# Patient Record
Sex: FEMALE | Race: WHITE | Employment: OTHER | URBAN - METROPOLITAN AREA
[De-identification: names, ages, dates, MRNs, and addresses within clinical notes are randomized per-mention and may not be internally consistent; named-entity substitution may affect disease eponyms.]

---

## 2017-08-14 ENCOUNTER — SURG/PROC ORDERS (OUTPATIENT)
Dept: CARDIOLOGY | Age: 62
End: 2017-08-14

## 2017-08-14 PROBLEM — I21.4 NSTEMI (NON-ST ELEVATED MYOCARDIAL INFARCTION) (HCC): Status: ACTIVE | Noted: 2017-08-14

## 2017-08-14 RX ORDER — DIAZEPAM 5 MG/1
5 TABLET ORAL
Status: CANCELLED | OUTPATIENT
Start: 2017-08-14 | End: 2017-08-14

## 2017-08-14 RX ORDER — SODIUM CHLORIDE 9 MG/ML
INJECTION, SOLUTION INTRAVENOUS CONTINUOUS
Status: CANCELLED | OUTPATIENT
Start: 2017-08-14

## 2017-08-14 RX ORDER — SODIUM CHLORIDE 0.9 % (FLUSH) 0.9 %
10 SYRINGE (ML) INJECTION PRN
Status: CANCELLED | OUTPATIENT
Start: 2017-08-14

## 2017-08-14 RX ORDER — DIPHENHYDRAMINE HCL 25 MG
25 TABLET ORAL
Status: CANCELLED | OUTPATIENT
Start: 2017-08-14 | End: 2017-08-14

## 2017-08-14 RX ORDER — SODIUM CHLORIDE 0.9 % (FLUSH) 0.9 %
10 SYRINGE (ML) INJECTION EVERY 12 HOURS SCHEDULED
Status: CANCELLED | OUTPATIENT
Start: 2017-08-14

## 2017-08-15 PROBLEM — R94.39 ABNORMAL NUCLEAR STRESS TEST: Status: ACTIVE | Noted: 2017-08-15

## 2017-08-29 ENCOUNTER — OFFICE VISIT (OUTPATIENT)
Dept: CARDIOLOGY CLINIC | Age: 62
End: 2017-08-29

## 2017-08-29 VITALS
BODY MASS INDEX: 32.2 KG/M2 | HEART RATE: 72 BPM | SYSTOLIC BLOOD PRESSURE: 120 MMHG | DIASTOLIC BLOOD PRESSURE: 80 MMHG | WEIGHT: 175 LBS | HEIGHT: 62 IN

## 2017-08-29 DIAGNOSIS — R07.2 PRECORDIAL PAIN: Primary | ICD-10-CM

## 2017-08-29 PROCEDURE — G8427 DOCREV CUR MEDS BY ELIG CLIN: HCPCS | Performed by: INTERNAL MEDICINE

## 2017-08-29 PROCEDURE — 3017F COLORECTAL CA SCREEN DOC REV: CPT | Performed by: INTERNAL MEDICINE

## 2017-08-29 PROCEDURE — 99214 OFFICE O/P EST MOD 30 MIN: CPT | Performed by: INTERNAL MEDICINE

## 2017-08-29 PROCEDURE — 3014F SCREEN MAMMO DOC REV: CPT | Performed by: INTERNAL MEDICINE

## 2017-08-29 PROCEDURE — G8419 CALC BMI OUT NRM PARAM NOF/U: HCPCS | Performed by: INTERNAL MEDICINE

## 2017-08-29 PROCEDURE — 1111F DSCHRG MED/CURRENT MED MERGE: CPT | Performed by: INTERNAL MEDICINE

## 2017-08-29 PROCEDURE — 1036F TOBACCO NON-USER: CPT | Performed by: INTERNAL MEDICINE

## 2017-09-12 ENCOUNTER — TELEPHONE (OUTPATIENT)
Dept: CARDIOLOGY CLINIC | Age: 62
End: 2017-09-12

## 2017-10-27 ENCOUNTER — HOSPITAL ENCOUNTER (OUTPATIENT)
Dept: SLEEP CENTER | Age: 62
Discharge: HOME | End: 2017-10-27
Attending: NURSE PRACTITIONER | Admitting: NURSE PRACTITIONER

## 2017-10-27 VITALS
HEART RATE: 80 BPM | WEIGHT: 177 LBS | HEIGHT: 62 IN | SYSTOLIC BLOOD PRESSURE: 143 MMHG | DIASTOLIC BLOOD PRESSURE: 83 MMHG | BODY MASS INDEX: 32.57 KG/M2

## 2017-10-27 DIAGNOSIS — R06.83 SNORING: ICD-10-CM

## 2017-10-27 DIAGNOSIS — G47.10 HYPERSOMNOLENCE: ICD-10-CM

## 2017-10-27 DIAGNOSIS — G47.33 OSA (OBSTRUCTIVE SLEEP APNEA): Primary | ICD-10-CM

## 2017-10-27 ASSESSMENT — SLEEP AND FATIGUE QUESTIONNAIRES
HOW LIKELY ARE YOU TO NOD OFF OR FALL ASLEEP WHILE WATCHING TV: 2
HOW LIKELY ARE YOU TO NOD OFF OR FALL ASLEEP WHILE SITTING INACTIVE IN A PUBLIC PLACE: 0
NECK CIRCUMFERENCE (INCHES): 15.75
HOW LIKELY ARE YOU TO NOD OFF OR FALL ASLEEP WHILE SITTING QUIETLY AFTER LUNCH WITHOUT ALCOHOL: 1
HOW LIKELY ARE YOU TO NOD OFF OR FALL ASLEEP WHILE SITTING AND READING: 0
HOW LIKELY ARE YOU TO NOD OFF OR FALL ASLEEP WHEN YOU ARE A PASSENGER IN A CAR FOR AN HOUR WITHOUT A BREAK: 1
HOW LIKELY ARE YOU TO NOD OFF OR FALL ASLEEP WHILE LYING DOWN TO REST IN THE AFTERNOON WHEN CIRCUMSTANCES PERMIT: 1
ESS TOTAL SCORE: 5
HOW LIKELY ARE YOU TO NOD OFF OR FALL ASLEEP IN A CAR, WHILE STOPPED FOR A FEW MINUTES IN TRAFFIC: 0
HOW LIKELY ARE YOU TO NOD OFF OR FALL ASLEEP WHILE SITTING AND TALKING TO SOMEONE: 0

## 2017-10-27 NOTE — PROGRESS NOTES
REASON FOR CONSULTATION/CC: ILSA EDS snoring      CONSULTING PHYSICIAN: Ottoniel Phillip CNP  PCP: Tiffanie Ba NP    HISTORY OF PRESENT ILLNESS: Manolo Saleh is a 63y.o. year old female with a history of Br asthma ILSA who presents with c/o EDS snoring. Hasnt used Cpap for 4 yrs. Old studies not available. C/O sleep fragmentation. C/O SOB on exertion. PAST MEDICAL HISTORY:  Past Medical History:   Diagnosis Date    Abnormal EKG 4/22/2014    Acid reflux     Anemia     Anesthesia     Nausea/Vomiting Post Op In Past    Anginal pain (HCC)     Denies Chest Pain At This Time    Anxiety     Arthritis     \"All Over\"    Asthma     CAD (coronary artery disease)     per last cardiac cath.  CHF (congestive heart failure) (HCC)     Chronic back pain     Chronic kidney disease     DDD (degenerative disc disease), cervical     12- Patient reports she was dx with DDD of Cerival spine C6,C7    Depression     Diabetes mellitus ([address] Dx 1990's    Diabetic neuropathy ([address]     \"In My Legs And Feet\"    Dizziness     \"Sometimes\"    Dry skin     Enlarged ureter     Right Side    Fatty liver     Fibrocystic breast     Gout     Pt states she was diagnosed with gout in the past few months.  H/O cardiac catheterization     Showed mild disease per last cath.  H/O cardiovascular stress test 03/15/2010    EF 69%, normal perfusion study except for diaphragmatic artifact, uniform wall motion.  H/O cardiovascular stress test 10/09/2008    EF 60%, no anginia, normal study.  H/O cardiovascular stress test 05/06/2014    EF 66%, no ischemia, normal LV systolic funciton, normal perfusion pattern.  H/O Doppler ultrasound 02/28/2011    CAROTID DOPPLER-normal study.  H/O echocardiogram 5/6/2014    Ef >55%. Impaired LV relaxation.  H/O echocardiogram 10/14/15    EF 60% Normal LV and systolic function. No significant valvulopathy seen.      History of Holter monitoring 3/24/15    24 hour - predominant rhythm sinus    [place] (hard of hearing)     Bilateral Ears    Hx of cardiovascular stress test 10/19/2015    lexiscan-normal,EF63%    Hx of motion sickness     HX OTHER MEDICAL     Primary Care Physician Is Dr. Héctor Reed [place], [place]    Hyperlipidemia     Hypertension     IBS (irritable bowel syndrome)     Incisional hernia 4/2014    Kidney stones Last Episode In 2012 Or 2013    Passed Kidney Stones Numberous Times    Migraines     Nausea & vomiting     Nausea/Vomiting Post Op In Past    Other specified disorder of skin     12- Patient states she has a condition of her vaginal area (skin) which starts with the letters Rosalene Seton. She is currently being treated with multiple creams and weekly Diflucan. 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  Staph infection Dx 1980's    Toes On Left Foot    Thyroid disease     hypothroidism    Tremor     \"Tremors All Over\"    Urinary incontinence     UTI (urinary tract infection) In Past    No Current Symptoms    UTI (urinary tract infection)     Vertigo     \"Sometimes\"    Wears glasses        PAST SURGICAL HISTORY:  Past Surgical History:   Procedure Laterality Date    APPENDECTOMY  1970's    Done With Cholecystectomy    BLADDER SURGERY  1970's Or 1980's    \"Stretched The Opening To The Bladder\", \"Total Of Four Bladder Surgeries\"    BREAST BIOPSY Right 1980's    Twice, Benign    BREAST SURGERY Left 1990's    Five, Benign    CARDIAC CATHETERIZATION  10-18-06    normal coronary angiogram with a normal left ventricular systolic function, patient can be treated medically.     CARDIAC CATHETERIZATION      \"Total 7 Cardiac Catheterizations\"    CARPAL TUNNEL RELEASE Right 1999    CHOLECYSTECTOMY  1970's    Appendectomy Also Done   Jerry Horner flagyl [metronidazole]; naproxen; other; prozac [fluoxetine hcl]; robaxin [methocarbamol]; ultram [tramadol]; and zoloft. REVIEW OF SYSTEMS:  Constitutional: Negative for fever  HENT: Negative for sore throat  Eyes: Negative for redness   Respiratory: Negative for dyspnea, cough  Cardiovascular: Negative for chest pain  Gastrointestinal: Negative for vomiting, diarrhea    Musculoskeletal: Negative for arthralgias   Skin: Negative for rash  Neurological: Negative for syncope        Objective:   PHYSICAL EXAM:  Blood pressure (!) 143/83, pulse 80, height 5' 2\" (1.575 m), weight 177 lb (80.3 kg). '    [place]: Total score: 5    Neck Circumference:  Neck circumference: 15.75  Inches    BMI:  Body mass index is 32.37 kg/m². Gen: No distress. Eyes: PERRL. No sclera icterus. No conjunctival injection. ENT: No discharge. Pharynx clear. External appearance of ears and nose normal.Mallampati score 3. Neck: Trachea midline. No obvious mass. Resp: No accessory muscle use. No crackles. No wheezes. No rhonchi. No dullness on percussion. CV: Regular rate. Regular rhythm. No murmur or rub. No edema. GI: Non-tender. Non-distended. No hernia. Skin: Warm, dry, normal texture and turgor. No nodule on exposed extremities. Lymph: No cervical LAD. No supraclavicular LAD. M/S: No cyanosis. No clubbing. No joint deformity. Neuro: Moves all four extremities. CN 2-12 tested, no defect noted. Psych: Oriented x 3. No anxiety. Awake. Alert. Intact judgement and insight.     Current Outpatient Prescriptions on File Prior to Encounter   Medication Sig Dispense Refill    celecoxib (CELEBREX) 100 MG capsule Take 100 mg by mouth 2 times daily      allopurinol (ZYLOPRIM) 300 MG tablet Take 300 mg by mouth daily      amitriptyline (ELAVIL) 25 MG tablet Take 25 mg by mouth nightly      metaxalone (SKELAXIN) 800 MG tablet Take 800 mg by mouth 2 times daily      hydrochlorothiazide (HYDRODIURIL) 25 MG tablet Take 25 mg by mouth daily      Insulin Degludec (TRESIBA FLEXTOUCH) 100 UNIT/ML SOPN Inject 10 Units into the skin every morning (before breakfast)      insulin aspart (NOVOLOG) 100 UNIT/ML injection vial Inject into the skin 3 times daily (before meals) Indications: per sliding scale      levETIRAcetam (KEPPRA) 250 MG tablet Take 500 mg by mouth nightly       metoclopramide (REGLAN) 5 MG tablet Take 1 tablet by mouth 3 times daily as needed (for nausea with meals) 15 tablet 0    diphenhydrAMINE (BENADRYL) 25 MG capsule Take 1 capsule by mouth 3 times daily as needed (to take with reglan for nausea or headache) 15 capsule 0    losartan-hydrochlorothiazide (HYZAAR) 50-12.5 MG per tablet Take 1 tablet by mouth daily      albuterol sulfate  (90 BASE) MCG/ACT inhaler Inhale 2 puffs into the lungs every 6 hours as needed for Wheezing 1 Inhaler 3    magnesium oxide (MAG-OX) 400 (240 MG) MG tablet Take 400 mg by mouth daily      polyethylene glycol (GLYCOLAX) powder 1 g daily      metoprolol (TOPROL-XL) 50 MG XL tablet Take 1 tablet by mouth daily (Patient taking differently: Take 25 mg by mouth daily ) 90 tablet 3    aspirin (ASPIRIN LOW DOSE) 81 MG EC tablet Take 1 tablet by mouth every morning Last Dose Taken  6-3-14 90 tablet 3    Multiple Vitamins-Iron (MULTI-VITAMIN/IRON PO) Take  by mouth.  nystatin (MYCOSTATIN) 623391 UNIT/GM ointment Apply  topically 2 times daily. Apply topically 2 times daily.  levothyroxine (SYNTHROID) 25 MCG tablet Take 25 mcg by mouth every morning.  montelukast (SINGULAIR) 10 MG tablet Take 10 mg by mouth nightly.  simvastatin (ZOCOR) 20 MG tablet Take 20 mg by mouth nightly.  pantoprazole (PROTONIX) 40 MG tablet Take 40 mg by mouth daily       gabapentin (NEURONTIN) 600 MG tablet Take 1,200 mg by mouth 3 times daily       Respiratory Therapy Supplies (NEBULIZER COMPRESSOR) KIT 1 kit by Does not apply route once for 1 dose.  1 kit 0    albuterol (PROVENTIL) (2.5 MG/3ML) 0.083% nebulizer solution Take 3 mLs by nebulization every 6 hours as needed for Wheezing. 120 each 3    nitroGLYCERIN (NITROSTAT) 0.4 MG SL tablet Place 1 tablet under the tongue every 5 minutes as needed. 25 tablet 6     No current facility-administered medications on file prior to encounter. Data Reviewed:       Assessment:     1. OSAS, snoring EDS. Hasnt used Cpap for 4 yrs. Plan:      1. Split night study. 2. Sleep hygiene. 3. RTO 6 wks.         c

## 2017-11-30 ENCOUNTER — HOSPITAL ENCOUNTER (OUTPATIENT)
Dept: NUCLEAR MEDICINE | Age: 62
Discharge: HOME | End: 2017-12-29
Attending: INTERNAL MEDICINE | Admitting: INTERNAL MEDICINE

## 2017-11-30 DIAGNOSIS — R13.14 DYSPHAGIA, PHARYNGOESOPHAGEAL PHASE: ICD-10-CM

## 2017-12-03 ENCOUNTER — HOSPITAL ENCOUNTER (OUTPATIENT)
Dept: SLEEP CENTER | Age: 62
Discharge: HOME | End: 2017-12-03
Attending: INTERNAL MEDICINE | Admitting: INTERNAL MEDICINE

## 2017-12-04 NOTE — PROGRESS NOTES
12/4/2017  sleep study  for Danielle Ramirez  1955 is complete. Results are pending physician review.     Electronically signed by Rebecca Kemp on 12/4/2017 at 4:13 AM

## 2017-12-07 ENCOUNTER — HOSPITAL ENCOUNTER (OUTPATIENT)
Dept: OTHER | Age: 62
Discharge: HOME | End: 2017-12-31
Attending: NURSE PRACTITIONER | Admitting: NURSE PRACTITIONER

## 2017-12-07 VITALS — BODY MASS INDEX: 32.39 KG/M2 | WEIGHT: 176 LBS | HEIGHT: 62 IN

## 2017-12-07 NOTE — PROGRESS NOTES
importance of testing; testing supplies; HgbA1C target levels; Factors affecting blood glucose; Importance of logging blood glucose levels for pattern recognition; ketone testing; safe lancet disposal.     NC in this session  Met with RN educator on this date also   Prevention, detection & treatment of acute complications:  Identify symptoms of hyper & hypoglycemia, and prevention & treatment strategies. Describe sick day guidelines & indications for ketone testing & physician notification. Identify short term consequences of poor control. NC in this session  Met with RN educator on this date also   Prevention, detection & treatment of chronic complications:  Define the natural course of diabetes & describe the relationship of blood glucose levels to long term complications of diabetes. Identify preventative measures & standards of care. NC in this session  Met with RN educator on this date also   Developing strategies to address psychosocial issues:  Describe feelings about living with diabetes; Describe how stress, depression & anxiety affect blood glucose; Identify coping strategies; Identify support needed & support network available. Hands Depression Tool Score=not completed  []Physician notified of suicidal ideations   Developing strategies to promote health/change behavior: Identify 7 self-care behaviors; Personal health risk factors; Benefits, challenges & strategies for behavioral change; Individualized goal selection.      Goal: take DM meds on time, try not to skip meals, eat at consistent times     Identified Barriers to learning/adherence to self management plan:     []None  []Physical  []Visual  []Hearing  []Speech  [x]Emotional  [x]Cognitive    []Reading  []Language  []Accessibility  []Financial    Instruction Method: [x]Lecture/Discussion  []PowerPoint Presentation  [x]Handouts   []Return Demonstration  Education Materials/Equipment Provided:  []Self-Management Manual  []Meal Plan

## 2017-12-07 NOTE — PROGRESS NOTES
Diabetes Self-Management Education Record    Participant Name: Barbi Leigh  Referring Provider: Griselda Carlson NP  Assessment/Evaluation Ratings:  1=Needs Instruction   4=Demonstrates Understanding/Competency  2=Needs Review   NC=Not Covered    3=Comprehends Key Points  N/A=Not Applicable  Topics/Learning Objectives Pre-session Assess Date:   Instr. Date Reinforce Date Post- session Eval Comments   Diabetes disease process & Treatment process: Define diabetes & pre-diabetes; Identify own type of diabetes; role of the pancreas; signs/symptoms; diagnostic criteria; prevention & treatment options; contributing factors. 2 12-  3 Attends with SO. Reports that she was diagnosed about 20 years ago. Reports that she has attended class \"long time ago\"  Reports that her diabetes is effecting her eye, feet and stomach and \"I want to get better control\"  Reports problems with memory. Incorporating nutritional management into lifestyle: Describe effect of type, amount & timing of food on blood glucose; Describe basic meal planning techniques & current nutrition guidelines;Correctly read food labels & demonstrate CHO counting & portion control with personalized meal plan. Identify dining out strategies, & dietary sick day guidelines. NC 12-  NC To meet with Dietitian after this session   Incorporating physical activity into lifestyle:   Verbalize effect of exercise on blood glucose levels; benefits of regular exercise; safety considerations; contraindications; maintenance of activity. 2 12-  3 Realizes that she would benefit from exercise but has a hard time motivating herself to stick with exercise program. States problems with pain in neck and legs decrease her ability to walk. Discussed exercises that would not aggravate pain. Discussed working with health care team to develop exercise plan that would work for er.   SO states that she has done this but \"does not do what they suggest\"  Discussed plan:     []None  []Physical  []Visual  []Hearing  []Speech  [x]Emotional  [x]Cognitive    []Reading  []Language  []Accessibility  []Financial    Instruction Method: [x]Lecture/Discussion  []PowerPoint Presentation  [x]Handouts   []Return Demonstration  Education Materials/Equipment Provided:  [x]Self-Management Manual  []Meal Plan []Glucose Meter  []Insulin Kit  []Nutritional Packet  []Survival Packet   []Other      Encounter Type Date Start Time End Time Comments No Show Dates   Assessment and RN session 12- [id number] 1500   []In Person  []Telephone    Insulin Instrx Review     []Pen  []Vial & Syringe      Additional Comments:Recetive to information    DSMS Support Plan:  Support Group  Follow-up plan/Date: At 4-6 month  Contact Post Class Regarding:   Fasting Blood Sugar   HgbA1C   Weight              Follow-up with Physician   Self-Foot Exam Frequency   Monitoring Frequency   Goal Attainment  Post Education Referrals:       []WIC   []PAP   []Wound Care   []Social Service   []Home Health  [x]Support Group   []Physician Specialist   []Rehabilitation   []Other  Ac [place].  Nakul Youssef RN, BSN, CDE

## 2017-12-08 NOTE — PROGRESS NOTES
12/03/2017 sleep study results for Catrachita Serve  1955 are finalized and available. Please see media tab.     Electronically signed by [employer] on 12/8/2017 at 5:16 PM

## 2017-12-15 ENCOUNTER — HOSPITAL ENCOUNTER (OUTPATIENT)
Dept: SLEEP CENTER | Age: 62
Discharge: HOME | End: 2017-12-15
Attending: INTERNAL MEDICINE | Admitting: INTERNAL MEDICINE

## 2017-12-15 DIAGNOSIS — J45.20 MILD INTERMITTENT ASTHMA WITHOUT COMPLICATION: ICD-10-CM

## 2017-12-15 NOTE — PROGRESS NOTES
abdominal (1987); Tubal ligation (1978); Esophagus dilation (1980's And 1990's); Knee arthroscopy (Right, 1999); joint replacement (2008); other surgical history (06 13 2014); fracture surgery (1974 Or 1975); Cardiac catheterization (10-18-06); and Cardiac catheterization. SOCIAL HISTORY:   reports that she has never smoked. She has never used smokeless tobacco. She reports that she does not drink alcohol or use drugs. Family history:  family history includes Arthritis in her father, mother, and sister; Cancer in her brother; Depression in her mother; Diabetes in her mother; Early Death in her brother; Early Death (age of onset: 37) in her brother; Early Death (age of onset: 61) in her mother; Early Death (age of onset: 61) in her brother; Hearing Loss in her sister; Heart Disease in her brother, brother, brother, father, mother, and sister; High Blood Pressure in her brother, father, mother, and sister; High Cholesterol in her brother, father, and mother; Mental Illness in her daughter; [place] / [place] in her mother; Other in her daughter, mother, and son; Stroke in her mother. Objective:   PHYSICAL EXAM:        VITALS:  There were no vitals taken for this visit. 24HR INTAKE/OUTPUT:  No intake or output data in the 24 hours ending 12/15/17 1507    CONSTITUTIONAL:  awake, alert, cooperative, no apparent distress, and appears stated age  LUNGS:  decreased breath sounds  CARDIOVASCULAR:  normal S1 and S2 and negative JVD  ABD:Abdomen soft, non-tender. BS normal. No masses,  No organomegaly  NEURO:Alert and oriented x3. Gait normal. Reflexes and motor strength normal and symmetric. Cranial nerves 2-12 and sensation grossly intact. DATA:    CBC:  No results for input(s): WBC, RBC, HGB, HCT, PLT, MCV, MCH, MCHC, RDW, NRBC, SEGSPCT, BANDSPCT in the last 72 hours. BMP:  No results for input(s): NA, K, CL, CO2, BUN, CREATININE, CALCIUM, GLUCOSE in the last 72 hours.    ABG:  No results for input(s): PH, PO2ART, AVP4NJX, HCO3, BEART, O2SAT in the last 72 hours. Lab Results   Component Value Date    PROBNP 44.00 03/09/2016    PROBNP 21.86 12/14/2015    PROBNP 55.40 02/11/2015     Radiology Review:  Pertinent images / reports were reviewed as a part of this visit. Assessment:     Patient Active Problem List   Diagnosis    Chest pain    H/O Doppler ultrasound    H/O cardiovascular stress test    H/O cardiovascular stress test    Preop cardiovascular exam    H/O cardiovascular stress test    Incarcerated umbilical hernia    Essential hypertension    Type 2 diabetes mellitus with diabetic polyneuropathy (HCC)    Tachycardia    Dyslipidemia    Type 2 diabetes mellitus without complication, with long-term current use of insulin ([facility]    Family history of early CAD    NSTEMI (non-ST elevated myocardial infarction) ([facility]    Abnormal nuclear stress test    ILSA (obstructive sleep apnea)    Snoring    Hypersomnolence       Plan:   1. Overall the patient has improved  2. Sleep hygiene. 3. RTO 1 mth.     Enrique Rubio MD  12/15/2017  3:07 PM

## 2018-01-01 ENCOUNTER — HOSPITAL ENCOUNTER (OUTPATIENT)
Dept: OTHER | Age: 63
Discharge: HOME | End: 2018-01-31
Attending: NURSE PRACTITIONER | Admitting: NURSE PRACTITIONER

## 2018-02-27 ENCOUNTER — OFFICE VISIT (OUTPATIENT)
Dept: CARDIOLOGY CLINIC | Age: 63
End: 2018-02-27

## 2018-02-27 VITALS
WEIGHT: 175.8 LBS | HEART RATE: 58 BPM | SYSTOLIC BLOOD PRESSURE: 134 MMHG | HEIGHT: 62 IN | DIASTOLIC BLOOD PRESSURE: 82 MMHG | BODY MASS INDEX: 32.35 KG/M2

## 2018-02-27 DIAGNOSIS — R07.2 PRECORDIAL PAIN: Primary | ICD-10-CM

## 2018-02-27 PROCEDURE — G8484 FLU IMMUNIZE NO ADMIN: HCPCS | Performed by: INTERNAL MEDICINE

## 2018-02-27 PROCEDURE — G8598 ASA/ANTIPLAT THER USED: HCPCS | Performed by: INTERNAL MEDICINE

## 2018-02-27 PROCEDURE — 3017F COLORECTAL CA SCREEN DOC REV: CPT | Performed by: INTERNAL MEDICINE

## 2018-02-27 PROCEDURE — 1036F TOBACCO NON-USER: CPT | Performed by: INTERNAL MEDICINE

## 2018-02-27 PROCEDURE — G8417 CALC BMI ABV UP PARAM F/U: HCPCS | Performed by: INTERNAL MEDICINE

## 2018-02-27 PROCEDURE — 99213 OFFICE O/P EST LOW 20 MIN: CPT | Performed by: INTERNAL MEDICINE

## 2018-02-27 PROCEDURE — G8427 DOCREV CUR MEDS BY ELIG CLIN: HCPCS | Performed by: INTERNAL MEDICINE

## 2018-02-27 PROCEDURE — 3014F SCREEN MAMMO DOC REV: CPT | Performed by: INTERNAL MEDICINE

## 2018-02-27 RX ORDER — TIZANIDINE 2 MG/1
2 TABLET ORAL EVERY 6 HOURS PRN
COMMUNITY
End: 2018-06-14 | Stop reason: DRUGHIGH
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
## 2018-02-28 ENCOUNTER — TELEPHONE (OUTPATIENT)
Dept: CARDIOLOGY CLINIC | Age: 63
End: 2018-02-28

## 2018-07-30 ENCOUNTER — HOSPITAL ENCOUNTER (OUTPATIENT)
Dept: DIABETES SERVICES | Age: 63
Discharge: HOME | End: 2018-07-31
Attending: NURSE PRACTITIONER | Admitting: NURSE PRACTITIONER

## 2018-07-30 NOTE — LETTER
[facility] - Diabetes Education    2018       Re:     Zach Hanson         :  1955  Dear Roselle Galeazzi, CNP:                    Thank you for referring your patient, Zach Hanson , for diabetes education. Your patient attended a follow up class on 2018, that addressed the following topics:    Nursing/Medical [x]     Nutrition [x]  · Describing the diabetes disease process/ treatment options  · Incorporating physical activity into lifestyle  · Using medication safely & for maximum therapeutic effectiveness  · Monitoring blood glucose & other parameters:  Interpreting and using results for self-management & decision making  · Preventing, detecting, & treating acute complications  · Preventing, detecting & treating chronic complications  · Developing personal strategies to address psychosocial issues and concerns  · Developing personal strategies to promote health & behavior change (risk reduction) · Incorporating nutrition management into lifestyle    · Consistent meal pattern/timing  · Relationship among nutrition, exercise, medication & blood glucose levels  · Food Groups/carbohydrate & non- carbohydrate foods  · Eating strategies with gastroparesis  · Fluid needs   · Label reading  · Carbohydrate consistent meal planning/ techniques  · Weighing/measuring portions  · Free foods/nutritive and non-nutritive sweeteners     []  Did well with return demonstration on        glucose meter. Blood glucose was        . []  Did well with return demonstration of insulin technique with        .     Upon completion of these sessions the diabetes team made the following evaluation of your patients progress:  [x] Attended class alone  [] Attended classes accompanied by                family/friend/significant other  [x] Very attentive to teaching  [x] Actively participated in class discussions   [x] Answered questions appropriately when       asked [x] Seems able to apply class concepts to          daily lifestyle  [] Had difficulty relating class information          to daily lifestyle  [x]  Seems motivated to do well [] Participated in [place] nutrition                 session  [x]  Able to identify proper food choices/diet         changes  [] Unable to identify proper food choices  [x] Verbalizes an understanding of meal plan  [x]  Expresses an intent to comply with             meal plan  []  Refused to participate in  [place]             nutrition session  [x]  Worked out meal timing adjustment             according to work/schedule/lifestyle        COMMENTS: Interested in information. Has a variety of health issues that may interefer/complicate diabetes management. Reports plan in place for swallow evaluation and colonoscopy to address GI issues. PATIENT SELECTED GOAL: Test glucose as instructed and record results in logbook, eat meals at consistent times     DIABETES SELF-MANAGEMENT SUPPORT PLAN/REFERRALS (patient identified):  [] [address] [place] [address]  [] Prescription Assistance  [x] Diabetes Support Group  [] [facility]  [] Financial Counselor  []   [] Diabetes [place] Subscription  [] [facility]  [] Exercise Program/Plan:  [] Other: [x] Dentist  [] Ophthalmologist  [] Podiatrist  [] Podorothist  [] Smoking Cessation Classes  [] Follow up with physician regarding                Depression screening  [] Yearly Diabetes Update  [] Websites: www. [facility]. org                        www.diabetes org                         www.eatright. org                        www.dlife. com   There will be a follow-up in 4 months to evaluate A1C, carbohydrate recall, attainment of their chosen goal, and self identified support plan. Thank you for referring this patient to our program.  Please do not hesitate to call if you have any questions at [phone].         Sincerely, Electronically signed by Ally Kimball RD, LD on 7/30/2018 at 12:19 PM      Diabetes Nurse Educators:   Registered Licensed Dietitians:  Melissa Knapp RN, BSN, CDE              Tra Barrera RD, LD, CDE  Brittney Petersen, RN, BSN, [facility]

## 2018-07-30 NOTE — PROGRESS NOTES
sites; proper storage; supplies needed; proper technique; safe needle disposal guidelines. 3 07-  3 Reports better compliance with taking insulin since telephone FU. Monitoring blood glucose, interpreting and using results:  Identify recommended & personal blood glucose targets; importance of testing; testing supplies; HgbA1C target levels; Factors affecting blood glucose; Importance of logging blood glucose levels for pattern recognition; ketone testing; safe lancet disposal. 2 07-  3 States that she is monitoring BG \"not like I should\"  Discussed importance of monitoring BG, recording in log book and taking to provider office. States BG running in 180-200's. Reviewed BG and A1C targets and discussed to improve health. Prevention, detection & treatment of acute complications:  Identify symptoms of hyper & hypoglycemia, and prevention & treatment strategies. Describe sick day guidelines & indications for ketone testing & physician notification. Identify short term consequences of poor control. 2 07-  3 Denies low BG episodes. Prevention, detection & treatment of chronic complications:  Define the natural course of diabetes & describe the relationship of blood glucose levels to long term complications of diabetes. Identify preventative measures & standards of care. 3 07-  3    Developing strategies to address psychosocial issues:  Describe feelings about living with diabetes; Describe how stress, depression & anxiety affect blood glucose; Identify coping strategies; Identify support needed & support network available. 3 07-  3    Developing strategies to promote health/change behavior: Identify 7 self-care behaviors; Personal health risk factors; Benefits, challenges & strategies for behavioral change; Individualized goal selection.  3 07-  3 See goals     Identified Barriers to learning/adherence to self management plan:     []None  [x]Physical  []Visual  []Hearing

## 2018-07-30 NOTE — PROGRESS NOTES
regarding timing of medications. Monitoring blood glucose, interpreting and using results:  Identify recommended & personal blood glucose targets; importance of testing; testing supplies; HgbA1C target levels; Factors affecting blood glucose; Importance of logging blood glucose levels for pattern recognition; ketone testing; safe lancet disposal.     Does not record glucose results, does not test consistently. Prevention, detection & treatment of acute complications:  Identify symptoms of hyper & hypoglycemia, and prevention & treatment strategies. Describe sick day guidelines & indications for ketone testing & physician notification. Identify short term consequences of poor control. NC in this session    Prevention, detection & treatment of chronic complications:  Define the natural course of diabetes & describe the relationship of blood glucose levels to long term complications of diabetes. Identify preventative measures & standards of care. Brief discussion regarding progression of kidney disease with unmanaged DM per patient questions/concerns. Developing strategies to address psychosocial issues:  Describe feelings about living with diabetes; Describe how stress, depression & anxiety affect blood glucose; Identify coping strategies; Identify support needed & support network available. Hands Depression Tool Score=not completed  []Physician notified of suicidal ideations   Developing strategies to promote health/change behavior: Identify 7 self-care behaviors; Personal health risk factors; Benefits, challenges & strategies for behavioral change; Individualized goal selection.      Goal: test glucose as instructed and record results in logbook, eat meals at consistent times     Identified Barriers to learning/adherence to self management plan:     [x]None  []Physical  []Visual  []Hearing  []Speech  []Emotional  []Cognitive    []Reading  []Language  []Accessibility  []Financial    Instruction Method: [x]Lecture/Discussion  []PowerPoint Presentation  [x]Handouts   []Return Demonstration  Education Materials/Equipment Provided:  []Self-Management Manual  []Meal Plan []Glucose Meter  []Insulin Kit  [x]Nutritional Packet-carb counting handouts from nutrition care manual  []Survival Packet   []Other  [] Gestational Pathway Initiated & Signed    Encounter Type Date Start Time End Time Comments No Show Dates   Assessment      []In Person  []Telephone    Session 1         Session 2        Session 3         Individual MNT         Gestational MNT         Shared Med Appt         Yearly Follow-up 07/30/2018 10:20 11:35     Meter Instrx        Insulin Instrx      []Pen  []Vial & Syringe      Additional Comments: Attended session alone. Motivated to make some changes to meal pattern to assist with better food tolerance glycemic control. Plan in place for swallow eval and colonoscopy to address GI issues.      DSMS Support Plan:  Follow-up plan/Date: within 4-12 months  Contact Post Class Regarding:   Fasting Blood Sugar   HgbA1C   Weight   Hypertension/Follow-up with Physician   Self-Foot Exam Frequency   Monitoring Frequency   Exercise Routine   Goal Attainment  Post Education Referrals:       []WIC   []PAP   []Wound Care   []Social Service   []Home Health  []Support Group   []Physician Specialist   []Rehabilitation   []Other

## 2018-08-01 ENCOUNTER — HOSPITAL ENCOUNTER (OUTPATIENT)
Dept: OTHER | Age: 63
Discharge: HOME | End: 2018-08-31
Attending: NURSE PRACTITIONER | Admitting: NURSE PRACTITIONER

## 2018-09-21 ENCOUNTER — HOSPITAL ENCOUNTER (EMERGENCY)
Dept: EMERGENCY DEPT | Age: 63
Discharge: HOME | End: 2018-09-22
Admitting: EMERGENCY MEDICINE
Payer: MEDICARE

## 2018-09-21 DIAGNOSIS — K76.0 HEPATIC STEATOSIS: ICD-10-CM

## 2018-09-21 DIAGNOSIS — R10.11 RIGHT UPPER QUADRANT ABDOMINAL PAIN: Primary | ICD-10-CM

## 2018-09-21 DIAGNOSIS — R74.01 TRANSAMINITIS: ICD-10-CM

## 2018-09-21 LAB
ALBUMIN SERPL-MCNC: 4.3 GM/DL (ref 3.4–5)
ALP BLD-CCNC: 76 IU/L (ref 40–128)
ALT SERPL-CCNC: 42 U/L (ref 10–40)
AMYLASE: 37 U/L (ref 25–115)
ANION GAP SERPL CALCULATED.3IONS-SCNC: 14 MMOL/L (ref 4–16)
AST SERPL-CCNC: 63 IU/L (ref 15–37)
BACTERIA: NEGATIVE /HPF
BASOPHILS ABSOLUTE: 0.1 K/CU MM
BASOPHILS RELATIVE PERCENT: 0.9 % (ref 0–1)
BILIRUB SERPL-MCNC: 0.5 MG/DL (ref 0–1)
BILIRUBIN URINE: NEGATIVE MG/DL
BLOOD, URINE: NEGATIVE
BUN BLDV-MCNC: 13 MG/DL (ref 6–23)
CALCIUM OXALATE CRYSTALS: ABNORMAL /HPF
CALCIUM SERPL-MCNC: 9.9 MG/DL (ref 8.3–10.6)
CHLORIDE BLD-SCNC: 96 MMOL/L (ref 99–110)
CLARITY: CLEAR
CO2: 24 MMOL/L (ref 21–32)
COLOR: YELLOW
CREAT SERPL-MCNC: 0.7 MG/DL (ref 0.6–1.1)
DIFFERENTIAL TYPE: ABNORMAL
EOSINOPHILS ABSOLUTE: 0.2 K/CU MM
EOSINOPHILS RELATIVE PERCENT: 1.8 % (ref 0–3)
GFR AFRICAN AMERICAN: >60 ML/MIN/1.73M2
GFR NON-AFRICAN AMERICAN: >60 ML/MIN/1.73M2
GLUCOSE BLD-MCNC: 302 MG/DL (ref 70–99)
GLUCOSE, URINE: >500 MG/DL
HCT VFR BLD CALC: 41.6 % (ref 37–47)
HEMOGLOBIN: 14 GM/DL (ref 12.5–16)
HYALINE CASTS: 0 /LPF
IMMATURE NEUTROPHIL %: 0.4 % (ref 0–0.43)
KETONES, URINE: NEGATIVE MG/DL
LEUKOCYTE ESTERASE, URINE: ABNORMAL
LIPASE: 25 IU/L (ref 13–60)
LYMPHOCYTES ABSOLUTE: 2.7 K/CU MM
LYMPHOCYTES RELATIVE PERCENT: 33 % (ref 24–44)
MCH RBC QN AUTO: 30.3 PG (ref 27–31)
MCHC RBC AUTO-ENTMCNC: 33.7 % (ref 32–36)
MCV RBC AUTO: 90 FL (ref 78–100)
MONOCYTES ABSOLUTE: 0.8 K/CU MM
MONOCYTES RELATIVE PERCENT: 10.3 % (ref 0–4)
MUCUS: ABNORMAL HPF
NITRITE URINE, QUANTITATIVE: NEGATIVE
NUCLEATED RBC %: 0 %
PDW BLD-RTO: 12.3 % (ref 11.7–14.9)
PH, URINE: 5 (ref 5–8)
PLATELET # BLD: 304 K/CU MM (ref 140–440)
PMV BLD AUTO: 9.7 FL (ref 7.5–11.1)
POTASSIUM SERPL-SCNC: 4.5 MMOL/L (ref 3.5–5.1)
PROTEIN UA: 30 MG/DL
RBC # BLD: 4.62 M/CU MM (ref 4.2–5.4)
RBC URINE: ABNORMAL /HPF (ref 0–6)
SEGMENTED NEUTROPHILS ABSOLUTE COUNT: 4.4 K/CU MM
SEGMENTED NEUTROPHILS RELATIVE PERCENT: 53.6 % (ref 36–66)
SODIUM BLD-SCNC: 134 MMOL/L (ref 135–145)
SPECIFIC GRAVITY UA: 1.02 (ref 1–1.03)
SQUAMOUS EPITHELIAL: 1 /HPF
TOTAL IMMATURE NEUTOROPHIL: 0.03 K/CU MM
TOTAL NUCLEATED RBC: 0 K/CU MM
TOTAL PROTEIN: 7.7 GM/DL (ref 6.4–8.2)
TRICHOMONAS: ABNORMAL /HPF
URIC ACID CRYSTALS: ABNORMAL /HPF
UROBILINOGEN, URINE: NORMAL MG/DL (ref 0.2–1)
WBC # BLD: 8.1 K/CU MM (ref 4–10.5)
WBC UA: 8 /HPF (ref 0–5)

## 2018-09-21 PROCEDURE — 36415 COLL VENOUS BLD VENIPUNCTURE: CPT

## 2018-09-21 PROCEDURE — 6360000004 HC RX CONTRAST MEDICATION: Performed by: PHYSICIAN ASSISTANT

## 2018-09-21 PROCEDURE — 82150 ASSAY OF AMYLASE: CPT

## 2018-09-21 PROCEDURE — 9990 CHARGE CONVERSION

## 2018-09-21 PROCEDURE — 96376 TX/PRO/DX INJ SAME DRUG ADON: CPT

## 2018-09-21 PROCEDURE — 99285 EMERGENCY DEPT VISIT HI MDM: CPT

## 2018-09-21 PROCEDURE — 81001 URINALYSIS AUTO W/SCOPE: CPT

## 2018-09-21 PROCEDURE — 96374 THER/PROPH/DIAG INJ IV PUSH: CPT

## 2018-09-21 PROCEDURE — 83690 ASSAY OF LIPASE: CPT

## 2018-09-21 PROCEDURE — 80053 COMPREHEN METABOLIC PANEL: CPT

## 2018-09-21 PROCEDURE — 74177 CT ABD & PELVIS W/CONTRAST: CPT

## 2018-09-21 PROCEDURE — 85025 COMPLETE CBC W/AUTO DIFF WBC: CPT

## 2018-09-21 PROCEDURE — 96361 HYDRATE IV INFUSION ADD-ON: CPT

## 2018-09-21 PROCEDURE — 96375 TX/PRO/DX INJ NEW DRUG ADDON: CPT

## 2018-09-21 RX ORDER — MORPHINE SULFATE 4 MG/ML
4 INJECTION, SOLUTION INTRAMUSCULAR; INTRAVENOUS EVERY 30 MIN PRN
Status: DISCONTINUED | OUTPATIENT
Start: 2018-09-21 | End: 2018-09-22 | Stop reason: HOSPADM

## 2018-09-21 RX ORDER — ONDANSETRON 2 MG/ML
4 INJECTION INTRAMUSCULAR; INTRAVENOUS EVERY 30 MIN PRN
Status: DISCONTINUED | OUTPATIENT
Start: 2018-09-21 | End: 2018-09-22 | Stop reason: HOSPADM

## 2018-09-21 RX ORDER — 0.9 % SODIUM CHLORIDE 0.9 %
1000 INTRAVENOUS SOLUTION INTRAVENOUS ONCE
Status: COMPLETED | OUTPATIENT
Start: 2018-09-21 | End: 2018-09-22

## 2018-09-21 RX ORDER — DIPHENHYDRAMINE HYDROCHLORIDE 50 MG/ML
25 INJECTION INTRAMUSCULAR; INTRAVENOUS ONCE
Status: COMPLETED | OUTPATIENT
Start: 2018-09-21 | End: 2018-09-21

## 2018-09-21 RX ORDER — 0.9 % SODIUM CHLORIDE 0.9 %
1000 INTRAVENOUS SOLUTION INTRAVENOUS ONCE
Status: COMPLETED | OUTPATIENT
Start: 2018-09-21 | End: 2018-09-21

## 2018-09-21 RX ADMIN — Medication 1000 ML: at 20:49

## 2018-09-21 RX ADMIN — DIPHENHYDRAMINE HYDROCHLORIDE 25 MG: 50 INJECTION INTRAMUSCULAR; INTRAVENOUS at 20:48

## 2018-09-21 RX ADMIN — ONDANSETRON 4 MG: 2 INJECTION INTRAMUSCULAR; INTRAVENOUS at 20:49

## 2018-09-21 RX ADMIN — MORPHINE SULFATE 4 MG: 4 INJECTION, SOLUTION INTRAMUSCULAR; INTRAVENOUS at 20:48

## 2018-09-21 RX ADMIN — MORPHINE SULFATE 4 MG: 4 INJECTION, SOLUTION INTRAMUSCULAR; INTRAVENOUS at 22:24

## 2018-09-21 RX ADMIN — Medication 1000 ML: at 20:48

## 2018-09-21 RX ADMIN — MORPHINE SULFATE 4 MG: 4 INJECTION, SOLUTION INTRAMUSCULAR; INTRAVENOUS at 21:36

## 2018-09-21 RX ADMIN — IOPAMIDOL 85 ML: 755 INJECTION, SOLUTION INTRAVENOUS at 23:30

## 2018-09-21 ASSESSMENT — PAIN SCALES - GENERAL
PAINLEVEL_OUTOF10: 10
PAINLEVEL_OUTOF10: 7
PAINLEVEL_OUTOF10: 7
PAINLEVEL_OUTOF10: 8

## 2018-09-21 ASSESSMENT — PAIN DESCRIPTION - ORIENTATION: ORIENTATION: MID;RIGHT;UPPER

## 2018-09-21 ASSESSMENT — PAIN DESCRIPTION - DESCRIPTORS: DESCRIPTORS: ACHING;DISCOMFORT

## 2018-09-21 ASSESSMENT — PAIN DESCRIPTION - LOCATION: LOCATION: ABDOMEN

## 2018-09-21 ASSESSMENT — PAIN DESCRIPTION - PAIN TYPE: TYPE: ACUTE PAIN

## 2018-09-22 VITALS
HEART RATE: 75 BPM | WEIGHT: 172 LBS | BODY MASS INDEX: 31.65 KG/M2 | DIASTOLIC BLOOD PRESSURE: 78 MMHG | SYSTOLIC BLOOD PRESSURE: 138 MMHG | OXYGEN SATURATION: 98 % | HEIGHT: 62 IN | RESPIRATION RATE: 16 BRPM | TEMPERATURE: 97.7 F

## 2018-09-22 PROCEDURE — 6360000004 HC RX CONTRAST MEDICATION: Performed by: PHYSICIAN ASSISTANT

## 2018-09-22 ASSESSMENT — PAIN SCALES - GENERAL: PAINLEVEL_OUTOF10: 8

## 2018-09-22 NOTE — ED PROVIDER NOTES
Pereyra Darvocet [Propoxyphene N-Acetaminophen] Palpitations     \"Severe High Blood Pressure\"    Darvon [Propoxyphene Hcl] Palpitations     \"Severe High Blood Pressure\"    Decadron [Dexamethasone]      Seizures    Ditropan [Oxybutynin Chloride] Palpitations     \"Severe High Blood Pressure\"    Fioricet [Butalbital-Apap-Caffeine] Palpitations     \"Severe High Blood Pressure\"    Fiorinal-Codeine #3 [Butalbital-Asa-Caff-Codeine]      \"Severe Stomach Cramps\"    Flagyl [Metronidazole] Diarrhea     \"Severe Diarrhea And Cramping\"    Naproxen Palpitations     \"Severe High Blood Pressure\"    Other      \"Allergic To Spider Bites Causing Blackness Of Skin, Severe Itching And Pain\"                                                  \"Allergic To Powder In Gloves Causing Severe Redness And Itching\"    Prozac [Fluoxetine Hcl]      \"Hallucinations\"    Robaxin [Methocarbamol] Palpitations     \"Severe High Blood Pressure\"    Ultram [Tramadol]      \"Severe Stomach Pain\"    Zoloft Palpitations     \"Sever High Blood Pressure\"    Tape [Adhesive Tape]      Patient states it tears her skin, including band aids       Nursing Notes Reviewed    Physical Exam:  ED Triage Vitals [09/21/18 1615]   Enc Vitals Group      BP (!) 170/96      Pulse 84      Resp 20      Temp 97.7 °F (36.5 °C)      Temp Source Oral      SpO2 96 %      Weight 172 lb (78 kg)      Height 5' 2\" (1.575 m)      Head Circumference       Peak Flow       Pain Score       Pain Loc       Pain Edu? Excl. in [address]? General :Patient is awake alert oriented person place and time no acute distress nontoxic appearing  HEENT: Pupils are equally round and reactive to light extraocular motors are intact conjunctivae clear sclerae white there is no injection no icterus. Nose without any rhinorrhea or epistaxis. Oral mucosa is moist no exudate buccal mucosa shows no ulcerations.  Uvula is midline    Neck: Neck is supple full range of motion trachea midline thyroid nonpalpable  Cardiac: Heart regular rate rhythm no murmurs rubs clicks or gallops  Lungs: Lungs are clear to auscultation there is no wheezing rhonchi or rales. There is no use of accessory muscles no nasal flaring identified. Chest wall: There is no tenderness to palpation over the chest wall or over ribs  Abdomen: Abdomen is soft nontender nondistended. There is no firm or pulsatile masses no rebound rigidity or guarding negative Gonzales's negative McBurney, no peritoneal signs  Suprapubic:  there is no tenderness to palpation over the external bladder   Musculoskeletal: 5 out of 5 strength in all 4 extremities full flexion extension abduction and adduction supination pronation of all extremities and all digits. No obvious muscle atrophy is noted. No focal muscle deficits are appreciated  Dermatology: Skin is warm and dry there is no obvious abscesses lacerations or lesions noted  Psych: Mentation is grossly normal cognition is grossly normal. Affect is appropriate  Neuro: Motor intact sensory intact cranial nerves II through XII are intact level of consciousness is normal cerebellar function is normal reflexes are grossly normal. No evidence of incontinence or loss of bowel or bladder no saddle anesthesia noted Lymphatic: There is no submandibular or cervical adenopathy appreciated.         I have reviewed and interpreted all of the currently available lab results from this visit (if applicable):  Results for orders placed or performed during the hospital encounter of 09/21/18   CBC auto diff   Result Value Ref Range    WBC 8.1 4.0 - 10.5 K/CU MM    RBC 4.62 4.2 - 5.4 M/CU MM    Hemoglobin 14.0 12.5 - 16.0 GM/DL    Hematocrit 41.6 37 - 47 %    MCV 90.0 78 - 100 FL    MCH 30.3 27 - 31 PG    MCHC 33.7 32.0 - 36.0 %    RDW 12.3 11.7 - 14.9 %    Platelets 414 849 - 915 K/CU MM    MPV 9.7 7.5 - 11.1 FL    Differential Type AUTOMATED DIFFERENTIAL     Segs Relative 53.6 36 - 66 %    Lymphocytes % 33.0 24 - 44 % Monocytes % 10.3 (H) 0 - 4 %    Eosinophils % 1.8 0 - 3 %    Basophils % 0.9 0 - 1 %    Segs Absolute 4.4 K/CU MM    Lymphocytes # 2.7 K/CU MM    Monocytes # 0.8 K/CU MM    Eosinophils # 0.2 K/CU MM    Basophils # 0.1 K/CU MM    Nucleated RBC % 0.0 %    Total Nucleated RBC 0.0 K/CU MM    Total Immature Neutrophil 0.03 K/CU MM    Immature Neutrophil % 0.4 0 - 0.43 %   CMP   Result Value Ref Range    Sodium 134 (L) 135 - 145 MMOL/L    Potassium 4.5 3.5 - 5.1 MMOL/L    Chloride 96 (L) 99 - 110 mMol/L    CO2 24 21 - 32 MMOL/L    BUN 13 6 - 23 MG/DL    CREATININE 0.7 0.6 - 1.1 MG/DL    Glucose 302 (H) 70 - 99 MG/DL    Calcium 9.9 8.3 - 10.6 MG/DL    Alb 4.3 3.4 - 5.0 GM/DL    Total Protein 7.7 6.4 - 8.2 GM/DL    Total Bilirubin 0.5 0.0 - 1.0 MG/DL    ALT 42 (H) 10 - 40 U/L    AST 63 (H) 15 - 37 IU/L    Alkaline Phosphatase 76 40 - 128 IU/L    GFR Non-African American >60 >60 mL/min/1.73m2    GFR African American >60 >60 mL/min/1.73m2    Anion Gap 14 4 - 16   Lipase   Result Value Ref Range    Lipase 25 13 - 60 IU/L   Amylase   Result Value Ref Range    Amylase 37 25 - 115 U/L   Urinalysis   Result Value Ref Range    Color, UA YELLOW UYELL    Clarity, UA CLEAR CLEAR    Glucose, Urine >500 (A) NEG MG/DL    Bilirubin Urine NEGATIVE NEG MG/DL    Ketones, Urine NEGATIVE NEG MG/DL    Specific Gravity, UA 1.024 1.001 - 1.035    Blood, Urine NEGATIVE NEG    pH, Urine 5.0 5.0 - 8.0    Protein, UA 30 (A) NEG MG/DL    Urobilinogen, Urine NORMAL 0.2 - 1.0 MG/DL    Nitrite Urine, Quantitative NEGATIVE NEG    Leukocyte Esterase, Urine TRACE (A) NEG    RBC, UA NONE SEEN 0 - 6 /HPF    WBC, UA 8 (H) 0 - 5 /HPF    Bacteria, UA NEGATIVE NEG /HPF    Squam Epithel, UA 1 /HPF    Mucus, UA RARE (A) NEG HPF    Trichomonas, UA NONE SEEN NOSEE /HPF    Hyaline Casts, UA 0 /LPF    Ca Oxalate Estefania, UA MANY /HPF    Uric Acid Estefania, UA RARE /HPF      Radiographs (if obtained):  [] The following radiograph was interpreted by myself in the absence of a

## 2018-09-22 NOTE — ED NOTES
Pt c/o pain around where her liver is, states she was also feeling constipated on her arrival to ED but has since had a BM. Pt reports feeling a knot around her liver. Pt also c/o nausea, breaking out in a hot sweat \"because of my blood sugar being high\".      Anil Rhodes RN  09/21/18 2011

## 2018-10-26 ENCOUNTER — HOSPITAL ENCOUNTER (EMERGENCY)
Age: 63
Discharge: HOME | End: 2018-10-26
Payer: MEDICARE

## 2018-10-26 VITALS
TEMPERATURE: 98 F | SYSTOLIC BLOOD PRESSURE: 164 MMHG | HEART RATE: 92 BPM | WEIGHT: 173 LBS | RESPIRATION RATE: 15 BRPM | HEIGHT: 62 IN | DIASTOLIC BLOOD PRESSURE: 80 MMHG | BODY MASS INDEX: 31.83 KG/M2 | OXYGEN SATURATION: 97 %

## 2018-10-26 DIAGNOSIS — J01.90 ACUTE NON-RECURRENT SINUSITIS, UNSPECIFIED LOCATION: Primary | ICD-10-CM

## 2018-10-26 PROCEDURE — 99283 EMERGENCY DEPT VISIT LOW MDM: CPT

## 2018-10-26 RX ORDER — DOXYCYCLINE HYCLATE 100 MG
100 TABLET ORAL 2 TIMES DAILY
Qty: 20 TABLET | Refills: 0 | Status: SHIPPED | OUTPATIENT
Start: 2018-10-26 | End: 2018-11-05

## 2018-10-26 ASSESSMENT — PAIN DESCRIPTION - PAIN TYPE: TYPE: ACUTE PAIN

## 2018-10-26 ASSESSMENT — PAIN DESCRIPTION - LOCATION: LOCATION: HEAD

## 2018-10-26 ASSESSMENT — PAIN SCALES - GENERAL: PAINLEVEL_OUTOF10: 3

## 2018-10-26 ASSESSMENT — PAIN DESCRIPTION - DESCRIPTORS: DESCRIPTORS: PRESSURE

## 2018-10-26 NOTE — ED NOTES
Discharge instructions and prescriptions discussed with patient. Patient verbalized understanding at this time. Patient ambulated self to exit with steady gait.       Catrachita Rawls RN  10/26/18 1009

## 2018-10-26 NOTE — ED TRIAGE NOTES
Pt states she has been experiencing nasal congestion for 2 weeks now. Pt was seen at her PCP recently for medicine which has provided no relief.  Pt describes drainage as purulent and blood tinged

## 2018-10-26 NOTE — ED PROVIDER NOTES
eMERGENCY dEPARTMENT eNCOUnter      PCP: WENDIE Franco - CNP    CHIEF COMPLAINT    Chief Complaint   Patient presents with    Nasal Congestion     x 2 weeks       HPI    Sonny Mcdermott is a 66 y.o. female who presents with Nasal congestion and green drainage ×2 weeks. Patient reports she was seen approximately 10 days ago by her primary care provider who reported it was viral at that time. Patient reports since then she's continued to have purulent green drainage coming from her nose. She also had some mild bleeding when she blows her nose. She reports sometimes the drainage is more of a Ace green color. Patient reports she feels that there is a sore in the side of her nostril. Patient denies any fevers. Patient denies any cough, shortness of breath. REVIEW OF SYSTEMS    Constitutional:  Denies fever, chills  HEENT:  See above  Cardiovascular:  Denies chest pain, palpitations. Respiratory:  Denies wheezes or shortness of breath   GI:  Denies abdominal pain, vomiting, or diarrhea   Neurologic:  Denies confusion, light headedness, dizziness, or syncope   Skin:  No rash    All other review of systems are negative  See HPI and nursing notes for additional information       PAST MEDICAL AND SURGICAL HISTORY    Past Medical History:   Diagnosis Date    Abnormal EKG 4/22/2014    Acid reflux     Anemia     Anesthesia     Nausea/Vomiting Post Op In Past    Anginal pain (HCC)     Denies Chest Pain At This Time    Anxiety     Arthritis     \"All Over\"    Asthma     CAD (coronary artery disease)     per last cardiac cath.     CHF (congestive heart failure) (HCC)     Chronic back pain     Chronic kidney disease     DDD (degenerative disc disease), cervical     12- Patient reports she was dx with DDD of Cerival spine C6,C7    Depression     Diabetes mellitus ([address] Dx 1990's    Diabetic neuropathy ([address]     \"In My Legs And Feet\"    Dizziness     \"Sometimes\"    Dry skin     Enlarged ureter     Right Side    Fatty liver     Fibrocystic breast     Gout     Pt states she was diagnosed with gout in the past few months.  H/O cardiac catheterization     Showed mild disease per last cath.  H/O cardiovascular stress test 03/15/2010    EF 69%, normal perfusion study except for diaphragmatic artifact, uniform wall motion.  H/O cardiovascular stress test 10/09/2008    EF 60%, no anginia, normal study.  H/O cardiovascular stress test 05/06/2014    EF 66%, no ischemia, normal LV systolic funciton, normal perfusion pattern.  H/O Doppler ultrasound 02/28/2011    CAROTID DOPPLER-normal study.  H/O echocardiogram 5/6/2014    Ef >55%. Impaired LV relaxation.  H/O echocardiogram 10/14/15    EF 60% Normal LV and systolic function. No significant valvulopathy seen.  History of Holter monitoring 3/24/15    24 hour - predominant rhythm sinus    [place] (hard of hearing)     Bilateral Ears    Hx of cardiovascular stress test 10/19/2015    lexiscan-normal,EF63%    Hx of motion sickness     HX OTHER MEDICAL     Primary Care Physician Is Dr. June Ellington In [facility]    Hyperlipidemia     Hypertension     IBS (irritable bowel syndrome)     Incisional hernia 4/2014    Kidney stones Last Episode In 2012 Or 2013    Passed Kidney Stones Numberous Times    Migraines     Nausea & vomiting     Nausea/Vomiting Post Op In Past    Other specified disorder of skin     12- Patient states she has a condition of her vaginal area (skin) which starts with the letters Patricia Olsen. She is currently being treated with multiple creams and weekly Diflucan. 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     Staph infection Dx 1980's    Toes On Left Foot    Thyroid disease

## 2018-11-09 ENCOUNTER — HOSPITAL ENCOUNTER (EMERGENCY)
Age: 63
Discharge: HOME | End: 2018-11-09
Payer: MEDICARE

## 2018-11-09 VITALS
WEIGHT: 173 LBS | TEMPERATURE: 98.4 F | HEART RATE: 85 BPM | DIASTOLIC BLOOD PRESSURE: 71 MMHG | OXYGEN SATURATION: 95 % | BODY MASS INDEX: 31.83 KG/M2 | SYSTOLIC BLOOD PRESSURE: 136 MMHG | HEIGHT: 62 IN

## 2018-11-09 DIAGNOSIS — R20.2 RIGHT HAND PARESTHESIA: ICD-10-CM

## 2018-11-09 DIAGNOSIS — M25.531 RIGHT WRIST PAIN: Primary | ICD-10-CM

## 2018-11-09 PROCEDURE — 99283 EMERGENCY DEPT VISIT LOW MDM: CPT

## 2018-11-09 RX ORDER — ACETAMINOPHEN 500 MG
500 TABLET ORAL EVERY 6 HOURS PRN
Qty: 30 TABLET | Refills: 0 | Status: SHIPPED | OUTPATIENT
Start: 2018-11-09 | End: 2018-11-17

## 2018-11-09 ASSESSMENT — PAIN DESCRIPTION - ORIENTATION: ORIENTATION: RIGHT

## 2018-11-09 ASSESSMENT — PAIN DESCRIPTION - PAIN TYPE: TYPE: ACUTE PAIN

## 2018-11-09 ASSESSMENT — PAIN SCALES - GENERAL: PAINLEVEL_OUTOF10: 10

## 2018-11-09 ASSESSMENT — PAIN DESCRIPTION - LOCATION: LOCATION: ARM

## 2018-11-09 NOTE — ED NOTES
Patient presents complaining of right arm numbness and pain. Patient states that she first noticed these symptoms 4 days ago. Patient states that she has a history of right bicep tear and right carpal tunnel.       Jerry Rothman RN  11/09/18 [id number]

## 2018-11-09 NOTE — ED PROVIDER NOTES
children: 3    Years of education: 6     Social History Main Topics    Smoking status: Never Smoker    Smokeless tobacco: Never Used    Alcohol use No    Drug use: No    Sexual activity: Not Currently     Other Topics Concern    None     Social History Narrative    None     Family History   Problem Relation Age of Onset    Stroke Mother     Other Mother         Seizures    Diabetes Mother         Borderline Diabetes    High Blood Pressure Mother     Arthritis Mother     Early Death Mother 61        Stroke    Depression Mother     Heart Disease Mother     High Cholesterol Mother     Miscarriages / Stillbirths Mother     Heart Disease Father         Massive Heart Attack    High Blood Pressure Father     Arthritis Father     High Cholesterol Father     Cancer Brother         Liver And Colon Cancer    Early Death Brother 37        Liver And Colon Cancer    Heart Disease Brother         Heart Stents    High Blood Pressure Brother     High Cholesterol Brother     Early Death Brother         65 Years Old,Hit [employer]    Hearing Loss Sister     High Blood Pressure Sister     Arthritis Sister     Heart Disease Brother     Heart Disease Sister     Early Death Brother 61        Heart Attack    Heart Disease Brother         Heart Attack    Mental Illness Daughter         Bipolar    Other Son         Seizures    Other Daughter         Stomach And Bowel Problems         PHYSICAL EXAM    VITAL SIGNS: /71   Pulse 85   Temp 98.4 °F (36.9 °C) (Oral)   Ht 5' 2\" (1.575 m)   Wt 173 lb (78.5 kg)   SpO2 95%   BMI 31.64 kg/m²    Constitutional:  Well developed, Well nourished  HENT:  Normocephalic, Atraumatic  Neck/Lymphatics: supple, no JVD, no swollen nodes  Cardiovascular:  Normal heart rate, Normal rhythm  Respiratory:  Nonlabored breathing.   Normal breath sounds, No wheezing  Abdomen:  No tenderness  Musculoskeletal:   Right upper extremity with mild swelling in the distal anterior

## 2018-11-17 ENCOUNTER — HOSPITAL ENCOUNTER (EMERGENCY)
Age: 63
Discharge: HOME | End: 2018-11-17
Payer: MEDICARE

## 2018-11-17 VITALS
WEIGHT: 173 LBS | HEIGHT: 62 IN | HEART RATE: 77 BPM | DIASTOLIC BLOOD PRESSURE: 93 MMHG | BODY MASS INDEX: 31.83 KG/M2 | RESPIRATION RATE: 18 BRPM | OXYGEN SATURATION: 96 % | SYSTOLIC BLOOD PRESSURE: 165 MMHG | TEMPERATURE: 97.5 F

## 2018-11-17 DIAGNOSIS — S39.012A STRAIN OF LUMBAR REGION, INITIAL ENCOUNTER: Primary | ICD-10-CM

## 2018-11-17 DIAGNOSIS — R03.0 ELEVATED BLOOD PRESSURE READING: ICD-10-CM

## 2018-11-17 DIAGNOSIS — G89.29 ACUTE EXACERBATION OF CHRONIC LOW BACK PAIN: ICD-10-CM

## 2018-11-17 DIAGNOSIS — M54.50 ACUTE EXACERBATION OF CHRONIC LOW BACK PAIN: ICD-10-CM

## 2018-11-17 LAB
BACTERIA: ABNORMAL /HPF
BILIRUBIN URINE: NEGATIVE MG/DL
BLOOD, URINE: NEGATIVE
CLARITY: CLEAR
COLOR: YELLOW
GLUCOSE, URINE: NEGATIVE MG/DL
KETONES, URINE: NEGATIVE MG/DL
LEUKOCYTE ESTERASE, URINE: ABNORMAL
NITRITE URINE, QUANTITATIVE: NEGATIVE
PH, URINE: 5 (ref 5–8)
PROTEIN UA: NEGATIVE MG/DL
RBC URINE: 4 /HPF (ref 0–6)
SPECIFIC GRAVITY UA: 1.01 (ref 1–1.03)
SQUAMOUS EPITHELIAL: 2 /HPF
TRICHOMONAS: ABNORMAL /HPF
UROBILINOGEN, URINE: NORMAL MG/DL (ref 0.2–1)
WBC UA: 4 /HPF (ref 0–5)

## 2018-11-17 PROCEDURE — 6370000000 HC RX 637 (ALT 250 FOR IP): Performed by: PHYSICIAN ASSISTANT

## 2018-11-17 PROCEDURE — 96372 THER/PROPH/DIAG INJ SC/IM: CPT

## 2018-11-17 PROCEDURE — 81001 URINALYSIS AUTO W/SCOPE: CPT

## 2018-11-17 PROCEDURE — 87086 URINE CULTURE/COLONY COUNT: CPT

## 2018-11-17 PROCEDURE — 6360000002 HC RX W HCPCS: Performed by: PHYSICIAN ASSISTANT

## 2018-11-17 PROCEDURE — 99283 EMERGENCY DEPT VISIT LOW MDM: CPT

## 2018-11-17 RX ORDER — TIZANIDINE 4 MG/1
4 TABLET ORAL 4 TIMES DAILY PRN
Qty: 12 TABLET | Refills: 0 | Status: SHIPPED | OUTPATIENT
Start: 2018-11-17 | End: 2019-04-09

## 2018-11-17 RX ORDER — DIPHENHYDRAMINE HCL 25 MG
25 TABLET ORAL ONCE
Status: COMPLETED | OUTPATIENT
Start: 2018-11-17 | End: 2018-11-17

## 2018-11-17 RX ORDER — ACETAMINOPHEN 500 MG
500 TABLET ORAL EVERY 6 HOURS PRN
Qty: 30 TABLET | Refills: 0 | Status: SHIPPED | OUTPATIENT
Start: 2018-11-17 | End: 2018-12-03

## 2018-11-17 RX ORDER — TIZANIDINE 4 MG/1
4 TABLET ORAL ONCE
Status: COMPLETED | OUTPATIENT
Start: 2018-11-17 | End: 2018-11-17

## 2018-11-17 RX ORDER — LIDOCAINE 50 MG/G
1 PATCH TOPICAL DAILY
Qty: 30 PATCH | Refills: 0 | Status: ON HOLD | OUTPATIENT
Start: 2018-11-17 | End: 2019-01-01

## 2018-11-17 RX ORDER — MORPHINE SULFATE 2 MG/ML
4 INJECTION, SOLUTION INTRAMUSCULAR; INTRAVENOUS ONCE
Status: COMPLETED | OUTPATIENT
Start: 2018-11-17 | End: 2018-11-17

## 2018-11-17 RX ADMIN — TIZANIDINE 4 MG: 4 TABLET ORAL at 13:59

## 2018-11-17 RX ADMIN — MORPHINE SULFATE 4 MG: 2 INJECTION, SOLUTION INTRAMUSCULAR; INTRAVENOUS at 13:59

## 2018-11-17 RX ADMIN — DIPHENHYDRAMINE HCL 25 MG: 25 TABLET ORAL at 13:34

## 2018-11-17 ASSESSMENT — PAIN DESCRIPTION - LOCATION: LOCATION: BACK

## 2018-11-17 ASSESSMENT — PAIN DESCRIPTION - PAIN TYPE: TYPE: ACUTE PAIN

## 2018-11-17 ASSESSMENT — PAIN SCALES - GENERAL
PAINLEVEL_OUTOF10: 10
PAINLEVEL_OUTOF10: 10

## 2018-11-17 NOTE — ED PROVIDER NOTES
HISTORY  06 [id number]    umbilical hernia with mesh    TUBAL LIGATION  1978       CURRENT MEDICATIONS    Current Outpatient Rx   Medication Sig Dispense Refill    tiZANidine (ZANAFLEX) 4 MG tablet Take 1 tablet by mouth 4 times daily as needed (muscle spasm) 12 tablet 0    lidocaine (LIDODERM) 5 % Place 1 patch onto the skin daily 12 hours on, 12 hours off. 30 patch 0    acetaminophen (APAP EXTRA STRENGTH) 500 MG tablet Take 1 tablet by mouth every 6 hours as needed for Pain 30 tablet 0    aluminum & magnesium hydroxide-simethicone (MAALOX MAX) 400-400-40 MG/5ML SUSP Take 15 mLs by mouth every 6 hours as needed (heart burn) 120 mL 0    ipratropium (ATROVENT) 0.02 % nebulizer solution Take 2.5 mLs by nebulization every 6 hours as needed for Wheezing Please dispense one box.  2.5 mL 0    pantoprazole (PROTONIX) 40 MG tablet Take 1 tablet by mouth daily 30 tablet 0    citalopram (CELEXA) 40 MG tablet Take 40 mg by mouth daily      TRESIBA FLEXTOUCH 200 UNIT/ML SOPN Inject 16 Units into the skin every morning      levETIRAcetam (KEPPRA) 750 MG tablet Take 750 mg by mouth nightly      carbidopa-levodopa (SINEMET)  MG per tablet Take 1 tablet by mouth nightly      losartan-hydrochlorothiazide (HYZAAR) 100-12.5 MG per tablet Take 1 tablet by mouth daily      metoprolol tartrate (LOPRESSOR) 25 MG tablet Take 25 mg by mouth daily      Dextromethorphan-Guaifenesin (MUCINEX DM)  MG TB12 Take 1 tablet by mouth 2 times daily 28 tablet 0    albuterol (PROVENTIL) (2.5 MG/3ML) 0.083% nebulizer solution Take 3 mLs by nebulization every 6 hours as needed for Wheezing 120 each 3    levothyroxine (SYNTHROID) 25 MCG tablet Take 1 tablet by mouth every morning 30 tablet 0    amitriptyline (ELAVIL) 25 MG tablet Take 1 tablet by mouth nightly 30 tablet 0    polyethylene glycol (GLYCOLAX) packet Take 17 g by mouth daily as needed for Constipation      aspirin 81 MG tablet Take 81 mg by mouth daily      celecoxib

## 2018-11-18 LAB
CULTURE: NORMAL
Lab: NORMAL
REPORT STATUS: NORMAL
SPECIMEN: NORMAL

## 2018-12-03 ENCOUNTER — HOSPITAL ENCOUNTER (EMERGENCY)
Age: 63
Discharge: HOME | End: 2018-12-03
Payer: MEDICARE

## 2018-12-03 VITALS
HEART RATE: 76 BPM | DIASTOLIC BLOOD PRESSURE: 77 MMHG | HEIGHT: 62 IN | SYSTOLIC BLOOD PRESSURE: 147 MMHG | TEMPERATURE: 98 F | RESPIRATION RATE: 15 BRPM | WEIGHT: 173 LBS | OXYGEN SATURATION: 97 % | BODY MASS INDEX: 31.83 KG/M2

## 2018-12-03 DIAGNOSIS — M25.561 ACUTE PAIN OF RIGHT KNEE: Primary | ICD-10-CM

## 2018-12-03 DIAGNOSIS — J06.9 ACUTE UPPER RESPIRATORY INFECTION: ICD-10-CM

## 2018-12-03 DIAGNOSIS — R05.9 COUGH: ICD-10-CM

## 2018-12-03 PROCEDURE — 99282 EMERGENCY DEPT VISIT SF MDM: CPT

## 2018-12-03 RX ORDER — DOXYCYCLINE HYCLATE 100 MG
100 TABLET ORAL 2 TIMES DAILY
Qty: 14 TABLET | Refills: 0 | Status: SHIPPED | OUTPATIENT
Start: 2018-12-03 | End: 2018-12-10

## 2018-12-03 RX ORDER — ACETAMINOPHEN 500 MG
500 TABLET ORAL EVERY 6 HOURS PRN
Qty: 30 TABLET | Refills: 0 | Status: SHIPPED | OUTPATIENT
Start: 2018-12-03 | End: 2020-03-13

## 2018-12-03 ASSESSMENT — PAIN DESCRIPTION - LOCATION: LOCATION: KNEE

## 2018-12-03 ASSESSMENT — PAIN DESCRIPTION - ORIENTATION: ORIENTATION: RIGHT

## 2018-12-03 ASSESSMENT — PAIN SCALES - GENERAL: PAINLEVEL_OUTOF10: 10

## 2018-12-03 NOTE — LETTER
[facility] Emergency Department  [facility] [address] [address]  Phone: [phone]  Fax: [phone]               December 3, 2018    Patient: Silviano Wolf   YOB: 1955   Date of Visit: 12/3/2018       To Whom It May Concern:    Michael Smallwood was seen and treated in our emergency department on 12/3/2018. I recommend patient receive help with activities of daily living from family member and/or fiancé - patient stay with fiancé for the next 1-2 weeks.       Sincerely,       HAYDEN Orourke         Signature:__________________________________

## 2018-12-03 NOTE — ED NOTES
Pt able to stand and pivot to wheelchair unassisted.      [facility]Well[facility]  12/03/18 2337

## 2018-12-03 NOTE — ED PROVIDER NOTES
eMERGENCY dEPARTMENT eNCOUnter      PCP: Agustina Daley, APRN - CNP    CHIEF COMPLAINT    Chief Complaint   Patient presents with    Knee Pain       HPI    Anila Weeks is a 66 y.o. female who presents  With Right knee pain. Onset several days. Context is patient notes episodic right knee pain for the past several years without history of recent trauma. Pain is generalized, does not radiate. Pain is described as achy. No known aggravating or alleviating factors. Patient states she recently shopped requiring prolonged ambulation and feels she \"overdid it\". No fall or blunt trauma. No twisting mechanism. Patient had right total knee arthroplasty done 10 years ago. Patient also complains of sinus pressure, nasal congestion, nonproductive cough. Onset 5-7 days. Context is patient states she gets sinusitis every year in the winter and feels she may have sinusitis again. Cough is nonproductive. No wheezes or shortness of breath. No chest pain or palpitations. REVIEW OF SYSTEMS    Constitutional:  Denies fever, chills, weight loss or weakness   HENT:  Denies sore throat or ear pain   Cardiovascular:  Denies chest pain, palpitations   Respiratory:  See HPI. No shortness of breath. GI:  Denies abdominal pain, nausea, vomiting, or diarrhea  :  Denies any urinary symptoms or vaginal symptoms. Musculoskeletal:  See HPI.   Skin:  Denies rash  Neurologic:  Denies headache, focal weakness or sensory changes   Endocrine:  Denies polyuria or polydypsia   Lymphatic:  Denies swollen glands     All other review of systems are negative  See HPI and nursing notes for additional information     PAST MEDICAL AND SURGICAL HISTORY    Past Medical History:   Diagnosis Date    Abnormal EKG 4/22/2014    Acid reflux     Anemia     Anesthesia     Nausea/Vomiting Post Op In Past    Anginal pain (HCC)     Denies Chest Pain At This Time    Anxiety     Arthritis     \"All Over\"    Asthma     CAD (coronary artery disease)     per last cardiac cath.  CHF (congestive heart failure) (HCC)     Chronic back pain     Chronic kidney disease     DDD (degenerative disc disease), cervical     12- Patient reports she was dx with DDD of Cerival spine C6,C7    Depression     Diabetes mellitus ([address] Dx 1990's    Diabetic neuropathy [address] [address] [facility]     \"In My Legs And Feet\"    Dizziness     \"Sometimes\"    Dry skin     Enlarged ureter     Right Side    Fatty liver     Fibrocystic breast     Gout     Pt states she was diagnosed with gout in the past few months.  H/O cardiac catheterization     Showed mild disease per last cath.  H/O cardiovascular stress test 03/15/2010    EF 69%, normal perfusion study except for diaphragmatic artifact, uniform wall motion.  H/O cardiovascular stress test 10/09/2008    EF 60%, no anginia, normal study.  H/O cardiovascular stress test 05/06/2014    EF 66%, no ischemia, normal LV systolic funciton, normal perfusion pattern.  H/O Doppler ultrasound 02/28/2011    CAROTID DOPPLER-normal study.  H/O echocardiogram 5/6/2014    Ef >55%. Impaired LV relaxation.  H/O echocardiogram 10/14/15    EF 60% Normal LV and systolic function. No significant valvulopathy seen.  History of Holter monitoring 3/24/15    24 hour - predominant rhythm sinus    [place] (hard of hearing)     Bilateral Ears    Hx of cardiovascular stress test 10/19/2015    lexiscan-normal,EF63%    Hx of motion sickness     HX OTHER MEDICAL     Primary Care Physician Is Dr. Huber Golden In [address] Tamiko Gomez     Hypertension     IBS (irritable bowel syndrome)     Incisional hernia 4/2014    Kidney stones Last Episode In 2012 Or 2013    Passed Kidney Stones Numberous Times    Migraines     Nausea & vomiting     Nausea/Vomiting Post Op In Past    Other specified disorder of skin     12- Patient states she has a condition of her vaginal area (skin) which starts with the letters L.  S.

## 2018-12-16 ENCOUNTER — HOSPITAL ENCOUNTER (EMERGENCY)
Age: 63
Discharge: HOME | End: 2018-12-16
Payer: MEDICARE

## 2018-12-16 ENCOUNTER — APPOINTMENT (OUTPATIENT)
Dept: CT IMAGING | Age: 63
End: 2018-12-16
Payer: MEDICARE

## 2018-12-16 VITALS
BODY MASS INDEX: 31.28 KG/M2 | DIASTOLIC BLOOD PRESSURE: 87 MMHG | WEIGHT: 170 LBS | HEART RATE: 66 BPM | OXYGEN SATURATION: 95 % | TEMPERATURE: 98 F | SYSTOLIC BLOOD PRESSURE: 156 MMHG | HEIGHT: 62 IN | RESPIRATION RATE: 17 BRPM

## 2018-12-16 DIAGNOSIS — R51.9 NONINTRACTABLE HEADACHE, UNSPECIFIED CHRONICITY PATTERN, UNSPECIFIED HEADACHE TYPE: Primary | ICD-10-CM

## 2018-12-16 DIAGNOSIS — R10.84 GENERALIZED ABDOMINAL PAIN: ICD-10-CM

## 2018-12-16 DIAGNOSIS — E11.65 TYPE 2 DIABETES MELLITUS WITH HYPERGLYCEMIA, UNSPECIFIED WHETHER LONG TERM INSULIN USE (HCC): ICD-10-CM

## 2018-12-16 DIAGNOSIS — K59.00 CONSTIPATION, UNSPECIFIED CONSTIPATION TYPE: ICD-10-CM

## 2018-12-16 LAB
ALBUMIN SERPL-MCNC: 3.9 GM/DL (ref 3.4–5)
ALP BLD-CCNC: 74 IU/L (ref 40–128)
ALT SERPL-CCNC: 25 U/L (ref 10–40)
ANION GAP SERPL CALCULATED.3IONS-SCNC: 11 MMOL/L (ref 4–16)
AST SERPL-CCNC: 33 IU/L (ref 15–37)
BACTERIA: NEGATIVE /HPF
BASE EXCESS MIXED: 3.4 (ref 0–2.3)
BASOPHILS ABSOLUTE: 0.1 K/CU MM
BASOPHILS RELATIVE PERCENT: 0.5 % (ref 0–1)
BETA-HYDROXYBUTYRATE: 2.1 MG/DL (ref 0–3)
BILIRUB SERPL-MCNC: 0.5 MG/DL (ref 0–1)
BILIRUBIN URINE: NEGATIVE MG/DL
BLOOD, URINE: NEGATIVE
BUN BLDV-MCNC: 14 MG/DL (ref 6–23)
CALCIUM SERPL-MCNC: 9.3 MG/DL (ref 8.3–10.6)
CHLORIDE BLD-SCNC: 95 MMOL/L (ref 99–110)
CLARITY: CLEAR
CO2: 26 MMOL/L (ref 21–32)
COLOR: YELLOW
CREAT SERPL-MCNC: 0.6 MG/DL (ref 0.6–1.1)
DIFFERENTIAL TYPE: ABNORMAL
EOSINOPHILS ABSOLUTE: 0.4 K/CU MM
EOSINOPHILS RELATIVE PERCENT: 3.2 % (ref 0–3)
GFR AFRICAN AMERICAN: >60 ML/MIN/1.73M2
GFR NON-AFRICAN AMERICAN: >60 ML/MIN/1.73M2
GLUCOSE BLD-MCNC: 285 MG/DL (ref 70–99)
GLUCOSE, URINE: NEGATIVE MG/DL
HCO3 VENOUS: 28.5 MMOL/L (ref 19–25)
HCT VFR BLD CALC: 41.5 % (ref 37–47)
HEMOGLOBIN: 13.1 GM/DL (ref 12.5–16)
IMMATURE NEUTROPHIL %: 0.3 % (ref 0–0.43)
KETONES, URINE: NEGATIVE MG/DL
LEUKOCYTE ESTERASE, URINE: NEGATIVE
LIPASE: 22 IU/L (ref 13–60)
LYMPHOCYTES ABSOLUTE: 3.4 K/CU MM
LYMPHOCYTES RELATIVE PERCENT: 30.5 % (ref 24–44)
MCH RBC QN AUTO: 29 PG (ref 27–31)
MCHC RBC AUTO-ENTMCNC: 31.6 % (ref 32–36)
MCV RBC AUTO: 91.8 FL (ref 78–100)
MONOCYTES ABSOLUTE: 0.9 K/CU MM
MONOCYTES RELATIVE PERCENT: 8.2 % (ref 0–4)
MUCUS: ABNORMAL HPF
NITRITE URINE, QUANTITATIVE: NEGATIVE
NUCLEATED RBC %: 0 %
O2 SAT, VEN: 70.7 % (ref 50–70)
PCO2, VEN: 44 MMHG (ref 38–52)
PDW BLD-RTO: 12.3 % (ref 11.7–14.9)
PH VENOUS: 7.42 (ref 7.32–7.42)
PH, URINE: 5 (ref 5–8)
PLATELET # BLD: 261 K/CU MM (ref 140–440)
PMV BLD AUTO: 10.4 FL (ref 7.5–11.1)
PO2, VEN: 34 MMHG (ref 28–48)
POTASSIUM SERPL-SCNC: 4.3 MMOL/L (ref 3.5–5.1)
PROTEIN UA: NEGATIVE MG/DL
RBC # BLD: 4.52 M/CU MM (ref 4.2–5.4)
RBC URINE: 1 /HPF (ref 0–6)
SEGMENTED NEUTROPHILS ABSOLUTE COUNT: 6.4 K/CU MM
SEGMENTED NEUTROPHILS RELATIVE PERCENT: 57.3 % (ref 36–66)
SODIUM BLD-SCNC: 132 MMOL/L (ref 135–145)
SPECIFIC GRAVITY UA: 1.05 (ref 1–1.03)
SQUAMOUS EPITHELIAL: 1 /HPF
TOTAL IMMATURE NEUTOROPHIL: 0.03 K/CU MM
TOTAL NUCLEATED RBC: 0 K/CU MM
TOTAL PROTEIN: 7.4 GM/DL (ref 6.4–8.2)
TRICHOMONAS: ABNORMAL /HPF
UROBILINOGEN, URINE: NORMAL MG/DL (ref 0.2–1)
WBC # BLD: 11.2 K/CU MM (ref 4–10.5)
WBC UA: <1 /HPF (ref 0–5)

## 2018-12-16 PROCEDURE — 6360000004 HC RX CONTRAST MEDICATION: Performed by: PHYSICIAN ASSISTANT

## 2018-12-16 PROCEDURE — 85025 COMPLETE CBC W/AUTO DIFF WBC: CPT

## 2018-12-16 PROCEDURE — 6360000002 HC RX W HCPCS: Performed by: PHYSICIAN ASSISTANT

## 2018-12-16 PROCEDURE — 81001 URINALYSIS AUTO W/SCOPE: CPT

## 2018-12-16 PROCEDURE — 82805 BLOOD GASES W/O2 SATURATION: CPT

## 2018-12-16 PROCEDURE — 80053 COMPREHEN METABOLIC PANEL: CPT

## 2018-12-16 PROCEDURE — 2580000003 HC RX 258: Performed by: PHYSICIAN ASSISTANT

## 2018-12-16 PROCEDURE — 82010 KETONE BODYS QUAN: CPT

## 2018-12-16 PROCEDURE — 96374 THER/PROPH/DIAG INJ IV PUSH: CPT

## 2018-12-16 PROCEDURE — 83690 ASSAY OF LIPASE: CPT

## 2018-12-16 PROCEDURE — 96375 TX/PRO/DX INJ NEW DRUG ADDON: CPT

## 2018-12-16 PROCEDURE — 74177 CT ABD & PELVIS W/CONTRAST: CPT

## 2018-12-16 PROCEDURE — 36415 COLL VENOUS BLD VENIPUNCTURE: CPT

## 2018-12-16 PROCEDURE — 6370000000 HC RX 637 (ALT 250 FOR IP): Performed by: PHYSICIAN ASSISTANT

## 2018-12-16 PROCEDURE — 99285 EMERGENCY DEPT VISIT HI MDM: CPT

## 2018-12-16 RX ORDER — 0.9 % SODIUM CHLORIDE 0.9 %
1000 INTRAVENOUS SOLUTION INTRAVENOUS ONCE
Status: COMPLETED | OUTPATIENT
Start: 2018-12-16 | End: 2018-12-16

## 2018-12-16 RX ORDER — AMOXICILLIN 250 MG
1 CAPSULE ORAL 2 TIMES DAILY
Qty: 30 TABLET | Refills: 0 | Status: ON HOLD | OUTPATIENT
Start: 2018-12-16 | End: 2019-01-01 | Stop reason: ALTCHOICE

## 2018-12-16 RX ORDER — 0.9 % SODIUM CHLORIDE 0.9 %
10 VIAL (ML) INJECTION
Status: COMPLETED | OUTPATIENT
Start: 2018-12-16 | End: 2018-12-16

## 2018-12-16 RX ORDER — DIPHENHYDRAMINE HYDROCHLORIDE 50 MG/ML
50 INJECTION INTRAMUSCULAR; INTRAVENOUS ONCE
Status: COMPLETED | OUTPATIENT
Start: 2018-12-16 | End: 2018-12-16

## 2018-12-16 RX ORDER — MAGNESIUM HYDROXIDE/ALUMINUM HYDROXICE/SIMETHICONE 120; 1200; 1200 MG/30ML; MG/30ML; MG/30ML
30 SUSPENSION ORAL ONCE
Status: COMPLETED | OUTPATIENT
Start: 2018-12-16 | End: 2018-12-16

## 2018-12-16 RX ORDER — METOCLOPRAMIDE HYDROCHLORIDE 5 MG/ML
10 INJECTION INTRAMUSCULAR; INTRAVENOUS ONCE
Status: COMPLETED | OUTPATIENT
Start: 2018-12-16 | End: 2018-12-16

## 2018-12-16 RX ADMIN — ALUMINUM HYDROXIDE, MAGNESIUM HYDROXIDE, AND SIMETHICONE 30 ML: 200; 200; 20 SUSPENSION ORAL at 19:43

## 2018-12-16 RX ADMIN — SODIUM CHLORIDE, PRESERVATIVE FREE 10 ML: 5 INJECTION INTRAVENOUS at 19:01

## 2018-12-16 RX ADMIN — SODIUM CHLORIDE 1000 ML: 9 INJECTION, SOLUTION INTRAVENOUS at 17:19

## 2018-12-16 RX ADMIN — METOCLOPRAMIDE 10 MG: 5 INJECTION, SOLUTION INTRAMUSCULAR; INTRAVENOUS at 17:25

## 2018-12-16 RX ADMIN — IOPAMIDOL 75 ML: 755 INJECTION, SOLUTION INTRAVENOUS at 19:01

## 2018-12-16 RX ADMIN — DIPHENHYDRAMINE HYDROCHLORIDE 50 MG: 50 INJECTION, SOLUTION INTRAMUSCULAR; INTRAVENOUS at 17:25

## 2018-12-16 RX ADMIN — LIDOCAINE HYDROCHLORIDE 15 ML: 20 SOLUTION ORAL; TOPICAL at 19:43

## 2018-12-16 ASSESSMENT — PAIN DESCRIPTION - PAIN TYPE: TYPE: CHRONIC PAIN

## 2018-12-16 ASSESSMENT — PAIN SCALES - GENERAL: PAINLEVEL_OUTOF10: 10

## 2018-12-16 ASSESSMENT — PAIN DESCRIPTION - LOCATION: LOCATION: HEAD

## 2018-12-16 NOTE — ED PROVIDER NOTES
eMERGENCY dEPARTMENT eNCOUnter         [facility] EMERGENCY DEPARTMENT    PCP: Rony Evangelista, APRN - CNP    CHIEF COMPLAINT    Headache, hyperglycemia    HPI    Kiah Dave is a 66 y.o. female who presents with significant other with multiple complaints from home via EMS. Context is patient states that she has had gradual onset of a persistent migraine headache for the last 5 days. She has a history of chronic migraines similar to this was associated nausea but no vomiting or diarrhea. No neck pain or stiffness, fever or chills, vision changes or vision loss. She states that she takes Benadryl/Reglan orally at home for headaches however \"it doesn't seem to work as well as the IV medications. \"  She states the headache started 5 days ago without preceding trauma or injury. 10/10 generalized throbbing aching pain to the top of the head. She has been seen by neurology clonus in the past for Botox injections for migraine relief. This is not the worst headache of the patient's life, she denies thunderclap onset. No new symptoms different from her previous headaches. No history of brain aneurysms. Patient is also concerned for elevated glucose levels up to 400s. Reports that she has been compliant with her oral anti-glycemics as well as her insulin. Has also had one week of constipation and has not had a bowel movement. States \"when I strain, I feel like I'm stuck. \"  Denying any diarrhea or bright red blood per rectum however she has had on toilet paper with wiping in the past when she has been constipated. Has history of gastroparesis secondary to her diabetes history. She is reporting 10/10 generalized cramping abdominal pain, no nausea. She is passing gas. No fever or chills, dysuria hematuria.    Has had previous appendectomy and cholecystectomy    REVIEW OF SYSTEMS    Constitutional:  Denies fever, chills, weight loss or weakness   Neurologic:   Denies confusion or memory loss. Denies light-headedness, dizziness, or LOC. Denies stiff neck. Denies weakness or sensory changes   Eyes:   Denies discharge . Has photophobia,  Denies dipplopia, blurred vision, or loss visual field  HENT:  Denies sore throat or ear pain   Cardiovascular:  Denies chest pain, palpitations. Respiratory:  Denies cough, shortness of breath, respiratory discomfort   GI/:  See HPI. Denies Dysuria or Hematuria. Musculoskeletal:  Denies back pain   Skin:  Denies rash   Endocrine:  Denies polyuria or polydypsia   Lymphatic:  Denies swollen glands   Psychiatric:  Denies depression, suicidal ideation or homicidal ideation     All other review of systems are negative  See HPI and nursing notes for additional information       PAST MEDICAL & SURGICAL HISTORY    Past Medical History:   Diagnosis Date    Abnormal EKG 4/22/2014    Acid reflux     Anemia     Anesthesia     Nausea/Vomiting Post Op In Past    Anginal pain (HCC)     Denies Chest Pain At This Time    Anxiety     Arthritis     \"All Over\"    Asthma     CAD (coronary artery disease)     per last cardiac cath.  CHF (congestive heart failure) (HCC)     Chronic back pain     Chronic kidney disease     DDD (degenerative disc disease), cervical     12- Patient reports she was dx with DDD of Cerival spine C6,C7    Depression     Diabetes mellitus ([address] Dx 1990's    Diabetic neuropathy ([address]     \"In My Legs And Feet\"    Dizziness     \"Sometimes\"    Dry skin     Enlarged ureter     Right Side    Fatty liver     Fibrocystic breast     Gout     Pt states she was diagnosed with gout in the past few months.  H/O cardiac catheterization     Showed mild disease per last cath.  H/O cardiovascular stress test 03/15/2010    EF 69%, normal perfusion study except for diaphragmatic artifact, uniform wall motion.  H/O cardiovascular stress test 10/09/2008    EF 60%, no anginia, normal study.     H/O cardiovascular stress test 05/06/2014    EF 66%, no ischemia, normal LV systolic funciton, normal perfusion pattern.  H/O Doppler ultrasound 02/28/2011    CAROTID DOPPLER-normal study.  H/O echocardiogram 5/6/2014    Ef >55%. Impaired LV relaxation.  H/O echocardiogram 10/14/15    EF 60% Normal LV and systolic function. No significant valvulopathy seen.  History of Holter monitoring 3/24/15    24 hour - predominant rhythm sinus    [place] (hard of hearing)     Bilateral Ears    Hx of cardiovascular stress test 10/19/2015    lexiscan-normal,EF63%    Hx of motion sickness     HX OTHER MEDICAL     Primary Care Physician Is [facility] K[facility] Abdullahi [facility]    Hyperlipidemia     Hypertension     IBS (irritable bowel syndrome)     Incisional hernia 4/2014    Kidney stones Last Episode In 2012 Or 2013    Passed Kidney Stones Numberous Times    Migraines     Nausea & vomiting     Nausea/Vomiting Post Op In Past    Other specified disorder of skin     12- Patient states she has a condition of her vaginal area (skin) which starts with the letters Jenae Garcia. She is currently being treated with multiple creams and weekly Diflucan. 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     Staph infection Dx 1980's    Toes On Left Foot    Thyroid disease     hypothroidism    Tremor     \"Tremors All Over\"    Urinary incontinence     UTI (urinary tract infection) In Past    No Current Symptoms    UTI (urinary tract infection)     Vertigo     \"Sometimes\"    Wears glasses      Past Surgical History:   Procedure Laterality Date    APPENDECTOMY  1970's    Done With Cholecystectomy    BLADDER SURGERY  1970's Or 1980's    \"Stretched The Opening To The Bladder\", \"Total Of Four Bladder Surgeries\"    BREAST BIOPSY needed for Wheezing Please dispense one box. 2.5 mL 0    pantoprazole (PROTONIX) 40 MG tablet Take 1 tablet by mouth daily 30 tablet 0    citalopram (CELEXA) 40 MG tablet Take 40 mg by mouth daily      TRESIBA FLEXTOUCH 200 UNIT/ML SOPN Inject 16 Units into the skin every morning      levETIRAcetam (KEPPRA) 750 MG tablet Take 750 mg by mouth nightly      carbidopa-levodopa (SINEMET)  MG per tablet Take 1 tablet by mouth nightly      losartan-hydrochlorothiazide (HYZAAR) 100-12.5 MG per tablet Take 1 tablet by mouth daily      metoprolol tartrate (LOPRESSOR) 25 MG tablet Take 25 mg by mouth daily      Dextromethorphan-Guaifenesin (MUCINEX DM)  MG TB12 Take 1 tablet by mouth 2 times daily 28 tablet 0    albuterol (PROVENTIL) (2.5 MG/3ML) 0.083% nebulizer solution Take 3 mLs by nebulization every 6 hours as needed for Wheezing 120 each 3    levothyroxine (SYNTHROID) 25 MCG tablet Take 1 tablet by mouth every morning 30 tablet 0    amitriptyline (ELAVIL) 25 MG tablet Take 1 tablet by mouth nightly 30 tablet 0    polyethylene glycol (GLYCOLAX) packet Take 17 g by mouth daily as needed for Constipation      aspirin 81 MG tablet Take 81 mg by mouth daily      celecoxib (CELEBREX) 100 MG capsule Take 100 mg by mouth 2 times daily      allopurinol (ZYLOPRIM) 300 MG tablet Take 300 mg by mouth daily      insulin aspart (NOVOLOG) 100 UNIT/ML injection vial Inject 12 Units into the skin 3 times daily (before meals)       magnesium oxide (MAG-OX) 400 (240 MG) MG tablet Take 400 mg by mouth daily      Respiratory Therapy Supplies (NEBULIZER COMPRESSOR) KIT 1 kit by Does not apply route once for 1 dose. 1 kit 0    Multiple Vitamins-Iron (MULTI-VITAMIN/IRON PO) Take  by mouth.  nystatin (MYCOSTATIN) 577049 UNIT/GM ointment Apply  topically 2 times daily. Apply topically 2 times daily.  montelukast (SINGULAIR) 10 MG tablet Take 10 mg by mouth nightly.       simvastatin (ZOCOR) 20 MG tablet

## 2018-12-27 ENCOUNTER — HOSPITAL ENCOUNTER (EMERGENCY)
Age: 63
Discharge: HOME | End: 2018-12-28
Attending: EMERGENCY MEDICINE
Payer: MEDICARE

## 2018-12-27 ENCOUNTER — APPOINTMENT (OUTPATIENT)
Dept: GENERAL RADIOLOGY | Age: 63
End: 2018-12-27
Payer: MEDICARE

## 2018-12-27 DIAGNOSIS — R73.9 HYPERGLYCEMIA: Primary | ICD-10-CM

## 2018-12-27 DIAGNOSIS — R06.02 SHORTNESS OF BREATH: ICD-10-CM

## 2018-12-27 LAB
BASOPHILS ABSOLUTE: 0.1 K/CU MM
BASOPHILS RELATIVE PERCENT: 0.8 % (ref 0–1)
CHP ED QC CHECK: YES
DIFFERENTIAL TYPE: ABNORMAL
EOSINOPHILS ABSOLUTE: 0.4 K/CU MM
EOSINOPHILS RELATIVE PERCENT: 4.3 % (ref 0–3)
GLUCOSE BLD-MCNC: 365 MG/DL
GLUCOSE BLD-MCNC: 365 MG/DL (ref 70–99)
HCT VFR BLD CALC: 38.1 % (ref 37–47)
HEMOGLOBIN: 12.6 GM/DL (ref 12.5–16)
IMMATURE NEUTROPHIL %: 0.2 % (ref 0–0.43)
LYMPHOCYTES ABSOLUTE: 3.1 K/CU MM
LYMPHOCYTES RELATIVE PERCENT: 33.6 % (ref 24–44)
MCH RBC QN AUTO: 29.4 PG (ref 27–31)
MCHC RBC AUTO-ENTMCNC: 33.1 % (ref 32–36)
MCV RBC AUTO: 89 FL (ref 78–100)
MONOCYTES ABSOLUTE: 0.9 K/CU MM
MONOCYTES RELATIVE PERCENT: 10.3 % (ref 0–4)
NUCLEATED RBC %: 0 %
PDW BLD-RTO: 12.3 % (ref 11.7–14.9)
PLATELET # BLD: 269 K/CU MM (ref 140–440)
PMV BLD AUTO: 10.1 FL (ref 7.5–11.1)
RBC # BLD: 4.28 M/CU MM (ref 4.2–5.4)
REASON FOR REJECTION: NORMAL
REJECTED TEST: NORMAL
SEGMENTED NEUTROPHILS ABSOLUTE COUNT: 4.6 K/CU MM
SEGMENTED NEUTROPHILS RELATIVE PERCENT: 50.8 % (ref 36–66)
SOURCE: NORMAL
TOTAL IMMATURE NEUTOROPHIL: 0.02 K/CU MM
TOTAL NUCLEATED RBC: 0 K/CU MM
WBC # BLD: 9.1 K/CU MM (ref 4–10.5)

## 2018-12-27 PROCEDURE — 71045 X-RAY EXAM CHEST 1 VIEW: CPT

## 2018-12-27 PROCEDURE — 82962 GLUCOSE BLOOD TEST: CPT

## 2018-12-27 PROCEDURE — 99285 EMERGENCY DEPT VISIT HI MDM: CPT

## 2018-12-27 PROCEDURE — 85025 COMPLETE CBC W/AUTO DIFF WBC: CPT

## 2018-12-27 PROCEDURE — 93005 ELECTROCARDIOGRAM TRACING: CPT | Performed by: EMERGENCY MEDICINE

## 2018-12-27 ASSESSMENT — ENCOUNTER SYMPTOMS
RHINORRHEA: 1
NAUSEA: 0
WHEEZING: 0
EYE REDNESS: 0
ABDOMINAL PAIN: 0
COUGH: 1
VOMITING: 0
CHEST TIGHTNESS: 1
BACK PAIN: 0
SHORTNESS OF BREATH: 1
SORE THROAT: 0

## 2018-12-28 VITALS
RESPIRATION RATE: 16 BRPM | WEIGHT: 170 LBS | OXYGEN SATURATION: 99 % | SYSTOLIC BLOOD PRESSURE: 128 MMHG | DIASTOLIC BLOOD PRESSURE: 86 MMHG | HEART RATE: 74 BPM | BODY MASS INDEX: 31.28 KG/M2 | HEIGHT: 62 IN | TEMPERATURE: 98 F

## 2018-12-28 LAB
ALBUMIN SERPL-MCNC: 4 GM/DL (ref 3.4–5)
ALP BLD-CCNC: 108 IU/L (ref 40–128)
ALT SERPL-CCNC: 34 U/L (ref 10–40)
ANION GAP SERPL CALCULATED.3IONS-SCNC: 12 MMOL/L (ref 4–16)
AST SERPL-CCNC: 30 IU/L (ref 15–37)
BILIRUB SERPL-MCNC: 0.3 MG/DL (ref 0–1)
BUN BLDV-MCNC: 9 MG/DL (ref 6–23)
CALCIUM SERPL-MCNC: 9.3 MG/DL (ref 8.3–10.6)
CHLORIDE BLD-SCNC: 96 MMOL/L (ref 99–110)
CO2: 27 MMOL/L (ref 21–32)
CREAT SERPL-MCNC: 0.5 MG/DL (ref 0.6–1.1)
GFR AFRICAN AMERICAN: >60 ML/MIN/1.73M2
GFR NON-AFRICAN AMERICAN: >60 ML/MIN/1.73M2
GLUCOSE BLD-MCNC: 222 MG/DL (ref 70–99)
POTASSIUM SERPL-SCNC: 3.8 MMOL/L (ref 3.5–5.1)
PRO-BNP: 55.04 PG/ML
SODIUM BLD-SCNC: 135 MMOL/L (ref 135–145)
TOTAL PROTEIN: 7.1 GM/DL (ref 6.4–8.2)
TROPONIN T: <0.01 NG/ML

## 2018-12-28 PROCEDURE — 83880 ASSAY OF NATRIURETIC PEPTIDE: CPT

## 2018-12-28 PROCEDURE — 80053 COMPREHEN METABOLIC PANEL: CPT

## 2018-12-28 PROCEDURE — 36415 COLL VENOUS BLD VENIPUNCTURE: CPT

## 2018-12-28 PROCEDURE — 93010 ELECTROCARDIOGRAM REPORT: CPT | Performed by: INTERNAL MEDICINE

## 2018-12-28 PROCEDURE — 84484 ASSAY OF TROPONIN QUANT: CPT

## 2018-12-31 LAB
EKG ATRIAL RATE: 84 BPM
EKG DIAGNOSIS: NORMAL
EKG P AXIS: 14 DEGREES
EKG P-R INTERVAL: 164 MS
EKG Q-T INTERVAL: 412 MS
EKG QRS DURATION: 102 MS
EKG QTC CALCULATION (BAZETT): 486 MS
EKG R AXIS: -39 DEGREES
EKG T AXIS: 31 DEGREES
EKG VENTRICULAR RATE: 84 BPM

## 2019-01-01 ENCOUNTER — APPOINTMENT (OUTPATIENT)
Dept: GENERAL RADIOLOGY | Age: 64
End: 2019-01-01
Payer: MEDICARE

## 2019-01-01 ENCOUNTER — HOSPITAL ENCOUNTER (OUTPATIENT)
Age: 64
Setting detail: OBSERVATION
Discharge: HOME | End: 2019-01-03
Attending: EMERGENCY MEDICINE | Admitting: INTERNAL MEDICINE
Payer: MEDICARE

## 2019-01-01 DIAGNOSIS — J45.901 EXACERBATION OF ASTHMA, UNSPECIFIED ASTHMA SEVERITY, UNSPECIFIED WHETHER PERSISTENT: Primary | ICD-10-CM

## 2019-01-01 LAB
ALBUMIN SERPL-MCNC: 4.1 GM/DL (ref 3.4–5)
ALP BLD-CCNC: 77 IU/L (ref 40–129)
ALT SERPL-CCNC: 31 U/L (ref 10–40)
ANION GAP SERPL CALCULATED.3IONS-SCNC: 14 MMOL/L (ref 4–16)
AST SERPL-CCNC: 37 IU/L (ref 15–37)
BASOPHILS ABSOLUTE: 0.1 K/CU MM
BASOPHILS RELATIVE PERCENT: 0.8 % (ref 0–1)
BILIRUB SERPL-MCNC: 0.5 MG/DL (ref 0–1)
BUN BLDV-MCNC: 10 MG/DL (ref 6–23)
CALCIUM SERPL-MCNC: 9.4 MG/DL (ref 8.3–10.6)
CHLORIDE BLD-SCNC: 94 MMOL/L (ref 99–110)
CO2: 25 MMOL/L (ref 21–32)
CREAT SERPL-MCNC: 0.6 MG/DL (ref 0.6–1.1)
DIFFERENTIAL TYPE: ABNORMAL
EOSINOPHILS ABSOLUTE: 0.3 K/CU MM
EOSINOPHILS RELATIVE PERCENT: 2.7 % (ref 0–3)
GFR AFRICAN AMERICAN: >60 ML/MIN/1.73M2
GFR NON-AFRICAN AMERICAN: >60 ML/MIN/1.73M2
GLUCOSE BLD-MCNC: 161 MG/DL (ref 70–99)
GLUCOSE BLD-MCNC: 190 MG/DL (ref 70–99)
GLUCOSE BLD-MCNC: 239 MG/DL (ref 70–99)
GLUCOSE BLD-MCNC: 265 MG/DL (ref 70–99)
HCT VFR BLD CALC: 37.6 % (ref 37–47)
HEMOGLOBIN: 12.4 GM/DL (ref 12.5–16)
IMMATURE NEUTROPHIL %: 0.4 % (ref 0–0.43)
LYMPHOCYTES ABSOLUTE: 2.9 K/CU MM
LYMPHOCYTES RELATIVE PERCENT: 31.7 % (ref 24–44)
MCH RBC QN AUTO: 29.6 PG (ref 27–31)
MCHC RBC AUTO-ENTMCNC: 33 % (ref 32–36)
MCV RBC AUTO: 89.7 FL (ref 78–100)
MONOCYTES ABSOLUTE: 0.8 K/CU MM
MONOCYTES RELATIVE PERCENT: 8.5 % (ref 0–4)
NUCLEATED RBC %: 0 %
PDW BLD-RTO: 12.4 % (ref 11.7–14.9)
PLATELET # BLD: 299 K/CU MM (ref 140–440)
PMV BLD AUTO: 9.8 FL (ref 7.5–11.1)
POTASSIUM SERPL-SCNC: 3.9 MMOL/L (ref 3.5–5.1)
RAPID INFLUENZA  B AGN: NEGATIVE
RAPID INFLUENZA A AGN: NEGATIVE
RBC # BLD: 4.19 M/CU MM (ref 4.2–5.4)
SEGMENTED NEUTROPHILS ABSOLUTE COUNT: 5.2 K/CU MM
SEGMENTED NEUTROPHILS RELATIVE PERCENT: 55.9 % (ref 36–66)
SODIUM BLD-SCNC: 133 MMOL/L (ref 135–145)
TOTAL IMMATURE NEUTOROPHIL: 0.04 K/CU MM
TOTAL NUCLEATED RBC: 0 K/CU MM
TOTAL PROTEIN: 7.4 GM/DL (ref 6.4–8.2)
TROPONIN T: <0.01 NG/ML
WBC # BLD: 9.3 K/CU MM (ref 4–10.5)

## 2019-01-01 PROCEDURE — 84484 ASSAY OF TROPONIN QUANT: CPT

## 2019-01-01 PROCEDURE — 6360000002 HC RX W HCPCS: Performed by: EMERGENCY MEDICINE

## 2019-01-01 PROCEDURE — 82962 GLUCOSE BLOOD TEST: CPT

## 2019-01-01 PROCEDURE — 96374 THER/PROPH/DIAG INJ IV PUSH: CPT

## 2019-01-01 PROCEDURE — 93005 ELECTROCARDIOGRAM TRACING: CPT | Performed by: PHYSICIAN ASSISTANT

## 2019-01-01 PROCEDURE — 80053 COMPREHEN METABOLIC PANEL: CPT

## 2019-01-01 PROCEDURE — 6370000000 HC RX 637 (ALT 250 FOR IP): Performed by: PHYSICIAN ASSISTANT

## 2019-01-01 PROCEDURE — 85025 COMPLETE CBC W/AUTO DIFF WBC: CPT

## 2019-01-01 PROCEDURE — G0378 HOSPITAL OBSERVATION PER HR: HCPCS

## 2019-01-01 PROCEDURE — 94640 AIRWAY INHALATION TREATMENT: CPT

## 2019-01-01 PROCEDURE — 83036 HEMOGLOBIN GLYCOSYLATED A1C: CPT

## 2019-01-01 PROCEDURE — 96375 TX/PRO/DX INJ NEW DRUG ADDON: CPT

## 2019-01-01 PROCEDURE — 87804 INFLUENZA ASSAY W/OPTIC: CPT

## 2019-01-01 PROCEDURE — 99285 EMERGENCY DEPT VISIT HI MDM: CPT

## 2019-01-01 PROCEDURE — 94761 N-INVAS EAR/PLS OXIMETRY MLT: CPT

## 2019-01-01 PROCEDURE — 71046 X-RAY EXAM CHEST 2 VIEWS: CPT

## 2019-01-01 RX ORDER — TIZANIDINE 4 MG/1
4 TABLET ORAL 4 TIMES DAILY PRN
Status: DISCONTINUED | OUTPATIENT
Start: 2019-01-01 | End: 2019-01-03 | Stop reason: HOSPADM

## 2019-01-01 RX ORDER — DIPHENHYDRAMINE HYDROCHLORIDE 50 MG/ML
25 INJECTION INTRAMUSCULAR; INTRAVENOUS ONCE
Status: COMPLETED | OUTPATIENT
Start: 2019-01-01 | End: 2019-01-01

## 2019-01-01 RX ORDER — POLYETHYLENE GLYCOL 3350 17 G/17G
17 POWDER, FOR SOLUTION ORAL DAILY PRN
Status: DISCONTINUED | OUTPATIENT
Start: 2019-01-01 | End: 2019-01-03 | Stop reason: HOSPADM

## 2019-01-01 RX ORDER — KETOROLAC TROMETHAMINE 30 MG/ML
15 INJECTION, SOLUTION INTRAMUSCULAR; INTRAVENOUS EVERY 6 HOURS PRN
Status: DISCONTINUED | OUTPATIENT
Start: 2019-01-01 | End: 2019-01-03 | Stop reason: HOSPADM

## 2019-01-01 RX ORDER — IPRATROPIUM BROMIDE AND ALBUTEROL SULFATE 2.5; .5 MG/3ML; MG/3ML
SOLUTION RESPIRATORY (INHALATION)
Status: DISPENSED
Start: 2019-01-01 | End: 2019-01-02

## 2019-01-01 RX ORDER — ONDANSETRON 2 MG/ML
4 INJECTION INTRAMUSCULAR; INTRAVENOUS EVERY 6 HOURS PRN
Status: DISCONTINUED | OUTPATIENT
Start: 2019-01-01 | End: 2019-01-03 | Stop reason: HOSPADM

## 2019-01-01 RX ORDER — AMITRIPTYLINE HYDROCHLORIDE 25 MG/1
25 TABLET, FILM COATED ORAL NIGHTLY
Status: DISCONTINUED | OUTPATIENT
Start: 2019-01-01 | End: 2019-01-03 | Stop reason: HOSPADM

## 2019-01-01 RX ORDER — SIMVASTATIN 20 MG
20 TABLET ORAL NIGHTLY
Status: DISCONTINUED | OUTPATIENT
Start: 2019-01-01 | End: 2019-01-03 | Stop reason: HOSPADM

## 2019-01-01 RX ORDER — ALLOPURINOL 300 MG/1
300 TABLET ORAL DAILY
Status: DISCONTINUED | OUTPATIENT
Start: 2019-01-02 | End: 2019-01-03 | Stop reason: HOSPADM

## 2019-01-01 RX ORDER — MORPHINE SULFATE 4 MG/ML
4 INJECTION, SOLUTION INTRAMUSCULAR; INTRAVENOUS ONCE
Status: COMPLETED | OUTPATIENT
Start: 2019-01-01 | End: 2019-01-01

## 2019-01-01 RX ORDER — NICOTINE POLACRILEX 4 MG
15 LOZENGE BUCCAL PRN
Status: DISCONTINUED | OUTPATIENT
Start: 2019-01-01 | End: 2019-01-03 | Stop reason: HOSPADM

## 2019-01-01 RX ORDER — HYDROCODONE BITARTRATE AND ACETAMINOPHEN 5; 325 MG/1; MG/1
1 TABLET ORAL ONCE
Status: COMPLETED | OUTPATIENT
Start: 2019-01-01 | End: 2019-01-01

## 2019-01-01 RX ORDER — LOSARTAN POTASSIUM AND HYDROCHLOROTHIAZIDE 12.5; 1 MG/1; MG/1
1 TABLET ORAL DAILY
Status: DISCONTINUED | OUTPATIENT
Start: 2019-01-02 | End: 2019-01-02 | Stop reason: CLARIF

## 2019-01-01 RX ORDER — CELECOXIB 100 MG/1
100 CAPSULE ORAL 2 TIMES DAILY
Status: DISCONTINUED | OUTPATIENT
Start: 2019-01-01 | End: 2019-01-03 | Stop reason: HOSPADM

## 2019-01-01 RX ORDER — CITALOPRAM 40 MG/1
40 TABLET ORAL DAILY
Status: DISCONTINUED | OUTPATIENT
Start: 2019-01-02 | End: 2019-01-03 | Stop reason: HOSPADM

## 2019-01-01 RX ORDER — SODIUM CHLORIDE 0.9 % (FLUSH) 0.9 %
10 SYRINGE (ML) INJECTION EVERY 12 HOURS SCHEDULED
Status: DISCONTINUED | OUTPATIENT
Start: 2019-01-01 | End: 2019-01-03 | Stop reason: HOSPADM

## 2019-01-01 RX ORDER — IPRATROPIUM BROMIDE AND ALBUTEROL SULFATE 2.5; .5 MG/3ML; MG/3ML
3 SOLUTION RESPIRATORY (INHALATION)
Status: DISCONTINUED | OUTPATIENT
Start: 2019-01-01 | End: 2019-01-01

## 2019-01-01 RX ORDER — DEXTROSE MONOHYDRATE 25 G/50ML
12.5 INJECTION, SOLUTION INTRAVENOUS PRN
Status: DISCONTINUED | OUTPATIENT
Start: 2019-01-01 | End: 2019-01-03 | Stop reason: HOSPADM

## 2019-01-01 RX ORDER — LEVETIRACETAM 250 MG/1
750 TABLET ORAL NIGHTLY
Status: DISCONTINUED | OUTPATIENT
Start: 2019-01-01 | End: 2019-01-03 | Stop reason: HOSPADM

## 2019-01-01 RX ORDER — LEVOTHYROXINE SODIUM 0.03 MG/1
25 TABLET ORAL EVERY MORNING
Status: DISCONTINUED | OUTPATIENT
Start: 2019-01-02 | End: 2019-01-03 | Stop reason: HOSPADM

## 2019-01-01 RX ORDER — SODIUM CHLORIDE 0.9 % (FLUSH) 0.9 %
10 SYRINGE (ML) INJECTION PRN
Status: DISCONTINUED | OUTPATIENT
Start: 2019-01-01 | End: 2019-01-03 | Stop reason: HOSPADM

## 2019-01-01 RX ORDER — MAGNESIUM HYDROXIDE/ALUMINUM HYDROXICE/SIMETHICONE 120; 1200; 1200 MG/30ML; MG/30ML; MG/30ML
30 SUSPENSION ORAL EVERY 6 HOURS PRN
Status: DISCONTINUED | OUTPATIENT
Start: 2019-01-01 | End: 2019-01-03 | Stop reason: HOSPADM

## 2019-01-01 RX ORDER — GUAIFENESIN/DEXTROMETHORPHAN 100-10MG/5
5 SYRUP ORAL ONCE
Status: COMPLETED | OUTPATIENT
Start: 2019-01-01 | End: 2019-01-01

## 2019-01-01 RX ORDER — MONTELUKAST SODIUM 10 MG/1
10 TABLET ORAL NIGHTLY
Status: DISCONTINUED | OUTPATIENT
Start: 2019-01-01 | End: 2019-01-03 | Stop reason: HOSPADM

## 2019-01-01 RX ORDER — ALBUTEROL SULFATE 2.5 MG/3ML
2.5 SOLUTION RESPIRATORY (INHALATION) EVERY 6 HOURS PRN
Status: DISCONTINUED | OUTPATIENT
Start: 2019-01-01 | End: 2019-01-03 | Stop reason: HOSPADM

## 2019-01-01 RX ORDER — DEXTROSE MONOHYDRATE 50 MG/ML
100 INJECTION, SOLUTION INTRAVENOUS PRN
Status: DISCONTINUED | OUTPATIENT
Start: 2019-01-01 | End: 2019-01-03 | Stop reason: HOSPADM

## 2019-01-01 RX ORDER — IPRATROPIUM BROMIDE AND ALBUTEROL SULFATE 2.5; .5 MG/3ML; MG/3ML
3 SOLUTION RESPIRATORY (INHALATION) ONCE
Status: COMPLETED | OUTPATIENT
Start: 2019-01-01 | End: 2019-01-01

## 2019-01-01 RX ORDER — INSULIN GLARGINE 100 [IU]/ML
30 INJECTION, SOLUTION SUBCUTANEOUS NIGHTLY
Status: DISCONTINUED | OUTPATIENT
Start: 2019-01-01 | End: 2019-01-02

## 2019-01-01 RX ORDER — IPRATROPIUM BROMIDE AND ALBUTEROL SULFATE 2.5; .5 MG/3ML; MG/3ML
1 SOLUTION RESPIRATORY (INHALATION)
Status: DISCONTINUED | OUTPATIENT
Start: 2019-01-02 | End: 2019-01-03 | Stop reason: HOSPADM

## 2019-01-01 RX ORDER — PANTOPRAZOLE SODIUM 40 MG/1
40 TABLET, DELAYED RELEASE ORAL DAILY
Status: DISCONTINUED | OUTPATIENT
Start: 2019-01-02 | End: 2019-01-03 | Stop reason: HOSPADM

## 2019-01-01 RX ORDER — ASPIRIN 81 MG/1
81 TABLET, CHEWABLE ORAL DAILY
Status: DISCONTINUED | OUTPATIENT
Start: 2019-01-02 | End: 2019-01-03 | Stop reason: HOSPADM

## 2019-01-01 RX ORDER — ONDANSETRON 2 MG/ML
4 INJECTION INTRAMUSCULAR; INTRAVENOUS ONCE
Status: COMPLETED | OUTPATIENT
Start: 2019-01-01 | End: 2019-01-01

## 2019-01-01 RX ORDER — FLUTICASONE PROPIONATE 110 UG/1
1 AEROSOL, METERED RESPIRATORY (INHALATION) 2 TIMES DAILY
COMMUNITY
End: 2020-03-13

## 2019-01-01 RX ADMIN — ONDANSETRON 4 MG: 2 INJECTION INTRAMUSCULAR; INTRAVENOUS at 22:28

## 2019-01-01 RX ADMIN — DIPHENHYDRAMINE HYDROCHLORIDE 25 MG: 50 INJECTION, SOLUTION INTRAMUSCULAR; INTRAVENOUS at 22:28

## 2019-01-01 RX ADMIN — IPRATROPIUM BROMIDE AND ALBUTEROL SULFATE 3 AMPULE: .5; 3 SOLUTION RESPIRATORY (INHALATION) at 19:33

## 2019-01-01 RX ADMIN — GUAIFENESIN AND DEXTROMETHORPHAN 5 ML: 100; 10 SYRUP ORAL at 20:38

## 2019-01-01 RX ADMIN — MORPHINE SULFATE 4 MG: 4 INJECTION INTRAVENOUS at 22:28

## 2019-01-01 RX ADMIN — HYDROCODONE BITARTRATE AND ACETAMINOPHEN 1 TABLET: 5; 325 TABLET ORAL at 21:21

## 2019-01-01 ASSESSMENT — PAIN SCALES - GENERAL
PAINLEVEL_OUTOF10: 10
PAINLEVEL_OUTOF10: 0

## 2019-01-01 ASSESSMENT — PAIN DESCRIPTION - PAIN TYPE: TYPE: ACUTE PAIN

## 2019-01-01 ASSESSMENT — PAIN DESCRIPTION - ORIENTATION: ORIENTATION: RIGHT;LEFT

## 2019-01-01 ASSESSMENT — PAIN DESCRIPTION - LOCATION: LOCATION: CHEST

## 2019-01-02 LAB
ANION GAP SERPL CALCULATED.3IONS-SCNC: 13 MMOL/L (ref 4–16)
BUN BLDV-MCNC: 10 MG/DL (ref 6–23)
CALCIUM SERPL-MCNC: 9.3 MG/DL (ref 8.3–10.6)
CHLORIDE BLD-SCNC: 98 MMOL/L (ref 99–110)
CO2: 26 MMOL/L (ref 21–32)
CREAT SERPL-MCNC: 0.6 MG/DL (ref 0.6–1.1)
ESTIMATED AVERAGE GLUCOSE: 214 MG/DL
GFR AFRICAN AMERICAN: >60 ML/MIN/1.73M2
GFR NON-AFRICAN AMERICAN: >60 ML/MIN/1.73M2
GLUCOSE BLD-MCNC: 151 MG/DL (ref 70–99)
GLUCOSE BLD-MCNC: 161 MG/DL (ref 70–99)
GLUCOSE BLD-MCNC: 242 MG/DL (ref 70–99)
GLUCOSE BLD-MCNC: 379 MG/DL (ref 70–99)
GLUCOSE BLD-MCNC: 494 MG/DL (ref 70–99)
HBA1C MFR BLD: 9.1 % (ref 4.2–6.3)
HCT VFR BLD CALC: 35 % (ref 37–47)
HEMOGLOBIN: 11.5 GM/DL (ref 12.5–16)
MCH RBC QN AUTO: 29.6 PG (ref 27–31)
MCHC RBC AUTO-ENTMCNC: 32.9 % (ref 32–36)
MCV RBC AUTO: 90.2 FL (ref 78–100)
PDW BLD-RTO: 12.5 % (ref 11.7–14.9)
PLATELET # BLD: 247 K/CU MM (ref 140–440)
PMV BLD AUTO: 9.3 FL (ref 7.5–11.1)
POTASSIUM SERPL-SCNC: 4.7 MMOL/L (ref 3.5–5.1)
RBC # BLD: 3.88 M/CU MM (ref 4.2–5.4)
SODIUM BLD-SCNC: 137 MMOL/L (ref 135–145)
WBC # BLD: 10 K/CU MM (ref 4–10.5)

## 2019-01-02 PROCEDURE — 80048 BASIC METABOLIC PNL TOTAL CA: CPT

## 2019-01-02 PROCEDURE — 6360000002 HC RX W HCPCS: Performed by: INTERNAL MEDICINE

## 2019-01-02 PROCEDURE — 2580000003 HC RX 258: Performed by: INTERNAL MEDICINE

## 2019-01-02 PROCEDURE — 87205 SMEAR GRAM STAIN: CPT

## 2019-01-02 PROCEDURE — 6370000000 HC RX 637 (ALT 250 FOR IP): Performed by: INTERNAL MEDICINE

## 2019-01-02 PROCEDURE — 96376 TX/PRO/DX INJ SAME DRUG ADON: CPT

## 2019-01-02 PROCEDURE — 87070 CULTURE OTHR SPECIMN AEROBIC: CPT

## 2019-01-02 PROCEDURE — G0378 HOSPITAL OBSERVATION PER HR: HCPCS

## 2019-01-02 PROCEDURE — 96375 TX/PRO/DX INJ NEW DRUG ADDON: CPT

## 2019-01-02 PROCEDURE — 96372 THER/PROPH/DIAG INJ SC/IM: CPT

## 2019-01-02 PROCEDURE — 6370000000 HC RX 637 (ALT 250 FOR IP): Performed by: HOSPITALIST

## 2019-01-02 PROCEDURE — 96365 THER/PROPH/DIAG IV INF INIT: CPT

## 2019-01-02 PROCEDURE — 85027 COMPLETE CBC AUTOMATED: CPT

## 2019-01-02 PROCEDURE — 94640 AIRWAY INHALATION TREATMENT: CPT

## 2019-01-02 PROCEDURE — 36415 COLL VENOUS BLD VENIPUNCTURE: CPT

## 2019-01-02 PROCEDURE — 94680 O2 UPTK RST&XERS DIR SIMPLE: CPT

## 2019-01-02 PROCEDURE — 82962 GLUCOSE BLOOD TEST: CPT

## 2019-01-02 PROCEDURE — 93010 ELECTROCARDIOGRAM REPORT: CPT | Performed by: INTERNAL MEDICINE

## 2019-01-02 RX ORDER — DIPHENHYDRAMINE HCL 25 MG
50 TABLET ORAL ONCE
Status: COMPLETED | OUTPATIENT
Start: 2019-01-02 | End: 2019-01-02

## 2019-01-02 RX ORDER — INSULIN GLARGINE 100 [IU]/ML
30 INJECTION, SOLUTION SUBCUTANEOUS NIGHTLY
Status: DISCONTINUED | OUTPATIENT
Start: 2019-01-02 | End: 2019-01-03 | Stop reason: HOSPADM

## 2019-01-02 RX ORDER — HYDROXYZINE HYDROCHLORIDE 10 MG/1
10 TABLET, FILM COATED ORAL 3 TIMES DAILY PRN
Status: DISCONTINUED | OUTPATIENT
Start: 2019-01-02 | End: 2019-01-03 | Stop reason: HOSPADM

## 2019-01-02 RX ORDER — METOCLOPRAMIDE 10 MG/1
10 TABLET ORAL ONCE
Status: COMPLETED | OUTPATIENT
Start: 2019-01-02 | End: 2019-01-02

## 2019-01-02 RX ORDER — HYDROCHLOROTHIAZIDE 12.5 MG/1
12.5 TABLET ORAL DAILY
Status: DISCONTINUED | OUTPATIENT
Start: 2019-01-02 | End: 2019-01-03 | Stop reason: HOSPADM

## 2019-01-02 RX ORDER — BENZONATATE 100 MG/1
100 CAPSULE ORAL 3 TIMES DAILY PRN
Status: DISCONTINUED | OUTPATIENT
Start: 2019-01-02 | End: 2019-01-03 | Stop reason: HOSPADM

## 2019-01-02 RX ORDER — METHYLPREDNISOLONE SODIUM SUCCINATE 40 MG/ML
40 INJECTION, POWDER, LYOPHILIZED, FOR SOLUTION INTRAMUSCULAR; INTRAVENOUS EVERY 8 HOURS
Status: DISCONTINUED | OUTPATIENT
Start: 2019-01-02 | End: 2019-01-03 | Stop reason: HOSPADM

## 2019-01-02 RX ORDER — LOSARTAN POTASSIUM 100 MG/1
100 TABLET ORAL DAILY
Status: DISCONTINUED | OUTPATIENT
Start: 2019-01-02 | End: 2019-01-03 | Stop reason: HOSPADM

## 2019-01-02 RX ADMIN — HYDROXYZINE HYDROCHLORIDE 10 MG: 10 TABLET, FILM COATED ORAL at 04:44

## 2019-01-02 RX ADMIN — LEVOTHYROXINE SODIUM 25 MCG: 25 TABLET ORAL at 06:44

## 2019-01-02 RX ADMIN — ALLOPURINOL 300 MG: 300 TABLET ORAL at 08:20

## 2019-01-02 RX ADMIN — CELECOXIB 100 MG: 100 CAPSULE ORAL at 20:47

## 2019-01-02 RX ADMIN — LEVETIRACETAM 750 MG: 250 TABLET, FILM COATED ORAL at 20:47

## 2019-01-02 RX ADMIN — IPRATROPIUM BROMIDE AND ALBUTEROL SULFATE 1 AMPULE: .5; 3 SOLUTION RESPIRATORY (INHALATION) at 08:18

## 2019-01-02 RX ADMIN — KETOROLAC TROMETHAMINE 15 MG: 30 INJECTION, SOLUTION INTRAMUSCULAR at 00:14

## 2019-01-02 RX ADMIN — AMITRIPTYLINE HYDROCHLORIDE 25 MG: 25 TABLET, FILM COATED ORAL at 20:47

## 2019-01-02 RX ADMIN — METHYLPREDNISOLONE SODIUM SUCCINATE 40 MG: 40 INJECTION, POWDER, FOR SOLUTION INTRAMUSCULAR; INTRAVENOUS at 20:48

## 2019-01-02 RX ADMIN — MONTELUKAST SODIUM 10 MG: 10 TABLET, FILM COATED ORAL at 00:14

## 2019-01-02 RX ADMIN — SODIUM CHLORIDE, PRESERVATIVE FREE 10 ML: 5 INJECTION INTRAVENOUS at 08:21

## 2019-01-02 RX ADMIN — SIMVASTATIN 20 MG: 20 TABLET, FILM COATED ORAL at 00:14

## 2019-01-02 RX ADMIN — PANTOPRAZOLE SODIUM 40 MG: 40 TABLET, DELAYED RELEASE ORAL at 06:44

## 2019-01-02 RX ADMIN — LOSARTAN POTASSIUM 100 MG: 100 TABLET, FILM COATED ORAL at 08:20

## 2019-01-02 RX ADMIN — CARBIDOPA AND LEVODOPA 1 TABLET: 10; 100 TABLET ORAL at 20:47

## 2019-01-02 RX ADMIN — AMITRIPTYLINE HYDROCHLORIDE 25 MG: 25 TABLET, FILM COATED ORAL at 00:14

## 2019-01-02 RX ADMIN — CITALOPRAM HYDROBROMIDE 40 MG: 40 TABLET ORAL at 08:20

## 2019-01-02 RX ADMIN — INSULIN LISPRO 6 UNITS: 100 INJECTION, SOLUTION INTRAVENOUS; SUBCUTANEOUS at 20:47

## 2019-01-02 RX ADMIN — KETOROLAC TROMETHAMINE 15 MG: 30 INJECTION, SOLUTION INTRAMUSCULAR at 16:42

## 2019-01-02 RX ADMIN — ENOXAPARIN SODIUM 40 MG: 40 INJECTION SUBCUTANEOUS at 08:20

## 2019-01-02 RX ADMIN — METOPROLOL TARTRATE 25 MG: 25 TABLET ORAL at 08:20

## 2019-01-02 RX ADMIN — SIMVASTATIN 20 MG: 20 TABLET, FILM COATED ORAL at 20:47

## 2019-01-02 RX ADMIN — PREDNISONE 30 MG: 20 TABLET ORAL at 10:03

## 2019-01-02 RX ADMIN — CELECOXIB 100 MG: 100 CAPSULE ORAL at 00:14

## 2019-01-02 RX ADMIN — TIZANIDINE 4 MG: 4 TABLET ORAL at 16:47

## 2019-01-02 RX ADMIN — ASPIRIN 81 MG 81 MG: 81 TABLET ORAL at 08:20

## 2019-01-02 RX ADMIN — ALUMINUM HYDROXIDE, MAGNESIUM HYDROXIDE, AND SIMETHICONE 30 ML: 200; 200; 20 SUSPENSION ORAL at 14:12

## 2019-01-02 RX ADMIN — INSULIN GLARGINE 30 UNITS: 100 INJECTION, SOLUTION SUBCUTANEOUS at 20:48

## 2019-01-02 RX ADMIN — SODIUM CHLORIDE, PRESERVATIVE FREE 10 ML: 5 INJECTION INTRAVENOUS at 20:54

## 2019-01-02 RX ADMIN — SODIUM CHLORIDE, PRESERVATIVE FREE 10 ML: 5 INJECTION INTRAVENOUS at 00:15

## 2019-01-02 RX ADMIN — INSULIN LISPRO 10 UNITS: 100 INJECTION, SOLUTION INTRAVENOUS; SUBCUTANEOUS at 17:26

## 2019-01-02 RX ADMIN — METHYLPREDNISOLONE SODIUM SUCCINATE 40 MG: 40 INJECTION, POWDER, FOR SOLUTION INTRAMUSCULAR; INTRAVENOUS at 12:26

## 2019-01-02 RX ADMIN — HYDROCHLOROTHIAZIDE 12.5 MG: 12.5 TABLET ORAL at 08:20

## 2019-01-02 RX ADMIN — IPRATROPIUM BROMIDE AND ALBUTEROL SULFATE 1 AMPULE: .5; 3 SOLUTION RESPIRATORY (INHALATION) at 16:23

## 2019-01-02 RX ADMIN — GUAIFENESIN AND DEXTROMETHORPHAN HYDROBROMIDE 1 TABLET: 600; 30 TABLET, EXTENDED RELEASE ORAL at 00:14

## 2019-01-02 RX ADMIN — MONTELUKAST SODIUM 10 MG: 10 TABLET, FILM COATED ORAL at 20:47

## 2019-01-02 RX ADMIN — LEVETIRACETAM 750 MG: 250 TABLET, FILM COATED ORAL at 00:24

## 2019-01-02 RX ADMIN — INSULIN LISPRO 4 UNITS: 100 INJECTION, SOLUTION INTRAVENOUS; SUBCUTANEOUS at 12:26

## 2019-01-02 RX ADMIN — AZITHROMYCIN MONOHYDRATE 500 MG: 500 INJECTION, POWDER, LYOPHILIZED, FOR SOLUTION INTRAVENOUS at 13:12

## 2019-01-02 RX ADMIN — GUAIFENESIN AND DEXTROMETHORPHAN HYDROBROMIDE 1 TABLET: 600; 30 TABLET, EXTENDED RELEASE ORAL at 08:20

## 2019-01-02 RX ADMIN — DIPHENHYDRAMINE HCL 50 MG: 25 TABLET ORAL at 10:03

## 2019-01-02 RX ADMIN — INSULIN GLARGINE 30 UNITS: 100 INJECTION, SOLUTION SUBCUTANEOUS at 00:24

## 2019-01-02 RX ADMIN — IPRATROPIUM BROMIDE AND ALBUTEROL SULFATE 1 AMPULE: .5; 3 SOLUTION RESPIRATORY (INHALATION) at 20:20

## 2019-01-02 RX ADMIN — Medication 400 MG: at 08:20

## 2019-01-02 RX ADMIN — METOCLOPRAMIDE 10 MG: 10 TABLET ORAL at 10:03

## 2019-01-02 RX ADMIN — GUAIFENESIN AND DEXTROMETHORPHAN HYDROBROMIDE 1 TABLET: 600; 30 TABLET, EXTENDED RELEASE ORAL at 20:47

## 2019-01-02 RX ADMIN — CARBIDOPA AND LEVODOPA 1 TABLET: 10; 100 TABLET ORAL at 00:14

## 2019-01-02 RX ADMIN — CELECOXIB 100 MG: 100 CAPSULE ORAL at 08:20

## 2019-01-02 ASSESSMENT — PAIN DESCRIPTION - PAIN TYPE
TYPE: ACUTE PAIN
TYPE: ACUTE PAIN

## 2019-01-02 ASSESSMENT — PAIN DESCRIPTION - DESCRIPTORS
DESCRIPTORS: ACHING
DESCRIPTORS: CRAMPING;DISCOMFORT

## 2019-01-02 ASSESSMENT — PAIN DESCRIPTION - PROGRESSION
CLINICAL_PROGRESSION: NOT CHANGED
CLINICAL_PROGRESSION: RAPIDLY WORSENING

## 2019-01-02 ASSESSMENT — PAIN SCALES - GENERAL
PAINLEVEL_OUTOF10: 0
PAINLEVEL_OUTOF10: 0
PAINLEVEL_OUTOF10: 10
PAINLEVEL_OUTOF10: 8

## 2019-01-02 ASSESSMENT — PAIN DESCRIPTION - ONSET
ONSET: ON-GOING
ONSET: SUDDEN

## 2019-01-02 ASSESSMENT — PAIN DESCRIPTION - FREQUENCY
FREQUENCY: CONTINUOUS
FREQUENCY: INTERMITTENT

## 2019-01-02 ASSESSMENT — PAIN DESCRIPTION - ORIENTATION: ORIENTATION: UPPER

## 2019-01-02 ASSESSMENT — PAIN DESCRIPTION - LOCATION
LOCATION: ABDOMEN
LOCATION: GENERALIZED

## 2019-01-03 VITALS
OXYGEN SATURATION: 94 % | WEIGHT: 181.7 LBS | SYSTOLIC BLOOD PRESSURE: 116 MMHG | DIASTOLIC BLOOD PRESSURE: 55 MMHG | BODY MASS INDEX: 33.44 KG/M2 | TEMPERATURE: 98.2 F | HEART RATE: 66 BPM | HEIGHT: 62 IN | RESPIRATION RATE: 17 BRPM

## 2019-01-03 LAB
ALBUMIN SERPL-MCNC: 4.3 GM/DL (ref 3.4–5)
ALP BLD-CCNC: 62 IU/L (ref 40–128)
ALT SERPL-CCNC: 23 U/L (ref 10–40)
ANION GAP SERPL CALCULATED.3IONS-SCNC: 13 MMOL/L (ref 4–16)
AST SERPL-CCNC: 36 IU/L (ref 15–37)
BASOPHILS ABSOLUTE: 0 K/CU MM
BASOPHILS RELATIVE PERCENT: 0.1 % (ref 0–1)
BILIRUB SERPL-MCNC: 0.4 MG/DL (ref 0–1)
BUN BLDV-MCNC: 16 MG/DL (ref 6–23)
CALCIUM SERPL-MCNC: 9.5 MG/DL (ref 8.3–10.6)
CHLORIDE BLD-SCNC: 96 MMOL/L (ref 99–110)
CO2: 25 MMOL/L (ref 21–32)
CREAT SERPL-MCNC: 0.5 MG/DL (ref 0.6–1.1)
CULTURE: NORMAL
DIFFERENTIAL TYPE: ABNORMAL
EOSINOPHILS ABSOLUTE: 0 K/CU MM
EOSINOPHILS RELATIVE PERCENT: 0 % (ref 0–3)
GFR AFRICAN AMERICAN: >60 ML/MIN/1.73M2
GFR NON-AFRICAN AMERICAN: >60 ML/MIN/1.73M2
GLUCOSE BLD-MCNC: 260 MG/DL (ref 70–99)
GLUCOSE BLD-MCNC: 268 MG/DL (ref 70–99)
GLUCOSE BLD-MCNC: 303 MG/DL (ref 70–99)
GLUCOSE BLD-MCNC: 461 MG/DL (ref 70–99)
GRAM SMEAR: NORMAL
HCT VFR BLD CALC: 36.9 % (ref 37–47)
HEMOGLOBIN: 12 GM/DL (ref 12.5–16)
IMMATURE NEUTROPHIL %: 0.9 % (ref 0–0.43)
LYMPHOCYTES ABSOLUTE: 1.6 K/CU MM
LYMPHOCYTES RELATIVE PERCENT: 13 % (ref 24–44)
Lab: NORMAL
MAGNESIUM: 2.1 MG/DL (ref 1.8–2.4)
MCH RBC QN AUTO: 29.4 PG (ref 27–31)
MCHC RBC AUTO-ENTMCNC: 32.5 % (ref 32–36)
MCV RBC AUTO: 90.4 FL (ref 78–100)
MONOCYTES ABSOLUTE: 0.4 K/CU MM
MONOCYTES RELATIVE PERCENT: 3.2 % (ref 0–4)
NUCLEATED RBC %: 0 %
PDW BLD-RTO: 12.1 % (ref 11.7–14.9)
PHOSPHORUS: 2.6 MG/DL (ref 2.5–4.9)
PLATELET # BLD: 269 K/CU MM (ref 140–440)
PMV BLD AUTO: 9.8 FL (ref 7.5–11.1)
POTASSIUM SERPL-SCNC: 4.3 MMOL/L (ref 3.5–5.1)
PROCALCITONIN: 0.05
RBC # BLD: 4.08 M/CU MM (ref 4.2–5.4)
REASON FOR REJECTION: NORMAL
REJECTED TEST: NORMAL
REPORT STATUS: NORMAL
SEGMENTED NEUTROPHILS ABSOLUTE COUNT: 10 K/CU MM
SEGMENTED NEUTROPHILS RELATIVE PERCENT: 82.8 % (ref 36–66)
SODIUM BLD-SCNC: 134 MMOL/L (ref 135–145)
SOURCE: NORMAL
SPECIMEN: NORMAL
T4 FREE: 1.15 NG/DL (ref 0.9–1.8)
TOTAL IMMATURE NEUTOROPHIL: 0.11 K/CU MM
TOTAL NUCLEATED RBC: 0 K/CU MM
TOTAL PROTEIN: 7 GM/DL (ref 6.4–8.2)
TSH HIGH SENSITIVITY: 0.29 UIU/ML (ref 0.27–4.2)
WBC # BLD: 12 K/CU MM (ref 4–10.5)

## 2019-01-03 PROCEDURE — 6360000002 HC RX W HCPCS: Performed by: INTERNAL MEDICINE

## 2019-01-03 PROCEDURE — 6370000000 HC RX 637 (ALT 250 FOR IP): Performed by: INTERNAL MEDICINE

## 2019-01-03 PROCEDURE — 82962 GLUCOSE BLOOD TEST: CPT

## 2019-01-03 PROCEDURE — G0378 HOSPITAL OBSERVATION PER HR: HCPCS

## 2019-01-03 PROCEDURE — 96372 THER/PROPH/DIAG INJ SC/IM: CPT

## 2019-01-03 PROCEDURE — 80053 COMPREHEN METABOLIC PANEL: CPT

## 2019-01-03 PROCEDURE — 84443 ASSAY THYROID STIM HORMONE: CPT

## 2019-01-03 PROCEDURE — 85025 COMPLETE CBC W/AUTO DIFF WBC: CPT

## 2019-01-03 PROCEDURE — 94761 N-INVAS EAR/PLS OXIMETRY MLT: CPT

## 2019-01-03 PROCEDURE — 83735 ASSAY OF MAGNESIUM: CPT

## 2019-01-03 PROCEDURE — 6370000000 HC RX 637 (ALT 250 FOR IP): Performed by: HOSPITALIST

## 2019-01-03 PROCEDURE — 36415 COLL VENOUS BLD VENIPUNCTURE: CPT

## 2019-01-03 PROCEDURE — 84145 PROCALCITONIN (PCT): CPT

## 2019-01-03 PROCEDURE — 94640 AIRWAY INHALATION TREATMENT: CPT

## 2019-01-03 PROCEDURE — 84100 ASSAY OF PHOSPHORUS: CPT

## 2019-01-03 PROCEDURE — 96376 TX/PRO/DX INJ SAME DRUG ADON: CPT

## 2019-01-03 PROCEDURE — 2700000000 HC OXYGEN THERAPY PER DAY

## 2019-01-03 PROCEDURE — 84439 ASSAY OF FREE THYROXINE: CPT

## 2019-01-03 RX ORDER — IPRATROPIUM BROMIDE AND ALBUTEROL SULFATE 2.5; .5 MG/3ML; MG/3ML
3 SOLUTION RESPIRATORY (INHALATION)
Qty: 360 ML | Refills: 2 | Status: ON HOLD | OUTPATIENT
Start: 2019-01-03 | End: 2020-09-04 | Stop reason: HOSPADM

## 2019-01-03 RX ORDER — ALBUTEROL SULFATE 2.5 MG/3ML
2.5 SOLUTION RESPIRATORY (INHALATION) 4 TIMES DAILY
Qty: 120 EACH | Refills: 3 | Status: SHIPPED | OUTPATIENT
Start: 2019-01-03 | End: 2019-03-18

## 2019-01-03 RX ORDER — AZITHROMYCIN 500 MG/1
500 TABLET, FILM COATED ORAL DAILY
Qty: 2 TABLET | Refills: 0 | Status: SHIPPED | OUTPATIENT
Start: 2019-01-03 | End: 2019-01-05

## 2019-01-03 RX ORDER — DIPHENHYDRAMINE HCL 25 MG
50 TABLET ORAL ONCE
Status: COMPLETED | OUTPATIENT
Start: 2019-01-03 | End: 2019-01-03

## 2019-01-03 RX ORDER — INSULIN DEGLUDEC 200 U/ML
30 INJECTION, SOLUTION SUBCUTANEOUS EVERY MORNING
Qty: 1 PEN | Refills: 2 | Status: ON HOLD | OUTPATIENT
Start: 2019-01-03 | End: 2020-09-04 | Stop reason: SDUPTHER

## 2019-01-03 RX ORDER — BENZONATATE 100 MG/1
100 CAPSULE ORAL 3 TIMES DAILY PRN
Qty: 30 CAPSULE | Refills: 1 | Status: SHIPPED | OUTPATIENT
Start: 2019-01-03 | End: 2019-01-10

## 2019-01-03 RX ORDER — PREDNISONE 10 MG/1
TABLET ORAL
Qty: 18 TABLET | Refills: 0 | Status: SHIPPED | OUTPATIENT
Start: 2019-01-03 | End: 2019-02-26 | Stop reason: ALTCHOICE

## 2019-01-03 RX ORDER — LEVETIRACETAM 750 MG/1
750 TABLET ORAL NIGHTLY
Qty: 60 TABLET | Refills: 3 | Status: ON HOLD | OUTPATIENT
Start: 2019-01-03 | End: 2020-09-04 | Stop reason: HOSPADM

## 2019-01-03 RX ORDER — KETOCONAZOLE 20 MG/G
CREAM TOPICAL 2 TIMES DAILY
Status: DISCONTINUED | OUTPATIENT
Start: 2019-01-03 | End: 2019-01-03 | Stop reason: HOSPADM

## 2019-01-03 RX ORDER — METOCLOPRAMIDE 10 MG/1
10 TABLET ORAL ONCE
Status: COMPLETED | OUTPATIENT
Start: 2019-01-03 | End: 2019-01-03

## 2019-01-03 RX ADMIN — CITALOPRAM HYDROBROMIDE 40 MG: 40 TABLET ORAL at 08:09

## 2019-01-03 RX ADMIN — METOCLOPRAMIDE 10 MG: 10 TABLET ORAL at 08:19

## 2019-01-03 RX ADMIN — TIZANIDINE 4 MG: 4 TABLET ORAL at 13:13

## 2019-01-03 RX ADMIN — METOPROLOL TARTRATE 25 MG: 25 TABLET ORAL at 08:09

## 2019-01-03 RX ADMIN — ENOXAPARIN SODIUM 40 MG: 40 INJECTION SUBCUTANEOUS at 08:09

## 2019-01-03 RX ADMIN — KETOCONAZOLE: 20 CREAM TOPICAL at 11:47

## 2019-01-03 RX ADMIN — INSULIN LISPRO 12 UNITS: 100 INJECTION, SOLUTION INTRAVENOUS; SUBCUTANEOUS at 13:14

## 2019-01-03 RX ADMIN — ALLOPURINOL 300 MG: 300 TABLET ORAL at 08:08

## 2019-01-03 RX ADMIN — KETOROLAC TROMETHAMINE 15 MG: 30 INJECTION, SOLUTION INTRAMUSCULAR at 00:28

## 2019-01-03 RX ADMIN — INSULIN LISPRO 8 UNITS: 100 INJECTION, SOLUTION INTRAVENOUS; SUBCUTANEOUS at 03:32

## 2019-01-03 RX ADMIN — IPRATROPIUM BROMIDE AND ALBUTEROL SULFATE 1 AMPULE: .5; 3 SOLUTION RESPIRATORY (INHALATION) at 08:01

## 2019-01-03 RX ADMIN — TIZANIDINE 4 MG: 4 TABLET ORAL at 08:19

## 2019-01-03 RX ADMIN — LEVOTHYROXINE SODIUM 25 MCG: 25 TABLET ORAL at 05:58

## 2019-01-03 RX ADMIN — LOSARTAN POTASSIUM 100 MG: 100 TABLET, FILM COATED ORAL at 08:09

## 2019-01-03 RX ADMIN — Medication 400 MG: at 08:09

## 2019-01-03 RX ADMIN — METHYLPREDNISOLONE SODIUM SUCCINATE 40 MG: 40 INJECTION, POWDER, FOR SOLUTION INTRAMUSCULAR; INTRAVENOUS at 03:34

## 2019-01-03 RX ADMIN — DIPHENHYDRAMINE HCL 50 MG: 25 TABLET ORAL at 08:19

## 2019-01-03 RX ADMIN — HYDROXYZINE HYDROCHLORIDE 10 MG: 10 TABLET, FILM COATED ORAL at 00:28

## 2019-01-03 RX ADMIN — CELECOXIB 100 MG: 100 CAPSULE ORAL at 08:09

## 2019-01-03 RX ADMIN — INSULIN LISPRO 15 UNITS: 100 INJECTION, SOLUTION INTRAVENOUS; SUBCUTANEOUS at 13:16

## 2019-01-03 RX ADMIN — ASPIRIN 81 MG 81 MG: 81 TABLET ORAL at 08:08

## 2019-01-03 RX ADMIN — HYDROCHLOROTHIAZIDE 12.5 MG: 12.5 TABLET ORAL at 08:09

## 2019-01-03 RX ADMIN — GUAIFENESIN AND DEXTROMETHORPHAN HYDROBROMIDE 1 TABLET: 600; 30 TABLET, EXTENDED RELEASE ORAL at 08:09

## 2019-01-03 RX ADMIN — KETOROLAC TROMETHAMINE 15 MG: 30 INJECTION, SOLUTION INTRAMUSCULAR at 08:19

## 2019-01-03 RX ADMIN — INSULIN LISPRO 4 UNITS: 100 INJECTION, SOLUTION INTRAVENOUS; SUBCUTANEOUS at 09:41

## 2019-01-03 RX ADMIN — PANTOPRAZOLE SODIUM 40 MG: 40 TABLET, DELAYED RELEASE ORAL at 05:58

## 2019-01-03 ASSESSMENT — PAIN DESCRIPTION - LOCATION: LOCATION: ABDOMEN

## 2019-01-03 ASSESSMENT — PAIN DESCRIPTION - PAIN TYPE: TYPE: ACUTE PAIN

## 2019-01-03 ASSESSMENT — PAIN SCALES - GENERAL
PAINLEVEL_OUTOF10: 8
PAINLEVEL_OUTOF10: 8
PAINLEVEL_OUTOF10: 4

## 2019-01-04 LAB
EKG ATRIAL RATE: 83 BPM
EKG DIAGNOSIS: NORMAL
EKG P AXIS: 19 DEGREES
EKG P-R INTERVAL: 164 MS
EKG Q-T INTERVAL: 408 MS
EKG QRS DURATION: 104 MS
EKG QTC CALCULATION (BAZETT): 479 MS
EKG R AXIS: -38 DEGREES
EKG T AXIS: 15 DEGREES
EKG VENTRICULAR RATE: 83 BPM

## 2019-01-23 ENCOUNTER — HOSPITAL ENCOUNTER (EMERGENCY)
Age: 64
Discharge: HOME | End: 2019-01-23
Attending: EMERGENCY MEDICINE
Payer: MEDICARE

## 2019-01-23 VITALS
TEMPERATURE: 97.8 F | WEIGHT: 176 LBS | SYSTOLIC BLOOD PRESSURE: 163 MMHG | OXYGEN SATURATION: 97 % | RESPIRATION RATE: 18 BRPM | BODY MASS INDEX: 32.39 KG/M2 | HEART RATE: 86 BPM | DIASTOLIC BLOOD PRESSURE: 80 MMHG | HEIGHT: 62 IN

## 2019-01-23 DIAGNOSIS — G89.29 CHRONIC ABDOMINAL PAIN: ICD-10-CM

## 2019-01-23 DIAGNOSIS — N39.0 ACUTE UTI: Primary | ICD-10-CM

## 2019-01-23 DIAGNOSIS — R10.9 CHRONIC ABDOMINAL PAIN: ICD-10-CM

## 2019-01-23 LAB
ALBUMIN SERPL-MCNC: 4 GM/DL (ref 3.4–5)
ALP BLD-CCNC: 76 IU/L (ref 40–129)
ALT SERPL-CCNC: 39 U/L (ref 10–40)
ANION GAP SERPL CALCULATED.3IONS-SCNC: 12 MMOL/L (ref 4–16)
AST SERPL-CCNC: 29 IU/L (ref 15–37)
BACTERIA: NEGATIVE /HPF
BASOPHILS ABSOLUTE: 0.1 K/CU MM
BASOPHILS RELATIVE PERCENT: 0.6 % (ref 0–1)
BILIRUB SERPL-MCNC: 0.5 MG/DL (ref 0–1)
BILIRUBIN URINE: NEGATIVE MG/DL
BLOOD, URINE: NEGATIVE
BUN BLDV-MCNC: 13 MG/DL (ref 6–23)
CALCIUM SERPL-MCNC: 9.7 MG/DL (ref 8.3–10.6)
CHLORIDE BLD-SCNC: 99 MMOL/L (ref 99–110)
CLARITY: CLEAR
CO2: 29 MMOL/L (ref 21–32)
COLOR: ABNORMAL
CREAT SERPL-MCNC: 0.6 MG/DL (ref 0.6–1.1)
DIFFERENTIAL TYPE: ABNORMAL
EOSINOPHILS ABSOLUTE: 0.3 K/CU MM
EOSINOPHILS RELATIVE PERCENT: 3.7 % (ref 0–3)
GFR AFRICAN AMERICAN: >60 ML/MIN/1.73M2
GFR NON-AFRICAN AMERICAN: >60 ML/MIN/1.73M2
GLUCOSE BLD-MCNC: 140 MG/DL (ref 70–99)
GLUCOSE, URINE: NEGATIVE MG/DL
HCT VFR BLD CALC: 40.7 % (ref 37–47)
HEMOGLOBIN: 13.6 GM/DL (ref 12.5–16)
IMMATURE NEUTROPHIL %: 0.4 % (ref 0–0.43)
KETONES, URINE: NEGATIVE MG/DL
LEUKOCYTE ESTERASE, URINE: ABNORMAL
LIPASE: 20 IU/L (ref 13–60)
LYMPHOCYTES ABSOLUTE: 3 K/CU MM
LYMPHOCYTES RELATIVE PERCENT: 34.5 % (ref 24–44)
MCH RBC QN AUTO: 29.4 PG (ref 27–31)
MCHC RBC AUTO-ENTMCNC: 33.4 % (ref 32–36)
MCV RBC AUTO: 88.1 FL (ref 78–100)
MONOCYTES ABSOLUTE: 0.8 K/CU MM
MONOCYTES RELATIVE PERCENT: 9 % (ref 0–4)
NITRITE URINE, QUANTITATIVE: NEGATIVE
NUCLEATED RBC %: 0 %
PDW BLD-RTO: 11.9 % (ref 11.7–14.9)
PH, URINE: 8 (ref 5–8)
PLATELET # BLD: 249 K/CU MM (ref 140–440)
PMV BLD AUTO: 10.3 FL (ref 7.5–11.1)
POTASSIUM SERPL-SCNC: 3.9 MMOL/L (ref 3.5–5.1)
PROTEIN UA: NEGATIVE MG/DL
RBC # BLD: 4.62 M/CU MM (ref 4.2–5.4)
RBC URINE: ABNORMAL /HPF (ref 0–6)
SEGMENTED NEUTROPHILS ABSOLUTE COUNT: 4.4 K/CU MM
SEGMENTED NEUTROPHILS RELATIVE PERCENT: 51.8 % (ref 36–66)
SODIUM BLD-SCNC: 140 MMOL/L (ref 135–145)
SPECIFIC GRAVITY UA: 1 (ref 1–1.03)
SQUAMOUS EPITHELIAL: 2 /HPF
TOTAL IMMATURE NEUTOROPHIL: 0.03 K/CU MM
TOTAL NUCLEATED RBC: 0 K/CU MM
TOTAL PROTEIN: 7.4 GM/DL (ref 6.4–8.2)
TRICHOMONAS: ABNORMAL /HPF
UROBILINOGEN, URINE: NORMAL MG/DL (ref 0.2–1)
WBC # BLD: 8.5 K/CU MM (ref 4–10.5)
WBC UA: 3 /HPF (ref 0–5)

## 2019-01-23 PROCEDURE — 83690 ASSAY OF LIPASE: CPT

## 2019-01-23 PROCEDURE — 85025 COMPLETE CBC W/AUTO DIFF WBC: CPT

## 2019-01-23 PROCEDURE — 96372 THER/PROPH/DIAG INJ SC/IM: CPT

## 2019-01-23 PROCEDURE — 6360000002 HC RX W HCPCS: Performed by: EMERGENCY MEDICINE

## 2019-01-23 PROCEDURE — 36415 COLL VENOUS BLD VENIPUNCTURE: CPT

## 2019-01-23 PROCEDURE — 81001 URINALYSIS AUTO W/SCOPE: CPT

## 2019-01-23 PROCEDURE — 6370000000 HC RX 637 (ALT 250 FOR IP): Performed by: EMERGENCY MEDICINE

## 2019-01-23 PROCEDURE — 80053 COMPREHEN METABOLIC PANEL: CPT

## 2019-01-23 PROCEDURE — 99284 EMERGENCY DEPT VISIT MOD MDM: CPT

## 2019-01-23 PROCEDURE — 87086 URINE CULTURE/COLONY COUNT: CPT

## 2019-01-23 RX ORDER — PROMETHAZINE HYDROCHLORIDE 25 MG/ML
25 INJECTION, SOLUTION INTRAMUSCULAR; INTRAVENOUS ONCE
Status: COMPLETED | OUTPATIENT
Start: 2019-01-23 | End: 2019-01-23

## 2019-01-23 RX ORDER — CEPHALEXIN 500 MG/1
500 CAPSULE ORAL 3 TIMES DAILY
Qty: 21 CAPSULE | Refills: 0 | Status: SHIPPED | OUTPATIENT
Start: 2019-01-23 | End: 2019-01-30

## 2019-01-23 RX ORDER — CEPHALEXIN 250 MG/1
500 CAPSULE ORAL ONCE
Status: COMPLETED | OUTPATIENT
Start: 2019-01-23 | End: 2019-01-23

## 2019-01-23 RX ORDER — KETOROLAC TROMETHAMINE 30 MG/ML
30 INJECTION, SOLUTION INTRAMUSCULAR; INTRAVENOUS ONCE
Status: COMPLETED | OUTPATIENT
Start: 2019-01-23 | End: 2019-01-23

## 2019-01-23 RX ADMIN — KETOROLAC TROMETHAMINE 30 MG: 30 INJECTION, SOLUTION INTRAMUSCULAR; INTRAVENOUS at 19:10

## 2019-01-23 RX ADMIN — PROMETHAZINE HYDROCHLORIDE 25 MG: 25 INJECTION INTRAMUSCULAR; INTRAVENOUS at 18:18

## 2019-01-23 RX ADMIN — CEPHALEXIN 500 MG: 250 CAPSULE ORAL at 19:08

## 2019-01-23 ASSESSMENT — PAIN SCALES - GENERAL: PAINLEVEL_OUTOF10: 10

## 2019-01-23 ASSESSMENT — PAIN DESCRIPTION - LOCATION: LOCATION: ABDOMEN

## 2019-01-23 ASSESSMENT — PAIN DESCRIPTION - ORIENTATION: ORIENTATION: RIGHT;MID;UPPER

## 2019-01-23 ASSESSMENT — PAIN DESCRIPTION - FREQUENCY: FREQUENCY: CONTINUOUS

## 2019-01-23 ASSESSMENT — PAIN DESCRIPTION - DESCRIPTORS: DESCRIPTORS: SPASM;SHARP

## 2019-01-23 ASSESSMENT — PAIN DESCRIPTION - PAIN TYPE: TYPE: ACUTE PAIN;CHRONIC PAIN

## 2019-01-25 LAB
CULTURE: NORMAL
Lab: NORMAL
REPORT STATUS: NORMAL
SPECIMEN: NORMAL

## 2019-01-31 ENCOUNTER — APPOINTMENT (OUTPATIENT)
Dept: CT IMAGING | Age: 64
End: 2019-01-31
Payer: MEDICARE

## 2019-01-31 ENCOUNTER — HOSPITAL ENCOUNTER (EMERGENCY)
Age: 64
Discharge: HOME | End: 2019-01-31
Attending: EMERGENCY MEDICINE
Payer: MEDICARE

## 2019-01-31 VITALS
TEMPERATURE: 98.1 F | SYSTOLIC BLOOD PRESSURE: 151 MMHG | OXYGEN SATURATION: 98 % | HEIGHT: 62 IN | WEIGHT: 176 LBS | DIASTOLIC BLOOD PRESSURE: 85 MMHG | BODY MASS INDEX: 32.39 KG/M2 | RESPIRATION RATE: 20 BRPM | HEART RATE: 91 BPM

## 2019-01-31 DIAGNOSIS — F41.0 PANIC ATTACK: Primary | ICD-10-CM

## 2019-01-31 LAB
ALBUMIN SERPL-MCNC: 3.8 GM/DL (ref 3.4–5)
ALP BLD-CCNC: 85 IU/L (ref 40–129)
ALT SERPL-CCNC: 41 U/L (ref 10–40)
ANION GAP SERPL CALCULATED.3IONS-SCNC: 14 MMOL/L (ref 4–16)
AST SERPL-CCNC: 44 IU/L (ref 15–37)
BACTERIA: NEGATIVE /HPF
BASOPHILS ABSOLUTE: 0.1 K/CU MM
BASOPHILS RELATIVE PERCENT: 0.7 % (ref 0–1)
BILIRUB SERPL-MCNC: 0.3 MG/DL (ref 0–1)
BILIRUBIN URINE: NEGATIVE MG/DL
BLOOD, URINE: NEGATIVE
BUN BLDV-MCNC: 9 MG/DL (ref 6–23)
CALCIUM SERPL-MCNC: 9.2 MG/DL (ref 8.3–10.6)
CHLORIDE BLD-SCNC: 97 MMOL/L (ref 99–110)
CLARITY: ABNORMAL
CO2: 26 MMOL/L (ref 21–32)
COLOR: YELLOW
CREAT SERPL-MCNC: 0.5 MG/DL (ref 0.6–1.1)
DIFFERENTIAL TYPE: ABNORMAL
EOSINOPHILS ABSOLUTE: 0.4 K/CU MM
EOSINOPHILS RELATIVE PERCENT: 3.6 % (ref 0–3)
GFR AFRICAN AMERICAN: >60 ML/MIN/1.73M2
GFR NON-AFRICAN AMERICAN: >60 ML/MIN/1.73M2
GLUCOSE BLD-MCNC: 269 MG/DL (ref 70–99)
GLUCOSE, URINE: >500 MG/DL
HCT VFR BLD CALC: 39 % (ref 37–47)
HEMOGLOBIN: 13.1 GM/DL (ref 12.5–16)
IMMATURE NEUTROPHIL %: 0.4 % (ref 0–0.43)
KETONES, URINE: ABNORMAL MG/DL
LEUKOCYTE ESTERASE, URINE: ABNORMAL
LIPASE: 18 IU/L (ref 13–60)
LYMPHOCYTES ABSOLUTE: 3.3 K/CU MM
LYMPHOCYTES RELATIVE PERCENT: 32.4 % (ref 24–44)
MCH RBC QN AUTO: 29.6 PG (ref 27–31)
MCHC RBC AUTO-ENTMCNC: 33.6 % (ref 32–36)
MCV RBC AUTO: 88 FL (ref 78–100)
MONOCYTES ABSOLUTE: 0.9 K/CU MM
MONOCYTES RELATIVE PERCENT: 8.9 % (ref 0–4)
MUCUS: ABNORMAL HPF
NITRITE URINE, QUANTITATIVE: NEGATIVE
NUCLEATED RBC %: 0 %
PDW BLD-RTO: 12.2 % (ref 11.7–14.9)
PH, URINE: 5 (ref 5–8)
PLATELET # BLD: 349 K/CU MM (ref 140–440)
PMV BLD AUTO: 10 FL (ref 7.5–11.1)
POTASSIUM SERPL-SCNC: 3.8 MMOL/L (ref 3.5–5.1)
PROTEIN UA: NEGATIVE MG/DL
RBC # BLD: 4.43 M/CU MM (ref 4.2–5.4)
RBC URINE: ABNORMAL /HPF (ref 0–6)
REASON FOR REJECTION: NORMAL
REJECTED TEST: NORMAL
SEGMENTED NEUTROPHILS ABSOLUTE COUNT: 5.4 K/CU MM
SEGMENTED NEUTROPHILS RELATIVE PERCENT: 54 % (ref 36–66)
SODIUM BLD-SCNC: 137 MMOL/L (ref 135–145)
SOURCE: NORMAL
SPECIFIC GRAVITY UA: 1.03 (ref 1–1.03)
SQUAMOUS EPITHELIAL: 5 /HPF
TOTAL IMMATURE NEUTOROPHIL: 0.04 K/CU MM
TOTAL NUCLEATED RBC: 0 K/CU MM
TOTAL PROTEIN: 7 GM/DL (ref 6.4–8.2)
TRICHOMONAS: ABNORMAL /HPF
UROBILINOGEN, URINE: NORMAL MG/DL (ref 0.2–1)
WBC # BLD: 10.1 K/CU MM (ref 4–10.5)
WBC UA: 6 /HPF (ref 0–5)

## 2019-01-31 PROCEDURE — 93005 ELECTROCARDIOGRAM TRACING: CPT | Performed by: EMERGENCY MEDICINE

## 2019-01-31 PROCEDURE — 70450 CT HEAD/BRAIN W/O DYE: CPT

## 2019-01-31 PROCEDURE — 85025 COMPLETE CBC W/AUTO DIFF WBC: CPT

## 2019-01-31 PROCEDURE — 6360000002 HC RX W HCPCS: Performed by: EMERGENCY MEDICINE

## 2019-01-31 PROCEDURE — 81001 URINALYSIS AUTO W/SCOPE: CPT

## 2019-01-31 PROCEDURE — 99284 EMERGENCY DEPT VISIT MOD MDM: CPT

## 2019-01-31 PROCEDURE — 83690 ASSAY OF LIPASE: CPT

## 2019-01-31 PROCEDURE — 36415 COLL VENOUS BLD VENIPUNCTURE: CPT

## 2019-01-31 PROCEDURE — 96375 TX/PRO/DX INJ NEW DRUG ADDON: CPT

## 2019-01-31 PROCEDURE — 80053 COMPREHEN METABOLIC PANEL: CPT

## 2019-01-31 PROCEDURE — 96374 THER/PROPH/DIAG INJ IV PUSH: CPT

## 2019-01-31 RX ORDER — LORAZEPAM 0.5 MG/1
0.5 TABLET ORAL EVERY 8 HOURS PRN
Qty: 15 TABLET | Refills: 0 | Status: SHIPPED | OUTPATIENT
Start: 2019-01-31 | End: 2019-02-05

## 2019-01-31 RX ORDER — DIPHENHYDRAMINE HYDROCHLORIDE 50 MG/ML
50 INJECTION INTRAMUSCULAR; INTRAVENOUS ONCE
Status: COMPLETED | OUTPATIENT
Start: 2019-01-31 | End: 2019-01-31

## 2019-01-31 RX ORDER — METOCLOPRAMIDE HYDROCHLORIDE 5 MG/ML
10 INJECTION INTRAMUSCULAR; INTRAVENOUS ONCE
Status: COMPLETED | OUTPATIENT
Start: 2019-01-31 | End: 2019-01-31

## 2019-01-31 RX ADMIN — METOCLOPRAMIDE 10 MG: 5 INJECTION, SOLUTION INTRAMUSCULAR; INTRAVENOUS at 22:58

## 2019-01-31 RX ADMIN — DIPHENHYDRAMINE HYDROCHLORIDE 50 MG: 50 INJECTION INTRAMUSCULAR; INTRAVENOUS at 22:58

## 2019-01-31 ASSESSMENT — PAIN SCALES - GENERAL: PAINLEVEL_OUTOF10: 10

## 2019-01-31 ASSESSMENT — PAIN DESCRIPTION - LOCATION: LOCATION: ABDOMEN

## 2019-02-01 PROCEDURE — 93010 ELECTROCARDIOGRAM REPORT: CPT | Performed by: INTERNAL MEDICINE

## 2019-02-06 LAB
EKG ATRIAL RATE: 79 BPM
EKG DIAGNOSIS: NORMAL
EKG P AXIS: 25 DEGREES
EKG P-R INTERVAL: 154 MS
EKG Q-T INTERVAL: 408 MS
EKG QRS DURATION: 108 MS
EKG QTC CALCULATION (BAZETT): 467 MS
EKG R AXIS: -41 DEGREES
EKG T AXIS: 14 DEGREES
EKG VENTRICULAR RATE: 79 BPM

## 2019-02-16 ENCOUNTER — APPOINTMENT (OUTPATIENT)
Dept: CT IMAGING | Age: 64
End: 2019-02-16
Payer: MEDICARE

## 2019-02-16 ENCOUNTER — HOSPITAL ENCOUNTER (EMERGENCY)
Age: 64
Discharge: HOME | End: 2019-02-16
Attending: EMERGENCY MEDICINE
Payer: MEDICARE

## 2019-02-16 VITALS
RESPIRATION RATE: 16 BRPM | WEIGHT: 175 LBS | OXYGEN SATURATION: 97 % | BODY MASS INDEX: 32.2 KG/M2 | TEMPERATURE: 97.8 F | DIASTOLIC BLOOD PRESSURE: 70 MMHG | HEART RATE: 57 BPM | SYSTOLIC BLOOD PRESSURE: 134 MMHG | HEIGHT: 62 IN

## 2019-02-16 DIAGNOSIS — R10.9 RIGHT FLANK PAIN: Primary | ICD-10-CM

## 2019-02-16 LAB
ALBUMIN SERPL-MCNC: 3.7 GM/DL (ref 3.4–5)
ALP BLD-CCNC: 72 IU/L (ref 40–129)
ALT SERPL-CCNC: 43 U/L (ref 10–40)
ANION GAP SERPL CALCULATED.3IONS-SCNC: 11 MMOL/L (ref 4–16)
AST SERPL-CCNC: 54 IU/L (ref 15–37)
BACTERIA: NEGATIVE /HPF
BASOPHILS ABSOLUTE: 0 K/CU MM
BASOPHILS RELATIVE PERCENT: 0.4 % (ref 0–1)
BILIRUB SERPL-MCNC: 0.5 MG/DL (ref 0–1)
BILIRUBIN URINE: NEGATIVE MG/DL
BLOOD, URINE: NEGATIVE
BUN BLDV-MCNC: 12 MG/DL (ref 6–23)
CALCIUM SERPL-MCNC: 8.9 MG/DL (ref 8.3–10.6)
CHLORIDE BLD-SCNC: 95 MMOL/L (ref 99–110)
CLARITY: CLEAR
CO2: 24 MMOL/L (ref 21–32)
COLOR: YELLOW
CREAT SERPL-MCNC: 0.6 MG/DL (ref 0.6–1.1)
DIFFERENTIAL TYPE: ABNORMAL
EOSINOPHILS ABSOLUTE: 0.3 K/CU MM
EOSINOPHILS RELATIVE PERCENT: 2.6 % (ref 0–3)
GFR AFRICAN AMERICAN: >60 ML/MIN/1.73M2
GFR NON-AFRICAN AMERICAN: >60 ML/MIN/1.73M2
GLUCOSE BLD-MCNC: 377 MG/DL (ref 70–99)
GLUCOSE, URINE: >500 MG/DL
HCT VFR BLD CALC: 36.4 % (ref 37–47)
HEMOGLOBIN: 12 GM/DL (ref 12.5–16)
IMMATURE NEUTROPHIL %: 0.2 % (ref 0–0.43)
KETONES, URINE: NEGATIVE MG/DL
LEUKOCYTE ESTERASE, URINE: NEGATIVE
LYMPHOCYTES ABSOLUTE: 2.8 K/CU MM
LYMPHOCYTES RELATIVE PERCENT: 25.3 % (ref 24–44)
MCH RBC QN AUTO: 29.3 PG (ref 27–31)
MCHC RBC AUTO-ENTMCNC: 33 % (ref 32–36)
MCV RBC AUTO: 89 FL (ref 78–100)
MONOCYTES ABSOLUTE: 1.1 K/CU MM
MONOCYTES RELATIVE PERCENT: 9.7 % (ref 0–4)
MUCUS: ABNORMAL HPF
NITRITE URINE, QUANTITATIVE: NEGATIVE
NUCLEATED RBC %: 0 %
PDW BLD-RTO: 12.1 % (ref 11.7–14.9)
PH, URINE: 5 (ref 5–8)
PLATELET # BLD: 232 K/CU MM (ref 140–440)
PMV BLD AUTO: 10.4 FL (ref 7.5–11.1)
POTASSIUM SERPL-SCNC: 4.4 MMOL/L (ref 3.5–5.1)
PROTEIN UA: NEGATIVE MG/DL
RBC # BLD: 4.09 M/CU MM (ref 4.2–5.4)
RBC URINE: 1 /HPF (ref 0–6)
SEGMENTED NEUTROPHILS ABSOLUTE COUNT: 6.8 K/CU MM
SEGMENTED NEUTROPHILS RELATIVE PERCENT: 61.8 % (ref 36–66)
SODIUM BLD-SCNC: 130 MMOL/L (ref 135–145)
SPECIFIC GRAVITY UA: 1.02 (ref 1–1.03)
SQUAMOUS EPITHELIAL: 3 /HPF
TOTAL IMMATURE NEUTOROPHIL: 0.02 K/CU MM
TOTAL NUCLEATED RBC: 0 K/CU MM
TOTAL PROTEIN: 6.7 GM/DL (ref 6.4–8.2)
TRICHOMONAS: ABNORMAL /HPF
UROBILINOGEN, URINE: NORMAL MG/DL (ref 0.2–1)
WBC # BLD: 11 K/CU MM (ref 4–10.5)
WBC UA: 3 /HPF (ref 0–5)

## 2019-02-16 PROCEDURE — 6370000000 HC RX 637 (ALT 250 FOR IP): Performed by: EMERGENCY MEDICINE

## 2019-02-16 PROCEDURE — 81001 URINALYSIS AUTO W/SCOPE: CPT

## 2019-02-16 PROCEDURE — 99284 EMERGENCY DEPT VISIT MOD MDM: CPT

## 2019-02-16 PROCEDURE — 87086 URINE CULTURE/COLONY COUNT: CPT

## 2019-02-16 PROCEDURE — 85025 COMPLETE CBC W/AUTO DIFF WBC: CPT

## 2019-02-16 PROCEDURE — 74176 CT ABD & PELVIS W/O CONTRAST: CPT

## 2019-02-16 PROCEDURE — 80053 COMPREHEN METABOLIC PANEL: CPT

## 2019-02-16 RX ORDER — PHENAZOPYRIDINE HYDROCHLORIDE 100 MG/1
200 TABLET, FILM COATED ORAL ONCE
Status: COMPLETED | OUTPATIENT
Start: 2019-02-16 | End: 2019-02-16

## 2019-02-16 RX ORDER — HYDROCODONE BITARTRATE AND ACETAMINOPHEN 5; 325 MG/1; MG/1
1 TABLET ORAL ONCE
Status: COMPLETED | OUTPATIENT
Start: 2019-02-16 | End: 2019-02-16

## 2019-02-16 RX ORDER — OXYBUTYNIN CHLORIDE 5 MG/1
5 TABLET ORAL 3 TIMES DAILY
Qty: 10 TABLET | Refills: 0 | Status: SHIPPED | OUTPATIENT
Start: 2019-02-16 | End: 2020-03-13

## 2019-02-16 RX ORDER — IBUPROFEN 600 MG/1
600 TABLET ORAL ONCE
Status: DISCONTINUED | OUTPATIENT
Start: 2019-02-16 | End: 2019-02-16 | Stop reason: HOSPADM

## 2019-02-16 RX ADMIN — PHENAZOPYRIDINE HYDROCHLORIDE 200 MG: 100 TABLET ORAL at 14:32

## 2019-02-16 RX ADMIN — HYDROCODONE BITARTRATE AND ACETAMINOPHEN 1 TABLET: 5; 325 TABLET ORAL at 15:51

## 2019-02-16 ASSESSMENT — PAIN DESCRIPTION - DESCRIPTORS: DESCRIPTORS: ACHING

## 2019-02-16 ASSESSMENT — PAIN DESCRIPTION - FREQUENCY: FREQUENCY: CONTINUOUS

## 2019-02-16 ASSESSMENT — PAIN SCALES - GENERAL
PAINLEVEL_OUTOF10: 6
PAINLEVEL_OUTOF10: 8
PAINLEVEL_OUTOF10: 10

## 2019-02-16 ASSESSMENT — PAIN DESCRIPTION - PAIN TYPE: TYPE: ACUTE PAIN

## 2019-02-16 ASSESSMENT — PAIN DESCRIPTION - ORIENTATION: ORIENTATION: RIGHT

## 2019-02-16 ASSESSMENT — PAIN DESCRIPTION - LOCATION: LOCATION: FLANK

## 2019-02-17 LAB
CULTURE: NORMAL
Lab: NORMAL
REPORT STATUS: NORMAL
SPECIMEN: NORMAL

## 2019-02-23 ENCOUNTER — HOSPITAL ENCOUNTER (EMERGENCY)
Age: 64
Discharge: HOME | End: 2019-02-23
Attending: EMERGENCY MEDICINE
Payer: MEDICARE

## 2019-02-23 VITALS
OXYGEN SATURATION: 97 % | HEIGHT: 62 IN | HEART RATE: 68 BPM | DIASTOLIC BLOOD PRESSURE: 80 MMHG | WEIGHT: 175 LBS | TEMPERATURE: 98.2 F | BODY MASS INDEX: 32.2 KG/M2 | RESPIRATION RATE: 18 BRPM | SYSTOLIC BLOOD PRESSURE: 135 MMHG

## 2019-02-23 DIAGNOSIS — F32.A DEPRESSION, UNSPECIFIED DEPRESSION TYPE: ICD-10-CM

## 2019-02-23 DIAGNOSIS — R45.89 THOUGHTS OF HARMING OTHERS: Primary | ICD-10-CM

## 2019-02-23 LAB
ACETAMINOPHEN LEVEL: <5 UG/ML (ref 15–30)
ALBUMIN SERPL-MCNC: 3.8 GM/DL (ref 3.4–5)
ALCOHOL SCREEN SERUM: <0.01 %WT/VOL
ALP BLD-CCNC: 66 IU/L (ref 40–129)
ALT SERPL-CCNC: 37 U/L (ref 10–40)
AMPHETAMINES: NEGATIVE
ANION GAP SERPL CALCULATED.3IONS-SCNC: 11 MMOL/L (ref 4–16)
AST SERPL-CCNC: 49 IU/L (ref 15–37)
BACTERIA: ABNORMAL /HPF
BARBITURATE SCREEN URINE: NEGATIVE
BASOPHILS ABSOLUTE: 0.1 K/CU MM
BASOPHILS RELATIVE PERCENT: 0.5 % (ref 0–1)
BENZODIAZEPINE SCREEN, URINE: NEGATIVE
BILIRUB SERPL-MCNC: 0.4 MG/DL (ref 0–1)
BILIRUBIN URINE: NEGATIVE MG/DL
BLOOD, URINE: NEGATIVE
BUN BLDV-MCNC: 11 MG/DL (ref 6–23)
CALCIUM SERPL-MCNC: 9 MG/DL (ref 8.3–10.6)
CANNABINOID SCREEN URINE: NEGATIVE
CHLORIDE BLD-SCNC: 98 MMOL/L (ref 99–110)
CHP ED QC CHECK: NORMAL
CLARITY: CLEAR
CO2: 26 MMOL/L (ref 21–32)
COCAINE METABOLITE: NEGATIVE
COLOR: YELLOW
CREAT SERPL-MCNC: 0.6 MG/DL (ref 0.6–1.1)
DIFFERENTIAL TYPE: ABNORMAL
EOSINOPHILS ABSOLUTE: 0.4 K/CU MM
EOSINOPHILS RELATIVE PERCENT: 3.8 % (ref 0–3)
GFR AFRICAN AMERICAN: >60 ML/MIN/1.73M2
GFR NON-AFRICAN AMERICAN: >60 ML/MIN/1.73M2
GLUCOSE BLD-MCNC: 235 MG/DL
GLUCOSE BLD-MCNC: 235 MG/DL (ref 70–99)
GLUCOSE, URINE: >500 MG/DL
HCT VFR BLD CALC: 37.6 % (ref 37–47)
HEMOGLOBIN: 12.5 GM/DL (ref 12.5–16)
IMMATURE NEUTROPHIL %: 0.8 % (ref 0–0.43)
KETONES, URINE: NEGATIVE MG/DL
LEUKOCYTE ESTERASE, URINE: NEGATIVE
LYMPHOCYTES ABSOLUTE: 2.4 K/CU MM
LYMPHOCYTES RELATIVE PERCENT: 26 % (ref 24–44)
MCH RBC QN AUTO: 29.5 PG (ref 27–31)
MCHC RBC AUTO-ENTMCNC: 33.2 % (ref 32–36)
MCV RBC AUTO: 88.7 FL (ref 78–100)
MONOCYTES ABSOLUTE: 0.9 K/CU MM
MONOCYTES RELATIVE PERCENT: 9.5 % (ref 0–4)
MUCUS: ABNORMAL HPF
NITRITE URINE, QUANTITATIVE: NEGATIVE
NUCLEATED RBC %: 0 %
OPIATES, URINE: NEGATIVE
OXYCODONE: NEGATIVE
PDW BLD-RTO: 12.1 % (ref 11.7–14.9)
PH, URINE: 5 (ref 5–8)
PHENCYCLIDINE, URINE: NEGATIVE
PLATELET # BLD: 258 K/CU MM (ref 140–440)
PMV BLD AUTO: 10 FL (ref 7.5–11.1)
POTASSIUM SERPL-SCNC: 4 MMOL/L (ref 3.5–5.1)
PROTEIN UA: NEGATIVE MG/DL
RBC # BLD: 4.24 M/CU MM (ref 4.2–5.4)
RBC URINE: 1 /HPF (ref 0–6)
SALICYLATE LEVEL: <0.3 MG/DL (ref 15–30)
SEGMENTED NEUTROPHILS ABSOLUTE COUNT: 5.5 K/CU MM
SEGMENTED NEUTROPHILS RELATIVE PERCENT: 59.4 % (ref 36–66)
SODIUM BLD-SCNC: 135 MMOL/L (ref 135–145)
SPECIFIC GRAVITY UA: 1.01 (ref 1–1.03)
SQUAMOUS EPITHELIAL: 4 /HPF
T4 FREE: 1.14 NG/DL (ref 0.9–1.8)
TOTAL IMMATURE NEUTOROPHIL: 0.07 K/CU MM
TOTAL NUCLEATED RBC: 0 K/CU MM
TOTAL PROTEIN: 7.2 GM/DL (ref 6.4–8.2)
TRICHOMONAS: ABNORMAL /HPF
TSH HIGH SENSITIVITY: 1.84 UIU/ML (ref 0.27–4.2)
UROBILINOGEN, URINE: NORMAL MG/DL (ref 0.2–1)
WBC # BLD: 9.2 K/CU MM (ref 4–10.5)
WBC UA: 1 /HPF (ref 0–5)

## 2019-02-23 PROCEDURE — 80053 COMPREHEN METABOLIC PANEL: CPT

## 2019-02-23 PROCEDURE — 80307 DRUG TEST PRSMV CHEM ANLYZR: CPT

## 2019-02-23 PROCEDURE — 36415 COLL VENOUS BLD VENIPUNCTURE: CPT

## 2019-02-23 PROCEDURE — G0480 DRUG TEST DEF 1-7 CLASSES: HCPCS

## 2019-02-23 PROCEDURE — 6370000000 HC RX 637 (ALT 250 FOR IP): Performed by: PHYSICIAN ASSISTANT

## 2019-02-23 PROCEDURE — 81001 URINALYSIS AUTO W/SCOPE: CPT

## 2019-02-23 PROCEDURE — 85025 COMPLETE CBC W/AUTO DIFF WBC: CPT

## 2019-02-23 PROCEDURE — 99283 EMERGENCY DEPT VISIT LOW MDM: CPT

## 2019-02-23 PROCEDURE — 84443 ASSAY THYROID STIM HORMONE: CPT

## 2019-02-23 PROCEDURE — 84439 ASSAY OF FREE THYROXINE: CPT

## 2019-02-23 RX ORDER — HYDROXYZINE PAMOATE 25 MG/1
25 CAPSULE ORAL 3 TIMES DAILY PRN
Qty: 15 CAPSULE | Refills: 0 | Status: SHIPPED | OUTPATIENT
Start: 2019-02-23 | End: 2019-03-09

## 2019-02-23 RX ORDER — HYDROXYZINE PAMOATE 25 MG/1
25 CAPSULE ORAL ONCE
Status: COMPLETED | OUTPATIENT
Start: 2019-02-23 | End: 2019-02-23

## 2019-02-23 RX ADMIN — HYDROXYZINE PAMOATE 25 MG: 25 CAPSULE ORAL at 14:24

## 2019-02-23 ASSESSMENT — PAIN SCALES - GENERAL: PAINLEVEL_OUTOF10: 8

## 2019-02-23 ASSESSMENT — PAIN DESCRIPTION - LOCATION: LOCATION: BACK;NECK

## 2019-02-23 ASSESSMENT — PAIN DESCRIPTION - PAIN TYPE: TYPE: CHRONIC PAIN

## 2019-02-26 ENCOUNTER — OFFICE VISIT (OUTPATIENT)
Dept: CARDIOLOGY CLINIC | Age: 64
End: 2019-02-26
Payer: MEDICARE

## 2019-02-26 VITALS
WEIGHT: 176.8 LBS | HEIGHT: 62 IN | SYSTOLIC BLOOD PRESSURE: 132 MMHG | HEART RATE: 72 BPM | BODY MASS INDEX: 32.54 KG/M2 | DIASTOLIC BLOOD PRESSURE: 84 MMHG

## 2019-02-26 DIAGNOSIS — I10 ESSENTIAL HYPERTENSION: Primary | ICD-10-CM

## 2019-02-26 PROCEDURE — G8484 FLU IMMUNIZE NO ADMIN: HCPCS | Performed by: INTERNAL MEDICINE

## 2019-02-26 PROCEDURE — G8598 ASA/ANTIPLAT THER USED: HCPCS | Performed by: INTERNAL MEDICINE

## 2019-02-26 PROCEDURE — 3017F COLORECTAL CA SCREEN DOC REV: CPT | Performed by: INTERNAL MEDICINE

## 2019-02-26 PROCEDURE — 1036F TOBACCO NON-USER: CPT | Performed by: INTERNAL MEDICINE

## 2019-02-26 PROCEDURE — 99213 OFFICE O/P EST LOW 20 MIN: CPT | Performed by: INTERNAL MEDICINE

## 2019-02-26 PROCEDURE — G8417 CALC BMI ABV UP PARAM F/U: HCPCS | Performed by: INTERNAL MEDICINE

## 2019-02-26 PROCEDURE — G8427 DOCREV CUR MEDS BY ELIG CLIN: HCPCS | Performed by: INTERNAL MEDICINE

## 2019-02-26 RX ORDER — AMITRIPTYLINE HYDROCHLORIDE 25 MG/1
1 TABLET, FILM COATED ORAL EVERY EVENING
COMMUNITY
End: 2020-03-13

## 2019-02-26 RX ORDER — QUETIAPINE FUMARATE 25 MG/1
25 TABLET, FILM COATED ORAL EVERY EVENING
Status: ON HOLD | COMMUNITY
End: 2020-09-10 | Stop reason: HOSPADM

## 2019-03-18 ENCOUNTER — APPOINTMENT (OUTPATIENT)
Dept: GENERAL RADIOLOGY | Age: 64
End: 2019-03-18
Payer: MEDICARE

## 2019-03-18 ENCOUNTER — HOSPITAL ENCOUNTER (EMERGENCY)
Age: 64
Discharge: HOME | End: 2019-03-18
Payer: MEDICARE

## 2019-03-18 VITALS
WEIGHT: 176 LBS | SYSTOLIC BLOOD PRESSURE: 127 MMHG | HEIGHT: 62 IN | RESPIRATION RATE: 15 BRPM | DIASTOLIC BLOOD PRESSURE: 68 MMHG | BODY MASS INDEX: 32.39 KG/M2 | TEMPERATURE: 98.2 F | HEART RATE: 68 BPM | OXYGEN SATURATION: 97 %

## 2019-03-18 DIAGNOSIS — R05.9 COUGH: Primary | ICD-10-CM

## 2019-03-18 DIAGNOSIS — R07.9 CHEST PAIN, UNSPECIFIED TYPE: ICD-10-CM

## 2019-03-18 DIAGNOSIS — R06.00 DYSPNEA, UNSPECIFIED TYPE: ICD-10-CM

## 2019-03-18 LAB
ALBUMIN SERPL-MCNC: 3.9 GM/DL (ref 3.4–5)
ALP BLD-CCNC: 65 IU/L (ref 40–129)
ALT SERPL-CCNC: 30 U/L (ref 10–40)
ANION GAP SERPL CALCULATED.3IONS-SCNC: 12 MMOL/L (ref 4–16)
AST SERPL-CCNC: 36 IU/L (ref 15–37)
BASOPHILS ABSOLUTE: 0.1 K/CU MM
BASOPHILS RELATIVE PERCENT: 0.8 % (ref 0–1)
BILIRUB SERPL-MCNC: 0.4 MG/DL (ref 0–1)
BUN BLDV-MCNC: 14 MG/DL (ref 6–23)
CALCIUM SERPL-MCNC: 8.7 MG/DL (ref 8.3–10.6)
CHLORIDE BLD-SCNC: 99 MMOL/L (ref 99–110)
CO2: 24 MMOL/L (ref 21–32)
CREAT SERPL-MCNC: 0.6 MG/DL (ref 0.6–1.1)
DIFFERENTIAL TYPE: ABNORMAL
EOSINOPHILS ABSOLUTE: 0.3 K/CU MM
EOSINOPHILS RELATIVE PERCENT: 3.1 % (ref 0–3)
GFR AFRICAN AMERICAN: >60 ML/MIN/1.73M2
GFR NON-AFRICAN AMERICAN: >60 ML/MIN/1.73M2
GLUCOSE BLD-MCNC: 231 MG/DL (ref 70–99)
HCT VFR BLD CALC: 39.5 % (ref 37–47)
HEMOGLOBIN: 12.8 GM/DL (ref 12.5–16)
IMMATURE NEUTROPHIL %: 0.3 % (ref 0–0.43)
LYMPHOCYTES ABSOLUTE: 2.5 K/CU MM
LYMPHOCYTES RELATIVE PERCENT: 28.1 % (ref 24–44)
MCH RBC QN AUTO: 29.3 PG (ref 27–31)
MCHC RBC AUTO-ENTMCNC: 32.4 % (ref 32–36)
MCV RBC AUTO: 90.4 FL (ref 78–100)
MONOCYTES ABSOLUTE: 0.9 K/CU MM
MONOCYTES RELATIVE PERCENT: 9.8 % (ref 0–4)
NUCLEATED RBC %: 0 %
PDW BLD-RTO: 12.1 % (ref 11.7–14.9)
PLATELET # BLD: 266 K/CU MM (ref 140–440)
PMV BLD AUTO: 9.7 FL (ref 7.5–11.1)
POTASSIUM SERPL-SCNC: 3.8 MMOL/L (ref 3.5–5.1)
PRO-BNP: 83.48 PG/ML
RBC # BLD: 4.37 M/CU MM (ref 4.2–5.4)
SEGMENTED NEUTROPHILS ABSOLUTE COUNT: 5.2 K/CU MM
SEGMENTED NEUTROPHILS RELATIVE PERCENT: 57.9 % (ref 36–66)
SODIUM BLD-SCNC: 135 MMOL/L (ref 135–145)
TOTAL IMMATURE NEUTOROPHIL: 0.03 K/CU MM
TOTAL NUCLEATED RBC: 0 K/CU MM
TOTAL PROTEIN: 7.1 GM/DL (ref 6.4–8.2)
TROPONIN T: <0.01 NG/ML
WBC # BLD: 9 K/CU MM (ref 4–10.5)

## 2019-03-18 PROCEDURE — 6360000002 HC RX W HCPCS: Performed by: PHYSICIAN ASSISTANT

## 2019-03-18 PROCEDURE — 6370000000 HC RX 637 (ALT 250 FOR IP): Performed by: PHYSICIAN ASSISTANT

## 2019-03-18 PROCEDURE — 83880 ASSAY OF NATRIURETIC PEPTIDE: CPT

## 2019-03-18 PROCEDURE — 94761 N-INVAS EAR/PLS OXIMETRY MLT: CPT

## 2019-03-18 PROCEDURE — 85025 COMPLETE CBC W/AUTO DIFF WBC: CPT

## 2019-03-18 PROCEDURE — 80053 COMPREHEN METABOLIC PANEL: CPT

## 2019-03-18 PROCEDURE — 84484 ASSAY OF TROPONIN QUANT: CPT

## 2019-03-18 PROCEDURE — 71045 X-RAY EXAM CHEST 1 VIEW: CPT

## 2019-03-18 PROCEDURE — 36415 COLL VENOUS BLD VENIPUNCTURE: CPT

## 2019-03-18 PROCEDURE — 93010 ELECTROCARDIOGRAM REPORT: CPT | Performed by: INTERNAL MEDICINE

## 2019-03-18 PROCEDURE — 93005 ELECTROCARDIOGRAM TRACING: CPT | Performed by: EMERGENCY MEDICINE

## 2019-03-18 PROCEDURE — 99285 EMERGENCY DEPT VISIT HI MDM: CPT

## 2019-03-18 PROCEDURE — 96372 THER/PROPH/DIAG INJ SC/IM: CPT

## 2019-03-18 PROCEDURE — 94640 AIRWAY INHALATION TREATMENT: CPT

## 2019-03-18 RX ORDER — CYCLOBENZAPRINE HCL 5 MG
5 TABLET ORAL 2 TIMES DAILY PRN
Qty: 20 TABLET | Refills: 0 | Status: SHIPPED | OUTPATIENT
Start: 2019-03-18 | End: 2019-03-28

## 2019-03-18 RX ORDER — METHYLPREDNISOLONE SODIUM SUCCINATE 125 MG/2ML
125 INJECTION, POWDER, LYOPHILIZED, FOR SOLUTION INTRAMUSCULAR; INTRAVENOUS ONCE
Status: COMPLETED | OUTPATIENT
Start: 2019-03-18 | End: 2019-03-18

## 2019-03-18 RX ORDER — BENZONATATE 100 MG/1
100 CAPSULE ORAL 3 TIMES DAILY PRN
Qty: 20 CAPSULE | Refills: 0 | Status: SHIPPED | OUTPATIENT
Start: 2019-03-18 | End: 2019-06-11

## 2019-03-18 RX ORDER — AZITHROMYCIN 250 MG/1
500 TABLET, FILM COATED ORAL ONCE
Status: COMPLETED | OUTPATIENT
Start: 2019-03-18 | End: 2019-03-18

## 2019-03-18 RX ORDER — IPRATROPIUM BROMIDE AND ALBUTEROL SULFATE 2.5; .5 MG/3ML; MG/3ML
1 SOLUTION RESPIRATORY (INHALATION) ONCE
Status: COMPLETED | OUTPATIENT
Start: 2019-03-18 | End: 2019-03-18

## 2019-03-18 RX ORDER — ALBUTEROL SULFATE 90 UG/1
2 AEROSOL, METERED RESPIRATORY (INHALATION) EVERY 6 HOURS PRN
Qty: 1 INHALER | Refills: 0 | Status: SHIPPED | OUTPATIENT
Start: 2019-03-18 | End: 2019-06-11

## 2019-03-18 RX ORDER — AZITHROMYCIN 250 MG/1
TABLET, FILM COATED ORAL
Qty: 1 PACKET | Refills: 0 | Status: SHIPPED | OUTPATIENT
Start: 2019-03-18 | End: 2019-04-18

## 2019-03-18 RX ORDER — BENZONATATE 100 MG/1
100 CAPSULE ORAL ONCE
Status: COMPLETED | OUTPATIENT
Start: 2019-03-18 | End: 2019-03-18

## 2019-03-18 RX ADMIN — AZITHROMYCIN 500 MG: 250 TABLET, FILM COATED ORAL at 17:33

## 2019-03-18 RX ADMIN — METHYLPREDNISOLONE SODIUM SUCCINATE 125 MG: 125 INJECTION, POWDER, FOR SOLUTION INTRAMUSCULAR; INTRAVENOUS at 17:34

## 2019-03-18 RX ADMIN — IPRATROPIUM BROMIDE AND ALBUTEROL SULFATE 1 AMPULE: .5; 3 SOLUTION RESPIRATORY (INHALATION) at 17:43

## 2019-03-18 RX ADMIN — BENZONATATE 100 MG: 100 CAPSULE ORAL at 17:33

## 2019-03-18 ASSESSMENT — PAIN DESCRIPTION - LOCATION: LOCATION: CHEST

## 2019-03-18 ASSESSMENT — PAIN DESCRIPTION - DESCRIPTORS: DESCRIPTORS: TIGHTNESS

## 2019-03-18 ASSESSMENT — PAIN DESCRIPTION - ORIENTATION: ORIENTATION: MID

## 2019-03-18 ASSESSMENT — PAIN SCALES - GENERAL: PAINLEVEL_OUTOF10: 8

## 2019-03-18 ASSESSMENT — PAIN DESCRIPTION - PAIN TYPE: TYPE: ACUTE PAIN

## 2019-03-22 LAB
EKG ATRIAL RATE: 64 BPM
EKG DIAGNOSIS: NORMAL
EKG P AXIS: 21 DEGREES
EKG P-R INTERVAL: 168 MS
EKG Q-T INTERVAL: 444 MS
EKG QRS DURATION: 108 MS
EKG QTC CALCULATION (BAZETT): 458 MS
EKG R AXIS: -39 DEGREES
EKG T AXIS: 23 DEGREES
EKG VENTRICULAR RATE: 64 BPM

## 2019-04-09 ENCOUNTER — HOSPITAL ENCOUNTER (EMERGENCY)
Age: 64
Discharge: HOME | End: 2019-04-09
Payer: MEDICARE

## 2019-04-09 VITALS
OXYGEN SATURATION: 99 % | RESPIRATION RATE: 18 BRPM | HEIGHT: 62 IN | WEIGHT: 177 LBS | DIASTOLIC BLOOD PRESSURE: 86 MMHG | HEART RATE: 73 BPM | BODY MASS INDEX: 32.57 KG/M2 | TEMPERATURE: 98.3 F | SYSTOLIC BLOOD PRESSURE: 140 MMHG

## 2019-04-09 DIAGNOSIS — M54.50 ACUTE EXACERBATION OF CHRONIC LOW BACK PAIN: ICD-10-CM

## 2019-04-09 DIAGNOSIS — M54.2 NECK PAIN: Primary | ICD-10-CM

## 2019-04-09 DIAGNOSIS — G89.29 ACUTE EXACERBATION OF CHRONIC LOW BACK PAIN: ICD-10-CM

## 2019-04-09 PROCEDURE — 99283 EMERGENCY DEPT VISIT LOW MDM: CPT

## 2019-04-09 PROCEDURE — 6360000002 HC RX W HCPCS: Performed by: NURSE PRACTITIONER

## 2019-04-09 PROCEDURE — 96372 THER/PROPH/DIAG INJ SC/IM: CPT

## 2019-04-09 RX ORDER — ORPHENADRINE CITRATE 30 MG/ML
60 INJECTION INTRAMUSCULAR; INTRAVENOUS ONCE
Status: COMPLETED | OUTPATIENT
Start: 2019-04-09 | End: 2019-04-09

## 2019-04-09 RX ORDER — TIZANIDINE 2 MG/1
2 TABLET ORAL 3 TIMES DAILY PRN
Qty: 30 TABLET | Refills: 0 | Status: SHIPPED | OUTPATIENT
Start: 2019-04-09 | End: 2019-04-18

## 2019-04-09 RX ADMIN — ORPHENADRINE CITRATE 60 MG: 60 INJECTION INTRAMUSCULAR; INTRAVENOUS at 15:09

## 2019-04-09 ASSESSMENT — PAIN DESCRIPTION - LOCATION: LOCATION: BACK;NECK

## 2019-04-09 ASSESSMENT — PAIN SCALES - GENERAL: PAINLEVEL_OUTOF10: 10
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
## 2019-04-18 ENCOUNTER — HOSPITAL ENCOUNTER (EMERGENCY)
Age: 64
Discharge: HOME | End: 2019-04-18
Payer: MEDICARE

## 2019-04-18 VITALS
SYSTOLIC BLOOD PRESSURE: 166 MMHG | HEART RATE: 71 BPM | BODY MASS INDEX: 32.57 KG/M2 | HEIGHT: 62 IN | OXYGEN SATURATION: 95 % | TEMPERATURE: 98.2 F | WEIGHT: 177 LBS | DIASTOLIC BLOOD PRESSURE: 80 MMHG | RESPIRATION RATE: 17 BRPM

## 2019-04-18 DIAGNOSIS — M54.31 SCIATICA OF RIGHT SIDE: ICD-10-CM

## 2019-04-18 DIAGNOSIS — G89.29 ACUTE EXACERBATION OF CHRONIC LOW BACK PAIN: Primary | ICD-10-CM

## 2019-04-18 DIAGNOSIS — M54.50 ACUTE EXACERBATION OF CHRONIC LOW BACK PAIN: Primary | ICD-10-CM

## 2019-04-18 PROCEDURE — 96372 THER/PROPH/DIAG INJ SC/IM: CPT

## 2019-04-18 PROCEDURE — 6360000002 HC RX W HCPCS: Performed by: PHYSICIAN ASSISTANT

## 2019-04-18 PROCEDURE — 99283 EMERGENCY DEPT VISIT LOW MDM: CPT

## 2019-04-18 RX ORDER — CYCLOBENZAPRINE HCL 10 MG
10 TABLET ORAL 3 TIMES DAILY PRN
Qty: 12 TABLET | Refills: 0 | Status: SHIPPED | OUTPATIENT
Start: 2019-04-18 | End: 2019-06-11

## 2019-04-18 RX ORDER — KETOROLAC TROMETHAMINE 30 MG/ML
30 INJECTION, SOLUTION INTRAMUSCULAR; INTRAVENOUS ONCE
Status: COMPLETED | OUTPATIENT
Start: 2019-04-18 | End: 2019-04-18

## 2019-04-18 RX ADMIN — KETOROLAC TROMETHAMINE 30 MG: 30 INJECTION, SOLUTION INTRAMUSCULAR at 15:01

## 2019-04-18 ASSESSMENT — PAIN DESCRIPTION - LOCATION: LOCATION: BACK;LEG

## 2019-04-18 ASSESSMENT — PAIN SCALES - GENERAL
PAINLEVEL_OUTOF10: 10
PAINLEVEL_OUTOF10: 10

## 2019-04-18 ASSESSMENT — PAIN DESCRIPTION - ORIENTATION: ORIENTATION: RIGHT

## 2019-04-18 ASSESSMENT — PAIN DESCRIPTION - PAIN TYPE: TYPE: CHRONIC PAIN

## 2019-04-18 NOTE — ED NOTES
Bed: ED-02  Expected date:   Expected time:   Means of arrival:   Comments:  Medic chronic knee pain     Jimi Montgomery RN  04/18/19 0081

## 2019-04-18 NOTE — ED NOTES
Discharge instructions given to pt, pt verbalized understanding. All questions answered. Follow-up instructions given.      Sergei Lowry, RN  04/18/19 [id number]

## 2019-04-18 NOTE — ED PROVIDER NOTES
eMERGENCY dEPARTMENT eNCOUnter      PCP: No primary care provider on file. CHIEF COMPLAINT    Chief Complaint   Patient presents with    Back Pain     lower back pain    Leg Injury     right       HPI    Yen Bowman is a 66 y.o. female who presents with back pain, located in the low back region. The onset was approximately 7-10 days. Context is patient has a history of chronic episodic low back pain with sciatica. She was seen in the emergency Department 9 days ago for the same. Recent fall or blunt trauma. Patient does admit to lifting heavy furniture within the past 2 weeks. Pain is generalized the low back, worse in the right lower back and occasionally radiates down right lower extremity. Patient reports no focal knee pain or lower leg pain or lower leg swelling or redness. No known alleviating factors. REVIEW OF SYSTEMS    General:   Denies fever, weight loss or weakness. Denies recent/current systemic infection, recent spinal fracture or procedure, or immunosuppression. Denies history of IVDA. ENT:  No upper respiratory symptoms  Neck:  See HPI  Cardiovascular:  Denies chest pain, palpitations or swelling  Respiratory:  Denies cough or shortness of breath    GI:  Denies abdominal pain, nausea, vomiting, or diarrhea  :  Denies any urinary symptoms (including incontinence or retention) or  vaginal symptoms. Denies pregnancy. No recent or current indwelling urinary catheter  Musculoskeletal:  No upper or lower extremity pain or functional deficits. No numbness or tingling. Skin:  Denies rash  Neurologic:   No bowel incontinence or bladder retention, No saddle anesthesia. + radicular symptoms. No lower extremity weakness or altered sensation.   Endocrine:  Denies polyuria or polydypsia   Lymphatic:  Denies swollen glands     All other review of systems are negative  See HPI and nursing notes for additional information       PAST MEDICAL & SURGICAL HISTORY    Past Medical History: Diagnosis Date    Abnormal EKG 4/22/2014    Acid reflux     Anemia     Anesthesia     Nausea/Vomiting Post Op In Past    Anginal pain (HCC)     Denies Chest Pain At This Time    Anxiety     Arthritis     \"All Over\"    Asthma     CAD (coronary artery disease)     per last cardiac cath.  CH[facility]congestive heart failure) [facility]     Chronic back pain     Chronic kidney disease     DDD (degenerative disc disease), cervical     12- Patient reports she was dx with DDD of Cerival spine C6,C7    Depression     Diabetes mellitus ([address] Dx 1990's    Diabetic neuropathy ([address]     \"In My Legs And Feet\"    Dizziness     \"Sometimes\"    Dry skin     Enlarged ureter     Right Side    Fatty liver     Fibrocystic breast     Gout     Pt states she was diagnosed with gout in the past few months.  H/O cardiac catheterization     Showed mild disease per last cath.  H/O cardiovascular stress test 03/15/2010    EF 69%, normal perfusion study except for diaphragmatic artifact, uniform wall motion.  H/O cardiovascular stress test 10/09/2008    EF 60%, no anginia, normal study.  H/O cardiovascular stress test 05/06/2014    EF 66%, no ischemia, normal LV systolic funciton, normal perfusion pattern.  H/O Doppler ultrasound 02/28/2011    CAROTID DOPPLER-normal study.  H/O echocardiogram 5/6/2014    Ef >55%. Impaired LV relaxation.  H/O echocardiogram 10/14/15    EF 60% Normal LV and systolic function. No significant valvulopathy seen.      History of Holter monitoring 3/24/15    24 hour - predominant rhythm sinus    Resighini (hard of hearing)     Bilateral Ears    Hx of cardiovascular stress test 10/19/2015    lexiscan-normal,EF63%    Hx of motion sickness     HX OTHER MEDICAL     Primary Care Physician Is Dr. Ne Moses [facility] [place]    Hyperlipidemia     Hypertension     IBS (irritable bowel syndrome)     Incisional hernia 4/2014    Kidney stones Last Episode In 2012 Or 2013    Passed Kidney Stones Numberous Times    Migraines     Nausea & vomiting     Nausea/Vomiting Post Op In Past    Other specified disorder of skin     12- Patient states she has a condition of her vaginal area (skin) which starts with the letters Paolowilma Serratoa. She is currently being treated with multiple creams and weekly Diflucan. 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  Staph infection Dx 1980's    Toes On Left Foot    Thyroid disease     hypothroidism    Tremor     \"Tremors All Over\"    Urinary incontinence     UTI (urinary tract infection) In Past    No Current Symptoms    UTI (urinary tract infection)     Vertigo     \"Sometimes\"    Wears glasses      Past Surgical History:   Procedure Laterality Date    APPENDECTOMY  1970's    Done With Cholecystectomy    BLADDER SURGERY  1970's Or 1980's    \"Stretched The Opening To The Bladder\", \"Total Of Four Bladder Surgeries\"    BREAST BIOPSY Right 1980's    Twice, Benign    BREAST SURGERY Left 1990's    Five, Benign    CARDIAC CATHETERIZATION  10-18-06    normal coronary angiogram with a normal left ventricular systolic function, patient can be treated medically.     CARDIAC CATHETERIZATION      \"Total 7 Cardiac Catheterizations\"    CARPAL TUNNEL RELEASE Right 1999    CHOLECYSTECTOMY  1970's    Appendectomy Also Done    COLONOSCOPY  Last Done 6-13    One Polyp Removed In Past    DENTAL SURGERY      All Teeth Extracted In Past    DIAGNOSTIC CARDIAC CATH LAB PROCEDURE  01/11/2010    no significant disease, continue medical therapy    ENDOSCOPY, COLON, DIAGNOSTIC  Several     ESOPHAGEAL DILATATION  1980's And 1990's    X 3    FRACTURE SURGERY  1974 Or 1975    Broken Bones Left [place] Due To [address] [id number] Incisional Abdominal Hernia Repair  With Mesh    HERNIA REPAIR  1970's    Abdominal Hernia Repair    HYSTERECTOMY, TOTAL ABDOMINAL  1987    JOINT REPLACEMENT  2008    Total Right Knee    KNEE ARTHROSCOPY Right 1999    LITHOTRIPSY  2011    For Kidney Stones    OTHER SURGICAL HISTORY  [id number]    umbilical hernia with mesh    TUBAL LIGATION  1978       CURRENT MEDICATIONS    Current Outpatient Rx   Medication Sig Dispense Refill    tiZANidine (ZANAFLEX) 2 MG tablet Take 1 tablet by mouth 3 times daily as needed (muscle spasm.) 30 tablet 0    azithromycin (ZITHROMAX) 250 MG tablet Z-Elias. Use as directed. 1 packet 0    albuterol sulfate  (90 Base) MCG/ACT inhaler Inhale 2 puffs into the lungs every 6 hours as needed for Wheezing or Shortness of Breath (or cough) Please include spacer with instructions for use.  1 Inhaler 0    benzonatate (TESSALON PERLES) 100 MG capsule Take 1 capsule by mouth 3 times daily as needed for Cough 20 capsule 0    amitriptyline (ELAVIL) 25 MG tablet 1 tablet every evening      QUEtiapine (SEROQUEL) 25 MG tablet Take 25 mg by mouth every evening      oxybutynin (DITROPAN) 5 MG tablet Take 1 tablet by mouth 3 times daily 10 tablet 0    TRESIBA FLEXTOUCH 200 UNIT/ML SOPN Inject 30 Units into the skin every morning 1 pen 2    Respiratory Therapy Supplies (NEBULIZER COMPRESSOR) KIT 1 kit by Does not apply route once for 1 dose 1 kit 0    ipratropium-albuterol (DUONEB) 0.5-2.5 (3) MG/3ML SOLN nebulizer solution Inhale 3 mLs into the lungs every 4 hours (while awake) 360 mL 2    levETIRAcetam (KEPPRA) 750 MG tablet Take 1 tablet by mouth nightly 60 tablet 3    fluticasone (FLOVENT HFA) 110 MCG/ACT inhaler Inhale 1 puff into the lungs 2 times daily      acetaminophen (APAP EXTRA STRENGTH) 500 MG tablet Take 1 tablet by mouth every 6 hours as needed for Pain 30 tablet 0    aluminum & magnesium hydroxide-simethicone (MAALOX MAX) 400-400-40 MG/5ML SUSP Take 15 mLs by mouth every 6 hours as needed (heart burn) 120 mL 0    pantoprazole (PROTONIX) 40 MG tablet Take 1 tablet by mouth daily 30 tablet 0    citalopram (CELEXA) 40 MG tablet Take 40 mg by mouth daily      carbidopa-levodopa (SINEMET)  MG per tablet Take 1 tablet by mouth nightly      losartan-hydrochlorothiazide (HYZAAR) 100-12.5 MG per tablet Take 1 tablet by mouth daily      metoprolol tartrate (LOPRESSOR) 25 MG tablet Take 25 mg by mouth daily      Dextromethorphan-Guaifenesin (MUCINEX DM)  MG TB12 Take 1 tablet by mouth 2 times daily 28 tablet 0    levothyroxine (SYNTHROID) 25 MCG tablet Take 1 tablet by mouth every morning 30 tablet 0    amitriptyline (ELAVIL) 25 MG tablet Take 1 tablet by mouth nightly 30 tablet 0    polyethylene glycol (GLYCOLAX) packet Take 17 g by mouth daily as needed for Constipation      aspirin 81 MG tablet Take 81 mg by mouth daily      celecoxib (CELEBREX) 100 MG capsule Take 100 mg by mouth 2 times daily      allopurinol (ZYLOPRIM) 300 MG tablet Take 300 mg by mouth daily      insulin aspart (NOVOLOG) 100 UNIT/ML injection vial Inject 12 Units into the skin 3 times daily (before meals)       magnesium oxide (MAG-OX) 400 (240 MG) MG tablet Take 400 mg by mouth daily      Multiple Vitamins-Iron (MULTI-VITAMIN/IRON PO) Take  by mouth.  nystatin (MYCOSTATIN) 451028 UNIT/GM ointment Apply  topically 2 times daily. Apply topically 2 times daily.  montelukast (SINGULAIR) 10 MG tablet Take 10 mg by mouth nightly.  simvastatin (ZOCOR) 20 MG tablet Take 20 mg by mouth nightly.          ALLERGIES    Allergies   Allergen Reactions    Abilify [Aripiprazole]      \"Severe Shaking And Restlessness\"    Advil [Ibuprofen Micronized] Palpitations     \"Severe High Blood Pressure\"    Augmentin [Amoxicillin-Pot Clavulanate] Itching and Rash    Bee Venom Swelling     Redness    Ciprofloxacin Itching and Rash    Codeine      \"Severe Abdominal Cramping\"    Darvocet [Propoxyphene N-Acetaminophen] Palpitations     \"Severe High Blood Pressure\"    Darvon [Propoxyphene Hcl] Palpitations     \"Severe High Blood Pressure\"    Decadron [Dexamethasone] Other (See Comments)     seizure  Seizures    Ditropan [Oxybutynin Chloride] Palpitations     \"Severe High Blood Pressure\"    Fioricet [Butalbital-Apap-Caffeine] Palpitations     \"Severe High Blood Pressure\"    Fiorinal-Codeine #3 [Butalbital-Asa-Caff-Codeine]      \"Severe Stomach Cramps\"    Flagyl [Metronidazole] Diarrhea     \"Severe Diarrhea And Cramping\"    Naproxen Palpitations     \"Severe High Blood Pressure\"    Other      \"Allergic To Spider Bites Causing Blackness Of Skin, Severe Itching And Pain\"                                                  \"Allergic To Powder In Gloves Causing Severe Redness And Itching\"    Prozac [Fluoxetine Hcl]      \"Hallucinations\"    Robaxin [Methocarbamol] Palpitations     \"Severe High Blood Pressure\"    Ultram [Tramadol]      \"Severe Stomach Pain\"    Zoloft Palpitations     \"Sever High Blood Pressure\"    Butalbital-Aspirin-Caffeine Other (See Comments)     \"severe stomach pain\"    Fluoxetine Other (See Comments)     hallucinations    Oxybutynin Chloride Other (See Comments)     Raises bp    Sertraline Other (See Comments)     hallucinations    Tape [Adhesive Tape]      Patient states it tears her skin, including band aids    Hydrocodone-Acetaminophen Palpitations     Blood pressure spikes, severe heart palpitations       SOCIAL HISTORY    Social History     Socioeconomic History    Marital status:       Spouse name: None    Number of children: 3    Years of education: 6    Highest education level: None   Occupational History    None   Social Needs    Financial resource strain: None    Food insecurity:     Worry: None     Inability: None    Transportation needs:     Medical: None     Non-medical: None   Tobacco Use    Smoking status: Never Smoker    Smokeless tobacco: Never Used   Substance and Sexual Activity    Alcohol use: No    Drug use: No    Sexual activity: Not Currently   Lifestyle    Physical activity:     Days per week: None     Minutes per session: None    Stress: None   Relationships    Social connections:     Talks on phone: None     Gets together: None     Attends Orthodox service: None     Active member of club or organization: None     Attends meetings of clubs or organizations: None     Relationship status: None    Intimate partner violence:     Fear of current or ex partner: None     Emotionally abused: None     Physically abused: None     Forced sexual activity: None   Other Topics Concern    None   Social History Narrative    None       PHYSICAL EXAM    VITAL SIGNS: BP (!) 166/80   Pulse 71   Temp 98.2 °F (36.8 °C) (Oral)   Resp 17   Ht 5' 2\" (1.575 m)   Wt 177 lb (80.3 kg)   SpO2 95%   BMI 32.37 kg/m²    Patient was noted to be afebrile    Constitutional:  Well developed, well nourished. HENT:  Atraumatic,  Moist mucus membranes. Eyes:  No orbital trauma. No scleral icterus. Neck: No JVD, supple. No enlarged lymph nodes. Cardiovascular:  Normal rate, regular rhythm, no murmurs/rubs/gallops  Respiratory:  No respiratory distress, normal breath sounds. Abdomen: Bowel sounds normal, Soft, No tenderness, no masses. Musculoskeletal:     No lower extremity swelling, discoloration, focal tenderness, palpable cord. Veronica's sign negative  Integument:  Well hydrated, no rash, no pallor. Back:   - No gross deformity, swelling, or discoloration.    -  + generalized lumbar and Paralumbar tenderness without focus. No masses, fluctuance, warmth, or skin changes.  - No localized midline bony tenderness.   - No change in pain with forward flexion  - SLR test negative     No CVA tenderness to percussion      Neurologic:    No obvious neurological deficits. Patient moves without obvious difficulty or weakness.      HIGH sensitivity Neuro

## 2019-04-26 ENCOUNTER — APPOINTMENT (OUTPATIENT)
Dept: GENERAL RADIOLOGY | Age: 64
End: 2019-04-26
Payer: MEDICARE

## 2019-04-26 ENCOUNTER — HOSPITAL ENCOUNTER (EMERGENCY)
Age: 64
Discharge: HOME | End: 2019-04-26
Payer: MEDICARE

## 2019-04-26 VITALS
WEIGHT: 178 LBS | OXYGEN SATURATION: 97 % | HEART RATE: 80 BPM | TEMPERATURE: 98.5 F | SYSTOLIC BLOOD PRESSURE: 155 MMHG | DIASTOLIC BLOOD PRESSURE: 89 MMHG | HEIGHT: 62 IN | RESPIRATION RATE: 14 BRPM | BODY MASS INDEX: 32.76 KG/M2

## 2019-04-26 DIAGNOSIS — M54.6 ACUTE BILATERAL THORACIC BACK PAIN: ICD-10-CM
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
## 2019-04-26 PROCEDURE — 99284 EMERGENCY DEPT VISIT MOD MDM: CPT

## 2019-04-26 PROCEDURE — 72072 X-RAY EXAM THORAC SPINE 3VWS: CPT

## 2019-04-26 PROCEDURE — 96372 THER/PROPH/DIAG INJ SC/IM: CPT

## 2019-04-26 PROCEDURE — 6360000002 HC RX W HCPCS: Performed by: PHYSICIAN ASSISTANT

## 2019-04-26 PROCEDURE — 6370000000 HC RX 637 (ALT 250 FOR IP): Performed by: PHYSICIAN ASSISTANT

## 2019-04-26 RX ORDER — LIDOCAINE 50 MG/G
1 PATCH TOPICAL DAILY
Qty: 10 PATCH | Refills: 0 | Status: SHIPPED | OUTPATIENT
Start: 2019-04-26 | End: 2019-07-18

## 2019-04-26 RX ORDER — ORPHENADRINE CITRATE 30 MG/ML
60 INJECTION INTRAMUSCULAR; INTRAVENOUS ONCE
Status: COMPLETED | OUTPATIENT
Start: 2019-04-26 | End: 2019-04-26

## 2019-04-26 RX ORDER — LIDOCAINE 4 G/G
1 PATCH TOPICAL DAILY
Status: DISCONTINUED | OUTPATIENT
Start: 2019-04-26 | End: 2019-04-26 | Stop reason: HOSPADM

## 2019-04-26 RX ORDER — ACETAMINOPHEN 500 MG
1000 TABLET ORAL ONCE
Status: COMPLETED | OUTPATIENT
Start: 2019-04-26 | End: 2019-04-26

## 2019-04-26 RX ADMIN — ORPHENADRINE CITRATE 60 MG: 60 INJECTION INTRAMUSCULAR; INTRAVENOUS at 14:49

## 2019-04-26 RX ADMIN — ACETAMINOPHEN 1000 MG: 500 TABLET ORAL at 14:49

## 2019-04-26 ASSESSMENT — PAIN DESCRIPTION - PAIN TYPE: TYPE: ACUTE PAIN

## 2019-04-26 ASSESSMENT — PAIN SCALES - GENERAL
PAINLEVEL_OUTOF10: 8
PAINLEVEL_OUTOF10: 8

## 2019-04-26 ASSESSMENT — PAIN DESCRIPTION - LOCATION: LOCATION: NECK;BACK

## 2019-04-26 ASSESSMENT — PAIN DESCRIPTION - ORIENTATION: ORIENTATION: MID;UPPER
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
## 2019-04-26 NOTE — ED NOTES
Discharge instructions reviewed with patient. PT verbalizes understanding. All questions answered. Follow up instructions given. PT denies any further needs at this time.       Julian Crowleyut  57/72/30 0620
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
## 2019-06-11 ENCOUNTER — HOSPITAL ENCOUNTER (EMERGENCY)
Age: 64
Discharge: HOME | End: 2019-06-11
Payer: MEDICARE

## 2019-06-11 VITALS
HEART RATE: 77 BPM | TEMPERATURE: 98.3 F | BODY MASS INDEX: 32.76 KG/M2 | OXYGEN SATURATION: 97 % | RESPIRATION RATE: 18 BRPM | HEIGHT: 62 IN | SYSTOLIC BLOOD PRESSURE: 151 MMHG | WEIGHT: 178 LBS | DIASTOLIC BLOOD PRESSURE: 89 MMHG

## 2019-06-11 DIAGNOSIS — F41.9 ANXIETY: Primary | ICD-10-CM

## 2019-06-11 DIAGNOSIS — Z87.09 HISTORY OF ASTHMA: ICD-10-CM

## 2019-06-11 DIAGNOSIS — R06.2 WHEEZES: ICD-10-CM

## 2019-06-11 LAB
BACTERIA: NEGATIVE /HPF
BILIRUBIN URINE: NEGATIVE MG/DL
BLOOD, URINE: NEGATIVE
CLARITY: CLEAR
COLOR: ABNORMAL
GLUCOSE, URINE: >500 MG/DL
KETONES, URINE: NEGATIVE MG/DL
LEUKOCYTE ESTERASE, URINE: NEGATIVE
MUCUS: ABNORMAL HPF
NITRITE URINE, QUANTITATIVE: NEGATIVE
PH, URINE: 5 (ref 5–8)
PROTEIN UA: NEGATIVE MG/DL
RBC URINE: ABNORMAL /HPF (ref 0–6)
SPECIFIC GRAVITY UA: 1.01 (ref 1–1.03)
SQUAMOUS EPITHELIAL: <1 /HPF
TRICHOMONAS: ABNORMAL /HPF
UROBILINOGEN, URINE: NORMAL MG/DL (ref 0.2–1)
WBC UA: <1 /HPF (ref 0–5)

## 2019-06-11 PROCEDURE — 93005 ELECTROCARDIOGRAM TRACING: CPT | Performed by: EMERGENCY MEDICINE

## 2019-06-11 PROCEDURE — 6370000000 HC RX 637 (ALT 250 FOR IP): Performed by: PHYSICIAN ASSISTANT

## 2019-06-11 PROCEDURE — 94761 N-INVAS EAR/PLS OXIMETRY MLT: CPT

## 2019-06-11 PROCEDURE — 99284 EMERGENCY DEPT VISIT MOD MDM: CPT

## 2019-06-11 PROCEDURE — 94664 DEMO&/EVAL PT USE INHALER: CPT

## 2019-06-11 PROCEDURE — 2700000000 HC OXYGEN THERAPY PER DAY

## 2019-06-11 PROCEDURE — 81001 URINALYSIS AUTO W/SCOPE: CPT

## 2019-06-11 PROCEDURE — 93010 ELECTROCARDIOGRAM REPORT: CPT | Performed by: INTERNAL MEDICINE

## 2019-06-11 PROCEDURE — 94640 AIRWAY INHALATION TREATMENT: CPT

## 2019-06-11 RX ORDER — PREDNISONE 20 MG/1
20 TABLET ORAL DAILY
Qty: 4 TABLET | Refills: 0 | Status: SHIPPED | OUTPATIENT
Start: 2019-06-11 | End: 2019-06-15

## 2019-06-11 RX ORDER — IPRATROPIUM BROMIDE AND ALBUTEROL SULFATE 2.5; .5 MG/3ML; MG/3ML
1 SOLUTION RESPIRATORY (INHALATION) ONCE
Status: COMPLETED | OUTPATIENT
Start: 2019-06-11 | End: 2019-06-11

## 2019-06-11 RX ORDER — ALBUTEROL SULFATE 90 UG/1
2 AEROSOL, METERED RESPIRATORY (INHALATION) EVERY 6 HOURS PRN
Qty: 1 INHALER | Refills: 0 | Status: ON HOLD | OUTPATIENT
Start: 2019-06-11 | End: 2022-01-11 | Stop reason: HOSPADM

## 2019-06-11 RX ORDER — HYDROXYZINE PAMOATE 25 MG/1
25 CAPSULE ORAL ONCE
Status: COMPLETED | OUTPATIENT
Start: 2019-06-11 | End: 2019-06-11

## 2019-06-11 RX ORDER — PREDNISONE 20 MG/1
20 TABLET ORAL ONCE
Status: COMPLETED | OUTPATIENT
Start: 2019-06-11 | End: 2019-06-11

## 2019-06-11 RX ADMIN — IPRATROPIUM BROMIDE AND ALBUTEROL SULFATE 1 AMPULE: .5; 3 SOLUTION RESPIRATORY (INHALATION) at 04:27

## 2019-06-11 RX ADMIN — IPRATROPIUM BROMIDE AND ALBUTEROL SULFATE 1 AMPULE: .5; 3 SOLUTION RESPIRATORY (INHALATION) at 13:09

## 2019-06-11 RX ADMIN — PREDNISONE 20 MG: 20 TABLET ORAL at 13:00

## 2019-06-11 RX ADMIN — HYDROXYZINE PAMOATE 25 MG: 25 CAPSULE ORAL at 04:03

## 2019-06-11 ASSESSMENT — PAIN SCALES - GENERAL: PAINLEVEL_OUTOF10: 6

## 2019-06-11 ASSESSMENT — PAIN DESCRIPTION - PAIN TYPE: TYPE: ACUTE PAIN
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
## 2019-06-11 NOTE — ED NOTES
This nurse spoke with [employer]. An advocate has been referred and will be back in contact with the patient.       Sunni Mccartney RN  06/11/19 [id number]

## 2019-06-11 NOTE — ED NOTES
The patient has finished her breakfast. She is in low fowlers position and is alert. Denies need for a blanket, but has put herself back on 02, stating she feels like she needs it.      Alley Valdez RN  06/11/19 1037
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
## 2019-06-11 NOTE — ED NOTES
Report received on this patient from [facility], [address] [place] [address]  06/11/19 9739

## 2019-06-11 NOTE — ED NOTES
Bed: ED-27  Expected date:   Expected time:   Means of arrival:   Comments:  [facility] [address] [facility]  06/11/19 [id number]

## 2019-06-11 NOTE — ED NOTES
The patient complains of chest pressure. Placed on the cardiac monitor. Showing NSR with RBBB. EKG done per EDT.      Ryland Lutz RN  06/11/19 [id number]

## 2019-06-21 LAB
EKG ATRIAL RATE: 78 BPM
EKG DIAGNOSIS: NORMAL
EKG P AXIS: 20 DEGREES
EKG P-R INTERVAL: 166 MS
EKG Q-T INTERVAL: 412 MS
EKG QRS DURATION: 100 MS
EKG QTC CALCULATION (BAZETT): 469 MS
EKG R AXIS: -39 DEGREES
EKG T AXIS: 9 DEGREES
EKG VENTRICULAR RATE: 78 BPM

## 2019-07-18 ENCOUNTER — HOSPITAL ENCOUNTER (EMERGENCY)
Age: 64
Discharge: HOME | End: 2019-07-18
Payer: MEDICARE

## 2019-07-18 VITALS
SYSTOLIC BLOOD PRESSURE: 133 MMHG | DIASTOLIC BLOOD PRESSURE: 72 MMHG | WEIGHT: 178 LBS | TEMPERATURE: 98.2 F | RESPIRATION RATE: 17 BRPM | HEIGHT: 62 IN | HEART RATE: 61 BPM | BODY MASS INDEX: 32.76 KG/M2 | OXYGEN SATURATION: 100 %

## 2019-07-18 DIAGNOSIS — M54.50 ACUTE EXACERBATION OF CHRONIC LOW BACK PAIN: Primary | ICD-10-CM

## 2019-07-18 DIAGNOSIS — M54.2 NECK PAIN: ICD-10-CM

## 2019-07-18 DIAGNOSIS — G89.29 ACUTE EXACERBATION OF CHRONIC LOW BACK PAIN: Primary | ICD-10-CM

## 2019-07-18 LAB
BACTERIA: ABNORMAL /HPF
BILIRUBIN URINE: NEGATIVE MG/DL
BLOOD, URINE: NEGATIVE
CLARITY: CLEAR
COLOR: YELLOW
GLUCOSE, URINE: 50 MG/DL
KETONES, URINE: NEGATIVE MG/DL
LEUKOCYTE ESTERASE, URINE: NEGATIVE
MUCUS: ABNORMAL HPF
NITRITE URINE, QUANTITATIVE: NEGATIVE
PH, URINE: 5 (ref 5–8)
PROTEIN UA: NEGATIVE MG/DL
RBC URINE: ABNORMAL /HPF (ref 0–6)
SPECIFIC GRAVITY UA: 1.01 (ref 1–1.03)
SQUAMOUS EPITHELIAL: 3 /HPF
TRICHOMONAS: ABNORMAL /HPF
UROBILINOGEN, URINE: NORMAL MG/DL (ref 0.2–1)
WBC UA: 2 /HPF (ref 0–5)

## 2019-07-18 PROCEDURE — 6360000002 HC RX W HCPCS: Performed by: PHYSICIAN ASSISTANT

## 2019-07-18 PROCEDURE — 81001 URINALYSIS AUTO W/SCOPE: CPT

## 2019-07-18 PROCEDURE — 96372 THER/PROPH/DIAG INJ SC/IM: CPT

## 2019-07-18 PROCEDURE — 6370000000 HC RX 637 (ALT 250 FOR IP): Performed by: PHYSICIAN ASSISTANT

## 2019-07-18 PROCEDURE — 99283 EMERGENCY DEPT VISIT LOW MDM: CPT

## 2019-07-18 RX ORDER — LIDOCAINE 4 G/G
1 PATCH TOPICAL ONCE
Status: DISCONTINUED | OUTPATIENT
Start: 2019-07-18 | End: 2019-07-18 | Stop reason: HOSPADM

## 2019-07-18 RX ORDER — LIDOCAINE 4 G/G
1 PATCH TOPICAL DAILY
Qty: 30 PATCH | Refills: 0 | Status: SHIPPED | OUTPATIENT
Start: 2019-07-18 | End: 2019-08-17

## 2019-07-18 RX ORDER — KETOROLAC TROMETHAMINE 30 MG/ML
30 INJECTION, SOLUTION INTRAMUSCULAR; INTRAVENOUS ONCE
Status: COMPLETED | OUTPATIENT
Start: 2019-07-18 | End: 2019-07-18

## 2019-07-18 RX ADMIN — KETOROLAC TROMETHAMINE 30 MG: 30 INJECTION, SOLUTION INTRAMUSCULAR; INTRAVENOUS at 20:32

## 2019-07-18 ASSESSMENT — PAIN DESCRIPTION - LOCATION: LOCATION: BACK;NECK

## 2019-07-18 ASSESSMENT — PAIN SCALES - GENERAL
PAINLEVEL_OUTOF10: 9
PAINLEVEL_OUTOF10: 9

## 2019-07-18 ASSESSMENT — PAIN DESCRIPTION - PAIN TYPE: TYPE: ACUTE PAIN;CHRONIC PAIN

## 2019-07-18 ASSESSMENT — PAIN DESCRIPTION - DESCRIPTORS: DESCRIPTORS: ACHING;SHARP;RADIATING
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
## 2019-12-11 ENCOUNTER — HOSPITAL ENCOUNTER (EMERGENCY)
Age: 64
Discharge: HOME | End: 2019-12-11
Payer: MEDICARE

## 2019-12-11 VITALS
SYSTOLIC BLOOD PRESSURE: 194 MMHG | TEMPERATURE: 98.1 F | HEIGHT: 62 IN | OXYGEN SATURATION: 97 % | HEART RATE: 62 BPM | DIASTOLIC BLOOD PRESSURE: 84 MMHG | RESPIRATION RATE: 20 BRPM | WEIGHT: 170 LBS | BODY MASS INDEX: 31.28 KG/M2

## 2019-12-11 DIAGNOSIS — K21.00 GASTROESOPHAGEAL REFLUX DISEASE WITH ESOPHAGITIS: Primary | ICD-10-CM

## 2019-12-11 LAB
ALBUMIN SERPL-MCNC: 4.5 GM/DL (ref 3.4–5)
ALP BLD-CCNC: 75 IU/L (ref 40–128)
ALT SERPL-CCNC: 25 U/L (ref 10–40)
ANION GAP SERPL CALCULATED.3IONS-SCNC: 12 MMOL/L (ref 4–16)
AST SERPL-CCNC: 29 IU/L (ref 15–37)
BACTERIA: NEGATIVE /HPF
BASOPHILS ABSOLUTE: 0.1 K/CU MM
BASOPHILS RELATIVE PERCENT: 0.6 % (ref 0–1)
BILIRUB SERPL-MCNC: 0.6 MG/DL (ref 0–1)
BILIRUBIN URINE: NEGATIVE MG/DL
BLOOD, URINE: NEGATIVE
BUN BLDV-MCNC: 9 MG/DL (ref 6–23)
CALCIUM SERPL-MCNC: 9.9 MG/DL (ref 8.3–10.6)
CHLORIDE BLD-SCNC: 97 MMOL/L (ref 99–110)
CLARITY: CLEAR
CO2: 27 MMOL/L (ref 21–32)
COLOR: ABNORMAL
CREAT SERPL-MCNC: 0.6 MG/DL (ref 0.6–1.1)
DIFFERENTIAL TYPE: ABNORMAL
EOSINOPHILS ABSOLUTE: 0.2 K/CU MM
EOSINOPHILS RELATIVE PERCENT: 2 % (ref 0–3)
GFR AFRICAN AMERICAN: >60 ML/MIN/1.73M2
GFR NON-AFRICAN AMERICAN: >60 ML/MIN/1.73M2
GLUCOSE BLD-MCNC: 135 MG/DL (ref 70–99)
GLUCOSE, URINE: NEGATIVE MG/DL
HCT VFR BLD CALC: 41.2 % (ref 37–47)
HEMOGLOBIN: 13.7 GM/DL (ref 12.5–16)
IMMATURE NEUTROPHIL %: 0.3 % (ref 0–0.43)
KETONES, URINE: NEGATIVE MG/DL
LEUKOCYTE ESTERASE, URINE: ABNORMAL
LIPASE: 16 IU/L (ref 13–60)
LYMPHOCYTES ABSOLUTE: 3 K/CU MM
LYMPHOCYTES RELATIVE PERCENT: 26.2 % (ref 24–44)
MCH RBC QN AUTO: 28.7 PG (ref 27–31)
MCHC RBC AUTO-ENTMCNC: 33.3 % (ref 32–36)
MCV RBC AUTO: 86.2 FL (ref 78–100)
MONOCYTES ABSOLUTE: 0.9 K/CU MM
MONOCYTES RELATIVE PERCENT: 8 % (ref 0–4)
MUCUS: ABNORMAL HPF
NITRITE URINE, QUANTITATIVE: NEGATIVE
NUCLEATED RBC %: 0 %
PDW BLD-RTO: 12.3 % (ref 11.7–14.9)
PH, URINE: 9 (ref 5–8)
PLATELET # BLD: 322 K/CU MM (ref 140–440)
PMV BLD AUTO: 10.2 FL (ref 7.5–11.1)
POTASSIUM SERPL-SCNC: 4.1 MMOL/L (ref 3.5–5.1)
PROTEIN UA: NEGATIVE MG/DL
RBC # BLD: 4.78 M/CU MM (ref 4.2–5.4)
RBC URINE: <1 /HPF (ref 0–6)
SEGMENTED NEUTROPHILS ABSOLUTE COUNT: 7.3 K/CU MM
SEGMENTED NEUTROPHILS RELATIVE PERCENT: 62.9 % (ref 36–66)
SODIUM BLD-SCNC: 136 MMOL/L (ref 135–145)
SPECIFIC GRAVITY UA: 1.01 (ref 1–1.03)
SQUAMOUS EPITHELIAL: 1 /HPF
TOTAL IMMATURE NEUTOROPHIL: 0.04 K/CU MM
TOTAL NUCLEATED RBC: 0 K/CU MM
TOTAL PROTEIN: 7.6 GM/DL (ref 6.4–8.2)
TRICHOMONAS: ABNORMAL /HPF
UROBILINOGEN, URINE: NORMAL MG/DL (ref 0.2–1)
WBC # BLD: 11.6 K/CU MM (ref 4–10.5)
WBC UA: 2 /HPF (ref 0–5)

## 2019-12-11 PROCEDURE — 80053 COMPREHEN METABOLIC PANEL: CPT

## 2019-12-11 PROCEDURE — 6370000000 HC RX 637 (ALT 250 FOR IP): Performed by: PHYSICIAN ASSISTANT

## 2019-12-11 PROCEDURE — 83690 ASSAY OF LIPASE: CPT

## 2019-12-11 PROCEDURE — 36415 COLL VENOUS BLD VENIPUNCTURE: CPT

## 2019-12-11 PROCEDURE — 93005 ELECTROCARDIOGRAM TRACING: CPT | Performed by: PHYSICIAN ASSISTANT

## 2019-12-11 PROCEDURE — 99284 EMERGENCY DEPT VISIT MOD MDM: CPT

## 2019-12-11 PROCEDURE — 81001 URINALYSIS AUTO W/SCOPE: CPT

## 2019-12-11 PROCEDURE — 85025 COMPLETE CBC W/AUTO DIFF WBC: CPT

## 2019-12-11 RX ORDER — LIDOCAINE HYDROCHLORIDE 20 MG/ML
15 SOLUTION OROPHARYNGEAL PRN
Qty: 100 ML | Refills: 0 | Status: SHIPPED | OUTPATIENT
Start: 2019-12-11 | End: 2020-07-12

## 2019-12-11 RX ORDER — METOCLOPRAMIDE 10 MG/1
10 TABLET ORAL ONCE
Status: DISCONTINUED | OUTPATIENT
Start: 2019-12-11 | End: 2019-12-12 | Stop reason: HOSPADM

## 2019-12-11 RX ORDER — DICYCLOMINE HCL 20 MG
20 TABLET ORAL ONCE
Status: DISCONTINUED | OUTPATIENT
Start: 2019-12-11 | End: 2019-12-12 | Stop reason: HOSPADM

## 2019-12-11 RX ORDER — LIDOCAINE HYDROCHLORIDE 20 MG/ML
15 SOLUTION OROPHARYNGEAL ONCE
Status: COMPLETED | OUTPATIENT
Start: 2019-12-11 | End: 2019-12-11

## 2019-12-11 RX ORDER — PROMETHAZINE HYDROCHLORIDE 25 MG/1
25 TABLET ORAL EVERY 6 HOURS PRN
Qty: 10 TABLET | Refills: 0 | Status: SHIPPED | OUTPATIENT
Start: 2019-12-11 | End: 2019-12-18

## 2019-12-11 RX ORDER — MAGNESIUM HYDROXIDE/ALUMINUM HYDROXICE/SIMETHICONE 120; 1200; 1200 MG/30ML; MG/30ML; MG/30ML
30 SUSPENSION ORAL ONCE
Status: COMPLETED | OUTPATIENT
Start: 2019-12-11 | End: 2019-12-11

## 2019-12-11 RX ADMIN — LIDOCAINE HYDROCHLORIDE 15 ML: 20 SOLUTION ORAL; TOPICAL at 20:05

## 2019-12-11 RX ADMIN — ALUMINUM HYDROXIDE, MAGNESIUM HYDROXIDE, AND SIMETHICONE 30 ML: 200; 200; 20 SUSPENSION ORAL at 20:05

## 2019-12-11 ASSESSMENT — PAIN SCALES - GENERAL: PAINLEVEL_OUTOF10: 10

## 2019-12-12 PROCEDURE — 93010 ELECTROCARDIOGRAM REPORT: CPT | Performed by: INTERNAL MEDICINE

## 2019-12-18 LAB
EKG ATRIAL RATE: 65 BPM
EKG DIAGNOSIS: NORMAL
EKG P AXIS: 18 DEGREES
EKG P-R INTERVAL: 170 MS
EKG Q-T INTERVAL: 432 MS
EKG QRS DURATION: 106 MS
EKG QTC CALCULATION (BAZETT): 449 MS
EKG R AXIS: -41 DEGREES
EKG T AXIS: 11 DEGREES
EKG VENTRICULAR RATE: 65 BPM

## 2020-01-09 ENCOUNTER — APPOINTMENT (OUTPATIENT)
Dept: GENERAL RADIOLOGY | Age: 65
End: 2020-01-09
Payer: MEDICARE

## 2020-01-09 ENCOUNTER — APPOINTMENT (OUTPATIENT)
Dept: CT IMAGING | Age: 65
End: 2020-01-09
Payer: MEDICARE

## 2020-01-09 ENCOUNTER — HOSPITAL ENCOUNTER (EMERGENCY)
Age: 65
Discharge: HOME | End: 2020-01-09
Attending: EMERGENCY MEDICINE
Payer: MEDICARE

## 2020-01-09 VITALS
DIASTOLIC BLOOD PRESSURE: 91 MMHG | HEART RATE: 76 BPM | RESPIRATION RATE: 17 BRPM | OXYGEN SATURATION: 100 % | BODY MASS INDEX: 31.01 KG/M2 | SYSTOLIC BLOOD PRESSURE: 182 MMHG | HEIGHT: 63 IN | WEIGHT: 175 LBS | TEMPERATURE: 98.8 F

## 2020-01-09 LAB
ALBUMIN SERPL-MCNC: 3.8 GM/DL (ref 3.4–5)
ALP BLD-CCNC: 75 IU/L (ref 40–129)
ALT SERPL-CCNC: 16 U/L (ref 10–40)
ANION GAP SERPL CALCULATED.3IONS-SCNC: 13 MMOL/L (ref 4–16)
AST SERPL-CCNC: 20 IU/L (ref 15–37)
BACTERIA: NEGATIVE /HPF
BASOPHILS ABSOLUTE: 0.1 K/CU MM
BASOPHILS RELATIVE PERCENT: 0.6 % (ref 0–1)
BILIRUB SERPL-MCNC: 0.3 MG/DL (ref 0–1)
BILIRUBIN URINE: NEGATIVE MG/DL
BLOOD, URINE: NEGATIVE
BUN BLDV-MCNC: 12 MG/DL (ref 6–23)
CALCIUM SERPL-MCNC: 9.4 MG/DL (ref 8.3–10.6)
CHLORIDE BLD-SCNC: 98 MMOL/L (ref 99–110)
CLARITY: CLEAR
CO2: 24 MMOL/L (ref 21–32)
COLOR: YELLOW
CREAT SERPL-MCNC: 0.6 MG/DL (ref 0.6–1.1)
DIFFERENTIAL TYPE: ABNORMAL
EOSINOPHILS ABSOLUTE: 0.4 K/CU MM
EOSINOPHILS RELATIVE PERCENT: 4.4 % (ref 0–3)
GFR AFRICAN AMERICAN: >60 ML/MIN/1.73M2
GFR NON-AFRICAN AMERICAN: >60 ML/MIN/1.73M2
GLUCOSE BLD-MCNC: 157 MG/DL (ref 70–99)
GLUCOSE, URINE: NEGATIVE MG/DL
HCT VFR BLD CALC: 37.7 % (ref 37–47)
HEMOGLOBIN: 12.5 GM/DL (ref 12.5–16)
IMMATURE NEUTROPHIL %: 0.2 % (ref 0–0.43)
KETONES, URINE: NEGATIVE MG/DL
LACTATE: ABNORMAL MMOL/L (ref 0.4–2)
LEUKOCYTE ESTERASE, URINE: NEGATIVE
LYMPHOCYTES ABSOLUTE: 3.4 K/CU MM
LYMPHOCYTES RELATIVE PERCENT: 36.7 % (ref 24–44)
MCH RBC QN AUTO: 28.8 PG (ref 27–31)
MCHC RBC AUTO-ENTMCNC: 33.2 % (ref 32–36)
MCV RBC AUTO: 86.9 FL (ref 78–100)
MONOCYTES ABSOLUTE: 0.9 K/CU MM
MONOCYTES RELATIVE PERCENT: 9.1 % (ref 0–4)
MUCUS: ABNORMAL HPF
NITRITE URINE, QUANTITATIVE: NEGATIVE
NUCLEATED RBC %: 0 %
PDW BLD-RTO: 12.2 % (ref 11.7–14.9)
PH, URINE: 5 (ref 5–8)
PLATELET # BLD: 261 K/CU MM (ref 140–440)
PMV BLD AUTO: 9.9 FL (ref 7.5–11.1)
POTASSIUM SERPL-SCNC: 4 MMOL/L (ref 3.5–5.1)
PROTEIN UA: NEGATIVE MG/DL
RBC # BLD: 4.34 M/CU MM (ref 4.2–5.4)
RBC URINE: <1 /HPF (ref 0–6)
SEGMENTED NEUTROPHILS ABSOLUTE COUNT: 4.6 K/CU MM
SEGMENTED NEUTROPHILS RELATIVE PERCENT: 49 % (ref 36–66)
SODIUM BLD-SCNC: 135 MMOL/L (ref 135–145)
SPECIFIC GRAVITY UA: 1.01 (ref 1–1.03)
SQUAMOUS EPITHELIAL: 2 /HPF
TOTAL IMMATURE NEUTOROPHIL: 0.02 K/CU MM
TOTAL NUCLEATED RBC: 0 K/CU MM
TOTAL PROTEIN: 7.1 GM/DL (ref 6.4–8.2)
TRICHOMONAS: ABNORMAL /HPF
UROBILINOGEN, URINE: NORMAL MG/DL (ref 0.2–1)
WBC # BLD: 9.3 K/CU MM (ref 4–10.5)
WBC UA: 2 /HPF (ref 0–5)

## 2020-01-09 PROCEDURE — 74019 RADEX ABDOMEN 2 VIEWS: CPT

## 2020-01-09 PROCEDURE — 80053 COMPREHEN METABOLIC PANEL: CPT

## 2020-01-09 PROCEDURE — 74177 CT ABD & PELVIS W/CONTRAST: CPT

## 2020-01-09 PROCEDURE — 81001 URINALYSIS AUTO W/SCOPE: CPT

## 2020-01-09 PROCEDURE — 83605 ASSAY OF LACTIC ACID: CPT

## 2020-01-09 PROCEDURE — 85025 COMPLETE CBC W/AUTO DIFF WBC: CPT

## 2020-01-09 PROCEDURE — 96372 THER/PROPH/DIAG INJ SC/IM: CPT

## 2020-01-09 PROCEDURE — 6360000004 HC RX CONTRAST MEDICATION: Performed by: PHYSICIAN ASSISTANT

## 2020-01-09 PROCEDURE — 99284 EMERGENCY DEPT VISIT MOD MDM: CPT

## 2020-01-09 PROCEDURE — 36415 COLL VENOUS BLD VENIPUNCTURE: CPT

## 2020-01-09 PROCEDURE — 6370000000 HC RX 637 (ALT 250 FOR IP): Performed by: PHYSICIAN ASSISTANT

## 2020-01-09 PROCEDURE — 2580000003 HC RX 258: Performed by: PHYSICIAN ASSISTANT

## 2020-01-09 PROCEDURE — 6360000002 HC RX W HCPCS: Performed by: PHYSICIAN ASSISTANT

## 2020-01-09 RX ORDER — ACETAMINOPHEN 500 MG
500 TABLET ORAL ONCE
Status: DISCONTINUED | OUTPATIENT
Start: 2020-01-09 | End: 2020-01-09 | Stop reason: HOSPADM

## 2020-01-09 RX ORDER — 0.9 % SODIUM CHLORIDE 0.9 %
500 INTRAVENOUS SOLUTION INTRAVENOUS ONCE
Status: COMPLETED | OUTPATIENT
Start: 2020-01-09 | End: 2020-01-09

## 2020-01-09 RX ORDER — DOCUSATE SODIUM 100 MG/1
100 CAPSULE, LIQUID FILLED ORAL 2 TIMES DAILY
Qty: 30 CAPSULE | Refills: 0 | Status: SHIPPED | OUTPATIENT
Start: 2020-01-09

## 2020-01-09 RX ORDER — PROMETHAZINE HYDROCHLORIDE 25 MG/ML
12.5 INJECTION, SOLUTION INTRAMUSCULAR; INTRAVENOUS ONCE
Status: COMPLETED | OUTPATIENT
Start: 2020-01-09 | End: 2020-01-09

## 2020-01-09 RX ADMIN — PROMETHAZINE HYDROCHLORIDE 12.5 MG: 25 INJECTION INTRAMUSCULAR; INTRAVENOUS at 12:00

## 2020-01-09 RX ADMIN — MAGNESIUM CITRATE 296 ML: 1.75 LIQUID ORAL at 14:18

## 2020-01-09 RX ADMIN — IOPAMIDOL 80 ML: 755 INJECTION, SOLUTION INTRAVENOUS at 13:46

## 2020-01-09 RX ADMIN — SODIUM CHLORIDE 500 ML: 9 INJECTION, SOLUTION INTRAVENOUS at 13:14

## 2020-01-09 ASSESSMENT — PAIN DESCRIPTION - LOCATION: LOCATION: ABDOMEN

## 2020-01-09 ASSESSMENT — PAIN DESCRIPTION - PAIN TYPE: TYPE: CHRONIC PAIN

## 2020-01-09 ASSESSMENT — PAIN DESCRIPTION - FREQUENCY: FREQUENCY: CONTINUOUS

## 2020-01-09 ASSESSMENT — PAIN DESCRIPTION - PROGRESSION: CLINICAL_PROGRESSION: GRADUALLY WORSENING

## 2020-01-09 ASSESSMENT — PAIN SCALES - GENERAL
PAINLEVEL_OUTOF10: 10
PAINLEVEL_OUTOF10: 7

## 2020-01-09 ASSESSMENT — PAIN DESCRIPTION - ONSET: ONSET: AWAKENED FROM SLEEP

## 2020-01-09 ASSESSMENT — PAIN SCALES - WONG BAKER: WONGBAKER_NUMERICALRESPONSE: 10

## 2020-01-09 ASSESSMENT — PAIN DESCRIPTION - ORIENTATION: ORIENTATION: RIGHT
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
## 2020-01-09 NOTE — ED PROVIDER NOTES
I independently examined and evaluated Jo Ortiz. In brief their history revealed patient who is had chronic abdominal pain for approximately a year and a half but states he got worse over the last few days. Does admit she is having some decreased bowel movements. No nausea or vomiting. No urinary symptoms. No fevers or chills. . Their exam revealed female resting comfortably. Vital signs stable. No guarding or rebound in abdomen. Soft abdomen. . All diagnostic, treatment, and disposition decisions were made by myself in conjunction with the mid-level provider. Before disposition, questions were sought and answered with the patient. They have voiced understanding and agree with the plan: Patient hypertensive at 181/91. Heart rate in the 80s. Sats 90%. CBC does not show any elevation in white count. No left shift. Normal hemoglobin. Urinalysis is clear. .  Electrolytes are normal.  Tray was ordered by GISELL that stooled stool throughout the colon. Patient is chronically on narcotics at home. Just filled 90 tablets on the 19th of last month. CT scan then ordered which showed no acute findings. Patient's vitals are stable. Labs within normal limits. Patient does have chronic pain medication has medication at home. Do feel she should follow-up with her primary care doctor. For all further details of the patient's emergency department visit, please see the mid-level practitioner's documentation.         Mike Vann MD  01/09/20 [id number]

## 2020-01-09 NOTE — ED NOTES
Bed: ED-29  Expected date:   Expected time:   Means of arrival:   Comments:  Ems  [facility] yof with abd pain x3 days  With h/o same     Florence Ortiz RN  01/09/20 2802

## 2020-02-05 ENCOUNTER — APPOINTMENT (OUTPATIENT)
Dept: ULTRASOUND IMAGING | Age: 65
End: 2020-02-05
Payer: MEDICARE

## 2020-02-05 ENCOUNTER — HOSPITAL ENCOUNTER (EMERGENCY)
Age: 65
Discharge: HOME | End: 2020-02-05
Payer: MEDICARE

## 2020-02-05 ENCOUNTER — APPOINTMENT (OUTPATIENT)
Dept: GENERAL RADIOLOGY | Age: 65
End: 2020-02-05
Payer: MEDICARE

## 2020-02-05 VITALS
BODY MASS INDEX: 31.83 KG/M2 | DIASTOLIC BLOOD PRESSURE: 70 MMHG | TEMPERATURE: 98 F | HEART RATE: 80 BPM | WEIGHT: 173 LBS | RESPIRATION RATE: 18 BRPM | SYSTOLIC BLOOD PRESSURE: 132 MMHG | OXYGEN SATURATION: 100 % | HEIGHT: 62 IN

## 2020-02-05 PROCEDURE — 73600 X-RAY EXAM OF ANKLE: CPT

## 2020-02-05 PROCEDURE — 73630 X-RAY EXAM OF FOOT: CPT

## 2020-02-05 PROCEDURE — 99284 EMERGENCY DEPT VISIT MOD MDM: CPT

## 2020-02-05 PROCEDURE — 73610 X-RAY EXAM OF ANKLE: CPT

## 2020-02-05 PROCEDURE — 96372 THER/PROPH/DIAG INJ SC/IM: CPT

## 2020-02-05 PROCEDURE — 6360000002 HC RX W HCPCS: Performed by: PHYSICIAN ASSISTANT

## 2020-02-05 PROCEDURE — 93971 EXTREMITY STUDY: CPT

## 2020-02-05 RX ORDER — ORPHENADRINE CITRATE 30 MG/ML
60 INJECTION INTRAMUSCULAR; INTRAVENOUS ONCE
Status: COMPLETED | OUTPATIENT
Start: 2020-02-05 | End: 2020-02-05

## 2020-02-05 RX ORDER — COVID-19 ANTIGEN TEST
1 KIT MISCELLANEOUS 3 TIMES DAILY
Qty: 30 CAPSULE | Refills: 0 | Status: ON HOLD | OUTPATIENT
Start: 2020-02-05 | End: 2020-06-30 | Stop reason: HOSPADM

## 2020-02-05 RX ORDER — PREDNISONE 20 MG/1
20 TABLET ORAL DAILY
Qty: 7 TABLET | Refills: 0 | Status: SHIPPED | OUTPATIENT
Start: 2020-02-05 | End: 2020-02-12

## 2020-02-05 RX ORDER — KETOROLAC TROMETHAMINE 30 MG/ML
15 INJECTION, SOLUTION INTRAMUSCULAR; INTRAVENOUS ONCE
Status: COMPLETED | OUTPATIENT
Start: 2020-02-05 | End: 2020-02-05

## 2020-02-05 RX ADMIN — ORPHENADRINE CITRATE 60 MG: 30 INJECTION INTRAMUSCULAR; INTRAVENOUS at 16:23

## 2020-02-05 RX ADMIN — KETOROLAC TROMETHAMINE 15 MG: 30 INJECTION, SOLUTION INTRAMUSCULAR at 15:14

## 2020-02-05 ASSESSMENT — PAIN SCALES - GENERAL
PAINLEVEL_OUTOF10: 8
PAINLEVEL_OUTOF10: 8
PAINLEVEL_OUTOF10: 0

## 2020-02-05 ASSESSMENT — PAIN DESCRIPTION - PAIN TYPE: TYPE: ACUTE PAIN

## 2020-02-05 ASSESSMENT — PAIN DESCRIPTION - ORIENTATION: ORIENTATION: LEFT

## 2020-02-05 ASSESSMENT — PAIN DESCRIPTION - LOCATION: LOCATION: ANKLE
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
## 2020-02-20 ENCOUNTER — HOSPITAL ENCOUNTER (EMERGENCY)
Age: 65
Discharge: HOME | End: 2020-02-20
Attending: EMERGENCY MEDICINE
Payer: MEDICARE

## 2020-02-20 VITALS
TEMPERATURE: 98.2 F | OXYGEN SATURATION: 97 % | HEIGHT: 62 IN | SYSTOLIC BLOOD PRESSURE: 168 MMHG | DIASTOLIC BLOOD PRESSURE: 119 MMHG | RESPIRATION RATE: 18 BRPM | WEIGHT: 173 LBS | BODY MASS INDEX: 31.83 KG/M2 | HEART RATE: 68 BPM

## 2020-02-20 PROCEDURE — 6370000000 HC RX 637 (ALT 250 FOR IP): Performed by: EMERGENCY MEDICINE

## 2020-02-20 PROCEDURE — 96372 THER/PROPH/DIAG INJ SC/IM: CPT

## 2020-02-20 PROCEDURE — 99284 EMERGENCY DEPT VISIT MOD MDM: CPT

## 2020-02-20 PROCEDURE — 6360000002 HC RX W HCPCS: Performed by: EMERGENCY MEDICINE

## 2020-02-20 RX ORDER — DIPHENHYDRAMINE HCL 25 MG
25 TABLET ORAL ONCE
Status: COMPLETED | OUTPATIENT
Start: 2020-02-20 | End: 2020-02-20

## 2020-02-20 RX ORDER — HYDROMORPHONE HCL 110MG/55ML
1 PATIENT CONTROLLED ANALGESIA SYRINGE INTRAVENOUS ONCE
Status: COMPLETED | OUTPATIENT
Start: 2020-02-20 | End: 2020-02-20

## 2020-02-20 RX ADMIN — HYDROMORPHONE HYDROCHLORIDE 1 MG: 2 INJECTION, SOLUTION INTRAMUSCULAR; INTRAVENOUS; SUBCUTANEOUS at 16:23

## 2020-02-20 RX ADMIN — DIPHENHYDRAMINE HYDROCHLORIDE 25 MG: 25 TABLET ORAL at 16:24

## 2020-02-20 ASSESSMENT — PAIN SCALES - GENERAL
PAINLEVEL_OUTOF10: 10
PAINLEVEL_OUTOF10: 10

## 2020-02-20 ASSESSMENT — PAIN DESCRIPTION - ORIENTATION: ORIENTATION: LEFT

## 2020-02-20 ASSESSMENT — PAIN DESCRIPTION - LOCATION: LOCATION: ARM

## 2020-02-20 ASSESSMENT — PAIN DESCRIPTION - PAIN TYPE: TYPE: ACUTE PAIN

## 2020-02-25 ENCOUNTER — OFFICE VISIT (OUTPATIENT)
Dept: PHYSICAL MEDICINE AND REHAB | Age: 65
End: 2020-02-25
Payer: MEDICARE

## 2020-02-25 PROCEDURE — 95913 NRV CNDJ TEST 13/> STUDIES: CPT | Performed by: PHYSICAL MEDICINE & REHABILITATION

## 2020-02-25 PROCEDURE — 95886 MUSC TEST DONE W/N TEST COMP: CPT | Performed by: PHYSICAL MEDICINE & REHABILITATION

## 2020-02-25 NOTE — LETTER
February 25, 2020        Kyle Moore MD  [address] [place] [address], [facility],Third Floor        Patient Name: Fiona Lopez   MRN Number: [id number]   YOB: 1955   Date Of Visit: 02/25/2020          Dear Kyle Moore MD,      Thank you for referring Fiona Lopez to me for electrodiagnostic testing. Attached are the findings of the EMG and my assessment. If you have any further questions, please do not hesitate to call me. Thank you for this interesting referral.      Sincerely,          BRANDYN Chapa MD.

## 2020-02-25 NOTE — PROGRESS NOTES
EMG REPORT     CHIEF COMPLAINT: Multiple spinal, paraspinal and limb pains including the left neck and left lateral lower leg. HISTORY OF PRESENT ILLNESS: 60 y.o. right hand dominant female with a long history of struggles with spinal and limb arthritis manifesting as joint pain, activity intolerance and frequent stumbling. She reports having diabetes for more than 3 decades with numbness and tingling of the fingers and toes. She has had prior right carpal tunnel surgical release procedures done. She has a history of both diabetes and a thyroid disorder. Now she reports significant neck stiffness, particularly when turning her head to the left. She also has throbbing about the left lower leg distal to the knee. She occasionally falls and is not sleeping well. She denied the presence of any rashes or limb discoloration. She rated the pain severity as 10/10 frequently. PHYSICAL EXAMINATION: Only about 20 degrees of active cervical rotation bilaterally. Spurling's maneuver increases neck pain but does not re-create limb symptoms. Upper limb reflexes are absent and Tinel sign was negative. She had give way with manual muscle testing across the wrist and digits. Perception of touch in the fingertips is blunted. She had multiple soft tissue tender points throughout the shoulder girdle muscles and in the proximal upper limbs. Her lumbar lordosis was slightly exaggerated. Paraspinal tenderness was present to palpation. Hip and knee passive movements were free and full. Straight leg raising was negative. Lower limb deep tendon reflexes were absent. She had give way with manual muscle testing across the left ankle. There was blunted perception of touch about both feet. NERVE CONDUCTION STUDIES:     MOTOR         LATENCY NORMAL AMPLITUDE DISTANCE COND. JERROD.    RIGHT  MEDIAN 4.3 < 4.2 msec 5.5 8 cm >50   LEFT  MEDIAN 5.0 < 4.2 msec 5.4 8 cm 53   RIGHT  ULNAR 3.5 < 4.2 msec 4.7 8 cm >50   LEFT
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
## 2020-03-05 ENCOUNTER — TELEPHONE (OUTPATIENT)
Dept: CARDIOLOGY CLINIC | Age: 65
End: 2020-03-05

## 2020-03-05 NOTE — LETTER
[facility]  [address]  [facility], [address]  Phone: [phone]    Fax [phone]                  Mckinley Wells MD, Graciela Whitfield MD, [address] [facility], MD, MD Christopher Khan MD Angelita Alto, MD Emile Rochester, APRN-CNP   Thao Hankins, APRN-CNP  Nayely Sanchez, APRN-CNP    Magdalena Allen, APRN-CNP    Cardiac Risk Assessment   3/13/2020        Surgery Date: Pending  Surgery: Colonoscopy  Anesthesia Type: Propofol  Fax Number: [phone]    To: Dr. Liu Palma  From : Mckinley Wells MD, [facility]    Patient Name: Zach Forrest     YOB: 1955  MRN: [id number]    Zach Forrest was evaluated from a cardiac standpoint for her surgery or procedure and based on her history, diagnosis, recent cardiac testing, she is considered a low risk candidate for any carlene-operative cardiac complications. Antiplatelet/anticoagulant therapy can be held prior to the surgery or procedure at the discretion of the surgeon to be resumed as soon as possible if held. Please call with any further questions.     Respectfully,     Mckinley Wells MD, [facility]  FA/cjs

## 2020-03-13 ENCOUNTER — HOSPITAL ENCOUNTER (EMERGENCY)
Age: 65
Discharge: HOME | End: 2020-03-13
Payer: MEDICARE

## 2020-03-13 ENCOUNTER — OFFICE VISIT (OUTPATIENT)
Dept: CARDIOLOGY CLINIC | Age: 65
End: 2020-03-13
Payer: MEDICARE

## 2020-03-13 VITALS
WEIGHT: 172 LBS | DIASTOLIC BLOOD PRESSURE: 74 MMHG | HEIGHT: 62 IN | HEART RATE: 60 BPM | SYSTOLIC BLOOD PRESSURE: 112 MMHG | BODY MASS INDEX: 31.65 KG/M2

## 2020-03-13 VITALS
WEIGHT: 172 LBS | HEIGHT: 62 IN | HEART RATE: 88 BPM | OXYGEN SATURATION: 95 % | BODY MASS INDEX: 31.65 KG/M2 | DIASTOLIC BLOOD PRESSURE: 83 MMHG | RESPIRATION RATE: 18 BRPM | SYSTOLIC BLOOD PRESSURE: 181 MMHG | TEMPERATURE: 97.8 F

## 2020-03-13 PROCEDURE — G8427 DOCREV CUR MEDS BY ELIG CLIN: HCPCS | Performed by: INTERNAL MEDICINE

## 2020-03-13 PROCEDURE — 1036F TOBACCO NON-USER: CPT | Performed by: INTERNAL MEDICINE

## 2020-03-13 PROCEDURE — G8417 CALC BMI ABV UP PARAM F/U: HCPCS | Performed by: INTERNAL MEDICINE

## 2020-03-13 PROCEDURE — 99214 OFFICE O/P EST MOD 30 MIN: CPT | Performed by: INTERNAL MEDICINE

## 2020-03-13 PROCEDURE — 99283 EMERGENCY DEPT VISIT LOW MDM: CPT

## 2020-03-13 PROCEDURE — 3017F COLORECTAL CA SCREEN DOC REV: CPT | Performed by: INTERNAL MEDICINE

## 2020-03-13 PROCEDURE — 93000 ELECTROCARDIOGRAM COMPLETE: CPT | Performed by: INTERNAL MEDICINE

## 2020-03-13 PROCEDURE — G8484 FLU IMMUNIZE NO ADMIN: HCPCS | Performed by: INTERNAL MEDICINE

## 2020-03-13 RX ORDER — HYDROXYZINE PAMOATE 25 MG/1
1 CAPSULE ORAL PRN
COMMUNITY
Start: 2020-03-09 | End: 2020-07-12

## 2020-03-13 RX ORDER — DIPHENHYDRAMINE HCL 25 MG
25 TABLET ORAL EVERY 6 HOURS PRN
Status: ON HOLD | COMMUNITY
End: 2020-09-04 | Stop reason: HOSPADM

## 2020-03-13 RX ORDER — TIZANIDINE 4 MG/1
TABLET ORAL PRN
Status: ON HOLD | COMMUNITY
Start: 2020-03-04 | End: 2020-09-04 | Stop reason: HOSPADM

## 2020-03-13 RX ORDER — PROMETHAZINE HYDROCHLORIDE 25 MG/1
TABLET ORAL
COMMUNITY
Start: 2020-01-03 | End: 2020-06-24 | Stop reason: ALTCHOICE

## 2020-03-13 RX ORDER — HYDROCODONE BITARTRATE AND ACETAMINOPHEN 5; 325 MG/1; MG/1
1-2 TABLET ORAL EVERY 4 HOURS PRN
Status: ON HOLD | COMMUNITY
End: 2020-09-10 | Stop reason: HOSPADM

## 2020-03-13 ASSESSMENT — PAIN SCALES - GENERAL: PAINLEVEL_OUTOF10: 10

## 2020-03-13 NOTE — ED PROVIDER NOTES
[facility] [address] ENCOUNTER      PCP: Dayanna Elise MD    [address] [facility]    Chief Complaint   Patient presents with    Hand Pain     Left hand         This patient was not evaluated by the attending physician. I have independently evaluated this patient . HPI    Nj Bartholomew is a 60 y.o. female who presents with left hand pain. Onset 3 days. Context is 3 days ago patient had left carpal tunnel surgery and notes generalized pain since. No redness or warmth. No cold or pale hand or fingers. No numbness or tingling. Able to move all fingers. No other pain. REVIEW OF SYSTEMS    General: Denies constitutional symptoms. Cardiac: Denies chest wall injury or chest pain  Pulmonary: Denies chest wall injury or shortness of breath  GI: Denies abdomen injury or abdominal pain  Musculoskeletal:  Denies any other musculoskeletal pain or injuries, including chest wall and back. Skin: See HPI. Lymphatics:  No nodules or streaks. See HPI and nursing notes for additional information     PAST MEDICAL & SURGICAL HISTORY    Past Medical History:   Diagnosis Date    Abnormal EKG 4/22/2014    Acid reflux     Anemia     Anesthesia     Nausea/Vomiting Post Op In Past    Anginal pain (HCC)     Denies Chest Pain At This Time    Anxiety     Arthritis     \"All Over\"    Asthma     CAD (coronary artery disease)     per last cardiac cath.  CHF (congestive heart failure) (HCC)     Chronic back pain     Chronic kidney disease     DDD (degenerative disc disease), cervical     12- Patient reports she was dx with DDD of Cerival spine C6,C7    Depression     Diabetes mellitus ([address] Dx 1990's    Diabetic neuropathy ([address]     \"In My Legs And Feet\"    Dizziness     \"Sometimes\"    Dry skin     Enlarged ureter     Right Side    Fatty liver     Fibrocystic breast     Gout     Pt states she was diagnosed with gout in the past few months.     H/O cardiac catheterization     Showed mild disease per last cath.  H/O cardiovascular stress test 03/15/2010    EF 69%, normal perfusion study except for diaphragmatic artifact, uniform wall motion.  H/O cardiovascular stress test 10/09/2008    EF 60%, no anginia, normal study.  H/O cardiovascular stress test 05/06/2014    EF 66%, no ischemia, normal LV systolic funciton, normal perfusion pattern.  H/O Doppler ultrasound 02/28/2011    CAROTID DOPPLER-normal study.  H/O echocardiogram 5/6/2014    Ef >55%. Impaired LV relaxation.  H/O echocardiogram 10/14/15    EF 60% Normal LV and systolic function. No significant valvulopathy seen.  History of Holter monitoring 3/24/15    24 hour - predominant rhythm sinus    [place] (hard of hearing)     Bilateral Ears    Hx of cardiovascular stress test 10/19/2015    lexiscan-normal,EF63%    Hx of motion sickness     HX OTHER MEDICAL     Primary Care Physician Is Dr. Tracy Cisneros In [facility]    Hyperlipidemia     Hypertension     IBS (irritable bowel syndrome)     Incisional hernia 4/2014    Kidney stones Last Episode In 2012 Or 2013    Passed Kidney Stones Numberous Times    Migraines     Nausea & vomiting     Nausea/Vomiting Post Op In Past    Other specified disorder of skin     12- Patient states she has a condition of her vaginal area (skin) which starts with the letters [place]. She is currently being treated with multiple creams and weekly Diflucan. 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     Staph infection Dx 1980's    Toes On Left Foot    Thyroid disease     hypothroidism    Tremor     \"Tremors All Over\"    Urinary incontinence     UTI (urinary tract infection) In Past    No Current Symptoms    UTI (urinary tract infection)     Vertigo Diabetes Mother         Borderline Diabetes    High Blood Pressure Mother     Arthritis Mother     Early Death Mother 61        Stroke    Depression Mother     Heart Disease Mother     High Cholesterol Mother     Miscarriages / Stillbirths Mother     Heart Disease Father         Massive Heart Attack    High Blood Pressure Father     Arthritis Father     High Cholesterol Father     Cancer Brother         Liver And Colon Cancer    Early Death Brother 37        Liver And Colon Cancer    Heart Disease Brother         Heart Stents    High Blood Pressure Brother     High Cholesterol Brother     Early Death Brother         65 Years Old,Hit By A Car    Colon Cancer Brother     Hearing Loss Sister     Heart Failure Sister     High Blood Pressure Sister     Arthritis Sister     Heart Disease Brother     Heart Disease Sister     Cancer Sister     Early Death Brother 61        Heart Attack    Heart Disease Brother         Heart Attack    Mental Illness Daughter         Bipolar    Other Son         Seizures    Other Daughter         Stomach And Bowel Problems           PHYSICAL EXAM    VITAL SIGNS: BP (!) 181/83   Pulse 88   Temp 97.8 °F (36.6 °C) (Oral)   Resp 18   Ht 5' 2\" (1.575 m)   Wt 172 lb (78 kg)   SpO2 95%   BMI 31.46 kg/m²   Constitutional:  Well developed, well nourished, no acute distress, non-toxic appearance   HENT:  Atraumatic    Musculoskeletal:   Patient's left wrist reveals surgical incision consistent with recent carpal tunnel surgery. No redness or warmth. Wound edges well approximated. No wound dehiscence. No palpable induration, fluctuance or mass. No streaks. Patient able to move all fingers at all joints and distal sensation and capillary refill intact. Remaining left upper extremity exam without abnormality. Examination of remaining extremities reveals active ROM of  joints without ligamentous laxity.  Theres no obvious joint or bony

## 2020-03-13 NOTE — ED TRIAGE NOTES
Pt presents to ED c/o left hand swelling and pain following carpal tunnel surgery on wed. States that her fingers are discolored. Rates pain 10/10. Pt is alert and oriented.

## 2020-03-13 NOTE — PROGRESS NOTES
CARDIOLOGY NOTE      3/13/2020    RE: Gilda Pham  ([id number])                               TO:  Dr. Jay Prakash MD            Celestino Thompson is a 60 y.o. female who was seen today for management of  htn                                    HPI:   Patient is here for    - Hypertension,is  well controlled, pt is  compliant with medicines  - Hyperlipidimea, lipids are in acceptable range. Pt  is  compliant with medicines  - Diabetes mellitus, blood glucose level is  well controlled.  Pt is compliant with meds and diet                The patient does not have cardiac complaints    Gilda Pham has the following history recorded in care path:  Patient Active Problem List    Diagnosis Date Noted    Abnormal nuclear stress test 08/15/2017     Priority: High    Dyslipidemia      Priority: High    Type 2 diabetes mellitus without complication, with long-term current use of insulin (HCC)      Priority: High    Family history of early CAD      Priority: High    Asthma exacerbation attacks 01/01/2019    Mild intermittent asthma without complication 72/15/6367    ILSA (obstructive sleep apnea) 10/27/2017    Snoring 10/27/2017    Hypersomnolence 10/27/2017    NSTEMI (non-ST elevated myocardial infarction) ([facility] [address] 08/14/2017    Tachycardia 03/30/2016    Essential hypertension     Type 2 diabetes mellitus with diabetic polyneuropathy (HCC)     Incarcerated umbilical hernia 38/16/0521    H/O cardiovascular stress test 05/06/2014    H/O Doppler ultrasound     H/O cardiovascular stress test     H/O cardiovascular stress test     Chest pain 09/16/2013     Current Outpatient Medications   Medication Sig Dispense Refill    Naproxen Sodium 220 MG CAPS Take 1 tablet by mouth 3 times daily 30 capsule 0    docusate sodium (COLACE) 100 MG capsule Take 1 capsule by mouth 2 times daily 30 capsule 0    lidocaine viscous hcl (XYLOCAINE) 2 % SOLN solution Take 15 mLs by mouth as needed for (MYCOSTATIN) 756682 UNIT/GM ointment Apply  topically 2 times daily. Apply topically 2 times daily.  montelukast (SINGULAIR) 10 MG tablet Take 10 mg by mouth nightly.  simvastatin (ZOCOR) 20 MG tablet Take 20 mg by mouth nightly.  diphenhydrAMINE (BENADRYL) 25 MG tablet Take 25 mg by mouth every 6 hours as needed      MUCINEX 600 MG extended release tablet 2 times daily      HYDROcodone-acetaminophen (NORCO) 5-325 MG per tablet Take 1-2 tablets by mouth every 4 hours as needed.  promethazine (PHENERGAN) 25 MG tablet       hydrOXYzine (VISTARIL) 25 MG capsule 1 capsule as needed      tiZANidine (ZANAFLEX) 4 MG tablet as needed      Respiratory Therapy Supplies (NEBULIZER COMPRESSOR) KIT 1 kit by Does not apply route once for 1 dose 1 kit 0     No current facility-administered medications for this visit. Allergies: Abilify [aripiprazole]; Advil [ibuprofen micronized]; Augmentin [amoxicillin-pot clavulanate]; Bee venom; Ciprofloxacin; Codeine; Darvocet [propoxyphene n-acetaminophen]; Darvon [propoxyphene hcl]; Decadron [dexamethasone]; Ditropan [oxybutynin chloride]; Fioricet [butalbital-apap-caffeine]; Fiorinal-codeine #3 [butalbital-asa-caff-codeine]; Flagyl [metronidazole]; Naproxen; Other; Prozac [fluoxetine hcl]; Robaxin [methocarbamol]; Ultram [tramadol]; Zoloft; Butalbital-aspirin-caffeine; Fluoxetine; Oxybutynin chloride; Sertraline; and Tape [adhesive tape]  Past Medical History:   Diagnosis Date    Abnormal EKG 4/22/2014    Acid reflux     Anemia     Anesthesia     Nausea/Vomiting Post Op In Past    Anginal pain ([facility])     Denies Chest Pain At This Time    Anxiety     Arthritis     \"All Over\"    Asthma     CAD (coronary artery disease)     per last cardiac cath.     CHF (congestive heart failure) ([facility])     Chronic back pain     Chronic kidney disease     DDD (degenerative disc disease), cervical     12- Patient reports she was dx with DDD of Cerival spine C6,C7 Diabetes    High Blood Pressure Mother     Arthritis Mother     Early Death Mother 61        Stroke    Depression Mother     Heart Disease Mother     High Cholesterol Mother     Miscarriages / Stillbirths Mother     Heart Disease Father         Massive Heart Attack    High Blood Pressure Father     Arthritis Father     High Cholesterol Father     Cancer Brother         Liver And Colon Cancer    Early Death Brother 37        Liver And Colon Cancer    Heart Disease Brother         Heart Stents    High Blood Pressure Brother     High Cholesterol Brother     Early Death Brother         65 Years Old,Hit By [employer]    Hearing Loss Sister     Heart Failure Sister     High Blood Pressure Sister     Arthritis Sister     Heart Disease Brother     Heart Disease Sister     Early Death Brother 61        Heart Attack    Heart Disease Brother         Heart Attack    Mental Illness Daughter         Bipolar    Other Son         Seizures    Other Daughter         Stomach And Bowel Problems     Social History     Tobacco Use    Smoking status: Never Smoker    Smokeless tobacco: Never Used   Substance Use Topics    Alcohol use: No      Review of Systems:    Constitutional: Negative for diaphoresis and fatigue  Psychological:Negative for anxiety or depression  HENT: Negative for headaches, nasal congestion, sinus pain or vertigo  Eyes: Negative for visual disturbance.    Endocrine: Negative for polydipsia/polyuria  Respiratory: Negative for shortness of breath  Cardiovascular: Negative for chest pain, dyspnea on exertion, claudication, edema, irregular heartbeat, murmur, palpitations or shortness of breath  Gastrointestinal: Negative for abdominal pain or heartburn  Genito-Urinary: Negative for urinary frequency/urgency  Musculoskeletal: Negative for muscle pain, muscular weakness, negative for pain in arm and leg or swelling in foot and leg  Neurological: Negative for dizziness, headaches, memory loss, numbness/tingling, visual changes, syncope  Dermatological: Negative for rash    Objective: There were no vitals taken for this visit. Wt Readings from Last 3 Encounters:   02/20/20 173 lb (78.5 kg)   02/05/20 173 lb (78.5 kg)   01/09/20 175 lb (79.4 kg)     There is no height or weight on file to calculate BMI. GENERAL - Alert, oriented, pleasant, in no apparent distress. EYES: No jaundice, no conjunctival pallor. SKIN: It is warm & dry. No rashes. No Echhymosis    HEENT - No clinically significant abnormalities seen. Neck - Supple. No jugular venous distention noted. No carotid bruits. Cardiovascular - Normal S1 and S2 without obvious murmur or gallop. Extremities - No cyanosis, clubbing, or significant edema. Pulmonary - No respiratory distress. No wheezes or rales. Abdomen - No masses, tenderness, or organomegaly. Musculoskeletal - No significant edema. No joint deformities. No muscle wasting. Neurologic - Cranial nerves II through XII are grossly intact. There were no gross focal neurologic abnormalities.     Lab Review   Lab Results   Component Value Date    CKTOTAL 46 12/14/2015    TROPONINT <0.010 03/18/2019     BNP:    Lab Results   Component Value Date    BNP 51.0 02/15/2014     PT/INR:    Lab Results   Component Value Date    INR 0.97 02/18/2016     Lab Results   Component Value Date    LABA1C 9.1 (H) 01/01/2019    LABA1C 7.6 (H) 08/13/2017     Lab Results   Component Value Date    WBC 9.3 01/09/2020    HCT 37.7 01/09/2020    MCV 86.9 01/09/2020     01/09/2020     Lab Results   Component Value Date    CHOL 162 07/15/2016    TRIG 159 (H) 07/15/2016    HDL 67 07/15/2016    LDLCALC 63 07/15/2016    LDLDIRECT 42 09/20/2014     Lab Results   Component Value Date    ALT 16 01/09/2020    AST 20 01/09/2020     BMP:    Lab Results   Component Value Date     01/09/2020    K 4.0 01/09/2020    K 4.2 03/15/2018    CL 98 01/09/2020    CO2 24 01/09/2020    BUN 12 01/09/2020 CREATININE 0.6 01/09/2020     CMP:   Lab Results   Component Value Date     01/09/2020    K 4.0 01/09/2020    K 4.2 03/15/2018    CL 98 01/09/2020    CO2 24 01/09/2020    BUN 12 01/09/2020    PROT 7.1 01/09/2020    PROT 6.6 03/12/2011     TSH:    Lab Results   Component Value Date    TSHHS 1.840 02/23/2019           Impression:    No diagnosis found. Patient Active Problem List   Diagnosis Code    Chest pain R07.9    H/O Doppler ultrasound Z92.89    H/O cardiovascular stress test Z92.89    H/O cardiovascular stress test Z92.89    H/O cardiovascular stress test Z92.89    Incarcerated umbilical hernia B91.0    Essential hypertension I10    Type 2 diabetes mellitus with diabetic polyneuropathy (HCC) E11.42    Tachycardia R00.0    Dyslipidemia E78.5    Type 2 diabetes mellitus without complication, with long-term current use of insulin (HCC) E11.9, Z79.4    Family history of early CAD Z80.55    NSTEMI (non-ST elevated myocardial infarction) ([facility] [address] I21.4    Abnormal nuclear stress test R94.39    ILSA (obstructive sleep apnea) G47.33    Snoring R06.83    Hypersomnolence G47.10    Mild intermittent asthma without complication E80.74    Asthma exacerbation attacks J45.901       Assessment & Plan:    - Low cardiac risk    -  Hypertension: Patients blood pressure is normal. Patient is advised about low sodium diet. Present medical regimen will not be changed. -  LIPID MANAGEMENT:  Available lipid  lab data reviewed  and patient was given dietary advice. NCEP- ATP III guidelines reviewed with patient. -   Changes  in medicines made: No           -   DIABETES MELLITUS: Available pertinent lab data reviewed   and  patient was given dietary advice . Advised to check blood glucose level on a regular basis. -   Changes  in medicines made:  No                                                               Noah Ross MA  [facility] [place]

## 2020-05-09 ENCOUNTER — HOSPITAL ENCOUNTER (OUTPATIENT)
Age: 65
Setting detail: SPECIMEN
Discharge: HOME | End: 2020-05-09
Payer: MEDICARE

## 2020-05-09 PROCEDURE — U0002 COVID-19 LAB TEST NON-CDC: HCPCS

## 2020-05-11 LAB
SARS-COV-2: NOT DETECTED
SOURCE: NORMAL

## 2020-05-26 ENCOUNTER — HOSPITAL ENCOUNTER (OUTPATIENT)
Age: 65
Discharge: HOME | End: 2020-05-26
Payer: MEDICARE

## 2020-05-26 LAB
ALBUMIN SERPL-MCNC: 4.3 GM/DL (ref 3.4–5)
ALP BLD-CCNC: 64 IU/L (ref 40–128)
ALT SERPL-CCNC: 25 U/L (ref 10–40)
ANION GAP SERPL CALCULATED.3IONS-SCNC: 14 MMOL/L (ref 4–16)
AST SERPL-CCNC: 37 IU/L (ref 15–37)
BILIRUB SERPL-MCNC: 0.7 MG/DL (ref 0–1)
BUN BLDV-MCNC: 14 MG/DL (ref 6–23)
CALCIUM SERPL-MCNC: 9.6 MG/DL (ref 8.3–10.6)
CHLORIDE BLD-SCNC: 98 MMOL/L (ref 99–110)
CHOLESTEROL: 107 MG/DL
CO2: 25 MMOL/L (ref 21–32)
CREAT SERPL-MCNC: 0.7 MG/DL (ref 0.6–1.1)
ESTIMATED AVERAGE GLUCOSE: 186 MG/DL
GFR AFRICAN AMERICAN: >60 ML/MIN/1.73M2
GFR NON-AFRICAN AMERICAN: >60 ML/MIN/1.73M2
GLUCOSE BLD-MCNC: 154 MG/DL (ref 70–99)
HBA1C MFR BLD: 8.1 % (ref 4.2–6.3)
HDLC SERPL-MCNC: 43 MG/DL
LDL CHOLESTEROL DIRECT: 51 MG/DL
POTASSIUM SERPL-SCNC: 4.3 MMOL/L (ref 3.5–5.1)
SODIUM BLD-SCNC: 137 MMOL/L (ref 135–145)
TOTAL PROTEIN: 6.8 GM/DL (ref 6.4–8.2)
TRIGL SERPL-MCNC: 142 MG/DL

## 2020-05-26 PROCEDURE — 80061 LIPID PANEL: CPT

## 2020-05-26 PROCEDURE — 83721 ASSAY OF BLOOD LIPOPROTEIN: CPT

## 2020-05-26 PROCEDURE — 36415 COLL VENOUS BLD VENIPUNCTURE: CPT

## 2020-05-26 PROCEDURE — 80053 COMPREHEN METABOLIC PANEL: CPT

## 2020-05-26 PROCEDURE — 83036 HEMOGLOBIN GLYCOSYLATED A1C: CPT

## 2020-05-30 ENCOUNTER — HOSPITAL ENCOUNTER (EMERGENCY)
Age: 65
Discharge: HOME | End: 2020-05-30
Attending: EMERGENCY MEDICINE
Payer: MEDICARE

## 2020-05-30 VITALS
BODY MASS INDEX: 31.65 KG/M2 | TEMPERATURE: 97.5 F | HEIGHT: 62 IN | DIASTOLIC BLOOD PRESSURE: 103 MMHG | OXYGEN SATURATION: 97 % | RESPIRATION RATE: 16 BRPM | WEIGHT: 172 LBS | HEART RATE: 103 BPM | SYSTOLIC BLOOD PRESSURE: 207 MMHG

## 2020-05-30 LAB
ALBUMIN SERPL-MCNC: 4.6 GM/DL (ref 3.4–5)
ALP BLD-CCNC: 70 IU/L (ref 40–128)
ALT SERPL-CCNC: 21 U/L (ref 10–40)
ANION GAP SERPL CALCULATED.3IONS-SCNC: 15 MMOL/L (ref 4–16)
AST SERPL-CCNC: 25 IU/L (ref 15–37)
BACTERIA: ABNORMAL /HPF
BASOPHILS ABSOLUTE: 0.1 K/CU MM
BASOPHILS RELATIVE PERCENT: 0.5 % (ref 0–1)
BILIRUB SERPL-MCNC: 0.4 MG/DL (ref 0–1)
BILIRUBIN URINE: NEGATIVE MG/DL
BLOOD, URINE: NEGATIVE
BUN BLDV-MCNC: 8 MG/DL (ref 6–23)
CALCIUM SERPL-MCNC: 10 MG/DL (ref 8.3–10.6)
CHLORIDE BLD-SCNC: 101 MMOL/L (ref 99–110)
CLARITY: CLEAR
CO2: 25 MMOL/L (ref 21–32)
COLOR: YELLOW
CREAT SERPL-MCNC: 0.7 MG/DL (ref 0.6–1.1)
DIFFERENTIAL TYPE: ABNORMAL
EOSINOPHILS ABSOLUTE: 0.1 K/CU MM
EOSINOPHILS RELATIVE PERCENT: 1.1 % (ref 0–3)
GFR AFRICAN AMERICAN: >60 ML/MIN/1.73M2
GFR NON-AFRICAN AMERICAN: >60 ML/MIN/1.73M2
GLUCOSE BLD-MCNC: 241 MG/DL (ref 70–99)
GLUCOSE, URINE: >500 MG/DL
GONADOTROPIN, CHORIONIC (HCG) QUANT: 3.7 UIU/ML
HCT VFR BLD CALC: 37.5 % (ref 37–47)
HEMOGLOBIN: 12.5 GM/DL (ref 12.5–16)
IMMATURE NEUTROPHIL %: 0.4 % (ref 0–0.43)
KETONES, URINE: ABNORMAL MG/DL
LEUKOCYTE ESTERASE, URINE: ABNORMAL
LIPASE: 19 IU/L (ref 13–60)
LYMPHOCYTES ABSOLUTE: 2.5 K/CU MM
LYMPHOCYTES RELATIVE PERCENT: 19 % (ref 24–44)
MCH RBC QN AUTO: 28.1 PG (ref 27–31)
MCHC RBC AUTO-ENTMCNC: 33.3 % (ref 32–36)
MCV RBC AUTO: 84.3 FL (ref 78–100)
MONOCYTES ABSOLUTE: 1 K/CU MM
MONOCYTES RELATIVE PERCENT: 7.8 % (ref 0–4)
NITRITE URINE, QUANTITATIVE: NEGATIVE
NUCLEATED RBC %: 0 %
PDW BLD-RTO: 13.1 % (ref 11.7–14.9)
PH, URINE: 5 (ref 5–8)
PLATELET # BLD: 290 K/CU MM (ref 140–440)
PMV BLD AUTO: 9.9 FL (ref 7.5–11.1)
POTASSIUM SERPL-SCNC: 3.9 MMOL/L (ref 3.5–5.1)
PROTEIN UA: NEGATIVE MG/DL
RBC # BLD: 4.45 M/CU MM (ref 4.2–5.4)
RBC URINE: ABNORMAL /HPF (ref 0–6)
SEGMENTED NEUTROPHILS ABSOLUTE COUNT: 9.2 K/CU MM
SEGMENTED NEUTROPHILS RELATIVE PERCENT: 71.2 % (ref 36–66)
SODIUM BLD-SCNC: 141 MMOL/L (ref 135–145)
SPECIFIC GRAVITY UA: 1.02 (ref 1–1.03)
SQUAMOUS EPITHELIAL: 1 /HPF
TOTAL IMMATURE NEUTOROPHIL: 0.05 K/CU MM
TOTAL NUCLEATED RBC: 0 K/CU MM
TOTAL PROTEIN: 7.5 GM/DL (ref 6.4–8.2)
TRICHOMONAS: ABNORMAL /HPF
UROBILINOGEN, URINE: NORMAL MG/DL (ref 0.2–1)
WBC # BLD: 12.9 K/CU MM (ref 4–10.5)
WBC UA: 8 /HPF (ref 0–5)

## 2020-05-30 PROCEDURE — 83690 ASSAY OF LIPASE: CPT

## 2020-05-30 PROCEDURE — 6360000002 HC RX W HCPCS

## 2020-05-30 PROCEDURE — 6370000000 HC RX 637 (ALT 250 FOR IP): Performed by: PHYSICIAN ASSISTANT

## 2020-05-30 PROCEDURE — 80053 COMPREHEN METABOLIC PANEL: CPT

## 2020-05-30 PROCEDURE — 96374 THER/PROPH/DIAG INJ IV PUSH: CPT

## 2020-05-30 PROCEDURE — 84702 CHORIONIC GONADOTROPIN TEST: CPT

## 2020-05-30 PROCEDURE — 2580000003 HC RX 258: Performed by: PHYSICIAN ASSISTANT

## 2020-05-30 PROCEDURE — 85025 COMPLETE CBC W/AUTO DIFF WBC: CPT

## 2020-05-30 PROCEDURE — 99284 EMERGENCY DEPT VISIT MOD MDM: CPT

## 2020-05-30 PROCEDURE — 81001 URINALYSIS AUTO W/SCOPE: CPT

## 2020-05-30 RX ORDER — ONDANSETRON 2 MG/ML
INJECTION INTRAMUSCULAR; INTRAVENOUS
Status: COMPLETED
Start: 2020-05-30 | End: 2020-05-30

## 2020-05-30 RX ORDER — ONDANSETRON 2 MG/ML
4 INJECTION INTRAMUSCULAR; INTRAVENOUS EVERY 6 HOURS PRN
Status: DISCONTINUED | OUTPATIENT
Start: 2020-05-30 | End: 2020-05-30 | Stop reason: HOSPADM

## 2020-05-30 RX ORDER — LOPERAMIDE HYDROCHLORIDE 2 MG/1
2 CAPSULE ORAL ONCE
Status: COMPLETED | OUTPATIENT
Start: 2020-05-30 | End: 2020-05-30

## 2020-05-30 RX ORDER — 0.9 % SODIUM CHLORIDE 0.9 %
1000 INTRAVENOUS SOLUTION INTRAVENOUS ONCE
Status: COMPLETED | OUTPATIENT
Start: 2020-05-30 | End: 2020-05-30

## 2020-05-30 RX ORDER — PROMETHAZINE HYDROCHLORIDE 25 MG/ML
25 INJECTION, SOLUTION INTRAMUSCULAR; INTRAVENOUS ONCE
Status: DISCONTINUED | OUTPATIENT
Start: 2020-05-30 | End: 2020-05-30 | Stop reason: HOSPADM

## 2020-05-30 RX ADMIN — ONDANSETRON 4 MG: 2 INJECTION INTRAMUSCULAR; INTRAVENOUS at 04:22

## 2020-05-30 RX ADMIN — SODIUM CHLORIDE 1000 ML: 9 INJECTION, SOLUTION INTRAVENOUS at 04:23

## 2020-05-30 RX ADMIN — LOPERAMIDE HYDROCHLORIDE 2 MG: 2 CAPSULE ORAL at 04:22

## 2020-05-30 ASSESSMENT — PAIN SCALES - GENERAL: PAINLEVEL_OUTOF10: 10

## 2020-05-30 NOTE — ED NOTES
Bed: ED-19  Expected date:   Expected time:   Means of arrival:   Comments:  EMS     Dhruv Bautista RN  05/30/20 2002
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
## 2020-06-01 ENCOUNTER — CARE COORDINATION (OUTPATIENT)
Dept: CARE COORDINATION | Age: 65
End: 2020-06-01

## 2020-06-09 ENCOUNTER — CARE COORDINATION (OUTPATIENT)
Dept: CARE COORDINATION | Age: 65
End: 2020-06-09

## 2020-06-09 NOTE — CARE COORDINATION
You Patient resolved from the Care Transitions episode on 6. 9.20  Discussed COVID-19 related testing which was not done at this time. Test results were not done. Patient informed of results, if available? n/a    Patient/family has been provided the following resources and education related to COVID-19:                         Signs, symptoms and red flags related to COVID-19            CDC exposure and quarantine guidelines            [employer] exposure contact - [phone]            Contact for their local Department of Health                 Patient currently reports that the following symptoms have improved:  Pt went to ED with ABD pain  and no new/worsening symptoms. No further outreach scheduled with this CTN/ACM. Episode of Care resolved. Patient has this CTN/ACM contact information if future needs arise. Pt reports she has f/u with her PCP and her GI, she is feeling a little better, and she does not have any s/s of respiratory illness at this time.

## 2020-06-19 ENCOUNTER — NURSE TRIAGE (OUTPATIENT)
Dept: OTHER | Facility: CLINIC | Age: 65
End: 2020-06-19

## 2020-06-21 ENCOUNTER — APPOINTMENT (OUTPATIENT)
Dept: GENERAL RADIOLOGY | Age: 65
End: 2020-06-21
Payer: MEDICARE

## 2020-06-21 ENCOUNTER — HOSPITAL ENCOUNTER (EMERGENCY)
Age: 65
Discharge: HOME | End: 2020-06-21
Payer: MEDICARE

## 2020-06-21 VITALS
OXYGEN SATURATION: 97 % | SYSTOLIC BLOOD PRESSURE: 125 MMHG | HEIGHT: 62 IN | WEIGHT: 172 LBS | TEMPERATURE: 98.6 F | DIASTOLIC BLOOD PRESSURE: 67 MMHG | HEART RATE: 63 BPM | RESPIRATION RATE: 18 BRPM | BODY MASS INDEX: 31.65 KG/M2

## 2020-06-21 PROCEDURE — 99283 EMERGENCY DEPT VISIT LOW MDM: CPT

## 2020-06-21 PROCEDURE — 6370000000 HC RX 637 (ALT 250 FOR IP): Performed by: PHYSICIAN ASSISTANT

## 2020-06-21 PROCEDURE — 73130 X-RAY EXAM OF HAND: CPT

## 2020-06-21 RX ORDER — PREDNISONE 20 MG/1
TABLET ORAL
Qty: 11 TABLET | Refills: 0 | Status: SHIPPED | OUTPATIENT
Start: 2020-06-21 | End: 2020-06-30

## 2020-06-21 RX ORDER — HYDROCODONE BITARTRATE AND ACETAMINOPHEN 5; 325 MG/1; MG/1
1 TABLET ORAL ONCE
Status: COMPLETED | OUTPATIENT
Start: 2020-06-21 | End: 2020-06-21

## 2020-06-21 RX ADMIN — HYDROCODONE BITARTRATE AND ACETAMINOPHEN 1 TABLET: 5; 325 TABLET ORAL at 14:32

## 2020-06-21 ASSESSMENT — PAIN DESCRIPTION - LOCATION: LOCATION: HAND

## 2020-06-21 ASSESSMENT — PAIN DESCRIPTION - ORIENTATION: ORIENTATION: LEFT

## 2020-06-21 ASSESSMENT — PAIN SCALES - GENERAL
PAINLEVEL_OUTOF10: 10
PAINLEVEL_OUTOF10: 10

## 2020-06-21 ASSESSMENT — PAIN DESCRIPTION - PAIN TYPE: TYPE: ACUTE PAIN

## 2020-06-21 ASSESSMENT — PAIN DESCRIPTION - FREQUENCY: FREQUENCY: CONTINUOUS

## 2020-06-21 NOTE — ED NOTES
Pt requesting medication for hand. HAYDEN Marie notified. Pt took her own norco just PTA.      Damián Fonseca RN  06/21/20 [id number]
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
## 2020-06-23 ENCOUNTER — NURSE TRIAGE (OUTPATIENT)
Dept: OTHER | Facility: CLINIC | Age: 65
End: 2020-06-23

## 2020-06-24 ENCOUNTER — HOSPITAL ENCOUNTER (OUTPATIENT)
Dept: SPEECH THERAPY | Age: 65
Setting detail: THERAPIES SERIES
Discharge: HOME | End: 2020-06-24
Payer: MEDICARE

## 2020-06-24 ENCOUNTER — APPOINTMENT (OUTPATIENT)
Dept: CT IMAGING | Age: 65
End: 2020-06-24
Payer: MEDICARE

## 2020-06-24 ENCOUNTER — APPOINTMENT (OUTPATIENT)
Dept: GENERAL RADIOLOGY | Age: 65
End: 2020-06-24
Payer: MEDICARE

## 2020-06-24 ENCOUNTER — HOSPITAL ENCOUNTER (OUTPATIENT)
Dept: GENERAL RADIOLOGY | Age: 65
Discharge: HOME | End: 2020-06-24
Payer: MEDICARE

## 2020-06-24 ENCOUNTER — HOSPITAL ENCOUNTER (EMERGENCY)
Age: 65
Discharge: HOME | End: 2020-06-24
Attending: EMERGENCY MEDICINE
Payer: MEDICARE

## 2020-06-24 VITALS
RESPIRATION RATE: 18 BRPM | BODY MASS INDEX: 32.02 KG/M2 | HEIGHT: 62 IN | OXYGEN SATURATION: 96 % | WEIGHT: 174 LBS | HEART RATE: 88 BPM | DIASTOLIC BLOOD PRESSURE: 78 MMHG | SYSTOLIC BLOOD PRESSURE: 146 MMHG | TEMPERATURE: 98.4 F

## 2020-06-24 LAB
ALBUMIN SERPL-MCNC: 4.2 GM/DL (ref 3.4–5)
ALP BLD-CCNC: 62 IU/L (ref 40–129)
ALT SERPL-CCNC: 23 U/L (ref 10–40)
ANION GAP SERPL CALCULATED.3IONS-SCNC: 10 MMOL/L (ref 4–16)
AST SERPL-CCNC: 40 IU/L (ref 15–37)
BASOPHILS ABSOLUTE: 0.1 K/CU MM
BASOPHILS RELATIVE PERCENT: 0.7 % (ref 0–1)
BILIRUB SERPL-MCNC: 0.5 MG/DL (ref 0–1)
BUN BLDV-MCNC: 12 MG/DL (ref 6–23)
CALCIUM SERPL-MCNC: 9.7 MG/DL (ref 8.3–10.6)
CHLORIDE BLD-SCNC: 97 MMOL/L (ref 99–110)
CHP ED QC CHECK: YES
CO2: 28 MMOL/L (ref 21–32)
CREAT SERPL-MCNC: 0.7 MG/DL (ref 0.6–1.1)
DIFFERENTIAL TYPE: ABNORMAL
EOSINOPHILS ABSOLUTE: 0.3 K/CU MM
EOSINOPHILS RELATIVE PERCENT: 2.2 % (ref 0–3)
GFR AFRICAN AMERICAN: >60 ML/MIN/1.73M2
GFR NON-AFRICAN AMERICAN: >60 ML/MIN/1.73M2
GLUCOSE BLD-MCNC: 114 MG/DL (ref 70–99)
GLUCOSE BLD-MCNC: 118 MG/DL
GLUCOSE BLD-MCNC: 123 MG/DL (ref 70–99)
HCT VFR BLD CALC: 39.7 % (ref 37–47)
HEMOGLOBIN: 13.2 GM/DL (ref 12.5–16)
IMMATURE NEUTROPHIL %: 0.2 % (ref 0–0.43)
INR BLD: 0.95 INDEX
LYMPHOCYTES ABSOLUTE: 4.2 K/CU MM
LYMPHOCYTES RELATIVE PERCENT: 34.1 % (ref 24–44)
MCH RBC QN AUTO: 28.4 PG (ref 27–31)
MCHC RBC AUTO-ENTMCNC: 33.2 % (ref 32–36)
MCV RBC AUTO: 85.6 FL (ref 78–100)
MONOCYTES ABSOLUTE: 1 K/CU MM
MONOCYTES RELATIVE PERCENT: 8 % (ref 0–4)
NUCLEATED RBC %: 0 %
PDW BLD-RTO: 13.6 % (ref 11.7–14.9)
PLATELET # BLD: 296 K/CU MM (ref 140–440)
PMV BLD AUTO: 9.8 FL (ref 7.5–11.1)
POTASSIUM SERPL-SCNC: 4.2 MMOL/L (ref 3.5–5.1)
PROTHROMBIN TIME: 11.5 SECONDS (ref 11.7–14.5)
RBC # BLD: 4.64 M/CU MM (ref 4.2–5.4)
SEGMENTED NEUTROPHILS ABSOLUTE COUNT: 6.7 K/CU MM
SEGMENTED NEUTROPHILS RELATIVE PERCENT: 54.8 % (ref 36–66)
SODIUM BLD-SCNC: 135 MMOL/L (ref 135–145)
TOTAL IMMATURE NEUTOROPHIL: 0.03 K/CU MM
TOTAL NUCLEATED RBC: 0 K/CU MM
TOTAL PROTEIN: 7.5 GM/DL (ref 6.4–8.2)
TROPONIN T: <0.01 NG/ML
WBC # BLD: 12.2 K/CU MM (ref 4–10.5)

## 2020-06-24 PROCEDURE — 82962 GLUCOSE BLOOD TEST: CPT

## 2020-06-24 PROCEDURE — 71045 X-RAY EXAM CHEST 1 VIEW: CPT

## 2020-06-24 PROCEDURE — 96375 TX/PRO/DX INJ NEW DRUG ADDON: CPT

## 2020-06-24 PROCEDURE — 96374 THER/PROPH/DIAG INJ IV PUSH: CPT

## 2020-06-24 PROCEDURE — 84484 ASSAY OF TROPONIN QUANT: CPT

## 2020-06-24 PROCEDURE — 6360000004 HC RX CONTRAST MEDICATION: Performed by: PHYSICIAN ASSISTANT

## 2020-06-24 PROCEDURE — 99284 EMERGENCY DEPT VISIT MOD MDM: CPT

## 2020-06-24 PROCEDURE — 74230 X-RAY XM SWLNG FUNCJ C+: CPT

## 2020-06-24 PROCEDURE — 93005 ELECTROCARDIOGRAM TRACING: CPT | Performed by: PHYSICIAN ASSISTANT

## 2020-06-24 PROCEDURE — 92611 MOTION FLUOROSCOPY/SWALLOW: CPT | Performed by: SPEECH-LANGUAGE PATHOLOGIST

## 2020-06-24 PROCEDURE — 70498 CT ANGIOGRAPHY NECK: CPT

## 2020-06-24 PROCEDURE — 6360000002 HC RX W HCPCS

## 2020-06-24 PROCEDURE — 6370000000 HC RX 637 (ALT 250 FOR IP): Performed by: PHYSICIAN ASSISTANT

## 2020-06-24 PROCEDURE — 6360000002 HC RX W HCPCS: Performed by: PHYSICIAN ASSISTANT

## 2020-06-24 PROCEDURE — 70496 CT ANGIOGRAPHY HEAD: CPT

## 2020-06-24 PROCEDURE — 80053 COMPREHEN METABOLIC PANEL: CPT

## 2020-06-24 PROCEDURE — 36415 COLL VENOUS BLD VENIPUNCTURE: CPT

## 2020-06-24 PROCEDURE — 85610 PROTHROMBIN TIME: CPT

## 2020-06-24 PROCEDURE — 85025 COMPLETE CBC W/AUTO DIFF WBC: CPT

## 2020-06-24 PROCEDURE — 70450 CT HEAD/BRAIN W/O DYE: CPT

## 2020-06-24 PROCEDURE — 93010 ELECTROCARDIOGRAM REPORT: CPT | Performed by: INTERNAL MEDICINE

## 2020-06-24 RX ORDER — LEVOTHYROXINE SODIUM 0.05 MG/1
50 TABLET ORAL ONCE
Status: DISCONTINUED | OUTPATIENT
Start: 2020-06-24 | End: 2020-06-24 | Stop reason: HOSPADM

## 2020-06-24 RX ORDER — ONDANSETRON 2 MG/ML
INJECTION INTRAMUSCULAR; INTRAVENOUS
Status: COMPLETED
Start: 2020-06-24 | End: 2020-06-24

## 2020-06-24 RX ORDER — ONDANSETRON 2 MG/ML
4 INJECTION INTRAMUSCULAR; INTRAVENOUS ONCE
Status: COMPLETED | OUTPATIENT
Start: 2020-06-24 | End: 2020-06-24

## 2020-06-24 RX ORDER — LORAZEPAM 2 MG/ML
1 INJECTION INTRAMUSCULAR ONCE
Status: COMPLETED | OUTPATIENT
Start: 2020-06-24 | End: 2020-06-24

## 2020-06-24 RX ORDER — DIPHENHYDRAMINE HYDROCHLORIDE 50 MG/ML
25 INJECTION INTRAMUSCULAR; INTRAVENOUS ONCE
Status: COMPLETED | OUTPATIENT
Start: 2020-06-24 | End: 2020-06-24

## 2020-06-24 RX ORDER — METOPROLOL TARTRATE 50 MG/1
50 TABLET, FILM COATED ORAL ONCE
Status: COMPLETED | OUTPATIENT
Start: 2020-06-24 | End: 2020-06-24

## 2020-06-24 RX ORDER — HYDRALAZINE HYDROCHLORIDE 20 MG/ML
20 INJECTION INTRAMUSCULAR; INTRAVENOUS ONCE
Status: COMPLETED | OUTPATIENT
Start: 2020-06-24 | End: 2020-06-24

## 2020-06-24 RX ORDER — LOSARTAN POTASSIUM AND HYDROCHLOROTHIAZIDE 12.5; 5 MG/1; MG/1
1 TABLET ORAL DAILY
Status: DISCONTINUED | OUTPATIENT
Start: 2020-06-24 | End: 2020-06-24 | Stop reason: HOSPADM

## 2020-06-24 RX ADMIN — ONDANSETRON 4 MG: 2 INJECTION INTRAMUSCULAR; INTRAVENOUS at 11:19

## 2020-06-24 RX ADMIN — IOPAMIDOL 80 ML: 755 INJECTION, SOLUTION INTRAVENOUS at 11:11

## 2020-06-24 RX ADMIN — DIPHENHYDRAMINE HYDROCHLORIDE 25 MG: 50 INJECTION INTRAMUSCULAR; INTRAVENOUS at 11:54

## 2020-06-24 RX ADMIN — HYDRALAZINE HYDROCHLORIDE 20 MG: 20 INJECTION INTRAMUSCULAR; INTRAVENOUS at 11:19

## 2020-06-24 RX ADMIN — METOPROLOL TARTRATE 50 MG: 50 TABLET, FILM COATED ORAL at 11:55

## 2020-06-24 RX ADMIN — LORAZEPAM 1 MG: 2 INJECTION INTRAMUSCULAR; INTRAVENOUS at 11:54

## 2020-06-24 ASSESSMENT — PAIN DESCRIPTION - PAIN TYPE: TYPE: CHRONIC PAIN

## 2020-06-24 ASSESSMENT — PAIN DESCRIPTION - ORIENTATION: ORIENTATION: RIGHT

## 2020-06-24 ASSESSMENT — PAIN DESCRIPTION - LOCATION: LOCATION: ARM

## 2020-06-24 ASSESSMENT — PAIN SCALES - GENERAL: PAINLEVEL_OUTOF10: 10

## 2020-06-24 NOTE — PROCEDURES
INSTRUMENTAL SWALLOW REPORT  MODIFIED BARIUM SWALLOW      Thank you for referring [facility]  1955 for a modified barium swallow study. Please see attached results and contact me at your convenience if you have any questions or concerns. Reynaldo Chicas MA, [address] [place] [address] of [address] [place] [address] Pathology  [phone]  2020  1:00 PM      NAME: [facility]   : 1955  MRN: [id number]       Date of Eval: 2020     Ordering Physician: Rosie Orellana  Radiologist: available upon consult  ENT: N/a  Referring Diagnosis(es): Referring Diagnosis: pharyngeal esophageal dysphagia    Past Medical History:  has a past medical history of Abnormal EKG, Acid reflux, Anemia, Anesthesia, Anginal pain ([address] Anxiety, Arthritis, Asthma, CAD (coronary artery disease), Cerebral artery occlusion with cerebral infarction ([address] CHF (congestive heart failure) ([address] Chronic back pain, Chronic kidney disease, DDD (degenerative disc disease), cervical, Depression, Diabetes mellitus ([address] Diabetic neuropathy ([address] Dizziness, Dry skin, Enlarged ureter, Fatty liver, Fibrocystic breast, Gout, H/O cardiac catheterization, H/O cardiovascular stress test, H/O cardiovascular stress test, H/O cardiovascular stress test, H/O Doppler ultrasound, H/O echocardiogram, H/O echocardiogram, History of Holter monitoring, [place] (hard of hearing), Hx of cardiovascular stress test, Hx of motion sickness, HX OTHER MEDICAL, Hyperlipidemia, Hypertension, IBS (irritable bowel syndrome), Incisional hernia, Kidney stones, Migraines, Nausea & vomiting, Other specified disorder of skin, Panic attacks, Pneumonia, Pseudoseizures, Restless leg, Shortness of breath, Sleep apnea, Staph infection, Thyroid disease, Tremor, Urinary incontinence, UTI (urinary tract infection), UTI (urinary tract infection), Vertigo, and Wears glasses.   Past Surgical History:

## 2020-06-24 NOTE — ED PROVIDER NOTES
I independently examined and evaluated Dominic Mayo. In brief, 53-year-old female presents with facial numbness. She was being seen in the hospital today for a swallow study and began describing some facial numbness. She brought to the emergency department for evaluation. Stroke alert was initially called given that the onset of her symptoms seem to be within an appropriate window. However upon further interview, patient does note that she is had facial numbness for some time. Denies any other significant neurological deficits. No changes in speech, hearing or vision. No trouble with gait. No focal weakness. She does have history of high blood pressure. Her blood pressures were elevated upon presentation. Symptoms did improve while she was in the emergency department. .    Focused exam revealed speech is clear and fluent, face is symmetric, equal strength and sensation in bilateral upper and lower extremities, does complain of diffuse facial numbness with no focality, moving extremity spontaneously, cerebellar testing within normal limits, gait unremarkable. Patient is unlabored. Abdomen is soft, nontender non-peritoneal    ED course: Stroke alert was initially called on patient however upon further interview it was deemed that her symptoms have had a longer duration that would make her a TPA candidate. Labs were obtained and overall at baseline nonacute. Did obtain imaging including head CT scan and CT head neck. Imaging was overall nonacute. We did discuss disposition option with patient. She was offered inpatient mission for further work-up. This time she does decline. The risk of symptom progression and failure to complete medical evaluation were discussed. She demonstrates capacity and understanding sufficient to decline further work-up. Given that she has been having symptoms for some time is overall unremarkable work-up, discharge home is reasonable. She will follow-up outpatient. Return precautions for worsening or concerning symptoms are discussed. She is discharged home in stable condition. All diagnostic, treatment, and disposition decisions were made by myself in conjunction with the advanced practice provider. For all further details of the patient's emergency department visit, please see the advanced practice provider's documentation. Comment: Please note this report has been produced using speech recognition software and may contain errors related to that system including errors in grammar, punctuation, and spelling, as well as words and phrases that may be inappropriate. If there are any questions or concerns please feel free to contact the dictating provider for clarification.       Aviva Hutton MD  06/24/20 [id number]

## 2020-06-24 NOTE — ED PROVIDER NOTES
Triage Chief Complaint:   Numbness (facial; started 20 minutes ago; was being seen by speech therapy when symptoms started. )        Of note, this patient was also evaluated by the attending physician, Dr. Gallito Carpenter. HUNTER:  Godfrey Garcia is a 60 y.o. female that presents acute onset of numbness to face. Onset approximately 20 minutes prior to arrival to ED. Context is patient was at speech therapy for \"possible TIAs\" when she developed acute onset of symptoms. She notes numbness to her face consistent with previous anxiety episodes in which she develops facial numbness. She states this occurs at least 3 times weekly for several months. No new nature severity today. No slurred speech, difficulty speaking. No confusion, visual deficits. No new extremity numbness, tingling, weakness (patient has baseline right upper extremity weakness secondary to \"spine problems\"). REVIEW OF SYSTEMS    General:  No fevers  Eyes:  No recent vison changes  ENT:  No hearing changes  Cardiovascular:  No chest pain, no palpitations  Respiratory:  No shortness of breath, no cough, no wheezing  Gastrointestinal:  No pain, no nausea, no vomiting  Musculoskeletal:  No muscle pain, no joint pain  Skin:  No rash  Neurologic:  See HPI  Genitourinary:  No dysuria, no hematuria  Endocrine:  No unexpected weight gain, no unexpected weight loss  Extremities:  no edema, no pain    All other review of systems are negative  See HPI and nursing notes for additional information      Physical Exam:  ED Triage Vitals [06/24/20 1021]   Enc Vitals Group      BP (!) 230/98      Pulse 78      Resp 18      Temp 98.4 °F (36.9 °C)      Temp Source Oral      SpO2 96 %      Weight 174 lb (78.9 kg)      Height 5' 2\" (1.575 m)      Head Circumference       Peak Flow       Pain Score       Pain Loc       Pain Edu? Excl. in [address]? GENERAL APPEARANCE: Awake and alert. Cooperative. HEAD: Normocephalic. Atraumatic.   EYES:  EOM's grossly intact. Sclera anicteric. ENT: Mucous membranes are moist. Tolerates saliva. No trismus. NECK: Supple. No meningismus. Trachea midline. HEART: RRR. Radial pulses 2+. LUNGS: Respirations unlabored. CTAB  ABDOMEN: Soft. Non-tender. No guarding or rebound. EXTREMITIES: No acute deformities. SKIN: Warm and dry. Neurologic:    - Alert & oriented person, place, time, and situation, no speech difficulties or slurring.  - No obvious gross motor deficits  - Cranial nerves 2-12 grossly intact  - Negative meningeal signs.  - Sensation intact to light touch  - Strength 5/5 in upper and lower extremities bilaterally  - Normal finger to nose test bilaterally  - Rapid alternating movements intact  - Normal heel-shin bilaterally  - No pronator drift. - Light touch sensation intact throughout. - Upper and lower extremity DTRs 2+ bilaterally.   - Gait steady and without difficulty  -No truncal ataxia  -Negative skew test bilateral eyes      ----------------------------------------------------------------------------------------------------------------------    NIH Stroke Scale  (Total score at bottom)      (1A) LOC  (Alert?):  0 - alert; keenly responsive    (1B) LOC Questions (Month / Age):  0 - answers both questions correctly     (1C) LOC Command  (Close eyes, squeeze my hands):  0 - performs both tasks correctly    (2) Best Gaze  (Eyes open-patient follows finger or face):  0 - normal    (3) Visual  (Introduce visual stimulus to patient's visual field quadrants):  0 - no visual loss    (4)  Facial palsy  (Show teeth, raise eyebrows and squeeze eyes shut):  0 - normal symmetric movement    (5) Motor arms  (Elevate extremity to 90° in sitting or 45° if supine -  score drift/movement)       (5A)  motor arm LEFT :  0 - no drift, limb holds 90 (or 45) degrees for full 10 seconds       (5B) motor arm RIGHT:   0 - no drift, limb holds 90 (or 45) degrees for full 10 seconds    Of note, patient does have baseline deficit right upper extremity-unable to assess  strength, motor of fingers, wrist.  (Patient states no new deficits regarding this). (6) Motor legs  (Elevate extremity to 30° while supine and score drift/movement)       (6A)  motor leg LEFT:   0 - no drift; leg holds 30 degree position for full 5 seconds       (6B) motor leg RIGHT:  0 - no drift; leg holds 30 degree position for full 5 seconds       (7)  Limb Ataxia  (Finger-nose, heel-shin):    Unable to fully assess this given patient's baseline right upper extremity deficit. No obvious left upper extremity deficit. No deficits seen on lower extremity exam, heel-to-shin.     (8) Sensory  (face, arms trunk, and legs-compare side to side):  1 - mild to moderate sensory loss; patient feels pinprick is less sharp or is dull on the affected side; there is a loss of superficial pain with pinprick but patient is aware of being touched     (9)  Best language  (Name items, describes a picture, read sentence-see attached testing cards):  0 - no aphasia, normal    (10)  Dysarthria  (Evaluate speech clarity by patient repeating listed words):  0 - normal    (11)  Extinction and inattention  (can feel \"left, right, or both sides\" of face, arms, legs w/ closed eyes) (can see moving index finger in \"left, right, or both\":  0 - no abnormality        Simona Anna's NIH Stroke Scale at 2:08 PM is:  1    ----------------------------------------------------------------------------------------------------------------------        LABS:  Results for orders placed or performed during the hospital encounter of 06/24/20   CBC Auto Differential   Result Value Ref Range    WBC 12.2 (H) 4.0 - 10.5 K/CU MM    RBC 4.64 4.2 - 5.4 M/CU MM    Hemoglobin 13.2 12.5 - 16.0 GM/DL    Hematocrit 39.7 37 - 47 %    MCV 85.6 78 - 100 FL    MCH 28.4 27 - 31 PG    MCHC 33.2 32.0 - 36.0 %    RDW 13.6 11.7 - 14.9 %    Platelets 809 140 - 924 K/CU MM    MPV 9.8 7.5 - 11.1 FL    Differential Type AUTOMATED DIFFERENTIAL     Segs Relative 54.8 36 - 66 %    Lymphocytes % 34.1 24 - 44 %    Monocytes % 8.0 (H) 0 - 4 %    Eosinophils % 2.2 0 - 3 %    Basophils % 0.7 0 - 1 %    Segs Absolute 6.7 K/CU MM    Lymphocytes Absolute 4.2 K/CU MM    Monocytes Absolute 1.0 K/CU MM    Eosinophils Absolute 0.3 K/CU MM    Basophils Absolute 0.1 K/CU MM    Nucleated RBC % 0.0 %    Total Nucleated RBC 0.0 K/CU MM    Total Immature Neutrophil 0.03 K/CU MM    Immature Neutrophil % 0.2 0 - 0.43 %   Comprehensive Metabolic Panel w/ Reflex to MG   Result Value Ref Range    Sodium 135 135 - 145 MMOL/L    Potassium 4.2 3.5 - 5.1 MMOL/L    Chloride 97 (L) 99 - 110 mMol/L    CO2 28 21 - 32 MMOL/L    BUN 12 6 - 23 MG/DL    CREATININE 0.7 0.6 - 1.1 MG/DL    Glucose 114 (H) 70 - 99 MG/DL    Calcium 9.7 8.3 - 10.6 MG/DL    Alb 4.2 3.4 - 5.0 GM/DL    Total Protein 7.5 6.4 - 8.2 GM/DL    Total Bilirubin 0.5 0.0 - 1.0 MG/DL    ALT 23 10 - 40 U/L    AST 40 (H) 15 - 37 IU/L    Alkaline Phosphatase 62 40 - 129 IU/L    GFR Non-African American >60 >60 mL/min/1.73m2    GFR African American >60 >60 mL/min/1.73m2    Anion Gap 10 4 - 16   Troponin   Result Value Ref Range    Troponin T <0.010 <0.01 NG/ML   Protime-INR   Result Value Ref Range    Protime 11.5 (L) 11.7 - 14.5 SECONDS    INR 0.95 INDEX   POCT Glucose   Result Value Ref Range    Glucose 118 mg/dL    QC OK? yes    EKG 12 Lead   Result Value Ref Range    Ventricular Rate 119 BPM    Atrial Rate 119 BPM    P-R Interval 136 ms    QRS Duration 104 ms    Q-T Interval 360 ms    QTc Calculation (Bazett) 506 ms    P Axis 34 degrees    R Axis -51 degrees    T Axis 75 degrees    Diagnosis       Sinus tachycardia  Possible Left atrial enlargement  Left anterior fascicular block  Left ventricular hypertrophy with repolarization abnormality  Abnormal ECG  When compared with ECG of 11-DEC-2019 21:00,  Vent.  rate has increased BY  54 BPM  ST now depressed in Lateral leads  T wave inversion no longer evident in Inferior leads  T wave amplitude has increased in Anterolateral leads           Radiographs:  [] The following radiograph was interpreted by myself in the absence of a radiologist:  [x] Radiologist's Report Reviewed:        EKG Interpretation  Please see ED physician's note for EKG interpretation        MDM:      -----------------------------------------------------------      CRITICAL CARE NOTE:  There was a high probability of clinically significant life-threatening deterioration of the patient's condition requiring my urgent intervention due to concern for CVA. .    Immediate implementation of stroke alert protocol was performed to address this. Assement of the patient, including exam, was done simultaneously during critical care actions. Total critical care time is at least  25 minutes. This includes vital sign monitoring, pulse oximetry monitoring, telemetry monitoring, clinical response to the IV medications, reviewing the nursing notes, consultation time, dictation/documentation time, and interpretation of the lab work. This time excludes family discussion time and time spent performing separately billable procedure(s) (see below for more details)    If applicable, the below separately billable procedure that was performed during this critical care intervention are documented separately - please see the appropriate note for details:        -ECG interpretation      ----------------------------------------------------------                   Stroke Alert:    1. GISELL evaluation performed at: Immediately upon patient presentation to ED  2. Stroke alert called at:  1038  3. I immediately made ED supervising physician aware of patient scenario [id number]  3. CT results obtained at:  1038 via verbal report from Dr. Cherelle Mora, radiologist  4.   Decision was made that TPA is not indicated as patient rapidly improved throughout ED course and states face numbness has resolved on last serial recheck. Given unclear etiology of patient's symptoms, I recommend admission for further evaluation. Especially in the setting of extremely elevated blood pressure which was present on initial evaluation. Blood pressure was addressed and observed to trend downward to final reading of 146/78. Patient admits she did not take her blood pressure medication prior to emergency department visit today. Patient asymptomatic on last several serial rechecks and states would like to go home. She states that she Amanda Georges gets this way with anxiety\" and notes no new nature severity of symptoms. She understands that I cannot effectively rule out stroke without completion of evaluation, including MRI, admission, neuro consult. Prior to discharge, bedside conversation with patient done by supervising physician as well. Patient continues to adamantly want to go home. She understands she may return anytime should she change her mind. Of note, EMR records one low BP measurement. I investigated this and patient's BP cuff was lying on the floor. I immediately rechecked blood pressure and heart rate which were recorded at 146/78 and heart rate of 88. Recommend patient to follow with PCP within the next day or so. I recommend patient immediately return the emergency department if symptoms recur or any new symptoms occur. Clinical Impression:  1. Numbness of face    2. Anxiety    3.  Hypertension, unspecified type            (Please note that portions of this note may have been completed with a voice recognition program. Efforts were made to edit the dictations but occasionally words are mis-transcribed.)    Ailyn Gusman [address] [place] [address]  06/24/20 4106

## 2020-06-25 ENCOUNTER — CARE COORDINATION (OUTPATIENT)
Dept: CARE COORDINATION | Age: 65
End: 2020-06-25

## 2020-06-25 ENCOUNTER — HOSPITAL ENCOUNTER (EMERGENCY)
Age: 65
Discharge: HOME | End: 2020-06-25
Payer: MEDICARE

## 2020-06-25 VITALS
RESPIRATION RATE: 18 BRPM | OXYGEN SATURATION: 96 % | HEART RATE: 102 BPM | SYSTOLIC BLOOD PRESSURE: 155 MMHG | TEMPERATURE: 99.5 F | DIASTOLIC BLOOD PRESSURE: 85 MMHG

## 2020-06-25 PROCEDURE — 6370000000 HC RX 637 (ALT 250 FOR IP): Performed by: PHYSICIAN ASSISTANT

## 2020-06-25 PROCEDURE — 99283 EMERGENCY DEPT VISIT LOW MDM: CPT

## 2020-06-25 RX ORDER — OXYCODONE HYDROCHLORIDE AND ACETAMINOPHEN 5; 325 MG/1; MG/1
1 TABLET ORAL ONCE
Status: COMPLETED | OUTPATIENT
Start: 2020-06-25 | End: 2020-06-25

## 2020-06-25 RX ADMIN — OXYCODONE HYDROCHLORIDE AND ACETAMINOPHEN 1 TABLET: 5; 325 TABLET ORAL at 12:12

## 2020-06-25 ASSESSMENT — PAIN SCALES - GENERAL: PAINLEVEL_OUTOF10: 10

## 2020-06-25 NOTE — CARE COORDINATION
Patient contacted regarding recent discharge and COVID-19 risk. Discussed COVID-19 related testing which was not done at this time. Test results were not done. Patient informed of results, if available? No     Care Transition Nurse/ Ambulatory Care Manager contacted the patient by telephone to perform post discharge assessment. Verified name and  with patient as identifiers. Patient has following risk factors of: diabetes. CTN/ACM reviewed discharge instructions, medical action plan and red flags related to discharge diagnosis. Reviewed and educated them on any new and changed medications related to discharge diagnosis. Advised obtaining a 90-day supply of all daily and as-needed medications. Education provided regarding infection prevention, and signs and symptoms of COVID-19 and when to seek medical attention with patient who verbalized understanding. Discussed exposure protocols and quarantine from [address] you at higher risk for severe illness  and given an opportunity for questions and concerns. The patient agrees to contact the COVID-19 hotline [phone] or PCP office for questions related to their healthcare. CTN/ACM provided contact information for future reference. From CDC: Are you at higher risk for severe illness?  Wash your hands often.  Avoid close contact (6 feet, which is about two arm lengths) with people who are sick.  Put distance between yourself and other people if COVID-19 is spreading in your community.  Clean and disinfect frequently touched surfaces.  Avoid all cruise travel and non-essential air travel.  Call your healthcare professional if you have concerns about COVID-19 and your underlying condition or if you are sick. For more information on steps you can take to protect yourself, see CDC's How to [address] [place] [address] for follow-up call in 7-14 days based on severity of symptoms and risk factors.   Pt Reports Dr Sanjeev Gabriel (pain

## 2020-06-25 NOTE — ED NOTES
Patient was here on Sunday (06/21/20) and yesterday (06/24/20) for right arm pain. Patient states that she called her pain specialist office and he was not in today. The patient states that they told her if it gets too bad to come back to the ER. The patient states that she has \"complex regional pain syndrome in the right upper limb\". Patient states that the pain is 10 out of 10. No deformities noted. No redness noted. Patient states she is unable to move it.       Yelena Bermeo RN  06/25/20 4995

## 2020-06-25 NOTE — ED PROVIDER NOTES
\"Severe Shaking And Restlessness\"    Advil [Ibuprofen Micronized] Palpitations     \"Severe High Blood Pressure\"    Augmentin [Amoxicillin-Pot Clavulanate] Itching and Rash    Bee Venom Swelling     Redness    Ciprofloxacin Itching and Rash    Codeine      \"Severe Abdominal Cramping\"    Darvocet [Propoxyphene N-Acetaminophen] Palpitations     \"Severe High Blood Pressure\"    Darvon [Propoxyphene Hcl] Palpitations     \"Severe High Blood Pressure\"    Decadron [Dexamethasone] Other (See Comments)     seizure  Seizures    Ditropan [Oxybutynin Chloride] Palpitations     \"Severe High Blood Pressure\"    Fioricet [Butalbital-Apap-Caffeine] Palpitations     \"Severe High Blood Pressure\"    Fiorinal-Codeine #3 [Butalbital-Asa-Caff-Codeine]      \"Severe Stomach Cramps\"    Flagyl [Metronidazole] Diarrhea     \"Severe Diarrhea And Cramping\"    Naproxen Palpitations     \"Severe High Blood Pressure\"    Other      \"Allergic To Spider Bites Causing Blackness Of Skin, Severe Itching And Pain\"                                                  \"Allergic To Powder In Gloves Causing Severe Redness And Itching\"    Prozac [Fluoxetine Hcl]      \"Hallucinations\"    Robaxin [Methocarbamol] Palpitations     \"Severe High Blood Pressure\"    Ultram [Tramadol]      \"Severe Stomach Pain\"    Zoloft Palpitations     \"Sever High Blood Pressure\"    Butalbital-Aspirin-Caffeine Other (See Comments)     \"severe stomach pain\"    Fluoxetine Other (See Comments)     hallucinations    Oxybutynin Chloride Other (See Comments)     Raises bp    Sertraline Other (See Comments)     hallucinations    Tape [Adhesive Tape]      Patient states it tears her skin, including band aids       Past medical history:  has a past medical history of Abnormal EKG (4/22/2014), Acid reflux, Anemia, Anesthesia, Anginal pain ([address] Anxiety, Arthritis, Asthma, CAD (coronary artery disease), Cerebral artery occlusion with cerebral infarction [facility]), CHF (congestive heart failure) ([facility] Chronic back pain, Chronic kidney disease, DDD (degenerative disc disease), cervical, Depression, Diabetes mellitus ([address] (Dx 1990's), Diabetic neuropathy ([facility] Dizziness, Dry skin, Enlarged ureter, Fatty liver, Fibrocystic breast, Gout, H/O cardiac catheterization, H/O cardiovascular stress test (03/15/2010), H/O cardiovascular stress test (10/09/2008), H/O cardiovascular stress test (05/06/2014), H/O Doppler ultrasound (02/28/2011), H/O echocardiogram (5/6/2014), H/O echocardiogram (10/14/15), History of Holter monitoring (3/24/15), [place] (hard of hearing), cardiovascular stress test (10/19/2015), motion sickness, OTHER MEDICAL, Hyperlipidemia, Hypertension, IBS (irritable bowel syndrome), Incisional hernia (4/2014), Kidney stones (Last Episode In 2012 Or 2013), Migraines, Nausea & vomiting, Other specified disorder of skin, Panic attacks, Pneumonia (Last Episode In 1980's), Pseudoseizures (Last One In 1990's), Restless leg, Shortness of breath, Sleep apnea, Staph infection (Dx 1980's), Thyroid disease, Tremor, Urinary incontinence, UTI (urinary tract infection) (In Past), UTI (urinary tract infection), Vertigo, and Wears glasses. Past surgical history:  has a past surgical history that includes Carpal tunnel release (Right, 1999); Diagnostic Cardiac Cath Lab Procedure (01/11/2010); Dental surgery; Colonoscopy (Last Done 6-13); Endoscopy, colon, diagnostic (Several ); Bladder surgery (1970's Or 1980's); Lithotripsy (2011); Breast biopsy (Right, 1980's); Breast surgery (Left, 1990's); hernia repair (2744'[id number]); hernia repair (1970's); Cholecystectomy (1970's); Appendectomy (1970's); Hysterectomy, total abdominal (1987); Tubal ligation (1978); Esophagus dilation (1980's And 1990's); Knee arthroscopy (Right, 1999); joint replacement (2008); other surgical history (06 13 2014); fracture surgery (1974 Or 1975); Cardiac catheterization (10-18-06); and Cardiac catheterization.     Home medications:

## 2020-06-27 ENCOUNTER — APPOINTMENT (OUTPATIENT)
Dept: GENERAL RADIOLOGY | Age: 65
DRG: 305 | End: 2020-06-27
Payer: MEDICARE

## 2020-06-27 ENCOUNTER — APPOINTMENT (OUTPATIENT)
Dept: CT IMAGING | Age: 65
DRG: 305 | End: 2020-06-27
Payer: MEDICARE

## 2020-06-27 ENCOUNTER — HOSPITAL ENCOUNTER (INPATIENT)
Age: 65
LOS: 1 days | Discharge: HOME | DRG: 305 | End: 2020-06-30
Attending: EMERGENCY MEDICINE | Admitting: INTERNAL MEDICINE
Payer: MEDICARE

## 2020-06-27 LAB
ALBUMIN SERPL-MCNC: 4.5 GM/DL (ref 3.4–5)
ALP BLD-CCNC: 59 IU/L (ref 40–129)
ALT SERPL-CCNC: 25 U/L (ref 10–40)
ANION GAP SERPL CALCULATED.3IONS-SCNC: 13 MMOL/L (ref 4–16)
AST SERPL-CCNC: 37 IU/L (ref 15–37)
BACTERIA: ABNORMAL /HPF
BANDED NEUTROPHILS ABSOLUTE COUNT: 0.34 K/CU MM
BANDED NEUTROPHILS RELATIVE PERCENT: 2 % (ref 5–11)
BILIRUB SERPL-MCNC: 0.5 MG/DL (ref 0–1)
BILIRUBIN URINE: NEGATIVE MG/DL
BLOOD, URINE: NEGATIVE
BUN BLDV-MCNC: 16 MG/DL (ref 6–23)
CALCIUM SERPL-MCNC: 9.8 MG/DL (ref 8.3–10.6)
CHLORIDE BLD-SCNC: 98 MMOL/L (ref 99–110)
CLARITY: CLEAR
CO2: 25 MMOL/L (ref 21–32)
COLOR: COLORLESS
CREAT SERPL-MCNC: 0.7 MG/DL (ref 0.6–1.1)
DIFFERENTIAL TYPE: ABNORMAL
ESTIMATED AVERAGE GLUCOSE: 177 MG/DL
GFR AFRICAN AMERICAN: >60 ML/MIN/1.73M2
GFR NON-AFRICAN AMERICAN: >60 ML/MIN/1.73M2
GLUCOSE BLD-MCNC: 107 MG/DL (ref 70–99)
GLUCOSE BLD-MCNC: 142 MG/DL (ref 70–99)
GLUCOSE, URINE: NEGATIVE MG/DL
HBA1C MFR BLD: 7.8 % (ref 4.2–6.3)
HCT VFR BLD CALC: 41.2 % (ref 37–47)
HEMOGLOBIN: 13.6 GM/DL (ref 12.5–16)
KETONES, URINE: NEGATIVE MG/DL
LEUKOCYTE ESTERASE, URINE: ABNORMAL
LYMPHOCYTES ABSOLUTE: 6.9 K/CU MM
LYMPHOCYTES RELATIVE PERCENT: 41 % (ref 24–44)
MAGNESIUM: 1.9 MG/DL (ref 1.8–2.4)
MCH RBC QN AUTO: 28.2 PG (ref 27–31)
MCHC RBC AUTO-ENTMCNC: 33 % (ref 32–36)
MCV RBC AUTO: 85.5 FL (ref 78–100)
METAMYELOCYTES ABSOLUTE COUNT: 0.17 K/CU MM
METAMYELOCYTES PERCENT: 1 %
MONOCYTES ABSOLUTE: 0.7 K/CU MM
MONOCYTES RELATIVE PERCENT: 4 % (ref 0–4)
NITRITE URINE, QUANTITATIVE: NEGATIVE
PDW BLD-RTO: 13.5 % (ref 11.7–14.9)
PH, URINE: 7 (ref 5–8)
PLATELET # BLD: 239 K/CU MM (ref 140–440)
PLT MORPHOLOGY: ABNORMAL
PMV BLD AUTO: 10.3 FL (ref 7.5–11.1)
POLYCHROMASIA: ABNORMAL
POTASSIUM SERPL-SCNC: 4.3 MMOL/L (ref 3.5–5.1)
PRO-BNP: 533 PG/ML
PROTEIN UA: 30 MG/DL
RBC # BLD: 4.82 M/CU MM (ref 4.2–5.4)
RBC URINE: <1 /HPF (ref 0–6)
SEGMENTED NEUTROPHILS ABSOLUTE COUNT: 8.7 K/CU MM
SEGMENTED NEUTROPHILS RELATIVE PERCENT: 52 % (ref 36–66)
SODIUM BLD-SCNC: 136 MMOL/L (ref 135–145)
SPECIFIC GRAVITY UA: 1 (ref 1–1.03)
SQUAMOUS EPITHELIAL: <1 /HPF
TOTAL CK: 33 IU/L (ref 26–140)
TOTAL PROTEIN: 7.5 GM/DL (ref 6.4–8.2)
TRICHOMONAS: ABNORMAL /HPF
TROPONIN T: 0.03 NG/ML
TROPONIN T: <0.01 NG/ML
TSH HIGH SENSITIVITY: 4 UIU/ML (ref 0.27–4.2)
UROBILINOGEN, URINE: NORMAL MG/DL (ref 0.2–1)
WBC # BLD: 16.8 K/CU MM (ref 4–10.5)
WBC UA: 3 /HPF (ref 0–5)

## 2020-06-27 PROCEDURE — 94761 N-INVAS EAR/PLS OXIMETRY MLT: CPT

## 2020-06-27 PROCEDURE — 84484 ASSAY OF TROPONIN QUANT: CPT

## 2020-06-27 PROCEDURE — 96365 THER/PROPH/DIAG IV INF INIT: CPT

## 2020-06-27 PROCEDURE — 83735 ASSAY OF MAGNESIUM: CPT

## 2020-06-27 PROCEDURE — 71275 CT ANGIOGRAPHY CHEST: CPT

## 2020-06-27 PROCEDURE — 82962 GLUCOSE BLOOD TEST: CPT

## 2020-06-27 PROCEDURE — 82550 ASSAY OF CK (CPK): CPT

## 2020-06-27 PROCEDURE — 6360000002 HC RX W HCPCS: Performed by: NURSE PRACTITIONER

## 2020-06-27 PROCEDURE — 99285 EMERGENCY DEPT VISIT HI MDM: CPT

## 2020-06-27 PROCEDURE — 36415 COLL VENOUS BLD VENIPUNCTURE: CPT

## 2020-06-27 PROCEDURE — 81001 URINALYSIS AUTO W/SCOPE: CPT

## 2020-06-27 PROCEDURE — 96375 TX/PRO/DX INJ NEW DRUG ADDON: CPT

## 2020-06-27 PROCEDURE — 85027 COMPLETE CBC AUTOMATED: CPT

## 2020-06-27 PROCEDURE — G0378 HOSPITAL OBSERVATION PER HR: HCPCS

## 2020-06-27 PROCEDURE — 96372 THER/PROPH/DIAG INJ SC/IM: CPT

## 2020-06-27 PROCEDURE — 93005 ELECTROCARDIOGRAM TRACING: CPT | Performed by: PHYSICIAN ASSISTANT

## 2020-06-27 PROCEDURE — 85007 BL SMEAR W/DIFF WBC COUNT: CPT

## 2020-06-27 PROCEDURE — 6360000004 HC RX CONTRAST MEDICATION: Performed by: EMERGENCY MEDICINE

## 2020-06-27 PROCEDURE — 6370000000 HC RX 637 (ALT 250 FOR IP): Performed by: PHYSICIAN ASSISTANT

## 2020-06-27 PROCEDURE — 83036 HEMOGLOBIN GLYCOSYLATED A1C: CPT

## 2020-06-27 PROCEDURE — 80053 COMPREHEN METABOLIC PANEL: CPT

## 2020-06-27 PROCEDURE — 2580000003 HC RX 258: Performed by: NURSE PRACTITIONER

## 2020-06-27 PROCEDURE — 6370000000 HC RX 637 (ALT 250 FOR IP): Performed by: NURSE PRACTITIONER

## 2020-06-27 PROCEDURE — 83880 ASSAY OF NATRIURETIC PEPTIDE: CPT

## 2020-06-27 PROCEDURE — 71045 X-RAY EXAM CHEST 1 VIEW: CPT

## 2020-06-27 PROCEDURE — 84443 ASSAY THYROID STIM HORMONE: CPT

## 2020-06-27 RX ORDER — LORAZEPAM 2 MG/ML
1 INJECTION INTRAMUSCULAR EVERY 8 HOURS PRN
Status: DISCONTINUED | OUTPATIENT
Start: 2020-06-27 | End: 2020-06-29

## 2020-06-27 RX ORDER — DEXTROSE MONOHYDRATE 50 MG/ML
100 INJECTION, SOLUTION INTRAVENOUS PRN
Status: DISCONTINUED | OUTPATIENT
Start: 2020-06-27 | End: 2020-06-30 | Stop reason: HOSPADM

## 2020-06-27 RX ORDER — ATORVASTATIN CALCIUM 10 MG/1
10 TABLET, FILM COATED ORAL DAILY
Status: DISCONTINUED | OUTPATIENT
Start: 2020-06-27 | End: 2020-06-30 | Stop reason: HOSPADM

## 2020-06-27 RX ORDER — MAGNESIUM HYDROXIDE/ALUMINUM HYDROXICE/SIMETHICONE 120; 1200; 1200 MG/30ML; MG/30ML; MG/30ML
15 SUSPENSION ORAL EVERY 6 HOURS PRN
Status: DISCONTINUED | OUTPATIENT
Start: 2020-06-27 | End: 2020-06-30 | Stop reason: HOSPADM

## 2020-06-27 RX ORDER — DIPHENHYDRAMINE HYDROCHLORIDE 50 MG/ML
25 INJECTION INTRAMUSCULAR; INTRAVENOUS EVERY 6 HOURS PRN
Status: DISCONTINUED | OUTPATIENT
Start: 2020-06-27 | End: 2020-06-30 | Stop reason: HOSPADM

## 2020-06-27 RX ORDER — NICOTINE POLACRILEX 4 MG
15 LOZENGE BUCCAL PRN
Status: DISCONTINUED | OUTPATIENT
Start: 2020-06-27 | End: 2020-06-30 | Stop reason: HOSPADM

## 2020-06-27 RX ORDER — PANTOPRAZOLE SODIUM 40 MG/1
40 TABLET, DELAYED RELEASE ORAL DAILY
Status: DISCONTINUED | OUTPATIENT
Start: 2020-06-27 | End: 2020-06-30 | Stop reason: HOSPADM

## 2020-06-27 RX ORDER — DEXTROSE MONOHYDRATE 25 G/50ML
12.5 INJECTION, SOLUTION INTRAVENOUS PRN
Status: DISCONTINUED | OUTPATIENT
Start: 2020-06-27 | End: 2020-06-30 | Stop reason: HOSPADM

## 2020-06-27 RX ORDER — ONDANSETRON 2 MG/ML
4 INJECTION INTRAMUSCULAR; INTRAVENOUS EVERY 6 HOURS PRN
Status: DISCONTINUED | OUTPATIENT
Start: 2020-06-27 | End: 2020-06-30 | Stop reason: HOSPADM

## 2020-06-27 RX ORDER — HYDROCHLOROTHIAZIDE 12.5 MG/1
12.5 TABLET ORAL DAILY
Status: DISCONTINUED | OUTPATIENT
Start: 2020-06-27 | End: 2020-06-30 | Stop reason: HOSPADM

## 2020-06-27 RX ORDER — QUETIAPINE FUMARATE 25 MG/1
25 TABLET, FILM COATED ORAL EVERY EVENING
Status: DISCONTINUED | OUTPATIENT
Start: 2020-06-27 | End: 2020-06-30 | Stop reason: HOSPADM

## 2020-06-27 RX ORDER — HYDROCODONE BITARTRATE AND ACETAMINOPHEN 7.5; 325 MG/1; MG/1
1 TABLET ORAL EVERY 6 HOURS PRN
Status: DISCONTINUED | OUTPATIENT
Start: 2020-06-27 | End: 2020-06-30 | Stop reason: HOSPADM

## 2020-06-27 RX ORDER — IPRATROPIUM BROMIDE AND ALBUTEROL SULFATE 2.5; .5 MG/3ML; MG/3ML
3 SOLUTION RESPIRATORY (INHALATION)
Status: DISCONTINUED | OUTPATIENT
Start: 2020-06-27 | End: 2020-06-29

## 2020-06-27 RX ORDER — NITROGLYCERIN 0.4 MG/1
0.4 TABLET SUBLINGUAL EVERY 5 MIN PRN
Status: DISCONTINUED | OUTPATIENT
Start: 2020-06-27 | End: 2020-06-27

## 2020-06-27 RX ORDER — NITROGLYCERIN 0.4 MG/1
0.4 TABLET SUBLINGUAL EVERY 5 MIN PRN
Status: DISCONTINUED | OUTPATIENT
Start: 2020-06-27 | End: 2020-06-30 | Stop reason: HOSPADM

## 2020-06-27 RX ORDER — METOPROLOL TARTRATE 50 MG/1
100 TABLET, FILM COATED ORAL DAILY
Status: DISCONTINUED | OUTPATIENT
Start: 2020-06-27 | End: 2020-06-28

## 2020-06-27 RX ORDER — SODIUM CHLORIDE 0.9 % (FLUSH) 0.9 %
10 SYRINGE (ML) INJECTION PRN
Status: DISCONTINUED | OUTPATIENT
Start: 2020-06-27 | End: 2020-06-30 | Stop reason: HOSPADM

## 2020-06-27 RX ORDER — INSULIN GLARGINE 100 [IU]/ML
30 INJECTION, SOLUTION SUBCUTANEOUS DAILY
Status: DISCONTINUED | OUTPATIENT
Start: 2020-06-28 | End: 2020-06-28

## 2020-06-27 RX ORDER — SODIUM CHLORIDE 9 MG/ML
INJECTION, SOLUTION INTRAVENOUS CONTINUOUS
Status: DISCONTINUED | OUTPATIENT
Start: 2020-06-27 | End: 2020-06-28

## 2020-06-27 RX ORDER — METOPROLOL TARTRATE 50 MG/1
100 TABLET, FILM COATED ORAL ONCE
Status: COMPLETED | OUTPATIENT
Start: 2020-06-27 | End: 2020-06-27

## 2020-06-27 RX ORDER — LOSARTAN POTASSIUM AND HYDROCHLOROTHIAZIDE 12.5; 5 MG/1; MG/1
1 TABLET ORAL ONCE
Status: COMPLETED | OUTPATIENT
Start: 2020-06-27 | End: 2020-06-27

## 2020-06-27 RX ORDER — PROMETHAZINE HYDROCHLORIDE 25 MG/1
12.5 TABLET ORAL EVERY 6 HOURS PRN
Status: DISCONTINUED | OUTPATIENT
Start: 2020-06-27 | End: 2020-06-30 | Stop reason: HOSPADM

## 2020-06-27 RX ORDER — AMITRIPTYLINE HYDROCHLORIDE 25 MG/1
25 TABLET, FILM COATED ORAL NIGHTLY
Status: DISCONTINUED | OUTPATIENT
Start: 2020-06-27 | End: 2020-06-30 | Stop reason: HOSPADM

## 2020-06-27 RX ORDER — GUAIFENESIN 600 MG/1
600 TABLET, EXTENDED RELEASE ORAL 2 TIMES DAILY
Status: DISCONTINUED | OUTPATIENT
Start: 2020-06-27 | End: 2020-06-30 | Stop reason: HOSPADM

## 2020-06-27 RX ORDER — LANOLIN ALCOHOL/MO/W.PET/CERES
400 CREAM (GRAM) TOPICAL DAILY
Status: DISCONTINUED | OUTPATIENT
Start: 2020-06-27 | End: 2020-06-30 | Stop reason: HOSPADM

## 2020-06-27 RX ORDER — LEVOTHYROXINE SODIUM 0.07 MG/1
75 TABLET ORAL EVERY MORNING
Status: DISCONTINUED | OUTPATIENT
Start: 2020-06-28 | End: 2020-06-30 | Stop reason: HOSPADM

## 2020-06-27 RX ORDER — ONDANSETRON 4 MG/1
4 TABLET, ORALLY DISINTEGRATING ORAL ONCE
Status: COMPLETED | OUTPATIENT
Start: 2020-06-27 | End: 2020-06-27

## 2020-06-27 RX ORDER — METOCLOPRAMIDE HYDROCHLORIDE 5 MG/ML
10 INJECTION INTRAMUSCULAR; INTRAVENOUS EVERY 6 HOURS
Status: DISCONTINUED | OUTPATIENT
Start: 2020-06-27 | End: 2020-06-30 | Stop reason: HOSPADM

## 2020-06-27 RX ORDER — MONTELUKAST SODIUM 10 MG/1
10 TABLET ORAL NIGHTLY
Status: DISCONTINUED | OUTPATIENT
Start: 2020-06-27 | End: 2020-06-30 | Stop reason: HOSPADM

## 2020-06-27 RX ORDER — LOSARTAN POTASSIUM 100 MG/1
100 TABLET ORAL DAILY
Status: DISCONTINUED | OUTPATIENT
Start: 2020-06-27 | End: 2020-06-30 | Stop reason: HOSPADM

## 2020-06-27 RX ORDER — SODIUM CHLORIDE 0.9 % (FLUSH) 0.9 %
10 SYRINGE (ML) INJECTION EVERY 12 HOURS SCHEDULED
Status: DISCONTINUED | OUTPATIENT
Start: 2020-06-27 | End: 2020-06-30 | Stop reason: HOSPADM

## 2020-06-27 RX ORDER — POLYETHYLENE GLYCOL 3350 17 G/17G
17 POWDER, FOR SOLUTION ORAL DAILY PRN
Status: DISCONTINUED | OUTPATIENT
Start: 2020-06-27 | End: 2020-06-30 | Stop reason: HOSPADM

## 2020-06-27 RX ORDER — LOSARTAN POTASSIUM AND HYDROCHLOROTHIAZIDE 12.5; 1 MG/1; MG/1
1 TABLET ORAL DAILY
Status: DISCONTINUED | OUTPATIENT
Start: 2020-06-27 | End: 2020-06-27 | Stop reason: CLARIF

## 2020-06-27 RX ADMIN — SODIUM CHLORIDE: 9 INJECTION, SOLUTION INTRAVENOUS at 18:02

## 2020-06-27 RX ADMIN — HYDRALAZINE HYDROCHLORIDE 10 MG: 20 INJECTION INTRAMUSCULAR; INTRAVENOUS at 17:23

## 2020-06-27 RX ADMIN — LORAZEPAM 1 MG: 2 INJECTION INTRAMUSCULAR; INTRAVENOUS at 18:45

## 2020-06-27 RX ADMIN — LOSARTAN POTASSIUM AND HYDROCHLOROTHIAZIDE 1 TABLET: 12.5; 5 TABLET ORAL at 09:53

## 2020-06-27 RX ADMIN — ONDANSETRON 4 MG: 4 TABLET, ORALLY DISINTEGRATING ORAL at 09:25

## 2020-06-27 RX ADMIN — MONTELUKAST 10 MG: 10 TABLET, FILM COATED ORAL at 21:52

## 2020-06-27 RX ADMIN — IOPAMIDOL 80 ML: 755 INJECTION, SOLUTION INTRAVENOUS at 11:49

## 2020-06-27 RX ADMIN — ATORVASTATIN CALCIUM 10 MG: 10 TABLET, FILM COATED ORAL at 21:52

## 2020-06-27 RX ADMIN — POLYETHYLENE GLYCOL (3350) 17 G: 17 POWDER, FOR SOLUTION ORAL at 18:02

## 2020-06-27 RX ADMIN — SODIUM CHLORIDE, PRESERVATIVE FREE 10 ML: 5 INJECTION INTRAVENOUS at 18:01

## 2020-06-27 RX ADMIN — LEVETIRACETAM 750 MG: 500 TABLET ORAL at 21:52

## 2020-06-27 RX ADMIN — DIPHENHYDRAMINE HYDROCHLORIDE 25 MG: 50 INJECTION, SOLUTION INTRAMUSCULAR; INTRAVENOUS at 18:03

## 2020-06-27 RX ADMIN — METOPROLOL TARTRATE 100 MG: 50 TABLET, FILM COATED ORAL at 09:25

## 2020-06-27 RX ADMIN — QUETIAPINE FUMARATE 25 MG: 25 TABLET ORAL at 21:52

## 2020-06-27 RX ADMIN — ENOXAPARIN SODIUM 40 MG: 40 INJECTION SUBCUTANEOUS at 21:52

## 2020-06-27 RX ADMIN — AMITRIPTYLINE HYDROCHLORIDE 25 MG: 25 TABLET, FILM COATED ORAL at 21:52

## 2020-06-27 RX ADMIN — CARBIDOPA AND LEVODOPA 1 TABLET: 10; 100 TABLET ORAL at 21:52

## 2020-06-27 RX ADMIN — PANTOPRAZOLE SODIUM 40 MG: 40 TABLET, DELAYED RELEASE ORAL at 18:09

## 2020-06-27 RX ADMIN — ONDANSETRON 4 MG: 2 INJECTION INTRAMUSCULAR; INTRAVENOUS at 18:03

## 2020-06-27 RX ADMIN — Medication 400 MG: at 18:09

## 2020-06-27 RX ADMIN — HYDROCODONE BITARTRATE AND ACETAMINOPHEN 1 TABLET: 7.5; 325 TABLET ORAL at 18:10

## 2020-06-27 RX ADMIN — METOCLOPRAMIDE 10 MG: 5 INJECTION, SOLUTION INTRAMUSCULAR; INTRAVENOUS at 18:03

## 2020-06-27 ASSESSMENT — PAIN DESCRIPTION - PAIN TYPE
TYPE: ACUTE PAIN
TYPE: NEUROPATHIC PAIN
TYPE: ACUTE PAIN

## 2020-06-27 ASSESSMENT — PAIN SCALES - GENERAL
PAINLEVEL_OUTOF10: 10
PAINLEVEL_OUTOF10: 6

## 2020-06-27 ASSESSMENT — PAIN DESCRIPTION - LOCATION
LOCATION: LEG
LOCATION: ELBOW
LOCATION: ELBOW

## 2020-06-27 ASSESSMENT — PAIN DESCRIPTION - ORIENTATION
ORIENTATION: RIGHT
ORIENTATION: RIGHT;LEFT

## 2020-06-27 ASSESSMENT — PAIN DESCRIPTION - FREQUENCY
FREQUENCY: CONTINUOUS
FREQUENCY: CONTINUOUS

## 2020-06-27 ASSESSMENT — PAIN DESCRIPTION - DESCRIPTORS: DESCRIPTORS: ACHING

## 2020-06-27 NOTE — H&P
History and Physical      Name:  Radha Justin /Age/Sex: 1955  (60 y.o. female)   MRN & CSN:  [id number] & [id number] Admission Date/Time: 2020  8:55 AM   Location:  [facility] PCP: Arianna Moe MD       Hospital Day: 1        Admitting Physician: Dr. Nithin Cuenca      Assessment and Plan:   Radha Justin is a 60 y.o. female who presents with Hypertension and Arm Pain (Right elbow and arm )     Chest pain r/o ACS. Concern for anginal source given risk factors. EKG shows no acute changes. initial troponin neg   Admit to Med/Surg under OBSERVATION status. Telemetry monitoring    Serial troponin level and repeat EKG in AM   Cardiology consult. Follows with Dr. Jenny Caba   Pending labs for further risk stratification    Antiplatelet/BB/Statin/sl Nitro on medication regimen.  Uncontrolled hypertension- SL nitro given in ED   Continue home antihypertensive regime   Add PRN hydralazine      DMII with chronic complications and with long term use of insulin. Last A1C 8.1 (2020)   Continue Tresiba Monitor FSBS and cover with medium dose SSI   PRN symptoms control for gastroparesis      Leukocytosis- (16.8) unknown clinical significance or source at present. Monitor trends and hold abx for now        Patient case discussed with ED provider    Diet Carb control    DVT Prophylaxis [x] Lovenox, []  Heparin, [] SCDs, [] Ambulation  [] Long term AC   GI Prophylaxis [x] PPI,  [] H2 Blocker,  [] Carafate,  [] Diet/Tube Feeds   Code Status Full     Disposition Admit to inpatient. Patient plans to return home upon discharge   MDM [] Low, [x] Moderate,[]  High     -Patient assessment and plan discussed and reviewed with admitting physician: Paul Gregg MD.       History of Present Illness:     Chief Complaint: Hypertension and Arm Pain (Right elbow and arm )    Radha Justin is a 60 y.o. female who presents with elevated blood pressures and right arm pain.  Reports chest pain and palpitations. States home blood pressure readings were elevated with systolic >763M. States epigastric pain with sharp intermittent episodes she is attributing to gastroparesis. Attributing right arm pain to \"neck shot\" is wearing off. Ten point ROS: reviewed negative, unless as noted in above HPI. Objective:   No intake or output data in the 24 hours ending 06/27/20 1454     Vitals:   Vitals:    06/27/20 1402 06/27/20 1442 06/27/20 1501 06/27/20 1508   BP: (!) 194/88 (!) 202/93 (!) 190/103    Pulse: 68 74 78 71   Resp: 18 16 16 18   Temp:       TempSrc:       SpO2: 98% 97% 99% 98%   Weight:       Height:           Physical Exam: 06/27/20     GEN -Awake distressed appearing female, sitting upright in bed , NAD. obese body habitus. Appears given age. EYES -PERRLA. No scleral erythema, discharge, or conjunctivitis. HENT -MM are moist. Oral pharynx without exudates, no evidence of thrush. NECK -Supple, no apparent thyromegaly or masses. RESP -CTA, no wheezes, rales or rhonchi. Symmetric chest movement while on RA.   C/V -S1/S2 auscultated. RRR without appreciable M/R/G. No JVD or carotid bruits. Peripheral pulses equal bilaterally and palpable. Cap refill <3 sec. No peripheral edema. GI -Abdomen is soft non distended and without significant TTP. + BS. No masses or guarding. Rectal exam deferred. No HSM   -No CVA/ flank tenderness. Mercer catheter is not present. LYMPH-No palpable cervical lymphadenopathy and no hepatosplenomegaly. No petechiae or ecchymoses. MS -No gross joint deformities. SKIN -Normal coloration, warm, dry. NEURO-Cranial nerves appear grossly intact, normal speech, no lateralizing weakness. PSYC-Awake, alert, oriented x 4- person, place, time, situation,  Appropriate affect.     Past Medical History:      Past Medical History:   Diagnosis Date    Abnormal EKG 4/22/2014    Acid reflux     Anemia     Anesthesia     Nausea/Vomiting Post Op In Past    Anginal pain ([address] Denies Chest Pain At This Time    Anxiety     Arthritis     \"All Over\"    Asthma     CAD (coronary artery disease)     per last cardiac cath.  Cerebral artery occlusion with cerebral infarction ([address]     CHF (congestive heart failure) (HCC)     Chronic back pain     Chronic kidney disease     DDD (degenerative disc disease), cervical     12- Patient reports she was dx with DDD of Cerival spine C6,C7    Depression     Diabetes mellitus ([address] Dx 1990's    Diabetic neuropathy ([address]     \"In My Legs And Feet\"    Dizziness     \"Sometimes\"    Dry skin     Enlarged ureter     Right Side    Fatty liver     Fibrocystic breast     Gout     Pt states she was diagnosed with gout in the past few months.  H/O cardiac catheterization     Showed mild disease per last cath.  H/O cardiovascular stress test 03/15/2010    EF 69%, normal perfusion study except for diaphragmatic artifact, uniform wall motion.  H/O cardiovascular stress test 10/09/2008    EF 60%, no anginia, normal study.  H/O cardiovascular stress test 05/06/2014    EF 66%, no ischemia, normal LV systolic funciton, normal perfusion pattern.  H/O Doppler ultrasound 02/28/2011    CAROTID DOPPLER-normal study.  H/O echocardiogram 5/6/2014    Ef >55%. Impaired LV relaxation.  H/O echocardiogram 10/14/15    EF 60% Normal LV and systolic function. No significant valvulopathy seen.      History of Holter monitoring 3/24/15    24 hour - predominant rhythm sinus    [place] (hard of hearing)     Bilateral Ears    Hx of cardiovascular stress test 10/19/2015    lexiscan-normal,EF63%    Hx of motion sickness     HX OTHER MEDICAL     Primary Care Physician Is Dr[facility]pping In [facility]    Hyperlipidemia     Hypertension     IBS (irritable bowel syndrome)     Incisional hernia 4/2014    Kidney stones Last Episode In 2012 Or 2013    Passed Kidney Stones Numberous Times    Migraines     Nausea & vomiting     Nausea/Vomiting Hearing Loss in her sister; Heart Disease in her brother, brother, brother, father, mother, and sister; Heart Failure in her sister; High Blood Pressure in her brother, father, mother, and sister; High Cholesterol in her brother, father, and mother; Mental Illness in her daughter; [employer] [place] / [place] in her mother; Other in her daughter, mother, and son; Stroke in her mother. Soc HX:   Social History     Socioeconomic History    Marital status:      Spouse name: None    Number of children: 3    Years of education: 6    Highest education level: None   Occupational History    None   Social Needs    Financial resource strain: None    Food insecurity     Worry: None     Inability: None    Transportation needs     Medical: None     Non-medical: None   Tobacco Use    Smoking status: Never Smoker    Smokeless tobacco: Never Used   Substance and Sexual Activity    Alcohol use: No    Drug use: No    Sexual activity: Not Currently   Lifestyle    Physical activity     Days per week: None     Minutes per session: None    Stress: None   Relationships    Social connections     Talks on phone: None     Gets together: None     Attends Temple service: None     Active member of club or organization: None     Attends meetings of clubs or organizations: None     Relationship status: None    Intimate partner violence     Fear of current or ex partner: None     Emotionally abused: None     Physically abused: None     Forced sexual activity: None   Other Topics Concern    None   Social History Narrative    None     TOBACCO:   reports that she has never smoked. She has never used smokeless tobacco.  ETOH:   reports no history of alcohol use. Drugs:  reports no history of drug use. Allergies:    Allergies   Allergen Reactions    Abilify [Aripiprazole]      \"Severe Shaking And Restlessness\"    Advil [Ibuprofen Micronized] Palpitations     \"Severe High Blood Pressure\"    Augmentin [Amoxicillin-Pot Clavulanate] Itching and Rash    Bee Venom Swelling     Redness    Ciprofloxacin Itching and Rash    Codeine      \"Severe Abdominal Cramping\"    Darvocet [Propoxyphene N-Acetaminophen] Palpitations     \"Severe High Blood Pressure\"    Darvon [Propoxyphene Hcl] Palpitations     \"Severe High Blood Pressure\"    Decadron [Dexamethasone] Other (See Comments)     seizure  Seizures    Ditropan [Oxybutynin Chloride] Palpitations     \"Severe High Blood Pressure\"    Fioricet [Butalbital-Apap-Caffeine] Palpitations     \"Severe High Blood Pressure\"    Fiorinal-Codeine #3 [Butalbital-Asa-Caff-Codeine]      \"Severe Stomach Cramps\"    Flagyl [Metronidazole] Diarrhea     \"Severe Diarrhea And Cramping\"    Naproxen Palpitations     \"Severe High Blood Pressure\"    Other      \"Allergic To Spider Bites Causing Blackness Of Skin, Severe Itching And Pain\"                                                  \"Allergic To Powder In Gloves Causing Severe Redness And Itching\"    Prozac [Fluoxetine Hcl]      \"Hallucinations\"    Robaxin [Methocarbamol] Palpitations     \"Severe High Blood Pressure\"    Ultram [Tramadol]      \"Severe Stomach Pain\"    Zoloft Palpitations     \"Sever High Blood Pressure\"    Butalbital-Aspirin-Caffeine Other (See Comments)     \"severe stomach pain\"    Fluoxetine Other (See Comments)     hallucinations    Oxybutynin Chloride Other (See Comments)     Raises bp    Sertraline Other (See Comments)     hallucinations    Tape [Adhesive Tape]      Patient states it tears her skin, including band aids       Home Medications:     Prior to Admission medications    Medication Sig Start Date End Date Taking? Authorizing Provider   predniSONE (DELTASONE) 20 MG tablet Take 2 tablets by mouth daily for 3 days, THEN 1 tablet daily for 3 days, THEN 0.5 tablets daily for 3 days.  6/21/20 6/30/20  Purnima Moraes PA-C   diphenhydrAMINE (BENADRYL) 25 MG tablet Take 25 mg by mouth every 6 hours as needed    Historical Provider, MD   MUCINEX 600 MG extended release tablet 2 times daily 3/4/20   Historical Provider, MD   HYDROcodone-acetaminophen (NORCO) 5-325 MG per tablet Take 1-2 tablets by mouth every 4 hours as needed.     Historical Provider, MD   hydrOXYzine (VISTARIL) 25 MG capsule 1 capsule as needed 3/9/20   Historical Provider, MD   tiZANidine (ZANAFLEX) 4 MG tablet as needed 3/4/20   Historical Provider, MD   DICLOFENAC SODIUM EX Apply topically    Historical Provider, MD   tiotropium (SPIRIVA RESPIMAT) 1.25 MCG/ACT AERS inhaler Inhale 2 puffs into the lungs daily    Historical Provider, MD   metFORMIN (GLUCOPHAGE) 500 MG tablet Take 500 mg by mouth 3 times daily    Historical Provider, MD   Naproxen Sodium 220 MG CAPS Take 1 tablet by mouth 3 times daily 2/5/20   HAYDEN Luong   docusate sodium (COLACE) 100 MG capsule Take 1 capsule by mouth 2 times daily 1/9/20   HAYDEN Avalos   lidocaine viscous hcl (XYLOCAINE) 2 % SOLN solution Take 15 mLs by mouth as needed for Irritation 12/11/19   Jeri Oleary [place]   albuterol sulfate  (90 Base) MCG/ACT inhaler Inhale 2 puffs into the lungs every 6 hours as needed for Wheezing or Shortness of Breath (or cough) Please include spacer with instructions for use. 6/11/19   HAYDEN Avalos   QUEtiapine (SEROQUEL) 25 MG tablet Take 25 mg by mouth every evening    Historical Provider, MD   TREKIKI FLEXTOUCH 200 UNIT/ML SOPN Inject 30 Units into the skin every morning 1/3/19   Deloris Sandoval MD   Respiratory Therapy Supplies (NEBULIZER COMPRESSOR) KIT 1 kit by Does not apply route once for 1 dose 1/3/19 1/3/19  Deloris Sandoval MD   ipratropium-albuterol (DUONEB) 0.5-2.5 (3) MG/3ML SOLN nebulizer solution Inhale 3 mLs into the lungs every 4 hours (while awake) 1/3/19   Deloris Sandoval MD   levETIRAcetam (KEPPRA) 750 MG tablet Take 1 tablet by mouth nightly 1/3/19   Deloris Sandoval MD   aluminum & magnesium hydroxide-simethicone (MAALOX MAX) 023-606-17 MG/5ML SUSP Take 15 mLs by mouth every 6 hours as needed (heart burn) 9/12/18   WENDIE Buck - CNP   pantoprazole (PROTONIX) 40 MG tablet Take 1 tablet by mouth daily 7/20/18   HAYDEN Gusman   carbidopa-levodopa (SINEMET)  MG per tablet Take 1 tablet by mouth nightly 5/14/18   Historical Provider, MD   losartan-hydrochlorothiazide (HYZAAR) 100-12.5 MG per tablet Take 1 tablet by mouth daily 5/14/18   Historical Provider, MD   metoprolol tartrate (LOPRESSOR) 25 MG tablet Take 100 mg by mouth daily  5/14/18   Historical Provider, MD   levothyroxine (SYNTHROID) 25 MCG tablet Take 1 tablet by mouth every morning  Patient taking differently: Take 75 mcg by mouth every morning  1/11/18   Kamala Vences MD   amitriptyline (ELAVIL) 25 MG tablet Take 1 tablet by mouth nightly 1/11/18   Kamala Vences MD   polyethylene glycol (GLYCOLAX) packet Take 17 g by mouth daily as needed for Constipation    Historical Provider, MD   aspirin 81 MG tablet Take 81 mg by mouth daily    Historical Provider, MD   celecoxib (CELEBREX) 100 MG capsule Take 100 mg by mouth 2 times daily    Historical Provider, MD   allopurinol (ZYLOPRIM) 300 MG tablet Take 300 mg by mouth daily    Historical Provider, MD   insulin aspart (NOVOLOG) 100 UNIT/ML injection vial Inject 12 Units into the skin 3 times daily (before meals)     Historical Provider, MD   magnesium oxide (MAG-OX) 400 (240 MG) MG tablet Take 400 mg by mouth daily    Historical Provider, MD   Multiple Vitamins-Iron (MULTI-VITAMIN/IRON PO) Take  by mouth. Historical Provider, MD   nystatin (MYCOSTATIN) 778209 UNIT/GM ointment Apply  topically 2 times daily. Apply topically 2 times daily. Historical Provider, MD   montelukast (SINGULAIR) 10 MG tablet Take 10 mg by mouth nightly. Historical Provider, MD   simvastatin (ZOCOR) 20 MG tablet Take 20 mg by mouth nightly.     Historical Provider, MD         Medications:   Medications:    Infusions:   PRN TECHNOLOGIST PROVIDED HISTORY: Has a \"code stroke\" or \"stroke alert\" been called? ->Yes Reason for exam:->Numbness to face Reason for Exam: Stroke Acuity: Acute Type of Exam: Initial FINDINGS: BRAIN/VENTRICLES: There is no acute intracranial hemorrhage, mass effect or midline shift. No abnormal extra-axial fluid collection. The gray-white differentiation is maintained without evidence of an acute infarct. There is no evidence of hydrocephalus. Patchy and confluent areas of low-attenuation in the periventricular and subcortical white matter, likely related to chronic small vessel ischemic changes and not significantly changed since 01/31/2019. ORBITS: The visualized portion of the orbits demonstrate no acute abnormality. SINUSES: The visualized paranasal sinuses and mastoid air cells demonstrate no acute abnormality. SOFT TISSUES/SKULL:  No acute abnormality of the visualized skull or soft tissues. Stable exam.  No acute intracranial abnormality. Critical results were called by Dr. Tash Edwards. Curtis Olson MD to [facility] on 6/24/2020 at 10:59. Xr Chest Portable    Result Date: 6/27/2020  EXAMINATION: ONE XRAY VIEW OF THE CHEST 6/27/2020 9:06 am COMPARISON: 06/24/2020 HISTORY: ORDERING SYSTEM PROVIDED HISTORY: chest pain TECHNOLOGIST PROVIDED HISTORY: Reason for exam:->chest pain Reason for Exam: chest pain Acuity: Acute Type of Exam: Initial FINDINGS: The heart and mediastinal structures are stable. The pulmonary vasculature is normal.  Lungs are clear. No acute cardiopulmonary disease. Xr Chest Portable    Result Date: 6/24/2020  EXAMINATION: ONE XRAY VIEW OF THE CHEST 6/24/2020 11:04 am COMPARISON: 03/18/2019 HISTORY: ORDERING SYSTEM PROVIDED HISTORY: numbness to face TECHNOLOGIST PROVIDED HISTORY: Reason for exam:->numbness to face Reason for Exam: numbness to face Acuity: Acute Type of Exam: Initial Additional signs and symptoms:  female that presents acute onset of numbness to face.   Onset approximately 20 minutes prior to arrival to ED. Context is patient was at speech therapy for \"possible TIAs\" when she developed acute onset of symptoms. She notes numbness is worse over the left face FINDINGS: No focal consolidation, pleural effusion or pneumothorax. The cardiomediastinal silhouette is stable. No overt pulmonary edema. The osseous structures are stable. Multiple radiopaque densities are noted projecting over left chest and are likely external to the patient. No acute cardiopulmonary findings. Cta Chest W Contrast    Result Date: 6/27/2020  EXAMINATION: CTA OF THE CHEST 6/27/2020 11:46 am TECHNIQUE: CTA of the chest was performed after the administration of intravenous contrast.  Multiplanar reformatted images are provided for review. MIP images are provided for review. Dose modulation, iterative reconstruction, and/or weight based adjustment of the mA/kV was utilized to reduce the radiation dose to as low as reasonably achievable. COMPARISON: Chest radiograph 06/27/2020, chest CTA 11/15/2013 HISTORY: ORDERING SYSTEM PROVIDED HISTORY: chest pain/htn TECHNOLOGIST PROVIDED HISTORY: Reason for exam:->chest pain/htn Reason for Exam: chest pain/htn Acuity: Acute Type of Exam: Initial Additional signs and symptoms: recently seen here for high blood pressure Relevant Medical/Surgical History: hx of known high blood pressure FINDINGS: PULMONARY ARTERIES:  Normal caliber. Adequately opacified for evaluation. No filling defects consistent with emboli. MEDIASTINUM:  Mild cardiomegaly with left atrial and left ventricular dilation. No flattening of the interventricular septum. No pericardial effusion. Reflux of the contrast bolus into the azygos vein. Normal variant direct origin of the left vertebral artery from the aortic arch. No mediastinal nor hilar lymphadenopathy. Distention of the proximal to mid thoracic esophagus. LUNGS/PLEURA:  Patent central airways.   Minimal to mild bronchial images are provided for review. MIP images are provided for review. Dose modulation, iterative reconstruction, and/or weight based adjustment of the mA/kV was utilized to reduce the radiation dose to as low as reasonably achievable.; CTA of the neck was performed with the administration of intravenous contrast. Multiplanar reformatted images are provided for review. MIP images are provided for review. Stenosis of the internal carotid arteries measured using NASCET criteria. Dose modulation, iterative reconstruction, and/or weight based adjustment of the mA/kV was utilized to reduce the radiation dose to as low as reasonably achievable. COMPARISON: None. HISTORY: ORDERING SYSTEM PROVIDED HISTORY: numbness to face TECHNOLOGIST PROVIDED HISTORY: Has a \"code stroke\" or \"stroke alert\" been called? ->Yes Reason for exam:->numbness to face Reason for Exam: STROKE WEAKNESS; ORDERING SYSTEM PROVIDED HISTORY: numbness to face TECHNOLOGIST PROVIDED HISTORY: Has a \"code stroke\" or \"stroke alert\" been called? ->Yes Reason for exam:->numbness to face Reason for Exam: STROKE WEAKNESS Acuity: Acute Type of Exam: Initial FINDINGS: CTA NECK: AORTIC ARCH/ARCH VESSELS: There is 4 vessel aortic arch with direct origin of the left vertebral artery from the thoracic aorta. No dissection or arterial injury. No significant stenosis of the brachiocephalic or subclavian arteries. CAROTID ARTERIES: No dissection, arterial injury, or hemodynamically significant stenosis by NASCET criteria. VERTEBRAL ARTERIES: No dissection, arterial injury, or significant stenosis. SOFT TISSUES: The lung apices are clear. No cervical or superior mediastinal lymphadenopathy. The larynx and pharynx are unremarkable. No acute abnormality of the salivary and thyroid glands. BONES: No acute osseous abnormality. CTA HEAD: ANTERIOR CIRCULATION: No significant stenosis of the intracranial internal carotid, anterior cerebral, or middle cerebral arteries.  No aneurysm. Anterior communicating artery is present. POSTERIOR CIRCULATION: No significant stenosis of the vertebral, basilar, or posterior cerebral arteries. No aneurysm. Left posterior communicating artery is present. OTHER: No dural venous sinus thrombosis on this non-dedicated study. Unremarkable CTA of the head and neck. Fl Modified Barium Swallow W Video    Result Date: 6/24/2020  EXAMINATION: MODIFIED BARIUM SWALLOW WAS PERFORMED IN CONJUNCTION WITH SPEECH PATHOLOGY SERVICES TECHNIQUE: Fluoroscopic evaluation of the swallowing mechanism was performed with multiple consistency of barium product. FLUOROSCOPY DOSE AND TYPE OR TIME AND EXPOSURES: 6 images 1.2 minutes Dose area product 173.48 µGy*m² Entrance dose 5.3 mGy COMPARISON: None HISTORY: ORDERING SYSTEM PROVIDED HISTORY: Dysphagia, pharyngoesophageal TECHNOLOGIST PROVIDED HISTORY: Reason for Exam: Dysphagia FINDINGS: Thin barium, nectar consistency, puree, and solid were trialed. Oral phase of swallowing demonstrates lingual pumping. Pharyngeal phase of swallowing demonstrates residue at the upper esophageal sphincter. No laryngeal penetration or aspiration. Esophageal screen demonstrates tertiary contractions and motility deficit. Swallowing mechanism grossly within normal limits without aspiration. Please see separate speech pathology report for full discussion of findings and recommendations.          EKG this visit:  Reviewed             Electronically signed by WENDIE Albright CNP on 6/27/2020 at 2:54 PM

## 2020-06-27 NOTE — ED NOTES
Bed: ED-28  Expected date: 6/27/20  Expected time: 8:50 AM  Means of arrival:   Comments:  ems     Ofelia Alvarez RN  06/27/20 [id number]

## 2020-06-27 NOTE — ED NOTES
[address] paged Dr Merline Burton covering Dr Neris Mason  06/27/20 [phone] Dr Merline Burton returned call      Yumiko Lopez  06/27/20 [id number]

## 2020-06-27 NOTE — ED NOTES
Report called to Mary Orlando on 3E. All questions answered.      Akanksha Alvarado RN  06/27/20 0198
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
## 2020-06-27 NOTE — ED NOTES
[id number] paged hospitalist     Ata Vazquez  06/27/20 301 [facility] with [facility] returned call      Ata Vazquez  06/27/20 [id number]

## 2020-06-27 NOTE — ED PROVIDER NOTES
I independently examined and evaluated Zoey Thao. In brief their history revealed  a 60 y.o. female who presents with elevated blood pressure. Context is patient took random blood pressure reading this morning after changing batteries in her BP cuff. She noticed that blood pressure was greater than 625 systolic and called her PCP who instructed her to come to the ED. She states she feels like her Ernst Dress is beating out of her chest\" which is similar to previous feeling when her blood pressure is elevated. Otherwise denies  chest pain, difficulty breathing.     As a side note, patient has ongoing right upper extremity pain related to complex regional pain syndrome for which she follows with orthopedist.  She notes no new nature severity today. Their focused exam revealed alert and oriented female resting in bed no distress normocephalic atraumatic sclera clear airway normal lungs clear heart regular rate and rhythm 2+ pulses throughout abdomen soft obese mildly tender diffusely chronic pain she states no rebound guarding rigidity bowel sounds present repair 5-5 strength throughout skin has no rashes chronic swelling to both lower extremities compartments are soft. Cranial nerves intact does have chronic right arm pain compartments are soft no signs of trauma or infection no rash no weakness    ED course: Seen with PA please see his note. Patient here with high blood pressure, chest pain, right arm pain.   She has chronic pain syndrome regional pain syndrome of right arm this is not new she had some chest pressure she did not take her blood pressure medicine this morning due to thyroid problems we talked to her endocrinologist okay to give blood pressure medicine now we did order nitro if needed for chest pain shortness of breath hypertension work-up performed white counts elevated BNP elevated slightly otherwise she does have some possible heart failure seen on CT of her chest no dissection no PE will admit to hospital for hypertension chest pain shortness of breath hypertensive urgency otherwise stable. EKG is negative admitted. Did talk to hospital medicine. All diagnostic, treatment, and disposition decisions were made by myself in conjunction with the Advanced Practice Provider. For all further details of the patient's emergency department visit, please see the Advanced Practice Provider's documentation. 12 lead EKG per my interpretation:  Normal Sinus Rhythm 78 PVC's  Axis is   Left  QTc is  444  There is no specific T wave changes appreciated. There is no specific ST wave changes appreciated.     Prior EKG to compare with was not available        Grovo, DO  06/27/20 1724 Wilfred Mcdermott, DO  06/27/20 0532

## 2020-06-28 LAB
ALBUMIN SERPL-MCNC: 4.5 GM/DL (ref 3.4–5)
ALP BLD-CCNC: 62 IU/L (ref 40–128)
ALT SERPL-CCNC: 8 U/L (ref 10–40)
ANION GAP SERPL CALCULATED.3IONS-SCNC: 13 MMOL/L (ref 4–16)
AST SERPL-CCNC: 45 IU/L (ref 15–37)
BILIRUB SERPL-MCNC: 0.6 MG/DL (ref 0–1)
BUN BLDV-MCNC: 11 MG/DL (ref 6–23)
CALCIUM SERPL-MCNC: 9.5 MG/DL (ref 8.3–10.6)
CHLORIDE BLD-SCNC: 100 MMOL/L (ref 99–110)
CHOLESTEROL: 135 MG/DL
CO2: 26 MMOL/L (ref 21–32)
CREAT SERPL-MCNC: 0.6 MG/DL (ref 0.6–1.1)
GFR AFRICAN AMERICAN: >60 ML/MIN/1.73M2
GFR NON-AFRICAN AMERICAN: >60 ML/MIN/1.73M2
GLUCOSE BLD-MCNC: 106 MG/DL (ref 70–99)
GLUCOSE BLD-MCNC: 147 MG/DL (ref 70–99)
GLUCOSE BLD-MCNC: 163 MG/DL (ref 70–99)
GLUCOSE BLD-MCNC: 169 MG/DL (ref 70–99)
GLUCOSE BLD-MCNC: 231 MG/DL (ref 70–99)
GLUCOSE BLD-MCNC: 237 MG/DL (ref 70–99)
HCT VFR BLD CALC: 41.2 % (ref 37–47)
HDLC SERPL-MCNC: 58 MG/DL
HEMOGLOBIN: 13.7 GM/DL (ref 12.5–16)
LDL CHOLESTEROL DIRECT: 56 MG/DL
MAGNESIUM: 1.8 MG/DL (ref 1.8–2.4)
MCH RBC QN AUTO: 28.3 PG (ref 27–31)
MCHC RBC AUTO-ENTMCNC: 33.3 % (ref 32–36)
MCV RBC AUTO: 85.1 FL (ref 78–100)
PDW BLD-RTO: 13.8 % (ref 11.7–14.9)
PLATELET # BLD: 247 K/CU MM (ref 140–440)
PMV BLD AUTO: 9.7 FL (ref 7.5–11.1)
POTASSIUM SERPL-SCNC: 3.5 MMOL/L (ref 3.5–5.1)
RBC # BLD: 4.84 M/CU MM (ref 4.2–5.4)
SODIUM BLD-SCNC: 139 MMOL/L (ref 135–145)
TOTAL PROTEIN: 7.2 GM/DL (ref 6.4–8.2)
TRIGL SERPL-MCNC: 263 MG/DL
TROPONIN T: <0.01 NG/ML
WBC # BLD: 14.7 K/CU MM (ref 4–10.5)

## 2020-06-28 PROCEDURE — 85027 COMPLETE CBC AUTOMATED: CPT

## 2020-06-28 PROCEDURE — 80053 COMPREHEN METABOLIC PANEL: CPT

## 2020-06-28 PROCEDURE — 93005 ELECTROCARDIOGRAM TRACING: CPT | Performed by: NURSE PRACTITIONER

## 2020-06-28 PROCEDURE — 80061 LIPID PANEL: CPT

## 2020-06-28 PROCEDURE — 6370000000 HC RX 637 (ALT 250 FOR IP): Performed by: NURSE PRACTITIONER

## 2020-06-28 PROCEDURE — 6360000002 HC RX W HCPCS: Performed by: NURSE PRACTITIONER

## 2020-06-28 PROCEDURE — 83735 ASSAY OF MAGNESIUM: CPT

## 2020-06-28 PROCEDURE — 99222 1ST HOSP IP/OBS MODERATE 55: CPT | Performed by: INTERNAL MEDICINE

## 2020-06-28 PROCEDURE — G0378 HOSPITAL OBSERVATION PER HR: HCPCS

## 2020-06-28 PROCEDURE — 36415 COLL VENOUS BLD VENIPUNCTURE: CPT

## 2020-06-28 PROCEDURE — 2580000003 HC RX 258: Performed by: NURSE PRACTITIONER

## 2020-06-28 PROCEDURE — APPNB45 APP NON BILLABLE 31-45 MINUTES: Performed by: NURSE PRACTITIONER

## 2020-06-28 PROCEDURE — 6370000000 HC RX 637 (ALT 250 FOR IP): Performed by: INTERNAL MEDICINE

## 2020-06-28 PROCEDURE — 82962 GLUCOSE BLOOD TEST: CPT

## 2020-06-28 PROCEDURE — 96372 THER/PROPH/DIAG INJ SC/IM: CPT

## 2020-06-28 PROCEDURE — 83721 ASSAY OF BLOOD LIPOPROTEIN: CPT

## 2020-06-28 PROCEDURE — 94761 N-INVAS EAR/PLS OXIMETRY MLT: CPT

## 2020-06-28 PROCEDURE — 84484 ASSAY OF TROPONIN QUANT: CPT

## 2020-06-28 PROCEDURE — 96376 TX/PRO/DX INJ SAME DRUG ADON: CPT

## 2020-06-28 PROCEDURE — 6370000000 HC RX 637 (ALT 250 FOR IP): Performed by: HOSPITALIST

## 2020-06-28 RX ORDER — CARVEDILOL 6.25 MG/1
6.25 TABLET ORAL 2 TIMES DAILY WITH MEALS
Status: DISCONTINUED | OUTPATIENT
Start: 2020-06-28 | End: 2020-06-30 | Stop reason: HOSPADM

## 2020-06-28 RX ORDER — INSULIN GLARGINE 100 [IU]/ML
20 INJECTION, SOLUTION SUBCUTANEOUS NIGHTLY
Status: DISCONTINUED | OUTPATIENT
Start: 2020-06-28 | End: 2020-06-30 | Stop reason: HOSPADM

## 2020-06-28 RX ORDER — AMLODIPINE BESYLATE 5 MG/1
5 TABLET ORAL DAILY
Status: DISCONTINUED | OUTPATIENT
Start: 2020-06-28 | End: 2020-06-29

## 2020-06-28 RX ADMIN — Medication 400 MG: at 11:04

## 2020-06-28 RX ADMIN — HYDROCHLOROTHIAZIDE 12.5 MG: 12.5 TABLET ORAL at 11:04

## 2020-06-28 RX ADMIN — INSULIN LISPRO 4 UNITS: 100 INJECTION, SOLUTION INTRAVENOUS; SUBCUTANEOUS at 13:47

## 2020-06-28 RX ADMIN — LORAZEPAM 1 MG: 2 INJECTION INTRAMUSCULAR; INTRAVENOUS at 02:46

## 2020-06-28 RX ADMIN — MONTELUKAST 10 MG: 10 TABLET, FILM COATED ORAL at 22:36

## 2020-06-28 RX ADMIN — ATORVASTATIN CALCIUM 10 MG: 10 TABLET, FILM COATED ORAL at 11:05

## 2020-06-28 RX ADMIN — DIPHENHYDRAMINE HYDROCHLORIDE 25 MG: 50 INJECTION, SOLUTION INTRAMUSCULAR; INTRAVENOUS at 11:20

## 2020-06-28 RX ADMIN — DIPHENHYDRAMINE HYDROCHLORIDE 25 MG: 50 INJECTION, SOLUTION INTRAMUSCULAR; INTRAVENOUS at 17:14

## 2020-06-28 RX ADMIN — ALUMINUM HYDROXIDE, MAGNESIUM HYDROXIDE, AND SIMETHICONE 15 ML: 200; 200; 20 SUSPENSION ORAL at 11:20

## 2020-06-28 RX ADMIN — LEVETIRACETAM 750 MG: 500 TABLET ORAL at 22:36

## 2020-06-28 RX ADMIN — METOPROLOL TARTRATE 100 MG: 50 TABLET, FILM COATED ORAL at 11:04

## 2020-06-28 RX ADMIN — INSULIN GLARGINE 30 UNITS: 100 INJECTION, SOLUTION SUBCUTANEOUS at 11:07

## 2020-06-28 RX ADMIN — METOCLOPRAMIDE 10 MG: 5 INJECTION, SOLUTION INTRAMUSCULAR; INTRAVENOUS at 17:14

## 2020-06-28 RX ADMIN — METOCLOPRAMIDE 10 MG: 5 INJECTION, SOLUTION INTRAMUSCULAR; INTRAVENOUS at 11:05

## 2020-06-28 RX ADMIN — DIPHENHYDRAMINE HYDROCHLORIDE 25 MG: 50 INJECTION, SOLUTION INTRAMUSCULAR; INTRAVENOUS at 00:32

## 2020-06-28 RX ADMIN — METOCLOPRAMIDE 10 MG: 5 INJECTION, SOLUTION INTRAMUSCULAR; INTRAVENOUS at 00:32

## 2020-06-28 RX ADMIN — HYDROCODONE BITARTRATE AND ACETAMINOPHEN 1 TABLET: 7.5; 325 TABLET ORAL at 11:20

## 2020-06-28 RX ADMIN — METOCLOPRAMIDE 10 MG: 5 INJECTION, SOLUTION INTRAMUSCULAR; INTRAVENOUS at 22:36

## 2020-06-28 RX ADMIN — HYDROCODONE BITARTRATE AND ACETAMINOPHEN 1 TABLET: 7.5; 325 TABLET ORAL at 00:32

## 2020-06-28 RX ADMIN — SODIUM CHLORIDE: 9 INJECTION, SOLUTION INTRAVENOUS at 05:59

## 2020-06-28 RX ADMIN — AMITRIPTYLINE HYDROCHLORIDE 25 MG: 25 TABLET, FILM COATED ORAL at 22:36

## 2020-06-28 RX ADMIN — INSULIN LISPRO 2 UNITS: 100 INJECTION, SOLUTION INTRAVENOUS; SUBCUTANEOUS at 17:36

## 2020-06-28 RX ADMIN — ENOXAPARIN SODIUM 40 MG: 40 INJECTION SUBCUTANEOUS at 11:06

## 2020-06-28 RX ADMIN — QUETIAPINE FUMARATE 25 MG: 25 TABLET ORAL at 22:36

## 2020-06-28 RX ADMIN — LOSARTAN POTASSIUM 100 MG: 100 TABLET, FILM COATED ORAL at 11:05

## 2020-06-28 RX ADMIN — AMLODIPINE BESYLATE 5 MG: 5 TABLET ORAL at 17:31

## 2020-06-28 RX ADMIN — PANTOPRAZOLE SODIUM 40 MG: 40 TABLET, DELAYED RELEASE ORAL at 11:05

## 2020-06-28 RX ADMIN — CARVEDILOL 6.25 MG: 6.25 TABLET, FILM COATED ORAL at 22:36

## 2020-06-28 RX ADMIN — LEVOTHYROXINE SODIUM 75 MCG: 75 TABLET ORAL at 11:04

## 2020-06-28 RX ADMIN — LORAZEPAM 1 MG: 2 INJECTION INTRAMUSCULAR; INTRAVENOUS at 17:14

## 2020-06-28 RX ADMIN — CARBIDOPA AND LEVODOPA 1 TABLET: 10; 100 TABLET ORAL at 22:36

## 2020-06-28 RX ADMIN — SODIUM CHLORIDE, PRESERVATIVE FREE 10 ML: 5 INJECTION INTRAVENOUS at 22:45

## 2020-06-28 ASSESSMENT — PAIN SCALES - GENERAL
PAINLEVEL_OUTOF10: 10
PAINLEVEL_OUTOF10: 8
PAINLEVEL_OUTOF10: 6
PAINLEVEL_OUTOF10: 0

## 2020-06-28 ASSESSMENT — PAIN DESCRIPTION - LOCATION: LOCATION: LEG

## 2020-06-28 ASSESSMENT — PAIN DESCRIPTION - PAIN TYPE: TYPE: NEUROPATHIC PAIN

## 2020-06-28 ASSESSMENT — PAIN DESCRIPTION - ORIENTATION: ORIENTATION: RIGHT;LEFT

## 2020-06-28 NOTE — CONSULTS
CARDIOLOGY CONSULT NOTE    Reason for consultation: Chest pain     Referring physician: Pearson Cockayne, MD      Primary care physician: Dionte Johnson MD        Dear Pearson Cockayne, MD   Thanks for the consult.     History of present illness:Simona is a 60 y. o.year old who  presents with  chest pain for last few days, happening daily, intermittent for 15 to 20 mins and aggravated with activity substernal also,reproducible with palpation, radiated to shoulder, 6/10, tender to touch,associated with shortness of breath, + sweating, nausea, did not get NTG in ED. Her blood pressure is uncontrolled, she thinks its anxiety which is causing her symptoms, her 2nd trop was minially elevated and then normalized  No fever, no chills, no nausea no vomiting.  Blood pressure, cholesterol, blood glucose and weight are well controlled.     Chief Complaint   Patient presents with    Hypertension    Arm Pain     Right elbow and arm        Past medical history:    has a past medical history of Abnormal EKG, Acid reflux, Anemia, Anesthesia, Anginal pain ([address] Anxiety, Arthritis, Asthma, CAD (coronary artery disease), Cerebral artery occlusion with cerebral infarction ([address] CHF (congestive heart failure) ([address] Chronic back pain, Chronic kidney disease, DDD (degenerative disc disease), cervical, Depression, Diabetes mellitus ([address] Diabetic neuropathy ([address] Dizziness, Dry skin, Enlarged ureter, Fatty liver, Fibrocystic breast, Gout, H/O cardiac catheterization, H/O cardiovascular stress test, H/O cardiovascular stress test, H/O cardiovascular stress test, H/O Doppler ultrasound, H/O echocardiogram, H/O echocardiogram, History of Holter monitoring, [place] (hard of hearing), Hx of cardiovascular stress test, Hx of motion sickness, HX OTHER MEDICAL, Hyperlipidemia, Hypertension, IBS (irritable bowel syndrome), Incisional hernia, Kidney stones, Migraines, Nausea & vomiting, Other specified disorder of skin, Panic attacks, Pneumonia, Pseudoseizures, Restless leg, Shortness of breath, Sleep apnea, Staph infection, Thyroid disease, Tremor, Urinary incontinence, UTI (urinary tract infection), UTI (urinary tract infection), Vertigo, and Wears glasses. Past surgical history:   has a past surgical history that includes Carpal tunnel release (Right, 1999); Diagnostic Cardiac Cath Lab Procedure (01/11/2010); Dental surgery; Colonoscopy (Last Done 6-13); Endoscopy, colon, diagnostic (Several ); Bladder surgery (1970's Or 1980's); Lithotripsy (2011); Breast biopsy (Right, 1980's); Breast surgery (Left, 1990's); hernia repair (2331'D); hernia repair (1970's); Cholecystectomy (1970's); Appendectomy (1970's); Hysterectomy, total abdominal (1987); Tubal ligation (1978); Esophagus dilation (1980's And 1990's); Knee arthroscopy (Right, 1999); joint replacement (2008); other surgical history (06 13 2014); fracture surgery (1974 Or 1975); Cardiac catheterization (10-18-06); and Cardiac catheterization. Social History:   reports that she has never smoked. She has never used smokeless tobacco. She reports that she does not drink alcohol or use drugs.   Family history:   no family history of CAD, STROKE of DM    insulin glargine (LANTUS) injection vial 20 Units, Nightly  insulin lispro (HUMALOG) injection vial 0-6 Units, 2 times per day  amLODIPine (NORVASC) tablet 5 mg, Daily  nitroGLYCERIN (NITROSTAT) SL tablet 0.4 mg, Q5 Min PRN  carbidopa-levodopa (SINEMET)  MG per tablet 1 tablet, Nightly  levETIRAcetam (KEPPRA) tablet 750 mg, Nightly  levothyroxine (SYNTHROID) tablet 75 mcg, QAM  metoprolol tartrate (LOPRESSOR) tablet 100 mg, Daily  montelukast (SINGULAIR) tablet 10 mg, Nightly  QUEtiapine (SEROQUEL) tablet 25 mg, QPM  atorvastatin (LIPITOR) tablet 10 mg, Daily  pantoprazole (PROTONIX) tablet 40 mg, Daily  guaiFENesin (MUCINEX) extended release tablet 600 mg, BID  magnesium oxide (MAG-OX) tablet 400 mg, Daily  ipratropium-albuterol (DUONEB) nebulizer solution 3 mL, Q4H WA  insulin lispro (HUMALOG) injection vial 0-12 Units, TID WC  glucose (GLUTOSE) 40 % oral gel 15 g, PRN  dextrose 50 % IV solution, PRN  glucagon (rDNA) injection 1 mg, PRN  dextrose 5 % solution, PRN  sodium chloride flush 0.9 % injection 10 mL, 2 times per day  sodium chloride flush 0.9 % injection 10 mL, PRN  polyethylene glycol (GLYCOLAX) packet 17 g, Daily PRN  promethazine (PHENERGAN) tablet 12.5 mg, Q6H PRN    Or  ondansetron (ZOFRAN) injection 4 mg, Q6H PRN  enoxaparin (LOVENOX) injection 40 mg, Daily  nitroGLYCERIN (NITROSTAT) SL tablet 0.4 mg, Q5 Min PRN  aluminum & magnesium hydroxide-simethicone (MAALOX) 200-200-20 MG/5ML suspension 15 mL, Q6H PRN  amitriptyline (ELAVIL) tablet 25 mg, Nightly  diphenhydrAMINE (BENADRYL) injection 25 mg, Q6H PRN  metoclopramide (REGLAN) injection 10 mg, Q6H  hydrALAZINE (APRESOLINE) 10 mg in sodium chloride 0.9 % 50 mL ivpb, Q6H PRN  losartan (COZAAR) tablet 100 mg, Daily    And  hydrochlorothiazide (HYDRODIURIL) tablet 12.5 mg, Daily  HYDROcodone-acetaminophen (NORCO) 7.5-325 MG per tablet 1 tablet, Q6H PRN  LORazepam (ATIVAN) injection 1 mg, Q8H PRN      Current Facility-Administered Medications   Medication Dose Route Frequency Provider Last Rate Last Dose    insulin glargine (LANTUS) injection vial 20 Units  20 Units Subcutaneous Nightly M Martín Dubon MD        insulin lispro (HUMALOG) injection vial 0-6 Units  0-6 Units Subcutaneous 2 times per day KISHAN Dubon MD        amLODIPine (NORVASC) tablet 5 mg  5 mg Oral Daily Matthias Moreno MD   5 mg at 06/28/20 2622    nitroGLYCERIN (NITROSTAT) SL tablet 0.4 mg  0.4 mg Sublingual Q5 Min PRN Charlotte MirDyllan acosta        carbidopa-levodopa (SINEMET)  MG per tablet 1 tablet  1 tablet Oral Nightly Kevin Ket, APRN - CNP   1 tablet at 06/27/20 2152    levETIRAcetam (KEPPRA) tablet 750 mg  750 mg Oral Nightly WENDIE Pham CNP   750 mg at 06/27/20 2152    levothyroxine (SYNTHROID) tablet 75 mcg  75 mcg Oral QAM Coreyute CoastWENDIE castellon - CNP   75 mcg at 06/28/20 1104    metoprolol tartrate (LOPRESSOR) tablet 100 mg  100 mg Oral Daily [facility] W[facility] - C[facility]   100 mg at 06/28/20 1104    montelukast (SINGULAIR) tablet 10 mg  10 mg Oral Nightly Coreyute CoastWENDIE castellon - CNP   10 mg at 06/27/20 2152    QUEtiapine (SEROQUEL) tablet 25 mg  25 mg Oral QPM Knute CoastJEEVAN castellonN - CNP   25 mg at 06/27/20 2152    atorvastatin (LIPITOR) tablet 10 mg  10 mg Oral Daily \[facility] [place] [facility]\""ravinder, WENDIE - CNP   10 mg at 06/28/20 1105    pantoprazole (PROTONIX) tablet 40 mg  40 mg Oral Daily [facility]   40 mg at 06/28/20 1105    guaiFENesin (MUCINEX) extended release tablet 600 mg  600 mg Oral BID [facility] WENDIE Bardales CNP        magnesium oxide (MAG-OX) tablet 400 mg  400 mg Oral Daily [facility] - CNP   400 mg at 06/28/20 1104    ipratropium-albuterol (DUONEB) nebulizer solution 3 mL  3 mL Inhalation Q4H WA WENDIE Grover CNP        insulin lispro (HUMALOG) injection vial 0-12 Units  0-12 Units Subcutaneous TID  WENDIE Grover CNP   2 Units at 06/28/20 1736    glucose (GLUTOSE) 40 % oral gel 15 g  15 g Oral PRN WENDIE Grover CNP        dextrose 50 % IV solution  12.5 g Intravenous PRN WENDIE Grover CNP        glucagon (rDNA) injection 1 mg  1 mg Intramuscular PRN WENDIE Grover CNP        dextrose 5 % solution  100 mL/hr Intravenous PRN WENDIE Grover CNP        sodium chloride flush 0.9 % injection 10 mL  10 mL Intravenous 2 times per day WENDIE Grover CNP        sodium chloride flush 0.9 % injection 10 mL  10 mL Intravenous PRN WENDIE Grover - CNP   10 mL at 06/27/20 1801    polyethylene glycol (GLYCOLAX) packet 17 g  17 g Oral Daily PRN WENDIE Grover CNP   17 g at 06/27/20 1802    promethazine (PHENERGAN) tablet 12.5 mg  12.5 mg Oral Q6H PRN WENDIE Grover CNP        Or    ondansetron [facility]) injection 4 mg  4 mg Intravenous Q6H PRN Shanti Ramos, APRN - CNP   4 mg at 06/27/20 1803    enoxaparin (LOVENOX) injection 40 mg  40 mg Subcutaneous Daily Shanti Ramos, APRN - CNP   40 mg at 06/28/20 1106    nitroGLYCERIN (NITROSTAT) SL tablet 0.4 mg  0.4 mg Sublingual Q5 Min PRN Shanti Ramos, APRN - CNP        aluminum & magnesium hydroxide-simethicone (MAALOX) 200-200-20 MG/5ML suspension 15 mL  15 mL Oral Q6H PRN Shanti Ramos, APRN - CNP   15 mL at 06/28/20 1120    amitriptyline (ELAVIL) tablet 25 mg  25 mg Oral Nightly Shanti Ramos, APRN - CNP   25 mg at 06/27/20 2152    diphenhydrAMINE (BENADRYL) injection 25 mg  25 mg Intravenous Q6H PRN Shanti Ramos, APRN - CNP   25 mg at 06/28/20 1714    metoclopramide (REGLAN) injection 10 mg  10 mg Intravenous Q6H Shanti Ramos, APRN - CNP   10 mg at 06/28/20 1714    hydrALAZINE (APRESOLINE) 10 mg in sodium chloride 0.9 % 50 mL ivpb  10 mg Intravenous Q6H PRN Shanti Ramos, APRN - CNP   Stopped at 06/27/20 1755    losartan (COZAAR) tablet 100 mg  100 mg Oral Daily Shanti Ramos, APRN - CNP   100 mg at 06/28/20 1105    And    hydrochlorothiazide (HYDRODIURIL) tablet 12.5 mg  12.5 mg Oral Daily Shanti Ramos, APRN - CNP   12.5 mg at 06/28/20 1104    HYDROcodone-acetaminophen (NORCO) 7.5-325 MG per tablet 1 tablet  1 tablet Oral Q6H PRN Shanti Ramos, APRN - CNP   1 tablet at 06/28/20 1120    LORazepam (ATIVAN) injection 1 mg  1 mg Intravenous Q8H PRN Shanti Ramos, APRN - CNP   1 mg at 06/28/20 1714          Review of Systems:    · Constitutional: No Fever or Weight Loss    · Eyes: No Decreased Vision  · ENT: No Headaches, Hearing Loss or Vertigo  · Cardiovascular: + chest pain, dyspnea on exertion, palpitations or loss of consciousness  · Respiratory: No cough or wheezing    · Gastrointestinal: No abdominal pain, appetite loss, blood in stools, constipation, diarrhea or heartburn  · Genitourinary: No dysuria, trouble voiding, or hematuria  · Musculoskeletal: No gait disturbance, weakness or joint complaints  · Integumentary: No rash or pruritis  · Neurological: No TIA or stroke symptoms  · Psychiatric: No anxiety or depression  · Endocrine: No malaise, fatigue or temperature intolerance  · Hematologic/Lymphatic: No bleeding problems, blood clots or swollen lymph nodes  · Allergic/Immunologic: No nasal congestion or hives  All systems negative except as marked.      ·    ·    ·      Physical Examination:    Vitals:    06/28/20 1510   BP: 172/89   Pulse: 62   Resp: 25   Temp:    SpO2:       Wt Readings from Last 3 Encounters:   06/28/20 176 lb 14.4 oz (80.2 kg)   06/24/20 174 lb (78.9 kg)   06/21/20 172 lb (78 kg)     Body mass index is 32.36 kg/m².        General Appearance: No distress, conversant     Constitutional: Well developed, Well nourished, No acute distress, Non-toxic appearance.    HENT:  Normocephalic, Atraumatic, Bilateral external ears normal, Oropharynx moist, No oral exudates, Nose normal. Neck- Normal range of motion, No tenderness, Supple, No stridor,no apical-carotid delay, no carotid bruit  Eyes: PERRL, EOMI, Conjunctiva normal, No discharge.    Respiratory:  Normal breath sounds, No respiratory distress, No wheezing, No chest tenderness. ,no use of accessory muscles, diaphragm movement is normal  Cardiovascular: (PMI) apex non displaced,no lifts no thrills, no s3,no s4, Normal heart rate, Normal rhythm, No murmurs, No rubs, No gallops. Carotid arteries pulse and amplitude are normal no bruit, no abdominal bruit noted ( normal abdominal aorta ausculation), femoral arteries pulse and amplitude are normal no bruit, pedal pulses are normal.  GI: Bowel sounds normal, Soft, No tenderness, No masses, No pulsatile masses, no hepatosplenomegally, no bruits  : External genitalia appear normal, No masses or lesions. No discharge. No CVA tenderness.    Musculoskeletal: Intact distal pulses, No edema, No tenderness, No cyanosis, No clubbing. Good range of motion in all major joints.  No tenderness to palpation or major deformities noted. Back- No tenderness. Integument: Warm, Dry, No erythema, No rash. Skin: no rash, no ulcers  Lymphatic: No lymphadenopathy noted.    Neurologic: Alert & oriented x 3, Normal motor function, Normal sensory function, No focal deficits noted.    Psychiatric: Affect normal, Judgment normal, Mood normal.   Lab Review        Recent Labs       09/28/16 0010    WBC  12.3*    HGB  14.4    HCT  43.8    PLT  316            Recent Labs       09/28/16 0010    NA  136    K  3.9    CL  95*    CO2  23    BUN  10    CREATININE  0.8           Recent Labs       09/28/16 0010    AST  12*    ALT  10    BILITOT  0.4    ALKPHOS  124       No results for input(s): TROPONINI in the last 72 hours. No results found for: BNP  No results found for: INR, PROTIME        EKG:nsr, ILLB     Chest Xray:nad     ECHO:pending  Labs, echo, meds reviewed  Assessment:      Recommendations:     1. Chest pain: ATYPICAL, her [facility] was normal in 2017, will recommend echo as out patient  2. CT chest suggested bronchtiis, recmmend to treat with antibotics  3. uncontrolled htn: add coreg 6.25mg bid, dc lopressor and titrate up, contineu hctz,and losartan norvasc  4.  Health maintenance: exerise and diet  All labs, medications and tests reviewed, continue all other medications of all above medical condition listed as is.          Andrew Palacio MD,   Andrew Palacio MD, 6/28/2020 6:23 PM

## 2020-06-28 NOTE — PROGRESS NOTES
Cardiology consulted for chest pain    Patient reports that she has been having problems controlling her blood pressure  She reports this is been going on since March  She usually follows with Dr. Michaelle Evans  She states that yesterday she began to have a high blood pressure systolic pressures in the 200s  She had left-sided chest pain that did not radiate  She does not have chest pain now  She had a heart cath in 2017 that revealed normal coronary arteries  Has not had an echo since 2015  Recommend to control blood pressure  Uptitrate medications as blood pressure tolerates  troponins negative  EKG reviewed

## 2020-06-28 NOTE — PROGRESS NOTES
Hospitalist Progress Note      Name:  Laly Archer /Age/Sex: 1955  (60 y.o. female)   MRN & CSN:  [id number] & [id number] Admission Date/Time: 2020  8:55 AM   [facility] PCP: Nam Wilder MD         Hospital Day: 2    Assessment and Plan:   Laly Arcehr is a 60 y.o.  female  who presents with <principal problem not specified>    > Uncontrolled hypertension  - SL nitro given in ED  - losartan, HCTZ, lopressor,  PRN hydralazine   - add amlodipine    >  Chest pain  - EKG shows no acute changes. troponin 0.025 repeat neg  - Cardiology consulted  - Antiplatelet/BB/Statin/sl Nitro on medication regimen.   - LHC  normal, most likely sec to uncontrolled HTN, improved pressure control recommended      > DMII with chronic complications and with long term use of insulin. Last A1C 8.1 (2020)  - Continue Tresiba Monitor FSBS and cover with medium dose SSI  - PRN symptoms control for gastroparesis      > Leukocytosis  - (16.8) unknown clinical significance or source at present. - Monitor trends and hold abx for now     Diet Diet NPO, After Midnight   DVT Prophylaxis ? Lovenox   Code Status Full Code   Disposition  pending BP control, and cardiac clearance      History of Present Illness:     Pt S&E. No chest pain, no dyspnea, no abd pain, no N/V, no F/C. Admit poor BP control at home,     10-14 point ROS reviewed negative, unless as noted above    Objective: Intake/Output Summary (Last 24 hours) at 2020 0926  Last data filed at 2020 0916  Gross per 24 hour   Intake 0 ml   Output 0 ml   Net 0 ml      Vitals:   Vitals:    20 0202   BP: (!) 143/85   Pulse: 73   Resp: 20   Temp: 98.4 °F (36.9 °C)   SpO2: 98%     Physical Exam:    GEN Awake female, cooperative, no apparent distress. RESP Clear to auscultation, no wheezes, rales or rhonchi. Symmetric chest movement . CARDIO/VASC S1/S2 auscultated. Regular rate.    GI Abdomen is soft without significant
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
## 2020-06-29 ENCOUNTER — APPOINTMENT (OUTPATIENT)
Dept: GENERAL RADIOLOGY | Age: 65
DRG: 305 | End: 2020-06-29
Payer: MEDICARE

## 2020-06-29 PROBLEM — I16.0 HYPERTENSIVE URGENCY: Status: ACTIVE | Noted: 2020-06-29

## 2020-06-29 LAB
EKG ATRIAL RATE: 75 BPM
EKG ATRIAL RATE: 78 BPM
EKG DIAGNOSIS: NORMAL
EKG DIAGNOSIS: NORMAL
EKG P AXIS: -22 DEGREES
EKG P AXIS: 26 DEGREES
EKG P-R INTERVAL: 126 MS
EKG P-R INTERVAL: 158 MS
EKG Q-T INTERVAL: 390 MS
EKG Q-T INTERVAL: 438 MS
EKG QRS DURATION: 104 MS
EKG QRS DURATION: 114 MS
EKG QTC CALCULATION (BAZETT): 444 MS
EKG QTC CALCULATION (BAZETT): 489 MS
EKG R AXIS: -39 DEGREES
EKG R AXIS: -45 DEGREES
EKG T AXIS: -12 DEGREES
EKG T AXIS: 80 DEGREES
EKG VENTRICULAR RATE: 75 BPM
EKG VENTRICULAR RATE: 78 BPM
GLUCOSE BLD-MCNC: 144 MG/DL (ref 70–99)
GLUCOSE BLD-MCNC: 149 MG/DL (ref 70–99)
GLUCOSE BLD-MCNC: 149 MG/DL (ref 70–99)
GLUCOSE BLD-MCNC: 172 MG/DL (ref 70–99)
GLUCOSE BLD-MCNC: 188 MG/DL (ref 70–99)
LV EF: 58 %
LVEF MODALITY: NORMAL

## 2020-06-29 PROCEDURE — 94761 N-INVAS EAR/PLS OXIMETRY MLT: CPT

## 2020-06-29 PROCEDURE — 99232 SBSQ HOSP IP/OBS MODERATE 35: CPT | Performed by: INTERNAL MEDICINE

## 2020-06-29 PROCEDURE — 93010 ELECTROCARDIOGRAM REPORT: CPT | Performed by: INTERNAL MEDICINE

## 2020-06-29 PROCEDURE — 6370000000 HC RX 637 (ALT 250 FOR IP): Performed by: INTERNAL MEDICINE

## 2020-06-29 PROCEDURE — 6370000000 HC RX 637 (ALT 250 FOR IP): Performed by: NURSE PRACTITIONER

## 2020-06-29 PROCEDURE — 82962 GLUCOSE BLOOD TEST: CPT

## 2020-06-29 PROCEDURE — 74018 RADEX ABDOMEN 1 VIEW: CPT

## 2020-06-29 PROCEDURE — APPSS15 APP SPLIT SHARED TIME 0-15 MINUTES: Performed by: NURSE PRACTITIONER

## 2020-06-29 PROCEDURE — 1200000000 HC SEMI PRIVATE

## 2020-06-29 PROCEDURE — 2580000003 HC RX 258: Performed by: NURSE PRACTITIONER

## 2020-06-29 PROCEDURE — 6370000000 HC RX 637 (ALT 250 FOR IP): Performed by: HOSPITALIST

## 2020-06-29 PROCEDURE — 93306 TTE W/DOPPLER COMPLETE: CPT

## 2020-06-29 PROCEDURE — 6360000002 HC RX W HCPCS: Performed by: NURSE PRACTITIONER

## 2020-06-29 PROCEDURE — 6360000002 HC RX W HCPCS: Performed by: HOSPITALIST

## 2020-06-29 PROCEDURE — 96372 THER/PROPH/DIAG INJ SC/IM: CPT

## 2020-06-29 PROCEDURE — 94150 VITAL CAPACITY TEST: CPT

## 2020-06-29 RX ORDER — AMLODIPINE BESYLATE 10 MG/1
10 TABLET ORAL DAILY
Status: DISCONTINUED | OUTPATIENT
Start: 2020-06-30 | End: 2020-06-30 | Stop reason: HOSPADM

## 2020-06-29 RX ORDER — AMLODIPINE BESYLATE 5 MG/1
5 TABLET ORAL ONCE
Status: COMPLETED | OUTPATIENT
Start: 2020-06-29 | End: 2020-06-29

## 2020-06-29 RX ORDER — BUSPIRONE HYDROCHLORIDE 15 MG/1
15 TABLET ORAL 3 TIMES DAILY
Status: DISCONTINUED | OUTPATIENT
Start: 2020-06-29 | End: 2020-06-30 | Stop reason: HOSPADM

## 2020-06-29 RX ORDER — LORAZEPAM 2 MG/ML
0.5 INJECTION INTRAMUSCULAR EVERY 8 HOURS PRN
Status: DISCONTINUED | OUTPATIENT
Start: 2020-06-29 | End: 2020-06-30 | Stop reason: HOSPADM

## 2020-06-29 RX ORDER — IPRATROPIUM BROMIDE AND ALBUTEROL SULFATE 2.5; .5 MG/3ML; MG/3ML
3 SOLUTION RESPIRATORY (INHALATION) EVERY 4 HOURS PRN
Status: DISCONTINUED | OUTPATIENT
Start: 2020-06-29 | End: 2020-06-30 | Stop reason: HOSPADM

## 2020-06-29 RX ORDER — SENNA AND DOCUSATE SODIUM 50; 8.6 MG/1; MG/1
1 TABLET, FILM COATED ORAL NIGHTLY
Status: DISCONTINUED | OUTPATIENT
Start: 2020-06-29 | End: 2020-06-30 | Stop reason: HOSPADM

## 2020-06-29 RX ADMIN — DIPHENHYDRAMINE HYDROCHLORIDE 25 MG: 50 INJECTION, SOLUTION INTRAMUSCULAR; INTRAVENOUS at 22:20

## 2020-06-29 RX ADMIN — HYDROCODONE BITARTRATE AND ACETAMINOPHEN 1 TABLET: 7.5; 325 TABLET ORAL at 09:18

## 2020-06-29 RX ADMIN — LEVETIRACETAM 750 MG: 500 TABLET ORAL at 22:10

## 2020-06-29 RX ADMIN — METOCLOPRAMIDE 10 MG: 5 INJECTION, SOLUTION INTRAMUSCULAR; INTRAVENOUS at 04:33

## 2020-06-29 RX ADMIN — CARVEDILOL 6.25 MG: 6.25 TABLET, FILM COATED ORAL at 16:51

## 2020-06-29 RX ADMIN — BUSPIRONE HYDROCHLORIDE 15 MG: 15 TABLET ORAL at 16:51

## 2020-06-29 RX ADMIN — LOSARTAN POTASSIUM 100 MG: 100 TABLET, FILM COATED ORAL at 08:56

## 2020-06-29 RX ADMIN — CARVEDILOL 6.25 MG: 6.25 TABLET, FILM COATED ORAL at 08:56

## 2020-06-29 RX ADMIN — CARBIDOPA AND LEVODOPA 1 TABLET: 10; 100 TABLET ORAL at 22:10

## 2020-06-29 RX ADMIN — BUSPIRONE HYDROCHLORIDE 15 MG: 15 TABLET ORAL at 22:11

## 2020-06-29 RX ADMIN — HYDROCODONE BITARTRATE AND ACETAMINOPHEN 1 TABLET: 7.5; 325 TABLET ORAL at 23:18

## 2020-06-29 RX ADMIN — QUETIAPINE FUMARATE 25 MG: 25 TABLET ORAL at 22:10

## 2020-06-29 RX ADMIN — AMLODIPINE BESYLATE 5 MG: 5 TABLET ORAL at 08:57

## 2020-06-29 RX ADMIN — ENOXAPARIN SODIUM 40 MG: 40 INJECTION SUBCUTANEOUS at 08:57

## 2020-06-29 RX ADMIN — PANTOPRAZOLE SODIUM 40 MG: 40 TABLET, DELAYED RELEASE ORAL at 08:56

## 2020-06-29 RX ADMIN — INSULIN LISPRO 2 UNITS: 100 INJECTION, SOLUTION INTRAVENOUS; SUBCUTANEOUS at 07:56

## 2020-06-29 RX ADMIN — INSULIN LISPRO 2 UNITS: 100 INJECTION, SOLUTION INTRAVENOUS; SUBCUTANEOUS at 16:51

## 2020-06-29 RX ADMIN — ATORVASTATIN CALCIUM 10 MG: 10 TABLET, FILM COATED ORAL at 08:57

## 2020-06-29 RX ADMIN — BUSPIRONE HYDROCHLORIDE 15 MG: 15 TABLET ORAL at 11:09

## 2020-06-29 RX ADMIN — SODIUM CHLORIDE, PRESERVATIVE FREE 10 ML: 5 INJECTION INTRAVENOUS at 22:26

## 2020-06-29 RX ADMIN — MONTELUKAST 10 MG: 10 TABLET, FILM COATED ORAL at 22:16

## 2020-06-29 RX ADMIN — SODIUM CHLORIDE, PRESERVATIVE FREE 10 ML: 5 INJECTION INTRAVENOUS at 08:57

## 2020-06-29 RX ADMIN — HYDROCODONE BITARTRATE AND ACETAMINOPHEN 1 TABLET: 7.5; 325 TABLET ORAL at 16:51

## 2020-06-29 RX ADMIN — INSULIN GLARGINE 15 UNITS: 100 INJECTION, SOLUTION SUBCUTANEOUS at 22:16

## 2020-06-29 RX ADMIN — Medication 400 MG: at 08:56

## 2020-06-29 RX ADMIN — AMITRIPTYLINE HYDROCHLORIDE 25 MG: 25 TABLET, FILM COATED ORAL at 22:09

## 2020-06-29 RX ADMIN — SENNOSIDES AND DOCUSATE SODIUM 1 TABLET: 8.6; 5 TABLET ORAL at 22:10

## 2020-06-29 RX ADMIN — HYDROCHLOROTHIAZIDE 12.5 MG: 12.5 TABLET ORAL at 08:56

## 2020-06-29 RX ADMIN — LEVOTHYROXINE SODIUM 75 MCG: 75 TABLET ORAL at 08:56

## 2020-06-29 RX ADMIN — INSULIN LISPRO 2 UNITS: 100 INJECTION, SOLUTION INTRAVENOUS; SUBCUTANEOUS at 11:31

## 2020-06-29 RX ADMIN — LORAZEPAM 0.5 MG: 2 INJECTION INTRAMUSCULAR; INTRAVENOUS at 11:40

## 2020-06-29 RX ADMIN — DIPHENHYDRAMINE HYDROCHLORIDE 25 MG: 50 INJECTION, SOLUTION INTRAMUSCULAR; INTRAVENOUS at 11:39

## 2020-06-29 RX ADMIN — AMLODIPINE BESYLATE 5 MG: 5 TABLET ORAL at 18:34

## 2020-06-29 ASSESSMENT — PAIN SCALES - GENERAL
PAINLEVEL_OUTOF10: 8
PAINLEVEL_OUTOF10: 6
PAINLEVEL_OUTOF10: 10
PAINLEVEL_OUTOF10: 8
PAINLEVEL_OUTOF10: 8
PAINLEVEL_OUTOF10: 0

## 2020-06-29 ASSESSMENT — PAIN DESCRIPTION - DESCRIPTORS: DESCRIPTORS: ACHING;THROBBING

## 2020-06-29 ASSESSMENT — PAIN DESCRIPTION - PAIN TYPE: TYPE: CHRONIC PAIN

## 2020-06-29 ASSESSMENT — PAIN DESCRIPTION - LOCATION: LOCATION: ABDOMEN;HAND

## 2020-06-29 ASSESSMENT — PAIN DESCRIPTION - FREQUENCY: FREQUENCY: CONTINUOUS

## 2020-06-29 ASSESSMENT — PAIN DESCRIPTION - ORIENTATION: ORIENTATION: RIGHT;MID

## 2020-06-29 NOTE — PROGRESS NOTES
Cardiology Note    Admit Date:  6/27/2020     Today's Plan: uptitrate BP medications    Admission diagnosis / Complaint: chest pain      Subjective:  Ms. Ramón Day sitting up in bed. Denies cardiac complaints. History of present illness: Charli Driscoll is a 60 y. o.year old who  presents with  chest pain for last few days, happening daily, intermittent for 15 to 20 mins and aggravated with activity substernal also,reproducible with palpation, radiated to shoulder, 6/10, tender to touch,associated with shortness of breath, + sweating, nausea, did not get NTG in ED. Her blood pressure is uncontrolled, she thinks its anxiety which is causing her symptoms, her 2nd trop was minially elevated and then normalized      Assessment and Plan:    1. Chest Pain: atypical. LHC was normal in 2017. Echo done, its normal  2. CT chest suggestive of bronchitis: recommend to treat with antibiotics  3. Uncontrolled HTN: BP is still elevated today. continue coreg 6.25 mg BID uptitrate as BP and HR tolerate. continue HCTZ , losartan and norvasc.           Objective:   BP (!) 171/107   Pulse 86   Temp 98.2 °F (36.8 °C) (Oral)   Resp 18   Ht 5' 2\" (1.575 m)   Wt 176 lb 12.8 oz (80.2 kg)   SpO2 98%   BMI 32.34 kg/m²       Intake/Output Summary (Last 24 hours) at 6/29/2020 1358  Last data filed at 6/28/2020 1824  Gross per 24 hour   Intake 460 ml   Output --   Net 460 ml       TELEMETRY: Sinus    has a past medical history of Abnormal EKG, Acid reflux, Anemia, Anesthesia, Anginal pain (HCC), Anxiety, Arthritis, Asthma, CAD (coronary artery disease), Cerebral artery occlusion with cerebral infarction ([address] CHF (congestive heart failure) (HCC), Chronic back pain, Chronic kidney disease, DDD (degenerative disc disease), cervical, Depression, Diabetes mellitus ([address] Diabetic neuropathy ([address] Dizziness, Dry skin, Enlarged ureter, Fatty liver, Fibrocystic breast, Gout, H/O cardiac catheterization, H/O cardiovascular stress test, H/O cardiovascular stress test, H/O cardiovascular stress test, H/O Doppler ultrasound, H/O echocardiogram, H/O echocardiogram, History of Holter monitoring, Ramona (hard of hearing), Hx of cardiovascular stress test, Hx of motion sickness, HX OTHER MEDICAL, Hyperlipidemia, Hypertension, IBS (irritable bowel syndrome), Incisional hernia, Kidney stones, Migraines, Nausea & vomiting, Other specified disorder of skin, Panic attacks, Pneumonia, Pseudoseizures, Restless leg, Shortness of breath, Sleep apnea, Staph infection, Thyroid disease, Tremor, Urinary incontinence, UTI (urinary tract infection), UTI (urinary tract infection), Vertigo, and Wears glasses. has a past surgical history that includes Carpal tunnel release (Right, 1999); Diagnostic Cardiac Cath Lab Procedure (01/11/2010); Dental surgery; Colonoscopy (Last Done 6-13); Endoscopy, colon, diagnostic (Several ); Bladder surgery (1970's Or 1980's); Lithotripsy (2011); Breast biopsy (Right, 1980's); Breast surgery (Left, 1990's); hernia repair (5791'U); hernia repair (1970's); Cholecystectomy (1970's); Appendectomy (1970's); Hysterectomy, total abdominal (1987); Tubal ligation (1978); Esophagus dilation (1980's And 1990's); Knee arthroscopy (Right, 1999); joint replacement (2008); other surgical history (06 13 2014); fracture surgery (1974 Or 1975); Cardiac catheterization (10-18-06); and Cardiac catheterization.        Physical Exam:  General:  Awake, alert, NAD  Skin:  Warm and dry  Neck:  JVD not present  Chest:  Clear to auscultation, respiration easy  Cardiovascular:  RRR S1S2  Abdomen:  Soft nontender  Extremities:  Trace lower leg edema    Medications:    busPIRone  15 mg Oral TID    insulin glargine  20 Units Subcutaneous Nightly    insulin lispro  0-6 Units Subcutaneous 2 times per day    amLODIPine  5 mg Oral Daily    carvedilol  6.25 mg Oral BID WC    carbidopa-levodopa  1 tablet Oral Nightly    levETIRAcetam  750 mg Oral Nightly    levothyroxine 75 mcg Oral QAM    montelukast  10 mg Oral Nightly    QUEtiapine  25 mg Oral QPM    atorvastatin  10 mg Oral Daily    pantoprazole  40 mg Oral Daily    guaiFENesin  600 mg Oral BID    magnesium oxide  400 mg Oral Daily    insulin lispro  0-12 Units Subcutaneous TID WC    sodium chloride flush  10 mL Intravenous 2 times per day    enoxaparin  40 mg Subcutaneous Daily    amitriptyline  25 mg Oral Nightly    metoclopramide  10 mg Intravenous Q6H    losartan  100 mg Oral Daily    And    hydroCHLOROthiazide  12.5 mg Oral Daily      dextrose       LORazepam, ipratropium-albuterol, nitroGLYCERIN, glucose, dextrose, glucagon (rDNA), dextrose, sodium chloride flush, polyethylene glycol, promethazine **OR** ondansetron, nitroGLYCERIN, aluminum & magnesium hydroxide-simethicone, diphenhydrAMINE, hydrALAZINE (APRESOLINE) ivpb, HYDROcodone-acetaminophen    Lab Data:  CBC:   Recent Labs     06/27/20  0910 06/28/20  0101   WBC 16.8* 14.7*   HGB 13.6 13.7   HCT 41.2 41.2   MCV 85.5 85.1    247     BMP:   Recent Labs     06/27/20  0910 06/28/20  0101    139   K 4.3 3.5   CL 98* 100   CO2 25 26   BUN 16 11   CREATININE 0.7 0.6     LIVER PROFILE:   Recent Labs     06/27/20  0910 06/28/20  0101   AST 37 45*   ALT 25 8*   BILITOT 0.5 0.6   ALKPHOS 59 58               Anabella Shane, APRN-CNP 6/29/2020 1:58 PM     I have seen ,spoken to  and examined this patient personally, independently of the nurse practitioner. I have reviewed the hospital care given to date and reviewed all pertinent labs and imaging. The plan was developed mutually at the time of the visit with the patient,  NP  and myself. I have spoken with patient, nursing staff and provided written and verbal instructions . The above note has been reviewed and I agree with the assessment, diagnosis, and treatment plan with changes made by me as follows     CARDIOLOGY ATTENDING ADDENDUM    HPI:  I have reviewed the above HPI  And agree with above Milton Ridley is a 60 y. o.year old who and presents with had concerns including Hypertension and Arm Pain (Right elbow and arm ).   Chief Complaint   Patient presents with    Hypertension    Arm Pain     Right elbow and arm      Interval history:  Blood pressure remain uncontrolled, echo is normal    Physical Exam:  General:  Awake, alert, NAD  Head:normal  Eye:normal  Neck:  No JVD   Chest:  Clear to auscultation, respiration easy  Cardiovascular:  RRR S1S2  Abdomen:   nontender  Extremities:  no edema  Pulses; palpable  Neuro: grossly normal      MEDICAL DECISION MAKING;    I agree with the above plan, which was planned by myself and discussed with NP.

## 2020-06-29 NOTE — PROGRESS NOTES
Hospitalist Progress Note      Name:  David Ferraro /Age/Sex: 1955  (60 y.o. female)   MRN & CSN:  [id number] & [id number] Admission Date/Time: 2020  8:55 AM   [facility]- PCP: Jose Granado MD         Hospital Day: 3    Assessment and Plan:   David Ferraro is a 60 y.o.  female  who presents with <principal problem not specified>    > Uncontrolled hypertension  - SL nitro given in ED  - losartan, HCTZ, lopressor,  PRN hydralazine   - added amlodipine  -  BP still significantly elevated with diastolic above 005, will increased norvasc.     >  Chest pain  - EKG shows no acute changes. troponin 0.025 repeat neg  - Cardiology consulted  - Antiplatelet/BB/Statin/sl Nitro on medication regimen.   - LHC  normal, most likely sec to uncontrolled HTN, improving pressure control recommended      > DMII with chronic complications and with long term use of insulin. Last A1C 8.1 (2020)  - Continue Tresiba Monitor FSBS and cover with medium dose SSI  - PRN symptoms control for gastroparesis      > Leukocytosis  - (16.8) unknown clinical significance or source at present. - Monitor trends and hold abx for now   - trending down    > Abd pain  -  Abd xray with constipation, will start laxative. Diet DIET CARB CONTROL; Carb Control: 4 carb choices (60 gms)/meal; No Caffeine   DVT Prophylaxis ? Lovenox   Code Status Full Code   Disposition  Home with , pending BP control, and cardiac clearance      History of Present Illness:     Pt S&E. No cough, reports abd pain ( cramping recurrent chronic ) also chronic nausea, no chest pain, no F/C.    10-14 point ROS reviewed negative, unless as noted above    Objective:        Intake/Output Summary (Last 24 hours) at 2020 0846  Last data filed at 2020 1824  Gross per 24 hour   Intake 700 ml   Output 0 ml   Net 700 ml      Vitals:   Vitals:    20 0830   BP: (!) 171/107   Pulse: 101   Resp: 18   Temp: 98.2 °F (36.8 °C) SpO2:      Physical Exam:    GEN Awake female, cooperative, no apparent distress. RESP Clear to auscultation, no wheezes, rales or rhonchi. Symmetric chest movement . CARDIO/VASC S1/S2 auscultated. Regular rate. GI Abdomen is soft without significant tenderness, Bowel sounds are normoactive. MSK No gross joint deformities. Spontaneous movement of all extremities  SKIN Normal coloration, warm, dry. NEURO normal speech, no lateralizing weakness. PSYCH Awake, alert, oriented x 4. Affect appropriate.     Medications:   Medications:    busPIRone  15 mg Oral TID    insulin glargine  20 Units Subcutaneous Nightly    insulin lispro  0-6 Units Subcutaneous 2 times per day    amLODIPine  5 mg Oral Daily    carvedilol  6.25 mg Oral BID WC    carbidopa-levodopa  1 tablet Oral Nightly    levETIRAcetam  750 mg Oral Nightly    levothyroxine  75 mcg Oral QAM    montelukast  10 mg Oral Nightly    QUEtiapine  25 mg Oral QPM    atorvastatin  10 mg Oral Daily    pantoprazole  40 mg Oral Daily    guaiFENesin  600 mg Oral BID    magnesium oxide  400 mg Oral Daily    ipratropium-albuterol  3 mL Inhalation Q4H WA    insulin lispro  0-12 Units Subcutaneous TID WC    sodium chloride flush  10 mL Intravenous 2 times per day    enoxaparin  40 mg Subcutaneous Daily    amitriptyline  25 mg Oral Nightly    metoclopramide  10 mg Intravenous Q6H    losartan  100 mg Oral Daily    And    hydroCHLOROthiazide  12.5 mg Oral Daily      Infusions:    dextrose       PRN Meds: LORazepam, 0.5 mg, Q8H PRN  nitroGLYCERIN, 0.4 mg, Q5 Min PRN  glucose, 15 g, PRN  dextrose, 12.5 g, PRN  glucagon (rDNA), 1 mg, PRN  dextrose, 100 mL/hr, PRN  sodium chloride flush, 10 mL, PRN  polyethylene glycol, 17 g, Daily PRN  promethazine, 12.5 mg, Q6H PRN    Or  ondansetron, 4 mg, Q6H PRN  nitroGLYCERIN, 0.4 mg, Q5 Min PRN  aluminum & magnesium hydroxide-simethicone, 15 mL, Q6H PRN  diphenhydrAMINE, 25 mg, Q6H PRN  hydrALAZINE (APRESOLINE) ivpb, 10 mg, Q6H PRN  HYDROcodone-acetaminophen, 1 tablet, Q6H PRN          Electronically signed by Joaquin Cain MD on 6/29/2020 at 8:46 AM

## 2020-06-29 NOTE — CARE COORDINATION
CM reviewed patients chart CM into see patient to initiate a discharge plan. CM introduced self and explained role of CM. Pt. Is kind, alert and oriented. Patient currently lives by self in [facility]. Patient reported that her boyfriend also lives in the same apartment building should patient need any assistance. Patient does live on a one floor apartment on the 5th story, but utilizes an elevator. Patient reports that she does not have any difficulty walking but does have a walker at home when needed. Patient reported that since her \"blood pressure has been out of whack. . she is dizzy and has recently requested her family physician for prescriptions for a new walker and a shower chair. \"   Patient does have a Primary Care Physician. Patient can afford her medication once patient returns home as patient has Medicare and [employer]; however patient did report that she was having difficulty purchasing \"over the counter things at the drug store such as magnesium. \" Patient does have transportation or transportation assistance via utilization of the bus or [employer]. Patient reported that she is currently involved with [employer] [facility] @ [phone]. Patient reported that [employer] assists patient with anything that she needs help with at home. Patient requested that CM call [employer] and notify them that patient has been admitted to hospital as aid \"Shy\" was due to be at residence today at 1 p.m. Patient also reported that she is signed up for Meals on Wheels  Patient does have support upon discharge via her boyfriend who lives in the same building. Patient reported that she has 2 daughters that are busy, but she does get to see them some. CM provided name and encouraged to call for any needs or concerns. CM is available if any needs arise. CM called Brandan with Service Coordination from [employer] and shared information that patient is inpatient at hospital at this time.

## 2020-06-29 NOTE — PROGRESS NOTES
Pt has remained considerably drowsy and has slept for the entirety of the night but woke up to take meds and was able to hold conversation when awoken. Pt has not received any norco, ativan, or benadryl from this RN yet remains significantly drowsier than the previous night where she was highly anxious and awake and readily waiting for ativan to calm her nerves when she neared being due for her next dose. Pt glucose has remained stable as well as vitals.

## 2020-06-30 VITALS
OXYGEN SATURATION: 95 % | DIASTOLIC BLOOD PRESSURE: 89 MMHG | SYSTOLIC BLOOD PRESSURE: 144 MMHG | HEART RATE: 99 BPM | BODY MASS INDEX: 32.92 KG/M2 | HEIGHT: 62 IN | WEIGHT: 178.9 LBS | RESPIRATION RATE: 19 BRPM | TEMPERATURE: 98.6 F

## 2020-06-30 PROBLEM — I16.0 HYPERTENSIVE URGENCY: Status: RESOLVED | Noted: 2020-06-29 | Resolved: 2020-06-30

## 2020-06-30 LAB
BASOPHILS ABSOLUTE: 0.1 K/CU MM
BASOPHILS RELATIVE PERCENT: 0.7 % (ref 0–1)
DIFFERENTIAL TYPE: ABNORMAL
EKG ATRIAL RATE: 119 BPM
EKG DIAGNOSIS: NORMAL
EKG P AXIS: 34 DEGREES
EKG P-R INTERVAL: 136 MS
EKG Q-T INTERVAL: 360 MS
EKG QRS DURATION: 104 MS
EKG QTC CALCULATION (BAZETT): 506 MS
EKG R AXIS: -51 DEGREES
EKG T AXIS: 75 DEGREES
EKG VENTRICULAR RATE: 119 BPM
EOSINOPHILS ABSOLUTE: 0.4 K/CU MM
EOSINOPHILS RELATIVE PERCENT: 2.7 % (ref 0–3)
GLUCOSE BLD-MCNC: 172 MG/DL (ref 70–99)
GLUCOSE BLD-MCNC: 175 MG/DL (ref 70–99)
HCT VFR BLD CALC: 41.7 % (ref 37–47)
HEMOGLOBIN: 13.8 GM/DL (ref 12.5–16)
IMMATURE NEUTROPHIL %: 0.6 % (ref 0–0.43)
LYMPHOCYTES ABSOLUTE: 5.5 K/CU MM
LYMPHOCYTES RELATIVE PERCENT: 38.3 % (ref 24–44)
MCH RBC QN AUTO: 28.2 PG (ref 27–31)
MCHC RBC AUTO-ENTMCNC: 33.1 % (ref 32–36)
MCV RBC AUTO: 85.3 FL (ref 78–100)
MONOCYTES ABSOLUTE: 1.2 K/CU MM
MONOCYTES RELATIVE PERCENT: 8.4 % (ref 0–4)
NUCLEATED RBC %: 0 %
PDW BLD-RTO: 13.6 % (ref 11.7–14.9)
PLATELET # BLD: 293 K/CU MM (ref 140–440)
PMV BLD AUTO: 10.1 FL (ref 7.5–11.1)
RBC # BLD: 4.89 M/CU MM (ref 4.2–5.4)
SEGMENTED NEUTROPHILS ABSOLUTE COUNT: 7 K/CU MM
SEGMENTED NEUTROPHILS RELATIVE PERCENT: 49.3 % (ref 36–66)
TOTAL IMMATURE NEUTOROPHIL: 0.09 K/CU MM
TOTAL NUCLEATED RBC: 0 K/CU MM
WBC # BLD: 14.3 K/CU MM (ref 4–10.5)

## 2020-06-30 PROCEDURE — 6360000002 HC RX W HCPCS: Performed by: NURSE PRACTITIONER

## 2020-06-30 PROCEDURE — 85025 COMPLETE CBC W/AUTO DIFF WBC: CPT

## 2020-06-30 PROCEDURE — 6370000000 HC RX 637 (ALT 250 FOR IP): Performed by: INTERNAL MEDICINE

## 2020-06-30 PROCEDURE — 6370000000 HC RX 637 (ALT 250 FOR IP): Performed by: HOSPITALIST

## 2020-06-30 PROCEDURE — 2580000003 HC RX 258: Performed by: NURSE PRACTITIONER

## 2020-06-30 PROCEDURE — 6370000000 HC RX 637 (ALT 250 FOR IP): Performed by: NURSE PRACTITIONER

## 2020-06-30 PROCEDURE — 82962 GLUCOSE BLOOD TEST: CPT

## 2020-06-30 RX ORDER — CARVEDILOL 6.25 MG/1
6.25 TABLET ORAL 2 TIMES DAILY WITH MEALS
Qty: 60 TABLET | Refills: 3 | Status: ON HOLD | OUTPATIENT
Start: 2020-06-30 | End: 2020-07-15 | Stop reason: HOSPADM

## 2020-06-30 RX ORDER — AMLODIPINE BESYLATE 10 MG/1
10 TABLET ORAL DAILY
Qty: 30 TABLET | Refills: 3 | Status: SHIPPED | OUTPATIENT
Start: 2020-07-01 | End: 2020-07-01 | Stop reason: SDUPTHER

## 2020-06-30 RX ORDER — LEVOTHYROXINE SODIUM 0.07 MG/1
75 TABLET ORAL EVERY MORNING
Qty: 30 TABLET | Refills: 3 | Status: SHIPPED | OUTPATIENT
Start: 2020-07-01 | End: 2020-06-30

## 2020-06-30 RX ORDER — LEVOTHYROXINE SODIUM 0.03 MG/1
25 TABLET ORAL EVERY MORNING
Qty: 30 TABLET | Refills: 3 | Status: ON HOLD
Start: 2020-07-01 | End: 2020-09-04 | Stop reason: HOSPADM

## 2020-06-30 RX ADMIN — BUSPIRONE HYDROCHLORIDE 15 MG: 15 TABLET ORAL at 08:51

## 2020-06-30 RX ADMIN — GUAIFENESIN 600 MG: 600 TABLET, EXTENDED RELEASE ORAL at 08:51

## 2020-06-30 RX ADMIN — SODIUM CHLORIDE, PRESERVATIVE FREE 10 ML: 5 INJECTION INTRAVENOUS at 08:53

## 2020-06-30 RX ADMIN — LOSARTAN POTASSIUM 100 MG: 100 TABLET, FILM COATED ORAL at 08:51

## 2020-06-30 RX ADMIN — ENOXAPARIN SODIUM 40 MG: 40 INJECTION SUBCUTANEOUS at 08:51

## 2020-06-30 RX ADMIN — PANTOPRAZOLE SODIUM 40 MG: 40 TABLET, DELAYED RELEASE ORAL at 08:51

## 2020-06-30 RX ADMIN — LEVOTHYROXINE SODIUM 75 MCG: 75 TABLET ORAL at 08:51

## 2020-06-30 RX ADMIN — CARVEDILOL 6.25 MG: 6.25 TABLET, FILM COATED ORAL at 08:51

## 2020-06-30 RX ADMIN — INSULIN LISPRO 2 UNITS: 100 INJECTION, SOLUTION INTRAVENOUS; SUBCUTANEOUS at 08:52

## 2020-06-30 RX ADMIN — HYDROCHLOROTHIAZIDE 12.5 MG: 12.5 TABLET ORAL at 08:51

## 2020-06-30 RX ADMIN — ATORVASTATIN CALCIUM 10 MG: 10 TABLET, FILM COATED ORAL at 08:51

## 2020-06-30 RX ADMIN — Medication 400 MG: at 08:50

## 2020-06-30 RX ADMIN — AMLODIPINE BESYLATE 10 MG: 10 TABLET ORAL at 08:57

## 2020-06-30 ASSESSMENT — PAIN DESCRIPTION - LOCATION
LOCATION: ARM
LOCATION: ARM

## 2020-06-30 ASSESSMENT — PAIN DESCRIPTION - ORIENTATION
ORIENTATION: RIGHT
ORIENTATION: RIGHT

## 2020-06-30 ASSESSMENT — PAIN SCALES - GENERAL
PAINLEVEL_OUTOF10: 5
PAINLEVEL_OUTOF10: 5

## 2020-06-30 ASSESSMENT — PAIN DESCRIPTION - PAIN TYPE
TYPE: CHRONIC PAIN
TYPE: CHRONIC PAIN

## 2020-06-30 NOTE — PROGRESS NOTES
CLINICAL PHARMACY NOTE: MEDS TO [address] Select Patient?: No  Total # of Prescriptions Filled: 3   The following medications were delivered to the patient:  · Carvedilol 6.25mg  · Levothyroxine 75mcg  · Amlodipine 10mg  Total # of Interventions Completed: 1  Time Spent (min): 30    Additional Documentation:  Verified with her nurse dose of levothyroxine is now 75mcg

## 2020-06-30 NOTE — DISCHARGE SUMMARY
Discharge Summary    Name:  Landon Balderas /Age/Sex: 1955  (60 y.o. female)   MRN & CSN:  [id number] & [id number] Admission Date/Time: 2020  8:55 AM   Attending:  Johnna Sanchez MD Discharging Physician: Johnna Sanchez MD     Hospital Course:   Landon Balderas is a 60 y.o.  female  who presents with uncontrolled hypertension     > Hypertensive urgency - p/w BP of 214/119 mmhg.  - SL nitro given in ED  BP control achieved with losartan, hydrochlorothiazide, carvedilol, amlodipine. She will continue these medications on discharge. Cardiology was on board. >  Chest pain -resolved. Considered to be due to uncontrolled hypertension.  - EKG shows no acute changes. Initial troponin 0.025, repeat neg  - Cardiology was on board. - Antiplatelet/BB/Statin/sl Nitro on medication regimen.   - LHC 2017 normal.       > DMII with chronic complications and with long term use of insulin. Last A1C 8.1 (2020)  - Continue Cleavon Linear Monitor FSBS and cover with medium dose SSI  - PRN symptoms control for gastroparesis      > Leukocytosis -unclear significance. Infection not suspected. The patient expressed appropriate understanding of and agreement with the discharge recommendations, medications, and plan.      Consults this admission:  IP CONSULT TO PRIMARY CARE PROVIDER  IP CONSULT TO CARDIOLOGY  IP CONSULT TO HOSPITALIST    Discharge Instruction:   Follow up appointments:   Primary care physician:  within 2 weeks  Cardiology    Diet:  cardiac diet   Activity: activity as tolerated  Disposition: Discharged to:   [x]Home, [][facility], []SNF, []Acute Rehab, []Hospice  Condition on discharge: Stable    Discharge Medications:      Maliha Rdz   Home Medication Instructions GALILEO:[id number]    Printed on:20 0936   Medication Information                      albuterol sulfate  (90 Base) MCG/ACT inhaler  Inhale 2 puffs into the lungs every 6 hours as needed for Wheezing or Shortness of Breath (or cough) Please include spacer with instructions for use. allopurinol (ZYLOPRIM) 300 MG tablet  Take 300 mg by mouth daily             aluminum & magnesium hydroxide-simethicone (MAALOX MAX) 400-400-40 MG/5ML SUSP  Take 15 mLs by mouth every 6 hours as needed (heart burn)             amitriptyline (ELAVIL) 25 MG tablet  Take 1 tablet by mouth nightly             amLODIPine (NORVASC) 10 MG tablet  Take 1 tablet by mouth daily             aspirin 81 MG tablet  Take 81 mg by mouth daily             carbidopa-levodopa (SINEMET)  MG per tablet  Take 1 tablet by mouth nightly             carvedilol (COREG) 6.25 MG tablet  Take 1 tablet by mouth 2 times daily (with meals)             celecoxib (CELEBREX) 100 MG capsule  Take 100 mg by mouth 2 times daily             DICLOFENAC SODIUM EX  Apply topically             diphenhydrAMINE (BENADRYL) 25 MG tablet  Take 25 mg by mouth every 6 hours as needed             docusate sodium (COLACE) 100 MG capsule  Take 1 capsule by mouth 2 times daily             HYDROcodone-acetaminophen (NORCO) 5-325 MG per tablet  Take 1-2 tablets by mouth every 4 hours as needed.              hydrOXYzine (VISTARIL) 25 MG capsule  1 capsule as needed             insulin aspart (NOVOLOG) 100 UNIT/ML injection vial  Inject 12 Units into the skin 3 times daily (before meals)              ipratropium-albuterol (DUONEB) 0.5-2.5 (3) MG/3ML SOLN nebulizer solution  Inhale 3 mLs into the lungs every 4 hours (while awake)             levETIRAcetam (KEPPRA) 750 MG tablet  Take 1 tablet by mouth nightly             levothyroxine (SYNTHROID) 25 MCG tablet  Take 1 tablet by mouth every morning             lidocaine viscous hcl (XYLOCAINE) 2 % SOLN solution  Take 15 mLs by mouth as needed for Irritation             losartan-hydrochlorothiazide (HYZAAR) 100-12.5 MG per tablet  Take 1 tablet by mouth daily             magnesium oxide (MAG-OX) 400 (240 MG) MG tablet  Take 400 mg by mouth daily metFORMIN (GLUCOPHAGE) 500 MG tablet  Take 500 mg by mouth 3 times daily             montelukast (SINGULAIR) 10 MG tablet  Take 10 mg by mouth nightly. MUCINEX 600 MG extended release tablet  2 times daily             Multiple Vitamins-Iron (MULTI-VITAMIN/IRON PO)  Take  by mouth.             pantoprazole (PROTONIX) 40 MG tablet  Take 1 tablet by mouth daily             polyethylene glycol (GLYCOLAX) packet  Take 17 g by mouth daily as needed for Constipation             predniSONE (DELTASONE) 20 MG tablet  Take 2 tablets by mouth daily for 3 days, THEN 1 tablet daily for 3 days, THEN 0.5 tablets daily for 3 days. QUEtiapine (SEROQUEL) 25 MG tablet  Take 25 mg by mouth every evening             Respiratory Therapy Supplies (NEBULIZER COMPRESSOR) KIT  1 kit by Does not apply route once for 1 dose             simvastatin (ZOCOR) 20 MG tablet  Take 20 mg by mouth nightly. tiotropium (SPIRIVA RESPIMAT) 1.25 MCG/ACT AERS inhaler  Inhale 2 puffs into the lungs daily             tiZANidine (ZANAFLEX) 4 MG tablet  as needed             TRESIBA FLEXTOUCH 200 UNIT/ML SOPN  Inject 30 Units into the skin every morning                 Objective Findings at Discharge:   BP (!) 144/89   Pulse 99   Temp 98.6 °F (37 °C) (Oral)   Resp 19   Ht 5' 2\" (1.575 m)   Wt 178 lb 14.4 oz (81.1 kg)   SpO2 95%   BMI 32.72 kg/m²            PHYSICAL EXAM   GEN Awake female, sitting upright in bed in no apparent distress. RESP Clear to auscultation, no wheezes, rales or rhonchi. Symmetric chest movement while on room air. CARDIO/VASC S1/S2 auscultated. Regular rate without appreciable murmurs, rubs, or gallops. No JVD or carotid bruits. Peripheral pulses equal bilaterally and palpable. No peripheral edema. GI Abdomen is soft without significant tenderness, masses, or guarding. Bowel sounds are normoactive. MSK No gross joint deformities. SKIN Normal coloration, warm, dry.   NEURO normal

## 2020-06-30 NOTE — PROGRESS NOTES
Progress Note( Dr. Mckeon Door)  6/30/2020  Subjective:   Admit Date: 6/27/2020  PCP: Farhad Duong MD    Admitted For :    Consulted For:     Interval History:    Denies any chest pains,   Denies SOB . Denies nausea or vomiting. No new bowel or bladder symptoms. No intake or output data in the 24 hours ending 06/30/20 0701    DATA    CBC:   Recent Labs     06/27/20  0910 06/28/20  0101 06/30/20  0310   WBC 16.8* 14.7* 14.3*   HGB 13.6 13.7 13.8    247 293    CMP:  Recent Labs     06/27/20  0910 06/28/20  0101    139   K 4.3 3.5   CL 98* 100   CO2 25 26   BUN 16 11   CREATININE 0.7 0.6   CALCIUM 9.8 9.5   PROT 7.5 7.2   LABALBU 4.5 4.5   BILITOT 0.5 0.6   ALKPHOS 59 62   AST 37 45*   ALT 25 8*     Lipids:   Lab Results   Component Value Date    CHOL 135 06/28/2020    HDL 58 06/28/2020    TRIG 263 06/28/2020     Glucose:  Recent Labs     06/29/20  1637 06/29/20  2200 06/30/20  0250   POCGLU 188* 149* 172*     [id number]:  Lab Results   Component Value Date    LABA1C 7.8 06/27/2020     High Sensitivity TSH:   Lab Results   Component Value Date    TSHHS 4.000 06/27/2020     Free T3:   Lab Results   Component Value Date    FT3 2.2 07/15/2016     Free T4:  Lab Results   Component Value Date    T4FREE 1.14 02/23/2019       Xr Abdomen (kub) (single Ap View)    Result Date: 6/29/2020  EXAMINATION: ONE SUPINE XRAY VIEW(S) OF THE ABDOMEN 6/29/2020 10:22 am COMPARISON: 01/09/2020 HISTORY: ORDERING SYSTEM PROVIDED HISTORY: abd pain, cramping, nausea, r/o ileus/SBO TECHNOLOGIST PROVIDED HISTORY: Reason for exam:->abd pain, cramping, nausea, r/o ileus/SBO Reason for Exam: abd pain, cramping, nausea, r/o ileus/SBO FINDINGS: Prominent loops of large bowel with stool are noted no direct signs for pneumoperitoneum or obstruction. 5 lumbar type vertebral bodies are noted. Left greater than right femoroacetabular arthropathy. Scattered calcifications may represent fecaliths. No evidence for obstruction. Probable fecal impaction or constipation. Ct Head Wo Contrast    Result Date: 6/27/2020  EXAMINATION: CT OF THE HEAD WITHOUT CONTRAST,  6/24/2020 10:50 am TECHNIQUE: CT of the head was performed without the administration of intravenous contrast. Dose modulation, iterative reconstruction, and/or weight based adjustment of the mA/kV was utilized to reduce the radiation dose to as low as reasonably achievable. COMPARISON: 01/31/2019 HISTORY: ORDERING SYSTEM PROVIDED HISTORY: Numbness to face TECHNOLOGIST PROVIDED HISTORY: Has a \"code stroke\" or \"stroke alert\" been called? ->Yes Reason for exam:->Numbness to face Reason for Exam: Stroke Acuity: Acute Type of Exam: Initial FINDINGS: BRAIN/VENTRICLES: There is no acute intracranial hemorrhage, mass effect or midline shift. No abnormal extra-axial fluid collection. The gray-white differentiation is maintained without evidence of an acute infarct. There is no evidence of hydrocephalus. Patchy and confluent areas of low-attenuation in the periventricular and subcortical white matter, likely related to chronic small vessel ischemic changes and not significantly changed since 01/31/2019. ORBITS: The visualized portion of the orbits demonstrate no acute abnormality. SINUSES: The visualized paranasal sinuses and mastoid air cells demonstrate no acute abnormality. SOFT TISSUES/SKULL:  No acute abnormality of the visualized skull or soft tissues. Stable exam.  No acute intracranial abnormality. Critical results were called by Dr. Yakov Capone. Chuy Dillon MD to [facility] on 6/24/2020 at 10:59. Xr Chest Portable    Result Date: 6/27/2020  EXAMINATION: ONE XRAY VIEW OF THE CHEST 6/27/2020 9:06 am COMPARISON: 06/24/2020 HISTORY: ORDERING SYSTEM PROVIDED HISTORY: chest pain TECHNOLOGIST PROVIDED HISTORY: Reason for exam:->chest pain Reason for Exam: chest pain Acuity: Acute Type of Exam: Initial FINDINGS: The heart and mediastinal structures are stable.   The pulmonary vasculature is normal.  Lungs are clear. No acute cardiopulmonary disease. Xr Chest Portable    Result Date: 6/24/2020  EXAMINATION: ONE XRAY VIEW OF THE CHEST 6/24/2020 11:04 am COMPARISON: 03/18/2019 HISTORY: ORDERING SYSTEM PROVIDED HISTORY: numbness to face TECHNOLOGIST PROVIDED HISTORY: Reason for exam:->numbness to face Reason for Exam: numbness to face Acuity: Acute Type of Exam: Initial Additional signs and symptoms:  female that presents acute onset of numbness to face. Onset approximately 20 minutes prior to arrival to ED. Context is patient was at speech therapy for \"possible TIAs\" when she developed acute onset of symptoms. She notes numbness is worse over the left face FINDINGS: No focal consolidation, pleural effusion or pneumothorax. The cardiomediastinal silhouette is stable. No overt pulmonary edema. The osseous structures are stable. Multiple radiopaque densities are noted projecting over left chest and are likely external to the patient. No acute cardiopulmonary findings. Cta Chest W Contrast    Result Date: 6/27/2020  EXAMINATION: CTA OF THE CHEST 6/27/2020 11:46 am TECHNIQUE: CTA of the chest was performed after the administration of intravenous contrast.  Multiplanar reformatted images are provided for review. MIP images are provided for review. Dose modulation, iterative reconstruction, and/or weight based adjustment of the mA/kV was utilized to reduce the radiation dose to as low as reasonably achievable. COMPARISON: Chest radiograph 06/27/2020, chest CTA 11/15/2013 HISTORY: ORDERING SYSTEM PROVIDED HISTORY: chest pain/htn TECHNOLOGIST PROVIDED HISTORY: Reason for exam:->chest pain/htn Reason for Exam: chest pain/htn Acuity: Acute Type of Exam: Initial Additional signs and symptoms: recently seen here for high blood pressure Relevant Medical/Surgical History: hx of known high blood pressure FINDINGS: PULMONARY ARTERIES:  Normal caliber.   Adequately opacified for evaluation. No filling defects consistent with emboli. MEDIASTINUM:  Mild cardiomegaly with left atrial and left ventricular dilation. No flattening of the interventricular septum. No pericardial effusion. Reflux of the contrast bolus into the azygos vein. Normal variant direct origin of the left vertebral artery from the aortic arch. No mediastinal nor hilar lymphadenopathy. Distention of the proximal to mid thoracic esophagus. LUNGS/PLEURA:  Patent central airways. Minimal to mild bronchial wall thickening most prominent centrally. Patchy mosaic attenuation with no interlobular septal thickening, likely due to small airways disease or small vessels disease. Calcified granuloma in the right middle lobe. Minimal gravity dependent atelectasis bilaterally. No pleural effusions nor pneumothoraces. UPPER ABDOMEN:  Suggestion of hepatic steatosis. Moderate pancreatic atrophy. Splenic granulomatous calcifications. Reflux of the contrast bolus into the inferior vena cava and hepatic veins. SOFT TISSUES/BONES:   Reflux of the contrast bolus into venous collaterals about the right shoulder. No supraclavicular nor axillary lymphadenopathy. Diffuse osseous demineralization. No acute fractures nor suspicious osseous lesions. 1. No findings of pulmonary embolism. 2. Reflux of the contrast bolus suggestive of right heart dysfunction. Mild cardiomegaly with left atrial and left ventricular dilation may be related. 3. Minimal to mild bronchial wall thickening potentially due to pulmonary vascular congestion, reactive airways disease, or bronchitis. Cta Neck W Contrast    Result Date: 6/24/2020  EXAMINATION: CTA OF THE HEAD WITH CONTRAST; CTA OF THE NECK 6/24/2020 11:11 am; 6/24/2020 11:13 am: TECHNIQUE: CTA of the head/brain was performed with the administration of intravenous contrast. Multiplanar reformatted images are provided for review. MIP images are provided for review.  Dose modulation, iterative reconstruction, and/or weight based adjustment of the mA/kV was utilized to reduce the radiation dose to as low as reasonably achievable.; CTA of the neck was performed with the administration of intravenous contrast. Multiplanar reformatted images are provided for review. MIP images are provided for review. Stenosis of the internal carotid arteries measured using NASCET criteria. Dose modulation, iterative reconstruction, and/or weight based adjustment of the mA/kV was utilized to reduce the radiation dose to as low as reasonably achievable. COMPARISON: None. HISTORY: ORDERING SYSTEM PROVIDED HISTORY: numbness to face TECHNOLOGIST PROVIDED HISTORY: Has a \"code stroke\" or \"stroke alert\" been called? ->Yes Reason for exam:->numbness to face Reason for Exam: STROKE WEAKNESS; ORDERING SYSTEM PROVIDED HISTORY: numbness to face TECHNOLOGIST PROVIDED HISTORY: Has a \"code stroke\" or \"stroke alert\" been called? ->Yes Reason for exam:->numbness to face Reason for Exam: STROKE WEAKNESS Acuity: Acute Type of Exam: Initial FINDINGS: CTA NECK: AORTIC ARCH/ARCH VESSELS: There is 4 vessel aortic arch with direct origin of the left vertebral artery from the thoracic aorta. No dissection or arterial injury. No significant stenosis of the brachiocephalic or subclavian arteries. CAROTID ARTERIES: No dissection, arterial injury, or hemodynamically significant stenosis by NASCET criteria. VERTEBRAL ARTERIES: No dissection, arterial injury, or significant stenosis. SOFT TISSUES: The lung apices are clear. No cervical or superior mediastinal lymphadenopathy. The larynx and pharynx are unremarkable. No acute abnormality of the salivary and thyroid glands. BONES: No acute osseous abnormality. CTA HEAD: ANTERIOR CIRCULATION: No significant stenosis of the intracranial internal carotid, anterior cerebral, or middle cerebral arteries. No aneurysm. Anterior communicating artery is present.  POSTERIOR CIRCULATION: No significant stenosis of the vertebral, basilar, or posterior cerebral arteries. No aneurysm. Left posterior communicating artery is present. OTHER: No dural venous sinus thrombosis on this non-dedicated study. Unremarkable CTA of the head and neck. Cta Head W Contrast    Result Date: 6/24/2020  EXAMINATION: CTA OF THE HEAD WITH CONTRAST; CTA OF THE NECK 6/24/2020 11:11 am; 6/24/2020 11:13 am: TECHNIQUE: CTA of the head/brain was performed with the administration of intravenous contrast. Multiplanar reformatted images are provided for review. MIP images are provided for review. Dose modulation, iterative reconstruction, and/or weight based adjustment of the mA/kV was utilized to reduce the radiation dose to as low as reasonably achievable.; CTA of the neck was performed with the administration of intravenous contrast. Multiplanar reformatted images are provided for review. MIP images are provided for review. Stenosis of the internal carotid arteries measured using NASCET criteria. Dose modulation, iterative reconstruction, and/or weight based adjustment of the mA/kV was utilized to reduce the radiation dose to as low as reasonably achievable. COMPARISON: None. HISTORY: ORDERING SYSTEM PROVIDED HISTORY: numbness to face TECHNOLOGIST PROVIDED HISTORY: Has a \"code stroke\" or \"stroke alert\" been called? ->Yes Reason for exam:->numbness to face Reason for Exam: STROKE WEAKNESS; ORDERING SYSTEM PROVIDED HISTORY: numbness to face TECHNOLOGIST PROVIDED HISTORY: Has a \"code stroke\" or \"stroke alert\" been called? ->Yes Reason for exam:->numbness to face Reason for Exam: STROKE WEAKNESS Acuity: Acute Type of Exam: Initial FINDINGS: CTA NECK: AORTIC ARCH/ARCH VESSELS: There is 4 vessel aortic arch with direct origin of the left vertebral artery from the thoracic aorta. No dissection or arterial injury. No significant stenosis of the brachiocephalic or subclavian arteries.  CAROTID ARTERIES: No dissection, arterial injury, or hemodynamically Oral TID    amLODIPine  10 mg Oral Daily    sennosides-docusate sodium  1 tablet Oral Nightly    insulin glargine  20 Units Subcutaneous Nightly    insulin lispro  0-6 Units Subcutaneous 2 times per day    carvedilol  6.25 mg Oral BID WC    carbidopa-levodopa  1 tablet Oral Nightly    levETIRAcetam  750 mg Oral Nightly    levothyroxine  75 mcg Oral QAM    montelukast  10 mg Oral Nightly    QUEtiapine  25 mg Oral QPM    atorvastatin  10 mg Oral Daily    pantoprazole  40 mg Oral Daily    guaiFENesin  600 mg Oral BID    magnesium oxide  400 mg Oral Daily    insulin lispro  0-12 Units Subcutaneous TID WC    sodium chloride flush  10 mL Intravenous 2 times per day    enoxaparin  40 mg Subcutaneous Daily    amitriptyline  25 mg Oral Nightly    metoclopramide  10 mg Intravenous Q6H    losartan  100 mg Oral Daily    And    hydroCHLOROthiazide  12.5 mg Oral Daily      Infusions:    dextrose           Objective:   Vitals: /74   Pulse 84   Temp 98.8 °F (37.1 °C) (Tympanic)   Resp 21   Ht 5' 2\" (1.575 m)   Wt 178 lb 14.4 oz (81.1 kg)   SpO2 98%   BMI 32.72 kg/m²   General appearance: alert and cooperative with exam  Neck: no JVD or bruit  Thyroid : Normal lobes   Lungs: Has Vesicular Breath sounds   Heart:  regular rate and rhythm  Abdomen: soft, non-tender; bowel sounds normal; no masses,  no organomegaly  Musculoskeletal: Normal  Extremities: extremities normal, , no edema  Neurologic:  Awake, alert, oriented to name, place and time. Cranial nerves II-XII are grossly intact. Motor is  intact. Sensory is intact. ,  and gait is normal.    Assessment:     Patient Active Problem List:     Chest pain     H/O Doppler ultrasound     H/O cardiovascular stress test     H/O cardiovascular stress test     H/O cardiovascular stress test     Incarcerated umbilical hernia     Essential hypertension     Type 2 diabetes mellitus with diabetic polyneuropathy (HCC)     Tachycardia     Dyslipidemia     Type 2 diabetes mellitus without complication, with long-term current use of insulin (HCC)     Family history of early CAD     NSTEMI (non-ST elevated myocardial infarction) ([facility] [address]     Abnormal nuclear stress test     ILSA (obstructive sleep apnea)     Snoring     Hypersomnolence     Mild intermittent asthma without complication     Asthma exacerbation attacks     Hypertensive urgency      Plan:     1. Reviewed POC blood glucose . Labs and X ray results   2. Reviewed Current Medicines   3. On meal/ Correction bolus Humalog/ Basal Lantus Insulin regime / and Oral Hypoglycemic drugs   4. Monitor Blood glucose frequently   5. Modified  the dose of Insulin/ other medicines as needed   6. Will follow     .      Tita Rosa MD

## 2020-07-01 RX ORDER — AMLODIPINE BESYLATE 5 MG/1
5 TABLET ORAL DAILY
Qty: 30 TABLET | Refills: 5 | Status: SHIPPED | OUTPATIENT
Start: 2020-07-01 | End: 2020-07-12

## 2020-07-01 NOTE — TELEPHONE ENCOUNTER
I called the patient and the patient was stating she was in the hospital -. She said they put her on 2 new meds Coreg 6.25 and Amlodipoine 10mg. She been on it since . Last night she took her coreg and felt light head and BP was 84/74 75. Today her vitals are: (she took )   Layin/82 P: 74  Sitting 147/90 P: 72  Standin/84 P:85  She did not take her medications yet today. She feels light headed, dizzy, very unbalance and weak. Will talk to Nieves Mcdermott as soon as he comes in.

## 2020-07-01 NOTE — TELEPHONE ENCOUNTER
Patient called stated last night she was light headed   She took her blood pressure and it was 84/74  Today 124/78

## 2020-07-01 NOTE — TELEPHONE ENCOUNTER
I talked to Dr. Jose De Jesus Wu and he told me to lower the amlodipoine to 5mg. I talk to the patient and she verbally understood.

## 2020-07-02 ENCOUNTER — TELEPHONE (OUTPATIENT)
Dept: CARDIOLOGY CLINIC | Age: 65
End: 2020-07-02

## 2020-07-02 NOTE — TELEPHONE ENCOUNTER
I called the patient back and clarify the medications changes with and told her to track her BP as well.    Patient understood verbally

## 2020-07-02 NOTE — TELEPHONE ENCOUNTER
Patient calling to inform Dr. Marshall Bears that her BP was slowly decreasing states it is 126/76 and her pulse was at 75 earlier and at 1120am it was 59. She would like a nurse to call her.

## 2020-07-06 ENCOUNTER — TELEPHONE (OUTPATIENT)
Dept: CARDIOLOGY CLINIC | Age: 65
End: 2020-07-06

## 2020-07-06 NOTE — TELEPHONE ENCOUNTER
Called the patient back and she stating she stop her Amlodipine and Coreg. I tried to talk to her that Ambrosio Logan our NP said on the 2nd and what Dr. Art Beard wanted on the 1st. She said she is too scared that her blood pressure is going to bottom out and she will have to go to the er. I told her okay. And she is going to see Raine next week.

## 2020-07-06 NOTE — TELEPHONE ENCOUNTER
Patient called stated that she is scared to take   Her Coreg , She is worried that her blood pressure   Will bottom out. she reported that 7/2 it was as low as  83/58.  She has not taken her coreg for 2 days

## 2020-07-10 ENCOUNTER — CARE COORDINATION (OUTPATIENT)
Dept: CARE COORDINATION | Age: 65
End: 2020-07-10

## 2020-07-10 NOTE — CARE COORDINATION
You Patient resolved from the Care Transitions episode on 6/25/20. Discussed COVID-19 related testing which was not done at this time. Test results were not done. Patient informed of results, if available? n/a    Patient/family has been provided the following resources and education related to COVID-19:                         Signs, symptoms and red flags related to COVID-19            [facility] exposure and quarantine guidelines            [employer] exposure contact - [phone]            Contact for their local Department of Health             some sob, not new or worse, slight headache. Bp was 179/106 ths morning she reports and she then took her bp medication. Has not rechecked bp. Advised to call cardiology with any concerns/questions. Voices understanding and that she has number. Denies any other symptoms. Patient currently reports that the following symptoms have improved:  no new/worsening symptoms     No further outreach scheduled with this CTN/ACM. Episode of Care resolved. Patient has this CTN/ACM contact information if future needs arise.

## 2020-07-12 ENCOUNTER — APPOINTMENT (OUTPATIENT)
Dept: GENERAL RADIOLOGY | Age: 65
DRG: 305 | End: 2020-07-12
Payer: MEDICARE

## 2020-07-12 ENCOUNTER — HOSPITAL ENCOUNTER (INPATIENT)
Age: 65
LOS: 1 days | Discharge: HOME | DRG: 305 | End: 2020-07-15
Attending: EMERGENCY MEDICINE | Admitting: INTERNAL MEDICINE
Payer: MEDICARE

## 2020-07-12 LAB
BASOPHILS ABSOLUTE: 0.1 K/CU MM
BASOPHILS RELATIVE PERCENT: 0.7 % (ref 0–1)
DIFFERENTIAL TYPE: ABNORMAL
EOSINOPHILS ABSOLUTE: 0.4 K/CU MM
EOSINOPHILS RELATIVE PERCENT: 4 % (ref 0–3)
HCT VFR BLD CALC: 37.2 % (ref 37–47)
HEMOGLOBIN: 12.7 GM/DL (ref 12.5–16)
IMMATURE NEUTROPHIL %: 0.2 % (ref 0–0.43)
LYMPHOCYTES ABSOLUTE: 3.1 K/CU MM
LYMPHOCYTES RELATIVE PERCENT: 29.1 % (ref 24–44)
MCH RBC QN AUTO: 29.1 PG (ref 27–31)
MCHC RBC AUTO-ENTMCNC: 34.1 % (ref 32–36)
MCV RBC AUTO: 85.3 FL (ref 78–100)
MONOCYTES ABSOLUTE: 1.1 K/CU MM
MONOCYTES RELATIVE PERCENT: 10.1 % (ref 0–4)
NUCLEATED RBC %: 0 %
PDW BLD-RTO: 13.3 % (ref 11.7–14.9)
PLATELET # BLD: 327 K/CU MM (ref 140–440)
PMV BLD AUTO: 10.2 FL (ref 7.5–11.1)
RBC # BLD: 4.36 M/CU MM (ref 4.2–5.4)
SEGMENTED NEUTROPHILS ABSOLUTE COUNT: 6 K/CU MM
SEGMENTED NEUTROPHILS RELATIVE PERCENT: 55.9 % (ref 36–66)
TOTAL IMMATURE NEUTOROPHIL: 0.02 K/CU MM
TOTAL NUCLEATED RBC: 0 K/CU MM
WBC # BLD: 10.7 K/CU MM (ref 4–10.5)

## 2020-07-12 PROCEDURE — 93005 ELECTROCARDIOGRAM TRACING: CPT | Performed by: EMERGENCY MEDICINE

## 2020-07-12 PROCEDURE — 99285 EMERGENCY DEPT VISIT HI MDM: CPT

## 2020-07-12 PROCEDURE — 84484 ASSAY OF TROPONIN QUANT: CPT

## 2020-07-12 PROCEDURE — 83880 ASSAY OF NATRIURETIC PEPTIDE: CPT

## 2020-07-12 PROCEDURE — 80053 COMPREHEN METABOLIC PANEL: CPT

## 2020-07-12 PROCEDURE — 85025 COMPLETE CBC W/AUTO DIFF WBC: CPT

## 2020-07-12 ASSESSMENT — PAIN DESCRIPTION - LOCATION: LOCATION: ARM;NECK

## 2020-07-12 ASSESSMENT — PAIN DESCRIPTION - PAIN TYPE: TYPE: CHRONIC PAIN

## 2020-07-12 ASSESSMENT — PAIN SCALES - GENERAL: PAINLEVEL_OUTOF10: 9

## 2020-07-12 ASSESSMENT — PAIN DESCRIPTION - ORIENTATION: ORIENTATION: RIGHT;LEFT

## 2020-07-13 ENCOUNTER — APPOINTMENT (OUTPATIENT)
Dept: GENERAL RADIOLOGY | Age: 65
DRG: 305 | End: 2020-07-13
Payer: MEDICARE

## 2020-07-13 LAB
ALBUMIN SERPL-MCNC: 4.1 GM/DL (ref 3.4–5)
ALP BLD-CCNC: 61 IU/L (ref 40–129)
ALT SERPL-CCNC: 23 U/L (ref 10–40)
ANION GAP SERPL CALCULATED.3IONS-SCNC: 14 MMOL/L (ref 4–16)
AST SERPL-CCNC: 36 IU/L (ref 15–37)
BILIRUB SERPL-MCNC: 0.4 MG/DL (ref 0–1)
BUN BLDV-MCNC: 13 MG/DL (ref 6–23)
CALCIUM SERPL-MCNC: 9.8 MG/DL (ref 8.3–10.6)
CHLORIDE BLD-SCNC: 98 MMOL/L (ref 99–110)
CO2: 25 MMOL/L (ref 21–32)
CREAT SERPL-MCNC: 0.7 MG/DL (ref 0.6–1.1)
EKG ATRIAL RATE: 70 BPM
EKG DIAGNOSIS: NORMAL
EKG P AXIS: 21 DEGREES
EKG P-R INTERVAL: 170 MS
EKG Q-T INTERVAL: 412 MS
EKG QRS DURATION: 116 MS
EKG QTC CALCULATION (BAZETT): 444 MS
EKG R AXIS: -39 DEGREES
EKG T AXIS: 5 DEGREES
EKG VENTRICULAR RATE: 70 BPM
GFR AFRICAN AMERICAN: >60 ML/MIN/1.73M2
GFR NON-AFRICAN AMERICAN: >60 ML/MIN/1.73M2
GLUCOSE BLD-MCNC: 114 MG/DL (ref 70–99)
GLUCOSE BLD-MCNC: 140 MG/DL (ref 70–99)
GLUCOSE BLD-MCNC: 168 MG/DL (ref 70–99)
GLUCOSE BLD-MCNC: 176 MG/DL (ref 70–99)
GLUCOSE BLD-MCNC: 192 MG/DL (ref 70–99)
GLUCOSE BLD-MCNC: 210 MG/DL (ref 70–99)
POTASSIUM SERPL-SCNC: 4.2 MMOL/L (ref 3.5–5.1)
PRO-BNP: 213.4 PG/ML
SODIUM BLD-SCNC: 137 MMOL/L (ref 135–145)
TOTAL PROTEIN: 7.3 GM/DL (ref 6.4–8.2)
TROPONIN T: <0.01 NG/ML

## 2020-07-13 PROCEDURE — 2580000003 HC RX 258: Performed by: EMERGENCY MEDICINE

## 2020-07-13 PROCEDURE — 6360000002 HC RX W HCPCS: Performed by: INTERNAL MEDICINE

## 2020-07-13 PROCEDURE — 84484 ASSAY OF TROPONIN QUANT: CPT

## 2020-07-13 PROCEDURE — 6370000000 HC RX 637 (ALT 250 FOR IP): Performed by: INTERNAL MEDICINE

## 2020-07-13 PROCEDURE — G0378 HOSPITAL OBSERVATION PER HR: HCPCS

## 2020-07-13 PROCEDURE — 99222 1ST HOSP IP/OBS MODERATE 55: CPT | Performed by: INTERNAL MEDICINE

## 2020-07-13 PROCEDURE — 96366 THER/PROPH/DIAG IV INF ADDON: CPT

## 2020-07-13 PROCEDURE — 82962 GLUCOSE BLOOD TEST: CPT

## 2020-07-13 PROCEDURE — 96365 THER/PROPH/DIAG IV INF INIT: CPT

## 2020-07-13 PROCEDURE — 96375 TX/PRO/DX INJ NEW DRUG ADDON: CPT

## 2020-07-13 PROCEDURE — 71045 X-RAY EXAM CHEST 1 VIEW: CPT

## 2020-07-13 PROCEDURE — 93010 ELECTROCARDIOGRAM REPORT: CPT | Performed by: INTERNAL MEDICINE

## 2020-07-13 PROCEDURE — 36415 COLL VENOUS BLD VENIPUNCTURE: CPT

## 2020-07-13 PROCEDURE — 2500000003 HC RX 250 WO HCPCS: Performed by: EMERGENCY MEDICINE

## 2020-07-13 PROCEDURE — 2580000003 HC RX 258: Performed by: INTERNAL MEDICINE

## 2020-07-13 PROCEDURE — 94761 N-INVAS EAR/PLS OXIMETRY MLT: CPT

## 2020-07-13 PROCEDURE — 96376 TX/PRO/DX INJ SAME DRUG ADON: CPT

## 2020-07-13 PROCEDURE — 96372 THER/PROPH/DIAG INJ SC/IM: CPT

## 2020-07-13 RX ORDER — DOCUSATE SODIUM 100 MG/1
100 CAPSULE, LIQUID FILLED ORAL 2 TIMES DAILY
Status: DISCONTINUED | OUTPATIENT
Start: 2020-07-13 | End: 2020-07-15 | Stop reason: HOSPADM

## 2020-07-13 RX ORDER — MONTELUKAST SODIUM 10 MG/1
10 TABLET ORAL NIGHTLY
Status: DISCONTINUED | OUTPATIENT
Start: 2020-07-13 | End: 2020-07-15 | Stop reason: HOSPADM

## 2020-07-13 RX ORDER — SIMVASTATIN 20 MG
20 TABLET ORAL NIGHTLY
Status: DISCONTINUED | OUTPATIENT
Start: 2020-07-13 | End: 2020-07-15 | Stop reason: HOSPADM

## 2020-07-13 RX ORDER — MAGNESIUM HYDROXIDE/ALUMINUM HYDROXICE/SIMETHICONE 120; 1200; 1200 MG/30ML; MG/30ML; MG/30ML
30 SUSPENSION ORAL EVERY 6 HOURS PRN
Status: DISCONTINUED | OUTPATIENT
Start: 2020-07-13 | End: 2020-07-15 | Stop reason: HOSPADM

## 2020-07-13 RX ORDER — M-VIT,TX,IRON,MINS/CALC/FOLIC 27MG-0.4MG
1 TABLET ORAL DAILY
Status: DISCONTINUED | OUTPATIENT
Start: 2020-07-13 | End: 2020-07-13

## 2020-07-13 RX ORDER — QUETIAPINE FUMARATE 25 MG/1
25 TABLET, FILM COATED ORAL EVERY EVENING
Status: DISCONTINUED | OUTPATIENT
Start: 2020-07-13 | End: 2020-07-15 | Stop reason: HOSPADM

## 2020-07-13 RX ORDER — ONDANSETRON 2 MG/ML
4 INJECTION INTRAMUSCULAR; INTRAVENOUS EVERY 6 HOURS PRN
Status: DISCONTINUED | OUTPATIENT
Start: 2020-07-13 | End: 2020-07-15 | Stop reason: HOSPADM

## 2020-07-13 RX ORDER — ALLOPURINOL 300 MG/1
300 TABLET ORAL DAILY
Status: DISCONTINUED | OUTPATIENT
Start: 2020-07-13 | End: 2020-07-15 | Stop reason: HOSPADM

## 2020-07-13 RX ORDER — PROMETHAZINE HYDROCHLORIDE 25 MG/1
12.5 TABLET ORAL EVERY 6 HOURS PRN
Status: DISCONTINUED | OUTPATIENT
Start: 2020-07-13 | End: 2020-07-13

## 2020-07-13 RX ORDER — DEXTROSE MONOHYDRATE 25 G/50ML
12.5 INJECTION, SOLUTION INTRAVENOUS PRN
Status: DISCONTINUED | OUTPATIENT
Start: 2020-07-13 | End: 2020-07-15 | Stop reason: HOSPADM

## 2020-07-13 RX ORDER — LEVOTHYROXINE SODIUM 0.03 MG/1
25 TABLET ORAL EVERY MORNING
Status: DISCONTINUED | OUTPATIENT
Start: 2020-07-13 | End: 2020-07-15 | Stop reason: HOSPADM

## 2020-07-13 RX ORDER — PANTOPRAZOLE SODIUM 40 MG/1
40 TABLET, DELAYED RELEASE ORAL DAILY
Status: DISCONTINUED | OUTPATIENT
Start: 2020-07-13 | End: 2020-07-15 | Stop reason: HOSPADM

## 2020-07-13 RX ORDER — BUSPIRONE HYDROCHLORIDE 15 MG/1
15 TABLET ORAL 3 TIMES DAILY
Status: DISCONTINUED | OUTPATIENT
Start: 2020-07-13 | End: 2020-07-15 | Stop reason: HOSPADM

## 2020-07-13 RX ORDER — DIPHENHYDRAMINE HYDROCHLORIDE 50 MG/ML
12.5 INJECTION INTRAMUSCULAR; INTRAVENOUS ONCE
Status: DISCONTINUED | OUTPATIENT
Start: 2020-07-13 | End: 2020-07-13

## 2020-07-13 RX ORDER — IPRATROPIUM BROMIDE AND ALBUTEROL SULFATE 2.5; .5 MG/3ML; MG/3ML
3 SOLUTION RESPIRATORY (INHALATION) EVERY 4 HOURS PRN
Status: DISCONTINUED | OUTPATIENT
Start: 2020-07-13 | End: 2020-07-15 | Stop reason: HOSPADM

## 2020-07-13 RX ORDER — HYDROCODONE BITARTRATE AND ACETAMINOPHEN 7.5; 325 MG/1; MG/1
1 TABLET ORAL EVERY 8 HOURS SCHEDULED
Status: DISCONTINUED | OUTPATIENT
Start: 2020-07-13 | End: 2020-07-15 | Stop reason: HOSPADM

## 2020-07-13 RX ORDER — ASPIRIN 81 MG/1
81 TABLET, CHEWABLE ORAL DAILY
Status: DISCONTINUED | OUTPATIENT
Start: 2020-07-13 | End: 2020-07-15 | Stop reason: HOSPADM

## 2020-07-13 RX ORDER — CARVEDILOL 12.5 MG/1
12.5 TABLET ORAL 2 TIMES DAILY WITH MEALS
Status: DISCONTINUED | OUTPATIENT
Start: 2020-07-13 | End: 2020-07-13

## 2020-07-13 RX ORDER — M-VIT,TX,IRON,MINS/CALC/FOLIC 27MG-0.4MG
1 TABLET ORAL DAILY
Status: DISCONTINUED | OUTPATIENT
Start: 2020-07-13 | End: 2020-07-15 | Stop reason: HOSPADM

## 2020-07-13 RX ORDER — DIPHENHYDRAMINE HYDROCHLORIDE 50 MG/ML
25 INJECTION INTRAMUSCULAR; INTRAVENOUS EVERY 8 HOURS PRN
Status: DISCONTINUED | OUTPATIENT
Start: 2020-07-13 | End: 2020-07-13

## 2020-07-13 RX ORDER — AMITRIPTYLINE HYDROCHLORIDE 25 MG/1
25 TABLET, FILM COATED ORAL NIGHTLY
Status: DISCONTINUED | OUTPATIENT
Start: 2020-07-13 | End: 2020-07-15 | Stop reason: HOSPADM

## 2020-07-13 RX ORDER — HYDROCHLOROTHIAZIDE 12.5 MG/1
12.5 TABLET ORAL DAILY
Status: DISCONTINUED | OUTPATIENT
Start: 2020-07-13 | End: 2020-07-15 | Stop reason: HOSPADM

## 2020-07-13 RX ORDER — INSULIN GLARGINE 100 [IU]/ML
50 INJECTION, SOLUTION SUBCUTANEOUS NIGHTLY
Status: DISCONTINUED | OUTPATIENT
Start: 2020-07-13 | End: 2020-07-13

## 2020-07-13 RX ORDER — INSULIN GLARGINE 100 [IU]/ML
50 INJECTION, SOLUTION SUBCUTANEOUS NIGHTLY
Status: DISCONTINUED | OUTPATIENT
Start: 2020-07-13 | End: 2020-07-14

## 2020-07-13 RX ORDER — LANOLIN ALCOHOL/MO/W.PET/CERES
400 CREAM (GRAM) TOPICAL DAILY
Status: DISCONTINUED | OUTPATIENT
Start: 2020-07-13 | End: 2020-07-15 | Stop reason: HOSPADM

## 2020-07-13 RX ORDER — TIZANIDINE 2 MG/1
2 TABLET ORAL NIGHTLY PRN
Status: DISCONTINUED | OUTPATIENT
Start: 2020-07-13 | End: 2020-07-15 | Stop reason: HOSPADM

## 2020-07-13 RX ORDER — CARVEDILOL 12.5 MG/1
12.5 TABLET ORAL 2 TIMES DAILY WITH MEALS
Status: DISCONTINUED | OUTPATIENT
Start: 2020-07-13 | End: 2020-07-14

## 2020-07-13 RX ORDER — DEXTROSE MONOHYDRATE 50 MG/ML
100 INJECTION, SOLUTION INTRAVENOUS PRN
Status: DISCONTINUED | OUTPATIENT
Start: 2020-07-13 | End: 2020-07-15 | Stop reason: HOSPADM

## 2020-07-13 RX ORDER — HYDROCODONE BITARTRATE AND ACETAMINOPHEN 5; 325 MG/1; MG/1
1 TABLET ORAL EVERY 6 HOURS PRN
Status: DISCONTINUED | OUTPATIENT
Start: 2020-07-13 | End: 2020-07-13

## 2020-07-13 RX ORDER — LOSARTAN POTASSIUM AND HYDROCHLOROTHIAZIDE 12.5; 1 MG/1; MG/1
1 TABLET ORAL DAILY
Status: DISCONTINUED | OUTPATIENT
Start: 2020-07-13 | End: 2020-07-13 | Stop reason: CLARIF

## 2020-07-13 RX ORDER — LOSARTAN POTASSIUM 100 MG/1
100 TABLET ORAL DAILY
Status: DISCONTINUED | OUTPATIENT
Start: 2020-07-13 | End: 2020-07-15 | Stop reason: HOSPADM

## 2020-07-13 RX ORDER — MORPHINE SULFATE 2 MG/ML
2 INJECTION, SOLUTION INTRAMUSCULAR; INTRAVENOUS ONCE
Status: DISCONTINUED | OUTPATIENT
Start: 2020-07-13 | End: 2020-07-15 | Stop reason: HOSPADM

## 2020-07-13 RX ORDER — CELECOXIB 100 MG/1
100 CAPSULE ORAL 2 TIMES DAILY
Status: DISCONTINUED | OUTPATIENT
Start: 2020-07-13 | End: 2020-07-13

## 2020-07-13 RX ORDER — SODIUM CHLORIDE 0.9 % (FLUSH) 0.9 %
10 SYRINGE (ML) INJECTION EVERY 12 HOURS SCHEDULED
Status: DISCONTINUED | OUTPATIENT
Start: 2020-07-13 | End: 2020-07-15 | Stop reason: HOSPADM

## 2020-07-13 RX ORDER — LORAZEPAM 2 MG/ML
0.5 INJECTION INTRAMUSCULAR EVERY 4 HOURS PRN
Status: DISCONTINUED | OUTPATIENT
Start: 2020-07-13 | End: 2020-07-15 | Stop reason: HOSPADM

## 2020-07-13 RX ORDER — NICOTINE POLACRILEX 4 MG
15 LOZENGE BUCCAL PRN
Status: DISCONTINUED | OUTPATIENT
Start: 2020-07-13 | End: 2020-07-15 | Stop reason: HOSPADM

## 2020-07-13 RX ORDER — ALUMINA, MAGNESIA, AND SIMETHICONE 2400; 2400; 240 MG/30ML; MG/30ML; MG/30ML
15 SUSPENSION ORAL EVERY 6 HOURS PRN
Status: DISCONTINUED | OUTPATIENT
Start: 2020-07-13 | End: 2020-07-13 | Stop reason: CLARIF

## 2020-07-13 RX ORDER — ALBUTEROL SULFATE 90 UG/1
2 AEROSOL, METERED RESPIRATORY (INHALATION) EVERY 6 HOURS PRN
Status: DISCONTINUED | OUTPATIENT
Start: 2020-07-13 | End: 2020-07-15 | Stop reason: HOSPADM

## 2020-07-13 RX ORDER — SODIUM CHLORIDE 0.9 % (FLUSH) 0.9 %
10 SYRINGE (ML) INJECTION PRN
Status: DISCONTINUED | OUTPATIENT
Start: 2020-07-13 | End: 2020-07-15 | Stop reason: HOSPADM

## 2020-07-13 RX ORDER — INSULIN GLARGINE 100 [IU]/ML
60 INJECTION, SOLUTION SUBCUTANEOUS NIGHTLY
Status: DISCONTINUED | OUTPATIENT
Start: 2020-07-13 | End: 2020-07-13

## 2020-07-13 RX ORDER — POLYETHYLENE GLYCOL 3350 17 G/17G
17 POWDER, FOR SOLUTION ORAL DAILY PRN
Status: DISCONTINUED | OUTPATIENT
Start: 2020-07-13 | End: 2020-07-15 | Stop reason: HOSPADM

## 2020-07-13 RX ORDER — HYDRALAZINE HYDROCHLORIDE 20 MG/ML
10 INJECTION INTRAMUSCULAR; INTRAVENOUS EVERY 4 HOURS PRN
Status: DISCONTINUED | OUTPATIENT
Start: 2020-07-13 | End: 2020-07-13 | Stop reason: CLARIF

## 2020-07-13 RX ORDER — METOCLOPRAMIDE 10 MG/1
10 TABLET ORAL EVERY 6 HOURS PRN
Status: DISCONTINUED | OUTPATIENT
Start: 2020-07-13 | End: 2020-07-15 | Stop reason: HOSPADM

## 2020-07-13 RX ORDER — CARVEDILOL 6.25 MG/1
6.25 TABLET ORAL 2 TIMES DAILY WITH MEALS
Status: DISCONTINUED | OUTPATIENT
Start: 2020-07-13 | End: 2020-07-13

## 2020-07-13 RX ORDER — GUAIFENESIN 600 MG/1
600 TABLET, EXTENDED RELEASE ORAL 2 TIMES DAILY
Status: DISCONTINUED | OUTPATIENT
Start: 2020-07-13 | End: 2020-07-15 | Stop reason: HOSPADM

## 2020-07-13 RX ORDER — DIPHENHYDRAMINE HCL 25 MG
25 TABLET ORAL EVERY 8 HOURS PRN
Status: DISCONTINUED | OUTPATIENT
Start: 2020-07-13 | End: 2020-07-15 | Stop reason: HOSPADM

## 2020-07-13 RX ADMIN — HYDROCODONE BITARTRATE AND ACETAMINOPHEN 1 TABLET: 7.5; 325 TABLET ORAL at 14:59

## 2020-07-13 RX ADMIN — LORAZEPAM 0.5 MG: 2 INJECTION INTRAMUSCULAR; INTRAVENOUS at 18:07

## 2020-07-13 RX ADMIN — ONDANSETRON 4 MG: 2 INJECTION INTRAMUSCULAR; INTRAVENOUS at 05:21

## 2020-07-13 RX ADMIN — CARBIDOPA AND LEVODOPA 1 TABLET: 10; 100 TABLET ORAL at 21:19

## 2020-07-13 RX ADMIN — MULTIPLE VITAMINS W/ MINERALS TAB 1 TABLET: TAB at 08:23

## 2020-07-13 RX ADMIN — LORAZEPAM 0.5 MG: 2 INJECTION INTRAMUSCULAR; INTRAVENOUS at 11:27

## 2020-07-13 RX ADMIN — LEVETIRACETAM 750 MG: 500 TABLET, FILM COATED ORAL at 22:24

## 2020-07-13 RX ADMIN — QUETIAPINE FUMARATE 25 MG: 25 TABLET ORAL at 17:27

## 2020-07-13 RX ADMIN — HYDROCHLOROTHIAZIDE 12.5 MG: 12.5 TABLET ORAL at 08:23

## 2020-07-13 RX ADMIN — INSULIN LISPRO 2 UNITS: 100 INJECTION, SOLUTION INTRAVENOUS; SUBCUTANEOUS at 21:19

## 2020-07-13 RX ADMIN — HYDROCODONE BITARTRATE AND ACETAMINOPHEN 1 TABLET: 7.5; 325 TABLET ORAL at 09:16

## 2020-07-13 RX ADMIN — BUSPIRONE HYDROCHLORIDE 15 MG: 15 TABLET ORAL at 09:16

## 2020-07-13 RX ADMIN — AMITRIPTYLINE HYDROCHLORIDE 25 MG: 25 TABLET, FILM COATED ORAL at 21:20

## 2020-07-13 RX ADMIN — HYDROCODONE BITARTRATE AND ACETAMINOPHEN 1 TABLET: 7.5; 325 TABLET ORAL at 21:20

## 2020-07-13 RX ADMIN — CARVEDILOL 12.5 MG: 12.5 TABLET, FILM COATED ORAL at 10:25

## 2020-07-13 RX ADMIN — BUSPIRONE HYDROCHLORIDE 15 MG: 15 TABLET ORAL at 14:59

## 2020-07-13 RX ADMIN — SODIUM CHLORIDE, PRESERVATIVE FREE 10 ML: 5 INJECTION INTRAVENOUS at 08:24

## 2020-07-13 RX ADMIN — ENOXAPARIN SODIUM 40 MG: 40 INJECTION SUBCUTANEOUS at 08:23

## 2020-07-13 RX ADMIN — SODIUM CHLORIDE, PRESERVATIVE FREE 10 ML: 5 INJECTION INTRAVENOUS at 21:26

## 2020-07-13 RX ADMIN — METOCLOPRAMIDE 10 MG: 10 TABLET ORAL at 09:16

## 2020-07-13 RX ADMIN — INSULIN GLARGINE 50 UNITS: 100 INJECTION, SOLUTION SUBCUTANEOUS at 21:20

## 2020-07-13 RX ADMIN — ALLOPURINOL 300 MG: 300 TABLET ORAL at 08:23

## 2020-07-13 RX ADMIN — MONTELUKAST 10 MG: 10 TABLET, FILM COATED ORAL at 21:21

## 2020-07-13 RX ADMIN — BUSPIRONE HYDROCHLORIDE 15 MG: 15 TABLET ORAL at 21:20

## 2020-07-13 RX ADMIN — SIMVASTATIN 20 MG: 20 TABLET, FILM COATED ORAL at 21:19

## 2020-07-13 RX ADMIN — DOCUSATE SODIUM 100 MG: 100 CAPSULE, LIQUID FILLED ORAL at 21:20

## 2020-07-13 RX ADMIN — ONDANSETRON 4 MG: 2 INJECTION INTRAMUSCULAR; INTRAVENOUS at 18:08

## 2020-07-13 RX ADMIN — GUAIFENESIN 600 MG: 600 TABLET, EXTENDED RELEASE ORAL at 21:20

## 2020-07-13 RX ADMIN — ASPIRIN 81 MG CHEWABLE TABLET 81 MG: 81 TABLET CHEWABLE at 08:23

## 2020-07-13 RX ADMIN — DEXTROSE MONOHYDRATE 2.5 MG/HR: 50 INJECTION, SOLUTION INTRAVENOUS at 00:07

## 2020-07-13 RX ADMIN — DOCUSATE SODIUM 100 MG: 100 CAPSULE, LIQUID FILLED ORAL at 08:23

## 2020-07-13 RX ADMIN — Medication 400 MG: at 08:23

## 2020-07-13 RX ADMIN — DIPHENHYDRAMINE HYDROCHLORIDE 25 MG: 50 INJECTION, SOLUTION INTRAMUSCULAR; INTRAVENOUS at 09:16

## 2020-07-13 RX ADMIN — CARVEDILOL 12.5 MG: 12.5 TABLET, FILM COATED ORAL at 17:27

## 2020-07-13 RX ADMIN — PANTOPRAZOLE SODIUM 40 MG: 40 TABLET, DELAYED RELEASE ORAL at 08:23

## 2020-07-13 RX ADMIN — LOSARTAN POTASSIUM 100 MG: 100 TABLET, FILM COATED ORAL at 08:23

## 2020-07-13 RX ADMIN — HYDRALAZINE HYDROCHLORIDE 10 MG: 20 INJECTION INTRAMUSCULAR; INTRAVENOUS at 18:08

## 2020-07-13 ASSESSMENT — ENCOUNTER SYMPTOMS
VOMITING: 0
NAUSEA: 1
PHOTOPHOBIA: 0
EYE PAIN: 0
CONSTIPATION: 0
BACK PAIN: 1
DIARRHEA: 0
COLOR CHANGE: 0
WHEEZING: 0
COUGH: 0
ABDOMINAL PAIN: 1
SHORTNESS OF BREATH: 0
CHEST TIGHTNESS: 0
BLOOD IN STOOL: 0

## 2020-07-13 ASSESSMENT — PAIN DESCRIPTION - LOCATION
LOCATION: GENERALIZED
LOCATION: ARM;NECK
LOCATION: GENERALIZED

## 2020-07-13 ASSESSMENT — PAIN DESCRIPTION - PROGRESSION
CLINICAL_PROGRESSION: NOT CHANGED
CLINICAL_PROGRESSION: NOT CHANGED
CLINICAL_PROGRESSION: GRADUALLY WORSENING
CLINICAL_PROGRESSION: NOT CHANGED

## 2020-07-13 ASSESSMENT — PAIN DESCRIPTION - PAIN TYPE
TYPE: CHRONIC PAIN

## 2020-07-13 ASSESSMENT — PAIN DESCRIPTION - FREQUENCY
FREQUENCY: CONTINUOUS

## 2020-07-13 ASSESSMENT — PAIN SCALES - GENERAL
PAINLEVEL_OUTOF10: 8
PAINLEVEL_OUTOF10: 10
PAINLEVEL_OUTOF10: 5
PAINLEVEL_OUTOF10: 5
PAINLEVEL_OUTOF10: 8

## 2020-07-13 ASSESSMENT — PAIN DESCRIPTION - ONSET
ONSET: ON-GOING

## 2020-07-13 ASSESSMENT — PAIN DESCRIPTION - DESCRIPTORS
DESCRIPTORS: ACHING;BURNING;CONSTANT;DISCOMFORT
DESCRIPTORS: ACHING;THROBBING

## 2020-07-13 ASSESSMENT — PAIN DESCRIPTION - ORIENTATION
ORIENTATION: RIGHT;LEFT
ORIENTATION: RIGHT;LEFT

## 2020-07-13 ASSESSMENT — PAIN - FUNCTIONAL ASSESSMENT
PAIN_FUNCTIONAL_ASSESSMENT: ACTIVITIES ARE NOT PREVENTED
PAIN_FUNCTIONAL_ASSESSMENT: PREVENTS OR INTERFERES SOME ACTIVE ACTIVITIES AND ADLS
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
## 2020-07-13 NOTE — PROGRESS NOTES
Pt lying in bed shaking all over and crying. States she is having a full blown panic attack. Orders for ativan prn obtained.
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
## 2020-07-13 NOTE — PROGRESS NOTES
Hospitalist Progress Note      Name:  Tanner Otoole /Age/Sex: 1955  (60 y.o. female)   MRN & CSN:  [id number] & [id number] Admission Date/Time: 2020 10:43 PM   Location:  [address] [place], [place] [address]-A PCP: Stephen Guerra MD         Hospital Day: 2    ASSESSMENT & PLAN:   Tanner Otoole is a 60 y.o.  female  who presented with a complaint of chest pain and elevated blood pressures. Disposition: Home with home health care  Remain in the hospital due to elevated BP    Hypertensive emergency---- /99 on arrival to the ED. With associated chest pain/chest discomfort. Initial troponins and EKG normal.  Creatinine normal.  Recently discharged from the hospital after getting admitted for elevated blood pressures. Had been taking metoprolol and Coreg along with Norvasc and losartan. Stated blood pressures got lower and Norvasc subsequently discontinued. She has been initially treated with nicardipine drip but has been seen and started off.  -Resume home antihypertensives  -Increase Coreg to 12.5 twice daily, losartan 100 mg daily, hydrochlorothiazide 12.5 daily  -Hydralazine PRN    Chest pain---likely secondary to uncontrolled blood pressure. Initial troponins and EKG negative. Cardiology consulted by admitting team.  -Await cardiology recommendation    DM2--- on Tresiba 60 units at bedtime, NovoLog 20 units with lunch and breakfast and 25 units with dinner along with sliding scale insulin. A1c 7.8 on 20.   Blood glucose so far between 100-170.  -Lantus 50 units at bedtime  -Sliding scale insulin  -Hypoglycemic protocol    Peripheral diabetic neuropathy--- on Elavil    Depression/anxiety--- on Seroquel and BuSpar    Epilepsy---on Keppra    Restless leg syndrome/Parkinson disease---on Sinemet    Hypothyroidism---on levothyroxine    Obesity---weight 174 pounds, BMI 31.83    IBS      MEDICAL DECISION MAKING:  -Labs reviewed  -Imaging reviewed  -Level of risk moderate  Diet DIET CARB CONTROL;   DVT Subcutaneous TID WC    insulin lispro  0-3 Units Subcutaneous Nightly    sodium chloride flush  10 mL Intravenous 2 times per day    enoxaparin  40 mg Subcutaneous Daily    losartan  100 mg Oral Daily    And    hydrochlorothiazide  12.5 mg Oral Daily    insulin glargine  60 Units Subcutaneous Nightly    morphine  2 mg Intravenous Once    diphenhydrAMINE  12.5 mg Intravenous Once    therapeutic multivitamin-minerals  1 tablet Oral Daily    carvedilol  12.5 mg Oral BID WC    HYDROcodone-acetaminophen  1 tablet Oral 3 times per day    busPIRone  15 mg Oral TID      Infusions:    dextrose       PRN Meds: polyethylene glycol, 17 g, Daily PRN  ipratropium-albuterol, 3 mL, Q4H PRN  albuterol sulfate HFA, 2 puff, Q6H PRN  tiZANidine, 2 mg, Nightly PRN  glucose, 15 g, PRN  dextrose, 12.5 g, PRN  glucagon (rDNA), 1 mg, PRN  dextrose, 100 mL/hr, PRN  sodium chloride flush, 10 mL, PRN  promethazine, 12.5 mg, Q6H PRN    Or  ondansetron, 4 mg, Q6H PRN  aluminum & magnesium hydroxide-simethicone, 30 mL, Q6H PRN  hydrALAZINE, 10 mg, Q4H PRN          Electronically signed by Luisito Boyer MD on 7/13/2020 at 8:44 AM

## 2020-07-13 NOTE — PROGRESS NOTES
Patient admitted with   spiriva respimat 1.25mcg/actuation  Albuterol sulfate inhalation aerosol 90mcg per  actuation   from home. Patient also had small pocket knife in her purse. Knife confiscated by RN locked in med room. Patient left in safe and stable condition no signs of distress noted upon departure from patient room.

## 2020-07-13 NOTE — ED TRIAGE NOTES
Patient comes in with c/o hypertension at home, patient recently was placed on coreg. Patient also states that she is going through some depression and anxiety, no thoughts of SI or HI. Dr Fernie Schulte saw patient.  Patient in the hallway at this time

## 2020-07-13 NOTE — H&P
History and Physical      Name:  Godfrey Garcia /Age/Sex: 1955  (60 y.o. female)   MRN & CSN:  [id number] & [id number] Admission Date/Time: 2020 10:43 PM   Location:  [address]- PCP: Brady Will MD       Hospital Day: 2    History of Present Illness:     Chief Complaint: Chest pain   Godfrey Garcia is a 60 y.o.  female with history of hypertension, type 2 diabetes, diabetic neuropathy who presents with complaints of anxiety, chest discomfort. She has been having constant chest pain for the past 2 days which radiates to left side of the neck that goes down to her left arm. It also associated with anxiety. She was recently admitted to the hospital 2 weeks back, evaluated by cardiology at the time also obtain echocardiogram, sent home after adjusting her blood pressure medications. She says whenever her blood pressure goes out of control she gets anxiety and chest pain. She is currently taking metoprolol, was instructed to take carvedilol during discharge from hospital last time. On arrival to the ER her blood pressure was in 563V systolic, she was started on nicardipine drip, her blood pressure dropped soon and her chest pain resolved hence nicardipine drip was discontinued.     Ten point ROS reviewed negative, unless as noted above  Objective:   No intake or output data in the 24 hours ending 20   Vitals:   Vitals:    20 0131   BP: 131/76   Pulse: 63   Resp: 11   Temp:    SpO2: 97%     Physical Exam:   General Appearance: alert and oriented to person, place and time, in no acute distress  Cardiovascular: normal rate, regular rhythm, normal S1 and S2  Pulmonary/Chest: clear to auscultation bilaterally- no wheezes, rales or rhonchi, normal air movement, no respiratory distress  Abdomen: soft, non-tender, non-distended, normal bowel sounds, no masses   Extremities: no cyanosis, clubbing or edema, pulse   Skin: warm and dry, no rash or erythema  Head: normocephalic predominant rhythm sinus    [place] (hard of hearing)     Bilateral Ears    Hx of cardiovascular stress test 10/19/2015    lexiscan-normal,EF63%    Hx of motion sickness     HX OTHER MEDICAL     Primary Care Physician Is Dr. Hermelinda Asencio [place], [place]    Hyperlipidemia     Hypertension     IBS (irritable bowel syndrome)     Incisional hernia 4/2014    Kidney stones Last Episode In 2012 Or 2013    Passed Kidney Stones Numberous Times    Migraines     Nausea & vomiting     Nausea/Vomiting Post Op In Past    Other specified disorder of skin     12- Patient states she has a condition of her vaginal area (skin) which starts with the letters Starla Mackey. She is currently being treated with multiple creams and weekly Diflucan. 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  Staph infection Dx 1980's    Toes On Left Foot    Thyroid disease     hypothroidism    Tremor     \"Tremors All Over\"    Urinary incontinence     UTI (urinary tract infection) In Past    No Current Symptoms    UTI (urinary tract infection)     Vertigo     \"Sometimes\"    Wears glasses      PSHX:  has a past surgical history that includes Carpal tunnel release (Right, 1999); Diagnostic Cardiac Cath Lab Procedure (01/11/2010); Dental surgery; Colonoscopy (Last Done 6-13); Endoscopy, colon, diagnostic (Several ); Bladder surgery (1970's Or 1980's); Lithotripsy (2011); Breast biopsy (Right, 1980's); Breast surgery (Left, 1990's); hernia repair (1316'D); hernia repair (1970's); Cholecystectomy (1970's); Appendectomy (1970's); Hysterectomy, total abdominal (1987); Tubal ligation (1978); Esophagus dilation (1980's And 1990's);  Knee arthroscopy (Right, 1999); joint replacement (2008); other surgical history (06 13 2014); fracture with it it's fine\"       FAM HX: family history includes Arthritis in her father, mother, and sister; Cancer in her brother and sister; Tia Gosling in her brother; Depression in her mother; Diabetes in her mother; Early Death in her brother; Early Death (age of onset: 37) in her brother; Early Death (age of onset: 61) in her mother; Early Death (age of onset: 61) in her brother; Hearing Loss in her sister; Heart Disease in her brother, brother, brother, father, mother, and sister; Heart Failure in her sister; High Blood Pressure in her brother, father, mother, and sister; High Cholesterol in her brother, father, and mother; Mental Illness in her daughter; [place]  / [place] in her mother; Other in her daughter, mother, and son; Stroke in her mother. Soc HX:   Social History     Socioeconomic History    Marital status:       Spouse name: None    Number of children: 3    Years of education: 6    Highest education level: None   Occupational History    None   Social Needs    Financial resource strain: None    Food insecurity     Worry: None     Inability: None    Transportation needs     Medical: None     Non-medical: None   Tobacco Use    Smoking status: Never Smoker    Smokeless tobacco: Never Used   Substance and Sexual Activity    Alcohol use: No    Drug use: No    Sexual activity: Not Currently   Lifestyle    Physical activity     Days per week: None     Minutes per session: None    Stress: None   Relationships    Social connections     Talks on phone: None     Gets together: None     Attends Quaker service: None     Active member of club or organization: None     Attends meetings of clubs or organizations: None     Relationship status: None    Intimate partner violence     Fear of current or ex partner: None     Emotionally abused: None     Physically abused: None     Forced sexual activity: None   Other Topics Concern    None   Social History Narrative    None Medications:   Medications:    Infusions:    niCARdipine 2.5 mg/hr (07/13/20 0007)     PRN Meds:      Labs:     CBC:   Lab Results   Component Value Date    WBC 10.7 07/12/2020    RBC 4.36 07/12/2020    HGB 12.7 07/12/2020    HCT 37.2 07/12/2020     07/12/2020    MCV 85.3 07/12/2020     BMP:    Lab Results   Component Value Date     07/12/2020    K 4.2 07/12/2020    K 4.2 03/15/2018    CL 98 07/12/2020    CO2 25 07/12/2020    BUN 13 07/12/2020    CREATININE 0.7 07/12/2020    GLUCOSE 114 07/12/2020    CALCIUM 9.8 07/12/2020     Hepatic Function Panel:    Lab Results   Component Value Date    ALKPHOS 61 07/12/2020    AST 36 07/12/2020    ALT 23 07/12/2020    PROT 7.3 07/12/2020    PROT 6.6 03/12/2011    LABALBU 4.1 07/12/2020    BILITOT 0.4 07/12/2020     Magnesium:    Lab Results   Component Value Date    MG 1.8 06/28/2020     Cardiac Enzymes:   Lab Results   Component Value Date    CKTOTAL 33 06/27/2020    CKTOTAL 46 12/14/2015    CKTOTAL 25 (L) 02/07/2011    TROPONINI <0.006 03/28/2014    TROPONINI <0.006 02/15/2014    TROPONINI <0.006 02/09/2014     LDH:  No results found for: LDH  PT/INR:    Lab Results   Component Value Date    PROTIME 11.5 06/24/2020    PROTIME 11.2 02/07/2011    INR 0.95 06/24/2020     U/A:   Lab Results   Component Value Date    NITRITE Negative 02/28/2014    LEUKOCYTESUR TRACE 06/27/2020    WBCUA 3 06/27/2020    RBCUA <1 06/27/2020    BACTERIA RARE 06/27/2020    SPECGRAV 1.003 06/27/2020    BLOODU NEGATIVE 06/27/2020    GLUCOSEU Normal 11/05/2014     ABG:  No results found for: PHART, MZO3BQU, PO2ART, J3PXXFUT, YAD8JGR, BEART  TSH:  No results found for: TSH  Cardiac Enzymes:   Lab Results   Component Value Date    CKTOTAL 33 06/27/2020    CKTOTAL 46 12/14/2015    CKTOTAL 25 (L) 02/07/2011    TROPONINI <0.006 03/28/2014    TROPONINI <0.006 02/15/2014    TROPONINI <0.006 02/09/2014       Assessment and Plan:   Brenda Cabral is a 60 y.o.  female  who presents with anxiety, chest pain and uncontrolled hypertension    Chest pain likely secondary to uncontrolled hypertension  Initial Troponin negative  EKG with no ST/T changes  Trend troponin  Telemetry monitoring  Admit for observation   N.p.o. for now  Cardiology consult     Hypertension-uncontrolled  Restart home medications  Monitor blood pressure    Type 2 diabetes  Continue home degludec  Sliding scale insulin  Hypoglycemia precautions    Diabetic neuropathy  Continue pain medications     Restless leg syndrome/Parkinson  Continue Sinemet     Asthma  Albuterol as needed   Continue Singulair    History of seizures  Continue Keppra    Hypothyroidism  Continue Synthroid    Diet No diet orders on file   DVT Prophylaxis [x] Lovenox, []  Heparin, [] SCDs, [] Ambulation   GI Prophylaxis [x] PPI,  [] H2 Blocker,  [] Carafate,  [] Diet/Tube Feeds   Code Status Full code   Disposition  likely to be discharged today if okay with cardiology   MDM [] Low, [x] Moderate,[]  High       Electronically signed by Malik Maciel MD on 7/13/2020 at 2:16 AM

## 2020-07-13 NOTE — CARE COORDINATION
Destiney Duran was evaluated today and a DME order was entered for a wheeled walker with seat because she requires this to successfully complete daily living tasks of eating, bathing, toileting, personal cares, ambulating, grooming, hygiene, dressing upper body, dressing lower body, meal preparation and taking own medications. A wheeled walker with seat is necessary due to the patient's unsteady gait, upper body weakness, inability to  and ambulation device, ambulating only short distances by pushing a walker, and the need to sit for a short time before resuming ambulation. These tasks cannot be completed with a lesser ambulation device such as a cane, crutch, or standard walker. The need for this equipment was discussed with the patient and she understands and is in agreement.

## 2020-07-13 NOTE — CARE COORDINATION
Chart reviewed, in to see pt for dc planning. Introduced self and board updated. Pt was admitted for CP. States she lives in [facility] alone and uses her walker with a seat though it is broken and she has had it for nearly 10 years. States she has [employer] Touch C and intends to return home at discharge. Explained she follows with PCP, has insurance with affordable RX co-pays and reliable transportation. Spoke with Dr. Lonne Councilman who provided permission for walker orders. CM remains available if needs arise.

## 2020-07-13 NOTE — PROGRESS NOTES
Patient's daughter called to speak to charge nurse. Stated the patient called her upset stating we having been giving her anything to eat and she is a diabetic and we are not giving her medications to her besides a vitamin. Explained that patient had just arrived to floor within the hour. Patient was also NPO for cardiac consult in the event the patient would need a cardiac intervention. Daughter also asked about blood glucose being checked. Daughter informed that we can treat glucose with IV medications if needed. Patient also triggered BPA for NPO status based on patient stating she was having difficulty swallowing, dtr stated this is an ongoing problem. Also informed daughter that the Patient's primary nurse would be in to speak to patient as well as checking blood glucose. Patient's glucose 168 and is now requesting to speak with this nurse.

## 2020-07-14 LAB
GLUCOSE BLD-MCNC: 138 MG/DL (ref 70–99)
GLUCOSE BLD-MCNC: 153 MG/DL (ref 70–99)
GLUCOSE BLD-MCNC: 158 MG/DL (ref 70–99)
GLUCOSE BLD-MCNC: 168 MG/DL (ref 70–99)
GLUCOSE BLD-MCNC: 246 MG/DL (ref 70–99)

## 2020-07-14 PROCEDURE — 2700000000 HC OXYGEN THERAPY PER DAY

## 2020-07-14 PROCEDURE — 96376 TX/PRO/DX INJ SAME DRUG ADON: CPT

## 2020-07-14 PROCEDURE — 96367 TX/PROPH/DG ADDL SEQ IV INF: CPT

## 2020-07-14 PROCEDURE — 99232 SBSQ HOSP IP/OBS MODERATE 35: CPT | Performed by: NURSE PRACTITIONER

## 2020-07-14 PROCEDURE — 6370000000 HC RX 637 (ALT 250 FOR IP): Performed by: INTERNAL MEDICINE

## 2020-07-14 PROCEDURE — 94761 N-INVAS EAR/PLS OXIMETRY MLT: CPT

## 2020-07-14 PROCEDURE — 6360000002 HC RX W HCPCS: Performed by: INTERNAL MEDICINE

## 2020-07-14 PROCEDURE — 96372 THER/PROPH/DIAG INJ SC/IM: CPT

## 2020-07-14 PROCEDURE — 82962 GLUCOSE BLOOD TEST: CPT

## 2020-07-14 PROCEDURE — G0378 HOSPITAL OBSERVATION PER HR: HCPCS

## 2020-07-14 PROCEDURE — 2580000003 HC RX 258: Performed by: INTERNAL MEDICINE

## 2020-07-14 RX ORDER — CARVEDILOL 6.25 MG/1
12.5 TABLET ORAL 2 TIMES DAILY WITH MEALS
Qty: 60 TABLET | Refills: 2 | Status: CANCELLED | OUTPATIENT
Start: 2020-07-14

## 2020-07-14 RX ORDER — INSULIN GLARGINE 100 [IU]/ML
60 INJECTION, SOLUTION SUBCUTANEOUS NIGHTLY
Status: DISCONTINUED | OUTPATIENT
Start: 2020-07-14 | End: 2020-07-15 | Stop reason: HOSPADM

## 2020-07-14 RX ORDER — NIFEDIPINE 30 MG/1
30 TABLET, EXTENDED RELEASE ORAL DAILY
Status: DISCONTINUED | OUTPATIENT
Start: 2020-07-14 | End: 2020-07-15

## 2020-07-14 RX ORDER — LOSARTAN POTASSIUM 100 MG/1
100 TABLET ORAL DAILY
Qty: 30 TABLET | Refills: 3 | Status: CANCELLED | OUTPATIENT
Start: 2020-07-14

## 2020-07-14 RX ORDER — CARVEDILOL 25 MG/1
25 TABLET ORAL 2 TIMES DAILY WITH MEALS
Status: DISCONTINUED | OUTPATIENT
Start: 2020-07-14 | End: 2020-07-14

## 2020-07-14 RX ORDER — CARVEDILOL 12.5 MG/1
12.5 TABLET ORAL 2 TIMES DAILY WITH MEALS
Status: DISCONTINUED | OUTPATIENT
Start: 2020-07-14 | End: 2020-07-15 | Stop reason: HOSPADM

## 2020-07-14 RX ADMIN — BUSPIRONE HYDROCHLORIDE 15 MG: 15 TABLET ORAL at 20:52

## 2020-07-14 RX ADMIN — CARBIDOPA AND LEVODOPA 1 TABLET: 10; 100 TABLET ORAL at 20:51

## 2020-07-14 RX ADMIN — ALLOPURINOL 300 MG: 300 TABLET ORAL at 09:18

## 2020-07-14 RX ADMIN — INSULIN LISPRO 2 UNITS: 100 INJECTION, SOLUTION INTRAVENOUS; SUBCUTANEOUS at 02:30

## 2020-07-14 RX ADMIN — LEVETIRACETAM 750 MG: 500 TABLET, FILM COATED ORAL at 20:51

## 2020-07-14 RX ADMIN — HYDRALAZINE HYDROCHLORIDE 10 MG: 20 INJECTION INTRAMUSCULAR; INTRAVENOUS at 18:30

## 2020-07-14 RX ADMIN — LORAZEPAM 0.5 MG: 2 INJECTION INTRAMUSCULAR; INTRAVENOUS at 19:01

## 2020-07-14 RX ADMIN — SODIUM CHLORIDE, PRESERVATIVE FREE 10 ML: 5 INJECTION INTRAVENOUS at 09:18

## 2020-07-14 RX ADMIN — ASPIRIN 81 MG CHEWABLE TABLET 81 MG: 81 TABLET CHEWABLE at 09:18

## 2020-07-14 RX ADMIN — ENOXAPARIN SODIUM 40 MG: 40 INJECTION SUBCUTANEOUS at 09:18

## 2020-07-14 RX ADMIN — MONTELUKAST 10 MG: 10 TABLET, FILM COATED ORAL at 20:52

## 2020-07-14 RX ADMIN — MULTIPLE VITAMINS W/ MINERALS TAB 1 TABLET: TAB at 09:18

## 2020-07-14 RX ADMIN — DOCUSATE SODIUM 100 MG: 100 CAPSULE, LIQUID FILLED ORAL at 09:17

## 2020-07-14 RX ADMIN — AMITRIPTYLINE HYDROCHLORIDE 25 MG: 25 TABLET, FILM COATED ORAL at 20:52

## 2020-07-14 RX ADMIN — LEVOTHYROXINE SODIUM 25 MCG: 25 TABLET ORAL at 06:10

## 2020-07-14 RX ADMIN — HYDROCODONE BITARTRATE AND ACETAMINOPHEN 1 TABLET: 7.5; 325 TABLET ORAL at 14:30

## 2020-07-14 RX ADMIN — CARVEDILOL 12.5 MG: 12.5 TABLET, FILM COATED ORAL at 17:31

## 2020-07-14 RX ADMIN — DIPHENHYDRAMINE HYDROCHLORIDE 25 MG: 25 TABLET ORAL at 22:45

## 2020-07-14 RX ADMIN — ONDANSETRON 4 MG: 2 INJECTION INTRAMUSCULAR; INTRAVENOUS at 18:36

## 2020-07-14 RX ADMIN — DOCUSATE SODIUM 100 MG: 100 CAPSULE, LIQUID FILLED ORAL at 20:52

## 2020-07-14 RX ADMIN — PANTOPRAZOLE SODIUM 40 MG: 40 TABLET, DELAYED RELEASE ORAL at 06:10

## 2020-07-14 RX ADMIN — Medication 400 MG: at 09:18

## 2020-07-14 RX ADMIN — HYDROCHLOROTHIAZIDE 12.5 MG: 12.5 TABLET ORAL at 09:17

## 2020-07-14 RX ADMIN — BUSPIRONE HYDROCHLORIDE 15 MG: 15 TABLET ORAL at 14:30

## 2020-07-14 RX ADMIN — DIPHENHYDRAMINE HYDROCHLORIDE 25 MG: 25 TABLET ORAL at 06:09

## 2020-07-14 RX ADMIN — HYDROCODONE BITARTRATE AND ACETAMINOPHEN 1 TABLET: 7.5; 325 TABLET ORAL at 06:09

## 2020-07-14 RX ADMIN — INSULIN LISPRO 1 UNITS: 100 INJECTION, SOLUTION INTRAVENOUS; SUBCUTANEOUS at 01:00

## 2020-07-14 RX ADMIN — DIPHENHYDRAMINE HYDROCHLORIDE 25 MG: 25 TABLET ORAL at 14:30

## 2020-07-14 RX ADMIN — SIMVASTATIN 20 MG: 20 TABLET, FILM COATED ORAL at 20:52

## 2020-07-14 RX ADMIN — SODIUM CHLORIDE, PRESERVATIVE FREE 10 ML: 5 INJECTION INTRAVENOUS at 20:53

## 2020-07-14 RX ADMIN — CARVEDILOL 12.5 MG: 12.5 TABLET, FILM COATED ORAL at 09:17

## 2020-07-14 RX ADMIN — HYDROCODONE BITARTRATE AND ACETAMINOPHEN 1 TABLET: 7.5; 325 TABLET ORAL at 20:52

## 2020-07-14 RX ADMIN — INSULIN GLARGINE 60 UNITS: 100 INJECTION, SOLUTION SUBCUTANEOUS at 20:51

## 2020-07-14 RX ADMIN — LOSARTAN POTASSIUM 100 MG: 100 TABLET, FILM COATED ORAL at 09:18

## 2020-07-14 RX ADMIN — NIFEDIPINE 30 MG: 30 TABLET, FILM COATED, EXTENDED RELEASE ORAL at 13:38

## 2020-07-14 RX ADMIN — LORAZEPAM 0.5 MG: 2 INJECTION INTRAMUSCULAR; INTRAVENOUS at 22:52

## 2020-07-14 RX ADMIN — QUETIAPINE FUMARATE 25 MG: 25 TABLET ORAL at 17:31

## 2020-07-14 RX ADMIN — BUSPIRONE HYDROCHLORIDE 15 MG: 15 TABLET ORAL at 09:18

## 2020-07-14 ASSESSMENT — PAIN SCALES - GENERAL
PAINLEVEL_OUTOF10: 8
PAINLEVEL_OUTOF10: 0
PAINLEVEL_OUTOF10: 3
PAINLEVEL_OUTOF10: 7
PAINLEVEL_OUTOF10: 5
PAINLEVEL_OUTOF10: 5

## 2020-07-14 ASSESSMENT — PAIN DESCRIPTION - ONSET
ONSET: ON-GOING
ONSET: ON-GOING

## 2020-07-14 ASSESSMENT — PAIN DESCRIPTION - DESCRIPTORS
DESCRIPTORS: ACHING;BURNING
DESCRIPTORS: BURNING

## 2020-07-14 ASSESSMENT — PAIN DESCRIPTION - LOCATION
LOCATION: GENERALIZED
LOCATION: FOOT

## 2020-07-14 ASSESSMENT — PAIN - FUNCTIONAL ASSESSMENT: PAIN_FUNCTIONAL_ASSESSMENT: ACTIVITIES ARE NOT PREVENTED

## 2020-07-14 ASSESSMENT — PAIN DESCRIPTION - PROGRESSION: CLINICAL_PROGRESSION: NOT CHANGED

## 2020-07-14 ASSESSMENT — PAIN DESCRIPTION - FREQUENCY
FREQUENCY: INTERMITTENT
FREQUENCY: CONTINUOUS

## 2020-07-14 ASSESSMENT — PAIN DESCRIPTION - PAIN TYPE
TYPE: CHRONIC PAIN
TYPE: CHRONIC PAIN

## 2020-07-14 ASSESSMENT — PAIN DESCRIPTION - ORIENTATION
ORIENTATION: RIGHT;LEFT
ORIENTATION: RIGHT;LEFT
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
## 2020-07-14 NOTE — PROGRESS NOTES
Admit Date:  7/12/2020    Admission diagnosis / Complaint : Chest pain      Subjective:  Ms. Santiago Joyce states that she is feeling much better today. Chest pain has resolved. Objective:   BP (!) 166/99   Pulse 75   Temp 98.2 °F (36.8 °C) (Oral)   Resp 21   Ht 5' 2\" (1.575 m)   Wt 166 lb 4.8 oz (75.4 kg)   SpO2 97%   BMI 30.42 kg/m²       Intake/Output Summary (Last 24 hours) at 7/14/2020 1445  Last data filed at 7/13/2020 2126  Gross per 24 hour   Intake 370 ml   Output --   Net 370 ml       TELEMETRY: Sinus    has a past medical history of Abnormal EKG, Acid reflux, Anemia, Anesthesia, Anginal pain (HCC), Anxiety, Arthritis, Asthma, CAD (coronary artery disease), Cerebral artery occlusion with cerebral infarction ([address] CHF (congestive heart failure) ([address] Chronic back pain, Chronic kidney disease, DDD (degenerative disc disease), cervical, Depression, Diabetes mellitus ([address] Diabetic neuropathy ([address] Dizziness, Dry skin, Enlarged ureter, Fatty liver, Fibrocystic breast, Gout, H/O cardiac catheterization, H/O cardiovascular stress test, H/O cardiovascular stress test, H/O cardiovascular stress test, H/O Doppler ultrasound, H/O echocardiogram, H/O echocardiogram, History of Holter monitoring, [place] (hard of hearing), Hx of cardiovascular stress test, Hx of motion sickness, HX OTHER MEDICAL, Hyperlipidemia, Hypertension, IBS (irritable bowel syndrome), Incisional hernia, Kidney stones, Migraines, Nausea & vomiting, Other specified disorder of skin, Panic attacks, Pneumonia, Pseudoseizures, Restless leg, Shortness of breath, Sleep apnea, Staph infection, Thyroid disease, Tremor, Urinary incontinence, UTI (urinary tract infection), UTI (urinary tract infection), Vertigo, and Wears glasses. has a past surgical history that includes Carpal tunnel release (Right, 1999); Diagnostic Cardiac Cath Lab Procedure (01/11/2010); Dental surgery; Colonoscopy (Last Done 6-13);  Endoscopy, colon, diagnostic (Several ); glycol, ipratropium-albuterol, albuterol sulfate HFA, tiZANidine, glucose, dextrose, glucagon (rDNA), dextrose, sodium chloride flush, [DISCONTINUED] promethazine **OR** ondansetron, aluminum & magnesium hydroxide-simethicone, metoclopramide, LORazepam, hydrALAZINE (APRESOLINE) ivpb, diphenhydrAMINE    Lab Data:  CBC:   Recent Labs     07/12/20  2320   WBC 10.7*   HGB 12.7   HCT 37.2   MCV 85.3        BMP:   Recent Labs     07/12/20  2320      K 4.2   CL 98*   CO2 25   BUN 13   CREATININE 0.7     LIVER PROFILE:   Recent Labs     07/12/20  2320   AST 36   ALT 23   BILITOT 0.4   ALKPHOS 61     PT/INR: No results for input(s): PROTIME, INR in the last 72 hours. APTT: No results for input(s): APTT in the last 72 hours. BNP:  No results for input(s): BNP in the last 72 hours. TROPONIN: @TROPONINI:3@      Assessment:  Chest pain  Hypertensive urgency  Anxiety  Diabetes mellitus type 2      Denies any further chest pain-states she is feeling much better today  EKG did not show any acute ischemic changes  Troponins negative  Stress test 2017 reviewed showing abnormal at that point point she had a heart catheterization normal coronaries-   She has LVH findings per EKG and echocardiogram-we will hold on stress test at this time as most likely will have a false positive stress test    Chest pain probably from hypertensive urgency  Recommend good blood pressure control tolerating increased dose of carvedilol  Will continue with losartan and hydrochlorothiazide    We will sign off at this time and follow-up as an outpatient      WENDIE Arita - CNP, 7/14/2020 2:45 PM     Please note this report has been partially produced using speech recognition software and may contain errors related to that system including errors in grammar, punctuation, and spelling, as well as words and phrases that may be inappropriate.  If there are any questions or concerns please feel free to contact the dictating provider for clarification.

## 2020-07-14 NOTE — PROGRESS NOTES
Progress Note( Dr. Rosie Lucero)  7/14/2020  Subjective:   Admit Date: 7/12/2020  PCP: Irlanda Matson MD    Admitted For : Chest pain and severe hypertension    Consulted For: Better control of blood glucose      Chest pain d resolved with control of blood pressure  Denies SOB . Denies nausea or vomiting. No new bowel or bladder symptoms. Intake/Output Summary (Last 24 hours) at 7/14/2020 0651  Last data filed at 7/13/2020 2126  Gross per 24 hour   Intake 730 ml   Output --   Net 730 ml       DATA    CBC:   Recent Labs     07/12/20  2320   WBC 10.7*   HGB 12.7       CMP:  Recent Labs     07/12/20  2320      K 4.2   CL 98*   CO2 25   BUN 13   CREATININE 0.7   CALCIUM 9.8   PROT 7.3   LABALBU 4.1   BILITOT 0.4   ALKPHOS 61   AST 36   ALT 23     Lipids:   Lab Results   Component Value Date    CHOL 135 06/28/2020    HDL 58 06/28/2020    TRIG 263 06/28/2020     Glucose:  Recent Labs     07/13/20  1640 07/13/20  2057 07/14/20  0226   POCGLU 176* 210* 246*     YilfvctusfW7C:  Lab Results   Component Value Date    LABA1C 7.8 06/27/2020     High Sensitivity TSH:   Lab Results   Component Value Date    TSHHS 4.000 06/27/2020     Free T3:   Lab Results   Component Value Date    FT3 2.2 07/15/2016     Free T4:  Lab Results   Component Value Date    T4FREE 1.14 02/23/2019       Xr Chest Portable    Result Date: 7/13/2020  EXAMINATION: ONE XRAY VIEW OF THE CHEST 7/13/2020 12:02 am COMPARISON: 06/27/2020 HISTORY: ORDERING SYSTEM PROVIDED HISTORY: chest pain TECHNOLOGIST PROVIDED HISTORY: Reason for exam:->chest pain Reason for Exam: chest pain Acuity: Unknown Type of Exam: Initial FINDINGS: Cardiac and mediastinal silhouettes appear within normal limits for size. Pulmonary vascularity is normal.  No parenchymal consolidation or pleural effusion is identified. No acute cardiopulmonary process. No acute osseous abnormality is identified. Mild degenerative change in the shoulders and spine.   Minimal left basilar atelectasis. Minimal left basilar atelectasis, otherwise stable chest.       Scheduled Medicines   Medications:    insulin glargine  60 Units Subcutaneous Nightly    insulin lispro  15 Units Subcutaneous TID WC    simvastatin  20 mg Oral Nightly    montelukast  10 mg Oral Nightly    magnesium oxide  400 mg Oral Daily    allopurinol  300 mg Oral Daily    amitriptyline  25 mg Oral Nightly    aspirin  81 mg Oral Daily    carbidopa-levodopa  1 tablet Oral Nightly    pantoprazole  40 mg Oral Daily    levETIRAcetam  750 mg Oral Nightly    QUEtiapine  25 mg Oral QPM    docusate sodium  100 mg Oral BID    guaiFENesin  600 mg Oral BID    tiotropium  1 puff Inhalation Daily    levothyroxine  25 mcg Oral QAM    sodium chloride flush  10 mL Intravenous 2 times per day    enoxaparin  40 mg Subcutaneous Daily    losartan  100 mg Oral Daily    And    hydrochlorothiazide  12.5 mg Oral Daily    morphine  2 mg Intravenous Once    therapeutic multivitamin-minerals  1 tablet Oral Daily    HYDROcodone-acetaminophen  1 tablet Oral 3 times per day    busPIRone  15 mg Oral TID    insulin lispro  0-12 Units Subcutaneous TID WC    carvedilol  12.5 mg Oral BID WC    insulin lispro  0-6 Units Subcutaneous 2 times per day      Infusions:    dextrose           Objective:   Vitals: BP (!) 128/56   Pulse 71   Temp 98.3 °F (36.8 °C) (Oral)   Resp 16   Ht 5' 2\" (1.575 m)   Wt 166 lb 4.8 oz (75.4 kg)   SpO2 97%   BMI 30.42 kg/m²   General appearance: alert and cooperative with exam  Neck: no JVD or bruit  Thyroid : Normal lobes   Lungs: Has Vesicular Breath sounds   Heart:  regular rate and rhythm  Abdomen: soft, non-tender; bowel sounds normal; no masses,  no organomegaly  Musculoskeletal: Normal  Extremities: extremities normal, , no edema  Neurologic:  Awake, alert, oriented to name, place and time. Cranial nerves II-XII are grossly intact. Motor is  intact. Sensory is intact. ,  and gait is normal.    Assessment:     Patient Active Problem List:     Chest pain     H/O Doppler ultrasound     H/O cardiovascular stress test     H/O cardiovascular stress test     H/O cardiovascular stress test     Incarcerated umbilical hernia     Essential hypertension     Type 2 diabetes mellitus with diabetic polyneuropathy ([facility])     Tachycardia     Dyslipidemia     Type 2 diabetes mellitus without complication, with long-term current use of insulin ([facility])     Family history of early CAD     NSTEMI (non-ST elevated myocardial infarction) ([facility])     Abnormal nuclear stress test     ILSA (obstructive sleep apnea)     Snoring     Hypersomnolence     Mild intermittent asthma without complication     Asthma exacerbation attacks      Plan:     1. Reviewed POC blood glucose . Labs and X ray results   2. Reviewed Current Medicines   3. On meal/ Correction bolus Humalog/ Basal Lantus Insulin regime / and Oral Hypoglycemic drugs   4. Monitor Blood glucose frequently   5. Modified  the dose of Insulin/ other medicines as needed   6. Will follow     .      Jose Granado MD

## 2020-07-14 NOTE — PROGRESS NOTES
Hospitalist Progress Note      Name:  Raz Quezada /Age/Sex: 1955  (60 y.o. female)   MRN & CSN:  [id number] & [id number] Admission Date/Time: 2020 10:43 PM   Location:  [address] [place] [address]-A PCP: Neo Hernandez MD         Hospital Day: 3    ASSESSMENT & PLAN:   Raz Quezada is a 60 y.o.  female  who presented with a complaint of chest pain and elevated blood pressures. Disposition: Home with home health care  BP fluctuating. Patient to remain in the hospital overnight to see if current regimen controls BP steadily    Hypertensive emergency---- /99 on arrival to the ED. With associated chest pain/chest discomfort. Initial troponins and EKG normal.  Creatinine normal.  Recently discharged from the hospital after getting admitted for elevated blood pressures. Had been taking metoprolol and Coreg along with Norvasc and losartan. Stated blood pressures got lower and Norvasc subsequently discontinued. She has been initially treated with nicardipine drip but has been seen and started off.  -Nicardipine 30 mg as of   -I continue Coreg to 12.5 twice daily, losartan 100 mg daily, hydrochlorothiazide 12.5 daily  -Hydralazine PRN    Chest pain---likely secondary to uncontrolled blood pressure. Initial troponins and EKG negative. No further work-up. DM2--- on Tresiba 60 units at bedtime, NovoLog 20 units with lunch and breakfast and 25 units with dinner along with sliding scale insulin. A1c 7.8 on 20.   Blood glucose so far between 100-170.  -Lantus 60 units at bedtime  -Sliding scale insulin  -Hypoglycemic protocol    Peripheral diabetic neuropathy--- on Elavil    Depression/anxiety--- on Seroquel and BuSpar    Epilepsy---on Keppra    Restless leg syndrome/Parkinson disease---on Sinemet    Hypothyroidism---on levothyroxine    Obesity---weight 174 pounds, BMI 31.83    MEDICAL DECISION MAKING:  -Labs reviewed  -Imaging reviewed  -Level of risk moderate  Diet DIET CARB CONTROL;   DVT Prophylaxis [x] Lovenox, []  Heparin, [] SCDs, [] Ambulation   GI Prophylaxis [] PPI,  [] H2 Blocker,  [] Carafate,  [] Diet/Tube Feeds   Code Status Full Code   Disposition  Home   MDM [] Low, [x] Moderate,[]  High     Chief complaint/Interval History/ROS     Chief Complaint: Chest pain, elevated blood pressure      INTERVAL HISTORY: Nicardipine started today. Blood pressure fluctuating. Patient remained hospital overnight. ROS:  Chest pain. No abdominal pain. No nausea. No vomiting. No fevers or chills. Objective: Intake/Output Summary (Last 24 hours) at 7/14/2020 1201  Last data filed at 7/13/2020 2126  Gross per 24 hour   Intake 370 ml   Output --   Net 370 ml      Vitals:   Vitals:    07/14/20 0900   BP: (!) 166/93   Pulse: 89   Resp: 17   Temp: 98.1 °F (36.7 °C)   SpO2: 97%     Physical Exam:   GEN: Awake female, sitting upright at bedside. Eating breakfast.  Nontoxic-appearing. EYES: No eye discharge. HENT: Mucous membranes are moist.  Edentulous. NECK: Supple,  RESP: No wheezing. No crackles. Symmetric breath sounds. CV: RRR. No pitting lower extremity edema. GI: Abdomen soft. Nondistended. :  Mercer catheter is not present. MSK: No bony fractures. No gross deformities. SKIN: warm, dry, no rashes,   NEURO: Cranial nerves appear grossly intact, normal speech, no lateralizing weakness.   PSYCH: Awake, alert, oriented     Medications:   Medications:    insulin glargine  60 Units Subcutaneous Nightly    insulin lispro  15 Units Subcutaneous TID     NIFEdipine  30 mg Oral Daily    carvedilol  12.5 mg Oral BID WC    simvastatin  20 mg Oral Nightly    montelukast  10 mg Oral Nightly    magnesium oxide  400 mg Oral Daily    allopurinol  300 mg Oral Daily    amitriptyline  25 mg Oral Nightly    aspirin  81 mg Oral Daily    carbidopa-levodopa  1 tablet Oral Nightly    pantoprazole  40 mg Oral Daily    levETIRAcetam  750 mg Oral Nightly    QUEtiapine  25 mg Oral QPM    docusate sodium  100 mg Oral BID    guaiFENesin  600 mg Oral BID    tiotropium  1 puff Inhalation Daily    levothyroxine  25 mcg Oral QAM    sodium chloride flush  10 mL Intravenous 2 times per day    enoxaparin  40 mg Subcutaneous Daily    losartan  100 mg Oral Daily    And    hydrochlorothiazide  12.5 mg Oral Daily    morphine  2 mg Intravenous Once    therapeutic multivitamin-minerals  1 tablet Oral Daily    HYDROcodone-acetaminophen  1 tablet Oral 3 times per day    busPIRone  15 mg Oral TID    insulin lispro  0-12 Units Subcutaneous TID WC    insulin lispro  0-6 Units Subcutaneous 2 times per day      Infusions:    dextrose       PRN Meds: polyethylene glycol, 17 g, Daily PRN  ipratropium-albuterol, 3 mL, Q4H PRN  albuterol sulfate HFA, 2 puff, Q6H PRN  tiZANidine, 2 mg, Nightly PRN  glucose, 15 g, PRN  dextrose, 12.5 g, PRN  glucagon (rDNA), 1 mg, PRN  dextrose, 100 mL/hr, PRN  sodium chloride flush, 10 mL, PRN  ondansetron, 4 mg, Q6H PRN  aluminum & magnesium hydroxide-simethicone, 30 mL, Q6H PRN  metoclopramide, 10 mg, Q6H PRN  LORazepam, 0.5 mg, Q4H PRN  hydrALAZINE (APRESOLINE) ivpb, 10 mg, Q4H PRN  diphenhydrAMINE, 25 mg, Q8H PRN          Electronically signed by Jesus Manuel Flores MD on 7/14/2020 at 12:01 PM

## 2020-07-14 NOTE — PLAN OF CARE
Problem: Falls - Risk of:  Goal: Will remain free from falls  Description: Will remain free from falls  7/14/2020 1547 by Mckinley Vazquez RN  Outcome: Ongoing  7/14/2020 0333 by Harman Coburn RN  Outcome: Ongoing  Goal: Absence of physical injury  Description: Absence of physical injury  7/14/2020 1547 by Mckinley Vazquez RN  Outcome: Ongoing  7/14/2020 0333 by Harman Coburn RN  Outcome: Ongoing     Problem: Pain:  Goal: Pain level will decrease  Description: Pain level will decrease  7/14/2020 1547 by Mckinley Vazquez RN  Outcome: Ongoing  7/14/2020 0333 by Harman Coburn RN  Outcome: Ongoing  Goal: Control of acute pain  Description: Control of acute pain  7/14/2020 1547 by Mckinley Vazquez RN  Outcome: Ongoing  7/14/2020 0333 by Harman Coburn RN  Outcome: Ongoing  Goal: Control of chronic pain  Description: Control of chronic pain  7/14/2020 1547 by Mckinley Vazquez RN  Outcome: Ongoing  7/14/2020 0333 by Harman Coburn RN  Outcome: Ongoing     Problem: Cardiac:  Goal: Ability to maintain vital signs within normal range will improve  Description: Ability to maintain vital signs within normal range will improve  7/14/2020 1547 by Mckinley Vazquez RN  Outcome: Ongoing  7/14/2020 0333 by Harman Coburn RN  Outcome: Ongoing  Goal: Cardiovascular alteration will improve  Description: Cardiovascular alteration will improve  7/14/2020 1547 by Mckinley Vazquez RN  Outcome: Ongoing  7/14/2020 0333 by Harman Coburn RN  Outcome: Ongoing     Problem: Health Behavior:  Goal: Will modify at least one risk factor affecting health status  Description: Will modify at least one risk factor affecting health status  7/14/2020 1547 by Mckinley Vazquez RN  Outcome: Ongoing  7/14/2020 0333 by Harman Coburn RN  Outcome: Ongoing     Problem: Physical Regulation:  Goal: Complications related to the disease process, condition or treatment will be avoided or minimized  Description: Complications related to the disease process, condition or treatment will be avoided or minimized  7/14/2020 1547 by Silvino Castellanos RN  Outcome: Ongoing  7/14/2020 0333 by Jos Whitehead RN  Outcome: Ongoing     Problem: KNOWLEDGE DEFICIT  Goal: Patient/S.O. demonstrates understanding of disease process, treatment plan, medications, and discharge instructions.   7/14/2020 1547 by Silvino Castellanos RN  Outcome: Ongoing  7/14/2020 0333 by Jos Whitehead RN  Outcome: Ongoing     Problem: DISCHARGE BARRIERS  Goal: Patient's continuum of care needs are met  7/14/2020 1547 by Silvino Castellanos RN  Outcome: Ongoing  7/14/2020 0333 by Jos Whitehead RN  Outcome: Ongoing     Problem: Anxiety:  Goal: Level of anxiety will decrease  Description: Level of anxiety will decrease  7/14/2020 1547 by Silvino Castellanos RN  Outcome: Ongoing  7/14/2020 0333 by Jos Whitehead RN  Outcome: Ongoing     Problem: Skin Integrity:  Goal: Will show no infection signs and symptoms  Description: Will show no infection signs and symptoms  7/14/2020 1547 by Silvino Castellanos RN  Outcome: Ongoing  7/14/2020 0333 by Jos Whitehead RN  Outcome: Ongoing  Goal: Absence of new skin breakdown  Description: Absence of new skin breakdown  7/14/2020 1547 by Silvino Castellanos RN  Outcome: Ongoing  7/14/2020 0333 by Jos Whitehead RN  Outcome: Ongoing

## 2020-07-14 NOTE — PLAN OF CARE
Problem: Falls - Risk of:  Goal: Will remain free from falls  Description: Will remain free from falls  7/14/2020 0333 by Mike Sanchez RN  Outcome: Ongoing  7/13/2020 2023 by [facility]. Vanessa Harris RN  Outcome: Ongoing  Goal: Absence of physical injury  Description: Absence of physical injury  7/14/2020 0333 by Mike Sanchez RN  Outcome: Ongoing  7/13/2020 2023 by [facility]. Vanessa Harris RN  Outcome: Ongoing     Problem: Pain:  Description: Pain management should include both nonpharmacologic and pharmacologic interventions. Goal: Pain level will decrease  Description: Pain level will decrease  7/14/2020 0333 by Mike Sanchez RN  Outcome: Ongoing  7/13/2020 2023 by [facility]. Vanessa Harris RN  Outcome: Ongoing  Goal: Control of acute pain  Description: Control of acute pain  7/14/2020 0333 by Mike Sanchez RN  Outcome: Ongoing  7/13/2020 2023 by [facility]. Vanessa Harris RN  Outcome: Ongoing  Goal: Control of chronic pain  Description: Control of chronic pain  7/14/2020 0333 by Mike Sanchez RN  Outcome: Ongoing  7/13/2020 2023 by [facility]. Vanessa Harris RN  Outcome: Ongoing     Problem: Cardiac:  Goal: Ability to maintain vital signs within normal range will improve  Description: Ability to maintain vital signs within normal range will improve  7/14/2020 0333 by Mike Sanchez RN  Outcome: Ongoing  7/13/2020 2023 by [facility]. Vanessa Harris RN  Outcome: Ongoing  Goal: Cardiovascular alteration will improve  Description: Cardiovascular alteration will improve  7/14/2020 0333 by Mike Sanchez RN  Outcome: Ongoing  7/13/2020 2023 by [facility]. Vanessa Harris RN  Outcome: Ongoing     Problem: Health Behavior:  Goal: Will modify at least one risk factor affecting health status  Description: Will modify at least one risk factor affecting health status  7/14/2020 0333 by Mike Sanchez RN  Outcome: Ongoing  7/13/2020 2023 by [facility].  Vanessa Harris RN  Outcome: Ongoing     Problem: Physical Regulation:  Goal: Complications related to the disease process, condition or treatment will be avoided or minimized  Description: Complications related to the disease process, condition or treatment will be avoided or minimized  7/14/2020 0333 by Deepali Jo RN  Outcome: Ongoing  7/13/2020 2023 by Donice Heath. Orlin Goldmann, RN  Outcome: Ongoing     Problem: KNOWLEDGE DEFICIT  Goal: Patient/S.O. demonstrates understanding of disease process, treatment plan, medications, and discharge instructions. 7/14/2020 0333 by Deepali Jo RN  Outcome: Ongoing  7/13/2020 2023 by Donice Heath. Orlin Goldmann, RN  Outcome: Ongoing     Problem: DISCHARGE BARRIERS  Goal: Patient's continuum of care needs are met  7/14/2020 0333 by Deepali Jo RN  Outcome: Ongoing  7/13/2020 2023 by Donice Heath. Orlin Goldmann, RN  Outcome: Ongoing     Problem: Anxiety:  Goal: Level of anxiety will decrease  Description: Level of anxiety will decrease  7/14/2020 0333 by Deepali Jo RN  Outcome: Ongoing  7/13/2020 2023 by Donice Heath. Orlin Goldmann, RN  Outcome: Ongoing     Problem: Skin Integrity:  Goal: Will show no infection signs and symptoms  Description: Will show no infection signs and symptoms  Outcome: Ongoing  Goal: Absence of new skin breakdown  Description: Absence of new skin breakdown  Outcome: Ongoing

## 2020-07-15 VITALS
OXYGEN SATURATION: 95 % | HEART RATE: 82 BPM | SYSTOLIC BLOOD PRESSURE: 152 MMHG | WEIGHT: 166.3 LBS | BODY MASS INDEX: 30.6 KG/M2 | RESPIRATION RATE: 17 BRPM | TEMPERATURE: 98.3 F | DIASTOLIC BLOOD PRESSURE: 84 MMHG | HEIGHT: 62 IN

## 2020-07-15 PROBLEM — I16.1 HYPERTENSIVE EMERGENCY: Status: ACTIVE | Noted: 2020-07-15

## 2020-07-15 LAB
GLUCOSE BLD-MCNC: 113 MG/DL (ref 70–99)
GLUCOSE BLD-MCNC: 164 MG/DL (ref 70–99)
GLUCOSE BLD-MCNC: 173 MG/DL (ref 70–99)

## 2020-07-15 PROCEDURE — 6370000000 HC RX 637 (ALT 250 FOR IP): Performed by: INTERNAL MEDICINE

## 2020-07-15 PROCEDURE — G0378 HOSPITAL OBSERVATION PER HR: HCPCS

## 2020-07-15 PROCEDURE — 82962 GLUCOSE BLOOD TEST: CPT

## 2020-07-15 PROCEDURE — 6360000002 HC RX W HCPCS: Performed by: INTERNAL MEDICINE

## 2020-07-15 PROCEDURE — 2580000003 HC RX 258: Performed by: INTERNAL MEDICINE

## 2020-07-15 PROCEDURE — 94761 N-INVAS EAR/PLS OXIMETRY MLT: CPT

## 2020-07-15 PROCEDURE — 2700000000 HC OXYGEN THERAPY PER DAY

## 2020-07-15 PROCEDURE — 1200000000 HC SEMI PRIVATE

## 2020-07-15 RX ORDER — LOSARTAN POTASSIUM AND HYDROCHLOROTHIAZIDE 25; 100 MG/1; MG/1
1 TABLET ORAL DAILY
Qty: 30 TABLET | Refills: 0 | Status: SHIPPED | OUTPATIENT
Start: 2020-07-15 | End: 2020-08-11

## 2020-07-15 RX ORDER — NIFEDIPINE 60 MG/1
60 TABLET, FILM COATED, EXTENDED RELEASE ORAL DAILY
Qty: 30 TABLET | Refills: 2 | Status: SHIPPED | OUTPATIENT
Start: 2020-07-15 | End: 2020-08-11 | Stop reason: SDUPTHER

## 2020-07-15 RX ORDER — LOSARTAN POTASSIUM AND HYDROCHLOROTHIAZIDE 25; 100 MG/1; MG/1
1 TABLET ORAL DAILY
Qty: 30 TABLET | Refills: 2 | Status: ON HOLD
Start: 2020-07-15 | End: 2021-07-15 | Stop reason: HOSPADM

## 2020-07-15 RX ORDER — NIFEDIPINE 30 MG/1
60 TABLET, EXTENDED RELEASE ORAL DAILY
Status: DISCONTINUED | OUTPATIENT
Start: 2020-07-15 | End: 2020-07-15

## 2020-07-15 RX ORDER — NIFEDIPINE 30 MG/1
30 TABLET, EXTENDED RELEASE ORAL DAILY
Status: DISCONTINUED | OUTPATIENT
Start: 2020-07-15 | End: 2020-07-15 | Stop reason: HOSPADM

## 2020-07-15 RX ORDER — NIFEDIPINE 60 MG/1
60 TABLET, FILM COATED, EXTENDED RELEASE ORAL DAILY
Qty: 30 TABLET | Refills: 0 | Status: SHIPPED | OUTPATIENT
Start: 2020-07-16 | End: 2020-08-11

## 2020-07-15 RX ADMIN — PANTOPRAZOLE SODIUM 40 MG: 40 TABLET, DELAYED RELEASE ORAL at 06:39

## 2020-07-15 RX ADMIN — DOCUSATE SODIUM 100 MG: 100 CAPSULE, LIQUID FILLED ORAL at 09:58

## 2020-07-15 RX ADMIN — ALLOPURINOL 300 MG: 300 TABLET ORAL at 09:57

## 2020-07-15 RX ADMIN — GUAIFENESIN 600 MG: 600 TABLET, EXTENDED RELEASE ORAL at 09:57

## 2020-07-15 RX ADMIN — HYDROCODONE BITARTRATE AND ACETAMINOPHEN 1 TABLET: 7.5; 325 TABLET ORAL at 06:39

## 2020-07-15 RX ADMIN — Medication 400 MG: at 09:57

## 2020-07-15 RX ADMIN — LEVOTHYROXINE SODIUM 25 MCG: 25 TABLET ORAL at 06:39

## 2020-07-15 RX ADMIN — LOSARTAN POTASSIUM 100 MG: 100 TABLET, FILM COATED ORAL at 09:58

## 2020-07-15 RX ADMIN — ENOXAPARIN SODIUM 40 MG: 40 INJECTION SUBCUTANEOUS at 09:58

## 2020-07-15 RX ADMIN — HYDROCHLOROTHIAZIDE 12.5 MG: 12.5 TABLET ORAL at 09:58

## 2020-07-15 RX ADMIN — ASPIRIN 81 MG CHEWABLE TABLET 81 MG: 81 TABLET CHEWABLE at 09:57

## 2020-07-15 RX ADMIN — SODIUM CHLORIDE, PRESERVATIVE FREE 10 ML: 5 INJECTION INTRAVENOUS at 09:59

## 2020-07-15 RX ADMIN — NIFEDIPINE 30 MG: 30 TABLET, FILM COATED, EXTENDED RELEASE ORAL at 09:57

## 2020-07-15 RX ADMIN — BUSPIRONE HYDROCHLORIDE 15 MG: 15 TABLET ORAL at 09:58

## 2020-07-15 RX ADMIN — MULTIPLE VITAMINS W/ MINERALS TAB 1 TABLET: TAB at 09:57

## 2020-07-15 RX ADMIN — DIPHENHYDRAMINE HYDROCHLORIDE 25 MG: 25 TABLET ORAL at 06:39

## 2020-07-15 ASSESSMENT — PAIN SCALES - GENERAL
PAINLEVEL_OUTOF10: 7
PAINLEVEL_OUTOF10: 0

## 2020-07-15 NOTE — PROGRESS NOTES
CLINICAL PHARMACY NOTE: MEDS TO [address] Select Patient?: No  Total # of Prescriptions Filled: 2   The following medications were delivered to the patient:  · Nifedipine Er 60mg  · Losartan/hctz 100/25mg  Total # of Interventions Completed: 0  Time Spent (min): 15    Additional Documentation:

## 2020-07-15 NOTE — DISCHARGE SUMMARY
Discharge Summary    Name:  Lamberto Madison /Age/Sex: 1955  (63 y.o. female)   MRN & CSN:  [id number] & [id number] Admission Date/Time: 2020 10:43 PM   Attending:  Alea Cason MD Discharging Physician: Alea Cason MD     Hospital Course:   Lamberto Madison is a 60 y.o.  female  who presented with a complaint of chest pain and elevated blood pressures. HOSPITAL COURSE:    Hypertensive emergency/accelerated hypertension---- /99 on arrival to the ED. With associated chest pain/chest discomfort. Initial troponins and EKG normal.  Creatinine normal.  Recently discharged from the hospital after getting admitted for elevated blood pressures. Had been taking metoprolol and Coreg along with Norvasc and losartan at home. She Stated blood pressures got lower with Norvasc and Norvasc subsequently discontinued. She has been initially treated with nicardipine drip but has been seen subsequently turned off. She was treated with Coreg, HCTZ, losartan and nifedipine. Blood pressure improved with SBP from 140 to 160 and fluctuating. However, the patient stated that she will no longer take Coreg because it causes her headache the morning of discharge. As a result, Coreg was discontinued. She was discharged home with nifedipine 60 mg daily, losartan 100 mg and hydrochlorothiazide 25 mg daily. She is discharged in stable condition. Patient instructed to follow-up with her primary care doctor.    Tejal Gonzalez pain---likely secondary to uncontrolled blood pressure. Initial troponins and EKG negative. Seen by cardiology. No further work-up.     DM2--- on Tresiba 60 units at bedtime, NovoLog 20 units with lunch and breakfast and 25 units with dinner along with sliding scale insulin. A1c 7.8 on 20.    She follows up with endocrinology on outpatient basis.     Peripheral diabetic neuropathy--- on Elavil     Depression/anxiety--- on Seroquel and BuSpar     Epilepsy---on Keppra     Restless leg syndrome/Parkinson disease---on Sinemet     Hypothyroidism---on levothyroxine     Obesity---weight 174 pounds, BMI 31.83    The patient expressed appropriate understanding of and agreement with the discharge recommendations, medications, and plan. Consults this admission:  IP CONSULT TO HOSPITALIST  IP CONSULT TO CARDIOLOGY    Discharge Instruction:   Follow-up with PCP and cardiology on outpatient basis    Diet:  diabetic diet   Activity: activity as tolerated  Disposition: Discharged to:   [x]Home, []C, []SNF, []Acute Rehab, []Hospice   Condition on discharge: Stable    Discharge Medications:      Mikaylajose maria Mack   Home Medication Instructions [id number]    Printed on:07/15/20 8437   Medication Information                      albuterol sulfate  (90 Base) MCG/ACT inhaler  Inhale 2 puffs into the lungs every 6 hours as needed for Wheezing or Shortness of Breath (or cough) Please include spacer with instructions for use. allopurinol (ZYLOPRIM) 300 MG tablet  Take 300 mg by mouth daily             aluminum & magnesium hydroxide-simethicone (MAALOX MAX) 400-400-40 MG/5ML SUSP  Take 15 mLs by mouth every 6 hours as needed (heart burn)             amitriptyline (ELAVIL) 25 MG tablet  Take 1 tablet by mouth nightly             aspirin 81 MG tablet  Take 81 mg by mouth daily             carbidopa-levodopa (SINEMET)  MG per tablet  Take 1 tablet by mouth nightly             DICLOFENAC SODIUM EX  Apply topically             diphenhydrAMINE (BENADRYL) 25 MG tablet  Take 25 mg by mouth every 6 hours as needed             docusate sodium (COLACE) 100 MG capsule  Take 1 capsule by mouth 2 times daily             HYDROcodone-acetaminophen (NORCO) 5-325 MG per tablet  Take 1-2 tablets by mouth every 4 hours as needed.              insulin aspart (NOVOLOG) 100 UNIT/ML injection vial  Inject 20 Units into the skin 3 times daily (before meals) Indications: per sliding scale Plus a sliding BMI 30.42 kg/m²            PHYSICAL EXAM   GEN Awake female, sitting upright in bed eating breakfast.  No acute distress. EYES  No scleral erythema, discharge, or conjunctivitis. HENT Mucous membranes are moist.  No nasal discharge. NECK Supple,   RESP Clear to auscultation, no wheezes, rales or rhonchi. Symmetric chest movement while on room air. CARDIO/VASC S1/S2 auscultated. Regular rate without appreciable murmurs,  No peripheral edema. GI Abdomen is soft without significant tenderness,  Rectal exam deferred.      Mercer catheter is not present. MSK No gross joint deformities. SKIN Normal coloration, warm, dry. NEURO Cranial nerves appear grossly intact, normal speech, no lateralizing weakness.   PSYCH Awake, alert, oriented     BMP/CBC  Recent Labs     07/12/20  2320      K 4.2   CL 98*   CO2 25   BUN 13   CREATININE 0.7   WBC 10.7*   HCT 37.2          IMAGING:  All imaging reviewed before discharge    Discharge Time of 34 minutes    Electronically signed by Edwar Jimenez MD on 7/15/2020 at 11:45 AM

## 2020-07-16 ENCOUNTER — VIRTUAL VISIT (OUTPATIENT)
Dept: CARDIOLOGY CLINIC | Age: 65
End: 2020-07-16
Payer: MEDICARE

## 2020-07-16 PROCEDURE — G2012 BRIEF CHECK IN BY MD/QHP: HCPCS | Performed by: INTERNAL MEDICINE

## 2020-07-16 RX ORDER — BUSPIRONE HYDROCHLORIDE 15 MG/1
15 TABLET ORAL 3 TIMES DAILY
Status: ON HOLD | COMMUNITY
End: 2021-04-28 | Stop reason: HOSPADM

## 2020-07-16 NOTE — LETTER
Claudine Bailey Breath     Daja Amie  1955  [id number]    Have you had any Chest Pain - No      Have you had any Shortness of Breath - No    Have you had any dizziness - No      Have you had any palpitations - No      Do you have any edema - swelling in legs    If Yes - CHECK TO SEE IF THE EDEMA IS PITTING  How long have they been having edema - Years  If Yes - Have they worn compression stockings No        Do you have a surgery or procedure scheduled in the near future - No

## 2020-07-25 ENCOUNTER — HOSPITAL ENCOUNTER (EMERGENCY)
Age: 65
Discharge: PSYCHIATRIC OR SUBSTANCE USE FACILITY | End: 2020-07-26
Attending: EMERGENCY MEDICINE
Payer: MEDICARE

## 2020-07-25 LAB
ACETAMINOPHEN LEVEL: <5 UG/ML (ref 15–30)
ALCOHOL SCREEN SERUM: <0.01 %WT/VOL
ANION GAP SERPL CALCULATED.3IONS-SCNC: 13 MMOL/L (ref 4–16)
BACTERIA: NEGATIVE /HPF
BASOPHILS ABSOLUTE: 0.1 K/CU MM
BASOPHILS RELATIVE PERCENT: 0.5 % (ref 0–1)
BILIRUBIN URINE: NEGATIVE MG/DL
BLOOD, URINE: NEGATIVE
BUN BLDV-MCNC: 14 MG/DL (ref 6–23)
CALCIUM SERPL-MCNC: 10.2 MG/DL (ref 8.3–10.6)
CHLORIDE BLD-SCNC: 95 MMOL/L (ref 99–110)
CLARITY: ABNORMAL
CO2: 28 MMOL/L (ref 21–32)
COLOR: YELLOW
CREAT SERPL-MCNC: 0.8 MG/DL (ref 0.6–1.1)
DIFFERENTIAL TYPE: ABNORMAL
DOSE AMOUNT: ABNORMAL
DOSE AMOUNT: ABNORMAL
DOSE TIME: ABNORMAL
DOSE TIME: ABNORMAL
EOSINOPHILS ABSOLUTE: 0.2 K/CU MM
EOSINOPHILS RELATIVE PERCENT: 1.9 % (ref 0–3)
GFR AFRICAN AMERICAN: >60 ML/MIN/1.73M2
GFR NON-AFRICAN AMERICAN: >60 ML/MIN/1.73M2
GLUCOSE BLD-MCNC: 127 MG/DL (ref 70–99)
GLUCOSE BLD-MCNC: 188 MG/DL (ref 70–99)
GLUCOSE, URINE: NEGATIVE MG/DL
HCT VFR BLD CALC: 37.4 % (ref 37–47)
HEMOGLOBIN: 12.5 GM/DL (ref 12.5–16)
HYALINE CASTS: 0 /LPF
IMMATURE NEUTROPHIL %: 0.4 % (ref 0–0.43)
KETONES, URINE: NEGATIVE MG/DL
LEUKOCYTE ESTERASE, URINE: ABNORMAL
LYMPHOCYTES ABSOLUTE: 2.8 K/CU MM
LYMPHOCYTES RELATIVE PERCENT: 25.3 % (ref 24–44)
MCH RBC QN AUTO: 29.2 PG (ref 27–31)
MCHC RBC AUTO-ENTMCNC: 33.4 % (ref 32–36)
MCV RBC AUTO: 87.4 FL (ref 78–100)
MONOCYTES ABSOLUTE: 1.4 K/CU MM
MONOCYTES RELATIVE PERCENT: 12.8 % (ref 0–4)
NITRITE URINE, QUANTITATIVE: NEGATIVE
NUCLEATED RBC %: 0 %
PDW BLD-RTO: 13.4 % (ref 11.7–14.9)
PH, URINE: 7 (ref 5–8)
PLATELET # BLD: 324 K/CU MM (ref 140–440)
PMV BLD AUTO: 9.8 FL (ref 7.5–11.1)
POTASSIUM SERPL-SCNC: 4 MMOL/L (ref 3.5–5.1)
PROTEIN UA: NEGATIVE MG/DL
RBC # BLD: 4.28 M/CU MM (ref 4.2–5.4)
RBC URINE: 1 /HPF (ref 0–6)
SALICYLATE LEVEL: <0.3 MG/DL (ref 15–30)
SEGMENTED NEUTROPHILS ABSOLUTE COUNT: 6.4 K/CU MM
SEGMENTED NEUTROPHILS RELATIVE PERCENT: 59.1 % (ref 36–66)
SODIUM BLD-SCNC: 136 MMOL/L (ref 135–145)
SPECIFIC GRAVITY UA: 1.01 (ref 1–1.03)
SQUAMOUS EPITHELIAL: 2 /HPF
TOTAL IMMATURE NEUTOROPHIL: 0.04 K/CU MM
TOTAL NUCLEATED RBC: 0 K/CU MM
TRICHOMONAS: ABNORMAL /HPF
UROBILINOGEN, URINE: NORMAL MG/DL (ref 0.2–1)
WBC # BLD: 10.9 K/CU MM (ref 4–10.5)
WBC UA: 25 /HPF (ref 0–5)

## 2020-07-25 PROCEDURE — 81001 URINALYSIS AUTO W/SCOPE: CPT

## 2020-07-25 PROCEDURE — 80048 BASIC METABOLIC PNL TOTAL CA: CPT

## 2020-07-25 PROCEDURE — G0480 DRUG TEST DEF 1-7 CLASSES: HCPCS

## 2020-07-25 PROCEDURE — 85025 COMPLETE CBC W/AUTO DIFF WBC: CPT

## 2020-07-25 PROCEDURE — 99284 EMERGENCY DEPT VISIT MOD MDM: CPT

## 2020-07-25 PROCEDURE — 82962 GLUCOSE BLOOD TEST: CPT

## 2020-07-25 ASSESSMENT — PAIN DESCRIPTION - LOCATION: LOCATION: ARM

## 2020-07-25 ASSESSMENT — PAIN DESCRIPTION - PAIN TYPE: TYPE: ACUTE PAIN

## 2020-07-25 ASSESSMENT — PAIN SCALES - GENERAL: PAINLEVEL_OUTOF10: 10

## 2020-07-25 ASSESSMENT — PAIN DESCRIPTION - ORIENTATION: ORIENTATION: RIGHT

## 2020-07-25 ASSESSMENT — PAIN DESCRIPTION - DESCRIPTORS: DESCRIPTORS: CONSTANT

## 2020-07-25 NOTE — ED NOTES
Pt told CM that she was afraid to go home because would OD. Dr. Courtney Hodgkins notified. Pt changed into green gown at this time. Sitter 1:1 at bedside.       Flor Finley RN  07/25/20 6500

## 2020-07-26 VITALS
WEIGHT: 166 LBS | RESPIRATION RATE: 14 BRPM | DIASTOLIC BLOOD PRESSURE: 69 MMHG | SYSTOLIC BLOOD PRESSURE: 119 MMHG | BODY MASS INDEX: 30.36 KG/M2 | TEMPERATURE: 97.9 F | HEART RATE: 74 BPM | OXYGEN SATURATION: 99 %

## 2020-07-26 LAB
AMPHETAMINES: NEGATIVE
BARBITURATE SCREEN URINE: NEGATIVE
BENZODIAZEPINE SCREEN, URINE: NEGATIVE
CANNABINOID SCREEN URINE: NEGATIVE
COCAINE METABOLITE: NEGATIVE
OPIATES, URINE: ABNORMAL
OXYCODONE: NEGATIVE
PHENCYCLIDINE, URINE: NEGATIVE

## 2020-07-26 PROCEDURE — 87086 URINE CULTURE/COLONY COUNT: CPT

## 2020-07-26 PROCEDURE — 6370000000 HC RX 637 (ALT 250 FOR IP): Performed by: EMERGENCY MEDICINE

## 2020-07-26 PROCEDURE — 80307 DRUG TEST PRSMV CHEM ANLYZR: CPT

## 2020-07-26 RX ORDER — LORAZEPAM 1 MG/1
1 TABLET ORAL ONCE
Status: COMPLETED | OUTPATIENT
Start: 2020-07-26 | End: 2020-07-26

## 2020-07-26 RX ADMIN — LORAZEPAM 1 MG: 1 TABLET ORAL at 00:32

## 2020-07-26 RX ADMIN — LORAZEPAM 1 MG: 1 TABLET ORAL at 04:26

## 2020-07-26 NOTE — ED NOTES
Patient sitting up in bed. Calm and cooperative. Sitter at bedside.      Oli Bajwa RN  07/26/20 0002

## 2020-07-26 NOTE — ED NOTES
Spoke with Beata Russell from the [employer], pt is accepted at [facility] per Dr. Dipak Ceballos.      Hortencia hSay RN  07/26/20 3465

## 2020-07-26 NOTE — ED NOTES
Pt in bed with no signs of distress. Sitter 1:1.       Edwin Gerardo, [place]  07/25/20 8851

## 2020-07-26 NOTE — ED NOTES
Pt in bed with no signs of distress. Sitter 1:1.         [facility]  07/25/20 2009

## 2020-07-26 NOTE — ED NOTES
Provisional Diagnosis:  Suicide Plan  Paranoia  History of Depression  History of Anxiety  Shares Diagnosis of PTSD  Questionable Compliance with Rx Meds    Psychosocial and Contextual Factors:  Per Dr. Eran Church, ED Physician:  \"presents to the emergency department for evaluation. Patient notes that there are multiple symptoms that are bothering her, however, her depression is the reason that she is here. Patient notes that she has a history of right arm pain for the past 2-1/2 months. Symptoms have been persistent. She is following up with the pain physician and a spinal surgeon for intervention. This intervention is scheduled in the near future. Patient notes that she has been dealing with generalized anxiety, major depressive disorder, and posttraumatic stress disorder for quite a few years. Patient believes that her symptoms are becoming worse. She has been taking her medications as prescribed, however, she does not feel that these are providing relief. Patient notes that she has many stressors that are causing her depression to worsen. Patient notes that she is in bad health overall which is making her more depressed. She does admit to a previous psychiatric admission at [place].  She feels that she may require psychiatric evaluation again. Initially, patient denies suicidal ideation. When case management discussed with the patient that there may be very little that we are able to do, patient states that she is feeling suicidal.  She is afraid that she will go home and overdose on medications. She denies any homicidal ideation, plan, intent. She denies auditory, visual, tactile hallucinations\". Per Case Management Consult - Maeve RN:  \"Pt went on to say that she is depressed and needs to be admitted to [facility] to the psych unit for treatment.  Pt states she is on Buspar, by PCP Dr Murtaza Peralta, it wasn't helping so the does was increased but pt states it still is not helping her with her Factors:    None are apparent at this time. Risk Factors:    Suicide Plan  History of Depression  History of Anxiety  Questionable Compliance with Rx Meds      Clinical Summary:    As above. Will seek placement.     Electronically signed by Mark Ramirez RN on 6/71/9807 at 1:47 AM     Mark Ramirez RN  91/13/69 Oscar Cosby RN  85/30/48 Oscar Cosby, CHAVO  88/51/34 5830

## 2020-07-26 NOTE — ED NOTES
Per pt request, daughter Main Méndez [phone] called to update on transfer to [facility].        Kenneth Medico, RN  07/26/20 2215

## 2020-07-26 NOTE — ED PROVIDER NOTES
CARE RECEIVED FROM: Dr. Michoacano Mart  I reviewed the [place] elements of the history, physical exam and initial treatment plan at the bedside. ANCILLARY DATA:  I reviewed the images. Radiologist interpretation:   No orders to display     Labs Reviewed   ACETAMINOPHEN LEVEL - Abnormal; Notable for the following components:       Result Value    Acetaminophen Level <5.0 (*)     All other components within normal limits   SALICYLATE LEVEL - Abnormal; Notable for the following components:    Salicylate Lvl <6.0 (*)     All other components within normal limits   URINALYSIS WITH MICROSCOPIC - Abnormal; Notable for the following components:    Clarity, UA SLIGHTLY CLOUDY (*)     Leukocyte Esterase, Urine MODERATE (*)     WBC, UA 25 (*)     All other components within normal limits   BASIC METABOLIC PANEL - Abnormal; Notable for the following components:    Chloride 95 (*)     Glucose 127 (*)     All other components within normal limits   CBC WITH AUTO DIFFERENTIAL - Abnormal; Notable for the following components:    WBC 10.9 (*)     Monocytes % 12.8 (*)     All other components within normal limits   POCT GLUCOSE - Abnormal; Notable for the following components:    POC Glucose 188 (*)     All other components within normal limits   ETHANOL     MEDICAL DECISION MAKING / PLAN:  This is a 21-year-old female with history of anxiety and depression who presented to the emergency department initially with multiple complaints including chronic arm pain for which she is supposed to see pain management. It seemed that her true reason for being here was secondary to worsening depression. She was evaluated by case management with discussion of outpatient therapy but patient did mention that she would go home and overdose on medications if she were to be discharged. Therefore pink slip was completed. At time of signout her medical screening exam is reassuring with urine drug screen pending.   She was noticed to have some bacteriuria but has no symptoms with this. Therefore, urine was sent for culture and antibiotic therapy was deferred. Urine drug screen was sent and positive for opioids. Evaluated by mental health team and is awaiting placement. Signed out to daytime physician. FINAL IMPRESSION:  1. Recurrent major depressive disorder, in partial remission ([facility] [place] [address]    2. Suicidal ideation      ? Electronically signed by:  Jayme Mckeon, 7/26/2020        Ruthy Seals DO  07/26/20 0509

## 2020-07-26 NOTE — ED NOTES
Called Provider Line @ [facility], I understand from [place], [address] @ [facility] that Dr. Tyson Cotto is on call now. Left information as required on Provider Line and expecting a return call regarding potential admission.      Stephanie Garcia RN  92/14/08 0959

## 2020-07-26 NOTE — ED NOTES
Spring Leiva in the Lab,they have urine from Urinalysis previously completed, requested UDS also be completed as per order.      Maryanne Linda RN  04/32/45 9532

## 2020-07-26 NOTE — ED NOTES
Report called to [id number] [facility] at [facility]. Advised that [facility] is on the way to  patient now. Voiced understanding. [place] slipped faxed to [facility].      Vishal Sanchez RN  07/26/20 3096

## 2020-07-26 NOTE — ED NOTES
Little Coffman, Charge RN @ [facility], requested she also text Dr. Yaa Goss to call me in the ED about patient.      Melvin Vidal, CHAVO  47/66/14 7840

## 2020-07-26 NOTE — ED NOTES
Report given to [facility]. Care transferred at this time.       Lalo Quinones, RN  07/25/20 5600
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
## 2020-07-26 NOTE — ED NOTES
Jony Rodriguez RN @ [facility], requested admission be sought @ [facility], explained that after more than one attempt, we are unable to reach on call provider @ [facility] with staff there not feeling iit appropriate to call provider's home or text them again for another hour. Libby Tan RN  02/07/16 [id number]

## 2020-07-26 NOTE — ED NOTES
Bed: ED-24  Expected date:   Expected time:   Means of arrival:   Comments:  Moving room 35 to this bed     Heather B. Colletta Grice, RN  07/26/20 3738

## 2020-07-26 NOTE — ED NOTES
Report from [facility]. Pt resting quietly in bed with eyes closed, resps easy and even. Suicide precautions in place, sitter 1:1 at bedside.      Angelique Ramirez RN  07/26/20 1147

## 2020-07-27 LAB
CULTURE: NORMAL
Lab: NORMAL
SPECIMEN: NORMAL

## 2020-08-11 ENCOUNTER — OFFICE VISIT (OUTPATIENT)
Dept: CARDIOLOGY CLINIC | Age: 65
End: 2020-08-11
Payer: MEDICARE

## 2020-08-11 ENCOUNTER — TELEPHONE (OUTPATIENT)
Dept: CARDIOLOGY CLINIC | Age: 65
End: 2020-08-11

## 2020-08-11 VITALS
WEIGHT: 166 LBS | SYSTOLIC BLOOD PRESSURE: 116 MMHG | RESPIRATION RATE: 16 BRPM | BODY MASS INDEX: 30.55 KG/M2 | DIASTOLIC BLOOD PRESSURE: 64 MMHG | HEART RATE: 64 BPM | HEIGHT: 62 IN

## 2020-08-11 PROCEDURE — 1036F TOBACCO NON-USER: CPT | Performed by: NURSE PRACTITIONER

## 2020-08-11 PROCEDURE — G8417 CALC BMI ABV UP PARAM F/U: HCPCS | Performed by: NURSE PRACTITIONER

## 2020-08-11 PROCEDURE — G8427 DOCREV CUR MEDS BY ELIG CLIN: HCPCS | Performed by: NURSE PRACTITIONER

## 2020-08-11 PROCEDURE — 1111F DSCHRG MED/CURRENT MED MERGE: CPT | Performed by: NURSE PRACTITIONER

## 2020-08-11 PROCEDURE — 99214 OFFICE O/P EST MOD 30 MIN: CPT | Performed by: NURSE PRACTITIONER

## 2020-08-11 PROCEDURE — 2022F DILAT RTA XM EVC RTNOPTHY: CPT | Performed by: NURSE PRACTITIONER

## 2020-08-11 PROCEDURE — 3017F COLORECTAL CA SCREEN DOC REV: CPT | Performed by: NURSE PRACTITIONER

## 2020-08-11 PROCEDURE — 93000 ELECTROCARDIOGRAM COMPLETE: CPT | Performed by: NURSE PRACTITIONER

## 2020-08-11 PROCEDURE — 3051F HG A1C>EQUAL 7.0%<8.0%: CPT | Performed by: NURSE PRACTITIONER

## 2020-08-11 RX ORDER — NIFEDIPINE 60 MG/1
60 TABLET, FILM COATED, EXTENDED RELEASE ORAL DAILY
Qty: 30 TABLET | Refills: 3 | Status: SHIPPED | OUTPATIENT
Start: 2020-08-11 | End: 2021-03-05
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
## 2020-08-11 NOTE — ASSESSMENT & PLAN NOTE
-EKG obtained and reviewed no acute abnormalities noted when comparing to previous EKG. Patient's heart rate is mildly elevated at 94 but during vitals pulse reading 64 bpm.  Patient reports that she is anxious and was recently started on more antipsychotic medication. Tachycardia secondary to anxiety and recent medication adjustment of psychiatric meds. We will have the patient follow-up in 3 months to evaluate if tachycardia has subsided.   Blood pressure is controlled

## 2020-08-11 NOTE — ASSESSMENT & PLAN NOTE
Controlled  She is to continue current medications   advised low salt diet   Testing ordered:  no   Last testing: Echo 2020

## 2020-08-11 NOTE — TELEPHONE ENCOUNTER
Patient called stated that since Sunday  Her blood pressure has been running   High today 151/93 hr 103 she stated that she   Feels very shake

## 2020-08-15 ENCOUNTER — HOSPITAL ENCOUNTER (EMERGENCY)
Age: 65
Discharge: HOME | End: 2020-08-15
Attending: EMERGENCY MEDICINE
Payer: MEDICARE

## 2020-08-15 VITALS
TEMPERATURE: 98.3 F | DIASTOLIC BLOOD PRESSURE: 80 MMHG | WEIGHT: 166 LBS | HEART RATE: 92 BPM | RESPIRATION RATE: 16 BRPM | BODY MASS INDEX: 30.55 KG/M2 | HEIGHT: 62 IN | SYSTOLIC BLOOD PRESSURE: 144 MMHG | OXYGEN SATURATION: 99 %

## 2020-08-15 LAB
ALBUMIN SERPL-MCNC: 4.3 GM/DL (ref 3.4–5)
ALP BLD-CCNC: 64 IU/L (ref 40–129)
ALT SERPL-CCNC: 20 U/L (ref 10–40)
ANION GAP SERPL CALCULATED.3IONS-SCNC: 13 MMOL/L (ref 4–16)
AST SERPL-CCNC: 22 IU/L (ref 15–37)
BASOPHILS ABSOLUTE: 0.1 K/CU MM
BASOPHILS RELATIVE PERCENT: 0.7 % (ref 0–1)
BILIRUB SERPL-MCNC: 0.5 MG/DL (ref 0–1)
BUN BLDV-MCNC: 9 MG/DL (ref 6–23)
CALCIUM SERPL-MCNC: 10.2 MG/DL (ref 8.3–10.6)
CHLORIDE BLD-SCNC: 97 MMOL/L (ref 99–110)
CO2: 27 MMOL/L (ref 21–32)
CREAT SERPL-MCNC: 0.7 MG/DL (ref 0.6–1.1)
DIFFERENTIAL TYPE: ABNORMAL
EOSINOPHILS ABSOLUTE: 0.2 K/CU MM
EOSINOPHILS RELATIVE PERCENT: 2 % (ref 0–3)
GFR AFRICAN AMERICAN: >60 ML/MIN/1.73M2
GFR NON-AFRICAN AMERICAN: >60 ML/MIN/1.73M2
GLUCOSE BLD-MCNC: 91 MG/DL (ref 70–99)
HCT VFR BLD CALC: 39.6 % (ref 37–47)
HEMOGLOBIN: 13 GM/DL (ref 12.5–16)
IMMATURE NEUTROPHIL %: 0.4 % (ref 0–0.43)
LYMPHOCYTES ABSOLUTE: 4.4 K/CU MM
LYMPHOCYTES RELATIVE PERCENT: 39.1 % (ref 24–44)
MAGNESIUM: 1.9 MG/DL (ref 1.8–2.4)
MCH RBC QN AUTO: 28.8 PG (ref 27–31)
MCHC RBC AUTO-ENTMCNC: 32.8 % (ref 32–36)
MCV RBC AUTO: 87.8 FL (ref 78–100)
MONOCYTES ABSOLUTE: 1.4 K/CU MM
MONOCYTES RELATIVE PERCENT: 12.3 % (ref 0–4)
NUCLEATED RBC %: 0 %
PDW BLD-RTO: 12.8 % (ref 11.7–14.9)
PLATELET # BLD: 403 K/CU MM (ref 140–440)
PMV BLD AUTO: 9.4 FL (ref 7.5–11.1)
POTASSIUM SERPL-SCNC: 3.2 MMOL/L (ref 3.5–5.1)
RBC # BLD: 4.51 M/CU MM (ref 4.2–5.4)
SEGMENTED NEUTROPHILS ABSOLUTE COUNT: 5.1 K/CU MM
SEGMENTED NEUTROPHILS RELATIVE PERCENT: 45.5 % (ref 36–66)
SODIUM BLD-SCNC: 137 MMOL/L (ref 135–145)
TOTAL IMMATURE NEUTOROPHIL: 0.04 K/CU MM
TOTAL NUCLEATED RBC: 0 K/CU MM
TOTAL PROTEIN: 7.3 GM/DL (ref 6.4–8.2)
TROPONIN T: <0.01 NG/ML
WBC # BLD: 11.2 K/CU MM (ref 4–10.5)

## 2020-08-15 PROCEDURE — 83735 ASSAY OF MAGNESIUM: CPT

## 2020-08-15 PROCEDURE — 99283 EMERGENCY DEPT VISIT LOW MDM: CPT

## 2020-08-15 PROCEDURE — 80053 COMPREHEN METABOLIC PANEL: CPT

## 2020-08-15 PROCEDURE — 85025 COMPLETE CBC W/AUTO DIFF WBC: CPT

## 2020-08-15 PROCEDURE — 94761 N-INVAS EAR/PLS OXIMETRY MLT: CPT

## 2020-08-15 PROCEDURE — 6370000000 HC RX 637 (ALT 250 FOR IP): Performed by: EMERGENCY MEDICINE

## 2020-08-15 PROCEDURE — 84484 ASSAY OF TROPONIN QUANT: CPT

## 2020-08-15 PROCEDURE — 36415 COLL VENOUS BLD VENIPUNCTURE: CPT

## 2020-08-15 PROCEDURE — 93005 ELECTROCARDIOGRAM TRACING: CPT | Performed by: EMERGENCY MEDICINE

## 2020-08-15 RX ORDER — LORAZEPAM 1 MG/1
1 TABLET ORAL ONCE
Status: COMPLETED | OUTPATIENT
Start: 2020-08-15 | End: 2020-08-15

## 2020-08-15 RX ORDER — POTASSIUM CHLORIDE 20 MEQ/1
40 TABLET, EXTENDED RELEASE ORAL ONCE
Status: COMPLETED | OUTPATIENT
Start: 2020-08-15 | End: 2020-08-15

## 2020-08-15 RX ORDER — METOPROLOL SUCCINATE 25 MG/1
25 TABLET, EXTENDED RELEASE ORAL DAILY
Qty: 30 TABLET | Refills: 0 | Status: SHIPPED | OUTPATIENT
Start: 2020-08-15 | End: 2021-02-16 | Stop reason: SDUPTHER

## 2020-08-15 RX ADMIN — POTASSIUM CHLORIDE 40 MEQ: 1500 TABLET, EXTENDED RELEASE ORAL at 18:44

## 2020-08-15 RX ADMIN — LORAZEPAM 1 MG: 1 TABLET ORAL at 17:53

## 2020-08-15 NOTE — ED PROVIDER NOTES
EMERGENCY DEPARTMENT ENCOUNTER      CHIEF COMPLAINT:   Anxiety  Tachycardia    HPI: Rashawn Ledezma is a 63 y.o. female who presents to the emergency department for evaluation of anxiety and episodes of tachycardia. The patient states that she was recently admitted to an inpatient psychiatric facility. She states that her medications were adjusted. She has had increased anxiety since being home. She is taking BuSpar without relief. She states that she has had intermittent episodes where her heart races and feels like it's beating fast. She started to have an episode today and felt lightheaded at that time. Symptoms have since improved. She denies any associated chest pain or shortness of breath. The patient states that she saw her cardiologist recently who told her that if she continued to have episodes that they may start her on a medication to help control her heart rate. She denies fevers, chills, chest pain, shortness of breath, abdominal pain or nausea, vomiting, suicidal ideation, homicidal ideation, hallucinations or any other complaints. REVIEW OF SYSTEMS:   Constitutional:  Denies fever or chills, complains of anxiety  Eyes:  Denies change in visual acuity  HENT:  Denies nasal congestion or sore throat  Respiratory:  Denies cough or shortness of breath  Cardiovascular:  Denies chest pain or edema, complains of tachycardia and feeling her heart racing   GI:  Denies abdominal pain, nausea, vomiting, bloody stools or diarrhea  :  Denies dysuria  Musculoskeletal:  Denies back pain or joint pain  Integument:  Denies rash  Neurologic:  Denies headache, focal weakness or sensory changes  \"Remaining review of systems reviewed and negative. I have reviewed the nursing triage documentation and agree unless otherwise noted below. \"      PAST MEDICAL HISTORY:   Past Medical History:   Diagnosis Date    Abnormal EKG 4/22/2014    Acid reflux     Anemia     Anesthesia     Nausea/Vomiting Post Op In Past    vomiting     Nausea/Vomiting Post Op In Past    Other specified disorder of skin     12- Patient states she has a condition of her vaginal area (skin) which starts with the letters Librado Carty. She is currently being treated with multiple creams and weekly Diflucan. 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  Staph infection Dx 1980's    Toes On Left Foot    Thyroid disease     hypothroidism    Tremor     \"Tremors All Over\"    Urinary incontinence     UTI (urinary tract infection) In Past    No Current Symptoms    UTI (urinary tract infection)     Vertigo     \"Sometimes\"    Wears glasses        CURRENT MEDICATIONS:   Home medications reviewed. SURGICAL HISTORY:   Past Surgical History:   Procedure Laterality Date    APPENDECTOMY  1970's    Done With Cholecystectomy    BLADDER SURGERY  1970's Or 1980's    \"Stretched The Opening To The Bladder\", \"Total Of Four Bladder Surgeries\"    BREAST BIOPSY Right 1980's    Twice, Benign    BREAST SURGERY Left 1990's    Five, Benign    CARDIAC CATHETERIZATION  10-18-06    normal coronary angiogram with a normal left ventricular systolic function, patient can be treated medically.     CARDIAC CATHETERIZATION      \"Total 7 Cardiac Catheterizations\"    CARPAL TUNNEL RELEASE Right 1999    CHOLECYSTECTOMY  1970's    Appendectomy Also Done    COLONOSCOPY  Last Done 6-13    One Polyp Removed In Past    DENTAL SURGERY      All Teeth Extracted In Past    DIAGNOSTIC CARDIAC CATH LAB PROCEDURE  01/11/2010    no significant disease, continue medical therapy    ENDOSCOPY, COLON, DIAGNOSTIC  Several     ESOPHAGEAL DILATATION  1980's And 1990's    X 3    FRACTURE SURGERY  1974 Or 1975    Broken Bones Left Religious Due To [facility] 1990's    Incisional Abdominal Hernia Repair  With Mesh    HERNIA REPAIR  1970's    Abdominal Hernia Repair    HYSTERECTOMY, TOTAL ABDOMINAL  1987    JOINT REPLACEMENT  2008    Total Right Knee    KNEE ARTHROSCOPY Right 1999    LITHOTRIPSY  2011    For Kidney Stones    OTHER SURGICAL HISTORY  06 13 0937    umbilical hernia with mesh    TUBAL LIGATION  1978       FAMILY HISTORY:   Family History   Problem Relation Age of Onset    Stroke Mother     Other Mother         Seizures    Diabetes Mother         Borderline Diabetes    High Blood Pressure Mother     Arthritis Mother     Early Death Mother 61        Stroke    Depression Mother     Heart Disease Mother     High Cholesterol Mother     Miscarriages / Stillbirths Mother     Heart Disease Father         Massive Heart Attack    High Blood Pressure Father     Arthritis Father     High Cholesterol Father     Cancer Brother         Liver And Colon Cancer    Early Death Brother 37        Liver And Colon Cancer    Heart Disease Brother         Heart Stents    High Blood Pressure Brother     High Cholesterol Brother     Early Death Brother         65 Years Old,Hit By [email or web address] Colon Cancer Brother     Hearing Loss Sister     Heart Failure Sister     High Blood Pressure Sister     Arthritis Sister     Heart Disease Brother     Heart Disease Sister     Cancer Sister     Early Death Brother 61        Heart Attack    Heart Disease Brother         Heart Attack    Mental Illness Daughter         Bipolar    Other Son         Seizures    Other Daughter         Stomach And Bowel Problems       SOCIAL HISTORY:   Social History     Socioeconomic History    Marital status:       Spouse name: Not on file    Number of children: 3    Years of education: 6    Highest education level: Not on file   Occupational History    Not on file   Social Needs    Financial resource strain: Not on file    Food insecurity     Worry: Not on file Inability: Not on file    Transportation needs     Medical: Not on file     Non-medical: Not on file   Tobacco Use    Smoking status: Never Smoker    Smokeless tobacco: Never Used   Substance and Sexual Activity    Alcohol use: No    Drug use: No    Sexual activity: Not Currently   Lifestyle    Physical activity     Days per week: Not on file     Minutes per session: Not on file    Stress: Not on file   Relationships    Social connections     Talks on phone: Not on file     Gets together: Not on file     Attends Synagogue service: Not on file     Active member of club or organization: Not on file     Attends meetings of clubs or organizations: Not on file     Relationship status: Not on file    Intimate partner violence     Fear of current or ex partner: Not on file     Emotionally abused: Not on file     Physically abused: Not on file     Forced sexual activity: Not on file   Other Topics Concern    Not on file   Social History Narrative    Not on file       ALLERGIES: Abilify [aripiprazole]; Advil [ibuprofen micronized]; Augmentin [amoxicillin-pot clavulanate]; Bee venom; Ciprofloxacin; Codeine; Darvocet [propoxyphene n-acetaminophen]; Darvon [propoxyphene hcl]; Decadron [dexamethasone]; Ditropan [oxybutynin chloride]; Fioricet [butalbital-apap-caffeine]; Fiorinal-codeine #3 [butalbital-asa-caff-codeine]; Flagyl [metronidazole]; Naproxen; Other; Prozac [fluoxetine hcl]; Robaxin [methocarbamol]; Ultram [tramadol]; Zoloft; Butalbital-aspirin-caffeine; Coreg [carvedilol]; Fluoxetine;  Oxybutynin chloride; Sertraline; Tape [adhesive tape]; and Reglan [metoclopramide]    PHYSICAL EXAM:  VITAL SIGNS:   ED Triage Vitals [08/15/20 1516]   Enc Vitals Group      /68      Pulse 114      Resp 18      Temp 98.3 °F (36.8 °C)      Temp Source Oral      SpO2 95 %      Weight 166 lb (75.3 kg)      Height 5' 2\" (1.575 m)      Head Circumference       Peak Flow       Pain Score       Pain Loc       Pain Edu? Excl. in [address]? Constitutional:  Non-toxic appearance  HENT: Normocephalic, Atraumatic, Bilateral external ears normal, Oropharynx moist, No oral exudates, Nose normal.  Eyes:  PERRL, EOMI, Conjunctiva normal, No discharge. Neck: Normal range of motion, No tenderness, Supple, No stridor, No lymphadenopathy. Cardiovascular:  Normal heart rate, Normal rhythm  Pulmonary/Chest:  Normal breath sounds, No respiratory distress, No wheezing  Abdomen: Bowel sounds normal, Soft, No tenderness, No masses, No pulsatile masses  Back:  No tenderness, No CVA tenderness  Extremities:  Normal range of motion, Intact distal pulses, No edema, No tenderness  Neurologic:  Alert & oriented x 3, Normal motor function, Sensation intact to light touch throughout, No focal deficits  Skin:  Warm, Dry, No erythema, No rash      EKG Interpretation  Interpreted by me  Compared to 8/11/20  Rhythm: sinus tachycardia  Rate: [id number]  Axis: left  Ectopy: none  Conduction: imcomplete RBBB (chronic)  ST Segments: no acute change  T Waves: new t-wave inversions in leads 1 and aVL  Q Waves: none  Clinical Impression: sinus tachycardia, LAD, chronic incomplete RBBB, new t-wave inversions in leads 1 and aVL    Cardiac Monitor Strip Interpretation  Interpreted by me  Monitor strip interpreted for greater than 10 seconds  Rhythm: sinus tachycardia  Rate: tachycardic  Ectopy: none  ST Segments: normal      Radiology / Procedures:  Labs Reviewed   CBC WITH AUTO DIFFERENTIAL - Abnormal; Notable for the following components:       Result Value    WBC 11.2 (*)     Monocytes % 12.3 (*)     All other components within normal limits   COMPREHENSIVE METABOLIC PANEL W/ REFLEX TO MG FOR LOW K - Abnormal; Notable for the following components:    Potassium 3.2 (*)     Chloride 97 (*)     All other components within normal limits   TROPONIN   MAGNESIUM       ED COURSE & MEDICAL DECISION MAKING:  Pertinent Labs & Imaging studies reviewed.  (See chart for details)  On exam, the patient is afebrile and nontoxic appearing. She is mildly hypertensive with a known history of hypertension and is asymptomatic. She is initially tachycardic but this resolves. She is otherwise hemodynamically stable and neurologically intact. EKG shows sinus tachycardia with no ST elevation or depression. Labs are obtained and are significant for mild leukocytosis and mild hypokalemia with no other clinically significant lab abnormalities. The patient was treated with 40 mEq of oral potassium chloride. I spoke with her cardiologist (Dr. Kranthi Pepper) who recommends that she be started on 25 mg of metoprolol daily. I suspect that the patient has episodes of anxiety as well as shows a sinus tachycardia. I have a low suspicion for arrhythmia, electrolyte derangement, STEMI, PE, pneumothorax or sepsis. I feel that the patient is stable for outpatient management follow up in 2-3 days. The patient is given return precautions. The patient verbalized understanding, was agreeable with plan, and the patient was discharged home in stable condition. Clinical Impression:  1. Anxiety    2. Tachycardia        Disposition referral (if applicable):  Ruben Carrasco MD  [address] [id number] [phone]    Schedule an appointment as soon as possible for a visit in 2 days      [facility] Emergency Department  [facility] [phone]  Go to   If symptoms worsen      Disposition medications (if applicable):  Discharge Medication List as of 8/15/2020  9:05 PM      START taking these medications    Details   metoprolol succinate (TOPROL XL) 25 MG extended release tablet Take 1 tablet by mouth daily, Disp-30 tablet,R-0Print               Comment: Please note this report has been produced using speech recognition software and may contain errors related to that system including errors in grammar, punctuation, and spelling, as well as words and phrases that may be inappropriate. If there are any questions or concerns please feel free to contact the dictating provider for clarification.         Dominic Luong MD  08/16/20 1014

## 2020-08-15 NOTE — ED NOTES
Bed: ED-32  Expected date:   Expected time:   Means of arrival:   Comments:  SOLEDAD Ryan RN  08/15/20 2932

## 2020-08-15 NOTE — ED PROVIDER NOTES
TeleHealth Pit Note    I evaluated this patient via telehealth platform as a physician in triage. I performed a medical screening evaluation on the patient remotely via the [address] History of Present Illness  Patient presents today with tachycardia and feelings of anxiety and feeling as if she was going to pass out. She has had the symptoms in the past.  Today she began feeling her heart racing. She was having no other symptoms at that time. It continued to increase and she felt that she was going to have a syncopal episode. She was on her feet when she noticed this. She denies any chest pain or shortness of breath. Patient does have history of anxiety and after she began feeling the symptoms she began feeling anxious. She has no other complaints at this time. Physical Exam  Vital signs reviewed  Well-appearing. She does appear anxious at this time.     (If required part of the physical exam was done by the nurse on my behalf while I was observing.)     John Yusuf, DO  08/15/20 8565

## 2020-08-16 PROCEDURE — 93010 ELECTROCARDIOGRAM REPORT: CPT | Performed by: INTERNAL MEDICINE

## 2020-08-16 NOTE — ED NOTES
Patient discharged to home at this time. Discharge instructions and follow up care discussed, patient voices understanding.       Julaine Dakin, RN  08/15/20 1289

## 2020-08-18 LAB
EKG ATRIAL RATE: 113 BPM
EKG DIAGNOSIS: NORMAL
EKG P AXIS: 17 DEGREES
EKG P-R INTERVAL: 134 MS
EKG Q-T INTERVAL: 368 MS
EKG QRS DURATION: 106 MS
EKG QTC CALCULATION (BAZETT): 504 MS
EKG R AXIS: -40 DEGREES
EKG T AXIS: 94 DEGREES
EKG VENTRICULAR RATE: 113 BPM

## 2020-08-19 ENCOUNTER — TELEPHONE (OUTPATIENT)
Dept: CARDIOLOGY CLINIC | Age: 65
End: 2020-08-19

## 2020-08-19 NOTE — TELEPHONE ENCOUNTER
Patient called she has a question about her Metoprolol  That was changed  When she was seen in th Ed  Her blood pressure 137/76  Pulse 97 , she asking if she should continue to   Take Metoprolol

## 2020-08-19 NOTE — TELEPHONE ENCOUNTER
Patient had not started Metoprolol, afraid it would lower B/P and HR too much. Assured her that she was safe.  Scheduled OV for 8/21 with Dr Brennon Renee

## 2020-09-02 ENCOUNTER — APPOINTMENT (OUTPATIENT)
Dept: CT IMAGING | Age: 65
DRG: 092 | End: 2020-09-02
Payer: MEDICARE

## 2020-09-02 ENCOUNTER — HOSPITAL ENCOUNTER (INPATIENT)
Age: 65
LOS: 2 days | Discharge: PSYCHIATRIC OR SUBSTANCE USE FACILITY | DRG: 092 | End: 2020-09-04
Attending: EMERGENCY MEDICINE | Admitting: INTERNAL MEDICINE
Payer: MEDICARE

## 2020-09-02 PROBLEM — R44.3 HALLUCINATIONS: Status: ACTIVE | Noted: 2020-09-02

## 2020-09-02 LAB
ALBUMIN SERPL-MCNC: 3.9 GM/DL (ref 3.4–5)
ALCOHOL SCREEN SERUM: <0.01 %WT/VOL
ALP BLD-CCNC: 66 IU/L (ref 40–129)
ALT SERPL-CCNC: 14 U/L (ref 10–40)
AMPHETAMINES: NEGATIVE
ANION GAP SERPL CALCULATED.3IONS-SCNC: 13 MMOL/L (ref 4–16)
AST SERPL-CCNC: 19 IU/L (ref 15–37)
BACTERIA: NEGATIVE /HPF
BARBITURATE SCREEN URINE: NEGATIVE
BASOPHILS ABSOLUTE: 0.1 K/CU MM
BASOPHILS RELATIVE PERCENT: 1 % (ref 0–1)
BENZODIAZEPINE SCREEN, URINE: NEGATIVE
BILIRUB SERPL-MCNC: 0.6 MG/DL (ref 0–1)
BILIRUBIN URINE: NEGATIVE MG/DL
BLOOD, URINE: NEGATIVE
BUN BLDV-MCNC: 10 MG/DL (ref 6–23)
CALCIUM SERPL-MCNC: 9.4 MG/DL (ref 8.3–10.6)
CANNABINOID SCREEN URINE: NEGATIVE
CHLORIDE BLD-SCNC: 96 MMOL/L (ref 99–110)
CLARITY: CLEAR
CO2: 23 MMOL/L (ref 21–32)
COCAINE METABOLITE: NEGATIVE
COLOR: YELLOW
CREAT SERPL-MCNC: 0.6 MG/DL (ref 0.6–1.1)
DIFFERENTIAL TYPE: ABNORMAL
EOSINOPHILS ABSOLUTE: 0.2 K/CU MM
EOSINOPHILS RELATIVE PERCENT: 2.1 % (ref 0–3)
GFR AFRICAN AMERICAN: >60 ML/MIN/1.73M2
GFR NON-AFRICAN AMERICAN: >60 ML/MIN/1.73M2
GLUCOSE BLD-MCNC: 130 MG/DL (ref 70–99)
GLUCOSE BLD-MCNC: 142 MG/DL (ref 70–99)
GLUCOSE, URINE: NEGATIVE MG/DL
HCT VFR BLD CALC: 39 % (ref 37–47)
HEMOGLOBIN: 13.4 GM/DL (ref 12.5–16)
IMMATURE NEUTROPHIL %: 0.2 % (ref 0–0.43)
KETONES, URINE: NEGATIVE MG/DL
LEUKOCYTE ESTERASE, URINE: ABNORMAL
LYMPHOCYTES ABSOLUTE: 3.6 K/CU MM
LYMPHOCYTES RELATIVE PERCENT: 39.1 % (ref 24–44)
MCH RBC QN AUTO: 29.4 PG (ref 27–31)
MCHC RBC AUTO-ENTMCNC: 34.4 % (ref 32–36)
MCV RBC AUTO: 85.5 FL (ref 78–100)
MONOCYTES ABSOLUTE: 0.9 K/CU MM
MONOCYTES RELATIVE PERCENT: 9.2 % (ref 0–4)
NITRITE URINE, QUANTITATIVE: NEGATIVE
NUCLEATED RBC %: 0 %
OPIATES, URINE: ABNORMAL
OXYCODONE: NEGATIVE
PDW BLD-RTO: 12.4 % (ref 11.7–14.9)
PH, URINE: 7 (ref 5–8)
PHENCYCLIDINE, URINE: NEGATIVE
PLATELET # BLD: 317 K/CU MM (ref 140–440)
PMV BLD AUTO: 9.6 FL (ref 7.5–11.1)
POTASSIUM SERPL-SCNC: 3.7 MMOL/L (ref 3.5–5.1)
PROTEIN UA: NEGATIVE MG/DL
RBC # BLD: 4.56 M/CU MM (ref 4.2–5.4)
RBC URINE: 2 /HPF (ref 0–6)
SEGMENTED NEUTROPHILS ABSOLUTE COUNT: 4.5 K/CU MM
SEGMENTED NEUTROPHILS RELATIVE PERCENT: 48.4 % (ref 36–66)
SODIUM BLD-SCNC: 132 MMOL/L (ref 135–145)
SPECIFIC GRAVITY UA: 1.01 (ref 1–1.03)
SQUAMOUS EPITHELIAL: 4 /HPF
T4 FREE: 1.47 NG/DL (ref 0.9–1.8)
TOTAL IMMATURE NEUTOROPHIL: 0.02 K/CU MM
TOTAL NUCLEATED RBC: 0 K/CU MM
TOTAL PROTEIN: 6.8 GM/DL (ref 6.4–8.2)
TRICHOMONAS: ABNORMAL /HPF
TSH HIGH SENSITIVITY: 0.5 UIU/ML (ref 0.27–4.2)
UROBILINOGEN, URINE: NORMAL MG/DL (ref 0.2–1)
WBC # BLD: 9.2 K/CU MM (ref 4–10.5)
WBC UA: 21 /HPF (ref 0–5)

## 2020-09-02 PROCEDURE — 99285 EMERGENCY DEPT VISIT HI MDM: CPT

## 2020-09-02 PROCEDURE — 84439 ASSAY OF FREE THYROXINE: CPT

## 2020-09-02 PROCEDURE — 6360000002 HC RX W HCPCS: Performed by: NURSE PRACTITIONER

## 2020-09-02 PROCEDURE — 2580000003 HC RX 258: Performed by: PHYSICIAN ASSISTANT

## 2020-09-02 PROCEDURE — 80307 DRUG TEST PRSMV CHEM ANLYZR: CPT

## 2020-09-02 PROCEDURE — 1200000000 HC SEMI PRIVATE

## 2020-09-02 PROCEDURE — 82962 GLUCOSE BLOOD TEST: CPT

## 2020-09-02 PROCEDURE — 70450 CT HEAD/BRAIN W/O DYE: CPT

## 2020-09-02 PROCEDURE — 2580000003 HC RX 258: Performed by: NURSE PRACTITIONER

## 2020-09-02 PROCEDURE — G0480 DRUG TEST DEF 1-7 CLASSES: HCPCS

## 2020-09-02 PROCEDURE — 81001 URINALYSIS AUTO W/SCOPE: CPT

## 2020-09-02 PROCEDURE — 6370000000 HC RX 637 (ALT 250 FOR IP): Performed by: NURSE PRACTITIONER

## 2020-09-02 PROCEDURE — 84443 ASSAY THYROID STIM HORMONE: CPT

## 2020-09-02 PROCEDURE — 96365 THER/PROPH/DIAG IV INF INIT: CPT

## 2020-09-02 PROCEDURE — 80177 DRUG SCRN QUAN LEVETIRACETAM: CPT

## 2020-09-02 PROCEDURE — 6360000002 HC RX W HCPCS: Performed by: PHYSICIAN ASSISTANT

## 2020-09-02 PROCEDURE — 80053 COMPREHEN METABOLIC PANEL: CPT

## 2020-09-02 PROCEDURE — 6370000000 HC RX 637 (ALT 250 FOR IP): Performed by: PHYSICIAN ASSISTANT

## 2020-09-02 PROCEDURE — 85025 COMPLETE CBC W/AUTO DIFF WBC: CPT

## 2020-09-02 RX ORDER — LOSARTAN POTASSIUM AND HYDROCHLOROTHIAZIDE 12.5; 5 MG/1; MG/1
2 TABLET ORAL DAILY
Status: DISCONTINUED | OUTPATIENT
Start: 2020-09-02 | End: 2020-09-04 | Stop reason: HOSPADM

## 2020-09-02 RX ORDER — POLYETHYLENE GLYCOL 3350 17 G/17G
17 POWDER, FOR SOLUTION ORAL DAILY PRN
Status: CANCELLED | OUTPATIENT
Start: 2020-09-02

## 2020-09-02 RX ORDER — DULOXETIN HYDROCHLORIDE 30 MG/1
30 CAPSULE, DELAYED RELEASE ORAL DAILY
Status: DISCONTINUED | OUTPATIENT
Start: 2020-09-02 | End: 2020-09-03

## 2020-09-02 RX ORDER — QUETIAPINE FUMARATE 25 MG/1
25 TABLET, FILM COATED ORAL EVERY EVENING
Status: DISCONTINUED | OUTPATIENT
Start: 2020-09-02 | End: 2020-09-04

## 2020-09-02 RX ORDER — SODIUM CHLORIDE 0.9 % (FLUSH) 0.9 %
10 SYRINGE (ML) INJECTION EVERY 12 HOURS SCHEDULED
Status: DISCONTINUED | OUTPATIENT
Start: 2020-09-02 | End: 2020-09-04 | Stop reason: HOSPADM

## 2020-09-02 RX ORDER — PROMETHAZINE HYDROCHLORIDE 25 MG/1
12.5 TABLET ORAL EVERY 6 HOURS PRN
Status: DISCONTINUED | OUTPATIENT
Start: 2020-09-02 | End: 2020-09-04 | Stop reason: HOSPADM

## 2020-09-02 RX ORDER — SODIUM CHLORIDE 0.9 % (FLUSH) 0.9 %
10 SYRINGE (ML) INJECTION PRN
Status: DISCONTINUED | OUTPATIENT
Start: 2020-09-02 | End: 2020-09-04 | Stop reason: HOSPADM

## 2020-09-02 RX ORDER — MAGNESIUM OXIDE 400 MG/1
400 TABLET ORAL DAILY
Status: DISCONTINUED | OUTPATIENT
Start: 2020-09-02 | End: 2020-09-04 | Stop reason: HOSPADM

## 2020-09-02 RX ORDER — DEXTROSE MONOHYDRATE 25 G/50ML
12.5 INJECTION, SOLUTION INTRAVENOUS PRN
Status: DISCONTINUED | OUTPATIENT
Start: 2020-09-02 | End: 2020-09-04 | Stop reason: HOSPADM

## 2020-09-02 RX ORDER — ALLOPURINOL 100 MG/1
300 TABLET ORAL DAILY
Status: DISCONTINUED | OUTPATIENT
Start: 2020-09-02 | End: 2020-09-04 | Stop reason: HOSPADM

## 2020-09-02 RX ORDER — ONDANSETRON 2 MG/ML
4 INJECTION INTRAMUSCULAR; INTRAVENOUS EVERY 6 HOURS PRN
Status: DISCONTINUED | OUTPATIENT
Start: 2020-09-02 | End: 2020-09-04 | Stop reason: HOSPADM

## 2020-09-02 RX ORDER — DEXTROSE MONOHYDRATE 50 MG/ML
100 INJECTION, SOLUTION INTRAVENOUS PRN
Status: DISCONTINUED | OUTPATIENT
Start: 2020-09-02 | End: 2020-09-04 | Stop reason: HOSPADM

## 2020-09-02 RX ORDER — MONTELUKAST SODIUM 10 MG/1
10 TABLET ORAL NIGHTLY
Status: DISCONTINUED | OUTPATIENT
Start: 2020-09-02 | End: 2020-09-04 | Stop reason: HOSPADM

## 2020-09-02 RX ORDER — HYDROCODONE BITARTRATE AND ACETAMINOPHEN 5; 325 MG/1; MG/1
1.5 TABLET ORAL ONCE
Status: COMPLETED | OUTPATIENT
Start: 2020-09-02 | End: 2020-09-02

## 2020-09-02 RX ORDER — HYDROCODONE BITARTRATE AND ACETAMINOPHEN 5; 325 MG/1; MG/1
1 TABLET ORAL EVERY 6 HOURS PRN
Status: DISCONTINUED | OUTPATIENT
Start: 2020-09-02 | End: 2020-09-04 | Stop reason: HOSPADM

## 2020-09-02 RX ORDER — ATORVASTATIN CALCIUM 20 MG/1
20 TABLET, FILM COATED ORAL DAILY
Status: DISCONTINUED | OUTPATIENT
Start: 2020-09-02 | End: 2020-09-04 | Stop reason: HOSPADM

## 2020-09-02 RX ORDER — LEVOTHYROXINE SODIUM 0.07 MG/1
75 TABLET ORAL EVERY MORNING
Status: DISCONTINUED | OUTPATIENT
Start: 2020-09-03 | End: 2020-09-04 | Stop reason: HOSPADM

## 2020-09-02 RX ORDER — MAGNESIUM HYDROXIDE/ALUMINUM HYDROXICE/SIMETHICONE 120; 1200; 1200 MG/30ML; MG/30ML; MG/30ML
15 SUSPENSION ORAL EVERY 6 HOURS PRN
Status: DISCONTINUED | OUTPATIENT
Start: 2020-09-02 | End: 2020-09-04 | Stop reason: HOSPADM

## 2020-09-02 RX ORDER — METOPROLOL SUCCINATE 25 MG/1
25 TABLET, EXTENDED RELEASE ORAL DAILY
Status: DISCONTINUED | OUTPATIENT
Start: 2020-09-02 | End: 2020-09-04 | Stop reason: HOSPADM

## 2020-09-02 RX ORDER — NIFEDIPINE 30 MG/1
60 TABLET, EXTENDED RELEASE ORAL DAILY
Status: DISCONTINUED | OUTPATIENT
Start: 2020-09-02 | End: 2020-09-04 | Stop reason: HOSPADM

## 2020-09-02 RX ORDER — GUAIFENESIN 600 MG/1
600 TABLET, EXTENDED RELEASE ORAL 2 TIMES DAILY PRN
Status: DISCONTINUED | OUTPATIENT
Start: 2020-09-02 | End: 2020-09-04 | Stop reason: HOSPADM

## 2020-09-02 RX ORDER — PANTOPRAZOLE SODIUM 40 MG/1
40 TABLET, DELAYED RELEASE ORAL DAILY
Status: DISCONTINUED | OUTPATIENT
Start: 2020-09-02 | End: 2020-09-04 | Stop reason: HOSPADM

## 2020-09-02 RX ORDER — POLYETHYLENE GLYCOL 3350 17 G/17G
17 POWDER, FOR SOLUTION ORAL DAILY PRN
Status: DISCONTINUED | OUTPATIENT
Start: 2020-09-02 | End: 2020-09-04 | Stop reason: HOSPADM

## 2020-09-02 RX ORDER — BUSPIRONE HYDROCHLORIDE 15 MG/1
15 TABLET ORAL 3 TIMES DAILY
Status: DISCONTINUED | OUTPATIENT
Start: 2020-09-02 | End: 2020-09-04

## 2020-09-02 RX ORDER — NICOTINE POLACRILEX 4 MG
15 LOZENGE BUCCAL PRN
Status: DISCONTINUED | OUTPATIENT
Start: 2020-09-02 | End: 2020-09-04 | Stop reason: HOSPADM

## 2020-09-02 RX ORDER — SODIUM CHLORIDE 9 MG/ML
INJECTION, SOLUTION INTRAVENOUS CONTINUOUS
Status: DISCONTINUED | OUTPATIENT
Start: 2020-09-02 | End: 2020-09-04

## 2020-09-02 RX ADMIN — CARBIDOPA AND LEVODOPA 1 TABLET: 10; 100 TABLET ORAL at 22:11

## 2020-09-02 RX ADMIN — QUETIAPINE FUMARATE 25 MG: 25 TABLET ORAL at 22:09

## 2020-09-02 RX ADMIN — HYDROCODONE BITARTRATE AND ACETAMINOPHEN 1 TABLET: 5; 325 TABLET ORAL at 22:53

## 2020-09-02 RX ADMIN — CEFTRIAXONE SODIUM 1 G: 1 INJECTION, POWDER, FOR SOLUTION INTRAMUSCULAR; INTRAVENOUS at 19:17

## 2020-09-02 RX ADMIN — MONTELUKAST 10 MG: 10 TABLET, FILM COATED ORAL at 22:11

## 2020-09-02 RX ADMIN — SODIUM CHLORIDE: 9 INJECTION, SOLUTION INTRAVENOUS at 22:22

## 2020-09-02 RX ADMIN — HYDROCODONE BITARTRATE AND ACETAMINOPHEN 1.5 TABLET: 5; 325 TABLET ORAL at 18:14

## 2020-09-02 RX ADMIN — ONDANSETRON 4 MG: 2 INJECTION INTRAMUSCULAR; INTRAVENOUS at 20:51

## 2020-09-02 RX ADMIN — BUSPIRONE HYDROCHLORIDE 15 MG: 15 TABLET ORAL at 22:10

## 2020-09-02 RX ADMIN — SODIUM CHLORIDE, PRESERVATIVE FREE 10 ML: 5 INJECTION INTRAVENOUS at 22:23

## 2020-09-02 RX ADMIN — LEVETIRACETAM 750 MG: 500 TABLET ORAL at 22:10

## 2020-09-02 RX ADMIN — ENOXAPARIN SODIUM 40 MG: 40 INJECTION SUBCUTANEOUS at 22:09

## 2020-09-02 RX ADMIN — ATORVASTATIN CALCIUM 20 MG: 20 TABLET, FILM COATED ORAL at 22:09

## 2020-09-02 ASSESSMENT — PAIN SCALES - GENERAL
PAINLEVEL_OUTOF10: 10
PAINLEVEL_OUTOF10: 8
PAINLEVEL_OUTOF10: 9
PAINLEVEL_OUTOF10: 10

## 2020-09-02 NOTE — ED PROVIDER NOTES
I independently examined and evaluated Tonya Martini. HPI:  In brief, patient is a 68-year-old female who presents to the emergency department with concern that she may have be having an adverse medication reaction. Patient notes that she has been taking Cymbalta for quite some time. On August 27, her dose of Cymbalta was increased to 60 mg twice daily. Since the increase in dose, she has been having auditory and visual hallucinations. She has been seeing spiders and people in her house were not there. She states that she sees cobwebs hanging from the ceiling. She hears indistinct voices. She has had similar symptoms with Prozac and Zoloft. Denies previous history of hallucinations. Denies suicidal homicidal ideation, plan, intent. Denies chest pain, shortness of breath, pleuritic pain. Denies abdominal pain, nausea, vomiting, diarrhea, constipation, hematochezia, melena, dysuria, hematuria. Denies additional precipitating, modifying, limiting factors      Physical Exam:  Triage VS:    ED Triage Vitals   Enc Vitals Group      BP 09/02/20 1357 (!) 154/85      Pulse 09/02/20 1357 67      Resp 09/02/20 1357 16      Temp 09/02/20 1357 98.3 °F (36.8 °C)      Temp Source 09/02/20 1357 Oral      SpO2 09/02/20 1357 99 %      Weight 09/02/20 1615 166 lb (75.3 kg)      Height 09/02/20 1615 5' 2\" (1.575 m)     My pulse ox interpretation is - normal    General appearance:  Patient is awake, alert, oriented. Following commands and answering questions. GCS is 15. Non-toxic in appearance. Skin:  Warm. Dry. Intact. No rashes, petechiae, purpura. Eye:  Pupils are equal, round, reactive. Extraocular movements intact. No nystagmus. No gaze deviation. Head, ears, nose, mouth and throat:  Head is normocephalic, atraumatic. No external masses or lesions. No nasal drainage. Pharynx is clear, non-erythematous. Airway is patent. Mucous membranes are moist.  Neck:  Supple. No nuchal rigidity. Trachea midline.  No masses, thyromegaly or lymphadenopathy. No JVD. No carotid thrills or bruits. Extremity:  No clubbing, cyanosis, or edema. No joint swelling. Normal muscle tone. Full range of motion in extremities. Heart:  Regular rate and regular rhythm. Audible S1 & S2. No audible murmurs, rubs, or gallops. Perfusion:  Symmetric peripheral pulses. Brisk capillary refill. Respiratory:  Lungs clear to auscultation bilaterally. No rales, rhonchi or wheezes. Respirations nonlabored. Abdominal:  Soft. Nontender. Non distended. Normal bowel sounds. No midline pulsatile abdominal masses, thrills or bruits. Back:  No CVA tenderness to palpation. No midline tenderness to palpation. Neurological:  Alert and oriented times 3. No focal or lateralizing neurological deficits. Psychiatric: Alert and lucid. Denies any suicidal or homicidal ideation, plan, intent. No signs of acute psychosis, edna, delirium. MDM:  Patient was seen and evaluated in the emergency department by myself and midlevel provider [facility]. A thorough history and physical exam were performed and prior medical records were reviewed. Upon arrival, patient's vital signs were noted. And no tachycardia, tachypnea, hypoxia per blood pressure elevated at 154/85. Patient is afebrile. Patient was connected to continuous cardiopulmonary monitoring. Rhythm strip interpreted by myself demonstrating sinus rhythm. Differential diagnoses and treatment plan were discussed. Pertinent labs were drawn and radiographic studies were performed. Once results were available, they were reviewed by myself. Labs demonstrate no leukocytosis, anemia, thrombocytopenia. Electrolytes demonstrate hyponatremia with a sodium 132. Chloride is hypochloremic at 96. Glucose is 142. TSH is 0.501. Free T4 is 1.47. Urine analysis is concerning for urinary tract infection with white blood cells and positive leukocyte esterase.  CT brain radiology report read stable exam without acute intracranial abnormality. On repeat evaluations, patient remains hemodynamically stable. Results of laboratory and radiographic data along with differential diagnoses and treatment plan were discussed with the patient. Patient presents with auditory/visual hallucinations after increasing dose of Cymbalta. She does have a urinary tract infection which may be leading to her encephalopathy as well. Given her symptoms, recommend admission to the hospital for further monitoring and treatment as necessary. Patient is agreeable. Rocephin is initiated for urinary tract infection. Case is discussed with admitting hospitalist who is agreeable with treatment plan excepts admission. Patient is updated bedside. All questions were answered to satisfaction. Patient is currently in the emergency department, and he was okay stable condition, awaiting admission. Clinical Impressions:  1. Acute encephalopathy    2. Urinary tract infection in female    3. Hallucinations    4. Adverse effect of drug, initial encounter        All diagnostic, treatment, and disposition decisions were made by myself in conjunction with the advanced practice provider. For all further details of the patient's emergency department visit, please see the advanced practice provider's documentation. Comment: Please note this report has been produced using speech recognition software and may contain errors related to that system including errors in grammar, punctuation, and spelling, as well as words and phrases that may be inappropriate. If there are any questions or concerns please feel free to contact the dictating provider for clarification.        [address] [facility],   09/02/20 8408

## 2020-09-02 NOTE — H&P
History and Physical      Name:  Vincent Levine /Age/Sex: 1955  (63 y.o. female)   MRN & CSN:  [id number] & [id number] Admission Date/Time: 2020  1:55 PM   Location:  [facility] PCP: Leandro Yee MD       Hospital Day: 1        Admitting Physician: Dr. Deb Bunch and Plan:   Vincent Levine is a 63 y.o. female who presents with Hallucinations and Medication Reaction     Hallucinations-auditory and visual.   Admit to [facility]   Neurochecks   Reduce Cymbalta back to 30 mg   Consult psych      UTI-urinalysis questionable but reports flank pain/dysuria   pending culture   IV Rocephin for empiric coverage      MDD/Anxiety -continue BuSpar/ seroquel     Essential hypertension- continue home antihypertensive regimen- Monitor BP trends.  DMII with chronic complications and with long term use of insulin. Last A1C 7.8 (2020). Nonadherent insulin regimen              Monitor FSBS and cover with medium dose SSI      RLS-sinemet      Chronic pain syndrome- continue norco     Patient case discussed with ED provider    Diet  carb control   DVT Prophylaxis [x] Lovenox, []  Heparin, [] SCDs, [] Ambulation  [] Long term AC   GI Prophylaxis [] PPI,  [] H2 Blocker,  [] Carafate,  [] Diet/Tube Feeds   Code Status Full     Disposition Admit to inpatient. Patient plans to return home upon discharge   MDM [] Low, [] Moderate,[x]  High     -Patient assessment and plan discussed and reviewed with admitting physician: Any Kumar MD.       History of Present Illness:     Chief Complaint: Hallucinations and Medication Reaction    Vincent Levine is a 63 y.o. female who presents with concerns of auditory visual hallucinations. Reports onset of past 3 days. Reports hearing/ seeing people picking up things in her apartment as well as seeing spiders. Also reports hearing incomprehensible voices.  She is attributing her symptoms to her newly increased Cymbalta dose (last taken this AM). It was increased from 30 mg DR to 60 mg ER on 8/3/2020. She does report discharge from inpatient psych unit at [address] [place] [address] on that day also. She was admitted for uncontrolled anxiety/suicidal ideations/ MDD. Currently denies any suicidal ideation/homicidal ideation but states anxiety continues to be uncontrolled generalized stressors. She was seen at this facility 8/15/2020 related to anxiety/tachycardia. She was started on metoprolol reports better control of her blood pressure/heart rate    Also states increased lower back pain that she is attributed to her kidneys with associated dysuria       Ten point ROS: reviewed negative, unless as noted in above HPI. Objective:   No intake or output data in the 24 hours ending 09/02/20 1822     Vitals:   Vitals:    09/02/20 1357 09/02/20 1615 09/02/20 1816   BP: (!) 154/85  (!) 162/79   Pulse: 67  80   Resp: 16  16   Temp: 98.3 °F (36.8 °C)  97.8 °F (36.6 °C)   TempSrc: Oral  Oral   SpO2: 99%  98%   Weight:  166 lb (75.3 kg)    Height:  5' 2\" (1.575 m)        Physical Exam: 09/02/20     GEN -Awake nontoxic appearing female, sitting upright in bed , NAD. Normal body habitus. Appears given age. EYES -PERRLA. No scleral erythema, discharge, or conjunctivitis. HENT -MM are moist. Oral pharynx without exudates, no evidence of thrush. NECK -Supple, no apparent thyromegaly or masses. RESP -CTA, no wheezes, rales or rhonchi. Symmetric chest movement while on RA.   C/V -S1/S2 auscultated. RRR without appreciable M/R/G. No JVD or carotid bruits. Peripheral pulses equal bilaterally and palpable. Cap refill <3 sec. no peripheral edema. GI -Abdomen is soft non distended and without significant TTP. + BS. No masses or guarding. Rectal exam deferred. No HSM   -No CVA/ flank tenderness. Mercer catheter is not present. LYMPH-No palpable cervical lymphadenopathy and no hepatosplenomegaly. No petechiae or ecchymoses. MS -No gross joint deformities.   SKIN -Normal coloration, warm, dry. NEURO-Cranial nerves appear grossly intact, normal speech, no lateralizing weakness. PSYC-Awake, alert, oriented x 4- person, place, time, situation, anxious affect. Flight of ideas/tangential communication    Past Medical History:      Past Medical History:   Diagnosis Date    Abnormal EKG 4/22/2014    Acid reflux     Anemia     Anesthesia     Nausea/Vomiting Post Op In Past    Anginal pain (HCC)     Denies Chest Pain At This Time    Anxiety     Arthritis     \"All Over\"    Asthma     CAD (coronary artery disease)     per last cardiac cath.  Cerebral artery occlusion with cerebral infarction ([address]     CHF (congestive heart failure) (HCC)     Chronic back pain     Chronic kidney disease     DDD (degenerative disc disease), cervical     12- Patient reports she was dx with DDD of Cerival spine C6,C7    Depression     Diabetes mellitus ([address] Dx 1990's    Diabetic neuropathy ([address]     \"In My Legs And Feet\"    Dizziness     \"Sometimes\"    Dry skin     Enlarged ureter     Right Side    Fatty liver     Fibrocystic breast     Gout     Pt states she was diagnosed with gout in the past few months.  H/O cardiac catheterization     Showed mild disease per last cath.  H/O cardiovascular stress test 03/15/2010    EF 69%, normal perfusion study except for diaphragmatic artifact, uniform wall motion.  H/O cardiovascular stress test 10/09/2008    EF 60%, no anginia, normal study.  H/O cardiovascular stress test 05/06/2014    EF 66%, no ischemia, normal LV systolic funciton, normal perfusion pattern.  H/O Doppler ultrasound 02/28/2011    CAROTID DOPPLER-normal study.  H/O echocardiogram 5/6/2014    Ef >55%. Impaired LV relaxation.  H/O echocardiogram 10/14/15    EF 60% Normal LV and systolic function. No significant valvulopathy seen.      History of Holter monitoring 3/24/15    24 hour - predominant rhythm sinus    [place] (hard of hearing)     Bilateral Ears    Hx of cardiovascular stress test 10/19/2015    lexiscan-normal,EF63%    Hx of motion sickness     HX OTHER MEDICAL     Primary Care Physician Is Dr. Heron Jiménez In [facility] in [place]    Hyperlipidemia     Hypertension     IBS (irritable bowel syndrome)     Incisional hernia 4/2014    Kidney stones Last Episode In 2012 Or 2013    Passed Kidney Stones Numberous Times    Migraines     Nausea & vomiting     Nausea/Vomiting Post Op In Past    Other specified disorder of skin     12- Patient states she has a condition of her vaginal area (skin) which starts with the letters Hector Bras. She is currently being treated with multiple creams and weekly Diflucan. 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  Staph infection Dx 1980's    Toes On Left Foot    Thyroid disease     hypothroidism    Tremor     \"Tremors All Over\"    Urinary incontinence     UTI (urinary tract infection) In Past    No Current Symptoms    UTI (urinary tract infection)     Vertigo     \"Sometimes\"    Wears glasses        Past Surgical  History:    has a past surgical history that includes Carpal tunnel release (Right, 1999); Diagnostic Cardiac Cath Lab Procedure (01/11/2010); Dental surgery; Colonoscopy (Last Done 6-13); Endoscopy, colon, diagnostic (Several ); Bladder surgery (1970's Or 1980's); Lithotripsy (2011); Breast biopsy (Right, 1980's); Breast surgery (Left, 1990's); hernia repair (7746'X); hernia repair (1970's); Cholecystectomy (1970's); Appendectomy (1970's); Hysterectomy, total abdominal (1987); Tubal ligation (1978); Esophagus dilation (1980's And 1990's); Knee arthroscopy (Right, 1999); joint replacement (2008); other surgical history (06 13 2014); fracture surgery (1974 Or 1975);  Cardiac catheterization (10-18-06); and Cardiac catheterization. Social History:    FAM HX: Reviewed  family history includes Arthritis in her father, mother, and sister; Cancer in her brother and sister; Colon Cancer in her brother; Depression in her mother; Diabetes in her mother; Early Death in her brother; Early Death (age of onset: 37) in her brother; Early Death (age of onset: 61) in her mother; Early Death (age of onset: 61) in her brother; Hearing Loss in her sister; Heart Disease in her brother, brother, brother, father, mother, and sister; Heart Failure in her sister; High Blood Pressure in her brother, father, mother, and sister; High Cholesterol in her brother, father, and mother; Mental Illness in her daughter; Arnetha Veronica / [place] in her mother; Other in her daughter, mother, and son; Stroke in her mother. Soc HX:   Social History     Socioeconomic History    Marital status:       Spouse name: None    Number of children: 3    Years of education: 6    Highest education level: None   Occupational History    None   Social Needs    Financial resource strain: None    Food insecurity     Worry: None     Inability: None    Transportation needs     Medical: None     Non-medical: None   Tobacco Use    Smoking status: Never Smoker    Smokeless tobacco: Never Used   Substance and Sexual Activity    Alcohol use: No    Drug use: No    Sexual activity: Not Currently   Lifestyle    Physical activity     Days per week: None     Minutes per session: None    Stress: None   Relationships    Social connections     Talks on phone: None     Gets together: None     Attends Yarsani service: None     Active member of club or organization: None     Attends meetings of clubs or organizations: None     Relationship status: None    Intimate partner violence     Fear of current or ex partner: None     Emotionally abused: None     Physically abused: None     Forced sexual activity: None   Other Topics Concern    None   Social History Narrative    None     TOBACCO:   reports that she has never smoked. She has never used smokeless tobacco.  ETOH:   reports no history of alcohol use. Drugs:  reports no history of drug use. Allergies:    Allergies   Allergen Reactions    Abilify [Aripiprazole]      \"Severe Shaking And Restlessness\"    Advil [Ibuprofen Micronized] Palpitations     \"Severe High Blood Pressure\"    Augmentin [Amoxicillin-Pot Clavulanate] Itching and Rash    Bee Venom Swelling     Redness    Ciprofloxacin Itching and Rash    Codeine      \"Severe Abdominal Cramping\"    Darvocet [Propoxyphene N-Acetaminophen] Palpitations     \"Severe High Blood Pressure\"    Darvon [Propoxyphene Hcl] Palpitations     \"Severe High Blood Pressure\"    Decadron [Dexamethasone] Other (See Comments)     seizure  Seizures    Ditropan [Oxybutynin Chloride] Palpitations     \"Severe High Blood Pressure\"    Fioricet [Butalbital-Apap-Caffeine] Palpitations     \"Severe High Blood Pressure\"    Fiorinal-Codeine #3 [Butalbital-Asa-Caff-Codeine]      \"Severe Stomach Cramps\"    Flagyl [Metronidazole] Diarrhea     \"Severe Diarrhea And Cramping\"    Naproxen Palpitations     \"Severe High Blood Pressure\"    Other      \"Allergic To Spider Bites Causing Blackness Of Skin, Severe Itching And Pain\"                                                  \"Allergic To Powder In Gloves Causing Severe Redness And Itching\"    Prozac [Fluoxetine Hcl]      \"Hallucinations\"    Robaxin [Methocarbamol] Palpitations     \"Severe High Blood Pressure\"    Ultram [Tramadol]      \"Severe Stomach Pain\"    Zoloft Palpitations     \"Sever High Blood Pressure\"    Butalbital-Aspirin-Caffeine Other (See Comments)     \"severe stomach pain\"    Coreg [Carvedilol] Other (See Comments)     \"spikes my BP severely and send me into a severe anxiety attack\"    Fluoxetine Other (See Comments)     hallucinations    Oxybutynin Chloride Other (See Comments) Raises bp    Sertraline Other (See Comments)     hallucinations    Tape Victor M Pares Tape]      Patient states it tears her skin, including band aids    Reglan [Metoclopramide] Other (See Comments)     \"makes me talk like a baby, but if I take benadryl with it it's fine\"       Home Medications:     Prior to Admission medications    Medication Sig Start Date End Date Taking? Authorizing Provider   metoprolol succinate (TOPROL XL) 25 MG extended release tablet Take 1 tablet by mouth daily 8/15/20   Kevin Hall MD   NIFEdipine (ADALAT CC) 60 MG extended release tablet Take 1 tablet by mouth daily 8/11/20   Jordon Samaniego, APRN - CNP   busPIRone (BUSPAR) 15 MG tablet Take 15 mg by mouth 3 times daily    Historical Provider, MD   losartan-hydrochlorothiazide (HYZAAR) 100-25 MG per tablet Take 1 tablet by mouth daily 7/15/20   Rolando Benitez MD   levothyroxine (SYNTHROID) 25 MCG tablet Take 1 tablet by mouth every morning  Patient taking differently: Take 75 mcg by mouth every morning  7/1/20   Radha Razo MD   diphenhydrAMINE (BENADRYL) 25 MG tablet Take 25 mg by mouth every 6 hours as needed    Historical Provider, MD   MUCINEX 600 MG extended release tablet 2 times daily 3/4/20   Historical Provider, MD   HYDROcodone-acetaminophen (NORCO) 5-325 MG per tablet Take 1-2 tablets by mouth every 4 hours as needed.     Historical Provider, MD   tiZANidine (ZANAFLEX) 4 MG tablet as needed 3/4/20   Historical Provider, MD   DICLOFENAC SODIUM EX Apply topically    Historical Provider, MD   docusate sodium (COLACE) 100 MG capsule Take 1 capsule by mouth 2 times daily 1/9/20   HAYDEN Bowman   albuterol sulfate  (90 Base) MCG/ACT inhaler Inhale 2 puffs into the lungs every 6 hours as needed for Wheezing or Shortness of Breath (or cough) Please include spacer with instructions for use. 6/11/19   HAYDEN Bowman   QUEtiapine (SEROQUEL) 25 MG tablet Take 25 mg by mouth every evening    Historical Provider, MD PEACOCK FLEXTOUCH 200 UNIT/ML SOPN Inject 30 Units into the skin every morning  Patient taking differently: Inject 60 Units into the skin nightly  1/3/19   Toshia Harding MD   Respiratory Therapy Supplies (NEBULIZER COMPRESSOR) KIT 1 kit by Does not apply route once for 1 dose 1/3/19 7/16/20  Toshia Harding MD   ipratropium-albuterol (DUONEB) 0.5-2.5 (3) MG/3ML SOLN nebulizer solution Inhale 3 mLs into the lungs every 4 hours (while awake) 1/3/19   Toshia Harding, MD   levETIRAcetam (KEPPRA) 750 MG tablet Take 1 tablet by mouth nightly 1/3/19   Toshia Harding, MD   aluminum & magnesium hydroxide-simethicone (MAALOX MAX) 400-400-40 MG/5ML SUSP Take 15 mLs by mouth every 6 hours as needed (heart burn) 9/12/18   WENDIE Rosario - CNP   pantoprazole (PROTONIX) 40 MG tablet Take 1 tablet by mouth daily 7/20/18   HAYDEN Suarez   carbidopa-levodopa (SINEMET)  MG per tablet Take 1 tablet by mouth nightly 5/14/18   Historical Provider, MD   amitriptyline (ELAVIL) 25 MG tablet Take 1 tablet by mouth nightly 1/11/18   Corazon Martines MD   polyethylene glycol (GLYCOLAX) packet Take 17 g by mouth daily as needed for Constipation    Historical Provider, MD   aspirin 81 MG tablet Take 81 mg by mouth daily    Historical Provider, MD   allopurinol (ZYLOPRIM) 300 MG tablet Take 300 mg by mouth daily    Historical Provider, MD   insulin aspart (NOVOLOG) 100 UNIT/ML injection vial Inject 20 Units into the skin 3 times daily (before meals) Indications: per sliding scale Plus a sliding scale, supper 25 units plus sliding scale    Historical Provider, MD   magnesium oxide (MAG-OX) 400 (240 MG) MG tablet Take 400 mg by mouth daily    Historical Provider, MD   Multiple Vitamins-Iron (MULTI-VITAMIN/IRON PO) Take  by mouth. Historical Provider, MD   montelukast (SINGULAIR) 10 MG tablet Take 10 mg by mouth nightly.     Historical Provider, MD   simvastatin (ZOCOR) 20 MG tablet Take 20 mg by mouth nightly. Historical Provider, MD         Medications:   Medications:    cefTRIAXone (ROCEPHIN) IV  1 g Intravenous Once      Infusions:   PRN Meds:      Data:     Laboratory this visit:  Reviewed  Recent Labs     09/02/20  1517   WBC 9.2   HGB 13.4   HCT 39.0         Recent Labs     09/02/20  1517   *   K 3.7   CL 96*   CO2 23   BUN 10   CREATININE 0.6     Recent Labs     09/02/20  1517   AST 19   ALT 14   BILITOT 0.6   ALKPHOS 66     No results for input(s): INR in the last 72 hours. Radiology this visit:  Reviewed. Ct Head Wo Contrast    Result Date: 9/2/2020  EXAMINATION: CT OF THE HEAD WITHOUT CONTRAST  9/2/2020 2:36 pm TECHNIQUE: CT of the head was performed without the administration of intravenous contrast. Dose modulation, iterative reconstruction, and/or weight based adjustment of the mA/kV was utilized to reduce the radiation dose to as low as reasonably achievable. COMPARISON: 06/24/2020. HISTORY: ORDERING SYSTEM PROVIDED HISTORY: hallucinations TECHNOLOGIST PROVIDED HISTORY: Reason for exam:->hallucinations Has a \"code stroke\" or \"stroke alert\" been called? ->No Reason for Exam: hallucinations Acuity: Acute Type of Exam: Initial FINDINGS: BRAIN/VENTRICLES: There is no acute intracranial hemorrhage, mass effect or midline shift. No abnormal extra-axial fluid collection. The gray-white differentiation is maintained without evidence of an acute infarct. There is prominence of the ventricles and sulci due to global parenchymal volume loss. There are nonspecific areas of hypoattenuation within the periventricular and subcortical white matter, which likely represent chronic microvascular ischemic change. ORBITS: The visualized portion of the orbits demonstrate no acute abnormality. SINUSES: The visualized paranasal sinuses and mastoid air cells demonstrate no acute abnormality. SOFT TISSUES/SKULL: No acute abnormality of the visualized skull or soft tissues.      Stable exam without acute intracranial abnormality.          EKG this visit:  Reviewed         Electronically signed by Margarita Holstein, APRN - CNP on 9/2/2020 at 6:22 PM

## 2020-09-02 NOTE — ED TRIAGE NOTES
Pt to ED with c/o medication reaction r/t Cymbalta, pt states she is having hallucinations while in her apartment with \"people walking in my apartment and stack boxes where they shouldn't be\". Pt with many listed drug allergies.
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
## 2020-09-03 ENCOUNTER — APPOINTMENT (OUTPATIENT)
Dept: CT IMAGING | Age: 65
DRG: 092 | End: 2020-09-03
Payer: MEDICARE

## 2020-09-03 LAB
ALBUMIN SERPL-MCNC: 4.3 GM/DL (ref 3.4–5)
ALP BLD-CCNC: 65 IU/L (ref 40–128)
ALT SERPL-CCNC: <5 U/L (ref 10–40)
ANION GAP SERPL CALCULATED.3IONS-SCNC: 9 MMOL/L (ref 4–16)
AST SERPL-CCNC: 16 IU/L (ref 15–37)
BASOPHILS ABSOLUTE: 0.1 K/CU MM
BASOPHILS RELATIVE PERCENT: 0.7 % (ref 0–1)
BILIRUB SERPL-MCNC: 0.5 MG/DL (ref 0–1)
BUN BLDV-MCNC: 10 MG/DL (ref 6–23)
CALCIUM SERPL-MCNC: 9.4 MG/DL (ref 8.3–10.6)
CHLORIDE BLD-SCNC: 99 MMOL/L (ref 99–110)
CO2: 29 MMOL/L (ref 21–32)
CREAT SERPL-MCNC: 0.7 MG/DL (ref 0.6–1.1)
DIFFERENTIAL TYPE: ABNORMAL
EOSINOPHILS ABSOLUTE: 0.1 K/CU MM
EOSINOPHILS RELATIVE PERCENT: 1.1 % (ref 0–3)
GFR AFRICAN AMERICAN: >60 ML/MIN/1.73M2
GFR NON-AFRICAN AMERICAN: >60 ML/MIN/1.73M2
GLUCOSE BLD-MCNC: 153 MG/DL (ref 70–99)
GLUCOSE BLD-MCNC: 156 MG/DL (ref 70–99)
GLUCOSE BLD-MCNC: 202 MG/DL (ref 70–99)
GLUCOSE BLD-MCNC: 216 MG/DL (ref 70–99)
GLUCOSE BLD-MCNC: 270 MG/DL (ref 70–99)
HCT VFR BLD CALC: 37.1 % (ref 37–47)
HEMOGLOBIN: 12.7 GM/DL (ref 12.5–16)
IMMATURE NEUTROPHIL %: 0.3 % (ref 0–0.43)
LYMPHOCYTES ABSOLUTE: 3.4 K/CU MM
LYMPHOCYTES RELATIVE PERCENT: 32.5 % (ref 24–44)
MCH RBC QN AUTO: 29.3 PG (ref 27–31)
MCHC RBC AUTO-ENTMCNC: 34.2 % (ref 32–36)
MCV RBC AUTO: 85.5 FL (ref 78–100)
MONOCYTES ABSOLUTE: 0.8 K/CU MM
MONOCYTES RELATIVE PERCENT: 7.6 % (ref 0–4)
NUCLEATED RBC %: 0 %
PDW BLD-RTO: 12.5 % (ref 11.7–14.9)
PLATELET # BLD: 317 K/CU MM (ref 140–440)
PMV BLD AUTO: 9.5 FL (ref 7.5–11.1)
POTASSIUM SERPL-SCNC: 4 MMOL/L (ref 3.5–5.1)
RBC # BLD: 4.34 M/CU MM (ref 4.2–5.4)
SEGMENTED NEUTROPHILS ABSOLUTE COUNT: 6.1 K/CU MM
SEGMENTED NEUTROPHILS RELATIVE PERCENT: 57.8 % (ref 36–66)
SODIUM BLD-SCNC: 137 MMOL/L (ref 135–145)
TOTAL IMMATURE NEUTOROPHIL: 0.03 K/CU MM
TOTAL NUCLEATED RBC: 0 K/CU MM
TOTAL PROTEIN: 6.5 GM/DL (ref 6.4–8.2)
WBC # BLD: 10.5 K/CU MM (ref 4–10.5)

## 2020-09-03 PROCEDURE — 87086 URINE CULTURE/COLONY COUNT: CPT

## 2020-09-03 PROCEDURE — 80053 COMPREHEN METABOLIC PANEL: CPT

## 2020-09-03 PROCEDURE — 94150 VITAL CAPACITY TEST: CPT

## 2020-09-03 PROCEDURE — 6360000002 HC RX W HCPCS: Performed by: NURSE PRACTITIONER

## 2020-09-03 PROCEDURE — 2580000003 HC RX 258: Performed by: NURSE PRACTITIONER

## 2020-09-03 PROCEDURE — 74176 CT ABD & PELVIS W/O CONTRAST: CPT

## 2020-09-03 PROCEDURE — 6370000000 HC RX 637 (ALT 250 FOR IP): Performed by: NURSE PRACTITIONER

## 2020-09-03 PROCEDURE — 6370000000 HC RX 637 (ALT 250 FOR IP): Performed by: PHYSICIAN ASSISTANT

## 2020-09-03 PROCEDURE — 2580000003 HC RX 258: Performed by: HOSPITALIST

## 2020-09-03 PROCEDURE — 6370000000 HC RX 637 (ALT 250 FOR IP): Performed by: HOSPITALIST

## 2020-09-03 PROCEDURE — 99221 1ST HOSP IP/OBS SF/LOW 40: CPT | Performed by: NURSE PRACTITIONER

## 2020-09-03 PROCEDURE — 82962 GLUCOSE BLOOD TEST: CPT

## 2020-09-03 PROCEDURE — 85025 COMPLETE CBC W/AUTO DIFF WBC: CPT

## 2020-09-03 PROCEDURE — 36415 COLL VENOUS BLD VENIPUNCTURE: CPT

## 2020-09-03 PROCEDURE — 1200000000 HC SEMI PRIVATE

## 2020-09-03 PROCEDURE — 6360000002 HC RX W HCPCS: Performed by: HOSPITALIST

## 2020-09-03 RX ORDER — HYDROCODONE BITARTRATE AND ACETAMINOPHEN 5; 325 MG/1; MG/1
1.5 TABLET ORAL ONCE
Status: COMPLETED | OUTPATIENT
Start: 2020-09-03 | End: 2020-09-03

## 2020-09-03 RX ORDER — DULOXETIN HYDROCHLORIDE 30 MG/1
30 CAPSULE, DELAYED RELEASE ORAL 2 TIMES DAILY
Status: DISCONTINUED | OUTPATIENT
Start: 2020-09-03 | End: 2020-09-04

## 2020-09-03 RX ADMIN — INSULIN LISPRO 2 UNITS: 100 INJECTION, SOLUTION INTRAVENOUS; SUBCUTANEOUS at 09:37

## 2020-09-03 RX ADMIN — HYDROCODONE BITARTRATE AND ACETAMINOPHEN 1 TABLET: 5; 325 TABLET ORAL at 20:12

## 2020-09-03 RX ADMIN — BUSPIRONE HYDROCHLORIDE 15 MG: 15 TABLET ORAL at 14:13

## 2020-09-03 RX ADMIN — SODIUM CHLORIDE: 9 INJECTION, SOLUTION INTRAVENOUS at 12:44

## 2020-09-03 RX ADMIN — DULOXETINE HYDROCHLORIDE 30 MG: 30 CAPSULE, DELAYED RELEASE ORAL at 08:02

## 2020-09-03 RX ADMIN — CEFTRIAXONE SODIUM 1 G: 1 INJECTION, POWDER, FOR SOLUTION INTRAMUSCULAR; INTRAVENOUS at 16:43

## 2020-09-03 RX ADMIN — MAGNESIUM OXIDE 400 MG (241.3 MG MAGNESIUM) TABLET 400 MG: TABLET at 08:02

## 2020-09-03 RX ADMIN — INSULIN LISPRO 4 UNITS: 100 INJECTION, SOLUTION INTRAVENOUS; SUBCUTANEOUS at 14:08

## 2020-09-03 RX ADMIN — PANTOPRAZOLE SODIUM 40 MG: 40 TABLET, DELAYED RELEASE ORAL at 08:02

## 2020-09-03 RX ADMIN — BUSPIRONE HYDROCHLORIDE 15 MG: 15 TABLET ORAL at 20:09

## 2020-09-03 RX ADMIN — ATORVASTATIN CALCIUM 20 MG: 20 TABLET, FILM COATED ORAL at 08:02

## 2020-09-03 RX ADMIN — CARBIDOPA AND LEVODOPA 1 TABLET: 10; 100 TABLET ORAL at 20:11

## 2020-09-03 RX ADMIN — ENOXAPARIN SODIUM 40 MG: 40 INJECTION SUBCUTANEOUS at 08:02

## 2020-09-03 RX ADMIN — NIFEDIPINE 60 MG: 30 TABLET, FILM COATED, EXTENDED RELEASE ORAL at 08:02

## 2020-09-03 RX ADMIN — DULOXETINE HYDROCHLORIDE 30 MG: 30 CAPSULE, DELAYED RELEASE ORAL at 20:09

## 2020-09-03 RX ADMIN — HYDROCODONE BITARTRATE AND ACETAMINOPHEN 1 TABLET: 5; 325 TABLET ORAL at 08:02

## 2020-09-03 RX ADMIN — MONTELUKAST 10 MG: 10 TABLET, FILM COATED ORAL at 20:09

## 2020-09-03 RX ADMIN — QUETIAPINE FUMARATE 25 MG: 25 TABLET ORAL at 16:43

## 2020-09-03 RX ADMIN — HYDROCODONE BITARTRATE AND ACETAMINOPHEN 1 TABLET: 5; 325 TABLET ORAL at 14:13

## 2020-09-03 RX ADMIN — LEVETIRACETAM 750 MG: 500 TABLET ORAL at 20:10

## 2020-09-03 RX ADMIN — METOPROLOL SUCCINATE 25 MG: 25 TABLET, EXTENDED RELEASE ORAL at 08:02

## 2020-09-03 RX ADMIN — ALLOPURINOL 300 MG: 100 TABLET ORAL at 08:02

## 2020-09-03 RX ADMIN — BUSPIRONE HYDROCHLORIDE 15 MG: 15 TABLET ORAL at 08:02

## 2020-09-03 RX ADMIN — ONDANSETRON 4 MG: 2 INJECTION INTRAMUSCULAR; INTRAVENOUS at 15:55

## 2020-09-03 RX ADMIN — LOSARTAN POTASSIUM AND HYDROCHLOROTHIAZIDE 2 TABLET: 12.5; 5 TABLET ORAL at 10:18

## 2020-09-03 RX ADMIN — HYDROCODONE BITARTRATE AND ACETAMINOPHEN 1.5 TABLET: 5; 325 TABLET ORAL at 01:32

## 2020-09-03 RX ADMIN — INSULIN LISPRO 2 UNITS: 100 INJECTION, SOLUTION INTRAVENOUS; SUBCUTANEOUS at 16:47

## 2020-09-03 RX ADMIN — SODIUM CHLORIDE, PRESERVATIVE FREE 10 ML: 5 INJECTION INTRAVENOUS at 10:18

## 2020-09-03 RX ADMIN — LEVOTHYROXINE SODIUM 75 MCG: 75 TABLET ORAL at 08:02

## 2020-09-03 ASSESSMENT — PAIN DESCRIPTION - FREQUENCY
FREQUENCY: CONTINUOUS
FREQUENCY: CONTINUOUS

## 2020-09-03 ASSESSMENT — PAIN SCALES - GENERAL
PAINLEVEL_OUTOF10: 6
PAINLEVEL_OUTOF10: 9
PAINLEVEL_OUTOF10: 10
PAINLEVEL_OUTOF10: 9
PAINLEVEL_OUTOF10: 10
PAINLEVEL_OUTOF10: 9
PAINLEVEL_OUTOF10: 10
PAINLEVEL_OUTOF10: 6

## 2020-09-03 ASSESSMENT — PAIN DESCRIPTION - LOCATION
LOCATION: ARM;BACK;NECK
LOCATION: GENERALIZED

## 2020-09-03 ASSESSMENT — PAIN DESCRIPTION - ONSET: ONSET: ON-GOING

## 2020-09-03 ASSESSMENT — PAIN DESCRIPTION - DESCRIPTORS
DESCRIPTORS: ACHING;CONSTANT
DESCRIPTORS: ACHING;DISCOMFORT

## 2020-09-03 ASSESSMENT — PAIN DESCRIPTION - ORIENTATION: ORIENTATION: RIGHT

## 2020-09-03 ASSESSMENT — PAIN DESCRIPTION - PAIN TYPE
TYPE: CHRONIC PAIN
TYPE: CHRONIC PAIN

## 2020-09-03 NOTE — CARE COORDINATION
Pt needs to be seen by psych per consult. CM to follow, will see her once psych has made recommendations.

## 2020-09-03 NOTE — PROGRESS NOTES
HOME MEDICATIONS BEING STORED IN THE INPATIENT PHARMACY     CONTENTS:  HYDROCODONE-APAP 7.5-325mg (seventy-one tablets (71))    Security Bag #: [id number]       Bag Receipt sent to the floor to be placed in the patient's chart. Please have RN or patient pick-up home medications in the in-patient pharmacy prior to being discharged. Thank you.     Genoveva Diehl RPh, PharmD, BCPS  9/3/2020   7:48 PM

## 2020-09-03 NOTE — PROGRESS NOTES
 Robaxin [Methocarbamol] Palpitations     \"Severe High Blood Pressure\"    Ultram [Tramadol]      \"Severe Stomach Pain\"    Zoloft Palpitations     \"Sever High Blood Pressure\"    Butalbital-Aspirin-Caffeine Other (See Comments)     \"severe stomach pain\"    Coreg [Carvedilol] Other (See Comments)     \"spikes my BP severely and send me into a severe anxiety attack\"    Fluoxetine Other (See Comments)     hallucinations    Oxybutynin Chloride Other (See Comments)     Raises bp    Sertraline Other (See Comments)     hallucinations    Tape [Adhesive Tape]      Patient states it tears her skin, including band aids    Reglan [Metoclopramide] Other (See Comments)     \"makes me talk like a baby, but if I take benadryl with it it's fine\"       PCP: Dilcia Andersen MD    PAST MEDICAL HISTORY, SURGICAL HISTORY, SOCIAL HISTORY and  HOME MEDICATIONS all reviewed. OBJECTIVE  Vitals:    09/02/20 1816 09/02/20 2040 09/03/20 0412 09/03/20 0800   BP: (!) 162/79 (!) 161/83 (!) 158/86 (!) 174/84   Pulse: 80 79 78 71   Resp: 16 16 16 18   Temp: 97.8 °F (36.6 °C) 98.4 °F (36.9 °C) 98 °F (36.7 °C) 98.2 °F (36.8 °C)   TempSrc: Oral Oral Oral Oral   SpO2: 98% 98%     Weight:       Height:           PHYSICAL EXAM   GEN Awake female, sitting upright in bed in no apparent distress. Appears given age. EYES Pupils are equally round. No scleral erythema, discharge, or conjunctivitis. HENT Mucous membranes are moist. Oral pharynx without exudates, no evidence of thrush. NECK Supple, no apparent thyromegaly or masses. RESP Clear to auscultation, no wheezes, rales or rhonchi. Symmetric chest movement while on room air. CARDIO/VASC S1/S2 auscultated. Regular rate without appreciable murmurs, rubs, or gallops. No JVD or carotid bruits. Peripheral pulses equal bilaterally and palpable. No peripheral edema. GI Abdomen is soft without significant tenderness, masses, or guarding. Bowel sounds are normoactive. Rectal exam deferred.  No costovertebral angle tenderness. Normal appearing external genitalia. Mercer catheter is not present. HEME/LYMPH No palpable cervical lymphadenopathy and no hepatosplenomegaly. No petechiae or ecchymoses. MSK Spontaneous movement of all extremities. No gross joint deformities. SKIN Normal coloration, warm, dry. NEURO Cranial nerves appear grossly intact, normal speech, no lateralizing weakness. PSYCH Awake, alert, oriented x 4. Affect appropriate. INTAKE: In: 10 [I.V.:10]  Out: -   OUTPUT: In: 10   Out: -     LABS  Recent Labs     09/02/20  1517 09/03/20  0155   WBC 9.2 10.5   HGB 13.4 12.7   HCT 39.0 37.1    317      Recent Labs     09/02/20  1517 09/03/20  0155   * 137   K 3.7 4.0   CL 96* 99   CO2 23 29   BUN 10 10   CREATININE 0.6 0.7     Recent Labs     09/02/20  1517 09/03/20  0155   AST 19 16   ALT 14 <5*   BILITOT 0.6 0.5   ALKPHOS 66 65     No results for input(s): INR in the last 72 hours. No results for input(s): CKTOTAL, CKMB, CKMBINDEX, TROPONINT in the last 72 hours.        Abnormal labs for today noted      Imaging:     ECHO:    Microbiology:  Blood culture:    Urine culture:    Sputum culture:    Procedures done this admission:    MEDS  Scheduled Meds:   sodium chloride flush  10 mL Intravenous 2 times per day    enoxaparin  40 mg Subcutaneous Daily    allopurinol  300 mg Oral Daily    busPIRone  15 mg Oral TID    metoprolol succinate  25 mg Oral Daily    carbidopa-levodopa  1 tablet Oral Nightly    levETIRAcetam  750 mg Oral Nightly    levothyroxine  75 mcg Oral QAM    magnesium oxide  400 mg Oral Daily    losartan-hydroCHLOROthiazide  2 tablet Oral Daily    montelukast  10 mg Oral Nightly    NIFEdipine  60 mg Oral Daily    pantoprazole  40 mg Oral Daily    QUEtiapine  25 mg Oral QPM    atorvastatin  20 mg Oral Daily    insulin lispro  0-12 Units Subcutaneous TID     insulin lispro  0-6 Units Subcutaneous Nightly    DULoxetine  30 mg Oral Daily Continuous Infusions:   sodium chloride 75 mL/hr at 09/02/20 2222    dextrose       PRN Meds:sodium chloride flush, promethazine **OR** ondansetron, aluminum & magnesium hydroxide-simethicone, guaiFENesin, polyethylene glycol, glucose, dextrose, glucagon (rDNA), dextrose, HYDROcodone-acetaminophen        ASSESSMENT and PLAN  Hospital Day: 2    1-Probable symptomatic UTI on presentation- with lower abdominal and right sided lower back pain- dysuric- UA mildly positive, urine culture ordered- continue rocephin for now  2-Hallucinations- visual/auditory- likely metabolic encephalopathy from UTI vs toxic from medications , denies having this while being treated at inpatient psych- CT head negative, noted cymbalta dose, recommended to resume original cymbalta dose of 60mg while treating UTI- declines and wants to keep it at 30mg- she thinks its contributing to this ongoing hallucinations. 3-Major depression/anxiety- recently requiring inpatient psych- reports continued improvement since discharge from inpatient, even though with intermittent uncontrolled anxiety at times.  Psych consult pending    Other issues  -HTN  -DN  -chronic pain syndrome  -RLS    Expect to discharge once her UTI symptoms and hallucinations improves          Disp:     Diet DIET CARB CONTROL;   DVT Prophylaxis [] Lovenox, []  Heparin, [] SCDs, [] Ambulation   GI Prophylaxis [] PPI,  [] H2 Blocker,  [] Carafate,  [] Diet/Tube Feeds   Code Status Full Code   Disposition Patient requires continued admission due to symptomatic UTI   CMS Level of Risk [] Low, [x] Moderate,[]  High  Patient's risk as above due to symptomatic UTI     ANDREE NEVAREZ MD 9/3/2020 8:24 AM

## 2020-09-04 ENCOUNTER — APPOINTMENT (OUTPATIENT)
Dept: MRI IMAGING | Age: 65
DRG: 092 | End: 2020-09-04
Payer: MEDICARE

## 2020-09-04 ENCOUNTER — APPOINTMENT (OUTPATIENT)
Dept: CT IMAGING | Age: 65
DRG: 092 | End: 2020-09-04
Payer: MEDICARE

## 2020-09-04 ENCOUNTER — HOSPITAL ENCOUNTER (INPATIENT)
Age: 65
LOS: 6 days | Discharge: HOME HEALTH | DRG: 885 | End: 2020-09-10
Attending: PSYCHIATRY & NEUROLOGY | Admitting: PSYCHIATRY & NEUROLOGY
Payer: MEDICARE

## 2020-09-04 VITALS
WEIGHT: 161 LBS | RESPIRATION RATE: 16 BRPM | BODY MASS INDEX: 29.63 KG/M2 | OXYGEN SATURATION: 93 % | DIASTOLIC BLOOD PRESSURE: 63 MMHG | TEMPERATURE: 98.6 F | HEIGHT: 62 IN | HEART RATE: 77 BPM | SYSTOLIC BLOOD PRESSURE: 132 MMHG

## 2020-09-04 PROBLEM — F41.1 GENERALIZED ANXIETY DISORDER: Status: ACTIVE | Noted: 2020-09-04

## 2020-09-04 PROBLEM — R44.0 AUDITORY HALLUCINATIONS: Status: ACTIVE | Noted: 2020-09-04

## 2020-09-04 PROBLEM — F33.3 MDD (MAJOR DEPRESSIVE DISORDER), RECURRENT, SEVERE, WITH PSYCHOSIS (HCC): Status: ACTIVE | Noted: 2020-09-04

## 2020-09-04 PROBLEM — R44.1 HALLUCINATION, VISUAL: Status: ACTIVE | Noted: 2020-09-02

## 2020-09-04 PROBLEM — F33.3 SEVERE EPISODE OF RECURRENT MAJOR DEPRESSIVE DISORDER, WITH PSYCHOTIC FEATURES (HCC): Status: ACTIVE | Noted: 2020-09-04

## 2020-09-04 LAB
AMITRIPTYLINE + NORTRIPTYLINE: ABNORMAL NG/ML (ref 95–250)
AMITRIPTYLINE: <10 NG/ML
ANION GAP SERPL CALCULATED.3IONS-SCNC: 11 MMOL/L (ref 4–16)
BASOPHILS ABSOLUTE: 0.1 K/CU MM
BASOPHILS RELATIVE PERCENT: 0.7 % (ref 0–1)
BUN BLDV-MCNC: 6 MG/DL (ref 6–23)
CALCIUM SERPL-MCNC: 8.9 MG/DL (ref 8.3–10.6)
CHLORIDE BLD-SCNC: 100 MMOL/L (ref 99–110)
CO2: 25 MMOL/L (ref 21–32)
CREAT SERPL-MCNC: 0.7 MG/DL (ref 0.6–1.1)
CULTURE: NORMAL
DIFFERENTIAL TYPE: ABNORMAL
EOSINOPHILS ABSOLUTE: 0.2 K/CU MM
EOSINOPHILS RELATIVE PERCENT: 1.9 % (ref 0–3)
GFR AFRICAN AMERICAN: >60 ML/MIN/1.73M2
GFR NON-AFRICAN AMERICAN: >60 ML/MIN/1.73M2
GLUCOSE BLD-MCNC: 165 MG/DL (ref 70–99)
GLUCOSE BLD-MCNC: 168 MG/DL (ref 70–99)
GLUCOSE BLD-MCNC: 170 MG/DL (ref 70–99)
GLUCOSE BLD-MCNC: 180 MG/DL (ref 70–99)
GLUCOSE BLD-MCNC: 206 MG/DL (ref 70–99)
GLUCOSE BLD-MCNC: 216 MG/DL (ref 70–99)
GLUCOSE BLD-MCNC: 255 MG/DL (ref 70–99)
HCT VFR BLD CALC: 34.5 % (ref 37–47)
HEMOGLOBIN: 11.9 GM/DL (ref 12.5–16)
IMMATURE NEUTROPHIL %: 0.3 % (ref 0–0.43)
KEPPRA: 9 UG/ML (ref 12–46)
LYMPHOCYTES ABSOLUTE: 1.8 K/CU MM
LYMPHOCYTES RELATIVE PERCENT: 19.9 % (ref 24–44)
Lab: NORMAL
MAGNESIUM: 1.5 MG/DL (ref 1.8–2.4)
MCH RBC QN AUTO: 30 PG (ref 27–31)
MCHC RBC AUTO-ENTMCNC: 34.5 % (ref 32–36)
MCV RBC AUTO: 86.9 FL (ref 78–100)
MONOCYTES ABSOLUTE: 0.8 K/CU MM
MONOCYTES RELATIVE PERCENT: 8.9 % (ref 0–4)
NORTRIPTYLINE: <10 NG/ML (ref 50–150)
NUCLEATED RBC %: 0 %
PDW BLD-RTO: 12.6 % (ref 11.7–14.9)
PLATELET # BLD: 272 K/CU MM (ref 140–440)
PMV BLD AUTO: 9.6 FL (ref 7.5–11.1)
POTASSIUM SERPL-SCNC: 3.5 MMOL/L (ref 3.5–5.1)
RBC # BLD: 3.97 M/CU MM (ref 4.2–5.4)
SARS-COV-2, NAAT: NOT DETECTED
SEGMENTED NEUTROPHILS ABSOLUTE COUNT: 6.2 K/CU MM
SEGMENTED NEUTROPHILS RELATIVE PERCENT: 68.3 % (ref 36–66)
SODIUM BLD-SCNC: 136 MMOL/L (ref 135–145)
SOURCE: NORMAL
SPECIMEN: NORMAL
TOTAL IMMATURE NEUTOROPHIL: 0.03 K/CU MM
TOTAL NUCLEATED RBC: 0 K/CU MM
WBC # BLD: 9 K/CU MM (ref 4–10.5)

## 2020-09-04 PROCEDURE — 36415 COLL VENOUS BLD VENIPUNCTURE: CPT

## 2020-09-04 PROCEDURE — 85027 COMPLETE CBC AUTOMATED: CPT

## 2020-09-04 PROCEDURE — 80048 BASIC METABOLIC PNL TOTAL CA: CPT

## 2020-09-04 PROCEDURE — 6370000000 HC RX 637 (ALT 250 FOR IP): Performed by: PSYCHIATRY & NEUROLOGY

## 2020-09-04 PROCEDURE — 93010 ELECTROCARDIOGRAM REPORT: CPT | Performed by: INTERNAL MEDICINE

## 2020-09-04 PROCEDURE — 70498 CT ANGIOGRAPHY NECK: CPT

## 2020-09-04 PROCEDURE — 6370000000 HC RX 637 (ALT 250 FOR IP): Performed by: HOSPITALIST

## 2020-09-04 PROCEDURE — 70450 CT HEAD/BRAIN W/O DYE: CPT

## 2020-09-04 PROCEDURE — 2580000003 HC RX 258: Performed by: NURSE PRACTITIONER

## 2020-09-04 PROCEDURE — U0002 COVID-19 LAB TEST NON-CDC: HCPCS

## 2020-09-04 PROCEDURE — 6370000000 HC RX 637 (ALT 250 FOR IP): Performed by: INTERNAL MEDICINE

## 2020-09-04 PROCEDURE — 2580000003 HC RX 258: Performed by: HOSPITALIST

## 2020-09-04 PROCEDURE — 6370000000 HC RX 637 (ALT 250 FOR IP): Performed by: NURSE PRACTITIONER

## 2020-09-04 PROCEDURE — 6360000002 HC RX W HCPCS

## 2020-09-04 PROCEDURE — 82962 GLUCOSE BLOOD TEST: CPT

## 2020-09-04 PROCEDURE — 94150 VITAL CAPACITY TEST: CPT

## 2020-09-04 PROCEDURE — 1240000000 HC EMOTIONAL WELLNESS R&B

## 2020-09-04 PROCEDURE — 6360000004 HC RX CONTRAST MEDICATION: Performed by: HOSPITALIST

## 2020-09-04 PROCEDURE — 83735 ASSAY OF MAGNESIUM: CPT

## 2020-09-04 PROCEDURE — 85025 COMPLETE CBC W/AUTO DIFF WBC: CPT

## 2020-09-04 PROCEDURE — 2700000000 HC OXYGEN THERAPY PER DAY

## 2020-09-04 PROCEDURE — 6360000002 HC RX W HCPCS: Performed by: HOSPITALIST

## 2020-09-04 PROCEDURE — 93005 ELECTROCARDIOGRAM TRACING: CPT | Performed by: HOSPITALIST

## 2020-09-04 PROCEDURE — 6360000002 HC RX W HCPCS: Performed by: NURSE PRACTITIONER

## 2020-09-04 PROCEDURE — 70551 MRI BRAIN STEM W/O DYE: CPT

## 2020-09-04 PROCEDURE — 94761 N-INVAS EAR/PLS OXIMETRY MLT: CPT

## 2020-09-04 RX ORDER — LEVETIRACETAM 500 MG/1
500 TABLET ORAL 2 TIMES DAILY
Qty: 60 TABLET | Refills: 3 | Status: ON HOLD | OUTPATIENT
Start: 2020-09-04 | End: 2020-09-10 | Stop reason: HOSPADM

## 2020-09-04 RX ORDER — LORAZEPAM 0.5 MG/1
0.5 TABLET ORAL EVERY 4 HOURS PRN
Status: DISCONTINUED | OUTPATIENT
Start: 2020-09-04 | End: 2020-09-04 | Stop reason: HOSPADM

## 2020-09-04 RX ORDER — MAGNESIUM HYDROXIDE/ALUMINUM HYDROXICE/SIMETHICONE 120; 1200; 1200 MG/30ML; MG/30ML; MG/30ML
15 SUSPENSION ORAL EVERY 6 HOURS PRN
Status: CANCELLED | OUTPATIENT
Start: 2020-09-04

## 2020-09-04 RX ORDER — NIFEDIPINE 30 MG/1
60 TABLET, EXTENDED RELEASE ORAL DAILY
Status: CANCELLED | OUTPATIENT
Start: 2020-09-05

## 2020-09-04 RX ORDER — HYDROCODONE BITARTRATE AND ACETAMINOPHEN 5; 325 MG/1; MG/1
1 TABLET ORAL EVERY 6 HOURS PRN
Status: CANCELLED | OUTPATIENT
Start: 2020-09-04

## 2020-09-04 RX ORDER — HALOPERIDOL 5 MG
5 TABLET ORAL EVERY 4 HOURS PRN
Status: DISCONTINUED | OUTPATIENT
Start: 2020-09-04 | End: 2020-09-10 | Stop reason: HOSPADM

## 2020-09-04 RX ORDER — MAGNESIUM OXIDE 400 MG/1
400 TABLET ORAL DAILY
Status: CANCELLED | OUTPATIENT
Start: 2020-09-05

## 2020-09-04 RX ORDER — DULOXETIN HYDROCHLORIDE 30 MG/1
30 CAPSULE, DELAYED RELEASE ORAL DAILY
Qty: 30 CAPSULE | Refills: 3 | Status: SHIPPED | OUTPATIENT
Start: 2020-09-05 | End: 2020-12-01

## 2020-09-04 RX ORDER — TRAZODONE HYDROCHLORIDE 50 MG/1
50 TABLET ORAL NIGHTLY PRN
Status: DISCONTINUED | OUTPATIENT
Start: 2020-09-04 | End: 2020-09-09

## 2020-09-04 RX ORDER — INSULIN GLARGINE 100 [IU]/ML
20 INJECTION, SOLUTION SUBCUTANEOUS NIGHTLY
Status: CANCELLED | OUTPATIENT
Start: 2020-09-04

## 2020-09-04 RX ORDER — LORAZEPAM 2 MG/ML
INJECTION INTRAMUSCULAR
Status: COMPLETED
Start: 2020-09-04 | End: 2020-09-04

## 2020-09-04 RX ORDER — LORAZEPAM 1 MG/1
1 TABLET ORAL EVERY 4 HOURS PRN
Status: DISCONTINUED | OUTPATIENT
Start: 2020-09-04 | End: 2020-09-10 | Stop reason: HOSPADM

## 2020-09-04 RX ORDER — DULOXETIN HYDROCHLORIDE 30 MG/1
30 CAPSULE, DELAYED RELEASE ORAL DAILY
Status: CANCELLED | OUTPATIENT
Start: 2020-09-05

## 2020-09-04 RX ORDER — LEVOTHYROXINE SODIUM 0.07 MG/1
75 TABLET ORAL EVERY MORNING
Status: CANCELLED | OUTPATIENT
Start: 2020-09-05

## 2020-09-04 RX ORDER — ALLOPURINOL 100 MG/1
300 TABLET ORAL DAILY
Status: CANCELLED | OUTPATIENT
Start: 2020-09-05

## 2020-09-04 RX ORDER — BUSPIRONE HYDROCHLORIDE 15 MG/1
30 TABLET ORAL 2 TIMES DAILY
Status: DISCONTINUED | OUTPATIENT
Start: 2020-09-04 | End: 2020-09-04 | Stop reason: HOSPADM

## 2020-09-04 RX ORDER — POLYETHYLENE GLYCOL 3350 17 G/17G
17 POWDER, FOR SOLUTION ORAL DAILY PRN
Status: DISCONTINUED | OUTPATIENT
Start: 2020-09-04 | End: 2020-09-05 | Stop reason: SDUPTHER

## 2020-09-04 RX ORDER — INSULIN DEGLUDEC 200 U/ML
20 INJECTION, SOLUTION SUBCUTANEOUS EVERY MORNING
Qty: 1 PEN | Refills: 2 | Status: SHIPPED | OUTPATIENT
Start: 2020-09-04 | End: 2022-07-06 | Stop reason: SDUPTHER

## 2020-09-04 RX ORDER — POLYETHYLENE GLYCOL 3350 17 G/17G
17 POWDER, FOR SOLUTION ORAL DAILY PRN
Status: CANCELLED | OUTPATIENT
Start: 2020-09-04

## 2020-09-04 RX ORDER — LEVOTHYROXINE SODIUM 0.07 MG/1
75 TABLET ORAL EVERY MORNING
Qty: 30 TABLET | Refills: 3 | Status: ON HOLD | OUTPATIENT
Start: 2020-09-05 | End: 2020-09-10 | Stop reason: HOSPADM

## 2020-09-04 RX ORDER — ASPIRIN 81 MG/1
81 TABLET, CHEWABLE ORAL DAILY
Status: CANCELLED | OUTPATIENT
Start: 2020-09-05

## 2020-09-04 RX ORDER — BUSPIRONE HYDROCHLORIDE 15 MG/1
30 TABLET ORAL 2 TIMES DAILY
Status: CANCELLED | OUTPATIENT
Start: 2020-09-04

## 2020-09-04 RX ORDER — DULOXETIN HYDROCHLORIDE 30 MG/1
30 CAPSULE, DELAYED RELEASE ORAL DAILY
Status: DISCONTINUED | OUTPATIENT
Start: 2020-09-05 | End: 2020-09-04 | Stop reason: HOSPADM

## 2020-09-04 RX ORDER — SODIUM CHLORIDE 0.9 % (FLUSH) 0.9 %
10 SYRINGE (ML) INJECTION 2 TIMES DAILY
Status: DISCONTINUED | OUTPATIENT
Start: 2020-09-04 | End: 2020-09-04 | Stop reason: HOSPADM

## 2020-09-04 RX ORDER — METOPROLOL SUCCINATE 25 MG/1
25 TABLET, EXTENDED RELEASE ORAL DAILY
Status: CANCELLED | OUTPATIENT
Start: 2020-09-05

## 2020-09-04 RX ORDER — LEVETIRACETAM 500 MG/1
500 TABLET ORAL 2 TIMES DAILY
Status: DISCONTINUED | OUTPATIENT
Start: 2020-09-04 | End: 2020-09-04 | Stop reason: HOSPADM

## 2020-09-04 RX ORDER — MONTELUKAST SODIUM 10 MG/1
10 TABLET ORAL NIGHTLY
Status: CANCELLED | OUTPATIENT
Start: 2020-09-04

## 2020-09-04 RX ORDER — HALOPERIDOL 5 MG/ML
5 INJECTION INTRAMUSCULAR EVERY 4 HOURS PRN
Status: DISCONTINUED | OUTPATIENT
Start: 2020-09-04 | End: 2020-09-10 | Stop reason: HOSPADM

## 2020-09-04 RX ORDER — OXCARBAZEPINE 150 MG/1
150 TABLET, FILM COATED ORAL 2 TIMES DAILY
Qty: 60 TABLET | Refills: 3 | Status: ON HOLD
Start: 2020-09-04 | End: 2021-07-15 | Stop reason: HOSPADM

## 2020-09-04 RX ORDER — LEVETIRACETAM 500 MG/1
500 TABLET ORAL 2 TIMES DAILY
Status: CANCELLED | OUTPATIENT
Start: 2020-09-04

## 2020-09-04 RX ORDER — LORAZEPAM 2 MG/ML
1 INJECTION INTRAMUSCULAR EVERY 4 HOURS PRN
Status: DISCONTINUED | OUTPATIENT
Start: 2020-09-04 | End: 2020-09-10 | Stop reason: HOSPADM

## 2020-09-04 RX ORDER — OXCARBAZEPINE 150 MG/1
150 TABLET, FILM COATED ORAL 2 TIMES DAILY
Status: DISCONTINUED | OUTPATIENT
Start: 2020-09-04 | End: 2020-09-04 | Stop reason: HOSPADM

## 2020-09-04 RX ORDER — QUETIAPINE FUMARATE 25 MG/1
50 TABLET, FILM COATED ORAL EVERY EVENING
Status: DISCONTINUED | OUTPATIENT
Start: 2020-09-04 | End: 2020-09-04 | Stop reason: HOSPADM

## 2020-09-04 RX ORDER — PANTOPRAZOLE SODIUM 40 MG/1
40 TABLET, DELAYED RELEASE ORAL DAILY
Status: CANCELLED | OUTPATIENT
Start: 2020-09-05

## 2020-09-04 RX ORDER — ACETAMINOPHEN 325 MG/1
650 TABLET ORAL EVERY 4 HOURS PRN
Status: DISCONTINUED | OUTPATIENT
Start: 2020-09-04 | End: 2020-09-10 | Stop reason: HOSPADM

## 2020-09-04 RX ORDER — ATORVASTATIN CALCIUM 20 MG/1
20 TABLET, FILM COATED ORAL DAILY
Status: CANCELLED | OUTPATIENT
Start: 2020-09-05

## 2020-09-04 RX ORDER — OXCARBAZEPINE 150 MG/1
150 TABLET, FILM COATED ORAL 2 TIMES DAILY
Status: CANCELLED | OUTPATIENT
Start: 2020-09-04

## 2020-09-04 RX ORDER — ASPIRIN 81 MG/1
81 TABLET, CHEWABLE ORAL DAILY
Status: DISCONTINUED | OUTPATIENT
Start: 2020-09-04 | End: 2020-09-04 | Stop reason: HOSPADM

## 2020-09-04 RX ORDER — INSULIN GLARGINE 100 [IU]/ML
20 INJECTION, SOLUTION SUBCUTANEOUS NIGHTLY
Status: DISCONTINUED | OUTPATIENT
Start: 2020-09-04 | End: 2020-09-04 | Stop reason: HOSPADM

## 2020-09-04 RX ORDER — MAGNESIUM HYDROXIDE/ALUMINUM HYDROXICE/SIMETHICONE 120; 1200; 1200 MG/30ML; MG/30ML; MG/30ML
30 SUSPENSION ORAL EVERY 6 HOURS PRN
Status: DISCONTINUED | OUTPATIENT
Start: 2020-09-04 | End: 2020-09-05 | Stop reason: SDUPTHER

## 2020-09-04 RX ORDER — ACETAMINOPHEN 500 MG
500 TABLET ORAL EVERY 4 HOURS PRN
Status: DISCONTINUED | OUTPATIENT
Start: 2020-09-04 | End: 2020-09-10 | Stop reason: HOSPADM

## 2020-09-04 RX ORDER — LOSARTAN POTASSIUM AND HYDROCHLOROTHIAZIDE 12.5; 5 MG/1; MG/1
2 TABLET ORAL DAILY
Status: CANCELLED | OUTPATIENT
Start: 2020-09-05

## 2020-09-04 RX ORDER — CEFUROXIME AXETIL 250 MG/1
250 TABLET ORAL 2 TIMES DAILY
Qty: 8 TABLET | Refills: 0 | Status: ON HOLD | OUTPATIENT
Start: 2020-09-04 | End: 2020-09-10 | Stop reason: HOSPADM

## 2020-09-04 RX ORDER — ACETAMINOPHEN 325 MG/1
325 TABLET ORAL EVERY 4 HOURS PRN
Status: DISCONTINUED | OUTPATIENT
Start: 2020-09-04 | End: 2020-09-10 | Stop reason: HOSPADM

## 2020-09-04 RX ADMIN — CEFTRIAXONE SODIUM 1 G: 1 INJECTION, POWDER, FOR SOLUTION INTRAMUSCULAR; INTRAVENOUS at 09:40

## 2020-09-04 RX ADMIN — LOSARTAN POTASSIUM AND HYDROCHLOROTHIAZIDE 2 TABLET: 12.5; 5 TABLET ORAL at 09:31

## 2020-09-04 RX ADMIN — LEVOTHYROXINE SODIUM 75 MCG: 75 TABLET ORAL at 09:30

## 2020-09-04 RX ADMIN — DULOXETINE HYDROCHLORIDE 30 MG: 30 CAPSULE, DELAYED RELEASE ORAL at 09:30

## 2020-09-04 RX ADMIN — PANTOPRAZOLE SODIUM 40 MG: 40 TABLET, DELAYED RELEASE ORAL at 09:30

## 2020-09-04 RX ADMIN — INSULIN LISPRO 10 UNITS: 100 INJECTION, SOLUTION INTRAVENOUS; SUBCUTANEOUS at 13:04

## 2020-09-04 RX ADMIN — ASPIRIN 81 MG CHEWABLE TABLET 81 MG: 81 TABLET CHEWABLE at 18:08

## 2020-09-04 RX ADMIN — BUSPIRONE HYDROCHLORIDE 15 MG: 15 TABLET ORAL at 14:57

## 2020-09-04 RX ADMIN — ENOXAPARIN SODIUM 40 MG: 40 INJECTION SUBCUTANEOUS at 09:36

## 2020-09-04 RX ADMIN — SODIUM CHLORIDE, PRESERVATIVE FREE 10 ML: 5 INJECTION INTRAVENOUS at 09:37

## 2020-09-04 RX ADMIN — MAGNESIUM OXIDE 400 MG (241.3 MG MAGNESIUM) TABLET 400 MG: TABLET at 09:31

## 2020-09-04 RX ADMIN — BUSPIRONE HYDROCHLORIDE 30 MG: 15 TABLET ORAL at 20:40

## 2020-09-04 RX ADMIN — BUSPIRONE HYDROCHLORIDE 15 MG: 15 TABLET ORAL at 09:31

## 2020-09-04 RX ADMIN — HYDROCODONE BITARTRATE AND ACETAMINOPHEN 1 TABLET: 5; 325 TABLET ORAL at 09:31

## 2020-09-04 RX ADMIN — SODIUM CHLORIDE: 9 INJECTION, SOLUTION INTRAVENOUS at 02:45

## 2020-09-04 RX ADMIN — ATORVASTATIN CALCIUM 20 MG: 20 TABLET, FILM COATED ORAL at 09:31

## 2020-09-04 RX ADMIN — LORAZEPAM 2 MG: 2 INJECTION, SOLUTION INTRAMUSCULAR; INTRAVENOUS at 04:19

## 2020-09-04 RX ADMIN — MONTELUKAST 10 MG: 10 TABLET, FILM COATED ORAL at 20:40

## 2020-09-04 RX ADMIN — QUETIAPINE FUMARATE 50 MG: 25 TABLET ORAL at 20:40

## 2020-09-04 RX ADMIN — INSULIN LISPRO 4 UNITS: 100 INJECTION, SOLUTION INTRAVENOUS; SUBCUTANEOUS at 09:12

## 2020-09-04 RX ADMIN — NIFEDIPINE 60 MG: 30 TABLET, FILM COATED, EXTENDED RELEASE ORAL at 09:34

## 2020-09-04 RX ADMIN — INSULIN LISPRO 6 UNITS: 100 INJECTION, SOLUTION INTRAVENOUS; SUBCUTANEOUS at 13:03

## 2020-09-04 RX ADMIN — ALLOPURINOL 300 MG: 100 TABLET ORAL at 09:30

## 2020-09-04 RX ADMIN — METOPROLOL SUCCINATE 25 MG: 25 TABLET, EXTENDED RELEASE ORAL at 09:31

## 2020-09-04 RX ADMIN — LEVETIRACETAM 500 MG: 500 TABLET ORAL at 20:40

## 2020-09-04 RX ADMIN — SODIUM CHLORIDE, PRESERVATIVE FREE 10 ML: 5 INJECTION INTRAVENOUS at 09:38

## 2020-09-04 RX ADMIN — INSULIN LISPRO 2 UNITS: 100 INJECTION, SOLUTION INTRAVENOUS; SUBCUTANEOUS at 17:17

## 2020-09-04 RX ADMIN — IOPAMIDOL 75 ML: 755 INJECTION, SOLUTION INTRAVENOUS at 04:40

## 2020-09-04 RX ADMIN — HYDROCODONE BITARTRATE AND ACETAMINOPHEN 1 TABLET: 5; 325 TABLET ORAL at 02:49

## 2020-09-04 ASSESSMENT — PAIN SCALES - GENERAL
PAINLEVEL_OUTOF10: 6
PAINLEVEL_OUTOF10: 7
PAINLEVEL_OUTOF10: 8
PAINLEVEL_OUTOF10: 8

## 2020-09-04 ASSESSMENT — PAIN DESCRIPTION - LOCATION
LOCATION: GENERALIZED

## 2020-09-04 ASSESSMENT — PAIN DESCRIPTION - PAIN TYPE
TYPE: CHRONIC PAIN
TYPE: ACUTE PAIN

## 2020-09-04 ASSESSMENT — PAIN DESCRIPTION - DESCRIPTORS: DESCRIPTORS: ACHING

## 2020-09-04 NOTE — PLAN OF CARE
Problem: Pain:  Goal: Pain level will decrease  Description: Pain level will decrease  9/3/2020 2345 by Farshad Reece RN  Outcome: Ongoing  9/3/2020 1657 by Kaylynn Newton RN  Outcome: Ongoing  Goal: Control of acute pain  Description: Control of acute pain  9/3/2020 2345 by Farshad Reece RN  Outcome: Ongoing  9/3/2020 1657 by Kaylynn Newton RN  Outcome: Ongoing  Goal: Control of chronic pain  Description: Control of chronic pain  9/3/2020 2345 by Farshad Reece RN  Outcome: Ongoing  9/3/2020 1657 by Kaylynn Newton RN  Outcome: Ongoing     Problem: Falls - Risk of:  Goal: Will remain free from falls  Description: Will remain free from falls  9/3/2020 2345 by Farshad Reece RN  Outcome: Ongoing  9/3/2020 1657 by Kaylynn Newton RN  Outcome: Ongoing  Goal: Absence of physical injury  Description: Absence of physical injury  9/3/2020 2345 by Farshad Reece RN  Outcome: Ongoing  9/3/2020 1657 by Kaylynn Newton RN  Outcome: Ongoing

## 2020-09-04 NOTE — CARE COORDINATION
Chart reviewed. Pt is from home and has a support system of her children. Pt was recently discharged from [address] [place] [address] psych (8/3/2020). Pt is independent in the room. Waiting on recommendations from psych to help with discharge planning.
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
## 2020-09-04 NOTE — PROGRESS NOTES
Pain\"    Zoloft Palpitations     \"Sever High Blood Pressure\"    Butalbital-Aspirin-Caffeine Other (See Comments)     \"severe stomach pain\"    Coreg [Carvedilol] Other (See Comments)     \"spikes my BP severely and send me into a severe anxiety attack\"    Fluoxetine Other (See Comments)     hallucinations    Oxybutynin Chloride Other (See Comments)     Raises bp    Sertraline Other (See Comments)     hallucinations    Tape [Adhesive Tape]      Patient states it tears her skin, including band aids    Reglan [Metoclopramide] Other (See Comments)     \"makes me talk like a baby, but if I take benadryl with it it's fine\"       PCP: Chase Pardo MD    PAST MEDICAL HISTORY, SURGICAL HISTORY, SOCIAL HISTORY and  HOME MEDICATIONS all reviewed. OBJECTIVE  Vitals:    09/04/20 0400 09/04/20 0445 09/04/20 0654 09/04/20 0930   BP: (!) 173/92 126/70  (!) 140/86   Pulse: 113 105  107   Resp: 18 18     Temp: 98 °F (36.7 °C) 98.3 °F (36.8 °C)     TempSrc: Skin Axillary     SpO2: 98% 95%     Weight:   161 lb (73 kg)    Height:           PHYSICAL EXAM   GEN Awake female, sitting upright in bed in no apparent distress. Appears given age. EYES Pupils are equally round. No scleral erythema, discharge, or conjunctivitis. HENT Mucous membranes are moist. Oral pharynx without exudates, no evidence of thrush. NECK Supple, no apparent thyromegaly or masses. RESP Clear to auscultation, no wheezes, rales or rhonchi. Symmetric chest movement while on room air. CARDIO/VASC S1/S2 auscultated. Regular rate without appreciable murmurs, rubs, or gallops. No JVD or carotid bruits. Peripheral pulses equal bilaterally and palpable. No peripheral edema. GI Abdomen is soft without significant tenderness, masses, or guarding. Bowel sounds are normoactive. Rectal exam deferred.  No costovertebral angle tenderness. Normal appearing external genitalia. Mercer catheter is not present.   HEME/LYMPH No palpable cervical lymphadenopathy and no hepatosplenomegaly. No petechiae or ecchymoses. MSK Spontaneous movement of all extremities. No gross joint deformities. SKIN Normal coloration, warm, dry. NEURO Cranial nerves appear grossly intact, normal speech, no lateralizing weakness. PSYCH Awake, alert, oriented x 4. Affect appropriate. INTAKE: In: -   Out: 1   OUTPUT: In: -   Out: 1 [Urine:1]    LABS  Recent Labs     09/02/20  1517 09/03/20  0155 09/04/20  0439   WBC 9.2 10.5 9.0   HGB 13.4 12.7 11.9*   HCT 39.0 37.1 34.5*    317 272      Recent Labs     09/02/20  1517 09/03/20  0155 09/04/20  0439   * 137 136   K 3.7 4.0 3.5   CL 96* 99 100   CO2 23 29 25   BUN 10 10 6   CREATININE 0.6 0.7 0.7     Recent Labs     09/02/20  1517 09/03/20  0155   AST 19 16   ALT 14 <5*   BILITOT 0.6 0.5   ALKPHOS 66 65     No results for input(s): INR in the last 72 hours. No results for input(s): CKTOTAL, CKMB, CKMBINDEX, TROPONINT in the last 72 hours.        Abnormal labs for today noted      Imaging:     ECHO:    Microbiology:  Blood culture:    Urine culture:    Sputum culture:    Procedures done this admission:    MEDS  Scheduled Meds:   sodium chloride flush  10 mL Intravenous BID    insulin lispro  10 Units Subcutaneous TID     insulin lispro  0-6 Units Subcutaneous 2 times per day    insulin glargine  20 Units Subcutaneous Nightly    DULoxetine  30 mg Oral BID    cefTRIAXone (ROCEPHIN) IV  1 g Intravenous Daily    sodium chloride flush  10 mL Intravenous 2 times per day    enoxaparin  40 mg Subcutaneous Daily    allopurinol  300 mg Oral Daily    busPIRone  15 mg Oral TID    metoprolol succinate  25 mg Oral Daily    carbidopa-levodopa  1 tablet Oral Nightly    levETIRAcetam  750 mg Oral Nightly    levothyroxine  75 mcg Oral QAM    magnesium oxide  400 mg Oral Daily    losartan-hydroCHLOROthiazide  2 tablet Oral Daily    montelukast  10 mg Oral Nightly    NIFEdipine  60 mg Oral Daily    pantoprazole  40 mg Oral Daily

## 2020-09-04 NOTE — PROGRESS NOTES
Pt was noted to be lethargic and confused. Pt kept asking where she is and stating she is lost.  Pt had called her daughter and was confused when speaking with her. Pt unable to answer questions appropriately. Blood sugar 180 and BP elevated. Rapid response called about 0400. EKG done and stroke alert was called at 0410. During assessment, pt had a seizure and Lorazepam 2 mg given IV. Pt taken for CT and CTA, labs drawn. Pt did come back to baseline and was more alert and oriented. Pt returned to room at 0525.

## 2020-09-04 NOTE — CONSULTS
Cerebral artery occlusion with cerebral infarction ([address]     CHF (congestive heart failure) (HCC)     Chronic back pain     Chronic kidney disease     DDD (degenerative disc disease), cervical     12- Patient reports she was dx with DDD of Cerival spine C6,C7    Depression     Diabetes mellitus ([address] Dx 1990's    Diabetic neuropathy ([address]     \"In My Legs And Feet\"    Dizziness     \"Sometimes\"    Dry skin     Enlarged ureter     Right Side    Fatty liver     Fibrocystic breast     Gout     Pt states she was diagnosed with gout in the past few months.  H/O cardiac catheterization     Showed mild disease per last cath.  H/O cardiovascular stress test 03/15/2010    EF 69%, normal perfusion study except for diaphragmatic artifact, uniform wall motion.  H/O cardiovascular stress test 10/09/2008    EF 60%, no anginia, normal study.  H/O cardiovascular stress test 05/06/2014    EF 66%, no ischemia, normal LV systolic funciton, normal perfusion pattern.  H/O Doppler ultrasound 02/28/2011    CAROTID DOPPLER-normal study.  H/O echocardiogram 5/6/2014    Ef >55%. Impaired LV relaxation.  H/O echocardiogram 10/14/15    EF 60% Normal LV and systolic function. No significant valvulopathy seen.      History of Holter monitoring 3/24/15    24 hour - predominant rhythm sinus    [place] (hard of hearing)     Bilateral Ears    Hx of cardiovascular stress test 10/19/2015    lexiscan-normal,EF63%    Hx of motion sickness     HX OTHER MEDICAL     Primary Care Physician Is Dr. Heron Jiménez In [place], [place]    Hyperlipidemia     Hypertension     IBS (irritable bowel syndrome)     Incisional hernia 4/2014    Kidney stones Last Episode In 2012 Or 2013    Passed Kidney Stones Numberous Times    Migraines     Nausea & vomiting     Nausea/Vomiting Post Op In Past    Other specified disorder of skin     12- Patient states she has a condition of her vaginal area (skin) which starts with the For Kidney Stones    OTHER SURGICAL HISTORY  06 13 5165    umbilical hernia with mesh    TUBAL LIGATION  1978     Current Medications:   Current Facility-Administered Medications: sodium chloride flush 0.9 % injection 10 mL, 10 mL, Intravenous, BID  LORazepam (ATIVAN) tablet 0.5 mg, 0.5 mg, Oral, Q4H PRN  insulin lispro (HUMALOG) injection vial 10 Units, 10 Units, Subcutaneous, TID WC  insulin lispro (HUMALOG) injection vial 0-6 Units, 0-6 Units, Subcutaneous, 2 times per day  insulin glargine (LANTUS) injection vial 20 Units, 20 Units, Subcutaneous, Nightly  QUEtiapine (SEROQUEL) tablet 50 mg, 50 mg, Oral, QPM  busPIRone (BUSPAR) tablet 30 mg, 30 mg, Oral, BID  [START ON 9/5/2020] DULoxetine (CYMBALTA) extended release capsule 30 mg, 30 mg, Oral, Daily  levETIRAcetam (KEPPRA) tablet 500 mg, 500 mg, Oral, BID  OXcarbazepine (TRILEPTAL) tablet 150 mg, 150 mg, Oral, BID  cefTRIAXone (ROCEPHIN) 1 g IVPB in 50 mL D5W minibag, 1 g, Intravenous, Daily  sodium chloride flush 0.9 % injection 10 mL, 10 mL, Intravenous, 2 times per day  sodium chloride flush 0.9 % injection 10 mL, 10 mL, Intravenous, PRN  promethazine (PHENERGAN) tablet 12.5 mg, 12.5 mg, Oral, Q6H PRN **OR** ondansetron (ZOFRAN) injection 4 mg, 4 mg, Intravenous, Q6H PRN  enoxaparin (LOVENOX) injection 40 mg, 40 mg, Subcutaneous, Daily  allopurinol (ZYLOPRIM) tablet 300 mg, 300 mg, Oral, Daily  aluminum & magnesium hydroxide-simethicone (MAALOX) 200-200-20 MG/5ML suspension 15 mL, 15 mL, Oral, Q6H PRN  metoprolol succinate (TOPROL XL) extended release tablet 25 mg, 25 mg, Oral, Daily  carbidopa-levodopa (SINEMET)  MG per tablet 1 tablet, 1 tablet, Oral, Nightly  levothyroxine (SYNTHROID) tablet 75 mcg, 75 mcg, Oral, QAM  magnesium oxide (MAG-OX) tablet 400 mg, 400 mg, Oral, Daily  losartan-hydroCHLOROthiazide (HYZAAR) 50-12.5 MG per tablet 2 tablet, 2 tablet, Oral, Daily  montelukast (SINGULAIR) tablet 10 mg, 10 mg, Oral, Nightly  guaiFENesin [facility]) extended release tablet 600 mg, 600 mg, Oral, BID PRN  NIFEdipine (PROCARDIA XL) extended release tablet 60 mg, 60 mg, Oral, Daily  pantoprazole (PROTONIX) tablet 40 mg, 40 mg, Oral, Daily  polyethylene glycol (GLYCOLAX) packet 17 g, 17 g, Oral, Daily PRN  atorvastatin (LIPITOR) tablet 20 mg, 20 mg, Oral, Daily  insulin lispro (HUMALOG) injection vial 0-12 Units, 0-12 Units, Subcutaneous, TID WC  glucose (GLUTOSE) 40 % oral gel 15 g, 15 g, Oral, PRN  dextrose 50 % IV solution, 12.5 g, Intravenous, PRN  glucagon (rDNA) injection 1 mg, 1 mg, Intramuscular, PRN  dextrose 5 % solution, 100 mL/hr, Intravenous, PRN  HYDROcodone-acetaminophen (NORCO) 5-325 MG per tablet 1 tablet, 1 tablet, Oral, Q6H PRN  Allergies:  Abilify [aripiprazole]; Advil [ibuprofen micronized]; Augmentin [amoxicillin-pot clavulanate]; Bee venom; Ciprofloxacin; Codeine; Darvocet [propoxyphene n-acetaminophen]; Darvon [propoxyphene hcl]; Decadron [dexamethasone]; Ditropan [oxybutynin chloride]; Fioricet [butalbital-apap-caffeine]; Fiorinal-codeine #3 [butalbital-asa-caff-codeine]; Flagyl [metronidazole]; Naproxen; Other; Prozac [fluoxetine hcl]; Robaxin [methocarbamol]; Ultram [tramadol]; Zoloft; Butalbital-aspirin-caffeine; Coreg [carvedilol]; Fluoxetine; Oxybutynin chloride; Sertraline; Tape [adhesive tape]; and Reglan [metoclopramide]    Social History:  TOBACCO:   reports that she has never smoked. She has never used smokeless tobacco.  ETOH:   reports no history of alcohol use. DRUGS:   reports no history of drug use.   Family History:       Problem Relation Age of Onset    Stroke Mother     Other Mother         Seizures    Diabetes Mother         Borderline Diabetes    High Blood Pressure Mother     Arthritis Mother     Early Death Mother 61        Stroke    Depression Mother     Heart Disease Mother     High Cholesterol Mother    [employer] / Stillbirths Mother     Heart Disease Father         Massive Heart Attack    High Blood Pressure Father     Arthritis Father     High Cholesterol Father     Cancer Brother         Liver And Colon Cancer    Early Death Brother 37        Liver And Colon Cancer    Heart Disease Brother         Heart Stents    High Blood Pressure Brother     High Cholesterol Brother     Early Death Brother         65 Years Old,Hit By [employer]    Colon Cancer Brother     Hearing Loss Sister     Heart Failure Sister     High Blood Pressure Sister     Arthritis Sister     Heart Disease Brother     Heart Disease Sister     Cancer Sister     Early Death Brother 61        Heart Attack    Heart Disease Brother         Heart Attack    Mental Illness Daughter         Bipolar    Depression Daughter     Anxiety Disorder Daughter     Other Son         Seizures    Other Daughter         Stomach And Bowel Problems       REVIEW OF SYSTEMS:  CONSTITUTIONAL:  negative  HEENT:  negative  RESPIRATORY:  negative  CARDIOVASCULAR:  negative  GASTROINTESTINAL:  negative  GENITOURINARY:  negative  MUSCULOSKELETAL:  negative  BEHAVIOR/PSYCH:  Negative    ROS neg    Family hx neg    PHYSICAL EXAM  ------------------------  Vitals:  /63   Pulse 77   Temp 98.6 °F (37 °C) (Oral)   Resp 16   Ht 5' 2\" (1.575 m)   Wt 161 lb (73 kg)   SpO2 93%   BMI 29.45 kg/m²      General:  Awake, alert, oriented X 3. Well developed, well nourished, well groomed. No apparent distress. HEENT:  Normocephalic, atraumatic. Pupils equal, round, reactive to light. No scleral icterus. No conjunctival injection. Normal lips, teeth, and gums. No nasal discharge. Neck:  Supple  Heart:  RRR, no murmurs, gallops, rubs  Lungs:  CTA bilaterally, bilat symmetrical expansion, no wheeze, rales, or rhonchi  Abdomen:   Bowel sounds present, soft, nontender, no masses, no organomegaly, no peritoneal signs  Extremities:  No clubbing, cyanosis, or edema  Skin:  Warm and dry, no open lesions or rash  Breast: deferred  Rectal: deferred  Genitalia:  deferred    NEUROLOGICAL EXAM  ---------------------------------    Mental Status Exam:             Alert and oriented times three,follows commands,speech and language intact    Cranial Dcarbg-FX-PUH Intact.         Cranial nerve II           Visual acuity:  normal                 Cranial nerve III           Pupils:  equal, round, reactive to light      Cranial nerves III, IV, VI           Extraocular Movements: intact      Cranial nerve V           Facial sensation:  intact      Cranial nerve VII           Facial strength: intact      Cranial nerve VIII           Hearing:  intact      Cranial nerve IX           Palate:  intact      Cranial nerve XI         Shoulder shrug:  intact      Cranial nerve XII          Tongue movement:  normal    Motor:    Drift:  absent  Motor exam is symmetrical 5 out of 5 all extremities bilaterally  Tone:  normal  Abnormal Movements:  Absent    DTRs-2+ biceps,triceps,brachioradialis,knee jerks and ankle jerks bilaterally symmetrical.  Toes-downgoing bilaterally            Sensory:normal sensation              CBC with Differential:    Lab Results   Component Value Date    WBC 9.0 09/04/2020    RBC 3.97 09/04/2020    HGB 11.9 09/04/2020    HCT 34.5 09/04/2020     09/04/2020    MCV 86.9 09/04/2020    MCH 30.0 09/04/2020    MCHC 34.5 09/04/2020    RDW 12.6 09/04/2020    SEGSPCT 68.3 09/04/2020    BANDSPCT 2 06/27/2020    LYMPHOPCT 19.9 09/04/2020    MONOPCT 8.9 09/04/2020    EOSPCT 3.5 03/12/2011    BASOPCT 0.7 09/04/2020    MONOSABS 0.8 09/04/2020    LYMPHSABS 1.8 09/04/2020    EOSABS 0.2 09/04/2020    BASOSABS 0.1 09/04/2020    DIFFTYPE AUTOMATED DIFFERENTIAL 09/04/2020     CMP:    Lab Results   Component Value Date     09/04/2020    K 3.5 09/04/2020    K 4.2 03/15/2018     09/04/2020    CO2 25 09/04/2020    BUN 6 09/04/2020    CREATININE 0.7 09/04/2020    GFRAA >60 09/04/2020    LABGLOM >60 09/04/2020    GLUCOSE 206 09/04/2020    PROT 6.5 09/03/2020    PROT 6.6 03/12/2011    LABALBU 4.3 09/03/2020    CALCIUM 8.9 09/04/2020    BILITOT 0.5 09/03/2020    ALKPHOS 65 09/03/2020    AST 16 09/03/2020    ALT <5 09/03/2020     BMP:    Lab Results   Component Value Date     09/04/2020    K 3.5 09/04/2020    K 4.2 03/15/2018     09/04/2020    CO2 25 09/04/2020    BUN 6 09/04/2020    LABALBU 4.3 09/03/2020    CREATININE 0.7 09/04/2020    CALCIUM 8.9 09/04/2020    GFRAA >60 09/04/2020    LABGLOM >60 09/04/2020    GLUCOSE 206 09/04/2020     PT/INR:    Lab Results   Component Value Date    PROTIME 11.5 06/24/2020    PROTIME 11.2 02/07/2011    INR 0.95 06/24/2020     PTT:    Lab Results   Component Value Date    APTT 20.0 02/18/2016   [APTT  U/A:    Lab Results   Component Value Date    NITRITE Negative 02/28/2014    COLORU YELLOW 09/02/2020    WBCUA 21 09/02/2020    RBCUA 2 09/02/2020    MUCUS RARE 01/09/2020    TRICHOMONAS NONE SEEN 09/02/2020    YEAST RARE 09/12/2018    BACTERIA NEGATIVE 09/02/2020    CLARITYU CLEAR 09/02/2020    SPECGRAV 1.010 09/02/2020    LEUKOCYTESUR LARGE 09/02/2020    UROBILINOGEN NORMAL 09/02/2020    BILIRUBINUR NEGATIVE 09/02/2020    BLOODU NEGATIVE 09/02/2020    GLUCOSEU Normal 11/05/2014     TSH:  No results found for: TSH  VITAMIN B12: No components found for: B12  FOLATE:    Lab Results   Component Value Date    FOLATE >24.0 01/27/2011     RPR:  No results found for: RPR  AYLA:  No results found for: ANATITER, AYLA  Urine Toxicology:  No components found for: IAMMENTA, IBARBIT, IBENZO, ICOCAINE, IMARTHC, IOPIATES, IPHENCYC     IMPRESSION:    Hallucinations possibly from cymbalta VS MDD-pt transferred to [facility]    Seizures/Pseudo-seizures continue keppra    Refractory tension Migraine headaches    Occpital neuralgia    MDD    Anxiety panic attacks    Small vessel disease    PLAN:    Mri brain neg    B 12 folate TSH    keppra 500 mg bid    Start trileptal    Decrease cymbalta    Rashaad Faustin    Psychiatry consult
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
## 2020-09-04 NOTE — CONSULTS
Initial Psychiatric History and Physical    Ling Kramer  [id number]  9/2/2020 09/04/20    ID: Patient is a 63 yrs y.o. female    CC: \"I've been having hallucinations for the past 3 nights. \"    HPI: Ling Kramer is a 63 y.o. female who presents with concerns of auditory and visual hallucinations. Reports onset of past 3 days. Reports hearing/ seeing people picking up things in her apartment as well as seeing spiders. Also reports hearing incomprehensible voices. She is attributing her symptoms to her newly increased Cymbalta dose (last taken this AM). It was increased from 30 mg DR to 60 mg ER on 8/3/2020. She does report discharge from inpatient psych unit at [facility] on that day also. She was admitted for uncontrolled anxiety/suicidal ideations/MDD. Currently denies any suicidal ideation/homicidal ideation but states anxiety continues to be uncontrolled; generalized stressors. She was seen at this facility 8/15/2020 related to anxiety/tachycardia. She was started on metoprolol reports better control of her blood pressure/heart rate. The interview was conducted via In Touch audio visual machine with the interviewer located at [facility] and the interviewee located at [facility]. Interview conducted on 9/3/2020 at 1320 but could not be entered into the record until today due to a system-wide computer downtime. Patient was alert and oriented x 3. She denies SI/HI. She endorses auditory and visual hallucinations of \"snakes, spiders, deer and voices. \" Denies any verbal command hallucinations. She endorses depression which she rates as \"7-8\" and anxiety as \"10\" on a scale of 0 to 10 with 0 being none and 10 being horrible. Notes that her sleep has been fitful over the past 4 nights. She finds the hallucinations distressing and states that it makes it difficult to function. States that her appetite has improved recently, but had been reduced.  Pt was polite and cordial during the interview process. Past Psychiatric History: suicide attempt several years ago, multiple inpatient psychiatric hospitalizations    Family Psychiatric History:   Family History   Problem Relation Age of Onset    Stroke Mother     Other Mother         Seizures    Diabetes Mother         Borderline Diabetes    High Blood Pressure Mother     Arthritis Mother     Early Death Mother 61        Stroke    Depression Mother     Heart Disease Mother     High Cholesterol Mother     Miscarriages / Theola Curling Mother     Heart Disease Father         Massive Heart Attack    High Blood Pressure Father     Arthritis Father     High Cholesterol Father     Cancer Brother         Liver And Colon Cancer    Early Death Brother 37        Liver And Colon Cancer    Heart Disease Brother         Heart Stents    High Blood Pressure Brother     High Cholesterol Brother     Early Death Brother         65 Years Old,Hit By Twenga Colon Cancer Brother     Hearing Loss Sister     Heart Failure Sister     High Blood Pressure Sister     Arthritis Sister     Heart Disease Brother     Heart Disease Sister     Cancer Sister     Early Death Brother 61        Heart Attack    Heart Disease Brother         Heart Attack    Mental Illness Daughter         Bipolar    Depression Daughter     Anxiety Disorder Daughter     Other Son         Seizures    Other Daughter         Stomach And Bowel Problems        Allergies:   Allergies   Allergen Reactions    Abilify [Aripiprazole]      \"Severe Shaking And Restlessness\"    Advil [Ibuprofen Micronized] Palpitations     \"Severe High Blood Pressure\"    Augmentin [Amoxicillin-Pot Clavulanate] Itching and Rash    Bee Venom Swelling     Redness    Ciprofloxacin Itching and Rash    Codeine      \"Severe Abdominal Cramping\"    Darvocet [Propoxyphene N-Acetaminophen] Palpitations     \"Severe High Blood Pressure\"    Darvon [Propoxyphene Hcl] Palpitations     \"Severe High Blood Pressure\"    Decadron [Dexamethasone] Other (See Comments)     seizure  Seizures    Ditropan [Oxybutynin Chloride] Palpitations     \"Severe High Blood Pressure\"    Fioricet [Butalbital-Apap-Caffeine] Palpitations     \"Severe High Blood Pressure\"    Fiorinal-Codeine #3 [Butalbital-Asa-Caff-Codeine]      \"Severe Stomach Cramps\"    Flagyl [Metronidazole] Diarrhea     \"Severe Diarrhea And Cramping\"    Naproxen Palpitations     \"Severe High Blood Pressure\"    Other      \"Allergic To Spider Bites Causing Blackness Of Skin, Severe Itching And Pain\"                                                  \"Allergic To Powder In Gloves Causing Severe Redness And Itching\"    Prozac [Fluoxetine Hcl]      \"Hallucinations\"    Robaxin [Methocarbamol] Palpitations     \"Severe High Blood Pressure\"    Ultram [Tramadol]      \"Severe Stomach Pain\"    Zoloft Palpitations     \"Sever High Blood Pressure\"    Butalbital-Aspirin-Caffeine Other (See Comments)     \"severe stomach pain\"    Coreg [Carvedilol] Other (See Comments)     \"spikes my BP severely and send me into a severe anxiety attack\"    Fluoxetine Other (See Comments)     hallucinations    Oxybutynin Chloride Other (See Comments)     Raises bp    Sertraline Other (See Comments)     hallucinations    Tape [Adhesive Tape]      Patient states it tears her skin, including band aids    Reglan [Metoclopramide] Other (See Comments)     \"makes me talk like a baby, but if I take benadryl with it it's fine\"        OBJECTIVE  Vital Signs:  Vitals:    09/04/20 1430   BP: 132/63   Pulse: 77   Resp: 16   Temp: 98.6 °F (37 °C)   SpO2: 93%       Labs:  Recent Results (from the past 48 hour(s))   Urinalysis    Collection Time: 09/02/20  4:34 PM   Result Value Ref Range    Color, UA YELLOW YELLOW    Clarity, UA CLEAR CLEAR    Glucose, Urine NEGATIVE NEGATIVE MG/DL    Bilirubin Urine NEGATIVE NEGATIVE MG/DL    Ketones, Urine NEGATIVE NEGATIVE MG/DL    Specific Gravity, UA 1.010 1.001 - 1.035    Blood, Urine NEGATIVE NEGATIVE    pH, Urine 7.0 5.0 - 8.0    Protein, UA NEGATIVE NEGATIVE MG/DL    Urobilinogen, Urine NORMAL 0.2 - 1.0 MG/DL    Nitrite Urine, Quantitative NEGATIVE NEGATIVE    Leukocyte Esterase, Urine LARGE (A) NEGATIVE    RBC, UA 2 0 - 6 /HPF    WBC, UA 21 (H) 0 - 5 /HPF    Bacteria, UA NEGATIVE NEGATIVE /HPF    Squam Epithel, UA 4 /HPF    Trichomonas, UA NONE SEEN NONE SEEN /HPF   Drug screen multi urine    Collection Time: 09/02/20  4:34 PM   Result Value Ref Range    Cannabinoid Scrn, Ur NEGATIVE NEGATIVE    Amphetamines NEGATIVE NEGATIVE    Cocaine Metabolite NEGATIVE NEGATIVE    Benzodiazepine Screen, Urine NEGATIVE NEGATIVE    Barbiturate Screen, Ur NEGATIVE NEGATIVE    Opiates, Urine UNCONFIRMED POSITIVE (A) NEGATIVE    Phencyclidine, Urine NEGATIVE NEGATIVE    Oxycodone NEGATIVE NEGATIVE   ETOH Blood    Collection Time: 09/02/20  6:56 PM   Result Value Ref Range    Alcohol Scrn <0.01 <0.01 %WT/VOL   POCT Glucose    Collection Time: 09/02/20 10:24 PM   Result Value Ref Range    POC Glucose 130 (H) 70 - 99 MG/DL   Comprehensive Metabolic Panel w/ Reflex to MG    Collection Time: 09/03/20  1:55 AM   Result Value Ref Range    Sodium 137 135 - 145 MMOL/L    Potassium 4.0 3.5 - 5.1 MMOL/L    Chloride 99 99 - 110 mMol/L    CO2 29 21 - 32 MMOL/L    BUN 10 6 - 23 MG/DL    CREATININE 0.7 0.6 - 1.1 MG/DL    Glucose 270 (H) 70 - 99 MG/DL    Calcium 9.4 8.3 - 10.6 MG/DL    Alb 4.3 3.4 - 5.0 GM/DL    Total Protein 6.5 6.4 - 8.2 GM/DL    Total Bilirubin 0.5 0.0 - 1.0 MG/DL    ALT <5 (L) 10 - 40 U/L    AST 16 15 - 37 IU/L    Alkaline Phosphatase 65 40 - 128 IU/L    GFR Non-African American >60 >60 mL/min/1.73m2    GFR African American >60 >60 mL/min/1.73m2    Anion Gap 9 4 - 16   CBC auto differential    Collection Time: 09/03/20  1:55 AM   Result Value Ref Range    WBC 10.5 4.0 - 10.5 K/CU MM    RBC 4.34 4.2 - 5.4 M/CU MM    Hemoglobin 12.7 12.5 - 16.0 GM/DL    Hematocrit 37.1 37 - 47 %    MCV 85.5 78 - 100 FL    MCH 29.3 27 - 31 PG    MCHC 34.2 32.0 - 36.0 %    RDW 12.5 11.7 - 14.9 %    Platelets 981 168 - 802 K/CU MM    MPV 9.5 7.5 - 11.1 FL    Differential Type AUTOMATED DIFFERENTIAL     Segs Relative 57.8 36 - 66 %    Lymphocytes % 32.5 24 - 44 %    Monocytes % 7.6 (H) 0 - 4 %    Eosinophils % 1.1 0 - 3 %    Basophils % 0.7 0 - 1 %    Segs Absolute 6.1 K/CU MM    Lymphocytes Absolute 3.4 K/CU MM    Monocytes Absolute 0.8 K/CU MM    Eosinophils Absolute 0.1 K/CU MM    Basophils Absolute 0.1 K/CU MM    Nucleated RBC % 0.0 %    Total Nucleated RBC 0.0 K/CU MM    Total Immature Neutrophil 0.03 K/CU MM    Immature Neutrophil % 0.3 0 - 0.43 %   POCT Glucose    Collection Time: 09/03/20  7:57 AM   Result Value Ref Range    POC Glucose 153 (H) 70 - 99 MG/DL   POCT Glucose    Collection Time: 09/03/20 11:55 AM   Result Value Ref Range    POC Glucose 202 (H) 70 - 99 MG/DL   Culture, Urine    Collection Time: 09/03/20  2:14 PM    Specimen: Urine, clean catch   Result Value Ref Range    Specimen URINE CLEAN CATCH     Special Requests NONE     Culture Final Report  No growth at 18 to 36 hours      POCT Glucose    Collection Time: 09/03/20  4:41 PM   Result Value Ref Range    POC Glucose 156 (H) 70 - 99 MG/DL   POCT Glucose    Collection Time: 09/03/20  8:19 PM   Result Value Ref Range    POC Glucose 216 (H) 70 - 99 MG/DL   POCT Glucose    Collection Time: 09/04/20  2:41 AM   Result Value Ref Range    POC Glucose 165 (H) 70 - 99 MG/DL   POCT Glucose    Collection Time: 09/04/20  4:06 AM   Result Value Ref Range    POC Glucose 180 (H) 70 - 99 MG/DL   EKG 12 Lead    Collection Time: 09/04/20  4:15 AM   Result Value Ref Range    Ventricular Rate 112 BPM    Atrial Rate 112 BPM    P-R Interval 166 ms    QRS Duration 102 ms    Q-T Interval 334 ms    QTc Calculation (Bazett) 455 ms    P Axis 36 degrees    R Axis -46 degrees    T Axis 116 degrees    Diagnosis       Sinus tachycardia  Left anterior fascicular cardiovascular, respiratory, gastrointestinal, genitourinary, integumentary, neurological, musculoskeletal, or immunological symptoms today. PSYCHIATRIC: See HPI above. PSYCHIATRIC EXAMINATION / MENTAL STATUS EXAM    CONSTITUTIONAL:    Vitals:   Vitals:    09/04/20 1430   BP: 132/63   Pulse: 77   Resp: 16   Temp: 98.6 °F (37 °C)   SpO2: 93%      General appearance: [x] appears age, []  appears older than stated age,               [x]  adequately dressed and groomed, [] disheveled,               [x]  in no acute distress, [] appears mildly distressed, [] other           MUSCULOSKELETAL:   Gait:   [] normal, [] antalgic, [] unsteady, [x] gait not evaluated   Station:             [] erect, [x] sitting, [] recumbent, [] other        Strength/tone:  [x] muscle strength and tone appear consistent with age and condition     [] atrophy      [] abnormal movements  PSYCHIATRIC:    Appearance: Appears stated age. Alert and oriented to person, place, time & situation. No acute distress. Adequate grooming and hygiene. Good eye contact. No prominent physical abnormalities. Attitude: Manner is cooperative and pleasant  Motor: No psychomotor agitation, retardation or abnormal movements noted  Speech: Clearly articulated; normal rate, volume, tone & amount. Language: intact understanding and production  Mood: anxious  Affect: anxious, full range, non-labile, congruent with mood and content of speech  Thought Production: Spontaneous. Thought Form: Coherent, linear, logical & goal-directed. No tangentiality or circumstantiality. No flight of ideas or loosening of associations. Thought Content/Perceptions: No REYNALDO, no AVH, no delusion  Insight: adequate  Judgment: adequate  Memory: Immediate, recent, and remote appear intact, though not formally tested.   Attention: maintained throughout interview  Fund of knowledge: Average  Gait/Balance: not assessed    Impression:   Severe episode of recurrent major depressive disorder with psychotic features  Visual hallucinations  Auditory hallucinations  Generalized anxiety disorder    Problem List:   Severe episode of recurrent major depressive disorder, with psychotic features ([facility] [address]    Plan:  1. Increase quetiapine to 50 mg nightly  2. Increase buspirone to 30 mg BID  3. Patient is a good candidate for the [facility] at [facility]  4. Case discussed with Dr. Shellie Harper who accepts admission of the patient for acute psychosis  5. Patient is willing to come on a voluntary basis  6.  Notified  Megan Sinha    Electronically signed by WENDIE Echavarria CNP on 9/4/2020 at 3:37 PM

## 2020-09-04 NOTE — CARE COORDINATION
Call from [facility]. Pt has david accepted to the [facility] if she will come voluntarily . Elizabeth Lutz wanted LSW to go and talk to the Pt to make sure that she wanted to go. LSW went into the Pt room. Pt is willing to go. LSW sent message to the hospitlist.     If Pt discharged after the LSW has left. Please arrange transportation with either [facility] [phone] or [phone] Pt choice. Contact Pt family. Complete RN part of the [address]. Complete packet by printing AVS with ANDREINA, H&P, and facesheet. Call [phone] to give report. Thanks.  Jae Harris

## 2020-09-04 NOTE — DISCHARGE SUMMARY
Discharge Summary    Name:  Edu Garcia /Age/Sex: 1955  (63 y.o. female)   MRN & CSN:  [id number] & [id number] Admission Date/Time: 2020  1:55 PM   Attending:  Mart Lauren MD Discharging Physician: Amanda Crowell MD     HPI and Hospital Course:   Edu Garcia is a 63 y.o.  female  who presents with Hallucinations and Medication Reaction    HPI- as per H and [address]     1-Probable symptomatic UTI on presentation- with lower abdominal and right sided lower back pain- dysuric- CT non-acute, was on rocephin- discharged on ceftin for 4 more days  2-Hallucinations- visual/auditory- likely metabolic encephalopathy from UTI vs toxic from medications , denies having this while being treated at inpatient psych- CT head negative  -seizure episode with probable post-ictal confusion- MRI non-acute. -seen by neuro- keppra dose reduced, cymbalta dose reduced, started on trilepta. 3-Major depression/anxiety- recently requiring inpatient psych- reports continued improvement since discharge from inpatient, even though with intermittent uncontrolled anxiety at times. Psych consulted- going to inpatient psych    Other issues  -HTN  -DN  -chronic pain syndrome  -RLS    The patient expressed appropriate understanding of and agreement with the discharge recommendations, medications, and plan.      Consults this admission:  IP CONSULT TO HOSPITALIST  IP CONSULT TO PSYCHIATRY  IP CONSULT TO NEUROLOGY  IP CONSULT TO ENDOCRINOLOGY    Discharge Instruction:   Follow up appointments:   Primary care physician:  within 2 weeks    Diet:  General/cardiac/ADA/as tolerated  Activity: {discharge activity: as tolerated  Disposition: Discharged to:   [x]Home, []C, []SNF, []Acute Rehab, []Hospice   Condition on discharge: Stable    Discharge Medications:      Gil Fails   Home Medication Instructions [id number]    Printed on:20 7249   Medication Information                      albuterol sulfate  (90 Base) MCG/ACT inhaler  Inhale 2 puffs into the lungs every 6 hours as needed for Wheezing or Shortness of Breath (or cough) Please include spacer with instructions for use. allopurinol (ZYLOPRIM) 300 MG tablet  Take 300 mg by mouth daily             aluminum & magnesium hydroxide-simethicone (MAALOX MAX) 400-400-40 MG/5ML SUSP  Take 15 mLs by mouth every 6 hours as needed (heart burn)             amitriptyline (ELAVIL) 25 MG tablet  Take 1 tablet by mouth nightly             aspirin 81 MG tablet  Take 81 mg by mouth daily             busPIRone (BUSPAR) 15 MG tablet  Take 15 mg by mouth 3 times daily             carbidopa-levodopa (SINEMET)  MG per tablet  Take 1 tablet by mouth nightly             cefUROXime (CEFTIN) 250 MG tablet  Take 1 tablet by mouth 2 times daily for 4 days             docusate sodium (COLACE) 100 MG capsule  Take 1 capsule by mouth 2 times daily             DULoxetine (CYMBALTA) 30 MG extended release capsule  Take 1 capsule by mouth daily             HYDROcodone-acetaminophen (NORCO) 5-325 MG per tablet  Take 1-2 tablets by mouth every 4 hours as needed. insulin lispro (HUMALOG) 100 UNIT/ML injection vial  Check blood glucose three times daily before meals and at bedtime. For blood glucose less than 150- no insulin, 151-200=2, 201-250=4, 251-300=6, 301-350=8, 351-400=10, >400=12 units and call MD             levETIRAcetam (KEPPRA) 500 MG tablet  Take 1 tablet by mouth 2 times daily             levothyroxine (SYNTHROID) 75 MCG tablet  Take 1 tablet by mouth every morning             losartan-hydrochlorothiazide (HYZAAR) 100-25 MG per tablet  Take 1 tablet by mouth daily             magnesium oxide (MAG-OX) 400 (240 MG) MG tablet  Take 400 mg by mouth daily             metoprolol succinate (TOPROL XL) 25 MG extended release tablet  Take 1 tablet by mouth daily             montelukast (SINGULAIR) 10 MG tablet  Take 10 mg by mouth nightly. MUCINEX 600 MG extended release tablet  2 times daily             Multiple Vitamins-Iron (MULTI-VITAMIN/IRON PO)  Take  by mouth. NIFEdipine (ADALAT CC) 60 MG extended release tablet  Take 1 tablet by mouth daily             OXcarbazepine (TRILEPTAL) 150 MG tablet  Take 1 tablet by mouth 2 times daily             pantoprazole (PROTONIX) 40 MG tablet  Take 1 tablet by mouth daily             polyethylene glycol (GLYCOLAX) packet  Take 17 g by mouth daily as needed for Constipation             QUEtiapine (SEROQUEL) 25 MG tablet  Take 25 mg by mouth every evening             Respiratory Therapy Supplies (NEBULIZER COMPRESSOR) KIT  1 kit by Does not apply route once for 1 dose             simvastatin (ZOCOR) 20 MG tablet  Take 20 mg by mouth nightly. TRESIBA FLEXTOUCH 200 UNIT/ML SOPN  Inject 20 Units into the skin every morning                 Objective Findings at Discharge:   /63   Pulse 77   Temp 98.6 °F (37 °C) (Oral)   Resp 16   Ht 5' 2\" (1.575 m)   Wt 161 lb (73 kg)   SpO2 93%   BMI 29.45 kg/m²            PHYSICAL EXAM   GEN Awake female, laying in bed in no apparent distress. EYES Pupils are equally round. No scleral discharge  HENT Atraumatic and symmetric head  NECK No apparent thyromegaly  RESP Symmetric chest movement while on room air. CARDIO/VASC Peripheral pulses equal bilaterally and palpable. No peripheral edema. GI Abdomen is not distended. Rectal exam deferred.  Mercer catheter is not present. HEME/LYMPH No petechiae or ecchymoses. MSK Spontaneous movement of BL upper extremities  SKIN Normal coloration, warm, dry. NEURO Cranial nerves appear grossly intact  PSYCH Awake, alert.     BMP/CBC  Recent Labs     09/02/20  1517 09/03/20  0155 09/04/20  0439   * 137 136   K 3.7 4.0 3.5   CL 96* 99 100   CO2 23 29 25   BUN 10 10 6   CREATININE 0.6 0.7 0.7   WBC 9.2 10.5 9.0   HCT 39.0 37.1 34.5*    317 272     SIGNIFICANT IMAGING AND LABS:      Discharge Time of 31 minutes    Electronically signed by Efe Gonsalez MD on 9/4/2020 at 5:54 PM

## 2020-09-04 NOTE — PROGRESS NOTES
Patient tearful and anxious. Pt reports pain \"all over\" and shaky, sitting up in her bed. This nurse talked with patient and gave Sandra Soda for her pain. Warm blanket provided.

## 2020-09-04 NOTE — PROGRESS NOTES
Pt feeling nauseous and anxious at this time. Reassurance given and HOB elevated. Daughter at bedside. Pt alert and oriented. She stated that she has had these \"spells\" before, when she gets anxious she goes unresponsive.

## 2020-09-05 PROBLEM — F31.4 BIPOLAR I DISORDER WITH DEPRESSION, SEVERE (HCC): Status: ACTIVE | Noted: 2020-09-04

## 2020-09-05 LAB
GLUCOSE BLD-MCNC: 130 MG/DL (ref 70–99)
GLUCOSE BLD-MCNC: 201 MG/DL (ref 70–99)
GLUCOSE BLD-MCNC: 213 MG/DL (ref 70–99)
GLUCOSE BLD-MCNC: 224 MG/DL (ref 70–99)

## 2020-09-05 PROCEDURE — 1240000000 HC EMOTIONAL WELLNESS R&B

## 2020-09-05 PROCEDURE — 82962 GLUCOSE BLOOD TEST: CPT

## 2020-09-05 PROCEDURE — 6370000000 HC RX 637 (ALT 250 FOR IP): Performed by: PSYCHIATRY & NEUROLOGY

## 2020-09-05 PROCEDURE — 99232 SBSQ HOSP IP/OBS MODERATE 35: CPT | Performed by: PSYCHIATRY & NEUROLOGY

## 2020-09-05 PROCEDURE — 6370000000 HC RX 637 (ALT 250 FOR IP): Performed by: HOSPITALIST

## 2020-09-05 RX ORDER — MAGNESIUM HYDROXIDE/ALUMINUM HYDROXICE/SIMETHICONE 120; 1200; 1200 MG/30ML; MG/30ML; MG/30ML
15 SUSPENSION ORAL EVERY 6 HOURS PRN
Status: DISCONTINUED | OUTPATIENT
Start: 2020-09-05 | End: 2020-09-10 | Stop reason: HOSPADM

## 2020-09-05 RX ORDER — NIFEDIPINE 30 MG/1
60 TABLET, EXTENDED RELEASE ORAL DAILY
Status: DISCONTINUED | OUTPATIENT
Start: 2020-09-05 | End: 2020-09-10 | Stop reason: HOSPADM

## 2020-09-05 RX ORDER — DULOXETIN HYDROCHLORIDE 30 MG/1
30 CAPSULE, DELAYED RELEASE ORAL DAILY
Status: DISCONTINUED | OUTPATIENT
Start: 2020-09-05 | End: 2020-09-10 | Stop reason: HOSPADM

## 2020-09-05 RX ORDER — ASPIRIN 81 MG/1
81 TABLET, CHEWABLE ORAL DAILY
Status: DISCONTINUED | OUTPATIENT
Start: 2020-09-05 | End: 2020-09-10 | Stop reason: HOSPADM

## 2020-09-05 RX ORDER — HYDROCODONE BITARTRATE AND ACETAMINOPHEN 5; 325 MG/1; MG/1
1 TABLET ORAL EVERY 6 HOURS PRN
Status: DISCONTINUED | OUTPATIENT
Start: 2020-09-05 | End: 2020-09-10 | Stop reason: HOSPADM

## 2020-09-05 RX ORDER — ALLOPURINOL 300 MG/1
300 TABLET ORAL DAILY
Status: DISCONTINUED | OUTPATIENT
Start: 2020-09-05 | End: 2020-09-10 | Stop reason: HOSPADM

## 2020-09-05 RX ORDER — LEVETIRACETAM 500 MG/1
500 TABLET ORAL 2 TIMES DAILY
Status: DISCONTINUED | OUTPATIENT
Start: 2020-09-05 | End: 2020-09-10 | Stop reason: HOSPADM

## 2020-09-05 RX ORDER — OXCARBAZEPINE 150 MG/1
150 TABLET, FILM COATED ORAL 2 TIMES DAILY
Status: DISCONTINUED | OUTPATIENT
Start: 2020-09-05 | End: 2020-09-05 | Stop reason: SDUPTHER

## 2020-09-05 RX ORDER — OXCARBAZEPINE 150 MG/1
150 TABLET, FILM COATED ORAL 2 TIMES DAILY
Status: DISCONTINUED | OUTPATIENT
Start: 2020-09-05 | End: 2020-09-10 | Stop reason: HOSPADM

## 2020-09-05 RX ORDER — BUSPIRONE HYDROCHLORIDE 15 MG/1
30 TABLET ORAL 2 TIMES DAILY
Status: DISCONTINUED | OUTPATIENT
Start: 2020-09-05 | End: 2020-09-05 | Stop reason: SDUPTHER

## 2020-09-05 RX ORDER — HYDROCHLOROTHIAZIDE 25 MG/1
25 TABLET ORAL DAILY
Status: DISCONTINUED | OUTPATIENT
Start: 2020-09-05 | End: 2020-09-10 | Stop reason: HOSPADM

## 2020-09-05 RX ORDER — LOSARTAN POTASSIUM 100 MG/1
100 TABLET ORAL DAILY
Status: DISCONTINUED | OUTPATIENT
Start: 2020-09-05 | End: 2020-09-10 | Stop reason: HOSPADM

## 2020-09-05 RX ORDER — BUSPIRONE HYDROCHLORIDE 15 MG/1
15 TABLET ORAL 3 TIMES DAILY
Status: DISCONTINUED | OUTPATIENT
Start: 2020-09-05 | End: 2020-09-10 | Stop reason: HOSPADM

## 2020-09-05 RX ORDER — MONTELUKAST SODIUM 10 MG/1
10 TABLET ORAL NIGHTLY
Status: DISCONTINUED | OUTPATIENT
Start: 2020-09-05 | End: 2020-09-10 | Stop reason: HOSPADM

## 2020-09-05 RX ORDER — ARIPIPRAZOLE 5 MG/1
5 TABLET ORAL DAILY
Status: DISCONTINUED | OUTPATIENT
Start: 2020-09-05 | End: 2020-09-07

## 2020-09-05 RX ORDER — PANTOPRAZOLE SODIUM 40 MG/1
40 TABLET, DELAYED RELEASE ORAL DAILY
Status: DISCONTINUED | OUTPATIENT
Start: 2020-09-05 | End: 2020-09-10 | Stop reason: HOSPADM

## 2020-09-05 RX ORDER — METOPROLOL SUCCINATE 25 MG/1
25 TABLET, EXTENDED RELEASE ORAL DAILY
Status: DISCONTINUED | OUTPATIENT
Start: 2020-09-05 | End: 2020-09-10 | Stop reason: HOSPADM

## 2020-09-05 RX ORDER — POLYETHYLENE GLYCOL 3350 17 G/17G
17 POWDER, FOR SOLUTION ORAL DAILY PRN
Status: DISCONTINUED | OUTPATIENT
Start: 2020-09-05 | End: 2020-09-10 | Stop reason: HOSPADM

## 2020-09-05 RX ORDER — ATORVASTATIN CALCIUM 20 MG/1
20 TABLET, FILM COATED ORAL NIGHTLY
Status: DISCONTINUED | OUTPATIENT
Start: 2020-09-05 | End: 2020-09-08 | Stop reason: CLARIF

## 2020-09-05 RX ORDER — LEVOTHYROXINE SODIUM 0.07 MG/1
75 TABLET ORAL EVERY MORNING
Status: DISCONTINUED | OUTPATIENT
Start: 2020-09-05 | End: 2020-09-08

## 2020-09-05 RX ORDER — LOSARTAN POTASSIUM AND HYDROCHLOROTHIAZIDE 12.5; 5 MG/1; MG/1
2 TABLET ORAL DAILY
Status: DISCONTINUED | OUTPATIENT
Start: 2020-09-05 | End: 2020-09-05 | Stop reason: CLARIF

## 2020-09-05 RX ADMIN — MAGNESIUM OXIDE TAB 400 MG (241.3 MG ELEMENTAL MG) 400 MG: 400 (241.3 MG) TAB at 14:55

## 2020-09-05 RX ADMIN — TRAZODONE HYDROCHLORIDE 50 MG: 50 TABLET ORAL at 20:56

## 2020-09-05 RX ADMIN — ARIPIPRAZOLE 5 MG: 5 TABLET ORAL at 12:08

## 2020-09-05 RX ADMIN — METOPROLOL SUCCINATE 25 MG: 25 TABLET, EXTENDED RELEASE ORAL at 14:56

## 2020-09-05 RX ADMIN — MONTELUKAST 10 MG: 10 TABLET, FILM COATED ORAL at 20:49

## 2020-09-05 RX ADMIN — ALLOPURINOL 300 MG: 300 TABLET ORAL at 14:55

## 2020-09-05 RX ADMIN — INSULIN LISPRO 4 UNITS: 100 INJECTION, SOLUTION INTRAVENOUS; SUBCUTANEOUS at 17:02

## 2020-09-05 RX ADMIN — ASPIRIN 81 MG 81 MG: 81 TABLET ORAL at 12:09

## 2020-09-05 RX ADMIN — CARBIDOPA AND LEVODOPA 1 TABLET: 10; 100 TABLET ORAL at 20:59

## 2020-09-05 RX ADMIN — INSULIN LISPRO 4 UNITS: 100 INJECTION, SOLUTION INTRAVENOUS; SUBCUTANEOUS at 12:30

## 2020-09-05 RX ADMIN — PANTOPRAZOLE SODIUM 40 MG: 40 TABLET, DELAYED RELEASE ORAL at 12:09

## 2020-09-05 RX ADMIN — ACETAMINOPHEN 650 MG: 325 TABLET ORAL at 12:09

## 2020-09-05 RX ADMIN — LOSARTAN POTASSIUM 100 MG: 100 TABLET, FILM COATED ORAL at 14:56

## 2020-09-05 RX ADMIN — DULOXETINE HYDROCHLORIDE 30 MG: 30 CAPSULE, DELAYED RELEASE ORAL at 12:09

## 2020-09-05 RX ADMIN — HYDROCODONE BITARTRATE AND ACETAMINOPHEN 1 TABLET: 5; 325 TABLET ORAL at 15:04

## 2020-09-05 RX ADMIN — NIFEDIPINE 60 MG: 30 TABLET, FILM COATED, EXTENDED RELEASE ORAL at 12:08

## 2020-09-05 RX ADMIN — OXCARBAZEPINE 150 MG: 150 TABLET, FILM COATED ORAL at 14:55

## 2020-09-05 RX ADMIN — HYDROCODONE BITARTRATE AND ACETAMINOPHEN 1 TABLET: 5; 325 TABLET ORAL at 21:00

## 2020-09-05 RX ADMIN — INSULIN GLARGINE 20 UNITS: 100 INJECTION, SOLUTION SUBCUTANEOUS at 21:03

## 2020-09-05 RX ADMIN — LEVETIRACETAM 500 MG: 500 TABLET ORAL at 20:49

## 2020-09-05 RX ADMIN — OXCARBAZEPINE 150 MG: 150 TABLET, FILM COATED ORAL at 20:54

## 2020-09-05 RX ADMIN — ATORVASTATIN CALCIUM 20 MG: 20 TABLET, FILM COATED ORAL at 20:49

## 2020-09-05 RX ADMIN — BUSPIRONE HYDROCHLORIDE 15 MG: 15 TABLET ORAL at 14:56

## 2020-09-05 RX ADMIN — HYDROCHLOROTHIAZIDE 25 MG: 25 TABLET ORAL at 14:56

## 2020-09-05 RX ADMIN — BUSPIRONE HYDROCHLORIDE 15 MG: 15 TABLET ORAL at 20:49

## 2020-09-05 RX ADMIN — LEVOTHYROXINE SODIUM 75 MCG: 0.07 TABLET ORAL at 12:09

## 2020-09-05 ASSESSMENT — PAIN SCALES - GENERAL
PAINLEVEL_OUTOF10: 8
PAINLEVEL_OUTOF10: 8
PAINLEVEL_OUTOF10: 4
PAINLEVEL_OUTOF10: 0
PAINLEVEL_OUTOF10: 5

## 2020-09-05 ASSESSMENT — SLEEP AND FATIGUE QUESTIONNAIRES
RESTFUL SLEEP: NO
DIFFICULTY ARISING: NO
DIFFICULTY STAYING ASLEEP: YES
DO YOU HAVE DIFFICULTY SLEEPING: YES
SLEEP PATTERN: DISTURBED/INTERRUPTED SLEEP
AVERAGE NUMBER OF SLEEP HOURS: 6
DIFFICULTY FALLING ASLEEP: YES
DO YOU USE A SLEEP AID: YES

## 2020-09-05 ASSESSMENT — PATIENT HEALTH QUESTIONNAIRE - PHQ9: SUM OF ALL RESPONSES TO PHQ QUESTIONS 1-9: 11

## 2020-09-05 NOTE — PROGRESS NOTES
Pt isolated to room for most of shift. Came out for lunch and dinner. Med compliant and given Norco per prn order at 2pm for c/o neck pain. Pleasant and cooperative all shift. Alert and oriented x4.

## 2020-09-05 NOTE — BH NOTE
Pt belongings include:  Pink t shirt  Grey sweats  White socks  Shoes  Black sports bra  Purse with cell phone, wallet,   50.00 cash  Make up case with tic tacs and notes in it  Glasses

## 2020-09-05 NOTE — BH NOTE
[facility]  Admission Note     Admission Type:   Admission Type: Voluntary    Reason for admission:  Reason for Admission: MDD severe with psychotic features    PATIENT STRENGTHS:  Strengths: Communication, Medication Compliance, Social Skills    Patient Strengths and Limitations:  Limitations: Tendency to isolate self    Addictive Behavior:        Medical Problems:   Past Medical History:   Diagnosis Date    Abnormal EKG 4/22/2014    Acid reflux     Anemia     Anesthesia     Nausea/Vomiting Post Op In Past    Anginal pain (HCC)     Denies Chest Pain At This Time    Anxiety     Arthritis     \"All Over\"    Asthma     CAD (coronary artery disease)     per last cardiac cath.  Cerebral artery occlusion with cerebral infarction ([address]     [facility]est[facility] failure[facility]     Chronic back pain     Chronic kidney disease     DDD (degenerative disc disease), cervical     12- Patient reports she was dx with DDD of Cerival spine C6,C7    Depression     Diabetes mellitus ([address] Dx 1990's    Diabetic neuropathy ([address]     \"In My Legs And Feet\"    Dizziness     \"Sometimes\"    Dry skin     Enlarged ureter     Right Side    Fatty liver     Fibrocystic breast     Gout     Pt states she was diagnosed with gout in the past few months.  H/O cardiac catheterization     Showed mild disease per last cath.  H/O cardiovascular stress test 03/15/2010    EF 69%, normal perfusion study except for diaphragmatic artifact, uniform wall motion.  H/O cardiovascular stress test 10/09/2008    EF 60%, no anginia, normal study.  H/O cardiovascular stress test 05/06/2014    EF 66%, no ischemia, normal LV systolic funciton, normal perfusion pattern.  H/O Doppler ultrasound 02/28/2011    CAROTID DOPPLER-normal study.  H/O echocardiogram 5/6/2014    Ef >55%. Impaired LV relaxation.  H/O echocardiogram 10/14/15    EF 60% Normal LV and systolic function.  No significant valvulopathy seen.     History of Holter monitoring 3/24/15    24 hour - predominant rhythm sinus    [place] (hard of hearing)     Bilateral Ears    Hx of cardiovascular stress test 10/19/2015    lexiscan-normal,EF63%    Hx of motion sickness     HX OTHER MEDICAL     Primary Care Physician Is [facility] In [facility]    Hyperlipidemia     Hypertension     IBS (irritable bowel syndrome)     Incisional hernia 4/2014    Kidney stones Last Episode In 2012 Or 2013    Passed Kidney Stones Numberous Times    Migraines     Nausea & vomiting     Nausea/Vomiting Post Op In Past    Other specified disorder of skin     12- Patient states she has a condition of her vaginal area (skin) which starts with the letters Floreen Pipe. She is currently being treated with multiple creams and weekly Diflucan. 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     Staph infection Dx 1980's    Toes On Left Foot    Thyroid disease     hypothroidism    Tremor     \"Tremors All Over\"    Urinary incontinence     UTI (urinary tract infection) In Past    No Current Symptoms    UTI (urinary tract infection)     Vertigo     \"Sometimes\"    Wears glasses        Status EXAM:  Status and Exam  Normal: No  Facial Expression: Flat  Affect: Appropriate  Level of Consciousness: Alert  Mood:Normal: No  Mood: Depressed  Motor Activity:Normal: No  Motor Activity: Decreased  Interview Behavior: Cooperative  Preception: Sawyer to Person, Martin Baize to Time, Sawyer to Place, Sawyer to Situation  Attention:Normal: Yes  Thought Content:Normal: Yes  Hallucinations: None  Delusions: No  Memory:Normal: Yes  Insight and Judgment: No  Insight and Judgment: Poor Insight  Present Suicidal Ideation: No  Present Homicidal Ideation: No    Tobacco Screening:  Practical Counseling, on admission, dread X, if applicable and completed (first 3 are required if patient doesn't refuse):            ( )  Recognizing danger situations (included triggers and roadblocks)                    ( )  Coping skills (new ways to manage stress, exercise, relaxation techniques, changing routine, distraction)                                                           ( )  Basic information about quitting (benefits of quitting, techniques in how to quit, available resources  ( ) [facility]                                           ( ) Patient refused counseling  (x ) Patient has not smoked in the last 30 days      Pt admitted with followings belongings:  Dentures: None  Vision - Corrective Lenses: Glasses  Hearing Aid: None  Body Piercings Removed: N/A  Clothing: Footwear, Pants, Shirt, Socks, Undergarments (Comment)  Were All Patient Medications Collected?: Yes  Other Valuables: Cell phone, Money (Comment), Ashley Ree, Keys(50 cash)     Patient arrives on unit per cot via squad. Alert and oriented x4. Ambulated to bed independently. Admission assessment complete, denies SI/HI, AVH. Valuables placed in locker number [id number]. Patient's home medications were placed in med room. Patient oriented to surroundings and program expectations and copy of patient rights given. Patient offered Psychiatric Advanced Directive:refused. Consents not signed as patient wants to sleep.                                          Lauren Munoz, RN
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
## 2020-09-05 NOTE — PLAN OF CARE
Problem: Altered Mood, Depressive Behavior:  Goal: Able to verbalize acceptance of life and situations over which he or she has no control  Description: Able to verbalize acceptance of life and situations over which he or she has no control  Outcome: Ongoing  Goal: Able to verbalize and/or display a decrease in depressive symptoms  Description: Able to verbalize and/or display a decrease in depressive symptoms  Outcome: Ongoing  Goal: Ability to disclose and discuss suicidal ideas will improve  Description: Ability to disclose and discuss suicidal ideas will improve  Outcome: Ongoing  Goal: Able to verbalize support systems  Description: Able to verbalize support systems  Outcome: Ongoing  Goal: Absence of self-harm  Description: Absence of self-harm  Outcome: Ongoing  Goal: Patient specific goal  Description: Patient specific goal  Outcome: Ongoing  Goal: Participates in care planning  Description: Participates in care planning  Outcome: Ongoing

## 2020-09-06 LAB
GLUCOSE BLD-MCNC: 157 MG/DL (ref 70–99)
GLUCOSE BLD-MCNC: 193 MG/DL (ref 70–99)
GLUCOSE BLD-MCNC: 226 MG/DL (ref 70–99)
GLUCOSE BLD-MCNC: 239 MG/DL (ref 70–99)

## 2020-09-06 PROCEDURE — 6370000000 HC RX 637 (ALT 250 FOR IP): Performed by: PSYCHIATRY & NEUROLOGY

## 2020-09-06 PROCEDURE — 6370000000 HC RX 637 (ALT 250 FOR IP): Performed by: HOSPITALIST

## 2020-09-06 PROCEDURE — 99232 SBSQ HOSP IP/OBS MODERATE 35: CPT | Performed by: PSYCHIATRY & NEUROLOGY

## 2020-09-06 PROCEDURE — 82962 GLUCOSE BLOOD TEST: CPT

## 2020-09-06 PROCEDURE — 1240000000 HC EMOTIONAL WELLNESS R&B

## 2020-09-06 RX ORDER — LORAZEPAM 1 MG/1
2 TABLET ORAL ONCE
Status: COMPLETED | OUTPATIENT
Start: 2020-09-07 | End: 2020-09-06

## 2020-09-06 RX ADMIN — INSULIN LISPRO 2 UNITS: 100 INJECTION, SOLUTION INTRAVENOUS; SUBCUTANEOUS at 08:12

## 2020-09-06 RX ADMIN — INSULIN LISPRO 4 UNITS: 100 INJECTION, SOLUTION INTRAVENOUS; SUBCUTANEOUS at 16:30

## 2020-09-06 RX ADMIN — LEVETIRACETAM 500 MG: 500 TABLET ORAL at 08:10

## 2020-09-06 RX ADMIN — OXCARBAZEPINE 150 MG: 150 TABLET, FILM COATED ORAL at 08:11

## 2020-09-06 RX ADMIN — LEVOTHYROXINE SODIUM 75 MCG: 0.07 TABLET ORAL at 08:10

## 2020-09-06 RX ADMIN — LORAZEPAM 1 MG: 1 TABLET ORAL at 22:14

## 2020-09-06 RX ADMIN — BUSPIRONE HYDROCHLORIDE 15 MG: 15 TABLET ORAL at 14:00

## 2020-09-06 RX ADMIN — BUSPIRONE HYDROCHLORIDE 15 MG: 15 TABLET ORAL at 08:10

## 2020-09-06 RX ADMIN — ATORVASTATIN CALCIUM 20 MG: 20 TABLET, FILM COATED ORAL at 20:34

## 2020-09-06 RX ADMIN — MAGNESIUM OXIDE TAB 400 MG (241.3 MG ELEMENTAL MG) 400 MG: 400 (241.3 MG) TAB at 08:10

## 2020-09-06 RX ADMIN — HALOPERIDOL 2.5 MG: 2 TABLET ORAL at 23:57

## 2020-09-06 RX ADMIN — HYDROCHLOROTHIAZIDE 25 MG: 25 TABLET ORAL at 08:10

## 2020-09-06 RX ADMIN — CARBIDOPA AND LEVODOPA 1 TABLET: 10; 100 TABLET ORAL at 20:34

## 2020-09-06 RX ADMIN — HYDROCODONE BITARTRATE AND ACETAMINOPHEN 1 TABLET: 5; 325 TABLET ORAL at 08:16

## 2020-09-06 RX ADMIN — ALLOPURINOL 300 MG: 300 TABLET ORAL at 08:11

## 2020-09-06 RX ADMIN — OXCARBAZEPINE 150 MG: 150 TABLET, FILM COATED ORAL at 20:35

## 2020-09-06 RX ADMIN — DULOXETINE HYDROCHLORIDE 30 MG: 30 CAPSULE, DELAYED RELEASE ORAL at 08:10

## 2020-09-06 RX ADMIN — ACETAMINOPHEN 650 MG: 325 TABLET ORAL at 21:52

## 2020-09-06 RX ADMIN — ARIPIPRAZOLE 5 MG: 5 TABLET ORAL at 08:10

## 2020-09-06 RX ADMIN — ASPIRIN 81 MG 81 MG: 81 TABLET ORAL at 08:10

## 2020-09-06 RX ADMIN — TRAZODONE HYDROCHLORIDE 50 MG: 50 TABLET ORAL at 20:34

## 2020-09-06 RX ADMIN — INSULIN LISPRO 2 UNITS: 100 INJECTION, SOLUTION INTRAVENOUS; SUBCUTANEOUS at 11:56

## 2020-09-06 RX ADMIN — NIFEDIPINE 60 MG: 30 TABLET, FILM COATED, EXTENDED RELEASE ORAL at 08:10

## 2020-09-06 RX ADMIN — PANTOPRAZOLE SODIUM 40 MG: 40 TABLET, DELAYED RELEASE ORAL at 08:10

## 2020-09-06 RX ADMIN — LORAZEPAM 2 MG: 1 TABLET ORAL at 23:57

## 2020-09-06 RX ADMIN — MONTELUKAST 10 MG: 10 TABLET, FILM COATED ORAL at 20:34

## 2020-09-06 RX ADMIN — LOSARTAN POTASSIUM 100 MG: 100 TABLET, FILM COATED ORAL at 08:10

## 2020-09-06 RX ADMIN — INSULIN GLARGINE 20 UNITS: 100 INJECTION, SOLUTION SUBCUTANEOUS at 20:35

## 2020-09-06 RX ADMIN — BUSPIRONE HYDROCHLORIDE 15 MG: 15 TABLET ORAL at 20:34

## 2020-09-06 RX ADMIN — METOPROLOL SUCCINATE 25 MG: 25 TABLET, EXTENDED RELEASE ORAL at 08:10

## 2020-09-06 RX ADMIN — LEVETIRACETAM 500 MG: 500 TABLET ORAL at 20:34

## 2020-09-06 ASSESSMENT — PAIN DESCRIPTION - ORIENTATION
ORIENTATION: LEFT
ORIENTATION: RIGHT;LEFT

## 2020-09-06 ASSESSMENT — PAIN DESCRIPTION - FREQUENCY
FREQUENCY: CONTINUOUS
FREQUENCY: CONTINUOUS

## 2020-09-06 ASSESSMENT — PAIN SCALES - GENERAL
PAINLEVEL_OUTOF10: 10
PAINLEVEL_OUTOF10: 8
PAINLEVEL_OUTOF10: 3
PAINLEVEL_OUTOF10: 5
PAINLEVEL_OUTOF10: 5
PAINLEVEL_OUTOF10: 0

## 2020-09-06 ASSESSMENT — PAIN DESCRIPTION - LOCATION
LOCATION: RIB CAGE
LOCATION: ABDOMEN
LOCATION: NECK

## 2020-09-06 ASSESSMENT — PAIN - FUNCTIONAL ASSESSMENT
PAIN_FUNCTIONAL_ASSESSMENT: PREVENTS OR INTERFERES SOME ACTIVE ACTIVITIES AND ADLS
PAIN_FUNCTIONAL_ASSESSMENT: ACTIVITIES ARE NOT PREVENTED

## 2020-09-06 ASSESSMENT — PAIN DESCRIPTION - PAIN TYPE
TYPE: CHRONIC PAIN
TYPE: OTHER (COMMENT)
TYPE: CHRONIC PAIN

## 2020-09-06 ASSESSMENT — PAIN DESCRIPTION - PROGRESSION
CLINICAL_PROGRESSION: NOT CHANGED
CLINICAL_PROGRESSION: RAPIDLY WORSENING

## 2020-09-06 ASSESSMENT — PAIN DESCRIPTION - DESCRIPTORS
DESCRIPTORS: ACHING;DISCOMFORT
DESCRIPTORS: DISCOMFORT

## 2020-09-06 ASSESSMENT — PAIN DESCRIPTION - ONSET
ONSET: ON-GOING
ONSET: ON-GOING

## 2020-09-06 NOTE — PROGRESS NOTES
Pt med compliant this shift, out for all meals, then isolated to room for the remainder of time. Asked twice if she wanted a shower and both times said yes, but then would go back to bed.

## 2020-09-06 NOTE — PLAN OF CARE
Problem: Altered Mood, Depressive Behavior:  Goal: Able to verbalize acceptance of life and situations over which he or she has no control  Description: Able to verbalize acceptance of life and situations over which he or she has no control  9/5/2020 2301 by Jazmyne Arias RN  Outcome: Ongoing  9/5/2020 1114 by Kalli Fofana RN  Outcome: Ongoing  Goal: Able to verbalize and/or display a decrease in depressive symptoms  Description: Able to verbalize and/or display a decrease in depressive symptoms  9/5/2020 2301 by Jazmyne Arias RN  Outcome: Ongoing  9/5/2020 1114 by Kalli Fofana RN  Outcome: Ongoing  Goal: Ability to disclose and discuss suicidal ideas will improve  Description: Ability to disclose and discuss suicidal ideas will improve  9/5/2020 2301 by Jazmyne Arias RN  Outcome: Ongoing  9/5/2020 1114 by Kalli Fofana RN  Outcome: Ongoing  Goal: Able to verbalize support systems  Description: Able to verbalize support systems  9/5/2020 2301 by Jazmyne Arias RN  Outcome: Ongoing  9/5/2020 1114 by Kalli Fofana RN  Outcome: Ongoing  Goal: Absence of self-harm  Description: Absence of self-harm  9/5/2020 2301 by Jazmyne Arias RN  Outcome: Ongoing  9/5/2020 1114 by Kalli Fofana RN  Outcome: Ongoing  Goal: Patient specific goal  Description: Patient specific goal  9/5/2020 2301 by Jazmyne Arias RN  Outcome: Ongoing  9/5/2020 1114 by Kalli Fofana RN  Outcome: Ongoing  Goal: Participates in care planning  Description: Participates in care planning  9/5/2020 2301 by Jazmyne Arias RN  Outcome: Ongoing  9/5/2020 1114 by Kalli Fofana RN  Outcome: Ongoing     Problem: Falls - Risk of:  Goal: Will remain free from falls  Description: Will remain free from falls  9/5/2020 2301 by Jazmyne Arias RN  Outcome: Ongoing  9/5/2020 1114 by Kalli Fofana RN  Outcome: Ongoing  Goal: Absence of physical injury  Description: Absence of physical injury  9/5/2020 2301 by Jazmyne Arias RN  Outcome: Ongoing  9/5/2020 1114 by Robin Galindo RN  Outcome: Ongoing

## 2020-09-06 NOTE — PLAN OF CARE
echocardiogram 5/6/2014    Ef >55%. Impaired LV relaxation.  H/O echocardiogram 10/14/15    EF 60% Normal LV and systolic function. No significant valvulopathy seen.  History of Holter monitoring 3/24/15    24 hour - predominant rhythm sinus    [place] (hard of hearing)     Bilateral Ears    Hx of cardiovascular stress test 10/19/2015    lexiscan-normal,EF63%    Hx of motion sickness     HX OTHER MEDICAL     Primary Care Physician [place]. [place]    Hyperlipidemia     Hypertension     IBS (irritable bowel syndrome)     Incisional hernia 4/2014    Kidney stones Last Episode In 2012 Or 2013    Passed Kidney Stones Numberous Times    Migraines     Nausea & vomiting     Nausea/Vomiting Post Op In Past    Other specified disorder of skin     12- Patient states she has a condition of her vaginal area (skin) which starts with the letters [place]. She is currently being treated with multiple creams and weekly Diflucan. 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     Staph infection Dx 1980's    Toes On Left Foot    Thyroid disease     hypothroidism    Tremor     \"Tremors All Over\"    Urinary incontinence     UTI (urinary tract infection) In Past    No Current Symptoms    UTI (urinary tract infection)     Vertigo     \"Sometimes\"    Wears glasses        EDUCATION:   Learner Progress Toward Treatment Goals: Reviewed goals and plan of care    Method: Group    Outcome: Needs reinforcement    PATIENT GOALS: Med titration, compliance with unit programming    PLAN/TREATMENT RECOMMENDATIONS UPDATE: Med titration    Estimated Length of Stay Update:  6 days  Estimated Discharge Date Update: 09/11/20  Discharge Criteria: Med titration    SHORT-TERM GOALS UPDATE:   Time frame for Short-Term Goals: 6 days    LONG-TERM GOALS UPDATE:   Time frame for Long-Term Goals: 6 days  Members Present in Team Meeting: See [address] MSW, LSW

## 2020-09-06 NOTE — PROGRESS NOTES
Psychiatric Progress Note    Chau Kuhn  [id number]  9/4/2020 09/06/20    ID: Patient is a 63 yrs y.o. female    CC: I am depressed     HPI: Pt is a 64 yo  female who presents for exacerbation of bipolar depression and anxiety. Pt noted recent exacarbation of mood with thoughts to harm himself. Pt noted she currently feels safe and comfortable on the unit. Pt was in agreement with treatment team.  Pt was polite and cordial during the interview process. Pt noted she is doing [place] today. \"  Pt noted she is sleeping \"good. ..about 6 hours last night. \"  Pt noted her apptetite is getting better. Pt rated her depresssion a \"6, on a scale of zero to ten with ten being the worst and zero being none. Pt rated her anxiety a \"6,\" on the same scale. Pt denied any thoughts to harm herself or anyone else. Pt denied any auditory or visiual hallucintations.       Met with pt and treatment team  No TD noted, AIMS=0        Family Psychiatric History:   Family History   Problem Relation Age of Onset    Stroke Mother     Other Mother         Seizures    Diabetes Mother         Borderline Diabetes    High Blood Pressure Mother     Arthritis Mother     Early Death Mother 61        Stroke    Depression Mother     Heart Disease Mother     High Cholesterol Mother     Miscarriages / Dagmar See Mother     Heart Disease Father         Massive Heart Attack    High Blood Pressure Father     Arthritis Father     High Cholesterol Father     Cancer Brother         Liver And Colon Cancer    Early Death Brother 37        Liver And Colon Cancer    Heart Disease Brother         Heart Stents    High Blood Pressure Brother     High Cholesterol Brother     Early Death Brother         65 Years Old,Hit By Arledia Colon Cancer Brother     Hearing Loss Sister     Heart Failure Sister     High Blood Pressure Sister     Arthritis Sister     Heart Disease Brother     Heart Disease Sister     Cancer Sister  Early Death Brother 61        Heart Attack    Heart Disease Brother         Heart Attack    Mental Illness Daughter         Bipolar    Depression Daughter     Anxiety Disorder Daughter     Other Son         Seizures    Other Daughter         Stomach And Bowel Problems        Allergies:   Allergies   Allergen Reactions    Abilify [Aripiprazole]      \"Severe Shaking And Restlessness\"    Advil [Ibuprofen Micronized] Palpitations     \"Severe High Blood Pressure\"    Augmentin [Amoxicillin-Pot Clavulanate] Itching and Rash    Bee Venom Swelling     Redness    Ciprofloxacin Itching and Rash    Codeine      \"Severe Abdominal Cramping\"    Darvocet [Propoxyphene N-Acetaminophen] Palpitations     \"Severe High Blood Pressure\"    Darvon [Propoxyphene Hcl] Palpitations     \"Severe High Blood Pressure\"    Decadron [Dexamethasone] Other (See Comments)     seizure  Seizures    Ditropan [Oxybutynin Chloride] Palpitations     \"Severe High Blood Pressure\"    Fioricet [Butalbital-Apap-Caffeine] Palpitations     \"Severe High Blood Pressure\"    Fiorinal-Codeine #3 [Butalbital-Asa-Caff-Codeine]      \"Severe Stomach Cramps\"    Flagyl [Metronidazole] Diarrhea     \"Severe Diarrhea And Cramping\"    Naproxen Palpitations     \"Severe High Blood Pressure\"    Other      \"Allergic To Spider Bites Causing Blackness Of Skin, Severe Itching And Pain\"                                                  \"Allergic To Powder In Gloves Causing Severe Redness And Itching\"    Prozac [Fluoxetine Hcl]      \"Hallucinations\"    Robaxin [Methocarbamol] Palpitations     \"Severe High Blood Pressure\"    Ultram [Tramadol]      \"Severe Stomach Pain\"    Zoloft Palpitations     \"Sever High Blood Pressure\"    Butalbital-Aspirin-Caffeine Other (See Comments)     \"severe stomach pain\"    Coreg [Carvedilol] Other (See Comments)     \"spikes my BP severely and send me into a severe anxiety attack\"    Fluoxetine Other (See Comments) and oriented to person, place, time & situation. no acute distress. Adequate grooming and hygeine. Good eye contact. No prominent physical abnormalities. Attitude: Manner is cooperative and pleasant  Motor: No psychomotor agitation, retardation or abnormal movements noted  Speech: Clearly articulated; normal rate, volume, tone & amount. Language: intact understanding and production  Mood: depressed  Affect: flat  Thought Production: Spontaneous. Thought Form: Coherent, linear, logical & goal-directed. No tangentiality or circumstantiality. No flight of ideas or loosening of associations. Thought Content/Perceptions: No REYNALDO, noted hx of AVH, no delusion  Insight:questionable  Judgment: questionable  Memory: Immediate, recent, and remote appear intact, though not formally tested. Attention: maintained throughout interview  Fund of knowledge: Average  Gait/Balance: WNL/WNL           Impression:   Bipolar Depression severe with psychosis    Problem List:   <principal problem not specified>    Plan:  1. Admit to [facility] Unit  2. Consult hospitalist to evaluate and treat medical conditions  3. Adjust psychotropic medications to target symptoms  4. Occupational Therapy, Physical Therapy, Group Psychotherapy as tolerated  5. Reviewed treatment plan with patient including medication risks, benefits, side effects. Obtained informed consent for treatment. 6. Anticipated length of stay 10-12 days  7. I certify that inpatient psychiatric hospital admission is medically necessary for treatment, which can be expected to improve the patient's condition, and/or for diagnostic study. 8. Psychiatric management:medication initiation and titration, group and individual therapy, safe and theraputic environment. 9. Status of problem/condition: ?Improving  10. Medical co-morbidities: Management per [facility] group, appreciate assistance  11. Legal Status:Pending  12.  The treatment team reviewed with the patient the diagnosis and treatment recommendations to include the risks, benefits, and side effects of chosen medications. 13. The patient verbalized understanding and agreed with the treatment regimen as outlined above. 14. The patient was encouraged to participate in groups. 15. Medical records, Labs, Diagnotic tests reviewed  16. q15 min safety checks for safety  17. Interval History. 18. Review current labs  21. Continue current medications  22. Supportive Therapy Provided  23. Pt had an opportunity to ask questions and address concerns  24. Pt encouraged to continue therapy group or individual.  25. Pt was in agreement with treatment plan. 26. The risks benefits and side effects of medications were discussed with the patient, including alternatives and no treatment.           Electronically signed by Piyush Neal DO on 9/6/2020 at 1:58 PM

## 2020-09-06 NOTE — BH NOTE
Assessment complete, patient states anxiety 4, depression 4. States she likes the unit and enjoys being here. Denies SI/HI/AVH. Came out to dayroom for snack. Medications discussed, Norco given for complaints of pain rated 8 in neck and arm. Ambulates without difficulty.

## 2020-09-06 NOTE — PLAN OF CARE
Problem: Altered Mood, Depressive Behavior:  Goal: Able to verbalize acceptance of life and situations over which he or she has no control  Description: Able to verbalize acceptance of life and situations over which he or she has no control  Outcome: Ongoing  Goal: Able to verbalize and/or display a decrease in depressive symptoms  Description: Able to verbalize and/or display a decrease in depressive symptoms  Outcome: Ongoing  Goal: Ability to disclose and discuss suicidal ideas will improve  Description: Ability to disclose and discuss suicidal ideas will improve  Outcome: Ongoing  Goal: Able to verbalize support systems  Description: Able to verbalize support systems  Outcome: Ongoing  Goal: Absence of self-harm  Description: Absence of self-harm  Outcome: Ongoing  Goal: Patient specific goal  Description: Patient specific goal  Outcome: Ongoing  Goal: Participates in care planning  Description: Participates in care planning  Outcome: Ongoing     Problem: Falls - Risk of:  Goal: Will remain free from falls  Description: Will remain free from falls  Outcome: Ongoing  Goal: Absence of physical injury  Description: Absence of physical injury  Outcome: Ongoing     Problem: Pain:  Goal: Pain level will decrease  Description: Pain level will decrease  Outcome: Ongoing  Goal: Control of acute pain  Description: Control of acute pain  Outcome: Ongoing  Goal: Control of chronic pain  Description: Control of chronic pain  Outcome: Ongoing

## 2020-09-07 LAB
BACTERIA: NEGATIVE /HPF
BILIRUBIN URINE: NEGATIVE MG/DL
BLOOD, URINE: NEGATIVE
CAST TYPE: ABNORMAL /HPF
CLARITY: CLEAR
COLOR: YELLOW
CRYSTAL TYPE: NEGATIVE /HPF
EPITHELIAL CELLS, UA: 2 /HPF
GLUCOSE BLD-MCNC: 166 MG/DL (ref 70–99)
GLUCOSE BLD-MCNC: 200 MG/DL (ref 70–99)
GLUCOSE BLD-MCNC: 206 MG/DL (ref 70–99)
GLUCOSE BLD-MCNC: 232 MG/DL (ref 70–99)
GLUCOSE BLD-MCNC: 265 MG/DL (ref 70–99)
GLUCOSE, URINE: NEGATIVE MG/DL
KETONES, URINE: NEGATIVE MG/DL
LEUKOCYTE ESTERASE, URINE: ABNORMAL
NITRITE URINE, QUANTITATIVE: NEGATIVE
PH, URINE: 6 (ref 5–8)
PROTEIN UA: NEGATIVE MG/DL
RBC URINE: 1 /HPF (ref 0–6)
SPECIFIC GRAVITY UA: 1.02 (ref 1–1.03)
UROBILINOGEN, URINE: NORMAL MG/DL (ref 0.2–1)
WBC UA: 8 /HPF (ref 0–5)

## 2020-09-07 PROCEDURE — 81001 URINALYSIS AUTO W/SCOPE: CPT

## 2020-09-07 PROCEDURE — 6370000000 HC RX 637 (ALT 250 FOR IP): Performed by: PSYCHIATRY & NEUROLOGY

## 2020-09-07 PROCEDURE — 85025 COMPLETE CBC W/AUTO DIFF WBC: CPT

## 2020-09-07 PROCEDURE — 99232 SBSQ HOSP IP/OBS MODERATE 35: CPT | Performed by: PSYCHIATRY & NEUROLOGY

## 2020-09-07 PROCEDURE — 1240000000 HC EMOTIONAL WELLNESS R&B

## 2020-09-07 PROCEDURE — 6370000000 HC RX 637 (ALT 250 FOR IP): Performed by: HOSPITALIST

## 2020-09-07 PROCEDURE — 82962 GLUCOSE BLOOD TEST: CPT

## 2020-09-07 RX ORDER — OLANZAPINE 5 MG/1
5 TABLET ORAL NIGHTLY
Status: DISCONTINUED | OUTPATIENT
Start: 2020-09-07 | End: 2020-09-10 | Stop reason: HOSPADM

## 2020-09-07 RX ADMIN — LEVOTHYROXINE SODIUM 75 MCG: 0.07 TABLET ORAL at 09:50

## 2020-09-07 RX ADMIN — MONTELUKAST 10 MG: 10 TABLET, FILM COATED ORAL at 20:38

## 2020-09-07 RX ADMIN — BUSPIRONE HYDROCHLORIDE 15 MG: 15 TABLET ORAL at 17:31

## 2020-09-07 RX ADMIN — MAGNESIUM OXIDE TAB 400 MG (241.3 MG ELEMENTAL MG) 400 MG: 400 (241.3 MG) TAB at 09:50

## 2020-09-07 RX ADMIN — BUSPIRONE HYDROCHLORIDE 15 MG: 15 TABLET ORAL at 09:50

## 2020-09-07 RX ADMIN — INSULIN LISPRO 4 UNITS: 100 INJECTION, SOLUTION INTRAVENOUS; SUBCUTANEOUS at 09:59

## 2020-09-07 RX ADMIN — LOSARTAN POTASSIUM 100 MG: 100 TABLET, FILM COATED ORAL at 09:50

## 2020-09-07 RX ADMIN — OLANZAPINE 5 MG: 5 TABLET, FILM COATED ORAL at 20:38

## 2020-09-07 RX ADMIN — ATORVASTATIN CALCIUM 20 MG: 20 TABLET, FILM COATED ORAL at 20:38

## 2020-09-07 RX ADMIN — METOPROLOL SUCCINATE 25 MG: 25 TABLET, EXTENDED RELEASE ORAL at 09:50

## 2020-09-07 RX ADMIN — LEVETIRACETAM 500 MG: 500 TABLET ORAL at 20:38

## 2020-09-07 RX ADMIN — LEVETIRACETAM 500 MG: 500 TABLET ORAL at 09:50

## 2020-09-07 RX ADMIN — OXCARBAZEPINE 150 MG: 150 TABLET, FILM COATED ORAL at 21:00

## 2020-09-07 RX ADMIN — ARIPIPRAZOLE 5 MG: 5 TABLET ORAL at 09:50

## 2020-09-07 RX ADMIN — ALUMINUM HYDROXIDE, MAGNESIUM HYDROXIDE, AND SIMETHICONE 15 ML: 200; 200; 20 SUSPENSION ORAL at 18:17

## 2020-09-07 RX ADMIN — DULOXETINE HYDROCHLORIDE 30 MG: 30 CAPSULE, DELAYED RELEASE ORAL at 09:50

## 2020-09-07 RX ADMIN — ALLOPURINOL 300 MG: 300 TABLET ORAL at 09:58

## 2020-09-07 RX ADMIN — PANTOPRAZOLE SODIUM 40 MG: 40 TABLET, DELAYED RELEASE ORAL at 09:58

## 2020-09-07 RX ADMIN — INSULIN GLARGINE 20 UNITS: 100 INJECTION, SOLUTION SUBCUTANEOUS at 21:18

## 2020-09-07 RX ADMIN — BUSPIRONE HYDROCHLORIDE 15 MG: 15 TABLET ORAL at 20:38

## 2020-09-07 RX ADMIN — INSULIN LISPRO 2 UNITS: 100 INJECTION, SOLUTION INTRAVENOUS; SUBCUTANEOUS at 13:48

## 2020-09-07 RX ADMIN — OXCARBAZEPINE 150 MG: 150 TABLET, FILM COATED ORAL at 09:58

## 2020-09-07 RX ADMIN — HYDROCODONE BITARTRATE AND ACETAMINOPHEN 1 TABLET: 5; 325 TABLET ORAL at 20:52

## 2020-09-07 RX ADMIN — HYDROCHLOROTHIAZIDE 25 MG: 25 TABLET ORAL at 09:50

## 2020-09-07 RX ADMIN — ASPIRIN 81 MG 81 MG: 81 TABLET ORAL at 09:50

## 2020-09-07 RX ADMIN — TRAZODONE HYDROCHLORIDE 50 MG: 50 TABLET ORAL at 20:38

## 2020-09-07 RX ADMIN — NIFEDIPINE 60 MG: 30 TABLET, FILM COATED, EXTENDED RELEASE ORAL at 09:50

## 2020-09-07 RX ADMIN — CARBIDOPA AND LEVODOPA 1 TABLET: 10; 100 TABLET ORAL at 20:38

## 2020-09-07 RX ADMIN — INSULIN LISPRO 2 UNITS: 100 INJECTION, SOLUTION INTRAVENOUS; SUBCUTANEOUS at 17:31

## 2020-09-07 ASSESSMENT — PAIN SCALES - GENERAL
PAINLEVEL_OUTOF10: 10
PAINLEVEL_OUTOF10: 3

## 2020-09-07 ASSESSMENT — PAIN DESCRIPTION - PAIN TYPE: TYPE: CHRONIC PAIN

## 2020-09-07 ASSESSMENT — PAIN DESCRIPTION - LOCATION: LOCATION: NECK

## 2020-09-07 NOTE — CONSULTS
Hospitalist Consult Note      Name:  Dominic Mayo /Age/Sex: 1955  (63 y.o. female)   MRN & CSN:  [id number] & [id number] Admission Date/Time: 2020  9:19 PM   Location:  1046/1046-01 PCP: Luis Antonio Quach MD       Hospital Day: 4    Assessment and Plan:   Dominic Mayo is a 63 y.o.  female  who presents with psychiatric disturbances  Hospitalist Team consulted for: Medical Management patient with a past medical history of diabetes mellitus type 2, hyperlipidemia, hypothyroidism, hypertension gout and GERD. Also medical review of EKGs and cardiology history with 2020 echo EF of 55 to 60% moderate LVH. EKG from 2020 demonstrated sinus tach with left anterior fascicular block with LVH no acute ST-T wave abnormalities. 1.  Hypertension: Mildly elevated but recorded prior to medications. We will continue to monitor but continue on losartan/hydrochlorothiazide 100-20 daily, Toprol-XL 25 mg daily, nifedipine ER 60 mg daily. Continue to monitor will adjust accordingly if needed. 2.  Insulin-dependent diabetes: POC glucose 239 yesterday last reading. Will order POC glucose with meals and at bedtime. With adjustments moderate dose sliding scale. Hemoglobin A1c 7.8 as of 2020. Poorly controlled. Continue to monitor    3. History of hypothyroid: TSH in  therapeutic continue levothyroxine 75 mcg daily in the a.m. no overt signs continue to monitor. 4.  Hyperlipidemia: Controlled. Record review  demonstrates total cholesterol 107, HDL 43, LDL 51 with triglycerides 142. We will continue simvastatin 20 mg daily    5. History of gout: Currently on allopurinol 300 mg daily continue same. No symptoms or signs at this time.     6.  GERD: Continue pantoprazole 40 mg daily        Diet DIET CARB CONTROL;   DVT Prophylaxis [] Lovenox, []  Heparin, [] SCDs, [] No VTE prophylaxis, patient ambulating   GI Prophylaxis [] PPI, [] H2 Blocker, [] No GI prophylaxis, patient is receiving diet/Tube Feeds   Code Status Full Code   Disposition Patient requires continued admission due to an SBU   MDM [] Low, [x] Moderate,[]  High  Patient's risk as above due to chronic conditions as above as well as psychiatric behavior issues difficulty gathering history     History of Present Illness:     Pt S&E. Resting comfortably in bed versus recently received Ativan. Not participating in history and physical.  However, medical records reviewed as above with a past medical history significant for insulin-dependent diabetes, hyperlipidemia controlled, hypothyroidism, hypertension, gout and GERD. EKG with a history of left anterior fascicular block and LVH. Preserved EF noted at 55 to 60%. No acute events overnight. No fever chills or nausea vomiting. Was some concern for a UTI. However, only shows small leukoesterase without nitrites some bacteria with no symptoms. 10-14 point ROS unable to obtain due to recent Ativan administration    Objective:   No intake or output data in the 24 hours ending 09/07/20 1454   Vitals:   Vitals:    09/07/20 0930   BP: (!) 148/83   Pulse: 103   Resp: 14   Temp: 99.4 °F (37.4 °C)   SpO2: 98%     Physical Exam:    GEN sleeping female, lying upright in bed in no apparent distress. Appears given age. HENT Mucous membranes are moist.   NECK No apparent thyromegaly or masses. RESP Clear to auscultation, no wheezes, rales or rhonchi. Symmetric chest movement while on room air. CARDIO/VASC S1/S2 auscultated. Regular rate without appreciable murmurs, rubs, or gallops. No JVD or carotid bruits. Peripheral pulses equal bilaterally and palpable. No peripheral edema. GI Abdomen is soft without significant tenderness, masses, or guarding. Bowel sounds are normoactive. Rectal exam deferred.  Mercer catheter is not present. HEME/LYMPH No palpable cervical lymphadenopathy and no hepatosplenomegaly. No petechiae or ecchymoses.   MSK No gross joint deformities. SKIN Normal coloration, warm, dry.       Medications:   Medications:    OLANZapine  5 mg Oral Nightly    OXcarbazepine  150 mg Oral BID    busPIRone  15 mg Oral TID    allopurinol  300 mg Oral Daily    aspirin  81 mg Oral Daily    atorvastatin  20 mg Oral Nightly    carbidopa-levodopa  1 tablet Oral Nightly    DULoxetine  30 mg Oral Daily    insulin glargine  20 Units Subcutaneous Nightly    insulin lispro  0-12 Units Subcutaneous TID WC    insulin lispro  10 Units Subcutaneous TID WC    insulin lispro  0-6 Units Subcutaneous 2 times per day    levETIRAcetam  500 mg Oral BID    levothyroxine  75 mcg Oral QAM    magnesium oxide  400 mg Oral Daily    metoprolol succinate  25 mg Oral Daily    montelukast  10 mg Oral Nightly    NIFEdipine  60 mg Oral Daily    pantoprazole  40 mg Oral Daily    losartan  100 mg Oral Daily    And    hydroCHLOROthiazide  25 mg Oral Daily      Infusions:   PRN Meds: aluminum & magnesium hydroxide-simethicone, 15 mL, Q6H PRN  HYDROcodone-acetaminophen, 1 tablet, Q6H PRN  polyethylene glycol, 17 g, Daily PRN  acetaminophen, 650 mg, Q4H PRN  traZODone, 50 mg, Nightly PRN  LORazepam, 1 mg, Q4H PRN    Or  LORazepam, 1 mg, Q4H PRN  haloperidol lactate, 5 mg, Q4H PRN    Or  haloperidol, 5 mg, Q4H PRN  acetaminophen, 325 mg, Q4H PRN  acetaminophen, 500 mg, Q4H PRN          Electronically signed by Bobetta Seip, MD on 9/7/2020 at 2:54 PM

## 2020-09-07 NOTE — GROUP NOTE
Group Therapy Note    Date: 9/7/2020    Group Start Time: 0820  Group End Time: 0840    Number of Participants: 3/5    Type: Morning Goals Group/ Community Meeting    Group Topic/Objective: Set Goal For The Day and to review Unit Rules and Regulations. Patient's Goal:  Pt states \"Try to feel better. \"    Notes:  Pt presented as lethargic during group. Pt states she is feeling \"tired\" and grateful \"I'm awake. \"  Pt reports restless sleep of approximately 3-4 hours. Depression (0-10): 5    Anxiety (0-10): 0    Irritability/Aggitation (0-10): 5    Status After Intervention:  Improved    Participation Level:  Active Listener and Interactive    Participation Quality: Appropriate and Lethargic    Speech:  normal    Thought Process/Content: Logical    Affective Functioning: Blunted    Mood: Blunted    Level of consciousness:  Attentive and Drowsy    Response to Learning: Able to verbalize current knowledge/experience    Endings: None Reported    Modes of Intervention: Education, Support and Socialization    Discipline Responsible: Certified Therapeutic Recreation Specialist     Electronically signed by Bety Dalton MA on 9/7/2020 at 9:19 AM

## 2020-09-07 NOTE — PROGRESS NOTES
Attempted to complete pt's assessment at ~0909. Pt sleeping and did not respond to attempts to wake her up.      Electronically signed by DANIELLE Krishnan MA on 9/7/2020 at 9:10 AM

## 2020-09-07 NOTE — PROGRESS NOTES
Pt pressed call light and staff responded. Pt found thrashing arms and legs in bed, looking up breathing loudly through nose. Pt HOB sat up and staff encouraged Pt to breathe normally. Pt stopped thrashing in bed after 3 minutes. Pt has a hx of pseudoseizures. VS obtained and notified psychiatrist by phone. New orders received for Ativan 2mg and haldol 2.5mg X1 now. Pt is now alert and oriented, able to follow direction. Pt states \"I felt funny and I hit the light. \" After medication was administered Pt stated \"I had a spell like that last week at the other hospital and it was worse than that one, when I woke up I couldn't remember my daughter. \" Emotional support and positive encouragement offered.

## 2020-09-07 NOTE — PROGRESS NOTES
Psychiatric Progress Note    Laly Archer  [id number]  9/4/2020 09/07/20    ID: Patient is a 63 yrs y.o. female    CC: I am depressed     HPI: Pt is a 62 yo  female who presents for exacerbation of bipolar depression and anxiety. Pt noted recent exacarbation of mood with thoughts to harm himself. Pt noted she currently feels safe and comfortable on the unit. Pt was in agreement with treatment team.  Pt was polite and cordial during the interview process. Pt noted she is doing [place] today. \"  Pt noted she is sleeping \"good. Fabián Brinks Fabián Brinks \"about 8 hours last night. Pt noted her apptetite is getting better. Pt rated her depresssion a \"5, on a scale of zero to ten with ten being the worst and zero being none. Pt rated her anxiety a \"5,\" on the same scale. Pt denied any thoughts to harm herself or anyone else. Pt denied any auditory or visiual hallucintations.       Met with pt and treatment team  No TD noted, AIMS=0        Family Psychiatric History:   Family History   Problem Relation Age of Onset    Stroke Mother     Other Mother         Seizures    Diabetes Mother         Borderline Diabetes    High Blood Pressure Mother     Arthritis Mother     Early Death Mother 61        Stroke    Depression Mother     Heart Disease Mother     High Cholesterol Mother     Miscarriages / Merilynn Gu Mother     Heart Disease Father         Massive Heart Attack    High Blood Pressure Father     Arthritis Father     High Cholesterol Father     Cancer Brother         Liver And Colon Cancer    Early Death Brother 37        Liver And Colon Cancer    Heart Disease Brother         Heart Stents    High Blood Pressure Brother     High Cholesterol Brother     Early Death Brother         65 Years Old,Hit By Influx Colon Cancer Brother     Hearing Loss Sister     Heart Failure Sister     High Blood Pressure Sister     Arthritis Sister     Heart Disease Brother     Heart Disease Sister     Cancer Sister  Early Death Brother 61        Heart Attack    Heart Disease Brother         Heart Attack    Mental Illness Daughter         Bipolar    Depression Daughter     Anxiety Disorder Daughter     Other Son         Seizures    Other Daughter         Stomach And Bowel Problems        Allergies:   Allergies   Allergen Reactions    Abilify [Aripiprazole]      \"Severe Shaking And Restlessness\"    Advil [Ibuprofen Micronized] Palpitations     \"Severe High Blood Pressure\"    Augmentin [Amoxicillin-Pot Clavulanate] Itching and Rash    Bee Venom Swelling     Redness    Ciprofloxacin Itching and Rash    Codeine      \"Severe Abdominal Cramping\"    Darvocet [Propoxyphene N-Acetaminophen] Palpitations     \"Severe High Blood Pressure\"    Darvon [Propoxyphene Hcl] Palpitations     \"Severe High Blood Pressure\"    Decadron [Dexamethasone] Other (See Comments)     seizure  Seizures    Ditropan [Oxybutynin Chloride] Palpitations     \"Severe High Blood Pressure\"    Fioricet [Butalbital-Apap-Caffeine] Palpitations     \"Severe High Blood Pressure\"    Fiorinal-Codeine #3 [Butalbital-Asa-Caff-Codeine]      \"Severe Stomach Cramps\"    Flagyl [Metronidazole] Diarrhea     \"Severe Diarrhea And Cramping\"    Naproxen Palpitations     \"Severe High Blood Pressure\"    Other      \"Allergic To Spider Bites Causing Blackness Of Skin, Severe Itching And Pain\"                                                  \"Allergic To Powder In Gloves Causing Severe Redness And Itching\"    Prozac [Fluoxetine Hcl]      \"Hallucinations\"    Robaxin [Methocarbamol] Palpitations     \"Severe High Blood Pressure\"    Ultram [Tramadol]      \"Severe Stomach Pain\"    Zoloft Palpitations     \"Sever High Blood Pressure\"    Butalbital-Aspirin-Caffeine Other (See Comments)     \"severe stomach pain\"    Coreg [Carvedilol] Other (See Comments)     \"spikes my BP severely and send me into a severe anxiety attack\"    Fluoxetine Other (See Comments) hallucinations    Oxybutynin Chloride Other (See Comments)     Raises bp    Sertraline Other (See Comments)     hallucinations    Tape Omid Dye Tape]      Patient states it tears her skin, including band aids    Reglan [Metoclopramide] Other (See Comments)     \"makes me talk like a baby, but if I take benadryl with it it's fine\"        OBJECTIVE  Vital Signs:  Vitals:    09/07/20 0930   BP: (!) 148/83   Pulse: 103   Resp: 14   Temp: 99.4 °F (37.4 °C)   SpO2: 98%       Labs:  Recent Results (from the past 48 hour(s))   POCT Glucose    Collection Time: 09/05/20  8:07 PM   Result Value Ref Range    POC Glucose 130 (H) 70 - 99 MG/DL   POCT Glucose    Collection Time: 09/05/20  8:12 PM   Result Value Ref Range    POC Glucose 213 (H) 70 - 99 MG/DL   POCT Glucose    Collection Time: 09/06/20  4:52 PM   Result Value Ref Range    POC Glucose 239 (H) 70 - 99 MG/DL   Urinalysis    Collection Time: 09/07/20 12:20 AM   Result Value Ref Range    Color, UA YELLOW YELLOW    Clarity, UA CLEAR CLEAR    Glucose, Urine NEGATIVE NEGATIVE MG/DL    Bilirubin Urine NEGATIVE NEGATIVE MG/DL    Ketones, Urine NEGATIVE NEGATIVE MG/DL    Specific Gravity, UA 1.020 1.001 - 1.035    Blood, Urine NEGATIVE NEGATIVE    pH, Urine 6.0 5.0 - 8.0    Protein, UA NEGATIVE NEGATIVE MG/DL    Urobilinogen, Urine NORMAL 0.2 - 1.0 MG/DL    Nitrite Urine, Quantitative NEGATIVE NEGATIVE    Leukocyte Esterase, Urine SMALL NUMBER OR AMOUNT OBSERVED (A) NEGATIVE    RBC, UA 1 0 - 6 /HPF    WBC, UA 8 (H) 0 - 5 /HPF    Epithelial Cells, UA 2 /HPF    Cast Type NO CAST FORMS SEEN NO CAST FORMS SEEN /HPF    Bacteria, UA NEGATIVE NEGATIVE /HPF    Crystal Type NEGATIVE NEGATIVE /HPF      .          PSYCHIATRIC EXAMINATION / MENTAL STATUS EXAM    CONSTITUTIONAL:    Vitals:   Vitals:    09/07/20 0930   BP: (!) 148/83   Pulse: 103   Resp: 14   Temp: 99.4 °F (37.4 °C)   SpO2: 98%      General appearance: [x] appears age, []  appears older than stated age,               [x] adequately dressed and groomed, [] disheveled,               [x]  in no acute distress, [] appears mildly distressed, [] other           MUSCULOSKELETAL:   Gait:   [] normal, [] antalgic, [] unsteady, [] gait not evaluated   Station:             [] erect, [] sitting, [] recumbent, [] other        Strength/tone:  [x] muscle strength and tone appear consistent with age and                                        condition     [] atrophy      [] abnormal movements  PSYCHIATRIC:    Appearance: appears stated age. alert and oriented to person, place, time & situation. no acute distress. Adequate grooming and hygeine. Good eye contact. No prominent physical abnormalities. Attitude: Manner is cooperative and pleasant  Motor: No psychomotor agitation, retardation or abnormal movements noted  Speech: Clearly articulated; normal rate, volume, tone & amount. Language: intact understanding and production  Mood: depressed  Affect: flat  Thought Production: Spontaneous. Thought Form: Coherent, linear, logical & goal-directed. No tangentiality or circumstantiality. No flight of ideas or loosening of associations. Thought Content/Perceptions: No REYNALDO, noted hx of AVH, no delusion  Insight:questionable  Judgment: questionable  Memory: Immediate, recent, and remote appear intact, though not formally tested. Attention: maintained throughout interview  Fund of knowledge: Average  Gait/Balance: WNL/WNL           Impression:   Bipolar Depression severe with psychosis    Problem List:   <principal problem not specified>    Plan:  1. Admit to [facility] Unit  2. Consult hospitalist to evaluate and treat medical conditions  3. Adjust psychotropic medications to target symptoms  4. Occupational Therapy, Physical Therapy, Group Psychotherapy as tolerated  5. Reviewed treatment plan with patient including medication risks, benefits, side effects. Obtained informed consent for treatment.   6. Anticipated length of stay 10-12 days  7. I certify that inpatient psychiatric hospital admission is medically necessary for treatment, which can be expected to improve the patient's condition, and/or for diagnostic study. 8. Psychiatric management:medication initiation and titration, group and individual therapy, safe and theraputic environment. 9. Status of problem/condition: ?Improving  10. Medical co-morbidities: Management per [facility] group, appreciate assistance  11. Legal Status:Pending  12. The treatment team reviewed with the patient the diagnosis and treatment recommendations to include the risks, benefits, and side effects of chosen medications. 13. The patient verbalized understanding and agreed with the treatment regimen as outlined above. 14. The patient was encouraged to participate in groups. 15. Medical records, Labs, Diagnotic tests reviewed  16. q15 min safety checks for safety  17. Interval History. 18. Review current labs  21. Continue current medications  22. Supportive Therapy Provided  23. Pt had an opportunity to ask questions and address concerns  24. Pt encouraged to continue therapy group or individual.  25. Pt was in agreement with treatment plan. 26. The risks benefits and side effects of medications were discussed with the patient, including alternatives and no treatment.           Electronically signed by Albert Pimentel DO on 9/7/2020 at 10:52 AM

## 2020-09-07 NOTE — PLAN OF CARE
[facility]  Day 3 Interdisciplinary Treatment Plan NOTE    Review Date & Time: 09/07/20  0830    Admission Type:   Admission Type: Voluntary    Reason for admission:  Reason for Admission: MDD severe with psychotic features    Patient Diagnosis: Bipolar I disorder with depression, severe ([address]     PATIENT STRENGTHS:  Patient Strengths Strengths: Communication, Medication Compliance, Social Skills  Patient Strengths and Limitations:Limitations: Tendency to isolate self  Addictive Behavior:   Medical Problems:  Past Medical History:   Diagnosis Date    Abnormal EKG 4/22/2014    Acid reflux     Anemia     Anesthesia     Nausea/Vomiting Post Op In Past    Anginal pain (HCC)     Denies Chest Pain At This Time    Anxiety     Arthritis     \"All Over\"    Asthma     CAD (coronary artery disease)     per last cardiac cath.  Cerebral artery occlusion with cerebral infarction ([address]     CHF (congestive heart failure) ([facility]     Chronic back pain     Chronic kidney disease     DDD (degenerative disc disease), cervical     12- Patient reports she was dx with DDD of Cerival spine C6,C7    Depression     Diabetes mellitus ([address] Dx 1990's    Diabetic neuropathy ([address]     \"In My Legs And Feet\"    Dizziness     \"Sometimes\"    Dry skin     Enlarged ureter     Right Side    Fatty liver     Fibrocystic breast     Gout     Pt states she was diagnosed with gout in the past few months.  H/O cardiac catheterization     Showed mild disease per last cath.  H/O cardiovascular stress test 03/15/2010    EF 69%, normal perfusion study except for diaphragmatic artifact, uniform wall motion.  H/O cardiovascular stress test 10/09/2008    EF 60%, no anginia, normal study.  H/O cardiovascular stress test 05/06/2014    EF 66%, no ischemia, normal LV systolic funciton, normal perfusion pattern.  H/O Doppler ultrasound 02/28/2011    CAROTID DOPPLER-normal study.     H/O echocardiogram 5/6/2014    Ef >55%. Impaired LV relaxation.  H/O echocardiogram 10/14/15    EF 60% Normal LV and systolic function. No significant valvulopathy seen.  History of Holter monitoring 3/24/15    24 hour - predominant rhythm sinus    [place] (hard of hearing)     Bilateral Ears    Hx of cardiovascular stress test 10/19/2015    lexiscan-normal,EF63%    Hx of motion sickness     HX OTHER MEDICAL     Primary Care Physician Is Dr. Racheal Florez In [address] Tamiko Gomez     Hypertension     IBS (irritable bowel syndrome)     Incisional hernia 4/2014    Kidney stones Last Episode In 2012 Or 2013    Passed Kidney Stones Numberous Times    Migraines     Nausea & vomiting     Nausea/Vomiting Post Op In Past    Other specified disorder of skin     12- Patient states she has a condition of her vaginal area (skin) which starts with the letters Alphonso Goode. She is currently being treated with multiple creams and weekly Diflucan. 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     Staph infection Dx 1980's    Toes On Left Foot    Thyroid disease     hypothroidism    Tremor     \"Tremors All Over\"    Urinary incontinence     UTI (urinary tract infection) In Past    No Current Symptoms    UTI (urinary tract infection)     Vertigo     \"Sometimes\"    Wears glasses        Risk:  Fall Risk Total: 75  Bethel Scale Bethel Scale Score: 21  BVC Total: 0    Status EXAM:   Status and Exam  Normal: No  Facial Expression: Sad  Affect: Appropriate  Level of Consciousness: Alert  Mood:Normal: No  Mood: Depressed  Motor Activity:Normal: Yes  Motor Activity: Decreased  Interview Behavior: Cooperative  Preception: [place] to Person, [place] to Time, [place] to Situation, [place] to Place  Attention:Normal: Yes  Thought Content:Normal: Yes  Hallucinations: None  Delusions: No  Memory:Normal: Yes  Insight and Judgment: Yes  Insight and Judgment: Unmotivated  Present Suicidal Ideation: No  Present Homicidal Ideation: No    Daily Assessment Last Entry:   Daily Sleep (WDL): Within Defined Limits  Patient Currently in Pain: Yes  Daily Nutrition (WDL): Within Defined Limits    Patient Monitoring:  Frequency of Checks: 4 times per hour, close    Psychiatric Symptoms:   Depression Symptoms  Depression Symptoms: Change in energy level(pt coming out of room sitting with other pts)  Anxiety Symptoms  Anxiety Symptoms: Chest pain, Shortness of breath  Yesenia Symptoms  Yesenia Symptoms: No problems reported or observed. Psychosis Symptoms  Delusion Type: No problems reported or observed.     Suicide Risk CSSR-S:  1) Within the past month, have you wished you were dead or wished you could go to sleep and not wake up? : No  2) Have you actually had any thoughts of killing yourself? : No  6) Have you ever done anything, started to do anything, or prepared to do anything to end your life?: No    EDUCATION:   Learner Progress Toward Treatment Goals: Reviewed goals and plan of care    Method: Group    Outcome: Needs reinforcement    PATIENT GOALS: Med titration, compliance with unit programming    PLAN/TREATMENT RECOMMENDATIONS UPDATE: Med titration    Estimated Length of Stay Update:  4 days  Estimated Discharge Date Update: 09/11/20  Discharge criteria: Med titration      SHORT-TERM GOALS UPDATE:   Time frame for Short-Term Goals: 4 days    LONG-TERM GOALS UPDATE:   Time frame for Long-Term Goals: 4 days  Members Present in Team Meeting: See Signature Sheet    Kemal Kim MSW, LSW

## 2020-09-08 LAB
ALBUMIN SERPL-MCNC: 4 GM/DL (ref 3.4–5)
ALP BLD-CCNC: 61 IU/L (ref 40–129)
ALT SERPL-CCNC: 14 U/L (ref 10–40)
ANION GAP SERPL CALCULATED.3IONS-SCNC: 11 MMOL/L (ref 4–16)
AST SERPL-CCNC: 14 IU/L (ref 15–37)
BASOPHILS ABSOLUTE: 0.1 K/CU MM
BASOPHILS RELATIVE PERCENT: 0.8 % (ref 0–1)
BILIRUB SERPL-MCNC: 0.5 MG/DL (ref 0–1)
BUN BLDV-MCNC: 10 MG/DL (ref 6–23)
CALCIUM SERPL-MCNC: 9.8 MG/DL (ref 8.3–10.6)
CHLORIDE BLD-SCNC: 97 MMOL/L (ref 99–110)
CO2: 31 MMOL/L (ref 21–32)
CREAT SERPL-MCNC: 0.5 MG/DL (ref 0.6–1.1)
DIFFERENTIAL TYPE: ABNORMAL
EKG ATRIAL RATE: 112 BPM
EKG DIAGNOSIS: NORMAL
EKG P AXIS: 36 DEGREES
EKG P-R INTERVAL: 166 MS
EKG Q-T INTERVAL: 334 MS
EKG QRS DURATION: 102 MS
EKG QTC CALCULATION (BAZETT): 455 MS
EKG R AXIS: -46 DEGREES
EKG T AXIS: 116 DEGREES
EKG VENTRICULAR RATE: 112 BPM
EOSINOPHILS ABSOLUTE: 0.2 K/CU MM
EOSINOPHILS RELATIVE PERCENT: 1.7 % (ref 0–3)
GFR AFRICAN AMERICAN: >60 ML/MIN/1.73M2
GFR NON-AFRICAN AMERICAN: >60 ML/MIN/1.73M2
GLUCOSE BLD-MCNC: 157 MG/DL (ref 70–99)
GLUCOSE BLD-MCNC: 163 MG/DL (ref 70–99)
GLUCOSE BLD-MCNC: 175 MG/DL (ref 70–99)
GLUCOSE BLD-MCNC: 214 MG/DL (ref 70–99)
GLUCOSE BLD-MCNC: 215 MG/DL (ref 70–99)
HCT VFR BLD CALC: 37.1 % (ref 37–47)
HEMOGLOBIN: 12.9 GM/DL (ref 12.5–16)
IMMATURE NEUTROPHIL %: 0.2 % (ref 0–0.43)
LYMPHOCYTES ABSOLUTE: 4.6 K/CU MM
LYMPHOCYTES RELATIVE PERCENT: 44.6 % (ref 24–44)
MAGNESIUM: 1.7 MG/DL (ref 1.8–2.4)
MCH RBC QN AUTO: 29.3 PG (ref 27–31)
MCHC RBC AUTO-ENTMCNC: 34.8 % (ref 32–36)
MCV RBC AUTO: 84.3 FL (ref 78–100)
MONOCYTES ABSOLUTE: 1.1 K/CU MM
MONOCYTES RELATIVE PERCENT: 11 % (ref 0–4)
PDW BLD-RTO: 12.5 % (ref 11.7–14.9)
PLATELET # BLD: 302 K/CU MM (ref 140–440)
PMV BLD AUTO: 9.6 FL (ref 7.5–11.1)
POTASSIUM SERPL-SCNC: 3 MMOL/L (ref 3.5–5.1)
RBC # BLD: 4.4 M/CU MM (ref 4.2–5.4)
SEGMENTED NEUTROPHILS ABSOLUTE COUNT: 4.3 K/CU MM
SEGMENTED NEUTROPHILS RELATIVE PERCENT: 41.7 % (ref 36–66)
SODIUM BLD-SCNC: 139 MMOL/L (ref 135–145)
TOTAL IMMATURE NEUTOROPHIL: 0.02 K/CU MM
TOTAL PROTEIN: 6.9 GM/DL (ref 6.4–8.2)
WBC # BLD: 10.3 K/CU MM (ref 4–10.5)

## 2020-09-08 PROCEDURE — 83735 ASSAY OF MAGNESIUM: CPT

## 2020-09-08 PROCEDURE — 6370000000 HC RX 637 (ALT 250 FOR IP): Performed by: HOSPITALIST

## 2020-09-08 PROCEDURE — 6370000000 HC RX 637 (ALT 250 FOR IP): Performed by: PSYCHIATRY & NEUROLOGY

## 2020-09-08 PROCEDURE — 80053 COMPREHEN METABOLIC PANEL: CPT

## 2020-09-08 PROCEDURE — 85025 COMPLETE CBC W/AUTO DIFF WBC: CPT

## 2020-09-08 PROCEDURE — 1240000000 HC EMOTIONAL WELLNESS R&B

## 2020-09-08 PROCEDURE — 36415 COLL VENOUS BLD VENIPUNCTURE: CPT

## 2020-09-08 PROCEDURE — 6370000000 HC RX 637 (ALT 250 FOR IP): Performed by: NURSE PRACTITIONER

## 2020-09-08 PROCEDURE — 99233 SBSQ HOSP IP/OBS HIGH 50: CPT | Performed by: NURSE PRACTITIONER

## 2020-09-08 RX ORDER — SIMVASTATIN 20 MG
20 TABLET ORAL NIGHTLY
Status: DISCONTINUED | OUTPATIENT
Start: 2020-09-08 | End: 2020-09-10 | Stop reason: HOSPADM

## 2020-09-08 RX ORDER — LEVOTHYROXINE SODIUM 0.07 MG/1
75 TABLET ORAL
Status: DISCONTINUED | OUTPATIENT
Start: 2020-09-09 | End: 2020-09-10 | Stop reason: HOSPADM

## 2020-09-08 RX ORDER — POTASSIUM CHLORIDE 20 MEQ/1
20 TABLET, EXTENDED RELEASE ORAL ONCE
Status: COMPLETED | OUTPATIENT
Start: 2020-09-08 | End: 2020-09-08

## 2020-09-08 RX ADMIN — LEVOTHYROXINE SODIUM 75 MCG: 0.07 TABLET ORAL at 08:05

## 2020-09-08 RX ADMIN — PANTOPRAZOLE SODIUM 40 MG: 40 TABLET, DELAYED RELEASE ORAL at 08:05

## 2020-09-08 RX ADMIN — METOPROLOL SUCCINATE 25 MG: 25 TABLET, EXTENDED RELEASE ORAL at 08:03

## 2020-09-08 RX ADMIN — ACETAMINOPHEN 650 MG: 325 TABLET ORAL at 08:17

## 2020-09-08 RX ADMIN — INSULIN GLARGINE 20 UNITS: 100 INJECTION, SOLUTION SUBCUTANEOUS at 20:44

## 2020-09-08 RX ADMIN — HYDROCODONE BITARTRATE AND ACETAMINOPHEN 1 TABLET: 5; 325 TABLET ORAL at 17:20

## 2020-09-08 RX ADMIN — DULOXETINE HYDROCHLORIDE 30 MG: 30 CAPSULE, DELAYED RELEASE ORAL at 08:05

## 2020-09-08 RX ADMIN — SIMVASTATIN 20 MG: 20 TABLET, FILM COATED ORAL at 20:43

## 2020-09-08 RX ADMIN — TRAZODONE HYDROCHLORIDE 50 MG: 50 TABLET ORAL at 20:43

## 2020-09-08 RX ADMIN — HYDROCODONE BITARTRATE AND ACETAMINOPHEN 1 TABLET: 5; 325 TABLET ORAL at 23:24

## 2020-09-08 RX ADMIN — POTASSIUM CHLORIDE 20 MEQ: 20 TABLET, EXTENDED RELEASE ORAL at 08:05

## 2020-09-08 RX ADMIN — OXCARBAZEPINE 150 MG: 150 TABLET, FILM COATED ORAL at 20:43

## 2020-09-08 RX ADMIN — LEVETIRACETAM 500 MG: 500 TABLET ORAL at 20:43

## 2020-09-08 RX ADMIN — HYDROCODONE BITARTRATE AND ACETAMINOPHEN 1 TABLET: 5; 325 TABLET ORAL at 09:55

## 2020-09-08 RX ADMIN — ALLOPURINOL 300 MG: 300 TABLET ORAL at 08:05

## 2020-09-08 RX ADMIN — NIFEDIPINE 60 MG: 30 TABLET, FILM COATED, EXTENDED RELEASE ORAL at 08:04

## 2020-09-08 RX ADMIN — INSULIN LISPRO 4 UNITS: 100 INJECTION, SOLUTION INTRAVENOUS; SUBCUTANEOUS at 16:56

## 2020-09-08 RX ADMIN — BUSPIRONE HYDROCHLORIDE 15 MG: 15 TABLET ORAL at 20:43

## 2020-09-08 RX ADMIN — BUSPIRONE HYDROCHLORIDE 15 MG: 15 TABLET ORAL at 13:45

## 2020-09-08 RX ADMIN — LEVETIRACETAM 500 MG: 500 TABLET ORAL at 08:05

## 2020-09-08 RX ADMIN — LOSARTAN POTASSIUM 100 MG: 100 TABLET, FILM COATED ORAL at 08:05

## 2020-09-08 RX ADMIN — BUSPIRONE HYDROCHLORIDE 15 MG: 15 TABLET ORAL at 08:05

## 2020-09-08 RX ADMIN — OLANZAPINE 5 MG: 5 TABLET, FILM COATED ORAL at 20:43

## 2020-09-08 RX ADMIN — MAGNESIUM OXIDE TAB 400 MG (241.3 MG ELEMENTAL MG) 400 MG: 400 (241.3 MG) TAB at 08:05

## 2020-09-08 RX ADMIN — INSULIN LISPRO 2 UNITS: 100 INJECTION, SOLUTION INTRAVENOUS; SUBCUTANEOUS at 08:17

## 2020-09-08 RX ADMIN — ASPIRIN 81 MG 81 MG: 81 TABLET ORAL at 08:05

## 2020-09-08 RX ADMIN — MONTELUKAST 10 MG: 10 TABLET, FILM COATED ORAL at 20:43

## 2020-09-08 RX ADMIN — INSULIN LISPRO 4 UNITS: 100 INJECTION, SOLUTION INTRAVENOUS; SUBCUTANEOUS at 12:14

## 2020-09-08 RX ADMIN — HYDROCHLOROTHIAZIDE 25 MG: 25 TABLET ORAL at 08:05

## 2020-09-08 RX ADMIN — OXCARBAZEPINE 150 MG: 150 TABLET, FILM COATED ORAL at 08:05

## 2020-09-08 RX ADMIN — CARBIDOPA AND LEVODOPA 1 TABLET: 10; 100 TABLET ORAL at 20:43

## 2020-09-08 ASSESSMENT — PAIN SCALES - GENERAL
PAINLEVEL_OUTOF10: 10
PAINLEVEL_OUTOF10: 7
PAINLEVEL_OUTOF10: 0
PAINLEVEL_OUTOF10: 0
PAINLEVEL_OUTOF10: 3
PAINLEVEL_OUTOF10: 10
PAINLEVEL_OUTOF10: 0
PAINLEVEL_OUTOF10: 2
PAINLEVEL_OUTOF10: 2
PAINLEVEL_OUTOF10: 8

## 2020-09-08 NOTE — GROUP NOTE
Group Therapy Note    Date: 9/8/2020    Group Start Time: 0830  Group End Time: 0900    Number of Participants: 2/5    Type: Morning Goals Group/ Community Meeting    Group Topic/Objective: Set Goal For The Day and to review Unit Rules and Regulations. Patient's Goal:  Pt states her goal is \"Going home and trying to straighten up my apartment. \"    Notes:  Pt presented as pleasant and cooperative during group. Pt states she is feeling \"sore and in pain\" and is grateful \"getting out of here. \"  Pt reports restful sleep of ~7.5 hours. Depression (0-10): 5    Anxiety (0-10): 5    Irritability/Aggitation (0-10): 5    Status After Intervention:  Improved    Participation Level:  Active Listener and Interactive    Participation Quality: Appropriate, Attentive and Sharing    Speech:  normal    Thought Process/Content: Logical    Affective Functioning: Congruent    Mood: Bright    Level of consciousness:  Alert and Attentive    Response to Learning: Able to verbalize current knowledge/experience    Endings: None Reported    Modes of Intervention: Education, Support and Socialization    Discipline Responsible: Certified Therapeutic Recreation Specialist     Electronically signed by Teresa Herron MA on 9/8/2020 at 10:09 AM

## 2020-09-08 NOTE — PLAN OF CARE
9/5/20, 11:00 AM EDT    DISCHARGE BARRIERS    Reason for Referral: [facility] initial assessment  Mental Status: alert and oriented  Decision Making Ability: Yes  Family/Social/Home Environment: spoke with patient who states the patient currently resides with Alone  in an apartment  and their support system includes Children, Friends/Neighbors  Current Services/ Equipment: ,  None  Patient Income source:Unemployed, and Income meets expenses. Concerns or Barriers to Discharge: no  Patient and/or family verbalized understanding of the plan of care and contributed to goal setting. Social/ Functional History    Lives With:  Alone  Type of Home: Apartment  Home Layout:    Home Access:    Bathroom Shower/ Tub:    Bathroom Toilet: Standard  Other Bathroom Assistive Devices:    Home Equipment[de-identified]    ADL Assistance: Independent  Homemaking Assistance: Independent  Ambulation Assistance: Independent  Transfer Assistance: Independent  Additional Comments:      Anticipated Needs per Patient:     Potential Assistance Needed: N/A   Type of Home Care Services: None  Patient expects to be discharged to: Home        Discharge Plan:  D/C home, no needs. F/U with [facility], transport needed?     Vidya Tracy, MSW, LSW

## 2020-09-08 NOTE — CARE COORDINATION
LSW attempted to contact patient's daughter Katerin Marquez ([phone]) concerning discharge planning. Katerin Marquez did not answer and her voicemail is full, so unable to leave message. LSW will reattempt later in the day. 11:00 AM  LSW received notification from [facility] that they are patient's secondary insurance. Traditional Medicare is primary. Per Medicare guidelines, no pre-auth needed. Auth/cert screen updated to reflect.

## 2020-09-08 NOTE — PROGRESS NOTES
Psychiatric Progress Note    Nicanor Kowalski  [id number]  09/08/20    CHIEF COMPLAINT: I am depressed    HPI: Nicanor Kowalski is a 63 y. o. female who presented to [facility] on 9/2/2020 with concerns of auditory and visual hallucinations. Reported onset of past 3 days.  Reported hearing/ seeing people picking up things in her apartment as well as seeing spiders. Also reported hearing incomprehensible voices. She is attributing her symptoms to her newly increased Cymbalta dose.  It was increased from 30 mg DR to 60 mg ER on 8/3/2020.  She does report discharge from inpatient psych unit at [facility] on that day also. She was admitted for uncontrolled anxiety/suicidal ideations/MDD. Denied any suicidal ideation/homicidal ideation but stated anxiety continues to be uncontrolled; generalized stressors. Ryan Pagan was seen at this facility 8/15/2020 related to anxiety/tachycardia. Ryan Pagan was started on metoprolol reports better control of her blood pressure/heart rate. She was seen in consultation on 9/3/2020 and decision was made to admit her to the [facility] in [facility] once medically stable for acute psychiatric stabilization. She presented to the [facility] on 9/5/2020 for exacerbation of bipolar depression and anxiety. Pt noted recent exacerbation of mood with thoughts to harm herself. Pt noted she currently feels safe and comfortable on the unit. Pt was in agreement with treatment team.  Pt was polite and cordial during the interview process.     Pt noted she is doing [place] today. \"  Pt noted she is sleeping \"OK. \"  Pt noted her appetite is \"so-so. \"  Pt rated her depression a \"5\" on a scale of zero to ten with ten being the worst and zero being none. Pt rated her anxiety a \"5\" on the same scale. Pt denied any thoughts to harm herself or anyone else.  Pt denied any auditory or visual hallucinations.     Met with pt and treatment team  No TD noted, AIMS=0    Allergies   Allergen Reactions    Abilify [Aripiprazole]      \"Severe Shaking And Restlessness\"    Advil [Ibuprofen Micronized] Palpitations     \"Severe High Blood Pressure\"    Augmentin [Amoxicillin-Pot Clavulanate] Itching and Rash    Bee Venom Swelling     Redness    Ciprofloxacin Itching and Rash    Codeine      \"Severe Abdominal Cramping\"    Darvocet [Propoxyphene N-Acetaminophen] Palpitations     \"Severe High Blood Pressure\"    Darvon [Propoxyphene Hcl] Palpitations     \"Severe High Blood Pressure\"    Decadron [Dexamethasone] Other (See Comments)     seizure  Seizures    Ditropan [Oxybutynin Chloride] Palpitations     \"Severe High Blood Pressure\"    Fioricet [Butalbital-Apap-Caffeine] Palpitations     \"Severe High Blood Pressure\"    Fiorinal-Codeine #3 [Butalbital-Asa-Caff-Codeine]      \"Severe Stomach Cramps\"    Flagyl [Metronidazole] Diarrhea     \"Severe Diarrhea And Cramping\"    Naproxen Palpitations     \"Severe High Blood Pressure\"    Other      \"Allergic To Spider Bites Causing Blackness Of Skin, Severe Itching And Pain\"                                                  \"Allergic To Powder In Gloves Causing Severe Redness And Itching\"    Prozac [Fluoxetine Hcl]      \"Hallucinations\"    Robaxin [Methocarbamol] Palpitations     \"Severe High Blood Pressure\"    Ultram [Tramadol]      \"Severe Stomach Pain\"    Zoloft Palpitations     \"Sever High Blood Pressure\"    Butalbital-Aspirin-Caffeine Other (See Comments)     \"severe stomach pain\"    Coreg [Carvedilol] Other (See Comments)     \"spikes my BP severely and send me into a severe anxiety attack\"    Fluoxetine Other (See Comments)     hallucinations    Oxybutynin Chloride Other (See Comments)     Raises bp    Sertraline Other (See Comments)     hallucinations    Tape [Adhesive Tape]      Patient states it tears her skin, including band aids    Reglan [Metoclopramide] Other (See Comments)     \"makes me talk like a baby, but if I take benadryl with it it's fine\" tablet, Take 1 tablet by mouth daily  carbidopa-levodopa (SINEMET)  MG per tablet, Take 1 tablet by mouth nightly  amitriptyline (ELAVIL) 25 MG tablet, Take 1 tablet by mouth nightly  polyethylene glycol (GLYCOLAX) packet, Take 17 g by mouth daily as needed for Constipation  allopurinol (ZYLOPRIM) 300 MG tablet, Take 300 mg by mouth daily  magnesium oxide (MAG-OX) 400 (240 MG) MG tablet, Take 400 mg by mouth daily  Multiple Vitamins-Iron (MULTI-VITAMIN/IRON PO), Take  by mouth.  montelukast (SINGULAIR) 10 MG tablet, Take 10 mg by mouth nightly. simvastatin (ZOCOR) 20 MG tablet, Take 20 mg by mouth nightly. Past Medical History:   Diagnosis Date    Abnormal EKG 4/22/2014    Acid reflux     Anemia     Anesthesia     Nausea/Vomiting Post Op In Past    Anginal pain (HCC)     Denies Chest Pain At This Time    Anxiety     Arthritis     \"All Over\"    Asthma     CAD (coronary artery disease)     per last cardiac cath.  Cerebral artery occlusion with cerebral infarction ([address]     CHF (congestive heart failure) (HCC)     Chronic back pain     Chronic kidney disease     DDD (degenerative disc disease), cervical     12- Patient reports she was dx with DDD of Cerival spine C6,C7    Depression     Diabetes mellitus ([address] Dx 1990's    Diabetic neuropathy ([address]     \"In My Legs And Feet\"    Dizziness     \"Sometimes\"    Dry skin     Enlarged ureter     Right Side    Fatty liver     Fibrocystic breast     Gout     Pt states she was diagnosed with gout in the past few months.  H/O cardiac catheterization     Showed mild disease per last cath.  H/O cardiovascular stress test 03/15/2010    EF 69%, normal perfusion study except for diaphragmatic artifact, uniform wall motion.  H/O cardiovascular stress test 10/09/2008    EF 60%, no anginia, normal study.  H/O cardiovascular stress test 05/06/2014    EF 66%, no ischemia, normal LV systolic funciton, normal perfusion pattern.      H/O Doppler ultrasound 02/28/2011    CAROTID DOPPLER-normal study.  H/O echocardiogram 5/6/2014    Ef >55%. Impaired LV relaxation.  H/O echocardiogram 10/14/15    EF 60% Normal LV and systolic function. No significant valvulopathy seen.  History of Holter monitoring 3/24/15    24 hour - predominant rhythm sinus    [place] (hard of hearing)     Bilateral Ears    Hx of cardiovascular stress test 10/19/2015    lexiscan-normal,EF63%    Hx of motion sickness     HX OTHER MEDICAL     Primary Care Physician Is Dr[facility] Agustin [facility]    Hyperlipidemia     Hypertension     IBS (irritable bowel syndrome)     Incisional hernia 4/2014    Kidney stones Last Episode In 2012 Or 2013    Passed Kidney Stones Numberous Times    Migraines     Nausea & vomiting     Nausea/Vomiting Post Op In Past    Other specified disorder of skin     12- Patient states she has a condition of her vaginal area (skin) which starts with the letters Krystal Boyer. She is currently being treated with multiple creams and weekly Diflucan. 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     Staph infection Dx 1980's    Toes On Left Foot    Thyroid disease     hypothroidism    Tremor     \"Tremors All Over\"    Urinary incontinence     UTI (urinary tract infection) In Past    No Current Symptoms    UTI (urinary tract infection)     Vertigo     \"Sometimes\"    Wears glasses         Patient Active Problem List   Diagnosis    Chest pain    H/O Doppler ultrasound    H/O cardiovascular stress test    H/O cardiovascular stress test    H/O cardiovascular stress test    Incarcerated umbilical hernia    Essential hypertension    Type 2 diabetes mellitus with diabetic polyneuropathy, with long-term current use of insulin ([facility]    Tachycardia    Dyslipidemia    Family history of early CAD    NSTEMI (non-ST elevated myocardial infarction) ([facility]    Abnormal nuclear stress test    ILSA (obstructive sleep apnea)    Snoring    Hypersomnolence    Mild intermittent asthma without complication    Asthma exacerbation attacks    Hypertensive emergency    Hallucination, visual    Severe episode of recurrent major depressive disorder, with psychotic features ([facility]    Generalized anxiety disorder    Auditory hallucinations    Bipolar I disorder with depression, severe ([facility]       Review of Systems    OBJECTIVE  Vital Signs:  Vitals:    09/08/20 0745   BP: 130/71   Pulse: 96   Resp: 16   Temp: 97.6 °F (36.4 °C)   SpO2: 95%       Labs:  Recent Results (from the past 48 hour(s))   POCT Glucose    Collection Time: 09/06/20 11:52 AM   Result Value Ref Range    POC Glucose 157 (H) 70 - 99 MG/DL   POCT Glucose    Collection Time: 09/06/20  4:52 PM   Result Value Ref Range    POC Glucose 239 (H) 70 - 99 MG/DL   POCT Glucose    Collection Time: 09/06/20  7:56 PM   Result Value Ref Range    POC Glucose 226 (H) 70 - 99 MG/DL   Urinalysis    Collection Time: 09/07/20 12:20 AM   Result Value Ref Range    Color, UA YELLOW YELLOW    Clarity, UA CLEAR CLEAR    Glucose, Urine NEGATIVE NEGATIVE MG/DL    Bilirubin Urine NEGATIVE NEGATIVE MG/DL    Ketones, Urine NEGATIVE NEGATIVE MG/DL    Specific Gravity, UA 1.020 1.001 - 1.035    Blood, Urine NEGATIVE NEGATIVE    pH, Urine 6.0 5.0 - 8.0    Protein, UA NEGATIVE NEGATIVE MG/DL    Urobilinogen, Urine NORMAL 0.2 - 1.0 MG/DL    Nitrite Urine, Quantitative NEGATIVE NEGATIVE    Leukocyte Esterase, Urine SMALL NUMBER OR AMOUNT OBSERVED (A) NEGATIVE    RBC, UA 1 0 - 6 /HPF    WBC, UA 8 (H) 0 - 5 /HPF    Epithelial Cells, UA 2 /HPF    Cast Type NO CAST FORMS SEEN NO CAST FORMS SEEN /HPF    Bacteria, UA NEGATIVE NEGATIVE /HPF    Crystal Type NEGATIVE NEGATIVE /HPF   POCT Glucose    Collection Time: 09/07/20 1:51 AM   Result Value Ref Range    POC Glucose 265 (H) 70 - 99 MG/DL   POCT Glucose    Collection Time: 09/07/20  8:04 AM   Result Value Ref Range    POC Glucose 200 (H) 70 - 99 MG/DL   POCT Glucose    Collection Time: 09/07/20 12:02 PM   Result Value Ref Range    POC Glucose 206 (H) 70 - 99 MG/DL   POCT Glucose    Collection Time: 09/07/20  4:55 PM   Result Value Ref Range    POC Glucose 166 (H) 70 - 99 MG/DL   POCT Glucose    Collection Time: 09/07/20  7:38 PM   Result Value Ref Range    POC Glucose 232 (H) 70 - 99 MG/DL   CBC Auto Differential    Collection Time: 09/08/20  5:36 AM   Result Value Ref Range    WBC 10.3 4.0 - 10.5 K/CU MM    RBC 4.40 4.2 - 5.4 M/CU MM    Hemoglobin 12.9 12.5 - 16.0 GM/DL    Hematocrit 37.1 37 - 47 %    MCV 84.3 78 - 100 FL    MCH 29.3 27 - 31 PG    MCHC 34.8 32.0 - 36.0 %    RDW 12.5 11.7 - 14.9 %    Platelets 863 983 - 147 K/CU MM    MPV 9.6 7.5 - 11.1 FL    Differential Type AUTOMATED DIFFERENTIAL     Segs Relative 41.7 36 - 66 %    Lymphocytes % 44.6 (H) 24 - 44 %    Monocytes % 11.0 (H) 0 - 4 %    Eosinophils % 1.7 0 - 3 %    Basophils % 0.8 0 - 1 %    Segs Absolute 4.3 K/CU MM    Lymphocytes Absolute 4.6 K/CU MM    Monocytes Absolute 1.1 K/CU MM    Eosinophils Absolute 0.2 K/CU MM    Basophils Absolute 0.1 K/CU MM    Immature Neutrophil % 0.2 0 - 0.43 %    Total Immature Neutrophil 0.02 K/CU MM   Comprehensive Metabolic Panel w/ Reflex to MG    Collection Time: 09/08/20  5:36 AM   Result Value Ref Range    Sodium 139 135 - 145 MMOL/L    Potassium 3.0 (LL) 3.5 - 5.1 MMOL/L    Chloride 97 (L) 99 - 110 mMol/L    CO2 31 21 - 32 MMOL/L    BUN 10 6 - 23 MG/DL    CREATININE 0.5 (L) 0.6 - 1.1 MG/DL    Glucose 163 (H) 70 - 99 MG/DL    Calcium 9.8 8.3 - 10.6 MG/DL    Alb 4.0 3.4 - 5.0 GM/DL    Total Protein 6.9 6.4 - 8.2 GM/DL    Total Bilirubin 0.5 0.0 - 1.0 MG/DL    ALT 14 10 - 40 U/L    AST 14 (L) 15 - 37 IU/L    Alkaline Phosphatase 61 40 - 129 IU/L    GFR Non- >60 >60 mL/min/1.73m2    GFR African American >60 >60 mL/min/1.73m2    Anion Gap 11 4 - 16   Magnesium    Collection Time: 09/08/20  5:36 AM   Result Value Ref Range    Magnesium 1.7 (L) 1.8 - 2.4 mg/dl   POCT Glucose    Collection Time: 09/08/20  7:42 AM   Result Value Ref Range    POC Glucose 175 (H) 70 - 99 MG/DL       PSYCHIATRIC ASSESSMENT / MENTAL STATUS EXAM:   Vitals: Blood pressure 130/71, pulse 96, temperature 97.6 °F (36.4 °C), temperature source Temporal, resp. rate 16, weight 159 lb 4.8 oz (72.3 kg), SpO2 95 %. CONSTITUTIONAL:    Appearance: Appears stated age. Alert and oriented to person, place, time & situation. No acute distress. Adequate grooming and hygiene. Good eye contact. No prominent physical abnormalities. Attitude: Manner is cooperative and pleasant  Motor: No psychomotor agitation, retardation or abnormal movements noted  Speech: Clearly articulated; normal rate, volume, tone & amount. Language: intact understanding and production  Mood: anxious, depressed  Affect: anxious, full range, non-labile, congruent with mood and content of speech  Thought Production: Spontaneous  Thought Form: Coherent, linear, logical & goal-directed. No tangentiality or circumstantiality. No flight of ideas or loosening of associations. Thought Content/Perceptions: No REYNALDO, no AVH, no delusion  Insight: limited  Judgment: limited  Memory: Immediate, recent, and remote appear intact, though not formally tested. Attention: maintained throughout interview  Fund of knowledge: Average  Gait/Balance: not assessed    IMPRESSION:   Bipolar depression severe with psychosis    PLAN:  Psychiatric management:medication initiation and titration, group and individual therapy, safe and therapeutic environment. Status of problem/condition: Improving  Medical co-morbidities: Management per [facility] group, appreciate assistance  Legal Status: Pending  Disposition:estimated LOS: Pending.   The treatment team reviewed with the patient the diagnosis and treatment recommendations to include the risks, benefits, and side effects of chosen medications. The patient verbalized understanding and agreed with the treatment regimen as outlined above. The patient was encouraged to participate in groups. Medical records, Labs, Diagnotic tests reviewed  q15 min safety checks for safety  Interval History  Review current labs  Continue current medications  Supportive Therapy Provided  Pt had an opportunity to ask questions and address concerns  Pt encouraged to continue therapy group or individual.  Pt was in agreement with treatment plan. The risks benefits and side effects of medications were discussed with the patient, including alternatives and no treatment.     Electronically signed by WENDIE Sarah CNP on 9/8/2020 at 11:19 AM

## 2020-09-08 NOTE — PLAN OF CARE
Problem: Altered Mood, Depressive Behavior:  Goal: Able to verbalize acceptance of life and situations over which he or she has no control  Description: Able to verbalize acceptance of life and situations over which he or she has no control  9/7/2020 2200 by Alina Jimenez RN  Outcome: Ongoing  9/7/2020 1711 by Lashanda Zambrano RN  Outcome: Ongoing  Goal: Able to verbalize and/or display a decrease in depressive symptoms  Description: Able to verbalize and/or display a decrease in depressive symptoms  9/7/2020 2200 by Alina Jimenez RN  Outcome: Ongoing  9/7/2020 1711 by Lashanda Zambrano RN  Outcome: Ongoing  Goal: Ability to disclose and discuss suicidal ideas will improve  Description: Ability to disclose and discuss suicidal ideas will improve  9/7/2020 2200 by Alina Jimenez RN  Outcome: Ongoing  9/7/2020 1711 by Lashanda Zambrano RN  Outcome: Ongoing  Goal: Able to verbalize support systems  Description: Able to verbalize support systems  9/7/2020 2200 by Alina Jimenez RN  Outcome: Ongoing  9/7/2020 1711 by Lashanda Zambrano RN  Outcome: Ongoing  Goal: Absence of self-harm  Description: Absence of self-harm  9/7/2020 2200 by Alina Jimenez RN  Outcome: Ongoing  9/7/2020 1711 by Lashanda Zambrano RN  Outcome: Ongoing  Goal: Patient specific goal  Description: Patient specific goal  9/7/2020 2200 by Alina Jimenez RN  Outcome: Ongoing  9/7/2020 1711 by Lashanda Zambrano RN  Outcome: Ongoing  Goal: Participates in care planning  Description: Participates in care planning  9/7/2020 2200 by Alina Jimenez RN  Outcome: Ongoing  9/7/2020 1711 by Lashanda Zambrano RN  Outcome: Ongoing     Problem: Falls - Risk of:  Goal: Will remain free from falls  Description: Will remain free from falls  9/7/2020 2200 by Alina Jimenez RN  Outcome: Ongoing  9/7/2020 1711 by Lashanda Zambrano RN  Outcome: Ongoing  Goal: Absence of physical injury  Description: Absence of physical injury  9/7/2020 2200 by Kostas Candelario RN  Outcome: Ongoing  9/7/2020 1711 by Deng Newman RN  Outcome: Ongoing     Problem: Pain:  Goal: Pain level will decrease  Description: Pain level will decrease  9/7/2020 2200 by Kostas Candelario RN  Outcome: Ongoing  9/7/2020 1711 by Deng Newman RN  Outcome: Ongoing  Goal: Control of acute pain  Description: Control of acute pain  9/7/2020 2200 by Kostas Candelario RN  Outcome: Ongoing  9/7/2020 1711 by Deng Newman RN  Outcome: Ongoing  Goal: Control of chronic pain  Description: Control of chronic pain  9/7/2020 2200 by Kostas Candelario RN  Outcome: Ongoing  9/7/2020 1711 by Deng Newman RN  Outcome: Ongoing

## 2020-09-08 NOTE — PROGRESS NOTES
Pt seen by therapist for 1:1 session. Pt agreeable to completing assessment during 1:1 session. Pt states reason for admission was [place] doctor wanted me to come here because I had a couple of spells because of anxiety. \"  After prompting from therapist, pt admitted to having visual hallucinations. Pt noted to struggle to identify leisure interests and reports limited participating d/t pain and physical limitations. Therapist encouraged pt to attend groups.      Electronically signed by Richard Dominguez on 9/8/2020 at 11:48 AM

## 2020-09-08 NOTE — CARE COORDINATION
Patient pre-auth submitted to [facility] via fax to [phone]. LSW awaits determination.

## 2020-09-08 NOTE — GROUP NOTE
Group Therapy Note    Date: 9/8/2020    Group Start Time: 9054  Group End Time: 1600    Number of Participants: 2/5    Type: Exercise/Recreation Group    Group Topic/Objective: Chair Exercise    Notes:  Pt completed approximately half of the exercises. Pt reported finding simple movements painful. Pt and therapist discussed pain management techniques. Status After Intervention:  Improved    Participation Level:  Active Listener and Interactive    Participation Quality: Appropriate, Attentive and Sharing    Speech:  normal    Thought Process/Content: Logical    Affective Functioning: Congruent    Mood: Bright    Level of consciousness:  Alert and Attentive    Response to Learning: Able to verbalize current knowledge/experience and Able to verbalize/acknowledge new learning    Endings: None Reported    Modes of Intervention: Activity and Movement    Discipline Responsible: Certified Therapeutic Recreation Specialist     Electronically signed by DANIELLE Barcenas MA+ on 9/8/2020 at 4:21 PM

## 2020-09-09 LAB
GLUCOSE BLD-MCNC: 142 MG/DL (ref 70–99)
GLUCOSE BLD-MCNC: 160 MG/DL (ref 70–99)
GLUCOSE BLD-MCNC: 191 MG/DL (ref 70–99)
GLUCOSE BLD-MCNC: 217 MG/DL (ref 70–99)
GLUCOSE BLD-MCNC: 236 MG/DL (ref 70–99)
MAGNESIUM: 1.7 MG/DL (ref 1.7–2.4)
POTASSIUM SERPL-SCNC: 3.2 MMOL/L (ref 3.5–5.1)

## 2020-09-09 PROCEDURE — 83735 ASSAY OF MAGNESIUM: CPT

## 2020-09-09 PROCEDURE — 84132 ASSAY OF SERUM POTASSIUM: CPT

## 2020-09-09 PROCEDURE — 6370000000 HC RX 637 (ALT 250 FOR IP): Performed by: NURSE PRACTITIONER

## 2020-09-09 PROCEDURE — 82962 GLUCOSE BLOOD TEST: CPT

## 2020-09-09 PROCEDURE — 6370000000 HC RX 637 (ALT 250 FOR IP): Performed by: HOSPITALIST

## 2020-09-09 PROCEDURE — 1240000000 HC EMOTIONAL WELLNESS R&B

## 2020-09-09 PROCEDURE — 6370000000 HC RX 637 (ALT 250 FOR IP): Performed by: PSYCHIATRY & NEUROLOGY

## 2020-09-09 PROCEDURE — 36415 COLL VENOUS BLD VENIPUNCTURE: CPT

## 2020-09-09 PROCEDURE — 99233 SBSQ HOSP IP/OBS HIGH 50: CPT | Performed by: NURSE PRACTITIONER

## 2020-09-09 RX ORDER — TRAZODONE HYDROCHLORIDE 100 MG/1
100 TABLET ORAL NIGHTLY PRN
Status: DISCONTINUED | OUTPATIENT
Start: 2020-09-09 | End: 2020-09-10 | Stop reason: HOSPADM

## 2020-09-09 RX ADMIN — INSULIN LISPRO 4 UNITS: 100 INJECTION, SOLUTION INTRAVENOUS; SUBCUTANEOUS at 16:00

## 2020-09-09 RX ADMIN — MONTELUKAST 10 MG: 10 TABLET, FILM COATED ORAL at 20:36

## 2020-09-09 RX ADMIN — LEVETIRACETAM 500 MG: 500 TABLET ORAL at 20:37

## 2020-09-09 RX ADMIN — HYDROCODONE BITARTRATE AND ACETAMINOPHEN 1 TABLET: 5; 325 TABLET ORAL at 12:21

## 2020-09-09 RX ADMIN — OLANZAPINE 5 MG: 5 TABLET, FILM COATED ORAL at 20:36

## 2020-09-09 RX ADMIN — BUSPIRONE HYDROCHLORIDE 15 MG: 15 TABLET ORAL at 20:37

## 2020-09-09 RX ADMIN — INSULIN GLARGINE 20 UNITS: 100 INJECTION, SOLUTION SUBCUTANEOUS at 20:52

## 2020-09-09 RX ADMIN — METOPROLOL SUCCINATE 25 MG: 25 TABLET, EXTENDED RELEASE ORAL at 07:55

## 2020-09-09 RX ADMIN — LEVOTHYROXINE SODIUM 75 MCG: 0.07 TABLET ORAL at 07:55

## 2020-09-09 RX ADMIN — ASPIRIN 81 MG 81 MG: 81 TABLET ORAL at 07:55

## 2020-09-09 RX ADMIN — HYDROCHLOROTHIAZIDE 25 MG: 25 TABLET ORAL at 07:56

## 2020-09-09 RX ADMIN — DULOXETINE HYDROCHLORIDE 30 MG: 30 CAPSULE, DELAYED RELEASE ORAL at 07:55

## 2020-09-09 RX ADMIN — OXCARBAZEPINE 150 MG: 150 TABLET, FILM COATED ORAL at 07:55

## 2020-09-09 RX ADMIN — SIMVASTATIN 20 MG: 20 TABLET, FILM COATED ORAL at 20:37

## 2020-09-09 RX ADMIN — ALLOPURINOL 300 MG: 300 TABLET ORAL at 07:55

## 2020-09-09 RX ADMIN — BUSPIRONE HYDROCHLORIDE 15 MG: 15 TABLET ORAL at 14:00

## 2020-09-09 RX ADMIN — ALUMINUM HYDROXIDE, MAGNESIUM HYDROXIDE, AND SIMETHICONE 15 ML: 200; 200; 20 SUSPENSION ORAL at 22:07

## 2020-09-09 RX ADMIN — CARBIDOPA AND LEVODOPA 1 TABLET: 10; 100 TABLET ORAL at 20:36

## 2020-09-09 RX ADMIN — INSULIN LISPRO 2 UNITS: 100 INJECTION, SOLUTION INTRAVENOUS; SUBCUTANEOUS at 12:01

## 2020-09-09 RX ADMIN — LEVETIRACETAM 500 MG: 500 TABLET ORAL at 07:55

## 2020-09-09 RX ADMIN — HYDROCODONE BITARTRATE AND ACETAMINOPHEN 1 TABLET: 5; 325 TABLET ORAL at 20:37

## 2020-09-09 RX ADMIN — MAGNESIUM OXIDE TAB 400 MG (241.3 MG ELEMENTAL MG) 400 MG: 400 (241.3 MG) TAB at 07:55

## 2020-09-09 RX ADMIN — LOSARTAN POTASSIUM 100 MG: 100 TABLET, FILM COATED ORAL at 07:55

## 2020-09-09 RX ADMIN — BUSPIRONE HYDROCHLORIDE 15 MG: 15 TABLET ORAL at 07:55

## 2020-09-09 RX ADMIN — INSULIN LISPRO 1 UNITS: 100 INJECTION, SOLUTION INTRAVENOUS; SUBCUTANEOUS at 07:59

## 2020-09-09 RX ADMIN — PANTOPRAZOLE SODIUM 40 MG: 40 TABLET, DELAYED RELEASE ORAL at 07:55

## 2020-09-09 RX ADMIN — OXCARBAZEPINE 150 MG: 150 TABLET, FILM COATED ORAL at 20:37

## 2020-09-09 RX ADMIN — NIFEDIPINE 60 MG: 30 TABLET, FILM COATED, EXTENDED RELEASE ORAL at 07:55

## 2020-09-09 ASSESSMENT — PAIN SCALES - GENERAL
PAINLEVEL_OUTOF10: 2
PAINLEVEL_OUTOF10: 8
PAINLEVEL_OUTOF10: 9
PAINLEVEL_OUTOF10: 3

## 2020-09-09 NOTE — PROGRESS NOTES
Psychiatric Progress Note    Vincent Levine  [id number]  09/09/20    CHIEF COMPLAINT: I am depressed    HPI: Vincent Levine is a 63 y. o. female who presented to [facility] on 9/2/2020 with concerns of auditory and visual hallucinations. Reported onset of past 3 days.  Reported hearing/ seeing people picking up things in her apartment as well as seeing spiders. Also reported hearing incomprehensible voices. She is attributing her symptoms to her newly increased Cymbalta dose.  It was increased from 30 mg DR to 60 mg ER on 8/3/2020.  She does report discharge from inpatient psych unit at [facility] on that day also. She was admitted for uncontrolled anxiety/suicidal ideations/MDD. Denied any suicidal ideation/homicidal ideation but stated anxiety continues to be uncontrolled; generalized stressors. Rangel Redd was seen at this facility 8/15/2020 related to anxiety/tachycardia. Rangel Redd was started on metoprolol reports better control of her blood pressure/heart rate. She was seen in consultation on 9/3/2020 and decision was made to [facility] [place] once medically stable for acute psychiatric stabilization. [facility] for exacerbation of bipolar depression and anxiety. Pt noted recent exacerbation of mood with thoughts to harm herself. Pt noted she currently feels safe and comfortable on the unit. Pt was in agreement with treatment team.  Pt was polite and cordial during the interview process.     Pt noted she is doing [place] today. \"  Pt noted she is sleeping \"not so good. \"  Pt noted her appetite is \"so-so. \"  Pt rated her depression a \"0\" on a scale of zero to ten with ten being the worst and zero being none. Pt rated her anxiety a \"0\" on the same scale. Pt denied any thoughts to harm herself or anyone else. Pt denied any auditory or visual hallucinations. Taking medications as prescribed.  Tends to isolate in her room, but has attended some magnesium hydroxide-simethicone (MAALOX MAX) 400-400-40 MG/5ML SUSP, Take 15 mLs by mouth every 6 hours as needed (heart burn)  pantoprazole (PROTONIX) 40 MG tablet, Take 1 tablet by mouth daily  carbidopa-levodopa (SINEMET)  MG per tablet, Take 1 tablet by mouth nightly  amitriptyline (ELAVIL) 25 MG tablet, Take 1 tablet by mouth nightly  polyethylene glycol (GLYCOLAX) packet, Take 17 g by mouth daily as needed for Constipation  allopurinol (ZYLOPRIM) 300 MG tablet, Take 300 mg by mouth daily  magnesium oxide (MAG-OX) 400 (240 MG) MG tablet, Take 400 mg by mouth daily  Multiple Vitamins-Iron (MULTI-VITAMIN/IRON PO), Take  by mouth.  montelukast (SINGULAIR) 10 MG tablet, Take 10 mg by mouth nightly. simvastatin (ZOCOR) 20 MG tablet, Take 20 mg by mouth nightly. Past Medical History:   Diagnosis Date    Abnormal EKG 4/22/2014    Acid reflux     Anemia     Anesthesia     Nausea/Vomiting Post Op In Past    Anginal pain (HCC)     Denies Chest Pain At This Time    Anxiety     Arthritis     \"All Over\"    Asthma     CAD (coronary artery disease)     per last cardiac cath.  Cerebral artery occlusion with cerebral infarction ([address]     [facility]     Chronic back pain     Chronic kidney disease     DDD (degenerative disc disease), cervical     12- Patient reports she was dx with DDD of Cerival spine C6,C7    Depression     Diabetes mellitus ([address] Dx 1990's    Diabetic neuropathy ([address]     \"In My Legs And Feet\"    Dizziness     \"Sometimes\"    Dry skin     Enlarged ureter     Right Side    Fatty liver     Fibrocystic breast     Gout     Pt states she was diagnosed with gout in the past few months.  H/O cardiac catheterization     Showed mild disease per last cath.  H/O cardiovascular stress test 03/15/2010    EF 69%, normal perfusion study except for diaphragmatic artifact, uniform wall motion.     H/O cardiovascular stress test 10/09/2008    EF 60%, no anginia, normal study.  H/O cardiovascular stress test 05/06/2014    EF 66%, no ischemia, normal LV systolic funciton, normal perfusion pattern.  H/O Doppler ultrasound 02/28/2011    CAROTID DOPPLER-normal study.  H/O echocardiogram 5/6/2014    Ef >55%. Impaired LV relaxation.  H/O echocardiogram 10/14/15    EF 60% Normal LV and systolic function. No significant valvulopathy seen.  History of Holter monitoring 3/24/15    24 hour - predominant rhythm sinus    [place] (hard of hearing)     Bilateral Ears    Hx of cardiovascular stress test 10/19/2015    lexiscan-normal,EF63%    Hx of motion sickness     HX OTHER MEDICAL     Primary Care Physician Is Dr. Sanjeev Gabriel In [place], [place]    Hyperlipidemia     Hypertension     IBS (irritable bowel syndrome)     Incisional hernia 4/2014    Kidney stones Last Episode In 2012 Or 2013    Passed Kidney Stones Numberous Times    Migraines     Nausea & vomiting     Nausea/Vomiting Post Op In Past    Other specified disorder of skin     12- Patient states she has a condition of her vaginal area (skin) which starts with the letters Dolph Pata. She is currently being treated with multiple creams and weekly Diflucan. 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     Staph infection Dx 1980's    Toes On Left Foot    Thyroid disease     hypothroidism    Tremor     \"Tremors All Over\"    Urinary incontinence     UTI (urinary tract infection) In Past    No Current Symptoms    UTI (urinary tract infection)     Vertigo     \"Sometimes\"    Wears glasses         Patient Active Problem List   Diagnosis    Chest pain    H/O Doppler ultrasound    H/O cardiovascular stress test    H/O cardiovascular stress test    H/O cardiovascular stress test    Incarcerated umbilical hernia    Essential hypertension    Type 2 diabetes mellitus with diabetic polyneuropathy, with long-term current use of insulin (HCC)    Tachycardia    Dyslipidemia    Family history of early CAD    [facility]STEMI (non-ST elevated myocardial infarction) [facility]    Abnormal nuclear stress test    ILSA (obstructive sleep apnea)    Snoring    Hypersomnolence    Mild intermittent asthma without complication    Asthma exacerbation attacks    Hypertensive emergency    Hallucination, visual    Severe episode of recurrent major depressive disorder, with psychotic features ([facility]    Generalized anxiety disorder    Auditory hallucinations    Bipolar I disorder with depression, severe ([facility]       Review of Systems    OBJECTIVE  Vital Signs:  Vitals:    09/09/20 0740   BP: (!) 174/88   Pulse: 88   Resp: 16   Temp: 97.4 °F (36.3 °C)   SpO2: 95%       Labs:  Recent Results (from the past 48 hour(s))   POCT Glucose    Collection Time: 09/07/20 12:02 PM   Result Value Ref Range    POC Glucose 206 (H) 70 - 99 MG/DL   POCT Glucose    Collection Time: 09/07/20  4:55 PM   Result Value Ref Range    POC Glucose 166 (H) 70 - 99 MG/DL   POCT Glucose    Collection Time: 09/07/20  7:38 PM   Result Value Ref Range    POC Glucose 232 (H) 70 - 99 MG/DL   CBC Auto Differential    Collection Time: 09/08/20  5:36 AM   Result Value Ref Range    WBC 10.3 4.0 - 10.5 K/CU MM    RBC 4.40 4.2 - 5.4 M/CU MM    Hemoglobin 12.9 12.5 - 16.0 GM/DL    Hematocrit 37.1 37 - 47 %    MCV 84.3 78 - 100 FL    MCH 29.3 27 - 31 PG    MCHC 34.8 32.0 - 36.0 %    RDW 12.5 11.7 - 14.9 %    Platelets 211 146 - 962 K/CU MM    MPV 9.6 7.5 - 11.1 FL    Differential Type AUTOMATED DIFFERENTIAL     Segs Relative 41.7 36 - 66 %    Lymphocytes % 44.6 (H) 24 - 44 %    Monocytes % 11.0 (H) 0 - 4 %    Eosinophils % 1.7 0 - 3 %    Basophils % 0.8 0 - 1 %    Segs Absolute 4.3 K/CU MM    Lymphocytes Absolute 4.6 K/CU MM    Monocytes Absolute 1.1 K/CU MM Eosinophils Absolute 0.2 K/CU MM    Basophils Absolute 0.1 K/CU MM    Immature Neutrophil % 0.2 0 - 0.43 %    Total Immature Neutrophil 0.02 K/CU MM   Comprehensive Metabolic Panel w/ Reflex to MG    Collection Time: 09/08/20  5:36 AM   Result Value Ref Range    Sodium 139 135 - 145 MMOL/L    Potassium 3.0 (LL) 3.5 - 5.1 MMOL/L    Chloride 97 (L) 99 - 110 mMol/L    CO2 31 21 - 32 MMOL/L    BUN 10 6 - 23 MG/DL    CREATININE 0.5 (L) 0.6 - 1.1 MG/DL    Glucose 163 (H) 70 - 99 MG/DL    Calcium 9.8 8.3 - 10.6 MG/DL    Alb 4.0 3.4 - 5.0 GM/DL    Total Protein 6.9 6.4 - 8.2 GM/DL    Total Bilirubin 0.5 0.0 - 1.0 MG/DL    ALT 14 10 - 40 U/L    AST 14 (L) 15 - 37 IU/L    Alkaline Phosphatase 61 40 - 129 IU/L    GFR Non-African American >60 >60 mL/min/1.73m2    GFR African American >60 >60 mL/min/1.73m2    Anion Gap 11 4 - 16   Magnesium    Collection Time: 09/08/20  5:36 AM   Result Value Ref Range    Magnesium 1.7 (L) 1.8 - 2.4 mg/dl   POCT Glucose    Collection Time: 09/08/20  7:42 AM   Result Value Ref Range    POC Glucose 175 (H) 70 - 99 MG/DL   POCT Glucose    Collection Time: 09/08/20 12:10 PM   Result Value Ref Range    POC Glucose 214 (H) 70 - 99 MG/DL   POCT Glucose    Collection Time: 09/08/20  4:17 PM   Result Value Ref Range    POC Glucose 215 (H) 70 - 99 MG/DL   POCT Glucose    Collection Time: 09/08/20  7:31 PM   Result Value Ref Range    POC Glucose 157 (H) 70 - 99 MG/DL   POCT Glucose    Collection Time: 09/09/20  1:03 AM   Result Value Ref Range    POC Glucose 142 (H) 70 - 99 MG/DL   POCT Glucose    Collection Time: 09/09/20  7:47 AM   Result Value Ref Range    POC Glucose 160 (H) 70 - 99 MG/DL       PSYCHIATRIC ASSESSMENT / MENTAL STATUS EXAM:   Vitals: Blood pressure (!) 174/88, pulse 88, temperature 97.4 °F (36.3 °C), temperature source Temporal, resp. rate 16, weight 159 lb 4.8 oz (72.3 kg), SpO2 95 %. CONSTITUTIONAL:    Appearance: Appears stated age.  Alert and oriented to person, place, time & situation. No acute distress. Adequate grooming and hygiene. Good eye contact. No prominent physical abnormalities. Attitude: Manner is cooperative and pleasant  Motor: No psychomotor agitation, retardation or abnormal movements noted  Speech: Clearly articulated; normal rate, volume, tone & amount. Language: intact understanding and production  Mood: anxious, depressed  Affect: anxious, full range, non-labile, congruent with mood and content of speech  Thought Production: Spontaneous  Thought Form: Coherent, linear, logical & goal-directed. No tangentiality or circumstantiality. No flight of ideas or loosening of associations. Thought Content/Perceptions: No REYNALDO, no AVH, no delusion  Insight: limited  Judgment: limited  Memory: Immediate, recent, and remote appear intact, though not formally tested. Attention: maintained throughout interview  Fund of knowledge: Average  Gait/Balance: not assessed    IMPRESSION:   Bipolar depression severe with psychosis    PLAN:  Psychiatric management:medication initiation and titration, group and individual therapy, safe and therapeutic environment. Status of problem/condition: Improving  Medical co-morbidities: Management per [facility] group, appreciate assistance  Legal Status: Pending  Disposition:estimated LOS: Pending. The treatment team reviewed with the patient the diagnosis and treatment recommendations to include the risks, benefits, and side effects of chosen medications. The patient verbalized understanding and agreed with the treatment regimen as outlined above. The patient was encouraged to participate in groups.   Medical records, Labs, Diagnotic tests reviewed  q15 min safety checks for safety  Interval History  Review current labs - check potassium with reflex to magnesium  Continue current medications  Supportive Therapy Provided  Pt had an opportunity to ask questions and address concerns  Pt encouraged to continue therapy group or individual.  Pt was in agreement with treatment plan. The risks benefits and side effects of medications were discussed with the patient, including alternatives and no treatment.     Electronically signed by WENDIE Billy CNP on 9/9/2020 at 10:04 AM

## 2020-09-09 NOTE — GROUP NOTE
Group Therapy Note    Date: 9/9/2020    Group Start Time: 2616  Group End Time: 1600    Number of Participants: 1/4    Type: Exercise/Recreation Group    Group Topic/Objective: Mental Health Benefits of Exercise Discussion    Notes:  Pt participated actively in the group. Pt fixated on physical health and required constant redirection/prompting to focus on group. Pt presented as wanting therapist to tell her she is unable to physically do things for herself. Status After Intervention:  Improved    Participation Level:  Active Listener and Interactive    Participation Quality: Attentive and Sharing    Speech:  normal    Thought Process/Content: Logical    Affective Functioning: Blunted    Mood: Blunted    Level of consciousness:  Alert and Attentive    Response to Learning: Able to verbalize current knowledge/experience and Able to verbalize/acknowledge new learning    Endings: None Reported    Modes of Intervention: Education, Support and Socialization    Discipline Responsible: Certified Therapeutic Recreation Specialist     Electronically signed by Samantha Hodgkin, MA on 9/9/2020 at 4:15 PM

## 2020-09-09 NOTE — PLAN OF CARE
Problem: Altered Mood, Depressive Behavior:  Goal: Able to verbalize acceptance of life and situations over which he or she has no control  Description: Able to verbalize acceptance of life and situations over which he or she has no control  9/8/2020 2302 by Ivan Strickland RN  Outcome: Ongoing  9/8/2020 0952 by Santana Thorne RN  Outcome: Ongoing  Goal: Able to verbalize and/or display a decrease in depressive symptoms  Description: Able to verbalize and/or display a decrease in depressive symptoms  9/8/2020 2302 by Ivan Strickland RN  Outcome: Ongoing  9/8/2020 0952 by Santana Thorne RN  Outcome: Ongoing  Goal: Ability to disclose and discuss suicidal ideas will improve  Description: Ability to disclose and discuss suicidal ideas will improve  9/8/2020 2302 by Ivan Strickland RN  Outcome: Ongoing  9/8/2020 0952 by Santana Thorne RN  Outcome: Ongoing  Goal: Able to verbalize support systems  Description: Able to verbalize support systems  9/8/2020 2302 by Ivan Strickland RN  Outcome: Ongoing  9/8/2020 0952 by Santana Thorne RN  Outcome: Ongoing  Goal: Absence of self-harm  Description: Absence of self-harm  9/8/2020 2302 by Ivan Strickland RN  Outcome: Ongoing  9/8/2020 0952 by Santana Thorne RN  Outcome: Ongoing  Goal: Patient specific goal  Description: Patient specific goal  9/8/2020 2302 by Ivan Strickland RN  Outcome: Ongoing  9/8/2020 0952 by Santana Thorne RN  Outcome: Ongoing  Goal: Participates in care planning  Description: Participates in care planning  9/8/2020 2302 by Ivan Strickland RN  Outcome: Ongoing  9/8/2020 0952 by Santana Thorne RN  Outcome: Ongoing     Problem: Falls - Risk of:  Goal: Will remain free from falls  Description: Will remain free from falls  9/8/2020 2302 by Ivan Strickland RN  Outcome: Ongoing  9/8/2020 0952 by Santana Thorne RN  Outcome: Ongoing  Goal: Absence of physical injury  Description: Absence of physical injury  9/8/2020 2302 by Ysabel Larose [place] RN  Outcome: Ongoing  9/8/2020 9084 by Meeta Frausto RN  Outcome: Ongoing     Problem: Pain:  Description: Pain management should include both nonpharmacologic and pharmacologic interventions.   Goal: Pain level will decrease  Description: Pain level will decrease  9/8/2020 2302 by Reece Osler, RN  Outcome: Ongoing  9/8/2020 0952 by Meeta Frausto RN  Outcome: Ongoing  Goal: Control of acute pain  Description: Control of acute pain  9/8/2020 2302 by Reece Osler, RN  Outcome: Ongoing  9/8/2020 0952 by Meeta Frausto RN  Outcome: Ongoing  Goal: Control of chronic pain  Description: Control of chronic pain  9/8/2020 2302 by Reece Osler, RN  Outcome: Ongoing  9/8/2020 0952 by Meeta Frausto RN  Outcome: Ongoing

## 2020-09-09 NOTE — GROUP NOTE
Group Therapy Note    Date: 9/9/2020    Group Start Time: 1030  Group End Time: 3011  Group Topic: Psychoeducation    [address] [place], [place]        Group Therapy Note    Attendees: 4/6       Notes:  Pts participated in recreational therapy group; Relaxation Through Music. Pts were each allowed to pick a song they could listen to that will help them relax. Pts were encouraged to relax and socialize as the music was playing. Pt encouraged to attend, pt refused.        Discipline Responsible: Certified Therapeutic Recreation Specialist       Signature:  Ellie Costa [place], [place]

## 2020-09-10 VITALS
TEMPERATURE: 97.1 F | DIASTOLIC BLOOD PRESSURE: 83 MMHG | OXYGEN SATURATION: 99 % | BODY MASS INDEX: 29.43 KG/M2 | HEART RATE: 85 BPM | SYSTOLIC BLOOD PRESSURE: 139 MMHG | WEIGHT: 160.9 LBS | RESPIRATION RATE: 16 BRPM

## 2020-09-10 LAB
GLUCOSE BLD-MCNC: 198 MG/DL (ref 70–99)
GLUCOSE BLD-MCNC: 249 MG/DL (ref 70–99)

## 2020-09-10 PROCEDURE — 6370000000 HC RX 637 (ALT 250 FOR IP): Performed by: HOSPITALIST

## 2020-09-10 PROCEDURE — 82962 GLUCOSE BLOOD TEST: CPT

## 2020-09-10 PROCEDURE — 6370000000 HC RX 637 (ALT 250 FOR IP): Performed by: PSYCHIATRY & NEUROLOGY

## 2020-09-10 PROCEDURE — 99239 HOSP IP/OBS DSCHRG MGMT >30: CPT | Performed by: NURSE PRACTITIONER

## 2020-09-10 PROCEDURE — 6370000000 HC RX 637 (ALT 250 FOR IP): Performed by: NURSE PRACTITIONER

## 2020-09-10 RX ORDER — SIMVASTATIN 20 MG
20 TABLET ORAL NIGHTLY
Qty: 30 TABLET | Refills: 3 | Status: SHIPPED | OUTPATIENT
Start: 2020-09-10 | End: 2022-06-16 | Stop reason: SDUPTHER

## 2020-09-10 RX ORDER — LEVOTHYROXINE SODIUM 0.07 MG/1
75 TABLET ORAL
Qty: 30 TABLET | Refills: 3 | Status: ON HOLD | OUTPATIENT
Start: 2020-09-11 | End: 2021-04-28 | Stop reason: SDUPTHER

## 2020-09-10 RX ORDER — TRAZODONE HYDROCHLORIDE 100 MG/1
100 TABLET ORAL NIGHTLY PRN
Qty: 30 TABLET | Refills: 1 | Status: SHIPPED | OUTPATIENT
Start: 2020-09-10 | End: 2020-12-01

## 2020-09-10 RX ORDER — OLANZAPINE 5 MG/1
5 TABLET ORAL NIGHTLY
Qty: 30 TABLET | Refills: 1 | Status: SHIPPED | OUTPATIENT
Start: 2020-09-10 | End: 2020-12-01

## 2020-09-10 RX ORDER — HYDROCODONE BITARTRATE AND ACETAMINOPHEN 5; 325 MG/1; MG/1
1 TABLET ORAL EVERY 6 HOURS PRN
Qty: 30 TABLET | Refills: 0 | Status: SHIPPED | OUTPATIENT
Start: 2020-09-10 | End: 2020-09-13

## 2020-09-10 RX ORDER — LEVETIRACETAM 500 MG/1
500 TABLET ORAL 2 TIMES DAILY
Qty: 60 TABLET | Refills: 3 | Status: ON HOLD
Start: 2020-09-10 | End: 2020-12-04 | Stop reason: HOSPADM

## 2020-09-10 RX ADMIN — NIFEDIPINE 60 MG: 30 TABLET, FILM COATED, EXTENDED RELEASE ORAL at 09:35

## 2020-09-10 RX ADMIN — PANTOPRAZOLE SODIUM 40 MG: 40 TABLET, DELAYED RELEASE ORAL at 09:34

## 2020-09-10 RX ADMIN — ALLOPURINOL 300 MG: 300 TABLET ORAL at 09:36

## 2020-09-10 RX ADMIN — LOSARTAN POTASSIUM 100 MG: 100 TABLET, FILM COATED ORAL at 09:35

## 2020-09-10 RX ADMIN — HYDROCHLOROTHIAZIDE 25 MG: 25 TABLET ORAL at 09:35

## 2020-09-10 RX ADMIN — MAGNESIUM OXIDE TAB 400 MG (241.3 MG ELEMENTAL MG) 400 MG: 400 (241.3 MG) TAB at 09:36

## 2020-09-10 RX ADMIN — LEVETIRACETAM 500 MG: 500 TABLET ORAL at 09:35

## 2020-09-10 RX ADMIN — DULOXETINE HYDROCHLORIDE 30 MG: 30 CAPSULE, DELAYED RELEASE ORAL at 09:36

## 2020-09-10 RX ADMIN — LEVOTHYROXINE SODIUM 75 MCG: 0.07 TABLET ORAL at 06:32

## 2020-09-10 RX ADMIN — INSULIN LISPRO 4 UNITS: 100 INJECTION, SOLUTION INTRAVENOUS; SUBCUTANEOUS at 12:32

## 2020-09-10 RX ADMIN — METOPROLOL SUCCINATE 25 MG: 25 TABLET, EXTENDED RELEASE ORAL at 09:35

## 2020-09-10 RX ADMIN — ASPIRIN 81 MG 81 MG: 81 TABLET ORAL at 09:36

## 2020-09-10 RX ADMIN — LORAZEPAM 1 MG: 1 TABLET ORAL at 09:55

## 2020-09-10 RX ADMIN — INSULIN LISPRO 2 UNITS: 100 INJECTION, SOLUTION INTRAVENOUS; SUBCUTANEOUS at 07:39

## 2020-09-10 RX ADMIN — BUSPIRONE HYDROCHLORIDE 15 MG: 15 TABLET ORAL at 09:36

## 2020-09-10 RX ADMIN — HYDROCODONE BITARTRATE AND ACETAMINOPHEN 1 TABLET: 5; 325 TABLET ORAL at 03:48

## 2020-09-10 RX ADMIN — BUSPIRONE HYDROCHLORIDE 15 MG: 15 TABLET ORAL at 14:11

## 2020-09-10 RX ADMIN — OXCARBAZEPINE 150 MG: 150 TABLET, FILM COATED ORAL at 09:34

## 2020-09-10 ASSESSMENT — PAIN SCALES - GENERAL: PAINLEVEL_OUTOF10: 10

## 2020-09-10 NOTE — GROUP NOTE
Group Therapy Note    Date: 9/10/2020    Group Start Time: 0830  Group End Time: 0900    Number of Participants: 4/4    Type: Morning Goals Group/ Community Meeting    Group Topic/Objective: Set Goal For The Day and to review Unit Rules and Regulations. Patient's Goal:  Pt states her goal is \"Going home. \"    Notes:  Pt presented as pleasant and cooperative during group. Pt states she is feeling \"a little shaky\" d/t anxiety and is grateful for \"being here. \"  Pt reports restless sleep of approximately 6-7 hours. Depression (0-10): 0    Anxiety (0-10): 7    Irritability/Aggitation (0-10): 0    Status After Intervention:  Improved    Participation Level:  Active Listener and Interactive    Participation Quality: Appropriate, Attentive and Sharing    Speech:  normal    Thought Process/Content: Logical    Affective Functioning: Congruent    Mood: anxious    Level of consciousness:  Alert and Attentive    Response to Learning: Able to verbalize current knowledge/experience    Endings: None Reported    Modes of Intervention: Education, Support and Socialization    Discipline Responsible: Certified Therapeutic Recreation Specialist     Electronically signed by Richard Dominguez MA on 9/10/2020 at 10:21 AM

## 2020-09-10 NOTE — GROUP NOTE
Group Therapy Note    Date: 9/10/2020    Group Start Time: 1115  Group End Time: 8354  Group Topic: Psychoeducation    [address] [place], [place]        Group Therapy Note    Attendees: 4/4       Notes:  Pts participated in recreational therapy group; [employer] [facility]. Pts and therapist utilized various activities to reminisce about their past and share stories. Purpose was to encourage positive thinking and socialization. Pt participated actively in the group. Pt shared memories of going into haunted houses and hay rides with her family. Status After Intervention:  Improved    Participation Level:  Active Listener and Interactive    Participation Quality: Appropriate, Attentive and Sharing      Speech:  normal      Thought Process/Content: Logical      Affective Functioning: Congruent      Mood: Bright      Level of consciousness:  Alert and Attentive      Response to Learning: Able to verbalize current knowledge/experience and Able to verbalize/acknowledge new learning      Endings: None Reported    Modes of Intervention: Support, Socialization, Exploration and Activity      Discipline Responsible: Certified Therapeutic Recreation Specialist       Signature:  Naima Dial, [address], [address] [facility] [place]

## 2020-09-10 NOTE — PROGRESS NOTES
Physician Progress Note      PATIENT:               Keon Crenshaw  CSN #:                  [id number]  :                       1955  ADMIT DATE:       2020 1:55 PM  [address] DATE:        2020 9:03 PM  RESPONDING  PROVIDER #:        Shekhar Caldera MD          QUERY TEXT:    Dear Hospitalist    Patient admitted with UTI. Documentation reflects \"The urine culture yielded   no growth\". If possible, please document in the progress notes and discharge   summary if UTI was: The medical record reflects the following:  Risk Factors: UTI, Encephalopathy  Clinical Indicators:\" Probable symptomatic UTI on presentation- with lower   abdominal and right sided lower back pain- dysuric- CT non-acute,   UTI-urinalysis questionable but reports flank pain/dysuria\":\"The urine culture   yielded no growth  Treatment: on rocephin- discharged on ceftin for 4 more days , UA, UA cx  Options provided:  -- UTI ruled out after study  -- UTI treated and resolved  -- UTI confirmed after study  -- Other - I will add my own diagnosis  -- Disagree - Not applicable / Not valid  -- Disagree - Clinically unable to determine / Unknown  -- Refer to Clinical Documentation Reviewer    PROVIDER RESPONSE TEXT:    UTI ruled out after study.     Query created by: Jeffrey Song on 9/10/2020 12:42 PM      Electronically signed by:  Shekhar Caldera MD 9/10/2020 3:59 PM

## 2020-09-10 NOTE — DISCHARGE SUMMARY
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    Specific Gravity, UA 1.020 1.001 - 1.035    Blood, Urine NEGATIVE NEGATIVE    pH, Urine 6.0 5.0 - 8.0    Protein, UA NEGATIVE NEGATIVE MG/DL    Urobilinogen, Urine NORMAL 0.2 - 1.0 MG/DL    Nitrite Urine, Quantitative NEGATIVE NEGATIVE    Leukocyte Esterase, Urine SMALL NUMBER OR AMOUNT OBSERVED (A) NEGATIVE    RBC, UA 1 0 - 6 /HPF    WBC, UA 8 (H) 0 - 5 /HPF    Epithelial Cells, UA 2 /HPF    Cast Type NO CAST FORMS SEEN NO CAST FORMS SEEN /HPF    Bacteria, UA NEGATIVE NEGATIVE /HPF    Crystal Type NEGATIVE NEGATIVE /HPF   POCT Glucose    Collection Time: 09/07/20  1:51 AM   Result Value Ref Range    POC Glucose 265 (H) 70 - 99 MG/DL   POCT Glucose    Collection Time: 09/07/20  8:04 AM   Result Value Ref Range    POC Glucose 200 (H) 70 - 99 MG/DL   POCT Glucose    Collection Time: 09/07/20 12:02 PM   Result Value Ref Range    POC Glucose 206 (H) 70 - 99 MG/DL   POCT Glucose    Collection Time: 09/07/20  4:55 PM   Result Value Ref Range    POC Glucose 166 (H) 70 - 99 MG/DL   POCT Glucose    Collection Time: 09/07/20  7:38 PM   Result Value Ref Range    POC Glucose 232 (H) 70 - 99 MG/DL   CBC Auto Differential    Collection Time: 09/08/20  5:36 AM   Result Value Ref Range    WBC 10.3 4.0 - 10.5 K/CU MM    RBC 4.40 4.2 - 5.4 M/CU MM    Hemoglobin 12.9 12.5 - 16.0 GM/DL    Hematocrit 37.1 37 - 47 %    MCV 84.3 78 - 100 FL    MCH 29.3 27 - 31 PG    MCHC 34.8 32.0 - 36.0 %    RDW 12.5 11.7 - 14.9 %    Platelets 926 176 - 153 K/CU MM    MPV 9.6 7.5 - 11.1 FL    Differential Type AUTOMATED DIFFERENTIAL     Segs Relative 41.7 36 - 66 %    Lymphocytes % 44.6 (H) 24 - 44 %    Monocytes % 11.0 (H) 0 - 4 %    Eosinophils % 1.7 0 - 3 %    Basophils % 0.8 0 - 1 %    Segs Absolute 4.3 K/CU MM    Lymphocytes Absolute 4.6 K/CU MM    Monocytes Absolute 1.1 K/CU MM    Eosinophils Absolute 0.2 K/CU MM    Basophils Absolute 0.1 K/CU MM    Immature Neutrophil % 0.2 0 - 0.43 %    Total Immature Neutrophil 0.02 K/CU MM   Comprehensive Metabolic Panel w/ Reflex to MG    Collection Time: 09/08/20  5:36 AM   Result Value Ref Range    Sodium 139 135 - 145 MMOL/L    Potassium 3.0 (LL) 3.5 - 5.1 MMOL/L    Chloride 97 (L) 99 - 110 mMol/L    CO2 31 21 - 32 MMOL/L    BUN 10 6 - 23 MG/DL    CREATININE 0.5 (L) 0.6 - 1.1 MG/DL    Glucose 163 (H) 70 - 99 MG/DL    Calcium 9.8 8.3 - 10.6 MG/DL    Alb 4.0 3.4 - 5.0 GM/DL    Total Protein 6.9 6.4 - 8.2 GM/DL    Total Bilirubin 0.5 0.0 - 1.0 MG/DL    ALT 14 10 - 40 U/L    AST 14 (L) 15 - 37 IU/L    Alkaline Phosphatase 61 40 - 129 IU/L    GFR Non-African American >60 >60 mL/min/1.73m2    GFR African American >60 >60 mL/min/1.73m2    Anion Gap 11 4 - 16   Magnesium    Collection Time: 09/08/20  5:36 AM   Result Value Ref Range    Magnesium 1.7 (L) 1.8 - 2.4 mg/dl   POCT Glucose    Collection Time: 09/08/20  7:42 AM   Result Value Ref Range    POC Glucose 175 (H) 70 - 99 MG/DL   POCT Glucose    Collection Time: 09/08/20 12:10 PM   Result Value Ref Range    POC Glucose 214 (H) 70 - 99 MG/DL   POCT Glucose    Collection Time: 09/08/20  4:17 PM   Result Value Ref Range    POC Glucose 215 (H) 70 - 99 MG/DL   POCT Glucose    Collection Time: 09/08/20  7:31 PM   Result Value Ref Range    POC Glucose 157 (H) 70 - 99 MG/DL   POCT Glucose    Collection Time: 09/09/20  1:03 AM   Result Value Ref Range    POC Glucose 142 (H) 70 - 99 MG/DL   POCT Glucose    Collection Time: 09/09/20  7:47 AM   Result Value Ref Range    POC Glucose 160 (H) 70 - 99 MG/DL   Potassium w/ Reflex to Magnesium    Collection Time: 09/09/20 11:00 AM   Result Value Ref Range    Potassium 3.2 (L) 3.5 - 5.1 MMOL/L   Magnesium    Collection Time: 09/09/20 11:00 AM   Result Value Ref Range    Magnesium 1.7 1.7 - 2.4 mg/dl   POCT Glucose    Collection Time: 09/09/20 11:50 AM   Result Value Ref Range    POC Glucose 191 (H) 70 - 99 MG/DL   POCT Glucose    Collection Time: 09/09/20  4:49 PM   Result Value Ref Range    POC Glucose 217 (H) 70 - 99 MG/DL   POCT Glucose    Collection Time: 09/09/20  7:23 PM   Result Value Ref Range    POC Glucose 236 (H) 70 - 99 MG/DL   POCT Glucose    Collection Time: 09/10/20  7:25 AM   Result Value Ref Range    POC Glucose 198 (H) 70 - 99 MG/DL       40 Minutes    Electronically signed by WENDIE Garcia CNP on 9/10/2020 at 11:52 AM

## 2020-09-10 NOTE — PLAN OF CARE
Problem: Altered Mood, Depressive Behavior:  Goal: Able to verbalize acceptance of life and situations over which he or she has no control  Description: Able to verbalize acceptance of life and situations over which he or she has no control  9/10/2020 1101 by Jerod Scott RN  Outcome: Ongoing  9/9/2020 2329 by Frida Green RN  Outcome: Ongoing  Goal: Able to verbalize and/or display a decrease in depressive symptoms  Description: Able to verbalize and/or display a decrease in depressive symptoms  9/10/2020 1101 by Jerod Scott RN  Outcome: Ongoing  9/9/2020 2329 by Frida Green RN  Outcome: Ongoing  Goal: Ability to disclose and discuss suicidal ideas will improve  Description: Ability to disclose and discuss suicidal ideas will improve  9/10/2020 1101 by Jerod Scott RN  Outcome: Ongoing  9/9/2020 2329 by Frida Green RN  Outcome: Ongoing  Goal: Able to verbalize support systems  Description: Able to verbalize support systems  9/10/2020 1101 by Jerod Scott RN  Outcome: Ongoing  9/9/2020 2329 by Frida Green RN  Outcome: Ongoing  Goal: Absence of self-harm  Description: Absence of self-harm  9/10/2020 1101 by Jerod Scott RN  Outcome: Ongoing  9/9/2020 2329 by Frida Green RN  Outcome: Ongoing  Goal: Patient specific goal  Description: Patient specific goal  9/10/2020 1101 by Jerod Scott RN  Outcome: Ongoing  9/9/2020 2329 by Frida Green RN  Outcome: Ongoing  Goal: Participates in care planning  Description: Participates in care planning  9/10/2020 1101 by Jerod Scott RN  Outcome: Ongoing  9/9/2020 2329 by Frida Green RN  Outcome: Ongoing     Problem: Falls - Risk of:  Goal: Will remain free from falls  Description: Will remain free from falls  9/10/2020 1101 by Jerod Scott RN  Outcome: Ongoing  9/9/2020 2329 by Frida Green RN  Outcome: Ongoing  Goal: Absence of physical injury  Description: Absence of physical injury  9/10/2020 1101 by Anuja Eckert Jaiden Nieto RN  Outcome: Ongoing  9/9/2020 2329 by Leandro Pimentel RN  Outcome: Ongoing     Problem: Pain:  Goal: Pain level will decrease  Description: Pain level will decrease  9/10/2020 1101 by Ivan Romero RN  Outcome: Ongoing  9/9/2020 2329 by Leandro Pimentel RN  Outcome: Ongoing  Goal: Control of acute pain  Description: Control of acute pain  9/10/2020 1101 by Ivan Romero RN  Outcome: Ongoing  9/9/2020 2329 by Leandro Pimentel RN  Outcome: Ongoing  Goal: Control of chronic pain  Description: Control of chronic pain  9/10/2020 1101 by Ivan Romero RN  Outcome: Ongoing  9/9/2020 2329 by Leandro Pimentel RN  Outcome: Ongoing
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
## 2020-09-10 NOTE — PLAN OF CARE
Problem: Altered Mood, Depressive Behavior:  Goal: Able to verbalize acceptance of life and situations over which he or she has no control  Description: Able to verbalize acceptance of life and situations over which he or she has no control  9/9/2020 2329 by Zhen Soni RN  Outcome: Ongoing  9/9/2020 1458 by Dejan Love RN  Outcome: Ongoing  Goal: Able to verbalize and/or display a decrease in depressive symptoms  Description: Able to verbalize and/or display a decrease in depressive symptoms  9/9/2020 2329 by Zhen Soni RN  Outcome: Ongoing  9/9/2020 1458 by Dejan Love RN  Outcome: Ongoing  Goal: Ability to disclose and discuss suicidal ideas will improve  Description: Ability to disclose and discuss suicidal ideas will improve  9/9/2020 2329 by Zhen Soni RN  Outcome: Ongoing  9/9/2020 1458 by Dejan Love RN  Outcome: Ongoing  Goal: Able to verbalize support systems  Description: Able to verbalize support systems  9/9/2020 2329 by Zhen Soni RN  Outcome: Ongoing  9/9/2020 1458 by Dejan Lvoe RN  Outcome: Ongoing  Goal: Absence of self-harm  Description: Absence of self-harm  9/9/2020 2329 by Zhen Soni RN  Outcome: Ongoing  9/9/2020 1458 by Dejan Love RN  Outcome: Ongoing  Goal: Patient specific goal  Description: Patient specific goal  9/9/2020 2329 by Zhen Soni RN  Outcome: Ongoing  9/9/2020 1458 by Dejan Love RN  Outcome: Ongoing  Goal: Participates in care planning  Description: Participates in care planning  9/9/2020 2329 by Zhen Soni RN  Outcome: Ongoing  9/9/2020 1458 by Dejan Love RN  Outcome: Ongoing     Problem: Falls - Risk of:  Goal: Will remain free from falls  Description: Will remain free from falls  9/9/2020 2329 by Zhen Soni RN  Outcome: Ongoing  9/9/2020 1458 by Dejan Love RN  Outcome: Ongoing  Goal: Absence of physical injury  Description: Absence of physical injury  9/9/2020 2329 by Danis Ibarra Angeli Andino RN  Outcome: Ongoing  9/9/2020 1458 by Noemi Monte RN  Outcome: Ongoing     Problem: Pain:  Goal: Pain level will decrease  Description: Pain level will decrease  9/9/2020 2329 by Leandro Pimentel RN  Outcome: Ongoing  9/9/2020 1458 by Noemi Monte RN  Outcome: Ongoing  Goal: Control of acute pain  Description: Control of acute pain  9/9/2020 2329 by Leandro Pimentel RN  Outcome: Ongoing  9/9/2020 1458 by Noemi Monte RN  Outcome: Ongoing  Goal: Control of chronic pain  Description: Control of chronic pain  9/9/2020 2329 by Leandro Pimentel RN  Outcome: Ongoing  9/9/2020 1458 by Noemi Monte RN  Outcome: Ongoing

## 2020-09-10 NOTE — PROGRESS NOTES
Discharge orders reviewed with patient. Picked up in private car by daughter with belongings and home medications.

## 2020-09-15 ENCOUNTER — HOSPITAL ENCOUNTER (EMERGENCY)
Age: 65
Discharge: HOME | End: 2020-09-15
Payer: MEDICARE

## 2020-09-15 ENCOUNTER — APPOINTMENT (OUTPATIENT)
Dept: CT IMAGING | Age: 65
End: 2020-09-15
Payer: MEDICARE

## 2020-09-15 ENCOUNTER — APPOINTMENT (OUTPATIENT)
Dept: ULTRASOUND IMAGING | Age: 65
End: 2020-09-15
Payer: MEDICARE

## 2020-09-15 VITALS
BODY MASS INDEX: 29.44 KG/M2 | WEIGHT: 160 LBS | HEART RATE: 97 BPM | DIASTOLIC BLOOD PRESSURE: 73 MMHG | OXYGEN SATURATION: 98 % | SYSTOLIC BLOOD PRESSURE: 130 MMHG | HEIGHT: 62 IN | RESPIRATION RATE: 18 BRPM | TEMPERATURE: 98.2 F

## 2020-09-15 LAB
ANION GAP SERPL CALCULATED.3IONS-SCNC: 9 MMOL/L (ref 4–16)
BUN BLDV-MCNC: 10 MG/DL (ref 6–23)
CALCIUM SERPL-MCNC: 9.7 MG/DL (ref 8.3–10.6)
CHLORIDE BLD-SCNC: 97 MMOL/L (ref 99–110)
CO2: 29 MMOL/L (ref 21–32)
CREAT SERPL-MCNC: 0.6 MG/DL (ref 0.6–1.1)
GFR AFRICAN AMERICAN: >60 ML/MIN/1.73M2
GFR NON-AFRICAN AMERICAN: >60 ML/MIN/1.73M2
GLUCOSE BLD-MCNC: 160 MG/DL (ref 70–99)
POTASSIUM SERPL-SCNC: 3.3 MMOL/L (ref 3.5–5.1)
SODIUM BLD-SCNC: 135 MMOL/L (ref 135–145)
TROPONIN T: <0.01 NG/ML

## 2020-09-15 PROCEDURE — 96374 THER/PROPH/DIAG INJ IV PUSH: CPT

## 2020-09-15 PROCEDURE — 96361 HYDRATE IV INFUSION ADD-ON: CPT

## 2020-09-15 PROCEDURE — 93970 EXTREMITY STUDY: CPT

## 2020-09-15 PROCEDURE — 6370000000 HC RX 637 (ALT 250 FOR IP): Performed by: PHYSICIAN ASSISTANT

## 2020-09-15 PROCEDURE — 6360000002 HC RX W HCPCS: Performed by: PHYSICIAN ASSISTANT

## 2020-09-15 PROCEDURE — 93010 ELECTROCARDIOGRAM REPORT: CPT | Performed by: INTERNAL MEDICINE

## 2020-09-15 PROCEDURE — 96375 TX/PRO/DX INJ NEW DRUG ADDON: CPT

## 2020-09-15 PROCEDURE — 84484 ASSAY OF TROPONIN QUANT: CPT

## 2020-09-15 PROCEDURE — 80048 BASIC METABOLIC PNL TOTAL CA: CPT

## 2020-09-15 PROCEDURE — 6360000004 HC RX CONTRAST MEDICATION: Performed by: PHYSICIAN ASSISTANT

## 2020-09-15 PROCEDURE — 71275 CT ANGIOGRAPHY CHEST: CPT

## 2020-09-15 PROCEDURE — 2580000003 HC RX 258: Performed by: PHYSICIAN ASSISTANT

## 2020-09-15 PROCEDURE — 99284 EMERGENCY DEPT VISIT MOD MDM: CPT

## 2020-09-15 PROCEDURE — 93005 ELECTROCARDIOGRAM TRACING: CPT | Performed by: PHYSICIAN ASSISTANT

## 2020-09-15 RX ORDER — LORAZEPAM 2 MG/ML
1 INJECTION INTRAMUSCULAR ONCE
Status: COMPLETED | OUTPATIENT
Start: 2020-09-15 | End: 2020-09-15

## 2020-09-15 RX ORDER — POTASSIUM CHLORIDE 20 MEQ/1
40 TABLET, EXTENDED RELEASE ORAL ONCE
Status: COMPLETED | OUTPATIENT
Start: 2020-09-15 | End: 2020-09-15

## 2020-09-15 RX ORDER — ONDANSETRON 2 MG/ML
4 INJECTION INTRAMUSCULAR; INTRAVENOUS EVERY 30 MIN PRN
Status: DISCONTINUED | OUTPATIENT
Start: 2020-09-15 | End: 2020-09-15 | Stop reason: HOSPADM

## 2020-09-15 RX ORDER — 0.9 % SODIUM CHLORIDE 0.9 %
500 INTRAVENOUS SOLUTION INTRAVENOUS ONCE
Status: COMPLETED | OUTPATIENT
Start: 2020-09-15 | End: 2020-09-15

## 2020-09-15 RX ORDER — HYDROCODONE BITARTRATE AND ACETAMINOPHEN 5; 325 MG/1; MG/1
1 TABLET ORAL ONCE
Status: COMPLETED | OUTPATIENT
Start: 2020-09-15 | End: 2020-09-15

## 2020-09-15 RX ORDER — SODIUM CHLORIDE 0.9 % (FLUSH) 0.9 %
10 SYRINGE (ML) INJECTION 2 TIMES DAILY
Status: DISCONTINUED | OUTPATIENT
Start: 2020-09-15 | End: 2020-09-15 | Stop reason: HOSPADM

## 2020-09-15 RX ADMIN — ONDANSETRON 4 MG: 2 INJECTION INTRAMUSCULAR; INTRAVENOUS at 04:32

## 2020-09-15 RX ADMIN — POTASSIUM CHLORIDE 40 MEQ: 1500 TABLET, EXTENDED RELEASE ORAL at 03:30

## 2020-09-15 RX ADMIN — LORAZEPAM 1 MG: 2 INJECTION INTRAMUSCULAR; INTRAVENOUS at 04:30

## 2020-09-15 RX ADMIN — IOPAMIDOL 100 ML: 755 INJECTION, SOLUTION INTRAVENOUS at 03:13

## 2020-09-15 RX ADMIN — SODIUM CHLORIDE 500 ML: 9 INJECTION, SOLUTION INTRAVENOUS at 02:26

## 2020-09-15 RX ADMIN — HYDROCODONE BITARTRATE AND ACETAMINOPHEN 1 TABLET: 5; 325 TABLET ORAL at 02:26

## 2020-09-15 ASSESSMENT — PAIN SCALES - GENERAL
PAINLEVEL_OUTOF10: 10
PAINLEVEL_OUTOF10: 7

## 2020-09-15 NOTE — ED PROVIDER NOTES
EKG: Normal sinus rhythm with left axis deviation. No specific ST or T wave changes. Incomplete right bundle branch block. LVH. QTc is normal.  Age-indeterminate septal infarct.       Aleja Amin MD  09/15/20 3157

## 2020-09-15 NOTE — ED PROVIDER NOTES
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 or midline shift. 1. No flow limiting stenosis of the cervical carotid/vertebral arteries. 2. No focal stenosis of the [id number]. Mri Brain Wo Contrast    Result Date: 9/8/2020  EXAMINATION: MRI OF THE BRAIN WITHOUT CONTRAST  9/4/2020 8:21 am TECHNIQUE: Multiplanar multisequence MRI of the brain was performed without the administration of intravenous contrast. COMPARISON: 6/25/2010 HISTORY: ORDERING SYSTEM PROVIDED HISTORY: r/o stroke TECHNOLOGIST PROVIDED HISTORY: Reason for exam:->r/o stroke Reason for Exam: hallucinations Initial evaluation FINDINGS: INTRACRANIAL STRUCTURES/VENTRICLES: There is no acute infarct. The ventricles and cisternal spaces are normal in size, shape, and configuration for the age of the patient. There are numerous punctate and confluent areas of high signal in the periventricular white matter and centrum semiovale that are likely related to chronic small vessel ischemic disease. There is no midline shift or mass effect. There are no areas of restricted diffusion. ORBITS: The visualized portion of the orbits demonstrate no acute abnormality. SINUSES: There is minimal mucoperiosteal thickening of the ethmoid air cells. The remainder of the sinuses are clear.  BONES/SOFT TISSUES: The bone marrow signal intensity appears normal. The soft tissues demonstrate no acute abnormality. Moderate chronic small vessel ischemic changes. There are no areas of restricted diffusion to suggest an acute ischemic event. No acute brain parenchymal abnormality. MDM:   Hemodynamically stable neurologically neurovascularly intact  Pt has multiple complaints. She does have a panic attack while here. Requiring Ativan. Her work-up is essentially negative including CT scan, blood work. She is noted to be mildly hypokalemic. Incidental finding on CT scan was noted. Patient is made aware of these findings. Also put in her discharge information. No evidence of kidney stones. Doubt acute intra-abdominal inflammatory versus infectious versus obstructive process  I estimate there is LOW risk for (including but not limited to) ACUTE CORONARY SYNDROME, PULMONARY EMBOLISM, PNEUMOTHORAX, RUPTURED ESOPHAGUS OR THORACIC AORTIC DISSECTION, thus I consider the discharge disposition reasonable. ve addressed their questions and concerns. Important warning signs as well as new or worsening symptoms which would necessitate immediate return to the ED were discussed. The plan is to discharge from the ED at this time, and the patient is in stable condition. The patient acknowledged understanding is agreeable with this plan. The patient and/or family and I have discussed the diagnosis and risks, and we agree with discharging home to follow-up with their primary care, specialist or referral doctor. Questions addressed. Disposition and follow-up agreed upon. Specific discharge instructions explained. We have discussed the symptoms which are most concerning that necessitate immediate return. We also discussed returning to the Emergency Department immediately if new or worsening symptoms occur.         I independently managed patient today in the ED        BP (!) 116/91   Pulse 105   Temp 98.2 °F (36.8 °C)   Resp 28   Ht 5' 2\" (1.575 m)   Wt 160 lb (72.6 kg)   SpO2 98% BMI 29.26 kg/m²       Clinical Impression:  1. Abnormal CT of the chest    2. Leg swelling    3. Flank pain    4. Anxiety attack        Disposition referral (if applicable): Stephen Guerra MD  [facility] ([place], [address]  [phone]    In 2 days      Disposition medications (if applicable):  New Prescriptions    No medications on file         Comment: Please note this report has been produced using speech recognition software and may contain errors related to that system including errors in grammar, punctuation, and spelling, as well as words and phrases that may be inappropriate. If there are any questions or concerns please feel free to contact the dictating provider for clarification.       SHIVA Ruiz PA-C  09/15/20 [address], PA-C  09/15/20 [id number]

## 2020-09-15 NOTE — ED NOTES
Bed: 01TR-01  Expected date:   Expected time:   Means of arrival:   Comments:  EMS - pedal edema; [id number] [facility], RN  09/15/20 0133

## 2020-09-15 NOTE — ED NOTES
Nausea better , pt states feels better , given gingerale at this time to sip on , skin w/d pink a.o/ox4     Courtney Mello RN  09/15/20 8837

## 2020-09-15 NOTE — ED NOTES
Pt found jerking all over lasted about 45 sec , pt dropped arm to her side , closes eyes tight when tries to open , and responds to painful stimuli by pulling her finger away when pinched , stopped jerking and started having  Conversation with me , PA notified , meds ordered, pt also c/o nausea ,      Lianne Mulligan RN  09/15/20 2069

## 2020-09-23 LAB
EKG ATRIAL RATE: 71 BPM
EKG DIAGNOSIS: NORMAL
EKG P AXIS: 16 DEGREES
EKG P-R INTERVAL: 174 MS
EKG Q-T INTERVAL: 428 MS
EKG QRS DURATION: 112 MS
EKG QTC CALCULATION (BAZETT): 465 MS
EKG R AXIS: -39 DEGREES
EKG T AXIS: 25 DEGREES
EKG VENTRICULAR RATE: 71 BPM

## 2020-11-02 ENCOUNTER — HOSPITAL ENCOUNTER (EMERGENCY)
Age: 65
Discharge: HOME | End: 2020-11-03
Attending: EMERGENCY MEDICINE
Payer: MEDICARE

## 2020-11-02 ENCOUNTER — APPOINTMENT (OUTPATIENT)
Dept: GENERAL RADIOLOGY | Age: 65
End: 2020-11-02
Payer: MEDICARE

## 2020-11-02 LAB
ALBUMIN SERPL-MCNC: 4.6 GM/DL (ref 3.4–5)
ALP BLD-CCNC: 91 IU/L (ref 40–129)
ALT SERPL-CCNC: 22 U/L (ref 10–40)
ANION GAP SERPL CALCULATED.3IONS-SCNC: 18 MMOL/L (ref 4–16)
AST SERPL-CCNC: 28 IU/L (ref 15–37)
BACTERIA: NEGATIVE /HPF
BASOPHILS ABSOLUTE: 0.1 K/CU MM
BASOPHILS RELATIVE PERCENT: 0.5 % (ref 0–1)
BILIRUB SERPL-MCNC: 0.3 MG/DL (ref 0–1)
BILIRUBIN URINE: NEGATIVE MG/DL
BLOOD, URINE: NEGATIVE
BUN BLDV-MCNC: 8 MG/DL (ref 6–23)
CALCIUM SERPL-MCNC: 10.2 MG/DL (ref 8.3–10.6)
CHLORIDE BLD-SCNC: 93 MMOL/L (ref 99–110)
CLARITY: CLEAR
CO2: 25 MMOL/L (ref 21–32)
COLOR: COLORLESS
CREAT SERPL-MCNC: 0.7 MG/DL (ref 0.6–1.1)
DIFFERENTIAL TYPE: ABNORMAL
EOSINOPHILS ABSOLUTE: 0.2 K/CU MM
EOSINOPHILS RELATIVE PERCENT: 1.1 % (ref 0–3)
GFR AFRICAN AMERICAN: >60 ML/MIN/1.73M2
GFR NON-AFRICAN AMERICAN: >60 ML/MIN/1.73M2
GLUCOSE BLD-MCNC: 231 MG/DL (ref 70–99)
GLUCOSE, URINE: 150 MG/DL
HCT VFR BLD CALC: 40.4 % (ref 37–47)
HEMOGLOBIN: 14.5 GM/DL (ref 12.5–16)
IMMATURE NEUTROPHIL %: 0.5 % (ref 0–0.43)
KETONES, URINE: NEGATIVE MG/DL
LACTATE DEHYDROGENASE: 241 IU/L (ref 120–246)
LEUKOCYTE ESTERASE, URINE: ABNORMAL
LIPASE: 16 IU/L (ref 13–60)
LYMPHOCYTES ABSOLUTE: 3.4 K/CU MM
LYMPHOCYTES RELATIVE PERCENT: 23 % (ref 24–44)
MAGNESIUM: 1.7 MG/DL (ref 1.8–2.4)
MCH RBC QN AUTO: 30 PG (ref 27–31)
MCHC RBC AUTO-ENTMCNC: 35.9 % (ref 32–36)
MCV RBC AUTO: 83.5 FL (ref 78–100)
MONOCYTES ABSOLUTE: 1.1 K/CU MM
MONOCYTES RELATIVE PERCENT: 7.1 % (ref 0–4)
NITRITE URINE, QUANTITATIVE: NEGATIVE
NUCLEATED RBC %: 0 %
PDW BLD-RTO: 12.5 % (ref 11.7–14.9)
PH, URINE: 6 (ref 5–8)
PLATELET # BLD: 410 K/CU MM (ref 140–440)
PMV BLD AUTO: 10.1 FL (ref 7.5–11.1)
POTASSIUM SERPL-SCNC: 3.4 MMOL/L (ref 3.5–5.1)
PRO-BNP: 120.5 PG/ML
PROTEIN UA: NEGATIVE MG/DL
RBC # BLD: 4.84 M/CU MM (ref 4.2–5.4)
RBC URINE: ABNORMAL /HPF (ref 0–6)
SEGMENTED NEUTROPHILS ABSOLUTE COUNT: 10.1 K/CU MM
SEGMENTED NEUTROPHILS RELATIVE PERCENT: 67.8 % (ref 36–66)
SODIUM BLD-SCNC: 136 MMOL/L (ref 135–145)
SPECIFIC GRAVITY UA: 1 (ref 1–1.03)
SQUAMOUS EPITHELIAL: 3 /HPF
TOTAL IMMATURE NEUTOROPHIL: 0.08 K/CU MM
TOTAL NUCLEATED RBC: 0 K/CU MM
TOTAL PROTEIN: 8.1 GM/DL (ref 6.4–8.2)
TRICHOMONAS: ABNORMAL /HPF
TROPONIN T: <0.01 NG/ML
UROBILINOGEN, URINE: NORMAL MG/DL (ref 0.2–1)
WBC # BLD: 14.9 K/CU MM (ref 4–10.5)
WBC UA: 4 /HPF (ref 0–5)

## 2020-11-02 PROCEDURE — 83615 LACTATE (LD) (LDH) ENZYME: CPT

## 2020-11-02 PROCEDURE — 81001 URINALYSIS AUTO W/SCOPE: CPT

## 2020-11-02 PROCEDURE — 71045 X-RAY EXAM CHEST 1 VIEW: CPT

## 2020-11-02 PROCEDURE — 6370000000 HC RX 637 (ALT 250 FOR IP): Performed by: EMERGENCY MEDICINE

## 2020-11-02 PROCEDURE — 2580000003 HC RX 258: Performed by: EMERGENCY MEDICINE

## 2020-11-02 PROCEDURE — 96360 HYDRATION IV INFUSION INIT: CPT

## 2020-11-02 PROCEDURE — 87086 URINE CULTURE/COLONY COUNT: CPT

## 2020-11-02 PROCEDURE — 85025 COMPLETE CBC W/AUTO DIFF WBC: CPT

## 2020-11-02 PROCEDURE — 94640 AIRWAY INHALATION TREATMENT: CPT

## 2020-11-02 PROCEDURE — 83690 ASSAY OF LIPASE: CPT

## 2020-11-02 PROCEDURE — 83880 ASSAY OF NATRIURETIC PEPTIDE: CPT

## 2020-11-02 PROCEDURE — 80053 COMPREHEN METABOLIC PANEL: CPT

## 2020-11-02 PROCEDURE — 84484 ASSAY OF TROPONIN QUANT: CPT

## 2020-11-02 PROCEDURE — 83735 ASSAY OF MAGNESIUM: CPT

## 2020-11-02 PROCEDURE — 93005 ELECTROCARDIOGRAM TRACING: CPT | Performed by: EMERGENCY MEDICINE

## 2020-11-02 PROCEDURE — 99284 EMERGENCY DEPT VISIT MOD MDM: CPT

## 2020-11-02 RX ORDER — 0.9 % SODIUM CHLORIDE 0.9 %
1000 INTRAVENOUS SOLUTION INTRAVENOUS ONCE
Status: COMPLETED | OUTPATIENT
Start: 2020-11-02 | End: 2020-11-03

## 2020-11-02 RX ORDER — ALBUTEROL SULFATE 90 UG/1
2 AEROSOL, METERED RESPIRATORY (INHALATION) ONCE
Status: COMPLETED | OUTPATIENT
Start: 2020-11-02 | End: 2020-11-02

## 2020-11-02 RX ADMIN — ALBUTEROL SULFATE 2 PUFF: 90 AEROSOL, METERED RESPIRATORY (INHALATION) at 22:15

## 2020-11-02 RX ADMIN — SODIUM CHLORIDE 1000 ML: 9 INJECTION, SOLUTION INTRAVENOUS at 23:33

## 2020-11-02 ASSESSMENT — PAIN SCALES - GENERAL: PAINLEVEL_OUTOF10: 8

## 2020-11-02 ASSESSMENT — PAIN DESCRIPTION - LOCATION: LOCATION: GENERALIZED

## 2020-11-02 ASSESSMENT — PAIN DESCRIPTION - PAIN TYPE: TYPE: ACUTE PAIN

## 2020-11-03 VITALS
DIASTOLIC BLOOD PRESSURE: 82 MMHG | HEART RATE: 90 BPM | BODY MASS INDEX: 31.28 KG/M2 | RESPIRATION RATE: 20 BRPM | TEMPERATURE: 98.1 F | HEIGHT: 62 IN | OXYGEN SATURATION: 100 % | WEIGHT: 170 LBS | SYSTOLIC BLOOD PRESSURE: 162 MMHG

## 2020-11-03 PROCEDURE — 6370000000 HC RX 637 (ALT 250 FOR IP): Performed by: EMERGENCY MEDICINE

## 2020-11-03 PROCEDURE — 96361 HYDRATE IV INFUSION ADD-ON: CPT

## 2020-11-03 PROCEDURE — 93010 ELECTROCARDIOGRAM REPORT: CPT | Performed by: INTERNAL MEDICINE

## 2020-11-03 RX ORDER — MAGNESIUM OXIDE 400 MG/1
400 TABLET ORAL ONCE
Status: COMPLETED | OUTPATIENT
Start: 2020-11-03 | End: 2020-11-03

## 2020-11-03 RX ORDER — POTASSIUM CHLORIDE 20 MEQ/1
40 TABLET, EXTENDED RELEASE ORAL PRN
Status: DISCONTINUED | OUTPATIENT
Start: 2020-11-03 | End: 2020-11-03 | Stop reason: HOSPADM

## 2020-11-03 RX ORDER — ONDANSETRON 4 MG/1
4 TABLET, ORALLY DISINTEGRATING ORAL ONCE
Status: COMPLETED | OUTPATIENT
Start: 2020-11-03 | End: 2020-11-03

## 2020-11-03 RX ADMIN — ONDANSETRON 4 MG: 4 TABLET, ORALLY DISINTEGRATING ORAL at 00:53

## 2020-11-03 RX ADMIN — POTASSIUM BICARBONATE 40 MEQ: 782 TABLET, EFFERVESCENT ORAL at 00:53

## 2020-11-03 RX ADMIN — MAGNESIUM OXIDE 400 MG (241.3 MG MAGNESIUM) TABLET 400 MG: TABLET at 00:57

## 2020-11-03 ASSESSMENT — PAIN SCALES - GENERAL: PAINLEVEL_OUTOF10: 5

## 2020-11-03 ASSESSMENT — PAIN DESCRIPTION - PAIN TYPE: TYPE: ACUTE PAIN

## 2020-11-03 ASSESSMENT — PAIN - FUNCTIONAL ASSESSMENT: PAIN_FUNCTIONAL_ASSESSMENT: 0-10

## 2020-11-03 NOTE — ED PROVIDER NOTES
As physician-in-triage, I performed a medical screening history and physical exam on this patient. HISTORY OF PRESENT ILLNESS  Branden Bryson is a 63 y.o. female presents emergency department with chief complaint of palpitations, generalized muscle aches. Patient has a history of similar symptoms. Patient has a very long list of medications that cause palpitations and blood pressure issues. Lab work, EKG and chest x-ray were started. Patient will be evaluated by physicians at [facility]. PHYSICAL EXAM  BP (!) 168/81   Pulse 93   Temp 98.1 °F (36.7 °C)   Resp 19   Ht 5' 2\" (1.575 m)   Wt 170 lb (77.1 kg)   BMI 31.09 kg/m²     On exam, the patient appears in no acute distress. Speech is clear. Breathing is unlabored. Moves all extremities    Comment: Please note this report has been produced using speech recognition software and may contain errors related to that system including errors in grammar, punctuation, and spelling, as well as words and phrases that may be inappropriate. If there are any questions or concerns please feel free to contact the dictating provider for clarification.        Kristine Benites,   11/02/20 1939

## 2020-11-03 NOTE — ED NOTES
Pt up walked to restroom and back to  without any problems.  Skin w/d pink a/ox4     Adrianna Brown RN  11/02/20 7715
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
## 2020-11-03 NOTE — ED NOTES
Anesthesia PreOp Note    HPI:     Sushila Cruz is a 78year old male who presents for preoperative consultation requested by: Trung Chun MD    Date of Surgery: 6/28/2017    Procedure(s):  NASAL SEPTOPLASTY BILAT SINUS ENDOSCOPY ETHM MAX  Indicat Called to pt rm by pt .  Pt c/o nausea at this time, given emisis bag , skin w/d pink a/ox4 , pt breathing in air through her mouth and then belching , talked with pt about stoppig that it will help her nausea, pt states she can not help it      Silvia Prasad RN  11/03/20 3596 History:  2-17-17: ADJ TISS Bridgett Null <10SQCM Right      Comment: Exc lesion of nose, V_Y flap  No date: COLONOSCOPY  2-17-17: EXCIS TENDON SHEATH TJ MADDEN/EFRAIN Right      Comment: Exc ganglion R thumb  2006: FOOT SURGERY Left      Comment: Nerve manager       Social History Main Topics   Smoking status: Current Every Day Smoker  1.00 Packs/day  For 42.00 Years     Types: Cigarettes    Smokeless tobacco: Not on file    Alcohol use Yes  0.0 oz/week     Comment: rarely    Drug use: No    Sexual activ planned.   Jake GLASS  6/28/2017 12:50 PM

## 2020-11-03 NOTE — ED NOTES
Pateint states she did not take her medications today.  Poor historian     Arty Salines, RN  11/02/20 0627

## 2020-11-03 NOTE — ED NOTES
Report from delmar neri , care taken over at this time, in to get list of pt meds from , pt , pt states she took her home meds today , er dr notified     Mariella Everett RN  11/02/20 9887

## 2020-11-05 LAB
CULTURE: NORMAL
Lab: NORMAL
SPECIMEN: NORMAL

## 2020-11-10 LAB
EKG ATRIAL RATE: 108 BPM
EKG DIAGNOSIS: NORMAL
EKG P AXIS: 43 DEGREES
EKG P-R INTERVAL: 172 MS
EKG Q-T INTERVAL: 370 MS
EKG QRS DURATION: 114 MS
EKG QTC CALCULATION (BAZETT): 495 MS
EKG R AXIS: -44 DEGREES
EKG T AXIS: 87 DEGREES
EKG VENTRICULAR RATE: 108 BPM

## 2020-12-01 ENCOUNTER — APPOINTMENT (OUTPATIENT)
Dept: CT IMAGING | Age: 65
DRG: 204 | End: 2020-12-01
Payer: MEDICARE

## 2020-12-01 ENCOUNTER — APPOINTMENT (OUTPATIENT)
Dept: GENERAL RADIOLOGY | Age: 65
DRG: 204 | End: 2020-12-01
Payer: MEDICARE

## 2020-12-01 ENCOUNTER — HOSPITAL ENCOUNTER (INPATIENT)
Age: 65
LOS: 3 days | Discharge: HOME HEALTH | DRG: 204 | End: 2020-12-04
Attending: HOSPITALIST | Admitting: HOSPITALIST
Payer: MEDICARE

## 2020-12-01 PROBLEM — R06.02 SHORTNESS OF BREATH: Status: ACTIVE | Noted: 2020-12-01

## 2020-12-01 LAB
ALBUMIN SERPL-MCNC: 4.1 GM/DL (ref 3.4–5)
ALP BLD-CCNC: 81 IU/L (ref 40–129)
ALT SERPL-CCNC: 24 U/L (ref 10–40)
ANION GAP SERPL CALCULATED.3IONS-SCNC: 17 MMOL/L (ref 4–16)
AST SERPL-CCNC: 27 IU/L (ref 15–37)
BACTERIA: NEGATIVE /HPF
BASOPHILS ABSOLUTE: 0.1 K/CU MM
BASOPHILS RELATIVE PERCENT: 0.7 % (ref 0–1)
BILIRUB SERPL-MCNC: 0.5 MG/DL (ref 0–1)
BILIRUBIN URINE: NEGATIVE MG/DL
BLOOD, URINE: NEGATIVE
BUN BLDV-MCNC: 8 MG/DL (ref 6–23)
CALCIUM SERPL-MCNC: 9.7 MG/DL (ref 8.3–10.6)
CHLORIDE BLD-SCNC: 96 MMOL/L (ref 99–110)
CLARITY: CLEAR
CO2: 22 MMOL/L (ref 21–32)
COLOR: YELLOW
CREAT SERPL-MCNC: 0.5 MG/DL (ref 0.6–1.1)
D DIMER: 4726 NG/ML(DDU)
DIFFERENTIAL TYPE: ABNORMAL
EOSINOPHILS ABSOLUTE: 0.3 K/CU MM
EOSINOPHILS RELATIVE PERCENT: 2 % (ref 0–3)
GFR AFRICAN AMERICAN: >60 ML/MIN/1.73M2
GFR NON-AFRICAN AMERICAN: >60 ML/MIN/1.73M2
GLUCOSE BLD-MCNC: 221 MG/DL (ref 70–99)
GLUCOSE BLD-MCNC: 234 MG/DL (ref 70–99)
GLUCOSE, URINE: NEGATIVE MG/DL
HCT VFR BLD CALC: 41.7 % (ref 37–47)
HEMOGLOBIN: 14.2 GM/DL (ref 12.5–16)
IMMATURE NEUTROPHIL %: 0.5 % (ref 0–0.43)
KETONES, URINE: ABNORMAL MG/DL
LEUKOCYTE ESTERASE, URINE: ABNORMAL
LYMPHOCYTES ABSOLUTE: 3.1 K/CU MM
LYMPHOCYTES RELATIVE PERCENT: 24.5 % (ref 24–44)
MAGNESIUM: 1.6 MG/DL (ref 1.8–2.4)
MCH RBC QN AUTO: 29.2 PG (ref 27–31)
MCHC RBC AUTO-ENTMCNC: 34.1 % (ref 32–36)
MCV RBC AUTO: 85.8 FL (ref 78–100)
MONOCYTES ABSOLUTE: 1.1 K/CU MM
MONOCYTES RELATIVE PERCENT: 8.8 % (ref 0–4)
MUCUS: ABNORMAL HPF
NITRITE URINE, QUANTITATIVE: NEGATIVE
NUCLEATED RBC %: 0 %
PDW BLD-RTO: 12.3 % (ref 11.7–14.9)
PH, URINE: 5 (ref 5–8)
PLATELET # BLD: 342 K/CU MM (ref 140–440)
PMV BLD AUTO: 9.4 FL (ref 7.5–11.1)
POTASSIUM SERPL-SCNC: 3.4 MMOL/L (ref 3.5–5.1)
PRO-BNP: 47.12 PG/ML
PROTEIN UA: NEGATIVE MG/DL
RBC # BLD: 4.86 M/CU MM (ref 4.2–5.4)
RBC URINE: 3 /HPF (ref 0–6)
SARS-COV-2, NAAT: NOT DETECTED
SEGMENTED NEUTROPHILS ABSOLUTE COUNT: 8.1 K/CU MM
SEGMENTED NEUTROPHILS RELATIVE PERCENT: 63.5 % (ref 36–66)
SODIUM BLD-SCNC: 135 MMOL/L (ref 135–145)
SPECIFIC GRAVITY UA: >1.06 (ref 1–1.03)
SQUAMOUS EPITHELIAL: 3 /HPF
TOTAL IMMATURE NEUTOROPHIL: 0.06 K/CU MM
TOTAL NUCLEATED RBC: 0 K/CU MM
TOTAL PROTEIN: 7.6 GM/DL (ref 6.4–8.2)
TRICHOMONAS: ABNORMAL /HPF
TROPONIN T: <0.01 NG/ML
UROBILINOGEN, URINE: NORMAL MG/DL (ref 0.2–1)
WBC # BLD: 12.7 K/CU MM (ref 4–10.5)
WBC UA: 3 /HPF (ref 0–5)

## 2020-12-01 PROCEDURE — 84484 ASSAY OF TROPONIN QUANT: CPT

## 2020-12-01 PROCEDURE — 71275 CT ANGIOGRAPHY CHEST: CPT

## 2020-12-01 PROCEDURE — 6360000002 HC RX W HCPCS: Performed by: PHYSICIAN ASSISTANT

## 2020-12-01 PROCEDURE — 82962 GLUCOSE BLOOD TEST: CPT

## 2020-12-01 PROCEDURE — 1200000000 HC SEMI PRIVATE

## 2020-12-01 PROCEDURE — 94761 N-INVAS EAR/PLS OXIMETRY MLT: CPT

## 2020-12-01 PROCEDURE — 93005 ELECTROCARDIOGRAM TRACING: CPT | Performed by: PHYSICIAN ASSISTANT

## 2020-12-01 PROCEDURE — 96372 THER/PROPH/DIAG INJ SC/IM: CPT

## 2020-12-01 PROCEDURE — 83880 ASSAY OF NATRIURETIC PEPTIDE: CPT

## 2020-12-01 PROCEDURE — U0002 COVID-19 LAB TEST NON-CDC: HCPCS

## 2020-12-01 PROCEDURE — 6360000004 HC RX CONTRAST MEDICATION: Performed by: PHYSICIAN ASSISTANT

## 2020-12-01 PROCEDURE — 96365 THER/PROPH/DIAG IV INF INIT: CPT

## 2020-12-01 PROCEDURE — 81001 URINALYSIS AUTO W/SCOPE: CPT

## 2020-12-01 PROCEDURE — 2580000003 HC RX 258: Performed by: PHYSICIAN ASSISTANT

## 2020-12-01 PROCEDURE — 85379 FIBRIN DEGRADATION QUANT: CPT

## 2020-12-01 PROCEDURE — 6370000000 HC RX 637 (ALT 250 FOR IP): Performed by: PHYSICIAN ASSISTANT

## 2020-12-01 PROCEDURE — 96366 THER/PROPH/DIAG IV INF ADDON: CPT

## 2020-12-01 PROCEDURE — 80053 COMPREHEN METABOLIC PANEL: CPT

## 2020-12-01 PROCEDURE — 85025 COMPLETE CBC W/AUTO DIFF WBC: CPT

## 2020-12-01 PROCEDURE — 36415 COLL VENOUS BLD VENIPUNCTURE: CPT

## 2020-12-01 PROCEDURE — 71045 X-RAY EXAM CHEST 1 VIEW: CPT

## 2020-12-01 PROCEDURE — 83735 ASSAY OF MAGNESIUM: CPT

## 2020-12-01 PROCEDURE — 99285 EMERGENCY DEPT VISIT HI MDM: CPT

## 2020-12-01 PROCEDURE — 96361 HYDRATE IV INFUSION ADD-ON: CPT

## 2020-12-01 PROCEDURE — 94640 AIRWAY INHALATION TREATMENT: CPT

## 2020-12-01 RX ORDER — HYDROCODONE BITARTRATE AND ACETAMINOPHEN 7.5; 325 MG/1; MG/1
1 TABLET ORAL 3 TIMES DAILY
COMMUNITY
Start: 2020-11-25 | End: 2021-02-02

## 2020-12-01 RX ORDER — TIZANIDINE 4 MG/1
4 TABLET ORAL 3 TIMES DAILY
Status: ON HOLD | COMMUNITY
Start: 2020-10-12 | End: 2021-04-28 | Stop reason: HOSPADM

## 2020-12-01 RX ORDER — METOPROLOL SUCCINATE 25 MG/1
25 TABLET, EXTENDED RELEASE ORAL ONCE
Status: COMPLETED | OUTPATIENT
Start: 2020-12-01 | End: 2020-12-01

## 2020-12-01 RX ORDER — LEVOTHYROXINE SODIUM 0.07 MG/1
75 TABLET ORAL
Status: DISCONTINUED | OUTPATIENT
Start: 2020-12-02 | End: 2020-12-04 | Stop reason: HOSPADM

## 2020-12-01 RX ORDER — LANOLIN ALCOHOL/MO/W.PET/CERES
400 CREAM (GRAM) TOPICAL 2 TIMES DAILY
Status: DISCONTINUED | OUTPATIENT
Start: 2020-12-01 | End: 2020-12-01 | Stop reason: CLARIF

## 2020-12-01 RX ORDER — 0.9 % SODIUM CHLORIDE 0.9 %
1000 INTRAVENOUS SOLUTION INTRAVENOUS ONCE
Status: COMPLETED | OUTPATIENT
Start: 2020-12-01 | End: 2020-12-01

## 2020-12-01 RX ORDER — INSULIN ASPART 100 [IU]/ML
INJECTION, SOLUTION INTRAVENOUS; SUBCUTANEOUS SEE ADMIN INSTRUCTIONS
COMMUNITY
Start: 2020-10-24 | End: 2022-07-06 | Stop reason: SDUPTHER

## 2020-12-01 RX ORDER — METOPROLOL SUCCINATE 25 MG/1
25 TABLET, EXTENDED RELEASE ORAL DAILY
Status: DISCONTINUED | OUTPATIENT
Start: 2020-12-02 | End: 2020-12-04 | Stop reason: HOSPADM

## 2020-12-01 RX ORDER — AMITRIPTYLINE HYDROCHLORIDE 25 MG/1
25 TABLET, FILM COATED ORAL NIGHTLY
Status: DISCONTINUED | OUTPATIENT
Start: 2020-12-01 | End: 2020-12-04 | Stop reason: HOSPADM

## 2020-12-01 RX ORDER — MONTELUKAST SODIUM 10 MG/1
10 TABLET ORAL NIGHTLY
Status: DISCONTINUED | OUTPATIENT
Start: 2020-12-01 | End: 2020-12-04 | Stop reason: HOSPADM

## 2020-12-01 RX ORDER — MAGNESIUM OXIDE 400 MG/1
400 TABLET ORAL 2 TIMES DAILY
Status: DISCONTINUED | OUTPATIENT
Start: 2020-12-01 | End: 2020-12-04 | Stop reason: HOSPADM

## 2020-12-01 RX ORDER — QUETIAPINE FUMARATE 25 MG/1
25 TABLET, FILM COATED ORAL NIGHTLY
Status: DISCONTINUED | OUTPATIENT
Start: 2020-12-01 | End: 2020-12-04 | Stop reason: HOSPADM

## 2020-12-01 RX ORDER — NIFEDIPINE 30 MG/1
60 TABLET, EXTENDED RELEASE ORAL ONCE
Status: COMPLETED | OUTPATIENT
Start: 2020-12-01 | End: 2020-12-01

## 2020-12-01 RX ORDER — ASPIRIN 81 MG/1
81 TABLET, CHEWABLE ORAL DAILY
Status: DISCONTINUED | OUTPATIENT
Start: 2020-12-01 | End: 2020-12-04 | Stop reason: HOSPADM

## 2020-12-01 RX ORDER — BUSPIRONE HYDROCHLORIDE 15 MG/1
15 TABLET ORAL 3 TIMES DAILY
Status: DISCONTINUED | OUTPATIENT
Start: 2020-12-01 | End: 2020-12-03

## 2020-12-01 RX ORDER — METOPROLOL SUCCINATE 25 MG/1
25 TABLET, EXTENDED RELEASE ORAL DAILY
Status: DISCONTINUED | OUTPATIENT
Start: 2020-12-01 | End: 2020-12-01 | Stop reason: CLARIF

## 2020-12-01 RX ORDER — LOSARTAN POTASSIUM AND HYDROCHLOROTHIAZIDE 25; 100 MG/1; MG/1
1 TABLET ORAL DAILY
Status: DISCONTINUED | OUTPATIENT
Start: 2020-12-01 | End: 2020-12-01 | Stop reason: CLARIF

## 2020-12-01 RX ORDER — LOSARTAN POTASSIUM 50 MG/1
100 TABLET ORAL DAILY
Status: DISCONTINUED | OUTPATIENT
Start: 2020-12-01 | End: 2020-12-04 | Stop reason: HOSPADM

## 2020-12-01 RX ORDER — QUETIAPINE FUMARATE 25 MG/1
25 TABLET, FILM COATED ORAL NIGHTLY
Status: ON HOLD | COMMUNITY
Start: 2020-11-12 | End: 2021-07-15 | Stop reason: HOSPADM

## 2020-12-01 RX ORDER — LORAZEPAM 1 MG/1
1 TABLET ORAL ONCE
Status: COMPLETED | OUTPATIENT
Start: 2020-12-01 | End: 2020-12-01

## 2020-12-01 RX ORDER — ATORVASTATIN CALCIUM 20 MG/1
20 TABLET, FILM COATED ORAL DAILY
Status: DISCONTINUED | OUTPATIENT
Start: 2020-12-01 | End: 2020-12-04 | Stop reason: HOSPADM

## 2020-12-01 RX ORDER — AMITRIPTYLINE HYDROCHLORIDE 25 MG/1
25 TABLET, FILM COATED ORAL NIGHTLY
Status: ON HOLD | COMMUNITY
Start: 2020-11-20 | End: 2021-04-28 | Stop reason: SDUPTHER

## 2020-12-01 RX ORDER — NIFEDIPINE 30 MG/1
60 TABLET, EXTENDED RELEASE ORAL DAILY
Status: DISCONTINUED | OUTPATIENT
Start: 2020-12-01 | End: 2020-12-01 | Stop reason: CLARIF

## 2020-12-01 RX ORDER — POTASSIUM CHLORIDE 750 MG/1
10 TABLET, FILM COATED, EXTENDED RELEASE ORAL 2 TIMES DAILY
Status: DISCONTINUED | OUTPATIENT
Start: 2020-12-01 | End: 2020-12-04 | Stop reason: HOSPADM

## 2020-12-01 RX ORDER — NIFEDIPINE 30 MG/1
60 TABLET, EXTENDED RELEASE ORAL DAILY
Status: DISCONTINUED | OUTPATIENT
Start: 2020-12-02 | End: 2020-12-04 | Stop reason: HOSPADM

## 2020-12-01 RX ORDER — HYDROXYZINE PAMOATE 25 MG/1
50 CAPSULE ORAL 3 TIMES DAILY PRN
COMMUNITY
Start: 2020-11-12 | End: 2022-05-30

## 2020-12-01 RX ORDER — POTASSIUM CHLORIDE 750 MG/1
10 TABLET, FILM COATED, EXTENDED RELEASE ORAL 2 TIMES DAILY
Status: ON HOLD | COMMUNITY
Start: 2020-11-05 | End: 2021-07-15 | Stop reason: SDUPTHER

## 2020-12-01 RX ORDER — OXCARBAZEPINE 150 MG/1
150 TABLET, FILM COATED ORAL 2 TIMES DAILY
Status: DISCONTINUED | OUTPATIENT
Start: 2020-12-01 | End: 2020-12-03

## 2020-12-01 RX ORDER — ALBUTEROL SULFATE 90 UG/1
2 AEROSOL, METERED RESPIRATORY (INHALATION) ONCE
Status: COMPLETED | OUTPATIENT
Start: 2020-12-01 | End: 2020-12-01

## 2020-12-01 RX ORDER — LORAZEPAM 2 MG/ML
1 INJECTION INTRAMUSCULAR ONCE
Status: COMPLETED | OUTPATIENT
Start: 2020-12-01 | End: 2020-12-01

## 2020-12-01 RX ORDER — LEVETIRACETAM 500 MG/1
500 TABLET ORAL NIGHTLY
Status: DISCONTINUED | OUTPATIENT
Start: 2020-12-01 | End: 2020-12-01

## 2020-12-01 RX ORDER — HYDROCHLOROTHIAZIDE 25 MG/1
25 TABLET ORAL DAILY
Status: DISCONTINUED | OUTPATIENT
Start: 2020-12-01 | End: 2020-12-04 | Stop reason: HOSPADM

## 2020-12-01 RX ADMIN — METOPROLOL SUCCINATE 25 MG: 25 TABLET, EXTENDED RELEASE ORAL at 15:30

## 2020-12-01 RX ADMIN — LEVETIRACETAM 500 MG: 100 INJECTION, SOLUTION INTRAVENOUS at 13:09

## 2020-12-01 RX ADMIN — LORAZEPAM 1 MG: 2 INJECTION INTRAMUSCULAR; INTRAVENOUS at 13:06

## 2020-12-01 RX ADMIN — ALBUTEROL SULFATE 2 PUFF: 90 AEROSOL, METERED RESPIRATORY (INHALATION) at 18:00

## 2020-12-01 RX ADMIN — SODIUM CHLORIDE 1000 ML: 9 INJECTION, SOLUTION INTRAVENOUS at 17:32

## 2020-12-01 RX ADMIN — Medication 2 PUFF: at 18:01

## 2020-12-01 RX ADMIN — NIFEDIPINE 60 MG: 30 TABLET, FILM COATED, EXTENDED RELEASE ORAL at 15:30

## 2020-12-01 RX ADMIN — ENOXAPARIN SODIUM 80 MG: 80 INJECTION SUBCUTANEOUS at 18:50

## 2020-12-01 RX ADMIN — IOPAMIDOL 100 ML: 755 INJECTION, SOLUTION INTRAVENOUS at 14:28

## 2020-12-01 RX ADMIN — LORAZEPAM 1 MG: 1 TABLET ORAL at 12:06

## 2020-12-01 ASSESSMENT — ENCOUNTER SYMPTOMS
EYE ITCHING: 1
DIARRHEA: 0
BACK PAIN: 0
VOMITING: 0
ABDOMINAL PAIN: 0
TROUBLE SWALLOWING: 1
SORE THROAT: 0
COUGH: 1
CHEST TIGHTNESS: 1
WHEEZING: 1
RHINORRHEA: 0
SHORTNESS OF BREATH: 1

## 2020-12-01 ASSESSMENT — PAIN DESCRIPTION - ORIENTATION: ORIENTATION: MID

## 2020-12-01 ASSESSMENT — PAIN DESCRIPTION - PAIN TYPE: TYPE: ACUTE PAIN

## 2020-12-01 ASSESSMENT — PAIN DESCRIPTION - LOCATION: LOCATION: CHEST

## 2020-12-01 NOTE — ED NOTES
Contacted [employer] regarding collection of COVID. They advise to wait 15 minutes before collecting specimen.        Irma Choi RN  12/01/20 0054

## 2020-12-01 NOTE — ED PROVIDER NOTES
12 lead EKG per my interpretation:  Sinus Tachycardia 111  Axis is   Left axis deviation  QTc is  462  There is no specific T wave changes appreciated. There is no specific ST wave changes appreciated.     Prior EKG to compare with was not available        Tracey Mae DO  12/01/20 1175

## 2020-12-01 NOTE — ED NOTES
The patient is alert and tearful and states having chest pain when asked again about her pain, she states that she has pain everyday and that the pain is \"slight\".      Astrid Stevens RN  12/01/20 2046

## 2020-12-01 NOTE — ED NOTES
The patient was not responding and would move her head around. The PA Raisa Allen was notified and examined the patient and the patient would initially not respond and as soon as he left she opened her eyes and stated \" I can't move \". The PA was notified.      Evon Keen RN  12/01/20 1048

## 2020-12-01 NOTE — ED NOTES
Pt to and from restroom, pt was very weak, required a wheel chair and her heart rate was 135 upon return to room. Pt back on tele. Call light within reach. HAYDEN Beasley notified.       Gregg Byrnes RN  12/01/20 [id number]

## 2020-12-01 NOTE — ED TRIAGE NOTES
The patient presents to the emergency department alert and oriented with a complaint of shortness of breath since this morning. The patient believes asthma related and that her inhaler did not help. The patient does have substernal chest pain but states \" I think its from the asthma\". The patient also states severe anxiety. The patient denies a fever. The patient placed on the monitor with vital signs taken. Assessment as follows; Skin is pink, warm and dry. Lung sounds are clear without wheezing but demenished.

## 2020-12-01 NOTE — ED NOTES
CT called and stated that the IV infiltrated with contrast. The CT tech stated that most of the contrast made it through the IV and that he would send it to the radiologist. The IV Keppra had infused complete just prior to CT scan. The patients right arm is swollen and a heated blanket applied.  was notified.      Verl Schaumann, RN  12/01/20 4830

## 2020-12-01 NOTE — ED NOTES
Bed: ED-25  Expected date: 12/1/20  Expected time: 8:34 AM  Means of arrival:   Comments:  ems     Carmen Myers RN  12/01/20 [id number]
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
## 2020-12-01 NOTE — ED NOTES
The patient states a history of anxiety and when she has an attack that she is unresponsive. The patient states that she \"wanted to let me know in case it happens\".      Augusto Contreras RN  12/01/20 1034

## 2020-12-01 NOTE — PROGRESS NOTES
Medication History  [facility]    Patient Name: Shahzad Shane 1955     Medication history has been completed by: Kelly Gaytan CPhT    Source(s) of information: patient, insurance claims and retail pharmacy     Primary Care Physician: Yohana Felipe MD     Pharmacy: [address] as of 12/01/2020 - Review Complete 12/01/2020   Allergen Reaction Noted    Abilify [aripiprazole]  06/10/2014    Advil [ibuprofen micronized] Palpitations     Augmentin [amoxicillin-pot clavulanate] Itching and Rash     Bee venom Swelling 06/10/2014    Ciprofloxacin Itching and Rash     Codeine      Darvocet [propoxyphene n-acetaminophen] Palpitations     Darvon [propoxyphene hcl] Palpitations 06/10/2014    Decadron [dexamethasone] Other (See Comments) 05/01/2015    Ditropan [oxybutynin chloride] Palpitations     Fioricet [butalbital-apap-caffeine] Palpitations     Fiorinal-codeine #3 [butalbital-asa-caff-codeine]  06/10/2014    Flagyl [metronidazole] Diarrhea     Naproxen Palpitations     Other  06/10/2014    Prozac [fluoxetine hcl]      Robaxin [methocarbamol] Palpitations     Ultram [tramadol]      Zoloft Palpitations     Butalbital-aspirin-caffeine Other (See Comments) 05/01/2015    Coreg [carvedilol] Other (See Comments) 07/25/2020    Fluoxetine Other (See Comments) 05/01/2015    Oxybutynin chloride Other (See Comments) 05/01/2015    Sertraline Other (See Comments) 05/01/2015    Tape [adhesive tape]  06/14/2018    Reglan [metoclopramide] Other (See Comments) 12/03/2014        Prior to Admission medications    Medication Sig Start Date End Date Taking?  Authorizing Provider   amitriptyline (ELAVIL) 25 MG tablet Take 25 mg by mouth nightly 11/20/20  Yes Historical Provider, MD   hydrOXYzine (VISTARIL) 25 MG capsule Take 25 mg by mouth 2 times daily as needed 11/12/20  Yes Historical Provider, MD   NOVOLOG FLEXPEN 100 UNIT/ML injection pen Inject into the skin See Admin Instructions 12/01/2020 patient states she follows sliding scale regimen BS > 150 10/24/20  Yes Historical Provider, MD   potassium chloride (KLOR-CON) 10 MEQ extended release tablet Take 10 mEq by mouth 2 times daily 11/5/20  Yes Historical Provider, MD   QUEtiapine (SEROQUEL) 25 MG tablet Take 25 mg by mouth nightly 11/12/20  Yes Historical Provider, MD   tiZANidine (ZANAFLEX) 4 MG tablet Take 4 mg by mouth 3 times daily 10/12/20  Yes Historical Provider, MD   HYDROcodone-acetaminophen (NORCO) 7.5-325 MG per tablet Take 1 tablet by mouth 3 times daily. 11/25/20  Yes Historical Provider, MD   levETIRAcetam (KEPPRA) 500 MG tablet Take 1 tablet by mouth 2 times daily 9/10/20  Yes WENDIE Ponce CNP   simvastatin (ZOCOR) 20 MG tablet Take 1 tablet by mouth nightly 9/10/20  Yes WENDIE Ponce CNP   levothyroxine (SYNTHROID) 75 MCG tablet Take 1 tablet by mouth every morning (before breakfast) 9/11/20  Yes WENDIE Ponce CNP   TRESIBA FLEXTOUCH 200 UNIT/ML SOPN Inject into the skin nightly 12/01/2020 Patient states she follows sliding scale 9/4/20  Yes Chintan Bustos MD   OXcarbazepine (TRILEPTAL) 150 MG tablet Take 1 tablet by mouth 2 times daily 9/4/20  Yes Chintan Bustos MD   metoprolol succinate (TOPROL XL) 25 MG extended release tablet Take 1 tablet by mouth daily 8/15/20  Yes Mak Cornell MD   NIFEdipine (ADALAT CC) 60 MG extended release tablet Take 1 tablet by mouth daily 8/11/20  Yes WENDIE Lyles CNP   busPIRone (BUSPAR) 15 MG tablet Take 15 mg by mouth 3 times daily   Yes Historical Provider, MD   losartan-hydrochlorothiazide (HYZAAR) 100-25 MG per tablet Take 1 tablet by mouth daily 7/15/20  Yes Wilbur Sadler MD   docusate sodium (COLACE) 100 MG capsule Take 1 capsule by mouth 2 times daily 1/9/20  Yes HAYDEN Grimm   albuterol sulfate  (90 Base) MCG/ACT inhaler Inhale 2 puffs into the lungs every 6 hours as needed for Wheezing or Shortness of Breath (or cough) Please

## 2020-12-01 NOTE — ED NOTES
During rounding check, the patient is shaking and states \" It's happening again\". The patient is easily redirected and when she talks the shaking stops.       Sanjeev Jacinto RN  12/01/20 [id number]
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
## 2020-12-01 NOTE — ED NOTES
[id number] paged hospitalist     Eloise Lemus  12/01/20 [phone] hospitalist returned call      Eloise Lemus  12/01/20 [id number]

## 2020-12-01 NOTE — ED NOTES
Patient's IV infiltrated during CTA chest w/contrast exam. IV removed and arm wrapped in a warm compress. RN Agueda Vargas notified and patient taken back to ED room.

## 2020-12-01 NOTE — ED NOTES
IV was started and CT was notified after several attempts were made at starting the IV.      Pamella Limon RN  12/01/20 1300

## 2020-12-02 ENCOUNTER — APPOINTMENT (OUTPATIENT)
Dept: ULTRASOUND IMAGING | Age: 65
DRG: 204 | End: 2020-12-02
Payer: MEDICARE

## 2020-12-02 ENCOUNTER — APPOINTMENT (OUTPATIENT)
Dept: CT IMAGING | Age: 65
DRG: 204 | End: 2020-12-02
Payer: MEDICARE

## 2020-12-02 PROBLEM — R06.89 DYSPNEA AND RESPIRATORY ABNORMALITIES: Status: ACTIVE | Noted: 2020-12-01

## 2020-12-02 PROBLEM — R06.00 DYSPNEA AND RESPIRATORY ABNORMALITIES: Status: ACTIVE | Noted: 2020-12-01

## 2020-12-02 LAB
ESTIMATED AVERAGE GLUCOSE: 180 MG/DL
GLUCOSE BLD-MCNC: 116 MG/DL (ref 70–99)
GLUCOSE BLD-MCNC: 131 MG/DL (ref 70–99)
GLUCOSE BLD-MCNC: 181 MG/DL (ref 70–99)
GLUCOSE BLD-MCNC: 206 MG/DL (ref 70–99)
GLUCOSE BLD-MCNC: 274 MG/DL (ref 70–99)
HBA1C MFR BLD: 7.9 % (ref 4.2–6.3)
LV EF: 53 %
LVEF MODALITY: NORMAL
MAGNESIUM: 1.8 MG/DL (ref 1.8–2.4)
TROPONIN T: <0.01 NG/ML
TROPONIN T: <0.01 NG/ML
TSH HIGH SENSITIVITY: 0.85 UIU/ML (ref 0.27–4.2)

## 2020-12-02 PROCEDURE — 6360000002 HC RX W HCPCS: Performed by: INTERNAL MEDICINE

## 2020-12-02 PROCEDURE — 1200000000 HC SEMI PRIVATE

## 2020-12-02 PROCEDURE — 93306 TTE W/DOPPLER COMPLETE: CPT

## 2020-12-02 PROCEDURE — 94761 N-INVAS EAR/PLS OXIMETRY MLT: CPT

## 2020-12-02 PROCEDURE — 99222 1ST HOSP IP/OBS MODERATE 55: CPT | Performed by: INTERNAL MEDICINE

## 2020-12-02 PROCEDURE — 93970 EXTREMITY STUDY: CPT

## 2020-12-02 PROCEDURE — 6370000000 HC RX 637 (ALT 250 FOR IP): Performed by: NURSE PRACTITIONER

## 2020-12-02 PROCEDURE — 6370000000 HC RX 637 (ALT 250 FOR IP): Performed by: INTERNAL MEDICINE

## 2020-12-02 PROCEDURE — 2580000003 HC RX 258: Performed by: INTERNAL MEDICINE

## 2020-12-02 PROCEDURE — 83036 HEMOGLOBIN GLYCOSYLATED A1C: CPT

## 2020-12-02 PROCEDURE — 6360000002 HC RX W HCPCS: Performed by: HOSPITALIST

## 2020-12-02 PROCEDURE — 6370000000 HC RX 637 (ALT 250 FOR IP): Performed by: HOSPITALIST

## 2020-12-02 PROCEDURE — 84484 ASSAY OF TROPONIN QUANT: CPT

## 2020-12-02 PROCEDURE — 82962 GLUCOSE BLOOD TEST: CPT

## 2020-12-02 PROCEDURE — 36415 COLL VENOUS BLD VENIPUNCTURE: CPT

## 2020-12-02 PROCEDURE — 84443 ASSAY THYROID STIM HORMONE: CPT

## 2020-12-02 PROCEDURE — 71275 CT ANGIOGRAPHY CHEST: CPT

## 2020-12-02 PROCEDURE — 6360000004 HC RX CONTRAST MEDICATION: Performed by: INTERNAL MEDICINE

## 2020-12-02 PROCEDURE — 2580000003 HC RX 258: Performed by: HOSPITALIST

## 2020-12-02 PROCEDURE — 93010 ELECTROCARDIOGRAM REPORT: CPT | Performed by: INTERNAL MEDICINE

## 2020-12-02 PROCEDURE — 83735 ASSAY OF MAGNESIUM: CPT

## 2020-12-02 RX ORDER — INSULIN GLARGINE 100 [IU]/ML
30 INJECTION, SOLUTION SUBCUTANEOUS NIGHTLY
Status: DISCONTINUED | OUTPATIENT
Start: 2020-12-02 | End: 2020-12-04 | Stop reason: HOSPADM

## 2020-12-02 RX ORDER — SODIUM CHLORIDE 9 MG/ML
INJECTION, SOLUTION INTRAVENOUS CONTINUOUS
Status: ACTIVE | OUTPATIENT
Start: 2020-12-02 | End: 2020-12-02

## 2020-12-02 RX ORDER — HYDROCODONE BITARTRATE AND ACETAMINOPHEN 7.5; 325 MG/1; MG/1
1 TABLET ORAL 3 TIMES DAILY
Status: DISCONTINUED | OUTPATIENT
Start: 2020-12-02 | End: 2020-12-04 | Stop reason: HOSPADM

## 2020-12-02 RX ORDER — OXYCODONE HYDROCHLORIDE AND ACETAMINOPHEN 5; 325 MG/1; MG/1
1 TABLET ORAL ONCE
Status: COMPLETED | OUTPATIENT
Start: 2020-12-02 | End: 2020-12-02

## 2020-12-02 RX ORDER — MAGNESIUM SULFATE 1 G/100ML
1 INJECTION INTRAVENOUS PRN
Status: DISCONTINUED | OUTPATIENT
Start: 2020-12-02 | End: 2020-12-04 | Stop reason: HOSPADM

## 2020-12-02 RX ORDER — ALBUTEROL SULFATE 90 UG/1
2 AEROSOL, METERED RESPIRATORY (INHALATION) 4 TIMES DAILY
Status: DISCONTINUED | OUTPATIENT
Start: 2020-12-02 | End: 2020-12-04 | Stop reason: HOSPADM

## 2020-12-02 RX ORDER — TIZANIDINE 4 MG/1
4 TABLET ORAL 3 TIMES DAILY
Status: DISCONTINUED | OUTPATIENT
Start: 2020-12-02 | End: 2020-12-04 | Stop reason: HOSPADM

## 2020-12-02 RX ORDER — DEXTROSE MONOHYDRATE 25 G/50ML
12.5 INJECTION, SOLUTION INTRAVENOUS PRN
Status: DISCONTINUED | OUTPATIENT
Start: 2020-12-02 | End: 2020-12-04 | Stop reason: HOSPADM

## 2020-12-02 RX ORDER — ALBUTEROL SULFATE 90 UG/1
2 AEROSOL, METERED RESPIRATORY (INHALATION) EVERY 6 HOURS PRN
Status: DISCONTINUED | OUTPATIENT
Start: 2020-12-02 | End: 2020-12-04 | Stop reason: HOSPADM

## 2020-12-02 RX ORDER — DEXTROSE MONOHYDRATE 50 MG/ML
100 INJECTION, SOLUTION INTRAVENOUS PRN
Status: DISCONTINUED | OUTPATIENT
Start: 2020-12-02 | End: 2020-12-04 | Stop reason: HOSPADM

## 2020-12-02 RX ORDER — DIPHENHYDRAMINE HYDROCHLORIDE 50 MG/ML
25 INJECTION INTRAMUSCULAR; INTRAVENOUS ONCE
Status: COMPLETED | OUTPATIENT
Start: 2020-12-02 | End: 2020-12-02

## 2020-12-02 RX ORDER — DIPHENHYDRAMINE HCL 25 MG
25 TABLET ORAL ONCE
Status: COMPLETED | OUTPATIENT
Start: 2020-12-02 | End: 2020-12-02

## 2020-12-02 RX ORDER — NICOTINE POLACRILEX 4 MG
15 LOZENGE BUCCAL PRN
Status: DISCONTINUED | OUTPATIENT
Start: 2020-12-02 | End: 2020-12-04 | Stop reason: HOSPADM

## 2020-12-02 RX ADMIN — OXCARBAZEPINE 150 MG: 150 TABLET, FILM COATED ORAL at 21:54

## 2020-12-02 RX ADMIN — TIZANIDINE 4 MG: 4 TABLET ORAL at 10:20

## 2020-12-02 RX ADMIN — NIFEDIPINE 60 MG: 30 TABLET, FILM COATED, EXTENDED RELEASE ORAL at 10:20

## 2020-12-02 RX ADMIN — INSULIN LISPRO 4 UNITS: 100 INJECTION, SOLUTION INTRAVENOUS; SUBCUTANEOUS at 10:05

## 2020-12-02 RX ADMIN — ENOXAPARIN SODIUM 80 MG: 80 INJECTION SUBCUTANEOUS at 10:20

## 2020-12-02 RX ADMIN — OXCARBAZEPINE 150 MG: 150 TABLET, FILM COATED ORAL at 00:22

## 2020-12-02 RX ADMIN — CARBIDOPA AND LEVODOPA 1 TABLET: 10; 100 TABLET ORAL at 00:14

## 2020-12-02 RX ADMIN — DIPHENHYDRAMINE HYDROCHLORIDE 25 MG: 25 TABLET ORAL at 23:33

## 2020-12-02 RX ADMIN — ATORVASTATIN CALCIUM 20 MG: 20 TABLET, FILM COATED ORAL at 00:14

## 2020-12-02 RX ADMIN — BUSPIRONE HYDROCHLORIDE 15 MG: 15 TABLET ORAL at 06:27

## 2020-12-02 RX ADMIN — LOSARTAN POTASSIUM 100 MG: 50 TABLET, FILM COATED ORAL at 10:18

## 2020-12-02 RX ADMIN — BUSPIRONE HYDROCHLORIDE 15 MG: 15 TABLET ORAL at 21:53

## 2020-12-02 RX ADMIN — LEVETIRACETAM 750 MG: 500 TABLET, FILM COATED ORAL at 21:53

## 2020-12-02 RX ADMIN — MAGNESIUM OXIDE 400 MG: 400 TABLET ORAL at 10:17

## 2020-12-02 RX ADMIN — HYDROCHLOROTHIAZIDE 25 MG: 25 TABLET ORAL at 00:14

## 2020-12-02 RX ADMIN — INSULIN LISPRO 2 UNITS: 100 INJECTION, SOLUTION INTRAVENOUS; SUBCUTANEOUS at 18:35

## 2020-12-02 RX ADMIN — BUSPIRONE HYDROCHLORIDE 15 MG: 15 TABLET ORAL at 00:14

## 2020-12-02 RX ADMIN — MONTELUKAST 10 MG: 10 TABLET, FILM COATED ORAL at 00:22

## 2020-12-02 RX ADMIN — ENOXAPARIN SODIUM 80 MG: 80 INJECTION SUBCUTANEOUS at 21:52

## 2020-12-02 RX ADMIN — QUETIAPINE FUMARATE 25 MG: 25 TABLET ORAL at 21:53

## 2020-12-02 RX ADMIN — AMITRIPTYLINE HYDROCHLORIDE 25 MG: 25 TABLET, FILM COATED ORAL at 21:53

## 2020-12-02 RX ADMIN — ATORVASTATIN CALCIUM 20 MG: 20 TABLET, FILM COATED ORAL at 21:53

## 2020-12-02 RX ADMIN — SODIUM CHLORIDE: 9 INJECTION, SOLUTION INTRAVENOUS at 01:31

## 2020-12-02 RX ADMIN — POTASSIUM CHLORIDE 10 MEQ: 750 TABLET, FILM COATED, EXTENDED RELEASE ORAL at 21:53

## 2020-12-02 RX ADMIN — BUSPIRONE HYDROCHLORIDE 15 MG: 15 TABLET ORAL at 15:26

## 2020-12-02 RX ADMIN — TIZANIDINE 4 MG: 4 TABLET ORAL at 13:36

## 2020-12-02 RX ADMIN — CARBIDOPA AND LEVODOPA 1 TABLET: 10; 100 TABLET ORAL at 21:53

## 2020-12-02 RX ADMIN — ASPIRIN 81 MG CHEWABLE TABLET 81 MG: 81 TABLET CHEWABLE at 10:17

## 2020-12-02 RX ADMIN — AMITRIPTYLINE HYDROCHLORIDE 25 MG: 25 TABLET, FILM COATED ORAL at 00:15

## 2020-12-02 RX ADMIN — OXYCODONE HYDROCHLORIDE AND ACETAMINOPHEN 1 TABLET: 5; 325 TABLET ORAL at 07:22

## 2020-12-02 RX ADMIN — QUETIAPINE FUMARATE 25 MG: 25 TABLET ORAL at 00:22

## 2020-12-02 RX ADMIN — POTASSIUM CHLORIDE 10 MEQ: 750 TABLET, FILM COATED, EXTENDED RELEASE ORAL at 00:21

## 2020-12-02 RX ADMIN — TIZANIDINE 4 MG: 4 TABLET ORAL at 21:53

## 2020-12-02 RX ADMIN — LEVOTHYROXINE SODIUM 75 MCG: 75 TABLET ORAL at 06:24

## 2020-12-02 RX ADMIN — AZITHROMYCIN MONOHYDRATE 500 MG: 500 INJECTION, POWDER, LYOPHILIZED, FOR SOLUTION INTRAVENOUS at 13:36

## 2020-12-02 RX ADMIN — DIPHENHYDRAMINE HYDROCHLORIDE 25 MG: 50 INJECTION INTRAMUSCULAR; INTRAVENOUS at 07:22

## 2020-12-02 RX ADMIN — ASPIRIN 81 MG CHEWABLE TABLET 81 MG: 81 TABLET CHEWABLE at 00:20

## 2020-12-02 RX ADMIN — LEVETIRACETAM 500 MG: 500 TABLET, FILM COATED ORAL at 00:23

## 2020-12-02 RX ADMIN — POTASSIUM CHLORIDE 10 MEQ: 750 TABLET, FILM COATED, EXTENDED RELEASE ORAL at 10:17

## 2020-12-02 RX ADMIN — HYDROCODONE BITARTRATE AND ACETAMINOPHEN 1 TABLET: 7.5; 325 TABLET ORAL at 22:09

## 2020-12-02 RX ADMIN — OXCARBAZEPINE 150 MG: 150 TABLET, FILM COATED ORAL at 11:18

## 2020-12-02 RX ADMIN — METOPROLOL SUCCINATE 25 MG: 25 TABLET, EXTENDED RELEASE ORAL at 10:18

## 2020-12-02 RX ADMIN — MAGNESIUM OXIDE 400 MG: 400 TABLET ORAL at 00:22

## 2020-12-02 RX ADMIN — MAGNESIUM OXIDE 400 MG: 400 TABLET ORAL at 21:53

## 2020-12-02 RX ADMIN — MONTELUKAST 10 MG: 10 TABLET, FILM COATED ORAL at 21:53

## 2020-12-02 RX ADMIN — LOSARTAN POTASSIUM 100 MG: 50 TABLET, FILM COATED ORAL at 00:20

## 2020-12-02 RX ADMIN — SODIUM CHLORIDE: 9 INJECTION, SOLUTION INTRAVENOUS at 10:28

## 2020-12-02 RX ADMIN — IOPAMIDOL 85 ML: 755 INJECTION, SOLUTION INTRAVENOUS at 09:41

## 2020-12-02 RX ADMIN — HYDROCHLOROTHIAZIDE 25 MG: 25 TABLET ORAL at 10:18

## 2020-12-02 RX ADMIN — ENOXAPARIN SODIUM 80 MG: 80 INJECTION SUBCUTANEOUS at 00:19

## 2020-12-02 ASSESSMENT — PAIN DESCRIPTION - DESCRIPTORS
DESCRIPTORS: ACHING;CONSTANT;DISCOMFORT
DESCRIPTORS: ACHING;CONSTANT;DISCOMFORT

## 2020-12-02 ASSESSMENT — PAIN DESCRIPTION - PAIN TYPE
TYPE: CHRONIC PAIN
TYPE: ACUTE PAIN
TYPE: ACUTE PAIN

## 2020-12-02 ASSESSMENT — PAIN SCALES - GENERAL
PAINLEVEL_OUTOF10: 3
PAINLEVEL_OUTOF10: 10
PAINLEVEL_OUTOF10: 10
PAINLEVEL_OUTOF10: 7
PAINLEVEL_OUTOF10: 10
PAINLEVEL_OUTOF10: 10

## 2020-12-02 ASSESSMENT — PAIN DESCRIPTION - FREQUENCY
FREQUENCY: CONTINUOUS
FREQUENCY: CONTINUOUS

## 2020-12-02 ASSESSMENT — PAIN DESCRIPTION - LOCATION
LOCATION: GENERALIZED

## 2020-12-02 NOTE — CONSULTS
[address] [place] [address], [address] [place] [address]                                  CONSULTATION    PATIENT NAME: Frankie Salazar                   :        1955  MED REC NO:   [id number]                          ROOM:       [id number]  ACCOUNT NO:   [de-identified]                           ADMIT DATE: 2020  PROVIDER:     Arnaud Oleary MD    CONSULT DATE:  2020    HISTORY OF PRESENT ILLNESS:  The patient is a 53-year-old lady with  multiple medical problems including bronchial asthma, hypertension,  hyperlipidemia, obstructive sleep apnea, noncompliant with CPAP,  gastroesophageal reflux disease, congestive heart failure, coronary  artery disease, who was admitted through the emergency room with  complaints of shortness of breath and cough. She denied any hemoptysis. She denied any fever or chills. She denied any nausea or vomiting. She  was complaining of some chest tightness. According to the history, she  had a temperature of 104, but in the ER, the temperature was normal and  she has been afebrile while in the hospital.  She had a CAT scan of the  chest, which showed no evidence of PE. She was given bronchodilator  therapy and was subsequently admitted to the hospital.  She had COVID-19  test, which was negative. PAST MEDICAL HISTORY:  Significant for bronchial asthma, obstructive  sleep apnea, noncompliant with CPAP, arthritis, anxiety disorder,  gastroesophageal reflux disease, hypertension, hyperlipidemia, and  migraine headaches. PAST SURGICAL HISTORY:  Remarkable for tubal ligation, knee arthroscopy,  hysterectomy, hernia repair, esophageal dilatation, colonoscopy, carpal  tunnel release surgery, cardiac catheterization, bladder surgery, and  appendectomy.     FAMILY HISTORY:  Reveals that her mother had diabetes, hypertension,  arthritis, depression, coronary artery disease, and hypercholesterolemia. Her father had heart disease, hypertension, and  hypercholesterolemia. SOCIAL HISTORY:  Reveals that she is a nonsmoker. No history of alcohol  or drug abuse. MEDICATIONS:  Reviewed. ALLERGIES:  She has multiple allergies, 26 of them, as listed in the  Epic. REVIEW OF SYSTEMS:  A 10- to 14-point review of systems was reviewed and  is negative except for what is mentioned in the history of present  illness. PHYSICAL EXAMINATION:  GENERAL:  The patient is alert and oriented x3, in no acute respiratory  distress. VITAL SIGNS:  Her blood pressure is 163/79 mmHg, pulse of 82 per minute,  and respiratory rate of 20 per minute. She is afebrile. Her saturation  is 97% on room air. HEENT:  Essentially unremarkable. NECK:  There is no JVD. No lymphadenopathy. The neck is supple. LUNG:  Revealed diminished breath sounds. HEART:  Showed normal S1 and S2. There was no S3 or S4 noted. ABDOMEN:  Benign. There is no evidence of any organomegaly. The bowel  sounds are present. NEUROLOGIC:  Reveals that she is awake and responsive, answering  questions appropriately, and moving her extremities. IMAGING:  Her CAT scan of the chest showed no evidence of PE and no  acute infiltrate. Her duplex leg scan was negative. LABORATORY DATA:  Her COVID-19 test was negative. Her electrolytes  showed a sodium of 135, potassium 3.4, chloride 96, carbon dioxide 22,  BUN 8, creatinine 0.5. Her proBNP was 47.12. Her CBC showed a white  count of 14.9, hemoglobin 14.4, and hematocrit of 40.4. IMPRESSION:  1. Acute asthmatic bronchitis. 2.  Obstructive sleep apnea, noncompliant with CPAP. 3.  Anxiety disorder. PLAN:  1. We will start the patient on Zithromax. 2.  Combivent two puffs four times a day. 3.  Singulair 10 mg at bedtime. 4.  The patient has had reaction with Decadron and steroids. We will  try to avoid steroids. 5.  Sputum for culture and sensitivity. 6. Check magnesium and phosphorus. 7.  Check procalcitonin level. 8.  As per orders.         Kenney Field MD    D: 12/02/2020 11:32:13       T: 12/02/2020 14:48:39     [email or web address]  Job#: [id number]     Doc#: [id number]    CC:

## 2020-12-02 NOTE — CONSULTS
Endocrinology   Consult Note      Dear Doctor Rojean Felty     Thank You for the Consult     Pt. Was Admitted for : Severe cough and shortness of breath    Reason for Consult: Better control of blood glucose      History Obtained From:  Patient/ EMR       HISTORY OF PRESENT ILLNESS:                The patient is a 63 y.o. female with significant past medical history of CAD GERD, anxiety, nausea vomiting, asthma, CVA, CHF, diabetes mellitus with diabetic neuropathy, hypertension, hyperlipidemia comes in complaining of severe shortness of breath with wheezing and chest discomfort. I was  consulted for better control of blood glucose. ROS:   Pt's ROS done in detail. Abnormal ROS are noted in Medical and Surgical History Section below: Other Medical History:        Diagnosis Date    Abnormal EKG 4/22/2014    Acid reflux     Anemia     Anesthesia     Nausea/Vomiting Post Op In Past    Anginal pain (HCC)     Denies Chest Pain At This Time    Anxiety     Arthritis     \"All Over\"    Asthma     CAD (coronary artery disease)     per last cardiac cath.  Cerebral artery occlusion with cerebral infarction ([address]     CHF (congestive heart failure) (HCC)     Chronic back pain     Chronic kidney disease     DDD (degenerative disc disease), cervical     12- Patient reports she was dx with DDD of Cerival spine C6,C7    Depression     Diabetes mellitus ([address] Dx 1990's    Diabetic neuropathy ([address]     \"In My Legs And Feet\"    Dizziness     \"Sometimes\"    Dry skin     Enlarged ureter     Right Side    Fatty liver     Fibrocystic breast     Gout     Pt states she was diagnosed with gout in the past few months.  H/O cardiac catheterization     Showed mild disease per last cath.  H/O cardiovascular stress test 03/15/2010    EF 69%, normal perfusion study except for diaphragmatic artifact, uniform wall motion.  H/O cardiovascular stress test 10/09/2008    EF 60%, no anginia, normal study.     H/O cardiovascular stress test 05/06/2014    EF 66%, no ischemia, normal LV systolic funciton, normal perfusion pattern.  H/O Doppler ultrasound 02/28/2011    CAROTID DOPPLER-normal study.  H/O echocardiogram 5/6/2014    Ef >55%. Impaired LV relaxation.  H/O echocardiogram 10/14/15    EF 60% Normal LV and systolic function. No significant valvulopathy seen.  History of Holter monitoring 3/24/15    24 hour - predominant rhythm sinus    [place] (hard of hearing)     Bilateral Ears    Hx of cardiovascular stress test 10/19/2015    lexiscan-normal,EF63%    Hx of motion sickness     HX OTHER MEDICAL     Primary Care Physician Is Dr[facility] Yasmeen Leach In [facility]    Hyperlipidemia     Hypertension     IBS (irritable bowel syndrome)     Incisional hernia 4/2014    Kidney stones Last Episode In 2012 Or 2013    Passed Kidney Stones Numberous Times    Migraines     Nausea & vomiting     Nausea/Vomiting Post Op In Past    Other specified disorder of skin     12- Patient states she has a condition of her vaginal area (skin) which starts with the letters Nirav Pavon. She is currently being treated with multiple creams and weekly Diflucan. 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     Staph infection Dx 1980's    Toes On Left Foot    Thyroid disease     hypothroidism    Tremor     \"Tremors All Over\"    Urinary incontinence     UTI (urinary tract infection) In Past    No Current Symptoms    UTI (urinary tract infection)     Vertigo     \"Sometimes\"    Wears glasses      Surgical History:        Procedure Laterality Date    APPENDECTOMY  1970's    Done With Cholecystectomy    BLADDER SURGERY  1970's Or 1980's    \"Stretched The Opening To The Bladder\", \"Total Of Four Bladder Surgeries\"    Borderline Diabetes    High Blood Pressure Mother     Arthritis Mother     Early Death Mother 61        Stroke    Depression Mother     Heart Disease Mother     High Cholesterol Mother    Christina Prema / Stillbirths Mother     Heart Disease Father         Massive Heart Attack    High Blood Pressure Father     Arthritis Father     High Cholesterol Father     Cancer Brother         Liver And Colon Cancer    Early Death Brother 37        Liver And Colon Cancer    Heart Disease Brother         Heart Stents    High Blood Pressure Brother     High Cholesterol Brother     Early Death Brother         65 Years Old,Hit By A Car    Colon Cancer Brother     Hearing Loss Sister     Heart Failure Sister     High Blood Pressure Sister     Arthritis Sister     Heart Disease Brother     Heart Disease Sister     Cancer Sister     Early Death Brother 61        Heart Attack    Heart Disease Brother         Heart Attack    Mental Illness Daughter         Bipolar    Depression Daughter     Anxiety Disorder Daughter     Other Son         Seizures    Other Daughter         Stomach And Bowel Problems     REVIEW OF SYSTEMS:  Review of System Done as noted above     PHYSICAL EXAM:      Vitals:    BP (!) 163/79   Pulse 82   Temp 98.1 °F (36.7 °C) (Oral)   Resp 20   Ht 5' 2\" (1.575 m)   Wt 172 lb 1 oz (78 kg)   SpO2 97%   BMI 31.47 kg/m²     CONSTITUTIONAL:  awake, alert, cooperative, appears stated age   EYES:  vision intact Fundoscopic Exam not performed   ENT:Normal  NECK:  Supple, No JVD.    Thyroid Exam:Normal   LUNGS:  Has Vesicular Breath Sounds, has occasional wheezing  CARDIOVASCULAR:  Normal apical impulse, regular rate and rhythm, normal S1 and S2, no S3 or S4, and has no  murmur   ABDOMEN:  No scars, normal bowel sounds, soft, non-distended, non-tender, no masses palpated, no hepatolienomegaly  Musculoskeletal: Normal  Extremities: Normal, peripheral pulses normal, , has no edema   NEUROLOGIC: Awake, alert, oriented to name, place and time. Cranial nerves II-XII are grossly intact. Motor is  intact. Sensory neuropathy t. ,  and gait is abnormal.    DATA:    CBC:   Recent Labs     12/01/20 0917   WBC 12.7*   HGB 14.2       CMP:  Recent Labs     12/01/20 0917      K 3.4*   CL 96*   CO2 22   BUN 8   CREATININE 0.5*   CALCIUM 9.7   PROT 7.6   LABALBU 4.1   BILITOT 0.5   ALKPHOS 81   AST 27   ALT 24     Lipids:   Lab Results   Component Value Date    CHOL 135 06/28/2020    HDL 58 06/28/2020    TRIG 263 06/28/2020     Glucose:   Recent Labs     12/01/20 2031 12/02/20  0013   POCGLU 221* 274*     Hemoglobin A1C:   Lab Results   Component Value Date    LABA1C 7.8 06/27/2020     Free T4:   Lab Results   Component Value Date    T4FREE 1.47 09/02/2020     Free T3:   Lab Results   Component Value Date    FT3 2.2 07/15/2016     TSH High Sensitivity:   Lab Results   Component Value Date    TSHHS 0.501 09/02/2020       Xr Chest Portable    Result Date: 12/1/2020  EXAMINATION: ONE XRAY VIEW OF THE CHEST 12/1/2020 9:13 am COMPARISON: 11/02/2020 HISTORY: ORDERING SYSTEM PROVIDED HISTORY: chest pain TECHNOLOGIST PROVIDED HISTORY: Reason for exam:->chest pain Reason for Exam: chest pain FINDINGS: Normal heart size and pulmonary vasculature. No focal consolidations, pleural effusions, or pneumothorax. No evidence of acute process.      Vl Dup Lower Extremity Venous Bilateral    Result Date: 12/2/2020  EXAMINATION: DUPLEX VENOUS ULTRASOUND OF THE BILATERAL LOWER EXTREMITIES, 12/2/2020 5:29 am TECHNIQUE: Duplex ultrasound and Doppler images were obtained of the bilateral lower extremities COMPARISON: 09/15/2020 HISTORY: ORDERING SYSTEM PROVIDED HISTORY: concern for PE, r/o DVT TECHNOLOGIST PROVIDED HISTORY: Reason for exam:->concern for PE, r/o DVT Reason for Exam: Leg pain Acuity: Chronic Type of Exam: Ongoing FINDINGS: The visualized veins of the bilateral lower extremities are patent and free of echogenic thrombus. The veins are compressible and demonstrate gross flow. No evidence of DVT in either lower extremity. Cta Pulmonary W Contrast    Result Date: 12/1/2020  EXAMINATION: CTA OF THE CHEST 12/1/2020 2:26 pm       1. Moderately to severely suboptimal examination related to poor timing of the contrast bolus. No central pulmonary embolism. No evidence of right heart strain. Exam could be repeated if clinically indicated. 2. Fatty liver.        Scheduled Medicines   Medications:    tiZANidine  4 mg Oral TID    insulin lispro  10 Units Subcutaneous TID WC    insulin glargine  30 Units Subcutaneous Nightly    insulin lispro  0-6 Units Subcutaneous 2 times per day    amitriptyline  25 mg Oral Nightly    aspirin  81 mg Oral Daily    busPIRone  15 mg Oral TID    carbidopa-levodopa  1 tablet Oral Nightly    levothyroxine  75 mcg Oral QAM AC    montelukast  10 mg Oral Nightly    OXcarbazepine  150 mg Oral BID    potassium chloride  10 mEq Oral BID    QUEtiapine  25 mg Oral Nightly    atorvastatin  20 mg Oral Daily    insulin lispro  0-12 Units Subcutaneous TID WC    enoxaparin  1 mg/kg Subcutaneous BID    losartan  100 mg Oral Daily    And    hydroCHLOROthiazide  25 mg Oral Daily    magnesium oxide  400 mg Oral BID    metoprolol succinate  25 mg Oral Daily    NIFEdipine  60 mg Oral Daily    levETIRAcetam  750 mg Oral Nightly      Infusions:    sodium chloride 100 mL/hr at 12/02/20 0131         IMPRESSION    Patient Active Problem List   Diagnosis    Chest pain    H/O Doppler ultrasound    H/O cardiovascular stress test    H/O cardiovascular stress test    H/O cardiovascular stress test    Incarcerated umbilical hernia    Essential hypertension    Type 2 diabetes mellitus with diabetic polyneuropathy, with long-term current use of insulin ([facility])    Tachycardia    Dyslipidemia    Family history of early CAD    NSTEMI (non-ST elevated myocardial infarction) ([address]    Abnormal nuclear stress test    ILSA (obstructive sleep apnea)    Snoring    Hypersomnolence    Mild intermittent asthma without complication    Asthma exacerbation attacks    Hypertensive emergency    Hallucination, visual    Severe episode of recurrent major depressive disorder, with psychotic features [facility]    Generalized anxiety disorder    Auditory hallucinations    Bipolar I disorder with depression, severe (HCC)    Shortness of breath         RECOMMENDATIONS:      1. Reviewed POC blood glucose . Labs and X ray results   2. Reviewed Home and Current Medicines   3. Will Start On meal/ Correction bolus Humalog/ Lantus Insulin regime   4. Monitor Blood glucose frequently   5. Modify  the dose of Insulin/as needed        Will follow with you  Again thank you for sharing pt's care with me.      Truly yours,       Kami Harding MD

## 2020-12-02 NOTE — CARE COORDINATION
Met c pt to initiate discharge planning. Pt states she lives at home, she does not drive, dghtrs provide transport. Pt has pcp/ active insurance and can afford meds. Typically pt is able to ambulate without AD however prior to admit needed to start using her RW. Pt states she is active with [facility] for aide services for 2hrs each M/W/F. Pt was agreeable to [facility] follow up, no pref for provider if [facility] unable to provide skilled care. I called [facility] [phone] ([phone] fax) and spoke with Luisana Ornelas. Confirmed pt is active and they only provide aides, no skilled services. I therefore sent referral to [facility] with [facility] via [facility] to follow as well. Upon discharge please notify  [facility] upon discharge, [phone], they will provide fax number to send [facility] order & [address] [phone] of pt's discharge.

## 2020-12-02 NOTE — PROGRESS NOTES
[address]    Hospitalist Progress Note      Name:  Randi Traylor /Age/Sex: 1955  (63 y.o. female)   MRN & CSN:  [id number] & [id number] Admission Date/Time: 2020  8:55 AM   Location:  [address] [place] [address] PCP: Lindy Pena MD         Hospital Day: 2    Assessment and Plan:   Randi Traylor is a 63 y.o.  female  who presents with dyspnea    Dyspnea -unclear etiology  Associated chest tightness    Mostly exertional, not requiring any oxygen supplementation  CT chest no PE or any infectious process-done twice  Troponin negative, EKG unremarkable  ECHO with LVEF 50 to 55%  Cardiology planning stress test in a.m.    Mild intermittent asthma with bronchitis    Started on albuterol/Atrovent and Zithromax per pulmonary  Continue with Singulair    DM type II- on insulin sliding scale and Lantus    Hypertension -on metoprolol, Procardia, losartan, hydrochlorothiazide    Chronic conditions: Resume home medications unless contraindicated    Parkinson's disease  Depression/anxiety disorder  Hyperlipidemia  Seizure disorder  Hypothyroidism  ILSA    Diet No diet orders on file   DVT Prophylaxis [] Lovenox, []  Heparin, [] SCDs, []No VTE prophylaxis, patient ambulating   GI Prophylaxis [] PPI, [] H2 Blocker, [] No GI prophylaxis, patient is receiving diet/Tube Feeds   Code Status Prior   Disposition Patient requires continued admission due to dyspnea   MDM [] Low, [] Moderate,[x]  High     History of Present Illness: Subjective     Patient Seen & Examined at the bedside      Patient is resting in bed with no distress while on room air  Denies any chest pain or palpitation    Ten point ROS reviewed negative, unless as noted above    Objective:   No intake or output data in the 24 hours ending 20 1556   Vitals:   Vitals:    20 0806   BP: (!) 163/79   Pulse: 82   Resp: 20   Temp: 98.1 °F (36.7 °C)   SpO2: 97%     Physical Exam:    GEN Awake female, resting in bed in no apparent distress.  Appears given age.  HENT Mucous membranes are moist.   RESP Clear to auscultation, no wheezes, rales or rhonchi. CARDIO/VASC -S1/S2 auscultated. Regular rate without appreciable murmurs, rubs, or gallops. Peripheral pulses equal bilaterally and palpable. No peripheral edema. GI Abdomen is soft without significant tenderness, masses, or guarding. Bowel sounds are normoactive. Rectal exam deferred.  Mercer catheter is not present.     Medications:   Medications:    tiZANidine  4 mg Oral TID    insulin lispro  10 Units Subcutaneous TID WC    insulin glargine  30 Units Subcutaneous Nightly    insulin lispro  0-6 Units Subcutaneous 2 times per day    albuterol sulfate HFA  2 puff Inhalation 4x daily    ipratropium  2 puff Inhalation 4x daily    azithromycin  500 mg Intravenous Q24H    amitriptyline  25 mg Oral Nightly    aspirin  81 mg Oral Daily    busPIRone  15 mg Oral TID    carbidopa-levodopa  1 tablet Oral Nightly    levothyroxine  75 mcg Oral QAM AC    montelukast  10 mg Oral Nightly    OXcarbazepine  150 mg Oral BID    potassium chloride  10 mEq Oral BID    QUEtiapine  25 mg Oral Nightly    atorvastatin  20 mg Oral Daily    insulin lispro  0-12 Units Subcutaneous TID WC    enoxaparin  1 mg/kg Subcutaneous BID    losartan  100 mg Oral Daily    And    hydroCHLOROthiazide  25 mg Oral Daily    magnesium oxide  400 mg Oral BID    metoprolol succinate  25 mg Oral Daily    NIFEdipine  60 mg Oral Daily    levETIRAcetam  750 mg Oral Nightly      Infusions:   PRN Meds: albuterol sulfate HFA, 2 puff, Q6H PRN  magnesium sulfate, 1 g, PRN          Electronically signed by Deja Donato MD on 12/2/2020 at 3:56 PM

## 2020-12-02 NOTE — PLAN OF CARE
Problem: Falls - Risk of:  Goal: Will remain free from falls  Description: Will remain free from falls  Outcome: Ongoing  Goal: Absence of physical injury  Description: Absence of physical injury  Outcome: Ongoing     Problem: Pain:  Goal: Pain level will decrease  Description: Pain level will decrease  Outcome: Ongoing  Goal: Control of acute pain  Description: Control of acute pain  Outcome: Ongoing  Goal: Control of chronic pain  Description: Control of chronic pain  Outcome: Ongoing     Problem: OXYGENATION/RESPIRATORY FUNCTION  Goal: Patient will maintain patent airway  Outcome: Ongoing  Goal: Patient will achieve/maintain normal respiratory rate/effort  Description: Respiratory rate and effort will be within normal limits for the patient  Outcome: Ongoing

## 2020-12-02 NOTE — CONSULTS
INPATIENT CARDIOLOGY CONSULT NOTE       Reason for consultation: Shortness of breath    Referring physician:  Ginger Brown MD     Primary care physician: Zhen Weaver MD      Dear Ginger Brown MD Thank you for the consult    Chief Complaint   Patient presents with    Shortness of Breath     Started this morning    Anxiety       History of present illness:Simona is a 63 y. o.year old who  presents with  Chief Complaint   Patient presents with    Shortness of Breath     Started this morning    Anxiety     Patient is a 59-year-old female with prior medical history significant for hyperlipidemia, hypertension, sleep apnea presents to the hospital with chief complaint of shortness of breath. Patient's mentions that she started feeling short of breath and develop a cough 1 day ago. She has been using bronchodilators without much effect. She called the paramedics and was brought to the hospital.  Patient denies any chest pain today however she did feel chest discomfort yesterday with episode of shortness of breath. She has chronic headache dizziness and lightheadedness. In the ER CTA was performed which was suboptimal for PE  D-dimer is elevated.   EKG shows sinus tachycardia      Past medical history:    has a past medical history of Abnormal EKG, Acid reflux, Anemia, Anesthesia, Anginal pain ([address] Anxiety, Arthritis, Asthma, CAD (coronary artery disease), Cerebral artery occlusion with cerebral infarction ([address] CHF (congestive heart failure) ([address] Chronic back pain, Chronic kidney disease, DDD (degenerative disc disease), cervical, Depression, Diabetes mellitus ([address] Diabetic neuropathy ([address] Dizziness, Dry skin, Enlarged ureter, Fatty liver, Fibrocystic breast, Gout, H/O cardiac catheterization, H/O cardiovascular stress test, H/O cardiovascular stress test, H/O cardiovascular stress test, H/O Doppler ultrasound, H/O echocardiogram, H/O echocardiogram, History of Holter monitoring, [place] (hard of hearing), Hx of cardiovascular stress test, Hx of motion sickness, HX OTHER MEDICAL, Hyperlipidemia, Hypertension, IBS (irritable bowel syndrome), Incisional hernia, Kidney stones, Migraines, Nausea & vomiting, Other specified disorder of skin, Panic attacks, Pneumonia, Pseudoseizures, Restless leg, Shortness of breath, Sleep apnea, Staph infection, Thyroid disease, Tremor, Urinary incontinence, UTI (urinary tract infection), UTI (urinary tract infection), Vertigo, and Wears glasses. Past surgical history:   has a past surgical history that includes Carpal tunnel release (Right, 1999); Diagnostic Cardiac Cath Lab Procedure (01/11/2010); Dental surgery; Colonoscopy (Last Done 6-13); Endoscopy, colon, diagnostic (Several ); Bladder surgery (1970's Or 1980's); Lithotripsy (2011); Breast biopsy (Right, 1980's); Breast surgery (Left, 1990's); hernia repair (6972'K); hernia repair (1970's); Cholecystectomy (1970's); Appendectomy (1970's); Hysterectomy, total abdominal (1987); Tubal ligation (1978); Esophagus dilation (1980's And 1990's); Knee arthroscopy (Right, 1999); joint replacement (2008); other surgical history (06 13 2014); fracture surgery (1974 Or 1975); Cardiac catheterization (10-18-06); and Cardiac catheterization. Social History:   reports that she has never smoked. She has never used smokeless tobacco. She reports that she does not drink alcohol or use drugs.   Family history:   no family history of CAD, STROKE of DM    Allergies   Allergen Reactions    Abilify [Aripiprazole]      \"Severe Shaking And Restlessness\"    Advil [Ibuprofen Micronized] Palpitations     \"Severe High Blood Pressure\"    Augmentin [Amoxicillin-Pot Clavulanate] Itching and Rash    Bee Venom Swelling     Redness    Ciprofloxacin Itching and Rash    Codeine      \"Severe Abdominal Cramping\"    Darvocet [Propoxyphene N-Acetaminophen] Palpitations     \"Severe High Blood Pressure\"    Darvon [Propoxyphene Hcl] Palpitations     \"Severe 81 mg, Daily  busPIRone (BUSPAR) tablet 15 mg, TID  carbidopa-levodopa (SINEMET)  MG per tablet 1 tablet, Nightly  levothyroxine (SYNTHROID) tablet 75 mcg, QAM AC  montelukast (SINGULAIR) tablet 10 mg, Nightly  OXcarbazepine (TRILEPTAL) tablet 150 mg, BID  potassium chloride (KLOR-CON) extended release tablet 10 mEq, BID  QUEtiapine (SEROQUEL) tablet 25 mg, Nightly  atorvastatin (LIPITOR) tablet 20 mg, Daily  insulin lispro (HUMALOG) injection vial 0-12 Units, TID   enoxaparin (LOVENOX) injection 80 mg, BID  losartan (COZAAR) tablet 100 mg, Daily    And  hydroCHLOROthiazide (HYDRODIURIL) tablet 25 mg, Daily  magnesium oxide (MAG-OX) tablet 400 mg, BID  metoprolol succinate (TOPROL XL) extended release tablet 25 mg, Daily  NIFEdipine (PROCARDIA XL) extended release tablet 60 mg, Daily  levETIRAcetam (KEPPRA) tablet 750 mg, Nightly      Current Facility-Administered Medications   Medication Dose Route Frequency Provider Last Rate Last Dose    albuterol sulfate  (90 Base) MCG/ACT inhaler 2 puff  2 puff Inhalation Q6H PRN Jad Wu MD        tiZANidine (ZANAFLEX) tablet 4 mg  4 mg Oral TID Jad Wu MD        magnesium sulfate 1 g in dextrose 5% 100 mL IVPB  1 g Intravenous PRN Jad Wu MD        0.9 % sodium chloride infusion   Intravenous Continuous Jad Wu  mL/hr at 12/02/20 0131      insulin lispro (HUMALOG) injection vial 10 Units  10 Units Subcutaneous TID  Gilbert Allen MD        insulin glargine (LANTUS) injection vial 30 Units  30 Units Subcutaneous Nightly  Summer Schrader MD        insulin lispro (HUMALOG) injection vial 0-6 Units  0-6 Units Subcutaneous 2 times per day Gilbert Allen MD        amitriptyline (ELAVIL) tablet 25 mg  25 mg Oral Nightly Jad Wu MD   25 mg at 12/02/20 0015    aspirin chewable tablet 81 mg  81 mg Oral Daily Jad Wu MD   81 mg at 12/02/20 0020    busPIRone (BUSPAR) tablet 15 mg  15 mg Oral TID Jad Wu MD   15 mg at 12/02/20 9344    carbidopa-levodopa (SINEMET)  MG per tablet 1 tablet  1 tablet Oral Nightly Dayna Galvan MD   1 tablet at 12/02/20 0014    levothyroxine (SYNTHROID) tablet 75 mcg  75 mcg Oral QAM AC Dayna Galvan MD   75 mcg at 12/02/20 0624    montelukast (SINGULAIR) tablet 10 mg  10 mg Oral Nightly Dayna Galvan MD   10 mg at 12/02/20 0022    OXcarbazepine (TRILEPTAL) tablet 150 mg  150 mg Oral BID Dayna Galvan MD   150 mg at 12/02/20 0022    potassium chloride (KLOR-CON) extended release tablet 10 mEq  10 mEq Oral BID Dayna Galvan MD   10 mEq at 12/02/20 0021    QUEtiapine (SEROQUEL) tablet 25 mg  25 mg Oral Nightly Dayna Galvan MD   25 mg at 12/02/20 0022    atorvastatin (LIPITOR) tablet 20 mg  20 mg Oral Daily Dayna Galvan MD   20 mg at 12/02/20 0014    insulin lispro (HUMALOG) injection vial 0-12 Units  0-12 Units Subcutaneous TID WC Dayna Galvan MD        enoxaparin (LOVENOX) injection 80 mg  1 mg/kg Subcutaneous BID Dayna Galvan MD   80 mg at 12/02/20 0019    losartan (COZAAR) tablet 100 mg  100 mg Oral Daily Dayna Galvan MD   100 mg at 12/02/20 0020    And    hydroCHLOROthiazide (HYDRODIURIL) tablet 25 mg  25 mg Oral Daily Dayna Galvan MD   25 mg at 12/02/20 0014    magnesium oxide (MAG-OX) tablet 400 mg  400 mg Oral BID Dayna Galvan MD   400 mg at 12/02/20 0022    metoprolol succinate (TOPROL XL) extended release tablet 25 mg  25 mg Oral Daily Dayna Galvan MD        NIFEdipine (PROCARDIA XL) extended release tablet 60 mg  60 mg Oral Daily Dayna Galvan MD        levETIRAcetam (KEPPRA) tablet 750 mg  750 mg Oral Nightly Dayna Galvan MD             Review of Systems:     · Constitutional: No Fever or Weight Loss   · Eyes: No Decreased Vision  · ENT: No Headaches, Hearing Loss or Vertigo  · Cardiovascular: + chest pain, +++ dyspnea on exertion, no palpitations or loss of consciousness  · Respiratory: No cough or wheezing    · Gastrointestinal: No abdominal pain, appetite loss, blood in stools, constipation, diarrhea or heartburn  · Genitourinary: No dysuria, trouble voiding, or hematuria  · Musculoskeletal:  No gait disturbance, weakness or joint complaints  · Integumentary: No rash or pruritis  · Neurological: No TIA or stroke symptoms  · Psychiatric: No anxiety or depression  · Endocrine: No malaise, fatigue or temperature intolerance  · Hematologic/Lymphatic: No bleeding problems, blood clots or swollen lymph nodes  · Allergic/Immunologic: No nasal congestion or hives    All other systems were reviewed and were negative otherwise. Physical Examination:      Vitals:    12/02/20 0806   BP: (!) 163/79   Pulse: 82   Resp: 20   Temp: 98.1 °F (36.7 °C)   SpO2: 97%      Wt Readings from Last 3 Encounters:   12/02/20 172 lb 1 oz (78 kg)   11/02/20 170 lb (77.1 kg)   09/15/20 160 lb (72.6 kg)     Body mass index is 31.47 kg/m². General Appearance:  No distress, conversant  Constitutional:  Well developed, Well nourished  HEENT:  Normocephalic, Atraumatic, Oropharynx moist, No oral exudates,   Nose normal. Neck Supple Carotid: no carotid bruit  Eyes:  Conjunctiva normal, No discharge. Respiratory:    Normal breath sounds, Mild respiratory distress, No wheezing, no use of accessory muscles, diaphragm movement is normal  No chest Tenderness  Cardiovascular: S1-S2 No murmurs auscultated. No rubs, thrills or gallops. Normal  rhythm. Pedal pulses are normal. No pedal edema  GI:  Soft Non tender, non distended. :  No CVA tenderness. Musculoskeletal:   No tenderness, No cyanosis, No clubbing. Integument:  Warm, Dry, No erythema, No rash. Lymphatic:  No lymphadenopathy noted. Neurologic:  Alert & oriented x 3  No focal deficits noted.    Psychiatric:  Affect normal, Judgment normal, Mood normal.       Lab Review     Recent Labs     12/01/20  0917   WBC 12.7*   HGB 14.2   HCT 41.7         Recent Labs     12/01/20  0917      K 3.4*   CL 96*   CO2 22   BUN 8   CREATININE 0.5* Recent Labs     12/01/20  0917   AST 27   ALT 24   BILITOT 0.5   ALKPHOS 81     No results for input(s): TROPONINI in the last 72 hours. Lab Results   Component Value Date    BNP 51.0 02/15/2014    BNP 55 11/10/2013    BNP 21.6 11/08/2013     Lab Results   Component Value Date    INR 0.95 06/24/2020    PROTIME 11.5 (L) 06/24/2020         All labs, images, EKGs were personally reviewed      Assessment: 63 y. o.year old with PMH of  has a past medical history of Abnormal EKG, Acid reflux, Anemia, Anesthesia, Anginal pain ([address] Anxiety, Arthritis, Asthma, CAD (coronary artery disease), Cerebral artery occlusion with cerebral infarction ([address] CHF (congestive heart failure) ([address] Chronic back pain, Chronic kidney disease, DDD (degenerative disc disease), cervical, Depression, Diabetes mellitus ([address] Diabetic neuropathy ([address] Dizziness, Dry skin, Enlarged ureter, Fatty liver, Fibrocystic breast, Gout, H/O cardiac catheterization, H/O cardiovascular stress test, H/O cardiovascular stress test, H/O cardiovascular stress test, H/O Doppler ultrasound, H/O echocardiogram, H/O echocardiogram, History of Holter monitoring, [place] (hard of hearing), Hx of cardiovascular stress test, Hx of motion sickness, HX OTHER MEDICAL, Hyperlipidemia, Hypertension, IBS (irritable bowel syndrome), Incisional hernia, Kidney stones, Migraines, Nausea & vomiting, Other specified disorder of skin, Panic attacks, Pneumonia, Pseudoseizures, Restless leg, Shortness of breath, Sleep apnea, Staph infection, Thyroid disease, Tremor, Urinary incontinence, UTI (urinary tract infection), UTI (urinary tract infection), Vertigo, and Wears glasses. Recommendations:    1. Shortness of breath  2. Atypical chest pain  3. Elevated D-dimer  4. HTN -   5. HLD - cont statins   6. DM II       63 yr old f.  W/ shortness of breath + Retrosternal chest discomfort   EKG sinus tachycardia   DDIMER  Elevated   CTA suboptimal study     [facility] 2017 reviewed: Normal Coronaries  Echo today Prelim review: No RV strain, normal LV function.      Dyspnea + elevated DDimer + tachycardia, highly suspisous for PE   Will repeat CTA   If negative stress test in am   \    Thank you for the consult    Dr. Vicky Sands  12/2/2020 9:01 AM

## 2020-12-02 NOTE — ED NOTES
Called pharmacy about needed Humalog. Khang Perez states he will send it up.       Divina Bronson RN  12/01/20 6925

## 2020-12-03 ENCOUNTER — APPOINTMENT (OUTPATIENT)
Dept: CT IMAGING | Age: 65
DRG: 204 | End: 2020-12-03
Payer: MEDICARE

## 2020-12-03 LAB
ALBUMIN SERPL-MCNC: 3.9 GM/DL (ref 3.4–5)
ALP BLD-CCNC: 57 IU/L (ref 40–128)
ALT SERPL-CCNC: 19 U/L (ref 10–40)
ANION GAP SERPL CALCULATED.3IONS-SCNC: 11 MMOL/L (ref 4–16)
AST SERPL-CCNC: 31 IU/L (ref 15–37)
BASOPHILS ABSOLUTE: 0.1 K/CU MM
BASOPHILS RELATIVE PERCENT: 0.9 % (ref 0–1)
BILIRUB SERPL-MCNC: 0.4 MG/DL (ref 0–1)
BUN BLDV-MCNC: 7 MG/DL (ref 6–23)
CALCIUM SERPL-MCNC: 9.2 MG/DL (ref 8.3–10.6)
CHLORIDE BLD-SCNC: 102 MMOL/L (ref 99–110)
CO2: 25 MMOL/L (ref 21–32)
CREAT SERPL-MCNC: 0.6 MG/DL (ref 0.6–1.1)
DIFFERENTIAL TYPE: ABNORMAL
EOSINOPHILS ABSOLUTE: 0.4 K/CU MM
EOSINOPHILS RELATIVE PERCENT: 6.4 % (ref 0–3)
GFR AFRICAN AMERICAN: >60 ML/MIN/1.73M2
GFR NON-AFRICAN AMERICAN: >60 ML/MIN/1.73M2
GLUCOSE BLD-MCNC: 124 MG/DL (ref 70–99)
GLUCOSE BLD-MCNC: 130 MG/DL (ref 70–99)
GLUCOSE BLD-MCNC: 133 MG/DL (ref 70–99)
GLUCOSE BLD-MCNC: 143 MG/DL (ref 70–99)
GLUCOSE BLD-MCNC: 160 MG/DL (ref 70–99)
GLUCOSE BLD-MCNC: 164 MG/DL (ref 70–99)
HCT VFR BLD CALC: 35.6 % (ref 37–47)
HEMOGLOBIN: 11.9 GM/DL (ref 12.5–16)
IMMATURE NEUTROPHIL %: 0.3 % (ref 0–0.43)
LYMPHOCYTES ABSOLUTE: 2.9 K/CU MM
LYMPHOCYTES RELATIVE PERCENT: 42.2 % (ref 24–44)
MAGNESIUM: 1.8 MG/DL (ref 1.8–2.4)
MCH RBC QN AUTO: 28.6 PG (ref 27–31)
MCHC RBC AUTO-ENTMCNC: 33.4 % (ref 32–36)
MCV RBC AUTO: 85.6 FL (ref 78–100)
MONOCYTES ABSOLUTE: 0.8 K/CU MM
MONOCYTES RELATIVE PERCENT: 11.7 % (ref 0–4)
NUCLEATED RBC %: 0 %
PDW BLD-RTO: 12.4 % (ref 11.7–14.9)
PHOSPHORUS: 3.4 MG/DL (ref 2.5–4.9)
PLATELET # BLD: 213 K/CU MM (ref 140–440)
PMV BLD AUTO: 9.1 FL (ref 7.5–11.1)
POTASSIUM SERPL-SCNC: 3.8 MMOL/L (ref 3.5–5.1)
PRO-BNP: 116.8 PG/ML
PROCALCITONIN: 7.07
RBC # BLD: 4.16 M/CU MM (ref 4.2–5.4)
REASON FOR REJECTION: NORMAL
REJECTED TEST: NORMAL
SEGMENTED NEUTROPHILS ABSOLUTE COUNT: 2.6 K/CU MM
SEGMENTED NEUTROPHILS RELATIVE PERCENT: 38.5 % (ref 36–66)
SODIUM BLD-SCNC: 138 MMOL/L (ref 135–145)
TOTAL IMMATURE NEUTOROPHIL: 0.02 K/CU MM
TOTAL NUCLEATED RBC: 0 K/CU MM
TOTAL PROTEIN: 6.3 GM/DL (ref 6.4–8.2)
WBC # BLD: 6.8 K/CU MM (ref 4–10.5)

## 2020-12-03 PROCEDURE — 6370000000 HC RX 637 (ALT 250 FOR IP): Performed by: INTERNAL MEDICINE

## 2020-12-03 PROCEDURE — 6360000002 HC RX W HCPCS: Performed by: INTERNAL MEDICINE

## 2020-12-03 PROCEDURE — 83880 ASSAY OF NATRIURETIC PEPTIDE: CPT

## 2020-12-03 PROCEDURE — 94640 AIRWAY INHALATION TREATMENT: CPT

## 2020-12-03 PROCEDURE — 99232 SBSQ HOSP IP/OBS MODERATE 35: CPT | Performed by: INTERNAL MEDICINE

## 2020-12-03 PROCEDURE — 2580000003 HC RX 258: Performed by: INTERNAL MEDICINE

## 2020-12-03 PROCEDURE — 80053 COMPREHEN METABOLIC PANEL: CPT

## 2020-12-03 PROCEDURE — 6360000002 HC RX W HCPCS: Performed by: PSYCHIATRY & NEUROLOGY

## 2020-12-03 PROCEDURE — 2580000003 HC RX 258: Performed by: PSYCHIATRY & NEUROLOGY

## 2020-12-03 PROCEDURE — 2500000003 HC RX 250 WO HCPCS: Performed by: INTERNAL MEDICINE

## 2020-12-03 PROCEDURE — 83735 ASSAY OF MAGNESIUM: CPT

## 2020-12-03 PROCEDURE — 82550 ASSAY OF CK (CPK): CPT

## 2020-12-03 PROCEDURE — 70450 CT HEAD/BRAIN W/O DYE: CPT

## 2020-12-03 PROCEDURE — 6370000000 HC RX 637 (ALT 250 FOR IP): Performed by: PSYCHIATRY & NEUROLOGY

## 2020-12-03 PROCEDURE — 6360000002 HC RX W HCPCS

## 2020-12-03 PROCEDURE — 84146 ASSAY OF PROLACTIN: CPT

## 2020-12-03 PROCEDURE — 1200000000 HC SEMI PRIVATE

## 2020-12-03 PROCEDURE — 84145 PROCALCITONIN (PCT): CPT

## 2020-12-03 PROCEDURE — 84100 ASSAY OF PHOSPHORUS: CPT

## 2020-12-03 PROCEDURE — 82962 GLUCOSE BLOOD TEST: CPT

## 2020-12-03 PROCEDURE — 6370000000 HC RX 637 (ALT 250 FOR IP): Performed by: HOSPITALIST

## 2020-12-03 PROCEDURE — 36415 COLL VENOUS BLD VENIPUNCTURE: CPT

## 2020-12-03 PROCEDURE — 6370000000 HC RX 637 (ALT 250 FOR IP): Performed by: NURSE PRACTITIONER

## 2020-12-03 PROCEDURE — 85025 COMPLETE CBC W/AUTO DIFF WBC: CPT

## 2020-12-03 PROCEDURE — 6360000002 HC RX W HCPCS: Performed by: HOSPITALIST

## 2020-12-03 RX ORDER — LORAZEPAM 2 MG/ML
2 INJECTION INTRAMUSCULAR ONCE
Status: COMPLETED | OUTPATIENT
Start: 2020-12-03 | End: 2020-12-03

## 2020-12-03 RX ORDER — LORAZEPAM 2 MG/ML
INJECTION INTRAMUSCULAR
Status: COMPLETED
Start: 2020-12-03 | End: 2020-12-03

## 2020-12-03 RX ORDER — OXCARBAZEPINE 600 MG/1
300 TABLET, FILM COATED ORAL 2 TIMES DAILY
Status: DISCONTINUED | OUTPATIENT
Start: 2020-12-03 | End: 2020-12-04 | Stop reason: HOSPADM

## 2020-12-03 RX ORDER — LANOLIN ALCOHOL/MO/W.PET/CERES
3 CREAM (GRAM) TOPICAL NIGHTLY PRN
Status: DISCONTINUED | OUTPATIENT
Start: 2020-12-03 | End: 2020-12-04 | Stop reason: HOSPADM

## 2020-12-03 RX ORDER — METOPROLOL TARTRATE 5 MG/5ML
2.5 INJECTION INTRAVENOUS ONCE
Status: COMPLETED | OUTPATIENT
Start: 2020-12-03 | End: 2020-12-03

## 2020-12-03 RX ORDER — LORAZEPAM 2 MG/ML
INJECTION INTRAMUSCULAR
Status: DISCONTINUED
Start: 2020-12-03 | End: 2020-12-03 | Stop reason: WASHOUT

## 2020-12-03 RX ORDER — BUSPIRONE HYDROCHLORIDE 10 MG/1
20 TABLET ORAL 3 TIMES DAILY
Status: DISCONTINUED | OUTPATIENT
Start: 2020-12-03 | End: 2020-12-04 | Stop reason: HOSPADM

## 2020-12-03 RX ORDER — TRAZODONE HYDROCHLORIDE 50 MG/1
50 TABLET ORAL NIGHTLY
Status: DISCONTINUED | OUTPATIENT
Start: 2020-12-03 | End: 2020-12-04 | Stop reason: HOSPADM

## 2020-12-03 RX ADMIN — HYDRALAZINE HYDROCHLORIDE 20 MG: 20 INJECTION INTRAMUSCULAR; INTRAVENOUS at 12:29

## 2020-12-03 RX ADMIN — QUETIAPINE FUMARATE 25 MG: 25 TABLET ORAL at 21:00

## 2020-12-03 RX ADMIN — AZITHROMYCIN MONOHYDRATE 500 MG: 500 INJECTION, POWDER, LYOPHILIZED, FOR SOLUTION INTRAVENOUS at 13:04

## 2020-12-03 RX ADMIN — MONTELUKAST 10 MG: 10 TABLET, FILM COATED ORAL at 20:59

## 2020-12-03 RX ADMIN — TIZANIDINE 4 MG: 4 TABLET ORAL at 21:00

## 2020-12-03 RX ADMIN — TRAZODONE HYDROCHLORIDE 50 MG: 50 TABLET ORAL at 20:59

## 2020-12-03 RX ADMIN — Medication 2 PUFF: at 20:14

## 2020-12-03 RX ADMIN — SODIUM CHLORIDE 750 MG: 900 INJECTION, SOLUTION INTRAVENOUS at 15:11

## 2020-12-03 RX ADMIN — METOPROLOL TARTRATE 2.5 MG: 1 INJECTION, SOLUTION INTRAVENOUS at 09:59

## 2020-12-03 RX ADMIN — ENOXAPARIN SODIUM 80 MG: 80 INJECTION SUBCUTANEOUS at 21:01

## 2020-12-03 RX ADMIN — ALBUTEROL SULFATE 2 PUFF: 90 AEROSOL, METERED RESPIRATORY (INHALATION) at 20:14

## 2020-12-03 RX ADMIN — BUSPIRONE HYDROCHLORIDE 20 MG: 10 TABLET ORAL at 21:00

## 2020-12-03 RX ADMIN — AMITRIPTYLINE HYDROCHLORIDE 25 MG: 25 TABLET, FILM COATED ORAL at 21:00

## 2020-12-03 RX ADMIN — POTASSIUM CHLORIDE 10 MEQ: 750 TABLET, FILM COATED, EXTENDED RELEASE ORAL at 21:01

## 2020-12-03 RX ADMIN — HYDROCODONE BITARTRATE AND ACETAMINOPHEN 1 TABLET: 7.5; 325 TABLET ORAL at 21:00

## 2020-12-03 RX ADMIN — LORAZEPAM 2 MG: 2 INJECTION INTRAMUSCULAR at 09:20

## 2020-12-03 RX ADMIN — LORAZEPAM 2 MG: 2 INJECTION INTRAMUSCULAR; INTRAVENOUS at 09:35

## 2020-12-03 RX ADMIN — ATORVASTATIN CALCIUM 20 MG: 20 TABLET, FILM COATED ORAL at 21:00

## 2020-12-03 RX ADMIN — CARBIDOPA AND LEVODOPA 1 TABLET: 10; 100 TABLET ORAL at 21:08

## 2020-12-03 RX ADMIN — LORAZEPAM 2 MG: 2 INJECTION INTRAMUSCULAR at 09:35

## 2020-12-03 RX ADMIN — OXCARBAZEPINE 300 MG: 600 TABLET, FILM COATED ORAL at 21:01

## 2020-12-03 RX ADMIN — HYDROCODONE BITARTRATE AND ACETAMINOPHEN 1 TABLET: 7.5; 325 TABLET ORAL at 18:02

## 2020-12-03 RX ADMIN — ENOXAPARIN SODIUM 80 MG: 80 INJECTION SUBCUTANEOUS at 08:46

## 2020-12-03 RX ADMIN — MAGNESIUM OXIDE 400 MG: 400 TABLET ORAL at 21:00

## 2020-12-03 RX ADMIN — LORAZEPAM 2 MG: 2 INJECTION INTRAMUSCULAR; INTRAVENOUS at 09:20

## 2020-12-03 ASSESSMENT — PAIN SCALES - GENERAL
PAINLEVEL_OUTOF10: 5
PAINLEVEL_OUTOF10: 10
PAINLEVEL_OUTOF10: 9

## 2020-12-03 ASSESSMENT — PAIN DESCRIPTION - PAIN TYPE: TYPE: CHRONIC PAIN

## 2020-12-03 ASSESSMENT — PAIN DESCRIPTION - DESCRIPTORS: DESCRIPTORS: ACHING;CONSTANT

## 2020-12-03 ASSESSMENT — PAIN DESCRIPTION - LOCATION: LOCATION: GENERALIZED

## 2020-12-03 ASSESSMENT — PAIN DESCRIPTION - FREQUENCY: FREQUENCY: CONTINUOUS

## 2020-12-03 ASSESSMENT — PAIN DESCRIPTION - ORIENTATION: ORIENTATION: MID
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
## 2020-12-03 NOTE — PROGRESS NOTES
Pt states she is short of breath holding chest and holding her breath and then cough. Lungs CTA throughout.   Called Respiratory, Leonardo, to get breathing tx per pt request.

## 2020-12-03 NOTE — PROGRESS NOTES
CARDIOLOGY PROGRESS NOTE                                                  Name:  Araseli Sanchez /Age/Sex: 1955  (63 y.o. female)   MRN & CSN:  [id number] & [id number] Admission Date/Time: 2020  8:55 AM   Location:  [address] [place] [address] PCP: Audrey Camacho MD         Admit Date:  2020  Hospital Day: 3      SUBJECTIVE:   Seen patient as follow up as consultation for   Chest pain     No chest pain. Pt had a seizure this morning  No shortness of breath  No palpations    TELEMETRY: NSR          Assessment/Plan:        1. Shortness of breath  2. Atypical chest pain  3. Elevated D-dimer  4. HTN -   5. HLD - cont statins   6. DM II         63 yr old f. W/ shortness of breath + Retrosternal chest discomfort   EKG sinus tachycardia   DDIMER  Elevated   CTA suboptimal study      [facility]  reviewed: Normal Coronaries  Echo :  No RV strain, normal LV function.      Dyspnea + elevated DDimer + tachycardia, highly suspisous for PE   CTA is negative x 2   Pt had a seziure this morning, suggest neuro workup   Recommend  stress test as out patient - cont with ASA/Statins/Toprol XL.  Losartan/HCTZ       Outpt follow up  Cardiology will sign off        Past medical history:    has a past medical history of Abnormal EKG, Acid reflux, Anemia, Anesthesia, Anginal pain (HCC), Anxiety, Arthritis, Asthma, CAD (coronary artery disease), Cerebral artery occlusion with cerebral infarction ([address] CHF (congestive heart failure) ([address] Chronic back pain, Chronic kidney disease, DDD (degenerative disc disease), cervical, Depression, Diabetes mellitus ([address] Diabetic neuropathy ([address] Dizziness, Dry skin, Enlarged ureter, Fatty liver, Fibrocystic breast, Gout, H/O cardiac catheterization, H/O cardiovascular stress test, H/O cardiovascular stress test, H/O cardiovascular stress test, H/O Doppler ultrasound, H/O echocardiogram, H/O echocardiogram, History of Holter monitoring, [place] (hard of hearing), Hx of cardiovascular stress \"Allergic To Powder In Gloves Causing Severe Redness And Itching\"    Prozac [Fluoxetine Hcl]      \"Hallucinations\"    Robaxin [Methocarbamol] Palpitations     \"Severe High Blood Pressure\"    Ultram [Tramadol]      \"Severe Stomach Pain\"    Zoloft Palpitations     \"Sever High Blood Pressure\"    Butalbital-Aspirin-Caffeine Other (See Comments)     \"severe stomach pain\"    Coreg [Carvedilol] Other (See Comments)     \"spikes my BP severely and send me into a severe anxiety attack\"    Fluoxetine Other (See Comments)     hallucinations    Oxybutynin Chloride Other (See Comments)     Raises bp    Sertraline Other (See Comments)     hallucinations    Tape Marella [place] Tape]      Patient states it tears her skin, including band aids    Reglan [Metoclopramide] Other (See Comments)     \"makes me talk like a baby, but if I take benadryl with it it's fine\"       Lab Data:  CBC:   Recent Labs     12/01/20  0917 12/03/20  0730   WBC 12.7* 6.8   HGB 14.2 11.9*   HCT 41.7 35.6*   MCV 85.8 85.6    213     BMP:   Recent Labs     12/01/20  0917 12/03/20  0730    138   K 3.4* 3.8   CL 96* 102   CO2 22 25   PHOS  --  3.4   BUN 8 7   CREATININE 0.5* 0.6     LIVER PROFILE:   Recent Labs     12/01/20  0917 12/03/20  0730   AST 27 31   ALT 24 19   BILITOT 0.5 0.4   ALKPHOS 81 [phone] [facility], MD 12/3/2020 11:58 AM

## 2020-12-03 NOTE — PROGRESS NOTES
Pt states she does not want to see Dr. Rian Jean anymore she has a neurologist in [place], [place].

## 2020-12-03 NOTE — PROGRESS NOTES
Pt called daughter on phone (hospital employee) and pt was at bedside and called out \"she's not responding. \"  Upon entering pt room 4102, pt's eyes were watering and flickering. Flinched with sternal rub but kept blinking eyes then pt starting to move head and legs. Dr Alysa Alston and ICU nurse, Rosmery Actis to bedside then Dr. Nadine Leblanc relieved Dr. Stephon Sutton, charge nurse and Jewels Schneider RT at bedside. Pt placed on 3 L O2 NC. Ativan iv total of 4 mg admin. ICU nurse Alissa admin Metoprolol iv 2.5 mg. VS as follows:  0486  234/121 (167), 95% RA,   0922  241/114 (164), 96% 2L O2 NC,   0932 202/95 (136), 97% 3L O2  0938 185/93 (131), HR 98, 96% 3L O2  0958 196/91 (130), 85, 16, 95% 3L O2 NC    Awaiting Apresoline IV and Keppra Iv from pharmacy. Pads placed on side rails and call light in reach. Pt drowsy but alert. Side rails up x2.

## 2020-12-03 NOTE — PROGRESS NOTES
[address]    Hospitalist Progress Note      Name:  Bon Bar /Age/Sex: 1955  (63 y.o. female)   MRN & CSN:  [id number] & [id number] Admission Date/Time: 2020  8:55 AM   Location:  [address] [place] [address] PCP: Ashleigh Franco MD         Hospital Day: 3    Assessment and Plan:   oBn Bar is a 63 y.o.  female  who presents with dyspnea    Dyspnea -unclear etiology  Associated chest tightness    Mostly exertional, not requiring any oxygen supplementation  CT chest no PE or any infectious process-done twice  Troponin negative, EKG unremarkable  ECHO with LVEF 50 to 55%  Cardiology planning stress test in a.m. Breakthrough seizure? Possible pseudoseizure? Rapid response was called around 10 AM for seizure like activity  Responded to Ativan 2 mg repeated in 10 minutes  Increased Lamictal 150 to 3300 mg and continued Keppra to 750 IV twice daily per neurology recommendation  Neurology consulted  May need EEG, check MRI of the brain    Mild intermittent asthma with bronchitis    Started on albuterol/Atrovent and Zithromax per pulmonary  Continue with Singulair    DM type II- on insulin sliding scale and Lantus    Hypertension -on metoprolol, Procardia, losartan, hydrochlorothiazide    Chronic conditions: Resume home medications unless contraindicated    Parkinson's disease  Depression/anxiety disorder  Hyperlipidemia  Seizure disorder?   Hypothyroidism  ILSA    Diet Diet NPO, After Midnight   DVT Prophylaxis [] Lovenox, []  Heparin, [] SCDs, []No VTE prophylaxis, patient ambulating   GI Prophylaxis [] PPI, [] H2 Blocker, [] No GI prophylaxis, patient is receiving diet/Tube Feeds   Code Status Prior   Disposition Patient requires continued admission due to dyspnea   MDM [] Low, [] Moderate,[x]  High     History of Present Illness: Subjective     Patient Seen & Examined at the bedside      Responded to a rapid response around 10 AM for seizure-like activity-  Patient was showing some seizure-like activity which was on and off and responded to Ativan IV given twice in 10 minutes difference for a total of 4 mg    Ten point ROS reviewed negative, unless as noted above    Objective:   No intake or output data in the 24 hours ending 12/03/20 0946   Vitals:   Vitals:    12/03/20 0532   BP: (!) 156/80   Pulse: 67   Resp: 16   Temp: 97.9 °F (36.6 °C)   SpO2: 95%     Physical Exam:    GEN Was having seizure like activity ,   HENT Mucous membranes are moist.   RESP Clear to auscultation, no wheezes, rales or rhonchi. CARDIO/VASC -S1/S2 auscultated. Regular rate without appreciable murmurs, rubs, or gallops. Peripheral pulses equal bilaterally and palpable. No peripheral edema. GI Abdomen is soft without significant tenderness, masses, or guarding. Bowel sounds are normoactive. Rectal exam deferred.  Mercer catheter is not present.     Medications:   Medications:    LORazepam        LORazepam  2 mg Intravenous Once    levetiracetam  1,000 mg Intravenous Q12H    OXcarbazepine  300 mg Oral BID    tiZANidine  4 mg Oral TID    insulin lispro  10 Units Subcutaneous TID WC    insulin glargine  30 Units Subcutaneous Nightly    insulin lispro  0-6 Units Subcutaneous 2 times per day    albuterol sulfate HFA  2 puff Inhalation 4x daily    ipratropium  2 puff Inhalation 4x daily    azithromycin  500 mg Intravenous Q24H    HYDROcodone-acetaminophen  1 tablet Oral TID    amitriptyline  25 mg Oral Nightly    aspirin  81 mg Oral Daily    busPIRone  15 mg Oral TID    carbidopa-levodopa  1 tablet Oral Nightly    levothyroxine  75 mcg Oral QAM AC    montelukast  10 mg Oral Nightly    potassium chloride  10 mEq Oral BID    QUEtiapine  25 mg Oral Nightly    atorvastatin  20 mg Oral Daily    insulin lispro  0-12 Units Subcutaneous TID WC    enoxaparin  1 mg/kg Subcutaneous BID    losartan  100 mg Oral Daily    And    hydroCHLOROthiazide  25 mg Oral Daily    magnesium oxide  400 mg Oral BID    metoprolol succinate 25 mg Oral Daily    NIFEdipine  60 mg Oral Daily      Infusions:    dextrose       PRN Meds: albuterol sulfate HFA, 2 puff, Q6H PRN  magnesium sulfate, 1 g, PRN  glucose, 15 g, PRN  dextrose, 12.5 g, PRN  glucagon (rDNA), 1 mg, PRN  dextrose, 100 mL/hr, PRN          Electronically signed by Yolis Ann MD on 12/3/2020 at 9:46 AM

## 2020-12-04 VITALS
RESPIRATION RATE: 26 BRPM | HEIGHT: 62 IN | HEART RATE: 74 BPM | WEIGHT: 172.06 LBS | BODY MASS INDEX: 31.66 KG/M2 | OXYGEN SATURATION: 95 % | DIASTOLIC BLOOD PRESSURE: 77 MMHG | SYSTOLIC BLOOD PRESSURE: 168 MMHG | TEMPERATURE: 98.2 F

## 2020-12-04 LAB
GLUCOSE BLD-MCNC: 149 MG/DL (ref 70–99)
GLUCOSE BLD-MCNC: 159 MG/DL (ref 70–99)
GLUCOSE BLD-MCNC: 256 MG/DL (ref 70–99)
PROLACTIN: 27.8 NG/ML
TOTAL CK: 51 IU/L (ref 26–140)

## 2020-12-04 PROCEDURE — 6370000000 HC RX 637 (ALT 250 FOR IP): Performed by: PSYCHIATRY & NEUROLOGY

## 2020-12-04 PROCEDURE — 6370000000 HC RX 637 (ALT 250 FOR IP): Performed by: NURSE PRACTITIONER

## 2020-12-04 PROCEDURE — 6370000000 HC RX 637 (ALT 250 FOR IP): Performed by: INTERNAL MEDICINE

## 2020-12-04 PROCEDURE — 6360000002 HC RX W HCPCS: Performed by: HOSPITALIST

## 2020-12-04 PROCEDURE — 2580000003 HC RX 258: Performed by: INTERNAL MEDICINE

## 2020-12-04 PROCEDURE — 6370000000 HC RX 637 (ALT 250 FOR IP): Performed by: HOSPITALIST

## 2020-12-04 PROCEDURE — 94640 AIRWAY INHALATION TREATMENT: CPT

## 2020-12-04 PROCEDURE — 2580000003 HC RX 258: Performed by: PSYCHIATRY & NEUROLOGY

## 2020-12-04 PROCEDURE — 6360000002 HC RX W HCPCS: Performed by: INTERNAL MEDICINE

## 2020-12-04 PROCEDURE — 6360000002 HC RX W HCPCS: Performed by: PSYCHIATRY & NEUROLOGY

## 2020-12-04 PROCEDURE — 82962 GLUCOSE BLOOD TEST: CPT

## 2020-12-04 PROCEDURE — 94761 N-INVAS EAR/PLS OXIMETRY MLT: CPT

## 2020-12-04 RX ORDER — LEVETIRACETAM 750 MG/1
750 TABLET ORAL 2 TIMES DAILY
Qty: 60 TABLET | Refills: 3 | Status: ON HOLD
Start: 2020-12-04 | End: 2021-07-15 | Stop reason: HOSPADM

## 2020-12-04 RX ORDER — MAGNESIUM OXIDE 400 MG/1
400 TABLET ORAL 2 TIMES DAILY
Qty: 30 TABLET | Refills: 1 | Status: ON HOLD | OUTPATIENT
Start: 2020-12-04 | End: 2021-07-15 | Stop reason: SDUPTHER

## 2020-12-04 RX ORDER — OXCARBAZEPINE 300 MG/1
300 TABLET, FILM COATED ORAL 2 TIMES DAILY
Qty: 60 TABLET | Refills: 3 | Status: SHIPPED | OUTPATIENT
Start: 2020-12-04

## 2020-12-04 RX ADMIN — SODIUM CHLORIDE 750 MG: 900 INJECTION, SOLUTION INTRAVENOUS at 03:41

## 2020-12-04 RX ADMIN — INSULIN LISPRO 6 UNITS: 100 INJECTION, SOLUTION INTRAVENOUS; SUBCUTANEOUS at 14:38

## 2020-12-04 RX ADMIN — HYDROCHLOROTHIAZIDE 25 MG: 25 TABLET ORAL at 08:44

## 2020-12-04 RX ADMIN — LOSARTAN POTASSIUM 100 MG: 50 TABLET, FILM COATED ORAL at 08:43

## 2020-12-04 RX ADMIN — ALBUTEROL SULFATE 2 PUFF: 90 AEROSOL, METERED RESPIRATORY (INHALATION) at 07:16

## 2020-12-04 RX ADMIN — ASPIRIN 81 MG CHEWABLE TABLET 81 MG: 81 TABLET CHEWABLE at 08:43

## 2020-12-04 RX ADMIN — POTASSIUM CHLORIDE 10 MEQ: 750 TABLET, FILM COATED, EXTENDED RELEASE ORAL at 08:44

## 2020-12-04 RX ADMIN — AZITHROMYCIN MONOHYDRATE 500 MG: 500 INJECTION, POWDER, LYOPHILIZED, FOR SOLUTION INTRAVENOUS at 12:21

## 2020-12-04 RX ADMIN — ENOXAPARIN SODIUM 80 MG: 80 INJECTION SUBCUTANEOUS at 08:42

## 2020-12-04 RX ADMIN — INSULIN LISPRO 2 UNITS: 100 INJECTION, SOLUTION INTRAVENOUS; SUBCUTANEOUS at 08:45

## 2020-12-04 RX ADMIN — OXCARBAZEPINE 300 MG: 600 TABLET, FILM COATED ORAL at 08:43

## 2020-12-04 RX ADMIN — HYDROCODONE BITARTRATE AND ACETAMINOPHEN 1 TABLET: 7.5; 325 TABLET ORAL at 08:44

## 2020-12-04 RX ADMIN — Medication 2 PUFF: at 07:16

## 2020-12-04 RX ADMIN — METOPROLOL SUCCINATE 25 MG: 25 TABLET, EXTENDED RELEASE ORAL at 08:43

## 2020-12-04 RX ADMIN — LEVOTHYROXINE SODIUM 75 MCG: 75 TABLET ORAL at 06:29

## 2020-12-04 RX ADMIN — TIZANIDINE 4 MG: 4 TABLET ORAL at 08:44

## 2020-12-04 RX ADMIN — MAGNESIUM OXIDE 400 MG: 400 TABLET ORAL at 09:00

## 2020-12-04 RX ADMIN — NIFEDIPINE 60 MG: 30 TABLET, FILM COATED, EXTENDED RELEASE ORAL at 08:43

## 2020-12-04 RX ADMIN — BUSPIRONE HYDROCHLORIDE 20 MG: 10 TABLET ORAL at 06:29

## 2020-12-04 ASSESSMENT — PAIN SCALES - GENERAL
PAINLEVEL_OUTOF10: 7
PAINLEVEL_OUTOF10: 0
PAINLEVEL_OUTOF10: 0

## 2020-12-04 NOTE — PROGRESS NOTES
Pulmonary and Critical Care  Progress Note    Subjective: The patient had sx this am.  Shortness of breath none  Chest pain none  Addressing respiratory complaints Patient is negative for  hemoptysis and cyanosis  CONSTITUTIONAL:  negative for fevers and chills      Past Medical History:     has a past medical history of Abnormal EKG, Acid reflux, Anemia, Anesthesia, Anginal pain (HCC), Anxiety, Arthritis, Asthma, CAD (coronary artery disease), Cerebral artery occlusion with cerebral infarction ([address] CHF (congestive heart failure) ([address] Chronic back pain, Chronic kidney disease, DDD (degenerative disc disease), cervical, Depression, Diabetes mellitus ([address] Diabetic neuropathy ([address] Dizziness, Dry skin, Enlarged ureter, Fatty liver, Fibrocystic breast, Gout, H/O cardiac catheterization, H/O cardiovascular stress test, H/O cardiovascular stress test, H/O cardiovascular stress test, H/O Doppler ultrasound, H/O echocardiogram, H/O echocardiogram, History of Holter monitoring, [place] (hard of hearing), Hx of cardiovascular stress test, Hx of motion sickness, HX OTHER MEDICAL, Hyperlipidemia, Hypertension, IBS (irritable bowel syndrome), Incisional hernia, Kidney stones, Migraines, Nausea & vomiting, Other specified disorder of skin, Panic attacks, Pneumonia, Pseudoseizures, Restless leg, Shortness of breath, Sleep apnea, Staph infection, Thyroid disease, Tremor, Urinary incontinence, UTI (urinary tract infection), UTI (urinary tract infection), Vertigo, and Wears glasses. has a past surgical history that includes Carpal tunnel release (Right, 1999); Diagnostic Cardiac Cath Lab Procedure (01/11/2010); Dental surgery; Colonoscopy (Last Done 6-13); Endoscopy, colon, diagnostic (Several ); Bladder surgery (1970's Or 1980's); Lithotripsy (2011); Breast biopsy (Right, 1980's); Breast surgery (Left, 1990's); hernia repair (9716'F); hernia repair (1970's); Cholecystectomy (1970's); Appendectomy (1970's);  Hysterectomy, total abdominal (1987); Tubal ligation (1978); Esophagus dilation (1980's And 1990's); Knee arthroscopy (Right, 1999); joint replacement (2008); other surgical history (06 13 2014); fracture surgery (1974 Or 1975); Cardiac catheterization (10-18-06); and Cardiac catheterization. reports that she has never smoked. She has never used smokeless tobacco. She reports that she does not drink alcohol or use drugs. Family history:  family history includes Anxiety Disorder in her daughter; Arthritis in her father, mother, and sister; Cancer in her brother and sister; Colon Cancer in her brother; Depression in her daughter and mother; Diabetes in her mother; Early Death in her brother; Early Death (age of onset: 37) in her brother; Early Death (age of onset: 61) in her mother; Early Death (age of onset: 61) in her brother; Hearing Loss in her sister; Heart Disease in her brother, brother, brother, father, mother, and sister; Heart Failure in her sister; High Blood Pressure in her brother, father, mother, and sister; High Cholesterol in her brother, father, and mother; Mental Illness in her daughter; Timmothy Rowels / [place] in her mother; Other in her daughter, mother, and son; Stroke in her mother.     Allergies   Allergen Reactions    Abilify [Aripiprazole]      \"Severe Shaking And Restlessness\"    Advil [Ibuprofen Micronized] Palpitations     \"Severe High Blood Pressure\"    Augmentin [Amoxicillin-Pot Clavulanate] Itching and Rash    Bee Venom Swelling     Redness    Ciprofloxacin Itching and Rash    Codeine      \"Severe Abdominal Cramping\"    Darvocet [Propoxyphene N-Acetaminophen] Palpitations     \"Severe High Blood Pressure\"    Darvon [Propoxyphene Hcl] Palpitations     \"Severe High Blood Pressure\"    Decadron [Dexamethasone] Other (See Comments)     seizure  Seizures    Ditropan [Oxybutynin Chloride] Palpitations     \"Severe High Blood Pressure\"    Fioricet [Butalbital-Apap-Caffeine] Palpitations     \"Severe High Blood Pressure\"    Fiorinal-Codeine #3 [Butalbital-Asa-Caff-Codeine]      \"Severe Stomach Cramps\"    Flagyl [Metronidazole] Diarrhea     \"Severe Diarrhea And Cramping\"    Naproxen Palpitations     \"Severe High Blood Pressure\"    Other      \"Allergic To Spider Bites Causing Blackness Of Skin, Severe Itching And Pain\"                                                  \"Allergic To Powder In Gloves Causing Severe Redness And Itching\"    Prozac [Fluoxetine Hcl]      \"Hallucinations\"    Robaxin [Methocarbamol] Palpitations     \"Severe High Blood Pressure\"    Ultram [Tramadol]      \"Severe Stomach Pain\"    Zoloft Palpitations     \"Sever High Blood Pressure\"    Butalbital-Aspirin-Caffeine Other (See Comments)     \"severe stomach pain\"    Coreg [Carvedilol] Other (See Comments)     \"spikes my BP severely and send me into a severe anxiety attack\"    Fluoxetine Other (See Comments)     hallucinations    Oxybutynin Chloride Other (See Comments)     Raises bp    Sertraline Other (See Comments)     hallucinations    Tape [employer] Tape]      Patient states it tears her skin, including band aids    Reglan [Metoclopramide] Other (See Comments)     \"makes me talk like a baby, but if I take benadryl with it it's fine\"     Social History:    Reviewed; no changes    Objective:   PHYSICAL EXAM:        VITALS:  BP (!) 176/94   Pulse 110   Temp 98.2 °F (36.8 °C) (Axillary)   Resp 16   Ht 5' 2\" (1.575 m)   Wt 172 lb 1 oz (78 kg)   SpO2 95%   BMI 31.47 kg/m²     24HR INTAKE/OUTPUT:  No intake or output data in the 24 hours ending 12/03/20 1902    CONSTITUTIONAL:  Somnolent. LUNGS:  decreased breath sounds  CARDIOVASCULAR:  normal S1 and S2 and negative JVD  ABD:Abdomen soft, non-tender. BS normal. No masses,  No organomegaly  NEURO:Somnolent.   DATA:    CBC:  Recent Labs     12/01/20  0917 12/03/20  0730   WBC 12.7* 6.8   RBC 4.86 4.16*   HGB 14.2 11.9*   HCT 41.7 35.6*    213   MCV 85.8 85.6   MCH 29.2 28.6 MCHC 34.1 33.4   RDW 12.3 12.4   SEGSPCT 63.5 38.5      BMP:  Recent Labs     12/01/20  0917 12/03/20  0730    138   K 3.4* 3.8   CL 96* 102   CO2 22 25   BUN 8 7   CREATININE 0.5* 0.6   CALCIUM 9.7 9.2   GLUCOSE 234* 124*      ABG:  No results for input(s): PH, PO2ART, PLN1ZEW, HCO3, BEART, O2SAT in the last 72 hours. Lab Results   Component Value Date    PROBNP 116.8 12/03/2020    PROBNP 47.12 12/01/2020    PROBNP 120.5 11/02/2020     No results found for: [facility]    Radiology Review:  Pertinent images / reports were reviewed as a part of this visit. Assessment:     Patient Active Problem List   Diagnosis    Chest pain    H/O Doppler ultrasound    H/O cardiovascular stress test    H/O cardiovascular stress test    H/O cardiovascular stress test    Incarcerated umbilical hernia    Essential hypertension    Type 2 diabetes mellitus with diabetic polyneuropathy, with long-term current use of insulin (HCC)    Tachycardia    Dyslipidemia    Family history of early CAD    NSTEMI (non-ST elevated myocardial infarction) ([address]    Abnormal nuclear stress test    ILSA (obstructive sleep apnea)    Snoring    Hypersomnolence    Mild intermittent asthma without complication    Asthma exacerbation attacks    Hypertensive emergency    Hallucination, visual    Severe episode of recurrent major depressive disorder, with psychotic features ([address]    Generalized anxiety disorder    Auditory hallucinations    Bipolar I disorder with depression, severe (HCC)    Dyspnea and respiratory abnormalities       Plan:   1. Overall the patient has improved in some ways and worsened in others  2. As per neurology.       Jordana Samaniego MD  12/3/2020  7:02 PM

## 2020-12-04 NOTE — PROGRESS NOTES
[facility] Liaison spoke w/pt & is aware of discharge & will initiate [address]. Spoke with Dr Ashley Burroughs and he will follow for c.

## 2020-12-04 NOTE — PROGRESS NOTES
Pulmonary and Critical Care  Progress Note    Subjective: The patient is feeling better. No more sx today. Shortness of breath none  Chest pain none  Addressing respiratory complaints Patient is negative for  hemoptysis and cyanosis  CONSTITUTIONAL:  negative for fevers and chills      Past Medical History:     has a past medical history of Abnormal EKG, Acid reflux, Anemia, Anesthesia, Anginal pain (HCC), Anxiety, Arthritis, Asthma, CAD (coronary artery disease), Cerebral artery occlusion with cerebral infarction ([address] CHF (congestive heart failure) ([address] Chronic back pain, Chronic kidney disease, DDD (degenerative disc disease), cervical, Depression, Diabetes mellitus ([address] Diabetic neuropathy ([address] Dizziness, Dry skin, Enlarged ureter, Fatty liver, Fibrocystic breast, Gout, H/O cardiac catheterization, H/O cardiovascular stress test, H/O cardiovascular stress test, H/O cardiovascular stress test, H/O Doppler ultrasound, H/O echocardiogram, H/O echocardiogram, History of Holter monitoring, [place] (hard of hearing), Hx of cardiovascular stress test, Hx of motion sickness, HX OTHER MEDICAL, Hyperlipidemia, Hypertension, IBS (irritable bowel syndrome), Incisional hernia, Kidney stones, Migraines, Nausea & vomiting, Other specified disorder of skin, Panic attacks, Pneumonia, Pseudoseizures, Restless leg, Shortness of breath, Sleep apnea, Staph infection, Thyroid disease, Tremor, Urinary incontinence, UTI (urinary tract infection), UTI (urinary tract infection), Vertigo, and Wears glasses. has a past surgical history that includes Carpal tunnel release (Right, 1999); Diagnostic Cardiac Cath Lab Procedure (01/11/2010); Dental surgery; Colonoscopy (Last Done 6-13); Endoscopy, colon, diagnostic (Several ); Bladder surgery (1970's Or 1980's); Lithotripsy (2011); Breast biopsy (Right, 1980's); Breast surgery (Left, 1990's); hernia repair (6186'E); hernia repair (1970's); Cholecystectomy (1970's);  Appendectomy (1970's); Hysterectomy, total abdominal (1987); Tubal ligation (1978); Esophagus dilation (1980's And 1990's); Knee arthroscopy (Right, 1999); joint replacement (2008); other surgical history (06 13 2014); fracture surgery (1974 Or 1975); Cardiac catheterization (10-18-06); and Cardiac catheterization. reports that she has never smoked. She has never used smokeless tobacco. She reports that she does not drink alcohol or use drugs. Family history:  family history includes Anxiety Disorder in her daughter; Arthritis in her father, mother, and sister; Cancer in her brother and sister; Colon Cancer in her brother; Depression in her daughter and mother; Diabetes in her mother; Early Death in her brother; Early Death (age of onset: 37) in her brother; Early Death (age of onset: 61) in her mother; Early Death (age of onset: 61) in her brother; Hearing Loss in her sister; Heart Disease in her brother, brother, brother, father, mother, and sister; Heart Failure in her sister; High Blood Pressure in her brother, father, mother, and sister; High Cholesterol in her brother, father, and mother; Mental Illness in her daughter; [place] Ho[place] / [place] in her mother; Other in her daughter, mother, and son; Stroke in her mother.     Allergies   Allergen Reactions    Abilify [Aripiprazole]      \"Severe Shaking And Restlessness\"    Advil [Ibuprofen Micronized] Palpitations     \"Severe High Blood Pressure\"    Augmentin [Amoxicillin-Pot Clavulanate] Itching and Rash    Bee Venom Swelling     Redness    Ciprofloxacin Itching and Rash    Codeine      \"Severe Abdominal Cramping\"    Darvocet [Propoxyphene N-Acetaminophen] Palpitations     \"Severe High Blood Pressure\"    Darvon [Propoxyphene Hcl] Palpitations     \"Severe High Blood Pressure\"    Decadron [Dexamethasone] Other (See Comments)     seizure  Seizures    Ditropan [Oxybutynin Chloride] Palpitations     \"Severe High Blood Pressure\"    Fioricet [Butalbital-Apap-Caffeine] Palpitations     \"Severe High Blood Pressure\"    Fiorinal-Codeine #3 [Butalbital-Asa-Caff-Codeine]      \"Severe Stomach Cramps\"    Flagyl [Metronidazole] Diarrhea     \"Severe Diarrhea And Cramping\"    Naproxen Palpitations     \"Severe High Blood Pressure\"    Other      \"Allergic To Spider Bites Causing Blackness Of Skin, Severe Itching And Pain\"                                                  \"Allergic To Powder In Gloves Causing Severe Redness And Itching\"    Prozac [Fluoxetine Hcl]      \"Hallucinations\"    Robaxin [Methocarbamol] Palpitations     \"Severe High Blood Pressure\"    Ultram [Tramadol]      \"Severe Stomach Pain\"    Zoloft Palpitations     \"Sever High Blood Pressure\"    Butalbital-Aspirin-Caffeine Other (See Comments)     \"severe stomach pain\"    Coreg [Carvedilol] Other (See Comments)     \"spikes my BP severely and send me into a severe anxiety attack\"    Fluoxetine Other (See Comments)     hallucinations    Oxybutynin Chloride Other (See Comments)     Raises bp    Sertraline Other (See Comments)     hallucinations    Tape [employer] Tape]      Patient states it tears her skin, including band aids    Reglan [Metoclopramide] Other (See Comments)     \"makes me talk like a baby, but if I take benadryl with it it's fine\"     Social History:    Reviewed; no changes    Objective:   PHYSICAL EXAM:        VITALS:  BP (!) 168/77   Pulse 74   Temp 98.2 °F (36.8 °C) (Oral)   Resp 26   Ht 5' 2\" (1.575 m)   Wt 172 lb 1 oz (78 kg)   SpO2 95%   BMI 31.47 kg/m²     24HR INTAKE/OUTPUT:  No intake or output data in the 24 hours ending 12/04/20 1221    CONSTITUTIONAL: Awake. LUNGS:  decreased breath sounds. CARDIOVASCULAR:  normal S1 and S2 and negative JVD  ABD:Abdomen soft, non-tender. BS normal. No masses,  No organomegaly  NEURO:Somnolent.   DATA:    CBC:  Recent Labs     12/03/20  0730   WBC 6.8   RBC 4.16*   HGB 11.9*   HCT 35.6*      MCV 85.6   MCH 28.6   MCHC 33.4   RDW 12.4   SEGSPCT 38.5      BMP:  Recent Labs     12/03/20  0730      K 3.8      CO2 25   BUN 7   CREATININE 0.6   CALCIUM 9.2   GLUCOSE 124*      ABG:  No results for input(s): PH, PO2ART, [id number], HCO3, BEART, O2SAT in the last 72 hours. Lab Results   Component Value Date    PROBNP 116.8 12/03/2020    PROBNP 47.12 12/01/2020    PROBNP 120.5 11/02/2020     No results found for: [facility]    Radiology Review:  Pertinent images / reports were reviewed as a part of this visit. Assessment:     Patient Active Problem List   Diagnosis    Chest pain    H/O Doppler ultrasound    H/O cardiovascular stress test    H/O cardiovascular stress test    H/O cardiovascular stress test    Incarcerated umbilical hernia    Essential hypertension    Type 2 diabetes mellitus with diabetic polyneuropathy, with long-term current use of insulin (HCC)    Tachycardia    Dyslipidemia    Family history of early CAD    NSTEMI (non-ST elevated myocardial infarction) ([address]    Abnormal nuclear stress test    ILSA (obstructive sleep apnea)    Snoring    Hypersomnolence    Mild intermittent asthma without complication    Asthma exacerbation attacks    Hypertensive emergency    Hallucination, visual    Severe episode of recurrent major depressive disorder, with psychotic features ([address]    Generalized anxiety disorder    Auditory hallucinations    Bipolar I disorder with depression, severe (HCC)    Dyspnea and respiratory abnormalities       Plan:   1. Overall the patient has improved. 2. Inc. activity.       Anita Win MD  12/4/2020  12:21 PM

## 2020-12-04 NOTE — DISCHARGE SUMMARY
[address] Hospitalist     Discharge Summary    Name:  Bentley Garcia /Age/Sex: 1955  (63 y.o. female)   MRN & CSN:  [id number] & [id number] Admission Date/Time: 2020  8:55 AM   Attending:  Staci Lutz MD Discharging Physician: Staci Lutz MD     HPI:     Please, see admission HPI in [address] and patient's hospital course below    Hospital Course:   Bentley Garcia is a 63 y.o.  female  who presents with dyspnea nd the following assessments reflect patient's hospital course      Dyspnea -unclear etiology - resolved   Associated chest tightness - resolved      Mostly exertional, not requiring any oxygen supplementation  CT chest no PE or any infectious process-done twice  Troponin negative, EKG unremarkable  ECHO with LVEF 50 to 55%  Cardiology planning stress test as OP      Breakthrough seizure? Possible pseudoseizure?     Rapid response was called around 10 AM on  for seizure like activity  Responded to Ativan 2 mg repeated in 10 minutes  Increased Lamictal 150 to 3300 mg and Keppra to 750 twice daily per neurology recommendation  Neurology follow up as OP      Mild intermittent asthma with bronchitis     Started on albuterol/Atrovent and Zithromax per pulmonary  Continue with Singulair     DM type II-resume home medications on discharge     Hypertension -on metoprolol, Procardia, losartan, hydrochlorothiazide     Chronic conditions: Resume home medications unless contraindicated     Parkinson's disease  Depression/anxiety disorder  Hyperlipidemia  Seizure disorder? Hypothyroidism  ILSA    Patient is hemodynamically stable for DC to home with [facility]    The patient expressed appropriate understanding of and agreement with the discharge recommendations, medications, and plan.      Consults this admission:  IP CONSULT TO HOSPITALIST  IP CONSULT TO PULMONOLOGY  IP CONSULT TO CARDIOLOGY  IP CONSULT TO NEUROLOGY    Discharge Instruction:   Follow up appointments: Neurology, cardiology  Primary care physician:  within 1 to 2 weeks    Diet:  diabetic diet   Activity: activity as tolerated -fall and seizure precautions  Disposition: Discharged to:   []Home, []HHC, []SNF, []Acute Rehab, []Hospice   Condition on discharge: Stable    Discharge Medications:      Lonny Sports   Home Medication Instructions [id number]    Printed on:12/04/20 1200   Medication Information                      albuterol sulfate  (90 Base) MCG/ACT inhaler  Inhale 2 puffs into the lungs every 6 hours as needed for Wheezing or Shortness of Breath (or cough) Please include spacer with instructions for use. aluminum & magnesium hydroxide-simethicone (MAALOX MAX) 400-400-40 MG/5ML SUSP  Take 15 mLs by mouth every 6 hours as needed (heart burn)             amitriptyline (ELAVIL) 25 MG tablet  Take 25 mg by mouth nightly             aspirin 81 MG tablet  Take 81 mg by mouth daily             busPIRone (BUSPAR) 15 MG tablet  Take 15 mg by mouth 3 times daily             carbidopa-levodopa (SINEMET)  MG per tablet  Take 1 tablet by mouth nightly             docusate sodium (COLACE) 100 MG capsule  Take 1 capsule by mouth 2 times daily             HYDROcodone-acetaminophen (NORCO) 7.5-325 MG per tablet  Take 1 tablet by mouth 3 times daily.              hydrOXYzine (VISTARIL) 25 MG capsule  Take 25 mg by mouth 2 times daily as needed             levETIRAcetam (KEPPRA) 750 MG tablet  Take 1 tablet by mouth 2 times daily             levothyroxine (SYNTHROID) 75 MCG tablet  Take 1 tablet by mouth every morning (before breakfast)             losartan-hydrochlorothiazide (HYZAAR) 100-25 MG per tablet  Take 1 tablet by mouth daily             magnesium oxide (MAG-OX) 400 (240 MG) MG tablet  Take 400 mg by mouth 2 times daily              magnesium oxide (MAG-OX) 400 MG tablet  Take 1 tablet by mouth 2 times daily             metoprolol succinate (TOPROL XL) 25 MG extended release tablet  Take 1 tablet by mouth daily montelukast (SINGULAIR) 10 MG tablet  Take 10 mg by mouth nightly. Multiple Vitamins-Iron (MULTI-VITAMIN/IRON PO)  Take  by mouth. NIFEdipine (ADALAT CC) 60 MG extended release tablet  Take 1 tablet by mouth daily             NOVOLOG FLEXPEN 100 UNIT/ML injection pen  Inject into the skin See Admin Instructions 12/01/2020 patient states she follows sliding scale regimen BS > 150             OXcarbazepine (TRILEPTAL) 150 MG tablet  Take 1 tablet by mouth 2 times daily             OXcarbazepine (TRILEPTAL) 300 MG tablet  Take 1 tablet by mouth 2 times daily             pantoprazole (PROTONIX) 40 MG tablet  Take 1 tablet by mouth daily             polyethylene glycol (GLYCOLAX) packet  Take 17 g by mouth daily as needed for Constipation             potassium chloride (KLOR-CON) 10 MEQ extended release tablet  Take 10 mEq by mouth 2 times daily             QUEtiapine (SEROQUEL) 25 MG tablet  Take 25 mg by mouth nightly             simvastatin (ZOCOR) 20 MG tablet  Take 1 tablet by mouth nightly             tiZANidine (ZANAFLEX) 4 MG tablet  Take 4 mg by mouth 3 times daily             TRESIBA FLEXTOUCH 200 UNIT/ML SOPN  Inject 20 Units into the skin every morning                 Subjective _patient is resting in her bed at the age, eating her breakfast, no distress while on room air, no seizure activity since yesterday morning    Objective Findings at Discharge:   BP (!) 168/77   Pulse 74   Temp 98.2 °F (36.8 °C) (Oral)   Resp 26   Ht 5' 2\" (1.575 m)   Wt 172 lb 1 oz (78 kg)   SpO2 95%   BMI 31.47 kg/m²            PHYSICAL EXAM   GEN Awake female, resting in bed in no apparent distress. Appears given age. RESP Clear to auscultation, no wheezes, rales or rhonchi. CARDIO/VAS - S1/S2 auscultated. Regular rate without appreciable murmurs, rubs, or gallops. Peripheral pulses equal bilaterally and palpable. No peripheral edema.   GI Abdomen is soft without significant tenderness, masses, or guarding. Bowel sounds are normoactive  MSK No gross joint deformities. Spontaneous movement of all extremities  SKIN Normal coloration, warm, dry. NEURO Cranial nerves appear grossly intact, normal speech, no lateralizing weakness.     BMP/CBC  Recent Labs     12/03/20  0730      K 3.8      CO2 25   BUN 7   CREATININE 0.6   WBC 6.8   HCT 35.6*            Discharge Time of 35 minutes    Electronically signed by Janene Sr MD on 12/4/2020 at 12:00 PM

## 2020-12-09 LAB
EKG ATRIAL RATE: 111 BPM
EKG DIAGNOSIS: NORMAL
EKG P AXIS: 54 DEGREES
EKG P-R INTERVAL: 176 MS
EKG Q-T INTERVAL: 340 MS
EKG QRS DURATION: 98 MS
EKG QTC CALCULATION (BAZETT): 462 MS
EKG R AXIS: -36 DEGREES
EKG T AXIS: 78 DEGREES
EKG VENTRICULAR RATE: 111 BPM

## 2020-12-17 ENCOUNTER — HOSPITAL ENCOUNTER (EMERGENCY)
Age: 65
Discharge: HOME | End: 2020-12-17
Attending: EMERGENCY MEDICINE
Payer: MEDICARE

## 2020-12-17 ENCOUNTER — TELEPHONE (OUTPATIENT)
Dept: CARDIOLOGY CLINIC | Age: 65
End: 2020-12-17

## 2020-12-17 ENCOUNTER — APPOINTMENT (OUTPATIENT)
Dept: GENERAL RADIOLOGY | Age: 65
End: 2020-12-17
Payer: MEDICARE

## 2020-12-17 VITALS
SYSTOLIC BLOOD PRESSURE: 126 MMHG | DIASTOLIC BLOOD PRESSURE: 81 MMHG | RESPIRATION RATE: 18 BRPM | HEIGHT: 66 IN | HEART RATE: 89 BPM | BODY MASS INDEX: 26.52 KG/M2 | OXYGEN SATURATION: 99 % | WEIGHT: 165 LBS | TEMPERATURE: 99.4 F

## 2020-12-17 LAB
ALBUMIN SERPL-MCNC: 3.7 GM/DL (ref 3.4–5)
ALP BLD-CCNC: 68 IU/L (ref 40–129)
ALT SERPL-CCNC: 21 U/L (ref 10–40)
ANION GAP SERPL CALCULATED.3IONS-SCNC: 12 MMOL/L (ref 4–16)
AST SERPL-CCNC: 25 IU/L (ref 15–37)
BASE EXCESS MIXED: 2.5 (ref 0–2.3)
BASOPHILS ABSOLUTE: 0.1 K/CU MM
BASOPHILS RELATIVE PERCENT: 0.9 % (ref 0–1)
BILIRUB SERPL-MCNC: 0.4 MG/DL (ref 0–1)
BUN BLDV-MCNC: 10 MG/DL (ref 6–23)
CALCIUM SERPL-MCNC: 9.3 MG/DL (ref 8.3–10.6)
CHLORIDE BLD-SCNC: 96 MMOL/L (ref 99–110)
CO2: 23 MMOL/L (ref 21–32)
COMMENT: ABNORMAL
CREAT SERPL-MCNC: 0.6 MG/DL (ref 0.6–1.1)
D DIMER: <200 NG/ML(DDU)
DIFFERENTIAL TYPE: ABNORMAL
EOSINOPHILS ABSOLUTE: 0.2 K/CU MM
EOSINOPHILS RELATIVE PERCENT: 2.5 % (ref 0–3)
GFR AFRICAN AMERICAN: >60 ML/MIN/1.73M2
GFR NON-AFRICAN AMERICAN: >60 ML/MIN/1.73M2
GLUCOSE BLD-MCNC: 238 MG/DL (ref 70–99)
HCO3 VENOUS: 26.3 MMOL/L (ref 19–25)
HCT VFR BLD CALC: 34.7 % (ref 37–47)
HEMOGLOBIN: 12.3 GM/DL (ref 12.5–16)
IMMATURE NEUTROPHIL %: 0.3 % (ref 0–0.43)
LYMPHOCYTES ABSOLUTE: 2.1 K/CU MM
LYMPHOCYTES RELATIVE PERCENT: 21.9 % (ref 24–44)
MAGNESIUM: 1.8 MG/DL (ref 1.8–2.4)
MCH RBC QN AUTO: 29.7 PG (ref 27–31)
MCHC RBC AUTO-ENTMCNC: 35.4 % (ref 32–36)
MCV RBC AUTO: 83.8 FL (ref 78–100)
MONOCYTES ABSOLUTE: 0.9 K/CU MM
MONOCYTES RELATIVE PERCENT: 9.6 % (ref 0–4)
NUCLEATED RBC %: 0 %
O2 SAT, VEN: 84.5 % (ref 50–70)
PCO2, VEN: 37 MMHG (ref 38–52)
PDW BLD-RTO: 12.2 % (ref 11.7–14.9)
PH VENOUS: 7.46 (ref 7.32–7.42)
PLATELET # BLD: 314 K/CU MM (ref 140–440)
PMV BLD AUTO: 9.6 FL (ref 7.5–11.1)
PO2, VEN: 45 MMHG (ref 28–48)
POTASSIUM SERPL-SCNC: 3.8 MMOL/L (ref 3.5–5.1)
PRO-BNP: 94.75 PG/ML
RBC # BLD: 4.14 M/CU MM (ref 4.2–5.4)
SEGMENTED NEUTROPHILS ABSOLUTE COUNT: 6.1 K/CU MM
SEGMENTED NEUTROPHILS RELATIVE PERCENT: 64.8 % (ref 36–66)
SODIUM BLD-SCNC: 131 MMOL/L (ref 135–145)
TOTAL CK: 31 IU/L (ref 26–140)
TOTAL IMMATURE NEUTOROPHIL: 0.03 K/CU MM
TOTAL NUCLEATED RBC: 0 K/CU MM
TOTAL PROTEIN: 6.8 GM/DL (ref 6.4–8.2)
TROPONIN T: <0.01 NG/ML
TROPONIN T: <0.01 NG/ML
TSH HIGH SENSITIVITY: 1.34 UIU/ML (ref 0.27–4.2)
WBC # BLD: 9.4 K/CU MM (ref 4–10.5)

## 2020-12-17 PROCEDURE — 84484 ASSAY OF TROPONIN QUANT: CPT

## 2020-12-17 PROCEDURE — 83880 ASSAY OF NATRIURETIC PEPTIDE: CPT

## 2020-12-17 PROCEDURE — 84443 ASSAY THYROID STIM HORMONE: CPT

## 2020-12-17 PROCEDURE — 99285 EMERGENCY DEPT VISIT HI MDM: CPT

## 2020-12-17 PROCEDURE — 85025 COMPLETE CBC W/AUTO DIFF WBC: CPT

## 2020-12-17 PROCEDURE — 82805 BLOOD GASES W/O2 SATURATION: CPT

## 2020-12-17 PROCEDURE — 82550 ASSAY OF CK (CPK): CPT

## 2020-12-17 PROCEDURE — 80053 COMPREHEN METABOLIC PANEL: CPT

## 2020-12-17 PROCEDURE — 85379 FIBRIN DEGRADATION QUANT: CPT

## 2020-12-17 PROCEDURE — 93005 ELECTROCARDIOGRAM TRACING: CPT | Performed by: EMERGENCY MEDICINE

## 2020-12-17 PROCEDURE — 83735 ASSAY OF MAGNESIUM: CPT

## 2020-12-17 PROCEDURE — 36415 COLL VENOUS BLD VENIPUNCTURE: CPT

## 2020-12-17 PROCEDURE — 6370000000 HC RX 637 (ALT 250 FOR IP): Performed by: EMERGENCY MEDICINE

## 2020-12-17 PROCEDURE — 71045 X-RAY EXAM CHEST 1 VIEW: CPT

## 2020-12-17 RX ORDER — ALBUTEROL SULFATE 90 UG/1
2 AEROSOL, METERED RESPIRATORY (INHALATION) ONCE
Status: DISCONTINUED | OUTPATIENT
Start: 2020-12-17 | End: 2020-12-17

## 2020-12-17 ASSESSMENT — ENCOUNTER SYMPTOMS
EYES NEGATIVE: 1
SHORTNESS OF BREATH: 1
ALLERGIC/IMMUNOLOGIC NEGATIVE: 1
GASTROINTESTINAL NEGATIVE: 1

## 2020-12-17 ASSESSMENT — PAIN DESCRIPTION - LOCATION: LOCATION: CHEST

## 2020-12-17 ASSESSMENT — PAIN DESCRIPTION - ORIENTATION: ORIENTATION: LEFT

## 2020-12-17 ASSESSMENT — PAIN SCALES - GENERAL: PAINLEVEL_OUTOF10: 7

## 2020-12-17 ASSESSMENT — PAIN DESCRIPTION - DESCRIPTORS: DESCRIPTORS: ACHING;PRESSURE

## 2020-12-17 NOTE — TELEPHONE ENCOUNTER
Raine patient called she feels like her heart is   Racing and she is very short of breath started  yesterday

## 2020-12-17 NOTE — TELEPHONE ENCOUNTER
Patient called again to advise that she is calling the squad; her blood pressure is 170/91, pulse 103.

## 2020-12-17 NOTE — ED PROVIDER NOTES
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 paranasal sinuses and mastoid air cells demonstrate no acute abnormality. SOFT TISSUES/SKULL:  No acute abnormality of the visualized skull or soft tissues. No acute intracranial abnormality. Old hemisphere white matter changes. No change has occurred from 09/04/2020. Xr Chest Portable    Result Date: 12/1/2020  EXAMINATION: ONE XRAY VIEW OF THE CHEST 12/1/2020 9:13 am COMPARISON: 11/02/2020 HISTORY: ORDERING SYSTEM PROVIDED HISTORY: chest pain TECHNOLOGIST PROVIDED HISTORY: Reason for exam:->chest pain Reason for Exam: chest pain FINDINGS: Normal heart size and pulmonary vasculature. No focal consolidations, pleural effusions, or pneumothorax. No evidence of acute process.      Vl Dup Lower Extremity Venous Bilateral    Result Date: 12/2/2020  EXAMINATION: DUPLEX VENOUS ULTRASOUND OF THE BILATERAL LOWER EXTREMITIES, 12/2/2020 5:29 am TECHNIQUE: Duplex ultrasound and Doppler images were obtained of the bilateral lower extremities COMPARISON: 09/15/2020 HISTORY: ORDERING SYSTEM PROVIDED HISTORY: concern for PE, r/o DVT TECHNOLOGIST PROVIDED HISTORY: Reason for exam:->concern for PE, r/o DVT Reason for Exam: Leg pain Acuity: Chronic Type of Exam: Ongoing FINDINGS: The visualized veins of the bilateral lower extremities are patent and free of echogenic thrombus. The veins are compressible and demonstrate gross flow. No evidence of DVT in either lower extremity. Cta Pulmonary W Contrast    Result Date: 12/2/2020  EXAMINATION: CTA OF THE CHEST 12/2/2020 9:32 am TECHNIQUE: CTA of the chest was performed after the administration of intravenous contrast.  Multiplanar reformatted images are provided for review. MIP images are provided for review. Dose modulation, iterative reconstruction, and/or weight based adjustment of the mA/kV was utilized to reduce the radiation dose to as low as reasonably achievable. COMPARISON: CT pulmonary angiogram performed 12/01/2020. HISTORY: ORDERING SYSTEM PROVIDED HISTORY: Suboptimal study yesterday, suspious for PE TECHNOLOGIST PROVIDED HISTORY: Reason for exam:-> suboptimal study yesterday, suspious for PE Reason for Exam: SUBOPTIMAL STUDY YESTERDAY 12/1/20, SUSPIOUS FOR PE Acuity: Acute Type of Exam: Subsequent/Follow-up Additional signs and symptoms: SOB,ANXIETY FINDINGS: Pulmonary Arteries: Pulmonary arteries are adequately opacified for evaluation. No evidence of intraluminal filling defect to suggest pulmonary embolism. Main pulmonary artery is normal in caliber. Mediastinum: No significant mediastinal or hilar lymphadenopathy. The esophagus is unremarkable. The thoracic aorta is normal in course and caliber. 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 Lungs/pleura: No focal consolidation, pleural effusion, or pneumothorax. The central airways are grossly patent. Upper Abdomen: Hepatic steatosis. No acute abnormality of the visualized upper abdomen. Soft Tissues/Bones: No acute bone or soft tissue abnormality. No evidence of pulmonary embolism or acute pulmonary abnormality.      Cta Pulmonary W Contrast    Result Date: 12/1/2020  EXAMINATION: CTA OF THE CHEST 12/1/2020 2:26 pm TECHNIQUE: CTA of the chest was performed after the administration of intravenous contrast.  Multiplanar reformatted images are provided for review. MIP images are provided for review. Dose modulation, iterative reconstruction, and/or weight based adjustment of the mA/kV was utilized to reduce the radiation dose to as low as reasonably achievable. COMPARISON: 09/15/2020, 11/15/2013 HISTORY: ORDERING SYSTEM PROVIDED HISTORY: sob, tachycardia TECHNOLOGIST PROVIDED HISTORY: Reason for exam:->sob, tachycardia Reason for Exam: sob, tachycardia, r/o PE Acuity: Acute Type of Exam: Initial Relevant Medical/Surgical History: hx tachycardia, NSTEMI FINDINGS: Pulmonary Arteries: Moderately to severely suboptimal examination related to poor timing of the contrast bolus. No central pulmonary embolism. No evidence of right heart strain. Main pulmonary artery is normal in caliber. Mediastinum: No evidence of mediastinal lymphadenopathy. The heart and pericardium demonstrate no acute abnormality. There is no acute abnormality of the thoracic aorta. Duplicated SVC. Lungs/pleura: The lungs are without acute process. No focal consolidation or pulmonary edema. No evidence of pleural effusion or pneumothorax. Upper Abdomen: Fatty liver. Soft Tissues/Bones: No acute bone or soft tissue abnormality. 1. Moderately to severely suboptimal examination related to poor timing of the contrast bolus. No central pulmonary embolism. No evidence of right heart strain. Exam could be repeated if clinically indicated. 2. Fatty liver. EKG (if obtained): (All EKG's are interpreted by myself in the absence of a cardiologist)    12 lead EKG per my interpretation:  Normal Sinus Rhythm 66  Axis is   Left axis deviation  QTc is  454  There is no specific T wave changes appreciated. There is no specific ST wave changes appreciated. Prior EKG to compare with was not available       MDM:    Patient here with chest pain shortness of breath.   Again symptoms started today after she was walking back to her apartment upstairs from her judeienkeri's apartment. Patient was in the hospital recently for the same complaint had a negative work-up including echocardiogram and otherwise normal work-up. She had a heart catheter 2017 that was normal.  She is back again with the same complaint she appears well now she had palpitations upon sitting after she walked back to her apartment she states her blood pressure was high but this is expected given exertion. She feels better on arrival EKG is normal vital signs are stable work-up performed so far her cardiac work-up is negative including labs EKG chest x-ray troponin. I did talk to cardiology given second visit for the same complaint and heart cath last was in 2017. Cardiologist reviewed echocardiogram and catheterization okay with D-dimer and second troponin if negative can go home. Which I will do. Patient rechecked doing well again cardiology okay with patient going home with D-dimer was negative her second troponin I talked to the lab was also negative patient is resting bed comfortably in the room no distress she is observed here for several hours did well patient stable discharged home given return precautions follow-up information. She is aware she has sleep apnea which she could she does see cardiology outpatient and pulmonology. Patient stable discharged home.     CLINICAL IMPRESSION:  Final diagnoses:   Chest pain, unspecified type   Dyspnea and respiratory abnormalities   Fatigue, unspecified type       (Please note that portions of this note may have been completed with a voice recognition program. Efforts were made to edit the dictations but occasionally words aremis-transcribed.)    DISPOSITION REFERRAL (if applicable):  [facility] Emergency Department  [address] [phone]    If symptoms worsen    Donna Garcia MD  [facility] [address]  [phone]    In 1 day      Kinza Patterson MD  [address] [address]  [phone]    Schedule an appointment as soon as possible for a visit in 1 day        DISPOSITION MEDICATIONS (if applicable):  New Prescriptions    No medications on file          Varinder Whitfield, [address] [place] [address],   12/17/20 4236

## 2020-12-17 NOTE — ED NOTES
Laura 18 paged Dr Garcia Dates  12/17/20 1239  1307 repaged Dr Garcia Dates  12/17/20 1307  1308 Dr Guerrero Castellon returned call      [facility]  12/17/20 1301

## 2020-12-17 NOTE — CARE COORDINATION
Pt identified as potential readmission. Last admission 12/1 - 12/4 for dyspnea. Pt here today for Shortness of Breath. No acute findings. Readmission was avoided and pt was able to be d/c'd home.

## 2020-12-17 NOTE — ED NOTES
Bed: ED-30  Expected date:   Expected time:   Means of arrival:   Comments:  EMS - SOB     Dede Damon RN  12/17/20 2648

## 2020-12-18 PROCEDURE — 93010 ELECTROCARDIOGRAM REPORT: CPT | Performed by: INTERNAL MEDICINE

## 2020-12-22 LAB
EKG ATRIAL RATE: 66 BPM
EKG DIAGNOSIS: NORMAL
EKG P AXIS: 18 DEGREES
EKG P-R INTERVAL: 170 MS
EKG Q-T INTERVAL: 434 MS
EKG QRS DURATION: 114 MS
EKG QTC CALCULATION (BAZETT): 454 MS
EKG R AXIS: -42 DEGREES
EKG T AXIS: 23 DEGREES
EKG VENTRICULAR RATE: 66 BPM

## 2020-12-28 ENCOUNTER — VIRTUAL VISIT (OUTPATIENT)
Dept: CARDIOLOGY CLINIC | Age: 65
End: 2020-12-28
Payer: MEDICARE

## 2020-12-28 ENCOUNTER — TELEPHONE (OUTPATIENT)
Dept: CARDIOLOGY CLINIC | Age: 65
End: 2020-12-28

## 2020-12-28 PROCEDURE — 99442 PR PHYS/QHP TELEPHONE EVALUATION 11-20 MIN: CPT | Performed by: NURSE PRACTITIONER

## 2020-12-28 ASSESSMENT — ENCOUNTER SYMPTOMS
COUGH: 0
DIARRHEA: 1
EYE DISCHARGE: 0
TROUBLE SWALLOWING: 1
CONSTIPATION: 1
EYE PAIN: 0

## 2020-12-28 NOTE — PROGRESS NOTES
David Rod is a 63 y.o. female evaluated via telephone on 12/28/2020. Documentation:  I communicated with the patient and/or health care decision maker about   1. Essential hypertension      . Details of this discussion including any medical advice provided:   She states she is feeling fair- she was recently seen in [place] [address] with complaints of chest pain. Troponins negative x2 EKG nonacute. Echocardiogram was completed showing normal left ventricular systolic function. Ejection fraction estimated 50 to 85%. There is no significant valvular abnormalities. She does note left breast pain with palpation. She also notes some swelling to her neck. She is to follow-up with her endocrinologist Dr. Loreta Dominguez. She denies any shortness of breath at this time. She notes that she is tired. She also reports generalized weakness and anxiety. Vital Signs: (As obtained by patient/caregiver or practitioner observation)    Patient-Reported Vitals 12/28/2020   Patient-Reported Weight -   Patient-Reported Height 5'2\"       Review of Systems   Constitutional: Positive for fatigue. HENT: Positive for trouble swallowing (at times). Negative for congestion. Eyes: Negative for pain and discharge. Respiratory: Negative for cough. Cardiovascular: Negative for chest pain. Gastrointestinal: Positive for constipation and diarrhea. Musculoskeletal: Positive for arthralgias and neck pain. Myalgias: left side of neck swelling         Right knee pain   Neurological: Positive for weakness. Negative for syncope. Psychiatric/Behavioral: The patient is nervous/anxious. Prior to Visit Medications    Medication Sig Taking?  Authorizing Provider   fluticasone-umeclidin-vilant (TRELEGY ELLIPTA) 100-62.5-25 MCG/INH AEPB Inhale 1 puff into the lungs daily  Arthur Angeles MD   magnesium oxide (MAG-OX) 400 MG tablet Take 1 tablet by mouth 2 times daily  Noah Oro MD OXcarbazepine (TRILEPTAL) 300 MG tablet Take 1 tablet by mouth 2 times daily  Malu Hoffmann MD   levETIRAcetam (KEPPRA) 750 MG tablet Take 1 tablet by mouth 2 times daily  Malu Hoffmann MD   amitriptyline (ELAVIL) 25 MG tablet Take 25 mg by mouth nightly  Historical Provider, MD   hydrOXYzine (VISTARIL) 25 MG capsule Take 25 mg by mouth 2 times daily as needed  Historical Provider, MD   NOVOLOG FLEXPEN 100 UNIT/ML injection pen Inject into the skin See Admin Instructions 12/01/2020 patient states she follows sliding scale regimen BS > 150  Historical Provider, MD   potassium chloride (KLOR-CON) 10 MEQ extended release tablet Take 10 mEq by mouth 2 times daily  Historical Provider, MD   QUEtiapine (SEROQUEL) 25 MG tablet Take 25 mg by mouth nightly  Historical Provider, MD   tiZANidine (ZANAFLEX) 4 MG tablet Take 4 mg by mouth 3 times daily  Historical Provider, MD   HYDROcodone-acetaminophen (NORCO) 7.5-325 MG per tablet Take 1 tablet by mouth 3 times daily. Historical Provider, MD   simvastatin (ZOCOR) 20 MG tablet Take 1 tablet by mouth nightly  WENDIE Mclaughlin CNP   levothyroxine (SYNTHROID) 75 MCG tablet Take 1 tablet by mouth every morning (before breakfast)  WENDIE Mclaughlin CNP   TRESIBA FLEXTOUCH 200 UNIT/ML SOPN Inject 20 Units into the skin every morning  Patient taking differently: Inject into the skin nightly 12/01/2020 Patient states she follows sliding scale  Yecenia Reynolds MD   OXcarbazepine (TRILEPTAL) 150 MG tablet Take 1 tablet by mouth 2 times daily  Alphonso Celestin MD   metoprolol succinate (TOPROL XL) 25 MG extended release tablet Take 1 tablet by mouth daily  Augustus Meza MD   NIFEdipine (ADALAT CC) 60 MG extended release tablet Take 1 tablet by mouth daily  WENDIE Lyles CNP   busPIRone (BUSPAR) 15 MG tablet Take 15 mg by mouth 3 times daily  Historical Provider, MD losartan-hydrochlorothiazide (HYZAAR) 100-25 MG per tablet Take 1 tablet by mouth daily  Chanelle Heard MD   docusate sodium (COLACE) 100 MG capsule Take 1 capsule by mouth 2 times daily  HAYDEN Ríos   albuterol sulfate  (90 Base) MCG/ACT inhaler Inhale 2 puffs into the lungs every 6 hours as needed for Wheezing or Shortness of Breath (or cough) Please include spacer with instructions for use. HAYDEN Ríos   aluminum & magnesium hydroxide-simethicone (MAALOX MAX) 400-400-40 MG/5ML SUSP Take 15 mLs by mouth every 6 hours as needed (heart burn)  WENDIE Mosquera - CNP   pantoprazole (PROTONIX) 40 MG tablet Take 1 tablet by mouth daily  HAYDEN Ríos   carbidopa-levodopa (SINEMET)  MG per tablet Take 1 tablet by mouth nightly  Historical Provider, MD   polyethylene glycol (GLYCOLAX) packet Take 17 g by mouth daily as needed for Constipation  Historical Provider, MD   aspirin 81 MG tablet Take 81 mg by mouth daily  Historical Provider, MD   magnesium oxide (MAG-OX) 400 (240 MG) MG tablet Take 400 mg by mouth 2 times daily   Historical Provider, MD   Multiple Vitamins-Iron (MULTI-VITAMIN/IRON PO) Take  by mouth. Historical Provider, MD   montelukast (SINGULAIR) 10 MG tablet Take 10 mg by mouth nightly.   Historical Provider, MD       Social History     Tobacco Use    Smoking status: Never Smoker    Smokeless tobacco: Never Used   Substance Use Topics    Alcohol use: No    Drug use: No        Allergies   Allergen Reactions    Abilify [Aripiprazole]      \"Severe Shaking And Restlessness\"    Advil [Ibuprofen Micronized] Palpitations     \"Severe High Blood Pressure\"    Augmentin [Amoxicillin-Pot Clavulanate] Itching and Rash    Bee Venom Swelling     Redness    Ciprofloxacin Itching and Rash    Codeine      \"Severe Abdominal Cramping\"    Darvocet [Propoxyphene N-Acetaminophen] Palpitations     \"Severe High Blood Pressure\"  Darvon [Propoxyphene Hcl] Palpitations     \"Severe High Blood Pressure\"    Decadron [Dexamethasone] Other (See Comments)     seizure  Seizures    Ditropan [Oxybutynin Chloride] Palpitations     \"Severe High Blood Pressure\"    Fioricet [Butalbital-Apap-Caffeine] Palpitations     \"Severe High Blood Pressure\"    Fiorinal-Codeine #3 [Butalbital-Asa-Caff-Codeine]      \"Severe Stomach Cramps\"    Flagyl [Metronidazole] Diarrhea     \"Severe Diarrhea And Cramping\"    Naproxen Palpitations     \"Severe High Blood Pressure\"    Other      \"Allergic To Spider Bites Causing Blackness Of Skin, Severe Itching And Pain\"                                                  \"Allergic To Powder In Gloves Causing Severe Redness And Itching\"    Prozac [Fluoxetine Hcl]      \"Hallucinations\"    Robaxin [Methocarbamol] Palpitations     \"Severe High Blood Pressure\"    Ultram [Tramadol]      \"Severe Stomach Pain\"    Zoloft Palpitations     \"Sever High Blood Pressure\"    Butalbital-Aspirin-Caffeine Other (See Comments)     \"severe stomach pain\"    Coreg [Carvedilol] Other (See Comments)     \"spikes my BP severely and send me into a severe anxiety attack\"    Fluoxetine Other (See Comments)     hallucinations    Oxybutynin Chloride Other (See Comments)     Raises bp    Sertraline Other (See Comments)     hallucinations    Tape [Adhesive Tape]      Patient states it tears her skin, including band aids    Reglan [Metoclopramide] Other (See Comments)     \"makes me talk like a baby, but if I take benadryl with it it's fine\"         1. Essential hypertension  She was unable to take her blood pressure today. She is unsure of any of her medications that she has had across his body and did not take her medications with. Hospital notes, testing, labs are reviewed with patient. Chest pain  Appears to be atypical.  Patient having pain in left breast with palpation.  Negative troponins Patient is recommended to have stress test for further evaluation of chest pain. Patient is concerned about insurance and payment  Testing can be done at hospital if needed  Will arrange for magdy scan    Had elevated D dimer - CT negative for PE        Follow up in 3 months or sooner if needed   I Confirm this is a Patient Initiated Episode with an Established Patient who has not had a related appointment within my department in the past 7 days or scheduled within the next 24 hours. The call was not tied to face to face office visit or procedure that has occurred in in prior 7 days.   Based on our conversation /evaluation , a subsequent office visit for the patient's problem is not indicated for  24 hrs     Total Time: minutes: 11-20 minutes    Note: not billable if this call serves to triage the patient into an appointment for the relevant concern

## 2020-12-28 NOTE — PATIENT INSTRUCTIONS
Please be informed that if you contact our office outside of normal business hours the physician on call cannot help with any scheduling or rescheduling issues, procedure instruction questions or any type of medication issue. We advise you for any urgent/emergency that you go to the nearest emergency room!     PLEASE CALL OUR OFFICE DURING NORMAL BUSINESS HOURS    Monday - Friday   8 am to 5 pm    [place]: [phone]    [place]:  [phone]

## 2020-12-28 NOTE — TELEPHONE ENCOUNTER
Mil Mcadams is having some insurance issues and would like to give us a call back to schedule. Instructed her to call the office back as soon as she gets things in order.

## 2021-01-08 ENCOUNTER — HOSPITAL ENCOUNTER (OUTPATIENT)
Dept: NUCLEAR MEDICINE | Age: 66
Discharge: HOME | End: 2021-01-08
Payer: MEDICARE

## 2021-01-08 DIAGNOSIS — M22.2X1 PATELLOFEMORAL DISORDER OF RIGHT KNEE: ICD-10-CM

## 2021-01-08 DIAGNOSIS — M22.2X9 DISORDER OF PATELLOFEMORAL JOINT, UNSPECIFIED LATERALITY: ICD-10-CM

## 2021-01-08 PROCEDURE — 3430000000 HC RX DIAGNOSTIC RADIOPHARMACEUTICAL: Performed by: ORTHOPAEDIC SURGERY

## 2021-01-08 PROCEDURE — A9503 TC99M MEDRONATE: HCPCS | Performed by: ORTHOPAEDIC SURGERY

## 2021-01-08 PROCEDURE — 78315 BONE IMAGING 3 PHASE: CPT

## 2021-01-08 RX ORDER — TC 99M MEDRONATE 20 MG/10ML
25 INJECTION, POWDER, LYOPHILIZED, FOR SOLUTION INTRAVENOUS
Status: COMPLETED | OUTPATIENT
Start: 2021-01-08 | End: 2021-01-08

## 2021-01-08 RX ADMIN — TC 99M MEDRONATE 25 MILLICURIE: 20 INJECTION, POWDER, LYOPHILIZED, FOR SOLUTION INTRAVENOUS at 09:30

## 2021-01-22 ENCOUNTER — TELEPHONE (OUTPATIENT)
Dept: CARDIOLOGY CLINIC | Age: 66
End: 2021-01-22

## 2021-01-22 NOTE — TELEPHONE ENCOUNTER
Called patient to see if she is ready to schedule Lexiscan stress test that was ordered 12/28/20 by Jael Devi. Per telephone note in chart, patient was going to call to schedule. Left message for return call.

## 2021-01-22 NOTE — TELEPHONE ENCOUNTER
Pt called back and states she wants to keep ov on 2/1/21 with Dr José Muñiz, before she has Oscar Ryan done.

## 2021-02-02 ENCOUNTER — TELEMEDICINE (OUTPATIENT)
Dept: CARDIOLOGY CLINIC | Age: 66
End: 2021-02-02
Payer: MEDICARE

## 2021-02-02 DIAGNOSIS — E78.5 DYSLIPIDEMIA: ICD-10-CM

## 2021-02-02 PROCEDURE — 99442 PR PHYS/QHP TELEPHONE EVALUATION 11-20 MIN: CPT | Performed by: INTERNAL MEDICINE

## 2021-02-02 RX ORDER — HYDROCODONE BITARTRATE AND ACETAMINOPHEN 10; 325 MG/1; MG/1
1 TABLET ORAL EVERY 6 HOURS PRN
COMMUNITY
Start: 2021-01-22

## 2021-02-02 NOTE — PROGRESS NOTES
Zara Martinez  is a  Established patient  ,63 y.o.   female here for evaluation of the following chief complaint(s):    htn  On phone visit      SUBJECTIVE/OBJECTIVE:  HPI : h/o  Htn, hyperlipidimea, DM now on phone visit  Has no cardiac complains    Has pain in kness and back  Had 10 :[facility]  Review of Systems    Knee pain    There were no vitals filed for this visit. There were no vitals taken for this visit. Patient-Reported Vitals 12/28/2020   Patient-Reported Weight -   Patient-Reported Height 5'2\"     Wt Readings from Last 3 Encounters:   12/17/20 165 lb (74.8 kg)   12/08/20 167 lb (75.8 kg)   12/02/20 172 lb 1 oz (78 kg)     There is no height or weight on file to calculate BMI. Physical Exam       All pertinent data reviewed      Meds : reviewed         Tests ordered        ASSESSMENT/PLAN:    -  Hypertension: Patients blood pressure is normal. Patient is advised about low sodium diet. Present medical regimen will not be changed. -  LIPID MANAGEMENT:  Importance of lipid levels discussed with patient   and patient was given dietary advice. NCEP- ATP III guidelines reviewed with patient. -   Changes  in medicines made: No     -   DIABETES MELLITUS: Available pertinent lab data reviewed   and  patient was given dietary advice . Advised to check blood glucose level on a regular basis. -   Changes  in medicines made: No              '   I confirm that this visit was completed in a telehealth setting ,using synchronous audiotechnology for real time patient interaction . The patient identity with name and date of birth was confirmed . This evaluation of patient was done by telehealth in the setting of [address] emergency , which precluded assurance of safe in person visit at the time of service. The patient consented to and accepts potential risks associated with telemedical evaluation and care was taken to assess patti presence of any medical issues that would be more  appropriate for expedited

## 2021-02-02 NOTE — LETTER
Dr. Heber Song  1955  [id number]    Have you had any Chest Pain that is not new? - No        Have you had any Shortness of Breath - Yes  If Yes - When on exertion    Have you had any dizziness - yes been dealing with it for 4 to 5 months     ? How long does it last she deals with this everyday all day   Have you had any palpitations that are not new? - No    Do you have any edema - swelling in legs    If Yes - CHECK TO SEE IF THE EDEMA IS PITTING  How long have they been having edema - Years  If Yes - Have they worn compression stockings No    Vein \"LEG PROBLEM Questionnaire\"  1. Do you have prominent leg veins? No   2. Do you have any skin discoloration? No  3. Do you have any healed or active sores? No  4. Do you have swelling of the legs? No  5. Do you have a family history of varicose veins? No  6. Does your profession involve pro-longed        standing or heavy lifting? No  7. Have you been fighting overweight problems? No  8. Do you have restless legs? No  9. Do you have any night time cramps? No  10.  Do you have any of the following in your legs:        No  Do you have a surgery or procedure scheduled in the near future - No

## 2021-02-11 ENCOUNTER — TELEPHONE (OUTPATIENT)
Dept: CARDIOLOGY CLINIC | Age: 66
End: 2021-02-11

## 2021-02-11 RX ORDER — METOPROLOL SUCCINATE 50 MG/1
50 TABLET, EXTENDED RELEASE ORAL DAILY
Qty: 30 TABLET | Refills: 3 | OUTPATIENT
Start: 2021-02-11

## 2021-02-11 NOTE — TELEPHONE ENCOUNTER
Patient called stating that her blood pressure is staying high; this morning it is 160/107 and she has dizziness and is lightheaded all the time. Please call her back at ph# [phone].

## 2021-02-16 ENCOUNTER — TELEPHONE (OUTPATIENT)
Dept: CARDIOLOGY CLINIC | Age: 66
End: 2021-02-16

## 2021-02-16 RX ORDER — METOPROLOL SUCCINATE 25 MG/1
50 TABLET, EXTENDED RELEASE ORAL DAILY
Qty: 30 TABLET | Refills: 5 | Status: SHIPPED | OUTPATIENT
Start: 2021-02-16 | End: 2021-09-10 | Stop reason: SDUPTHER

## 2021-02-16 NOTE — TELEPHONE ENCOUNTER
Patient called stated that [facility] is refusing to   Fill her Metoprolol that was called in on 2/16

## 2021-02-26 DIAGNOSIS — E11.42 TYPE 2 DIABETES MELLITUS WITH DIABETIC POLYNEUROPATHY, UNSPECIFIED WHETHER LONG TERM INSULIN USE (HCC): ICD-10-CM

## 2021-02-26 DIAGNOSIS — I10 ESSENTIAL HYPERTENSION: ICD-10-CM

## 2021-02-26 DIAGNOSIS — E78.5 DYSLIPIDEMIA: ICD-10-CM

## 2021-02-26 DIAGNOSIS — R00.0 TACHYCARDIA: ICD-10-CM

## 2021-03-01 ENCOUNTER — HOSPITAL ENCOUNTER (EMERGENCY)
Age: 66
Discharge: HOME | End: 2021-03-01
Payer: MEDICARE

## 2021-03-01 VITALS
WEIGHT: 171 LBS | SYSTOLIC BLOOD PRESSURE: 208 MMHG | DIASTOLIC BLOOD PRESSURE: 101 MMHG | HEIGHT: 62 IN | RESPIRATION RATE: 14 BRPM | TEMPERATURE: 97.5 F | OXYGEN SATURATION: 97 % | BODY MASS INDEX: 31.47 KG/M2 | HEART RATE: 83 BPM

## 2021-03-01 DIAGNOSIS — G89.29 BILATERAL CHRONIC KNEE PAIN: ICD-10-CM

## 2021-03-01 DIAGNOSIS — M25.561 BILATERAL CHRONIC KNEE PAIN: ICD-10-CM

## 2021-03-01 DIAGNOSIS — G89.29 ACUTE EXACERBATION OF CHRONIC LOW BACK PAIN: Primary | ICD-10-CM

## 2021-03-01 DIAGNOSIS — M54.50 ACUTE EXACERBATION OF CHRONIC LOW BACK PAIN: Primary | ICD-10-CM

## 2021-03-01 DIAGNOSIS — G89.29 CHRONIC NECK PAIN: ICD-10-CM

## 2021-03-01 DIAGNOSIS — M25.562 BILATERAL CHRONIC KNEE PAIN: ICD-10-CM

## 2021-03-01 DIAGNOSIS — M54.2 CHRONIC NECK PAIN: ICD-10-CM

## 2021-03-01 PROCEDURE — 99283 EMERGENCY DEPT VISIT LOW MDM: CPT

## 2021-03-01 PROCEDURE — 6360000002 HC RX W HCPCS: Performed by: PHYSICIAN ASSISTANT

## 2021-03-01 PROCEDURE — 6370000000 HC RX 637 (ALT 250 FOR IP): Performed by: PHYSICIAN ASSISTANT

## 2021-03-01 PROCEDURE — 96372 THER/PROPH/DIAG INJ SC/IM: CPT

## 2021-03-01 PROCEDURE — 99284 EMERGENCY DEPT VISIT MOD MDM: CPT

## 2021-03-01 RX ORDER — NIFEDIPINE 60 MG/1
60 TABLET, FILM COATED, EXTENDED RELEASE ORAL DAILY
Qty: 90 TABLET | Refills: 3 | OUTPATIENT
Start: 2021-03-01

## 2021-03-01 RX ORDER — KETOROLAC TROMETHAMINE 30 MG/ML
15 INJECTION, SOLUTION INTRAMUSCULAR; INTRAVENOUS ONCE
Status: COMPLETED | OUTPATIENT
Start: 2021-03-01 | End: 2021-03-01

## 2021-03-01 RX ORDER — ORPHENADRINE CITRATE 30 MG/ML
60 INJECTION INTRAMUSCULAR; INTRAVENOUS ONCE
Status: DISCONTINUED | OUTPATIENT
Start: 2021-03-01 | End: 2021-03-01 | Stop reason: HOSPADM

## 2021-03-01 RX ORDER — HYDROCODONE BITARTRATE AND ACETAMINOPHEN 5; 325 MG/1; MG/1
1 TABLET ORAL ONCE
Status: COMPLETED | OUTPATIENT
Start: 2021-03-01 | End: 2021-03-01

## 2021-03-01 RX ADMIN — HYDROCODONE BITARTRATE AND ACETAMINOPHEN 1 TABLET: 5; 325 TABLET ORAL at 08:26

## 2021-03-01 RX ADMIN — KETOROLAC TROMETHAMINE 15 MG: 30 INJECTION, SOLUTION INTRAMUSCULAR; INTRAVENOUS at 08:26

## 2021-03-01 ASSESSMENT — PAIN SCALES - GENERAL: PAINLEVEL_OUTOF10: 10

## 2021-03-01 NOTE — ED NOTES
Feb 25 Dr Eduard Garner started cervical injections due to bulging disks.       [facility], [place]  03/01/21 9562

## 2021-03-01 NOTE — ED NOTES
Pt given written and verbal discharge instructions and states understanding. Offered hot coffee/tea but refused, given trupti crackers. Pt given blanket and assisted to main lobby/bus stop to wait for bus.        [address], RN  03/01/21 0607

## 2021-03-01 NOTE — ED TRIAGE NOTES
Pt to ED with complaints of chronic back and neck pain that has worsened since this morning. Pt states she did take her pain medication at 0630.
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
## 2021-03-04 DIAGNOSIS — E78.5 DYSLIPIDEMIA: ICD-10-CM

## 2021-03-04 DIAGNOSIS — E11.42 TYPE 2 DIABETES MELLITUS WITH DIABETIC POLYNEUROPATHY, UNSPECIFIED WHETHER LONG TERM INSULIN USE (HCC): ICD-10-CM

## 2021-03-04 DIAGNOSIS — R00.0 TACHYCARDIA: ICD-10-CM

## 2021-03-04 DIAGNOSIS — I10 ESSENTIAL HYPERTENSION: ICD-10-CM

## 2021-03-05 RX ORDER — NIFEDIPINE 60 MG/1
60 TABLET, EXTENDED RELEASE ORAL DAILY
Qty: 30 TABLET | Refills: 5 | Status: SHIPPED | OUTPATIENT
Start: 2021-03-05 | End: 2021-09-03 | Stop reason: SDUPTHER

## 2021-03-22 ENCOUNTER — HOSPITAL ENCOUNTER (EMERGENCY)
Age: 66
Discharge: HOME | End: 2021-03-22
Payer: MEDICARE

## 2021-03-22 ENCOUNTER — APPOINTMENT (OUTPATIENT)
Dept: CT IMAGING | Age: 66
End: 2021-03-22
Payer: MEDICARE

## 2021-03-22 VITALS
WEIGHT: 170 LBS | RESPIRATION RATE: 20 BRPM | TEMPERATURE: 97.9 F | BODY MASS INDEX: 31.28 KG/M2 | HEIGHT: 62 IN | SYSTOLIC BLOOD PRESSURE: 176 MMHG | DIASTOLIC BLOOD PRESSURE: 91 MMHG | OXYGEN SATURATION: 98 % | HEART RATE: 77 BPM

## 2021-03-22 DIAGNOSIS — N39.0 URINARY TRACT INFECTION WITHOUT HEMATURIA, SITE UNSPECIFIED: Primary | ICD-10-CM

## 2021-03-22 LAB
ALBUMIN SERPL-MCNC: 4.1 GM/DL (ref 3.4–5)
ALP BLD-CCNC: 85 IU/L (ref 40–129)
ALT SERPL-CCNC: 16 U/L (ref 10–40)
ANION GAP SERPL CALCULATED.3IONS-SCNC: 10 MMOL/L (ref 4–16)
AST SERPL-CCNC: 16 IU/L (ref 15–37)
BACTERIA: ABNORMAL /HPF
BASOPHILS ABSOLUTE: 0.1 K/CU MM
BASOPHILS RELATIVE PERCENT: 0.6 % (ref 0–1)
BILIRUB SERPL-MCNC: 0.3 MG/DL (ref 0–1)
BILIRUBIN URINE: NEGATIVE MG/DL
BLOOD, URINE: NEGATIVE
BUN BLDV-MCNC: 9 MG/DL (ref 6–23)
CALCIUM SERPL-MCNC: 9.6 MG/DL (ref 8.3–10.6)
CHLORIDE BLD-SCNC: 86 MMOL/L (ref 99–110)
CLARITY: CLEAR
CO2: 28 MMOL/L (ref 21–32)
COLOR: YELLOW
CREAT SERPL-MCNC: 0.6 MG/DL (ref 0.6–1.1)
DIFFERENTIAL TYPE: ABNORMAL
EOSINOPHILS ABSOLUTE: 0.2 K/CU MM
EOSINOPHILS RELATIVE PERCENT: 1.6 % (ref 0–3)
GFR AFRICAN AMERICAN: >60 ML/MIN/1.73M2
GFR NON-AFRICAN AMERICAN: >60 ML/MIN/1.73M2
GLUCOSE BLD-MCNC: 190 MG/DL (ref 70–99)
GLUCOSE, URINE: NEGATIVE MG/DL
HCT VFR BLD CALC: 38.1 % (ref 37–47)
HEMOGLOBIN: 13.7 GM/DL (ref 12.5–16)
IMMATURE NEUTROPHIL %: 0.5 % (ref 0–0.43)
KETONES, URINE: NEGATIVE MG/DL
LEUKOCYTE ESTERASE, URINE: ABNORMAL
LYMPHOCYTES ABSOLUTE: 1.8 K/CU MM
LYMPHOCYTES RELATIVE PERCENT: 16.6 % (ref 24–44)
MAGNESIUM: 1.9 MG/DL (ref 1.8–2.4)
MCH RBC QN AUTO: 29.3 PG (ref 27–31)
MCHC RBC AUTO-ENTMCNC: 36 % (ref 32–36)
MCV RBC AUTO: 81.6 FL (ref 78–100)
MONOCYTES ABSOLUTE: 1.1 K/CU MM
MONOCYTES RELATIVE PERCENT: 9.9 % (ref 0–4)
NITRITE URINE, QUANTITATIVE: NEGATIVE
NUCLEATED RBC %: 0 %
PDW BLD-RTO: 11.6 % (ref 11.7–14.9)
PH, URINE: 7 (ref 5–8)
PLATELET # BLD: 353 K/CU MM (ref 140–440)
PMV BLD AUTO: 8.4 FL (ref 7.5–11.1)
POTASSIUM SERPL-SCNC: 4.8 MMOL/L (ref 3.5–5.1)
PROTEIN UA: NEGATIVE MG/DL
RBC # BLD: 4.67 M/CU MM (ref 4.2–5.4)
RBC URINE: <1 /HPF (ref 0–6)
SEGMENTED NEUTROPHILS ABSOLUTE COUNT: 7.8 K/CU MM
SEGMENTED NEUTROPHILS RELATIVE PERCENT: 70.8 % (ref 36–66)
SODIUM BLD-SCNC: 124 MMOL/L (ref 135–145)
SPECIFIC GRAVITY UA: 1.01 (ref 1–1.03)
SQUAMOUS EPITHELIAL: <1 /HPF
TOTAL IMMATURE NEUTOROPHIL: 0.06 K/CU MM
TOTAL NUCLEATED RBC: 0 K/CU MM
TOTAL PROTEIN: 6.9 GM/DL (ref 6.4–8.2)
TRICHOMONAS: ABNORMAL /HPF
UROBILINOGEN, URINE: NEGATIVE MG/DL (ref 0.2–1)
WBC # BLD: 11.1 K/CU MM (ref 4–10.5)
WBC UA: 7 /HPF (ref 0–5)

## 2021-03-22 PROCEDURE — 80053 COMPREHEN METABOLIC PANEL: CPT

## 2021-03-22 PROCEDURE — 99285 EMERGENCY DEPT VISIT HI MDM: CPT

## 2021-03-22 PROCEDURE — 81001 URINALYSIS AUTO W/SCOPE: CPT

## 2021-03-22 PROCEDURE — 85025 COMPLETE CBC W/AUTO DIFF WBC: CPT

## 2021-03-22 PROCEDURE — 74176 CT ABD & PELVIS W/O CONTRAST: CPT

## 2021-03-22 PROCEDURE — 83735 ASSAY OF MAGNESIUM: CPT

## 2021-03-22 RX ORDER — PHENAZOPYRIDINE HYDROCHLORIDE 200 MG/1
200 TABLET, FILM COATED ORAL 3 TIMES DAILY PRN
Qty: 6 TABLET | Refills: 0 | Status: SHIPPED | OUTPATIENT
Start: 2021-03-22 | End: 2021-03-24

## 2021-03-22 RX ORDER — CEPHALEXIN 500 MG/1
500 CAPSULE ORAL 3 TIMES DAILY
Qty: 21 CAPSULE | Refills: 0 | Status: SHIPPED | OUTPATIENT
Start: 2021-03-22 | End: 2021-03-29

## 2021-03-22 NOTE — ED PROVIDER NOTES
As physician-in-triage, I performed a medical screening history and physical exam on this patient. HISTORY OF PRESENT ILLNESS  Leatha Condon is a 63 y.o. female with right flank pain and concern for UTI and kidney stone. Patient with a history of the same. Additionally, patient reports that she has generalized fatigue/weakness and thinks that her \"magnesium oxide levels might be low\". .      PHYSICAL EXAM  BP (!) 181/103   Pulse 72   Temp 97.9 °F (36.6 °C) (Oral)   Resp 20   Ht 5' 2\" (1.575 m)   Wt 170 lb (77.1 kg)   SpO2 99%   BMI 31.09 kg/m²     On exam, the patient appears in no acute distress. Speech is clear. Breathing is unlabored. Moves all extremities. Comment: Please note this report has been produced using speech recognition software and may contain errors related to that system including errors in grammar, punctuation, and spelling, as well as words and phrases that may be inappropriate. If there are any questions or concerns please feel free to contact the dictating provider for clarification.        Lorraine López MD  03/22/21 [id number]
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
## 2021-03-22 NOTE — ED NOTES
Discharge instructions reviewed with patient, verbalized understanding and agreeable to discharge.      Haroldo Diego RN  03/22/21 [id number]

## 2021-04-01 ENCOUNTER — OFFICE VISIT (OUTPATIENT)
Dept: PHYSICAL MEDICINE AND REHAB | Age: 66
End: 2021-04-01
Payer: MEDICARE

## 2021-04-01 VITALS — TEMPERATURE: 97 F

## 2021-04-01 DIAGNOSIS — M54.50 CHRONIC BILATERAL LOW BACK PAIN WITHOUT SCIATICA: ICD-10-CM

## 2021-04-01 DIAGNOSIS — E11.42 DIABETIC SENSORIMOTOR POLYNEUROPATHY (HCC): Primary | ICD-10-CM

## 2021-04-01 DIAGNOSIS — R20.2 PARESTHESIA OF BOTH FEET: ICD-10-CM

## 2021-04-01 DIAGNOSIS — G89.29 CHRONIC BILATERAL LOW BACK PAIN WITHOUT SCIATICA: ICD-10-CM

## 2021-04-01 PROCEDURE — 95886 MUSC TEST DONE W/N TEST COMP: CPT | Performed by: PHYSICAL MEDICINE & REHABILITATION

## 2021-04-01 PROCEDURE — 95911 NRV CNDJ TEST 9-10 STUDIES: CPT | Performed by: PHYSICAL MEDICINE & REHABILITATION

## 2021-04-01 NOTE — PROGRESS NOTES
EMG REPORT     CHIEF COMPLAINT: Various pains in her back and right lower leg. HISTORY OF PRESENT ILLNESS: 63 y.o. right hand dominant female with nearly a lifetime of various spinal and limb pain issues. Most recently she is having intermittent right knee pain that makes it difficult to walk. She also reported pain in her neck, lower back, each flank and both lower limbs, worse on the right. She denied cramping, rashes or limb discoloration. She gets some swelling in her feet each day. She did not report any recent falls. She rated her pain severity as 10/10. The pains seem to wake her from sleep. She stumbles some with walking but does not describe foot drop. She has a history of a right knee replacement 13 years ago and both cervical and lumbar spinal injections in the past.  She has diabetes. No thyroid disorder. PHYSICAL EXAMINATION: Alert. Normal lumbar lordosis. No paraspinal muscle tenderness to light palpation. Guarded hip and knee passive range of motion. Negative straight leg raising. 1+ and symmetric knee jerks. Trace and symmetric ankle jerks. Give way with MMT across the ankles and toes. Trace edema in both feet. Blunted perception of touch about the feet and ankles. No signs of DVT. NERVE CONDUCTION STUDIES:     MOTOR         LATENCY NORMAL AMPLITUDE DISTANCE COND. JERROD.    R  PERONEAL   3.9 < 6.2 msec 0.7/0.6 8 cm 42/63   L  PERONEAL  < 6.2 msec  8 cm    RIGHT  TIBIAL 3.8 < 6.2 msec 3.0 8 cm 41   LEFT TIBIAL  < 6.2 msec  8 cm    R TIB H REFLEX Absent < 50 msec      L TIB H REFLEX Absent < 50 msec         SENSORY  ANTIDROMIC        LATENCY NORMAL AMPLITUDE DISTANCE   R SUP PERONEAL Absent < 3.6 msec  10 cm   L SUP PERONEAL  < 3.6 msec  10 cm   RIGHT  SURAL 3.5 < 4.0 msec 5 14 cm   LEFT  SURAL 3.7 < 4.0 msec 3 14 cm         NEEDLE EMG:      RIGHT   LEFT     Insertional Activity Spontaneous  Activity Volutional  MUAP's Insertional Activity Spontaneous  Activity Volutional  MUAP's   Lumbar paraspinals Normal None Normal      Glut Med Normal None Normal      Rect fem Normal None Normal      Vast Med Normal None Normal      Ant Tibialis Normal None Normal      EHL Normal None Normal      FDL Normal None Normal      Gastroc Normal None Normal      EDB Normal None Dec #, Larger      1st dors int Normal None Dec #, Polys          FINDINGS:   EMG of the lumbar paraspinals and the right lower limb demonstrated no paraspinal muscle membrane irritability. No limb muscle membrane irritability was encountered, either. A diminished number of larger motor units were noted in the foot transit muscles. Motor latencies were normal but the right peroneal motor evoked amplitude was diminished with stimulation distally and proximally. The right peroneal sensory and both tibial H reflexes were absent. Sural sensory responses were present, with diminished SNAP amplitudes. IMPRESSION:      1. Mildly abnormal EMG. 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 Electrical abnormalities are rather stable compared to an EMG from February 2020.     2.  No evidence of a focal spinal nerve root injury (radiculopathy), plexopathy or primary muscle disease.             Thank you for this interesting referral.

## 2021-04-01 NOTE — LETTER
April 01, 2021        Celeste Torres MD  [address] [place] [address]         Patient Name: Felicitas Kirkland   MRN Number: [id number]   YOB: 1955   Date Of Visit: 04/01/2021          Dear Celeste Torres MD,      Thank you for referring Felicitas Kirkland to me for electrodiagnostic testing. Attached are the findings of the EMG and my assessment. If you have any further questions, please do not hesitate to call me. Thank you for this interesting referral.      Sincerely,          BRANDYN Rodriguez MD.

## 2021-04-26 ENCOUNTER — APPOINTMENT (OUTPATIENT)
Dept: GENERAL RADIOLOGY | Age: 66
DRG: 883 | End: 2021-04-26
Payer: MEDICARE

## 2021-04-26 ENCOUNTER — APPOINTMENT (OUTPATIENT)
Dept: CT IMAGING | Age: 66
DRG: 883 | End: 2021-04-26
Payer: MEDICARE

## 2021-04-26 ENCOUNTER — HOSPITAL ENCOUNTER (INPATIENT)
Age: 66
LOS: 1 days | Discharge: HOME | DRG: 883 | End: 2021-04-28
Attending: EMERGENCY MEDICINE | Admitting: FAMILY MEDICINE
Payer: MEDICARE

## 2021-04-26 DIAGNOSIS — R79.89 ELEVATED LACTIC ACID LEVEL: ICD-10-CM

## 2021-04-26 DIAGNOSIS — G93.40 ACUTE ENCEPHALOPATHY: Primary | ICD-10-CM

## 2021-04-26 DIAGNOSIS — R30.0 DYSURIA: ICD-10-CM

## 2021-04-26 PROBLEM — R55 SYNCOPE: Status: ACTIVE | Noted: 2021-04-26

## 2021-04-26 LAB
ALBUMIN SERPL-MCNC: 4.6 GM/DL (ref 3.4–5)
ALP BLD-CCNC: 91 IU/L (ref 40–129)
ALT SERPL-CCNC: 12 U/L (ref 10–40)
ANION GAP SERPL CALCULATED.3IONS-SCNC: 12 MMOL/L (ref 4–16)
AST SERPL-CCNC: 20 IU/L (ref 15–37)
BACTERIA: NEGATIVE /HPF
BASOPHILS ABSOLUTE: 0.1 K/CU MM
BASOPHILS RELATIVE PERCENT: 0.7 % (ref 0–1)
BILIRUB SERPL-MCNC: 0.4 MG/DL (ref 0–1)
BILIRUBIN URINE: NEGATIVE MG/DL
BLOOD, URINE: NEGATIVE
BUN BLDV-MCNC: 15 MG/DL (ref 6–23)
CALCIUM SERPL-MCNC: 9.6 MG/DL (ref 8.3–10.6)
CHLORIDE BLD-SCNC: 93 MMOL/L (ref 99–110)
CLARITY: CLEAR
CO2: 27 MMOL/L (ref 21–32)
COLOR: YELLOW
CREAT SERPL-MCNC: 0.6 MG/DL (ref 0.6–1.1)
DIFFERENTIAL TYPE: ABNORMAL
EKG ATRIAL RATE: 90 BPM
EKG DIAGNOSIS: NORMAL
EKG P AXIS: 32 DEGREES
EKG P-R INTERVAL: 230 MS
EKG Q-T INTERVAL: 400 MS
EKG QRS DURATION: 118 MS
EKG QTC CALCULATION (BAZETT): 489 MS
EKG R AXIS: -42 DEGREES
EKG T AXIS: 96 DEGREES
EKG VENTRICULAR RATE: 90 BPM
EOSINOPHILS ABSOLUTE: 0.4 K/CU MM
EOSINOPHILS RELATIVE PERCENT: 3.6 % (ref 0–3)
GFR AFRICAN AMERICAN: >60 ML/MIN/1.73M2
GFR NON-AFRICAN AMERICAN: >60 ML/MIN/1.73M2
GLUCOSE BLD-MCNC: 160 MG/DL (ref 70–99)
GLUCOSE BLD-MCNC: 212 MG/DL (ref 70–99)
GLUCOSE BLD-MCNC: 220 MG/DL (ref 70–99)
GLUCOSE BLD-MCNC: 222 MG/DL (ref 70–99)
GLUCOSE BLD-MCNC: 232 MG/DL
GLUCOSE BLD-MCNC: 232 MG/DL (ref 70–99)
GLUCOSE, URINE: 50 MG/DL
HCT VFR BLD CALC: 40.2 % (ref 37–47)
HEMOGLOBIN: 13.9 GM/DL (ref 12.5–16)
IMMATURE NEUTROPHIL %: 0.2 % (ref 0–0.43)
INR BLD: 0.98 INDEX
KETONES, URINE: NEGATIVE MG/DL
LACTATE: 2.9 MMOL/L (ref 0.4–2)
LACTATE: 2.9 MMOL/L (ref 0.4–2)
LEUKOCYTE ESTERASE, URINE: ABNORMAL
LYMPHOCYTES ABSOLUTE: 4.1 K/CU MM
LYMPHOCYTES RELATIVE PERCENT: 39.5 % (ref 24–44)
MAGNESIUM: 1.8 MG/DL (ref 1.8–2.4)
MCH RBC QN AUTO: 29 PG (ref 27–31)
MCHC RBC AUTO-ENTMCNC: 34.6 % (ref 32–36)
MCV RBC AUTO: 83.9 FL (ref 78–100)
MONOCYTES ABSOLUTE: 1 K/CU MM
MONOCYTES RELATIVE PERCENT: 9.9 % (ref 0–4)
MUCUS: ABNORMAL HPF
NITRITE URINE, QUANTITATIVE: NEGATIVE
NUCLEATED RBC %: 0 %
PDW BLD-RTO: 11.7 % (ref 11.7–14.9)
PH, URINE: 5 (ref 5–8)
PLATELET # BLD: 331 K/CU MM (ref 140–440)
PMV BLD AUTO: 8.9 FL (ref 7.5–11.1)
POTASSIUM SERPL-SCNC: 3.4 MMOL/L (ref 3.5–5.1)
PROTEIN UA: NEGATIVE MG/DL
PROTHROMBIN TIME: 11.8 SECONDS (ref 11.7–14.5)
RBC # BLD: 4.79 M/CU MM (ref 4.2–5.4)
RBC URINE: 1 /HPF (ref 0–6)
SEGMENTED NEUTROPHILS ABSOLUTE COUNT: 4.8 K/CU MM
SEGMENTED NEUTROPHILS RELATIVE PERCENT: 46.1 % (ref 36–66)
SODIUM BLD-SCNC: 132 MMOL/L (ref 135–145)
SPECIFIC GRAVITY UA: 1.05 (ref 1–1.03)
SQUAMOUS EPITHELIAL: <1 /HPF
TOTAL IMMATURE NEUTOROPHIL: 0.02 K/CU MM
TOTAL NUCLEATED RBC: 0 K/CU MM
TOTAL PROTEIN: 7.6 GM/DL (ref 6.4–8.2)
TRICHOMONAS: ABNORMAL /HPF
TROPONIN T: <0.01 NG/ML
TROPONIN T: <0.01 NG/ML
UROBILINOGEN, URINE: NEGATIVE MG/DL (ref 0.2–1)
WBC # BLD: 10.5 K/CU MM (ref 4–10.5)
WBC UA: 4 /HPF (ref 0–5)

## 2021-04-26 PROCEDURE — 6360000002 HC RX W HCPCS: Performed by: FAMILY MEDICINE

## 2021-04-26 PROCEDURE — 82962 GLUCOSE BLOOD TEST: CPT

## 2021-04-26 PROCEDURE — 2580000003 HC RX 258: Performed by: INTERNAL MEDICINE

## 2021-04-26 PROCEDURE — 96376 TX/PRO/DX INJ SAME DRUG ADON: CPT

## 2021-04-26 PROCEDURE — 36415 COLL VENOUS BLD VENIPUNCTURE: CPT

## 2021-04-26 PROCEDURE — 94761 N-INVAS EAR/PLS OXIMETRY MLT: CPT

## 2021-04-26 PROCEDURE — G0378 HOSPITAL OBSERVATION PER HR: HCPCS

## 2021-04-26 PROCEDURE — 6370000000 HC RX 637 (ALT 250 FOR IP): Performed by: PSYCHIATRY & NEUROLOGY

## 2021-04-26 PROCEDURE — 84484 ASSAY OF TROPONIN QUANT: CPT

## 2021-04-26 PROCEDURE — 6360000002 HC RX W HCPCS: Performed by: INTERNAL MEDICINE

## 2021-04-26 PROCEDURE — 81001 URINALYSIS AUTO W/SCOPE: CPT

## 2021-04-26 PROCEDURE — 6360000004 HC RX CONTRAST MEDICATION: Performed by: EMERGENCY MEDICINE

## 2021-04-26 PROCEDURE — 93005 ELECTROCARDIOGRAM TRACING: CPT | Performed by: EMERGENCY MEDICINE

## 2021-04-26 PROCEDURE — 97166 OT EVAL MOD COMPLEX 45 MIN: CPT

## 2021-04-26 PROCEDURE — 6370000000 HC RX 637 (ALT 250 FOR IP): Performed by: NURSE PRACTITIONER

## 2021-04-26 PROCEDURE — 70496 CT ANGIOGRAPHY HEAD: CPT

## 2021-04-26 PROCEDURE — 93010 ELECTROCARDIOGRAM REPORT: CPT | Performed by: INTERNAL MEDICINE

## 2021-04-26 PROCEDURE — 83605 ASSAY OF LACTIC ACID: CPT

## 2021-04-26 PROCEDURE — 6360000002 HC RX W HCPCS: Performed by: STUDENT IN AN ORGANIZED HEALTH CARE EDUCATION/TRAINING PROGRAM

## 2021-04-26 PROCEDURE — 80053 COMPREHEN METABOLIC PANEL: CPT

## 2021-04-26 PROCEDURE — 87040 BLOOD CULTURE FOR BACTERIA: CPT

## 2021-04-26 PROCEDURE — 99285 EMERGENCY DEPT VISIT HI MDM: CPT

## 2021-04-26 PROCEDURE — 6370000000 HC RX 637 (ALT 250 FOR IP): Performed by: FAMILY MEDICINE

## 2021-04-26 PROCEDURE — 85025 COMPLETE CBC W/AUTO DIFF WBC: CPT

## 2021-04-26 PROCEDURE — 93005 ELECTROCARDIOGRAM TRACING: CPT | Performed by: INTERNAL MEDICINE

## 2021-04-26 PROCEDURE — 97530 THERAPEUTIC ACTIVITIES: CPT

## 2021-04-26 PROCEDURE — 87086 URINE CULTURE/COLONY COUNT: CPT

## 2021-04-26 PROCEDURE — 96374 THER/PROPH/DIAG INJ IV PUSH: CPT

## 2021-04-26 PROCEDURE — 71045 X-RAY EXAM CHEST 1 VIEW: CPT

## 2021-04-26 PROCEDURE — 97535 SELF CARE MNGMENT TRAINING: CPT

## 2021-04-26 PROCEDURE — 2580000003 HC RX 258: Performed by: STUDENT IN AN ORGANIZED HEALTH CARE EDUCATION/TRAINING PROGRAM

## 2021-04-26 PROCEDURE — 96375 TX/PRO/DX INJ NEW DRUG ADDON: CPT

## 2021-04-26 PROCEDURE — 83735 ASSAY OF MAGNESIUM: CPT

## 2021-04-26 PROCEDURE — 96372 THER/PROPH/DIAG INJ SC/IM: CPT

## 2021-04-26 PROCEDURE — 6370000000 HC RX 637 (ALT 250 FOR IP): Performed by: STUDENT IN AN ORGANIZED HEALTH CARE EDUCATION/TRAINING PROGRAM

## 2021-04-26 PROCEDURE — 85610 PROTHROMBIN TIME: CPT

## 2021-04-26 PROCEDURE — 92610 EVALUATE SWALLOWING FUNCTION: CPT | Performed by: SPEECH-LANGUAGE PATHOLOGIST

## 2021-04-26 RX ORDER — BUSPIRONE HYDROCHLORIDE 10 MG/1
20 TABLET ORAL 3 TIMES DAILY
Status: DISCONTINUED | OUTPATIENT
Start: 2021-04-26 | End: 2021-04-28 | Stop reason: HOSPADM

## 2021-04-26 RX ORDER — NICOTINE POLACRILEX 4 MG
15 LOZENGE BUCCAL PRN
Status: DISCONTINUED | OUTPATIENT
Start: 2021-04-26 | End: 2021-04-28 | Stop reason: HOSPADM

## 2021-04-26 RX ORDER — ASPIRIN 81 MG/1
81 TABLET ORAL DAILY
Status: DISCONTINUED | OUTPATIENT
Start: 2021-04-26 | End: 2021-04-28 | Stop reason: HOSPADM

## 2021-04-26 RX ORDER — SODIUM CHLORIDE 0.9 % (FLUSH) 0.9 %
5-40 SYRINGE (ML) INJECTION EVERY 12 HOURS SCHEDULED
Status: DISCONTINUED | OUTPATIENT
Start: 2021-04-26 | End: 2021-04-28 | Stop reason: HOSPADM

## 2021-04-26 RX ORDER — ALBUTEROL SULFATE 2.5 MG/3ML
2.5 SOLUTION RESPIRATORY (INHALATION) EVERY 6 HOURS PRN
Status: DISCONTINUED | OUTPATIENT
Start: 2021-04-26 | End: 2021-04-28 | Stop reason: HOSPADM

## 2021-04-26 RX ORDER — INSULIN GLARGINE 100 [IU]/ML
20 INJECTION, SOLUTION SUBCUTANEOUS DAILY
Status: DISCONTINUED | OUTPATIENT
Start: 2021-04-26 | End: 2021-04-28

## 2021-04-26 RX ORDER — DEXTROSE MONOHYDRATE 50 MG/ML
100 INJECTION, SOLUTION INTRAVENOUS PRN
Status: DISCONTINUED | OUTPATIENT
Start: 2021-04-26 | End: 2021-04-28 | Stop reason: HOSPADM

## 2021-04-26 RX ORDER — ASPIRIN 300 MG/1
300 SUPPOSITORY RECTAL DAILY
Status: DISCONTINUED | OUTPATIENT
Start: 2021-04-26 | End: 2021-04-28 | Stop reason: HOSPADM

## 2021-04-26 RX ORDER — LORAZEPAM 2 MG/ML
2 INJECTION INTRAMUSCULAR EVERY 4 HOURS PRN
Status: DISCONTINUED | OUTPATIENT
Start: 2021-04-26 | End: 2021-04-28 | Stop reason: HOSPADM

## 2021-04-26 RX ORDER — BUDESONIDE AND FORMOTEROL FUMARATE DIHYDRATE 160; 4.5 UG/1; UG/1
2 AEROSOL RESPIRATORY (INHALATION) 2 TIMES DAILY
Status: DISCONTINUED | OUTPATIENT
Start: 2021-04-26 | End: 2021-04-28 | Stop reason: HOSPADM

## 2021-04-26 RX ORDER — SODIUM CHLORIDE 9 MG/ML
25 INJECTION, SOLUTION INTRAVENOUS PRN
Status: DISCONTINUED | OUTPATIENT
Start: 2021-04-26 | End: 2021-04-28 | Stop reason: HOSPADM

## 2021-04-26 RX ORDER — LABETALOL HYDROCHLORIDE 5 MG/ML
10 INJECTION, SOLUTION INTRAVENOUS EVERY 10 MIN PRN
Status: DISCONTINUED | OUTPATIENT
Start: 2021-04-26 | End: 2021-04-28 | Stop reason: HOSPADM

## 2021-04-26 RX ORDER — SODIUM CHLORIDE 0.9 % (FLUSH) 0.9 %
5-40 SYRINGE (ML) INJECTION 2 TIMES DAILY
Status: DISCONTINUED | OUTPATIENT
Start: 2021-04-26 | End: 2021-04-28 | Stop reason: HOSPADM

## 2021-04-26 RX ORDER — MORPHINE SULFATE 4 MG/ML
1 INJECTION, SOLUTION INTRAMUSCULAR; INTRAVENOUS EVERY 4 HOURS PRN
Status: DISCONTINUED | OUTPATIENT
Start: 2021-04-26 | End: 2021-04-27

## 2021-04-26 RX ORDER — DEXTROSE MONOHYDRATE 25 G/50ML
12.5 INJECTION, SOLUTION INTRAVENOUS PRN
Status: DISCONTINUED | OUTPATIENT
Start: 2021-04-26 | End: 2021-04-28 | Stop reason: HOSPADM

## 2021-04-26 RX ORDER — BUSPIRONE HYDROCHLORIDE 15 MG/1
15 TABLET ORAL 3 TIMES DAILY
Status: DISCONTINUED | OUTPATIENT
Start: 2021-04-26 | End: 2021-04-26

## 2021-04-26 RX ORDER — ONDANSETRON 2 MG/ML
4 INJECTION INTRAMUSCULAR; INTRAVENOUS EVERY 8 HOURS PRN
Status: DISCONTINUED | OUTPATIENT
Start: 2021-04-26 | End: 2021-04-28 | Stop reason: HOSPADM

## 2021-04-26 RX ORDER — PROMETHAZINE HYDROCHLORIDE 25 MG/1
12.5 TABLET ORAL EVERY 6 HOURS PRN
Status: DISCONTINUED | OUTPATIENT
Start: 2021-04-26 | End: 2021-04-28 | Stop reason: HOSPADM

## 2021-04-26 RX ORDER — DIPHENHYDRAMINE HCL 25 MG
25 TABLET ORAL EVERY 6 HOURS PRN
Status: DISCONTINUED | OUTPATIENT
Start: 2021-04-26 | End: 2021-04-28 | Stop reason: HOSPADM

## 2021-04-26 RX ORDER — HYDROCODONE BITARTRATE AND ACETAMINOPHEN 7.5; 325 MG/1; MG/1
1 TABLET ORAL EVERY 6 HOURS PRN
Status: DISCONTINUED | OUTPATIENT
Start: 2021-04-26 | End: 2021-04-28 | Stop reason: HOSPADM

## 2021-04-26 RX ORDER — POTASSIUM CHLORIDE 7.45 MG/ML
10 INJECTION INTRAVENOUS ONCE
Status: COMPLETED | OUTPATIENT
Start: 2021-04-26 | End: 2021-04-26

## 2021-04-26 RX ORDER — SODIUM CHLORIDE 0.9 % (FLUSH) 0.9 %
5-40 SYRINGE (ML) INJECTION PRN
Status: DISCONTINUED | OUTPATIENT
Start: 2021-04-26 | End: 2021-04-28 | Stop reason: HOSPADM

## 2021-04-26 RX ORDER — OXCARBAZEPINE 300 MG/1
450 TABLET, FILM COATED ORAL 2 TIMES DAILY
Status: DISCONTINUED | OUTPATIENT
Start: 2021-04-26 | End: 2021-04-27

## 2021-04-26 RX ORDER — POLYETHYLENE GLYCOL 3350 17 G/17G
17 POWDER, FOR SOLUTION ORAL DAILY PRN
Status: DISCONTINUED | OUTPATIENT
Start: 2021-04-26 | End: 2021-04-28 | Stop reason: HOSPADM

## 2021-04-26 RX ORDER — LORAZEPAM 2 MG/ML
0.5 INJECTION INTRAMUSCULAR EVERY 6 HOURS PRN
Status: DISCONTINUED | OUTPATIENT
Start: 2021-04-26 | End: 2021-04-27

## 2021-04-26 RX ADMIN — OXCARBAZEPINE 450 MG: 300 TABLET, FILM COATED ORAL at 20:58

## 2021-04-26 RX ADMIN — LORAZEPAM 1 MG: 2 INJECTION INTRAMUSCULAR; INTRAVENOUS at 18:12

## 2021-04-26 RX ADMIN — CARBIDOPA AND LEVODOPA 1 TABLET: 10; 100 TABLET ORAL at 23:39

## 2021-04-26 RX ADMIN — LEVETIRACETAM 750 MG: 100 INJECTION, SOLUTION INTRAVENOUS at 18:30

## 2021-04-26 RX ADMIN — CEFTRIAXONE 1000 MG: 1 INJECTION, POWDER, FOR SOLUTION INTRAMUSCULAR; INTRAVENOUS at 18:27

## 2021-04-26 RX ADMIN — ENOXAPARIN SODIUM 40 MG: 40 INJECTION SUBCUTANEOUS at 18:30

## 2021-04-26 RX ADMIN — IOPAMIDOL 75 ML: 755 INJECTION, SOLUTION INTRAVENOUS at 04:57

## 2021-04-26 RX ADMIN — POTASSIUM CHLORIDE 10 MEQ: 7.46 INJECTION, SOLUTION INTRAVENOUS at 06:44

## 2021-04-26 RX ADMIN — LORAZEPAM 0.5 MG: 2 INJECTION INTRAMUSCULAR; INTRAVENOUS at 18:09

## 2021-04-26 RX ADMIN — BUSPIRONE HYDROCHLORIDE 20 MG: 10 TABLET ORAL at 20:59

## 2021-04-26 RX ADMIN — SODIUM CHLORIDE, PRESERVATIVE FREE 10 ML: 5 INJECTION INTRAVENOUS at 20:59

## 2021-04-26 RX ADMIN — DIPHENHYDRAMINE HYDROCHLORIDE 25 MG: 25 TABLET ORAL at 23:39

## 2021-04-26 RX ADMIN — MORPHINE SULFATE 1 MG: 4 INJECTION, SOLUTION INTRAMUSCULAR; INTRAVENOUS at 20:58

## 2021-04-26 RX ADMIN — HYDROCODONE BITARTRATE AND ACETAMINOPHEN 1 TABLET: 7.5; 325 TABLET ORAL at 23:39

## 2021-04-26 RX ADMIN — LEVETIRACETAM 250 MG: 100 INJECTION, SOLUTION INTRAVENOUS at 21:02

## 2021-04-26 RX ADMIN — ASPIRIN 81 MG: 81 TABLET, COATED ORAL at 21:04

## 2021-04-26 ASSESSMENT — ENCOUNTER SYMPTOMS
DIARRHEA: 0
COUGH: 0
SHORTNESS OF BREATH: 1
NAUSEA: 0
SORE THROAT: 0
ABDOMINAL PAIN: 1
VOMITING: 0
WHEEZING: 0
RHINORRHEA: 0
CHEST TIGHTNESS: 0
COLOR CHANGE: 0
BACK PAIN: 1

## 2021-04-26 ASSESSMENT — PAIN SCALES - GENERAL: PAINLEVEL_OUTOF10: 10

## 2021-04-26 NOTE — H&P
History and Physical      Name:  Candace León /Age/Sex: 1955  (63 y.o. female)   MRN & CSN:  [id number] & [id number] Admission Date/Time: 2021  4:30 AM   Location:  [facility] PCP: Sai Jonas MD       Hospital Day: 1    Assessment and Plan:   Candace León is a 63 y.o.  female  who presents with Syncope    1. TIA versus syncope versus panic attack  2. History of seizures and pseudoseizures  3. History of CVA with right-sided residual deficit  -Admit to observation with telemetry and continuous pulse ox  -CT head without contrast and CTA head and neck with no large vessel occlusion, significant stenosis, or cerebral aneurysm and no significant arterial stenosis identified within the neck and CT head without contrast shows no acute intracranial abnormalities  -MRI brain ordered  -Started ASA and statin  -2D echo was completed in 2020  -Check lipid panel and hemoglobin A1c  -Neuro checks q4 hours, bedside swallow evaluation, SLP consult, PT/OT consult, seizure and fall precautions  -Permissive hypertension unless BP >220/120 or pt symptomatic  -Consulted neurology, appreciate recommendations  -Consulted psychiatry, appreciate recommendations  -Initial troponin <0.010, cycle troponin  -Check orthostatic blood pressure and pulse  -Adjust blood pressure medications  -Holding all home medications as patient is n.p.o. and converting patient's Keppra to IV  -IV Ativan as needed    4. UTI? 5. Elevated lactic acid  -UA and urine culture pending  -Empirically treat with IV ceftriaxone  -Can de-escalate based on results if needed  -Lactic acid likely related to above    6. Hypokalemia  -Potassium was 3.4 on admission  -10 mEq of IV potassium  -A. m. labs    Pharmacy consult for a.m. med rec    Other chronic medical conditions:   Holding all home meds except stated above or contraindicated.    IDDM 2: N.p.o., home Tresiba, low n.p.o. SSI + BG checks ACHS, hypoglycemic protocol, and states she does not have a history of seizures. Otherwise no further history was obtained. Please see ED providers HPI for further detail. Discussed case with ED provider. ROS:   Review of Systems   Constitutional: Negative for appetite change, chills, fatigue and fever. HENT: Negative for congestion, rhinorrhea and sore throat. Eyes: Negative for visual disturbance. Respiratory: Positive for shortness of breath. Negative for cough, chest tightness and wheezing. Cardiovascular: Positive for palpitations. Negative for chest pain. Gastrointestinal: Positive for abdominal pain. Negative for diarrhea, nausea and vomiting. Genitourinary: Positive for dysuria, frequency and urgency. Negative for hematuria. Musculoskeletal: Positive for back pain. Negative for arthralgias. Skin: Negative for color change, pallor and rash. Neurological: Positive for syncope, weakness and headaches. Negative for dizziness, seizures, facial asymmetry, speech difficulty, light-headedness and numbness. Psychiatric/Behavioral: Negative for confusion. Objective:   No intake or output data in the 24 hours ending 04/26/21 0633   Vitals:   Vitals:    04/26/21 0444   BP: (!) 178/87   Pulse: 89   Resp: 18   Temp: 98.9 °F (37.2 °C)   SpO2: (!) 20%     BP (!) 178/87   Pulse 89   Temp 98.9 °F (37.2 °C) (Axillary)   Resp 18   Ht 5' 2\" (1.575 m)   Wt 170 lb (77.1 kg)   SpO2 (!) 20%   BMI 31.09 kg/m²   Physical Exam:   Physical Exam  Vitals signs and nursing note reviewed. Constitutional:       General: She is awake. She is not in acute distress. Appearance: Normal appearance. She is obese. She is not ill-appearing, toxic-appearing or diaphoretic. Interventions: She is not intubated. HENT:      Head: Atraumatic. Right Ear: External ear normal.      Left Ear: External ear normal.      Nose: Nose normal. No rhinorrhea.       Mouth/Throat:      Mouth: Mucous membranes are moist.      Dentition: Abnormal dentition. Tongue: Tongue does not deviate from midline. Pharynx: Uvula midline. Eyes:      General: No scleral icterus. Extraocular Movements: Extraocular movements intact. Conjunctiva/sclera: Conjunctivae normal.      Pupils: Pupils are equal, round, and reactive to light. Neck:      Musculoskeletal: Neck supple. Cardiovascular:      Rate and Rhythm: Normal rate and regular rhythm. Pulses: Normal pulses. Heart sounds: Normal heart sounds. No murmur. No gallop. Pulmonary:      Effort: Pulmonary effort is normal. No tachypnea, prolonged expiration or respiratory distress. She is not intubated. Breath sounds: Decreased air movement present. No wheezing, rhonchi or rales. Comments: Poor inspiratory quality secondary to his steroids. Abdominal:      General: Abdomen is protuberant. Bowel sounds are normal. There is no distension. Palpations: Abdomen is soft. Tenderness: There is abdominal tenderness in the suprapubic area. There is no guarding or rebound. Negative signs include Gonzales's sign and Rovsing's sign. Musculoskeletal: Normal range of motion. Right lower leg: No edema. Left lower leg: No edema. Skin:     General: Skin is warm and dry. Capillary Refill: Capillary refill takes less than 2 seconds. Neurological:      General: No focal deficit present. Mental Status: She is alert and oriented to person, place, and time. Mental status is at baseline. Cranial Nerves: Cranial nerves are intact. No cranial nerve deficit, dysarthria or facial asymmetry. Sensory: Sensation is intact. No sensory deficit. Motor: Weakness (Right-sided weakness, at patient's baseline) present. No tremor, seizure activity or pronator drift. Coordination: Coordination is intact. Finger-Nose-Finger Test normal.   Psychiatric:         Attention and Perception: She is attentive. Mood and Affect: Mood is anxious.  Affect is of cardiovascular stress test 10/19/2015    lexiscan-normal,EF63%    Hx of motion sickness     HX OTHER MEDICAL     Primary Care Physician Is Dr. Gustavo Zuniga In hospitals    Hyperlipidemia     Hypertension     IBS (irritable bowel syndrome)     Incisional hernia 4/2014    Kidney stones Last Episode In 2012 Or 2013    Passed Kidney Stones Numberous Times    Migraines     Nausea & vomiting     Nausea/Vomiting Post Op In Past    Other specified disorder of skin     12- Patient states she has a condition of her vaginal area (skin) which starts with the letters Charlotte Needs. She is currently being treated with multiple creams and weekly Diflucan. 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  Staph infection Dx 1980's    Toes On Left Foot    Thyroid disease     hypothroidism    Tremor     \"Tremors All Over\"    Urinary incontinence     UTI (urinary tract infection) In Past    No Current Symptoms    UTI (urinary tract infection)     Vertigo     \"Sometimes\"    Wears glasses      PSHX:  has a past surgical history that includes Carpal tunnel release (Right, 1999); Diagnostic Cardiac Cath Lab Procedure (01/11/2010); Dental surgery; Colonoscopy (Last Done 6-13); Endoscopy, colon, diagnostic (Several ); Bladder surgery (1970's Or 1980's); Lithotripsy (2011); Breast biopsy (Right, 1980's); Breast surgery (Left, 1990's); hernia repair (5892'Z); hernia repair (1970's); Cholecystectomy (1970's); Appendectomy (1970's); Hysterectomy, total abdominal (1987); Tubal ligation (1978); Esophagus dilation (1980's And 1990's); Knee arthroscopy (Right, 1999); joint replacement (2008); other surgical history (06 13 2014); fracture surgery (1974 Or 1975);  Cardiac catheterization (10-18-06); and Cardiac catheterization. Allergies:    Allergies   Allergen Reactions    Abilify [Aripiprazole]      \"Severe Shaking And Restlessness\"    Advil [Ibuprofen Micronized] Palpitations     \"Severe High Blood Pressure\"    Augmentin [Amoxicillin-Pot Clavulanate] Itching and Rash    Bee Venom Swelling     Redness    Ciprofloxacin Itching and Rash    Codeine      \"Severe Abdominal Cramping\"    Darvocet [Propoxyphene N-Acetaminophen] Palpitations     \"Severe High Blood Pressure\"    Darvon [Propoxyphene Hcl] Palpitations     \"Severe High Blood Pressure\"    Decadron [Dexamethasone] Other (See Comments)     seizure  Seizures    Ditropan [Oxybutynin Chloride] Palpitations     \"Severe High Blood Pressure\"    Fioricet [Butalbital-Apap-Caffeine] Palpitations     \"Severe High Blood Pressure\"    Fiorinal-Codeine #3 [Butalbital-Asa-Caff-Codeine]      \"Severe Stomach Cramps\"    Flagyl [Metronidazole] Diarrhea     \"Severe Diarrhea And Cramping\"    Naproxen Palpitations     \"Severe High Blood Pressure\"    Other      \"Allergic To Spider Bites Causing Blackness Of Skin, Severe Itching And Pain\"                                                  \"Allergic To Powder In Gloves Causing Severe Redness And Itching\"    Prozac [Fluoxetine Hcl]      \"Hallucinations\"    Robaxin [Methocarbamol] Palpitations     \"Severe High Blood Pressure\"    Ultram [Tramadol]      \"Severe Stomach Pain\"    Zoloft Palpitations     \"Sever High Blood Pressure\"    Butalbital-Aspirin-Caffeine Other (See Comments)     \"severe stomach pain\"    Coreg [Carvedilol] Other (See Comments)     \"spikes my BP severely and send me into a severe anxiety attack\"    Fluoxetine Other (See Comments)     hallucinations    Oxybutynin Chloride Other (See Comments)     Raises bp    Percocet [Oxycodone-Acetaminophen]      \"knocked me out for 12 hrs\"    Sertraline Other (See Comments)     hallucinations    Tape [Adhesive Tape]      Patient states it tears her skin, including band aids    Reglan [Metoclopramide] Other (See Comments)     \"makes me talk like a baby, but if I take benadryl with it it's fine\"       FAM HX: family history includes Anxiety Disorder in her daughter; Arthritis in her father, mother, and sister; Cancer in her brother and sister; Colon Cancer in her brother; Depression in her daughter and mother; Diabetes in her mother; Early Death in her brother; Early Death (age of onset: 37) in her brother; Early Death (age of onset: 61) in her mother; Early Death (age of onset: 61) in her brother; Hearing Loss in her sister; Heart Disease in her brother, brother, brother, father, mother, and sister; Heart Failure in her sister; High Blood Pressure in her brother, father, mother, and sister; High Cholesterol in her brother, father, and mother; Mental Illness in her daughter; [place] / [place] in her mother; Other in her daughter, mother, and son; Stroke in her mother. Soc HX:   Social History     Socioeconomic History    Marital status:       Spouse name: None    Number of children: 3    Years of education: 6    Highest education level: None   Occupational History    None   Social Needs    Financial resource strain: None    Food insecurity     Worry: None     Inability: None    Transportation needs     Medical: None     Non-medical: None   Tobacco Use    Smoking status: Never Smoker    Smokeless tobacco: Never Used   Substance and Sexual Activity    Alcohol use: No    Drug use: No    Sexual activity: Not Currently   Lifestyle    Physical activity     Days per week: None     Minutes per session: None    Stress: None   Relationships    Social connections     Talks on phone: None     Gets together: None     Attends [employer] service: None     Active member of club or organization: None     Attends meetings of clubs or organizations: None     Relationship status: None    Intimate partner violence     Fear of current or ex partner: None Emotionally abused: None     Physically abused: None     Forced sexual activity: None   Other Topics Concern    None   Social History Narrative    None       Data:   CBC with Differential:    Lab Results   Component Value Date    WBC 10.5 04/26/2021    RBC 4.79 04/26/2021    HGB 13.9 04/26/2021    HCT 40.2 04/26/2021     04/26/2021    MCV 83.9 04/26/2021    MCH 29.0 04/26/2021    MCHC 34.6 04/26/2021    RDW 11.7 04/26/2021    SEGSPCT 46.1 04/26/2021    BANDSPCT 2 06/27/2020    LYMPHOPCT 39.5 04/26/2021    MONOPCT 9.9 04/26/2021    EOSPCT 3.5 03/12/2011    BASOPCT 0.7 04/26/2021    MONOSABS 1.0 04/26/2021    LYMPHSABS 4.1 04/26/2021    EOSABS 0.4 04/26/2021    BASOSABS 0.1 04/26/2021    DIFFTYPE AUTOMATED DIFFERENTIAL 04/26/2021       CMP:     Lab Results   Component Value Date     04/26/2021    K 3.4 04/26/2021    K 4.2 03/15/2018    CL 93 04/26/2021    CO2 27 04/26/2021    BUN 15 04/26/2021    CREATININE 0.6 04/26/2021    GFRAA >60 04/26/2021    LABGLOM >60 04/26/2021    GLUCOSE 232 04/26/2021    PROT 7.6 04/26/2021    PROT 6.6 03/12/2011    LABALBU 4.6 04/26/2021    CALCIUM 9.6 04/26/2021    BILITOT 0.4 04/26/2021    ALKPHOS 91 04/26/2021    AST 20 04/26/2021    ALT 12 04/26/2021       Troponin:  Lab Results   Component Value Date    TROPONINT <0.010 04/26/2021       Radiology Review:    CXR: No acute process    CTA head neck with contrast:      CT head without contrast:      Medications:   Home Medications:   Prior to Admission medications    Medication Sig Start Date End Date Taking? Authorizing Provider   NIFEdipine (PROCARDIA XL) 60 MG extended release tablet Take 1 tablet by mouth daily 3/5/21   Jordon Samaniego, APRN - CNP   metoprolol succinate (TOPROL XL) 25 MG extended release tablet Take 2 tablets by mouth daily 2/16/21   Nirmala Ruiz MD   HYDROcodone-acetaminophen [facility])  MG per tablet  1/22/21   Historical Provider, MD   fluticasone-umeclidin-vilant (Annia Plan) 238-76.8-55 MCG/INH AEPB Inhale 1 puff into the lungs daily  Patient taking differently: Inhale 1 puff into the lungs daily as needed (only takes prn daily due to yeast infections in mouth, is aware she is supposed to take daily)  12/8/20   Tatiana Garcia MD   magnesium oxide (MAG-OX) 400 MG tablet Take 1 tablet by mouth 2 times daily 12/4/20   Santos Torre MD   OXcarbazepine (TRILEPTAL) 300 MG tablet Take 1 tablet by mouth 2 times daily 12/4/20   Santos Torre MD   levETIRAcetam (KEPPRA) 750 MG tablet Take 1 tablet by mouth 2 times daily  Patient taking differently: Take 750 mg by mouth nightly  12/4/20   Santos Torre MD   amitriptyline (ELAVIL) 25 MG tablet Take 25 mg by mouth nightly 11/20/20   Historical Provider, MD   hydrOXYzine (VISTARIL) 25 MG capsule Take 25 mg by mouth 2 times daily as needed 11/12/20   Historical Provider, MD   NOVOLOG FLEXPEN 100 UNIT/ML injection pen Inject into the skin See Admin Instructions 12/01/2020 patient states she follows sliding scale regimen BS > 150 10/24/20   Historical Provider, MD   potassium chloride (KLOR-CON) 10 MEQ extended release tablet Take 10 mEq by mouth 2 times daily 11/5/20   Historical Provider, MD   QUEtiapine (SEROQUEL) 25 MG tablet Take 25 mg by mouth nightly 11/12/20   Historical Provider, MD   tiZANidine (ZANAFLEX) 4 MG tablet Take 4 mg by mouth 3 times daily 10/12/20   Historical Provider, MD   simvastatin (ZOCOR) 20 MG tablet Take 1 tablet by mouth nightly 9/10/20   WENDIE Yin CNP   levothyroxine (SYNTHROID) 75 MCG tablet Take 1 tablet by mouth every morning (before breakfast) 9/11/20   WENDIE Yin CNP   TRESIBA FLEXTOUCH 200 UNIT/ML SOPN Inject 20 Units into the skin every morning  Patient taking differently: Inject into the skin nightly 12/01/2020 Patient states she follows sliding scale 9/4/20   Michael Charles MD   OXcarbazepine (TRILEPTAL) 150 MG tablet Take 1 tablet by mouth 2 times daily 9/4/20   Michael Charles MD busPIRone (BUSPAR) 15 MG tablet Take 15 mg by mouth 3 times daily    Historical Provider, MD   losartan-hydrochlorothiazide (HYZAAR) 100-25 MG per tablet Take 1 tablet by mouth daily 7/15/20   Jose L Barron MD   docusate sodium (COLACE) 100 MG capsule Take 1 capsule by mouth 2 times daily 1/9/20   HAYDEN Gilman   albuterol sulfate  (90 Base) MCG/ACT inhaler Inhale 2 puffs into the lungs every 6 hours as needed for Wheezing or Shortness of Breath (or cough) Please include spacer with instructions for use. 6/11/19   HAYDEN Gilman   aluminum & magnesium hydroxide-simethicone (MAALOX MAX) 400-400-40 MG/5ML SUSP Take 15 mLs by mouth every 6 hours as needed (heart burn) 9/12/18   WENDIE Ibanez - CNP   pantoprazole (PROTONIX) 40 MG tablet Take 1 tablet by mouth daily 7/20/18   HAYDEN Gilman   carbidopa-levodopa (SINEMET)  MG per tablet Take 1 tablet by mouth nightly 5/14/18   Historical Provider, MD   polyethylene glycol (GLYCOLAX) packet Take 17 g by mouth daily as needed for Constipation    Historical Provider, MD   aspirin 81 MG tablet Take 81 mg by mouth daily    Historical Provider, MD   Multiple Vitamins-Iron (MULTI-VITAMIN/IRON PO) Take  by mouth. Historical Provider, MD   montelukast (SINGULAIR) 10 MG tablet Take 10 mg by mouth nightly. Historical Provider, MD     Medications:    sodium chloride flush  5-40 mL Intravenous BID    sodium chloride flush  5-40 mL Intravenous BID    potassium chloride  10 mEq Intravenous Once      Infusions:   PRN Meds: iopamidol, 75 mL, ONCE PRN  morphine, 1 mg, Q4H PRN        Electronically signed by Lidia Mcfadden DO on 4/26/2021 at 6:33 AM      This dictation was created with voice recognition software. While attempts have been made to review the dictation as it is transcribed, on occasion the spoken word can be misinterpreted by the technology leading to omissions or inappropriate words, phrases or sentences.

## 2021-04-26 NOTE — PROGRESS NOTES
Hospitalist Progress Note      Name:  Bean lAmonte /Age/Sex: 1955  (63 y.o. female)   MRN & CSN:  [id number] & [id number] Admission Date/Time: 2021  4:30 AM   Location:  [place] [address] PCP: Chino Cedillo MD       Bean Almonte is a 63 y.o.  female  who presents with Altered Mental Status (called dispatch for being weak, once medics got there she was unresponsive)      Assessment and Plan:   1. TIA versus syncope versus panic attack r/o CVA  - likely appears to be panic attack considering HTN, Tachy and her clinical exam at this time with no gross neuro deficits, daugher at bedside confirms she has panic attacks like this  - CTA head and neck non acute  - CT head neg  - MRI brain pending  - asa + statin  - neuro checks  - Neurology consulted  - psych consulted  - SLP eval  - pt/ot  - ativan prn for anxiety  - resume home buspar    hypoK  - replace                       Other chronic medical conditions:   Holding all home meds except stated above or contraindicated.   · IDDM 2  · History of CAD  · GERD  · Asthma  · CKD  · DDD  · CHF  · Depression  · Hypothyroidism  · Gout  · Fatty liver disease            Diet Diet NPO Effective Now   Code Status Prior     Medications:   Medications:    sodium chloride flush  5-40 mL Intravenous BID    sodium chloride flush  5-40 mL Intravenous BID      Infusions:   PRN Meds: iopamidol, 75 mL, ONCE PRN  morphine, 1 mg, Q4H PRN      Subjective:   Doing a but better still has some anxiety    Objective:   No intake or output data in the 24 hours ending 21 1304   Vitals:   Vitals:    21 1102   BP: (!) 165/90   Pulse: 102   Resp: 26   Temp:    SpO2: 97%     Physical Exam:   Gen:  awake, alert, no apparent distress  Head/Eyes:  Normocephalic atraumatic, EOMI   NECK:   symmetrical, trachea midline  LUNGS: Normal Effort   CARDIOVASCULAR:  Normal rate  ABDOMEN:  non distended  MUSCULOSKELETAL:  ROM WNL  NEUROLOGIC: Alert and Oriented,  Cranial nerves II-XII are grossly intact.    SKIN:  no bruising or bleeding, normal skin color,  no redness      Data:       CBC   Recent Labs     04/26/21  0438   WBC 10.5   HGB 13.9   HCT 40.2         BMP   Recent Labs     04/26/21  0438   *   K 3.4*   CL 93*   CO2 27   BUN 15   CREATININE 0.6         Electronically signed by Aiden Yost MD on 4/26/2021 at 1:04 PM
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
## 2021-04-26 NOTE — ED TRIAGE NOTES
Pt presents to the ED unresponsive. Pt called ems for feeling weak. Once medics got there she was 'unresponsive'. She has her eyes open, but is not responding to any questions. No reflexes. . Pt is alert, does not speak.
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
## 2021-04-26 NOTE — PROGRESS NOTES
Medication History  [facility]    Patient Name: Juan Pablo Adkins 1955     Medication history has been completed by: Ike Brownlee CPhT    Source(s) of information: patient and insurance claims     Primary Care Physician: Bg Vernon MD     Pharmacy: [phone]  as of 04/26/2021 - Review Complete 04/26/2021   Allergen Reaction Noted    Abilify [aripiprazole]  06/10/2014    Advil [ibuprofen micronized] Palpitations     Augmentin [amoxicillin-pot clavulanate] Itching and Rash     Bee venom Swelling 06/10/2014    Ciprofloxacin Itching and Rash     Codeine      Darvocet [propoxyphene n-acetaminophen] Palpitations     Darvon [propoxyphene hcl] Palpitations 06/10/2014    Decadron [dexamethasone] Other (See Comments) 05/01/2015    Ditropan [oxybutynin chloride] Palpitations     Fioricet [butalbital-apap-caffeine] Palpitations     Fiorinal-codeine #3 [butalbital-asa-caff-codeine]  06/10/2014    Flagyl [metronidazole] Diarrhea     Naproxen Palpitations     Other  06/10/2014    Prozac [fluoxetine hcl]      Robaxin [methocarbamol] Palpitations     Ultram [tramadol]      Zoloft Palpitations     Butalbital-aspirin-caffeine Other (See Comments) 05/01/2015    Coreg [carvedilol] Other (See Comments) 07/25/2020    Fluoxetine Other (See Comments) 05/01/2015    Oxybutynin chloride Other (See Comments) 05/01/2015    Percocet [oxycodone-acetaminophen]  03/01/2021    Sertraline Other (See Comments) 05/01/2015    Tape [adhesive tape]  06/14/2018    Reglan [metoclopramide] Other (See Comments) 12/03/2014        Prior to Admission medications    Medication Sig Start Date End Date Taking? Authorizing Provider   NIFEdipine (PROCARDIA XL) 60 MG extended release tablet Take 1 tablet by mouth daily 3/5/21  Yes Jordon Samaniego, APRN - CNP   metoprolol succinate (TOPROL XL) 25 MG extended release tablet Take 2 tablets by mouth daily 2/16/21  Yes Guerda Linares MD   HYDROcodone-acetaminophen [facility]  MG per tablet Take 1 tablet by mouth every 6 hours as needed.   1/22/21  Yes Historical Provider, MD   fluticasone-umeclidin-vilant (Louellen Diana) 100-62.5-25 MCG/INH AEPB Inhale 1 puff into the lungs daily  Patient taking differently: Inhale 1 puff into the lungs daily as needed (only takes prn daily due to yeast infections in mouth, is aware she is supposed to take daily)  12/8/20  Yes January Rivera MD   magnesium oxide (MAG-OX) 400 MG tablet Take 1 tablet by mouth 2 times daily 12/4/20  Yes Richy Alcantar MD   OXcarbazepine (TRILEPTAL) 300 MG tablet Take 1 tablet by mouth 2 times daily 12/4/20  Yes Richy Alcantar MD   levETIRAcetam (KEPPRA) 750 MG tablet Take 750 mg by mouth nightly  12/4/20  Yes Richy Alcantar MD   amitriptyline (ELAVIL) 25 MG tablet Take 25 mg by mouth nightly 11/20/20  Yes Historical Provider, MD   hydrOXYzine (VISTARIL) 25 MG capsule Take 25 mg by mouth 2 times daily as needed 11/12/20  Yes Historical Provider, MD   NOVOLOG FLEXPEN 100 UNIT/ML injection pen Inject into the skin See Admin Instructions 12/01/2020 patient states she follows sliding scale regimen BS > 160 10/24/20  Yes Historical Provider, MD   potassium chloride (KLOR-CON) 10 MEQ extended release tablet Take 10 mEq by mouth 2 times daily 11/5/20  Yes Historical Provider, MD   QUEtiapine (SEROQUEL) 25 MG tablet Take 25 mg by mouth nightly 11/12/20  Yes Historical Provider, MD   tiZANidine (ZANAFLEX) 4 MG tablet Take 4 mg by mouth 3 times daily 10/12/20  Yes Historical Provider, MD   simvastatin (ZOCOR) 20 MG tablet Take 1 tablet by mouth nightly 9/10/20  Yes WENDIE Bonilla CNP   levothyroxine (SYNTHROID) 75 MCG tablet Take 1 tablet by mouth every morning (before breakfast) 9/11/20  Yes WENDIE Bonilla CNP   TRESIBA FLEXTOUCH 200 UNIT/ML SOPN  Inject into the skin nightly 04/26/21 Patient states she follows sliding scale 9/4/20  Yes Estefani Barcenas MD   OXcarbazepine (TRILEPTAL) 150 MG tablet Take 1 tablet by mouth 2 times daily 9/4/20  Yes Asya Chappell MD   busPIRone (BUSPAR) 15 MG tablet Take 15 mg by mouth 3 times daily   Yes Historical Provider, MD   losartan-hydrochlorothiazide (HYZAAR) 100-25 MG per tablet Take 1 tablet by mouth daily 7/15/20  Yes Caprice Fernandes MD   docusate sodium (COLACE) 100 MG capsule Take 1 capsule by mouth 2 times daily 1/9/20  Yes HAYDEN Louise   albuterol sulfate  (90 Base) MCG/ACT inhaler Inhale 2 puffs into the lungs every 6 hours as needed for Wheezing or Shortness of Breath (or cough) Please include spacer with instructions for use. 6/11/19  Yes HAYDEN Louise   pantoprazole (PROTONIX) 40 MG tablet Take 1 tablet by mouth daily 7/20/18  Yes HAYDEN Louise   carbidopa-levodopa (SINEMET)  MG per tablet Take 1 tablet by mouth nightly 5/14/18  Yes Historical Provider, MD   aspirin 81 MG tablet Take 81 mg by mouth daily   Yes Historical Provider, MD   Multiple Vitamins-Iron (MULTI-VITAMIN/IRON PO) Take  by mouth. Yes Historical Provider, MD   montelukast (SINGULAIR) 10 MG tablet Take 10 mg by mouth nightly. Yes Historical Provider, MD   aluminum & magnesium hydroxide-simethicone (MAALOX MAX) 400-400-40 MG/5ML SUSP Take 15 mLs by mouth every 6 hours as needed (heart burn) 9/12/18   WENDIE Quintero - CNP   polyethylene glycol [facility]) packet Take 17 g by mouth daily as needed for Constipation    Historical Provider, MD     Comments:  Medication list reviewed with patient and insurance claims verified. Insulin dosage updated as patient reports she follows sliding scale for both Novolog and Xiomara Romp   Reports she has taken no meds piror to ER visit.     To my knowledge the above medication history is accurate as of 4/26/2021 7:56 AM.   Erwin Gore CPhT   4/26/2021 7:56 AM

## 2021-04-26 NOTE — PROGRESS NOTES
Rapid response Note    Patient reportedly was seizing and appeared to be in post ictal phase initially but started seizing again. Given some Ativan IV and seizure was controled.     Increased Keppra to 1000 IV BID  MRI brain is ordered - pending  Neurology has been consulted and notified of the event

## 2021-04-26 NOTE — CONSULTS
good ability to grasp RW and grab bar when performing transfers. · Sensation: Diabetic neuropathy   · Tone: Normal  · Coordination: WFL  · Perception: WNL    **Pt reports CRPS in R (10/10 pain) and L (8/10 pain) hand. Edema noted. Pain with extension of digits in R hand. Per chart review, pt has history of CVA with right-sided residual deficit. Unable to formally assess RUE d/t pain. **    Activities of Daily Living (ADLs):  · Feeding: Cuate  setup and increased time. · Grooming: SBA anticipate pt could complete in standing with setup, increased time, and VC. Intermittent rest breaks d/t   · UB bathing: SBA recommend pt complete in seated with setup, increased time, VC.   · LB bathing: SBA recommend pt complete with setup, increased time, and VC, AE for distal reaching as needed. · UB dressing: SBA recommend pt complete in seated with setup, increased time, VC.  · LB dressing: CGA pt doffed/donned depend in standing with CGA for safety, increased time, and VC. · Toileting: CGA pt performed toileting this date with CGA to doff depend in standing and CGA for toilet transfers. Pt was able to complete carlene care in seated with setup and increased time. **Difficult to assess true function versus behaviors d/t to inconsistencies throughout session. **     Cognitive and Psychosocial Functioning:  · Overall cognitive status: Oriented to date, person, place, city, and time. · Affect: Normal     Balance:   · Sitting: Independent (pt sat EOB demo good dynamic sitting balance). · Standing: CGA (pt stood with RW. Demo good dynamic standing balance with slightly forward flexed posture). Functional Mobility:   · Bed Mobility: NT this date. Pt received seated EOB upon arrival.   · Transfers:   · CGA (pt performed STS from EOB with CGA and use of RW. VC throughout for sequencing and increased time). · Ambulation: CGA (pt ambulated approx 20ft to bathroom for toileting with RW. Demo good pace throughout and 0 LOB). AM-PAC 6 click short form for inpatient daily activity:   How much help from another person does the patient currently need. .. Unable  Dep A Lot  Max A A Lot   Mod A A Little  Min A A Little   CGA  SBA None   Mod I  Indep  Sup   1. Putting on and taking off regular lower body clothing? [] 1    [] 2   [] 2   [] 3   [x] 3   [] 4      2. Bathing (including washing, rinsing, drying)? [] 1   [] 2   [] 2 [] 3 [x] 3 [] 4   3. Toileting, which includes using toilet, bedpan, or urinal? [] 1    [] 2   [] 2   [] 3   [x] 3   [] 4     4. Putting on and taking off regular upper body clothing? [] 1   [] 2   [] 2   [] 3   [x] 3    [] 4      5. Taking care of personal grooming such as brushing teeth? [] 1   [] 2    [] 2 [] 3    [x] 3   [] 4      6. Eating meals? [] 1   [] 2   [] 2   [] 3   [] 3   [x] 4      Raw Score:  19     [24=0% impaired(CH), 23=1-19%(CI), 20-22=20-39%(CJ), 15-19=40-59%(CK), 10-14=60-79%(CL), 7-9=80-99%(CM), 6=100%(CN)]    Treatment:  Self Care Training:   Cues were given for safety, sequence, UE/LE placement, visual cues, and balance. Activities performed today included dressing and toileting. Therapeutic Activity Training:   Therapeutic activity training was instructed today. Cues were given for safety, sequence, UE/LE placement, awareness, and balance. Activities performed today included bed mobility training, sup-sit, sit-stand, ambulation. Safety Measures: Gait belt used, Left in bed, Alarm in place    Assessment:  Assessment  Treatment Diagnosis: syncope  Prognosis: Good  Decision Making: Medium Complexity  REQUIRES OT FOLLOW UP: No  Discharge Recommendations: Home with assist PRN(Initial 24 hour SUP/assist.)    Pt is a 63year old F admitted for syncope. Pt reports that prior to admission she was IND in ADLs, most IADLs, and Cuate for functional mobility. Difficult to assess true function versus behaviors d/t to inconsistencies throughout session.  At this time, pt appears to be functioning at or near baseline and does not require skilled OT services. OT recommend use of TTB for showering to increase access into tub, ability to complete tub transfers, and safety. OT recommend pt utilize AD full time while standing/ambulating. D/c home with initial 24 hour SUP/assist, PRN assist. Re-order if symptoms change. Time:   Time in: 1536  Time out: 1630  Timed treatment minutes: 40  Total time: 54      Electronically signed by:       ANN Lang/L, [place], .[id number]

## 2021-04-26 NOTE — ED NOTES
Report received from Mini Bowden, [facility]. Care assumed at this time. Patient tearful in bed at this time. Reassurance given. Updated patients daughter on plan of care. Verbalizes understanding and denies any further needs questions, concerns or complaints. resps even and unlabored.       Juanita Villegas RN  04/26/21 4223

## 2021-04-26 NOTE — PROGRESS NOTES
Speech Language Pathology  Facility/Department: [address] [place] [address]   CLINICAL BEDSIDE SWALLOW EVALUATION    NAME: Bernice Wells  : 1955  MRN: [id number]    Impression  Dysphagia Diagnosis: Swallow function appears grossly intact  Dysphagia Outcome Severity Scale: Level 6: Within functional limits/Modified independence     Bernice Wells was seen for a clinical bedside swallow evaluation following admission for TIA vs syncope vs panic attack. Pt was tearful, appeared anxious, and subjectively c/o multiple symptoms including dysphagia to liquids and solids, a sensation that she is \"not swallowing completely\" on the left side of her throat. No h/o CVA could be located in the chart. MBSS completed 20 demonstrated normal oropharyngeal swallow and suspected esophageal dysmotility that resulted in a cough when bolus was sitting at the aortic arch. She reports she has had her esophagus dilated in the past.  Oral motor exam was deferred due to pt's high level of distractibility and tangential behavior. No focal weakness appreciated. Vocal quality was WNL and cough strength was [place]. PO trials of thin liquids by cup and straw, pureed and regular solids completed with normal oral phase and no convincing s/s aspiration. Pt exhibited an exaggerated effortful swallow and mild cough during trials for liquids, however, this was inconsistent and did not appear to be r/t aspiration or reduced clearance. Oropharyngeal swallow appears WFL. Pt may experience discomfort when eating solids due to her h/o suspected esophageal dysmotility and report of small sliding hiatal hernia. Recommend:  Regular/Thins. ST will monitor x 1. May consider OP referral to GI if appropriate.       ADMISSION DATE: 2021  ADMITTING DIAGNOSIS: has Chest pain; H/O Doppler ultrasound; H/O cardiovascular stress test; H/O cardiovascular stress test; H/O cardiovascular stress test; Incarcerated umbilical hernia; Essential hypertension; Type 2 diabetes mellitus with diabetic polyneuropathy, with long-term current use of insulin ([address] Tachycardia; Dyslipidemia; Family history of early CAD; NSTEMI (non-ST elevated myocardial infarction) ([address] Abnormal nuclear stress test; ILSA (obstructive sleep apnea); Snoring; Hypersomnolence; Mild intermittent asthma without complication; Asthma exacerbation attacks; Hypertensive emergency; Hallucination, visual; Severe episode of recurrent major depressive disorder, with psychotic features ([address] Generalized anxiety disorder; Auditory hallucinations; Bipolar I disorder with depression, severe ([address] Dyspnea and respiratory abnormalities; Encephalopathy; and Syncope on their problem list.  ONSET DATE: 4/26/2021     Recent Chest Xray/CT of Chest:  negative    Date of Eval: 4/26/2021  Evaluating Therapist: Tobin Jimenez    Current Diet level:  Current Diet : Regular  Current Liquid Diet : Thin    Primary Complaint  Patient Complaint: c/o asthma, anxiety, gastroparesis, difficulty swallowing, and hiatal hernia    Pain:  Denies pain     Reason for Referral  Edmundo Kawasaki was referred for a bedside swallow evaluation to assess the efficiency of her swallow function, identify signs and symptoms of aspiration and make recommendations regarding safe dietary consistencies, effective compensatory strategies, and safe eating environment. Treatment Plan  Requires SLP Intervention: Yes  Duration/Frequency of Treatment: monitor x 1  D/C Recommendations: To be determined     Recommended Diet and Intervention  Diet Solids Recommendation: Regular  Liquid Consistency Recommendation:  Thin  Recommended Form of Meds: PO  Recommendations: Dysphagia treatment(for education regarding MBSS results of last year)     Compensatory Swallowing Strategies:  n/a     Treatment/Goals  Short-term Goals  Timeframe for Short-term Goals: 1 week  Goal 1: Pt will demonstrate understanding of impressions and recommendations following clinical assessment of swallowing 100%  Long-term Goals  Timeframe for Long-term Goals: n/a    General  Chart Reviewed: Yes  Behavior/Cognition: Alert;Pleasant mood(tangential)  Respiratory Status: Room air  O2 Device: None (Room air)  Communication Observation: Functional  Follows Directions: Simple  Dentition: Adequate  Patient Positioning: Upright in bed  Baseline Vocal Quality: Normal  Volitional Cough: Strong  Prior Dysphagia History: pt reports h/o dysphagia, MBSS 6/2020 demonstrates no pharyngeal dysphagia and suspected esophageal dysphagia  Consistencies Administered: Reg solid; Thin - straw    Vision/Hearing  Vision  Vision: Within Functional Limits  Hearing  Hearing: Within functional limits    Oral Motor Deficits  Oral/Motor  Oral Motor: (no focal weakness appreciated)    Oral Phase Dysfunction  Oral Phase  Oral Phase: WNL     Indicators of Pharyngeal Phase Dysfunction   Pharyngeal Phase  Pharyngeal Phase: WFL    Prognosis  Prognosis  Prognosis for safe diet advancement: good  Barriers to reach goals: behavior  Individuals consulted  Consulted and agree with results and recommendations: RN    Education  Patient Education: results appear WNL  Patient Education Response: Refused teaching  Safety Devices in place: Yes  Type of devices: Left in bed       Therapy Time  SLP Individual Minutes  Time In: 5297  Time Out: 3495  Minutes: Jameel Dawn [place]  4/26/2021 4:58 PM

## 2021-04-27 ENCOUNTER — APPOINTMENT (OUTPATIENT)
Dept: MRI IMAGING | Age: 66
DRG: 883 | End: 2021-04-27
Payer: MEDICARE

## 2021-04-27 PROBLEM — F41.0 GENERALIZED ANXIETY DISORDER WITH PANIC ATTACKS: Status: ACTIVE | Noted: 2020-09-04

## 2021-04-27 PROBLEM — F31.9 BIPOLAR 1 DISORDER (HCC): Status: ACTIVE | Noted: 2021-04-27

## 2021-04-27 LAB
ALBUMIN SERPL-MCNC: 4.3 GM/DL (ref 3.4–5)
ALP BLD-CCNC: 67 IU/L (ref 40–128)
ALT SERPL-CCNC: 7 U/L (ref 10–40)
ANION GAP SERPL CALCULATED.3IONS-SCNC: 12 MMOL/L (ref 4–16)
AST SERPL-CCNC: 21 IU/L (ref 15–37)
BILIRUB SERPL-MCNC: 0.3 MG/DL (ref 0–1)
BUN BLDV-MCNC: 7 MG/DL (ref 6–23)
CALCIUM SERPL-MCNC: 9.1 MG/DL (ref 8.3–10.6)
CHLORIDE BLD-SCNC: 99 MMOL/L (ref 99–110)
CHOLESTEROL: 99 MG/DL
CO2: 26 MMOL/L (ref 21–32)
CREAT SERPL-MCNC: 0.5 MG/DL (ref 0.6–1.1)
CULTURE: NORMAL
ESTIMATED AVERAGE GLUCOSE: 171 MG/DL
GFR AFRICAN AMERICAN: >60 ML/MIN/1.73M2
GFR NON-AFRICAN AMERICAN: >60 ML/MIN/1.73M2
GLUCOSE BLD-MCNC: 157 MG/DL (ref 70–99)
GLUCOSE BLD-MCNC: 165 MG/DL (ref 70–99)
GLUCOSE BLD-MCNC: 167 MG/DL (ref 70–99)
GLUCOSE BLD-MCNC: 175 MG/DL (ref 70–99)
GLUCOSE BLD-MCNC: 183 MG/DL (ref 70–99)
GLUCOSE BLD-MCNC: 190 MG/DL (ref 70–99)
GLUCOSE BLD-MCNC: 242 MG/DL (ref 70–99)
GLUCOSE BLD-MCNC: 284 MG/DL (ref 70–99)
HBA1C MFR BLD: 7.6 % (ref 4.2–6.3)
HCT VFR BLD CALC: 40.1 % (ref 37–47)
HDLC SERPL-MCNC: 42 MG/DL
HEMOGLOBIN: 13.5 GM/DL (ref 12.5–16)
LDL CHOLESTEROL DIRECT: 45 MG/DL
Lab: NORMAL
MAGNESIUM: 1.8 MG/DL (ref 1.8–2.4)
MCH RBC QN AUTO: 28.1 PG (ref 27–31)
MCHC RBC AUTO-ENTMCNC: 33.7 % (ref 32–36)
MCV RBC AUTO: 83.4 FL (ref 78–100)
PDW BLD-RTO: 11.9 % (ref 11.7–14.9)
PLATELET # BLD: 297 K/CU MM (ref 140–440)
PMV BLD AUTO: 9 FL (ref 7.5–11.1)
POTASSIUM SERPL-SCNC: 3.5 MMOL/L (ref 3.5–5.1)
RBC # BLD: 4.81 M/CU MM (ref 4.2–5.4)
SODIUM BLD-SCNC: 137 MMOL/L (ref 135–145)
SPECIMEN: NORMAL
TOTAL PROTEIN: 6.9 GM/DL (ref 6.4–8.2)
TRIGL SERPL-MCNC: 116 MG/DL
WBC # BLD: 12.8 K/CU MM (ref 4–10.5)

## 2021-04-27 PROCEDURE — 6360000004 HC RX CONTRAST MEDICATION: Performed by: STUDENT IN AN ORGANIZED HEALTH CARE EDUCATION/TRAINING PROGRAM

## 2021-04-27 PROCEDURE — 6370000000 HC RX 637 (ALT 250 FOR IP): Performed by: NURSE PRACTITIONER

## 2021-04-27 PROCEDURE — 36415 COLL VENOUS BLD VENIPUNCTURE: CPT

## 2021-04-27 PROCEDURE — 83735 ASSAY OF MAGNESIUM: CPT

## 2021-04-27 PROCEDURE — G0378 HOSPITAL OBSERVATION PER HR: HCPCS

## 2021-04-27 PROCEDURE — 94761 N-INVAS EAR/PLS OXIMETRY MLT: CPT

## 2021-04-27 PROCEDURE — 99221 1ST HOSP IP/OBS SF/LOW 40: CPT | Performed by: NURSE PRACTITIONER

## 2021-04-27 PROCEDURE — 6370000000 HC RX 637 (ALT 250 FOR IP): Performed by: FAMILY MEDICINE

## 2021-04-27 PROCEDURE — 6370000000 HC RX 637 (ALT 250 FOR IP): Performed by: PSYCHIATRY & NEUROLOGY

## 2021-04-27 PROCEDURE — 6370000000 HC RX 637 (ALT 250 FOR IP): Performed by: STUDENT IN AN ORGANIZED HEALTH CARE EDUCATION/TRAINING PROGRAM

## 2021-04-27 PROCEDURE — 83036 HEMOGLOBIN GLYCOSYLATED A1C: CPT

## 2021-04-27 PROCEDURE — 96372 THER/PROPH/DIAG INJ SC/IM: CPT

## 2021-04-27 PROCEDURE — 97163 PT EVAL HIGH COMPLEX 45 MIN: CPT

## 2021-04-27 PROCEDURE — 97530 THERAPEUTIC ACTIVITIES: CPT

## 2021-04-27 PROCEDURE — 99221 1ST HOSP IP/OBS SF/LOW 40: CPT | Performed by: INTERNAL MEDICINE

## 2021-04-27 PROCEDURE — 2580000003 HC RX 258: Performed by: STUDENT IN AN ORGANIZED HEALTH CARE EDUCATION/TRAINING PROGRAM

## 2021-04-27 PROCEDURE — 70553 MRI BRAIN STEM W/O & W/DYE: CPT

## 2021-04-27 PROCEDURE — A9579 GAD-BASE MR CONTRAST NOS,1ML: HCPCS | Performed by: STUDENT IN AN ORGANIZED HEALTH CARE EDUCATION/TRAINING PROGRAM

## 2021-04-27 PROCEDURE — 1200000000 HC SEMI PRIVATE

## 2021-04-27 PROCEDURE — 80053 COMPREHEN METABOLIC PANEL: CPT

## 2021-04-27 PROCEDURE — 97116 GAIT TRAINING THERAPY: CPT

## 2021-04-27 PROCEDURE — 85027 COMPLETE CBC AUTOMATED: CPT

## 2021-04-27 PROCEDURE — 2580000003 HC RX 258: Performed by: INTERNAL MEDICINE

## 2021-04-27 PROCEDURE — 80061 LIPID PANEL: CPT

## 2021-04-27 PROCEDURE — 96376 TX/PRO/DX INJ SAME DRUG ADON: CPT

## 2021-04-27 PROCEDURE — 94640 AIRWAY INHALATION TREATMENT: CPT

## 2021-04-27 PROCEDURE — 82962 GLUCOSE BLOOD TEST: CPT

## 2021-04-27 PROCEDURE — 83721 ASSAY OF BLOOD LIPOPROTEIN: CPT

## 2021-04-27 PROCEDURE — 6360000002 HC RX W HCPCS: Performed by: STUDENT IN AN ORGANIZED HEALTH CARE EDUCATION/TRAINING PROGRAM

## 2021-04-27 PROCEDURE — 6360000002 HC RX W HCPCS: Performed by: INTERNAL MEDICINE

## 2021-04-27 RX ORDER — QUETIAPINE FUMARATE 25 MG/1
12.5 TABLET, FILM COATED ORAL DAILY
Status: DISCONTINUED | OUTPATIENT
Start: 2021-04-27 | End: 2021-04-28 | Stop reason: HOSPADM

## 2021-04-27 RX ORDER — METOPROLOL SUCCINATE 50 MG/1
50 TABLET, EXTENDED RELEASE ORAL DAILY
Status: DISCONTINUED | OUTPATIENT
Start: 2021-04-27 | End: 2021-04-28 | Stop reason: HOSPADM

## 2021-04-27 RX ORDER — LOSARTAN POTASSIUM AND HYDROCHLOROTHIAZIDE 12.5; 5 MG/1; MG/1
2 TABLET ORAL DAILY
Status: DISCONTINUED | OUTPATIENT
Start: 2021-04-27 | End: 2021-04-28 | Stop reason: HOSPADM

## 2021-04-27 RX ORDER — OXCARBAZEPINE 300 MG/1
600 TABLET, FILM COATED ORAL 2 TIMES DAILY
Status: DISCONTINUED | OUTPATIENT
Start: 2021-04-28 | End: 2021-04-28 | Stop reason: HOSPADM

## 2021-04-27 RX ORDER — NIFEDIPINE 30 MG/1
60 TABLET, EXTENDED RELEASE ORAL DAILY
Status: DISCONTINUED | OUTPATIENT
Start: 2021-04-27 | End: 2021-04-28 | Stop reason: HOSPADM

## 2021-04-27 RX ORDER — QUETIAPINE FUMARATE 25 MG/1
25 TABLET, FILM COATED ORAL NIGHTLY
Status: DISCONTINUED | OUTPATIENT
Start: 2021-04-27 | End: 2021-04-28 | Stop reason: HOSPADM

## 2021-04-27 RX ADMIN — SODIUM CHLORIDE, PRESERVATIVE FREE 10 ML: 5 INJECTION INTRAVENOUS at 09:04

## 2021-04-27 RX ADMIN — NIFEDIPINE 60 MG: 30 TABLET, FILM COATED, EXTENDED RELEASE ORAL at 16:08

## 2021-04-27 RX ADMIN — TIOTROPIUM BROMIDE INHALATION SPRAY 2 PUFF: 3.12 SPRAY, METERED RESPIRATORY (INHALATION) at 08:13

## 2021-04-27 RX ADMIN — LORAZEPAM 2 MG: 2 INJECTION INTRAMUSCULAR; INTRAVENOUS at 12:46

## 2021-04-27 RX ADMIN — OXCARBAZEPINE 450 MG: 300 TABLET, FILM COATED ORAL at 20:37

## 2021-04-27 RX ADMIN — LEVETIRACETAM 1000 MG: 100 INJECTION, SOLUTION INTRAVENOUS at 18:39

## 2021-04-27 RX ADMIN — BUDESONIDE AND FORMOTEROL FUMARATE DIHYDRATE 2 PUFF: 160; 4.5 AEROSOL RESPIRATORY (INHALATION) at 08:13

## 2021-04-27 RX ADMIN — BUSPIRONE HYDROCHLORIDE 20 MG: 10 TABLET ORAL at 09:03

## 2021-04-27 RX ADMIN — BUSPIRONE HYDROCHLORIDE 20 MG: 10 TABLET ORAL at 20:37

## 2021-04-27 RX ADMIN — QUETIAPINE FUMARATE 25 MG: 25 TABLET ORAL at 20:37

## 2021-04-27 RX ADMIN — GADOTERIDOL 15 ML: 279.3 INJECTION, SOLUTION INTRAVENOUS at 15:30

## 2021-04-27 RX ADMIN — DIPHENHYDRAMINE HYDROCHLORIDE 25 MG: 25 TABLET ORAL at 09:03

## 2021-04-27 RX ADMIN — ASPIRIN 81 MG: 81 TABLET, COATED ORAL at 09:03

## 2021-04-27 RX ADMIN — INSULIN LISPRO 3 UNITS: 100 INJECTION, SOLUTION INTRAVENOUS; SUBCUTANEOUS at 20:50

## 2021-04-27 RX ADMIN — LOSARTAN POTASSIUM AND HYDROCHLOROTHIAZIDE 2 TABLET: 12.5; 5 TABLET ORAL at 16:08

## 2021-04-27 RX ADMIN — DIPHENHYDRAMINE HYDROCHLORIDE 25 MG: 25 TABLET ORAL at 20:37

## 2021-04-27 RX ADMIN — CARBIDOPA AND LEVODOPA 1 TABLET: 10; 100 TABLET ORAL at 20:37

## 2021-04-27 RX ADMIN — INSULIN LISPRO 1 UNITS: 100 INJECTION, SOLUTION INTRAVENOUS; SUBCUTANEOUS at 17:57

## 2021-04-27 RX ADMIN — INSULIN LISPRO 1 UNITS: 100 INJECTION, SOLUTION INTRAVENOUS; SUBCUTANEOUS at 14:07

## 2021-04-27 RX ADMIN — SODIUM CHLORIDE, PRESERVATIVE FREE 10 ML: 5 INJECTION INTRAVENOUS at 20:38

## 2021-04-27 RX ADMIN — OXCARBAZEPINE 450 MG: 300 TABLET, FILM COATED ORAL at 09:03

## 2021-04-27 RX ADMIN — ENOXAPARIN SODIUM 40 MG: 40 INJECTION SUBCUTANEOUS at 09:05

## 2021-04-27 RX ADMIN — HYDROCODONE BITARTRATE AND ACETAMINOPHEN 1 TABLET: 7.5; 325 TABLET ORAL at 09:03

## 2021-04-27 RX ADMIN — HYDROCODONE BITARTRATE AND ACETAMINOPHEN 1 TABLET: 7.5; 325 TABLET ORAL at 20:37

## 2021-04-27 RX ADMIN — METOPROLOL SUCCINATE 50 MG: 50 TABLET, EXTENDED RELEASE ORAL at 16:08

## 2021-04-27 RX ADMIN — LEVETIRACETAM 1000 MG: 100 INJECTION, SOLUTION INTRAVENOUS at 06:03

## 2021-04-27 ASSESSMENT — PAIN SCALES - GENERAL: PAINLEVEL_OUTOF10: 6
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
## 2021-04-27 NOTE — CONSULTS
Name:  Messi Lopez /Age/Sex: 1955  (63 y.o. female)   MRN & CSN:  [id number] & [id number] Admission Date/Time: 2021  4:30 AM   Location:  3005/3005-A PCP: Catr[place]  Cora Harris Day: 2          Referring physician:  Maria R Adam DO         Reason for consultation:  syncope        Thanks for referral.    Information source: patient    CC;  syncope      HPI:   Thank you for involving me in taking  care of Messi Lopez who  is a 63 y. o.year  Old female  Presents with  H/o DM, normal LHC in past now admitted with  ? Syncope, says felt very anxious and passed out, has no CP, SOB, no vagal symptoms. Past medical history:    has a past medical history of Abnormal EKG, Acid reflux, Anemia, Anesthesia, Anginal pain ([address] Anxiety, Arthritis, Asthma, CAD (coronary artery disease), Cerebral artery occlusion with cerebral infarction ([address] CHF (congestive heart failure) ([address] Chronic back pain, Chronic kidney disease, DDD (degenerative disc disease), cervical, Depression, Diabetes mellitus ([address] Diabetic neuropathy ([address] Dizziness, Dry skin, Enlarged ureter, Fatty liver, Fibrocystic breast, Gout, H/O cardiac catheterization, H/O cardiovascular stress test, H/O cardiovascular stress test, H/O cardiovascular stress test, H/O Doppler ultrasound, H/O echocardiogram, H/O echocardiogram, History of Holter monitoring, [place] (hard of hearing), Hx of cardiovascular stress test, Hx of motion sickness, HX OTHER MEDICAL, Hyperlipidemia, Hypertension, IBS (irritable bowel syndrome), Incisional hernia, Kidney stones, Migraines, Nausea & vomiting, Other specified disorder of skin, Panic attacks, Pneumonia, Pseudoseizures, Restless leg, Shortness of breath, Sleep apnea, Staph infection, Thyroid disease, Tremor, Urinary incontinence, UTI (urinary tract infection), UTI (urinary tract infection), Vertigo, and Wears glasses.   Past surgical history:   has a past surgical history that includes Carpal tunnel release (Right, 1999); Diagnostic Cardiac Cath Lab Procedure (01/11/2010); Dental surgery; Colonoscopy (Last Done 6-13); Endoscopy, colon, diagnostic (Several ); Bladder surgery (1970's Or 1980's); Lithotripsy (2011); Breast biopsy (Right, 1980's); Breast surgery (Left, 1990's); hernia repair (9715'Y); hernia repair (1970's); Cholecystectomy (1970's); Appendectomy (1970's); Hysterectomy, total abdominal (1987); Tubal ligation (1978); Esophagus dilation (1980's And 1990's); Knee arthroscopy (Right, 1999); joint replacement (2008); other surgical history (06 13 2014); fracture surgery (1974 Or 1975); Cardiac catheterization (10-18-06); and Cardiac catheterization. Social History:   reports that she has never smoked. She has never used smokeless tobacco. She reports that she does not drink alcohol or use drugs. Family history:  family history includes Anxiety Disorder in her daughter; Arthritis in her father, mother, and sister; Cancer in her brother and sister; Colon Cancer in her brother; Depression in her daughter and mother; Diabetes in her mother; Early Death in her brother; Early Death (age of onset: 37) in her brother; Early Death (age of onset: 61) in her mother; Early Death (age of onset: 61) in her brother; Hearing Loss in her sister; Heart Disease in her brother, brother, brother, father, mother, and sister; Heart Failure in her sister; High Blood Pressure in her brother, father, mother, and sister; High Cholesterol in her brother, father, and mother; Mental Illness in her daughter; [place] in her mother; Other in her daughter, mother, and son; Stroke in her mother.     Allergies   Allergen Reactions    Abilify [Aripiprazole]      \"Severe Shaking And Restlessness\"    Advil [Ibuprofen Micronized] Palpitations     \"Severe High Blood Pressure\"    Augmentin [Amoxicillin-Pot Clavulanate] Itching and Rash    Bee Venom Swelling     Redness    Ciprofloxacin Itching and Rash    Codeine      \"Severe Abdominal Cramping\"   [place] Darvocet [Propoxyphene N-Acetaminophen] Palpitations     \"Severe High Blood Pressure\"    Darvon [Propoxyphene Hcl] Palpitations     \"Severe High Blood Pressure\"    Decadron [Dexamethasone] Other (See Comments)     seizure  Seizures    Ditropan [Oxybutynin Chloride] Palpitations     \"Severe High Blood Pressure\"    Fioricet [Butalbital-Apap-Caffeine] Palpitations     \"Severe High Blood Pressure\"    Fiorinal-Codeine #3 [Butalbital-Asa-Caff-Codeine]      \"Severe Stomach Cramps\"    Flagyl [Metronidazole] Diarrhea     \"Severe Diarrhea And Cramping\"    Naproxen Palpitations     \"Severe High Blood Pressure\"    Other      \"Allergic To Spider Bites Causing Blackness Of Skin, Severe Itching And Pain\"                                                  \"Allergic To Powder In Gloves Causing Severe Redness And Itching\"    Prozac [Fluoxetine Hcl]      \"Hallucinations\"    Robaxin [Methocarbamol] Palpitations     \"Severe High Blood Pressure\"    Ultram [Tramadol]      \"Severe Stomach Pain\"    Zoloft Palpitations     \"Sever High Blood Pressure\"    Butalbital-Aspirin-Caffeine Other (See Comments)     \"severe stomach pain\"    Coreg [Carvedilol] Other (See Comments)     \"spikes my BP severely and send me into a severe anxiety attack\"    Fluoxetine Other (See Comments)     hallucinations    Oxybutynin Chloride Other (See Comments)     Raises bp    Percocet [Oxycodone-Acetaminophen]      \"knocked me out for 12 hrs\"    Sertraline Other (See Comments)     hallucinations    Tape [Adhesive Tape]      Patient states it tears her skin, including band aids    Reglan [Metoclopramide] Other (See Comments)     \"makes me talk like a baby, but if I take benadryl with it it's fine\"       sodium chloride flush 0.9 % injection 5-40 mL, BID  sodium chloride flush 0.9 % injection 5-40 mL, BID  iopamidol (ISOVUE-370) 76 % injection 75 mL, ONCE PRN  albuterol (PROVENTIL) nebulizer solution 2.5 mg, Q6H PRN  sodium chloride flush 0.9 % injection 5-40 mL, 2 times per day  sodium chloride flush 0.9 % injection 5-40 mL, PRN  0.9 % sodium chloride infusion, PRN  polyethylene glycol (GLYCOLAX) packet 17 g, Daily PRN  aspirin EC tablet 81 mg, Daily    Or  aspirin suppository 300 mg, Daily  enoxaparin (LOVENOX) injection 40 mg, Daily  promethazine (PHENERGAN) tablet 12.5 mg, Q6H PRN  glucose (GLUTOSE) 40 % oral gel 15 g, PRN  dextrose 50 % IV solution, PRN  glucagon (rDNA) injection 1 mg, PRN  dextrose 5 % solution, PRN  LORazepam (ATIVAN) injection 2 mg, Q4H PRN  labetalol (NORMODYNE;TRANDATE) injection 10 mg, Q10 Min PRN  insulin lispro (HUMALOG) injection vial 0-6 Units, Q4H  insulin glargine (LANTUS) injection vial 20 Units, Daily  budesonide-formoterol (SYMBICORT) 160-4.5 MCG/ACT inhaler 2 puff, BID  tiotropium (SPIRIVA RESPIMAT) 2.5 MCG/ACT inhaler 2 puff, Daily  OXcarbazepine (TRILEPTAL) tablet 450 mg, BID  busPIRone (BUSPAR) tablet 20 mg, TID  ondansetron (ZOFRAN) injection 4 mg, Q8H PRN  diphenhydrAMINE (BENADRYL) tablet 25 mg, Q6H PRN  HYDROcodone-acetaminophen (NORCO) 7.5-325 MG per tablet 1 tablet, Q6H PRN  levETIRAcetam (KEPPRA) 1,000 mg in sodium chloride 0.9 % 100 mL IVPB, Q12H  carbidopa-levodopa (SINEMET)  MG per tablet 1 tablet, Nightly      Current Facility-Administered Medications   Medication Dose Route Frequency Provider Last Rate Last Admin    sodium chloride flush 0.9 % injection 5-40 mL  5-40 mL Intravenous BID [facility], DO        sodium chloride flush 0.9 % injection 5-40 mL  5-40 mL Intravenous BID Ramakrishna Angela, DO        iopamidol (ISOVUE-370) 76 % injection 75 mL  75 mL Intravenous ONCE PRN Ramakrishna Manuele, DO        albuterol (PROVENTIL) nebulizer solution 2.5 mg  2.5 mg Nebulization Q6H PRN [facility], DO        sodium chloride flush 0.9 % injection 5-40 mL  5-40 mL Intravenous 2 times per day Ramakrishna Angela DO   10 mL at Berry Edwards MD        diphenhydrAMINE (BENADRYL) tablet 25 mg  25 mg Oral Q6H PRN Shirlene Lawler MD   25 mg at 04/27/21 0903    HYDROcodone-acetaminophen (NORCO) 7.5-325 MG per tablet 1 tablet  1 tablet Oral Q6H PRN Shirlene Lawler MD   1 tablet at 04/27/21 0903    levETIRAcetam (KEPPRA) 1,000 mg in sodium chloride 0.9 % 100 mL IVPB  1,000 mg Intravenous Q12H Santos Torre MD   Stopped at 04/27/21 0648    carbidopa-levodopa (SINEMET)  MG per tablet 1 tablet  1 tablet Oral Nightly WENDIE Fitzpatrick - CNP   1 tablet at 04/26/21 2339     Review of Systems:  All 14 systems reviewed, all negative except for  syncope    Physical Examination:    BP (!) 148/86   Pulse 85   Temp 98.4 °F (36.9 °C) (Oral)   Resp 14   Ht 5' 2\" (1.575 m)   Wt 170 lb (77.1 kg)   SpO2 98%   BMI 31.09 kg/m²      Wt Readings from Last 3 Encounters:   04/26/21 170 lb (77.1 kg)   03/22/21 170 lb (77.1 kg)   03/01/21 171 lb (77.6 kg)     Body mass index is 31.09 kg/m². General Appearance:  fair  Head: normocephalic     Eyes: normal, noninjected conjunctiva    ENT: normal mucosa, noninjected throat, normal     NECK: No JVP  No thyromegaly        Cardiovascular: No thrills palpated   Auscultation: Normal S1 and S2,  no murmur   carotid bruit no   Abdominal Aorta no bruit    Respiratory:    Breath sounds Clear = 0    Extremities:  none Edema clubbing ,   no cyanosis    SKIN: Warm and well perfused, no pallor or cyanosis    Vascular exam:  Pedal Pulses: palp  bilaterally        Abdomen:  No masses or tenderness. No organomegaly noted. Neurological:  Oriented to time, place, and person   No focal neurological deficit noted.   Psychiatric:normal mood, no anxiety    Lab Review   Recent Labs     04/27/21  0321   WBC 12.8*   HGB 13.5   HCT 40.1         Recent Labs     04/27/21  0321      K 3.5   CL 99   CO2 26   BUN 7   CREATININE 0.5*     Recent Labs     04/27/21  0321   AST 21   ALT 7*   BILITOT 0.3   ALKPHOS 67     No results for input(s): TROPONINI in the last 72 hours.   Lab Results   Component Value Date    BNP 51.0 02/15/2014    BNP 55 11/10/2013    BNP 21.6 11/08/2013     Lab Results   Component Value Date    INR 0.98 04/26/2021    PROTIME 11.8 04/26/2021           Assessment/Recommendations:     - syncope:   Holter , orthos  - HTN  stable  - hyperlipidimea on statin  - ? neuropsych consult         Alfonzo Isbell MD, 4/27/2021 11:07 AM
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
## 2021-04-27 NOTE — CARE COORDINATION
Chart reviewed, in to see pt for dc planning. Introduced self and board updated. Pt was admitted for unresponsiveness. States she lives at [address] [place] [address] alone and intends to return home at discharge. Shared she has a [place] [address] aide with [facility] per [employer] who visits twice weekly for two hours each. Explained she follows with PCP, has insurance with affordable RX co-pays and reliable transportation per her dgts, [employer] or walks. Discussed HHC and pt declined skilled [place] [address] need nor any other discharge needs. CM remains available if needs arise.

## 2021-04-27 NOTE — PROGRESS NOTES
Hospitalist Progress Note      Name:  Rosmery Puentes /Age/Sex: 1955  (63 y.o. female)   MRN & CSN:  [id number] & [id number] Admission Date/Time: 2021  4:30 AM   [facility] PCP: Fermín Fisher MD       Rosmery Puentes is a 63 y.o.  female  who presents with Altered Mental Status (called dispatch for being weak, once medics got there she was unresponsive)      Assessment and Plan:   1. TIA versus syncope versus panic attack r/o CVA  - likely appears to be panic attack considering HTN, Tachy and her clinical exam at this time with no gross neuro deficits, daugher at bedside confirms she has panic attacks like this  - could also be pseudoseizures vs seizures?  - CTA head and neck non acute  - CT head neg  - MRI brain pending  - asa + statin  - neuro checks  - Neurology consulted  - psych consulted  - consult cardiology   - SLP eval  - pt/ot  - ativan prn for anxiety  - resume home buspar  - IV Keppra  - trileptal added     hypoK  - replaced                          Other chronic medical conditions:   Holding all home meds except stated above or contraindicated.   · IDDM 2  · History of CAD  · GERD  · Asthma  · CKD  · DDD  · CHF  · Depression  · Hypothyroidism  · Gout  · Fatty liver disease               Diet DIET CARB CONTROL;   Code Status Full Code     Medications:   Medications:    sodium chloride flush  5-40 mL Intravenous BID    sodium chloride flush  5-40 mL Intravenous BID    sodium chloride flush  5-40 mL Intravenous 2 times per day    aspirin  81 mg Oral Daily    Or    aspirin  300 mg Rectal Daily    enoxaparin  40 mg Subcutaneous Daily    insulin lispro  0-6 Units Subcutaneous Q4H    cefTRIAXone (ROCEPHIN) IV  1,000 mg Intravenous Q24H    insulin glargine  20 Units Subcutaneous Daily    budesonide-formoterol  2 puff Inhalation BID    tiotropium  2 puff Inhalation Daily    OXcarbazepine  450 mg Oral BID    busPIRone  20 mg Oral TID    levetiracetam  1,000 mg

## 2021-04-27 NOTE — PROGRESS NOTES
Physical Therapy    Facility/Department: [address] [place] [address] 3E  Initial Assessment    NAME: Juan Pablo Adkins  : 1955  MRN: [id number]    Date of Service: 2021    Discharge Recommendations:  IP Rehab        Assessment   Assessment: Pt is a 63 y.o. female with a medical history, surgical history, co-morbidities, and personal factors including Abnormal EKG, Acid reflux, Anemia, Anesthesia, Anginal pain ([address] Anxiety, Arthritis, Asthma, CAD (coronary artery disease), Cerebral artery occlusion with cerebral infarction ([address] CHF (congestive heart failure) ([address] Chronic back pain, Chronic kidney disease, DDD (degenerative disc disease), cervical, Depression, Diabetes mellitus ([address] Diabetic neuropathy ([address] Dizziness, Dry skin, Enlarged ureter, Fatty liver, Fibrocystic breast, Gout, H/O cardiac catheterization, H/O cardiovascular stress test, H/O cardiovascular stress test, H/O cardiovascular stress test, H/O Doppler ultrasound, H/O echocardiogram, H/O echocardiogram, History of Holter monitoring, [place] (hard of hearing), Hx of cardiovascular stress test, Hx of motion sickness, HX OTHER MEDICAL, Hyperlipidemia, Hypertension, IBS (irritable bowel syndrome), Incisional hernia, Kidney stones, Migraines, Nausea & vomiting, Other specified disorder of skin, Panic attacks, Pneumonia, Pseudoseizures, Restless leg, Shortness of breath, Sleep apnea, Staph infection, Thyroid disease, Tremor, Urinary incontinence, UTI (urinary tract infection), UTI (urinary tract infection), Vertigo, Carpal tunnel release (Right, ); Diagnostic Cardiac Cath Lab Procedure (2010); Dental surgery; Colonoscopy (Last Done ); Endoscopy, colon, diagnostic (Several ); Bladder surgery (s Or ); Lithotripsy (); Breast biopsy (Right, ); Breast surgery (Left, ); hernia repair (3'L); hernia repair (); Cholecystectomy (s); Appendectomy (s); Hysterectomy, total abdominal ();  Tubal ligation (); Esophagus dilation (1980's And 1990's); Knee arthroscopy (Right, 1999); joint replacement (2008); other surgical history (06 13 2014); fracture surgery (1974 Or 1975); Cardiac catheterization (10-18-06); and Cardiac catheterization with admission for Acute Encephalopathy and Syncope. Prior to admission, pt was modified independent with functional mobility and ADLs. Examination of body systems reveals decreased strength, decreased balance, decreased endurance, and decreased independence with functional mobility. Prognosis: Good  Decision Making: High Complexity  Clinical Presentation: unpredictable characteristics  PT Education: Goals; Home Exercise Program;PT Role;General Safety;Equipment; Adaptive Device Training;Functional Mobility Training;Plan of Care;Transfer Training;Gait Training  REQUIRES PT FOLLOW UP: Yes  Activity Tolerance  Activity Tolerance: Patient Tolerated treatment well;Patient limited by pain; Patient limited by endurance       Restrictions  Restrictions/Precautions  Restrictions/Precautions: Fall Risk, General Precautions  Vision/Hearing  Vision: Within Functional Limits  Hearing: Within functional limits     Subjective  General  Chart Reviewed: Yes  Patient assessed for rehabilitation services?: Yes  Family / Caregiver Present: No  Follows Commands: Within Functional Limits  Subjective  Subjective: \"I need help cutting my food\"  Pain Screening  Patient Currently in Pain: Yes(RUE, RLE, Low back; pt didn't rate pain)    Orientation  Orientation  Overall Orientation Status: Within Functional Limits  Social/Functional History  Social/Functional History  Lives With: Alone  Type of Home: Apartment(5th floor)  Home Layout: One level, Laundry in basement  Home Access: Elevator, Level entry(elevator access to apartment)  Bathroom Shower/Tub: Tub/Shower unit  Bathroom Toilet: Standard  Bathroom Equipment: Grab bars in shower  Home Equipment: 4 wheeled walker(Pt reports she does not always ambulate with AD. )  ADL Assistance: Independent(Pt reports difficulty with tub transfers, which pt reports results in decreased hygiene.  Pt reports when her knee is not in pain she is able to complete LB tasks without assist. IND toileting.)  Homemaking Assistance: Independent  Homemaking Responsibilities: Yes(Pt reports her children grocery shop and pt is able to cook simple meals.)  Ambulation Assistance: Independent  Transfer Assistance: Independent  Active : No  Cognition   Cognition  Overall Cognitive Status: WFL    Objective     Observation/Palpation  Posture: Fair(Forward head, rounded shoulders)       Strength RLE  Strength RLE: Exception  Comment: 3+/5 knee extension; 3/5 DF  Strength LLE  Strength LLE: Exception  Comment: 4-/5 knee extension 4+/5 DF     Sensation  Overall Sensation Status: Impaired  Light Touch: Partial deficits in the RLE     Transfers  Sit to Stand: Contact guard assistance(with verbal cues for BUE and BLE placement, walker placement, and sequencing)  Stand to sit: Contact guard assistance;Minimal Assistance(with verbal cues to feel chair at back of legs, reach back, and sit slowly; pt demonstrates decreased eccentric control)  Ambulation  Ambulation?: Yes  Ambulation 1  Surface: level tile  Device: Rolling Walker  Assistance: Contact guard assistance;Minimal assistance(intermittent assist for lateral weight shifts during ambulation)  Quality of Gait: decreased dat, decreased step length bilaterally, decreased step height RLE, decreased stance time RLE; pt ambulated with R elbow lean on walker and unable to  wlker handle with R hand; pt unable to clear RLE during ambulation  Distance: 10 feet  Comments: with verbal cues for sequencing throughout ambulation and tactile cues to lateral weight shifts to advance RLE     Balance  Posture: Fair  Sitting - Static: Good  Sitting - Dynamic: Fair;+  Standing - Static: Fair  Standing - Dynamic: Poor      Gait Training:  Cues were given for safety, sequence, device management, balance, posture, awareness, path. Plan   Plan  Times per week: 3+  Current Treatment Recommendations: Strengthening, Functional Mobility Training, IADL Training, Neuromuscular Re-education, Home Exercise Program, ROM, Transfer Training, Gait Training, Safety Education & Training, Balance Training, Endurance Training, ADL/Self-care Training, Pain Management, Patient/Caregiver Education & Training  Safety Devices  Type of devices: All fall risk precautions in place, Call light within reach, Gait belt, Chair alarm in place, Left in chair      AM-PAC Score  AM-PAC Inpatient Mobility Raw Score : 14 (04/27/21 1146)  AM-PAC Inpatient T-Scale Score : 38.1 (04/27/21 1146)  Mobility Inpatient CMS 0-100% Score: 61.29 (04/27/21 1146)  Mobility Inpatient CMS G-Code Modifier : CL (04/27/21 1146)          Goals  Long term goals  Time Frame for Long term goals :  In one week:  Long term goal 1: pt will complete all bed mobility with CGAx1  Long term goal 2: pt will complete sit <> stand transfer with SBAx1 and use of LRAD  Long term goal 3: pt will ambulate 35 feet with CGAx1 and use of LRAD  Long term goal 4: pt will independently complete 3 sets of 10 reps of BLE AROM exercises in appropriate and allowed ROM       Time In:  0955  Time Out: 1030  Total Treatment Time: 35  Timed Code Treatment Minutes: 25    Genet Javier, ANITHA

## 2021-04-28 VITALS
WEIGHT: 170 LBS | OXYGEN SATURATION: 94 % | HEART RATE: 76 BPM | SYSTOLIC BLOOD PRESSURE: 158 MMHG | HEIGHT: 62 IN | TEMPERATURE: 98.5 F | BODY MASS INDEX: 31.28 KG/M2 | RESPIRATION RATE: 20 BRPM | DIASTOLIC BLOOD PRESSURE: 87 MMHG

## 2021-04-28 LAB
EKG ATRIAL RATE: 102 BPM
EKG DIAGNOSIS: NORMAL
EKG P AXIS: 31 DEGREES
EKG P-R INTERVAL: 156 MS
EKG Q-T INTERVAL: 342 MS
EKG QRS DURATION: 106 MS
EKG QTC CALCULATION (BAZETT): 445 MS
EKG R AXIS: -43 DEGREES
EKG T AXIS: 68 DEGREES
EKG VENTRICULAR RATE: 102 BPM
GLUCOSE BLD-MCNC: 202 MG/DL (ref 70–99)
GLUCOSE BLD-MCNC: 232 MG/DL (ref 70–99)
TSH HIGH SENSITIVITY: 1.36 UIU/ML (ref 0.27–4.2)

## 2021-04-28 PROCEDURE — 93010 ELECTROCARDIOGRAM REPORT: CPT | Performed by: INTERNAL MEDICINE

## 2021-04-28 PROCEDURE — 82607 VITAMIN B-12: CPT

## 2021-04-28 PROCEDURE — 84443 ASSAY THYROID STIM HORMONE: CPT

## 2021-04-28 PROCEDURE — 6360000002 HC RX W HCPCS: Performed by: STUDENT IN AN ORGANIZED HEALTH CARE EDUCATION/TRAINING PROGRAM

## 2021-04-28 PROCEDURE — 82962 GLUCOSE BLOOD TEST: CPT

## 2021-04-28 PROCEDURE — 94761 N-INVAS EAR/PLS OXIMETRY MLT: CPT

## 2021-04-28 PROCEDURE — 94640 AIRWAY INHALATION TREATMENT: CPT

## 2021-04-28 PROCEDURE — 6370000000 HC RX 637 (ALT 250 FOR IP): Performed by: NURSE PRACTITIONER

## 2021-04-28 PROCEDURE — 2580000003 HC RX 258: Performed by: STUDENT IN AN ORGANIZED HEALTH CARE EDUCATION/TRAINING PROGRAM

## 2021-04-28 PROCEDURE — 82746 ASSAY OF FOLIC ACID SERUM: CPT

## 2021-04-28 PROCEDURE — 6370000000 HC RX 637 (ALT 250 FOR IP): Performed by: STUDENT IN AN ORGANIZED HEALTH CARE EDUCATION/TRAINING PROGRAM

## 2021-04-28 PROCEDURE — 6370000000 HC RX 637 (ALT 250 FOR IP): Performed by: PSYCHIATRY & NEUROLOGY

## 2021-04-28 PROCEDURE — 6370000000 HC RX 637 (ALT 250 FOR IP): Performed by: INTERNAL MEDICINE

## 2021-04-28 PROCEDURE — 93226 XTRNL ECG REC<48 HR SCAN A/R: CPT

## 2021-04-28 PROCEDURE — 96376 TX/PRO/DX INJ SAME DRUG ADON: CPT

## 2021-04-28 PROCEDURE — APPSS60 APP SPLIT SHARED TIME 46-60 MINUTES: Performed by: NURSE PRACTITIONER

## 2021-04-28 PROCEDURE — 99231 SBSQ HOSP IP/OBS SF/LOW 25: CPT | Performed by: INTERNAL MEDICINE

## 2021-04-28 PROCEDURE — 2580000003 HC RX 258: Performed by: INTERNAL MEDICINE

## 2021-04-28 PROCEDURE — 6360000002 HC RX W HCPCS: Performed by: INTERNAL MEDICINE

## 2021-04-28 PROCEDURE — 36415 COLL VENOUS BLD VENIPUNCTURE: CPT

## 2021-04-28 PROCEDURE — 6370000000 HC RX 637 (ALT 250 FOR IP): Performed by: FAMILY MEDICINE

## 2021-04-28 PROCEDURE — 93225 XTRNL ECG REC<48 HRS REC: CPT

## 2021-04-28 RX ORDER — AMITRIPTYLINE HYDROCHLORIDE 25 MG/1
25 TABLET, FILM COATED ORAL NIGHTLY
Qty: 30 TABLET | Refills: 3 | Status: SHIPPED | OUTPATIENT
Start: 2021-04-28 | End: 2022-06-16 | Stop reason: SDUPTHER

## 2021-04-28 RX ORDER — LEVOTHYROXINE SODIUM 0.07 MG/1
75 TABLET ORAL DAILY
Status: DISCONTINUED | OUTPATIENT
Start: 2021-04-28 | End: 2021-04-28 | Stop reason: HOSPADM

## 2021-04-28 RX ORDER — BUSPIRONE HYDROCHLORIDE 10 MG/1
20 TABLET ORAL 3 TIMES DAILY
Qty: 180 TABLET | Refills: 0 | Status: SHIPPED | OUTPATIENT
Start: 2021-04-28

## 2021-04-28 RX ORDER — INSULIN GLARGINE 100 [IU]/ML
30 INJECTION, SOLUTION SUBCUTANEOUS DAILY
Status: DISCONTINUED | OUTPATIENT
Start: 2021-04-28 | End: 2021-04-28 | Stop reason: HOSPADM

## 2021-04-28 RX ORDER — LEVOTHYROXINE SODIUM 0.07 MG/1
75 TABLET ORAL DAILY
Status: ON HOLD | COMMUNITY
End: 2021-04-28 | Stop reason: HOSPADM

## 2021-04-28 RX ORDER — LEVOTHYROXINE SODIUM 0.07 MG/1
75 TABLET ORAL
Qty: 30 TABLET | Refills: 3 | Status: SHIPPED | OUTPATIENT
Start: 2021-04-28 | End: 2022-06-16 | Stop reason: SDUPTHER

## 2021-04-28 RX ORDER — QUETIAPINE FUMARATE 25 MG/1
12.5 TABLET, FILM COATED ORAL DAILY
Qty: 60 TABLET | Refills: 3 | Status: ON HOLD
Start: 2021-04-29 | End: 2021-07-15 | Stop reason: HOSPADM

## 2021-04-28 RX ADMIN — HYDROCODONE BITARTRATE AND ACETAMINOPHEN 1 TABLET: 7.5; 325 TABLET ORAL at 06:39

## 2021-04-28 RX ADMIN — NIFEDIPINE 60 MG: 30 TABLET, FILM COATED, EXTENDED RELEASE ORAL at 08:19

## 2021-04-28 RX ADMIN — METOPROLOL SUCCINATE 50 MG: 50 TABLET, EXTENDED RELEASE ORAL at 08:20

## 2021-04-28 RX ADMIN — ASPIRIN 81 MG: 81 TABLET, COATED ORAL at 08:19

## 2021-04-28 RX ADMIN — LOSARTAN POTASSIUM AND HYDROCHLOROTHIAZIDE 2 TABLET: 12.5; 5 TABLET ORAL at 08:20

## 2021-04-28 RX ADMIN — LEVOTHYROXINE SODIUM 75 MCG: 0.07 TABLET ORAL at 11:36

## 2021-04-28 RX ADMIN — OXCARBAZEPINE 600 MG: 300 TABLET, FILM COATED ORAL at 08:21

## 2021-04-28 RX ADMIN — ENOXAPARIN SODIUM 40 MG: 40 INJECTION SUBCUTANEOUS at 08:21

## 2021-04-28 RX ADMIN — SODIUM CHLORIDE, PRESERVATIVE FREE 10 ML: 5 INJECTION INTRAVENOUS at 08:37

## 2021-04-28 RX ADMIN — BUDESONIDE AND FORMOTEROL FUMARATE DIHYDRATE 2 PUFF: 160; 4.5 AEROSOL RESPIRATORY (INHALATION) at 08:14

## 2021-04-28 RX ADMIN — QUETIAPINE FUMARATE 12.5 MG: 25 TABLET ORAL at 08:20

## 2021-04-28 RX ADMIN — INSULIN GLARGINE 30 UNITS: 100 INJECTION, SOLUTION SUBCUTANEOUS at 08:58

## 2021-04-28 RX ADMIN — LEVETIRACETAM 1000 MG: 100 INJECTION, SOLUTION INTRAVENOUS at 06:34

## 2021-04-28 RX ADMIN — BUSPIRONE HYDROCHLORIDE 20 MG: 10 TABLET ORAL at 08:19

## 2021-04-28 RX ADMIN — TIOTROPIUM BROMIDE INHALATION SPRAY 2 PUFF: 3.12 SPRAY, METERED RESPIRATORY (INHALATION) at 08:14

## 2021-04-28 ASSESSMENT — PAIN SCALES - GENERAL: PAINLEVEL_OUTOF10: 0

## 2021-04-28 NOTE — PROGRESS NOTES
[facility]  Liz Gonzalez  Phone: [phone]    Fax [phone]                  Elsi De La Torre MD, Phyllis Leavitt MD, Mike Whitley MD, MD Leroy Hurt MD Ginette Pitt, MD Janeice Endo, MD Du Roseline, APRN      Tommy Diaz, APRN  Pushpa Scriver, [address]    Cardiology Progress Note     Today's Plan: check orthostatic blood pressures    Admit Date:  4/26/2021    Consult reason/ Seen today for: syncope    Subjective and  Overnight Events:  Patient is frustrated that no one is able to tell her what is vi on. She states that she has been told she has pseudoseizures and that she has real seizures. She denies any issues with syncope today. Assessment / Plan / Recommendation:     1. Syncope: possible seizure. Psyc issue per psyc evaluation. Orthostatic blood pressures are negative. 2. HTN: elevated, improved from yesterday. Continue metoprolol, nefideipine, losartan hydrochlorothiazide. Patient just recently restarted medications we will see how she tolerates them prior to adjusting. Goal blood pressure for patient and less than 140/80. 3. Hyperlipidemia: 4/27/2021 HDL 42 LDL 45 triglycerides 116. At goal continue statin therapy  4. DVT prophylaxis if not contraindicated while in the hospital.   5. Will sign off. Please re consult if additional cardiology recommendations are needed. History of Presenting Illness:    Chief complain on admission : 63 y. o.year old who is admitted for  Chief Complaint   Patient presents with    Altered Mental Status     called dispatch for being weak, once medics got there she was unresponsive        Past medical history:    has a past medical history of Abnormal EKG, Acid reflux, Anemia, Anesthesia, Anginal pain ([facility] [address] Anxiety, Arthritis, Asthma, CAD (coronary artery disease), Cerebral artery occlusion with cerebral infarction [facility]), CHF (congestive heart failure) (HCC), Chronic back pain, Chronic kidney disease, DDD (degenerative disc disease), cervical, Depression, Diabetes mellitus ([address] Diabetic neuropathy ([address] Dizziness, Dry skin, Enlarged ureter, Fatty liver, Fibrocystic breast, Gout, H/O cardiac catheterization, H/O cardiovascular stress test, H/O cardiovascular stress test, H/O cardiovascular stress test, H/O Doppler ultrasound, H/O echocardiogram, H/O echocardiogram, History of Holter monitoring, [place] (hard of hearing), Hx of cardiovascular stress test, Hx of motion sickness, HX OTHER MEDICAL, Hyperlipidemia, Hypertension, IBS (irritable bowel syndrome), Incisional hernia, Kidney stones, Migraines, Nausea & vomiting, Other specified disorder of skin, Panic attacks, Pneumonia, Pseudoseizures, Restless leg, Shortness of breath, Sleep apnea, Staph infection, Thyroid disease, Tremor, Urinary incontinence, UTI (urinary tract infection), UTI (urinary tract infection), Vertigo, and Wears glasses. Past surgical history:   has a past surgical history that includes Carpal tunnel release (Right, 1999); Diagnostic Cardiac Cath Lab Procedure (01/11/2010); Dental surgery; Colonoscopy (Last Done 6-13); Endoscopy, colon, diagnostic (Several ); Bladder surgery (1970's Or 1980's); Lithotripsy (2011); Breast biopsy (Right, 1980's); Breast surgery (Left, 1990's); hernia repair (0204'G); hernia repair (1970's); Cholecystectomy (1970's); Appendectomy (1970's); Hysterectomy, total abdominal (1987); Tubal ligation (1978); Esophagus dilation (1980's And 1990's); Knee arthroscopy (Right, 1999); joint replacement (2008); other surgical history (06 13 2014); fracture surgery (1974 Or 1975); Cardiac catheterization (10-18-06); and Cardiac catheterization. Social History:   reports that she has never smoked. She has never used smokeless tobacco. She reports that she does not drink alcohol or use drugs.   Family history:  family history includes Anxiety Disorder in her daughter; Arthritis in her father, mother, and sister; Cancer in her brother and sister; Colon Cancer in her brother; Depression in her daughter and mother; Diabetes in her mother; Early Death in her brother; Early Death (age of onset: 37) in her brother; Early Death (age of onset: 61) in her mother; Early Death (age of onset: 61) in her brother; Hearing Loss in her sister; Heart Disease in her brother, brother, brother, father, mother, and sister; Heart Failure in her sister; High Blood Pressure in her brother, father, mother, and sister; High Cholesterol in her brother, father, and mother; Mental Illness in her daughter; [place]  / [place] in her mother; Other in her daughter, mother, and son; Stroke in her mother.     Allergies   Allergen Reactions    Abilify [Aripiprazole]      \"Severe Shaking And Restlessness\"    Advil [Ibuprofen Micronized] Palpitations     \"Severe High Blood Pressure\"    Augmentin [Amoxicillin-Pot Clavulanate] Itching and Rash    Bee Venom Swelling     Redness    Ciprofloxacin Itching and Rash    Codeine      \"Severe Abdominal Cramping\"    Darvocet [Propoxyphene N-Acetaminophen] Palpitations     \"Severe High Blood Pressure\"    Darvon [Propoxyphene Hcl] Palpitations     \"Severe High Blood Pressure\"    Decadron [Dexamethasone] Other (See Comments)     seizure  Seizures    Ditropan [Oxybutynin Chloride] Palpitations     \"Severe High Blood Pressure\"    Fioricet [Butalbital-Apap-Caffeine] Palpitations     \"Severe High Blood Pressure\"    Fiorinal-Codeine #3 [Butalbital-Asa-Caff-Codeine]      \"Severe Stomach Cramps\"    Flagyl [Metronidazole] Diarrhea     \"Severe Diarrhea And Cramping\"    Naproxen Palpitations     \"Severe High Blood Pressure\"    Other      \"Allergic To Spider Bites Causing Blackness Of Skin, Severe Itching And Pain\"                                                  \"Allergic To Powder In Gloves Causing Severe Redness And Itching\"    Prozac [Fluoxetine Hcl] \"Hallucinations\"    Robaxin [Methocarbamol] Palpitations     \"Severe High Blood Pressure\"    Ultram [Tramadol]      \"Severe Stomach Pain\"    Zoloft Palpitations     \"Sever High Blood Pressure\"    Butalbital-Aspirin-Caffeine Other (See Comments)     \"severe stomach pain\"    Coreg [Carvedilol] Other (See Comments)     \"spikes my BP severely and send me into a severe anxiety attack\"    Fluoxetine Other (See Comments)     hallucinations    Oxybutynin Chloride Other (See Comments)     Raises bp    Percocet [Oxycodone-Acetaminophen]      \"knocked me out for 12 hrs\"    Sertraline Other (See Comments)     hallucinations    Tape [Adhesive Tape]      Patient states it tears her skin, including band aids    Reglan [Metoclopramide] Other (See Comments)     \"makes me talk like a baby, but if I take benadryl with it it's fine\"       Review of Systems   All 14 systems were reviewed and are negative  Except for the positive findings  which as documented     BP (!) 130/57   Pulse 75   Temp 98.5 °F (36.9 °C) (Oral)   Resp 20   Ht 5' 2\" (1.575 m)   Wt 170 lb (77.1 kg)   SpO2 94%   BMI 31.09 kg/m²       Intake/Output Summary (Last 24 hours) at 4/28/2021 0734  Last data filed at 4/27/2021 2146  Gross per 24 hour   Intake --   Output 200 ml   Net -200 ml       Physical Exam  Vitals signs reviewed. Constitutional:       General: She is not in acute distress. Appearance: Normal appearance. She is obese. She is not ill-appearing. HENT:      Head: Atraumatic. Neck:      Musculoskeletal: Neck supple. No muscular tenderness. Vascular: No carotid bruit. Cardiovascular:      Rate and Rhythm: Normal rate and regular rhythm. Pulses: Normal pulses. Heart sounds: Normal heart sounds. No murmur. Pulmonary:      Effort: Pulmonary effort is normal. No respiratory distress. Breath sounds: Normal breath sounds. Musculoskeletal:         General: No swelling or deformity.    Neurological:      Mental reviewed by myself , continue all other medications of all above medical condition listed as is except for changes mentioned above. Thank you very much for consult , please call with questions. Electronically signed by WENDIE Zhao CNP on 4/28/2021 at 7:34 AM    I have seen ,spoken to  and examined this patient personally, independently of the nurse practitioner. I have reviewed the hospital care given to date and reviewed all pertinent labs and imaging. The plan was developed mutually at the time of the visit with the patient,  NP  and myself. I have spoken with patient, nursing staff and provided written and verbal instructions . The above note has been reviewed and I agree with the assessment, diagnosis, and treatment plan with changes made by me as follows     CARDIOLOGY ATTENDING ADDENDUM    HPI:  I have reviewed the above HPI  And agree with above   Meme Donis is a 63 y. o.year old who and presents with had concerns including Altered Mental Status (called dispatch for being weak, once medics got there she was unresponsive). Chief Complaint   Patient presents with    Altered Mental Status     called dispatch for being weak, once medics got there she was unresponsive     Interval history:  No syncope    Physical Exam:  General:  Awake, alert, NAD  Head:normal  Eye:normal  Neck:  No JVD   Chest:  Clear to auscultation, respiration easy  Cardiovascular:  RRR S1S2  Abdomen:   nontender  Extremities:  no edema  Pulses; palpable  Neuro: grossly normal      MEDICAL DECISION MAKING;    I agree with the above plan, which was planned by myself and discussed with NP.   Supportive care        Cindra Holstein MD [facility]

## 2021-04-28 NOTE — PROGRESS NOTES
Went over discharge instructions and medication list of what to  from Pharmacy and what to take tonight and what to stop taking. Went over the holter monitor with her and instructions on what to do at home and when to take it off.   Also, instructed her to put it in the envelope and have someone bring it back to hospital and give to information desk

## 2021-04-28 NOTE — PROGRESS NOTES
exam:->aphasia Decision Support Exception->Emergency Medical Condition (MA) FINDINGS: BRAIN/VENTRICLES: There is no acute intracranial hemorrhage, mass effect or midline shift. No abnormal extra-axial fluid collection. The gray-white differentiation is maintained without evidence of an acute infarct. There is no evidence of hydrocephalus. Mild generalized cerebral atrophy is noted with chronic microvascular ischemic changes. ORBITS: The visualized portion of the orbits demonstrate no acute abnormality. SINUSES: The visualized paranasal sinuses and mastoid air cells demonstrate no acute abnormality. SOFT TISSUES/SKULL:  No acute abnormality of the visualized skull or soft tissues. No acute intracranial abnormality. Generalized cerebral atrophy with chronic microvascular ischemic changes are noted. Results were called by Dr. Mireille Flannery MD to [facility] on 4/26/2021 at 05:02. Xr Chest Portable    Result Date: 4/26/2021  EXAMINATION: ONE XRAY VIEW OF THE CHEST 4/26/2021 5:21 am COMPARISON: Chest radiograph dated December 17, 2020 HISTORY: ORDERING SYSTEM PROVIDED HISTORY: ams TECHNOLOGIST PROVIDED HISTORY: Reason for exam:->ams Reason for Exam: stroke alert protocol Acuity: Acute Type of Exam: Initial FINDINGS: The lungs are without acute focal process. There is no effusion or pneumothorax. The cardiomediastinal silhouette is without acute process. The osseous structures are without acute process. No acute process. Cta Head Neck W Contrast    Result Date: 4/26/2021  EXAMINATION: CTA OF THE HEAD AND NECK WITH CONTRAST 4/26/2021 4:58 am: TECHNIQUE: CTA of the head and neck was performed with the administration of intravenous contrast. Multiplanar reformatted images are provided for review. MIP images are provided for review. Stenosis of the internal carotid arteries measured using NASCET criteria.  Dose modulation, iterative reconstruction, and/or weight based adjustment of the mA/kV was utilized to reduce the radiation dose to as low as reasonably achievable. COMPARISON: CT brain earlier same day, CT angiogram head and neck 09/04/2020 HISTORY: ORDERING SYSTEM PROVIDED HISTORY: aphasia TECHNOLOGIST PROVIDED HISTORY: Reason for exam:->aphasia Decision Support Exception->Emergency Medical Condition (MA) FINDINGS: CTA NECK: AORTIC ARCH/ARCH VESSELS: No dissection or arterial injury. No significant stenosis of the brachiocephalic or subclavian arteries. CAROTID ARTERIES: No dissection, arterial injury, or hemodynamically significant stenosis by NASCET criteria. Mild atherosclerotic calcifications are present at the origin of the left internal carotid artery. VERTEBRAL ARTERIES: No dissection, arterial injury, or significant stenosis. 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 The lung apices are clear. There is no pathologically enlarged lymphadenopathy. There is no soft tissue mass. The parotid glands and submandibular glands are unremarkable. BONES: No lytic or blastic osseous lesions are identified. There is moderate neural foraminal narrowing at C5-6 secondary to uncovertebral overgrowth. CTA HEAD: ANTERIOR CIRCULATION: The intracranial segments of the internal carotid arteries are normal.  There is no significant stenosis or aneurysm identified. The anterior and middle cerebral arteries are normal in appearance. No significant stenosis or aneurysm is identified. POSTERIOR CIRCULATION: The distal vertebral arteries are normal in appearance. The basilar artery is normal.  No significant stenosis or aneurysm is identified. The posterior cerebral arteries are normal in appearance. A fetal origin of the left posterior cerebral artery is noted, an anatomic variant. OTHER: No dural venous sinus thrombosis on this non-dedicated study. BRAIN: There is no mass effect or midline shift. There is no vascular malformation.      1. No large vessel occlusion, significant stenosis or cerebral aneurysm features ([facility] [address]     Generalized anxiety disorder with panic attacks     Auditory hallucinations     Bipolar I disorder with depression, severe (HCC)     Dyspnea and respiratory abnormalities     Encephalopathy     Syncope     Bipolar 1 disorder ([facility] [address]      Plan:     1. Reviewed POC blood glucose . Labs and X ray results   2. Reviewed Current Medicines   3. On meal/ Correction bolus Humalog/ Basal Lantus Insulin regime    4. Monitor Blood glucose frequently   5. Modified  the dose of Insulin/ other medicines as needed   6. Will follow     .      Sandhya Rowe MD

## 2021-04-28 NOTE — PROGRESS NOTES
significant stenosis. 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 The lung apices are clear. There is no pathologically enlarged lymphadenopathy. There is no soft tissue mass. The parotid glands and submandibular glands are unremarkable. BONES: No lytic or blastic osseous lesions are identified. There is moderate neural foraminal narrowing at C5-6 secondary to uncovertebral overgrowth. CTA HEAD: ANTERIOR CIRCULATION: The intracranial segments of the internal carotid arteries are normal.  There is no significant stenosis or aneurysm identified. The anterior and middle cerebral arteries are normal in appearance. No significant stenosis or aneurysm is identified. POSTERIOR CIRCULATION: The distal vertebral arteries are normal in appearance. The basilar artery is normal.  No significant stenosis or aneurysm is identified. The posterior cerebral arteries are normal in appearance. A fetal origin of the left posterior cerebral artery is noted, an anatomic variant. OTHER: No dural venous sinus thrombosis on this non-dedicated study. BRAIN: There is no mass effect or midline shift. There is no vascular malformation. 1. No large vessel occlusion, significant stenosis or cerebral aneurysm identified. 2. No significant arterial stenosis identified within the neck. Mri Brain With And Without Contrast    Result Date: 4/27/2021  EXAMINATION: MRI OF THE BRAIN WITHOUT AND WITH CONTRAST  4/27/2021 2:41 pm TECHNIQUE: Multiplanar multisequence MRI of the head/brain was performed without and with the administration of intravenous contrast. COMPARISON: Head CT 04/26/2021, MRI brain 09/04/2020 HISTORY: ORDERING SYSTEM PROVIDED HISTORY: tia TECHNOLOGIST PROVIDED HISTORY: Reason for exam:->tia Reason for Exam: ams , syncope  tia gfr>60 15 ml prohance FINDINGS: INTRACRANIAL STRUCTURES/VENTRICLES:  No acute infarction. No mass effect or midline shift. No acute intracranial hemorrhage.   Diffuse parenchymal volume loss with enlargement of the ventricles and cerebral sulci. Periventricular, subcortical, as well as pontine white matter T2/FLAIR hyperintense signal. The sellar/suprasellar regions appear unremarkable. The normal signal voids within the major intracranial vessels appear maintained. No abnormal focus of enhancement is seen within the brain. ORBITS: The visualized portion of the orbits demonstrate no acute abnormality. SINUSES: The visualized paranasal sinuses and mastoid air cells are well aerated. BONES/SOFT TISSUES: The bone marrow signal intensity appears normal. The soft tissues demonstrate no acute abnormality. 1. No acute intracranial abnormality. Specifically, no acute infarction. 2. Mild parenchymal volume loss. Sequela of moderate chronic microvascular ischemic changes.        LAB RESULTS  --------------------    Recent Results (from the past 24 hour(s))   POCT Glucose    Collection Time: 04/26/21  8:57 PM   Result Value Ref Range    POC Glucose 160 (H) 70 - 99 MG/DL   POCT Glucose    Collection Time: 04/27/21  2:27 AM   Result Value Ref Range    POC Glucose 167 (H) 70 - 99 MG/DL   CBC    Collection Time: 04/27/21  3:21 AM   Result Value Ref Range    WBC 12.8 (H) 4.0 - 10.5 K/CU MM    RBC 4.81 4.2 - 5.4 M/CU MM    Hemoglobin 13.5 12.5 - 16.0 GM/DL    Hematocrit 40.1 37 - 47 %    MCV 83.4 78 - 100 FL    MCH 28.1 27 - 31 PG    MCHC 33.7 32.0 - 36.0 %    RDW 11.9 11.7 - 14.9 %    Platelets 847 681 - 507 K/CU MM    MPV 9.0 7.5 - 11.1 FL   Hemoglobin A1c    Collection Time: 04/27/21  3:21 AM   Result Value Ref Range    Hemoglobin A1C 7.6 (H) 4.2 - 6.3 %    eAG 171 mg/dL   Lipid panel - fasting    Collection Time: 04/27/21  3:21 AM   Result Value Ref Range    Triglycerides 116 <150 MG/DL    Cholesterol 99 <200 MG/DL    HDL 42 >40 MG/DL    LDL Direct 45 <100 MG/DL   Comprehensive Metabolic Panel w/ Reflex to MG    Collection Time: 04/27/21  3:21 AM   Result Value Ref Range    Sodium 137 135 - 145 MMOL/L Potassium 3.5 3.5 - 5.1 MMOL/L    Chloride 99 99 - 110 mMol/L    CO2 26 21 - 32 MMOL/L    BUN 7 6 - 23 MG/DL    CREATININE 0.5 (L) 0.6 - 1.1 MG/DL    Glucose 165 (H) 70 - 99 MG/DL    Calcium 9.1 8.3 - 10.6 MG/DL    Albumin 4.3 3.4 - 5.0 GM/DL    Total Protein 6.9 6.4 - 8.2 GM/DL    Total Bilirubin 0.3 0.0 - 1.0 MG/DL    ALT 7 (L) 10 - 40 U/L    AST 21 15 - 37 IU/L    Alkaline Phosphatase 67 40 - 128 IU/L    GFR Non-African American >60 >60 mL/min/1.73m2    GFR African American >60 >60 mL/min/1.73m2    Anion Gap 12 4 - 16   Magnesium    Collection Time: 04/27/21  3:21 AM   Result Value Ref Range    Magnesium 1.8 1.8 - 2.4 mg/dl   POCT Glucose    Collection Time: 04/27/21  6:33 AM   Result Value Ref Range    POC Glucose 157 (H) 70 - 99 MG/DL   POCT Glucose    Collection Time: 04/27/21 11:14 AM   Result Value Ref Range    POC Glucose 284 (H) 70 - 99 MG/DL   POCT Glucose    Collection Time: 04/27/21  2:06 PM   Result Value Ref Range    POC Glucose 190 (H) 70 - 99 MG/DL   POCT Glucose    Collection Time: 04/27/21  4:15 PM   Result Value Ref Range    POC Glucose 175 (H) 70 - 99 MG/DL   POCT Glucose    Collection Time: 04/27/21  5:57 PM   Result Value Ref Range    POC Glucose 183 (H) 70 - 99 MG/DL         Medical problems    Patient Active Problem List:     Chest pain     H/O Doppler ultrasound     H/O cardiovascular stress test     H/O cardiovascular stress test     H/O cardiovascular stress test     Incarcerated umbilical hernia     Essential hypertension     Type 2 diabetes mellitus with diabetic polyneuropathy, with long-term current use of insulin ([facility])     Tachycardia     Dyslipidemia     Family history of early CAD     NSTEMI (non-ST elevated myocardial infarction) ([facility])     Abnormal nuclear stress test     ILSA (obstructive sleep apnea)     Snoring     Hypersomnolence     Mild intermittent asthma without complication     Asthma exacerbation attacks     Hypertensive emergency     Hallucination, visual     Severe episode of recurrent major depressive disorder, with psychotic features ([facility] [address]     Generalized anxiety disorder with panic attacks     Auditory hallucinations     Bipolar I disorder with depression, severe (HCC)     Dyspnea and respiratory abnormalities     Encephalopathy     Syncope     Bipolar 1 disorder (HCC)      ASSESSMENT:  ---------------------    Seizures     Pseudo-seizures     Syncopy r/o orthostatic hypotension/Hypertension-consult cardiology     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     PLAN:     CT brain neg     CTA head neck neg     Mri brain neg     B 12 folate TSH      Continue keppra Trileptal     Consult Psychiatry-cardiology     Pt had a spell yesterday and today which the nurse states were atypical with tremors for a few min without GTC activity and pt moving head from ammonia capsule and without any postictal state with pt following commands right during and after. Psychiatry noted one of the spells and felt are from Hysterical conversion reaction.  Psychiatry started pt on seroquel.   Discussed dx prognosis meds side effects and above with pt and answered all questions        Electronically signed by Chilango Meyers MD on 4/27/2021 at 8:34 PM

## 2021-04-28 NOTE — DISCHARGE SUMMARY
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 Evaluated pt and during episode which appears to be seizure like but with painful stimuli she snaps out of it immediately, this is more in line with a psych issue as noted above than actual seizures. - stable  - could also be pseudoseizures  - CTA head and neck non acute  - CT head neg  - MRI brain non acute  - asa + statin  - neuro checks  - Neurology consulted  - psych consulted, increased seroquel  - consult cardiology   - SLP eval  - pt/ot  - ativan prn for anxiety  - resume home buspar  - trileptal and home keppra     hypoK  - replaced    HTN Urgency  - resolved  - home meds resumed                          Other chronic medical conditions:   Holding all home meds except stated above or contraindicated. · IDDM 2  · History of CAD  · GERD  · Asthma  · CKD  · DDD  · CHF  · Depression  · Hypothyroidism  · Gout  · Fatty liver disease           Physical Exam on Discharge date: 04/28/21  Gen:  awake, alert, no apparent distress  Head/Eyes:  Normocephalic atraumatic, EOMI   NECK:   symmetrical, trachea midline  LUNGS: Normal Effort   CARDIOVASCULAR:  Normal rate  ABDOMEN:  non distended  MUSCULOSKELETAL:  ROM WNL  NEUROLOGIC: Alert and Oriented,  Cranial nerves II-XII are grossly intact.    SKIN:  no bruising or bleeding, normal skin color,  no redness    Procedures:  See above  Ct Head Wo Contrast    Result Date: 4/26/2021  EXAMINATION: CT OF THE HEAD WITHOUT CONTRAST  4/26/2021 4:56 am TECHNIQUE: CT of the head was performed without the administration of intravenous contrast. Dose modulation, iterative reconstruction, and/or weight based adjustment of the mA/kV was utilized to reduce the radiation dose to as low as reasonably achievable. COMPARISON: None. HISTORY: ORDERING SYSTEM PROVIDED HISTORY: aphasia TECHNOLOGIST PROVIDED HISTORY: Has a \"code stroke\" or \"stroke alert\" been called? ->Yes Reason for exam:->aphasia Decision Support Exception->Emergency Medical Condition (MA) FINDINGS: BRAIN/VENTRICLES: There is no acute intracranial hemorrhage, mass effect or midline shift. No abnormal extra-axial fluid collection. The gray-white differentiation is maintained without evidence of an acute infarct. There is no evidence of hydrocephalus. Mild generalized cerebral atrophy is noted with chronic microvascular ischemic changes. ORBITS: The visualized portion of the orbits demonstrate no acute abnormality. SINUSES: The visualized paranasal sinuses and mastoid air cells demonstrate no acute abnormality. SOFT TISSUES/SKULL:  No acute abnormality of the visualized skull or soft tissues. No acute intracranial abnormality. Generalized cerebral atrophy with chronic microvascular ischemic changes are noted. Results were called by Dr. Radha Baires MD to [facility] on 4/26/2021 at 05:02. Xr Chest Portable    Result Date: 4/26/2021  EXAMINATION: ONE XRAY VIEW OF THE CHEST 4/26/2021 5:21 am COMPARISON: Chest radiograph dated December 17, 2020 HISTORY: ORDERING SYSTEM PROVIDED HISTORY: ams TECHNOLOGIST PROVIDED HISTORY: Reason for exam:->ams Reason for Exam: stroke alert protocol Acuity: Acute Type of Exam: Initial FINDINGS: The lungs are without acute focal process. There is no effusion or pneumothorax. The cardiomediastinal silhouette is without acute process. The osseous structures are without acute process. No acute process.      Cta Head Neck W Contrast    Result Date: 4/26/2021  EXAMINATION: CTA OF THE HEAD AND NECK WITH CONTRAST 4/26/2021 4:58 am: TECHNIQUE: CTA of the head and neck was performed with the administration of intravenous contrast. Multiplanar reformatted images are provided venous sinus thrombosis on this non-dedicated study. BRAIN: There is no mass effect or midline shift. There is no vascular malformation. 1. No large vessel occlusion, significant stenosis or cerebral aneurysm identified. 2. No significant arterial stenosis identified within the neck. Mri Brain With And Without Contrast    Result Date: 4/27/2021  EXAMINATION: MRI OF THE BRAIN WITHOUT AND WITH CONTRAST  4/27/2021 2:41 pm TECHNIQUE: Multiplanar multisequence MRI of the head/brain was performed without and with the administration of intravenous contrast. COMPARISON: Head CT 04/26/2021, MRI brain 09/04/2020 HISTORY: ORDERING SYSTEM PROVIDED HISTORY: tia TECHNOLOGIST PROVIDED HISTORY: Reason for exam:->tia Reason for Exam: ams , syncope  tia gfr>60 15 ml prohance FINDINGS: INTRACRANIAL STRUCTURES/VENTRICLES:  No acute infarction. No mass effect or midline shift. No acute intracranial hemorrhage. Diffuse parenchymal volume loss with enlargement of the ventricles and cerebral sulci. Periventricular, subcortical, as well as pontine white matter T2/FLAIR hyperintense signal. The sellar/suprasellar regions appear unremarkable. The normal signal voids within the major intracranial vessels appear maintained. No abnormal focus of enhancement is seen within the brain. ORBITS: The visualized portion of the orbits demonstrate no acute abnormality. SINUSES: The visualized paranasal sinuses and mastoid air cells are well aerated. BONES/SOFT TISSUES: The bone marrow signal intensity appears normal. The soft tissues demonstrate no acute abnormality. 1. No acute intracranial abnormality. Specifically, no acute infarction. 2. Mild parenchymal volume loss. Sequela of moderate chronic microvascular ischemic changes.        Significant Diagnostic Studies at discharge:   CBC:   Lab Results   Component Value Date    WBC 12.8 04/27/2021    RBC 4.81 04/27/2021    HGB 13.5 04/27/2021    HCT 40.1 04/27/2021    MCV 83.4 04/27/2021    MCH 28.1 04/27/2021    MCHC 33.7 04/27/2021    RDW 11.9 04/27/2021     04/27/2021    MPV 9.0 04/27/2021       Patient Instructions:     Medication List      CHANGE how you take these medications    busPIRone 10 MG tablet  Commonly known as: BUSPAR  Take 2 tablets by mouth 3 times daily  What changed:   · medication strength  · how much to take     levETIRAcetam 750 MG tablet  Commonly known as: Keppra  Take 1 tablet by mouth 2 times daily  What changed:   · how much to take  · when to take this     * QUEtiapine 25 MG tablet  Commonly known as: SEROQUEL  What changed: Another medication with the same name was added. Make sure you understand how and when to take each. * QUEtiapine 25 MG tablet  Commonly known as: SEROQUEL  Take 0.5 tablets by mouth daily  Start taking on: April 29, 2021  What changed: You were already taking a medication with the same name, and this prescription was added. Make sure you understand how and when to take each. Trelegy Ellipta 100-62.5-25 MCG/INH Aepb  Generic drug: fluticasone-umeclidin-vilant  Inhale 1 puff into the lungs daily  What changed:   · when to take this  · reasons to take this     Genice Gram FlexTouch 200 UNIT/ML Sopn  Generic drug: Insulin Degludec  Inject 20 Units into the skin every morning  What changed:   · how much to take  · when to take this  · additional instructions         * This list has 2 medication(s) that are the same as other medications prescribed for you. Read the directions carefully, and ask your doctor or other care provider to review them with you. CONTINUE taking these medications    albuterol sulfate  (90 Base) MCG/ACT inhaler  Inhale 2 puffs into the lungs every 6 hours as needed for Wheezing or Shortness of Breath (or cough) Please include spacer with instructions for use. aluminum & magnesium hydroxide-simethicone 400-400-40 MG/5ML Susp  Commonly known as:  Maalox Max  Take 15 mLs by mouth every 6 hours
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
## 2021-04-29 LAB
FOLATE: >20 NG/ML (ref 3.1–17.5)
VITAMIN B-12: 560.3 PG/ML (ref 211–911)

## 2021-04-29 NOTE — PROGRESS NOTES
4/26/2021  EXAMINATION: ONE XRAY VIEW OF THE CHEST 4/26/2021 5:21 am COMPARISON: Chest radiograph dated December 17, 2020 HISTORY: ORDERING SYSTEM PROVIDED HISTORY: ams TECHNOLOGIST PROVIDED HISTORY: Reason for exam:->ams Reason for Exam: stroke alert protocol Acuity: Acute Type of Exam: Initial FINDINGS: The lungs are without acute focal process. There is no effusion or pneumothorax. The cardiomediastinal silhouette is without acute process. The osseous structures are without acute process. No acute process. Cta Head Neck W Contrast    Result Date: 4/26/2021  EXAMINATION: CTA OF THE HEAD AND NECK WITH CONTRAST 4/26/2021 4:58 am: TECHNIQUE: CTA of the head and neck was performed with the administration of intravenous contrast. Multiplanar reformatted images are provided for review. MIP images are provided for review. Stenosis of the internal carotid arteries measured using NASCET criteria. Dose modulation, iterative reconstruction, and/or weight based adjustment of the mA/kV was utilized to reduce the radiation dose to as low as reasonably achievable. COMPARISON: CT brain earlier same day, CT angiogram head and neck 09/04/2020 HISTORY: ORDERING SYSTEM PROVIDED HISTORY: aphasia TECHNOLOGIST PROVIDED HISTORY: Reason for exam:->aphasia Decision Support Exception->Emergency Medical Condition (MA) FINDINGS: CTA NECK: AORTIC ARCH/ARCH VESSELS: No dissection or arterial injury. No significant stenosis of the brachiocephalic or subclavian arteries. CAROTID ARTERIES: No dissection, arterial injury, or hemodynamically significant stenosis by NASCET criteria. Mild atherosclerotic calcifications are present at the origin of the left internal carotid artery. VERTEBRAL ARTERIES: No dissection, arterial injury, or significant stenosis. 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 The lung apices are clear. There is no pathologically enlarged lymphadenopathy. There is no soft tissue mass.  The parotid glands and submandibular glands are unremarkable. BONES: No lytic or blastic osseous lesions are identified. There is moderate neural foraminal narrowing at C5-6 secondary to uncovertebral overgrowth. CTA HEAD: ANTERIOR CIRCULATION: The intracranial segments of the internal carotid arteries are normal.  There is no significant stenosis or aneurysm identified. The anterior and middle cerebral arteries are normal in appearance. No significant stenosis or aneurysm is identified. POSTERIOR CIRCULATION: The distal vertebral arteries are normal in appearance. The basilar artery is normal.  No significant stenosis or aneurysm is identified. The posterior cerebral arteries are normal in appearance. A fetal origin of the left posterior cerebral artery is noted, an anatomic variant. OTHER: No dural venous sinus thrombosis on this non-dedicated study. BRAIN: There is no mass effect or midline shift. There is no vascular malformation. 1. No large vessel occlusion, significant stenosis or cerebral aneurysm identified. 2. No significant arterial stenosis identified within the neck. Mri Brain With And Without Contrast    Result Date: 4/27/2021  EXAMINATION: MRI OF THE BRAIN WITHOUT AND WITH CONTRAST  4/27/2021 2:41 pm TECHNIQUE: Multiplanar multisequence MRI of the head/brain was performed without and with the administration of intravenous contrast. COMPARISON: Head CT 04/26/2021, MRI brain 09/04/2020 HISTORY: ORDERING SYSTEM PROVIDED HISTORY: tia TECHNOLOGIST PROVIDED HISTORY: Reason for exam:->tia Reason for Exam: ams , syncope  tia gfr>60 15 ml prohance FINDINGS: INTRACRANIAL STRUCTURES/VENTRICLES:  No acute infarction. No mass effect or midline shift. No acute intracranial hemorrhage. Diffuse parenchymal volume loss with enlargement of the ventricles and cerebral sulci. Periventricular, subcortical, as well as pontine white matter T2/FLAIR hyperintense signal. The sellar/suprasellar regions appear unremarkable.   The normal signal voids within the major intracranial vessels appear maintained. No abnormal focus of enhancement is seen within the brain. ORBITS: The visualized portion of the orbits demonstrate no acute abnormality. SINUSES: The visualized paranasal sinuses and mastoid air cells are well aerated. BONES/SOFT TISSUES: The bone marrow signal intensity appears normal. The soft tissues demonstrate no acute abnormality. 1. No acute intracranial abnormality. Specifically, no acute infarction. 2. Mild parenchymal volume loss. Sequela of moderate chronic microvascular ischemic changes.        LAB RESULTS  --------------------    Recent Results (from the past 24 hour(s))   Vitamin B12 & Folate    Collection Time: 04/28/21  3:01 AM   Result Value Ref Range    Folate >20.0 (H) 3.1 - 17.5 NG/ML   TSH without Reflex    Collection Time: 04/28/21  3:01 AM   Result Value Ref Range    TSH, High Sensitivity 1.360 0.270 - 4.20 uIu/ml   POCT Glucose    Collection Time: 04/28/21  6:33 AM   Result Value Ref Range    POC Glucose 232 (H) 70 - 99 MG/DL   POCT Glucose    Collection Time: 04/28/21 11:31 AM   Result Value Ref Range    POC Glucose 202 (H) 70 - 99 MG/DL         Medical problems    Patient Active Problem List:     Chest pain     H/O Doppler ultrasound     H/O cardiovascular stress test     H/O cardiovascular stress test     H/O cardiovascular stress test     Incarcerated umbilical hernia     Essential hypertension     Type 2 diabetes mellitus with diabetic polyneuropathy, with long-term current use of insulin (HCC)     Tachycardia     Dyslipidemia     Family history of early CAD     NSTEMI (non-ST elevated myocardial infarction) ([facility] [address]     Abnormal nuclear stress test     ILSA (obstructive sleep apnea)     Snoring     Hypersomnolence     Mild intermittent asthma without complication     Asthma exacerbation attacks     Hypertensive emergency     Hallucination, visual     Severe episode of recurrent major depressive disorder, with psychotic features ([facility] [address]     Generalized anxiety disorder with panic attacks     Auditory hallucinations     Bipolar I disorder with depression, severe (HCC)     Dyspnea and respiratory abnormalities     Encephalopathy     Syncope     Bipolar 1 disorder (HCC)      ASSESSMENT:  ---------------------    Seizures     Pseudo-seizures     Syncopy r/o orthostatic hypotension/Hypertension-consult cardiology     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     PLAN:     CT brain neg     CTA head neck neg     Mri brain neg     nl folate TSH      Continue keppra Trileptal     Consult Psychiatry-cardiology     Pt has not had any more spells and doing better        Electronically signed by Altagracia Charles MD on 4/28/2021 at 9:12 PM

## 2021-05-01 LAB
CULTURE: NORMAL
CULTURE: NORMAL
Lab: NORMAL
Lab: NORMAL
SPECIMEN: NORMAL
SPECIMEN: NORMAL

## 2021-05-03 LAB
ACQUISITION DURATION: NORMAL S
AVERAGE HEART RATE: 88 BPM
EKG DIAGNOSIS: NORMAL
HOOKUP DATE: NORMAL
HOOKUP TIME: NORMAL
MAX HEART RATE TIME/DATE: NORMAL
MAX HEART RATE: 120 BPM
MIN HEART RATE TIME/DATE: NORMAL
MIN HEART RATE: 63 BPM
NUMBER OF QRS COMPLEXES: NORMAL
NUMBER OF SUPRAVENTRICULAR COUPLETS: 1
NUMBER OF SUPRAVENTRICULAR ECTOPICS: 10
NUMBER OF SUPRAVENTRICULAR ISOLATED BEATS: 8
NUMBER OF VENTRICULAR BIGEMINAL CYCLES: 0
NUMBER OF VENTRICULAR COUPLETS: 0
NUMBER OF VENTRICULAR ECTOPICS: 1

## 2021-05-09 NOTE — PROGRESS NOTES
Progress Note( Dr. Aleja Peralta)    Subjective:   Admit Date: 4/26/2021  PCP: Katelynn Christiansen MD    Admitted For :Anxiety and the panic attack versus TIA       Consulted For: Better control of blood glucose    Interval History: Feels somewhat better    Denies any chest pains,   Denies SOB . Denies nausea or vomiting. No new bowel or bladder symptoms. No intake or output data in the 24 hours ending 05/09/21 1816    DATA    CBC:   No results for input(s): WBC, HGB, PLT in the last 72 hours. CMP:  No results for input(s): NA, K, CL, CO2, BUN, CREATININE, CALCIUM, PROT, LABALBU, BILITOT, ALKPHOS, AST, ALT in the last 72 hours. Invalid input(s): GLU  Lipids:   Lab Results   Component Value Date    CHOL 99 04/27/2021    HDL 42 04/27/2021    TRIG 116 04/27/2021     Glucose:  No results for input(s): POCGLU in the last 72 hours. [id number]:  Lab Results   Component Value Date    LABA1C 7.6 04/27/2021     High Sensitivity TSH:   Lab Results   Component Value Date    TSHHS 1.360 04/28/2021     Free T3:   Lab Results   Component Value Date    FT3 2.2 07/15/2016     Free T4:  Lab Results   Component Value Date    T4FREE 1.47 09/02/2020       Ct Head Wo Contrast    Result Date: 4/26/2021  EXAMINATION: CT OF THE HEAD WITHOUT CONTRAST  4/26/2021 4:56 am TECHNIQUE: CT of the head was performed without the administration of intravenous contrast. Dose modulation, iterative reconstruction, and/or weight based adjustment of the mA/kV was utilized to reduce the radiation dose to as low as reasonably achievable. COMPARISON: None. HISTORY: ORDERING SYSTEM PROVIDED HISTORY: aphasia TECHNOLOGIST PROVIDED HISTORY: Has a \"code stroke\" or \"stroke alert\" been called? ->Yes Reason for exam:->aphasia Decision Support Exception->Emergency Medical Condition (MA) FINDINGS: BRAIN/VENTRICLES: There is no acute intracranial hemorrhage, mass effect or midline shift. No abnormal extra-axial fluid collection.   The gray-white differentiation is maintained without evidence of an acute infarct. There is no evidence of hydrocephalus. Mild generalized cerebral atrophy is noted with chronic microvascular ischemic changes. ORBITS: The visualized portion of the orbits demonstrate no acute abnormality. SINUSES: The visualized paranasal sinuses and mastoid air cells demonstrate no acute abnormality. SOFT TISSUES/SKULL:  No acute abnormality of the visualized skull or soft tissues. No acute intracranial abnormality. Generalized cerebral atrophy with chronic microvascular ischemic changes are noted. Results were called by Dr. Wing Roberts MD to [facility] on 4/26/2021 at 05:02. Xr Chest Portable    Result Date: 4/26/2021  EXAMINATION: ONE XRAY VIEW OF THE CHEST 4/26/2021 5:21 am COMPARISON: Chest radiograph dated December 17, 2020 HISTORY: ORDERING SYSTEM PROVIDED HISTORY: ams TECHNOLOGIST PROVIDED HISTORY: Reason for exam:->ams Reason for Exam: stroke alert protocol Acuity: Acute Type of Exam: Initial FINDINGS: The lungs are without acute focal process. There is no effusion or pneumothorax. The cardiomediastinal silhouette is without acute process. The osseous structures are without acute process. No acute process. Cta Head Neck W Contrast    Result Date: 4/26/2021  EXAMINATION: CTA OF THE HEAD AND NECK WITH CONTRAST 4/26/2021 4:58 am: TECHNIQUE: CTA of the head and neck was performed with the administration of intravenous contrast. Multiplanar reformatted images are provided for review. MIP images are provided for review. Stenosis of the internal carotid arteries measured using NASCET criteria. Dose modulation, iterative reconstruction, and/or weight based adjustment of the mA/kV was utilized to reduce the radiation dose to as low as reasonably achievable.  COMPARISON: CT brain earlier same day, CT angiogram head and neck 09/04/2020 HISTORY: ORDERING SYSTEM PROVIDED HISTORY: aphasia TECHNOLOGIST PROVIDED HISTORY: Reason for exam:->aphasia the head/brain was performed without and with the administration of intravenous contrast. COMPARISON: Head CT 04/26/2021, MRI brain 09/04/2020 HISTORY: ORDERING SYSTEM PROVIDED HISTORY: tia TECHNOLOGIST PROVIDED HISTORY: Reason for exam:->tia Reason for Exam: ams , syncope  tia gfr>60 15 ml prohance FINDINGS: INTRACRANIAL STRUCTURES/VENTRICLES:  No acute infarction. No mass effect or midline shift. No acute intracranial hemorrhage. Diffuse parenchymal volume loss with enlargement of the ventricles and cerebral sulci. Periventricular, subcortical, as well as pontine white matter T2/FLAIR hyperintense signal. The sellar/suprasellar regions appear unremarkable. The normal signal voids within the major intracranial vessels appear maintained. No abnormal focus of enhancement is seen within the brain. ORBITS: The visualized portion of the orbits demonstrate no acute abnormality. SINUSES: The visualized paranasal sinuses and mastoid air cells are well aerated. BONES/SOFT TISSUES: The bone marrow signal intensity appears normal. The soft tissues demonstrate no acute abnormality. 1. No acute intracranial abnormality. Specifically, no acute infarction. 2. Mild parenchymal volume loss. Sequela of moderate chronic microvascular ischemic changes. Scheduled Medicines   Medications:      Infusions:         Objective:   Vitals: BP (!) 158/87   Pulse 76   Temp 98.5 °F (36.9 °C) (Oral)   Resp 20   Ht 5' 2\" (1.575 m)   Wt 170 lb (77.1 kg)   SpO2 94%   BMI 31.09 kg/m²   General appearance: alert and cooperative with exam  Neck: no JVD or bruit  Thyroid : Normal lobes   Lungs: Has Vesicular Breath sounds   Heart:  regular rate and rhythm  Abdomen: soft, non-tender; bowel sounds normal; no masses,  no organomegaly  Musculoskeletal: Normal  Extremities: extremities normal, , no edema  Neurologic:  Awake, alert, oriented to name, place and time. Cranial nerves II-XII are grossly intact. Motor is  intact.   Sensory

## 2021-07-10 ENCOUNTER — APPOINTMENT (OUTPATIENT)
Dept: GENERAL RADIOLOGY | Age: 66
DRG: 305 | End: 2021-07-10
Payer: MEDICARE

## 2021-07-10 ENCOUNTER — HOSPITAL ENCOUNTER (INPATIENT)
Age: 66
LOS: 4 days | Discharge: HOME | DRG: 305 | End: 2021-07-15
Attending: INTERNAL MEDICINE | Admitting: INTERNAL MEDICINE
Payer: MEDICARE

## 2021-07-10 DIAGNOSIS — E87.1 HYPONATREMIA: ICD-10-CM

## 2021-07-10 DIAGNOSIS — R07.9 CHEST PAIN, UNSPECIFIED TYPE: Primary | ICD-10-CM

## 2021-07-10 LAB
ALBUMIN SERPL-MCNC: 4.7 GM/DL (ref 3.4–5)
ALP BLD-CCNC: 82 IU/L (ref 40–129)
ALT SERPL-CCNC: 25 U/L (ref 10–40)
ANION GAP SERPL CALCULATED.3IONS-SCNC: 17 MMOL/L (ref 4–16)
AST SERPL-CCNC: 32 IU/L (ref 15–37)
BASOPHILS ABSOLUTE: 0.1 K/CU MM
BASOPHILS RELATIVE PERCENT: 0.6 % (ref 0–1)
BILIRUB SERPL-MCNC: 0.3 MG/DL (ref 0–1)
BUN BLDV-MCNC: 9 MG/DL (ref 6–23)
CALCIUM SERPL-MCNC: 9.6 MG/DL (ref 8.3–10.6)
CHLORIDE BLD-SCNC: 85 MMOL/L (ref 99–110)
CO2: 22 MMOL/L (ref 21–32)
CREAT SERPL-MCNC: 0.4 MG/DL (ref 0.6–1.1)
DIFFERENTIAL TYPE: ABNORMAL
EOSINOPHILS ABSOLUTE: 0.1 K/CU MM
EOSINOPHILS RELATIVE PERCENT: 1.2 % (ref 0–3)
GFR AFRICAN AMERICAN: >60 ML/MIN/1.73M2
GFR NON-AFRICAN AMERICAN: >60 ML/MIN/1.73M2
GLUCOSE BLD-MCNC: 131 MG/DL (ref 70–99)
HCT VFR BLD CALC: 42.2 % (ref 37–47)
HEMOGLOBIN: 15.1 GM/DL (ref 12.5–16)
IMMATURE NEUTROPHIL %: 0.3 % (ref 0–0.43)
LIPASE: 18 IU/L (ref 13–60)
LYMPHOCYTES ABSOLUTE: 2.3 K/CU MM
LYMPHOCYTES RELATIVE PERCENT: 25.3 % (ref 24–44)
MCH RBC QN AUTO: 28.9 PG (ref 27–31)
MCHC RBC AUTO-ENTMCNC: 35.8 % (ref 32–36)
MCV RBC AUTO: 80.7 FL (ref 78–100)
MONOCYTES ABSOLUTE: 1.1 K/CU MM
MONOCYTES RELATIVE PERCENT: 12.2 % (ref 0–4)
NUCLEATED RBC %: 0 %
PDW BLD-RTO: 11.4 % (ref 11.7–14.9)
PLATELET # BLD: 346 K/CU MM (ref 140–440)
PMV BLD AUTO: 9.1 FL (ref 7.5–11.1)
POTASSIUM SERPL-SCNC: 4.5 MMOL/L (ref 3.5–5.1)
PRO-BNP: 35.76 PG/ML
RBC # BLD: 5.23 M/CU MM (ref 4.2–5.4)
REASON FOR REJECTION: NORMAL
REASON FOR REJECTION: NORMAL
REJECTED TEST: NORMAL
REJECTED TEST: NORMAL
SEGMENTED NEUTROPHILS ABSOLUTE COUNT: 5.4 K/CU MM
SEGMENTED NEUTROPHILS RELATIVE PERCENT: 60.4 % (ref 36–66)
SODIUM BLD-SCNC: 124 MMOL/L (ref 135–145)
TOTAL IMMATURE NEUTOROPHIL: 0.03 K/CU MM
TOTAL NUCLEATED RBC: 0 K/CU MM
TOTAL PROTEIN: 7.6 GM/DL (ref 6.4–8.2)
TROPONIN T: <0.01 NG/ML
WBC # BLD: 9 K/CU MM (ref 4–10.5)

## 2021-07-10 PROCEDURE — 99284 EMERGENCY DEPT VISIT MOD MDM: CPT

## 2021-07-10 PROCEDURE — 85025 COMPLETE CBC W/AUTO DIFF WBC: CPT

## 2021-07-10 PROCEDURE — 6370000000 HC RX 637 (ALT 250 FOR IP): Performed by: PHYSICIAN ASSISTANT

## 2021-07-10 PROCEDURE — 83690 ASSAY OF LIPASE: CPT

## 2021-07-10 PROCEDURE — 71045 X-RAY EXAM CHEST 1 VIEW: CPT

## 2021-07-10 PROCEDURE — 80053 COMPREHEN METABOLIC PANEL: CPT

## 2021-07-10 PROCEDURE — 83880 ASSAY OF NATRIURETIC PEPTIDE: CPT

## 2021-07-10 PROCEDURE — 96372 THER/PROPH/DIAG INJ SC/IM: CPT

## 2021-07-10 PROCEDURE — 84484 ASSAY OF TROPONIN QUANT: CPT

## 2021-07-10 PROCEDURE — 6360000002 HC RX W HCPCS: Performed by: PHYSICIAN ASSISTANT

## 2021-07-10 RX ORDER — NITROGLYCERIN 0.4 MG/1
0.4 TABLET SUBLINGUAL EVERY 5 MIN PRN
Status: COMPLETED | OUTPATIENT
Start: 2021-07-10 | End: 2021-07-14

## 2021-07-10 RX ORDER — MORPHINE SULFATE 4 MG/ML
4 INJECTION, SOLUTION INTRAMUSCULAR; INTRAVENOUS ONCE
Status: COMPLETED | OUTPATIENT
Start: 2021-07-10 | End: 2021-07-10

## 2021-07-10 RX ORDER — DIPHENHYDRAMINE HCL 25 MG
50 TABLET ORAL EVERY 6 HOURS PRN
Status: DISCONTINUED | OUTPATIENT
Start: 2021-07-10 | End: 2021-07-15 | Stop reason: HOSPADM

## 2021-07-10 RX ORDER — ASPIRIN 81 MG/1
162 TABLET, CHEWABLE ORAL ONCE
Status: COMPLETED | OUTPATIENT
Start: 2021-07-10 | End: 2021-07-10

## 2021-07-10 RX ADMIN — DIPHENHYDRAMINE HYDROCHLORIDE 50 MG: 25 TABLET ORAL at 22:59

## 2021-07-10 RX ADMIN — NITROGLYCERIN 0.4 MG: 0.4 TABLET, ORALLY DISINTEGRATING SUBLINGUAL at 22:11

## 2021-07-10 RX ADMIN — MORPHINE SULFATE 4 MG: 4 INJECTION, SOLUTION INTRAMUSCULAR; INTRAVENOUS at 22:59

## 2021-07-10 RX ADMIN — NITROGLYCERIN 0.4 MG: 0.4 TABLET, ORALLY DISINTEGRATING SUBLINGUAL at 22:02

## 2021-07-10 RX ADMIN — ASPIRIN 162 MG: 81 TABLET, CHEWABLE ORAL at 21:21

## 2021-07-10 ASSESSMENT — PAIN DESCRIPTION - LOCATION: LOCATION: CHEST

## 2021-07-10 ASSESSMENT — PAIN SCALES - GENERAL: PAINLEVEL_OUTOF10: 8

## 2021-07-10 ASSESSMENT — PAIN DESCRIPTION - DESCRIPTORS: DESCRIPTORS: PRESSURE;SHARP

## 2021-07-11 ENCOUNTER — APPOINTMENT (OUTPATIENT)
Dept: CT IMAGING | Age: 66
DRG: 305 | End: 2021-07-11
Payer: MEDICARE

## 2021-07-11 LAB
ANION GAP SERPL CALCULATED.3IONS-SCNC: 11 MMOL/L (ref 4–16)
ANION GAP SERPL CALCULATED.3IONS-SCNC: 13 MMOL/L (ref 4–16)
ANION GAP SERPL CALCULATED.3IONS-SCNC: 14 MMOL/L (ref 4–16)
BUN BLDV-MCNC: 10 MG/DL (ref 6–23)
BUN BLDV-MCNC: 8 MG/DL (ref 6–23)
BUN BLDV-MCNC: 9 MG/DL (ref 6–23)
CALCIUM SERPL-MCNC: 9.2 MG/DL (ref 8.3–10.6)
CALCIUM SERPL-MCNC: 9.3 MG/DL (ref 8.3–10.6)
CALCIUM SERPL-MCNC: 9.4 MG/DL (ref 8.3–10.6)
CHLORIDE BLD-SCNC: 86 MMOL/L (ref 99–110)
CHLORIDE BLD-SCNC: 86 MMOL/L (ref 99–110)
CHLORIDE BLD-SCNC: 89 MMOL/L (ref 99–110)
CO2: 23 MMOL/L (ref 21–32)
CO2: 25 MMOL/L (ref 21–32)
CO2: 26 MMOL/L (ref 21–32)
CREAT SERPL-MCNC: 0.4 MG/DL (ref 0.6–1.1)
CREAT SERPL-MCNC: 0.5 MG/DL (ref 0.6–1.1)
CREAT SERPL-MCNC: 0.5 MG/DL (ref 0.6–1.1)
ESTIMATED AVERAGE GLUCOSE: 186 MG/DL
GFR AFRICAN AMERICAN: >60 ML/MIN/1.73M2
GFR NON-AFRICAN AMERICAN: >60 ML/MIN/1.73M2
GLUCOSE BLD-MCNC: 134 MG/DL (ref 70–99)
GLUCOSE BLD-MCNC: 134 MG/DL (ref 70–99)
GLUCOSE BLD-MCNC: 137 MG/DL (ref 70–99)
GLUCOSE BLD-MCNC: 142 MG/DL (ref 70–99)
GLUCOSE BLD-MCNC: 151 MG/DL (ref 70–99)
GLUCOSE BLD-MCNC: 164 MG/DL (ref 70–99)
GLUCOSE BLD-MCNC: 165 MG/DL (ref 70–99)
GLUCOSE BLD-MCNC: 185 MG/DL (ref 70–99)
GLUCOSE BLD-MCNC: 207 MG/DL (ref 70–99)
HBA1C MFR BLD: 8.1 % (ref 4.2–6.3)
LACTIC ACID, SEPSIS: 2 MMOL/L (ref 0.5–1.9)
LACTIC ACID, SEPSIS: 2.7 MMOL/L (ref 0.5–1.9)
POTASSIUM SERPL-SCNC: 3.8 MMOL/L (ref 3.5–5.1)
POTASSIUM SERPL-SCNC: 3.8 MMOL/L (ref 3.5–5.1)
POTASSIUM SERPL-SCNC: 4.1 MMOL/L (ref 3.5–5.1)
SODIUM BLD-SCNC: 123 MMOL/L (ref 135–145)
SODIUM BLD-SCNC: 125 MMOL/L (ref 135–145)
SODIUM BLD-SCNC: 125 MMOL/L (ref 135–145)
TOTAL CK: 28 IU/L (ref 26–140)
TOTAL CK: 32 IU/L (ref 26–140)
TROPONIN T: <0.01 NG/ML

## 2021-07-11 PROCEDURE — 6360000002 HC RX W HCPCS

## 2021-07-11 PROCEDURE — 2580000003 HC RX 258: Performed by: INTERNAL MEDICINE

## 2021-07-11 PROCEDURE — 84300 ASSAY OF URINE SODIUM: CPT

## 2021-07-11 PROCEDURE — 76937 US GUIDE VASCULAR ACCESS: CPT

## 2021-07-11 PROCEDURE — 2000000000 HC ICU R&B

## 2021-07-11 PROCEDURE — 6370000000 HC RX 637 (ALT 250 FOR IP): Performed by: INTERNAL MEDICINE

## 2021-07-11 PROCEDURE — 94761 N-INVAS EAR/PLS OXIMETRY MLT: CPT

## 2021-07-11 PROCEDURE — C1751 CATH, INF, PER/CENT/MIDLINE: HCPCS

## 2021-07-11 PROCEDURE — 84484 ASSAY OF TROPONIN QUANT: CPT

## 2021-07-11 PROCEDURE — 36415 COLL VENOUS BLD VENIPUNCTURE: CPT

## 2021-07-11 PROCEDURE — 2500000003 HC RX 250 WO HCPCS: Performed by: PHYSICIAN ASSISTANT

## 2021-07-11 PROCEDURE — 36410 VNPNXR 3YR/> PHY/QHP DX/THER: CPT

## 2021-07-11 PROCEDURE — 83605 ASSAY OF LACTIC ACID: CPT

## 2021-07-11 PROCEDURE — 83930 ASSAY OF BLOOD OSMOLALITY: CPT

## 2021-07-11 PROCEDURE — 05HF33Z INSERTION OF INFUSION DEVICE INTO LEFT CEPHALIC VEIN, PERCUTANEOUS APPROACH: ICD-10-PCS | Performed by: INTERNAL MEDICINE

## 2021-07-11 PROCEDURE — 6360000002 HC RX W HCPCS: Performed by: INTERNAL MEDICINE

## 2021-07-11 PROCEDURE — 80048 BASIC METABOLIC PNL TOTAL CA: CPT

## 2021-07-11 PROCEDURE — 82436 ASSAY OF URINE CHLORIDE: CPT

## 2021-07-11 PROCEDURE — 84133 ASSAY OF URINE POTASSIUM: CPT

## 2021-07-11 PROCEDURE — 83036 HEMOGLOBIN GLYCOSYLATED A1C: CPT

## 2021-07-11 PROCEDURE — 83935 ASSAY OF URINE OSMOLALITY: CPT

## 2021-07-11 PROCEDURE — 70450 CT HEAD/BRAIN W/O DYE: CPT

## 2021-07-11 PROCEDURE — 82962 GLUCOSE BLOOD TEST: CPT

## 2021-07-11 PROCEDURE — 82550 ASSAY OF CK (CPK): CPT

## 2021-07-11 RX ORDER — MORPHINE SULFATE 4 MG/ML
4 INJECTION, SOLUTION INTRAMUSCULAR; INTRAVENOUS EVERY 4 HOURS PRN
Status: DISCONTINUED | OUTPATIENT
Start: 2021-07-11 | End: 2021-07-15 | Stop reason: HOSPADM

## 2021-07-11 RX ORDER — SODIUM CHLORIDE 0.9 % (FLUSH) 0.9 %
5-40 SYRINGE (ML) INJECTION PRN
Status: DISCONTINUED | OUTPATIENT
Start: 2021-07-11 | End: 2021-07-15 | Stop reason: HOSPADM

## 2021-07-11 RX ORDER — HYDROCODONE BITARTRATE AND ACETAMINOPHEN 10; 325 MG/1; MG/1
1 TABLET ORAL EVERY 6 HOURS PRN
Status: DISCONTINUED | OUTPATIENT
Start: 2021-07-11 | End: 2021-07-15 | Stop reason: HOSPADM

## 2021-07-11 RX ORDER — SODIUM CHLORIDE 0.9 % (FLUSH) 0.9 %
5-40 SYRINGE (ML) INJECTION EVERY 12 HOURS SCHEDULED
Status: DISCONTINUED | OUTPATIENT
Start: 2021-07-11 | End: 2021-07-15 | Stop reason: HOSPADM

## 2021-07-11 RX ORDER — LEVETIRACETAM 500 MG/1
750 TABLET ORAL NIGHTLY
Status: DISCONTINUED | OUTPATIENT
Start: 2021-07-11 | End: 2021-07-12

## 2021-07-11 RX ORDER — METOPROLOL SUCCINATE 25 MG/1
50 TABLET, EXTENDED RELEASE ORAL DAILY
Status: DISCONTINUED | OUTPATIENT
Start: 2021-07-11 | End: 2021-07-15 | Stop reason: HOSPADM

## 2021-07-11 RX ORDER — NITROGLYCERIN 20 MG/100ML
5-200 INJECTION INTRAVENOUS CONTINUOUS
Status: DISCONTINUED | OUTPATIENT
Start: 2021-07-11 | End: 2021-07-11

## 2021-07-11 RX ORDER — INSULIN GLARGINE 100 [IU]/ML
20 INJECTION, SOLUTION SUBCUTANEOUS NIGHTLY
Status: DISCONTINUED | OUTPATIENT
Start: 2021-07-11 | End: 2021-07-15 | Stop reason: HOSPADM

## 2021-07-11 RX ORDER — OXCARBAZEPINE 300 MG/1
300 TABLET, FILM COATED ORAL 2 TIMES DAILY
Status: DISCONTINUED | OUTPATIENT
Start: 2021-07-11 | End: 2021-07-15 | Stop reason: HOSPADM

## 2021-07-11 RX ORDER — SODIUM CHLORIDE 9 MG/ML
25 INJECTION, SOLUTION INTRAVENOUS PRN
Status: DISCONTINUED | OUTPATIENT
Start: 2021-07-11 | End: 2021-07-15 | Stop reason: HOSPADM

## 2021-07-11 RX ORDER — QUETIAPINE FUMARATE 25 MG/1
25 TABLET, FILM COATED ORAL NIGHTLY
Status: DISCONTINUED | OUTPATIENT
Start: 2021-07-11 | End: 2021-07-13

## 2021-07-11 RX ORDER — LORAZEPAM 2 MG/ML
INJECTION INTRAMUSCULAR
Status: COMPLETED
Start: 2021-07-11 | End: 2021-07-11

## 2021-07-11 RX ORDER — ONDANSETRON 2 MG/ML
4 INJECTION INTRAMUSCULAR; INTRAVENOUS EVERY 6 HOURS PRN
Status: DISCONTINUED | OUTPATIENT
Start: 2021-07-11 | End: 2021-07-15 | Stop reason: HOSPADM

## 2021-07-11 RX ORDER — ASPIRIN 81 MG/1
81 TABLET, CHEWABLE ORAL DAILY
Status: DISCONTINUED | OUTPATIENT
Start: 2021-07-12 | End: 2021-07-15 | Stop reason: HOSPADM

## 2021-07-11 RX ORDER — ATORVASTATIN CALCIUM 40 MG/1
40 TABLET, FILM COATED ORAL NIGHTLY
Status: DISCONTINUED | OUTPATIENT
Start: 2021-07-11 | End: 2021-07-15 | Stop reason: HOSPADM

## 2021-07-11 RX ORDER — LORAZEPAM 2 MG/ML
2 INJECTION INTRAMUSCULAR ONCE
Status: COMPLETED | OUTPATIENT
Start: 2021-07-11 | End: 2021-07-11

## 2021-07-11 RX ORDER — NITROGLYCERIN 20 MG/100ML
5-200 INJECTION INTRAVENOUS CONTINUOUS
Status: DISCONTINUED | OUTPATIENT
Start: 2021-07-11 | End: 2021-07-12

## 2021-07-11 RX ORDER — ACETAMINOPHEN 325 MG/1
650 TABLET ORAL EVERY 6 HOURS PRN
Status: DISCONTINUED | OUTPATIENT
Start: 2021-07-11 | End: 2021-07-15 | Stop reason: HOSPADM

## 2021-07-11 RX ORDER — LIDOCAINE HYDROCHLORIDE 10 MG/ML
5 INJECTION, SOLUTION EPIDURAL; INFILTRATION; INTRACAUDAL; PERINEURAL ONCE
Status: DISCONTINUED | OUTPATIENT
Start: 2021-07-11 | End: 2021-07-15 | Stop reason: HOSPADM

## 2021-07-11 RX ORDER — SODIUM CHLORIDE 9 MG/ML
INJECTION, SOLUTION INTRAVENOUS CONTINUOUS
Status: DISCONTINUED | OUTPATIENT
Start: 2021-07-11 | End: 2021-07-14

## 2021-07-11 RX ORDER — POLYETHYLENE GLYCOL 3350 17 G/17G
17 POWDER, FOR SOLUTION ORAL DAILY PRN
Status: DISCONTINUED | OUTPATIENT
Start: 2021-07-11 | End: 2021-07-15 | Stop reason: HOSPADM

## 2021-07-11 RX ORDER — PANTOPRAZOLE SODIUM 40 MG/1
40 TABLET, DELAYED RELEASE ORAL DAILY
Status: DISCONTINUED | OUTPATIENT
Start: 2021-07-11 | End: 2021-07-15 | Stop reason: HOSPADM

## 2021-07-11 RX ORDER — MONTELUKAST SODIUM 10 MG/1
10 TABLET ORAL NIGHTLY
Status: DISCONTINUED | OUTPATIENT
Start: 2021-07-11 | End: 2021-07-15 | Stop reason: HOSPADM

## 2021-07-11 RX ORDER — MORPHINE SULFATE 4 MG/ML
4 INJECTION, SOLUTION INTRAMUSCULAR; INTRAVENOUS EVERY 4 HOURS PRN
Status: DISCONTINUED | OUTPATIENT
Start: 2021-07-11 | End: 2021-07-11

## 2021-07-11 RX ORDER — ACETAMINOPHEN 650 MG/1
650 SUPPOSITORY RECTAL EVERY 6 HOURS PRN
Status: DISCONTINUED | OUTPATIENT
Start: 2021-07-11 | End: 2021-07-15 | Stop reason: HOSPADM

## 2021-07-11 RX ORDER — BUSPIRONE HYDROCHLORIDE 5 MG/1
20 TABLET ORAL 3 TIMES DAILY
Status: DISCONTINUED | OUTPATIENT
Start: 2021-07-11 | End: 2021-07-15 | Stop reason: HOSPADM

## 2021-07-11 RX ORDER — ONDANSETRON 4 MG/1
4 TABLET, ORALLY DISINTEGRATING ORAL EVERY 8 HOURS PRN
Status: DISCONTINUED | OUTPATIENT
Start: 2021-07-11 | End: 2021-07-15 | Stop reason: HOSPADM

## 2021-07-11 RX ORDER — NIFEDIPINE 30 MG/1
60 TABLET, EXTENDED RELEASE ORAL DAILY
Status: DISCONTINUED | OUTPATIENT
Start: 2021-07-11 | End: 2021-07-15 | Stop reason: HOSPADM

## 2021-07-11 RX ORDER — QUETIAPINE FUMARATE 25 MG/1
12.5 TABLET, FILM COATED ORAL DAILY
Status: DISCONTINUED | OUTPATIENT
Start: 2021-07-11 | End: 2021-07-13

## 2021-07-11 RX ORDER — LOSARTAN POTASSIUM AND HYDROCHLOROTHIAZIDE 12.5; 5 MG/1; MG/1
2 TABLET ORAL DAILY
Status: DISCONTINUED | OUTPATIENT
Start: 2021-07-11 | End: 2021-07-11

## 2021-07-11 RX ORDER — AMITRIPTYLINE HYDROCHLORIDE 25 MG/1
25 TABLET, FILM COATED ORAL NIGHTLY
Status: DISCONTINUED | OUTPATIENT
Start: 2021-07-11 | End: 2021-07-15 | Stop reason: HOSPADM

## 2021-07-11 RX ORDER — LEVOTHYROXINE SODIUM 0.03 MG/1
75 TABLET ORAL
Status: DISCONTINUED | OUTPATIENT
Start: 2021-07-11 | End: 2021-07-15 | Stop reason: HOSPADM

## 2021-07-11 RX ORDER — HYDROXYZINE PAMOATE 25 MG/1
25 CAPSULE ORAL EVERY 6 HOURS PRN
Status: DISCONTINUED | OUTPATIENT
Start: 2021-07-11 | End: 2021-07-15 | Stop reason: HOSPADM

## 2021-07-11 RX ADMIN — LORAZEPAM 2 MG: 2 INJECTION INTRAMUSCULAR; INTRAVENOUS at 11:16

## 2021-07-11 RX ADMIN — QUETIAPINE FUMARATE 25 MG: 25 TABLET ORAL at 01:15

## 2021-07-11 RX ADMIN — NIFEDIPINE 60 MG: 30 TABLET, FILM COATED, EXTENDED RELEASE ORAL at 10:05

## 2021-07-11 RX ADMIN — LORAZEPAM 2 MG: 2 INJECTION INTRAMUSCULAR; INTRAVENOUS at 11:02

## 2021-07-11 RX ADMIN — OXCARBAZEPINE 300 MG: 300 TABLET, FILM COATED ORAL at 10:04

## 2021-07-11 RX ADMIN — SODIUM CHLORIDE: 9 INJECTION, SOLUTION INTRAVENOUS at 16:57

## 2021-07-11 RX ADMIN — LEVETIRACETAM 1500 MG: 100 INJECTION, SOLUTION INTRAVENOUS at 11:29

## 2021-07-11 RX ADMIN — LEVETIRACETAM 1500 MG: 100 INJECTION, SOLUTION INTRAVENOUS at 21:15

## 2021-07-11 RX ADMIN — LOSARTAN POTASSIUM AND HYDROCHLOROTHIAZIDE 2 TABLET: 12.5; 5 TABLET ORAL at 10:03

## 2021-07-11 RX ADMIN — ATORVASTATIN CALCIUM 40 MG: 40 TABLET, FILM COATED ORAL at 21:15

## 2021-07-11 RX ADMIN — NITROGLYCERIN 5 MCG/MIN: 20 INJECTION INTRAVENOUS at 03:04

## 2021-07-11 RX ADMIN — LEVOTHYROXINE SODIUM 75 MCG: 25 TABLET ORAL at 10:03

## 2021-07-11 RX ADMIN — INSULIN GLARGINE 20 UNITS: 100 INJECTION, SOLUTION SUBCUTANEOUS at 02:05

## 2021-07-11 RX ADMIN — ENOXAPARIN SODIUM 40 MG: 40 INJECTION SUBCUTANEOUS at 10:06

## 2021-07-11 RX ADMIN — ONDANSETRON 4 MG: 2 INJECTION INTRAMUSCULAR; INTRAVENOUS at 03:25

## 2021-07-11 RX ADMIN — BUSPIRONE HYDROCHLORIDE 20 MG: 5 TABLET ORAL at 21:15

## 2021-07-11 RX ADMIN — MONTELUKAST 10 MG: 10 TABLET, FILM COATED ORAL at 01:52

## 2021-07-11 RX ADMIN — LEVETIRACETAM 750 MG: 500 TABLET, FILM COATED ORAL at 21:15

## 2021-07-11 RX ADMIN — HYDROCODONE BITARTRATE AND ACETAMINOPHEN 1 TABLET: 10; 325 TABLET ORAL at 10:04

## 2021-07-11 RX ADMIN — METOPROLOL SUCCINATE 50 MG: 25 TABLET, EXTENDED RELEASE ORAL at 10:04

## 2021-07-11 RX ADMIN — MONTELUKAST 10 MG: 10 TABLET, FILM COATED ORAL at 21:15

## 2021-07-11 RX ADMIN — QUETIAPINE FUMARATE 25 MG: 25 TABLET ORAL at 21:15

## 2021-07-11 RX ADMIN — ATORVASTATIN CALCIUM 40 MG: 40 TABLET, FILM COATED ORAL at 01:15

## 2021-07-11 RX ADMIN — INSULIN GLARGINE 10 UNITS: 100 INJECTION, SOLUTION SUBCUTANEOUS at 21:04

## 2021-07-11 RX ADMIN — QUETIAPINE FUMARATE 12.5 MG: 25 TABLET ORAL at 10:05

## 2021-07-11 RX ADMIN — BUSPIRONE HYDROCHLORIDE 20 MG: 5 TABLET ORAL at 10:03

## 2021-07-11 RX ADMIN — OXCARBAZEPINE 300 MG: 300 TABLET, FILM COATED ORAL at 01:52

## 2021-07-11 RX ADMIN — CARBIDOPA AND LEVODOPA 1 TABLET: 10; 100 TABLET ORAL at 22:15

## 2021-07-11 RX ADMIN — HYDROCODONE BITARTRATE AND ACETAMINOPHEN 1 TABLET: 10; 325 TABLET ORAL at 01:15

## 2021-07-11 RX ADMIN — CARBIDOPA AND LEVODOPA 1 TABLET: 10; 100 TABLET ORAL at 01:51

## 2021-07-11 RX ADMIN — LORAZEPAM 2 MG: 2 INJECTION INTRAMUSCULAR; INTRAVENOUS at 11:08

## 2021-07-11 RX ADMIN — OXCARBAZEPINE 300 MG: 300 TABLET, FILM COATED ORAL at 21:15

## 2021-07-11 RX ADMIN — LEVETIRACETAM 750 MG: 500 TABLET, FILM COATED ORAL at 01:15

## 2021-07-11 RX ADMIN — AMITRIPTYLINE HYDROCHLORIDE 25 MG: 25 TABLET, FILM COATED ORAL at 21:15

## 2021-07-11 RX ADMIN — AMITRIPTYLINE HYDROCHLORIDE 25 MG: 25 TABLET, FILM COATED ORAL at 01:52

## 2021-07-11 RX ADMIN — LORAZEPAM 2 MG: 2 INJECTION INTRAMUSCULAR at 11:02

## 2021-07-11 ASSESSMENT — PAIN DESCRIPTION - ORIENTATION: ORIENTATION: MID;LOWER

## 2021-07-11 ASSESSMENT — PAIN DESCRIPTION - FREQUENCY: FREQUENCY: INTERMITTENT

## 2021-07-11 ASSESSMENT — PAIN SCALES - GENERAL
PAINLEVEL_OUTOF10: 0
PAINLEVEL_OUTOF10: 10

## 2021-07-11 ASSESSMENT — PAIN DESCRIPTION - DESCRIPTORS: DESCRIPTORS: PRESSURE;OTHER (COMMENT)

## 2021-07-11 ASSESSMENT — PAIN DESCRIPTION - PROGRESSION: CLINICAL_PROGRESSION: NOT CHANGED

## 2021-07-11 ASSESSMENT — PAIN DESCRIPTION - PAIN TYPE: TYPE: ACUTE PAIN

## 2021-07-11 ASSESSMENT — PAIN DESCRIPTION - ONSET: ONSET: ON-GOING

## 2021-07-11 ASSESSMENT — PAIN DESCRIPTION - LOCATION: LOCATION: CHEST;ABDOMEN

## 2021-07-11 NOTE — ED PROVIDER NOTES
12 lead EKG per my interpretation:  Normal Sinus Rhythm at 77  [place] is   Left axis deviation  QTc is  within an acceptable range  There is no specific T wave changes appreciated. There is no specific ST wave changes appreciated. LVH  No STEMI    Prior EKG to compare with was available and no clinically significant change over morphology.     MD Lino Villar MD  07/10/21 [id number]
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
## 2021-07-11 NOTE — PROGRESS NOTES
Per patient she is having a anxiety attack and she is physically shaking, This nurse did give her all her morning medications about 45 minutes ago and norco for headache. Dr. Kristel Marrufo paged.

## 2021-07-11 NOTE — PROGRESS NOTES
Labs reviewed, lactic acidosis consistent with seizure induced hypoperfusion state. BMP shows worsening sodium levels  Medication reviewed, discontinue lisinopril/HCTZ  Gentle IV fluids at 100 cc/h and recheck sodium and CK levels at 9 PM  If sodium levels worsening, will consult nephrology in a.m.

## 2021-07-11 NOTE — PROGRESS NOTES
Patient arrived to unit from 2027 after rapid response, bedside report received from [facility] and handoff to [employer]. No active seizures at this time. Patient responds to voice and following commands. Patient is alert and oriented on cardiac monitor and RA. Nitroglycerine infusion continues    Skin assessment performed with Dawit diggs RN; no abnormalities noted. Patient unable to be oriented to room due to mental status (lethargic after ativan administration and seizure). Educated by this RN to not get up without assistance to prevent falls, and all needs assessed. Patient states understanding at this time.
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
## 2021-07-11 NOTE — PROGRESS NOTES
US guided peripheral IV insertion attempted x 2 with no success. Awaiting PICC team for midline insertion.
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
## 2021-07-11 NOTE — PROGRESS NOTES
Hospitalist Progress Note      Name:  Javier Rome /Age/Sex: 1955  (63 y.o. female)   MRN & CSN:  [id number] & [id number] Admission Date/Time: 7/10/2021  7:29 PM   Location:  -A PCP: Luz Frey MD         Hospital Day: 2    Assessment and Plan:   Javier Rome is a 63 y.o.  female with a past medical history of multiple past medical history including hypertension, GERD, dyslipidemia, type 2 diabetes, Parkinson's, SAH, seizures, hypothyroidism, anxiety with depression, who presented to the ED on 7/10/2021 on account of chest pain and shortness of breath. 1.  Chest pain: Appears typical.  Improved with sublingual nitro. Currently on nitroglycerin gtt. Continue for now  Chart review shows cardiac cath with clean coronaries 2017. May pursue stress test in a.m. Await cardiology evaluation. 2.  Hyponatremia: Presumed acute. Undetermined etiology but presumed SIADH versus drug-induced. Initiate fluid restriction  Urine electrolytes. Serum osmolality  Repeat BMP    3. Parkinson's: Continue Sinemet    4. Hyperglycemia due to type 2 diabetes: Continue basal bolus insulin  Lantus 20 units nightly, moderate dose insulin correctional scale    5. Seizures: Due to history of SAH. Per report, patient is compliant with Keppra 750 mg twice daily, Trileptal 300 mg twice daily. Continue. Addendum:  Rapid response called about 11 AM  Staff initially called out about 10:52 AM for extreme anxiety during the rapid response was called and on my arrival, patient was having generalized including convulsions. Patient seized for about 12 minutes needing 6 mg IV Ativan in total to abort seizures. Patient is now postictal  IV Keppra 1500 mg x 1. Placed on 15 L nonrebreather. Diagnosis: Status epilepticus  Transfer to ICU  Every hour neuro checks. Obtain stat labs-lactic acid, CK, BMP, urine test  Neuro consult  CT head stat   EEG.   Discussed with nursing staff, no capability for stat EEG.  Critical care time: 31minutes. 6.  Anxiety depression: Continue Seroquel twice daily, BuSpar 3 times daily    5. Dyslipidemia: Continue Lipitor 40 mg nightly    Greater than 50% of encounter time spent in counseling/coordination of care      Diet ADULT DIET; Regular; 3 carb choices (45 gm/meal)   DVT Prophylaxis [x] Lovenox, []  Heparin, [] SCDs, [] Ambulation   GI Prophylaxis [x] PPI,  [] H2 Blocker,  [] Carafate,  [] Diet/Tube Feeds   Code Status Full Code   Disposition Patient requires continued admission due to chest pain, seizures   MDM [] Low, [] Moderate,[x]  High  Patient's risk as above due to status     History of Present Illness:     Chief Complaint:     Araseli Sanchez is a 63 y.o.  female with a past medical history of the above who presents to the ED on account of chest pain. Patient is seen and examined several times today. At first when I saw this patient this morning, she reported that her chest pain is now negligible since she started a heparin gtt. She was walking back and forth in the room without reproducing her chest pain which brought her to the hospital initially. I discussed doing a stress test in the a.m. and she was agreeable     Patient seen again at around 11 AM due to rapid response. Patient was unresponsive to sternal rub and noxious stimuli and was having generalized tonic-clonic convulsions with opisthotonus. Ten point ROS reviewed negative, unless as noted above    Objective:   No intake or output data in the 24 hours ending 07/11/21 1121   Vitals:   Vitals:    07/11/21 1001   BP: (!) 181/96   Pulse: 85   Resp: 13   Temp: 98.1 °F (36.7 °C)   SpO2:      Physical Exam:   GEN: Awake female, alert and oriented x3 in no apparent distress. Appears given age. HEENT: Normal   NECK: Supple, no apparent thyromegaly or masses. No FAZAL  RESP: Clear lung fields bilaterally. Symmetric chest movement while on room air.   CVS: RRR, S1, S2  GI/: Abdomen is soft, nontender, no organomegaly. . Bowel sounds normal, rectal exam deferred. No CVA tenderness. MSK: No gross joint deformities. No tenderness  SKIN: Normal coloration, warm, dry. NEURO: Cranial nerves appear grossly intact, normal speech, no lateralizing weakness. PSYCH:  Affect appropriate.     Medications:   Medications:    amitriptyline  25 mg Oral Nightly    busPIRone  20 mg Oral TID    carbidopa-levodopa  1 tablet Oral Nightly    levETIRAcetam  750 mg Oral Nightly    levothyroxine  75 mcg Oral QAM AC    losartan-hydroCHLOROthiazide  2 tablet Oral Daily    metoprolol succinate  50 mg Oral Daily    montelukast  10 mg Oral Nightly    NIFEdipine  60 mg Oral Daily    OXcarbazepine  300 mg Oral BID    pantoprazole  40 mg Oral Daily    QUEtiapine  25 mg Oral Nightly    QUEtiapine  12.5 mg Oral Daily    sodium chloride flush  5-40 mL Intravenous 2 times per day    [START ON 7/12/2021] aspirin  81 mg Oral Daily    atorvastatin  40 mg Oral Nightly    enoxaparin  40 mg Subcutaneous Daily    insulin glargine  20 Units Subcutaneous Nightly    insulin lispro  0-6 Units Subcutaneous TID WC    insulin lispro  0-3 Units Subcutaneous 2 times per day    levetiracetam  1,500 mg Intravenous BID      Infusions:    sodium chloride      nitroGLYCERIN 5 mcg/min (07/11/21 0304)     PRN Meds: fluticasone-umeclidin-vilant, 1 puff, Daily PRN  HYDROcodone-acetaminophen, 1 tablet, Q6H PRN  hydrOXYzine, 25 mg, Q6H PRN  sodium chloride flush, 5-40 mL, PRN  sodium chloride, 25 mL, PRN  ondansetron, 4 mg, Q8H PRN   Or  ondansetron, 4 mg, Q6H PRN  acetaminophen, 650 mg, Q6H PRN   Or  acetaminophen, 650 mg, Q6H PRN  polyethylene glycol, 17 g, Daily PRN  morphine, 4 mg, Q4H PRN  nitroGLYCERIN, 0.4 mg, Q5 Min PRN  diphenhydrAMINE, 50 mg, Q6H PRN          Electronically signed by Odilia Wakefield MD on 7/11/2021 at 11:21 AM

## 2021-07-11 NOTE — PLAN OF CARE
Problem: Pain:  Description: Pain management should include both nonpharmacologic and pharmacologic interventions.   Goal: Pain level will decrease  Description: Pain level will decrease  7/11/2021 1706 by Maribel Mason RN  Outcome: Ongoing  7/11/2021 0616 by Tiana Ray RN  Outcome: Ongoing  Goal: Control of acute pain  Description: Control of acute pain  7/11/2021 1706 by Maribel Mason RN  Outcome: Ongoing  7/11/2021 0616 by Tiana Ray RN  Outcome: Ongoing  Goal: Control of chronic pain  Description: Control of chronic pain  7/11/2021 1706 by Maribel Mason RN  Outcome: Ongoing  7/11/2021 0616 by Tiana Ray RN  Outcome: Ongoing

## 2021-07-12 ENCOUNTER — APPOINTMENT (OUTPATIENT)
Dept: NUCLEAR MEDICINE | Age: 66
DRG: 305 | End: 2021-07-12
Payer: MEDICARE

## 2021-07-12 LAB
ANION GAP SERPL CALCULATED.3IONS-SCNC: 11 MMOL/L (ref 4–16)
BUN BLDV-MCNC: 8 MG/DL (ref 6–23)
CALCIUM SERPL-MCNC: 9.5 MG/DL (ref 8.3–10.6)
CHLORIDE BLD-SCNC: 91 MMOL/L (ref 99–110)
CHLORIDE URINE RANDOM: 90 MMOL/L (ref 43–210)
CO2: 26 MMOL/L (ref 21–32)
CREAT SERPL-MCNC: 0.5 MG/DL (ref 0.6–1.1)
GFR AFRICAN AMERICAN: >60 ML/MIN/1.73M2
GFR NON-AFRICAN AMERICAN: >60 ML/MIN/1.73M2
GLUCOSE BLD-MCNC: 146 MG/DL (ref 70–99)
GLUCOSE BLD-MCNC: 148 MG/DL (ref 70–99)
GLUCOSE BLD-MCNC: 154 MG/DL (ref 70–99)
GLUCOSE BLD-MCNC: 163 MG/DL (ref 70–99)
GLUCOSE BLD-MCNC: 214 MG/DL (ref 70–99)
HCT VFR BLD CALC: 35.8 % (ref 37–47)
HEMOGLOBIN: 12.3 GM/DL (ref 12.5–16)
LACTATE: 2 MMOL/L (ref 0.4–2)
LACTATE: 2.4 MMOL/L (ref 0.4–2)
MCH RBC QN AUTO: 28.2 PG (ref 27–31)
MCHC RBC AUTO-ENTMCNC: 34.4 % (ref 32–36)
MCV RBC AUTO: 82.1 FL (ref 78–100)
PDW BLD-RTO: 11.2 % (ref 11.7–14.9)
PLATELET # BLD: 249 K/CU MM (ref 140–440)
PMV BLD AUTO: 8.4 FL (ref 7.5–11.1)
POTASSIUM SERPL-SCNC: 3.7 MMOL/L (ref 3.5–5.1)
POTASSIUM, UR: 48 MMOL/L (ref 22–119)
RBC # BLD: 4.36 M/CU MM (ref 4.2–5.4)
SODIUM BLD-SCNC: 128 MMOL/L (ref 135–145)
SODIUM URINE: 98 MMOL/L (ref 35–167)
WBC # BLD: 9.8 K/CU MM (ref 4–10.5)

## 2021-07-12 PROCEDURE — 6370000000 HC RX 637 (ALT 250 FOR IP): Performed by: INTERNAL MEDICINE

## 2021-07-12 PROCEDURE — 83605 ASSAY OF LACTIC ACID: CPT

## 2021-07-12 PROCEDURE — 6360000002 HC RX W HCPCS: Performed by: INTERNAL MEDICINE

## 2021-07-12 PROCEDURE — 82962 GLUCOSE BLOOD TEST: CPT

## 2021-07-12 PROCEDURE — 2700000000 HC OXYGEN THERAPY PER DAY

## 2021-07-12 PROCEDURE — 94761 N-INVAS EAR/PLS OXIMETRY MLT: CPT

## 2021-07-12 PROCEDURE — 85027 COMPLETE CBC AUTOMATED: CPT

## 2021-07-12 PROCEDURE — 93005 ELECTROCARDIOGRAM TRACING: CPT | Performed by: INTERNAL MEDICINE

## 2021-07-12 PROCEDURE — 6360000002 HC RX W HCPCS

## 2021-07-12 PROCEDURE — 99222 1ST HOSP IP/OBS MODERATE 55: CPT | Performed by: INTERNAL MEDICINE

## 2021-07-12 PROCEDURE — 2580000003 HC RX 258: Performed by: INTERNAL MEDICINE

## 2021-07-12 PROCEDURE — 2000000000 HC ICU R&B

## 2021-07-12 PROCEDURE — 99223 1ST HOSP IP/OBS HIGH 75: CPT | Performed by: PSYCHIATRY & NEUROLOGY

## 2021-07-12 PROCEDURE — 6370000000 HC RX 637 (ALT 250 FOR IP): Performed by: NURSE PRACTITIONER

## 2021-07-12 PROCEDURE — 36415 COLL VENOUS BLD VENIPUNCTURE: CPT

## 2021-07-12 PROCEDURE — 80048 BASIC METABOLIC PNL TOTAL CA: CPT

## 2021-07-12 RX ORDER — LORAZEPAM 2 MG/ML
2 INJECTION INTRAMUSCULAR ONCE
Status: COMPLETED | OUTPATIENT
Start: 2021-07-12 | End: 2021-07-12

## 2021-07-12 RX ORDER — HYDRALAZINE HYDROCHLORIDE 20 MG/ML
20 INJECTION INTRAMUSCULAR; INTRAVENOUS EVERY 4 HOURS PRN
Status: DISCONTINUED | OUTPATIENT
Start: 2021-07-12 | End: 2021-07-15 | Stop reason: HOSPADM

## 2021-07-12 RX ORDER — HYDRALAZINE HYDROCHLORIDE 20 MG/ML
10 INJECTION INTRAMUSCULAR; INTRAVENOUS EVERY 4 HOURS PRN
Status: DISCONTINUED | OUTPATIENT
Start: 2021-07-12 | End: 2021-07-12

## 2021-07-12 RX ORDER — SODIUM CHLORIDE 1000 MG
1 TABLET, SOLUBLE MISCELLANEOUS
Status: DISPENSED | OUTPATIENT
Start: 2021-07-12 | End: 2021-07-13

## 2021-07-12 RX ORDER — LORAZEPAM 2 MG/ML
INJECTION INTRAMUSCULAR
Status: COMPLETED
Start: 2021-07-12 | End: 2021-07-12

## 2021-07-12 RX ORDER — LOSARTAN POTASSIUM 100 MG/1
100 TABLET ORAL DAILY
Status: DISCONTINUED | OUTPATIENT
Start: 2021-07-12 | End: 2021-07-15 | Stop reason: HOSPADM

## 2021-07-12 RX ADMIN — BUSPIRONE HYDROCHLORIDE 20 MG: 5 TABLET ORAL at 20:32

## 2021-07-12 RX ADMIN — Medication 1 G: at 13:07

## 2021-07-12 RX ADMIN — HYDROXYZINE PAMOATE 25 MG: 25 CAPSULE ORAL at 07:40

## 2021-07-12 RX ADMIN — CARBIDOPA AND LEVODOPA 1 TABLET: 10; 100 TABLET ORAL at 20:32

## 2021-07-12 RX ADMIN — OXCARBAZEPINE 300 MG: 300 TABLET, FILM COATED ORAL at 20:33

## 2021-07-12 RX ADMIN — Medication 1 G: at 17:29

## 2021-07-12 RX ADMIN — LEVETIRACETAM 750 MG: 500 TABLET, FILM COATED ORAL at 20:33

## 2021-07-12 RX ADMIN — LORAZEPAM 2 MG: 2 INJECTION INTRAMUSCULAR at 07:54

## 2021-07-12 RX ADMIN — HYDROCODONE BITARTRATE AND ACETAMINOPHEN 1 TABLET: 10; 325 TABLET ORAL at 22:39

## 2021-07-12 RX ADMIN — LOSARTAN POTASSIUM 100 MG: 100 TABLET, FILM COATED ORAL at 10:33

## 2021-07-12 RX ADMIN — METOPROLOL SUCCINATE 50 MG: 25 TABLET, EXTENDED RELEASE ORAL at 10:31

## 2021-07-12 RX ADMIN — MONTELUKAST 10 MG: 10 TABLET, FILM COATED ORAL at 20:33

## 2021-07-12 RX ADMIN — HYDROCODONE BITARTRATE AND ACETAMINOPHEN 1 TABLET: 10; 325 TABLET ORAL at 10:32

## 2021-07-12 RX ADMIN — INSULIN LISPRO 1 UNITS: 100 INJECTION, SOLUTION INTRAVENOUS; SUBCUTANEOUS at 16:54

## 2021-07-12 RX ADMIN — HYDRALAZINE HYDROCHLORIDE 10 MG: 20 INJECTION INTRAMUSCULAR; INTRAVENOUS at 08:41

## 2021-07-12 RX ADMIN — LEVETIRACETAM 1500 MG: 100 INJECTION, SOLUTION INTRAVENOUS at 09:45

## 2021-07-12 RX ADMIN — ENOXAPARIN SODIUM 40 MG: 40 INJECTION SUBCUTANEOUS at 10:33

## 2021-07-12 RX ADMIN — BUSPIRONE HYDROCHLORIDE 20 MG: 5 TABLET ORAL at 15:11

## 2021-07-12 RX ADMIN — SODIUM CHLORIDE, PRESERVATIVE FREE 10 ML: 5 INJECTION INTRAVENOUS at 08:42

## 2021-07-12 RX ADMIN — OXCARBAZEPINE 300 MG: 300 TABLET, FILM COATED ORAL at 10:31

## 2021-07-12 RX ADMIN — BUSPIRONE HYDROCHLORIDE 20 MG: 5 TABLET ORAL at 10:31

## 2021-07-12 RX ADMIN — QUETIAPINE FUMARATE 12.5 MG: 25 TABLET ORAL at 10:32

## 2021-07-12 RX ADMIN — QUETIAPINE FUMARATE 25 MG: 25 TABLET ORAL at 20:33

## 2021-07-12 RX ADMIN — LORAZEPAM 2 MG: 2 INJECTION INTRAMUSCULAR; INTRAVENOUS at 07:54

## 2021-07-12 RX ADMIN — NIFEDIPINE 60 MG: 30 TABLET, FILM COATED, EXTENDED RELEASE ORAL at 10:31

## 2021-07-12 RX ADMIN — ATORVASTATIN CALCIUM 40 MG: 40 TABLET, FILM COATED ORAL at 20:33

## 2021-07-12 RX ADMIN — LEVOTHYROXINE SODIUM 75 MCG: 25 TABLET ORAL at 10:32

## 2021-07-12 RX ADMIN — LORAZEPAM 2 MG: 2 INJECTION INTRAMUSCULAR at 07:58

## 2021-07-12 RX ADMIN — INSULIN GLARGINE 20 UNITS: 100 INJECTION, SOLUTION SUBCUTANEOUS at 20:32

## 2021-07-12 RX ADMIN — AMITRIPTYLINE HYDROCHLORIDE 25 MG: 25 TABLET, FILM COATED ORAL at 20:33

## 2021-07-12 RX ADMIN — ASPIRIN 81 MG: 81 TABLET, CHEWABLE ORAL at 10:33

## 2021-07-12 RX ADMIN — SODIUM CHLORIDE: 9 INJECTION, SOLUTION INTRAVENOUS at 17:29

## 2021-07-12 RX ADMIN — LORAZEPAM 2 MG: 2 INJECTION INTRAMUSCULAR; INTRAVENOUS at 07:58

## 2021-07-12 ASSESSMENT — PAIN DESCRIPTION - PAIN TYPE: TYPE: CHRONIC PAIN

## 2021-07-12 ASSESSMENT — PAIN SCALES - GENERAL
PAINLEVEL_OUTOF10: 10
PAINLEVEL_OUTOF10: 0
PAINLEVEL_OUTOF10: 8
PAINLEVEL_OUTOF10: 8

## 2021-07-12 ASSESSMENT — PAIN DESCRIPTION - LOCATION: LOCATION: GENERALIZED

## 2021-07-12 ASSESSMENT — PAIN DESCRIPTION - DESCRIPTORS: DESCRIPTORS: DISCOMFORT

## 2021-07-12 NOTE — PROGRESS NOTES
No acute intracranial abnormality. 2. Cerebral and cerebellar parenchymal volume loss with chronic microvascular white matter ischemic disease. XR CHEST PORTABLE    Result Date: 7/10/2021  EXAMINATION: ONE XRAY VIEW OF THE CHEST 7/10/2021 8:13 pm COMPARISON: Chest radiograph 04/26/2021. HISTORY: ORDERING SYSTEM PROVIDED HISTORY: chest pain TECHNOLOGIST PROVIDED HISTORY: Reason for exam:->chest pain Reason for Exam: chest pain Acuity: Acute Type of Exam: Initial FINDINGS: The lungs are without acute focal process. There is no effusion or pneumothorax. The cardiomediastinal silhouette is stable. The osseous structures are stable. No acute process.        Scheduled Medicines   Medications:    amitriptyline  25 mg Oral Nightly    busPIRone  20 mg Oral TID    carbidopa-levodopa  1 tablet Oral Nightly    levETIRAcetam  750 mg Oral Nightly    levothyroxine  75 mcg Oral QAM AC    metoprolol succinate  50 mg Oral Daily    montelukast  10 mg Oral Nightly    NIFEdipine  60 mg Oral Daily    OXcarbazepine  300 mg Oral BID    pantoprazole  40 mg Oral Daily    QUEtiapine  25 mg Oral Nightly    QUEtiapine  12.5 mg Oral Daily    sodium chloride flush  5-40 mL Intravenous 2 times per day    aspirin  81 mg Oral Daily    atorvastatin  40 mg Oral Nightly    enoxaparin  40 mg Subcutaneous Daily    insulin glargine  20 Units Subcutaneous Nightly    insulin lispro  0-6 Units Subcutaneous TID WC    insulin lispro  0-3 Units Subcutaneous 2 times per day    levetiracetam  1,500 mg Intravenous BID    lidocaine PF  5 mL Intradermal Once    sodium chloride flush  5-40 mL Intravenous 2 times per day      Infusions:    sodium chloride      nitroGLYCERIN Stopped (07/11/21 1125)    sodium chloride      sodium chloride 100 mL/hr at 07/11/21 1657         Objective:   Vitals: BP (!) 154/81   Pulse 89   Temp 98 °F (36.7 °C) (Oral)   Resp 27   Ht 5' 2\" (1.575 m)   Wt 170 lb 10.2 oz (77.4 kg)   SpO2 97%   BMI 31.21 kg/m²   General appearance: alert and cooperative with exam  Neck: no JVD or bruit  Thyroid : Normal lobes   Lungs: Has Vesicular Breath sounds   Heart:  regular rate and rhythm  Abdomen: soft, non-tender; bowel sounds normal; no masses,  no organomegaly  Musculoskeletal: Normal  Extremities: extremities normal, , no edema  Neurologic:  Awake, alert, oriented to name, place and time. Cranial nerves II-XII are grossly intact. Motor is  intact. Sensory is intact. ,  and gait is normal.    Assessment:     Patient Active Problem List:     Chest pain     H/O Doppler ultrasound     H/O cardiovascular stress test     H/O cardiovascular stress test     H/O cardiovascular stress test     Incarcerated umbilical hernia     Essential hypertension     Type 2 diabetes mellitus with diabetic polyneuropathy, with long-term current use of insulin (HCC)     Tachycardia     Dyslipidemia     Family history of early CAD     NSTEMI (non-ST elevated myocardial infarction) ([address]     Abnormal nuclear stress test     ILSA (obstructive sleep apnea)     Snoring     Hypersomnolence     Mild intermittent asthma without complication     Asthma exacerbation attacks     Hypertensive emergency     Hallucination, visual     Severe episode of recurrent major depressive disorder, with psychotic features ([address]     Generalized anxiety disorder with panic attacks     Auditory hallucinations     Bipolar I disorder with depression, severe (HCC)     Dyspnea and respiratory abnormalities     Encephalopathy     Syncope     Bipolar 1 disorder ([address]      Plan:     1. Reviewed POC blood glucose . Labs and X ray results   2. Reviewed Current Medicines   3. On Correction bolus Humalog/ Basal Lantus Insulin regime /  4. Monitor Blood glucose frequently   5. Modified  the dose of Insulin/ other medicines as needed  6. For Cardiac Stress test today    7. Will follow     .      Molina Barlow MD, MD

## 2021-07-12 NOTE — PROGRESS NOTES
Upon am assessment, pt stated she did not want to be treated by Dr. Valorie Mckeon and if she had to be sent to [place] she did not want to be treated by Dr. Dann Castillo, she states he is not with [facility] but another building near there.  -Hospitalist notified of pt's choice of 's Skye Officer NP notified of consult

## 2021-07-12 NOTE — PROGRESS NOTES
Patient moved to room 2121 without incident. Patient oriented to new room, placed on all monitors, and call light provided.

## 2021-07-12 NOTE — PROGRESS NOTES
Hospitalist Progress Note      Name:  Autumn Helms /Age/Sex: 1955  (63 y.o. female)   MRN & CSN:  [id number] & [id number] Admission Date/Time: 7/10/2021  7:29 PM   Location:  -A PCP: Juan Ruano MD         Hospital Day: 3    Assessment and Plan:   Autumn Helms is a 63 y.o.  female with a past medical history of multiple past medical history including hypertension, GERD, dyslipidemia, type 2 diabetes, Parkinson's, SAH, seizures, hypothyroidism, anxiety with depression, who presented to the ED on 7/10/2021 on account of chest pain and shortness of breath. 1.  Chest pain: Appears typical.  Improved with sublingual nitro. Off nitro gtt. Pt denies chest pain now. Chart review shows cardiac cath with clean coronaries . Await cardiology evaluation. Stress test d/c as cardio has not seen patient yet and due to seizure like activity and pt receiving IV benzos this morning, sleepy, echo  neg, EF 55%    2. Hyponatremia: improved from 123 to 128, possibly due to HCTZ and psych meds, neph consulted, on MIVF, improving slowly, urine lytes ordered    3. Parkinson's: Continue Sinemet    4. DM type 2--uncontrolled, a1c 8.1, endo consulted, lantus, SSI, ADA diet    1. 5.  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 Psychiatry is on the case we are appreciative of their recommendations. No further neurological recommendations at this time.     6. Mood disorder: continue home meds, await psych recs regarding medications given continued pseudoseizures due to anxiety and hyponatremia, psych meds possibly contributing to hyponatremia,     5. Dyslipidemia: Continue Lipitor 40 mg nightly    HTN--continue home meds    Elevated lactate--MIVF, repeat tomorrow, no evidence of infection clinically,       Await cardio consult and recs for chest pain that pt admitted with and psych consult for hyponatremia d/t meds and pseudoseizures/anxiety, med recs    PT/OT consulted for discharge recs       Diet ADULT DIET; Regular; 3 carb choices (45 gm/meal); 1800 ml   DVT Prophylaxis [x] Lovenox, []  Heparin, [] SCDs, [] Ambulation   GI Prophylaxis [x] PPI,  [] H2 Blocker,  [] Carafate,  [] Diet/Tube Feeds   Code Status Full Code   Disposition Patient requires continued admission due to chest pain, seizures   MDM [] Low, [] Moderate,[x]  High  Patient's risk as above due to status     History of Present Illness:     Chief Complaint:     Kirsten Cobos is a 63 y.o.  female with a past medical history of the above who presents to the ED on account of chest pain. Continued chest pain but improved, pt with seizure like activity this morning, received IV ativan x 2 doses, too sleepy for PT/OT eval and psych eval and stress test today    Ten point ROS reviewed negative, unless as noted above    Objective: Intake/Output Summary (Last 24 hours) at 7/12/2021 1609  Last data filed at 7/12/2021 1400  Gross per 24 hour   Intake 120 ml   Output --   Net 120 ml      Vitals:   Vitals:    07/12/21 1300   BP: (!) 146/79   Pulse: 84   Resp: 17   Temp: 96.9 °F (36.1 °C)   SpO2: 97%     Physical Exam:   GEN: Awake female, alert and oriented x3 in no apparent distress. Appears given age. HEENT: Normal   NECK: Supple, no apparent thyromegaly or masses. No FAZAL  RESP: Clear lung fields bilaterally. Symmetric chest movement while on room air. CVS: RRR, S1, S2  GI/: Abdomen is soft, nontender, no organomegaly. . Bowel sounds normal, rectal exam deferred. No CVA tenderness. MSK: No gross joint deformities. No tenderness  SKIN: Normal coloration, warm, dry.   NEURO: Cranial nerves appear grossly intact, normal speech, no lateralizing weakness. PSYCH:  Affect appropriate.     Medications:   Medications:    losartan  100 mg Oral Daily    sodium chloride  1 g Oral TID WC    levETIRAcetam  750 mg Oral BID    amitriptyline  25 mg Oral Nightly    busPIRone  20 mg Oral TID    carbidopa-levodopa  1 tablet Oral Nightly    levothyroxine  75 mcg Oral QAM AC    metoprolol succinate  50 mg Oral Daily    montelukast  10 mg Oral Nightly    NIFEdipine  60 mg Oral Daily    OXcarbazepine  300 mg Oral BID    pantoprazole  40 mg Oral Daily    QUEtiapine  25 mg Oral Nightly    QUEtiapine  12.5 mg Oral Daily    sodium chloride flush  5-40 mL Intravenous 2 times per day    aspirin  81 mg Oral Daily    atorvastatin  40 mg Oral Nightly    enoxaparin  40 mg Subcutaneous Daily    insulin glargine  20 Units Subcutaneous Nightly    insulin lispro  0-6 Units Subcutaneous TID WC    insulin lispro  0-3 Units Subcutaneous 2 times per day    lidocaine PF  5 mL Intradermal Once    sodium chloride flush  5-40 mL Intravenous 2 times per day      Infusions:    sodium chloride      sodium chloride      sodium chloride 50 mL/hr at 07/12/21 1040     PRN Meds: technetium sestamibi, 10 millicurie, ONCE PRN  technetium sestamibi, 30 millicurie, ONCE PRN  hydrALAZINE, 20 mg, Q4H PRN  fluticasone-umeclidin-vilant, 1 puff, Daily PRN  HYDROcodone-acetaminophen, 1 tablet, Q6H PRN  hydrOXYzine, 25 mg, Q6H PRN  sodium chloride flush, 5-40 mL, PRN  sodium chloride, 25 mL, PRN  ondansetron, 4 mg, Q8H PRN   Or  ondansetron, 4 mg, Q6H PRN  acetaminophen, 650 mg, Q6H PRN   Or  acetaminophen, 650 mg, Q6H PRN  polyethylene glycol, 17 g, Daily PRN  morphine, 4 mg, Q4H PRN  sodium chloride flush, 5-40 mL, PRN  sodium chloride, 25 mL, PRN  nitroGLYCERIN, 0.4 mg, Q5 Min PRN  diphenhydrAMINE, 50 mg, Q6H PRN          Electronically signed by Jameson Schwab, MD on 7/12/2021 at 4:09 PM

## 2021-07-12 NOTE — PROGRESS NOTES
Physician Progress Note      PATIENT:               Alannah Watson  CSN #:                  [id number]  :                       1955  ADMIT DATE:       7/10/2021 7:29 PM  [address] [place] DATE:  RESPONDING  PROVIDER #:        Jose Hanley MD        QUERY TEXT:    Stage of Chronic Kidney Disease: Please provide further specificity, if known. Clinical indicators include:  Options provided:  -- Chronic kidney disease stage 1  -- Chronic kidney disease stage 2  -- Chronic kidney disease stage 3  -- Chronic kidney disease stage 3a  -- Chronic kidney disease stage 3b  -- Chronic kidney disease stage 4  -- Chronic kidney disease stage 5  -- Chronic kidney disease stage 5, requiring dialysis  -- End stage renal disease  -- Other - I will add my own diagnosis  -- Disagree - Not applicable / Not valid  -- Disagree - Clinically Unable to determine / Unknown        PROVIDER RESPONSE TEXT:    Provider dismissed this query because it was not applicable to the patient or   not a valid query.   no CKD      Electronically signed by:  Jose Hanley MD 2021 7:02 PM

## 2021-07-12 NOTE — PROGRESS NOTES
Staff in from nuclear medicine to give med for stress test, medicated pt with vistaril for stated anxiety. Pt stated I hope someone will be able to watch me, once I get anxious I start shaking all over.  -At 0748 pt start to mildly shake and stated oh here it goes, at 0751 pt shaking head arms and legs and unresponsive, Ativan 2 mg given at 0753, and shaking continued, addt Ativan 2mg given at 0758. Head shaking and body movements ceased at 0803.    0835-No stress test at this time.  Per Dr. Cristian Carrasco

## 2021-07-12 NOTE — FLOWSHEET NOTE
At approx 0751 pt started seizing, Jaqui Marina called to bedside immediately, see orders. After two doses of ativan pt stopped seizure like activity stopped and responds to voice at this time. Cheri Hampton contacted by primary nurse.  Will monitor closely

## 2021-07-12 NOTE — CONSULTS
INPATIENT CARDIOLOGY CONSULT NOTE       Reason for consultation: Chest pain    Referring physician:  Ondina Haas MD     History of present illness:Simona is a 63 y. o.year old who  presents with chest pain. Patient presented to the hospital with chief complaint of chest pain. Chest pain was retrosternal radiating to the back rated as 6 out of 10 nonexertional primarily but exacerbated with exertion. Patient is also being evaluated by neurology for generalized tonic-clonic seizures versus pseudoseizures. Currently patient denies any chest pain. Left heart cardiac catheterization was performed in 2017, personally reviewed. Shows patent arteries no blockage.       EKG: NSR with no ST-T changes      Past medical history:    has a past medical history of Abnormal EKG, Acid reflux, Anemia, Anesthesia, Anginal pain (HCC), Anxiety, Arthritis, Asthma, CAD (coronary artery disease), Cerebral artery occlusion with cerebral infarction ([address] CHF (congestive heart failure) ([address] Chronic back pain, Chronic kidney disease, DDD (degenerative disc disease), cervical, Depression, Diabetes mellitus ([address] Diabetic neuropathy ([address] Dizziness, Dry skin, Enlarged ureter, Fatty liver, Fibrocystic breast, Gout, H/O cardiac catheterization, H/O cardiovascular stress test, H/O cardiovascular stress test, H/O cardiovascular stress test, H/O Doppler ultrasound, H/O echocardiogram, H/O echocardiogram, History of Holter monitoring, [place] (hard of hearing), Hx of cardiovascular stress test, Hx of motion sickness, HX OTHER MEDICAL, Hyperlipidemia, Hypertension, IBS (irritable bowel syndrome), Incisional hernia, Kidney stones, Migraines, Nausea & vomiting, Other specified disorder of skin, Panic attacks, Pneumonia, Pseudoseizures, Restless leg, Shortness of breath, Sleep apnea, Staph infection, Thyroid disease, Tremor, Urinary incontinence, UTI (urinary tract infection), UTI (urinary tract infection), Vertigo, and Wears glasses. Past surgical history:   has a past surgical history that includes Carpal tunnel release (Right, 1999); Diagnostic Cardiac Cath Lab Procedure (01/11/2010); Dental surgery; Colonoscopy (Last Done 6-13); Endoscopy, colon, diagnostic (Several ); Bladder surgery (1970's Or 1980's); Lithotripsy (2011); Breast biopsy (Right, 1980's); Breast surgery (Left, 1990's); hernia repair (4093'[id number]); hernia repair (1970's); Cholecystectomy (1970's); Appendectomy (1970's); Hysterectomy, total abdominal (1987); Tubal ligation (1978); Esophagus dilation (1980's And 1990's); Knee arthroscopy (Right, 1999); joint replacement (2008); other surgical history (06 13 2014); fracture surgery (1974 Or 1975); Cardiac catheterization (10-18-06); and Cardiac catheterization. Social History:   reports that she has never smoked. She has never used smokeless tobacco. She reports that she does not drink alcohol and does not use drugs.   Family history:   no family history of CAD, STROKE of DM    Allergies   Allergen Reactions    Abilify [Aripiprazole]      \"Severe Shaking And Restlessness\"    Advil [Ibuprofen Micronized] Palpitations     \"Severe High Blood Pressure\"    Augmentin [Amoxicillin-Pot Clavulanate] Itching and Rash    Bee Venom Swelling     Redness    Ciprofloxacin Itching and Rash    Codeine      \"Severe Abdominal Cramping\"    Darvocet [Propoxyphene N-Acetaminophen] Palpitations     \"Severe High Blood Pressure\"    Darvon [Propoxyphene Hcl] Palpitations     \"Severe High Blood Pressure\"    Decadron [Dexamethasone] Other (See Comments)     seizure  Seizures    Ditropan [Oxybutynin Chloride] Palpitations     \"Severe High Blood Pressure\"    Fioricet [Butalbital-Apap-Caffeine] Palpitations     \"Severe High Blood Pressure\"    Fiorinal-Codeine #3 [Butalbital-Asa-Caff-Codeine]      \"Severe Stomach Cramps\"    Flagyl [Metronidazole] Diarrhea     \"Severe Diarrhea And Cramping\"    Naproxen Palpitations     \"Severe High Blood (PROCARDIA XL) extended release tablet 60 mg, Daily  OXcarbazepine (TRILEPTAL) tablet 300 mg, BID  pantoprazole (PROTONIX) tablet 40 mg, Daily  QUEtiapine (SEROQUEL) tablet 25 mg, Nightly  QUEtiapine (SEROQUEL) tablet 12.5 mg, Daily  sodium chloride flush 0.9 % injection 5-40 mL, 2 times per day  sodium chloride flush 0.9 % injection 5-40 mL, PRN  0.9 % sodium chloride infusion, PRN  ondansetron (ZOFRAN-ODT) disintegrating tablet 4 mg, Q8H PRN   Or  ondansetron (ZOFRAN) injection 4 mg, Q6H PRN  acetaminophen (TYLENOL) tablet 650 mg, Q6H PRN   Or  acetaminophen (TYLENOL) suppository 650 mg, Q6H PRN  polyethylene glycol (GLYCOLAX) packet 17 g, Daily PRN  aspirin chewable tablet 81 mg, Daily  atorvastatin (LIPITOR) tablet 40 mg, Nightly  enoxaparin (LOVENOX) injection 40 mg, Daily  insulin glargine (LANTUS) injection vial 20 Units, Nightly  morphine sulfate (PF) injection 4 mg, Q4H PRN  insulin lispro (HUMALOG) injection vial 0-6 Units, TID WC  insulin lispro (HUMALOG) injection vial 0-3 Units, 2 times per day  lidocaine PF 1 % injection 5 mL, Once  sodium chloride flush 0.9 % injection 5-40 mL, 2 times per day  sodium chloride flush 0.9 % injection 5-40 mL, PRN  0.9 % sodium chloride infusion, PRN  0.9 % sodium chloride infusion, Continuous  nitroGLYCERIN (NITROSTAT) SL tablet 0.4 mg, Q5 Min PRN  diphenhydrAMINE (BENADRYL) tablet 50 mg, Q6H PRN      Current Facility-Administered Medications   Medication Dose Route Frequency Provider Last Rate Last Admin    technetium sestamibi (CARDIOLITE) injection 10 millicurie  10 millicurie Intravenous ONCE PRN Cassy Lao MD        technetium sestamibi (CARDIOLITE) injection 30 millicurie  30 millicurie Intravenous ONCE PRN Ritika Edmonds MD        losartan (COZAAR) tablet 100 mg  100 mg Oral Daily Eder Manriquez MD   100 mg at 07/12/21 1033    hydrALAZINE (APRESOLINE) injection 20 mg  20 mg Intravenous Q4H PRN Eder Manriquez MD        sodium chloride tablet 1 g  1 g Oral TID  Elvin Cosme DO   1 g at 07/12/21 1307    levETIRAcetam (KEPPRA) tablet 750 mg  750 mg Oral BID WENDIE Montenegro - CNP        amitriptyline (ELAVIL) tablet 25 mg  25 mg Oral Nightly Josh Abby Hay MD   25 mg at 07/11/21 2115    busPIRone (BUSPAR) tablet 20 mg  20 mg Oral TID Kevin Garcia MD   20 mg at 07/12/21 1511    carbidopa-levodopa (SINEMET)  MG per tablet 1 tablet  1 tablet Oral Nightly Josh Abby Hay MD   1 tablet at 07/11/21 2215    fluticasone-umeclidin-vilant (TRELEGY ELLIPTA) 100-62.5-25 MCG/INH inhaler 1 puff  1 puff Inhalation Daily PRN Josh Abby Hay MD        HYDROcodone-acetaminophen (NORCO)  MG per tablet 1 tablet  1 tablet Oral Q6H PRN Josh Abby Hay MD   1 tablet at 07/12/21 1032    hydrOXYzine (VISTARIL) capsule 25 mg  25 mg Oral Q6H PRN Josh Abby Hay MD   25 mg at 07/12/21 0740    levothyroxine (SYNTHROID) tablet 75 mcg  75 mcg Oral QAM AC Josh Abby Hay MD   75 mcg at 07/12/21 1032    metoprolol succinate (TOPROL XL) extended release tablet 50 mg  50 mg Oral Daily Pieter ARAUZ MD   50 mg at 07/12/21 1031    montelukast (SINGULAIR) tablet 10 mg  10 mg Oral Nightly Josh Abby Hay MD   10 mg at 07/11/21 2115    NIFEdipine (PROCARDIA XL) extended release tablet 60 mg  60 mg Oral Daily Josh Abby Hay MD   60 mg at 07/12/21 1031    OXcarbazepine (TRILEPTAL) tablet 300 mg  300 mg Oral BID Pieter ARAUZ MD   300 mg at 07/12/21 1031    pantoprazole (PROTONIX) tablet 40 mg  40 mg Oral Daily Josh Abby Hay MD        QUEtiapine (SEROQUEL) tablet 25 mg  25 mg Oral Nightly Josh Abby Hay MD   25 mg at 07/11/21 2115    QUEtiapine (SEROQUEL) tablet 12.5 mg  12.5 mg Oral Daily Pieter ARAUZ MD   12.5 mg at 07/12/21 1032    sodium chloride flush 0.9 % injection 5-40 mL  5-40 mL Intravenous 2 times per day Kevin Garcia MD        sodium chloride flush 0.9 % injection 5-40 mL  5-40 mL Intravenous PRN Kevin Garcia MD  0.9 % sodium chloride infusion  25 mL Intravenous PRN Josh Elsy Liang MD        ondansetron (ZOFRAN-ODT) disintegrating tablet 4 mg  4 mg Oral Q8H PRN Josh Elsy Liang MD        Or    ondansetron (ZOFRAN) injection 4 mg  4 mg Intravenous Q6H PRN Josh Elsy Liang MD   4 mg at 07/11/21 0325    acetaminophen (TYLENOL) tablet 650 mg  650 mg Oral Q6H PRN Josh Elsy Liang MD        Or    acetaminophen (TYLENOL) suppository 650 mg  650 mg Rectal Q6H PRN Josh Elsy Liang MD        polyethylene glycol (GLYCOLAX) packet 17 g  17 g Oral Daily PRN Josh Elsy Liang MD        aspirin chewable tablet 81 mg  81 mg Oral Daily Josh Elsy Liang MD   81 mg at 07/12/21 1033    atorvastatin (LIPITOR) tablet 40 mg  40 mg Oral Nightly Josh Elsy Liang MD   40 mg at 07/11/21 2115    enoxaparin (LOVENOX) injection 40 mg  40 mg Subcutaneous Daily Josh Elsy Liang MD   40 mg at 07/12/21 1033    insulin glargine (LANTUS) injection vial 20 Units  20 Units Subcutaneous Nightly Segun Fox MD   10 Units at 07/11/21 2104    morphine sulfate (PF) injection 4 mg  4 mg Intravenous Q4H PRN WENDIE Stephenson - NP        insulin lispro (HUMALOG) injection vial 0-6 Units  0-6 Units Subcutaneous TID  Segun Fox MD   1 Units at 07/12/21 1654    insulin lispro (HUMALOG) injection vial 0-3 Units  0-3 Units Subcutaneous 2 times per day Segun Fox MD   1 Units at 07/11/21 2114    lidocaine PF 1 % injection 5 mL  5 mL Intradermal Once Nirmal Fung MD        sodium chloride flush 0.9 % injection 5-40 mL  5-40 mL Intravenous 2 times per day Nirmal Fung MD   10 mL at 07/12/21 0842    sodium chloride flush 0.9 % injection 5-40 mL  5-40 mL Intravenous PRN Nirmal Fung MD        0.9 % sodium chloride infusion  25 mL Intravenous PRN Nirmal Fung MD        0.9 % sodium chloride infusion   Intravenous Continuous Riri Nina MD 50 mL/hr at 07/12/21 1040 Rate Change at 07/12/21 1040    nitroGLYCERIN (NITROSTAT) SL tablet 0.4 mg  0.4 mg Sublingual Q5 Min PRN Katherina Kocher, PA-C   0.4 mg at 07/10/21 2211    diphenhydrAMINE (BENADRYL) tablet 50 mg  50 mg Oral Q6H PRN Katherina Kocher, PA-C   50 mg at 07/10/21 8792         Review of Systems:     · Constitutional: No Fever or Weight Loss   · Eyes: No Decreased Vision  · ENT: No Headaches, Hearing Loss or Vertigo  · Cardiovascular: No chest pain, dyspnea on exertion, palpitations or loss of consciousness  · Respiratory: No cough or wheezing    · Gastrointestinal: No abdominal pain, appetite loss, blood in stools, constipation, diarrhea or heartburn  · Genitourinary: No dysuria, trouble voiding, or hematuria  · Musculoskeletal:  No gait disturbance, weakness or joint complaints  · Integumentary: No rash or pruritis  · Neurological: No TIA or stroke symptoms  · Psychiatric: No anxiety or depression  · Endocrine: No malaise, fatigue or temperature intolerance  · Hematologic/Lymphatic: No bleeding problems, blood clots or swollen lymph nodes  · Allergic/Immunologic: No nasal congestion or hives    All other systems were reviewed and were negative otherwise. Physical Examination:      Vitals:    07/12/21 1635   BP: (!) 168/90   Pulse: 81   Resp: 23   Temp: 98 °F (36.7 °C)   SpO2:       Wt Readings from Last 3 Encounters:   07/12/21 170 lb 10.2 oz (77.4 kg)   04/26/21 170 lb (77.1 kg)   03/22/21 170 lb (77.1 kg)     Body mass index is 31.21 kg/m². General Appearance:  No distress, conversant  Constitutional:  Well developed, Well nourished  HEENT:  Normocephalic, Atraumatic, Oropharynx moist, No oral exudates,   Nose normal. Neck Supple Carotid: no carotid bruit  Eyes:  Conjunctiva normal, No discharge. Respiratory:    Normal breath sounds, No respiratory distress, No wheezing, no use of accessory muscles, diaphragm movement is normal  No chest Tenderness  Cardiovascular: S1-S2 No murmurs auscultated. No rubs, thrills or gallops. Normal  rhythm. Pedal pulses are normal. Nopedal edema  GI:  Soft Non tender, non distended. :  No CVA tenderness. Musculoskeletal:   No tenderness, No cyanosis, No clubbing. Integument:  Warm, Dry, No erythema, No rash. Lymphatic:  No lymphadenopathy noted. Neurologic:  Alert & oriented x 3  No focal deficits noted. Psychiatric:  Affect normal, Judgment normal, Mood normal.       Lab Review     Recent Labs     07/12/21  0527   WBC 9.8   HGB 12.3*   HCT 35.8*         Recent Labs     07/12/21  0527   *   K 3.7   CL 91*   CO2 26   BUN 8   CREATININE 0.5*     Recent Labs     07/10/21  2200   AST 32   ALT 25   BILITOT 0.3   ALKPHOS 82     No results for input(s): TROPONINI in the last 72 hours. Lab Results   Component Value Date    BNP 51.0 02/15/2014    BNP 55 11/10/2013    BNP 21.6 11/08/2013     Lab Results   Component Value Date    INR 0.98 04/26/2021    PROTIME 11.8 04/26/2021         All labs, images, EKGs were personally reviewed      Assessment: 63 y. o.year old with PMH of  has a past medical history of Abnormal EKG, Acid reflux, Anemia, Anesthesia, Anginal pain ([address] Anxiety, Arthritis, Asthma, CAD (coronary artery disease), Cerebral artery occlusion with cerebral infarction ([address] CHF (congestive heart failure) ([address] Chronic back pain, Chronic kidney disease, DDD (degenerative disc disease), cervical, Depression, Diabetes mellitus ([address] Diabetic neuropathy ([address] Dizziness, Dry skin, Enlarged ureter, Fatty liver, Fibrocystic breast, Gout, H/O cardiac catheterization, H/O cardiovascular stress test, H/O cardiovascular stress test, H/O cardiovascular stress test, H/O Doppler ultrasound, H/O echocardiogram, H/O echocardiogram, History of Holter monitoring, [place] (hard of hearing), Hx of cardiovascular stress test, Hx of motion sickness, HX OTHER MEDICAL, Hyperlipidemia, Hypertension, IBS (irritable bowel syndrome), Incisional hernia, Kidney stones, Migraines, Nausea & vomiting, Other specified

## 2021-07-12 NOTE — CARE COORDINATION
Chart reviewed. CM attempted to meet with patient at bedside but she is unable to keep her eyes open. CM notes that [place] from [employer] was unable to speak with patient. Upon chart review, patient lives at [address] [place] [address] alone and will likely return. She has a [place] [id number] aide with [facility] per [employer] who visits twice weekly for two hours each. She follows with PCP, has insurance with affordable RX co-pays and reliable transportation per her dgts, [employer] or walks. CM will follow for any d/c needs.

## 2021-07-12 NOTE — BH NOTE
Psychiatry attempted to see patient but she is too somnolent for an assessment. Will attempt tomorrow.
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
## 2021-07-12 NOTE — PROGRESS NOTES
Sodium is better at 128  Continue NS  No more HCTZ on dc  She is on many antipsychotics, that cause low sodium  Full note to follow
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
## 2021-07-13 ENCOUNTER — APPOINTMENT (OUTPATIENT)
Dept: NUCLEAR MEDICINE | Age: 66
DRG: 305 | End: 2021-07-13
Payer: MEDICARE

## 2021-07-13 PROBLEM — F41.0 PANIC ATTACKS: Chronic | Status: ACTIVE | Noted: 2021-07-13

## 2021-07-13 PROBLEM — R56.9 PSEUDOSEIZURES (HCC): Chronic | Status: ACTIVE | Noted: 2021-07-13

## 2021-07-13 PROBLEM — F44.5 PSEUDOSEIZURES: Chronic | Status: ACTIVE | Noted: 2021-07-13

## 2021-07-13 LAB
ALBUMIN SERPL-MCNC: 4.4 GM/DL (ref 3.4–5)
ALP BLD-CCNC: 63 IU/L (ref 40–129)
ALT SERPL-CCNC: 15 U/L (ref 10–40)
ANION GAP SERPL CALCULATED.3IONS-SCNC: 14 MMOL/L (ref 4–16)
AST SERPL-CCNC: 23 IU/L (ref 15–37)
BASOPHILS ABSOLUTE: 0.1 K/CU MM
BASOPHILS RELATIVE PERCENT: 0.6 % (ref 0–1)
BILIRUB SERPL-MCNC: 0.4 MG/DL (ref 0–1)
BILIRUBIN DIRECT: 0.2 MG/DL (ref 0–0.3)
BILIRUBIN, INDIRECT: 0.2 MG/DL (ref 0–0.7)
BUN BLDV-MCNC: 7 MG/DL (ref 6–23)
CALCIUM SERPL-MCNC: 9.4 MG/DL (ref 8.3–10.6)
CHLORIDE BLD-SCNC: 96 MMOL/L (ref 99–110)
CO2: 23 MMOL/L (ref 21–32)
CREAT SERPL-MCNC: 0.5 MG/DL (ref 0.6–1.1)
DIFFERENTIAL TYPE: ABNORMAL
EOSINOPHILS ABSOLUTE: 0.3 K/CU MM
EOSINOPHILS RELATIVE PERCENT: 4 % (ref 0–3)
GFR AFRICAN AMERICAN: >60 ML/MIN/1.73M2
GFR NON-AFRICAN AMERICAN: >60 ML/MIN/1.73M2
GLUCOSE BLD-MCNC: 136 MG/DL (ref 70–99)
GLUCOSE BLD-MCNC: 141 MG/DL (ref 70–99)
GLUCOSE BLD-MCNC: 141 MG/DL (ref 70–99)
GLUCOSE BLD-MCNC: 156 MG/DL (ref 70–99)
HCT VFR BLD CALC: 34.3 % (ref 37–47)
HEMOGLOBIN: 11.9 GM/DL (ref 12.5–16)
IMMATURE NEUTROPHIL %: 0.2 % (ref 0–0.43)
LACTATE: 0.8 MMOL/L (ref 0.4–2)
LV EF: 58 %
LVEF MODALITY: NORMAL
LYMPHOCYTES ABSOLUTE: 2.6 K/CU MM
LYMPHOCYTES RELATIVE PERCENT: 31.7 % (ref 24–44)
MAGNESIUM: 1.4 MG/DL (ref 1.8–2.4)
MCH RBC QN AUTO: 28.4 PG (ref 27–31)
MCHC RBC AUTO-ENTMCNC: 34.7 % (ref 32–36)
MCV RBC AUTO: 81.9 FL (ref 78–100)
MONOCYTES ABSOLUTE: 1.2 K/CU MM
MONOCYTES RELATIVE PERCENT: 14.7 % (ref 0–4)
NUCLEATED RBC %: 0 %
OSMOLALITY URINE: 457 MOSM/KG (ref 50–800)
OSMOLALITY: 262 MOSM/KG (ref 280–303)
PDW BLD-RTO: 11.3 % (ref 11.7–14.9)
PLATELET # BLD: 262 K/CU MM (ref 140–440)
PMV BLD AUTO: 8.6 FL (ref 7.5–11.1)
POTASSIUM SERPL-SCNC: 3.5 MMOL/L (ref 3.5–5.1)
RBC # BLD: 4.19 M/CU MM (ref 4.2–5.4)
SEGMENTED NEUTROPHILS ABSOLUTE COUNT: 4 K/CU MM
SEGMENTED NEUTROPHILS RELATIVE PERCENT: 48.8 % (ref 36–66)
SODIUM BLD-SCNC: 133 MMOL/L (ref 135–145)
T4 FREE: 1.36 NG/DL (ref 0.9–1.8)
TOTAL IMMATURE NEUTOROPHIL: 0.02 K/CU MM
TOTAL NUCLEATED RBC: 0 K/CU MM
TOTAL PROTEIN: 6.1 GM/DL (ref 6.4–8.2)
WBC # BLD: 8.2 K/CU MM (ref 4–10.5)

## 2021-07-13 PROCEDURE — 82962 GLUCOSE BLOOD TEST: CPT

## 2021-07-13 PROCEDURE — A9500 TC99M SESTAMIBI: HCPCS | Performed by: INTERNAL MEDICINE

## 2021-07-13 PROCEDURE — 80053 COMPREHEN METABOLIC PANEL: CPT

## 2021-07-13 PROCEDURE — 2580000003 HC RX 258: Performed by: INTERNAL MEDICINE

## 2021-07-13 PROCEDURE — 83735 ASSAY OF MAGNESIUM: CPT

## 2021-07-13 PROCEDURE — 99233 SBSQ HOSP IP/OBS HIGH 50: CPT | Performed by: INTERNAL MEDICINE

## 2021-07-13 PROCEDURE — 6370000000 HC RX 637 (ALT 250 FOR IP): Performed by: INTERNAL MEDICINE

## 2021-07-13 PROCEDURE — 93306 TTE W/DOPPLER COMPLETE: CPT

## 2021-07-13 PROCEDURE — 85025 COMPLETE CBC W/AUTO DIFF WBC: CPT

## 2021-07-13 PROCEDURE — 6370000000 HC RX 637 (ALT 250 FOR IP): Performed by: PHYSICIAN ASSISTANT

## 2021-07-13 PROCEDURE — 6370000000 HC RX 637 (ALT 250 FOR IP): Performed by: NURSE PRACTITIONER

## 2021-07-13 PROCEDURE — 99221 1ST HOSP IP/OBS SF/LOW 40: CPT | Performed by: NURSE PRACTITIONER

## 2021-07-13 PROCEDURE — 2000000000 HC ICU R&B

## 2021-07-13 PROCEDURE — 99232 SBSQ HOSP IP/OBS MODERATE 35: CPT | Performed by: INTERNAL MEDICINE

## 2021-07-13 PROCEDURE — 6360000002 HC RX W HCPCS: Performed by: INTERNAL MEDICINE

## 2021-07-13 PROCEDURE — 3430000000 HC RX DIAGNOSTIC RADIOPHARMACEUTICAL: Performed by: INTERNAL MEDICINE

## 2021-07-13 PROCEDURE — 80048 BASIC METABOLIC PNL TOTAL CA: CPT

## 2021-07-13 PROCEDURE — 83605 ASSAY OF LACTIC ACID: CPT

## 2021-07-13 PROCEDURE — 92610 EVALUATE SWALLOWING FUNCTION: CPT

## 2021-07-13 PROCEDURE — 6360000002 HC RX W HCPCS: Performed by: HOSPITALIST

## 2021-07-13 PROCEDURE — 82248 BILIRUBIN DIRECT: CPT

## 2021-07-13 PROCEDURE — 84439 ASSAY OF FREE THYROXINE: CPT

## 2021-07-13 RX ORDER — QUETIAPINE FUMARATE 25 MG/1
25 TABLET, FILM COATED ORAL 2 TIMES DAILY
Status: DISCONTINUED | OUTPATIENT
Start: 2021-07-13 | End: 2021-07-15 | Stop reason: HOSPADM

## 2021-07-13 RX ORDER — DIAZEPAM 5 MG/ML
5 INJECTION, SOLUTION INTRAMUSCULAR; INTRAVENOUS
Status: ACTIVE | OUTPATIENT
Start: 2021-07-13 | End: 2021-07-13

## 2021-07-13 RX ORDER — DEXTROSE MONOHYDRATE 50 MG/ML
100 INJECTION, SOLUTION INTRAVENOUS PRN
Status: DISCONTINUED | OUTPATIENT
Start: 2021-07-13 | End: 2021-07-15 | Stop reason: HOSPADM

## 2021-07-13 RX ORDER — LORAZEPAM 2 MG/ML
0.5 INJECTION INTRAMUSCULAR
Status: COMPLETED | OUTPATIENT
Start: 2021-07-13 | End: 2021-07-13

## 2021-07-13 RX ORDER — TRAZODONE HYDROCHLORIDE 50 MG/1
50 TABLET ORAL NIGHTLY PRN
Status: DISCONTINUED | OUTPATIENT
Start: 2021-07-13 | End: 2021-07-15 | Stop reason: HOSPADM

## 2021-07-13 RX ORDER — DEXTROSE MONOHYDRATE 25 G/50ML
12.5 INJECTION, SOLUTION INTRAVENOUS PRN
Status: DISCONTINUED | OUTPATIENT
Start: 2021-07-13 | End: 2021-07-15 | Stop reason: HOSPADM

## 2021-07-13 RX ORDER — MAGNESIUM SULFATE 1 G/100ML
1000 INJECTION INTRAVENOUS PRN
Status: DISCONTINUED | OUTPATIENT
Start: 2021-07-13 | End: 2021-07-15 | Stop reason: HOSPADM

## 2021-07-13 RX ORDER — MAGNESIUM OXIDE 400 MG/1
400 TABLET ORAL 2 TIMES DAILY
Status: DISCONTINUED | OUTPATIENT
Start: 2021-07-13 | End: 2021-07-15 | Stop reason: HOSPADM

## 2021-07-13 RX ORDER — NICOTINE POLACRILEX 4 MG
15 LOZENGE BUCCAL PRN
Status: DISCONTINUED | OUTPATIENT
Start: 2021-07-13 | End: 2021-07-15 | Stop reason: HOSPADM

## 2021-07-13 RX ADMIN — HYDROCODONE BITARTRATE AND ACETAMINOPHEN 1 TABLET: 10; 325 TABLET ORAL at 15:53

## 2021-07-13 RX ADMIN — CARBIDOPA AND LEVODOPA 1 TABLET: 10; 100 TABLET ORAL at 19:58

## 2021-07-13 RX ADMIN — DIPHENHYDRAMINE HYDROCHLORIDE 50 MG: 25 TABLET ORAL at 07:28

## 2021-07-13 RX ADMIN — SODIUM CHLORIDE: 9 INJECTION, SOLUTION INTRAVENOUS at 19:58

## 2021-07-13 RX ADMIN — DIPHENHYDRAMINE HYDROCHLORIDE 50 MG: 25 TABLET ORAL at 15:53

## 2021-07-13 RX ADMIN — ATORVASTATIN CALCIUM 40 MG: 40 TABLET, FILM COATED ORAL at 19:59

## 2021-07-13 RX ADMIN — BUSPIRONE HYDROCHLORIDE 20 MG: 5 TABLET ORAL at 19:58

## 2021-07-13 RX ADMIN — KIT FOR THE PREPARATION OF TECHNETIUM TC99M SESTAMIBI 10 MILLICURIE: 1 INJECTION, POWDER, LYOPHILIZED, FOR SOLUTION PARENTERAL at 10:00

## 2021-07-13 RX ADMIN — QUETIAPINE FUMARATE 12.5 MG: 25 TABLET ORAL at 13:52

## 2021-07-13 RX ADMIN — HYDROXYZINE PAMOATE 25 MG: 25 CAPSULE ORAL at 22:24

## 2021-07-13 RX ADMIN — AMITRIPTYLINE HYDROCHLORIDE 25 MG: 25 TABLET, FILM COATED ORAL at 19:59

## 2021-07-13 RX ADMIN — LORAZEPAM 0.5 MG: 2 INJECTION INTRAMUSCULAR; INTRAVENOUS at 08:42

## 2021-07-13 RX ADMIN — MONTELUKAST 10 MG: 10 TABLET, FILM COATED ORAL at 19:59

## 2021-07-13 RX ADMIN — MAGNESIUM OXIDE 400 MG (241.3 MG MAGNESIUM) TABLET 400 MG: TABLET at 13:52

## 2021-07-13 RX ADMIN — LOSARTAN POTASSIUM 100 MG: 100 TABLET, FILM COATED ORAL at 13:51

## 2021-07-13 RX ADMIN — LEVETIRACETAM 750 MG: 500 TABLET, FILM COATED ORAL at 13:51

## 2021-07-13 RX ADMIN — BUSPIRONE HYDROCHLORIDE 20 MG: 5 TABLET ORAL at 13:53

## 2021-07-13 RX ADMIN — NIFEDIPINE 60 MG: 30 TABLET, FILM COATED, EXTENDED RELEASE ORAL at 13:52

## 2021-07-13 RX ADMIN — HYDROCODONE BITARTRATE AND ACETAMINOPHEN 1 TABLET: 10; 325 TABLET ORAL at 22:24

## 2021-07-13 RX ADMIN — PANTOPRAZOLE SODIUM 40 MG: 40 TABLET, DELAYED RELEASE ORAL at 13:52

## 2021-07-13 RX ADMIN — ASPIRIN 81 MG: 81 TABLET, CHEWABLE ORAL at 13:53

## 2021-07-13 RX ADMIN — TRAZODONE HYDROCHLORIDE 50 MG: 50 TABLET ORAL at 19:59

## 2021-07-13 RX ADMIN — HYDROCODONE BITARTRATE AND ACETAMINOPHEN 1 TABLET: 10; 325 TABLET ORAL at 07:28

## 2021-07-13 RX ADMIN — OXCARBAZEPINE 300 MG: 300 TABLET, FILM COATED ORAL at 13:51

## 2021-07-13 RX ADMIN — METOPROLOL SUCCINATE 50 MG: 25 TABLET, EXTENDED RELEASE ORAL at 13:51

## 2021-07-13 RX ADMIN — MAGNESIUM OXIDE 400 MG (241.3 MG MAGNESIUM) TABLET 400 MG: TABLET at 19:58

## 2021-07-13 RX ADMIN — SODIUM CHLORIDE, PRESERVATIVE FREE 10 ML: 5 INJECTION INTRAVENOUS at 19:59

## 2021-07-13 RX ADMIN — ENOXAPARIN SODIUM 40 MG: 40 INJECTION SUBCUTANEOUS at 14:00

## 2021-07-13 RX ADMIN — ONDANSETRON 4 MG: 2 INJECTION INTRAMUSCULAR; INTRAVENOUS at 13:48

## 2021-07-13 RX ADMIN — INSULIN GLARGINE 10 UNITS: 100 INJECTION, SOLUTION SUBCUTANEOUS at 20:08

## 2021-07-13 RX ADMIN — QUETIAPINE FUMARATE 25 MG: 25 TABLET ORAL at 19:58

## 2021-07-13 RX ADMIN — OXCARBAZEPINE 300 MG: 300 TABLET, FILM COATED ORAL at 19:59

## 2021-07-13 RX ADMIN — LEVETIRACETAM 750 MG: 500 TABLET, FILM COATED ORAL at 19:58

## 2021-07-13 ASSESSMENT — PAIN DESCRIPTION - PROGRESSION
CLINICAL_PROGRESSION: NOT CHANGED
CLINICAL_PROGRESSION: NOT CHANGED

## 2021-07-13 ASSESSMENT — PAIN SCALES - GENERAL
PAINLEVEL_OUTOF10: 10
PAINLEVEL_OUTOF10: 7
PAINLEVEL_OUTOF10: 0
PAINLEVEL_OUTOF10: 10
PAINLEVEL_OUTOF10: 8

## 2021-07-13 ASSESSMENT — PAIN DESCRIPTION - FREQUENCY
FREQUENCY: INTERMITTENT
FREQUENCY: INTERMITTENT

## 2021-07-13 ASSESSMENT — PAIN DESCRIPTION - DESCRIPTORS
DESCRIPTORS: DISCOMFORT
DESCRIPTORS: DISCOMFORT

## 2021-07-13 ASSESSMENT — PAIN DESCRIPTION - ONSET
ONSET: ON-GOING
ONSET: ON-GOING

## 2021-07-13 ASSESSMENT — PAIN DESCRIPTION - LOCATION
LOCATION: BACK
LOCATION: BACK;NECK

## 2021-07-13 ASSESSMENT — PAIN DESCRIPTION - PAIN TYPE
TYPE: CHRONIC PAIN
TYPE: CHRONIC PAIN

## 2021-07-13 NOTE — PROGRESS NOTES
Comprehensive Nutrition Assessment    Type and Reason for Visit:  Positive Nutrition Screen    Nutrition Recommendations/Plan:   Correct depleted magnesium levels  Recommend SLP eval if patient is having swallowing difficulty  Add fortified pudding BID to enhance po intake  Will closely monitor po intake, nutrition status, poc    Nutrition Assessment:  Pt admitted for hyponatremia, chest pain, PMH: HTN, IBS, CHF, CAD, DM, CVA, positive nutrition screen for decreased appetite and difficulty with swallowing food, pt currently on 3 carb choice diet/1800 ml fluid restriction, 1 meal of % documented in the past 72 hr, pt is at moderate nutrition risk    Malnutrition Assessment:  Malnutrition Status:  No malnutrition    Context:  Acute Illness     Estimated Daily Nutrient Needs:  Energy (kcal):  7074-4186 ([place] w/ stress factor 1.0-1.2); Weight Used for Energy Requirements:  Current     Protein (g):  50-60 (1.0-1.2 g/kg); Weight Used for Protein Requirements:  Ideal        Fluid (ml/day):  1500; Method Used for Fluid Requirements:  1 ml/kcal      Nutrition Related Findings:  Na 133, Mag 1.4, Glucose 136      Wounds:  None       Current Nutrition Therapies:    ADULT DIET; Regular; 3 carb choices (45 gm/meal); 1800 ml    Anthropometric Measures:  · Height: 5' 2.01\" (157.5 cm)  · Current Body Weight: 168 lb 14 oz (76.6 kg)   · Admission Body Weight: 172 lb 2.9 oz (78.1 kg) (first measured weight)    · Usual Body Weight: 171 lb 15.3 oz (78 kg) ((12/1/20) per chart review)     · Ideal Body Weight: 110 lbs; % Ideal Body Weight 153.5 %   · BMI: 30.9  · BMI Categories: Obese Class 1 (BMI 30.0-34. 9)       Nutrition Diagnosis:   · Inadequate oral intake related to decreased appetite as evidenced by poor intake prior to admission    Nutrition Interventions:   Food and/or Nutrient Delivery:  Continue Current Diet, Start Oral Nutrition Supplement  Nutrition Education/Counseling:  No recommendation at this time Coordination of Nutrition Care:  Continue to monitor while inpatient    Goals:  Pt will consume greater than 75% of meals and supplements       Nutrition Monitoring and Evaluation:   Behavioral-Environmental Outcomes:  None Identified   Food/Nutrient Intake Outcomes:  Supplement Intake, Food and Nutrient Intake  Physical Signs/Symptoms Outcomes:  Biochemical Data, Weight, GI Status, Hemodynamic Status     Discharge Planning:     Too soon to determine     Electronically signed by Ninoska Welch MS, RD, LD on 7/13/21 at 2:47 PM EDT    Contact: [phone]

## 2021-07-13 NOTE — PROGRESS NOTES
Speech Language Pathology  Facility/Department: [facility] ICU   CLINICAL BEDSIDE SWALLOW EVALUATION    NAME: Chiara Olsen  : 1955  MRN: [id number]    IMPRESSIONS: Chiara Olsen was referred for a bedside swallow evaluation following episode of seizure-like activity. She was admitted to [facility] with chest pressure and shortness of breath. Medical hx includes DM, CAD, HTN, CKD, CHF. She has been seen by SLP during previous admissions; last visit with SLP was MBS completed 2020, revealing normal oropharyngeal swallow/no laryngeal penetration or aspiration. Pt seen for evaluation seated upright in chair, alert, cooperative. Oral mechanism examination WFL/no asymmetry. Noted pt is edentulous. She was seen with her lunch tray of regular and soft solids and thin liquids via cup/straw. Oral stage with slow/adequate mastication, intact AP transit/clearance. Pharyngeal stage appears [place] with adequate swallow initiation/laryngeal elevation and no s/s aspiration. Pt did report sensation of dryness in her throat, ongoing for several weeks, as well as occasional globus sensation. She was encouraged to increase fluid intake and drink liquids with meals to facilitate pharyngeal clearance and comfort with PO intake. Recommend continued regular diet/thin liquids with general aspiration precautions. No further acute SLP needs identified. ADMISSION DATE: 7/10/2021  ADMITTING DIAGNOSIS: has Chest pain; H/O Doppler ultrasound; H/O cardiovascular stress test; H/O cardiovascular stress test; H/O cardiovascular stress test; Incarcerated umbilical hernia; Essential hypertension; Type 2 diabetes mellitus with diabetic polyneuropathy, with long-term current use of insulin ([facility] [address] Tachycardia; Dyslipidemia; Family history of early CAD; NSTEMI (non-ST elevated myocardial infarction) ([address] Abnormal nuclear stress test; ILSA (obstructive sleep apnea); Snoring;  Hypersomnolence; Mild intermittent asthma without complication; Asthma exacerbation attacks; Hypertensive emergency; Hallucination, visual; Severe episode of recurrent major depressive disorder, with psychotic features ([address] CHEKO (generalized anxiety disorder); Auditory hallucinations; Bipolar 1 disorder, depressed, severe ([address] Dyspnea and respiratory abnormalities; Encephalopathy; Syncope; Bipolar 1 disorder ([address] Hyponatremia; Pseudoseizures; and Panic attacks on their problem list.  ONSET DATE: this admission    Recent Chest Xray/CT of Chest: see chart    Date of Eval: 7/13/2021  Evaluating Therapist: ELY Guerrier    Current Diet level:  Current Diet : Regular  Current Liquid Diet : Thin      Primary Complaint  Patient Complaint: reports dry mouth/throat, globus sensation    Pain:  Pain Assessment  Pain Assessment: 0-10  Pain Level: 10  Patient's Stated Pain Goal: No pain  Pain Type: Chronic pain  Pain Location: Generalized  Pain Orientation: Mid, Lower  Pain Descriptors: Discomfort  Pain Frequency: Intermittent  Pain Onset: On-going  Clinical Progression: Not changed  Response to Pain Intervention: Patient Satisfied    Reason for Referral  Matt Oliver was referred for a bedside swallow evaluation to assess the efficiency of her swallow function, identify signs and symptoms of aspiration and make recommendations regarding safe dietary consistencies, effective compensatory strategies, and safe eating environment. Impression  Dysphagia Diagnosis: Swallow function appears grossly intact  Dysphagia Outcome Severity Scale: Level 6: Within functional limits/Modified independence     Treatment Plan  Requires SLP Intervention: No  Duration/Frequency of Treatment: N/A  D/C Recommendations: To be determined       Recommended Diet and Intervention  Diet Solids Recommendation: Regular  Liquid Consistency Recommendation:  Thin  Recommended Form of Meds: PO          Compensatory Swallowing Strategies  Compensatory Swallowing Strategies: Upright as possible for all oral intake;Eat/Feed slowly; Small bites/sips; Alternate solids and liquids; Remain upright for 30-45 minutes after meals    Treatment/Goals  Short-term Goals  Timeframe for Short-term Goals: N/A    General  Chart Reviewed: Yes  Behavior/Cognition: Alert; Cooperative  Respiratory Status: Room air  O2 Device: None (Room air)  Communication Observation: Functional  Follows Directions: Simple  Dentition: Edentulous  Patient Positioning: Upright in chair  Baseline Vocal Quality: Normal  Prior Dysphagia History: history of esophageal dysphagia, no oropharyngeal dysphagia  Consistencies Administered: Reg solid; Dysphagia Minced and Moist (Dysphagia II); Thin - cup; Thin - straw           Vision/Hearing  Hearing  Hearing: Within functional limits    Oral Motor Deficits  Oral/Motor  Oral Motor: Within functional limits    Oral Phase Dysfunction  Oral Phase  Oral Phase: WFL     Indicators of Pharyngeal Phase Dysfunction   Pharyngeal Phase  Pharyngeal Phase: WFL    Prognosis  Prognosis  Barriers/Prognosis Comment: N/A  Individuals consulted  Consulted and agree with results and recommendations: Patient;RN    Education  Patient Education: results, recommendations  Patient Education Response: Verbalizes understanding  Safety Devices in place: Yes  Type of devices:  All fall risk precautions in place       Therapy Time  SLP Individual Minutes  Time In: 1156  Time Out: [address]  Minutes: [address]  7/13/2021 3:42 PM

## 2021-07-13 NOTE — PROGRESS NOTES
Spoke @ length with pts daughters outside of room. They said that her mother is attention seeking & sleeps a lot during the day. The pt often c/o being drowsy d/t her medications to her family, but reported to the physicians today that she has difficulty sleeping. Both daughters stated that she did much better when she was seeing a counselor/psychiatrist regularly and would like to know what they could do to get that set up again. Also, they wanted to speak to the psychiatrist that evaluated her today. Unable to perfect serve DONNA Alexander--will message her in the morning.

## 2021-07-13 NOTE — PROGRESS NOTES
Hospitalist Progress Note      Name:  Javier Rome /Age/Sex: 1955  (63 y.o. female)   MRN & CSN:  [id number] & [id number] Admission Date/Time: 7/10/2021  7:29 PM   Location:  -A PCP: Luz Frey MD         Hospital Day: 4    Assessment and Plan:   Javier Rome is a 63 y.o.  female with a past medical history of multiple past medical history including hypertension, GERD, dyslipidemia, type 2 diabetes, Parkinson's, SAH, seizures, hypothyroidism, anxiety with depression, who presented to the ED on 7/10/2021 on account of chest pain and shortness of breath. 1.  Chest pain:   Improved with sublingual nitro. Off nitro gtt. Pt denies chest pain now. Chart review shows cardiac cath with clean coronaries 2017. Await cardiology evaluation. echo  neg, EF 55%  -unable to complete stress test due to another seizure-like episode. Tender with palpation, possible musculoskeletal.    2.  Hyponatremia:   possibly due to HCTZ and psych meds,  on MIVF, improving slowly, urine lytes ordered  -improved sodium 133. Nephrology recommends to stop HCTZ on discharge. Given salt tablets. Fluid restriction. 3.  Parkinson's:   Continue Sinemet    4. DM type 2  --uncontrolled, a1c 8.1, endo consulted, lantus, SSI, ADA diet    5. Seizure like activity  --pt seen by neuro, h/o pseudoseizures,  reported generalized tonic-clonic seizure x12 minutes based on description of events suspect pseudoseizure, no further work-up needed as she has a known history of pseudoseizure versus epileptic seizures, stay on home dose of Keppra (1500mg BID), CT head was nonacute, nonfocal neurological exam, do not need EEG at this time. Psychiatry is on the case we are appreciative of their recommendations. No further neurological recommendations at this time.  -Today seizure-like activity appeared to resolve with normal saline flush as per nurse.     6.   Bipolar 1 disorder, depressed, severe; general anxiety disorder with panic attacks  continue home meds, await psych recs regarding medications given continued pseudoseizures due to anxiety and hyponatremia, psych meds possibly contributing to hyponatremia,  -Psych increase Seroquel and added trazodone. Recommends outpatient follow-up with [facility] mental health services. 5.  Dyslipidemia:   Continue Lipitor 40 mg nightly    HTN  --continue home meds. On losartan and Toprol. Elevated lactate  --MIVF, no evidence of infection clinically,    -lactate improved to 0.8. Hypomagnesemia  -Magnesium 1.4. Start replacement protocol. Diet ADULT DIET; Regular; 3 carb choices (45 gm/meal); 1800 ml   DVT Prophylaxis [x] Lovenox, []  Heparin, [] SCDs, [] Ambulation   GI Prophylaxis [x] PPI,  [] H2 Blocker,  [] Carafate,  [] Diet/Tube Feeds   Code Status Full Code   Disposition Await cardiac work up   MDM [] Low, [] Moderate,[x]  High  Patient's risk as above due to status     History of Present Illness:     Chief Complaint:     Ariana Jorgensen is a 63 y.o.  female with a past medical history of the above who presents to the ED on account of chest pain. She states she has chest pain and shortness of breath, it is in the epigastric area. Feels like a dead weight. She went for stress test and had seizure like activity. As per nurse patient improved with saline flush. As per hospitalist physician at rapid response there was no post ictal phase for patient. Ten point ROS reviewed negative, unless as noted above    Objective: Intake/Output Summary (Last 24 hours) at 7/13/2021 1120  Last data filed at 7/12/2021 1635  Gross per 24 hour   Intake 2373.4 ml   Output --   Net 2373.4 ml      Vitals:   Vitals:    07/13/21 0730   BP: (!) 162/80   Pulse: 95   Resp: 19   Temp: 97.8 °F (36.6 °C)   SpO2:      Physical Exam:   GEN: Awake female, no acute distress, laying in bed  HEENT: NCAT  RESP: Clear lung fields bilaterally.  Symmetric chest movement while on room air. CVS: RRR, S1, S2  Chest: Has tenderness over sternum  GI/: Abdomen is soft, has epigastric tenderness to palpation  MSK: No gross joint deformities. No tenderness  SKIN: Normal coloration, warm, dry. NEURO: Cranial nerves appear grossly intact, normal speech, no lateralizing weakness. PSYCH:  Affect appropriate.     Medications:   Medications:    magnesium oxide  400 mg Oral BID    losartan  100 mg Oral Daily    sodium chloride  1 g Oral TID WC    levETIRAcetam  750 mg Oral BID    amitriptyline  25 mg Oral Nightly    busPIRone  20 mg Oral TID    carbidopa-levodopa  1 tablet Oral Nightly    levothyroxine  75 mcg Oral QAM AC    metoprolol succinate  50 mg Oral Daily    montelukast  10 mg Oral Nightly    NIFEdipine  60 mg Oral Daily    OXcarbazepine  300 mg Oral BID    pantoprazole  40 mg Oral Daily    QUEtiapine  25 mg Oral Nightly    QUEtiapine  12.5 mg Oral Daily    sodium chloride flush  5-40 mL Intravenous 2 times per day    aspirin  81 mg Oral Daily    atorvastatin  40 mg Oral Nightly    enoxaparin  40 mg Subcutaneous Daily    insulin glargine  20 Units Subcutaneous Nightly    insulin lispro  0-6 Units Subcutaneous TID WC    insulin lispro  0-3 Units Subcutaneous 2 times per day    lidocaine PF  5 mL Intradermal Once    sodium chloride flush  5-40 mL Intravenous 2 times per day      Infusions:    sodium chloride      sodium chloride      sodium chloride 50 mL/hr at 07/12/21 1729     PRN Meds: technetium sestamibi, 10 millicurie, ONCE PRN  technetium sestamibi, 30 millicurie, ONCE PRN  hydrALAZINE, 20 mg, Q4H PRN  fluticasone-umeclidin-vilant, 1 puff, Daily PRN  HYDROcodone-acetaminophen, 1 tablet, Q6H PRN  hydrOXYzine, 25 mg, Q6H PRN  sodium chloride flush, 5-40 mL, PRN  sodium chloride, 25 mL, PRN  ondansetron, 4 mg, Q8H PRN   Or  ondansetron, 4 mg, Q6H PRN  acetaminophen, 650 mg, Q6H PRN   Or  acetaminophen, 650 mg, Q6H PRN  polyethylene glycol, 17 g, Daily PRN  morphine, 4 mg, Q4H PRN  sodium chloride flush, 5-40 mL, PRN  sodium chloride, 25 mL, PRN  nitroGLYCERIN, 0.4 mg, Q5 Min PRN  diphenhydrAMINE, 50 mg, Q6H PRN      Recent Labs     07/10/21  2058 07/12/21  0527 07/13/21  0514   WBC 9.0 9.8 8.2   HGB 15.1 12.3* 11.9*   HCT 42.2 35.8* 34.3*    249 262      Recent Labs     07/11/21 2028 07/12/21  0527 07/13/21  0514   * 128* 133*   K 3.8 3.7 3.5   CL 89* 91* 96*   CO2 25 26 23   BUN 10 8 7   CREATININE 0.5* 0.5* 0.5*     Recent Labs     07/10/21  2200 07/13/21  0514   AST 32 23   ALT 25 15   BILIDIR  --  0.2   BILITOT 0.3 0.4   ALKPHOS 82 63     No results for input(s): INR in the last 72 hours.   Recent Labs     07/10/21  2200 07/11/21  0302 07/11/21  1314 07/11/21 2028   CKTOTAL  --   --  32 28   TROPONINT <0.010 <0.010  --   --          Electronically signed by Betzy Rolle MD on 7/13/2021 at 11:20 AM

## 2021-07-13 NOTE — PROGRESS NOTES
Patient continuing to get out of bed unassisted, removing blood pressure cuff and pulse ox. Instructed patient on use of call light and having nurse at bedside when walking around room, and importance of staying attached to all monitors, patient voices understanding, but continues to remove equipment and get up unassisted. Will continue to reapply as patient takes off. Patient returned back to bed, bed alarm active, call light within reach. Patient voices to needs at this time.

## 2021-07-13 NOTE — PLAN OF CARE
Nutrition Problem #1: Inadequate oral intake  Intervention: Food and/or Nutrient Delivery: Continue Current Diet, Start Oral Nutrition Supplement  Nutritional Goals: Pt will consume greater than 75% of meals and supplements

## 2021-07-13 NOTE — CONSULTS
happy with her current outpatient mental health provider. Pt was polite and cordial during the interview process.     Past Psychiatric History:   [facility] 9/10/2020 self harm thoughts  [facility] 7/2020 suicidal thoughts after her ex BF Starla Lutz began stalking her  [place] several years ago suicidal thoughts    Psychiatric Meds hx:   Abilify - severe shaking and restlessness,  Prozac - hallucinations  Zoloft - severe high blood pressure  Cymbalta - 30 mg and 60 mg stopped recently  Seroquel - started in 2020, continues  Trazodone 100 mg - stopped 2015, was effective though  Buspirone 20 mg tid, continues and is helpful  Ativan 1.0 mg bid stopped 7/30/2015  Hydroxyzine (atarax) 50 mg bid  Oxcarbazepine 450 mg bid - helpful as mood stabilizer    Family Psychiatric History:   Family History   Problem Relation Age of Onset    Stroke Mother     Other Mother         Seizures    Diabetes Mother         Borderline Diabetes    High Blood Pressure Mother     Arthritis Mother     Early Death Mother 61        Stroke    Depression Mother     Heart Disease Mother     High Cholesterol Mother     Miscarriages / Stillbirths Mother     Heart Disease Father         Massive Heart Attack    High Blood Pressure Father     Arthritis Father     High Cholesterol Father     Hearing Loss Sister     Heart Failure Sister     High Blood Pressure Sister     Arthritis Sister     Heart Disease Sister     Cancer Sister     Cancer Brother         Liver And Colon Cancer    Early Death Brother 37        Liver And Colon Cancer    Heart Disease Brother         Heart Stents    High Blood Pressure Brother     High Cholesterol Brother     Early Death Brother         65 Years Old,Hit [employer]    Colon Cancer Brother     Heart Disease Brother     Early Death Brother 61        Heart Attack    Heart Disease Brother         Heart Attack    Mental Illness Daughter         Bipolar    Depression Daughter     Anxiety Disorder Daughter     Bipolar Disorder Daughter     Other Daughter         Stomach And Bowel Problems    Other Son         Seizures        Allergies:   Allergies   Allergen Reactions    Abilify [Aripiprazole]      \"Severe Shaking And Restlessness\"    Advil [Ibuprofen Micronized] Palpitations     \"Severe High Blood Pressure\"    Augmentin [Amoxicillin-Pot Clavulanate] Itching and Rash    Bee Venom Swelling     Redness    Ciprofloxacin Itching and Rash    Codeine      \"Severe Abdominal Cramping\"    Darvocet [Propoxyphene N-Acetaminophen] Palpitations     \"Severe High Blood Pressure\"    Darvon [Propoxyphene Hcl] Palpitations     \"Severe High Blood Pressure\"    Decadron [Dexamethasone] Other (See Comments)     seizure  Seizures    Ditropan [Oxybutynin Chloride] Palpitations     \"Severe High Blood Pressure\"    Fioricet [Butalbital-Apap-Caffeine] Palpitations     \"Severe High Blood Pressure\"    Fiorinal-Codeine #3 [Butalbital-Asa-Caff-Codeine]      \"Severe Stomach Cramps\"    Flagyl [Metronidazole] Diarrhea     \"Severe Diarrhea And Cramping\"    Naproxen Palpitations     \"Severe High Blood Pressure\"    Other      \"Allergic To Spider Bites Causing Blackness Of Skin, Severe Itching And Pain\"                                                  \"Allergic To Powder In Gloves Causing Severe Redness And Itching\"    Prozac [Fluoxetine Hcl]      \"Hallucinations\"    Robaxin [Methocarbamol] Palpitations     \"Severe High Blood Pressure\"    Ultram [Tramadol]      \"Severe Stomach Pain\"    Zoloft Palpitations     \"Sever High Blood Pressure\"    Butalbital-Aspirin-Caffeine Other (See Comments)     \"severe stomach pain\"    Coreg [Carvedilol] Other (See Comments)     \"spikes my BP severely and send me into a severe anxiety attack\"    Fluoxetine Other (See Comments)     hallucinations    Oxybutynin Chloride Other (See Comments)     Raises bp    Percocet [Oxycodone-Acetaminophen]      \"knocked me out for 12 hrs\"    Sertraline Other (See Comments)     hallucinations    Tape [employer]]      Patient states it tears her skin, including band aids    Reglan [Metoclopramide] Other (See Comments)     \"makes me talk like a baby, but if I take benadryl with it it's fine\"        OBJECTIVE  Vital Signs:  Vitals:    07/13/21 1500   BP: (!) 140/83   Pulse: 79   Resp: 23   Temp:    SpO2:        Labs:  Recent Results (from the past 48 hour(s))   Lactate, Sepsis    Collection Time: 07/11/21  3:38 PM   Result Value Ref Range    Lactic Acid, Sepsis 2.0 (HH) 0.5 - 1.9 mMOL/L   POCT Glucose    Collection Time: 07/11/21  4:59 PM   Result Value Ref Range    POC Glucose 134 (H) 70 - 99 MG/DL   Basic metabolic panel    Collection Time: 07/11/21  8:28 PM   Result Value Ref Range    Sodium 125 (L) 135 - 145 MMOL/L    Potassium 3.8 3.5 - 5.1 MMOL/L    Chloride 89 (L) 99 - 110 mMol/L    CO2 25 21 - 32 MMOL/L    Anion Gap 11 4 - 16    BUN 10 6 - 23 MG/DL    CREATININE 0.5 (L) 0.6 - 1.1 MG/DL    Glucose 134 (H) 70 - 99 MG/DL    Calcium 9.4 8.3 - 10.6 MG/DL    GFR Non-African American >60 >60 mL/min/1.73m2    GFR African American >60 >60 mL/min/1.73m2   CK    Collection Time: 07/11/21  8:28 PM   Result Value Ref Range    Total CK 28 26 - 140 IU/L   POCT Glucose    Collection Time: 07/11/21  9:01 PM   Result Value Ref Range    POC Glucose 151 (H) 70 - 99 MG/DL   Electrolytes urine random    Collection Time: 07/11/21 11:49 PM   Result Value Ref Range    Sodium, Ur 98 35 - 167 MMOL/L    Potassium, Ur 48.0 22 - 119 MMOL/L    Chloride 90 43 - 210 MMOL/L   POCT Glucose    Collection Time: 07/12/21  2:09 AM   Result Value Ref Range    POC Glucose 148 (H) 70 - 99 MG/DL   CBC    Collection Time: 07/12/21  5:27 AM   Result Value Ref Range    WBC 9.8 4.0 - 10.5 K/CU MM    RBC 4.36 4.2 - 5.4 M/CU MM    Hemoglobin 12.3 (L) 12.5 - 16.0 GM/DL    Hematocrit 35.8 (L) 37 - 47 %    MCV 82.1 78 - 100 FL    MCH 28.2 27 - 31 PG    MCHC 34.4 32.0 - 36.0 %    RDW 11.2 (L) 11.7 - 14.9 %    Platelets 665 179 - 583 K/CU MM    MPV 8.4 7.5 - 11.1 FL   Basic metabolic panel    Collection Time: 07/12/21  5:27 AM   Result Value Ref Range    Sodium 128 (L) 135 - 145 MMOL/L    Potassium 3.7 3.5 - 5.1 MMOL/L    Chloride 91 (L) 99 - 110 mMol/L    CO2 26 21 - 32 MMOL/L    Anion Gap 11 4 - 16    BUN 8 6 - 23 MG/DL    CREATININE 0.5 (L) 0.6 - 1.1 MG/DL    Glucose 146 (H) 70 - 99 MG/DL    Calcium 9.5 8.3 - 10.6 MG/DL    GFR Non-African American >60 >60 mL/min/1.73m2    GFR African American >60 >60 mL/min/1.73m2   EKG 12 lead    Collection Time: 07/12/21  6:11 AM   Result Value Ref Range    Ventricular Rate 81 BPM    Atrial Rate 81 BPM    P-R Interval 178 ms    QRS Duration 116 ms    Q-T Interval 416 ms    QTc Calculation (Bazett) 483 ms    P Axis 25 degrees    R Axis -46 degrees    T Axis 28 degrees    Diagnosis       Normal sinus rhythm  Left anterior fascicular block  Left ventricular hypertrophy with QRS widening  Abnormal ECG  When compared with ECG of 10-JUL-2021 21:17,  No significant change was found     Lactic acid, plasma    Collection Time: 07/12/21 11:30 AM   Result Value Ref Range    Lactate 2.4 (HH) 0.4 - 2.0 mMOL/L   POCT Glucose    Collection Time: 07/12/21 12:57 PM   Result Value Ref Range    POC Glucose 163 (H) 70 - 99 MG/DL   Lactic Acid, Plasma    Collection Time: 07/12/21  4:08 PM   Result Value Ref Range    Lactate 2.0 0.4 - 2.0 mMOL/L   POCT Glucose    Collection Time: 07/12/21  4:53 PM   Result Value Ref Range    POC Glucose 154 (H) 70 - 99 MG/DL   POCT Glucose    Collection Time: 07/12/21  8:16 PM   Result Value Ref Range    POC Glucose 214 (H) 70 - 99 MG/DL   POCT Glucose    Collection Time: 07/13/21  2:05 AM   Result Value Ref Range    POC Glucose 141 (H) 70 - 99 MG/DL   Basic metabolic panel    Collection Time: 07/13/21  5:14 AM   Result Value Ref Range    Sodium 133 (L) 135 - 145 MMOL/L    Potassium 3.5 3.5 - 5.1 MMOL/L    Chloride 96 (L) 99 - 110 mMol/L    CO2 23 21 - 32 MMOL/L    Anion Gap 14 4 - 16    BUN 7 6 - 23 MG/DL    CREATININE 0.5 (L) 0.6 - 1.1 MG/DL    Glucose 136 (H) 70 - 99 MG/DL    Calcium 9.4 8.3 - 10.6 MG/DL    GFR Non-African American >60 >60 mL/min/1.73m2    GFR African American >60 >60 mL/min/1.73m2   T4, free    Collection Time: 07/13/21  5:14 AM   Result Value Ref Range    T4 Free 1.36 0.9 - 1.8 NG/DL   CBC Auto Differential    Collection Time: 07/13/21  5:14 AM   Result Value Ref Range    WBC 8.2 4.0 - 10.5 K/CU MM    RBC 4.19 (L) 4.2 - 5.4 M/CU MM    Hemoglobin 11.9 (L) 12.5 - 16.0 GM/DL    Hematocrit 34.3 (L) 37 - 47 %    MCV 81.9 78 - 100 FL    MCH 28.4 27 - 31 PG    MCHC 34.7 32.0 - 36.0 %    RDW 11.3 (L) 11.7 - 14.9 %    Platelets 220 909 - 511 K/CU MM    MPV 8.6 7.5 - 11.1 FL    Differential Type AUTOMATED DIFFERENTIAL     Segs Relative 48.8 36 - 66 %    Lymphocytes % 31.7 24 - 44 %    Monocytes % 14.7 (H) 0 - 4 %    Eosinophils % 4.0 (H) 0 - 3 %    Basophils % 0.6 0 - 1 %    Segs Absolute 4.0 K/CU MM    Lymphocytes Absolute 2.6 K/CU MM    Monocytes Absolute 1.2 K/CU MM    Eosinophils Absolute 0.3 K/CU MM    Basophils Absolute 0.1 K/CU MM    Nucleated RBC % 0.0 %    Total Nucleated RBC 0.0 K/CU MM    Total Immature Neutrophil 0.02 K/CU MM    Immature Neutrophil % 0.2 0 - 0.43 %   Magnesium    Collection Time: 07/13/21  5:14 AM   Result Value Ref Range    Magnesium 1.4 (L) 1.8 - 2.4 mg/dl   Lactic acid, plasma    Collection Time: 07/13/21  5:14 AM   Result Value Ref Range    Lactate 0.8 0.4 - 2.0 mMOL/L   Hepatic function panel    Collection Time: 07/13/21  5:14 AM   Result Value Ref Range    Albumin 4.4 3.4 - 5.0 GM/DL    Total Bilirubin 0.4 0.0 - 1.0 MG/DL    Bilirubin, Direct 0.2 0.0 - 0.3 MG/DL    Bilirubin, Indirect 0.2 0 - 0.7 MG/DL    Alkaline Phosphatase 63 40 - 129 IU/L    AST 23 15 - 37 IU/L    ALT 15 10 - 40 U/L    Total Protein 6.1 (L) 6.4 - 8.2 GM/DL       Review of Systems:  Reports of no current cardiovascular, respiratory, gastrointestinal, genitourinary, integumentary, neurological, muscuoskeletal, or immunological symptoms today. PSYCHIATRIC: See HPI above. PSYCHIATRIC EXAMINATION / MENTAL STATUS EXAM    CONSTITUTIONAL:    Vitals:   Vitals:    07/13/21 1500   BP: (!) 140/83   Pulse: 79   Resp: 23   Temp:    SpO2:       General appearance: [x] appears age, []  appears older than stated age,               [x]  adequately dressed and groomed, [] disheveled,               [x]  in no acute distress, [] appears mildly distressed, [] other           MUSCULOSKELETAL:   Gait:   [] normal, [] antalgic, [] unsteady, [x] gait not evaluated   Station:             [] erect, [] sitting, [x] recumbent, [] other        Strength/tone:  [x] muscle strength and tone appear consistent with age and condition     [] atrophy      [] abnormal movements  PSYCHIATRIC:    Appearance: Appears stated age. Alert and oriented to person, place, time & situation. No acute distress. Adequate grooming and hygiene. Good eye contact. No prominent physical abnormalities. Attitude: Manner is cooperative and pleasant  Motor: No psychomotor agitation, retardation or abnormal movements noted  Speech: Clearly articulated; normal rate, volume, tone & amount. Language: intact understanding and production  Mood:  Affect: euthymic, full range, non-labile, congruent with mood and content of speech  Thought Production: Spontaneous. Thought Form: Coherent, linear, logical & goal-directed. No tangentiality or circumstantiality. No flight of ideas or loosening of associations. Thought Content/Perceptions: No REYNALDO, no AVH, no delusion  Insight:  Judgment:  Memory: Immediate, recent, and remote appear intact, though not formally tested. Attention: maintained throughout interview  Fund of knowledge: Average  Gait/Balance: not assessed    Impression:   Pseudoseizures  Bipolar 1 disorder, depressed, severe  CHEKO with panic attacks    Problem List:   Chest pain    Plan:  1.  Increase Seroquel from 12.5 mg am and 25 mg pm to 25 mg BID  2. Add trazodone 50 mg HS PRN  3. Continue oxcarbazepine for mood stabilization and buspirone for anxiety  4. For procedures, can pre-medicate patient with 5-10 mg diazepam IM  5. Stable from a psychiatric standpoint for continued outpatient follow up with Mary Slater NP at [facility]. Please confirm that patient has an appointment after discharge. 6. Will follow loosely    Thank you for the interesting consult.     Electronically signed by WENDIE Carvalho CNP on 7/13/2021 at 3:31 PM

## 2021-07-13 NOTE — PROGRESS NOTES
recorded.     Intake/Output Summary (Last 24 hours) at 7/13/2021 0807  Last data filed at 7/12/2021 1635  Gross per 24 hour   Intake 2373.4 ml   Output --   Net 2373.4 ml       CBC:   Recent Labs     07/10/21  2058 07/12/21  0527 07/13/21  0514   WBC 9.0 9.8 8.2   HGB 15.1 12.3* 11.9*    249 262       BMP:    Recent Labs     07/11/21 2028 07/12/21 0527 07/13/21 0514   * 128* 133*   K 3.8 3.7 3.5   CL 89* 91* 96*   CO2 25 26 23   BUN 10 8 7   CREATININE 0.5* 0.5* 0.5*   GLUCOSE 134* 146* 136*     Hepatic:   Recent Labs     07/10/21  2200 07/13/21  0514   AST 32 23   ALT 25 15   BILITOT 0.3 0.4   ALKPHOS 82 63       Objective:   Vitals: BP (!) 162/80   Pulse 95   Temp 97.8 °F (36.6 °C) (Oral)   Resp 19   Ht 5' 2\" (1.575 m)   Wt 168 lb 12.8 oz (76.6 kg)   SpO2 94%   BMI 30.87 kg/m²   General appearance:  in no acute distress  HEENT: normocephalic, atraumatic,   Neck: supple, trachea midline  Lungs:  breathing comfortably   Heart[de-identified] regular rate and rhythm  Extremities: extremities atraumatic, no cyanosis or edema      Assessment and Plan:     Patient Active Problem List     Diagnosis Date Noted    Severe episode of recurrent major depressive disorder, with psychotic features ([address] 09/04/2020    Auditory hallucinations 09/04/2020    Abnormal nuclear stress test 08/15/2017    Dyslipidemia      Family history of early CAD      Generalized anxiety disorder with panic attacks 09/04/2020    Bipolar 1 disorder ([address] 04/27/2021    Encephalopathy 04/26/2021    Syncope 04/26/2021    Dyspnea and respiratory abnormalities 12/01/2020    Bipolar I disorder with depression, severe ([address] 09/04/2020    Hallucination, visual 09/02/2020    Hypertensive emergency 07/15/2020    Asthma exacerbation attacks 01/01/2019    Mild intermittent asthma without complication 64/01/0660    ILSA (obstructive sleep apnea) 10/27/2017    Snoring 10/27/2017    Hypersomnolence 10/27/2017    NSTEMI (non-ST elevated myocardial infarction) ([facility] 08/14/2017    Tachycardia 03/30/2016    Essential hypertension      Type 2 diabetes mellitus with diabetic polyneuropathy, with long-term current use of insulin (HCC)      Incarcerated umbilical hernia 92/84/9253    H/O cardiovascular stress test 05/06/2014    H/O Doppler ultrasound      H/O cardiovascular stress test      H/O cardiovascular stress test      Chest pain 09/16/2013      -Hyponatremia; sodium 122 on admission//ddx: volume depletion vs side effect of hctz and other antipsychotics  -Chest pain  -Seizure like activity  -HTN  -Mood disorder and on many antipsychotics     Plan:  Sodium better at 133, continue supportive mgmt  No more HCTZ on dc please  Limit oral fluid to 1800ml per day, continue good nutrtion                         Electronically signed by Lui Noble DO on 7/13/2021 at 8:07 MD Esteban Lopez DO [address],  [facility]  Formerly [facility], [place] [address]  PHONE: [phone]  FAX: [phone]

## 2021-07-13 NOTE — PROGRESS NOTES
CARDIOLOGY PROGRESS NOTE                                                  Name:  Bon Bar /Age/Sex: 1955  (63 y.o. female)   MRN & CSN:  [id number] & [id number] Admission Date/Time: 7/10/2021  7:29 PM   Location:  -A PCP: Ashleigh Franco MD         Admit Date:  7/10/2021  Hospital Day: 4      SUBJECTIVE:   Seen patient as follow up as consultation for chest pain    No chest pain. No shortness of breath  No palpations    TELEMETRY: Sinus       Intake/Output Summary (Last 24 hours) at 2021 1048  Last data filed at 2021 1635  Gross per 24 hour   Intake 2373.4 ml   Output --   Net 2373.4 ml       Assessment/Plan:         1. Atypical chest pain: [facility] Nonobstructive CAD in 2017. obtain stress MPI for his stratification. Echocardiogram to rule out structural heart disease. 2. Severe hyponatremia with sodium from 123-128. Possibly secondary to hydrochlorothiazide and psychiatric medications. Nephrology is following. On IV fluids. Off HCTZ   3. 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 4. Mood disorder: Patient is on psych medications. Mood is stable at this point. 5. hyperlipidemia: Continue with Lipitor 40 mg daily  6. Essential hypertension: Blood pressure elevated. Hold off on hydrochlorothiazide. Continue with losartan and Toprol-XL.          Past medical history:    has a past medical history of Abnormal EKG, Acid reflux, Anemia, Anesthesia, Anginal pain ([address] Anxiety, Arthritis, Asthma, CAD (coronary artery disease), Cerebral artery occlusion with cerebral infarction ([address] CHF (congestive heart failure) ([address] Chronic back pain, Chronic kidney disease, DDD (degenerative disc disease), cervical, Depression, Diabetes mellitus ([address] Diabetic neuropathy ([address] Dizziness, Dry skin, Enlarged ureter, Fatty liver, Fibrocystic breast, Gout, H/O cardiac catheterization, H/O cardiovascular stress test, H/O cardiovascular stress test, H/O cardiovascular stress test, H/O Doppler ultrasound, H/O echocardiogram, H/O echocardiogram, History of Holter monitoring, [place] (hard of hearing), Hx of cardiovascular stress test, Hx of motion sickness, HX OTHER MEDICAL, Hyperlipidemia, Hypertension, IBS (irritable bowel syndrome), Incisional hernia, Kidney stones, Migraines, Nausea & vomiting, Other specified disorder of skin, Panic attacks, Pneumonia, Pseudoseizures, Restless leg, Shortness of breath, Sleep apnea, Staph infection, Thyroid disease, Tremor, Urinary incontinence, UTI (urinary tract infection), UTI (urinary tract infection), Vertigo, and Wears glasses. Past surgical history:   has a past surgical history that includes Carpal tunnel release (Right, 1999); Diagnostic Cardiac Cath Lab Procedure (01/11/2010); Dental surgery; Colonoscopy (Last Done 6-13); Endoscopy, colon, diagnostic (Several ); Bladder surgery (1970's Or 1980's); Lithotripsy (2011); Breast biopsy (Right, 1980's); Breast surgery (Left, 1990's); hernia repair (0747'H); hernia repair (1970's); Cholecystectomy (1970's); Appendectomy (1970's); Hysterectomy, total abdominal (1987); Tubal ligation (1978); Esophagus dilation (1980's And 1990's); Knee arthroscopy (Right, 1999); joint replacement (2008); other surgical history (06 13 2014); fracture surgery (1974 Or 1975); Cardiac catheterization (10-18-06); and Cardiac catheterization. Social History:   reports that she has never smoked. She has never used smokeless tobacco. She reports that she does not drink alcohol and does not use drugs. Family history:  family history includes Anxiety Disorder in her daughter;  Arthritis in her father, mother, and sister; Cancer in her brother and sister; Colon Cancer in her brother; Depression in her daughter and mother; Diabetes in her mother; Early Death in her brother; Early Death (age of onset: 37) in her brother; Early Death (age of onset: 61) in her mother; Early Death (age of onset: 61) in her brother; Hearing Loss in her sister; Heart Disease in her brother, brother, brother, father, mother, and sister; Heart Failure in her sister; High Blood Pressure in her brother, father, mother, and sister; High Cholesterol in her brother, father, and mother; Mental Illness in her daughter; [place]elleen Job / [place] in her mother; Other in her daughter, mother, and son; Stroke in her mother. OBJECTIVE:     BP (!) 162/80   Pulse 95   Temp 97.8 °F (36.6 °C) (Oral)   Resp 19   Ht 5' 2\" (1.575 m)   Wt 168 lb 12.8 oz (76.6 kg)   SpO2 94%   BMI 30.87 kg/m²       Intake/Output Summary (Last 24 hours) at 7/13/2021 1048  Last data filed at 7/12/2021 1635  Gross per 24 hour   Intake 2373.4 ml   Output --   Net 2373.4 ml       Physical Exam:    Constitutional:  Well developed, Well nourished, No acute distress, Non-toxic appearance. HENT:  Normocephalic, Atraumatic, Bilateral external ears normal, Oropharynx moist, No oral exudates, Nose normal. Neck- Normal range of motion, No tenderness, Supple, No stridor. Eyes:  EOMI, Conjunctiva normal, No discharge. Respiratory:  Normal breath sounds, No respiratory distress, No wheezing, No chest tenderness. ,no use of accessory muscles, diaphragm movement is normal  Cardiovascular S1-S2 No murmurs, added sounds. Normal rate rhythm. No rubs gallops. Carotid pulses and amplitude are normal no bruit noted. Pedal pulses normal femoral pulses normal.  No pedal edema  GI:  Bowel sounds normal, Soft, No tenderness  : No CVA tenderness. Musculoskeletal: No edema, No tenderness, No cyanosis, No clubbing. Back- No tenderness. Integument:  Warm, Dry, No erythema, No rash. Lymphatic:  No lymphadenopathy noted. Neurologic:  Alert & oriented x 3, No focal deficits noted.    Psychiatric:  Affect normal, Judgment normal, Mood normal.           MEDICATIONS:     magnesium oxide  400 mg Oral BID    losartan  100 mg Oral Daily    sodium chloride  1 g Oral TID WC    levETIRAcetam  750 mg Oral BID  amitriptyline  25 mg Oral Nightly    busPIRone  20 mg Oral TID    carbidopa-levodopa  1 tablet Oral Nightly    levothyroxine  75 mcg Oral QAM AC    metoprolol succinate  50 mg Oral Daily    montelukast  10 mg Oral Nightly    NIFEdipine  60 mg Oral Daily    OXcarbazepine  300 mg Oral BID    pantoprazole  40 mg Oral Daily    QUEtiapine  25 mg Oral Nightly    QUEtiapine  12.5 mg Oral Daily    sodium chloride flush  5-40 mL Intravenous 2 times per day    aspirin  81 mg Oral Daily    atorvastatin  40 mg Oral Nightly    enoxaparin  40 mg Subcutaneous Daily    insulin glargine  20 Units Subcutaneous Nightly    insulin lispro  0-6 Units Subcutaneous TID WC    insulin lispro  0-3 Units Subcutaneous 2 times per day    lidocaine PF  5 mL Intradermal Once    sodium chloride flush  5-40 mL Intravenous 2 times per day      sodium chloride      sodium chloride      sodium chloride 50 mL/hr at 07/12/21 1729     technetium sestamibi, technetium sestamibi, hydrALAZINE, fluticasone-umeclidin-vilant, HYDROcodone-acetaminophen, hydrOXYzine, sodium chloride flush, sodium chloride, ondansetron **OR** ondansetron, acetaminophen **OR** acetaminophen, polyethylene glycol, morphine, sodium chloride flush, sodium chloride, nitroGLYCERIN, diphenhydrAMINE  Allergies   Allergen Reactions    Abilify [Aripiprazole]      \"Severe Shaking And Restlessness\"    Advil [Ibuprofen Micronized] Palpitations     \"Severe High Blood Pressure\"    Augmentin [Amoxicillin-Pot Clavulanate] Itching and Rash    Bee Venom Swelling     Redness    Ciprofloxacin Itching and Rash    Codeine      \"Severe Abdominal Cramping\"    Darvocet [Propoxyphene N-Acetaminophen] Palpitations     \"Severe High Blood Pressure\"    Darvon [Propoxyphene Hcl] Palpitations     \"Severe High Blood Pressure\"    Decadron [Dexamethasone] Other (See Comments)     seizure  Seizures    Ditropan [Oxybutynin Chloride] Palpitations     \"Severe High Blood Pressure\"    Fioricet [Butalbital-Apap-Caffeine] Palpitations     \"Severe High Blood Pressure\"    Fiorinal-Codeine #3 [Butalbital-Asa-Caff-Codeine]      \"Severe Stomach Cramps\"    Flagyl [Metronidazole] Diarrhea     \"Severe Diarrhea And Cramping\"    Naproxen Palpitations     \"Severe High Blood Pressure\"    Other      \"Allergic To Spider Bites Causing Blackness Of Skin, Severe Itching And Pain\"                                                  \"Allergic To Powder In Gloves Causing Severe Redness And Itching\"    Prozac [Fluoxetine Hcl]      \"Hallucinations\"    Robaxin [Methocarbamol] Palpitations     \"Severe High Blood Pressure\"    Ultram [Tramadol]      \"Severe Stomach Pain\"    Zoloft Palpitations     \"Sever High Blood Pressure\"    Butalbital-Aspirin-Caffeine Other (See Comments)     \"severe stomach pain\"    Coreg [Carvedilol] Other (See Comments)     \"spikes my BP severely and send me into a severe anxiety attack\"    Fluoxetine Other (See Comments)     hallucinations    Oxybutynin Chloride Other (See Comments)     Raises bp    Percocet [Oxycodone-Acetaminophen]      \"knocked me out for 12 hrs\"    Sertraline Other (See Comments)     hallucinations    Tape [Adhesive Tape]      Patient states it tears her skin, including band aids    Reglan [Metoclopramide] Other (See Comments)     \"makes me talk like a baby, but if I take benadryl with it it's fine\"       Lab Data:  CBC:   Recent Labs     07/10/21  2058 07/12/21  0527 07/13/21  0514   WBC 9.0 9.8 8.2   HGB 15.1 12.3* 11.9*   HCT 42.2 35.8* 34.3*   MCV 80.7 82.1 81.9    249 262     BMP:   Recent Labs     07/11/21 2028 07/12/21 0527 07/13/21  0514   * 128* 133*   K 3.8 3.7 3.5   CL 89* 91* 96*   CO2 25 26 23   BUN 10 8 7   CREATININE 0.5* 0.5* 0.5*     LIVER PROFILE:   Recent Labs     07/10/21  2200 07/13/21  0514   AST 32 23   ALT 25 15   LIPASE 18  --    BILIDIR  --  0.2   BILITOT 0.3 0.4   ALKPHOS [phone] Brady Fu MD, MD 7/13/2021 10:48 AM

## 2021-07-13 NOTE — PROGRESS NOTES
Pt in Nuclear Medicine for stress test. RR called do to pt having seizure-like activity. Arrived to procedure room to find pt having jerking movements, but talking. Dr. Soto Fruits Dr. Duncan Galeas present. Nurses at bedside stated that pt could feel it coming on & episode lasted for approx. 7 mins. Pt told this nurse that she started feeling panicked & knew it was about to happen. Pt transported back to room. Slightly hypertensive, but otherwise stable. Pt oriented x4.

## 2021-07-13 NOTE — CARE COORDINATION
I attempted to see patient, she is out of the room at this time. Will attempt again at a different time.

## 2021-07-14 ENCOUNTER — APPOINTMENT (OUTPATIENT)
Dept: NUCLEAR MEDICINE | Age: 66
DRG: 305 | End: 2021-07-14
Payer: MEDICARE

## 2021-07-14 LAB
ANION GAP SERPL CALCULATED.3IONS-SCNC: 10 MMOL/L (ref 4–16)
BASOPHILS ABSOLUTE: 0.1 K/CU MM
BASOPHILS RELATIVE PERCENT: 0.7 % (ref 0–1)
BUN BLDV-MCNC: 6 MG/DL (ref 6–23)
CALCIUM SERPL-MCNC: 9.3 MG/DL (ref 8.3–10.6)
CHLORIDE BLD-SCNC: 103 MMOL/L (ref 99–110)
CO2: 25 MMOL/L (ref 21–32)
CREAT SERPL-MCNC: 0.5 MG/DL (ref 0.6–1.1)
D DIMER: <200 NG/ML(DDU)
DIFFERENTIAL TYPE: ABNORMAL
EKG ATRIAL RATE: 77 BPM
EKG ATRIAL RATE: 81 BPM
EKG DIAGNOSIS: NORMAL
EKG DIAGNOSIS: NORMAL
EKG P AXIS: 20 DEGREES
EKG P AXIS: 25 DEGREES
EKG P-R INTERVAL: 170 MS
EKG P-R INTERVAL: 178 MS
EKG Q-T INTERVAL: 412 MS
EKG Q-T INTERVAL: 416 MS
EKG QRS DURATION: 110 MS
EKG QRS DURATION: 116 MS
EKG QTC CALCULATION (BAZETT): 466 MS
EKG QTC CALCULATION (BAZETT): 483 MS
EKG R AXIS: -41 DEGREES
EKG R AXIS: -46 DEGREES
EKG T AXIS: 28 DEGREES
EKG T AXIS: 32 DEGREES
EKG VENTRICULAR RATE: 77 BPM
EKG VENTRICULAR RATE: 81 BPM
EOSINOPHILS ABSOLUTE: 0.4 K/CU MM
EOSINOPHILS RELATIVE PERCENT: 4.8 % (ref 0–3)
GFR AFRICAN AMERICAN: >60 ML/MIN/1.73M2
GFR NON-AFRICAN AMERICAN: >60 ML/MIN/1.73M2
GLUCOSE BLD-MCNC: 139 MG/DL (ref 70–99)
GLUCOSE BLD-MCNC: 148 MG/DL (ref 70–99)
GLUCOSE BLD-MCNC: 149 MG/DL (ref 70–99)
GLUCOSE BLD-MCNC: 151 MG/DL (ref 70–99)
GLUCOSE BLD-MCNC: 156 MG/DL (ref 70–99)
GLUCOSE BLD-MCNC: 222 MG/DL (ref 70–99)
HCT VFR BLD CALC: 32.8 % (ref 37–47)
HEMOGLOBIN: 11.3 GM/DL (ref 12.5–16)
IMMATURE NEUTROPHIL %: 0.1 % (ref 0–0.43)
LACTATE: 1.1 MMOL/L (ref 0.4–2)
LYMPHOCYTES ABSOLUTE: 2.9 K/CU MM
LYMPHOCYTES RELATIVE PERCENT: 35 % (ref 24–44)
MAGNESIUM: 1.8 MG/DL (ref 1.8–2.4)
MCH RBC QN AUTO: 28.7 PG (ref 27–31)
MCHC RBC AUTO-ENTMCNC: 34.5 % (ref 32–36)
MCV RBC AUTO: 83.2 FL (ref 78–100)
MONOCYTES ABSOLUTE: 1.1 K/CU MM
MONOCYTES RELATIVE PERCENT: 13.5 % (ref 0–4)
NUCLEATED RBC %: 0 %
PDW BLD-RTO: 11.6 % (ref 11.7–14.9)
PLATELET # BLD: 274 K/CU MM (ref 140–440)
PMV BLD AUTO: 8.8 FL (ref 7.5–11.1)
POTASSIUM SERPL-SCNC: 3.8 MMOL/L (ref 3.5–5.1)
RBC # BLD: 3.94 M/CU MM (ref 4.2–5.4)
SEGMENTED NEUTROPHILS ABSOLUTE COUNT: 3.8 K/CU MM
SEGMENTED NEUTROPHILS RELATIVE PERCENT: 45.9 % (ref 36–66)
SODIUM BLD-SCNC: 138 MMOL/L (ref 135–145)
TOTAL IMMATURE NEUTOROPHIL: 0.01 K/CU MM
TOTAL NUCLEATED RBC: 0 K/CU MM
TROPONIN T: <0.01 NG/ML
WBC # BLD: 8.2 K/CU MM (ref 4–10.5)

## 2021-07-14 PROCEDURE — 6370000000 HC RX 637 (ALT 250 FOR IP): Performed by: PHYSICIAN ASSISTANT

## 2021-07-14 PROCEDURE — 97166 OT EVAL MOD COMPLEX 45 MIN: CPT

## 2021-07-14 PROCEDURE — 6370000000 HC RX 637 (ALT 250 FOR IP): Performed by: INTERNAL MEDICINE

## 2021-07-14 PROCEDURE — 2580000003 HC RX 258: Performed by: INTERNAL MEDICINE

## 2021-07-14 PROCEDURE — 99233 SBSQ HOSP IP/OBS HIGH 50: CPT | Performed by: NURSE PRACTITIONER

## 2021-07-14 PROCEDURE — 85025 COMPLETE CBC W/AUTO DIFF WBC: CPT

## 2021-07-14 PROCEDURE — 80048 BASIC METABOLIC PNL TOTAL CA: CPT

## 2021-07-14 PROCEDURE — 83735 ASSAY OF MAGNESIUM: CPT

## 2021-07-14 PROCEDURE — 93010 ELECTROCARDIOGRAM REPORT: CPT | Performed by: INTERNAL MEDICINE

## 2021-07-14 PROCEDURE — 94761 N-INVAS EAR/PLS OXIMETRY MLT: CPT

## 2021-07-14 PROCEDURE — 84484 ASSAY OF TROPONIN QUANT: CPT

## 2021-07-14 PROCEDURE — 6370000000 HC RX 637 (ALT 250 FOR IP): Performed by: NURSE PRACTITIONER

## 2021-07-14 PROCEDURE — 85379 FIBRIN DEGRADATION QUANT: CPT

## 2021-07-14 PROCEDURE — 82962 GLUCOSE BLOOD TEST: CPT

## 2021-07-14 PROCEDURE — 6360000002 HC RX W HCPCS: Performed by: INTERNAL MEDICINE

## 2021-07-14 PROCEDURE — 83605 ASSAY OF LACTIC ACID: CPT

## 2021-07-14 PROCEDURE — 93005 ELECTROCARDIOGRAM TRACING: CPT | Performed by: INTERNAL MEDICINE

## 2021-07-14 PROCEDURE — 97530 THERAPEUTIC ACTIVITIES: CPT

## 2021-07-14 PROCEDURE — 2000000000 HC ICU R&B

## 2021-07-14 PROCEDURE — 78451 HT MUSCLE IMAGE SPECT SING: CPT

## 2021-07-14 PROCEDURE — 99232 SBSQ HOSP IP/OBS MODERATE 35: CPT | Performed by: INTERNAL MEDICINE

## 2021-07-14 PROCEDURE — 1200000000 HC SEMI PRIVATE

## 2021-07-14 PROCEDURE — 99233 SBSQ HOSP IP/OBS HIGH 50: CPT | Performed by: INTERNAL MEDICINE

## 2021-07-14 RX ADMIN — LEVETIRACETAM 750 MG: 500 TABLET, FILM COATED ORAL at 20:26

## 2021-07-14 RX ADMIN — ENOXAPARIN SODIUM 40 MG: 40 INJECTION SUBCUTANEOUS at 08:28

## 2021-07-14 RX ADMIN — DIPHENHYDRAMINE HYDROCHLORIDE 50 MG: 25 TABLET ORAL at 13:19

## 2021-07-14 RX ADMIN — AMITRIPTYLINE HYDROCHLORIDE 25 MG: 25 TABLET, FILM COATED ORAL at 20:26

## 2021-07-14 RX ADMIN — LEVETIRACETAM 750 MG: 500 TABLET, FILM COATED ORAL at 08:26

## 2021-07-14 RX ADMIN — ASPIRIN 81 MG: 81 TABLET, CHEWABLE ORAL at 08:28

## 2021-07-14 RX ADMIN — QUETIAPINE FUMARATE 25 MG: 25 TABLET ORAL at 20:26

## 2021-07-14 RX ADMIN — MAGNESIUM OXIDE 400 MG (241.3 MG MAGNESIUM) TABLET 400 MG: TABLET at 08:28

## 2021-07-14 RX ADMIN — METOPROLOL SUCCINATE 50 MG: 25 TABLET, EXTENDED RELEASE ORAL at 08:27

## 2021-07-14 RX ADMIN — NITROGLYCERIN 0.4 MG: 0.4 TABLET, ORALLY DISINTEGRATING SUBLINGUAL at 17:01

## 2021-07-14 RX ADMIN — ATORVASTATIN CALCIUM 40 MG: 40 TABLET, FILM COATED ORAL at 20:26

## 2021-07-14 RX ADMIN — OXCARBAZEPINE 300 MG: 300 TABLET, FILM COATED ORAL at 20:26

## 2021-07-14 RX ADMIN — BUSPIRONE HYDROCHLORIDE 20 MG: 5 TABLET ORAL at 20:25

## 2021-07-14 RX ADMIN — Medication 10 ML: at 20:27

## 2021-07-14 RX ADMIN — SODIUM CHLORIDE, PRESERVATIVE FREE 10 ML: 5 INJECTION INTRAVENOUS at 20:27

## 2021-07-14 RX ADMIN — CARBIDOPA AND LEVODOPA 1 TABLET: 10; 100 TABLET ORAL at 20:38

## 2021-07-14 RX ADMIN — INSULIN LISPRO 1 UNITS: 100 INJECTION, SOLUTION INTRAVENOUS; SUBCUTANEOUS at 18:10

## 2021-07-14 RX ADMIN — MAGNESIUM OXIDE 400 MG (241.3 MG MAGNESIUM) TABLET 400 MG: TABLET at 20:26

## 2021-07-14 RX ADMIN — MONTELUKAST 10 MG: 10 TABLET, FILM COATED ORAL at 20:26

## 2021-07-14 RX ADMIN — SODIUM CHLORIDE, PRESERVATIVE FREE 10 ML: 5 INJECTION INTRAVENOUS at 08:29

## 2021-07-14 RX ADMIN — LOSARTAN POTASSIUM 100 MG: 100 TABLET, FILM COATED ORAL at 08:28

## 2021-07-14 RX ADMIN — INSULIN LISPRO 1 UNITS: 100 INJECTION, SOLUTION INTRAVENOUS; SUBCUTANEOUS at 09:35

## 2021-07-14 RX ADMIN — DIPHENHYDRAMINE HYDROCHLORIDE 50 MG: 25 TABLET ORAL at 21:56

## 2021-07-14 RX ADMIN — INSULIN LISPRO 1 UNITS: 100 INJECTION, SOLUTION INTRAVENOUS; SUBCUTANEOUS at 13:14

## 2021-07-14 RX ADMIN — BUSPIRONE HYDROCHLORIDE 20 MG: 5 TABLET ORAL at 09:35

## 2021-07-14 RX ADMIN — QUETIAPINE FUMARATE 25 MG: 25 TABLET ORAL at 08:27

## 2021-07-14 RX ADMIN — BUSPIRONE HYDROCHLORIDE 20 MG: 5 TABLET ORAL at 15:40

## 2021-07-14 RX ADMIN — PANTOPRAZOLE SODIUM 40 MG: 40 TABLET, DELAYED RELEASE ORAL at 05:51

## 2021-07-14 RX ADMIN — LEVOTHYROXINE SODIUM 75 MCG: 25 TABLET ORAL at 05:51

## 2021-07-14 RX ADMIN — INSULIN GLARGINE 20 UNITS: 100 INJECTION, SOLUTION SUBCUTANEOUS at 20:27

## 2021-07-14 RX ADMIN — HYDROCODONE BITARTRATE AND ACETAMINOPHEN 1 TABLET: 10; 325 TABLET ORAL at 21:56

## 2021-07-14 RX ADMIN — HYDROCODONE BITARTRATE AND ACETAMINOPHEN 1 TABLET: 10; 325 TABLET ORAL at 13:19

## 2021-07-14 RX ADMIN — NIFEDIPINE 60 MG: 30 TABLET, FILM COATED, EXTENDED RELEASE ORAL at 08:26

## 2021-07-14 RX ADMIN — OXCARBAZEPINE 300 MG: 300 TABLET, FILM COATED ORAL at 08:26

## 2021-07-14 RX ADMIN — ONDANSETRON 4 MG: 2 INJECTION INTRAMUSCULAR; INTRAVENOUS at 20:26

## 2021-07-14 ASSESSMENT — PAIN SCALES - GENERAL
PAINLEVEL_OUTOF10: 10
PAINLEVEL_OUTOF10: 8
PAINLEVEL_OUTOF10: 8
PAINLEVEL_OUTOF10: 5
PAINLEVEL_OUTOF10: 0

## 2021-07-14 ASSESSMENT — PAIN DESCRIPTION - DESCRIPTORS
DESCRIPTORS: ACHING

## 2021-07-14 ASSESSMENT — PAIN DESCRIPTION - FREQUENCY
FREQUENCY: CONTINUOUS
FREQUENCY: CONTINUOUS

## 2021-07-14 ASSESSMENT — PAIN - FUNCTIONAL ASSESSMENT
PAIN_FUNCTIONAL_ASSESSMENT: PREVENTS OR INTERFERES SOME ACTIVE ACTIVITIES AND ADLS
PAIN_FUNCTIONAL_ASSESSMENT: PREVENTS OR INTERFERES SOME ACTIVE ACTIVITIES AND ADLS

## 2021-07-14 ASSESSMENT — PAIN DESCRIPTION - LOCATION
LOCATION: HEAD;BACK;ABDOMEN
LOCATION: BACK;HEAD;ABDOMEN
LOCATION: ABDOMEN

## 2021-07-14 ASSESSMENT — PAIN DESCRIPTION - PAIN TYPE
TYPE: CHRONIC PAIN

## 2021-07-14 ASSESSMENT — PAIN DESCRIPTION - ONSET
ONSET: ON-GOING
ONSET: ON-GOING

## 2021-07-14 NOTE — PROGRESS NOTES
Occupational Therapy    [facility] ACUTE CARE OCCUPATIONAL THERAPY EVALUATION  Velia Muñoz, 1955, 2121/2121-A, 7/14/2021    History  [place]:  The primary encounter diagnosis was Chest pain, unspecified type. A diagnosis of Hyponatremia was also pertinent to this visit. Patient  has a past medical history of Abnormal EKG, Acid reflux, Anemia, Anesthesia, Anginal pain ([address] Anxiety, Arthritis, Asthma, Bipolar 1 disorder ([address] CAD (coronary artery disease), Cerebral artery occlusion with cerebral infarction ([address] CHF (congestive heart failure) ([address] Chronic back pain, Chronic kidney disease, DDD (degenerative disc disease), cervical, Depression, Diabetes mellitus ([address] Diabetic neuropathy ([address] Dizziness, Dry skin, Enlarged ureter, Fatty liver, Fibrocystic breast, Generalized anxiety disorder, Gout, H/O cardiac catheterization, H/O cardiovascular stress test, H/O cardiovascular stress test, H/O cardiovascular stress test, H/O Doppler ultrasound, H/O echocardiogram, H/O echocardiogram, History of Holter monitoring, [place] (hard of hearing), Hx of cardiovascular stress test, Hx of motion sickness, HX OTHER MEDICAL, Hyperlipidemia, Hypertension, IBS (irritable bowel syndrome), Incisional hernia, Kidney stones, Migraines, Nausea & vomiting, Other specified disorder of skin, Panic attacks, Panic attacks, Pneumonia, Pseudoseizures, Restless leg, Shortness of breath, Sleep apnea, Staph infection, Thyroid disease, Tremor, Urinary incontinence, UTI (urinary tract infection), UTI (urinary tract infection), Vertigo, and Wears glasses. Patient  has a past surgical history that includes Carpal tunnel release (Right, 1999); Diagnostic Cardiac Cath Lab Procedure (01/11/2010); Dental surgery; Colonoscopy (Last Done 6-13); Endoscopy, colon, diagnostic (Several ); Bladder surgery (1970's Or 1980's); Lithotripsy (2011); Breast biopsy (Right, 1980's);  Breast surgery (Left, 1990's); hernia repair (1990's); hernia functions:  · ROM R/L:  ~ 90 degrees of shoulder flexion, Pt wincing at 90 degrees, distal WFL w/ effort  · Strength R/L:  4-/5  · Sensation: Pt reported diabetic neuropathy in BLEs and numbness in BUEs. · Tone: Normal  · Coordination: decreased speed BUE  · Perception: WNL    Activities of Daily Living (ADLs):  · Feeding: Independent  · Grooming: SBA  · UB bathing: SBA  · LB bathing: Hayley  · UB dressing: SBA  · LB dressing: Hayley  · Toileting: Hayley    Cognitive and Psychosocial Functioning:  · Overall cognitive status: WFL  · Affect: Normal        Mobility:  · Supine to sit:  SBA  · Transfers: STS EOB<>RW [place], stand-sit RW<>chair CGA, sit-stand from chair [place], stand-sit to EOB CGA. · Sitting balance:  SBA  · Standing balance:  CGA  · Functional mobility: Pt completed functional mobility ~ 30ft in hallway w/ RW CGA. Pt became dizzy, returned to chair see Observation section for details                Treatment:  Therapeutic Activity Training:   Therapeutic activity training was instructed today. Cues were given for safety, sequence, UE/LE placement, awareness, and balance. Activities performed today included bed mobility training, sup-sit, sit-stand, stand-sit, sit-sup, functional mobility training. Safety: patient left in chair with chair alarm, call light within reach, RN notified, gait belt used. Assessment:  Pt is a 63 y.o. F admitted from home for chest pain. Pt at baseline is independent for ADLs poorly independent for high level IADLs and independent for functional transfers/mobility w/ 4VT. Pt currently presents w/ deficits in ADL and high level IADL independence, functional activity tolerance, UB strength, ROM, dynamic sitting and standing balance and tolerance and functional transfers. Pt would benefit from continued acute care OT services w/ discharge to SNF in order to return home to [facility]. Complexity: Moderate  Prognosis: Good, no significant barriers to participation at this time. Plan  Times per week: 3x+  Times per day: Daily  Current Treatment Recommendations: Strengthening, Patient/Caregiver Education & Training, Home Management Training, Equipment Evaluation, Education, & procurement, ROM, Balance Training, Functional Mobility Training, Positioning, Pain Management, Endurance Training, Safety Education & Training, Self-Care / ADL     Equipment: defer    Goals:  Pt goal: go home  Time Frame for STGs: discharge  Goal 1: Pt will perform UE ADLs supervision. Goal 2: Pt will perform LE ADLs supervision. Goal 3: Pt will perform toileting supervision. Goal 4: Pt will perform functional transfer w/ AD supervision. Goal 5: Pt will perform functional mobility w/ AD supervision. Goal 6: Pt will perform therex/theract in order to increase UB strength to 4+/5.     Treatment plan:  Pt will perform functional mobility inc distances in order to increase activity tolerance    Recommendations for NURSING activity: Up to chair for all 3 meals and up to [facility] for all toileting needs    Time:   Time in: 1629  Time out: 1651  Timed treatment minutes: 12  Total time: 22    Electronically signed by:    Mac Egan S/OT  7/14/2021, 4:57 PM
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
## 2021-07-14 NOTE — PROGRESS NOTES
CARDIOLOGY PROGRESS NOTE                                                  Name:  Bang Magallon /Age/Sex: 1955  (63 y.o. female)   MRN & CSN:  [id number] & [id number] Admission Date/Time: 7/10/2021  7:29 PM   Location:   PCP: Gilbert Allen MD         Admit Date:  7/10/2021  Hospital Day: 5      SUBJECTIVE:   Seen patient as follow up as consultation for chest pain    No chest pain today. No shortness of breath  No palpations    TELEMETRY: Sinus      Assessment/Plan:         1. Atypical chest pain: [facility] Nonobstructive CAD in 2017. Patient underwent resting MPI yesterday. She had an episode of ? Seizure (versus pseudoseizure ) in the noninvasive cardiac lab. Stress images were postponed. This morning patient refuses to proceed with further cardiac testing. She is chest pain-free. Recommend outpatient follow-up. Echocardiogram shows preserved LVEF. 2. Severe hyponatremia   Possibly secondary to hydrochlorothiazide and psychiatric medications. Nephrology is following. off HCTZ   3. 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 4. Mood disorder: Patient is on psych medications. Mood is stable at this point. 5. hyperlipidemia: Continue with Lipitor 40 mg daily  6. Essential hypertension: Blood pressure elevated. Hold off on hydrochlorothiazide. Continue with losartan and Toprol-XL.         Please call us with any further questions    Past medical history:    has a past medical history of Abnormal EKG, Acid reflux, Anemia, Anesthesia, Anginal pain ([address] Anxiety, Arthritis, Asthma, Bipolar 1 disorder ([address] CAD (coronary artery disease), Cerebral artery occlusion with cerebral infarction ([address] CHF (congestive heart failure) ([address] Chronic back pain, Chronic kidney disease, DDD (degenerative disc disease), cervical, Depression, Diabetes mellitus ([address] Diabetic neuropathy ([address] Dizziness, Dry skin, Enlarged ureter, Fatty liver, Fibrocystic breast, Generalized anxiety disorder, Gout, H/O cardiac catheterization, H/O cardiovascular stress test, H/O cardiovascular stress test, H/O cardiovascular stress test, H/O Doppler ultrasound, H/O echocardiogram, H/O echocardiogram, History of Holter monitoring, [place] (hard of hearing), Hx of cardiovascular stress test, Hx of motion sickness, HX OTHER MEDICAL, Hyperlipidemia, Hypertension, IBS (irritable bowel syndrome), Incisional hernia, Kidney stones, Migraines, Nausea & vomiting, Other specified disorder of skin, Panic attacks, Panic attacks, Pneumonia, Pseudoseizures, Restless leg, Shortness of breath, Sleep apnea, Staph infection, Thyroid disease, Tremor, Urinary incontinence, UTI (urinary tract infection), UTI (urinary tract infection), Vertigo, and Wears glasses. Past surgical history:   has a past surgical history that includes Carpal tunnel release (Right, 1999); Diagnostic Cardiac Cath Lab Procedure (01/11/2010); Dental surgery; Colonoscopy (Last Done 6-13); Endoscopy, colon, diagnostic (Several ); Bladder surgery (1970's Or 1980's); Lithotripsy (2011); Breast biopsy (Right, 1980's); Breast surgery (Left, 1990's); hernia repair (1960'E); hernia repair (1970's); Cholecystectomy (1970's); Appendectomy (1970's); Hysterectomy, total abdominal (1987); Tubal ligation (1978); Esophagus dilation (1980's And 1990's); Knee arthroscopy (Right, 1999); joint replacement (2008); other surgical history (06 13 2014); fracture surgery (1974 Or 1975); Cardiac catheterization (10-18-06); and Cardiac catheterization. Social History:   reports that she has never smoked. She has never used smokeless tobacco. She reports that she does not drink alcohol and does not use drugs. Family history:  family history includes Anxiety Disorder in her daughter;  Arthritis in her father, mother, and sister; Bipolar Disorder in her daughter; Cancer in her brother and sister; [place] in her brother; Depression in her daughter and mother; Diabetes in her magnesium oxide  400 mg Oral BID    QUEtiapine  25 mg Oral BID    losartan  100 mg Oral Daily    levETIRAcetam  750 mg Oral BID    amitriptyline  25 mg Oral Nightly    busPIRone  20 mg Oral TID    carbidopa-levodopa  1 tablet Oral Nightly    levothyroxine  75 mcg Oral QAM AC    metoprolol succinate  50 mg Oral Daily    montelukast  10 mg Oral Nightly    NIFEdipine  60 mg Oral Daily    OXcarbazepine  300 mg Oral BID    pantoprazole  40 mg Oral Daily    sodium chloride flush  5-40 mL Intravenous 2 times per day    aspirin  81 mg Oral Daily    atorvastatin  40 mg Oral Nightly    enoxaparin  40 mg Subcutaneous Daily    insulin glargine  20 Units Subcutaneous Nightly    insulin lispro  0-6 Units Subcutaneous TID WC    insulin lispro  0-3 Units Subcutaneous 2 times per day    lidocaine PF  5 mL Intradermal Once    sodium chloride flush  5-40 mL Intravenous 2 times per day      dextrose      sodium chloride      sodium chloride       glucose, dextrose, glucagon (rDNA), dextrose, traZODone, magnesium sulfate, technetium sestamibi, technetium sestamibi, hydrALAZINE, fluticasone-umeclidin-vilant, HYDROcodone-acetaminophen, hydrOXYzine, sodium chloride flush, sodium chloride, ondansetron **OR** ondansetron, acetaminophen **OR** acetaminophen, polyethylene glycol, morphine, sodium chloride flush, sodium chloride, nitroGLYCERIN, diphenhydrAMINE  Allergies   Allergen Reactions    Abilify [Aripiprazole]      \"Severe Shaking And Restlessness\"    Advil [Ibuprofen Micronized] Palpitations     \"Severe High Blood Pressure\"    Augmentin [Amoxicillin-Pot Clavulanate] Itching and Rash    Bee Venom Swelling     Redness    Ciprofloxacin Itching and Rash    Codeine      \"Severe Abdominal Cramping\"    Darvocet [Propoxyphene N-Acetaminophen] Palpitations     \"Severe High Blood Pressure\"    Darvon [Propoxyphene Hcl] Palpitations     \"Severe High Blood Pressure\"    Decadron [Dexamethasone] Other (See Comments) seizure  Seizures    Ditropan [Oxybutynin Chloride] Palpitations     \"Severe High Blood Pressure\"    Fioricet [Butalbital-Apap-Caffeine] Palpitations     \"Severe High Blood Pressure\"    Fiorinal-Codeine #3 [Butalbital-Asa-Caff-Codeine]      \"Severe Stomach Cramps\"    Flagyl [Metronidazole] Diarrhea     \"Severe Diarrhea And Cramping\"    Naproxen Palpitations     \"Severe High Blood Pressure\"    Other      \"Allergic To Spider Bites Causing Blackness Of Skin, Severe Itching And Pain\"                                                  \"Allergic To Powder In Gloves Causing Severe Redness And Itching\"    Prozac [Fluoxetine Hcl]      \"Hallucinations\"    Robaxin [Methocarbamol] Palpitations     \"Severe High Blood Pressure\"    Ultram [Tramadol]      \"Severe Stomach Pain\"    Zoloft Palpitations     \"Sever High Blood Pressure\"    Butalbital-Aspirin-Caffeine Other (See Comments)     \"severe stomach pain\"    Coreg [Carvedilol] Other (See Comments)     \"spikes my BP severely and send me into a severe anxiety attack\"    Fluoxetine Other (See Comments)     hallucinations    Oxybutynin Chloride Other (See Comments)     Raises bp    Percocet [Oxycodone-Acetaminophen]      \"knocked me out for 12 hrs\"    Sertraline Other (See Comments)     hallucinations    Tape [Adhesive Tape]      Patient states it tears her skin, including band aids    Reglan [Metoclopramide] Other (See Comments)     \"makes me talk like a baby, but if I take benadryl with it it's fine\"       Lab Data:  CBC:   Recent Labs     07/12/21 0527 07/13/21 0514 07/14/21 0452   WBC 9.8 8.2 8.2   HGB 12.3* 11.9* 11.3*   HCT 35.8* 34.3* 32.8*   MCV 82.1 81.9 83.2    262 274     BMP:   Recent Labs     07/12/21 0527 07/13/21 0514 07/14/21 0452   * 133* 138   K 3.7 3.5 3.8   CL 91* 96* 103   CO2 26 23 25   BUN 8 7 6   CREATININE 0.5* 0.5* 0.5*     LIVER PROFILE:   Recent Labs     07/13/21 0514   AST 23   ALT 15   BILIDIR 0.2   BILITOT 0.4 [facility] [address], MD, MD 7/14/2021 10:46 AM

## 2021-07-14 NOTE — PROGRESS NOTES
1715: Dr. Valerie Correa at bedside -  patient having seizure like activities, patient not verbally responsive at this time, small tremors noted, patient staring at the ceiling/closing eyes intermittently - received troponin order at this time    1716: Perfect serve sent to MD Mag Bryan there any other interventions you would like? NSR with left anterior fasicular block is what EKG shows. Hospitalist at bedside due to patient having seizure.  Hospitalist is okay transferring to stepdown if you had no other interventions\"     1723: Elle Gonzalez MD responds \"no interventions at this time\"

## 2021-07-14 NOTE — PROGRESS NOTES
Nephrology Progress Note        [address], [address] [address]  Phone: [phone]  Office Hours: 8:30AM - 4:30PM  Monday - Friday 7/14/2021 7:42 AM  Subjective:   Admit Date: 7/10/2021  PCP: Jinny Odell MD  Interval History: good bp  On nc    Diet: ADULT DIET; Regular; 3 carb choices (45 gm/meal); 1800 ml      Data:   Scheduled Meds:   magnesium oxide  400 mg Oral BID    QUEtiapine  25 mg Oral BID    losartan  100 mg Oral Daily    levETIRAcetam  750 mg Oral BID    amitriptyline  25 mg Oral Nightly    busPIRone  20 mg Oral TID    carbidopa-levodopa  1 tablet Oral Nightly    levothyroxine  75 mcg Oral QAM AC    metoprolol succinate  50 mg Oral Daily    montelukast  10 mg Oral Nightly    NIFEdipine  60 mg Oral Daily    OXcarbazepine  300 mg Oral BID    pantoprazole  40 mg Oral Daily    sodium chloride flush  5-40 mL Intravenous 2 times per day    aspirin  81 mg Oral Daily    atorvastatin  40 mg Oral Nightly    enoxaparin  40 mg Subcutaneous Daily    insulin glargine  20 Units Subcutaneous Nightly    insulin lispro  0-6 Units Subcutaneous TID WC    insulin lispro  0-3 Units Subcutaneous 2 times per day    lidocaine PF  5 mL Intradermal Once    sodium chloride flush  5-40 mL Intravenous 2 times per day     Continuous Infusions:   dextrose      sodium chloride      sodium chloride       PRN Meds:glucose, dextrose, glucagon (rDNA), dextrose, traZODone, magnesium sulfate, technetium sestamibi, technetium sestamibi, hydrALAZINE, fluticasone-umeclidin-vilant, HYDROcodone-acetaminophen, hydrOXYzine, sodium chloride flush, sodium chloride, ondansetron **OR** ondansetron, acetaminophen **OR** acetaminophen, polyethylene glycol, morphine, sodium chloride flush, sodium chloride, nitroGLYCERIN, diphenhydrAMINE  No intake/output data recorded. No intake/output data recorded.   No intake or output data in the 24 hours ending 07/14/21 0742    CBC:   Recent Labs 07/12/21 0527 07/13/21 0514 07/14/21 0452   WBC 9.8 8.2 8.2   HGB 12.3* 11.9* 11.3*    262 274       BMP:    Recent Labs     07/12/21 0527 07/13/21 0514 07/14/21 0452   * 133* 138   K 3.7 3.5 3.8   CL 91* 96* 103   CO2 26 23 25   BUN 8 7 6   CREATININE 0.5* 0.5* 0.5*   GLUCOSE 146* 136* 139*     Hepatic:   Recent Labs     07/13/21 0514   AST 23   ALT 15   BILITOT 0.4   ALKPHOS 63         Objective:   Vitals: /65   Pulse 74   Temp 98.7 °F (37.1 °C) (Oral)   Resp 22   Ht 5' 2.01\" (1.575 m)   Wt 179 lb 0.2 oz (81.2 kg)   SpO2 97%   BMI 32.73 kg/m²   General appearance:  in no acute distress  HEENT: normocephalic, atraumatic,   Neck: supple, trachea midline  Lungs:  breathing comfortably on nc  Heart[de-identified] regular rate and rhythm, S1, S2 normal,  Extremities: extremities atraumatic, no cyanosis or edema      Assessment and Plan:     Patient Active Problem List     Diagnosis Date Noted    Severe episode of recurrent major depressive disorder, with psychotic features ([address] 09/04/2020    Auditory hallucinations 09/04/2020    Abnormal nuclear stress test 08/15/2017    Dyslipidemia      Family history of early CAD      Generalized anxiety disorder with panic attacks 09/04/2020    Bipolar 1 disorder ([address] 04/27/2021    Encephalopathy 04/26/2021    Syncope 04/26/2021    Dyspnea and respiratory abnormalities 12/01/2020    Bipolar I disorder with depression, severe ([address] 09/04/2020    Hallucination, visual 09/02/2020    Hypertensive emergency 07/15/2020    Asthma exacerbation attacks 01/01/2019    Mild intermittent asthma without complication 04/07/0853    ILSA (obstructive sleep apnea) 10/27/2017    Snoring 10/27/2017    Hypersomnolence 10/27/2017    NSTEMI (non-ST elevated myocardial infarction) ([address] 08/14/2017    Tachycardia 03/30/2016    Essential hypertension      Type 2 diabetes mellitus with diabetic polyneuropathy, with long-term current use of insulin [facility]      Incarcerated umbilical hernia 74/09/3702    H/O cardiovascular stress test 05/06/2014    H/O Doppler ultrasound      H/O cardiovascular stress test      H/O cardiovascular stress test      Chest pain 09/16/2013      -Hyponatremia; sodium 122 on admission//ddx: volume depletion vs side effect of hctz and other antipsychotics  -Chest pain  -Seizure like activity  -HTN  -Mood disorder and on many antipsychotics     Plan:  Sodium better at 138, continue supportive mgmt  No more HCTZ on dc please  Stop NS today  Limit oral fluid to 1800ml per day, continue good nutrition, even on dc  Signing off today so call me back if needed    Thank you                                   Electronically signed by Bg Nicolas DO on 7/14/2021 at 7:42 AM    MD Leanne Burch DO [address]  [place], [place] [address]  PHONE: [phone]  FAX: [phone]

## 2021-07-14 NOTE — PROGRESS NOTES
volume loss with chronic microvascular white matter ischemic disease. XR CHEST PORTABLE    Result Date: 7/10/2021  EXAMINATION: ONE XRAY VIEW OF THE CHEST 7/10/2021 8:13 pm COMPARISON: Chest radiograph 04/26/2021. HISTORY: ORDERING SYSTEM PROVIDED HISTORY: chest pain TECHNOLOGIST PROVIDED HISTORY: Reason for exam:->chest pain Reason for Exam: chest pain Acuity: Acute Type of Exam: Initial FINDINGS: The lungs are without acute focal process. There is no effusion or pneumothorax. The cardiomediastinal silhouette is stable. The osseous structures are stable. No acute process.        Scheduled Medicines   Medications:    magnesium oxide  400 mg Oral BID    QUEtiapine  25 mg Oral BID    losartan  100 mg Oral Daily    levETIRAcetam  750 mg Oral BID    amitriptyline  25 mg Oral Nightly    busPIRone  20 mg Oral TID    carbidopa-levodopa  1 tablet Oral Nightly    levothyroxine  75 mcg Oral QAM AC    metoprolol succinate  50 mg Oral Daily    montelukast  10 mg Oral Nightly    NIFEdipine  60 mg Oral Daily    OXcarbazepine  300 mg Oral BID    pantoprazole  40 mg Oral Daily    sodium chloride flush  5-40 mL Intravenous 2 times per day    aspirin  81 mg Oral Daily    atorvastatin  40 mg Oral Nightly    enoxaparin  40 mg Subcutaneous Daily    insulin glargine  20 Units Subcutaneous Nightly    insulin lispro  0-6 Units Subcutaneous TID WC    insulin lispro  0-3 Units Subcutaneous 2 times per day    lidocaine PF  5 mL Intradermal Once    sodium chloride flush  5-40 mL Intravenous 2 times per day      Infusions:    dextrose      sodium chloride      sodium chloride      sodium chloride 50 mL/hr at 07/13/21 1958         Objective:   Vitals: /61   Pulse 67   Temp 98.6 °F (37 °C) (Oral)   Resp 19   Ht 5' 2.01\" (1.575 m)   Wt 168 lb 12.8 oz (76.6 kg)   SpO2 92%   BMI 30.87 kg/m²   General appearance: alert and cooperative with exam  Neck: no JVD or bruit  Thyroid : Normal lobes   Lungs: Has Vesicular Breath sounds   Heart:  regular rate and rhythm  Abdomen: soft, non-tender; bowel sounds normal; no masses,  no organomegaly  Musculoskeletal: Normal  Extremities: extremities normal, , no edema  Neurologic:  Awake, alert, oriented to name, place and time. Cranial nerves II-XII are grossly intact. Motor is  intact. Sensory is intact. ,  and gait is normal.    Assessment:     Patient Active Problem List:     Chest pain     H/O Doppler ultrasound     H/O cardiovascular stress test     H/O cardiovascular stress test     H/O cardiovascular stress test     Incarcerated umbilical hernia     Essential hypertension     Type 2 diabetes mellitus with diabetic polyneuropathy, with long-term current use of insulin (HCC)     Tachycardia     Dyslipidemia     Family history of early CAD     NSTEMI (non-ST elevated myocardial infarction) ([address]     Abnormal nuclear stress test     ILSA (obstructive sleep apnea)     Snoring     Hypersomnolence     Mild intermittent asthma without complication     Asthma exacerbation attacks     Hypertensive emergency     Hallucination, visual     Severe episode of recurrent major depressive disorder, with psychotic features ([address]     Generalized anxiety disorder with panic attacks     Auditory hallucinations     Bipolar I disorder with depression, severe (HCC)     Dyspnea and respiratory abnormalities     Encephalopathy     Syncope     Bipolar 1 disorder ([address]      Plan:     1. Reviewed POC blood glucose . Labs and X ray results   2. Reviewed Current Medicines   3. On Correction bolus Humalog/ Basal Lantus Insulin regime /  4. Monitor Blood glucose frequently   5. Modified  the dose of Insulin/ other medicines as needed  6. For Cardiac Stress test today    7. Reviewed notes from neurology suspecting is her seizure activity possibly pseudoseizures seizures does not need anymore further work-up  8. Will follow     .      Segun Fox MD, MD

## 2021-07-14 NOTE — PROGRESS NOTES
Hospitalist Progress Note       7/14/2021 5:21 PM  Admit Date: 7/10/2021    PCP: Jamilah Truner MD     Assessment and Plan:   1. Recurrent atypical chest pain: Troponin x2 - negative. Cardiology consulted. Echo-normal ejection fraction. She could not have stress test because patient had pseudoseizure attack just before the procedure. On metoprolol, aspirin and Lipitor. She had another chest pain this afternoon-EKG showed no acute ischemia. Will repeat troponin. 2.  Pseudoseizure versus seizures: Patient is having pseudoseizure attacks during the admission. EEG consulted. CT head-no acute findings. Continue on Keppra. No need for repeat EEG. Psychiatry consulted-diagnosis bipolar disorder and CHEKO with panic attack-continue psychiatric medications (Seroquel, trazodone and oxcarbazepine). Patient will follow up at [facility] mental health service. 3.  Hyponatremia: Likely due to hydrochlorothiazide. Nephrology consulted. Status post IV fluid. Hydrochlorothiazide discontinued. Resolved. 4.  Hypertension: Discontinued HCTZ. On metoprolol and Cozaar.     Chronic problems include hypothyroidism    Patient Active Problem List:     Chest pain     H/O Doppler ultrasound     H/O cardiovascular stress test     H/O cardiovascular stress test     H/O cardiovascular stress test     Incarcerated umbilical hernia     Essential hypertension     Type 2 diabetes mellitus with diabetic polyneuropathy, with long-term current use of insulin (HCC)     Tachycardia     Dyslipidemia     Family history of early CAD     NSTEMI (non-ST elevated [facility] infarction) [facility]     Abnormal nuclear stress test     ILSA (obstructive sleep apnea)     Snoring     Hypersomnolence     Mild intermittent asthma without complication     Asthma exacerbation attacks     Hypertensive emergency     Hallucination, visual     Severe episode of recurrent major depressive disorder, with psychotic features ([address]     CHEKO (generalized anxiety disorder)     Auditory hallucinations     Bipolar 1 disorder, depressed, severe (HCC)     Dyspnea and respiratory abnormalities     Encephalopathy     Syncope     Bipolar 1 disorder (HCC)     Hyponatremia     Pseudoseizures     Panic attacks      Subjective:     No chief complaint on file. F/U:  Interval History:   Nursing staff at bedside. Patient has just reported chest pain and now having pseudoseizure attacks-eyes open, no communicating and not conversant. Eyes blinking to corneal reflex. Objective:   No intake or output data in the 24 hours ending 07/14/21 1721   Vitals:   Vitals:    07/14/21 1600   BP: 136/81   Pulse: 76   Resp: 14   Temp:    SpO2: 96%     Physical Exam:  General: No apparent respiratory distress  Neurology: Patient is noncommunicating and both eyes open. Positive corneal reflex. Eyes: Anicteric. No pallor. Neck: No neck mass or thyromegaly  Cardiovascular: Regular. No murmur or S3  Respiratory: No wheezes or crepitation  Abdomen: Soft. No tenderness. No palpable mass. Extremities: No pedal edema.     Electronically signed by Liudmila Alicea MD on 7/14/2021 at 5:21 PM  [facility]

## 2021-07-15 VITALS
WEIGHT: 178.79 LBS | BODY MASS INDEX: 32.9 KG/M2 | DIASTOLIC BLOOD PRESSURE: 74 MMHG | OXYGEN SATURATION: 95 % | HEART RATE: 74 BPM | SYSTOLIC BLOOD PRESSURE: 141 MMHG | RESPIRATION RATE: 21 BRPM | HEIGHT: 62 IN | TEMPERATURE: 97.9 F

## 2021-07-15 LAB
ANION GAP SERPL CALCULATED.3IONS-SCNC: 9 MMOL/L (ref 4–16)
BASOPHILS ABSOLUTE: 0.1 K/CU MM
BASOPHILS RELATIVE PERCENT: 0.6 % (ref 0–1)
BUN BLDV-MCNC: 8 MG/DL (ref 6–23)
CALCIUM SERPL-MCNC: 8.9 MG/DL (ref 8.3–10.6)
CHLORIDE BLD-SCNC: 104 MMOL/L (ref 99–110)
CO2: 26 MMOL/L (ref 21–32)
CREAT SERPL-MCNC: 0.5 MG/DL (ref 0.6–1.1)
DIFFERENTIAL TYPE: ABNORMAL
EKG ATRIAL RATE: 78 BPM
EKG DIAGNOSIS: NORMAL
EKG P AXIS: 18 DEGREES
EKG P-R INTERVAL: 170 MS
EKG Q-T INTERVAL: 394 MS
EKG QRS DURATION: 116 MS
EKG QTC CALCULATION (BAZETT): 449 MS
EKG R AXIS: -47 DEGREES
EKG T AXIS: 41 DEGREES
EKG VENTRICULAR RATE: 78 BPM
EOSINOPHILS ABSOLUTE: 0.4 K/CU MM
EOSINOPHILS RELATIVE PERCENT: 4.8 % (ref 0–3)
GFR AFRICAN AMERICAN: >60 ML/MIN/1.73M2
GFR NON-AFRICAN AMERICAN: >60 ML/MIN/1.73M2
GLUCOSE BLD-MCNC: 128 MG/DL (ref 70–99)
GLUCOSE BLD-MCNC: 140 MG/DL (ref 70–99)
GLUCOSE BLD-MCNC: 147 MG/DL (ref 70–99)
GLUCOSE BLD-MCNC: 195 MG/DL (ref 70–99)
HCT VFR BLD CALC: 31.5 % (ref 37–47)
HEMOGLOBIN: 10.9 GM/DL (ref 12.5–16)
IMMATURE NEUTROPHIL %: 0.4 % (ref 0–0.43)
LYMPHOCYTES ABSOLUTE: 2.8 K/CU MM
LYMPHOCYTES RELATIVE PERCENT: 33.2 % (ref 24–44)
MAGNESIUM: 1.6 MG/DL (ref 1.8–2.4)
MCH RBC QN AUTO: 28.8 PG (ref 27–31)
MCHC RBC AUTO-ENTMCNC: 34.6 % (ref 32–36)
MCV RBC AUTO: 83.1 FL (ref 78–100)
MONOCYTES ABSOLUTE: 1 K/CU MM
MONOCYTES RELATIVE PERCENT: 11.5 % (ref 0–4)
NUCLEATED RBC %: 0 %
PDW BLD-RTO: 11.7 % (ref 11.7–14.9)
PLATELET # BLD: 254 K/CU MM (ref 140–440)
PMV BLD AUTO: 8.8 FL (ref 7.5–11.1)
POTASSIUM SERPL-SCNC: 3.4 MMOL/L (ref 3.5–5.1)
RBC # BLD: 3.79 M/CU MM (ref 4.2–5.4)
SEGMENTED NEUTROPHILS ABSOLUTE COUNT: 4.2 K/CU MM
SEGMENTED NEUTROPHILS RELATIVE PERCENT: 49.5 % (ref 36–66)
SODIUM BLD-SCNC: 139 MMOL/L (ref 135–145)
TOTAL IMMATURE NEUTOROPHIL: 0.03 K/CU MM
TOTAL NUCLEATED RBC: 0 K/CU MM
WBC # BLD: 8.5 K/CU MM (ref 4–10.5)

## 2021-07-15 PROCEDURE — 83735 ASSAY OF MAGNESIUM: CPT

## 2021-07-15 PROCEDURE — 2580000003 HC RX 258: Performed by: INTERNAL MEDICINE

## 2021-07-15 PROCEDURE — 80048 BASIC METABOLIC PNL TOTAL CA: CPT

## 2021-07-15 PROCEDURE — 97530 THERAPEUTIC ACTIVITIES: CPT

## 2021-07-15 PROCEDURE — 6370000000 HC RX 637 (ALT 250 FOR IP): Performed by: HOSPITALIST

## 2021-07-15 PROCEDURE — 6370000000 HC RX 637 (ALT 250 FOR IP): Performed by: INTERNAL MEDICINE

## 2021-07-15 PROCEDURE — 94761 N-INVAS EAR/PLS OXIMETRY MLT: CPT

## 2021-07-15 PROCEDURE — 6360000002 HC RX W HCPCS: Performed by: INTERNAL MEDICINE

## 2021-07-15 PROCEDURE — 99233 SBSQ HOSP IP/OBS HIGH 50: CPT | Performed by: INTERNAL MEDICINE

## 2021-07-15 PROCEDURE — 6370000000 HC RX 637 (ALT 250 FOR IP): Performed by: NURSE PRACTITIONER

## 2021-07-15 PROCEDURE — 6360000002 HC RX W HCPCS: Performed by: HOSPITALIST

## 2021-07-15 PROCEDURE — 85025 COMPLETE CBC W/AUTO DIFF WBC: CPT

## 2021-07-15 PROCEDURE — 82962 GLUCOSE BLOOD TEST: CPT

## 2021-07-15 PROCEDURE — 93010 ELECTROCARDIOGRAM REPORT: CPT | Performed by: INTERNAL MEDICINE

## 2021-07-15 PROCEDURE — 6370000000 HC RX 637 (ALT 250 FOR IP): Performed by: PHYSICIAN ASSISTANT

## 2021-07-15 RX ORDER — MAGNESIUM OXIDE 400 MG/1
400 TABLET ORAL 3 TIMES DAILY
Qty: 90 TABLET | Refills: 0 | Status: SHIPPED | OUTPATIENT
Start: 2021-07-15 | End: 2022-06-16 | Stop reason: SDUPTHER

## 2021-07-15 RX ORDER — LOSARTAN POTASSIUM 100 MG/1
100 TABLET ORAL DAILY
Qty: 30 TABLET | Refills: 0 | Status: SHIPPED | OUTPATIENT
Start: 2021-07-16 | End: 2022-06-16 | Stop reason: SDUPTHER

## 2021-07-15 RX ORDER — POTASSIUM CHLORIDE 750 MG/1
10 TABLET, FILM COATED, EXTENDED RELEASE ORAL DAILY
Qty: 14 TABLET | Refills: 0 | Status: SHIPPED | OUTPATIENT
Start: 2021-07-15 | End: 2021-09-14

## 2021-07-15 RX ORDER — POTASSIUM CHLORIDE 20 MEQ/1
20 TABLET, EXTENDED RELEASE ORAL ONCE
Status: COMPLETED | OUTPATIENT
Start: 2021-07-15 | End: 2021-07-15

## 2021-07-15 RX ORDER — TRAZODONE HYDROCHLORIDE 50 MG/1
50 TABLET ORAL NIGHTLY PRN
Qty: 30 TABLET | Refills: 0 | Status: SHIPPED | OUTPATIENT
Start: 2021-07-15 | End: 2021-09-14

## 2021-07-15 RX ORDER — QUETIAPINE FUMARATE 25 MG/1
25 TABLET, FILM COATED ORAL 2 TIMES DAILY
Qty: 60 TABLET | Refills: 0 | Status: SHIPPED | OUTPATIENT
Start: 2021-07-15

## 2021-07-15 RX ORDER — MAGNESIUM SULFATE IN WATER 40 MG/ML
2000 INJECTION, SOLUTION INTRAVENOUS ONCE
Status: COMPLETED | OUTPATIENT
Start: 2021-07-15 | End: 2021-07-15

## 2021-07-15 RX ORDER — LEVETIRACETAM 750 MG/1
1500 TABLET ORAL 2 TIMES DAILY
Qty: 120 TABLET | Refills: 0 | Status: ON HOLD | OUTPATIENT
Start: 2021-07-15 | End: 2021-10-01

## 2021-07-15 RX ADMIN — MAGNESIUM SULFATE HEPTAHYDRATE 2000 MG: 40 INJECTION, SOLUTION INTRAVENOUS at 10:12

## 2021-07-15 RX ADMIN — QUETIAPINE FUMARATE 25 MG: 25 TABLET ORAL at 08:29

## 2021-07-15 RX ADMIN — MAGNESIUM OXIDE 400 MG (241.3 MG MAGNESIUM) TABLET 400 MG: TABLET at 08:29

## 2021-07-15 RX ADMIN — METOPROLOL SUCCINATE 50 MG: 25 TABLET, EXTENDED RELEASE ORAL at 08:21

## 2021-07-15 RX ADMIN — ENOXAPARIN SODIUM 40 MG: 40 INJECTION SUBCUTANEOUS at 08:31

## 2021-07-15 RX ADMIN — HYDROCODONE BITARTRATE AND ACETAMINOPHEN 1 TABLET: 10; 325 TABLET ORAL at 08:29

## 2021-07-15 RX ADMIN — OXCARBAZEPINE 300 MG: 300 TABLET, FILM COATED ORAL at 08:30

## 2021-07-15 RX ADMIN — NIFEDIPINE 60 MG: 30 TABLET, FILM COATED, EXTENDED RELEASE ORAL at 08:21

## 2021-07-15 RX ADMIN — INSULIN LISPRO 1 UNITS: 100 INJECTION, SOLUTION INTRAVENOUS; SUBCUTANEOUS at 08:39

## 2021-07-15 RX ADMIN — LOSARTAN POTASSIUM 100 MG: 100 TABLET, FILM COATED ORAL at 08:28

## 2021-07-15 RX ADMIN — LEVETIRACETAM 750 MG: 500 TABLET, FILM COATED ORAL at 08:27

## 2021-07-15 RX ADMIN — BUSPIRONE HYDROCHLORIDE 20 MG: 5 TABLET ORAL at 08:26

## 2021-07-15 RX ADMIN — Medication 10 ML: at 08:34

## 2021-07-15 RX ADMIN — LEVOTHYROXINE SODIUM 75 MCG: 25 TABLET ORAL at 05:51

## 2021-07-15 RX ADMIN — DIPHENHYDRAMINE HYDROCHLORIDE 50 MG: 25 TABLET ORAL at 08:28

## 2021-07-15 RX ADMIN — SODIUM CHLORIDE, PRESERVATIVE FREE 10 ML: 5 INJECTION INTRAVENOUS at 08:26

## 2021-07-15 RX ADMIN — ASPIRIN 81 MG: 81 TABLET, CHEWABLE ORAL at 08:26

## 2021-07-15 RX ADMIN — PANTOPRAZOLE SODIUM 40 MG: 40 TABLET, DELAYED RELEASE ORAL at 05:51

## 2021-07-15 RX ADMIN — BUSPIRONE HYDROCHLORIDE 20 MG: 5 TABLET ORAL at 13:46

## 2021-07-15 RX ADMIN — ONDANSETRON 4 MG: 2 INJECTION INTRAMUSCULAR; INTRAVENOUS at 13:47

## 2021-07-15 RX ADMIN — INSULIN LISPRO 1 UNITS: 100 INJECTION, SOLUTION INTRAVENOUS; SUBCUTANEOUS at 13:23

## 2021-07-15 RX ADMIN — POTASSIUM CHLORIDE 20 MEQ: 1500 TABLET, EXTENDED RELEASE ORAL at 10:16

## 2021-07-15 RX ADMIN — MAGNESIUM SULFATE HEPTAHYDRATE 1000 MG: 1 INJECTION, SOLUTION INTRAVENOUS at 06:27

## 2021-07-15 ASSESSMENT — PAIN SCALES - GENERAL: PAINLEVEL_OUTOF10: 10

## 2021-07-15 NOTE — PROGRESS NOTES
Progress Note( Dr. Ashley Burroughs)  7/15/2021  Subjective:   Admit Date: 7/10/2021  PCP: Ramy De Guzman MD    Admitted For : Chest pain    Consulted For: Control of blood glucose    Interval History: Patient feels better. Her cardiac stress was postponed till later today  Reviewed notes from neurology they are suspecting that her seizures were possibly pseudoseizures does not need anymore further work-up    Patient was taken to heart station for stress test but could not complete as patient had: Code had seizure again so he was transferred back to ICU    Denies any chest pains,   Yes SOB . Denies nausea or vomiting. No new bowel or bladder symptoms. Intake/Output Summary (Last 24 hours) at 7/15/2021 0657  Last data filed at 7/15/2021 9004  Gross per 24 hour   Intake --   Output 500 ml   Net -500 ml       DATA    CBC:   Recent Labs     07/13/21  0514 07/14/21  0452 07/15/21  0355   WBC 8.2 8.2 8.5   HGB 11.9* 11.3* 10.9*    274 254    CMP:  Recent Labs     07/13/21  0514 07/14/21  0452 07/15/21  0355   * 138 139   K 3.5 3.8 3.4*   CL 96* 103 104   CO2 23 25 26   BUN 7 6 8   CREATININE 0.5* 0.5* 0.5*   CALCIUM 9.4 9.3 8.9   PROT 6.1*  --   --    LABALBU 4.4  --   --    BILITOT 0.4  --   --    ALKPHOS 63  --   --    AST 23  --   --    ALT 15  --   --      Lipids:   Lab Results   Component Value Date    CHOL 99 04/27/2021    HDL 42 04/27/2021    TRIG 116 04/27/2021     Glucose:  Recent Labs     07/14/21  1730 07/14/21  2025 07/15/21  0221   POCGLU 148* 222* 147*     [id number]:  Lab Results   Component Value Date    LABA1C 8.1 07/11/2021     High Sensitivity TSH:   Lab Results   Component Value Date    TSHHS 1.360 04/28/2021     Free T3:   Lab Results   Component Value Date    FT3 2.2 07/15/2016     Free T4:  Lab Results   Component Value Date    T4FREE 1.36 07/13/2021       CT HEAD WO CONTRAST    Result Date: 7/11/2021  EXAMINATION: CT OF THE HEAD WITHOUT CONTRAST  7/11/2021 12:02 pm     1.  No acute intracranial abnormality. 2. Cerebral and cerebellar parenchymal volume loss with chronic microvascular white matter ischemic disease. XR CHEST PORTABLE    Result Date: 7/10/2021  EXAMINATION: ONE XRAY VIEW OF THE CHEST 7/10/2021 8:13 pm COMPARISON: Chest radiograph 04/26/2021. HISTORY: ORDERING SYSTEM PROVIDED HISTORY: chest pain TECHNOLOGIST PROVIDED HISTORY: Reason for exam:->chest pain Reason for Exam: chest pain Acuity: Acute Type of Exam: Initial FINDINGS: The lungs are without acute focal process. There is no effusion or pneumothorax. The cardiomediastinal silhouette is stable. The osseous structures are stable. No acute process.        Scheduled Medicines   Medications:    magnesium oxide  400 mg Oral BID    QUEtiapine  25 mg Oral BID    losartan  100 mg Oral Daily    levETIRAcetam  750 mg Oral BID    amitriptyline  25 mg Oral Nightly    busPIRone  20 mg Oral TID    carbidopa-levodopa  1 tablet Oral Nightly    levothyroxine  75 mcg Oral QAM AC    metoprolol succinate  50 mg Oral Daily    montelukast  10 mg Oral Nightly    NIFEdipine  60 mg Oral Daily    OXcarbazepine  300 mg Oral BID    pantoprazole  40 mg Oral Daily    sodium chloride flush  5-40 mL Intravenous 2 times per day    aspirin  81 mg Oral Daily    atorvastatin  40 mg Oral Nightly    enoxaparin  40 mg Subcutaneous Daily    insulin glargine  20 Units Subcutaneous Nightly    insulin lispro  0-6 Units Subcutaneous TID WC    insulin lispro  0-3 Units Subcutaneous 2 times per day    lidocaine PF  5 mL Intradermal Once    sodium chloride flush  5-40 mL Intravenous 2 times per day      Infusions:    dextrose      sodium chloride      sodium chloride           Objective:   Vitals: /78   Pulse 66   Temp 98.3 °F (36.8 °C)   Resp 20   Ht 5' 2.01\" (1.575 m)   Wt 178 lb 12.7 oz (81.1 kg)   SpO2 92%   BMI 32.69 kg/m²   General appearance: alert and cooperative with exam  Neck: no JVD or bruit  Thyroid : Normal lobes   Lungs: Has Vesicular Breath sounds   Heart:  regular rate and rhythm  Abdomen: soft, non-tender; bowel sounds normal; no masses,  no organomegaly  Musculoskeletal: Normal  Extremities: extremities normal, , no edema  Neurologic:  Awake, alert, oriented to name, place and time. Cranial nerves II-XII are grossly intact. Motor is  intact. Sensory is intact. ,  and gait is normal.    Assessment:     Patient Active Problem List:     Chest pain     H/O Doppler ultrasound     H/O cardiovascular stress test     H/O cardiovascular stress test     H/O cardiovascular stress test     Incarcerated umbilical hernia     Essential hypertension     Type 2 diabetes mellitus with diabetic polyneuropathy, with long-term current use of insulin (HCC)     Tachycardia     Dyslipidemia     Family history of early CAD     NSTEMI (non-ST elevated myocardial infarction) ([address]     Abnormal nuclear stress test     ILSA (obstructive sleep apnea)     Snoring     Hypersomnolence     Mild intermittent asthma without complication     Asthma exacerbation attacks     Hypertensive emergency     Hallucination, visual     Severe episode of recurrent major depressive disorder, with psychotic features ([address]     Generalized anxiety disorder with panic attacks     Auditory hallucinations     Bipolar I disorder with depression, severe (HCC)     Dyspnea and respiratory abnormalities     Encephalopathy     Syncope     Bipolar 1 disorder ([address]      Plan:     1. Reviewed POC blood glucose . Labs and X ray results   2. Reviewed Current Medicines   3. On Correction bolus Humalog/ Basal Lantus Insulin regime /  4. Monitor Blood glucose frequently   5. Modified  the dose of Insulin/ other medicines as needed  6. For Cardiac Stress test today    7. Reviewed notes from neurology suspecting is her seizure activity possibly pseudoseizures seizures does not need anymore further work-up  8.  Second attempt for cardiac stress test that was incomplete and had to be aborted because of scoring \" \"seizures\"  9. Will follow     .      Segun Fox MD, MD

## 2021-07-15 NOTE — PROGRESS NOTES
Progress Note( Dr. Santo Kamara)  7/14/2021  Subjective:   Admit Date: 7/10/2021  PCP: Ailyn Seo MD    Admitted For : Chest pain    Consulted For: Control of blood glucose    Interval History: Patient feels better. Her cardiac stress was postponed till later today  Reviewed notes from neurology they are suspecting that her seizures were possibly pseudoseizures does not need anymore further work-up    Patient was taken to heart station for stress test but could not complete as patient had: Code had seizure again so he was transferred back to ICU    Denies any chest pains,   Yes SOB . Denies nausea or vomiting. No new bowel or bladder symptoms. Intake/Output Summary (Last 24 hours) at 7/14/2021 2342  Last data filed at 7/14/2021 2000  Gross per 24 hour   Intake --   Output 150 ml   Net -150 ml       DATA    CBC:   Recent Labs     07/12/21 0527 07/13/21 0514 07/14/21  0452   WBC 9.8 8.2 8.2   HGB 12.3* 11.9* 11.3*    262 274    CMP:  Recent Labs     07/12/21 0527 07/13/21  0514 07/14/21  0452   * 133* 138   K 3.7 3.5 3.8   CL 91* 96* 103   CO2 26 23 25   BUN 8 7 6   CREATININE 0.5* 0.5* 0.5*   CALCIUM 9.5 9.4 9.3   PROT  --  6.1*  --    LABALBU  --  4.4  --    BILITOT  --  0.4  --    ALKPHOS  --  63  --    AST  --  23  --    ALT  --  15  --      Lipids:   Lab Results   Component Value Date    CHOL 99 04/27/2021    HDL 42 04/27/2021    TRIG 116 04/27/2021     Glucose:  Recent Labs     07/14/21  1313 07/14/21  1730 07/14/21 2025   POCGLU 149* 148* 222*     [id number]:  Lab Results   Component Value Date    LABA1C 8.1 07/11/2021     High Sensitivity TSH:   Lab Results   Component Value Date    TSHHS 1.360 04/28/2021     Free T3:   Lab Results   Component Value Date    FT3 2.2 07/15/2016     Free T4:  Lab Results   Component Value Date    T4FREE 1.36 07/13/2021       CT HEAD WO CONTRAST    Result Date: 7/11/2021  EXAMINATION: CT OF THE HEAD WITHOUT CONTRAST  7/11/2021 12:02 pm     1.  No bruit  Thyroid : Normal lobes   Lungs: Has Vesicular Breath sounds   Heart:  regular rate and rhythm  Abdomen: soft, non-tender; bowel sounds normal; no masses,  no organomegaly  Musculoskeletal: Normal  Extremities: extremities normal, , no edema  Neurologic:  Awake, alert, oriented to name, place and time. Cranial nerves II-XII are grossly intact. Motor is  intact. Sensory is intact. ,  and gait is normal.    Assessment:     Patient Active Problem List:     Chest pain     H/O Doppler ultrasound     H/O cardiovascular stress test     H/O cardiovascular stress test     H/O cardiovascular stress test     Incarcerated umbilical hernia     Essential hypertension     Type 2 diabetes mellitus with diabetic polyneuropathy, with long-term current use of insulin (HCC)     Tachycardia     Dyslipidemia     Family history of early CAD     NSTEMI (non-ST elevated myocardial infarction) ([address]     Abnormal nuclear stress test     ILSA (obstructive sleep apnea)     Snoring     Hypersomnolence     Mild intermittent asthma without complication     Asthma exacerbation attacks     Hypertensive emergency     Hallucination, visual     Severe episode of recurrent major depressive disorder, with psychotic features ([address]     Generalized anxiety disorder with panic attacks     Auditory hallucinations     Bipolar I disorder with depression, severe (HCC)     Dyspnea and respiratory abnormalities     Encephalopathy     Syncope     Bipolar 1 disorder ([address]      Plan:     1. Reviewed POC blood glucose . Labs and X ray results   2. Reviewed Current Medicines   3. On Correction bolus Humalog/ Basal Lantus Insulin regime /  4. Monitor Blood glucose frequently   5. Modified  the dose of Insulin/ other medicines as needed  6. For Cardiac Stress test today    7. Reviewed notes from neurology suspecting is her seizure activity possibly pseudoseizures seizures does not need anymore further work-up  8.  Second attempt for cardiac stress test that was

## 2021-07-15 NOTE — PROGRESS NOTES
Occupational Therapy    Occupational Therapy Treatment Note      Name: Chiara Olsen MRN: [id number] :   1955   Date:  7/15/2021   Admission Date: 7/10/2021 Room:  -A     Primary Problem:  Chest pain    Restrictions/Precautions:    general precautions, fall risk    Communication with other providers:  RN notified Pt feeling nauseous and light-headed after functional mobility. Subjective:  Patient states: \"We can walk now. \"  Pain: none    Objective:    Observation:  Pt seated EOB eating lunch and agreeable to therapy. Objective Measures:  tele    Treatment, including education:  Pt performed sit-stand EOB<>RW CGA. Pt completed functional mobility in hallway ~ 12ft + 18ft + 20ft w/ 2 standing rest breaks. Pt performed stand-sit RW<>chair CGA. Pt returned to room in chair and Pt performed sit-stand chair<>RW CGA. Pt completed functional mobility ~ 4ft w/ RW CGA. Pt performed stand-sit RW<>EOB CGA. Therapeutic Activity Training:   Therapeutic activity training was instructed today. Cues were given for safety, sequence, UE/LE placement, awareness, and balance. Activities performed today included functional mobility training, stand-sit, sit-stand.         RN in room, RN approved Pt to remain seated EOB, call light in reach, bed alarm on    Assessment / Impression:    Patient's tolerance of treatment: well  Adverse Reaction: light-headedness and nausea after functional mobility  Significant change in status and impact:  none  Barriers to improvement: none      Plan for Next Session:    Continue w/ OT POC to inc functional activity tolerance    Time in:  1329  Time out:  1342  Timed treatment minutes:  13  Total treatment time:  13      Electronically signed by:    Damion Kaufman S/DESIREE,   7/15/2021, 1:46 PM

## 2021-07-15 NOTE — PROGRESS NOTES
Progress Note  Date:7/15/2021       ZNGK:[id number]  Patient Name:Simona Anna     Date of Birth:[birth year]     Age:66 y.o. Ready to go home  Subjective    Subjective:  Symptoms:  Stable. Diet:  Adequate intake. Pain:  She reports no pain. Review of Systems  Objective         Vitals Last 24 Hours:  TEMPERATURE:  Temp  Av.3 °F (36.8 °C)  Min: 98.3 °F (36.8 °C)  Max: 98.3 °F (36.8 °C)  RESPIRATIONS RANGE: Resp  Av.2  Min: 14  Max: 30  PULSE OXIMETRY RANGE: SpO2  Av.4 %  Min: 89 %  Max: 99 %  PULSE RANGE: Pulse  Av.6  Min: 63  Max: 92  BLOOD PRESSURE RANGE: Systolic (93FVM), RKX:398 , Min:120 , NBS:094   ; Diastolic (87EQV), XTN:24, Min:57, Max:108    I/O (24Hr): Intake/Output Summary (Last 24 hours) at 7/15/2021 0838  Last data filed at 7/15/2021 9096  Gross per 24 hour   Intake --   Output 500 ml   Net -500 ml     Objective:  General Appearance:  Comfortable. Vital signs: (most recent): Blood pressure (!) 141/74, pulse 73, temperature 97.9 °F (36.6 °C), temperature source Oral, resp. rate 20, height 5' 2.01\" (1.575 m), weight 178 lb 12.7 oz (81.1 kg), SpO2 95 %. Vital signs are normal.    HEENT: Normal HEENT exam.    Lungs:  Normal effort. Heart: Normal rate. Abdomen: Abdomen is soft. Bowel sounds are normal.     Extremities: Decreased range of motion. Neurological: Patient is alert. Pupils:  Pupils are equal, round, and reactive to light. Skin:  Warm.       Labs/Imaging/Diagnostics    Labs:  CBC:  Recent Labs     21  0514 21  0452 07/15/21  0355   WBC 8.2 8.2 8.5   RBC 4.19* 3.94* 3.79*   HGB 11.9* 11.3* 10.9*   HCT 34.3* 32.8* 31.5*   MCV 81.9 83.2 83.1   RDW 11.3* 11.6* 11.7    274 254     CHEMISTRIES:  Recent Labs     21  0514 21  0452 07/15/21  0355   * 138 139   K 3.5 3.8 3.4*   CL 96* 103 104   CO2 23 25 26   BUN 7 6 8   CREATININE 0.5* 0.5* 0.5*   GLUCOSE 136* 139* 128*   MG 1.4* 1.8 1.6*     PT/INR:No results function is normal.  Ejection fraction is visually estimated at 55-60%. Mild left ventricular hypertrophy. Grade I diastolic dysfunction. Left Atrium  Essentially normal left atrium. Right Atrium  Essentially normal right atrium. Right Ventricle  Essentially normal right ventricle. Aortic Valve  Structurally normal aortic valve. Mitral Valve  Mild mitral regurgitation. Tricuspid Valve  Structurally normal tricuspid valve. Pulmonic Valve  The pulmonic valve was not well visualized. Pericardial Effusion  No evidence of any pericardial effusion. Pleural Effusion  No evidence of pleural effusion.   M-Mode/2D Measurements & Calculations   LV Diastolic Dimension:  LV Systolic Dimension:  LA Dimension: 3.8 cmAO Root  4.89 cm                  3.55 cm                 Dimension: 3.1 cmLA Area:  LV FS:27.4 %             LV Volume Diastolic: 67 04.7 cm2  LV PW Diastolic: 8.90 cm ml  LV PW Systolic: 2.07 cm  LV Volume Systolic: 30  Septum Diastolic: 5.57   ml  cm                       LV EDV/LV EDV Index: 67  Septum Systolic: 7.12 cm EV/31 S4RD ESV/LV ESV   LA/Aorta: 1.23                           Index: 30 ml/17 m2                           EF Calculated (A4C):    LA volume/Index: 32 ml  LV Area Diastolic: 77.5  22.9 %                  /18m2  cm2                      EF Calculated (2D): 53  LV Area Systolic: 46.9   %  cm2                           LV Length: 8.02 cm                            LVOT: 2.1 cm  Doppler Measurements & Calculations   MV Peak E-Wave: 76.5 cm/s  MV Peak A-Wave: 111 cm/s  MV E/A Ratio: 0.69  MV Peak Gradient: 2.34 mmHg   MV P1/2t: 51 msec  MVA by PHT:4.31 cm2   MV E' Septal Velocity: 6.36 cm/s  MV E' Lateral Velocity: 6.03 cm/s  MV E/E' septal: 12.03  MV E/E' lateral: 12.69      NM MYOCARDIAL SPECT SINGLE    Result Date: 7/14/2021  Cardiac Perfusion Imaging   Demographics   Patient Name      89[facility]   Date of study        07/13/2021   Date of Birth     1955 Hospital Status: Inpatient.     Assessment//Plan           Hospital Problems         Last Modified POA    * (Principal) Chest pain 7/13/2021 Yes    CHEKO (generalized anxiety disorder) 7/13/2021 Yes    Pseudoseizures (Chronic) 7/13/2021 Yes    Panic attacks (Chronic) 7/13/2021 Yes    Bipolar 1 disorder, depressed, severe ([facility] [address] 7/13/2021 Yes    Hyponatremia 7/12/2021 Yes        Assessment & Plan  Chest pain  -ddimer low   -outpt f/U stress test  -Echo wnl  -Unable to stress test as had pseudoseizure/seizure  -BB, ASA, statin  Pseudosiezure, vs siezure  EEG not needed and  CT head neg  -keppra was increased  Bipolar  -psych recommend increase in seroquel  -trileptal  -on sinemet  Hyponatremia  -stopped thiazde as this was the cause  HTN  -BB, aRB, CCB  DM  -SSI  Hyothyroid  -synthroid  Hyokalemia and hypomag  -discharge on supplements      Discharge today    Electronically signed by Waldemar Sarmiento MD on 7/15/21 at 8:38 AM EDT

## 2021-07-15 NOTE — PROGRESS NOTES
CARDIOLOGY PROGRESS NOTE                                                  Name:  Bang Magallon /Age/Sex: 1955  (61 y.o. female)   MRN & CSN:  [id number] & [id number] Admission Date/Time: 7/10/2021  7:29 PM   Location:  - PCP: Gilbert Allen MD         Admit Date:  7/10/2021  Hospital Day: 6      SUBJECTIVE:   Seen patient as follow up as consultation for chest pain    + chest pain yesterday   No shortness of breath  No palpations    TELEMETRY: Sinus      Assessment/Plan:         1. Atypical chest pain: [facility] Nonobstructive CAD in 2017. Patient underwent resting MPI . She had an episode of ? Seizure (versus pseudoseizure ) in the noninvasive cardiac lab. Stress images were postponed. patient refuses to proceed with further cardiac testing. Patient had several episodes of chest pains. Unclear if the chest pains are pesudo chest pains (given psyh history) versus true angina. As mentioned prior angiogram in 2017 was unremarkable. Recommend outpatient follow-up. Echocardiogram shows preserved LVEF. No further work-up planned at this point  2. Severe hyponatremia   Possibly secondary to hydrochlorothiazide and psychiatric medications. Nephrology is following. off HCTZ   3. 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 4. Mood disorder: Patient is on psych medications. Mood is stable at this point. 5. hyperlipidemia: Continue with Lipitor 40 mg daily  6. Essential hypertension: Blood pressure elevated. Hold off on hydrochlorothiazide. Continue with losartan and Toprol-XL.         Please call us with any further questions    Past medical history:    has a past medical history of Abnormal EKG, Acid reflux, Anemia, Anesthesia, Anginal pain ([address] Anxiety, Arthritis, Asthma, Bipolar 1 disorder ([address] CAD (coronary artery disease), Cerebral artery occlusion with cerebral infarction [facility]), CHF (congestive heart failure) ([address] Chronic back pain, Chronic kidney disease, DDD (degenerative disc disease), cervical, Depression, Diabetes mellitus ([address] Diabetic neuropathy ([address] Dizziness, Dry skin, Enlarged ureter, Fatty liver, Fibrocystic breast, Generalized anxiety disorder, Gout, H/O cardiac catheterization, H/O cardiovascular stress test, H/O cardiovascular stress test, H/O cardiovascular stress test, H/O Doppler ultrasound, H/O echocardiogram, H/O echocardiogram, History of Holter monitoring, [place] (hard of hearing), Hx of cardiovascular stress test, Hx of motion sickness, HX OTHER MEDICAL, Hyperlipidemia, Hypertension, IBS (irritable bowel syndrome), Incisional hernia, Kidney stones, Migraines, Nausea & vomiting, Other specified disorder of skin, Panic attacks, Panic attacks, Pneumonia, Pseudoseizures, Restless leg, Shortness of breath, Sleep apnea, Staph infection, Thyroid disease, Tremor, Urinary incontinence, UTI (urinary tract infection), UTI (urinary tract infection), Vertigo, and Wears glasses. Past surgical history:   has a past surgical history that includes Carpal tunnel release (Right, 1999); Diagnostic Cardiac Cath Lab Procedure (01/11/2010); Dental surgery; Colonoscopy (Last Done 6-13); Endoscopy, colon, diagnostic (Several ); Bladder surgery (1970's Or 1980's); Lithotripsy (2011); Breast biopsy (Right, 1980's); Breast surgery (Left, 1990's); hernia repair ([address]); hernia repair (1970's); Cholecystectomy (1970's); Appendectomy (1970's); Hysterectomy, total abdominal (1987); Tubal ligation (1978); Esophagus dilation (1980's And 1990's); Knee arthroscopy (Right, 1999); joint replacement (2008); other surgical history (06 13 2014); fracture surgery (1974 Or 1975); Cardiac catheterization (10-18-06); and Cardiac catheterization. Social History:   reports that she has never smoked. She has never used smokeless tobacco. She reports that she does not drink alcohol and does not use drugs. Family history:  family history includes Anxiety Disorder in her daughter;  Arthritis in her father, mother, and sister; Bipolar Disorder in her daughter; Cancer in her brother and sister; Colon Cancer in her brother; Depression in her daughter and mother; Diabetes in her mother; Early Death in her brother; Early Death (age of onset: 37) in her brother; Early Death (age of onset: 61) in her mother; Early Death (age of onset: 61) in her brother; Hearing Loss in her sister; Heart Disease in her brother, brother, brother, father, mother, and sister; Heart Failure in her sister; High Blood Pressure in her brother, father, mother, and sister; High Cholesterol in her brother, father, and mother; Mental Illness in her brother, brother, daughter, father, mother, and sister; Patsy Dys / Padmini Budd in her mother; Other in her daughter, mother, and son; Stroke in her mother. OBJECTIVE:     BP (!) 141/74   Pulse 73   Temp 98.3 °F (36.8 °C)   Resp 19   Ht 5' 2.01\" (1.575 m)   Wt 178 lb 12.7 oz (81.1 kg)   SpO2 92%   BMI 32.69 kg/m²       Intake/Output Summary (Last 24 hours) at 7/15/2021 1003  Last data filed at 7/15/2021 2753  Gross per 24 hour   Intake --   Output 500 ml   Net -500 ml       Physical Exam:    Constitutional:  Well developed, Well nourished, No acute distress, Non-toxic appearance. HENT:  Normocephalic, Atraumatic, Bilateral external ears normal, Oropharynx moist, No oral exudates, Nose normal. Neck- Normal range of motion, No tenderness, Supple, No stridor. Eyes:  EOMI, Conjunctiva normal, No discharge. Respiratory:  Normal breath sounds, No respiratory distress, No wheezing, No chest tenderness. ,no use of accessory muscles, diaphragm movement is normal  Cardiovascular S1-S2 No murmurs, added sounds. Normal rate rhythm. No rubs gallops. Carotid pulses and amplitude are normal no bruit noted. Pedal pulses normal femoral pulses normal.  No pedal edema  GI:  Bowel sounds normal, Soft, No tenderness  : No CVA tenderness.    Musculoskeletal: No edema, No tenderness, No cyanosis, No clubbing. Back- No tenderness. Integument:  Warm, Dry, No erythema, No rash. Lymphatic:  No lymphadenopathy noted. Neurologic:  Alert & oriented x 3, No focal deficits noted.    Psychiatric:  Affect normal, Judgment normal, Mood normal.           MEDICATIONS:     magnesium sulfate  2,000 mg Intravenous Once    potassium chloride  20 mEq Oral Once    magnesium oxide  400 mg Oral BID    QUEtiapine  25 mg Oral BID    losartan  100 mg Oral Daily    levETIRAcetam  750 mg Oral BID    amitriptyline  25 mg Oral Nightly    busPIRone  20 mg Oral TID    carbidopa-levodopa  1 tablet Oral Nightly    levothyroxine  75 mcg Oral QAM AC    metoprolol succinate  50 mg Oral Daily    montelukast  10 mg Oral Nightly    NIFEdipine  60 mg Oral Daily    OXcarbazepine  300 mg Oral BID    pantoprazole  40 mg Oral Daily    sodium chloride flush  5-40 mL Intravenous 2 times per day    aspirin  81 mg Oral Daily    atorvastatin  40 mg Oral Nightly    enoxaparin  40 mg Subcutaneous Daily    insulin glargine  20 Units Subcutaneous Nightly    insulin lispro  0-6 Units Subcutaneous TID WC    insulin lispro  0-3 Units Subcutaneous 2 times per day    lidocaine PF  5 mL Intradermal Once    sodium chloride flush  5-40 mL Intravenous 2 times per day      dextrose      sodium chloride      sodium chloride       glucose, dextrose, glucagon (rDNA), dextrose, traZODone, magnesium sulfate, technetium sestamibi, hydrALAZINE, fluticasone-umeclidin-vilant, HYDROcodone-acetaminophen, hydrOXYzine, sodium chloride flush, sodium chloride, ondansetron **OR** ondansetron, acetaminophen **OR** acetaminophen, polyethylene glycol, morphine, sodium chloride flush, sodium chloride, diphenhydrAMINE  Allergies   Allergen Reactions    Abilify [Aripiprazole]      \"Severe Shaking And Restlessness\"    Advil [Ibuprofen Micronized] Palpitations     \"Severe High Blood Pressure\"    Augmentin [Amoxicillin-Pot Clavulanate] Itching and Rash    Bee Venom Swelling     Redness    Ciprofloxacin Itching and Rash    Codeine      \"Severe Abdominal Cramping\"    Darvocet [Propoxyphene N-Acetaminophen] Palpitations     \"Severe High Blood Pressure\"    Darvon [Propoxyphene Hcl] Palpitations     \"Severe High Blood Pressure\"    Decadron [Dexamethasone] Other (See Comments)     seizure  Seizures    Ditropan [Oxybutynin Chloride] Palpitations     \"Severe High Blood Pressure\"    Fioricet [Butalbital-Apap-Caffeine] Palpitations     \"Severe High Blood Pressure\"    Fiorinal-Codeine #3 [Butalbital-Asa-Caff-Codeine]      \"Severe Stomach Cramps\"    Flagyl [Metronidazole] Diarrhea     \"Severe Diarrhea And Cramping\"    Naproxen Palpitations     \"Severe High Blood Pressure\"    Other      \"Allergic To Spider Bites Causing Blackness Of Skin, Severe Itching And Pain\"                                                  \"Allergic To Powder In Gloves Causing Severe Redness And Itching\"    Prozac [Fluoxetine Hcl]      \"Hallucinations\"    Robaxin [Methocarbamol] Palpitations     \"Severe High Blood Pressure\"    Ultram [Tramadol]      \"Severe Stomach Pain\"    Zoloft Palpitations     \"Sever High Blood Pressure\"    Butalbital-Aspirin-Caffeine Other (See Comments)     \"severe stomach pain\"    Coreg [Carvedilol] Other (See Comments)     \"spikes my BP severely and send me into a severe anxiety attack\"    Fluoxetine Other (See Comments)     hallucinations    Oxybutynin Chloride Other (See Comments)     Raises bp    Percocet [Oxycodone-Acetaminophen]      \"knocked me out for 12 hrs\"    Sertraline Other (See Comments)     hallucinations    Tape [Adhesive Tape]      Patient states it tears her skin, including band aids    Reglan [Metoclopramide] Other (See Comments)     \"makes me talk like a baby, but if I take benadryl with it it's fine\"       Lab Data:  CBC:   Recent Labs     07/13/21  0514 07/14/21  0452 07/15/21  0355   WBC 8.2 8.2 8.5   HGB 11.9* 11.3* 10.9*   HCT 34.3* 32.8* 31.5*   MCV 81.9 83.2 83.1    274 254     BMP:   Recent Labs     07/13/21  0514 07/14/21  0452 07/15/21  0355   * 138 139   K 3.5 3.8 3.4*   CL 96* 103 104   CO2 23 25 26   BUN 7 6 8   CREATININE 0.5* 0.5* 0.5*     LIVER PROFILE:   Recent Labs     07/13/21  0514   AST 23   ALT 15   BILIDIR 0.2   BILITOT 0.4   ALKPHOS 2071 Brady Fu MD, MD 7/15/2021 10:03 AM

## 2021-07-15 NOTE — CARE COORDINATION
Pt on discharge to return to home, cleared by psych for outpt follow up. TC to [facility] and verified pt is no longer active with their services as pt was not happy with providers that were in her home. I called and spoke with pt's CM through [facility] [phone] and she states she is working on finding a new provider for aide services. Updated that pt is on discharge home today, Claudine Pérez will call pt and follow up directly later today.

## 2021-07-15 NOTE — DISCHARGE SUMMARY
Physician Discharge Summary     Patient ID:  Suzette Soto  [id number]  80 y.o.  1955    Admit date: 7/10/2021    Discharge date and time: No discharge date for patient encounter. Admitting Physician: Carloz Mejias MD     Discharge Physician:Jeff    Admission Diagnoses: Hyponatremia [E87.1]  Chest pain [R07.9]  Chest pain, unspecified type [R07.9]    Discharge Diagnoses: Chest pain                                           Pseudoseizure vs seizure                                          Bipolar                                           Hyponatremia                                           DM                                          Hypothyroid                                            Hypokalemia and hypmagnesia                                            HTN    Admission Condition: fair    Discharged Condition: good    Indication for Admission: chest pain    Hospital Course:    63 y.o. female admitted for  chest pressure that started around 6 PM yesterday. Patient states that the chest pressure is associated with shortness of breath. She states that the pressure still persistent, and that is associate with shortness of breath, however during our conversation patient is able to speak in full sentences did not appear to be in respiratory distress, and appeared to be comfortable. Patient  otherwise has no complaints of dizziness, N/V/C/D, abdominal pain, dysuria, joint pains, rash/boils, or fevers    She was admitted for chest pain and cardiology consulted. DDimer low and low pretest prob for VTE. Stress test was ordered but could not complete as she had seizure vs pseudoseizure. STress test was deferred for outpt. Echo with normal EF. Neurology consutled and CT head neg and her keppra was increased. Psych also consulted and increased her seroquel. She also had hyponatremia due to thiazide and this was stopped and f/u with nephrology outpt. She was also placed on mag and potassium supplements.  She was stable and discharged. Consults: cardiology, nephrology and neurology andpsych        Outstanding Order Results     No orders found from 6/11/2021 to 7/11/2021. Discharge Exam:  HEENT: Normal HEENT exam.    Lungs:  Normal effort. Heart: Normal rate. Abdomen: Abdomen is soft. Bowel sounds are normal.     Extremities: Decreased range of motion. Neurological: Patient is alert. Pupils:  Pupils are equal, round, and reactive to light. Skin:  Warm. Disposition: home    Patient Instructions:      Medication List      START taking these medications    losartan 100 MG tablet  Commonly known as: COZAAR  Take 1 tablet by mouth daily  Start taking on: July 16, 2021     traZODone 50 MG tablet  Commonly known as: DESYREL  Take 1 tablet by mouth nightly as needed for Sleep        CHANGE how you take these medications    levETIRAcetam 750 MG tablet  Commonly known as: KEPPRA  Take 2 tablets by mouth 2 times daily  What changed: how much to take     magnesium oxide 400 MG tablet  Commonly known as: MAG-OX  Take 1 tablet by mouth 3 times daily  What changed: when to take this     OXcarbazepine 300 MG tablet  Commonly known as: TRILEPTAL  Take 1 tablet by mouth 2 times daily  What changed: Another medication with the same name was removed. Continue taking this medication, and follow the directions you see here. potassium chloride 10 MEQ extended release tablet  Commonly known as: KLOR-CON  Take 1 tablet by mouth daily for 14 days  What changed: when to take this     QUEtiapine 25 MG tablet  Commonly known as: SEROQUEL  Take 1 tablet by mouth 2 times daily  What changed:   · when to take this  · Another medication with the same name was removed. Continue taking this medication, and follow the directions you see here.      Trelegy Ellipta 100-62.5-25 MCG/INH Aepb  Generic drug: fluticasone-umeclidin-vilant  Inhale 1 puff into the lungs daily  What changed:   · when to take this  · reasons to take this     [place] FlexTouch 200 UNIT/ML Sopn  Generic drug: Insulin Degludec  Inject 20 Units into the skin every morning  What changed:   · how much to take  · when to take this  · additional instructions        CONTINUE taking these medications    albuterol sulfate  (90 Base) MCG/ACT inhaler  Inhale 2 puffs into the lungs every 6 hours as needed for Wheezing or Shortness of Breath (or cough) Please include spacer with instructions for use. aluminum & magnesium hydroxide-simethicone 400-400-40 MG/5ML Susp  Commonly known as:  Maalox Max  Take 15 mLs by mouth every 6 hours as needed (heart burn)     amitriptyline 25 MG tablet  Commonly known as: ELAVIL  Take 1 tablet by mouth nightly     aspirin 81 MG tablet     busPIRone 10 MG tablet  Commonly known as: BUSPAR  Take 2 tablets by mouth 3 times daily     carbidopa-levodopa  MG per tablet  Commonly known as: SINEMET     docusate sodium 100 MG capsule  Commonly known as: Colace  Take 1 capsule by mouth 2 times daily     HYDROcodone-acetaminophen  MG per tablet  Commonly known as: NORCO     hydrOXYzine 25 MG capsule  Commonly known as: VISTARIL     levothyroxine 75 MCG tablet  Commonly known as: SYNTHROID  Take 1 tablet by mouth every morning (before breakfast)     metoprolol succinate 25 MG extended release tablet  Commonly known as: TOPROL XL  Take 2 tablets by mouth daily     montelukast 10 MG tablet  Commonly known as: SINGULAIR     MULTI-VITAMIN/IRON PO     NIFEdipine 60 MG extended release tablet  Commonly known as: PROCARDIA XL  Take 1 tablet by mouth daily     NovoLOG FlexPen 100 UNIT/ML injection pen  Generic drug: insulin aspart     pantoprazole 40 MG tablet  Commonly known as: Protonix  Take 1 tablet by mouth daily     polyethylene glycol 17 g packet  Commonly known as: GLYCOLAX     simvastatin 20 MG tablet  Commonly known as: ZOCOR  Take 1 tablet by mouth nightly        STOP taking these medications    losartan-hydroCHLOROthiazide 100-25 MG per tablet  Commonly known as: HYZAAR           Where to Get Your Medications      You can get these medications from any pharmacy    Bring a paper prescription for each of these medications  · levETIRAcetam 750 MG tablet  · losartan 100 MG tablet  · magnesium oxide 400 MG tablet  · potassium chloride 10 MEQ extended release tablet  · QUEtiapine 25 MG tablet  · traZODone 50 MG tablet         Activity: activity as tolerated  Diet: cardiac diet and diabetic  Wound Care: none needed    Follow-up with PCP.   Discharge time 30min  Signed:  Saul Carreon MD  7/15/2021  2:53 PM

## 2021-07-27 NOTE — PROGRESS NOTES
Physician Progress Note      PATIENT:               Ines Mccarthy  CSN #:                  [id number]  :                       1955  ADMIT DATE:       7/10/2021 7:29 PM  [address] DATE:        7/15/2021 4:00 PM  RESPONDING  PROVIDER #:        Yamil Jaimes MD          QUERY TEXT:    Pt admitted with chest pain. Pt noted to have CAD and elevated BP. If   possible, please document in progress notes and discharge summary if you are   evaluating and/or treating any of the following: The medical record reflects the following:  Risk Factors: HTN, Hx of CAD  Clinical Indicators: Presents to ED with chest pressure x 1 day with   associated SOB. Trop negative x 3. CXR, no acute disease. ECHO: Left   ventricular systolic function is normal. Ejection fraction is visually   estimated at 55-60%. Mild left ventricular hypertrophy. Grade I diastolic   dysfunction. No evidence of any pericardial effusion. Stress test:   Inconclusive stress test with resting images only due to seizure/pseudo   seizure prior to test. 7/10/21 BP in ED was 187/90. 7/10/21 Documentation in   the H&P notes, \"chest pain somewhat improved with sublingual nitro, started on   nit  Treatment: nitro gtt, ECHO, Card consult, Stress test unable to complete    Thank you,  Alcira Hernandez, RN  [phone]  Options provided:  -- Chest pain due to CAD with unstable angina  -- Chest pain due to pleurisy  -- Chest pain due to uncontrolled HTN  -- Other - I will add my own diagnosis  -- Disagree - Not applicable / Not valid  -- Disagree - Clinically unable to determine / Unknown  -- Refer to Clinical Documentation Reviewer    PROVIDER RESPONSE TEXT:    This patient has chest pain due to uncontrolled HTN.     Query created by: Haris Yeh on 2021 9:35 PM      Electronically signed by:  Yamil Jaimes MD 2021 1:22 PM

## 2021-07-28 ENCOUNTER — HOSPITAL ENCOUNTER (OUTPATIENT)
Age: 66
Discharge: HOME | End: 2021-07-28
Payer: MEDICARE

## 2021-07-28 ENCOUNTER — TELEPHONE (OUTPATIENT)
Dept: CARDIOLOGY CLINIC | Age: 66
End: 2021-07-28

## 2021-07-28 ENCOUNTER — OFFICE VISIT (OUTPATIENT)
Dept: CARDIOLOGY CLINIC | Age: 66
End: 2021-07-28
Payer: MEDICARE

## 2021-07-28 VITALS
HEART RATE: 84 BPM | HEIGHT: 62 IN | SYSTOLIC BLOOD PRESSURE: 136 MMHG | BODY MASS INDEX: 32.7 KG/M2 | DIASTOLIC BLOOD PRESSURE: 76 MMHG

## 2021-07-28 DIAGNOSIS — R07.9 CHEST PAIN, UNSPECIFIED TYPE: Primary | ICD-10-CM

## 2021-07-28 DIAGNOSIS — I10 ESSENTIAL HYPERTENSION: ICD-10-CM

## 2021-07-28 LAB — TROPONIN T: <0.01 NG/ML

## 2021-07-28 PROCEDURE — 1036F TOBACCO NON-USER: CPT | Performed by: NURSE PRACTITIONER

## 2021-07-28 PROCEDURE — G8427 DOCREV CUR MEDS BY ELIG CLIN: HCPCS | Performed by: NURSE PRACTITIONER

## 2021-07-28 PROCEDURE — 99214 OFFICE O/P EST MOD 30 MIN: CPT | Performed by: NURSE PRACTITIONER

## 2021-07-28 PROCEDURE — G8417 CALC BMI ABV UP PARAM F/U: HCPCS | Performed by: NURSE PRACTITIONER

## 2021-07-28 PROCEDURE — 36415 COLL VENOUS BLD VENIPUNCTURE: CPT

## 2021-07-28 PROCEDURE — 93000 ELECTROCARDIOGRAM COMPLETE: CPT | Performed by: NURSE PRACTITIONER

## 2021-07-28 PROCEDURE — 1111F DSCHRG MED/CURRENT MED MERGE: CPT | Performed by: NURSE PRACTITIONER

## 2021-07-28 PROCEDURE — 84484 ASSAY OF TROPONIN QUANT: CPT

## 2021-07-28 PROCEDURE — 3017F COLORECTAL CA SCREEN DOC REV: CPT | Performed by: NURSE PRACTITIONER

## 2021-07-28 ASSESSMENT — ENCOUNTER SYMPTOMS
SHORTNESS OF BREATH: 1
ORTHOPNEA: 0

## 2021-07-28 NOTE — TELEPHONE ENCOUNTER
Patient presents SOB, states chest pain 10/10 and B/P elevated. Recently in [facility] [address]. Discussed with Scar Menjivar NP, will room patient and evaluate.

## 2021-07-28 NOTE — PATIENT INSTRUCTIONS
Please hold on to these instructions the  will call you within 1-9 business days when we receive authorization from your insurance. Nuclear Stress Test    WHAT TO EXPECT:   ? You will need to confirm the test or it could be cancelled. ? This test will take approximately 2 hours: 1 hour in the AM &    1 hour in the PM. You will be given a time by the Technologist after the first part is completed to come back. ? You will be given a medication, through an IV in the hand, this will safely simulate exercise. This IV is also needed to inject the radioactive isotope unless you are able toe walk the treadmill. ? You will receive an injection in the AM & PM before the pictures. ? Using a special camera, you will have one set of pictures of your heart taken in the AM and a set of pictures in the PM.     PREPARATION FOR TEST:  ? Eat a light breakfast such as water or juice and toast.  ? If you are DIABETIC: Eat a normal breakfast with NO CAFFEINE and take your insulin as normal.   ? AVOID ALL FOODS & DRINKS containing CAFFEINE 12 HOURS PRIOR TO THE TEST: Including coffee, Tea, Erma and other soft drinks even those labeled  caffeine free or decaffeinated.  ? HOLD THESE MEDICATIONS Persantine & Theophylline (Theodur)  24 hours prior & bring your inhaler with you. The physician will specify if the following Beta blockers need to be held for 24 hours prior to test: Lopressor (metoprolol)        Please be informed that if you contact our office outside of normal business hours the physician on call cannot help with any scheduling or rescheduling issues, procedure instruction questions or any type of medication issue. We advise you for any urgent/emergency that you go to the nearest emergency room!     PLEASE CALL OUR OFFICE DURING NORMAL BUSINESS HOURS    Monday - Friday   8 am to 5 pm    [place]: [phone]    [place]: [phone]    [place]:  [phone]    **It is YOUR responsibilty to bring medication bottles and/or updated medication list to [facility].  This will allow us to better serve you and all your healthcare needs**

## 2021-07-28 NOTE — PROGRESS NOTES
7/28/2021  Primary cardiologist: Dr. Leim Kussmaul  is an established 61 y.o.  female here for follow-up on   1. Chest pain, unspecified type    2. Essential hypertension            SUBJECTIVE/OBJECTIVE:    HPI :   Key jones was recently in the hospital with chest pain. She was unable to complete stress test test secondary to seizure activity. Initial troponin in hospital was negative. Walked into the office complaining of severe chest pain 10 out of 10. Reports she walked 5 blocks to get to the office and had chest pain 10 out of 10 with walking. She describes the pain as a sharp pain to the left chest wall area. States it has been off and on since hospitalization. There are no associated symptoms and pain does not radiate. Pain goes away on its own. She also notes shortness of breath with exertion. Review of Systems   Constitutional: Negative for diaphoresis and malaise/fatigue. Cardiovascular: Positive for chest pain and dyspnea on exertion. Negative for claudication, irregular heartbeat, leg swelling, near-syncope, orthopnea, palpitations and paroxysmal nocturnal dyspnea. Respiratory: Positive for shortness of breath. Neurological: Positive for seizures. Negative for dizziness and light-headedness. Psychiatric/Behavioral: The patient is nervous/anxious. Vitals:    07/28/21 1032   BP: 136/76   Pulse: 84   Height: 5' 2\" (1.575 m)     Patient-Reported Vitals 12/28/2020   Patient-Reported Weight -   Patient-Reported Height 5'2\"     Wt Readings from Last 3 Encounters:   07/15/21 178 lb 12.7 oz (81.1 kg)   04/26/21 170 lb (77.1 kg)   03/22/21 170 lb (77.1 kg)     Body mass index is 32.7 kg/m². Physical Exam  Constitutional:       Appearance: Normal appearance. HENT:      Head: Normocephalic and atraumatic. Eyes:      Conjunctiva/sclera: Conjunctivae normal.      Pupils: Pupils are equal, round, and reactive to light.    Cardiovascular:      Rate and Rhythm: Normal rate and regular rhythm. Pulses: Normal pulses. Heart sounds: Normal heart sounds. Pulmonary:      Effort: Pulmonary effort is normal.      Breath sounds: Normal breath sounds. No rales. Chest:      Chest wall: No tenderness. Abdominal:      General: There is no distension. Palpations: Abdomen is soft. Tenderness: There is no abdominal tenderness. Musculoskeletal:         General: Tenderness present. Injury: to the left chest wall with touch. Cervical back: Normal range of motion and neck supple. Right lower leg: No edema. Left lower leg: No edema. Skin:     General: Skin is warm and dry. Capillary Refill: Capillary refill takes less than 2 seconds. Neurological:      General: No focal deficit present. Mental Status: She is alert and oriented to person, place, and time.    Psychiatric:         Mood and Affect: Mood normal.         Behavior: Behavior normal.                Current Outpatient Medications   Medication Sig Dispense Refill    magnesium oxide (MAG-OX) 400 MG tablet Take 1 tablet by mouth 3 times daily 90 tablet 0    traZODone (DESYREL) 50 MG tablet Take 1 tablet by mouth nightly as needed for Sleep 30 tablet 0    losartan (COZAAR) 100 MG tablet Take 1 tablet by mouth daily 30 tablet 0    QUEtiapine (SEROQUEL) 25 MG tablet Take 1 tablet by mouth 2 times daily 60 tablet 0    levETIRAcetam (KEPPRA) 750 MG tablet Take 2 tablets by mouth 2 times daily 120 tablet 0    potassium chloride (KLOR-CON) 10 MEQ extended release tablet Take 1 tablet by mouth daily for 14 days 14 tablet 0    busPIRone (BUSPAR) 10 MG tablet Take 2 tablets by mouth 3 times daily 180 tablet 0    levothyroxine (SYNTHROID) 75 MCG tablet Take 1 tablet by mouth every morning (before breakfast) 30 tablet 3    amitriptyline (ELAVIL) 25 MG tablet Take 1 tablet by mouth nightly 30 tablet 3    NIFEdipine (PROCARDIA XL) 60 MG extended release tablet Take 1 tablet by mouth daily 30 tablet 5    metoprolol succinate (TOPROL XL) 25 MG extended release tablet Take 2 tablets by mouth daily 30 tablet 5    HYDROcodone-acetaminophen (NORCO)  MG per tablet Take 1 tablet by mouth every 6 hours as needed.  fluticasone-umeclidin-vilant (TRELEGY ELLIPTA) 100-62.5-25 MCG/INH AEPB Inhale 1 puff into the lungs daily (Patient taking differently: Inhale 1 puff into the lungs daily as needed (only takes prn daily due to yeast infections in mouth, is aware she is supposed to take daily) ) 1 each 5    OXcarbazepine (TRILEPTAL) 300 MG tablet Take 1 tablet by mouth 2 times daily 60 tablet 3    hydrOXYzine (VISTARIL) 25 MG capsule Take 25 mg by mouth 2 times daily as needed      NOVOLOG FLEXPEN 100 UNIT/ML injection pen Inject into the skin See Admin Instructions 12/01/2020 patient states she follows sliding scale regimen BS > 150      simvastatin (ZOCOR) 20 MG tablet Take 1 tablet by mouth nightly 30 tablet 3    TRESIBA FLEXTOUCH 200 UNIT/ML SOPN Inject 20 Units into the skin every morning (Patient taking differently: Inject into the skin nightly 04/26/21 Patient states she follows sliding scale) 1 pen 2    docusate sodium (COLACE) 100 MG capsule Take 1 capsule by mouth 2 times daily 30 capsule 0    albuterol sulfate  (90 Base) MCG/ACT inhaler Inhale 2 puffs into the lungs every 6 hours as needed for Wheezing or Shortness of Breath (or cough) Please include spacer with instructions for use.  1 Inhaler 0    aluminum & magnesium hydroxide-simethicone (MAALOX MAX) 400-400-40 MG/5ML SUSP Take 15 mLs by mouth every 6 hours as needed (heart burn) 120 mL 0    pantoprazole (PROTONIX) 40 MG tablet Take 1 tablet by mouth daily 30 tablet 0    carbidopa-levodopa (SINEMET)  MG per tablet Take 1 tablet by mouth nightly      polyethylene glycol (GLYCOLAX) packet Take 17 g by mouth daily as needed for Constipation      aspirin 81 MG tablet Take 81 mg by mouth daily      Multiple Vitamins-Iron (MULTI-VITAMIN/IRON PO) Take  by mouth.  montelukast (SINGULAIR) 10 MG tablet Take 10 mg by mouth nightly. No current facility-administered medications for this visit. All pertinent data reviewed and discussed with patient    Transthoracic echocardiogram : 07/2021  Left ventricular systolic function is normal.   Ejection fraction is visually estimated at 55-60%. Mild left ventricular hypertrophy. Grade I diastolic dysfunction. No evidence of any pericardial effusion. Limited imaging provided; patient unable to tolerate complete exam.         [facility]: 08/2017   Angiograhically normal epicardial coronary arteries. Normal LV systolic function & wall motion. LVEF is 55 %. Patient tolerated the procedure well. No immediate complications     ASSESSMENT/PLAN:    Chest pain   Chest pain appears to be atypical.  There was chest pain reproducible to palpation to the left upper breast however patient states this is not the chest pain she has been having. We will send for stat troponin and arrange for stress Cardiolite. EKG today shows no acute ST or T wave abnormalities  Left heart cath reviewed from August 2017 showing angiography normal epicardial coronaries with normal EF. Echocardiogram reviewed from July 13, 2021 showing normal left ventricular systolic function mild LVH with grade 1 diastolic dysfunction.   Reports she has anxiety which may be driving chest discomfort  Continue with aspirin Toprol  If pain persist advised to go to ED  EKG sinus rhythm rate 84 left axis deviation no acute ST or T wave abnormalities appreciated    Hypertension   Blood pressure is Controlled  Encouraged a low sodium diet  Recommend to continue with losartan    Dyslipidemia   No recent lipids available   Goals for lipids reviewed  Continue with simvastatin      Medications reviewed and confirmed with patient     Tests ordered:  magdy scan       Follow-up after testing     Signed:  Graciela Smith, APRN - CNP, 7/28/2021, 11:38 AM    An electronic signature was used to authenticate this note.

## 2021-07-29 ENCOUNTER — TELEPHONE (OUTPATIENT)
Dept: CARDIOLOGY CLINIC | Age: 66
End: 2021-07-29

## 2021-07-29 NOTE — TELEPHONE ENCOUNTER
----- Message from WENDIE Beckham CNP sent at 7/28/2021  4:12 PM EDT -----  Please phone- labs is normal

## 2021-07-29 NOTE — TELEPHONE ENCOUNTER
Troponin  Order: [id number]  Status:  Final result   Visible to patient:  Yes (MyChart)   0 Result Notes   Ref Range & Units 1 d ago   (7/28/21) 2 wk ago   (7/14/21) 2 wk ago   (7/11/21)   Troponin T <0.01 NG/ML <0.010  <0.010 CM  <0.010 CM    Comment:          Patients with high levels of Biotin oral intake   (ie >5 mg/day) may have falsely decreased Troponin   levels. Samples collected within 8 hours of Biotin   intake may require addtional information for diagnosis. Resulting Agency  [address] [place] [address] [facility]  [facility]          Specimen Collected: 07/28/21 12:12 Last Resulted: 07/28/21 15:28               Advised pt of results and she verbalized understaning.

## 2021-08-23 ENCOUNTER — APPOINTMENT (OUTPATIENT)
Dept: CT IMAGING | Age: 66
End: 2021-08-23
Payer: MEDICARE

## 2021-08-23 ENCOUNTER — HOSPITAL ENCOUNTER (EMERGENCY)
Age: 66
Discharge: LEFT BEFORE DISCHARGE | End: 2021-08-23
Payer: MEDICARE

## 2021-08-23 VITALS
DIASTOLIC BLOOD PRESSURE: 89 MMHG | OXYGEN SATURATION: 98 % | TEMPERATURE: 98.4 F | RESPIRATION RATE: 18 BRPM | HEART RATE: 86 BPM | SYSTOLIC BLOOD PRESSURE: 184 MMHG

## 2021-08-23 LAB
ALBUMIN SERPL-MCNC: 4.4 GM/DL (ref 3.4–5)
ALCOHOL SCREEN SERUM: <0.01 %WT/VOL
ALP BLD-CCNC: 95 IU/L (ref 40–129)
ALT SERPL-CCNC: 22 U/L (ref 10–40)
ANION GAP SERPL CALCULATED.3IONS-SCNC: 13 MMOL/L (ref 4–16)
AST SERPL-CCNC: 25 IU/L (ref 15–37)
BASOPHILS ABSOLUTE: 0.1 K/CU MM
BASOPHILS RELATIVE PERCENT: 0.8 % (ref 0–1)
BILIRUB SERPL-MCNC: 0.4 MG/DL (ref 0–1)
BUN BLDV-MCNC: 6 MG/DL (ref 6–23)
CALCIUM SERPL-MCNC: 9.5 MG/DL (ref 8.3–10.6)
CHLORIDE BLD-SCNC: 85 MMOL/L (ref 99–110)
CO2: 23 MMOL/L (ref 21–32)
CREAT SERPL-MCNC: 0.5 MG/DL (ref 0.6–1.1)
DIFFERENTIAL TYPE: ABNORMAL
EOSINOPHILS ABSOLUTE: 0.5 K/CU MM
EOSINOPHILS RELATIVE PERCENT: 4 % (ref 0–3)
GFR AFRICAN AMERICAN: >60 ML/MIN/1.73M2
GFR NON-AFRICAN AMERICAN: >60 ML/MIN/1.73M2
GLUCOSE BLD-MCNC: 196 MG/DL (ref 70–99)
HCT VFR BLD CALC: 39.6 % (ref 37–47)
HEMOGLOBIN: 13.8 GM/DL (ref 12.5–16)
IMMATURE NEUTROPHIL %: 0.4 % (ref 0–0.43)
LYMPHOCYTES ABSOLUTE: 1.6 K/CU MM
LYMPHOCYTES RELATIVE PERCENT: 12.4 % (ref 24–44)
MCH RBC QN AUTO: 28.7 PG (ref 27–31)
MCHC RBC AUTO-ENTMCNC: 34.8 % (ref 32–36)
MCV RBC AUTO: 82.3 FL (ref 78–100)
MONOCYTES ABSOLUTE: 1 K/CU MM
MONOCYTES RELATIVE PERCENT: 7.8 % (ref 0–4)
NUCLEATED RBC %: 0 %
PDW BLD-RTO: 11.4 % (ref 11.7–14.9)
PLATELET # BLD: 328 K/CU MM (ref 140–440)
PMV BLD AUTO: 8.7 FL (ref 7.5–11.1)
POTASSIUM SERPL-SCNC: 4.7 MMOL/L (ref 3.5–5.1)
PROLACTIN: 14 NG/ML
RBC # BLD: 4.81 M/CU MM (ref 4.2–5.4)
SEGMENTED NEUTROPHILS ABSOLUTE COUNT: 9.8 K/CU MM
SEGMENTED NEUTROPHILS RELATIVE PERCENT: 74.6 % (ref 36–66)
SODIUM BLD-SCNC: 121 MMOL/L (ref 135–145)
TOTAL IMMATURE NEUTOROPHIL: 0.05 K/CU MM
TOTAL NUCLEATED RBC: 0 K/CU MM
TOTAL PROTEIN: 7.6 GM/DL (ref 6.4–8.2)
WBC # BLD: 13.1 K/CU MM (ref 4–10.5)

## 2021-08-23 PROCEDURE — G0480 DRUG TEST DEF 1-7 CLASSES: HCPCS

## 2021-08-23 PROCEDURE — 4500000002 HC ER NO CHARGE

## 2021-08-23 PROCEDURE — 84146 ASSAY OF PROLACTIN: CPT

## 2021-08-23 PROCEDURE — 99283 EMERGENCY DEPT VISIT LOW MDM: CPT

## 2021-08-23 PROCEDURE — 80053 COMPREHEN METABOLIC PANEL: CPT

## 2021-08-23 PROCEDURE — 70450 CT HEAD/BRAIN W/O DYE: CPT

## 2021-08-23 PROCEDURE — 93005 ELECTROCARDIOGRAM TRACING: CPT | Performed by: PHYSICIAN ASSISTANT

## 2021-08-23 PROCEDURE — 85025 COMPLETE CBC W/AUTO DIFF WBC: CPT

## 2021-08-23 ASSESSMENT — PAIN SCALES - GENERAL: PAINLEVEL_OUTOF10: 10

## 2021-08-23 ASSESSMENT — PAIN DESCRIPTION - DESCRIPTORS: DESCRIPTORS: CONSTANT

## 2021-08-23 ASSESSMENT — PAIN DESCRIPTION - PAIN TYPE: TYPE: ACUTE PAIN

## 2021-08-23 ASSESSMENT — PAIN DESCRIPTION - LOCATION: LOCATION: HEAD

## 2021-08-23 NOTE — ED PROVIDER NOTES
EKG  Normal sinus rhythm with rate of 86 bpm, normal intervals. No ST elevation. No previous to compare.      Ralph Tomlin MD  08/23/21 [id number]

## 2021-08-23 NOTE — Clinical Note
Patient has decided to leave without treatment. Attempts made to convince patient to stay and receive medical screening. Pt refuses. Patient advised of possible risks of leaving without medical screening. Patient voices understanding.     Patient taj d appropriate documentation in regards to leaving without medical screening

## 2021-08-29 LAB
EKG ATRIAL RATE: 86 BPM
EKG DIAGNOSIS: NORMAL
EKG P AXIS: 46 DEGREES
EKG P-R INTERVAL: 182 MS
EKG Q-T INTERVAL: 378 MS
EKG QRS DURATION: 108 MS
EKG QTC CALCULATION (BAZETT): 452 MS
EKG R AXIS: -37 DEGREES
EKG T AXIS: 21 DEGREES
EKG VENTRICULAR RATE: 86 BPM

## 2021-08-29 PROCEDURE — 93010 ELECTROCARDIOGRAM REPORT: CPT | Performed by: INTERNAL MEDICINE

## 2021-09-03 DIAGNOSIS — R00.0 TACHYCARDIA: ICD-10-CM

## 2021-09-03 DIAGNOSIS — E11.42 TYPE 2 DIABETES MELLITUS WITH DIABETIC POLYNEUROPATHY, UNSPECIFIED WHETHER LONG TERM INSULIN USE (HCC): ICD-10-CM

## 2021-09-03 DIAGNOSIS — E78.5 DYSLIPIDEMIA: ICD-10-CM

## 2021-09-03 DIAGNOSIS — I10 ESSENTIAL HYPERTENSION: ICD-10-CM

## 2021-09-03 RX ORDER — NIFEDIPINE 60 MG/1
60 TABLET, EXTENDED RELEASE ORAL DAILY
Qty: 30 TABLET | Refills: 5 | Status: ON HOLD
Start: 2021-09-03 | End: 2022-01-11 | Stop reason: HOSPADM

## 2021-09-10 ENCOUNTER — TELEPHONE (OUTPATIENT)
Dept: CARDIOLOGY CLINIC | Age: 66
End: 2021-09-10

## 2021-09-10 RX ORDER — METOPROLOL SUCCINATE 50 MG/1
50 TABLET, EXTENDED RELEASE ORAL DAILY
Qty: 30 TABLET | Refills: 5 | Status: SHIPPED | OUTPATIENT
Start: 2021-09-10 | End: 2022-02-28

## 2021-09-14 ENCOUNTER — OFFICE VISIT (OUTPATIENT)
Dept: CARDIOLOGY CLINIC | Age: 66
End: 2021-09-14
Payer: MEDICARE

## 2021-09-14 VITALS
HEIGHT: 62 IN | BODY MASS INDEX: 32.31 KG/M2 | SYSTOLIC BLOOD PRESSURE: 168 MMHG | WEIGHT: 175.6 LBS | DIASTOLIC BLOOD PRESSURE: 84 MMHG | HEART RATE: 80 BPM

## 2021-09-14 DIAGNOSIS — R07.2 PRECORDIAL CHEST PAIN: Primary | ICD-10-CM

## 2021-09-14 DIAGNOSIS — R07.9 CHEST PAIN, UNSPECIFIED TYPE: ICD-10-CM

## 2021-09-14 PROCEDURE — 1036F TOBACCO NON-USER: CPT | Performed by: INTERNAL MEDICINE

## 2021-09-14 PROCEDURE — G8427 DOCREV CUR MEDS BY ELIG CLIN: HCPCS | Performed by: INTERNAL MEDICINE

## 2021-09-14 PROCEDURE — 3017F COLORECTAL CA SCREEN DOC REV: CPT | Performed by: INTERNAL MEDICINE

## 2021-09-14 PROCEDURE — G8417 CALC BMI ABV UP PARAM F/U: HCPCS | Performed by: INTERNAL MEDICINE

## 2021-09-14 PROCEDURE — 99214 OFFICE O/P EST MOD 30 MIN: CPT | Performed by: INTERNAL MEDICINE

## 2021-09-14 RX ORDER — DIAZEPAM 5 MG/1
TABLET ORAL PRN
COMMUNITY
Start: 2021-09-03

## 2021-09-14 RX ORDER — BACLOFEN 10 MG/1
1 TABLET ORAL 2 TIMES DAILY
COMMUNITY
Start: 2021-09-03
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
## 2021-09-14 NOTE — LETTER
Esther Castorena [birth year]  1955  [id number]    Have you had any Chest Pain that is not new? - No        Have you had any Shortness of Breath - Yes  If Yes - When at rest and on exertion    Have you had any dizziness - Yes, ongoing, daily, unchanged  If Yes DO ORTHOSTATIC BP - when do you feel dizzy all  day, all the time   How long does it last .all  the time, all day  All Day       Have you had any palpitations that are not new? - Yes, ongoing, occasional, unchanged  If Yes DO EKG - Do you feel your heart varies to all  symptoms  How long does it last - .2  minutes       Do you have any edema - swelling in legs to feet    If Yes - CHECK TO SEE IF THE EDEMA IS PITTING  How long have they been having edema - Months  If Yes - Have they worn compression stockings No     Vein \"LEG PROBLEM Questionnaire\"  1. Do you have prominent leg veins? Yes   2. Do you have any skin discoloration? No  3. Do you have any healed or active sores? No  4. Do you have swelling of the legs? Yes  5. Do you have a family history of varicose veins? No  6. 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 Have you been fighting overweight problems? Yes  8. Do you have restless legs? Yes  9. Do you have any night time cramps? Yes  10. Do you have any of the following in your legs:        Aching, Itching and Tiredness and weakness     When did you have your last labs drawn All  [facility]    If we do not have these labs you are retrieve these labs for these providers!     Do you have a surgery or procedure scheduled in the near future - No  If Yes- DO EKG

## 2021-09-14 NOTE — PATIENT INSTRUCTIONS
**It is YOUR responsibilty to bring medication bottles and/or updated medication list to [address] [place] [address]. This will allow us to better serve you and all your healthcare needs**      Please be informed that if you contact our office outside of normal business hours the physician on call cannot help with any scheduling or rescheduling issues, procedure instruction questions or any type of medication issue. We advise you for any urgent/emergency that you go to the nearest emergency room!     PLEASE CALL OUR OFFICE DURING NORMAL BUSINESS HOURS    Monday - Friday   8 am to 5 pm    [place]: [address]: [phone]    [facility]:  [phone]

## 2021-09-27 ENCOUNTER — HOSPITAL ENCOUNTER (INPATIENT)
Age: 66
LOS: 1 days | Discharge: HOME | DRG: 101 | End: 2021-10-01
Attending: EMERGENCY MEDICINE | Admitting: STUDENT IN AN ORGANIZED HEALTH CARE EDUCATION/TRAINING PROGRAM
Payer: MEDICARE

## 2021-09-27 ENCOUNTER — APPOINTMENT (OUTPATIENT)
Dept: GENERAL RADIOLOGY | Age: 66
DRG: 101 | End: 2021-09-27
Payer: MEDICARE

## 2021-09-27 DIAGNOSIS — R07.89 OTHER CHEST PAIN: Primary | ICD-10-CM

## 2021-09-27 LAB
ALBUMIN SERPL-MCNC: 4.2 GM/DL (ref 3.4–5)
ALP BLD-CCNC: 90 IU/L (ref 40–129)
ALT SERPL-CCNC: 9 U/L (ref 10–40)
ANION GAP SERPL CALCULATED.3IONS-SCNC: 14 MMOL/L (ref 4–16)
AST SERPL-CCNC: 26 IU/L (ref 15–37)
BASOPHILS ABSOLUTE: 0.1 K/CU MM
BASOPHILS RELATIVE PERCENT: 0.7 % (ref 0–1)
BILIRUB SERPL-MCNC: 0.3 MG/DL (ref 0–1)
BUN BLDV-MCNC: 11 MG/DL (ref 6–23)
CALCIUM SERPL-MCNC: 9.1 MG/DL (ref 8.3–10.6)
CHLORIDE BLD-SCNC: 92 MMOL/L (ref 99–110)
CO2: 25 MMOL/L (ref 21–32)
CREAT SERPL-MCNC: 0.4 MG/DL (ref 0.6–1.1)
DIFFERENTIAL TYPE: ABNORMAL
EOSINOPHILS ABSOLUTE: 0.4 K/CU MM
EOSINOPHILS RELATIVE PERCENT: 4.4 % (ref 0–3)
GFR AFRICAN AMERICAN: >60 ML/MIN/1.73M2
GFR NON-AFRICAN AMERICAN: >60 ML/MIN/1.73M2
GLUCOSE BLD-MCNC: 153 MG/DL (ref 70–99)
HCT VFR BLD CALC: 35.6 % (ref 37–47)
HEMOGLOBIN: 12.2 GM/DL (ref 12.5–16)
IMMATURE NEUTROPHIL %: 0.2 % (ref 0–0.43)
LYMPHOCYTES ABSOLUTE: 3.2 K/CU MM
LYMPHOCYTES RELATIVE PERCENT: 39.2 % (ref 24–44)
MCH RBC QN AUTO: 28.6 PG (ref 27–31)
MCHC RBC AUTO-ENTMCNC: 34.3 % (ref 32–36)
MCV RBC AUTO: 83.4 FL (ref 78–100)
MONOCYTES ABSOLUTE: 1 K/CU MM
MONOCYTES RELATIVE PERCENT: 12 % (ref 0–4)
NUCLEATED RBC %: 0 %
PDW BLD-RTO: 11.8 % (ref 11.7–14.9)
PLATELET # BLD: 251 K/CU MM (ref 140–440)
PMV BLD AUTO: 9 FL (ref 7.5–11.1)
POTASSIUM SERPL-SCNC: 3.6 MMOL/L (ref 3.5–5.1)
PRO-BNP: 86 PG/ML
RBC # BLD: 4.27 M/CU MM (ref 4.2–5.4)
SEGMENTED NEUTROPHILS ABSOLUTE COUNT: 3.6 K/CU MM
SEGMENTED NEUTROPHILS RELATIVE PERCENT: 43.5 % (ref 36–66)
SODIUM BLD-SCNC: 131 MMOL/L (ref 135–145)
TOTAL IMMATURE NEUTOROPHIL: 0.02 K/CU MM
TOTAL NUCLEATED RBC: 0 K/CU MM
TOTAL PROTEIN: 6.8 GM/DL (ref 6.4–8.2)
TROPONIN T: <0.01 NG/ML
WBC # BLD: 8.3 K/CU MM (ref 4–10.5)

## 2021-09-27 PROCEDURE — 96375 TX/PRO/DX INJ NEW DRUG ADDON: CPT

## 2021-09-27 PROCEDURE — 85025 COMPLETE CBC W/AUTO DIFF WBC: CPT

## 2021-09-27 PROCEDURE — 93005 ELECTROCARDIOGRAM TRACING: CPT | Performed by: EMERGENCY MEDICINE

## 2021-09-27 PROCEDURE — 84484 ASSAY OF TROPONIN QUANT: CPT

## 2021-09-27 PROCEDURE — 71045 X-RAY EXAM CHEST 1 VIEW: CPT

## 2021-09-27 PROCEDURE — 99285 EMERGENCY DEPT VISIT HI MDM: CPT

## 2021-09-27 PROCEDURE — 83880 ASSAY OF NATRIURETIC PEPTIDE: CPT

## 2021-09-27 PROCEDURE — 80053 COMPREHEN METABOLIC PANEL: CPT

## 2021-09-27 RX ORDER — NITROGLYCERIN 0.4 MG/1
0.4 TABLET SUBLINGUAL ONCE
Status: DISCONTINUED | OUTPATIENT
Start: 2021-09-27 | End: 2021-10-01 | Stop reason: HOSPADM

## 2021-09-27 RX ORDER — DIPHENHYDRAMINE HCL 25 MG
25 TABLET ORAL ONCE
Status: COMPLETED | OUTPATIENT
Start: 2021-09-28 | End: 2021-09-28

## 2021-09-27 RX ORDER — MORPHINE SULFATE 2 MG/ML
5 INJECTION, SOLUTION INTRAMUSCULAR; INTRAVENOUS ONCE
Status: COMPLETED | OUTPATIENT
Start: 2021-09-28 | End: 2021-09-28

## 2021-09-27 ASSESSMENT — ENCOUNTER SYMPTOMS
RHINORRHEA: 0
SHORTNESS OF BREATH: 1
NAUSEA: 1
VOMITING: 0
WHEEZING: 0
SINUS PRESSURE: 0
DIARRHEA: 0
COUGH: 0
SORE THROAT: 0
CONSTIPATION: 0
ABDOMINAL PAIN: 0

## 2021-09-27 ASSESSMENT — PAIN DESCRIPTION - FREQUENCY: FREQUENCY: CONTINUOUS

## 2021-09-27 ASSESSMENT — PAIN DESCRIPTION - DIRECTION: RADIATING_TOWARDS: BACK

## 2021-09-27 ASSESSMENT — PAIN DESCRIPTION - ORIENTATION: ORIENTATION: LEFT

## 2021-09-27 ASSESSMENT — PAIN DESCRIPTION - LOCATION: LOCATION: CHEST

## 2021-09-27 ASSESSMENT — PAIN SCALES - GENERAL: PAINLEVEL_OUTOF10: 10

## 2021-09-28 ENCOUNTER — APPOINTMENT (OUTPATIENT)
Dept: CT IMAGING | Age: 66
DRG: 101 | End: 2021-09-28
Payer: MEDICARE

## 2021-09-28 ENCOUNTER — APPOINTMENT (OUTPATIENT)
Dept: NUCLEAR MEDICINE | Age: 66
DRG: 101 | End: 2021-09-28
Payer: MEDICARE

## 2021-09-28 LAB
ANION GAP SERPL CALCULATED.3IONS-SCNC: 14 MMOL/L (ref 4–16)
BUN BLDV-MCNC: 14 MG/DL (ref 6–23)
CALCIUM SERPL-MCNC: 9 MG/DL (ref 8.3–10.6)
CHLORIDE BLD-SCNC: 92 MMOL/L (ref 99–110)
CO2: 23 MMOL/L (ref 21–32)
CREAT SERPL-MCNC: 0.7 MG/DL (ref 0.6–1.1)
EKG ATRIAL RATE: 76 BPM
EKG ATRIAL RATE: 85 BPM
EKG DIAGNOSIS: NORMAL
EKG DIAGNOSIS: NORMAL
EKG P AXIS: 15 DEGREES
EKG P AXIS: 17 DEGREES
EKG P-R INTERVAL: 176 MS
EKG P-R INTERVAL: 178 MS
EKG Q-T INTERVAL: 416 MS
EKG Q-T INTERVAL: 422 MS
EKG QRS DURATION: 114 MS
EKG QRS DURATION: 114 MS
EKG QTC CALCULATION (BAZETT): 474 MS
EKG QTC CALCULATION (BAZETT): 495 MS
EKG R AXIS: -39 DEGREES
EKG R AXIS: -44 DEGREES
EKG T AXIS: 16 DEGREES
EKG T AXIS: 37 DEGREES
EKG VENTRICULAR RATE: 76 BPM
EKG VENTRICULAR RATE: 85 BPM
GFR AFRICAN AMERICAN: >60 ML/MIN/1.73M2
GFR NON-AFRICAN AMERICAN: >60 ML/MIN/1.73M2
GLUCOSE BLD-MCNC: 171 MG/DL (ref 70–99)
GLUCOSE BLD-MCNC: 221 MG/DL (ref 70–99)
HCT VFR BLD CALC: 39.2 % (ref 37–47)
HEMOGLOBIN: 13.3 GM/DL (ref 12.5–16)
LV EF: 60 %
LVEF MODALITY: NORMAL
MCH RBC QN AUTO: 28.4 PG (ref 27–31)
MCHC RBC AUTO-ENTMCNC: 33.9 % (ref 32–36)
MCV RBC AUTO: 83.6 FL (ref 78–100)
PDW BLD-RTO: 11.9 % (ref 11.7–14.9)
PLATELET # BLD: 252 K/CU MM (ref 140–440)
PMV BLD AUTO: 9.3 FL (ref 7.5–11.1)
POTASSIUM SERPL-SCNC: 4.4 MMOL/L (ref 3.5–5.1)
RBC # BLD: 4.69 M/CU MM (ref 4.2–5.4)
SODIUM BLD-SCNC: 129 MMOL/L (ref 135–145)
TROPONIN T: <0.01 NG/ML
TROPONIN T: <0.01 NG/ML
WBC # BLD: 15.9 K/CU MM (ref 4–10.5)

## 2021-09-28 PROCEDURE — 82962 GLUCOSE BLOOD TEST: CPT

## 2021-09-28 PROCEDURE — 6370000000 HC RX 637 (ALT 250 FOR IP): Performed by: FAMILY MEDICINE

## 2021-09-28 PROCEDURE — 93005 ELECTROCARDIOGRAM TRACING: CPT | Performed by: EMERGENCY MEDICINE

## 2021-09-28 PROCEDURE — 94640 AIRWAY INHALATION TREATMENT: CPT

## 2021-09-28 PROCEDURE — 85027 COMPLETE CBC AUTOMATED: CPT

## 2021-09-28 PROCEDURE — 96375 TX/PRO/DX INJ NEW DRUG ADDON: CPT

## 2021-09-28 PROCEDURE — 6370000000 HC RX 637 (ALT 250 FOR IP): Performed by: STUDENT IN AN ORGANIZED HEALTH CARE EDUCATION/TRAINING PROGRAM

## 2021-09-28 PROCEDURE — 78452 HT MUSCLE IMAGE SPECT MULT: CPT

## 2021-09-28 PROCEDURE — 6360000002 HC RX W HCPCS: Performed by: EMERGENCY MEDICINE

## 2021-09-28 PROCEDURE — 96366 THER/PROPH/DIAG IV INF ADDON: CPT

## 2021-09-28 PROCEDURE — 80048 BASIC METABOLIC PNL TOTAL CA: CPT

## 2021-09-28 PROCEDURE — G0378 HOSPITAL OBSERVATION PER HR: HCPCS

## 2021-09-28 PROCEDURE — 6360000002 HC RX W HCPCS: Performed by: INTERNAL MEDICINE

## 2021-09-28 PROCEDURE — 84484 ASSAY OF TROPONIN QUANT: CPT

## 2021-09-28 PROCEDURE — 71275 CT ANGIOGRAPHY CHEST: CPT

## 2021-09-28 PROCEDURE — 99222 1ST HOSP IP/OBS MODERATE 55: CPT | Performed by: INTERNAL MEDICINE

## 2021-09-28 PROCEDURE — 2580000003 HC RX 258: Performed by: STUDENT IN AN ORGANIZED HEALTH CARE EDUCATION/TRAINING PROGRAM

## 2021-09-28 PROCEDURE — 3430000000 HC RX DIAGNOSTIC RADIOPHARMACEUTICAL: Performed by: INTERNAL MEDICINE

## 2021-09-28 PROCEDURE — 6360000004 HC RX CONTRAST MEDICATION: Performed by: EMERGENCY MEDICINE

## 2021-09-28 PROCEDURE — 93010 ELECTROCARDIOGRAM REPORT: CPT | Performed by: INTERNAL MEDICINE

## 2021-09-28 PROCEDURE — 93017 CV STRESS TEST TRACING ONLY: CPT

## 2021-09-28 PROCEDURE — A9500 TC99M SESTAMIBI: HCPCS | Performed by: INTERNAL MEDICINE

## 2021-09-28 PROCEDURE — 96365 THER/PROPH/DIAG IV INF INIT: CPT

## 2021-09-28 PROCEDURE — 2580000003 HC RX 258: Performed by: FAMILY MEDICINE

## 2021-09-28 PROCEDURE — 6360000002 HC RX W HCPCS: Performed by: STUDENT IN AN ORGANIZED HEALTH CARE EDUCATION/TRAINING PROGRAM

## 2021-09-28 PROCEDURE — 6370000000 HC RX 637 (ALT 250 FOR IP): Performed by: EMERGENCY MEDICINE

## 2021-09-28 RX ORDER — NITROGLYCERIN 0.4 MG/1
0.4 TABLET SUBLINGUAL EVERY 5 MIN PRN
Status: DISCONTINUED | OUTPATIENT
Start: 2021-09-28 | End: 2021-10-01 | Stop reason: HOSPADM

## 2021-09-28 RX ORDER — PROMETHAZINE HYDROCHLORIDE 25 MG/1
12.5 TABLET ORAL EVERY 6 HOURS PRN
Status: DISCONTINUED | OUTPATIENT
Start: 2021-09-28 | End: 2021-10-01 | Stop reason: HOSPADM

## 2021-09-28 RX ORDER — POLYETHYLENE GLYCOL 3350 17 G/17G
17 POWDER, FOR SOLUTION ORAL DAILY PRN
Status: DISCONTINUED | OUTPATIENT
Start: 2021-09-28 | End: 2021-10-01 | Stop reason: HOSPADM

## 2021-09-28 RX ORDER — LOSARTAN POTASSIUM 100 MG/1
100 TABLET ORAL DAILY
Status: DISCONTINUED | OUTPATIENT
Start: 2021-09-28 | End: 2021-10-01 | Stop reason: HOSPADM

## 2021-09-28 RX ORDER — BACLOFEN 10 MG/1
10 TABLET ORAL 2 TIMES DAILY
Status: DISCONTINUED | OUTPATIENT
Start: 2021-09-28 | End: 2021-10-01 | Stop reason: HOSPADM

## 2021-09-28 RX ORDER — ALBUTEROL SULFATE 90 UG/1
2 AEROSOL, METERED RESPIRATORY (INHALATION) EVERY 6 HOURS PRN
Status: DISCONTINUED | OUTPATIENT
Start: 2021-09-28 | End: 2021-10-01 | Stop reason: HOSPADM

## 2021-09-28 RX ORDER — ATORVASTATIN CALCIUM 40 MG/1
40 TABLET, FILM COATED ORAL DAILY
Status: DISCONTINUED | OUTPATIENT
Start: 2021-09-28 | End: 2021-10-01 | Stop reason: HOSPADM

## 2021-09-28 RX ORDER — BUDESONIDE AND FORMOTEROL FUMARATE DIHYDRATE 160; 4.5 UG/1; UG/1
2 AEROSOL RESPIRATORY (INHALATION) 2 TIMES DAILY
Status: DISCONTINUED | OUTPATIENT
Start: 2021-09-28 | End: 2021-10-01 | Stop reason: HOSPADM

## 2021-09-28 RX ORDER — LEVOTHYROXINE SODIUM 0.07 MG/1
75 TABLET ORAL
Status: DISCONTINUED | OUTPATIENT
Start: 2021-09-28 | End: 2021-10-01 | Stop reason: HOSPADM

## 2021-09-28 RX ORDER — NICOTINE POLACRILEX 4 MG
15 LOZENGE BUCCAL PRN
Status: DISCONTINUED | OUTPATIENT
Start: 2021-09-28 | End: 2021-10-01 | Stop reason: HOSPADM

## 2021-09-28 RX ORDER — BUSPIRONE HYDROCHLORIDE 10 MG/1
20 TABLET ORAL 3 TIMES DAILY
Status: DISCONTINUED | OUTPATIENT
Start: 2021-09-28 | End: 2021-10-01 | Stop reason: HOSPADM

## 2021-09-28 RX ORDER — GUAIFENESIN 600 MG/1
600 TABLET, EXTENDED RELEASE ORAL 2 TIMES DAILY PRN
Status: DISCONTINUED | OUTPATIENT
Start: 2021-09-28 | End: 2021-10-01 | Stop reason: HOSPADM

## 2021-09-28 RX ORDER — DEXTROSE MONOHYDRATE 50 MG/ML
100 INJECTION, SOLUTION INTRAVENOUS PRN
Status: DISCONTINUED | OUTPATIENT
Start: 2021-09-28 | End: 2021-10-01 | Stop reason: HOSPADM

## 2021-09-28 RX ORDER — LORAZEPAM 2 MG/ML
2 INJECTION INTRAMUSCULAR ONCE
Status: COMPLETED | OUTPATIENT
Start: 2021-09-28 | End: 2021-09-28

## 2021-09-28 RX ORDER — QUETIAPINE FUMARATE 25 MG/1
25 TABLET, FILM COATED ORAL DAILY
Status: DISCONTINUED | OUTPATIENT
Start: 2021-09-28 | End: 2021-10-01 | Stop reason: HOSPADM

## 2021-09-28 RX ORDER — METOPROLOL SUCCINATE 50 MG/1
50 TABLET, EXTENDED RELEASE ORAL DAILY
Status: DISCONTINUED | OUTPATIENT
Start: 2021-09-28 | End: 2021-10-01 | Stop reason: HOSPADM

## 2021-09-28 RX ORDER — ACETAMINOPHEN 650 MG/1
650 SUPPOSITORY RECTAL EVERY 6 HOURS PRN
Status: DISCONTINUED | OUTPATIENT
Start: 2021-09-28 | End: 2021-10-01 | Stop reason: HOSPADM

## 2021-09-28 RX ORDER — ACETAMINOPHEN 325 MG/1
650 TABLET ORAL EVERY 6 HOURS PRN
Status: DISCONTINUED | OUTPATIENT
Start: 2021-09-28 | End: 2021-10-01 | Stop reason: HOSPADM

## 2021-09-28 RX ORDER — SODIUM CHLORIDE 9 MG/ML
25 INJECTION, SOLUTION INTRAVENOUS PRN
Status: DISCONTINUED | OUTPATIENT
Start: 2021-09-28 | End: 2021-09-28

## 2021-09-28 RX ORDER — PANTOPRAZOLE SODIUM 40 MG/1
40 TABLET, DELAYED RELEASE ORAL DAILY
Status: DISCONTINUED | OUTPATIENT
Start: 2021-09-28 | End: 2021-10-01 | Stop reason: HOSPADM

## 2021-09-28 RX ORDER — SODIUM CHLORIDE 9 MG/ML
INJECTION, SOLUTION INTRAVENOUS CONTINUOUS
Status: DISCONTINUED | OUTPATIENT
Start: 2021-09-28 | End: 2021-09-28

## 2021-09-28 RX ORDER — HYDROXYZINE PAMOATE 25 MG/1
25 CAPSULE ORAL 3 TIMES DAILY PRN
Status: DISCONTINUED | OUTPATIENT
Start: 2021-09-28 | End: 2021-10-01 | Stop reason: HOSPADM

## 2021-09-28 RX ORDER — SODIUM CHLORIDE 0.9 % (FLUSH) 0.9 %
5-40 SYRINGE (ML) INJECTION PRN
Status: DISCONTINUED | OUTPATIENT
Start: 2021-09-28 | End: 2021-10-01 | Stop reason: HOSPADM

## 2021-09-28 RX ORDER — ASPIRIN 81 MG/1
81 TABLET, CHEWABLE ORAL DAILY
Status: DISCONTINUED | OUTPATIENT
Start: 2021-09-28 | End: 2021-10-01 | Stop reason: HOSPADM

## 2021-09-28 RX ORDER — LEVALBUTEROL INHALATION SOLUTION 0.63 MG/3ML
0.63 SOLUTION RESPIRATORY (INHALATION) EVERY 4 HOURS PRN
Status: DISCONTINUED | OUTPATIENT
Start: 2021-09-28 | End: 2021-10-01 | Stop reason: HOSPADM

## 2021-09-28 RX ORDER — INSULIN GLARGINE 100 [IU]/ML
20 INJECTION, SOLUTION SUBCUTANEOUS NIGHTLY
Status: DISCONTINUED | OUTPATIENT
Start: 2021-09-28 | End: 2021-09-29

## 2021-09-28 RX ORDER — SODIUM CHLORIDE 0.9 % (FLUSH) 0.9 %
5-40 SYRINGE (ML) INJECTION EVERY 12 HOURS SCHEDULED
Status: DISCONTINUED | OUTPATIENT
Start: 2021-09-28 | End: 2021-10-01 | Stop reason: HOSPADM

## 2021-09-28 RX ORDER — SODIUM CHLORIDE 9 MG/ML
25 INJECTION, SOLUTION INTRAVENOUS CONTINUOUS
Status: DISCONTINUED | OUTPATIENT
Start: 2021-09-28 | End: 2021-09-29

## 2021-09-28 RX ORDER — MAGNESIUM HYDROXIDE/ALUMINUM HYDROXICE/SIMETHICONE 120; 1200; 1200 MG/30ML; MG/30ML; MG/30ML
15 SUSPENSION ORAL EVERY 6 HOURS PRN
Status: DISCONTINUED | OUTPATIENT
Start: 2021-09-28 | End: 2021-10-01 | Stop reason: HOSPADM

## 2021-09-28 RX ORDER — AMITRIPTYLINE HYDROCHLORIDE 25 MG/1
25 TABLET, FILM COATED ORAL NIGHTLY
Status: DISCONTINUED | OUTPATIENT
Start: 2021-09-28 | End: 2021-10-01 | Stop reason: HOSPADM

## 2021-09-28 RX ORDER — NIFEDIPINE 30 MG/1
60 TABLET, EXTENDED RELEASE ORAL DAILY
Status: DISCONTINUED | OUTPATIENT
Start: 2021-09-28 | End: 2021-10-01 | Stop reason: HOSPADM

## 2021-09-28 RX ORDER — MONTELUKAST SODIUM 10 MG/1
10 TABLET ORAL NIGHTLY
Status: DISCONTINUED | OUTPATIENT
Start: 2021-09-28 | End: 2021-10-01 | Stop reason: HOSPADM

## 2021-09-28 RX ORDER — DEXTROSE MONOHYDRATE 25 G/50ML
12.5 INJECTION, SOLUTION INTRAVENOUS PRN
Status: DISCONTINUED | OUTPATIENT
Start: 2021-09-28 | End: 2021-10-01 | Stop reason: HOSPADM

## 2021-09-28 RX ORDER — QUETIAPINE FUMARATE 25 MG/1
50 TABLET, FILM COATED ORAL NIGHTLY
Status: DISCONTINUED | OUTPATIENT
Start: 2021-09-28 | End: 2021-10-01 | Stop reason: HOSPADM

## 2021-09-28 RX ORDER — FENTANYL CITRATE 50 UG/ML
50 INJECTION, SOLUTION INTRAMUSCULAR; INTRAVENOUS ONCE
Status: COMPLETED | OUTPATIENT
Start: 2021-09-28 | End: 2021-09-28

## 2021-09-28 RX ORDER — MAGNESIUM OXIDE 400 MG/1
400 TABLET ORAL 3 TIMES DAILY
Status: DISCONTINUED | OUTPATIENT
Start: 2021-09-28 | End: 2021-10-01 | Stop reason: HOSPADM

## 2021-09-28 RX ORDER — HYDROCODONE BITARTRATE AND ACETAMINOPHEN 10; 325 MG/1; MG/1
1 TABLET ORAL EVERY 6 HOURS PRN
Status: DISCONTINUED | OUTPATIENT
Start: 2021-09-28 | End: 2021-10-01 | Stop reason: HOSPADM

## 2021-09-28 RX ORDER — DIAZEPAM 5 MG/1
5 TABLET ORAL EVERY 12 HOURS PRN
Status: DISCONTINUED | OUTPATIENT
Start: 2021-09-28 | End: 2021-10-01 | Stop reason: HOSPADM

## 2021-09-28 RX ADMIN — SODIUM CHLORIDE, PRESERVATIVE FREE 10 ML: 5 INJECTION INTRAVENOUS at 20:44

## 2021-09-28 RX ADMIN — INSULIN GLARGINE 20 UNITS: 100 INJECTION, SOLUTION SUBCUTANEOUS at 20:47

## 2021-09-28 RX ADMIN — BACLOFEN 10 MG: 10 TABLET ORAL at 20:34

## 2021-09-28 RX ADMIN — MORPHINE SULFATE 5 MG: 2 INJECTION, SOLUTION INTRAMUSCULAR; INTRAVENOUS at 00:09

## 2021-09-28 RX ADMIN — LORAZEPAM 2 MG: 2 INJECTION INTRAMUSCULAR; INTRAVENOUS at 04:51

## 2021-09-28 RX ADMIN — REGADENOSON 0.4 MG: 0.08 INJECTION, SOLUTION INTRAVENOUS at 10:28

## 2021-09-28 RX ADMIN — KIT FOR THE PREPARATION OF TECHNETIUM TC99M SESTAMIBI 30 MILLICURIE: 1 INJECTION, POWDER, LYOPHILIZED, FOR SOLUTION PARENTERAL at 10:30

## 2021-09-28 RX ADMIN — MAGNESIUM OXIDE 400 MG (241.3 MG MAGNESIUM) TABLET 400 MG: TABLET at 20:38

## 2021-09-28 RX ADMIN — HYDROXYZINE PAMOATE 25 MG: 25 CAPSULE ORAL at 20:38

## 2021-09-28 RX ADMIN — AZITHROMYCIN MONOHYDRATE 500 MG: 500 INJECTION, POWDER, LYOPHILIZED, FOR SOLUTION INTRAVENOUS at 06:15

## 2021-09-28 RX ADMIN — SODIUM CHLORIDE 25 ML: 9 INJECTION, SOLUTION INTRAVENOUS at 16:09

## 2021-09-28 RX ADMIN — IOPAMIDOL 75 ML: 755 INJECTION, SOLUTION INTRAVENOUS at 00:30

## 2021-09-28 RX ADMIN — OXCARBAZEPINE 450 MG: 300 TABLET, FILM COATED ORAL at 20:32

## 2021-09-28 RX ADMIN — DIPHENHYDRAMINE HYDROCHLORIDE 25 MG: 25 TABLET ORAL at 00:11

## 2021-09-28 RX ADMIN — KIT FOR THE PREPARATION OF TECHNETIUM TC99M SESTAMIBI 10 MILLICURIE: 1 INJECTION, POWDER, LYOPHILIZED, FOR SOLUTION PARENTERAL at 08:15

## 2021-09-28 RX ADMIN — BUSPIRONE HYDROCHLORIDE 20 MG: 10 TABLET ORAL at 20:28

## 2021-09-28 RX ADMIN — LEVOTHYROXINE SODIUM 75 MCG: 0.07 TABLET ORAL at 06:14

## 2021-09-28 RX ADMIN — AMITRIPTYLINE HYDROCHLORIDE 25 MG: 25 TABLET, FILM COATED ORAL at 20:29

## 2021-09-28 RX ADMIN — BUSPIRONE HYDROCHLORIDE 20 MG: 10 TABLET ORAL at 16:06

## 2021-09-28 RX ADMIN — FENTANYL CITRATE 50 MCG: 50 INJECTION INTRAMUSCULAR; INTRAVENOUS at 01:45

## 2021-09-28 RX ADMIN — MAGNESIUM OXIDE 400 MG (241.3 MG MAGNESIUM) TABLET 400 MG: TABLET at 16:10

## 2021-09-28 RX ADMIN — MONTELUKAST 10 MG: 10 TABLET, FILM COATED ORAL at 20:28

## 2021-09-28 RX ADMIN — HYDROCODONE BITARTRATE AND ACETAMINOPHEN 1 TABLET: 10; 325 TABLET ORAL at 22:08

## 2021-09-28 RX ADMIN — SODIUM CHLORIDE: 9 INJECTION, SOLUTION INTRAVENOUS at 05:00

## 2021-09-28 RX ADMIN — QUETIAPINE FUMARATE 50 MG: 25 TABLET ORAL at 20:33

## 2021-09-28 RX ADMIN — BUDESONIDE AND FORMOTEROL FUMARATE DIHYDRATE 2 PUFF: 160; 4.5 AEROSOL RESPIRATORY (INHALATION) at 18:51

## 2021-09-28 RX ADMIN — PANTOPRAZOLE SODIUM 40 MG: 40 TABLET, DELAYED RELEASE ORAL at 06:14

## 2021-09-28 RX ADMIN — GUAIFENESIN 600 MG: 600 TABLET, EXTENDED RELEASE ORAL at 20:34

## 2021-09-28 RX ADMIN — CARBIDOPA AND LEVODOPA 1 TABLET: 10; 100 TABLET ORAL at 20:30

## 2021-09-28 ASSESSMENT — ENCOUNTER SYMPTOMS
BLOOD IN STOOL: 0
WHEEZING: 0
VOMITING: 0
CHEST TIGHTNESS: 1
CONSTIPATION: 0
SHORTNESS OF BREATH: 1
DIARRHEA: 0
ABDOMINAL PAIN: 0
COLOR CHANGE: 0
ANAL BLEEDING: 0
COUGH: 1
NAUSEA: 0
RHINORRHEA: 0
SORE THROAT: 0
BACK PAIN: 0

## 2021-09-28 ASSESSMENT — PAIN SCALES - GENERAL
PAINLEVEL_OUTOF10: 10
PAINLEVEL_OUTOF10: 0
PAINLEVEL_OUTOF10: 10
PAINLEVEL_OUTOF10: 6

## 2021-09-28 NOTE — ED NOTES
Bed: ED-27  Expected date:   Expected time:   Means of arrival:   Comments:  Medic: VIVIAN Gomez RN  09/27/21 [id number]

## 2021-09-28 NOTE — CARE COORDINATION
Chart reviewed and attempted to meet w/ pt to initiate discharge planning, however, pt out of room for testing at this time.

## 2021-09-28 NOTE — CONSULTS
Chart reviewed  Full note to follow                      Name:  Bobbi Santos /Age/Sex: 1955  (61 y.o. female)   MRN & CSN:  [id number] & [id number] Admission Date/Time: 2021  9:56 PM   Location:  [facility] PCP: Shane Rodriguez, [address] Day: 2          Referring physician:  Darin Arnold DO         Reason for consultation:  cp        Thanks for referral.    Information source: patient    CC;  cp      HPI:   Thank you for involving me in taking  care of Bobbi Santos who  is a 61 y. o.year  Old female  Presents with  H/o HTN, DM, has recurrent mid sternal CP, has normal trops, EKGs, cath has been normal.             Past medical history:    has a past medical history of Abnormal EKG, Acid reflux, Anemia, Anesthesia, Anginal pain ([address] Anxiety, Arthritis, Asthma, Bipolar 1 disorder ([address] CAD (coronary artery disease), Cerebral artery occlusion with cerebral infarction ([address] CHF (congestive heart failure) ([address] Chronic back pain, Chronic kidney disease, DDD (degenerative disc disease), cervical, Depression, Diabetes mellitus ([address] Diabetic neuropathy ([address] Dizziness, Dry skin, Enlarged ureter, Fatty liver, Fibrocystic breast, Generalized anxiety disorder, Gout, H/O cardiac catheterization, H/O cardiovascular stress test, H/O cardiovascular stress test, H/O cardiovascular stress test, H/O Doppler ultrasound, H/O echocardiogram, H/O echocardiogram, History of Holter monitoring, [place] (hard of hearing), Hx of cardiovascular stress test, Hx of motion sickness, HX OTHER MEDICAL, Hyperlipidemia, Hypertension, IBS (irritable bowel syndrome), Incisional hernia, Kidney stones, Migraines, Nausea & vomiting, Other specified disorder of skin, Panic attacks, Panic attacks, Pneumonia, Pseudoseizures, Restless leg, Shortness of breath, Sleep apnea, Staph infection, Thyroid disease, Tremor, Urinary incontinence, UTI (urinary tract infection), UTI (urinary tract infection), Vertigo, and Wears \"Severe High Blood Pressure\"    Augmentin [Amoxicillin-Pot Clavulanate] Itching and Rash    Bee Venom Swelling     Redness    Ciprofloxacin Itching and Rash    Codeine      \"Severe Abdominal Cramping\"    Darvocet [Propoxyphene N-Acetaminophen] Palpitations     \"Severe High Blood Pressure\"    Darvon [Propoxyphene Hcl] Palpitations     \"Severe High Blood Pressure\"    Decadron [Dexamethasone] Other (See Comments)     seizure  Seizures    Ditropan [Oxybutynin Chloride] Palpitations     \"Severe High Blood Pressure\"    Fioricet [Butalbital-Apap-Caffeine] Palpitations     \"Severe High Blood Pressure\"    Fiorinal-Codeine #3 [Butalbital-Asa-Caff-Codeine]      \"Severe Stomach Cramps\"    Flagyl [Metronidazole] Diarrhea     \"Severe Diarrhea And Cramping\"    Naproxen Palpitations     \"Severe High Blood Pressure\"    Other      \"Allergic To Spider Bites Causing Blackness Of Skin, Severe Itching And Pain\"                                                  \"Allergic To Powder In Gloves Causing Severe Redness And Itching\"    Prozac [Fluoxetine Hcl]      \"Hallucinations\"    Robaxin [Methocarbamol] Palpitations     \"Severe High Blood Pressure\"    Ultram [Tramadol]      \"Severe Stomach Pain\"    Zoloft Palpitations     \"Sever High Blood Pressure\"    Butalbital-Aspirin-Caffeine Other (See Comments)     \"severe stomach pain\"    Coreg [Carvedilol] Other (See Comments)     \"spikes my BP severely and send me into a severe anxiety attack\"    Fluoxetine Other (See Comments)     hallucinations    Oxybutynin Chloride Other (See Comments)     Raises bp    Percocet [Oxycodone-Acetaminophen]      \"knocked me out for 12 hrs\"    Sertraline Other (See Comments)     hallucinations    Tape [Adhesive Tape]      Patient states it tears her skin, including band aids    Reglan [Metoclopramide] Other (See Comments)     \"makes me talk like a baby, but if I take benadryl with it it's fine\"       albuterol sulfate  (90 Base) MCG/ACT inhaler 2 puff, Q6H PRN  aluminum & magnesium hydroxide-simethicone (MAALOX) 200-200-20 MG/5ML suspension 15 mL, Q6H PRN  amitriptyline (ELAVIL) tablet 25 mg, Nightly  aspirin chewable tablet 81 mg, Daily  baclofen (LIORESAL) tablet 10 mg, BID  busPIRone (BUSPAR) tablet 20 mg, TID  carbidopa-levodopa (SINEMET)  MG per tablet 1 tablet, Nightly  diazePAM (VALIUM) tablet 5 mg, Q12H PRN  HYDROcodone-acetaminophen (NORCO)  MG per tablet 1 tablet, Q6H PRN  hydrOXYzine (VISTARIL) capsule 25 mg, TID PRN  levothyroxine (SYNTHROID) tablet 75 mcg, QAM AC  losartan (COZAAR) tablet 100 mg, Daily  magnesium oxide (MAG-OX) tablet 400 mg, TID  metoprolol succinate (TOPROL XL) extended release tablet 50 mg, Daily  montelukast (SINGULAIR) tablet 10 mg, Nightly  guaiFENesin (MUCINEX) extended release tablet 600 mg, BID PRN  NIFEdipine (PROCARDIA XL) extended release tablet 60 mg, Daily  OXcarbazepine (TRILEPTAL) tablet 450 mg, BID  pantoprazole (PROTONIX) tablet 40 mg, Daily  QUEtiapine (SEROQUEL) tablet 25 mg, Daily  QUEtiapine (SEROQUEL) tablet 50 mg, Nightly  atorvastatin (LIPITOR) tablet 40 mg, Daily  glucose (GLUTOSE) 40 % oral gel 15 g, PRN  dextrose 50 % IV solution, PRN  glucagon (rDNA) injection 1 mg, PRN  dextrose 5 % solution, PRN  sodium chloride flush 0.9 % injection 5-40 mL, 2 times per day  sodium chloride flush 0.9 % injection 5-40 mL, PRN  0.9 % sodium chloride infusion, PRN  acetaminophen (TYLENOL) tablet 650 mg, Q6H PRN   Or  acetaminophen (TYLENOL) suppository 650 mg, Q6H PRN  polyethylene glycol (GLYCOLAX) packet 17 g, Daily PRN  0.9 % sodium chloride infusion, Continuous  promethazine (PHENERGAN) tablet 12.5 mg, Q6H PRN  insulin lispro (HUMALOG) injection vial 0-6 Units, Q4H  enoxaparin (LOVENOX) injection 40 mg, Daily  nitroGLYCERIN (NITROSTAT) SL tablet 0.4 mg, Q5 Min PRN  levalbuterol (XOPENEX) nebulization 0.63 mg, Q4H PRN  azithromycin (ZITHROMAX) 500 mg in dextrose 5 % 250 mL IVPB, Q24H  budesonide-formoterol (SYMBICORT) 160-4.5 MCG/ACT inhaler 2 puff, BID  tiotropium (SPIRIVA RESPIMAT) 2.5 MCG/ACT inhaler 2 puff, Daily  insulin glargine (LANTUS) injection vial 20 Units, Nightly  nitroGLYCERIN (NITROSTAT) SL tablet 0.4 mg, Once      Current Facility-Administered Medications   Medication Dose Route Frequency Provider Last Rate Last Admin    albuterol sulfate  (90 Base) MCG/ACT inhaler 2 puff  2 puff Inhalation Q6H PRN Randol Nasuti, DO        aluminum & magnesium hydroxide-simethicone (MAALOX) 200-200-20 MG/5ML suspension 15 mL  15 mL Oral Q6H PRN Kamalaol Nasuti, DO        amitriptyline (ELAVIL) tablet 25 mg  25 mg Oral Nightly Kamalaol Nasuti, DO        aspirin chewable tablet 81 mg  81 mg Oral Daily Kamalaol Nasuti, DO        baclofen (LIORESAL) tablet 10 mg  10 mg Oral BID Randol Nasuti, DO        busPIRone (BUSPAR) tablet 20 mg  20 mg Oral TID Kamalaol Nasuti, DO        carbidopa-levodopa (SINEMET)  MG per tablet 1 tablet  1 tablet Oral Nightly Ramakrishna Angela,         diazePAM (VALIUM) tablet 5 mg  5 mg Oral Q12H PRN Randol Nasuti, DO        HYDROcodone-acetaminophen (NORCO)  MG per tablet 1 tablet  1 tablet Oral Q6H PRN Randol Nasuti, DO        hydrOXYzine (VISTARIL) capsule 25 mg  25 mg Oral TID PRN Kamalaol Nasuti, DO        levothyroxine (SYNTHROID) tablet 75 mcg  75 mcg Oral QAM AC Ramakrishna Angela DO        losartan (COZAAR) tablet 100 mg  100 mg Oral Daily Kamalaol Nasuti, DO        magnesium oxide (MAG-OX) tablet 400 mg  400 mg Oral TID Kamalaol Nasuti, DO        metoprolol succinate (TOPROL XL) extended release tablet 50 mg  50 mg Oral Daily Ramakrishna Angela DO        montelukast (SINGULAIR) tablet 10 mg  10 mg Oral Nightly Kamalaol Nasuti, DO        guaiFENesin [facility]) extended release tablet 600 mg  600 mg Oral BID PRN Randol Nasuti, DO        NIFEdipine (PROCARDIA XL) extended release tablet 60 mg  60 mg Oral Daily Maria De Jesus Avalos DO        OXcarbazepine (TRILEPTAL) tablet 450 mg  450 mg Oral BID Sahra De La Torreer, DO        pantoprazole (PROTONIX) tablet 40 mg  40 mg Oral Daily Sahra De La Torreer, DO        QUEtiapine (SEROQUEL) tablet 25 mg  25 mg Oral Daily Mariettada Colvin, DO        QUEtiapine (SEROQUEL) tablet 50 mg  50 mg Oral Nightly Ramakrishna Angela, DO        atorvastatin (LIPITOR) tablet 40 mg  40 mg Oral Daily Ramakrishna Angela, DO        glucose (GLUTOSE) 40 % oral gel 15 g  15 g Oral PRN Sahra De La Torreer, DO        dextrose 50 % IV solution  12.5 g IntraVENous PRN Sahra De La Torreer, DO        glucagon (rDNA) injection 1 mg  1 mg IntraMUSCular PRN Sahra De La Torreer, DO        dextrose 5 % solution  100 mL/hr IntraVENous PRN Sahra De La Torreer, DO        sodium chloride flush 0.9 % injection 5-40 mL  5-40 mL IntraVENous 2 times per day Sahra De La Torreer, DO        sodium chloride flush 0.9 % injection 5-40 mL  5-40 mL IntraVENous PRN Sahra De La Torreer, DO        0.9 % sodium chloride infusion  25 mL IntraVENous PRN Sahra De La Torreer, DO        acetaminophen (TYLENOL) tablet 650 mg  650 mg Oral Q6H PRN Sahra De La Torreer, DO        Or    acetaminophen (TYLENOL) suppository 650 mg  650 mg Rectal Q6H PRN Sahra De La Torreer, DO        polyethylene glycol (GLYCOLAX) packet 17 g  17 g Oral Daily PRN Sahra De La Torreer, DO        0.9 % sodium chloride infusion   IntraVENous Continuous Sahra Colvin, DO 75 mL/hr at 09/28/21 0500 New Bag at 09/28/21 0500    promethazine (PHENERGAN) tablet 12.5 mg  12.5 mg Oral Q6H PRN Sahra De La Torreer, DO        insulin lispro (HUMALOG) injection vial 0-6 Units  0-6 Units SubCUTAneous Q4H Ramakrishna Angela, DO        enoxaparin (LOVENOX) injection 40 mg  40 mg SubCUTAneous Daily Sahra De La Torreer, DO        nitroGLYCERIN (NITROSTAT) SL tablet 0.4 mg  0.4 mg SubLINGual Q5 Min PRN Sahra Colvin, DO        levalbuterol (XOPENEX) nebulization 0.63 mg  0.63 mg Nebulization Q4H PRN Sahra Colvin DO        azithromycin (ZITHROMAX) 500 mg in dextrose 5 % 250 mL IVPB  500 mg IntraVENous Q24H Devika Flowers,         budesonide-formoterol (SYMBICORT) 160-4.5 MCG/ACT inhaler 2 puff  2 puff Inhalation BID Ramakrishna Angela DO        tiotropium (SPIRIVA RESPIMAT) 2.5 MCG/ACT inhaler 2 puff  2 puff Inhalation Daily Ramakrishna Angela DO        insulin glargine (LANTUS) injection vial 20 Units  20 Units SubCUTAneous Nightly Ramakrishna Angela DO        nitroGLYCERIN (NITROSTAT) SL tablet 0.4 mg  0.4 mg SubLINGual Once Devika Flowers DO         Current Outpatient Medications   Medication Sig Dispense Refill    baclofen (LIORESAL) 10 MG tablet 1 tablet 2 times daily      diazePAM (VALIUM) 5 MG tablet as needed.  MUCINEX 600 MG extended release tablet 2 times daily as needed      metoprolol succinate (TOPROL XL) 50 MG extended release tablet Take 1 tablet by mouth daily 30 tablet 5    NIFEdipine (PROCARDIA XL) 60 MG extended release tablet Take 1 tablet by mouth daily 30 tablet 5    magnesium oxide (MAG-OX) 400 MG tablet Take 1 tablet by mouth 3 times daily 90 tablet 0    losartan (COZAAR) 100 MG tablet Take 1 tablet by mouth daily 30 tablet 0    QUEtiapine (SEROQUEL) 25 MG tablet Take 1 tablet by mouth 2 times daily (Patient taking differently: Take 25 mg by mouth 2 times daily Indications: 25 mg in am and 50 mg in pm ) 60 tablet 0    levETIRAcetam (KEPPRA) 750 MG tablet Take 2 tablets by mouth 2 times daily (Patient taking differently: Take 750 mg by mouth 2 times daily ) 120 tablet 0    busPIRone (BUSPAR) 10 MG tablet Take 2 tablets by mouth 3 times daily 180 tablet 0    levothyroxine (SYNTHROID) 75 MCG tablet Take 1 tablet by mouth every morning (before breakfast) 30 tablet 3    amitriptyline (ELAVIL) 25 MG tablet Take 1 tablet by mouth nightly 30 tablet 3    HYDROcodone-acetaminophen (NORCO)  MG per tablet Take 1 tablet by mouth every 6 hours as needed.        fluticasone-umeclidin-vilant (TRELEGY ELLIPTA) 100-62.5-25 MCG/INH AEPB Inhale 1 puff into the lungs daily (Patient taking differently: Inhale 1 puff into the lungs daily as needed (only takes prn daily due to yeast infections in mouth, is aware she is supposed to take daily) ) 1 each 5    OXcarbazepine (TRILEPTAL) 300 MG tablet Take 1 tablet by mouth 2 times daily (Patient taking differently: Take 450 mg by mouth 2 times daily ) 60 tablet 3    hydrOXYzine (VISTARIL) 25 MG capsule Take 25 mg by mouth 3 times daily as needed       NOVOLOG FLEXPEN 100 UNIT/ML injection pen Inject into the skin See Admin Instructions 12/01/2020 patient states she follows sliding scale regimen BS > 150      simvastatin (ZOCOR) 20 MG tablet Take 1 tablet by mouth nightly 30 tablet 3    TRESIBA FLEXTOUCH 200 UNIT/ML SOPN Inject 20 Units into the skin every morning (Patient taking differently: Inject into the skin nightly 04/26/21 Patient states she follows sliding scale) 1 pen 2    docusate sodium (COLACE) 100 MG capsule Take 1 capsule by mouth 2 times daily 30 capsule 0    albuterol sulfate  (90 Base) MCG/ACT inhaler Inhale 2 puffs into the lungs every 6 hours as needed for Wheezing or Shortness of Breath (or cough) Please include spacer with instructions for use. 1 Inhaler 0    aluminum & magnesium hydroxide-simethicone (MAALOX MAX) 400-400-40 MG/5ML SUSP Take 15 mLs by mouth every 6 hours as needed (heart burn) 120 mL 0    pantoprazole (PROTONIX) 40 MG tablet Take 1 tablet by mouth daily 30 tablet 0    carbidopa-levodopa (SINEMET)  MG per tablet Take 1 tablet by mouth nightly      polyethylene glycol (GLYCOLAX) packet Take 17 g by mouth daily as needed for Constipation      aspirin 81 MG tablet Take 81 mg by mouth daily      Multiple Vitamins-Iron (MULTI-VITAMIN/IRON PO) Take  by mouth.  montelukast (SINGULAIR) 10 MG tablet Take 10 mg by mouth nightly.        Review of Systems:  All 14 systems reviewed, all negative except for  cp    Physical Examination:    /85   Pulse 73   Temp 97.8 °F (36.6 °C)   Resp 23   Ht 5' 2\" (1.575 m)   Wt 176 lb (79.8 kg)   SpO2 94%   BMI 32.19 kg/m²      Wt Readings from Last 3 Encounters:   09/27/21 176 lb (79.8 kg)   09/14/21 175 lb 9.6 oz (79.7 kg)   07/15/21 178 lb 12.7 oz (81.1 kg)     Body mass index is 32.19 kg/m². General Appearance:  fair  Head: normocephalic     Eyes: normal, noninjected conjunctiva    ENT: normal mucosa, noninjected throat, normal     NECK: No JVP  No thyromegaly        Cardiovascular: No thrills palpated   Auscultation: Normal S1 and S2,  no murmur   carotid bruit no   Abdominal Aorta no bruit    Respiratory:    Breath sounds Diminshed bilaterally    Extremities:  Trace Edema clubbing ,   no cyanosis    SKIN: Warm and well perfused, no pallor or cyanosis    Vascular exam:  Pedal Pulses: palp  bilaterally        Abdomen:  No masses or tenderness. No organomegaly noted. Neurological:  Oriented to time, place, and person   No focal neurological deficit noted. Psychiatric:normal mood, no anxiety    Lab Review   Recent Labs     09/27/21  2223   WBC 8.3   HGB 12.2*   HCT 35.6*         Recent Labs     09/27/21  2223   *   K 3.6   CL 92*   CO2 25   BUN 11   CREATININE 0.4*     Recent Labs     09/27/21 2223   AST 26   ALT 9*   BILITOT 0.3   ALKPHOS 90     No results for input(s): TROPONINI in the last 72 hours.   Lab Results   Component Value Date    BNP 51.0 02/15/2014    BNP 55 11/10/2013    BNP 21.6 11/08/2013     Lab Results   Component Value Date    INR 0.98 04/26/2021    PROTIME 11.8 04/26/2021           Assessment/Recommendations:     - CP; serial trops, EKGs, stress cardiolite today  - Risk factor modification         Adrien Smith MD, 9/28/2021 5:26 AM

## 2021-09-28 NOTE — H&P
History and Physical      Name:  Kathy Mathews /Age/Sex: 1955  (61 y.o. female)   MRN & CSN:  [id number] & [id number] Admission Date/Time: 2021  9:56 PM   Location:  [facility] PCP: Ruma Santo MD       Hospital Day: 2    Assessment and Plan:   Kathy Mathews is a 61 y.o.  female  who presents with Chest pain    1. Chest pain, rule out acute coronary syndrome  -Admit to observation services with telemetry  -Status-post cardiac cath in 2017 with angiographically normal epicardial coronary arteries. Inconclusive myocardial stress test on 7/10/2021.  -Troponin <0.010 in ED, Cycle troponin x2 q6  -Continue home ASA, Beta blocker, ARB, calcium channel blocker, and statin  -Reviewed recent lipid panel and hemoglobin A1c  -CTA pulmonary with contrast shows no evidence of pulmonary list  -SL nitro prn  -Nasal cannula oxygen prn  -N.p.o. after midnight  -Reviewed 2D Echo from 7/10/2021 shows LVSF normal with EF 50 to 55% with mild LV hypertrophy and G1DD  -Reviewed stress test from 7/10/2021 that was inconclusive  -Outpatient follow-up with Dr. Reina Ash 2021: CTA coronary ordered  -Consult cardiology, appreciate recommendations    2. IDDM2  -Continue home long-acting medication, n.p.o. diet, low n.p.o. SSI + BG checks ACHS, hypoglycemic protocol, and target -180    3. Anxiety, severe  -Patient has severe anxiety to the point where she passes out.  -Continue home diazepam  -One-time Ativan dose now as patient is beginning to have a severe panic attack    4. Chronic hyponatremia, improved  -Sodium 131 on admission. Baseline sodium over the last 2 months ~ 127  -Question of medication related  -IVF at 75 mL an hour for 5 hours  -If sodium drops during this admission can consult Dr. Ginger Burgos, who patient follows with for her kidney care    5.  Acute asthma exacerbation, mild  -Patient with seizure activity with Decadron  -We will hold off on IV corticosteroids  -CTA pulmonary with contrast shows mosaic perfusion provider within the lung may be related to bronchiolitis or reactive airway disease with air trapping.  -We will add azithromycin IV for anti-inflammatory effects  -Xopenex for breathing treatments given significant anxiety as above    6. Abdominal hernias  -Patient endorses tenderness to palpation in the abdominal region. Patient states this is her baseline and has no abdominal hernias. Endorses no change in physical exam.    Other chronic medical conditions:   Continue all home meds except stated above or contraindicated.  Pseudoseizures   HLD   HTN   ILSA   Obesity   Bipolar 1 disorder   Insomnia    Diet No diet orders on file   DVT Prophylaxis [] Lovenox, []  Heparin, [] SCDs, [] Ambulation   GI Prophylaxis [] PPI,  [] H2 Blocker,  [] Carafate,  [] Diet/Tube Feeds   Code Status Prior   Disposition Patient requires continued admission due to Chest pain   MDM [] Low, [] Moderate,[]  High  Patient's risk as above due to acuity of condition with potential for decompensation. History of Present Illness:     Chief Complaint: Chest pain    Kimberly Vicente is a 61 y.o.  female with a reviewed and a contributory family history of early cardiac disease and a PMH as stated above, who presents with complaints of chest pain. Onset was 7 hours ago, with unchanged course since that time. The patient describes the pain as constant, pressure like in nature, radiates to the left neck/jaw. Patient rates pain as a 10/10 in intensity. Associated symptoms are dyspnea. Aggravating factors are emotional stress, exercise and laying flat. Alleviating factors are: nitroglycerin 2 tablets. Patient's cardiac risk factors are diabetes mellitus, dyslipidemia, family history of premature cardiovascular disease, hypertension, obesity (BMI >= 30 kg/m2) and sedentary lifestyle. Patient's risk factors for DVT/PE: none.  Previous cardiac testing: Stress test on 7/10/2021 that was inconclusive and 2D echocardiogram on 7/10/2021 with normal LVSF but grade 1 diastolic dysfunction. Patient does have an additional complaint of shortness of breath with mild cough. States she has a history of asthma and feels like she is going into an asthma exacerbation. Patient states she was afraid to take her home medication as this exacerbates her anxiety. She also endorses significant anxiety right now as she is concerned about her heart. Otherwise patient denies fever, chills, nausea, vomiting, diarrhea, dark tarry stools, blood per rectum, dysuria, frequency, or urgency. Discussed case with ED provider. ROS:   Review of Systems   Constitutional: Negative for appetite change, chills, diaphoresis, fatigue and fever. HENT: Negative for congestion, rhinorrhea and sore throat. Eyes: Negative for visual disturbance. Respiratory: Positive for cough, chest tightness and shortness of breath. Negative for wheezing. Cardiovascular: Positive for chest pain. Negative for palpitations and leg swelling. Gastrointestinal: Negative for abdominal pain, anal bleeding, blood in stool, constipation, diarrhea, nausea and vomiting. Genitourinary: Negative for dysuria, frequency, hematuria and urgency. Musculoskeletal: Negative for arthralgias and back pain. Skin: Negative for color change, pallor and rash. Neurological: Negative for dizziness, seizures, syncope, speech difficulty, weakness, numbness and headaches. Psychiatric/Behavioral: The patient is nervous/anxious. Objective:   No intake or output data in the 24 hours ending 09/28/21 0220   Vitals:   Vitals:    09/28/21 0117   BP: 135/85   Pulse: 73   Resp: 23   Temp:    SpO2: 94%     /85   Pulse 73   Temp 97.8 °F (36.6 °C)   Resp 23   Ht 5' 2\" (1.575 m)   Wt 176 lb (79.8 kg)   SpO2 94%   BMI 32.19 kg/m²   Physical Exam:   Physical Exam  Vitals and nursing note reviewed. Constitutional:       General: She is awake.  She is not in acute distress. Appearance: Normal appearance. She is ill-appearing. She is not toxic-appearing or diaphoretic. Interventions: She is not intubated. HENT:      Head: Atraumatic. Right Ear: External ear normal.      Left Ear: External ear normal.      Nose: Nose normal. No rhinorrhea. Mouth/Throat:      Mouth: Mucous membranes are moist.   Eyes:      General: No scleral icterus. Conjunctiva/sclera: Conjunctivae normal.      Pupils: Pupils are equal, round, and reactive to light. Cardiovascular:      Rate and Rhythm: Normal rate and regular rhythm. Pulses: Normal pulses. Heart sounds: No murmur heard. No gallop. Pulmonary:      Effort: Pulmonary effort is normal. Tachypnea present. No accessory muscle usage, prolonged expiration or respiratory distress. She is not intubated. Breath sounds: Wheezing present. No rhonchi or rales. Abdominal:      General: Bowel sounds are normal. There is no distension. Palpations: Abdomen is soft. Tenderness: There is generalized abdominal tenderness. There is no guarding or rebound. Negative signs include Gonzales's sign and Rovsing's sign. Musculoskeletal:         General: Normal range of motion. Cervical back: Neck supple. Right lower leg: No edema. Left lower leg: No edema. Skin:     General: Skin is warm and dry. Capillary Refill: Capillary refill takes less than 2 seconds. Neurological:      General: No focal deficit present. Mental Status: She is alert and oriented to person, place, and time. Mental status is at baseline. Cranial Nerves: No cranial nerve deficit, dysarthria or facial asymmetry. Motor: No tremor or seizure activity. Psychiatric:         Attention and Perception: She is attentive. Mood and Affect: Mood is anxious. Speech: She is communicative. Speech is not slurred. Behavior: Behavior is cooperative.          Past Medical History:      Past Medical History:   Diagnosis Date    Abnormal EKG 04/22/2014    Acid reflux     Anemia     Anesthesia     Nausea/Vomiting Post Op In Past    Anginal pain (HCC)     Denies Chest Pain At This Time    Anxiety     Arthritis     \"All Over\"    Asthma     Bipolar 1 disorder ([address]     CAD (coronary artery disease)     per last cardiac cath.  Cerebral artery occlusion with cerebral infarction ([address]     CHF (congestive heart failure) (HCC)     Chronic back pain     Chronic kidney disease     DDD (degenerative disc disease), cervical     12- Patient reports she was dx with DDD of Cerival spine C6,C7    Depression     Diabetes mellitus ([address] Dx 1990's    Diabetic neuropathy ([address]     \"In My Legs And Feet\"    Dizziness     \"Sometimes\"    Dry skin     Enlarged ureter     Right Side    Fatty liver     Fibrocystic breast     Generalized anxiety disorder     Gout     Pt states she was diagnosed with gout in the past few months.  H/O cardiac catheterization     Showed mild disease per last cath.  H/O cardiovascular stress test 03/15/2010    EF 69%, normal perfusion study except for diaphragmatic artifact, uniform wall motion.  H/O cardiovascular stress test 10/09/2008    EF 60%, no anginia, normal study.  H/O cardiovascular stress test 05/06/2014    EF 66%, no ischemia, normal LV systolic funciton, normal perfusion pattern.  H/O Doppler ultrasound 02/28/2011    CAROTID DOPPLER-normal study.  H/O echocardiogram 05/06/2014    Ef >55%. Impaired LV relaxation.  H/O echocardiogram 10/14/2015    EF 60% Normal LV and systolic function. No significant valvulopathy seen.      History of Holter monitoring 03/24/2015    24 hour - predominant rhythm sinus    [place] (hard of hearing)     Bilateral Ears    Hx of cardiovascular stress test 10/19/2015    lexiscan-normal,EF63%    Hx of motion sickness     HX OTHER MEDICAL     Primary Care Physician Is Dr. Harjit Peralta In [place] [id number] Hyperlipidemia     Hypertension     IBS (irritable bowel syndrome)     Incisional hernia 04/2014    Kidney stones Last Episode In 2012 Or 2013    Passed Kidney Stones Numberous Times    Migraines     Nausea & vomiting     Nausea/Vomiting Post Op In Past    Other specified disorder of skin     12- Patient states she has a condition of her vaginal area (skin) which starts with the letters Roland [place]. She is currently being treated with multiple creams and weekly Diflucan. 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  Staph infection Dx 1980's    Toes On Left Foot    Thyroid disease     hypothroidism    Tremor     \"Tremors All Over\"    Urinary incontinence     UTI (urinary tract infection) In Past    No Current Symptoms    UTI (urinary tract infection)     Vertigo     \"Sometimes\"    Wears glasses      PSHX:  has a past surgical history that includes Carpal tunnel release (Right, 1999); Diagnostic Cardiac Cath Lab Procedure (01/11/2010); Dental surgery; Colonoscopy (Last Done 6-13); Endoscopy, colon, diagnostic (Several ); Bladder surgery (1970's Or 1980's); Lithotripsy (2011); Breast biopsy (Right, 1980's); Breast surgery (Left, 1990's); hernia repair (1702'T); hernia repair (1970's); Cholecystectomy (1970's); Appendectomy (1970's); Hysterectomy, total abdominal (1987); Tubal ligation (1978); Esophagus dilation (1980's And 1990's); Knee arthroscopy (Right, 1999); joint replacement (2008); other surgical history (06 13 2014); fracture surgery (1974 Or 1975); Cardiac catheterization (10-18-06); and Cardiac catheterization. Allergies:    Allergies   Allergen Reactions    Abilify [Aripiprazole]      \"Severe Shaking And Restlessness\"    Advil [Ibuprofen Micronized] Palpitations     \"Severe High Blood Pressure\"    Augmentin [Amoxicillin-Pot Clavulanate] Itching and Rash    Bee Venom Swelling     Redness    Ciprofloxacin Itching and Rash    Codeine      \"Severe Abdominal Cramping\"    Darvocet [Propoxyphene N-Acetaminophen] Palpitations     \"Severe High Blood Pressure\"    Darvon [Propoxyphene Hcl] Palpitations     \"Severe High Blood Pressure\"    Decadron [Dexamethasone] Other (See Comments)     seizure  Seizures    Ditropan [Oxybutynin Chloride] Palpitations     \"Severe High Blood Pressure\"    Fioricet [Butalbital-Apap-Caffeine] Palpitations     \"Severe High Blood Pressure\"    Fiorinal-Codeine #3 [Butalbital-Asa-Caff-Codeine]      \"Severe Stomach Cramps\"    Flagyl [Metronidazole] Diarrhea     \"Severe Diarrhea And Cramping\"    Naproxen Palpitations     \"Severe High Blood Pressure\"    Other      \"Allergic To Spider Bites Causing Blackness Of Skin, Severe Itching And Pain\"                                                  \"Allergic To Powder In Gloves Causing Severe Redness And Itching\"    Prozac [Fluoxetine Hcl]      \"Hallucinations\"    Robaxin [Methocarbamol] Palpitations     \"Severe High Blood Pressure\"    Ultram [Tramadol]      \"Severe Stomach Pain\"    Zoloft Palpitations     \"Sever High Blood Pressure\"    Butalbital-Aspirin-Caffeine Other (See Comments)     \"severe stomach pain\"    Coreg [Carvedilol] Other (See Comments)     \"spikes my BP severely and send me into a severe anxiety attack\"    Fluoxetine Other (See Comments)     hallucinations    Oxybutynin Chloride Other (See Comments)     Raises bp    Percocet [Oxycodone-Acetaminophen]      \"knocked me out for 12 hrs\"    Sertraline Other (See Comments)     hallucinations    Tape [Adhesive Tape]      Patient states it tears her skin, including band aids    Reglan [Metoclopramide] Other (See Comments)     \"makes me talk like a baby, but if I take benadryl with it it's fine\"       FAM HX: family history and Family:     Attends Rastafarian Services:     Active Member of Clubs or Organizations:     Attends Club or Organization Meetings:     Marital Status:    Intimate Partner Violence:     Fear of Current or Ex-Partner:     Emotionally Abused:     Physically Abused:     Sexually Abused:        Data:   CBC with Differential:    Lab Results   Component Value Date    WBC 8.3 09/27/2021    RBC 4.27 09/27/2021    HGB 12.2 09/27/2021    HCT 35.6 09/27/2021     09/27/2021    MCV 83.4 09/27/2021    MCH 28.6 09/27/2021    MCHC 34.3 09/27/2021    RDW 11.8 09/27/2021    SEGSPCT 43.5 09/27/2021    BANDSPCT 2 06/27/2020    LYMPHOPCT 39.2 09/27/2021    MONOPCT 12.0 09/27/2021    EOSPCT 3.5 03/12/2011    BASOPCT 0.7 09/27/2021    MONOSABS 1.0 09/27/2021    LYMPHSABS 3.2 09/27/2021    EOSABS 0.4 09/27/2021    BASOSABS 0.1 09/27/2021    DIFFTYPE AUTOMATED DIFFERENTIAL 09/27/2021       CMP:     Lab Results   Component Value Date     09/27/2021    K 3.6 09/27/2021    K 4.2 03/15/2018    CL 92 09/27/2021    CO2 25 09/27/2021    BUN 11 09/27/2021    CREATININE 0.4 09/27/2021    GFRAA >60 09/27/2021    LABGLOM >60 09/27/2021    GLUCOSE 153 09/27/2021    PROT 6.8 09/27/2021    PROT 6.6 03/12/2011    LABALBU 4.2 09/27/2021    CALCIUM 9.1 09/27/2021    BILITOT 0.3 09/27/2021    ALKPHOS 90 09/27/2021    AST 26 09/27/2021    ALT 9 09/27/2021       Troponin:  Lab Results   Component Value Date    TROPONINT <0.010 09/27/2021     Radiology results:  CTA PULMONARY W CONTRAST   Final Result   No evidence of pulmonary embolism. There is a mosaic perfusion pattern within the lungs. The findings may be   related to bronchiolitis or reactive airway disease with air trapping among   other possibilities. XR CHEST PORTABLE   Final Result   No acute cardiopulmonary process identified. Medications:   Home Medications:   Prior to Admission medications    Medication Sig Start Date End Date Taking?  Authorizing Provider   baclofen (LIORESAL) 10 MG tablet 1 tablet 2 times daily 9/3/21   Historical Provider, MD   diazePAM (VALIUM) 5 MG tablet as needed. 9/3/21   Historical Provider, MD   MUCINEX 600 MG extended release tablet 2 times daily as needed 9/3/21   Historical Provider, MD   metoprolol succinate (TOPROL XL) 50 MG extended release tablet Take 1 tablet by mouth daily 9/10/21   Judy Mcfadden, APRN - CNP   NIFEdipine (PROCARDIA XL) 60 MG extended release tablet Take 1 tablet by mouth daily 9/3/21   Meron Pabon MD   magnesium oxide (MAG-OX) 400 MG tablet Take 1 tablet by mouth 3 times daily 7/15/21   Thomas Car MD   losartan (COZAAR) 100 MG tablet Take 1 tablet by mouth daily 7/16/21   Thomas Car MD   QUEtiapine (SEROQUEL) 25 MG tablet Take 1 tablet by mouth 2 times daily  Patient taking differently: Take 25 mg by mouth 2 times daily Indications: 25 mg in am and 50 mg in pm  7/15/21   Thomas Car MD   levETIRAcetam (KEPPRA) 750 MG tablet Take 2 tablets by mouth 2 times daily  Patient taking differently: Take 750 mg by mouth 2 times daily  7/15/21 9/14/21  Thomas Car MD   busPIRone (BUSPAR) 10 MG tablet Take 2 tablets by mouth 3 times daily 4/28/21   Marc Jeffrey MD   levothyroxine (SYNTHROID) 75 MCG tablet Take 1 tablet by mouth every morning (before breakfast) 4/28/21   Marc Jeffrey MD   amitriptyline (ELAVIL) 25 MG tablet Take 1 tablet by mouth nightly 4/28/21   Marc Jeffrey MD   HYDROcodone-acetaminophen (NORCO)  MG per tablet Take 1 tablet by mouth every 6 hours as needed.   1/22/21   Historical Provider, MD   fluticasone-umeclidin-vilant (TRELEGY ELLIPTA) 100-62.5-25 MCG/INH AEPB Inhale 1 puff into the lungs daily  Patient taking differently: Inhale 1 puff into the lungs daily as needed (only takes prn daily due to yeast infections in mouth, is aware she is supposed to take daily)  12/8/20   Ralph Dubose MD   OXcarbazepine (TRILEPTAL) 300 MG tablet Take 1 tablet by mouth 2 times daily  Patient taking differently: Take 450 mg by mouth 2 times daily  12/4/20   Jovon Aguilar MD   hydrOXYzine (VISTARIL) 25 MG capsule Take 25 mg by mouth 3 times daily as needed  11/12/20   Historical Provider, MD   NOVOLOG FLEXPEN 100 UNIT/ML injection pen Inject into the skin See Admin Instructions 12/01/2020 patient states she follows sliding scale regimen BS > 150 10/24/20   Historical Provider, MD   simvastatin (ZOCOR) 20 MG tablet Take 1 tablet by mouth nightly 9/10/20   WENDIE English - CNP   TRESIBA FLEXTOUCH 200 UNIT/ML SOPN Inject 20 Units into the skin every morning  Patient taking differently: Inject into the skin nightly 04/26/21 Patient states she follows sliding scale 9/4/20   Ramakrishna Asencio MD   docusate sodium (COLACE) 100 MG capsule Take 1 capsule by mouth 2 times daily 1/9/20   HAYDEN Coleman   albuterol sulfate  (90 Base) MCG/ACT inhaler Inhale 2 puffs into the lungs every 6 hours as needed for Wheezing or Shortness of Breath (or cough) Please include spacer with instructions for use. 6/11/19   HAYDEN Coleman   aluminum & magnesium hydroxide-simethicone (MAALOX MAX) 400-400-40 MG/5ML SUSP Take 15 mLs by mouth every 6 hours as needed (heart burn) 9/12/18   WENDIE Laguna - CNP   pantoprazole (PROTONIX) 40 MG tablet Take 1 tablet by mouth daily 7/20/18   HAYDEN Coleman   carbidopa-levodopa (SINEMET)  MG per tablet Take 1 tablet by mouth nightly 5/14/18   Historical Provider, MD   polyethylene glycol (GLYCOLAX) packet Take 17 g by mouth daily as needed for Constipation    Historical Provider, MD   aspirin 81 MG tablet Take 81 mg by mouth daily    Historical Provider, MD   Multiple Vitamins-Iron (MULTI-VITAMIN/IRON PO) Take  by mouth. Historical Provider, MD   montelukast (SINGULAIR) 10 MG tablet Take 10 mg by mouth nightly.     Historical Provider, MD     Medications:    LORazepam  2 mg IntraVENous Once    azithromycin  500 mg IntraVENous Q24H    nitroGLYCERIN  0.4 mg SubLINGual Once      Infusions:   PRN Meds: levalbuterol, 0.63 mg, Q4H PRN        Electronically signed by Ed Benjamin DO on 9/28/2021 at 2:20 AM      This dictation was created with voice recognition software. While attempts have been made to review the dictation as it is transcribed, on occasion the spoken word can be misinterpreted by the technology leading to omissions or inappropriate words, phrases or sentences.

## 2021-09-28 NOTE — PROGRESS NOTES
Hospitalist Progress Note      Name:  Marie Gusman /Age/Sex: 1955  (61 y.o. female)   MRN & CSN:  [id number] & [id number] Admission Date/Time: 2021  9:56 PM   Location:  OBS 10/CVF45-19 PCP: Harpal Ramirez MD         Hospital Day: 2    Assessment and Plan:   Marie Gusman is a 61 y.o.  female  who presents with Chest pain    1. Chest pain, rule out acute coronary syndrome  -Admit to observation services with telemetry  -Status-post cardiac cath in 2017 with angiographically normal epicardial coronary arteries. Inconclusive myocardial stress test on 7/10/2021.  -Troponin <0.010 in ED, Cycle troponin x2 q6  -Continue home ASA, Beta blocker, ARB, calcium channel blocker, and statin  -Reviewed recent lipid panel and hemoglobin A1c  -CTA pulmonary with contrast shows no evidence of pulmonary list  -Reviewed 2D Echo from 7/10/2021 shows LVSF normal with EF 50 to 55% with mild LV hypertrophy and G1DD  -Reviewed stress test from 7/10/2021 that was inconclusive  -Outpatient follow-up with Dr. Yanira Wilcox 2021: CTA pulmonary arteries () no evidence of pulmonary embolism. Mosaic perfusion pattern within the lungs, may be related to bronchiolitis or reactive airway disease.    -Consult cardiology~normal cardiolite stress test, ok to DC from a cardio standpoint      2. IDDM2  -Continue home long-acting medication, n.p.o. diet, low n.p.o. SSI + BG checks ACHS, hypoglycemic protocol, and target -180     3. Anxiety, severe  -Patient has severe anxiety to the point where she passes out.  -Continue home diazepam  -One-time Ativan dose now as patient is beginning to have a severe panic attack     4. Chronic hyponatremia, improved  -Sodium 131 on admission.   Baseline sodium over the last 2 months ~ 127  - improved to 129 ()  -Question of medication related  -IVF at 75 mL an hour for 5 hours  -If sodium drops during this admission can consult Dr. Rodrigo Guzman, who patient follows with for her kidney care     5. Acute asthma exacerbation, mild  -Patient with seizure activity with Decadron  -We will hold off on IV corticosteroids  -CTA pulmonary with contrast shows mosaic perfusion provider within the lung may be related to bronchiolitis or reactive airway disease with air trapping.  -We will add azithromycin IV for anti-inflammatory effects  -Xopenex for breathing treatments given significant anxiety as above     6. Abdominal hernias  -Patient endorses tenderness to palpation in the abdominal region. Patient states this is her baseline and has no abdominal hernias. Endorses no change in physical exam.     Other chronic medical conditions:   Continue all home meds except stated above or contraindicated. · Pseudoseizures  · HLD  · HTN  · ILSA  · Obesity  · Bipolar 1 disorder  · Insomnia    Diet ADULT DIET; Regular   DVT Prophylaxis [x] Lovenox, []  Heparin, [] SCDs, [] Ambulation   GI Prophylaxis [x] PPI,  [] H2 Blocker,  [] Carafate,  [] Diet/Tube Feeds   Code Status Full Code   Disposition Patient requires continued admission due to chest pain and hyponatremia          History of Present Illness:     Chief Complaint: Chest pain  Bri Mason is a 61 y.o.  female  who presents with chest pain that started last night. Pt states that she started having shortness of breath which then led to chest pain that was midsternal and did not radiate anywhere. Pt states that this happened 3 months prior to with no diagnosis as to why. Pt denies any nausea or vomiting. Pt denies any other concerns. Ten point ROS reviewed negative, unless as noted above    Objective:   No intake or output data in the 24 hours ending 09/28/21 1210   Vitals:   Vitals:    09/28/21 0815   BP: 110/72   Pulse: 88   Resp: 16   Temp: 98.6 °F (37 °C)   SpO2: 98%     Physical Exam:   GEN Awake female, sitting upright in bed in no apparent distress. Appears given age. EYES Pupils are equally round.   No scleral erythema, discharge, or conjunctivitis. HENT Mucous membranes are moist. Oral pharynx without exudates, no evidence of thrush. NECK Supple, no apparent thyromegaly or masses. RESP diminished to auscultation, no wheezes, rales or rhonchi. Symmetric chest movement while on room air. CARDIO/VASC S1/S2 auscultated. Regular rate without appreciable murmurs, rubs, or gallops. No JVD or carotid bruits. Peripheral pulses equal bilaterally and palpable. No peripheral edema. GI Abdomen is soft without significant tenderness, masses, or guarding. Bowel sounds are normoactive. Rectal exam deferred.  No costovertebral angle tenderness. Mercer catheter is not present. HEME/LYMPH No palpable cervical lymphadenopathy and no hepatosplenomegaly. No petechiae or ecchymoses. MSK No gross joint deformities. SKIN Normal coloration, warm, dry. NEURO Cranial nerves appear grossly intact, normal speech, no lateralizing weakness. PSYCH Awake, alert, oriented x 4. Affect appropriate.     Medications:   Medications:    amitriptyline  25 mg Oral Nightly    aspirin  81 mg Oral Daily    baclofen  10 mg Oral BID    busPIRone  20 mg Oral TID    carbidopa-levodopa  1 tablet Oral Nightly    levothyroxine  75 mcg Oral QAM AC    losartan  100 mg Oral Daily    magnesium oxide  400 mg Oral TID    metoprolol succinate  50 mg Oral Daily    montelukast  10 mg Oral Nightly    NIFEdipine  60 mg Oral Daily    OXcarbazepine  450 mg Oral BID    pantoprazole  40 mg Oral Daily    QUEtiapine  25 mg Oral Daily    QUEtiapine  50 mg Oral Nightly    atorvastatin  40 mg Oral Daily    sodium chloride flush  5-40 mL IntraVENous 2 times per day    insulin lispro  0-6 Units SubCUTAneous Q4H    enoxaparin  40 mg SubCUTAneous Daily    azithromycin  500 mg IntraVENous Q24H    budesonide-formoterol  2 puff Inhalation BID    tiotropium  2 puff Inhalation Daily    insulin glargine  20 Units SubCUTAneous Nightly    nitroGLYCERIN  0.4 mg SubLINGual Once Infusions:    dextrose      sodium chloride       PRN Meds: albuterol sulfate HFA, 2 puff, Q6H PRN  aluminum & magnesium hydroxide-simethicone, 15 mL, Q6H PRN  diazePAM, 5 mg, Q12H PRN  HYDROcodone-acetaminophen, 1 tablet, Q6H PRN  hydrOXYzine, 25 mg, TID PRN  guaiFENesin, 600 mg, BID PRN  glucose, 15 g, PRN  dextrose, 12.5 g, PRN  glucagon (rDNA), 1 mg, PRN  dextrose, 100 mL/hr, PRN  sodium chloride flush, 5-40 mL, PRN  sodium chloride, 25 mL, PRN  acetaminophen, 650 mg, Q6H PRN   Or  acetaminophen, 650 mg, Q6H PRN  polyethylene glycol, 17 g, Daily PRN  promethazine, 12.5 mg, Q6H PRN  nitroGLYCERIN, 0.4 mg, Q5 Min PRN  levalbuterol, 0.63 mg, Q4H PRN          Patient is still admitted because hyponatremia and chest pain . The anticipated discharge is in less than 24 hours.      Electronically signed by WENDIE Horton CNP on 9/28/2021 at 12:10 PM
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
## 2021-09-29 LAB
ANION GAP SERPL CALCULATED.3IONS-SCNC: 10 MMOL/L (ref 4–16)
BACTERIA: NEGATIVE /HPF
BASOPHILS ABSOLUTE: 0.1 K/CU MM
BASOPHILS RELATIVE PERCENT: 0.8 % (ref 0–1)
BILIRUBIN URINE: NEGATIVE MG/DL
BLOOD, URINE: NEGATIVE
BUN BLDV-MCNC: 12 MG/DL (ref 6–23)
CALCIUM SERPL-MCNC: 8.9 MG/DL (ref 8.3–10.6)
CHLORIDE BLD-SCNC: 92 MMOL/L (ref 99–110)
CLARITY: CLEAR
CO2: 23 MMOL/L (ref 21–32)
COLOR: ABNORMAL
CREAT SERPL-MCNC: 0.6 MG/DL (ref 0.6–1.1)
DIFFERENTIAL TYPE: ABNORMAL
EKG ATRIAL RATE: 131 BPM
EKG DIAGNOSIS: NORMAL
EKG Q-T INTERVAL: 412 MS
EKG QRS DURATION: 116 MS
EKG QTC CALCULATION (BAZETT): 472 MS
EKG R AXIS: -43 DEGREES
EKG T AXIS: 47 DEGREES
EKG VENTRICULAR RATE: 79 BPM
EOSINOPHILS ABSOLUTE: 0.3 K/CU MM
EOSINOPHILS RELATIVE PERCENT: 3.5 % (ref 0–3)
GFR AFRICAN AMERICAN: >60 ML/MIN/1.73M2
GFR NON-AFRICAN AMERICAN: >60 ML/MIN/1.73M2
GLUCOSE BLD-MCNC: 129 MG/DL (ref 70–99)
GLUCOSE BLD-MCNC: 169 MG/DL (ref 70–99)
GLUCOSE BLD-MCNC: 179 MG/DL (ref 70–99)
GLUCOSE BLD-MCNC: 183 MG/DL (ref 70–99)
GLUCOSE BLD-MCNC: 199 MG/DL (ref 70–99)
GLUCOSE, URINE: 50 MG/DL
HCT VFR BLD CALC: 35.8 % (ref 37–47)
HEMOGLOBIN: 12 GM/DL (ref 12.5–16)
IMMATURE NEUTROPHIL %: 0.8 % (ref 0–0.43)
KETONES, URINE: NEGATIVE MG/DL
LEUKOCYTE ESTERASE, URINE: ABNORMAL
LYMPHOCYTES ABSOLUTE: 2.1 K/CU MM
LYMPHOCYTES RELATIVE PERCENT: 27.8 % (ref 24–44)
MCH RBC QN AUTO: 28.4 PG (ref 27–31)
MCHC RBC AUTO-ENTMCNC: 33.5 % (ref 32–36)
MCV RBC AUTO: 84.6 FL (ref 78–100)
MONOCYTES ABSOLUTE: 0.9 K/CU MM
MONOCYTES RELATIVE PERCENT: 12.1 % (ref 0–4)
NITRITE URINE, QUANTITATIVE: NEGATIVE
NUCLEATED RBC %: 0 %
OSMOLALITY URINE: 368 MOS/L
PDW BLD-RTO: 11.8 % (ref 11.7–14.9)
PH, URINE: 8 (ref 5–8)
PLATELET # BLD: 214 K/CU MM (ref 140–440)
PMV BLD AUTO: 9.1 FL (ref 7.5–11.1)
POTASSIUM SERPL-SCNC: 4.2 MMOL/L (ref 3.5–5.1)
PROTEIN UA: NEGATIVE MG/DL
RBC # BLD: 4.23 M/CU MM (ref 4.2–5.4)
RBC URINE: 1 /HPF (ref 0–6)
SEGMENTED NEUTROPHILS ABSOLUTE COUNT: 4.1 K/CU MM
SEGMENTED NEUTROPHILS RELATIVE PERCENT: 55 % (ref 36–66)
SODIUM BLD-SCNC: 125 MMOL/L (ref 135–145)
SODIUM URINE: 120 MMOL/L (ref 35–167)
SPECIFIC GRAVITY UA: 1.01 (ref 1–1.03)
SQUAMOUS EPITHELIAL: <1 /HPF
TOTAL IMMATURE NEUTOROPHIL: 0.06 K/CU MM
TOTAL NUCLEATED RBC: 0 K/CU MM
TRICHOMONAS: ABNORMAL /HPF
UROBILINOGEN, URINE: NEGATIVE MG/DL (ref 0.2–1)
WBC # BLD: 7.5 K/CU MM (ref 4–10.5)
WBC UA: 9 /HPF (ref 0–5)

## 2021-09-29 PROCEDURE — 36415 COLL VENOUS BLD VENIPUNCTURE: CPT

## 2021-09-29 PROCEDURE — 6360000002 HC RX W HCPCS: Performed by: STUDENT IN AN ORGANIZED HEALTH CARE EDUCATION/TRAINING PROGRAM

## 2021-09-29 PROCEDURE — 84300 ASSAY OF URINE SODIUM: CPT

## 2021-09-29 PROCEDURE — 6370000000 HC RX 637 (ALT 250 FOR IP): Performed by: STUDENT IN AN ORGANIZED HEALTH CARE EDUCATION/TRAINING PROGRAM

## 2021-09-29 PROCEDURE — 83935 ASSAY OF URINE OSMOLALITY: CPT

## 2021-09-29 PROCEDURE — 6370000000 HC RX 637 (ALT 250 FOR IP): Performed by: INTERNAL MEDICINE

## 2021-09-29 PROCEDURE — 85025 COMPLETE CBC W/AUTO DIFF WBC: CPT

## 2021-09-29 PROCEDURE — 81001 URINALYSIS AUTO W/SCOPE: CPT

## 2021-09-29 PROCEDURE — 93005 ELECTROCARDIOGRAM TRACING: CPT | Performed by: STUDENT IN AN ORGANIZED HEALTH CARE EDUCATION/TRAINING PROGRAM

## 2021-09-29 PROCEDURE — 93010 ELECTROCARDIOGRAM REPORT: CPT | Performed by: INTERNAL MEDICINE

## 2021-09-29 PROCEDURE — 82962 GLUCOSE BLOOD TEST: CPT

## 2021-09-29 PROCEDURE — 80048 BASIC METABOLIC PNL TOTAL CA: CPT

## 2021-09-29 PROCEDURE — 96366 THER/PROPH/DIAG IV INF ADDON: CPT

## 2021-09-29 PROCEDURE — 6370000000 HC RX 637 (ALT 250 FOR IP): Performed by: FAMILY MEDICINE

## 2021-09-29 PROCEDURE — 94640 AIRWAY INHALATION TREATMENT: CPT

## 2021-09-29 PROCEDURE — 2580000003 HC RX 258: Performed by: STUDENT IN AN ORGANIZED HEALTH CARE EDUCATION/TRAINING PROGRAM

## 2021-09-29 PROCEDURE — 96372 THER/PROPH/DIAG INJ SC/IM: CPT

## 2021-09-29 PROCEDURE — G0378 HOSPITAL OBSERVATION PER HR: HCPCS

## 2021-09-29 RX ORDER — INSULIN GLARGINE 100 [IU]/ML
30 INJECTION, SOLUTION SUBCUTANEOUS NIGHTLY
Status: DISCONTINUED | OUTPATIENT
Start: 2021-09-29 | End: 2021-09-30

## 2021-09-29 RX ORDER — SODIUM CHLORIDE 1000 MG
1 TABLET, SOLUBLE MISCELLANEOUS
Status: DISCONTINUED | OUTPATIENT
Start: 2021-09-29 | End: 2021-10-01

## 2021-09-29 RX ORDER — AZITHROMYCIN 500 MG/1
500 TABLET, FILM COATED ORAL DAILY
Qty: 1 TABLET | Refills: 0 | Status: CANCELLED | OUTPATIENT
Start: 2021-09-29 | End: 2021-09-30

## 2021-09-29 RX ADMIN — SODIUM CHLORIDE, PRESERVATIVE FREE 10 ML: 5 INJECTION INTRAVENOUS at 08:28

## 2021-09-29 RX ADMIN — ENOXAPARIN SODIUM 40 MG: 40 INJECTION SUBCUTANEOUS at 08:23

## 2021-09-29 RX ADMIN — ATORVASTATIN CALCIUM 40 MG: 40 TABLET, FILM COATED ORAL at 20:31

## 2021-09-29 RX ADMIN — MAGNESIUM OXIDE 400 MG (241.3 MG MAGNESIUM) TABLET 400 MG: TABLET at 15:11

## 2021-09-29 RX ADMIN — LEVOTHYROXINE SODIUM 75 MCG: 0.07 TABLET ORAL at 08:22

## 2021-09-29 RX ADMIN — Medication 15 G: at 15:21

## 2021-09-29 RX ADMIN — GUAIFENESIN 600 MG: 600 TABLET, EXTENDED RELEASE ORAL at 08:19

## 2021-09-29 RX ADMIN — LOSARTAN POTASSIUM 100 MG: 100 TABLET, FILM COATED ORAL at 08:19

## 2021-09-29 RX ADMIN — INSULIN GLARGINE 30 UNITS: 100 INJECTION, SOLUTION SUBCUTANEOUS at 20:34

## 2021-09-29 RX ADMIN — HYDROCODONE BITARTRATE AND ACETAMINOPHEN 1 TABLET: 10; 325 TABLET ORAL at 13:11

## 2021-09-29 RX ADMIN — HYDROXYZINE PAMOATE 25 MG: 25 CAPSULE ORAL at 15:11

## 2021-09-29 RX ADMIN — MAGNESIUM OXIDE 400 MG (241.3 MG MAGNESIUM) TABLET 400 MG: TABLET at 08:19

## 2021-09-29 RX ADMIN — PANTOPRAZOLE SODIUM 40 MG: 40 TABLET, DELAYED RELEASE ORAL at 08:19

## 2021-09-29 RX ADMIN — BACLOFEN 10 MG: 10 TABLET ORAL at 08:19

## 2021-09-29 RX ADMIN — BUSPIRONE HYDROCHLORIDE 20 MG: 10 TABLET ORAL at 20:30

## 2021-09-29 RX ADMIN — MAGNESIUM OXIDE 400 MG (241.3 MG MAGNESIUM) TABLET 400 MG: TABLET at 20:29

## 2021-09-29 RX ADMIN — AMITRIPTYLINE HYDROCHLORIDE 25 MG: 25 TABLET, FILM COATED ORAL at 20:29

## 2021-09-29 RX ADMIN — NIFEDIPINE 60 MG: 30 TABLET, FILM COATED, EXTENDED RELEASE ORAL at 08:21

## 2021-09-29 RX ADMIN — HYDROXYZINE PAMOATE 25 MG: 25 CAPSULE ORAL at 20:32

## 2021-09-29 RX ADMIN — BUDESONIDE AND FORMOTEROL FUMARATE DIHYDRATE 2 PUFF: 160; 4.5 AEROSOL RESPIRATORY (INHALATION) at 08:13

## 2021-09-29 RX ADMIN — TIOTROPIUM BROMIDE INHALATION SPRAY 2 PUFF: 3.12 SPRAY, METERED RESPIRATORY (INHALATION) at 08:13

## 2021-09-29 RX ADMIN — QUETIAPINE FUMARATE 25 MG: 25 TABLET ORAL at 08:25

## 2021-09-29 RX ADMIN — Medication 15 G: at 20:46

## 2021-09-29 RX ADMIN — METOPROLOL SUCCINATE 50 MG: 50 TABLET, EXTENDED RELEASE ORAL at 08:19

## 2021-09-29 RX ADMIN — QUETIAPINE FUMARATE 50 MG: 25 TABLET ORAL at 20:30

## 2021-09-29 RX ADMIN — AZITHROMYCIN MONOHYDRATE 500 MG: 500 INJECTION, POWDER, LYOPHILIZED, FOR SOLUTION INTRAVENOUS at 02:08

## 2021-09-29 RX ADMIN — BUSPIRONE HYDROCHLORIDE 20 MG: 10 TABLET ORAL at 08:22

## 2021-09-29 RX ADMIN — GUAIFENESIN 600 MG: 600 TABLET, EXTENDED RELEASE ORAL at 20:30

## 2021-09-29 RX ADMIN — OXCARBAZEPINE 450 MG: 300 TABLET, FILM COATED ORAL at 20:31

## 2021-09-29 RX ADMIN — OXCARBAZEPINE 450 MG: 300 TABLET, FILM COATED ORAL at 08:21

## 2021-09-29 RX ADMIN — BACLOFEN 10 MG: 10 TABLET ORAL at 20:31

## 2021-09-29 RX ADMIN — BUSPIRONE HYDROCHLORIDE 20 MG: 10 TABLET ORAL at 15:11

## 2021-09-29 RX ADMIN — HYDROCODONE BITARTRATE AND ACETAMINOPHEN 1 TABLET: 10; 325 TABLET ORAL at 20:32

## 2021-09-29 RX ADMIN — HYDROXYZINE PAMOATE 25 MG: 25 CAPSULE ORAL at 08:25

## 2021-09-29 RX ADMIN — CARBIDOPA AND LEVODOPA 1 TABLET: 10; 100 TABLET ORAL at 20:32

## 2021-09-29 RX ADMIN — HYDROCODONE BITARTRATE AND ACETAMINOPHEN 1 TABLET: 10; 325 TABLET ORAL at 05:48

## 2021-09-29 RX ADMIN — ASPIRIN 81 MG: 81 TABLET, CHEWABLE ORAL at 08:19

## 2021-09-29 RX ADMIN — PROMETHAZINE HYDROCHLORIDE 12.5 MG: 25 TABLET ORAL at 20:34

## 2021-09-29 RX ADMIN — MONTELUKAST 10 MG: 10 TABLET, FILM COATED ORAL at 20:30

## 2021-09-29 RX ADMIN — SODIUM CHLORIDE, PRESERVATIVE FREE 10 ML: 5 INJECTION INTRAVENOUS at 20:46

## 2021-09-29 ASSESSMENT — PAIN SCALES - GENERAL
PAINLEVEL_OUTOF10: 6
PAINLEVEL_OUTOF10: 10

## 2021-09-29 ASSESSMENT — PAIN DESCRIPTION - LOCATION: LOCATION: BACK

## 2021-09-29 NOTE — PROGRESS NOTES
Hospitalist Progress Note      Name:  Mehdi Carroll /Age/Sex: 1955  (61 y.o. female)   MRN & CSN:  [id number] & [id number] Admission Date/Time: 2021  9:56 PM   Location:  OBS 10/DUG44-78 PCP: Main Cruz MD         Hospital Day: 3    Assessment and Plan:   Mehdi Carroll is a 61 y.o.  female  who presents with Chest pain    Hyponatremia   - Na trended down 129-->125 despite IV fluids  - is on trileptal  - check urine Na/osm, UA  - 1800cc free water restriction   - consult nephrology     Chest pain  - Status-post cardiac cath in 2017 with angiographically normal epicardial coronary arteries.  Inconclusive myocardial stress test on 7/10/2021  - troponin negative    - CTA chest with no acute process   - Continue home ASA, Beta blocker, ARB, calcium channel blocker, and statin  - Reviewed 2D Echo from 7/10/2021 shows LVSF normal with EF 50 to 55% with mild LV hypertrophy and G1DD  - pain resolved   - normal cardiolite stress test, cardiology signed off    ? Vaginal bleeding vs hemorrhoids  -Patient reported small amount amounts of bleeding on pad at home  -Has been through menopause  -Reports history of hemorrhoids with bleeding  -Hemoglobin has remained relatively stable  -Continue to monitor for signs of bleeding, H&H  -Strongly encourage follow-up with patient's OB/GYN    Insulin-dependent type 2 diabetes  -Continue home basal with sliding scale  -Monitor POCT glucose  -Hypoglycemic cough     Anxiety  -Continue home diazepam, BuSpar  -As needed hydroxyzine    Mild acute asthma exacerbation  -Reported wheezing and dry cough for months; appears improved this AM  -CTA pulmonary showed bronchiolitis versus reactive airway disease  -Continue azithromycin  -Continue Xopenex  -Unable to steroids    Pseudoseizures  -continue Trileptal    Hypertension   -Continue nifedipine, Cozaar, metoprolol    Bipolar 1 disorder  -Continue Elavil, Seroquel      This patient was seen and examined autonomously  A apparent thyromegaly or masses. RESP Clear to auscultation, no wheezes, rales or rhonchi. Respirations even and unlabored on RA. CARDIO/VASC   S1/S2 auscultated. Regular rate without appreciable murmurs, rubs, or gallops. Peripheral pulses equal bilaterally and palpable. No peripheral edema. GI Abdomen is soft without significant tenderness, masses, or guarding. Bowel sounds are normoactive.  No costovertebral angle tenderness. Mercer catheter is not present. MSK No gross joint deformities. SKIN Normal coloration, warm, dry. NEURO Cranial nerves appear grossly intact, normal speech, no lateralizing weakness. PSYCH Awake, alert, oriented x 4. Affect appropriate.     Medications:   Medications:    insulin glargine  30 Units SubCUTAneous Nightly    insulin lispro  0-12 Units SubCUTAneous TID WC    amitriptyline  25 mg Oral Nightly    aspirin  81 mg Oral Daily    baclofen  10 mg Oral BID    busPIRone  20 mg Oral TID    carbidopa-levodopa  1 tablet Oral Nightly    levothyroxine  75 mcg Oral QAM AC    losartan  100 mg Oral Daily    magnesium oxide  400 mg Oral TID    metoprolol succinate  50 mg Oral Daily    montelukast  10 mg Oral Nightly    NIFEdipine  60 mg Oral Daily    OXcarbazepine  450 mg Oral BID    pantoprazole  40 mg Oral Daily    QUEtiapine  25 mg Oral Daily    QUEtiapine  50 mg Oral Nightly    atorvastatin  40 mg Oral Daily    sodium chloride flush  5-40 mL IntraVENous 2 times per day    enoxaparin  40 mg SubCUTAneous Daily    azithromycin  500 mg IntraVENous Q24H    budesonide-formoterol  2 puff Inhalation BID    tiotropium  2 puff Inhalation Daily    insulin lispro  0-6 Units SubCUTAneous 2 times per day    nitroGLYCERIN  0.4 mg SubLINGual Once      Infusions:    dextrose       PRN Meds: albuterol sulfate HFA, 2 puff, Q6H PRN  aluminum & magnesium hydroxide-simethicone, 15 mL, Q6H PRN  diazePAM, 5 mg, Q12H PRN  HYDROcodone-acetaminophen, 1 tablet, Q6H PRN  hydrOXYzine, 25 mg, TID PRN  guaiFENesin, 600 mg, BID PRN  glucose, 15 g, PRN  dextrose, 12.5 g, PRN  glucagon (rDNA), 1 mg, PRN  dextrose, 100 mL/hr, PRN  sodium chloride flush, 5-40 mL, PRN  acetaminophen, 650 mg, Q6H PRN   Or  acetaminophen, 650 mg, Q6H PRN  polyethylene glycol, 17 g, Daily PRN  promethazine, 12.5 mg, Q6H PRN  nitroGLYCERIN, 0.4 mg, Q5 Min PRN  levalbuterol, 0.63 mg, Q4H PRN          Electronically signed by Mena Jiménez PA-C on 9/29/2021 at 1:12 PM
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
## 2021-09-30 ENCOUNTER — APPOINTMENT (OUTPATIENT)
Dept: CT IMAGING | Age: 66
DRG: 101 | End: 2021-09-30
Payer: MEDICARE

## 2021-09-30 PROBLEM — R56.9 SEIZURE (HCC): Status: ACTIVE | Noted: 2021-09-30

## 2021-09-30 LAB
ALBUMIN SERPL-MCNC: 4.5 GM/DL (ref 3.4–5)
ALP BLD-CCNC: 77 IU/L (ref 40–129)
ALT SERPL-CCNC: 27 U/L (ref 10–40)
ANION GAP SERPL CALCULATED.3IONS-SCNC: 13 MMOL/L (ref 4–16)
ANION GAP SERPL CALCULATED.3IONS-SCNC: 14 MMOL/L (ref 4–16)
AST SERPL-CCNC: 32 IU/L (ref 15–37)
BASOPHILS ABSOLUTE: 0.1 K/CU MM
BASOPHILS RELATIVE PERCENT: 1 % (ref 0–1)
BILIRUB SERPL-MCNC: 0.2 MG/DL (ref 0–1)
BILIRUBIN DIRECT: 0.2 MG/DL (ref 0–0.3)
BILIRUBIN, INDIRECT: 0 MG/DL (ref 0–0.7)
BUN BLDV-MCNC: 33 MG/DL (ref 6–23)
BUN BLDV-MCNC: 35 MG/DL (ref 6–23)
CALCIUM SERPL-MCNC: 10.3 MG/DL (ref 8.3–10.6)
CALCIUM SERPL-MCNC: 10.5 MG/DL (ref 8.3–10.6)
CHLORIDE BLD-SCNC: 95 MMOL/L (ref 99–110)
CHLORIDE BLD-SCNC: 96 MMOL/L (ref 99–110)
CO2: 23 MMOL/L (ref 21–32)
CO2: 23 MMOL/L (ref 21–32)
CREAT SERPL-MCNC: 0.4 MG/DL (ref 0.6–1.1)
CREAT SERPL-MCNC: 0.5 MG/DL (ref 0.6–1.1)
D DIMER: 558 NG/ML(DDU)
DIFFERENTIAL TYPE: ABNORMAL
EKG ATRIAL RATE: 73 BPM
EKG DIAGNOSIS: NORMAL
EKG P AXIS: 17 DEGREES
EKG P-R INTERVAL: 170 MS
EKG Q-T INTERVAL: 412 MS
EKG QRS DURATION: 114 MS
EKG QTC CALCULATION (BAZETT): 453 MS
EKG R AXIS: -44 DEGREES
EKG T AXIS: 31 DEGREES
EKG VENTRICULAR RATE: 73 BPM
EOSINOPHILS ABSOLUTE: 0.3 K/CU MM
EOSINOPHILS RELATIVE PERCENT: 3.8 % (ref 0–3)
GFR AFRICAN AMERICAN: >60 ML/MIN/1.73M2
GFR AFRICAN AMERICAN: >60 ML/MIN/1.73M2
GFR NON-AFRICAN AMERICAN: >60 ML/MIN/1.73M2
GFR NON-AFRICAN AMERICAN: >60 ML/MIN/1.73M2
GLUCOSE BLD-MCNC: 166 MG/DL (ref 70–99)
GLUCOSE BLD-MCNC: 190 MG/DL (ref 70–99)
GLUCOSE BLD-MCNC: 196 MG/DL (ref 70–99)
GLUCOSE BLD-MCNC: 221 MG/DL (ref 70–99)
GLUCOSE BLD-MCNC: 264 MG/DL (ref 70–99)
HCT VFR BLD CALC: 39.9 % (ref 37–47)
HEMOGLOBIN: 13.4 GM/DL (ref 12.5–16)
IMMATURE NEUTROPHIL %: 0.3 % (ref 0–0.43)
LACTATE: 1.5 MMOL/L (ref 0.4–2)
LIPASE: 16 IU/L (ref 13–60)
LYMPHOCYTES ABSOLUTE: 2.1 K/CU MM
LYMPHOCYTES RELATIVE PERCENT: 30.8 % (ref 24–44)
MCH RBC QN AUTO: 28.3 PG (ref 27–31)
MCHC RBC AUTO-ENTMCNC: 33.6 % (ref 32–36)
MCV RBC AUTO: 84.2 FL (ref 78–100)
MONOCYTES ABSOLUTE: 0.9 K/CU MM
MONOCYTES RELATIVE PERCENT: 13.5 % (ref 0–4)
NUCLEATED RBC %: 0 %
OSMOLALITY: 293 MOS/L
PDW BLD-RTO: 11.6 % (ref 11.7–14.9)
PLATELET # BLD: 256 K/CU MM (ref 140–440)
PMV BLD AUTO: 9.1 FL (ref 7.5–11.1)
POTASSIUM SERPL-SCNC: 4.4 MMOL/L (ref 3.5–5.1)
POTASSIUM SERPL-SCNC: 4.7 MMOL/L (ref 3.5–5.1)
RBC # BLD: 4.74 M/CU MM (ref 4.2–5.4)
REASON FOR REJECTION: NORMAL
REJECTED TEST: NORMAL
SEGMENTED NEUTROPHILS ABSOLUTE COUNT: 3.5 K/CU MM
SEGMENTED NEUTROPHILS RELATIVE PERCENT: 50.6 % (ref 36–66)
SODIUM BLD-SCNC: 132 MMOL/L (ref 135–145)
SODIUM BLD-SCNC: 132 MMOL/L (ref 135–145)
TOTAL IMMATURE NEUTOROPHIL: 0.02 K/CU MM
TOTAL NUCLEATED RBC: 0 K/CU MM
TOTAL PROTEIN: 7.1 GM/DL (ref 6.4–8.2)
TSH HIGH SENSITIVITY: 2 UIU/ML (ref 0.27–4.2)
WBC # BLD: 6.8 K/CU MM (ref 4–10.5)

## 2021-09-30 PROCEDURE — 83605 ASSAY OF LACTIC ACID: CPT

## 2021-09-30 PROCEDURE — 93010 ELECTROCARDIOGRAM REPORT: CPT | Performed by: INTERNAL MEDICINE

## 2021-09-30 PROCEDURE — 6370000000 HC RX 637 (ALT 250 FOR IP): Performed by: INTERNAL MEDICINE

## 2021-09-30 PROCEDURE — 82962 GLUCOSE BLOOD TEST: CPT

## 2021-09-30 PROCEDURE — 6370000000 HC RX 637 (ALT 250 FOR IP): Performed by: STUDENT IN AN ORGANIZED HEALTH CARE EDUCATION/TRAINING PROGRAM

## 2021-09-30 PROCEDURE — 83690 ASSAY OF LIPASE: CPT

## 2021-09-30 PROCEDURE — 6360000004 HC RX CONTRAST MEDICATION: Performed by: PHYSICIAN ASSISTANT

## 2021-09-30 PROCEDURE — 70450 CT HEAD/BRAIN W/O DYE: CPT

## 2021-09-30 PROCEDURE — 85379 FIBRIN DEGRADATION QUANT: CPT

## 2021-09-30 PROCEDURE — 6370000000 HC RX 637 (ALT 250 FOR IP): Performed by: PHYSICIAN ASSISTANT

## 2021-09-30 PROCEDURE — 85025 COMPLETE CBC W/AUTO DIFF WBC: CPT

## 2021-09-30 PROCEDURE — 96372 THER/PROPH/DIAG INJ SC/IM: CPT

## 2021-09-30 PROCEDURE — 96366 THER/PROPH/DIAG IV INF ADDON: CPT

## 2021-09-30 PROCEDURE — 6370000000 HC RX 637 (ALT 250 FOR IP): Performed by: FAMILY MEDICINE

## 2021-09-30 PROCEDURE — G0378 HOSPITAL OBSERVATION PER HR: HCPCS

## 2021-09-30 PROCEDURE — 94640 AIRWAY INHALATION TREATMENT: CPT

## 2021-09-30 PROCEDURE — 93005 ELECTROCARDIOGRAM TRACING: CPT | Performed by: PHYSICIAN ASSISTANT

## 2021-09-30 PROCEDURE — 6360000002 HC RX W HCPCS: Performed by: PHYSICIAN ASSISTANT

## 2021-09-30 PROCEDURE — 36415 COLL VENOUS BLD VENIPUNCTURE: CPT

## 2021-09-30 PROCEDURE — 74177 CT ABD & PELVIS W/CONTRAST: CPT

## 2021-09-30 PROCEDURE — 82248 BILIRUBIN DIRECT: CPT

## 2021-09-30 PROCEDURE — 80053 COMPREHEN METABOLIC PANEL: CPT

## 2021-09-30 PROCEDURE — 83930 ASSAY OF BLOOD OSMOLALITY: CPT

## 2021-09-30 PROCEDURE — 80048 BASIC METABOLIC PNL TOTAL CA: CPT

## 2021-09-30 PROCEDURE — 2580000003 HC RX 258: Performed by: PHYSICIAN ASSISTANT

## 2021-09-30 PROCEDURE — 1200000000 HC SEMI PRIVATE

## 2021-09-30 PROCEDURE — 99222 1ST HOSP IP/OBS MODERATE 55: CPT | Performed by: INTERNAL MEDICINE

## 2021-09-30 PROCEDURE — 2580000003 HC RX 258: Performed by: STUDENT IN AN ORGANIZED HEALTH CARE EDUCATION/TRAINING PROGRAM

## 2021-09-30 PROCEDURE — 6360000002 HC RX W HCPCS: Performed by: STUDENT IN AN ORGANIZED HEALTH CARE EDUCATION/TRAINING PROGRAM

## 2021-09-30 PROCEDURE — 96376 TX/PRO/DX INJ SAME DRUG ADON: CPT

## 2021-09-30 PROCEDURE — 84443 ASSAY THYROID STIM HORMONE: CPT

## 2021-09-30 RX ORDER — LABETALOL HYDROCHLORIDE 5 MG/ML
10 INJECTION, SOLUTION INTRAVENOUS EVERY 6 HOURS PRN
Status: DISCONTINUED | OUTPATIENT
Start: 2021-09-30 | End: 2021-09-30 | Stop reason: CLARIF

## 2021-09-30 RX ORDER — HYDRALAZINE HYDROCHLORIDE 20 MG/ML
10 INJECTION INTRAMUSCULAR; INTRAVENOUS ONCE
Status: DISCONTINUED | OUTPATIENT
Start: 2021-09-30 | End: 2021-10-01 | Stop reason: HOSPADM

## 2021-09-30 RX ORDER — 0.9 % SODIUM CHLORIDE 0.9 %
10 VIAL (ML) INJECTION
Status: COMPLETED | OUTPATIENT
Start: 2021-09-30 | End: 2021-09-30

## 2021-09-30 RX ORDER — INSULIN GLARGINE 100 [IU]/ML
40 INJECTION, SOLUTION SUBCUTANEOUS NIGHTLY
Status: DISCONTINUED | OUTPATIENT
Start: 2021-09-30 | End: 2021-10-01 | Stop reason: HOSPADM

## 2021-09-30 RX ORDER — NITROFURANTOIN 25; 75 MG/1; MG/1
100 CAPSULE ORAL EVERY 12 HOURS SCHEDULED
Status: DISCONTINUED | OUTPATIENT
Start: 2021-09-30 | End: 2021-10-01 | Stop reason: HOSPADM

## 2021-09-30 RX ORDER — LORAZEPAM 2 MG/ML
1 INJECTION INTRAMUSCULAR ONCE
Status: COMPLETED | OUTPATIENT
Start: 2021-09-30 | End: 2021-09-30

## 2021-09-30 RX ORDER — LORAZEPAM 2 MG/ML
1 INJECTION INTRAMUSCULAR EVERY 6 HOURS PRN
Status: DISCONTINUED | OUTPATIENT
Start: 2021-09-30 | End: 2021-10-01 | Stop reason: HOSPADM

## 2021-09-30 RX ORDER — ONDANSETRON 4 MG/1
4 TABLET, ORALLY DISINTEGRATING ORAL EVERY 8 HOURS PRN
Status: DISCONTINUED | OUTPATIENT
Start: 2021-09-30 | End: 2021-10-01 | Stop reason: HOSPADM

## 2021-09-30 RX ADMIN — Medication 15 G: at 02:36

## 2021-09-30 RX ADMIN — AMITRIPTYLINE HYDROCHLORIDE 25 MG: 25 TABLET, FILM COATED ORAL at 21:04

## 2021-09-30 RX ADMIN — PANTOPRAZOLE SODIUM 40 MG: 40 TABLET, DELAYED RELEASE ORAL at 09:55

## 2021-09-30 RX ADMIN — MONTELUKAST 10 MG: 10 TABLET, FILM COATED ORAL at 21:03

## 2021-09-30 RX ADMIN — SODIUM CHLORIDE, PRESERVATIVE FREE 10 ML: 5 INJECTION INTRAVENOUS at 09:53

## 2021-09-30 RX ADMIN — HYDROXYZINE PAMOATE 25 MG: 25 CAPSULE ORAL at 21:03

## 2021-09-30 RX ADMIN — NITROFURANTOIN (MONOHYDRATE/MACROCRYSTALS) 100 MG: 75; 25 CAPSULE ORAL at 21:04

## 2021-09-30 RX ADMIN — Medication 15 G: at 09:53

## 2021-09-30 RX ADMIN — NITROGLYCERIN 0.4 MG: 0.4 TABLET, ORALLY DISINTEGRATING SUBLINGUAL at 09:52

## 2021-09-30 RX ADMIN — OXCARBAZEPINE 450 MG: 300 TABLET, FILM COATED ORAL at 09:55

## 2021-09-30 RX ADMIN — SODIUM CHLORIDE 10 ML: 9 INJECTION INTRAVENOUS at 12:19

## 2021-09-30 RX ADMIN — ASPIRIN 81 MG: 81 TABLET, CHEWABLE ORAL at 09:54

## 2021-09-30 RX ADMIN — ONDANSETRON 4 MG: 4 TABLET, ORALLY DISINTEGRATING ORAL at 11:23

## 2021-09-30 RX ADMIN — BACLOFEN 10 MG: 10 TABLET ORAL at 09:54

## 2021-09-30 RX ADMIN — HYDROCODONE BITARTRATE AND ACETAMINOPHEN 1 TABLET: 10; 325 TABLET ORAL at 21:02

## 2021-09-30 RX ADMIN — AZITHROMYCIN MONOHYDRATE 500 MG: 500 INJECTION, POWDER, LYOPHILIZED, FOR SOLUTION INTRAVENOUS at 02:39

## 2021-09-30 RX ADMIN — HYDROCODONE BITARTRATE AND ACETAMINOPHEN 1 TABLET: 10; 325 TABLET ORAL at 04:40

## 2021-09-30 RX ADMIN — LEVETIRACETAM 750 MG: 500 TABLET, FILM COATED ORAL at 17:18

## 2021-09-30 RX ADMIN — QUETIAPINE FUMARATE 25 MG: 25 TABLET ORAL at 09:54

## 2021-09-30 RX ADMIN — NIFEDIPINE 60 MG: 30 TABLET, FILM COATED, EXTENDED RELEASE ORAL at 10:04

## 2021-09-30 RX ADMIN — CARBIDOPA AND LEVODOPA 1 TABLET: 10; 100 TABLET ORAL at 21:05

## 2021-09-30 RX ADMIN — OXCARBAZEPINE 450 MG: 300 TABLET, FILM COATED ORAL at 21:06

## 2021-09-30 RX ADMIN — LORAZEPAM 1 MG: 2 INJECTION INTRAMUSCULAR; INTRAVENOUS at 12:53

## 2021-09-30 RX ADMIN — SODIUM CHLORIDE, PRESERVATIVE FREE 10 ML: 5 INJECTION INTRAVENOUS at 21:16

## 2021-09-30 RX ADMIN — QUETIAPINE FUMARATE 50 MG: 25 TABLET ORAL at 21:03

## 2021-09-30 RX ADMIN — MAGNESIUM OXIDE 400 MG (241.3 MG MAGNESIUM) TABLET 400 MG: TABLET at 09:54

## 2021-09-30 RX ADMIN — METOPROLOL SUCCINATE 50 MG: 50 TABLET, EXTENDED RELEASE ORAL at 09:54

## 2021-09-30 RX ADMIN — MAGNESIUM OXIDE 400 MG (241.3 MG MAGNESIUM) TABLET 400 MG: TABLET at 17:09

## 2021-09-30 RX ADMIN — INSULIN GLARGINE 40 UNITS: 100 INJECTION, SOLUTION SUBCUTANEOUS at 21:10

## 2021-09-30 RX ADMIN — Medication 1 G: at 17:09

## 2021-09-30 RX ADMIN — LEVOTHYROXINE SODIUM 75 MCG: 0.07 TABLET ORAL at 09:57

## 2021-09-30 RX ADMIN — MAGNESIUM OXIDE 400 MG (241.3 MG MAGNESIUM) TABLET 400 MG: TABLET at 21:02

## 2021-09-30 RX ADMIN — LEVETIRACETAM 750 MG: 500 TABLET, FILM COATED ORAL at 22:22

## 2021-09-30 RX ADMIN — ENOXAPARIN SODIUM 40 MG: 40 INJECTION SUBCUTANEOUS at 09:54

## 2021-09-30 RX ADMIN — Medication 15 G: at 21:03

## 2021-09-30 RX ADMIN — BACLOFEN 10 MG: 10 TABLET ORAL at 21:03

## 2021-09-30 RX ADMIN — BUSPIRONE HYDROCHLORIDE 20 MG: 10 TABLET ORAL at 09:56

## 2021-09-30 RX ADMIN — ATORVASTATIN CALCIUM 40 MG: 40 TABLET, FILM COATED ORAL at 21:10

## 2021-09-30 RX ADMIN — Medication 1 G: at 00:18

## 2021-09-30 RX ADMIN — Medication 1 G: at 09:56

## 2021-09-30 RX ADMIN — BUSPIRONE HYDROCHLORIDE 20 MG: 10 TABLET ORAL at 17:09

## 2021-09-30 RX ADMIN — DIAZEPAM 5 MG: 5 TABLET ORAL at 10:02

## 2021-09-30 RX ADMIN — BUDESONIDE AND FORMOTEROL FUMARATE DIHYDRATE 2 PUFF: 160; 4.5 AEROSOL RESPIRATORY (INHALATION) at 21:55

## 2021-09-30 RX ADMIN — BUSPIRONE HYDROCHLORIDE 20 MG: 10 TABLET ORAL at 21:04

## 2021-09-30 RX ADMIN — LOSARTAN POTASSIUM 100 MG: 100 TABLET, FILM COATED ORAL at 09:55

## 2021-09-30 RX ADMIN — IOPAMIDOL 75 ML: 755 INJECTION, SOLUTION INTRAVENOUS at 12:10

## 2021-09-30 ASSESSMENT — PAIN DESCRIPTION - PAIN TYPE
TYPE: ACUTE PAIN

## 2021-09-30 ASSESSMENT — PAIN DESCRIPTION - ORIENTATION
ORIENTATION: RIGHT
ORIENTATION: RIGHT

## 2021-09-30 ASSESSMENT — PAIN SCALES - GENERAL
PAINLEVEL_OUTOF10: 7
PAINLEVEL_OUTOF10: 10

## 2021-09-30 ASSESSMENT — PAIN DESCRIPTION - LOCATION
LOCATION: ABDOMEN
LOCATION: BACK;CHEST
LOCATION: ABDOMEN

## 2021-09-30 ASSESSMENT — PAIN DESCRIPTION - DESCRIPTORS: DESCRIPTORS: STABBING

## 2021-09-30 NOTE — PROGRESS NOTES
Progress Note( Dr. Reynaldo Watson)  9/29/2021  Subjective:   Admit Date: 9/27/2021  PCP: Keshia Jerome MD    Admitted For : Chest pain    Consulted For: Better control blood glucose    Interval History: Had negative cardiac stress test supposed to be discharged but she developed shortness of breath possibly asthmatic attack also noted hyponatremia    Denies any chest pains,   Yes SOB . Denies nausea or vomiting. No new bowel or bladder symptoms. Intake/Output Summary (Last 24 hours) at 9/29/2021 2258  Last data filed at 9/29/2021 0828  Gross per 24 hour   Intake 10 ml   Output --   Net 10 ml       DATA    CBC:   Recent Labs     09/27/21 2223 09/28/21  0501 09/29/21  1111   WBC 8.3 15.9* 7.5   HGB 12.2* 13.3 12.0*    252 214    CMP:  Recent Labs     09/27/21 2223 09/28/21  0501 09/29/21  1111   * 129* 125*   K 3.6 4.4 4.2   CL 92* 92* 92*   CO2 25 23 23   BUN 11 14 12   CREATININE 0.4* 0.7 0.6   CALCIUM 9.1 9.0 8.9   PROT 6.8  --   --    LABALBU 4.2  --   --    BILITOT 0.3  --   --    ALKPHOS 90  --   --    AST 26  --   --    ALT 9*  --   --      Lipids:   Lab Results   Component Value Date    CHOL 99 04/27/2021    HDL 42 04/27/2021    TRIG 116 04/27/2021     Glucose:  Recent Labs     09/29/21  1153 09/29/21  1558 09/29/21 2029   POCGLU 183* 169* 199*     [id number]:  Lab Results   Component Value Date    LABA1C 8.1 07/11/2021     High Sensitivity TSH:   Lab Results   Component Value Date    TSHHS 1.360 04/28/2021     Free T3:   Lab Results   Component Value Date    FT3 2.2 07/15/2016     Free T4:  Lab Results   Component Value Date    T4FREE 1.36 07/13/2021       XR CHEST PORTABLE    Result Date: 9/27/2021  EXAMINATION: ONE XRAY VIEW OF THE CHEST 9/27/2021 10:12 pm   No acute cardiopulmonary process identified. CTA PULMONARY W CONTRAST    Result Date: 9/28/2021  EXAMINATION: CTA OF THE CHEST 9/28/2021 12:30 am T      No evidence of pulmonary embolism.  There is a mosaic perfusion pattern within the lungs. The findings may be related to bronchiolitis or reactive airway disease with air trapping among other possibilities. NM MYOCARDIAL SPECT REST EXERCISE OR RX    Result Date: 9/28/2021  Cardiac Perfusion Imaging   Demographics   Patient Name      Piedad Kendall   Date of study        09/28/2021   Date of Birth     1955         Gender               Female   Age               61 year(s)         Race                    Patient Number    [id number]         Room Number          OBS10   Visit Number      [id number]          Height               62 inches   Corporate ID      [id number]           Weight               176 pounds   Accession Number  [id number]                                        NM Technologist      Edward Rea MD        Cardiologist         [facility] MEDEIROS   Conclusions   Summary  Normal tracer uptake in all segments of myocardium on stress ans rest  images. Normal Stress nuclear scintigraphic study suggestive of normal  myocardial perfusion. Gated images demonstrate normal left ventricular  systolic function with EF of 60 %.    Signatures   ------------------------------------------------------------------  Electronically signed by Luis Fernando Stratton MD  (Interpreting cardiologist) on 09/28/2021 at 17:03        Scheduled Medicines   Medications:    insulin glargine  30 Units SubCUTAneous Nightly    insulin lispro  0-12 Units SubCUTAneous TID WC    urea  15 g Oral Q6H    sodium chloride  1 g Oral TID WC    amitriptyline  25 mg Oral Nightly    aspirin  81 mg Oral Daily    baclofen  10 mg Oral BID    busPIRone  20 mg Oral TID    carbidopa-levodopa  1 tablet Oral Nightly    levothyroxine  75 mcg Oral QAM AC    losartan  100 mg Oral Daily    magnesium oxide  400 mg Oral TID    metoprolol succinate  50 mg Oral Daily    montelukast  10 mg Oral Nightly    NIFEdipine  60 mg Oral Daily    OXcarbazepine  450 mg Oral BID    pantoprazole  40 mg Oral Daily    QUEtiapine  25 mg Oral Daily    QUEtiapine  50 mg Oral Nightly    atorvastatin  40 mg Oral Daily    sodium chloride flush  5-40 mL IntraVENous 2 times per day    enoxaparin  40 mg SubCUTAneous Daily    azithromycin  500 mg IntraVENous Q24H    budesonide-formoterol  2 puff Inhalation BID    tiotropium  2 puff Inhalation Daily    insulin lispro  0-6 Units SubCUTAneous 2 times per day    nitroGLYCERIN  0.4 mg SubLINGual Once      Infusions:    dextrose           Objective:   Vitals: BP (!) 186/93   Pulse 86   Temp 98.2 °F (36.8 °C) (Oral)   Resp 20   Ht 5' 2\" (1.575 m)   Wt 176 lb (79.8 kg)   SpO2 97%   BMI 32.19 kg/m²   General appearance: alert and cooperative with exam  Neck: no JVD or bruit  Thyroid : Normal lobes   Lungs: Has Vesicular Breath sounds occasional wheezing  Heart:  regular rate and rhythm  Abdomen: soft, non-tender; bowel sounds normal; no masses,  no organomegaly  Musculoskeletal: Normal  Extremities: extremities normal, , no edema  Neurologic:  Awake, alert, oriented to name, place and time. Cranial nerves II-XII are grossly intact. Motor is  intact.   Sensory i neuropathy t.,  and gait is normal.    Assessment:     Patient Active Problem List:     Chest pain     H/O Doppler ultrasound     H/O cardiovascular stress test     H/O cardiovascular stress test     H/O cardiovascular stress test     Incarcerated umbilical hernia     Essential hypertension     Type 2 diabetes mellitus with diabetic polyneuropathy, with long-term current use of insulin (HCC)     Tachycardia     Dyslipidemia     Family history of early CAD     NSTEMI (non-ST elevated myocardial infarction) [address]     Abnormal nuclear stress test     ILSA (obstructive sleep apnea)     Snoring     Hypersomnolence     Mild intermittent asthma without complication     Asthma exacerbation attacks     Hypertensive emergency     Hallucination, visual     Severe episode of recurrent major depressive disorder, with psychotic features ([facility]     CHEKO (generalized anxiety disorder)     Auditory hallucinations     Bipolar 1 disorder, depressed, severe (HCC)     Dyspnea and respiratory abnormalities     Encephalopathy     Syncope     Bipolar 1 disorder (HCC)     Hyponatremia     Pseudoseizures     Panic attacks      Plan:     1. Reviewed POC blood glucose . Labs and X ray results   2. Reviewed Current Medicines   3. On Correction bolus Humalog/ Basal Lantus Insulin regime   4. Monitor Blood glucose frequently   5. Modified  the dose of Insulin/ other medicines as needed  6. And to see nephrology for the hyponatremia  7. Patient Will follow     .      Abdelrahman Blount MD, MD

## 2021-09-30 NOTE — PROGRESS NOTES
Pt called out and reported feeling like she was going to have a panic attack. Pt sitting on side of bed with HR of 125. Pt reports back pain. Pt assisted back in bed. HAYDEN Nayak at bedside when pt started to have seizure activity. Verbal order of 1mg Ativan IVP given and medication override done to obtain medication. Pt medicated and activity stopped. Pt currently alert and speaking in complete sentences.  Pt does not seem to be postictal. Pt remains on monitor and will continue to monitor
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
## 2021-09-30 NOTE — PROGRESS NOTES
Hospitalist Progress Note      Name:  Keri Mills /Age/Sex: 1955  (61 y.o. female)   MRN & CSN:  [id number] & [id number] Admission Date/Time: 2021  9:56 PM   Location:  OBS 10/CDG33-73 PCP: Doug Ferris MD         Hospital Day: 4    Assessment and Plan:   Keri Mills is a 61 y.o.  female  who presents with Chest pain     Seizure-like activity   History of pseudoseizures  - noted episode of tachycardia, panic symptoms, followed  by ~1 minute of tonic-clonic activity, received Ativan 1mg IV, minimally post-ictal   - was on Keppra at home (was supposed to be 1500mg BID, patient has been taking 750mg BID), this was not ordered on admission, re-started today  - CT head, repeat BMP and LA pending   - consult neurology as have followed in the past, though likely pseudoseizure vs. breakthrough seizure secondary to missing Keppra     Hyponatremia   - Na trended down 129-->125 despite IV fluids  - is on trileptal  - check urine Na/osm, UA  - 1800cc free water restriction   - consult nephrology     UTI   - UA with small leuks, 9 WBCs  - complaining of dysuria  - afebrile without leukocytosis   - start Macrobid   - UCx pending      Epigastric abdominal pain   - LFTs, lipase WNL   - CT A/P with no acute process   -?  Anxiety related   - symptomatic management      Chest pain  - Status-post cardiac cath in 2017 with angiographically normal epicardial coronary arteries.  Inconclusive myocardial stress test on 7/10/2021  - troponin negative    - CTA chest with no acute process   - Continue home ASA, Beta blocker, ARB, calcium channel blocker, and statin  - Reviewed 2D Echo from 7/10/2021 shows LVSF normal with EF 50 to 55% with mild LV hypertrophy and G1DD  - pain resolved   - normal cardiolite stress test, cardiology signed off     ?Vaginal bleeding vs hemorrhoids  -Patient reported small amount amounts of bleeding on pad at home  -Has been through menopause  -Reports history of hemorrhoids with bleeding  -Hemoglobin has remained relatively stable  -Continue to monitor for signs of bleeding, H&H  -Strongly encourage follow-up with patient's OB/GYN     Insulin-dependent type 2 diabetes  -Continue home basal with sliding scale  -Monitor POCT glucose  -Hypoglycemic cough      Anxiety  -Continue home diazepam, BuSpar  -As needed hydroxyzine     Mild acute asthma exacerbation  -Reported wheezing and dry cough for months; appears improved this AM  -CTA pulmonary showed bronchiolitis versus reactive airway disease  -Continue azithromycin  -Continue Xopenex  -Unable to steroids     Hypertension   -Continue nifedipine, Cozaar, metoprolol  -Elevated in the setting of anxiety  -As needed labetalol ordered     Bipolar 1 disorder  -Continue Elavil, Seroquel    Admit to inpatient and transfer when bed available.      This patient was seen and examined autonomously  A hospitalist attending physician was available for questions/consultation as needed. Diet ADULT DIET; Regular; 1800 ml   DVT Prophylaxis [x] Lovenox, []  Heparin, [] SCDs, [] Ambulation   GI Prophylaxis [] PPI,  [] H2 Blocker,  [] Carafate,  [x] Diet/Tube Feeds   Code Status Full Code   Disposition Patient requires continued admission due to seizure-like activity          History of Present Illness:     Chief Complaint: Chest pain  Gloria Lockhart is a 61 y.o.  female  who presents with chest pain. Patient seen and examined at bedside. This morning, she was complaining of epigastric abdominal pain. She has had a little bit of nausea, but no vomiting has been tolerating a diet. This afternoon, the patient was complaining of anxiety. She was examined at bedside when she developed tonic-clonic seizure-like activity. This lasted about 1 minute. When the seizure occurred to be stopped, the patient was very minimally postictal.  She was able to have a conversation. She has no focal deficits.   He states at home, she was was to be on Keppra 1500 mg twice a day, however this was recently reduced to 750 mg twice a day which is what she has been taking at home. This was not ordered on admission. After receiving Ativan, the patient is more calm. Ten point ROS reviewed negative, unless as noted above    Objective: Intake/Output Summary (Last 24 hours) at 9/30/2021 1315  Last data filed at 9/30/2021 6185  Gross per 24 hour   Intake 437.95 ml   Output --   Net 437.95 ml      Vitals:   Vitals:    09/30/21 1305   BP: (!) 173/102   Pulse: 100   Resp: 18   Temp:    SpO2: 97%     Physical Exam:     GEN Awake female, sitting upright in bed in no apparent distress. Appears given age. EYES Pupils are equally round. No scleral erythema, discharge, or conjunctivitis. HENT Mucous membranes are moist.  NECK Supple, no apparent thyromegaly or masses. RESP Clear to auscultation, no wheezes, rales or rhonchi. Respirations even and unlabored on RA. CARDIO/VASC   S1/S2 auscultated. Regular rate without appreciable murmurs, rubs, or gallops. Peripheral pulses equal bilaterally and palpable. No peripheral edema. GI mild epigastric tenderness, no masses, or guarding. Bowel sounds are normoactive.  No costovertebral angle tenderness. Mercer catheter is not present. MSK No gross joint deformities. SKIN Normal coloration, warm, dry. NEURO Cranial nerves appear grossly intact, normal speech, no lateralizing weakness. PSYCH Awake, alert, oriented x 4. Affect appropriate.     Medications:   Medications:    insulin glargine  40 Units SubCUTAneous Nightly    hydrALAZINE  10 mg IntraVENous Once    levETIRAcetam  750 mg Oral BID    insulin lispro  0-12 Units SubCUTAneous TID WC    urea  15 g Oral Q6H    sodium chloride  1 g Oral TID WC    amitriptyline  25 mg Oral Nightly    aspirin  81 mg Oral Daily    baclofen  10 mg Oral BID    busPIRone  20 mg Oral TID    carbidopa-levodopa  1 tablet Oral Nightly    levothyroxine  75 mcg Oral QAM AC    losartan  100

## 2021-10-01 ENCOUNTER — APPOINTMENT (OUTPATIENT)
Dept: ULTRASOUND IMAGING | Age: 66
DRG: 101 | End: 2021-10-01
Payer: MEDICARE

## 2021-10-01 VITALS
HEART RATE: 64 BPM | BODY MASS INDEX: 32.39 KG/M2 | TEMPERATURE: 97.5 F | OXYGEN SATURATION: 96 % | RESPIRATION RATE: 16 BRPM | SYSTOLIC BLOOD PRESSURE: 145 MMHG | DIASTOLIC BLOOD PRESSURE: 79 MMHG | WEIGHT: 176 LBS | HEIGHT: 62 IN

## 2021-10-01 LAB
ALBUMIN SERPL-MCNC: 4.1 GM/DL (ref 3.4–5)
ALP BLD-CCNC: 74 IU/L (ref 40–129)
ALT SERPL-CCNC: 25 U/L (ref 10–40)
ANION GAP SERPL CALCULATED.3IONS-SCNC: 11 MMOL/L (ref 4–16)
AST SERPL-CCNC: 28 IU/L (ref 15–37)
BILIRUB SERPL-MCNC: 0.2 MG/DL (ref 0–1)
BILIRUBIN DIRECT: 0.2 MG/DL (ref 0–0.3)
BILIRUBIN, INDIRECT: 0 MG/DL (ref 0–0.7)
BUN BLDV-MCNC: 37 MG/DL (ref 6–23)
CALCIUM SERPL-MCNC: 9.9 MG/DL (ref 8.3–10.6)
CHLORIDE BLD-SCNC: 100 MMOL/L (ref 99–110)
CO2: 25 MMOL/L (ref 21–32)
CREAT SERPL-MCNC: 0.7 MG/DL (ref 0.6–1.1)
GFR AFRICAN AMERICAN: >60 ML/MIN/1.73M2
GFR NON-AFRICAN AMERICAN: >60 ML/MIN/1.73M2
GLUCOSE BLD-MCNC: 143 MG/DL (ref 70–99)
GLUCOSE BLD-MCNC: 162 MG/DL (ref 70–99)
GLUCOSE BLD-MCNC: 198 MG/DL (ref 70–99)
LIPASE: 15 IU/L (ref 13–60)
POTASSIUM SERPL-SCNC: 4.3 MMOL/L (ref 3.5–5.1)
SODIUM BLD-SCNC: 136 MMOL/L (ref 135–145)
TOTAL PROTEIN: 6.7 GM/DL (ref 6.4–8.2)

## 2021-10-01 PROCEDURE — 99223 1ST HOSP IP/OBS HIGH 75: CPT | Performed by: PSYCHIATRY & NEUROLOGY

## 2021-10-01 PROCEDURE — 93970 EXTREMITY STUDY: CPT

## 2021-10-01 PROCEDURE — 6370000000 HC RX 637 (ALT 250 FOR IP): Performed by: INTERNAL MEDICINE

## 2021-10-01 PROCEDURE — 6370000000 HC RX 637 (ALT 250 FOR IP): Performed by: PHYSICIAN ASSISTANT

## 2021-10-01 PROCEDURE — 82962 GLUCOSE BLOOD TEST: CPT

## 2021-10-01 PROCEDURE — 83690 ASSAY OF LIPASE: CPT

## 2021-10-01 PROCEDURE — 6370000000 HC RX 637 (ALT 250 FOR IP): Performed by: FAMILY MEDICINE

## 2021-10-01 PROCEDURE — 80053 COMPREHEN METABOLIC PANEL: CPT

## 2021-10-01 PROCEDURE — 87086 URINE CULTURE/COLONY COUNT: CPT

## 2021-10-01 PROCEDURE — 6360000002 HC RX W HCPCS: Performed by: STUDENT IN AN ORGANIZED HEALTH CARE EDUCATION/TRAINING PROGRAM

## 2021-10-01 PROCEDURE — 2580000003 HC RX 258: Performed by: STUDENT IN AN ORGANIZED HEALTH CARE EDUCATION/TRAINING PROGRAM

## 2021-10-01 PROCEDURE — 82248 BILIRUBIN DIRECT: CPT

## 2021-10-01 PROCEDURE — 99232 SBSQ HOSP IP/OBS MODERATE 35: CPT | Performed by: INTERNAL MEDICINE

## 2021-10-01 PROCEDURE — 6370000000 HC RX 637 (ALT 250 FOR IP): Performed by: STUDENT IN AN ORGANIZED HEALTH CARE EDUCATION/TRAINING PROGRAM

## 2021-10-01 RX ORDER — SODIUM CHLORIDE 1000 MG
1 TABLET, SOLUBLE MISCELLANEOUS 2 TIMES DAILY WITH MEALS
Qty: 90 TABLET | Refills: 3 | Status: SHIPPED | OUTPATIENT
Start: 2021-10-01 | End: 2022-05-30

## 2021-10-01 RX ORDER — NITROFURANTOIN 25; 75 MG/1; MG/1
100 CAPSULE ORAL EVERY 12 HOURS SCHEDULED
Qty: 8 CAPSULE | Refills: 0 | Status: SHIPPED | OUTPATIENT
Start: 2021-10-01 | End: 2021-10-05

## 2021-10-01 RX ORDER — SODIUM CHLORIDE 1000 MG
1 TABLET, SOLUBLE MISCELLANEOUS 2 TIMES DAILY WITH MEALS
Status: DISCONTINUED | OUTPATIENT
Start: 2021-10-01 | End: 2021-10-01 | Stop reason: HOSPADM

## 2021-10-01 RX ORDER — LEVETIRACETAM 750 MG/1
750 TABLET ORAL 2 TIMES DAILY
Qty: 60 TABLET | Refills: 0
Start: 2021-10-01 | End: 2022-11-01

## 2021-10-01 RX ADMIN — OXCARBAZEPINE 450 MG: 300 TABLET, FILM COATED ORAL at 10:26

## 2021-10-01 RX ADMIN — DIAZEPAM 5 MG: 5 TABLET ORAL at 10:26

## 2021-10-01 RX ADMIN — LEVETIRACETAM 750 MG: 500 TABLET, FILM COATED ORAL at 10:26

## 2021-10-01 RX ADMIN — LEVOTHYROXINE SODIUM 75 MCG: 0.07 TABLET ORAL at 10:25

## 2021-10-01 RX ADMIN — HYDROCODONE BITARTRATE AND ACETAMINOPHEN 1 TABLET: 10; 325 TABLET ORAL at 10:25

## 2021-10-01 RX ADMIN — NITROFURANTOIN (MONOHYDRATE/MACROCRYSTALS) 100 MG: 75; 25 CAPSULE ORAL at 10:27

## 2021-10-01 RX ADMIN — Medication 1 G: at 16:49

## 2021-10-01 RX ADMIN — SODIUM CHLORIDE, PRESERVATIVE FREE 10 ML: 5 INJECTION INTRAVENOUS at 10:34

## 2021-10-01 RX ADMIN — Medication 1 G: at 10:26

## 2021-10-01 RX ADMIN — Medication 15 G: at 01:32

## 2021-10-01 RX ADMIN — HYDROCODONE BITARTRATE AND ACETAMINOPHEN 1 TABLET: 10; 325 TABLET ORAL at 16:48

## 2021-10-01 RX ADMIN — ENOXAPARIN SODIUM 40 MG: 40 INJECTION SUBCUTANEOUS at 10:28

## 2021-10-01 RX ADMIN — BUSPIRONE HYDROCHLORIDE 20 MG: 10 TABLET ORAL at 16:49

## 2021-10-01 RX ADMIN — METOPROLOL SUCCINATE 50 MG: 50 TABLET, EXTENDED RELEASE ORAL at 10:27

## 2021-10-01 RX ADMIN — ASPIRIN 81 MG: 81 TABLET, CHEWABLE ORAL at 10:27

## 2021-10-01 RX ADMIN — ONDANSETRON 4 MG: 4 TABLET, ORALLY DISINTEGRATING ORAL at 10:12

## 2021-10-01 RX ADMIN — HYDROXYZINE PAMOATE 25 MG: 25 CAPSULE ORAL at 16:48

## 2021-10-01 RX ADMIN — BACLOFEN 10 MG: 10 TABLET ORAL at 10:25

## 2021-10-01 RX ADMIN — MAGNESIUM OXIDE 400 MG (241.3 MG MAGNESIUM) TABLET 400 MG: TABLET at 16:48

## 2021-10-01 RX ADMIN — BUSPIRONE HYDROCHLORIDE 20 MG: 10 TABLET ORAL at 10:25

## 2021-10-01 RX ADMIN — MAGNESIUM OXIDE 400 MG (241.3 MG MAGNESIUM) TABLET 400 MG: TABLET at 10:25

## 2021-10-01 RX ADMIN — LOSARTAN POTASSIUM 100 MG: 100 TABLET, FILM COATED ORAL at 10:27

## 2021-10-01 RX ADMIN — NIFEDIPINE 60 MG: 30 TABLET, FILM COATED, EXTENDED RELEASE ORAL at 10:26

## 2021-10-01 RX ADMIN — PANTOPRAZOLE SODIUM 40 MG: 40 TABLET, DELAYED RELEASE ORAL at 10:27

## 2021-10-01 RX ADMIN — QUETIAPINE FUMARATE 25 MG: 25 TABLET ORAL at 10:27

## 2021-10-01 ASSESSMENT — PAIN DESCRIPTION - ORIENTATION: ORIENTATION: LEFT

## 2021-10-01 ASSESSMENT — PAIN DESCRIPTION - FREQUENCY: FREQUENCY: CONTINUOUS

## 2021-10-01 ASSESSMENT — PAIN DESCRIPTION - LOCATION
LOCATION: GENERALIZED
LOCATION: ABDOMEN

## 2021-10-01 ASSESSMENT — PAIN DESCRIPTION - PAIN TYPE
TYPE: ACUTE PAIN
TYPE: CHRONIC PAIN

## 2021-10-01 ASSESSMENT — PAIN SCALES - GENERAL
PAINLEVEL_OUTOF10: 10
PAINLEVEL_OUTOF10: 3
PAINLEVEL_OUTOF10: 10

## 2021-10-01 ASSESSMENT — PAIN DESCRIPTION - DESCRIPTORS: DESCRIPTORS: BURNING

## 2021-10-01 NOTE — PROGRESS NOTES
Nephrology Progress Note        [address], [address] [address]  Phone: [phone]  Office Hours: 8:30AM - 4:30PM  Monday - Friday        10/1/2021 7:57 AM  Subjective:   Admit Date: 9/27/2021  PCP: Brooke Chapman MD  Interval History: resting comfortably  BP at goal    Diet: ADULT DIET;  Regular; 1800 ml      Data:   Scheduled Meds:   sodium chloride  1 g Oral BID WC    insulin glargine  40 Units SubCUTAneous Nightly    hydrALAZINE  10 mg IntraVENous Once    levETIRAcetam  750 mg Oral BID    nitrofurantoin (macrocrystal-monohydrate)  100 mg Oral 2 times per day    insulin lispro  0-12 Units SubCUTAneous TID WC    amitriptyline  25 mg Oral Nightly    aspirin  81 mg Oral Daily    baclofen  10 mg Oral BID    busPIRone  20 mg Oral TID    carbidopa-levodopa  1 tablet Oral Nightly    levothyroxine  75 mcg Oral QAM AC    losartan  100 mg Oral Daily    magnesium oxide  400 mg Oral TID    metoprolol succinate  50 mg Oral Daily    montelukast  10 mg Oral Nightly    NIFEdipine  60 mg Oral Daily    OXcarbazepine  450 mg Oral BID    pantoprazole  40 mg Oral Daily    QUEtiapine  25 mg Oral Daily    QUEtiapine  50 mg Oral Nightly    atorvastatin  40 mg Oral Daily    sodium chloride flush  5-40 mL IntraVENous 2 times per day    enoxaparin  40 mg SubCUTAneous Daily    budesonide-formoterol  2 puff Inhalation BID    tiotropium  2 puff Inhalation Daily    insulin lispro  0-6 Units SubCUTAneous 2 times per day    nitroGLYCERIN  0.4 mg SubLINGual Once     Continuous Infusions:   dextrose       PRN Meds:ondansetron, LORazepam, labetalol (TRANDATE) IVPB, albuterol sulfate HFA, aluminum & magnesium hydroxide-simethicone, diazePAM, HYDROcodone-acetaminophen, hydrOXYzine, guaiFENesin, glucose, dextrose, glucagon (rDNA), dextrose, sodium chloride flush, acetaminophen **OR** acetaminophen, polyethylene glycol, promethazine, nitroGLYCERIN, levalbuterol  No intake/output data recorded. No intake/output data recorded.   No intake or output data in the 24 hours ending 10/01/21 0757    CBC:   Recent Labs     09/29/21  1111 09/30/21  0700   WBC 7.5 6.8   HGB 12.0* 13.4    256       BMP:    Recent Labs     09/29/21  1111 09/30/21  0700 09/30/21  1721   * 132* 132*   K 4.2 4.4 4.7   CL 92* 96* 95*   CO2 23 23 23   BUN 12 35* 33*   CREATININE 0.6 0.4* 0.5*   GLUCOSE 179* 166* 190*     Hepatic:   Recent Labs     09/30/21  0700   AST 32   ALT 27   BILITOT 0.2   ALKPHOS 77         Objective:   Vitals: /77   Pulse 68   Temp 98.7 °F (37.1 °C) (Oral)   Resp 20   Ht 5' 2\" (1.575 m)   Wt 176 lb (79.8 kg)   SpO2 92%   BMI 32.19 kg/m²   General appearance: alert and cooperative with exam, in no acute distress  HEENT: normocephalic, atraumatic,   Neck: supple, trachea midline  Lungs:  breathing comfortably on room air  Heart[de-identified] rrr  Abdomen: soft, non-tender; non distended, +bowel sounds        Assessment and Plan:     -Hyponatremia:acute on chronic  --Antipsychotic meds contributing to the chronicity but can not be stopped as it is  needed for mental health  --Sodium 125 from 131 s/p IVF, initially but it is now 132 with urea and salt tabs  --stop urea today  --Continue salt tab at 1g bid upon dc too  --Oral fluid restriction 1800ml/day     -Chest pain  -Abdominal pain; CT A/P unremarkable  -DM2  -Bipolar dz     Signing off today    Thank you                  Electronically signed by Jocelyne Isabel DO on 10/1/2021 at 7:57 AM    [facility]  Eustace Spurling, MD Shanna Ovens, DO [address],  [facility]  PHONE: [phone]  FAX: [phone]

## 2021-10-01 NOTE — DISCHARGE SUMMARY
vs hemorrhoids  -Patient reported small amount amounts of bleeding on pad at home  -Has been through menopause  -Reports history of hemorrhoids with bleeding  - Hgb remained stable  -Strongly encourage follow-up with patient's OB/GYN and GI     Insulin-dependent type 2 diabetes  -Continue home basal with novolog and SSI   - follow-up with endocrinology      Anxiety  -Continue home diazepam, BuSpar     Mild acute asthma exacerbation  -Reported wheezing and dry cough for months; appears improved this AM  -CTA pulmonary showed bronchiolitis versus reactive airway disease  - finished course of azithromycin with improvement     Hypertension   -Continue nifedipine, Cozaar, metoprolol     Bipolar 1 disorder  -Continue Elavil, Seroquel        This patient was seen and examined autonomously  A hospitalist attending physician was available for questions/consultation as needed. The patient expressed appropriate understanding of and agreement with the discharge recommendations, medications, and plan. Consults this admission:  IP CONSULT TO HOSPITALIST  IP CONSULT TO CARDIOLOGY  IP CONSULT TO NEPHROLOGY  IP CONSULT TO NEUROLOGY    Discharge Instruction:   Follow up appointments: PCP, neurology, cardiology, nephrology  Primary care physician:  within 2 weeks    Diet:  diabetic diet   Activity: activity as tolerated  Disposition: Discharged to:   [x]Home, []C, []SNF, []Acute Rehab, []Hospice   Condition on discharge: Stable     Discharge Medications:      Belita Cancel   Home Medication Instructions GALILEO:[id number]    Printed on:10/01/21 9016   Medication Information                      albuterol sulfate  (90 Base) MCG/ACT inhaler  Inhale 2 puffs into the lungs every 6 hours as needed for Wheezing or Shortness of Breath (or cough) Please include spacer with instructions for use.              aluminum & magnesium hydroxide-simethicone (MAALOX MAX) 400-400-40 MG/5ML SUSP  Take 15 mLs by mouth every 6 hours as needed (heart burn)             amitriptyline (ELAVIL) 25 MG tablet  Take 1 tablet by mouth nightly             aspirin 81 MG tablet  Take 81 mg by mouth daily             baclofen (LIORESAL) 10 MG tablet  1 tablet 2 times daily             busPIRone (BUSPAR) 10 MG tablet  Take 2 tablets by mouth 3 times daily             carbidopa-levodopa (SINEMET)  MG per tablet  Take 1 tablet by mouth nightly             diazePAM (VALIUM) 5 MG tablet  as needed. docusate sodium (COLACE) 100 MG capsule  Take 1 capsule by mouth 2 times daily             fluticasone-umeclidin-vilant (TRELEGY ELLIPTA) 100-62.5-25 MCG/INH AEPB  Inhale 1 puff into the lungs daily             HYDROcodone-acetaminophen (NORCO)  MG per tablet  Take 1 tablet by mouth every 6 hours as needed. hydrOXYzine (VISTARIL) 25 MG capsule  Take 25 mg by mouth 3 times daily as needed              levETIRAcetam (KEPPRA) 750 MG tablet  Take 2 tablets by mouth 2 times daily             levothyroxine (SYNTHROID) 75 MCG tablet  Take 1 tablet by mouth every morning (before breakfast)             losartan (COZAAR) 100 MG tablet  Take 1 tablet by mouth daily             magnesium oxide (MAG-OX) 400 MG tablet  Take 1 tablet by mouth 3 times daily             metoprolol succinate (TOPROL XL) 50 MG extended release tablet  Take 1 tablet by mouth daily             montelukast (SINGULAIR) 10 MG tablet  Take 10 mg by mouth nightly. MUCINEX 600 MG extended release tablet  2 times daily as needed             Multiple Vitamins-Iron (MULTI-VITAMIN/IRON PO)  Take  by mouth.              NIFEdipine (PROCARDIA XL) 60 MG extended release tablet  Take 1 tablet by mouth daily             NOVOLOG FLEXPEN 100 UNIT/ML injection pen  Inject into the skin See Admin Instructions 12/01/2020 patient states she follows sliding scale regimen BS > 150             OXcarbazepine (TRILEPTAL) 300 MG tablet  Take 1 tablet by mouth 2 times daily pantoprazole (PROTONIX) 40 MG tablet  Take 1 tablet by mouth daily             polyethylene glycol (GLYCOLAX) packet  Take 17 g by mouth daily as needed for Constipation             QUEtiapine (SEROQUEL) 25 MG tablet  Take 1 tablet by mouth 2 times daily             simvastatin (ZOCOR) 20 MG tablet  Take 1 tablet by mouth nightly             TRESIBA FLEXTOUCH 200 UNIT/ML SOPN  Inject 20 Units into the skin every morning                 Objective Findings at Discharge:   BP (!) 163/85   Pulse 90   Temp 98.7 °F (37.1 °C) (Oral)   Resp 16   Ht 5' 2\" (1.575 m)   Wt 176 lb (79.8 kg)   SpO2 99%   BMI 32.19 kg/m²            PHYSICAL EXAM   GEN Awake female, sitting upright in bed in no apparent distress. Appears given age. EYES Pupils are equally round. No scleral erythema, discharge, or conjunctivitis. HENT Mucous membranes are moist.   NECK Supple, no apparent thyromegaly or masses. RESP Clear to auscultation, no wheezes, rales or rhonchi. Symmetric chest movement while on room air. CARDIO/VASC S1/S2 auscultated. Regular rate without appreciable murmurs, rubs, or gallops. Peripheral pulses equal bilaterally and palpable. No peripheral edema. GI Abdomen is soft without significant tenderness, masses, or guarding. Bowel sounds are normoactive.  No costovertebral angle tenderness. Mercer catheter is not present. HEME/LYMPH No petechiae or ecchymoses. MSK No gross joint deformities. SKIN Normal coloration, warm, dry. NEURO Cranial nerves appear grossly intact, normal speech, no lateralizing weakness. PSYCH Awake, alert, oriented x 4. Affect appropriate.     BMP/CBC  Recent Labs     09/29/21  1111 09/29/21  1111 09/30/21  0700 09/30/21  1721 10/01/21  0730   *   < > 132* 132* 136   K 4.2   < > 4.4 4.7 4.3   CL 92*   < > 96* 95* 100   CO2 23   < > 23 23 25   BUN 12   < > 35* 33* 37*   CREATININE 0.6   < > 0.4* 0.5* 0.7   WBC 7.5  --  6.8  --   --    HCT 35.8*  --  39.9  --   --      --  256
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
## 2021-10-01 NOTE — PROGRESS NOTES
Progress Note( Dr. Janette Weeks)  10/1/2021  Subjective:   Admit Date: 9/27/2021  PCP: Jorge Cervantes MD    Admitted For : Chest pain    Consulted For: Better control blood glucose    Interval History: Had negative cardiac stress test supposed to be discharged but she developed shortness of breath possibly asthmatic attack also noted hyponatremia   CT of the abdomen reviewed  has fatty liver other acute findings    Denies any chest pains,   Yes SOB . Denies nausea or vomiting. Has abdominal pain  No new bowel or bladder symptoms. No intake or output data in the 24 hours ending 10/01/21 0701    DATA    CBC:   Recent Labs     09/29/21  1111 09/30/21  0700   WBC 7.5 6.8   HGB 12.0* 13.4    256    CMP:  Recent Labs     09/29/21  1111 09/30/21  0700 09/30/21  1721   * 132* 132*   K 4.2 4.4 4.7   CL 92* 96* 95*   CO2 23 23 23   BUN 12 35* 33*   CREATININE 0.6 0.4* 0.5*   CALCIUM 8.9 10.5 10.3   PROT  --  7.1  --    LABALBU  --  4.5  --    BILITOT  --  0.2  --    ALKPHOS  --  77  --    AST  --  32  --    ALT  --  27  --      Lipids:   Lab Results   Component Value Date    CHOL 99 04/27/2021    HDL 42 04/27/2021    TRIG 116 04/27/2021     Glucose:  Recent Labs     09/30/21  1614 09/30/21  2020 10/01/21  0632   POCGLU 196* 264* 162*     [id number]:  Lab Results   Component Value Date    LABA1C 8.1 07/11/2021     High Sensitivity TSH:   Lab Results   Component Value Date    TSHHS 2.000 09/30/2021     Free T3:   Lab Results   Component Value Date    FT3 2.2 07/15/2016     Free T4:  Lab Results   Component Value Date    T4FREE 1.36 07/13/2021       XR CHEST PORTABLE    Result Date: 9/27/2021  EXAMINATION: ONE XRAY VIEW OF THE CHEST 9/27/2021 10:12 pm   No acute cardiopulmonary process identified. CTA PULMONARY W CONTRAST    Result Date: 9/28/2021  EXAMINATION: CTA OF THE CHEST 9/28/2021 12:30 am T      No evidence of pulmonary embolism. There is a mosaic perfusion pattern within the lungs.   The findings Daily    magnesium oxide  400 mg Oral TID    metoprolol succinate  50 mg Oral Daily    montelukast  10 mg Oral Nightly    NIFEdipine  60 mg Oral Daily    OXcarbazepine  450 mg Oral BID    pantoprazole  40 mg Oral Daily    QUEtiapine  25 mg Oral Daily    QUEtiapine  50 mg Oral Nightly    atorvastatin  40 mg Oral Daily    sodium chloride flush  5-40 mL IntraVENous 2 times per day    enoxaparin  40 mg SubCUTAneous Daily    budesonide-formoterol  2 puff Inhalation BID    tiotropium  2 puff Inhalation Daily    insulin lispro  0-6 Units SubCUTAneous 2 times per day    nitroGLYCERIN  0.4 mg SubLINGual Once      Infusions:    dextrose           Objective:   Vitals: /64   Pulse 66   Temp 97.5 °F (36.4 °C) (Oral)   Resp 21   Ht 5' 2\" (1.575 m)   Wt 176 lb (79.8 kg)   SpO2 94%   BMI 32.19 kg/m²   General appearance: alert and cooperative with exam  Neck: no JVD or bruit  Thyroid : Normal lobes   Lungs: Has Vesicular Breath sounds occasional wheezing  Heart:  regular rate and rhythm  Abdomen: soft, non-tender; bowel sounds normal; no masses,  no organomegaly  Musculoskeletal: Normal  Extremities: extremities normal, , no edema  Neurologic:  Awake, alert, oriented to name, place and time. Cranial nerves II-XII are grossly intact. Motor is  intact.   Sensory i neuropathy t.,  and gait is normal.    Assessment:     Patient Active Problem List:     Chest pain     H/O Doppler ultrasound     H/O cardiovascular stress test     H/O cardiovascular stress test     H/O cardiovascular stress test     Incarcerated umbilical hernia     Essential hypertension     Type 2 diabetes mellitus with diabetic polyneuropathy, with long-term current use of insulin ([facility])     Tachycardia     Dyslipidemia     Family history of early CAD     NSTEMI (non-ST elevated myocardial infarction) ([facility]     Abnormal nuclear stress test     ILSA (obstructive sleep apnea)     Snoring     Hypersomnolence     Mild intermittent asthma without complication     Asthma exacerbation attacks     Hypertensive emergency     Hallucination, visual     Severe episode of recurrent major depressive disorder, with psychotic features (HCC)     CHEKO (generalized anxiety disorder)     Auditory hallucinations     Bipolar 1 disorder, depressed, severe (HCC)     Dyspnea and respiratory abnormalities     Encephalopathy     Syncope     Bipolar 1 disorder (HCC)     Hyponatremia     Pseudoseizures     Panic attacks      Plan:     1. Reviewed POC blood glucose . Labs and X ray results   2. Reviewed Current Medicines   3. On Correction bolus Humalog/ Basal Lantus Insulin regime   4. Monitor Blood glucose frequently   5. Modified  the dose of Insulin/ other medicines as needed  6. And to see nephrology for the hyponatremia  7. Patient Will follow     .      Harpal Ramirez MD, MD

## 2021-10-01 NOTE — PROGRESS NOTES
Progress Note( Dr. Montemayor Senior)  9/30/2021  Subjective:   Admit Date: 9/27/2021  PCP: Cooper Alvarez MD    Admitted For : Chest pain    Consulted For: Better control blood glucose    Interval History: Had negative cardiac stress test supposed to be discharged but she developed shortness of breath possibly asthmatic attack also noted hyponatremia  Patient is going to have CT of the abdomen complaining of abdominal pain    Denies any chest pains,   Yes SOB . Denies nausea or vomiting. Has abdominal pain  No new bowel or bladder symptoms. Intake/Output Summary (Last 24 hours) at 9/30/2021 2237  Last data filed at 9/30/2021 0246  Gross per 24 hour   Intake 437.95 ml   Output --   Net 437.95 ml       DATA    CBC:   Recent Labs     09/28/21  0501 09/29/21  1111 09/30/21  0700   WBC 15.9* 7.5 6.8   HGB 13.3 12.0* 13.4    214 256    CMP:  Recent Labs     09/29/21  1111 09/30/21  0700 09/30/21  1721   * 132* 132*   K 4.2 4.4 4.7   CL 92* 96* 95*   CO2 23 23 23   BUN 12 35* 33*   CREATININE 0.6 0.4* 0.5*   CALCIUM 8.9 10.5 10.3   PROT  --  7.1  --    LABALBU  --  4.5  --    BILITOT  --  0.2  --    ALKPHOS  --  77  --    AST  --  32  --    ALT  --  27  --      Lipids:   Lab Results   Component Value Date    CHOL 99 04/27/2021    HDL 42 04/27/2021    TRIG 116 04/27/2021     Glucose:  Recent Labs     09/30/21  1204 09/30/21  1614 09/30/21 2020   POCGLU 221* 196* 264*     [id number]:  Lab Results   Component Value Date    LABA1C 8.1 07/11/2021     High Sensitivity TSH:   Lab Results   Component Value Date    TSHHS 2.000 09/30/2021     Free T3:   Lab Results   Component Value Date    FT3 2.2 07/15/2016     Free T4:  Lab Results   Component Value Date    T4FREE 1.36 07/13/2021       XR CHEST PORTABLE    Result Date: 9/27/2021  EXAMINATION: ONE XRAY VIEW OF THE CHEST 9/27/2021 10:12 pm   No acute cardiopulmonary process identified.      CTA PULMONARY W CONTRAST    Result Date: 9/28/2021  EXAMINATION: CTA OF THE CHEST 9/28/2021 12:30 am T      No evidence of pulmonary embolism. There is a mosaic perfusion pattern within the lungs. The findings may be related to bronchiolitis or reactive airway disease with air trapping among other possibilities. NM MYOCARDIAL SPECT REST EXERCISE OR RX    Result Date: 9/28/2021  Cardiac Perfusion Imaging   Demographics   Patient Name      Hussein Lemus   Date of study        09/28/2021   Date of Birth     1955         Gender               Female   Age               61 year(s)         Race                    Patient Number    [id number]         Room Number          OBS10   Visit Number      [id number]          Height               62 inches   Corporate ID      [id number]           Weight               176 pounds   Accession Number  [id number]                                        NM Technologist      Rohan Kwok MD        Cardiologist         Rowena MEDEIROS   Conclusions   Summary  Normal tracer uptake in all segments of myocardium on stress ans rest  images. Normal Stress nuclear scintigraphic study suggestive of normal  myocardial perfusion. Gated images demonstrate normal left ventricular  systolic function with EF of 60 %.    Signatures   ------------------------------------------------------------------  Electronically signed by Antoinette Cruz MD  (Interpreting cardiologist) on 09/28/2021 at 17:03        Scheduled Medicines   Medications:    insulin glargine  40 Units SubCUTAneous Nightly    hydrALAZINE  10 mg IntraVENous Once    levETIRAcetam  750 mg Oral BID    nitrofurantoin (macrocrystal-monohydrate)  100 mg Oral 2 times per day    insulin lispro  0-12 Units SubCUTAneous TID WC    urea  15 g Oral Q6H    sodium chloride  1 g Oral TID WC    amitriptyline  25 mg Oral Nightly    aspirin  81 mg Oral Daily    baclofen  10 mg Oral BID    [address] [address]     Abnormal nuclear stress test     ILSA (obstructive sleep apnea)     Snoring     Hypersomnolence     Mild intermittent asthma without complication     Asthma exacerbation attacks     Hypertensive emergency     Hallucination, visual     Severe episode of recurrent major depressive disorder, with psychotic features (HCC)     CHEKO (generalized anxiety disorder)     Auditory hallucinations     Bipolar 1 disorder, depressed, severe (HCC)     Dyspnea and respiratory abnormalities     Encephalopathy     Syncope     Bipolar 1 disorder (HCC)     Hyponatremia     Pseudoseizures     Panic attacks      Plan:     1. Reviewed POC blood glucose . Labs and X ray results   2. Reviewed Current Medicines   3. On Correction bolus Humalog/ Basal Lantus Insulin regime   4. Monitor Blood glucose frequently   5. Modified  the dose of Insulin/ other medicines as needed  6. And to see nephrology for the hyponatremia  7. To have CT of abdomen later in the day  8. Patient Will follow     .      Alison Gautam MD, MD

## 2021-10-02 LAB
CULTURE: NORMAL
Lab: NORMAL
SPECIMEN: NORMAL

## 2021-10-12 ENCOUNTER — TELEPHONE (OUTPATIENT)
Dept: CARDIOLOGY CLINIC | Age: 66
End: 2021-10-12

## 2021-10-12 ENCOUNTER — APPOINTMENT (OUTPATIENT)
Dept: GENERAL RADIOLOGY | Age: 66
DRG: 880 | End: 2021-10-12
Payer: MEDICARE

## 2021-10-12 PROBLEM — E85.1: Status: ACTIVE | Noted: 2021-10-12

## 2021-10-12 PROBLEM — G43.009 MIGRAINE WITHOUT AURA, NOT REFRACTORY: Status: ACTIVE | Noted: 2021-10-12

## 2021-10-12 PROBLEM — R41.3 MEMORY LOSS: Status: ACTIVE | Noted: 2021-10-12

## 2021-10-12 PROBLEM — G63: Status: ACTIVE | Noted: 2021-10-12

## 2021-10-12 PROBLEM — E78.5 HYPERLIPIDEMIA: Status: ACTIVE | Noted: 2021-10-12

## 2021-10-12 PROBLEM — I25.10 CORONARY ATHEROSCLEROSIS: Status: ACTIVE | Noted: 2021-10-12

## 2021-10-12 PROBLEM — M51.369 DEGENERATION OF LUMBAR INTERVERTEBRAL DISC: Status: ACTIVE | Noted: 2021-10-12

## 2021-10-12 PROBLEM — G89.29 CHRONIC PAIN: Status: ACTIVE | Noted: 2021-10-12

## 2021-10-12 PROBLEM — K75.81 NONALCOHOLIC STEATOHEPATITIS (NASH): Status: ACTIVE | Noted: 2021-10-12

## 2021-10-12 PROBLEM — M51.36 DEGENERATION OF LUMBAR INTERVERTEBRAL DISC: Status: ACTIVE | Noted: 2021-10-12

## 2021-10-12 PROBLEM — K76.9 DISORDER OF LIVER: Status: ACTIVE | Noted: 2021-10-12

## 2021-10-12 PROBLEM — G62.9 NEUROPATHY: Status: ACTIVE | Noted: 2021-10-12

## 2021-10-12 PROBLEM — K58.9 IRRITABLE BOWEL SYNDROME: Status: ACTIVE | Noted: 2021-10-12

## 2021-10-12 PROBLEM — N60.19 FIBROCYSTIC BREAST: Status: ACTIVE | Noted: 2021-10-12

## 2021-10-12 PROBLEM — M19.90 OSTEOARTHROSIS: Status: ACTIVE | Noted: 2021-10-12

## 2021-10-12 PROBLEM — Z87.39 H/O DEGENERATIVE DISC DISEASE: Status: ACTIVE | Noted: 2021-10-12

## 2021-10-12 PROBLEM — E11.65 HYPERGLYCEMIA DUE TO TYPE 2 DIABETES MELLITUS (HCC): Status: ACTIVE | Noted: 2021-10-12

## 2021-10-12 PROBLEM — M10.9 GOUT: Status: ACTIVE | Noted: 2021-10-12

## 2021-10-12 PROBLEM — N28.9 RENAL IMPAIRMENT: Status: ACTIVE | Noted: 2021-10-12

## 2021-10-12 PROBLEM — D64.9 ANEMIA: Status: ACTIVE | Noted: 2021-10-12

## 2021-10-12 PROBLEM — E11.42 POLYNEUROPATHY DUE TO TYPE 2 DIABETES MELLITUS (HCC): Status: ACTIVE | Noted: 2021-10-12

## 2021-10-12 PROBLEM — M81.0 OSTEOPOROSIS: Status: ACTIVE | Noted: 2021-10-12

## 2021-10-12 PROBLEM — I50.9 CHF (CONGESTIVE HEART FAILURE) (HCC): Status: ACTIVE | Noted: 2021-10-12

## 2021-10-12 LAB
ALBUMIN SERPL-MCNC: 4.7 GM/DL (ref 3.4–5)
ALP BLD-CCNC: 91 IU/L (ref 40–129)
ALT SERPL-CCNC: 24 U/L (ref 10–40)
ANION GAP SERPL CALCULATED.3IONS-SCNC: 12 MMOL/L (ref 4–16)
AST SERPL-CCNC: 24 IU/L (ref 15–37)
BASOPHILS ABSOLUTE: 0.1 K/CU MM
BASOPHILS RELATIVE PERCENT: 0.8 % (ref 0–1)
BILIRUB SERPL-MCNC: 0.2 MG/DL (ref 0–1)
BUN BLDV-MCNC: 10 MG/DL (ref 6–23)
CALCIUM SERPL-MCNC: 9.4 MG/DL (ref 8.3–10.6)
CHLORIDE BLD-SCNC: 98 MMOL/L (ref 99–110)
CO2: 24 MMOL/L (ref 21–32)
CREAT SERPL-MCNC: 0.4 MG/DL (ref 0.6–1.1)
DIFFERENTIAL TYPE: ABNORMAL
EOSINOPHILS ABSOLUTE: 0.4 K/CU MM
EOSINOPHILS RELATIVE PERCENT: 3.6 % (ref 0–3)
GFR AFRICAN AMERICAN: >60 ML/MIN/1.73M2
GFR NON-AFRICAN AMERICAN: >60 ML/MIN/1.73M2
GLUCOSE BLD-MCNC: 70 MG/DL (ref 70–99)
GLUCOSE BLD-MCNC: 70 MG/DL (ref 70–99)
GLUCOSE BLD-MCNC: 78 MG/DL (ref 70–99)
HCT VFR BLD CALC: 40.8 % (ref 37–47)
HEMOGLOBIN: 13.8 GM/DL (ref 12.5–16)
IMMATURE NEUTROPHIL %: 0.3 % (ref 0–0.43)
LYMPHOCYTES ABSOLUTE: 4.5 K/CU MM
LYMPHOCYTES RELATIVE PERCENT: 41.5 % (ref 24–44)
MAGNESIUM: 1.8 MG/DL (ref 1.8–2.4)
MCH RBC QN AUTO: 29 PG (ref 27–31)
MCHC RBC AUTO-ENTMCNC: 33.8 % (ref 32–36)
MCV RBC AUTO: 85.7 FL (ref 78–100)
MONOCYTES ABSOLUTE: 1.2 K/CU MM
MONOCYTES RELATIVE PERCENT: 10.5 % (ref 0–4)
NUCLEATED RBC %: 0 %
PDW BLD-RTO: 11.9 % (ref 11.7–14.9)
PLATELET # BLD: 357 K/CU MM (ref 140–440)
PMV BLD AUTO: 9.2 FL (ref 7.5–11.1)
POTASSIUM SERPL-SCNC: 3.3 MMOL/L (ref 3.5–5.1)
RBC # BLD: 4.76 M/CU MM (ref 4.2–5.4)
SEGMENTED NEUTROPHILS ABSOLUTE COUNT: 4.7 K/CU MM
SEGMENTED NEUTROPHILS RELATIVE PERCENT: 43.3 % (ref 36–66)
SODIUM BLD-SCNC: 134 MMOL/L (ref 135–145)
TOTAL IMMATURE NEUTOROPHIL: 0.03 K/CU MM
TOTAL NUCLEATED RBC: 0 K/CU MM
TOTAL PROTEIN: 7.8 GM/DL (ref 6.4–8.2)
WBC # BLD: 10.9 K/CU MM (ref 4–10.5)

## 2021-10-12 PROCEDURE — 81001 URINALYSIS AUTO W/SCOPE: CPT

## 2021-10-12 PROCEDURE — 83036 HEMOGLOBIN GLYCOSYLATED A1C: CPT

## 2021-10-12 PROCEDURE — 87086 URINE CULTURE/COLONY COUNT: CPT

## 2021-10-12 PROCEDURE — 93005 ELECTROCARDIOGRAM TRACING: CPT | Performed by: EMERGENCY MEDICINE

## 2021-10-12 PROCEDURE — 99284 EMERGENCY DEPT VISIT MOD MDM: CPT

## 2021-10-12 PROCEDURE — 80053 COMPREHEN METABOLIC PANEL: CPT

## 2021-10-12 PROCEDURE — 84484 ASSAY OF TROPONIN QUANT: CPT

## 2021-10-12 PROCEDURE — 71045 X-RAY EXAM CHEST 1 VIEW: CPT

## 2021-10-12 PROCEDURE — 82962 GLUCOSE BLOOD TEST: CPT

## 2021-10-12 PROCEDURE — 83735 ASSAY OF MAGNESIUM: CPT

## 2021-10-12 PROCEDURE — 85025 COMPLETE CBC W/AUTO DIFF WBC: CPT

## 2021-10-12 ASSESSMENT — PAIN DESCRIPTION - PAIN TYPE: TYPE: ACUTE PAIN

## 2021-10-12 ASSESSMENT — PAIN SCALES - GENERAL: PAINLEVEL_OUTOF10: 8

## 2021-10-12 NOTE — TELEPHONE ENCOUNTER
Patient called stating that she is sob and wants to know if she should go back to the hospital, please call her back at ph #[phone].

## 2021-10-13 ENCOUNTER — HOSPITAL ENCOUNTER (INPATIENT)
Age: 66
LOS: 1 days | Discharge: HOME | DRG: 880 | End: 2021-10-14
Attending: EMERGENCY MEDICINE | Admitting: INTERNAL MEDICINE
Payer: MEDICARE

## 2021-10-13 DIAGNOSIS — N89.8 VAGINAL IRRITATION: ICD-10-CM

## 2021-10-13 DIAGNOSIS — R06.02 SHORTNESS OF BREATH: ICD-10-CM

## 2021-10-13 DIAGNOSIS — N30.00 ACUTE CYSTITIS WITHOUT HEMATURIA: ICD-10-CM

## 2021-10-13 DIAGNOSIS — R07.9 CHEST PAIN, UNSPECIFIED TYPE: Primary | ICD-10-CM

## 2021-10-13 LAB
BACTERIA: NEGATIVE /HPF
BILIRUBIN URINE: NEGATIVE MG/DL
BLOOD, URINE: ABNORMAL
CLARITY: CLEAR
COLOR: YELLOW
EKG ATRIAL RATE: 78 BPM
EKG DIAGNOSIS: NORMAL
EKG P AXIS: 83 DEGREES
EKG P-R INTERVAL: 170 MS
EKG Q-T INTERVAL: 388 MS
EKG QRS DURATION: 112 MS
EKG QTC CALCULATION (BAZETT): 442 MS
EKG R AXIS: -43 DEGREES
EKG T AXIS: 42 DEGREES
EKG VENTRICULAR RATE: 78 BPM
ESTIMATED AVERAGE GLUCOSE: 177 MG/DL
ESTIMATED AVERAGE GLUCOSE: 180 MG/DL
GLUCOSE BLD-MCNC: 209 MG/DL (ref 70–99)
GLUCOSE BLD-MCNC: 236 MG/DL (ref 70–99)
GLUCOSE BLD-MCNC: 239 MG/DL (ref 70–99)
GLUCOSE, URINE: 50 MG/DL
HBA1C MFR BLD: 7.8 % (ref 4.2–6.3)
HBA1C MFR BLD: 7.9 % (ref 4.2–6.3)
KETONES, URINE: NEGATIVE MG/DL
LEUKOCYTE ESTERASE, URINE: ABNORMAL
NITRITE URINE, QUANTITATIVE: NEGATIVE
PH, URINE: 7 (ref 5–8)
PROTEIN UA: NEGATIVE MG/DL
RBC URINE: 1 /HPF (ref 0–6)
SARS-COV-2, NAAT: NOT DETECTED
SOURCE: NORMAL
SPECIFIC GRAVITY UA: 1.01 (ref 1–1.03)
SQUAMOUS EPITHELIAL: 1 /HPF
TRANSITIONAL EPITHELIAL: <1 /HPF
TRICHOMONAS: ABNORMAL /HPF
TROPONIN T: <0.01 NG/ML
UROBILINOGEN, URINE: NEGATIVE MG/DL (ref 0.2–1)
WBC UA: 16 /HPF (ref 0–5)

## 2021-10-13 PROCEDURE — 2580000003 HC RX 258: Performed by: PHYSICIAN ASSISTANT

## 2021-10-13 PROCEDURE — 6370000000 HC RX 637 (ALT 250 FOR IP): Performed by: INTERNAL MEDICINE

## 2021-10-13 PROCEDURE — 6370000000 HC RX 637 (ALT 250 FOR IP): Performed by: PHYSICIAN ASSISTANT

## 2021-10-13 PROCEDURE — 93010 ELECTROCARDIOGRAM REPORT: CPT | Performed by: INTERNAL MEDICINE

## 2021-10-13 PROCEDURE — 82962 GLUCOSE BLOOD TEST: CPT

## 2021-10-13 PROCEDURE — 6360000002 HC RX W HCPCS: Performed by: PHYSICIAN ASSISTANT

## 2021-10-13 PROCEDURE — 1200000000 HC SEMI PRIVATE

## 2021-10-13 PROCEDURE — 2580000003 HC RX 258: Performed by: INTERNAL MEDICINE

## 2021-10-13 PROCEDURE — 6360000002 HC RX W HCPCS: Performed by: INTERNAL MEDICINE

## 2021-10-13 PROCEDURE — 99213 OFFICE O/P EST LOW 20 MIN: CPT | Performed by: INTERNAL MEDICINE

## 2021-10-13 PROCEDURE — 94640 AIRWAY INHALATION TREATMENT: CPT

## 2021-10-13 PROCEDURE — 87635 SARS-COV-2 COVID-19 AMP PRB: CPT

## 2021-10-13 PROCEDURE — 84484 ASSAY OF TROPONIN QUANT: CPT

## 2021-10-13 PROCEDURE — 83036 HEMOGLOBIN GLYCOSYLATED A1C: CPT

## 2021-10-13 RX ORDER — ATORVASTATIN CALCIUM 20 MG/1
20 TABLET, FILM COATED ORAL DAILY
Status: DISCONTINUED | OUTPATIENT
Start: 2021-10-13 | End: 2021-10-14 | Stop reason: HOSPADM

## 2021-10-13 RX ORDER — ASPIRIN 81 MG/1
324 TABLET, CHEWABLE ORAL ONCE
Status: COMPLETED | OUTPATIENT
Start: 2021-10-13 | End: 2021-10-13

## 2021-10-13 RX ORDER — NICOTINE POLACRILEX 4 MG
15 LOZENGE BUCCAL PRN
Status: DISCONTINUED | OUTPATIENT
Start: 2021-10-13 | End: 2021-10-14 | Stop reason: HOSPADM

## 2021-10-13 RX ORDER — DOCUSATE SODIUM 100 MG/1
100 CAPSULE, LIQUID FILLED ORAL 2 TIMES DAILY
Status: DISCONTINUED | OUTPATIENT
Start: 2021-10-13 | End: 2021-10-14 | Stop reason: HOSPADM

## 2021-10-13 RX ORDER — ONDANSETRON 2 MG/ML
4 INJECTION INTRAMUSCULAR; INTRAVENOUS EVERY 6 HOURS PRN
Status: DISCONTINUED | OUTPATIENT
Start: 2021-10-13 | End: 2021-10-14 | Stop reason: HOSPADM

## 2021-10-13 RX ORDER — LEVOTHYROXINE SODIUM 0.07 MG/1
75 TABLET ORAL
Status: DISCONTINUED | OUTPATIENT
Start: 2021-10-13 | End: 2021-10-14 | Stop reason: HOSPADM

## 2021-10-13 RX ORDER — DEXTROSE MONOHYDRATE 25 G/50ML
12.5 INJECTION, SOLUTION INTRAVENOUS PRN
Status: DISCONTINUED | OUTPATIENT
Start: 2021-10-13 | End: 2021-10-14 | Stop reason: HOSPADM

## 2021-10-13 RX ORDER — DIAZEPAM 2 MG/1
2 TABLET ORAL EVERY 12 HOURS PRN
Status: DISCONTINUED | OUTPATIENT
Start: 2021-10-13 | End: 2021-10-14

## 2021-10-13 RX ORDER — POTASSIUM CHLORIDE 7.45 MG/ML
10 INJECTION INTRAVENOUS PRN
Status: DISCONTINUED | OUTPATIENT
Start: 2021-10-13 | End: 2021-10-14 | Stop reason: HOSPADM

## 2021-10-13 RX ORDER — SODIUM CHLORIDE 9 MG/ML
25 INJECTION, SOLUTION INTRAVENOUS PRN
Status: DISCONTINUED | OUTPATIENT
Start: 2021-10-13 | End: 2021-10-14 | Stop reason: HOSPADM

## 2021-10-13 RX ORDER — POTASSIUM CHLORIDE 20 MEQ/1
40 TABLET, EXTENDED RELEASE ORAL PRN
Status: DISCONTINUED | OUTPATIENT
Start: 2021-10-13 | End: 2021-10-14 | Stop reason: HOSPADM

## 2021-10-13 RX ORDER — ONDANSETRON 4 MG/1
4 TABLET, ORALLY DISINTEGRATING ORAL EVERY 8 HOURS PRN
Status: DISCONTINUED | OUTPATIENT
Start: 2021-10-13 | End: 2021-10-14 | Stop reason: HOSPADM

## 2021-10-13 RX ORDER — SODIUM CHLORIDE 1000 MG
1 TABLET, SOLUBLE MISCELLANEOUS 2 TIMES DAILY WITH MEALS
Status: DISCONTINUED | OUTPATIENT
Start: 2021-10-13 | End: 2021-10-14 | Stop reason: HOSPADM

## 2021-10-13 RX ORDER — LEVETIRACETAM 250 MG/1
750 TABLET ORAL 2 TIMES DAILY
Status: DISCONTINUED | OUTPATIENT
Start: 2021-10-13 | End: 2021-10-14 | Stop reason: HOSPADM

## 2021-10-13 RX ORDER — AMITRIPTYLINE HYDROCHLORIDE 25 MG/1
25 TABLET, FILM COATED ORAL NIGHTLY
Status: DISCONTINUED | OUTPATIENT
Start: 2021-10-13 | End: 2021-10-14 | Stop reason: HOSPADM

## 2021-10-13 RX ORDER — SODIUM CHLORIDE 0.9 % (FLUSH) 0.9 %
5-40 SYRINGE (ML) INJECTION EVERY 12 HOURS SCHEDULED
Status: DISCONTINUED | OUTPATIENT
Start: 2021-10-13 | End: 2021-10-14 | Stop reason: HOSPADM

## 2021-10-13 RX ORDER — POLYETHYLENE GLYCOL 3350 17 G/17G
17 POWDER, FOR SOLUTION ORAL DAILY PRN
Status: DISCONTINUED | OUTPATIENT
Start: 2021-10-13 | End: 2021-10-13

## 2021-10-13 RX ORDER — MAGNESIUM HYDROXIDE/ALUMINUM HYDROXICE/SIMETHICONE 120; 1200; 1200 MG/30ML; MG/30ML; MG/30ML
15 SUSPENSION ORAL EVERY 6 HOURS PRN
Status: DISCONTINUED | OUTPATIENT
Start: 2021-10-13 | End: 2021-10-14 | Stop reason: HOSPADM

## 2021-10-13 RX ORDER — MAGNESIUM OXIDE 400 MG/1
400 TABLET ORAL 3 TIMES DAILY
Status: DISCONTINUED | OUTPATIENT
Start: 2021-10-13 | End: 2021-10-14 | Stop reason: HOSPADM

## 2021-10-13 RX ORDER — BUSPIRONE HYDROCHLORIDE 10 MG/1
20 TABLET ORAL 3 TIMES DAILY
Status: DISCONTINUED | OUTPATIENT
Start: 2021-10-13 | End: 2021-10-14 | Stop reason: HOSPADM

## 2021-10-13 RX ORDER — SODIUM CHLORIDE 0.9 % (FLUSH) 0.9 %
5-40 SYRINGE (ML) INJECTION PRN
Status: DISCONTINUED | OUTPATIENT
Start: 2021-10-13 | End: 2021-10-14 | Stop reason: HOSPADM

## 2021-10-13 RX ORDER — GUAIFENESIN 600 MG/1
TABLET, EXTENDED RELEASE ORAL 2 TIMES DAILY PRN
Status: CANCELLED | OUTPATIENT
Start: 2021-10-13

## 2021-10-13 RX ORDER — INSULIN GLARGINE 100 [IU]/ML
15 INJECTION, SOLUTION SUBCUTANEOUS NIGHTLY
Status: DISCONTINUED | OUTPATIENT
Start: 2021-10-13 | End: 2021-10-14 | Stop reason: HOSPADM

## 2021-10-13 RX ORDER — DEXTROSE MONOHYDRATE 50 MG/ML
100 INJECTION, SOLUTION INTRAVENOUS PRN
Status: DISCONTINUED | OUTPATIENT
Start: 2021-10-13 | End: 2021-10-14 | Stop reason: HOSPADM

## 2021-10-13 RX ORDER — HYDROXYZINE PAMOATE 25 MG/1
25 CAPSULE ORAL 3 TIMES DAILY PRN
Status: DISCONTINUED | OUTPATIENT
Start: 2021-10-13 | End: 2021-10-14 | Stop reason: HOSPADM

## 2021-10-13 RX ORDER — ACETAMINOPHEN 325 MG/1
650 TABLET ORAL EVERY 6 HOURS PRN
Status: DISCONTINUED | OUTPATIENT
Start: 2021-10-13 | End: 2021-10-14 | Stop reason: HOSPADM

## 2021-10-13 RX ORDER — LOSARTAN POTASSIUM 100 MG/1
100 TABLET ORAL DAILY
Status: DISCONTINUED | OUTPATIENT
Start: 2021-10-13 | End: 2021-10-14 | Stop reason: HOSPADM

## 2021-10-13 RX ORDER — INSULIN GLARGINE 100 [IU]/ML
15 INJECTION, SOLUTION SUBCUTANEOUS NIGHTLY
Status: DISCONTINUED | OUTPATIENT
Start: 2021-10-13 | End: 2021-10-13

## 2021-10-13 RX ORDER — CLOTRIMAZOLE 1 %
CREAM WITH APPLICATOR VAGINAL 2 TIMES DAILY
Status: DISCONTINUED | OUTPATIENT
Start: 2021-10-13 | End: 2021-10-14 | Stop reason: HOSPADM

## 2021-10-13 RX ORDER — DEXTROSE MONOHYDRATE 25 G/50ML
12.5 INJECTION, SOLUTION INTRAVENOUS PRN
Status: DISCONTINUED | OUTPATIENT
Start: 2021-10-13 | End: 2021-10-13

## 2021-10-13 RX ORDER — QUETIAPINE FUMARATE 25 MG/1
25 TABLET, FILM COATED ORAL 2 TIMES DAILY
Status: DISCONTINUED | OUTPATIENT
Start: 2021-10-13 | End: 2021-10-14 | Stop reason: HOSPADM

## 2021-10-13 RX ORDER — CLOTRIMAZOLE 1 %
CREAM WITH APPLICATOR VAGINAL ONCE
Status: COMPLETED | OUTPATIENT
Start: 2021-10-13 | End: 2021-10-13

## 2021-10-13 RX ORDER — HYDROCODONE BITARTRATE AND ACETAMINOPHEN 10; 325 MG/1; MG/1
1 TABLET ORAL EVERY 6 HOURS PRN
Status: DISCONTINUED | OUTPATIENT
Start: 2021-10-13 | End: 2021-10-14 | Stop reason: HOSPADM

## 2021-10-13 RX ORDER — DEXTROSE MONOHYDRATE 50 MG/ML
100 INJECTION, SOLUTION INTRAVENOUS PRN
Status: DISCONTINUED | OUTPATIENT
Start: 2021-10-13 | End: 2021-10-13

## 2021-10-13 RX ORDER — POTASSIUM CHLORIDE 20 MEQ/1
40 TABLET, EXTENDED RELEASE ORAL ONCE
Status: COMPLETED | OUTPATIENT
Start: 2021-10-13 | End: 2021-10-13

## 2021-10-13 RX ORDER — ACETAMINOPHEN 650 MG/1
650 SUPPOSITORY RECTAL EVERY 6 HOURS PRN
Status: DISCONTINUED | OUTPATIENT
Start: 2021-10-13 | End: 2021-10-14 | Stop reason: HOSPADM

## 2021-10-13 RX ORDER — METOPROLOL SUCCINATE 50 MG/1
50 TABLET, EXTENDED RELEASE ORAL DAILY
Status: DISCONTINUED | OUTPATIENT
Start: 2021-10-13 | End: 2021-10-14 | Stop reason: HOSPADM

## 2021-10-13 RX ORDER — BUDESONIDE AND FORMOTEROL FUMARATE DIHYDRATE 160; 4.5 UG/1; UG/1
2 AEROSOL RESPIRATORY (INHALATION) 2 TIMES DAILY
Status: DISCONTINUED | OUTPATIENT
Start: 2021-10-13 | End: 2021-10-14 | Stop reason: HOSPADM

## 2021-10-13 RX ORDER — MONTELUKAST SODIUM 10 MG/1
10 TABLET ORAL NIGHTLY
Status: DISCONTINUED | OUTPATIENT
Start: 2021-10-13 | End: 2021-10-14 | Stop reason: HOSPADM

## 2021-10-13 RX ORDER — NICOTINE POLACRILEX 4 MG
15 LOZENGE BUCCAL PRN
Status: DISCONTINUED | OUTPATIENT
Start: 2021-10-13 | End: 2021-10-13

## 2021-10-13 RX ORDER — POLYETHYLENE GLYCOL 3350 17 G/17G
17 POWDER, FOR SOLUTION ORAL DAILY PRN
Status: DISCONTINUED | OUTPATIENT
Start: 2021-10-13 | End: 2021-10-14 | Stop reason: HOSPADM

## 2021-10-13 RX ORDER — PANTOPRAZOLE SODIUM 40 MG/1
40 TABLET, DELAYED RELEASE ORAL DAILY
Status: DISCONTINUED | OUTPATIENT
Start: 2021-10-13 | End: 2021-10-14 | Stop reason: HOSPADM

## 2021-10-13 RX ORDER — NIFEDIPINE 30 MG/1
60 TABLET, EXTENDED RELEASE ORAL DAILY
Status: DISCONTINUED | OUTPATIENT
Start: 2021-10-13 | End: 2021-10-14 | Stop reason: HOSPADM

## 2021-10-13 RX ORDER — ASPIRIN 81 MG/1
81 TABLET, CHEWABLE ORAL DAILY
Status: DISCONTINUED | OUTPATIENT
Start: 2021-10-13 | End: 2021-10-14 | Stop reason: HOSPADM

## 2021-10-13 RX ORDER — ALBUTEROL SULFATE 90 UG/1
2 AEROSOL, METERED RESPIRATORY (INHALATION) EVERY 6 HOURS PRN
Status: DISCONTINUED | OUTPATIENT
Start: 2021-10-13 | End: 2021-10-14 | Stop reason: HOSPADM

## 2021-10-13 RX ORDER — BACLOFEN 10 MG/1
10 TABLET ORAL 2 TIMES DAILY
Status: DISCONTINUED | OUTPATIENT
Start: 2021-10-13 | End: 2021-10-14 | Stop reason: HOSPADM

## 2021-10-13 RX ADMIN — DOCUSATE SODIUM 100 MG: 100 CAPSULE ORAL at 22:29

## 2021-10-13 RX ADMIN — HYDROXYZINE PAMOATE 25 MG: 25 CAPSULE ORAL at 15:50

## 2021-10-13 RX ADMIN — OXCARBAZEPINE 300 MG: 300 TABLET, FILM COATED ORAL at 08:04

## 2021-10-13 RX ADMIN — MONTELUKAST 10 MG: 10 TABLET, FILM COATED ORAL at 22:29

## 2021-10-13 RX ADMIN — AMITRIPTYLINE HYDROCHLORIDE 25 MG: 25 TABLET, FILM COATED ORAL at 22:29

## 2021-10-13 RX ADMIN — MAGNESIUM OXIDE 400 MG: 400 TABLET ORAL at 22:29

## 2021-10-13 RX ADMIN — BUSPIRONE HYDROCHLORIDE 20 MG: 10 TABLET ORAL at 22:29

## 2021-10-13 RX ADMIN — HYDROCODONE BITARTRATE AND ACETAMINOPHEN 1 TABLET: 10; 325 TABLET ORAL at 08:01

## 2021-10-13 RX ADMIN — ACETAMINOPHEN 650 MG: 325 TABLET ORAL at 08:00

## 2021-10-13 RX ADMIN — METOPROLOL SUCCINATE 50 MG: 50 TABLET, EXTENDED RELEASE ORAL at 08:02

## 2021-10-13 RX ADMIN — CEFTRIAXONE SODIUM 1000 MG: 1 INJECTION, POWDER, FOR SOLUTION INTRAMUSCULAR; INTRAVENOUS at 03:22

## 2021-10-13 RX ADMIN — HYDROXYZINE PAMOATE 25 MG: 25 CAPSULE ORAL at 08:00

## 2021-10-13 RX ADMIN — OXCARBAZEPINE 300 MG: 300 TABLET, FILM COATED ORAL at 23:51

## 2021-10-13 RX ADMIN — QUETIAPINE FUMARATE 25 MG: 25 TABLET ORAL at 08:01

## 2021-10-13 RX ADMIN — CARBIDOPA AND LEVODOPA 1 TABLET: 10; 100 TABLET ORAL at 23:47

## 2021-10-13 RX ADMIN — QUETIAPINE FUMARATE 25 MG: 25 TABLET ORAL at 22:29

## 2021-10-13 RX ADMIN — ASPIRIN 81 MG: 81 TABLET, CHEWABLE ORAL at 07:59

## 2021-10-13 RX ADMIN — ONDANSETRON 4 MG: 2 INJECTION INTRAMUSCULAR; INTRAVENOUS at 15:50

## 2021-10-13 RX ADMIN — ALUMINUM HYDROXIDE, MAGNESIUM HYDROXIDE, AND SIMETHICONE 15 ML: 200; 200; 20 SUSPENSION ORAL at 07:59

## 2021-10-13 RX ADMIN — ASPIRIN 324 MG: 81 TABLET, CHEWABLE ORAL at 03:20

## 2021-10-13 RX ADMIN — ONDANSETRON 4 MG: 2 INJECTION INTRAMUSCULAR; INTRAVENOUS at 07:59

## 2021-10-13 RX ADMIN — CARBIDOPA AND LEVODOPA 1 TABLET: 10; 100 TABLET ORAL at 23:50

## 2021-10-13 RX ADMIN — CLOTRIMAZOLE: 1 CREAM VAGINAL at 06:01

## 2021-10-13 RX ADMIN — MAGNESIUM OXIDE 400 MG: 400 TABLET ORAL at 08:01

## 2021-10-13 RX ADMIN — DOCUSATE SODIUM 100 MG: 100 CAPSULE ORAL at 08:02

## 2021-10-13 RX ADMIN — MAGNESIUM OXIDE 400 MG: 400 TABLET ORAL at 14:14

## 2021-10-13 RX ADMIN — LEVETIRACETAM 750 MG: 250 TABLET, FILM COATED ORAL at 08:03

## 2021-10-13 RX ADMIN — LEVETIRACETAM 750 MG: 250 TABLET, FILM COATED ORAL at 23:52

## 2021-10-13 RX ADMIN — NIFEDIPINE 60 MG: 30 TABLET, FILM COATED, EXTENDED RELEASE ORAL at 08:04

## 2021-10-13 RX ADMIN — HYDROCODONE BITARTRATE AND ACETAMINOPHEN 1 TABLET: 10; 325 TABLET ORAL at 15:49

## 2021-10-13 RX ADMIN — BACLOFEN 10 MG: 10 TABLET ORAL at 08:00

## 2021-10-13 RX ADMIN — PANTOPRAZOLE SODIUM 40 MG: 40 TABLET, DELAYED RELEASE ORAL at 08:00

## 2021-10-13 RX ADMIN — DIAZEPAM 2 MG: 2 TABLET ORAL at 15:49

## 2021-10-13 RX ADMIN — BACLOFEN 10 MG: 10 TABLET ORAL at 22:29

## 2021-10-13 RX ADMIN — BUSPIRONE HYDROCHLORIDE 20 MG: 10 TABLET ORAL at 08:06

## 2021-10-13 RX ADMIN — Medication 1 G: at 17:47

## 2021-10-13 RX ADMIN — DIAZEPAM 2 MG: 2 TABLET ORAL at 22:29

## 2021-10-13 RX ADMIN — POTASSIUM CHLORIDE 40 MEQ: 1500 TABLET, EXTENDED RELEASE ORAL at 12:23

## 2021-10-13 RX ADMIN — ATORVASTATIN CALCIUM 20 MG: 20 TABLET, FILM COATED ORAL at 08:01

## 2021-10-13 RX ADMIN — HYDROCODONE BITARTRATE AND ACETAMINOPHEN 1 TABLET: 10; 325 TABLET ORAL at 23:56

## 2021-10-13 RX ADMIN — HYDROXYZINE PAMOATE 25 MG: 25 CAPSULE ORAL at 22:29

## 2021-10-13 RX ADMIN — SODIUM CHLORIDE, PRESERVATIVE FREE 10 ML: 5 INJECTION INTRAVENOUS at 22:30

## 2021-10-13 RX ADMIN — ENOXAPARIN SODIUM 40 MG: 40 INJECTION SUBCUTANEOUS at 08:01

## 2021-10-13 RX ADMIN — CLOTRIMAZOLE: 1 CREAM VAGINAL at 08:07

## 2021-10-13 RX ADMIN — BUDESONIDE AND FORMOTEROL FUMARATE DIHYDRATE 2 PUFF: 160; 4.5 AEROSOL RESPIRATORY (INHALATION) at 20:50

## 2021-10-13 RX ADMIN — LEVOTHYROXINE SODIUM 75 MCG: 0.07 TABLET ORAL at 08:02

## 2021-10-13 RX ADMIN — LOSARTAN POTASSIUM 100 MG: 100 TABLET, FILM COATED ORAL at 08:00

## 2021-10-13 RX ADMIN — BUSPIRONE HYDROCHLORIDE 20 MG: 10 TABLET ORAL at 14:15

## 2021-10-13 RX ADMIN — Medication 1 G: at 08:03

## 2021-10-13 ASSESSMENT — PAIN SCALES - GENERAL
PAINLEVEL_OUTOF10: 10
PAINLEVEL_OUTOF10: 0
PAINLEVEL_OUTOF10: 10
PAINLEVEL_OUTOF10: 8
PAINLEVEL_OUTOF10: 10
PAINLEVEL_OUTOF10: 10

## 2021-10-13 ASSESSMENT — PAIN DESCRIPTION - LOCATION: LOCATION: GENERALIZED

## 2021-10-13 ASSESSMENT — HEART SCORE: ECG: 1
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
## 2021-10-13 NOTE — ED NOTES
Pt noted to shake and breath fast. This nurse approached pt and asked what is wrong and the pt states her BS was getting low. This nurse checked her BS and it was 78. Pt states it was too low for her and requested juice and crackers.      Trish Licea RN  10/12/21 [id number]

## 2021-10-13 NOTE — ED NOTES
Bed: ED-32  Expected date:   Expected time:   Means of arrival:   Comments:  Ben patient     Margarita Daniels RN  10/13/21 [id number]
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
## 2021-10-13 NOTE — ED PROVIDER NOTES
The Ekg interpreted by me shows  normal sinus rhythm with a rate of 78  Axis is   Left axis deviation  QTc is  normal  Left bundle branch block  ST Segments: no acute change  No significant change from prior EKG dated 9-           Akash Martin MD  10/12/21 2012

## 2021-10-13 NOTE — ED PROVIDER NOTES
I independently examined and evaluated Kimberly Vicente. In brief, 19-year-old female presenting with left-sided chest pain. Patient with recent stress test.  She states that since her recent stress test, she has continued to have left-sided chest pain that is associated with both at rest and with exertion, however is becoming progressively more associated with exertion that is shorter and shorter duration. She is chest pain-free here in the ED. Focused exam revealed heart regular rate and rhythm, lungs clear to auscultation bilaterally. ED course: Patient seen and examined. Troponin negative. Patient is chest pain-free. Given patient's concerning story, and high heart score, will admit for further evaluation and management. EKG -normal sinus rhythm, rate 78, , , QTc 442, no acute ST elevation, incomplete left bundle branch block, similar to previous EKG done on 9/27/2021. All diagnostic, treatment, and disposition decisions were made by myself in conjunction with the advanced practice provider. For all further details of the patient's emergency department visit, please see the advanced practice provider's documentation. Comment: Please note this report has been produced using speech recognition software and may contain errors related to that system including errors in grammar, punctuation, and spelling, as well as words and phrases that may be inappropriate. If there are any questions or concerns please feel free to contact the dictating provider for clarification.        [id number] [facility],   10/13/21 0971

## 2021-10-13 NOTE — CONSULTS
CARDIOLOGY CONSULT NOTE   Reason for consultation:  SOB    Referring physician:  Ruma Johansen MD     Primary care physician: Nick Bhakta MD      Dear  Dr. Ruma Johansen MD   Thanks for the consult. Chief Complaints :  Chief Complaint   Patient presents with    Shortness of Breath     called by pcp and told to come in. recently admitted for same. History of present illness:Simona is a 61 y. o.year old who presents with c/o extreme sob with limited ability to walk and ambulate , she has bene to ED and admitted 3 time s in last 1 month she is also c/o severe anxiety attacks   Stress test normal several times in last 1 year  Troponin and ekg normal   cta shos bronchilitis      Past medical history:    has a past medical history of Abnormal EKG, Acid reflux, Anemia, Anesthesia, Anginal pain ([address] Anxiety, Arthritis, Asthma, Bipolar 1 disorder ([address] CAD (coronary artery disease), Cerebral artery occlusion with cerebral infarction ([address] CHF (congestive heart failure) ([address] Chronic back pain, Chronic kidney disease, DDD (degenerative disc disease), cervical, Depression, Diabetes mellitus ([address] Diabetic neuropathy ([address] Dizziness, Dry skin, Enlarged ureter, Fatty liver, Fibrocystic breast, Generalized anxiety disorder, Gout, H/O cardiac catheterization, H/O cardiovascular stress test, H/O cardiovascular stress test, H/O cardiovascular stress test, H/O Doppler ultrasound, H/O echocardiogram, H/O echocardiogram, History of Holter monitoring, [place] (hard of hearing), Hx of cardiovascular stress test, Hx of motion sickness, HX OTHER MEDICAL, Hyperlipidemia, Hypertension, IBS (irritable bowel syndrome), Incisional hernia, Kidney stones, Migraines, Nausea & vomiting, Other specified disorder of skin, Panic attacks, Panic attacks, Pneumonia, Pseudoseizures ([address] Restless leg, Shortness of breath, Sleep apnea, Staph infection, Thyroid disease, Tremor, Urinary incontinence, UTI (urinary tract infection), UTI (urinary tract infection), Vertigo, and Wears glasses. Past surgical history:   has a past surgical history that includes Carpal tunnel release (Right, 1999); Diagnostic Cardiac Cath Lab Procedure (01/11/2010); Dental surgery; Colonoscopy (Last Done 6-13); Endoscopy, colon, diagnostic (Several ); Bladder surgery (1970's Or 1980's); Lithotripsy (2011); Breast biopsy (Right, 1980's); Breast surgery (Left, 1990's); hernia repair (4528'C); hernia repair (1970's); Cholecystectomy (1970's); Appendectomy (1970's); Hysterectomy, total abdominal (1987); Tubal ligation (1978); Esophagus dilation (1980's And 1990's); Knee arthroscopy (Right, 1999); joint replacement (2008); other surgical history (06 13 2014); fracture surgery (1974 Or 1975); Cardiac catheterization (10-18-06); and Cardiac catheterization. Social History:   reports that she has never smoked. She has never used smokeless tobacco. She reports that she does not drink alcohol and does not use drugs.   Family history:   no family history of CAD, STROKE of DM at early age    Allergies   Allergen Reactions    Abilify [Aripiprazole]      \"Severe Shaking And Restlessness\"    Advil [Ibuprofen Micronized] Palpitations     \"Severe High Blood Pressure\"    Augmentin [Amoxicillin-Pot Clavulanate] Itching and Rash    Bee Venom Swelling     Redness    Ciprofloxacin Itching and Rash    Codeine      \"Severe Abdominal Cramping\"    Darvocet [Propoxyphene N-Acetaminophen] Palpitations     \"Severe High Blood Pressure\"    Darvon [Propoxyphene Hcl] Palpitations     \"Severe High Blood Pressure\"    Decadron [Dexamethasone] Other (See Comments)     seizure  Seizures    Ditropan [Oxybutynin Chloride] Palpitations     \"Severe High Blood Pressure\"    Fioricet [Butalbital-Apap-Caffeine] Palpitations     \"Severe High Blood Pressure\"    Fiorinal-Codeine #3 [Butalbital-Asa-Caff-Codeine]      \"Severe Stomach Cramps\"    Flagyl [Metronidazole] Diarrhea     \"Severe Diarrhea And Cramping\"    Naproxen Palpitations     \"Severe High Blood Pressure\"    Other      \"Allergic To Spider Bites Causing Blackness Of Skin, Severe Itching And Pain\"                                                  \"Allergic To Powder In Gloves Causing Severe Redness And Itching\"    Prozac [Fluoxetine Hcl]      \"Hallucinations\"    Robaxin [Methocarbamol] Palpitations     \"Severe High Blood Pressure\"    Ultram [Tramadol]      \"Severe Stomach Pain\"    Zoloft Palpitations     \"Sever High Blood Pressure\"    Butalbital-Aspirin-Caffeine Other (See Comments)     \"severe stomach pain\"    Coreg [Carvedilol] Other (See Comments)     \"spikes my BP severely and send me into a severe anxiety attack\"    Fluoxetine Other (See Comments)     hallucinations    Oxybutynin Chloride Other (See Comments)     Raises bp    Percocet [Oxycodone-Acetaminophen]      \"knocked me out for 12 hrs\"    Sertraline Other (See Comments)     hallucinations    Tape [Adhesive Tape]      Patient states it tears her skin, including band aids    Reglan [Metoclopramide] Other (See Comments)     \"makes me talk like a baby, but if I take benadryl with it it's fine\"       montelukast (SINGULAIR) tablet 10 mg, Nightly  aspirin chewable tablet 81 mg, Daily  carbidopa-levodopa (SINEMET)  MG per tablet 1 tablet, Nightly  pantoprazole (PROTONIX) tablet 40 mg, Daily  aluminum & magnesium hydroxide-simethicone (MAALOX) 200-200-20 MG/5ML suspension 15 mL, Q6H PRN  albuterol sulfate  (90 Base) MCG/ACT inhaler 2 puff, Q6H PRN  docusate sodium (COLACE) capsule 100 mg, BID  atorvastatin (LIPITOR) tablet 20 mg, Daily  hydrOXYzine (VISTARIL) capsule 25 mg, TID PRN  OXcarbazepine (TRILEPTAL) tablet 450 mg, BID  HYDROcodone-acetaminophen (NORCO)  MG per tablet 1 tablet, Q6H PRN  busPIRone (BUSPAR) tablet 20 mg, TID  levothyroxine (SYNTHROID) tablet 75 mcg, QAM AC  amitriptyline (ELAVIL) tablet 25 mg, Nightly  magnesium oxide (MAG-OX) tablet 400 mg, TID  losartan (COZAAR) tablet 100 mg, Daily  QUEtiapine (SEROQUEL) tablet 25 mg, BID  NIFEdipine (PROCARDIA XL) extended release tablet 60 mg, Daily  metoprolol succinate (TOPROL XL) extended release tablet 50 mg, Daily  baclofen (LIORESAL) tablet 10 mg, BID  diazePAM (VALIUM) tablet 2 mg, Q12H PRN  sodium chloride tablet 1 g, BID WC  levETIRAcetam (KEPPRA) tablet 750 mg, BID  sodium chloride flush 0.9 % injection 5-40 mL, 2 times per day  sodium chloride flush 0.9 % injection 5-40 mL, PRN  0.9 % sodium chloride infusion, PRN  ondansetron (ZOFRAN-ODT) disintegrating tablet 4 mg, Q8H PRN   Or  ondansetron (ZOFRAN) injection 4 mg, Q6H PRN  acetaminophen (TYLENOL) tablet 650 mg, Q6H PRN   Or  acetaminophen (TYLENOL) suppository 650 mg, Q6H PRN  polyethylene glycol (GLYCOLAX) packet 17 g, Daily PRN  enoxaparin (LOVENOX) injection 40 mg, Daily  budesonide-formoterol (SYMBICORT) 160-4.5 MCG/ACT inhaler 2 puff, BID  tiotropium (SPIRIVA RESPIMAT) 2.5 MCG/ACT inhaler 2 puff, Daily  insulin glargine (LANTUS) injection vial 15 Units, Nightly  [START ON 10/14/2021] insulin lispro (HUMALOG) injection vial 0-6 Units, Q4H  glucose (GLUTOSE) 40 % oral gel 15 g, PRN  dextrose 50 % IV solution, PRN  glucagon (rDNA) injection 1 mg, PRN  dextrose 5 % solution, PRN  insulin lispro (HUMALOG) injection vial 0-6 Units  ++IF PO DIET++, TID WC  insulin lispro (HUMALOG) injection vial 0-3 Units  ++IF PO DIET++, Nightly  [START ON 10/14/2021] cefTRIAXone (ROCEPHIN) 1000 mg IVPB in 50 mL D5W minibag, Q24H  clotrimazole (LOTRIMIN) 1 % vaginal cream, BID      Current Facility-Administered Medications   Medication Dose Route Frequency Provider Last Rate Last Admin    montelukast (SINGULAIR) tablet 10 mg  10 mg Oral Nightly Josh Landeros MD        aspirin chewable tablet 81 mg  81 mg Oral Daily Josh Landeros MD        carbidopa-levodopa (SINEMET)  MG per tablet 1 tablet  1 tablet Oral Nightly Josh Landeros MD        pantoprazole (PROTONIX) tablet 40 mg  40 mg Oral Daily Josh Khoi Ackerman MD        aluminum & magnesium hydroxide-simethicone (MAALOX) 200-200-20 MG/5ML suspension 15 mL  15 mL Oral Q6H PRN Josh Khoi Ackerman MD        albuterol sulfate  (90 Base) MCG/ACT inhaler 2 puff  2 puff Inhalation Q6H PRN Josh Khoi Ackerman MD        docusate sodium (COLACE) capsule 100 mg  100 mg Oral BID Josh Khoi Ackerman MD        atorvastatin (LIPITOR) tablet 20 mg  20 mg Oral Daily Josh Khoi Ackerman MD        hydrOXYzine (VISTARIL) capsule 25 mg  25 mg Oral TID PRN Josh Khoi Ackerman MD        OXcarbazepine (TRILEPTAL) tablet 450 mg  450 mg Oral BID Josh Khoi Ackerman MD        HYDROcodone-acetaminophen (NORCO)  MG per tablet 1 tablet  1 tablet Oral Q6H PRN Josh Khoi Ackerman MD        busPIRone (BUSPAR) tablet 20 mg  20 mg Oral TID Josh Khoi Ackerman MD        levothyroxine (SYNTHROID) tablet 75 mcg  75 mcg Oral QAM AC Josh Khoi Ackerman MD        amitriptyline (ELAVIL) tablet 25 mg  25 mg Oral Nightly Josh Khoi Ackerman MD        magnesium oxide (MAG-OX) tablet 400 mg  400 mg Oral TID Josh Khoi Ackerman MD        losartan (COZAAR) tablet 100 mg  100 mg Oral Daily Josh Khoi Ackerman MD        QUEtiapine (SEROQUEL) tablet 25 mg  25 mg Oral BID Josh Khoi Ackerman MD        NIFEdipine (PROCARDIA XL) extended release tablet 60 mg  60 mg Oral Daily Josh Khoi Ackerman MD        metoprolol succinate (TOPROL XL) extended release tablet 50 mg  50 mg Oral Daily Josh Khoi Ackerman MD        baclofen (LIORESAL) tablet 10 mg  10 mg Oral BID Josh Khoi Ackerman MD        diazePAM (VALIUM) tablet 2 mg  2 mg Oral Q12H PRN Ojsh Khoi Ackerman MD        sodium chloride tablet 1 g  1 g Oral BID WC Josh Khoi Ackerman MD        levETIRAcetam (KEPPRA) tablet 750 mg  750 mg Oral BID Josh Khoi Ackerman MD        sodium chloride flush 0.9 % injection 5-40 mL  5-40 mL IntraVENous 2 times per day Antoinette Hebert MD        sodium chloride flush 0.9 % injection 5-40 mL  5-40 mL IntraVENous PRN Josh Khoi Ackerman MD        0.9 % sodium chloride infusion  25 mL IntraVENous PRN Josh Khoi Ackerman MD        ondansetron (ZOFRAN-ODT) disintegrating tablet 4 mg  4 mg Oral Q8H PRN Josh Khoi Ackerman MD        Or    ondansetron (ZOFRAN) injection 4 mg  4 mg IntraVENous Q6H PRN Josh Khoi Ackerman MD        acetaminophen (TYLENOL) tablet 650 mg  650 mg Oral Q6H PRN Josh Khoi Ackerman MD        Or    acetaminophen (TYLENOL) suppository 650 mg  650 mg Rectal Q6H PRN Josh Khoi Ackerman MD        polyethylene glycol (GLYCOLAX) packet 17 g  17 g Oral Daily PRN Josh Khoi Ackerman MD        enoxaparin (LOVENOX) injection 40 mg  40 mg SubCUTAneous Daily Josh Kohi Ackerman MD        budesonide-formoterol (SYMBICORT) 160-4.5 MCG/ACT inhaler 2 puff  2 puff Inhalation BID Josh Khoi Ackerman MD        tiotropium (SPIRIVA RESPIMAT) 2.5 MCG/ACT inhaler 2 puff  2 puff Inhalation Daily Josh Khoi Ackerman MD        insulin glargine (LANTUS) injection vial 15 Units  15 Units SubCUTAneous Nightly Joshjoseph Ackreman MD        [START ON 10/14/2021] insulin lispro (HUMALOG) injection vial 0-6 Units  0-6 Units SubCUTAneous Q4H Josh Khoi Ackerman MD        glucose (GLUTOSE) 40 % oral gel 15 g  15 g Oral PRN Josh Khoi Ackerman MD        dextrose 50 % IV solution  12.5 g IntraVENous PRN Joshjoseph Ackerman MD        glucagon (rDNA) injection 1 mg  1 mg IntraMUSCular PRN Joshjoseph Ackerman MD        dextrose 5 % solution  100 mL/hr IntraVENous PRN Joshjoseph Ackerman MD        insulin lispro (HUMALOG) injection vial 0-6 Units  ++IF PO DIET++  0-6 Units SubCUTAneous TID WC Joshjoseph Ackerman MD        insulin lispro (HUMALOG) injection vial 0-3 Units  ++IF PO DIET++  0-3 Units SubCUTAneous Nightly Joshjoseph Ackerman MD        [START ON 10/14/2021] cefTRIAXone (ROCEPHIN) 1000 mg IVPB in 50 mL D5W minibag  1,000 mg IntraVENous Q24H Josh Ackerman MD        clotrimazole (LOTRIMIN) 1 % vaginal cream   Vaginal BID Jsoh Ackerman MD         Current Outpatient Medications   Medication Sig Dispense Refill    sodium chloride 1 g tablet Take 1 tablet by mouth 2 times daily (with meals) 90 tablet 3    levETIRAcetam (KEPPRA) 750 MG tablet Take 1 tablet by mouth 2 times daily 60 tablet 0    baclofen (LIORESAL) 10 MG tablet 1 tablet 2 times daily      diazePAM (VALIUM) 5 MG tablet as needed.  MUCINEX 600 MG extended release tablet 2 times daily as needed      metoprolol succinate (TOPROL XL) 50 MG extended release tablet Take 1 tablet by mouth daily 30 tablet 5    NIFEdipine (PROCARDIA XL) 60 MG extended release tablet Take 1 tablet by mouth daily 30 tablet 5    magnesium oxide (MAG-OX) 400 MG tablet Take 1 tablet by mouth 3 times daily 90 tablet 0    losartan (COZAAR) 100 MG tablet Take 1 tablet by mouth daily 30 tablet 0    QUEtiapine (SEROQUEL) 25 MG tablet Take 1 tablet by mouth 2 times daily (Patient taking differently: Take 25 mg by mouth 2 times daily Indications: 25 mg in am and 50 mg in pm ) 60 tablet 0    busPIRone (BUSPAR) 10 MG tablet Take 2 tablets by mouth 3 times daily 180 tablet 0    levothyroxine (SYNTHROID) 75 MCG tablet Take 1 tablet by mouth every morning (before breakfast) 30 tablet 3    amitriptyline (ELAVIL) 25 MG tablet Take 1 tablet by mouth nightly 30 tablet 3    HYDROcodone-acetaminophen (NORCO)  MG per tablet Take 1 tablet by mouth every 6 hours as needed.        fluticasone-umeclidin-vilant (TRELEGY ELLIPTA) 100-62.5-25 MCG/INH AEPB Inhale 1 puff into the lungs daily (Patient taking differently: Inhale 1 puff into the lungs daily as needed (only takes prn daily due to yeast infections in mouth, is aware she is supposed to take daily) ) 1 each 5    OXcarbazepine (TRILEPTAL) 300 MG tablet Take 1 tablet by mouth 2 times daily (Patient taking differently: Take 450 mg by mouth 2 times daily ) 60 tablet 3    hydrOXYzine (VISTARIL) 25 MG capsule Take 25 mg by mouth 3 times daily as needed       NOVOLOG FLEXPEN 100 UNIT/ML injection pen Inject into the skin See Admin Instructions 12/01/2020 patient states she follows sliding scale regimen BS > 150      simvastatin (ZOCOR) 20 MG tablet Take 1 tablet by mouth nightly 30 tablet 3    TRESIBA FLEXTOUCH 200 UNIT/ML SOPN Inject 20 Units into the skin every morning (Patient taking differently: Inject into the skin nightly 04/26/21 Patient states she follows sliding scale) 1 pen 2    docusate sodium (COLACE) 100 MG capsule Take 1 capsule by mouth 2 times daily 30 capsule 0    albuterol sulfate  (90 Base) MCG/ACT inhaler Inhale 2 puffs into the lungs every 6 hours as needed for Wheezing or Shortness of Breath (or cough) Please include spacer with instructions for use. 1 Inhaler 0    aluminum & magnesium hydroxide-simethicone (MAALOX MAX) 400-400-40 MG/5ML SUSP Take 15 mLs by mouth every 6 hours as needed (heart burn) 120 mL 0    pantoprazole (PROTONIX) 40 MG tablet Take 1 tablet by mouth daily 30 tablet 0    carbidopa-levodopa (SINEMET)  MG per tablet Take 1 tablet by mouth nightly      polyethylene glycol (GLYCOLAX) packet Take 17 g by mouth daily as needed for Constipation      aspirin 81 MG tablet Take 81 mg by mouth daily      Multiple Vitamins-Iron (MULTI-VITAMIN/IRON PO) Take  by mouth.  montelukast (SINGULAIR) 10 MG tablet Take 10 mg by mouth nightly.        Review of Systems:   · Constitutional: No Fever or Weight Loss   · Eyes: No Decreased Vision  · ENT: No Headaches, Hearing Loss or Vertigo  · Cardiovascular: As per HPI  · Respiratory: As per HPI  · Gastrointestinal: No abdominal pain, appetite loss, blood in stools, constipation, diarrhea or heartburn  · Genitourinary: No dysuria, trouble voiding, or hematuria  · Musculoskeletal:  No gait disturbance, weakness or joint complaints  · Integumentary: No rash or pruritis  · Neurological: No TIA or stroke symptoms  · Psychiatric: No anxiety or depression  · Endocrine: No malaise, fatigue or temperature intolerance  · Hematologic/Lymphatic: No bleeding problems, blood clots or swollen lymph nodes  · Allergic/Immunologic: No nasal congestion or hives  All systems negative except as marked. Physical Examination:    Vitals:    10/12/21 1837 10/12/21 2339 10/13/21 0100 10/13/21 0440   BP: (!) 165/92 (!) 180/90 (!) 171/89 (!) 172/90   Pulse: 85 79 80 81   Resp: 22 20 19 20   Temp: 97.5 °F (36.4 °C)  98 °F (36.7 °C)    TempSrc: Oral      SpO2: 97% 95% 96% 97%   Weight: 175 lb (79.4 kg)      Height: 5' 2\" (1.575 m)          General Appearance:  No distress, conversant    Constitutional:  Well developed, Well nourished, No acute distress, Non-toxic appearance. HENT:  Normocephalic, Atraumatic, Bilateral external ears normal, Oropharynx moist, No oral exudates, Nose normal. Neck- Normal range of motion, No tenderness, Supple, No stridor,no apical-carotid delay  Lymphatics : no palpable lymph nodes  Eyes:  PERRL, EOMI, Conjunctiva normal, No discharge. Respiratory:  Normal breath sounds, No respiratory distress, No wheezing, No chest tenderness. ,no use of accessory muscles, crackles Absent   Cardiovascular: (PMI) apex non displaced,no lifts no thrills, ankle swelling Absent  , 1+, s1 and s2 audible,Murmur. Absent , JVD not noted    Abdomen /GI:  Bowel sounds normal, Soft, No tenderness, No masses, No gross visceromegaly   :  No costovertebral angle tenderness   Musculoskeletal:  No edema, no tenderness, no deformities.  Back- no tenderness  Integument:  Well hydrated, no rash   Lymphatic:  No lymphadenopathy noted   Neurologic:  Alert & oriented x 3, CN 2-12 normal, normal motor function, normal sensory function, no focal deficits noted           Medical decision making and Data review:    Lab Review   Recent Labs     10/12/21  2140   WBC 10.9*   HGB 13.8   HCT 40.8         Recent Labs     10/12/21  2140   *   K 3.3*   CL 98*   CO2 24   BUN 10   CREATININE 0.4*     Recent Labs     10/12/21  2140   AST 24   ALT 24   BILITOT 0.2   ALKPHOS 91 Recent Labs     10/12/21  2140   TROPONINT <0.010       No results for input(s): PROBNP in the last 72 hours. Lab Results   Component Value Date    INR 0.98 04/26/2021    PROTIME 11.8 04/26/2021       EKG: (reviewed by myself)    ECHO:(reviewed by myself)    Chest Xray:(reviewed by myself)  CT HEAD WO CONTRAST    Result Date: 9/30/2021  EXAMINATION: CT OF THE HEAD WITHOUT CONTRAST  9/30/2021 1:57 pm TECHNIQUE: CT of the head was performed without the administration of intravenous contrast. Dose modulation, iterative reconstruction, and/or weight based adjustment of the mA/kV was utilized to reduce the radiation dose to as low as reasonably achievable. COMPARISON: 08/23/2021 HISTORY: ORDERING SYSTEM PROVIDED HISTORY: seizure TECHNOLOGIST PROVIDED HISTORY: Reason for exam:->seizure Has a \"code stroke\" or \"stroke alert\" been called? ->No Reason for Exam: seizure Acuity: Acute Type of Exam: Initial Additional signs and symptoms: none Relevant Medical/Surgical History: none FINDINGS: BRAIN/VENTRICLES: There is no acute intracranial hemorrhage, mass effect or midline shift. No abnormal extra-axial fluid collection. The gray-white differentiation is maintained without evidence of an acute infarct. There is no evidence of hydrocephalus. Mild chronic microvascular disease is identified within the periventricular white matter ORBITS: The visualized portion of the orbits demonstrate no acute abnormality. SINUSES: The visualized paranasal sinuses and mastoid air cells demonstrate no acute abnormality. SOFT TISSUES/SKULL:  No acute abnormality of the visualized skull or soft tissues. No acute intracranial abnormality.  Stable mild chronic microvascular disease within the periventricular white matter     CT ABDOMEN PELVIS W IV CONTRAST Additional Contrast? None    Result Date: 9/30/2021  EXAMINATION: CT OF THE ABDOMEN AND PELVIS WITH CONTRAST 9/30/2021 11:37 am TECHNIQUE: CT of the abdomen and pelvis was performed with COUGH Acuity: Acute Type of Exam: Ongoing Additional signs and symptoms: PATIENT STATES SHE WAS ADMITTED ABOUT 1 MONTH AGO WITH CHEST PAIN, COUGH, SOB;  PATIENT SHAY HAD IV ANTIBIOTICS AND WAS FEELING BETTER SO SHE WAS DISCHARGED HOME;  PATIENT STATES SHE IS HAVING THESE SAME SYMPTOMS AGAIN Relevant Medical/Surgical History: PATIENT STATES SHE WAS ADMITTED ABOUT 1 MONTH AGO WITH CHEST PAIN, COUGH, SOB;  PATIENT SHAY HAD IV ANTIBIOTICS AND WAS FEELING BETTER SO SHE WAS DISCHARGED HOME;  PATIENT STATES SHE IS HAVING THESE SAME SYMPTOMS AGAIN FINDINGS: The lungs are clear. The cardiac and mediastinal contours are normal.  There is no pleural effusion or pneumothorax. No acute osseous abnormality is identified. No acute cardiopulmonary abnormality. XR CHEST PORTABLE    Result Date: 9/27/2021  EXAMINATION: ONE XRAY VIEW OF THE CHEST 9/27/2021 10:12 pm COMPARISON: 07/10/2021 HISTORY: ORDERING SYSTEM PROVIDED HISTORY: chest pain TECHNOLOGIST PROVIDED HISTORY: Reason for exam:->chest pain Reason for Exam: chest pain Acuity: Acute Type of Exam: Initial Additional signs and symptoms: na Relevant Medical/Surgical History: diabetes, ckd, chf FINDINGS: The mediastinal and cardiac contours are normal.  The lungs are clear. There is no focal consolidation, pleural effusion or pneumothorax evident. The bones are unremarkable. No acute cardiopulmonary process identified. VL DUP LOWER EXTREMITY VENOUS BILATERAL    Result Date: 10/1/2021  EXAMINATION: DUPLEX VENOUS ULTRASOUND OF THE BILATERAL LOWER ULLECEZNVTO96/1/2021 8:15 am TECHNIQUE: Duplex ultrasound using B-mode/gray scaled imaging, Doppler spectral analysis and color flow Doppler was obtained of the deep venous structures of the lower bilateral extremities.  COMPARISON: 12/02/2020 HISTORY: ORDERING SYSTEM PROVIDED HISTORY: elevated d-dimer TECHNOLOGIST PROVIDED HISTORY: Reason for exam:->elevated d-dimer FINDINGS: The visualized veins of the bilateral lower extremities are patent and free of echogenic thrombus. The veins demonstrate good compressibility with normal color flow study and spectral analysis. No evidence of DVT in either lower extremity. CTA PULMONARY W CONTRAST    Addendum Date: 10/1/2021    ADDENDUM: The system created addendum request regarding pulmonary nodule. The 3 mm nodule of the right upper lobe does not require further follow-up based on its size. Result Date: 10/1/2021  EXAMINATION: CTA OF THE CHEST 9/28/2021 12:30 am TECHNIQUE: CTA of the chest was performed after the administration of intravenous contrast.  Multiplanar reformatted images are provided for review. MIP images are provided for review. Dose modulation, iterative reconstruction, and/or weight based adjustment of the mA/kV was utilized to reduce the radiation dose to as low as reasonably achievable. COMPARISON: 12/02/2020 HISTORY: ORDERING SYSTEM PROVIDED HISTORY: chest pain TECHNOLOGIST PROVIDED HISTORY: Reason for exam:->chest pain Decision Support Exception - unselect if not a suspected or confirmed emergency medical condition->Emergency Medical Condition (MA) Reason for Exam: chest pain FINDINGS: Pulmonary Arteries: Pulmonary arteries are adequately opacified for evaluation. No evidence of intraluminal filling defect to suggest pulmonary embolism. Main pulmonary artery is normal in caliber. Mediastinum: No evidence of mediastinal lymphadenopathy. The heart and pericardium demonstrate no acute abnormality. There is no acute abnormality of the thoracic aorta. Lungs/pleura: There is mosaic perfusion pattern within the lungs. There is superimposed respiratory motion which limits detailed evaluation. No focal consolidation or pulmonary edema. No evidence of pleural effusion or pneumothorax. 3 mm nodule within the right upper lobe. No further follow-up is needed. Upper Abdomen: Limited images of the upper abdomen are unremarkable.  Soft Tissues/Bones: No acute bone or soft tissue abnormality. No evidence of pulmonary embolism. There is a mosaic perfusion pattern within the lungs. The findings may be related to bronchiolitis or reactive airway disease with air trapping among other possibilities. NM MYOCARDIAL SPECT REST EXERCISE OR RX    Result Date: 9/28/2021  Cardiac Perfusion Imaging   Demographics   Patient Name      Jeremías Villatoro   Date of study        09/28/2021   Date of Birth     1955         Gender               Female   Age               61 year(s)         Race                    Patient Number    [id number]         Room Number          OBS10   Visit Number      [id number]          Height               62 inches   Corporate ID      [id number]           Weight               176 pounds   Accession Number  [id number]                                        NM Technologist      Maryellen Sainz MD        Cardiologist         Rowena MEDEIROS   Conclusions   Summary  Normal tracer uptake in all segments of myocardium on stress ans rest  images. Normal Stress nuclear scintigraphic study suggestive of normal  myocardial perfusion. Gated images demonstrate normal left ventricular  systolic function with EF of 60 %. Signatures   ------------------------------------------------------------------  Electronically signed by Katerina Garcia MD  (Interpreting cardiologist) on 09/28/2021 at 17:03  ------------------------------------------------------------------  Procedure Procedure Type:   Nuclear Stress Test:Pharmacological, Myocardial Perfusion Imaging with  Pharm, NM MYOCARDIAL SPECT REST EXERCISE OR RX  Indications: Chest pain. Risk Factors   The patient risk factors include:obesity, hypercholesterolemia,  hypertension, diabetes mellitus and prior MI . Stress Protocols   Resting ECG  Normal sinus rhythm.    Resting HR:94 bpm  Stress Protocol:Pharmacologic - Lexiscan  Peak HR:107 bpm                  HR/BP product:[id number]  Peak BP:147/73 mmHg  Predicted HR: 156 bpm  % of predicted HR: 69   Exercise duration: 01:09 min  Reason for termination:Completed   Symptoms  Chest pain. Procedure Medications   - Lexiscan I.V. bolus (over 15sec.) 0.4 mg admininstered @ 09/28/2021 10:30. Imaging Protocols   Rest                             Stress   Isotope:Sestamibi 99mTc          Isotope: Sestamibi 99mTc  Isotope dose:10.7 mCi            Isotope dose:31.6 mCi  Administration route: I.V. Administration route: I.V. Injection Date:09/28/2021 08:15  Injection Date:09/28/2021 10:30  Scan Date:09/28/2021 09:00       Scan Date:09/28/2021 11:15   Technique:        SPECT          Technique:        Gated                                                     SPECT   Procedure Description   Upon patient arrival, the patient is identified using two identifiers and  the physician order is verified. An IV is established and 8-11mCi of 99mTc  Sestamibi is intravenously injected and followed with 10mL 0.9% Normal  Saline flush. A circulation period of 45 minutes occurs prior to resting  SPECT imaging. After imaging is complete the patient is escorted to the  stress lab. The patient is connected to the ECG and blood pressure is  measured. The RN starts the stress portion of the exam and rapidly  intravenously injects Lexiscan (regadenosine) 0.4mg over a period of 10 to15  seconds and follows with 5mL 0.9% Normal Saline flush. Immediately following  the Nuclear Technologist intravenously injects 22-33mCi of 99mTc Sestamibi  and 5mL 0.9% Normal Saline flush. After completion, recovery, and removal of  the IV, the patient rests during the second circulation period of 45  minutes. Final stress SPECT gated imaging is performed. The patient may  return home or to their room after stress imaging.  The images are processed  and final charting is completed and sent to the appropriate cardiologist for  interpretation and reporting. Perfusion Interpretation   Normal tracer uptake in all segments of myocardium on stress ans rest  images. Imaging Results    Summed scores     - Summed stress score: 0     - Summed rest score: 0     - Summed difference score:    0   Rest ejection  Ejection fraction:59 %  EDV :68 ml  ESV :28 ml  Stroke volume :40 ml  Medical History   Accession#:  [id number]  Admission Data Admission date: 09/27/2021 Admission Time: 21:56 Hospital Status: Inpatient. All labs, medications and tests reviewed by myself including data  from outside source , patient and available family . Continue all other medications of all above medical condition listed as is. Impression:  Active Problems:    Dyslipidemia    Chest pain  Resolved Problems:    * No resolved hospital problems. *      Assessment: 61 y. o.year old with PMH of  has a past medical history of Abnormal EKG, Acid reflux, Anemia, Anesthesia, Anginal pain ([address] Anxiety, Arthritis, Asthma, Bipolar 1 disorder ([address] CAD (coronary artery disease), Cerebral artery occlusion with cerebral infarction ([address] CHF (congestive heart failure) ([address] Chronic back pain, Chronic kidney disease, DDD (degenerative disc disease), cervical, Depression, Diabetes mellitus ([address] Diabetic neuropathy ([address] Dizziness, Dry skin, Enlarged ureter, Fatty liver, Fibrocystic breast, Generalized anxiety disorder, Gout, H/O cardiac catheterization, H/O cardiovascular stress test, H/O cardiovascular stress test, H/O cardiovascular stress test, H/O Doppler ultrasound, H/O echocardiogram, H/O echocardiogram, History of Holter monitoring, [place] (hard of hearing), Hx of cardiovascular stress test, Hx of motion sickness, HX OTHER MEDICAL, Hyperlipidemia, Hypertension, IBS (irritable bowel syndrome), Incisional hernia, Kidney stones, Migraines, Nausea & vomiting, Other specified disorder of skin, Panic attacks, Panic attacks, Pneumonia, Pseudoseizures ([address] Restless leg, Shortness of breath, Sleep apnea, Staph infection, Thyroid disease, Tremor, Urinary incontinence, UTI (urinary tract infection), UTI (urinary tract infection), Vertigo, and Wears glasses. Plan and Recommendations:    Treat anxiety   Follow up [place]    [facility]  [facility] with questions, no further cardiac work up at this time  DVT prophylaxis if no contraindication  6. Dyslipidemia: continue statins           Thank you  much for consult and giving us the opportunity in contributing in the care of this patient. Please feel free to call me for any questions.        Nestor Martinez MD, 10/13/2021 7:28 AM

## 2021-10-13 NOTE — PROGRESS NOTES
.        Hospitalist Progress Note      Name:  Dm Chicas /Age/Sex: 1955  (61 y.o. female)   MRN & CSN:  [id number] & [id number] Admission Date/Time: 10/13/2021 12:43 AM   [facility]: Nick Bhakta MD         Hospital Day: 1    Assessment and Plan:   Dm Chicas is a 61 y.o.  female  who presents with <principal problem not specified>    1. Chest pain, resolved  2. Urinary tract infection. Continue IV ceftriaxone and follow cultures  3. Asthma exacerbation. Continue bronchodilators. Follow-up with pulmonologist.  4. Anxiety on diazepam  5. Hypertension stable  6. Obstructive sleep apnea. Sleep study as outpatient per pulmonary    Diet Diet NPO  ADULT DIET; Regular; No Caffeine   DVT Prophylaxis [] Lovenox, []  Heparin, [] SCDs, [] Ambulation   GI Prophylaxis [] PPI,  [] H2 Blocker,  [] Carafate,  [] Diet/Tube Feeds   Code Status Full Code   Disposition Patient requires continued admission due to    MDM [] Low, [] Moderate,[]  High  Patient's risk as above due to      History of Present Illness:     Chief Complaint: <principal problem not specified>  Dm Chicas is a 61 y.o.  female  who presents with chest pain, shortness of breath. Feels better. No chest pain today. Still reports some shortness of breath. Denies fever, chills, nausea, vomiting. Ten point ROS reviewed negative, unless as noted above    Objective:   No intake or output data in the 24 hours ending 10/13/21 1830   Vitals:   Vitals:    10/13/21 1603   BP: (!) 162/88   Pulse: 80   Resp: 18   Temp: 98 °F (36.7 °C)   SpO2: 99%     Physical Exam:   GEN Awake female, sitting upright in bed in no apparent distress. Appears given age. EYES Pupils are equally round. No scleral erythema, discharge, or conjunctivitis. HENT Mucous membranes are moist. Oral pharynx without exudates, no evidence of thrush. NECK Supple, no apparent thyromegaly or masses. RESP Clear to auscultation, no wheezes.   Symmetric chest movement while on room air. CARDIO/VASC S1/S2 auscultated. Regular rate without appreciable murmurs, rubs, or gallops. No JVD or carotid bruits. Peripheral pulses equal bilaterally and palpable. No peripheral edema. GI Abdomen is soft without significant tenderness, masses, or guarding. Bowel sounds are normoactive. Rectal exam deferred. MSK No gross joint deformities. SKIN Normal coloration, warm, dry. NEURO Cranial nerves appear grossly intact, normal speech, no lateralizing weakness. PSYCH Awake, alert, oriented x 4. Affect appropriate.     Medications:   Medications:    montelukast  10 mg Oral Nightly    aspirin  81 mg Oral Daily    carbidopa-levodopa  1 tablet Oral Nightly    pantoprazole  40 mg Oral Daily    docusate sodium  100 mg Oral BID    atorvastatin  20 mg Oral Daily    OXcarbazepine  450 mg Oral BID    busPIRone  20 mg Oral TID    levothyroxine  75 mcg Oral QAM AC    amitriptyline  25 mg Oral Nightly    magnesium oxide  400 mg Oral TID    losartan  100 mg Oral Daily    QUEtiapine  25 mg Oral BID    NIFEdipine  60 mg Oral Daily    metoprolol succinate  50 mg Oral Daily    baclofen  10 mg Oral BID    sodium chloride  1 g Oral BID WC    levETIRAcetam  750 mg Oral BID    sodium chloride flush  5-40 mL IntraVENous 2 times per day    enoxaparin  40 mg SubCUTAneous Daily    budesonide-formoterol  2 puff Inhalation BID    tiotropium  2 puff Inhalation Daily    [START ON 10/14/2021] cefTRIAXone (ROCEPHIN) IV  1,000 mg IntraVENous Q24H    clotrimazole   Vaginal BID    insulin glargine  15 Units SubCUTAneous Nightly    insulin lispro  0-6 Units SubCUTAneous TID     insulin lispro  0-3 Units SubCUTAneous 2 times per day      Infusions:    sodium chloride      dextrose       PRN Meds: aluminum & magnesium hydroxide-simethicone, 15 mL, Q6H PRN  albuterol sulfate HFA, 2 puff, Q6H PRN  hydrOXYzine, 25 mg, TID PRN  HYDROcodone-acetaminophen, 1 tablet, Q6H PRN  diazePAM, 2 mg, Q12H PRN  sodium chloride flush, 5-40 mL, PRN  sodium chloride, 25 mL, PRN  ondansetron, 4 mg, Q8H PRN   Or  ondansetron, 4 mg, Q6H PRN  acetaminophen, 650 mg, Q6H PRN   Or  acetaminophen, 650 mg, Q6H PRN  polyethylene glycol, 17 g, Daily PRN  glucose, 15 g, PRN  dextrose, 12.5 g, PRN  glucagon (rDNA), 1 mg, PRN  dextrose, 100 mL/hr, PRN  potassium chloride, 40 mEq, PRN   Or  potassium alternative oral replacement, 40 mEq, PRN   Or  potassium chloride, 10 mEq, PRN              Electronically signed by Deisy Melendrez MD on 10/13/2021 at 6:30 PM
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
## 2021-10-13 NOTE — CONSULTS
[address] [place] [address], [address] [place] [address]                                  CONSULTATION    PATIENT NAME: Sg Sloan                   :        1955  MED REC NO:   [id number]                          ROOM:       ED03  ACCOUNT NO:   [de-identified]                           ADMIT DATE: 10/13/2021  PROVIDER:     Edu Jaeger MD    CONSULT DATE:  10/13/2021    HISTORY OF PRESENT ILLNESS:  The patient is a 72-year-old lady with  multiple medical problems including bronchial asthma, obstructive sleep  apnea, not on CPAP, congestive heart failure, chronic kidney disease,  depression, who came to the emergency room with complaints of chest  discomfort, mainly on the right side. She was also complaining of some  shortness of breath. She denied any significant cough. She denied any  fever or chills. She denied any nausea or vomiting. Her chest pain has  significantly improved since admission. She does complain of excessive  daytime somnolence and snoring and the patient wants her to be  reevaluated for sleep apnea. PAST MEDICAL HISTORY:  Significant for bronchial asthma, obstructive  sleep apnea, not on CPAP, hypertension, anxiety disorder, bipolar  disorder. PAST SURGICAL HISTORY:  Remarkable for carpal tunnel release surgery,  cardiac catheterization, colonoscopy, breast biopsy, hernia repair,  cholecystectomy, appendectomy, hysterectomy, esophageal dilatation. FAMILY HISTORY:  Reveals that her mother had diabetes,  hypercholesterolemia, stroke, hypertension, arthritis, mental illness. Her father had hypercholesterolemia, hypertension, arthritis, heart  disease. SOCIAL HISTORY:  Reveals that she is a nonsmoker. No history of alcohol  or drug abuse. MEDICATIONS:  Reviewed. ALLERGIES:  She has multiple allergies, 27 of them documented in [facility].     REVIEW OF SYSTEM:  A 10 to 14-point review of systems were reviewed and  are negative except for what is mentioned in the history of present  illness. PHYSICAL EXAMINATION:  GENERAL:  The patient is alert, oriented x3, in no acute respiratory  distress. VITAL SIGNS:  Her blood pressure was 170/80 mmHg, pulse of 80 per  minute, and respiratory rate of 20 per minute, and she is afebrile. Her  saturation is 97% on 1 liter nasal cannula. HEENT:  Exam is essentially unremarkable. There is no JVD, no  lymphadenopathy. NECK:  Supple. LUNGS:  Exam revealed diminished breath sounds, there are no rhonchi. HEART:  Exam showed normal S1, S2. There was no S3, S4 noted. ABDOMEN:  Exam is benign. There is no evidence of any organomegaly. The bowel sounds are present. NEUROLOGIC:  Exam reveals that she is awake and responsive, answering  questions appropriately and moving her extremities. LABORATORY AND DIAGNOSTIC DATA:  Her chest x-ray showed no acute  disease. Her electrolytes showed a sodium 134, potassium 3.3, chloride  98, carbon dioxide 24, BUN 10, creatinine 0.4. Her CBC showed a white  count 10.9, hemoglobin 13.8, hematocrit 40.8. IMPRESSION:  1. Bronchial asthma, not in acute exacerbation. 2.  History of excessive daytime somnolence, snoring, history of  obstructive sleep apnea, not on CPAP. 3.  Chest pain. 4.  Anxiety disorder. PLAN:  1. Continue present bronchodilator regimen. 2.  We will check ApneaLink and if positive, we will start auto-CPAP. 3.  Sleep study as outpatient and then arrange CPAP at home. 4.  Check procalcitonin level. 5.  Check BNP level. 6.  Check mag and phos. 7.  Supplement potassium. 8.  As per orders.         Benito Perrin MD    D: 10/13/2021 11:18:11       T: 10/13/2021 11:20:29     [email or web address]  Job#: [id number]     Doc#: [id number]    CC:

## 2021-10-14 VITALS
WEIGHT: 174.25 LBS | OXYGEN SATURATION: 97 % | RESPIRATION RATE: 13 BRPM | HEIGHT: 62 IN | TEMPERATURE: 98.5 F | HEART RATE: 77 BPM | BODY MASS INDEX: 32.07 KG/M2 | SYSTOLIC BLOOD PRESSURE: 156 MMHG | DIASTOLIC BLOOD PRESSURE: 77 MMHG

## 2021-10-14 LAB
ALBUMIN SERPL-MCNC: 4.2 GM/DL (ref 3.4–5)
ALP BLD-CCNC: 87 IU/L (ref 40–128)
ALT SERPL-CCNC: 8 U/L (ref 10–40)
ANION GAP SERPL CALCULATED.3IONS-SCNC: 13 MMOL/L (ref 4–16)
AST SERPL-CCNC: 17 IU/L (ref 15–37)
BILIRUB SERPL-MCNC: 0.3 MG/DL (ref 0–1)
BUN BLDV-MCNC: 11 MG/DL (ref 6–23)
CALCIUM SERPL-MCNC: 9.6 MG/DL (ref 8.3–10.6)
CHLORIDE BLD-SCNC: 100 MMOL/L (ref 99–110)
CO2: 24 MMOL/L (ref 21–32)
CREAT SERPL-MCNC: 0.5 MG/DL (ref 0.6–1.1)
CULTURE: NORMAL
EKG ATRIAL RATE: 69 BPM
EKG DIAGNOSIS: NORMAL
EKG P AXIS: -14 DEGREES
EKG P-R INTERVAL: 180 MS
EKG Q-T INTERVAL: 436 MS
EKG QRS DURATION: 116 MS
EKG QTC CALCULATION (BAZETT): 467 MS
EKG R AXIS: -43 DEGREES
EKG T AXIS: -3 DEGREES
EKG VENTRICULAR RATE: 69 BPM
GFR AFRICAN AMERICAN: >60 ML/MIN/1.73M2
GFR NON-AFRICAN AMERICAN: >60 ML/MIN/1.73M2
GLUCOSE BLD-MCNC: 162 MG/DL (ref 70–99)
GLUCOSE BLD-MCNC: 167 MG/DL (ref 70–99)
GLUCOSE BLD-MCNC: 195 MG/DL (ref 70–99)
GLUCOSE BLD-MCNC: 203 MG/DL (ref 70–99)
GLUCOSE BLD-MCNC: 258 MG/DL (ref 70–99)
HCT VFR BLD CALC: 35.2 % (ref 37–47)
HEMOGLOBIN: 12 GM/DL (ref 12.5–16)
Lab: NORMAL
MAGNESIUM: 1.7 MG/DL (ref 1.8–2.4)
MCH RBC QN AUTO: 28.5 PG (ref 27–31)
MCHC RBC AUTO-ENTMCNC: 34.1 % (ref 32–36)
MCV RBC AUTO: 83.6 FL (ref 78–100)
PDW BLD-RTO: 12 % (ref 11.7–14.9)
PHOSPHORUS: 4.1 MG/DL (ref 2.5–4.9)
PLATELET # BLD: 319 K/CU MM (ref 140–440)
PMV BLD AUTO: 9.3 FL (ref 7.5–11.1)
POTASSIUM SERPL-SCNC: 4.5 MMOL/L (ref 3.5–5.1)
PRO-BNP: 115.8 PG/ML
PROCALCITONIN: 0.09
RBC # BLD: 4.21 M/CU MM (ref 4.2–5.4)
SODIUM BLD-SCNC: 137 MMOL/L (ref 135–145)
SPECIMEN: NORMAL
TOTAL PROTEIN: 6.6 GM/DL (ref 6.4–8.2)
WBC # BLD: 8.5 K/CU MM (ref 4–10.5)

## 2021-10-14 PROCEDURE — 6370000000 HC RX 637 (ALT 250 FOR IP): Performed by: INTERNAL MEDICINE

## 2021-10-14 PROCEDURE — 83735 ASSAY OF MAGNESIUM: CPT

## 2021-10-14 PROCEDURE — 6360000002 HC RX W HCPCS: Performed by: INTERNAL MEDICINE

## 2021-10-14 PROCEDURE — 84100 ASSAY OF PHOSPHORUS: CPT

## 2021-10-14 PROCEDURE — 94761 N-INVAS EAR/PLS OXIMETRY MLT: CPT

## 2021-10-14 PROCEDURE — 36415 COLL VENOUS BLD VENIPUNCTURE: CPT

## 2021-10-14 PROCEDURE — 83880 ASSAY OF NATRIURETIC PEPTIDE: CPT

## 2021-10-14 PROCEDURE — 2580000003 HC RX 258: Performed by: INTERNAL MEDICINE

## 2021-10-14 PROCEDURE — 82962 GLUCOSE BLOOD TEST: CPT

## 2021-10-14 PROCEDURE — 93010 ELECTROCARDIOGRAM REPORT: CPT | Performed by: INTERNAL MEDICINE

## 2021-10-14 PROCEDURE — 84145 PROCALCITONIN (PCT): CPT

## 2021-10-14 PROCEDURE — 93005 ELECTROCARDIOGRAM TRACING: CPT | Performed by: INTERNAL MEDICINE

## 2021-10-14 PROCEDURE — 80053 COMPREHEN METABOLIC PANEL: CPT

## 2021-10-14 PROCEDURE — 85027 COMPLETE CBC AUTOMATED: CPT

## 2021-10-14 RX ORDER — FLUCONAZOLE 100 MG/1
150 TABLET ORAL DAILY
Status: COMPLETED | OUTPATIENT
Start: 2021-10-14 | End: 2021-10-14

## 2021-10-14 RX ADMIN — BUSPIRONE HYDROCHLORIDE 20 MG: 10 TABLET ORAL at 14:01

## 2021-10-14 RX ADMIN — HYDROCODONE BITARTRATE AND ACETAMINOPHEN 1 TABLET: 10; 325 TABLET ORAL at 09:13

## 2021-10-14 RX ADMIN — LOSARTAN POTASSIUM 100 MG: 100 TABLET, FILM COATED ORAL at 09:05

## 2021-10-14 RX ADMIN — LEVOTHYROXINE SODIUM 75 MCG: 0.07 TABLET ORAL at 05:42

## 2021-10-14 RX ADMIN — FLUCONAZOLE 150 MG: 100 TABLET ORAL at 14:02

## 2021-10-14 RX ADMIN — NIFEDIPINE 60 MG: 30 TABLET, FILM COATED, EXTENDED RELEASE ORAL at 09:04

## 2021-10-14 RX ADMIN — Medication 1 G: at 09:05

## 2021-10-14 RX ADMIN — ENOXAPARIN SODIUM 40 MG: 40 INJECTION SUBCUTANEOUS at 07:44

## 2021-10-14 RX ADMIN — SODIUM CHLORIDE, PRESERVATIVE FREE 10 ML: 5 INJECTION INTRAVENOUS at 09:08

## 2021-10-14 RX ADMIN — INSULIN GLARGINE 15 UNITS: 100 INJECTION, SOLUTION SUBCUTANEOUS at 00:08

## 2021-10-14 RX ADMIN — LEVETIRACETAM 750 MG: 250 TABLET, FILM COATED ORAL at 09:04

## 2021-10-14 RX ADMIN — PANTOPRAZOLE SODIUM 40 MG: 40 TABLET, DELAYED RELEASE ORAL at 05:43

## 2021-10-14 RX ADMIN — CEFTRIAXONE SODIUM 1000 MG: 1 INJECTION, POWDER, FOR SOLUTION INTRAMUSCULAR; INTRAVENOUS at 02:43

## 2021-10-14 RX ADMIN — MAGNESIUM OXIDE 400 MG: 400 TABLET ORAL at 09:05

## 2021-10-14 RX ADMIN — BACLOFEN 10 MG: 10 TABLET ORAL at 09:05

## 2021-10-14 RX ADMIN — METOPROLOL SUCCINATE 50 MG: 50 TABLET, EXTENDED RELEASE ORAL at 09:05

## 2021-10-14 RX ADMIN — ATORVASTATIN CALCIUM 20 MG: 20 TABLET, FILM COATED ORAL at 09:05

## 2021-10-14 RX ADMIN — DIAZEPAM 2 MG: 2 TABLET ORAL at 09:05

## 2021-10-14 RX ADMIN — QUETIAPINE FUMARATE 25 MG: 25 TABLET ORAL at 09:05

## 2021-10-14 RX ADMIN — MAGNESIUM OXIDE 400 MG: 400 TABLET ORAL at 14:02

## 2021-10-14 RX ADMIN — ASPIRIN 81 MG: 81 TABLET, CHEWABLE ORAL at 09:05

## 2021-10-14 RX ADMIN — SODIUM CHLORIDE 25 ML: 9 INJECTION, SOLUTION INTRAVENOUS at 02:42

## 2021-10-14 RX ADMIN — OXCARBAZEPINE 450 MG: 300 TABLET, FILM COATED ORAL at 09:07

## 2021-10-14 RX ADMIN — BUSPIRONE HYDROCHLORIDE 20 MG: 10 TABLET ORAL at 09:05

## 2021-10-14 RX ADMIN — HYDROXYZINE PAMOATE 25 MG: 25 CAPSULE ORAL at 09:48

## 2021-10-14 RX ADMIN — DOCUSATE SODIUM 100 MG: 100 CAPSULE ORAL at 09:05

## 2021-10-14 ASSESSMENT — PAIN DESCRIPTION - LOCATION: LOCATION: GENERALIZED

## 2021-10-14 ASSESSMENT — PAIN SCALES - GENERAL
PAINLEVEL_OUTOF10: 5
PAINLEVEL_OUTOF10: 10

## 2021-10-14 NOTE — PROGRESS NOTES
Physician Progress Note      PATIENT:               Opal Maloney  [facility] #:                  [id number]  :                       1955  ADMIT DATE:       10/13/2021 12:43 AM  DISCH DATE:  RESPONDING  PROVIDER #:        Andie MATHEWS          QUERY TEXT:    Hospitalists,    Pt admitted with chest pain and noted documentation of asthma exacerbation by   Internal Medicine. If possible, please document in progress notes and   discharge summary if you are evaluating and/or treating any of the following   as the suspected cause of the chest pain:    The medical record reflects the following:  Risk Factors: CAD, HTN  Clinical Indicators: c/o chest pain and SOB, no acute findings on CXR, severe   anxiety, normal EKG and troponin  Treatment: labs, imaging,  ASA, Beta blocker, ARB, calcium channel blocker,   and statin, SL nitro prn, Nasal cannula oxygen prn    Thank you,  Camilo Gallegos RN CDS  [phone]  Options provided:  -- Chest pain due to CAD with unstable angina  -- Chest pain due to asthma exacerbation  -- Chest pain due to anxiety  -- Chest pain due to  ##please specify, please specify. -- Other - I will add my own diagnosis  -- Disagree - Not applicable / Not valid  -- Disagree - Clinically unable to determine / Unknown  -- Refer to Clinical Documentation Reviewer    PROVIDER RESPONSE TEXT:    This patient has chest pain due to anxiety    Query created by: Huber Herndon on 10/14/2021 10:20 AM      QUERY TEXT:    Hospitalists,    Patient admitted with chest pain and noted documentation of asthma   exacerbation by Internal Medicine and documentation of no asthma exacerbation   by Pulmonology. If possible, please document in progress notes and discharge   summary if  asthma exacerbation:     The medical record reflects the following:  Risk Factors: asthma  Clinical Indicators: c/o chest pain & SOB, diminished breath sounds w/ rhonchi   and no asthma exacerbation documented by Pulmonology, RR 16-23  Treatment: labs, imaging, Pulmonology consult, MDI    Thank you,  Tammi Pacheco RN [facility]  [phone]  Options provided:  -- Asthma exacerbation confirmed  -- Asthma exacerbation ruled out  -- Other - I will add my own diagnosis  -- Disagree - Not applicable / Not valid  -- Disagree - Clinically unable to determine / Unknown  -- Refer to Clinical Documentation Reviewer    PROVIDER RESPONSE TEXT:    : After study, asthma exacerbation was ruled out.     Query created by: Penny Kelly on 10/14/2021 10:38 AM      Electronically signed by:  Robert Miguel 10/14/2021 2:24 PM

## 2021-10-14 NOTE — DISCHARGE SUMMARY
Discharge Summary  Mamta Self MD, MD     Name:  Ravindra Love /Age/Sex: 1955  (61 y.o. female)   MRN & CSN:  [id number] & [id number] Admission Date/Time: 10/13/2021 12:43 AM   Attending:  Mamta Self MD Discharging Physician: Mamta Self MD, MD     Hospital Course:   Ravindra Love is a 61 y.o.  female  who presents with chest pain    The patient was evaluated by cardiology and pulmonology. Cardiology and pulmonology cleared for discharge on the day of the discharge. The patient chest pain was likely from her anxiety. She was also noted to have fungal infection for which she will get one-time dose of fluconazole. The patient UA did not show bacterial UTI and will not be discharged on any antibiotics    The patient expressed appropriate understanding of and agreement with the discharge recommendations, medications, and plan. Consults this admission:  IP CONSULT TO HOSPITALIST  IP CONSULT TO CARDIOLOGY  IP CONSULT TO PULMONOLOGY    Discharge Instruction:     Follow up appointments:     No follow-up provider specified. Primary care physician:  within 2 weeks    Diet:  diabetic diet     Activity: activity as tolerated    Disposition: Discharged to:   [x]Home, [][facility], []SNF, []Acute Rehab, []Hospice     Condition on discharge: Stable    Discharge Medications:      Princess Dukes   Home Medication Instructions VCD:[id number]    Printed on:10/14/21 1006   Medication Information                      albuterol sulfate  (90 Base) MCG/ACT inhaler  Inhale 2 puffs into the lungs every 6 hours as needed for Wheezing or Shortness of Breath (or cough) Please include spacer with instructions for use.              aluminum & magnesium hydroxide-simethicone (MAALOX MAX) 400-400-40 MG/5ML SUSP  Take 15 mLs by mouth every 6 hours as needed (heart burn)             amitriptyline (ELAVIL) 25 MG tablet  Take 1 tablet by mouth nightly             aspirin 81 MG tablet  Take 81 mg by mouth daily baclofen (LIORESAL) 10 MG tablet  1 tablet 2 times daily             busPIRone (BUSPAR) 10 MG tablet  Take 2 tablets by mouth 3 times daily             carbidopa-levodopa (SINEMET)  MG per tablet  Take 1 tablet by mouth nightly             diazePAM (VALIUM) 5 MG tablet  as needed. docusate sodium (COLACE) 100 MG capsule  Take 1 capsule by mouth 2 times daily             fluticasone-umeclidin-vilant (TRELEGY ELLIPTA) 100-62.5-25 MCG/INH AEPB  Inhale 1 puff into the lungs daily             HYDROcodone-acetaminophen (NORCO)  MG per tablet  Take 1 tablet by mouth every 6 hours as needed. hydrOXYzine (VISTARIL) 25 MG capsule  Take 25 mg by mouth 3 times daily as needed              levETIRAcetam (KEPPRA) 750 MG tablet  Take 1 tablet by mouth 2 times daily             levothyroxine (SYNTHROID) 75 MCG tablet  Take 1 tablet by mouth every morning (before breakfast)             losartan (COZAAR) 100 MG tablet  Take 1 tablet by mouth daily             magnesium oxide (MAG-OX) 400 MG tablet  Take 1 tablet by mouth 3 times daily             metoprolol succinate (TOPROL XL) 50 MG extended release tablet  Take 1 tablet by mouth daily             montelukast (SINGULAIR) 10 MG tablet  Take 10 mg by mouth nightly. MUCINEX 600 MG extended release tablet  2 times daily as needed             Multiple Vitamins-Iron (MULTI-VITAMIN/IRON PO)  Take  by mouth.              NIFEdipine (PROCARDIA XL) 60 MG extended release tablet  Take 1 tablet by mouth daily             NOVOLOG FLEXPEN 100 UNIT/ML injection pen  Inject into the skin See Admin Instructions 12/01/2020 patient states she follows sliding scale regimen BS > 150             OXcarbazepine (TRILEPTAL) 300 MG tablet  Take 1 tablet by mouth 2 times daily             pantoprazole (PROTONIX) 40 MG tablet  Take 1 tablet by mouth daily             polyethylene glycol (GLYCOLAX) packet  Take 17 g by mouth daily as needed for Constipation QUEtiapine (SEROQUEL) 25 MG tablet  Take 1 tablet by mouth 2 times daily             simvastatin (ZOCOR) 20 MG tablet  Take 1 tablet by mouth nightly             sodium chloride 1 g tablet  Take 1 tablet by mouth 2 times daily (with meals)             TRESIBA FLEXTOUCH 200 UNIT/ML SOPN  Inject 20 Units into the skin every morning                 Objective Findings at Discharge:     BP (!) 156/77   Pulse 77   Temp 98.5 °F (36.9 °C) (Oral)   Resp 23   Ht 5' 2\" (1.575 m)   Wt (S) 174 lb 4 oz (79 kg)   SpO2 98%   BMI 31.87 kg/m²            PHYSICAL EXAM     GEN:  Awake female, sitting upright in bed in no apparent distress. RESP:  CTAB, no wheezes, rales or rhonchi. CVS:  RRR. No peripheral edema. GI/:  S/NT/ND BS+   NEURO: No focal defecits. PSYCH:  Awake, alert, oriented x 3. Affect appropriate. LABS: Reviewd    IMAGING: Reviwed    25 Minutes spent in preparation of discharging this patient including face to face encounter, discussions with the patient/family, medication reconciliation, and transition of care preparation.      Electronically signed by Anastasia Stroud MD, MD on 10/14/2021 at 10:09 AM

## 2021-10-14 NOTE — PROGRESS NOTES
Pulmonary and Critical Care  Progress Note    Subjective: The patient has improved. Apnea link not done. Shortness of breath none  Chest pain none  Addressing respiratory complaints Patient is negative for  hemoptysis and cyanosis  CONSTITUTIONAL:  negative for fevers and chills      Past Medical History:     has a past medical history of Abnormal EKG, Acid reflux, Anemia, Anesthesia, Anginal pain (HCC), Anxiety, Arthritis, Asthma, Bipolar 1 disorder ([address] CAD (coronary artery disease), Cerebral artery occlusion with cerebral infarction ([address] CHF (congestive heart failure) ([address] Chronic back pain, Chronic kidney disease, DDD (degenerative disc disease), cervical, Depression, Diabetes mellitus ([address] Diabetic neuropathy ([address] Dizziness, Dry skin, Enlarged ureter, Fatty liver, Fibrocystic breast, Generalized anxiety disorder, Gout, H/O cardiac catheterization, H/O cardiovascular stress test, H/O cardiovascular stress test, H/O cardiovascular stress test, H/O Doppler ultrasound, H/O echocardiogram, H/O echocardiogram, History of Holter monitoring, [place] (hard of hearing), Hx of cardiovascular stress test, Hx of motion sickness, HX OTHER MEDICAL, Hyperlipidemia, Hypertension, IBS (irritable bowel syndrome), Incisional hernia, Kidney stones, Migraines, Nausea & vomiting, Other specified disorder of skin, Panic attacks, Panic attacks, Pneumonia, Pseudoseizures ([address] Restless leg, Shortness of breath, Sleep apnea, Staph infection, Thyroid disease, Tremor, Urinary incontinence, UTI (urinary tract infection), UTI (urinary tract infection), Vertigo, and Wears glasses. has a past surgical history that includes Carpal tunnel release (Right, 1999); Diagnostic Cardiac Cath Lab Procedure (01/11/2010); Dental surgery; Colonoscopy (Last Done 6-13); Endoscopy, colon, diagnostic (Several ); Bladder surgery (1970's Or 1980's); Lithotripsy (2011); Breast biopsy (Right, 1980's);  Breast surgery (Left, 1990's); hernia repair (1990's); hernia repair (1970's); Cholecystectomy (1970's); Appendectomy (1970's); Hysterectomy, total abdominal (1987); Tubal ligation (1978); Esophagus dilation (1980's And 1990's); Knee arthroscopy (Right, 1999); joint replacement (2008); other surgical history (06 13 2014); fracture surgery (1974 Or 1975); Cardiac catheterization (10-18-06); and Cardiac catheterization. reports that she has never smoked. She has never used smokeless tobacco. She reports that she does not drink alcohol and does not use drugs. Family history:  family history includes Anxiety Disorder in her daughter; Arthritis in her father, mother, and sister; Bipolar Disorder in her daughter; Cancer in her brother and sister; Colon Cancer in her brother; Depression in her daughter and mother; Diabetes in her mother; Early Death in her brother; Early Death (age of onset: 37) in her brother; Early Death (age of onset: 61) in her mother; Early Death (age of onset: 61) in her brother; Hearing Loss in her sister; Heart Disease in her brother, brother, brother, father, mother, and sister; Heart Failure in her sister; High Blood Pressure in her brother, father, mother, and sister; High Cholesterol in her brother, father, and mother; Mental Illness in her brother, brother, daughter, father, mother, and sister; Kaylynn Dominique / [place] in her mother; Other in her daughter, mother, and son; Stroke in her mother.     Allergies   Allergen Reactions    Abilify [Aripiprazole]      \"Severe Shaking And Restlessness\"    Advil [Ibuprofen Micronized] Palpitations     \"Severe High Blood Pressure\"    Augmentin [Amoxicillin-Pot Clavulanate] Itching and Rash    Bee Venom Swelling     Redness    Ciprofloxacin Itching and Rash    Codeine      \"Severe Abdominal Cramping\"    Darvocet [Propoxyphene N-Acetaminophen] Palpitations     \"Severe High Blood Pressure\"    Darvon [Propoxyphene Hcl] Palpitations     \"Severe High Blood Pressure\"    Decadron [Dexamethasone] Other (See Comments)     seizure  Seizures    Ditropan [Oxybutynin Chloride] Palpitations     \"Severe High Blood Pressure\"    Fioricet [Butalbital-Apap-Caffeine] Palpitations     \"Severe High Blood Pressure\"    Fiorinal-Codeine #3 [Butalbital-Asa-Caff-Codeine]      \"Severe Stomach Cramps\"    Flagyl [Metronidazole] Diarrhea     \"Severe Diarrhea And Cramping\"    Naproxen Palpitations     \"Severe High Blood Pressure\"    Other      \"Allergic To Spider Bites Causing Blackness Of Skin, Severe Itching And Pain\"                                                  \"Allergic To Powder In Gloves Causing Severe Redness And Itching\"    Prozac [Fluoxetine Hcl]      \"Hallucinations\"    Robaxin [Methocarbamol] Palpitations     \"Severe High Blood Pressure\"    Ultram [Tramadol]      \"Severe Stomach Pain\"    Zoloft Palpitations     \"Sever High Blood Pressure\"    Butalbital-Aspirin-Caffeine Other (See Comments)     \"severe stomach pain\"    Coreg [Carvedilol] Other (See Comments)     \"spikes my BP severely and send me into a severe anxiety attack\"    Fluoxetine Other (See Comments)     hallucinations    Oxybutynin Chloride Other (See Comments)     Raises bp    Percocet [Oxycodone-Acetaminophen]      \"knocked me out for 12 hrs\"    Sertraline Other (See Comments)     hallucinations    Tape [Adhesive Tape]      Patient states it tears her skin, including band aids    Reglan [Metoclopramide] Other (See Comments)     \"makes me talk like a baby, but if I take benadryl with it it's fine\"     Social History:    Reviewed; no changes    Objective:   PHYSICAL EXAM:        VITALS:  BP (!) 156/77   Pulse 77   Temp 98.5 °F (36.9 °C) (Oral)   Resp 23   Ht 5' 2\" (1.575 m)   Wt (S) 174 lb 4 oz (79 kg)   SpO2 98%   BMI 31.87 kg/m²     24HR INTAKE/OUTPUT:  No intake or output data in the 24 hours ending 10/14/21 1029    CONSTITUTIONAL:  awake, alert, cooperative, no apparent distress, and appears stated age  LUNGS: decreased breath sounds  CARDIOVASCULAR:  normal S1 and S2 and negative JVD  ABD:Abdomen soft, non-tender. BS normal. No masses,  No organomegaly  NEURO:Alert and oriented x3. Gait normal. Reflexes and motor strength normal and symmetric. Cranial nerves 2-12 and sensation grossly intact. DATA:    CBC:  Recent Labs     10/12/21  2140 10/14/21  0301   WBC 10.9* 8.5   RBC 4.76 4.21   HGB 13.8 12.0*   HCT 40.8 35.2*    319   MCV 85.7 83.6   MCH 29.0 28.5   MCHC 33.8 34.1   RDW 11.9 12.0   SEGSPCT 43.3  --       BMP:  Recent Labs     10/12/21  2140 10/14/21  0301   * 137   K 3.3* 4.5   CL 98* 100   CO2 24 24   BUN 10 11   CREATININE 0.4* 0.5*   CALCIUM 9.4 9.6   GLUCOSE 70 203*      ABG:  No results for input(s): PH, PO2ART, BDJ9UPP, HCO3, BEART, O2SAT in the last 72 hours. Lab Results   Component Value Date    PROBNP 115.8 10/14/2021    PROBNP 86.00 09/27/2021    PROBNP 35.76 07/10/2021     No results found for: [facility]    Radiology Review:  Pertinent images / reports were reviewed as a part of this visit.     Assessment:     Patient Active Problem List   Diagnosis    Chest pain    H/O Doppler ultrasound    H/O cardiovascular stress test    H/O cardiovascular stress test    H/O cardiovascular stress test    Incarcerated umbilical hernia    Essential hypertension    Type 2 diabetes mellitus with diabetic polyneuropathy, with long-term current use of insulin (HCC)    Tachycardia    Dyslipidemia    Family history of early CAD    NSTEMI (non-ST elevated myocardial infarction) ([address]    Abnormal nuclear stress test    ILSA (obstructive sleep apnea)    Snoring    Hypersomnolence    Mild intermittent asthma without complication    Asthma exacerbation attacks    Hypertensive emergency    Hallucination, visual    Severe episode of recurrent major depressive disorder, with psychotic features ([address]    CHEKO (generalized anxiety disorder)    Auditory hallucinations    Bipolar 1 disorder, depressed, severe ([facility]    Dyspnea and respiratory abnormalities    Encephalopathy    Syncope    Bipolar 1 disorder (HCC)    Hyponatremia    Pseudoseizures (HCC)    Panic attacks    Generalized abdominal pain    Diabetes mellitus due to underlying condition with stage 2 chronic kidney disease, without long-term current use of insulin (HCC)    Seizure (HCC)    Amyloid polyneuropathy (HCC)    Anemia    Anxiety    Osteoarthrosis    CHF (congestive heart failure) (HCC)    Coronary atherosclerosis    Chronic pain    Degeneration of lumbar intervertebral disc    Disorder of liver    Dysuria    Fibrocystic breast    Gastroesophageal reflux disease    Gastroparesis    Gout    H/O degenerative disc disease    Hyperglycemia due to type 2 diabetes mellitus ([address]    Hypothyroidism due to acquired atrophy of thyroid    Irritable bowel syndrome    Low back pain    Memory loss    Migraine    Migraine without aura, not refractory    Hyperlipidemia    Neck pain    Neuropathy    Nonalcoholic steatohepatitis ([facility])    Osteoporosis    Polyneuropathy due to type 2 diabetes mellitus ([facility]    Renal impairment    Suicidal ideation    Vitamin B12 deficiency       Plan:   1. Overall the patient has improved  2. Sleep study as outpt.       Tiffanie Dalal MD  10/14/2021  10:29 AM

## 2021-11-03 ENCOUNTER — HOSPITAL ENCOUNTER (EMERGENCY)
Age: 66
Discharge: HOME | End: 2021-11-03
Payer: MEDICARE

## 2021-11-03 ENCOUNTER — APPOINTMENT (OUTPATIENT)
Dept: GENERAL RADIOLOGY | Age: 66
End: 2021-11-03
Payer: MEDICARE

## 2021-11-03 ENCOUNTER — TELEPHONE (OUTPATIENT)
Dept: CARDIOLOGY CLINIC | Age: 66
End: 2021-11-03

## 2021-11-03 VITALS
RESPIRATION RATE: 18 BRPM | TEMPERATURE: 97.9 F | WEIGHT: 176 LBS | OXYGEN SATURATION: 97 % | BODY MASS INDEX: 32.19 KG/M2 | DIASTOLIC BLOOD PRESSURE: 78 MMHG | SYSTOLIC BLOOD PRESSURE: 161 MMHG | HEART RATE: 70 BPM

## 2021-11-03 DIAGNOSIS — R07.9 CHEST PAIN, UNSPECIFIED TYPE: Primary | ICD-10-CM

## 2021-11-03 DIAGNOSIS — R06.02 SHORTNESS OF BREATH: ICD-10-CM

## 2021-11-03 LAB
ALBUMIN SERPL-MCNC: 4 GM/DL (ref 3.4–5)
ALP BLD-CCNC: 81 IU/L (ref 40–129)
ALT SERPL-CCNC: 21 U/L (ref 10–40)
ANION GAP SERPL CALCULATED.3IONS-SCNC: 11 MMOL/L (ref 4–16)
AST SERPL-CCNC: 28 IU/L (ref 15–37)
BASOPHILS ABSOLUTE: 0.1 K/CU MM
BASOPHILS RELATIVE PERCENT: 0.8 % (ref 0–1)
BILIRUB SERPL-MCNC: 0.3 MG/DL (ref 0–1)
BUN BLDV-MCNC: 10 MG/DL (ref 6–23)
CALCIUM SERPL-MCNC: 9.2 MG/DL (ref 8.3–10.6)
CHLORIDE BLD-SCNC: 97 MMOL/L (ref 99–110)
CO2: 25 MMOL/L (ref 21–32)
CREAT SERPL-MCNC: 0.6 MG/DL (ref 0.6–1.1)
DIFFERENTIAL TYPE: ABNORMAL
EOSINOPHILS ABSOLUTE: 0.4 K/CU MM
EOSINOPHILS RELATIVE PERCENT: 5.2 % (ref 0–3)
GFR AFRICAN AMERICAN: >60 ML/MIN/1.73M2
GFR NON-AFRICAN AMERICAN: >60 ML/MIN/1.73M2
GLUCOSE BLD-MCNC: 164 MG/DL (ref 70–99)
HCT VFR BLD CALC: 38.6 % (ref 37–47)
HEMOGLOBIN: 13.1 GM/DL (ref 12.5–16)
IMMATURE NEUTROPHIL %: 0.2 % (ref 0–0.43)
LIPASE: 14 IU/L (ref 13–60)
LYMPHOCYTES ABSOLUTE: 2.8 K/CU MM
LYMPHOCYTES RELATIVE PERCENT: 34.5 % (ref 24–44)
MCH RBC QN AUTO: 28.4 PG (ref 27–31)
MCHC RBC AUTO-ENTMCNC: 33.9 % (ref 32–36)
MCV RBC AUTO: 83.7 FL (ref 78–100)
MONOCYTES ABSOLUTE: 1 K/CU MM
MONOCYTES RELATIVE PERCENT: 12.4 % (ref 0–4)
NUCLEATED RBC %: 0 %
PDW BLD-RTO: 11.7 % (ref 11.7–14.9)
PLATELET # BLD: 280 K/CU MM (ref 140–440)
PMV BLD AUTO: 9.3 FL (ref 7.5–11.1)
POTASSIUM SERPL-SCNC: 4.5 MMOL/L (ref 3.5–5.1)
PRO-BNP: 28.69 PG/ML
RBC # BLD: 4.61 M/CU MM (ref 4.2–5.4)
SEGMENTED NEUTROPHILS ABSOLUTE COUNT: 3.9 K/CU MM
SEGMENTED NEUTROPHILS RELATIVE PERCENT: 46.9 % (ref 36–66)
SODIUM BLD-SCNC: 133 MMOL/L (ref 135–145)
TOTAL IMMATURE NEUTOROPHIL: 0.02 K/CU MM
TOTAL NUCLEATED RBC: 0 K/CU MM
TOTAL PROTEIN: 6.7 GM/DL (ref 6.4–8.2)
TROPONIN T: <0.01 NG/ML
WBC # BLD: 8.2 K/CU MM (ref 4–10.5)

## 2021-11-03 PROCEDURE — 71045 X-RAY EXAM CHEST 1 VIEW: CPT

## 2021-11-03 PROCEDURE — 99284 EMERGENCY DEPT VISIT MOD MDM: CPT

## 2021-11-03 PROCEDURE — 83690 ASSAY OF LIPASE: CPT

## 2021-11-03 PROCEDURE — 80053 COMPREHEN METABOLIC PANEL: CPT

## 2021-11-03 PROCEDURE — 84484 ASSAY OF TROPONIN QUANT: CPT

## 2021-11-03 PROCEDURE — 85025 COMPLETE CBC W/AUTO DIFF WBC: CPT

## 2021-11-03 PROCEDURE — 36415 COLL VENOUS BLD VENIPUNCTURE: CPT

## 2021-11-03 PROCEDURE — 83880 ASSAY OF NATRIURETIC PEPTIDE: CPT

## 2021-11-03 PROCEDURE — 93005 ELECTROCARDIOGRAM TRACING: CPT | Performed by: PHYSICIAN ASSISTANT

## 2021-11-03 ASSESSMENT — PAIN SCALES - GENERAL: PAINLEVEL_OUTOF10: 10

## 2021-11-03 ASSESSMENT — PAIN DESCRIPTION - LOCATION: LOCATION: CHEST
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
## 2021-11-03 NOTE — TELEPHONE ENCOUNTER
Patient called to let office know she called the squad she  has been having chest pain since 7:00 am

## 2021-11-03 NOTE — CARE COORDINATION
Patient identified as potential readmission. Last admission 10/13-10/14 for chest pain. Patient here today for chest pain. No acute findings requiring admission identified today. Readmission was avoided and patient was able to be discharged home for outpatient follow up.

## 2021-11-04 ENCOUNTER — HOSPITAL ENCOUNTER (EMERGENCY)
Age: 66
Discharge: HOME | End: 2021-11-04
Attending: EMERGENCY MEDICINE
Payer: MEDICARE

## 2021-11-04 ENCOUNTER — APPOINTMENT (OUTPATIENT)
Dept: GENERAL RADIOLOGY | Age: 66
End: 2021-11-04
Payer: MEDICARE

## 2021-11-04 VITALS
RESPIRATION RATE: 16 BRPM | OXYGEN SATURATION: 98 % | DIASTOLIC BLOOD PRESSURE: 83 MMHG | SYSTOLIC BLOOD PRESSURE: 165 MMHG | HEIGHT: 62 IN | WEIGHT: 176 LBS | TEMPERATURE: 97.9 F | BODY MASS INDEX: 32.39 KG/M2 | HEART RATE: 93 BPM

## 2021-11-04 DIAGNOSIS — R06.00 DYSPNEA AND RESPIRATORY ABNORMALITIES: ICD-10-CM

## 2021-11-04 DIAGNOSIS — R06.89 DYSPNEA AND RESPIRATORY ABNORMALITIES: ICD-10-CM

## 2021-11-04 DIAGNOSIS — J45.909 UNCOMPLICATED ASTHMA, UNSPECIFIED ASTHMA SEVERITY, UNSPECIFIED WHETHER PERSISTENT: ICD-10-CM

## 2021-11-04 DIAGNOSIS — J40 BRONCHITIS: Primary | ICD-10-CM

## 2021-11-04 LAB
ALBUMIN SERPL-MCNC: 4.5 GM/DL (ref 3.4–5)
ALP BLD-CCNC: 97 IU/L (ref 40–129)
ALT SERPL-CCNC: 25 U/L (ref 10–40)
ANION GAP SERPL CALCULATED.3IONS-SCNC: 13 MMOL/L (ref 4–16)
AST SERPL-CCNC: 29 IU/L (ref 15–37)
BACTERIA: NEGATIVE /HPF
BASOPHILS ABSOLUTE: 0.1 K/CU MM
BASOPHILS RELATIVE PERCENT: 0.6 % (ref 0–1)
BILIRUB SERPL-MCNC: 0.3 MG/DL (ref 0–1)
BILIRUBIN URINE: NEGATIVE MG/DL
BLOOD, URINE: NEGATIVE
BUN BLDV-MCNC: 10 MG/DL (ref 6–23)
CALCIUM SERPL-MCNC: 9.9 MG/DL (ref 8.3–10.6)
CHLORIDE BLD-SCNC: 94 MMOL/L (ref 99–110)
CLARITY: CLEAR
CO2: 26 MMOL/L (ref 21–32)
COLOR: YELLOW
CREAT SERPL-MCNC: 0.5 MG/DL (ref 0.6–1.1)
DIFFERENTIAL TYPE: ABNORMAL
EOSINOPHILS ABSOLUTE: 0.3 K/CU MM
EOSINOPHILS RELATIVE PERCENT: 3.4 % (ref 0–3)
GFR AFRICAN AMERICAN: >60 ML/MIN/1.73M2
GFR NON-AFRICAN AMERICAN: >60 ML/MIN/1.73M2
GLUCOSE BLD-MCNC: 119 MG/DL (ref 70–99)
GLUCOSE, URINE: 150 MG/DL
HCT VFR BLD CALC: 40.3 % (ref 37–47)
HEMOGLOBIN: 13.7 GM/DL (ref 12.5–16)
IMMATURE NEUTROPHIL %: 0.4 % (ref 0–0.43)
KETONES, URINE: NEGATIVE MG/DL
LEUKOCYTE ESTERASE, URINE: ABNORMAL
LYMPHOCYTES ABSOLUTE: 3.4 K/CU MM
LYMPHOCYTES RELATIVE PERCENT: 33.7 % (ref 24–44)
MCH RBC QN AUTO: 28.4 PG (ref 27–31)
MCHC RBC AUTO-ENTMCNC: 34 % (ref 32–36)
MCV RBC AUTO: 83.6 FL (ref 78–100)
MONOCYTES ABSOLUTE: 1.2 K/CU MM
MONOCYTES RELATIVE PERCENT: 11.6 % (ref 0–4)
MUCUS: ABNORMAL HPF
NITRITE URINE, QUANTITATIVE: NEGATIVE
NUCLEATED RBC %: 0 %
PDW BLD-RTO: 11.7 % (ref 11.7–14.9)
PH, URINE: 6 (ref 5–8)
PLATELET # BLD: 303 K/CU MM (ref 140–440)
PMV BLD AUTO: 9.1 FL (ref 7.5–11.1)
POTASSIUM SERPL-SCNC: 4 MMOL/L (ref 3.5–5.1)
PRO-BNP: 47.61 PG/ML
PROTEIN UA: NEGATIVE MG/DL
RBC # BLD: 4.82 M/CU MM (ref 4.2–5.4)
RBC URINE: 1 /HPF (ref 0–6)
SARS-COV-2, NAAT: NOT DETECTED
SEGMENTED NEUTROPHILS ABSOLUTE COUNT: 5.1 K/CU MM
SEGMENTED NEUTROPHILS RELATIVE PERCENT: 50.3 % (ref 36–66)
SODIUM BLD-SCNC: 133 MMOL/L (ref 135–145)
SOURCE: NORMAL
SPECIFIC GRAVITY UA: 1.03 (ref 1–1.03)
SQUAMOUS EPITHELIAL: 2 /HPF
TOTAL IMMATURE NEUTOROPHIL: 0.04 K/CU MM
TOTAL NUCLEATED RBC: 0 K/CU MM
TOTAL PROTEIN: 7.7 GM/DL (ref 6.4–8.2)
TRICHOMONAS: ABNORMAL /HPF
TROPONIN T: <0.01 NG/ML
UROBILINOGEN, URINE: NEGATIVE MG/DL (ref 0.2–1)
WBC # BLD: 10.1 K/CU MM (ref 4–10.5)
WBC UA: 5 /HPF (ref 0–5)

## 2021-11-04 PROCEDURE — 87635 SARS-COV-2 COVID-19 AMP PRB: CPT

## 2021-11-04 PROCEDURE — 83880 ASSAY OF NATRIURETIC PEPTIDE: CPT

## 2021-11-04 PROCEDURE — 6370000000 HC RX 637 (ALT 250 FOR IP): Performed by: EMERGENCY MEDICINE

## 2021-11-04 PROCEDURE — 84484 ASSAY OF TROPONIN QUANT: CPT

## 2021-11-04 PROCEDURE — 36415 COLL VENOUS BLD VENIPUNCTURE: CPT

## 2021-11-04 PROCEDURE — 81001 URINALYSIS AUTO W/SCOPE: CPT

## 2021-11-04 PROCEDURE — 85025 COMPLETE CBC W/AUTO DIFF WBC: CPT

## 2021-11-04 PROCEDURE — 99285 EMERGENCY DEPT VISIT HI MDM: CPT

## 2021-11-04 PROCEDURE — 93005 ELECTROCARDIOGRAM TRACING: CPT | Performed by: PHYSICIAN ASSISTANT

## 2021-11-04 PROCEDURE — 80053 COMPREHEN METABOLIC PANEL: CPT

## 2021-11-04 PROCEDURE — 71045 X-RAY EXAM CHEST 1 VIEW: CPT

## 2021-11-04 RX ORDER — GUAIFENESIN/DEXTROMETHORPHAN 100-10MG/5
5 SYRUP ORAL 4 TIMES DAILY PRN
Qty: 120 ML | Refills: 0 | Status: SHIPPED | OUTPATIENT
Start: 2021-11-04 | End: 2021-11-14

## 2021-11-04 RX ORDER — AZITHROMYCIN 250 MG/1
TABLET, FILM COATED ORAL
Qty: 1 PACKET | Refills: 0 | Status: SHIPPED | OUTPATIENT
Start: 2021-11-04 | End: 2021-11-14

## 2021-11-04 RX ORDER — ALBUTEROL SULFATE 90 UG/1
2 AEROSOL, METERED RESPIRATORY (INHALATION) ONCE
Status: DISCONTINUED | OUTPATIENT
Start: 2021-11-04 | End: 2021-11-04 | Stop reason: HOSPADM

## 2021-11-04 RX ORDER — BENZONATATE 100 MG/1
100 CAPSULE ORAL 3 TIMES DAILY PRN
Qty: 10 CAPSULE | Refills: 0 | Status: SHIPPED | OUTPATIENT
Start: 2021-11-04 | End: 2021-11-11

## 2021-11-04 RX ORDER — GUAIFENESIN 100 MG/5ML
200 SOLUTION ORAL ONCE
Status: COMPLETED | OUTPATIENT
Start: 2021-11-04 | End: 2021-11-04

## 2021-11-04 RX ORDER — BENZONATATE 100 MG/1
100 CAPSULE ORAL ONCE
Status: COMPLETED | OUTPATIENT
Start: 2021-11-04 | End: 2021-11-04

## 2021-11-04 RX ORDER — ALBUTEROL SULFATE 90 UG/1
2 AEROSOL, METERED RESPIRATORY (INHALATION) EVERY 4 HOURS PRN
Qty: 18 G | Refills: 1 | Status: ON HOLD | OUTPATIENT
Start: 2021-11-04 | End: 2022-03-30

## 2021-11-04 RX ADMIN — GUAIFENESIN 200 MG: 200 SOLUTION ORAL at 17:36

## 2021-11-04 RX ADMIN — BENZONATATE 100 MG: 100 CAPSULE ORAL at 17:36

## 2021-11-04 ASSESSMENT — PAIN SCALES - GENERAL: PAINLEVEL_OUTOF10: 6

## 2021-11-04 NOTE — ED PROVIDER NOTES
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 disease. No pleural effusion or pneumothorax. No evidence of acute osseous abnormality. No evidence of acute cardiopulmonary disease. XR CHEST PORTABLE    Result Date: 10/12/2021  EXAMINATION: ONE XRAY VIEW OF THE CHEST 10/12/2021 7:18 pm COMPARISON: 09/27/2020 HISTORY: ORDERING SYSTEM PROVIDED HISTORY: SOB TECHNOLOGIST PROVIDED HISTORY: Reason for exam:->SOB Reason for Exam: COUGH Acuity: Acute Type of Exam: Ongoing Additional signs and symptoms: PATIENT STATES SHE WAS ADMITTED ABOUT 1 MONTH AGO WITH CHEST PAIN, COUGH, SOB;  PATIENT SHAY HAD IV ANTIBIOTICS AND WAS FEELING BETTER SO SHE WAS DISCHARGED HOME;  PATIENT STATES SHE IS HAVING THESE SAME SYMPTOMS AGAIN Relevant Medical/Surgical History: PATIENT STATES SHE WAS ADMITTED ABOUT 1 MONTH AGO WITH CHEST PAIN, COUGH, SOB;  PATIENT SHAY HAD IV ANTIBIOTICS AND WAS FEELING BETTER SO SHE WAS DISCHARGED HOME;  PATIENT STATES SHE IS HAVING THESE SAME SYMPTOMS AGAIN FINDINGS: The lungs are clear. The cardiac and mediastinal contours are normal.  There is no pleural effusion or pneumothorax.  No acute osseous abnormality is identified. No acute cardiopulmonary abnormality. EKG (if obtained): (All EKG's are interpreted by myself in the absence of a cardiologist)    12 lead EKG per my interpretation:  Normal Sinus Rhythm 84  Axis is   Left axis deviation  QTc is  456  There is no specific T wave changes appreciated. There is no specific ST wave changes appreciated. Prior EKG to compare with was not available       MDM:    Patient here with continued cough shortness of breath wheezing. History of asthma she is on treatments at home she only takes steroids mixed with her inhalers but otherwise cannot take steroids. She is here yesterday for the same complaint had a negative work-up including cardiology work-up discharged home PCP sent her back in today for continued cough shortness of breath. She otherwise appears well cardiac work-up performed yesterday and today is negative. She is not had a Covid vaccine I did advise vaccine her Covid test is negative. X-ray shows bronchitis she was given treatments here cough medicine she feels better on repeat examination I doubt she has ACS DVT PE AAA otherwise work-up negative could EKG. She feels comfortable at home I will give her cough medicine inhalers and a Z-Elias for home patient stable discharge. She appears nontoxic nonseptic she is okay with plan. Vital signs are stable. She is in no distress. CLINICAL IMPRESSION:  Final diagnoses:   Dyspnea and respiratory abnormalities   Uncomplicated asthma, unspecified asthma severity, unspecified whether persistent   Bronchitis       (Please note that portions of this note may have been completed with a voice recognition program. Efforts were made to edit the dictations but occasionally words aremis-transcribed.)    DISPOSITION REFERRAL (if applicable):   Radu Daley MD  [facility], [address]   [phone]    Schedule an appointment as soon as possible for a visit in 1 day      [facility] Emergency Department  [place] [phone]    If symptoms worsen      DISPOSITION MEDICATIONS (if applicable):  Discharge Medication List as of 11/4/2021  8:12 PM      START taking these medications    Details   azithromycin (ZITHROMAX) 250 MG tablet Take 2 tablets (500 mg) on Day 1, followed by 1 tablet (250 mg) once daily on Days 2 through 5., Disp-1 packet, R-0Print      guaiFENesin-dextromethorphan (ROBITUSSIN DM) 100-10 MG/5ML syrup Take 5 mLs by mouth 4 times daily as needed for Cough, Disp-120 mL, R-0Print      benzonatate (TESSALON PERLES) 100 MG capsule Take 1 capsule by mouth 3 times daily as needed for Cough, Disp-10 capsule, R-0Print      !! albuterol sulfate HFA (PROVENTIL HFA) 108 (90 Base) MCG/ACT inhaler Inhale 2 puffs into the lungs every 4 hours as needed for Wheezing or Shortness of Breath With spacer (and mask if indicated). Thanks. , Disp-18 g, R-1Print       !! - Potential duplicate medications found. Please discuss with provider.              Screwpulp, DO             Screwpulp, DO  11/05/21 [id number]

## 2021-11-05 LAB
EKG ATRIAL RATE: 65 BPM
EKG ATRIAL RATE: 84 BPM
EKG DIAGNOSIS: NORMAL
EKG DIAGNOSIS: NORMAL
EKG P AXIS: 18 DEGREES
EKG P AXIS: 19 DEGREES
EKG P-R INTERVAL: 174 MS
EKG P-R INTERVAL: 198 MS
EKG Q-T INTERVAL: 386 MS
EKG Q-T INTERVAL: 418 MS
EKG QRS DURATION: 112 MS
EKG QRS DURATION: 114 MS
EKG QTC CALCULATION (BAZETT): 434 MS
EKG QTC CALCULATION (BAZETT): 456 MS
EKG R AXIS: -42 DEGREES
EKG R AXIS: -43 DEGREES
EKG T AXIS: 13 DEGREES
EKG T AXIS: 49 DEGREES
EKG VENTRICULAR RATE: 65 BPM
EKG VENTRICULAR RATE: 84 BPM

## 2021-11-05 PROCEDURE — 93010 ELECTROCARDIOGRAM REPORT: CPT | Performed by: INTERNAL MEDICINE

## 2021-11-05 ASSESSMENT — ENCOUNTER SYMPTOMS
EYES NEGATIVE: 1
WHEEZING: 1
GASTROINTESTINAL NEGATIVE: 1
SHORTNESS OF BREATH: 1
ALLERGIC/IMMUNOLOGIC NEGATIVE: 1
COUGH: 1

## 2021-11-05 NOTE — ED NOTES
Reviewed discharge information. Patient verbalized understanding of paperwork, medication, and care instructions. Patient denied any questions.      Lonnie Gordon RN  11/04/21 2020

## 2021-11-05 NOTE — CARE COORDINATION
CM --from request -- Dr Sridevi Arenas ---transportation arranged for pt .  Rosangela Patel / [facility]

## 2021-11-08 ENCOUNTER — TELEPHONE (OUTPATIENT)
Dept: CARDIOLOGY CLINIC | Age: 66
End: 2021-11-08

## 2021-11-08 RX ORDER — METOPROLOL TARTRATE 50 MG/1
50 TABLET, FILM COATED ORAL SEE ADMIN INSTRUCTIONS
Qty: 2 TABLET | Refills: 0 | Status: ON HOLD
Start: 2021-11-08 | End: 2022-01-11 | Stop reason: HOSPADM

## 2021-11-08 NOTE — TELEPHONE ENCOUNTER
Patient called she is to have a CTA and is on antibiotics and wanted to make sure that its ok for her to get CTA done

## 2021-11-08 NOTE — TELEPHONE ENCOUNTER
Discussed with Dr Ariadna Pepe patient to have CTA as planned. Advised of additional metoprolol tartrate to take as prep for CTA. Per Dr Ariadna Pepe, to take succinate as usual and tartrate 50mg 12 hrs and 2 hrs before CTA. Patient instructed.

## 2021-11-09 ENCOUNTER — TELEPHONE (OUTPATIENT)
Dept: CARDIOLOGY CLINIC | Age: 66
End: 2021-11-09

## 2021-11-10 ENCOUNTER — HOSPITAL ENCOUNTER (EMERGENCY)
Age: 66
Discharge: HOME | End: 2021-11-10
Attending: EMERGENCY MEDICINE
Payer: MEDICARE

## 2021-11-10 ENCOUNTER — APPOINTMENT (OUTPATIENT)
Dept: GENERAL RADIOLOGY | Age: 66
End: 2021-11-10
Payer: MEDICARE

## 2021-11-10 VITALS
TEMPERATURE: 97.7 F | HEIGHT: 62 IN | HEART RATE: 75 BPM | RESPIRATION RATE: 16 BRPM | OXYGEN SATURATION: 94 % | DIASTOLIC BLOOD PRESSURE: 76 MMHG | WEIGHT: 175 LBS | BODY MASS INDEX: 32.2 KG/M2 | SYSTOLIC BLOOD PRESSURE: 151 MMHG

## 2021-11-10 DIAGNOSIS — J01.00 ACUTE MAXILLARY SINUSITIS, RECURRENCE NOT SPECIFIED: Primary | ICD-10-CM

## 2021-11-10 LAB
ALBUMIN SERPL-MCNC: 4.3 GM/DL (ref 3.4–5)
ALP BLD-CCNC: 82 IU/L (ref 40–128)
ALT SERPL-CCNC: 26 U/L (ref 10–40)
ANION GAP SERPL CALCULATED.3IONS-SCNC: 15 MMOL/L (ref 4–16)
AST SERPL-CCNC: 35 IU/L (ref 15–37)
BASOPHILS ABSOLUTE: 0.1 K/CU MM
BASOPHILS RELATIVE PERCENT: 0.8 % (ref 0–1)
BILIRUB SERPL-MCNC: 0.4 MG/DL (ref 0–1)
BUN BLDV-MCNC: 9 MG/DL (ref 6–23)
CALCIUM SERPL-MCNC: 9.2 MG/DL (ref 8.3–10.6)
CHLORIDE BLD-SCNC: 99 MMOL/L (ref 99–110)
CO2: 21 MMOL/L (ref 21–32)
CREAT SERPL-MCNC: 0.5 MG/DL (ref 0.6–1.1)
DIFFERENTIAL TYPE: ABNORMAL
EOSINOPHILS ABSOLUTE: 0.4 K/CU MM
EOSINOPHILS RELATIVE PERCENT: 4.6 % (ref 0–3)
GFR AFRICAN AMERICAN: >60 ML/MIN/1.73M2
GFR NON-AFRICAN AMERICAN: >60 ML/MIN/1.73M2
GLUCOSE BLD-MCNC: 176 MG/DL (ref 70–99)
HCT VFR BLD CALC: 37.6 % (ref 37–47)
HEMOGLOBIN: 12.8 GM/DL (ref 12.5–16)
IMMATURE NEUTROPHIL %: 0.1 % (ref 0–0.43)
LYMPHOCYTES ABSOLUTE: 2.8 K/CU MM
LYMPHOCYTES RELATIVE PERCENT: 36.1 % (ref 24–44)
MCH RBC QN AUTO: 29 PG (ref 27–31)
MCHC RBC AUTO-ENTMCNC: 34 % (ref 32–36)
MCV RBC AUTO: 85.3 FL (ref 78–100)
MONOCYTES ABSOLUTE: 0.7 K/CU MM
MONOCYTES RELATIVE PERCENT: 8.5 % (ref 0–4)
NUCLEATED RBC %: 0 %
PDW BLD-RTO: 11.6 % (ref 11.7–14.9)
PLATELET # BLD: 272 K/CU MM (ref 140–440)
PMV BLD AUTO: 9.3 FL (ref 7.5–11.1)
POTASSIUM SERPL-SCNC: 4.1 MMOL/L (ref 3.5–5.1)
PRO-BNP: 72.38 PG/ML
RBC # BLD: 4.41 M/CU MM (ref 4.2–5.4)
SEGMENTED NEUTROPHILS ABSOLUTE COUNT: 3.9 K/CU MM
SEGMENTED NEUTROPHILS RELATIVE PERCENT: 49.9 % (ref 36–66)
SODIUM BLD-SCNC: 135 MMOL/L (ref 135–145)
TOTAL IMMATURE NEUTOROPHIL: 0.01 K/CU MM
TOTAL NUCLEATED RBC: 0 K/CU MM
TOTAL PROTEIN: 7 GM/DL (ref 6.4–8.2)
TROPONIN T: <0.01 NG/ML
WBC # BLD: 7.8 K/CU MM (ref 4–10.5)

## 2021-11-10 PROCEDURE — 6370000000 HC RX 637 (ALT 250 FOR IP): Performed by: EMERGENCY MEDICINE

## 2021-11-10 PROCEDURE — 80053 COMPREHEN METABOLIC PANEL: CPT

## 2021-11-10 PROCEDURE — 99285 EMERGENCY DEPT VISIT HI MDM: CPT

## 2021-11-10 PROCEDURE — 93005 ELECTROCARDIOGRAM TRACING: CPT | Performed by: EMERGENCY MEDICINE

## 2021-11-10 PROCEDURE — 71045 X-RAY EXAM CHEST 1 VIEW: CPT

## 2021-11-10 PROCEDURE — 84484 ASSAY OF TROPONIN QUANT: CPT

## 2021-11-10 PROCEDURE — 83880 ASSAY OF NATRIURETIC PEPTIDE: CPT

## 2021-11-10 PROCEDURE — 85025 COMPLETE CBC W/AUTO DIFF WBC: CPT

## 2021-11-10 RX ORDER — DOXYCYCLINE HYCLATE 100 MG
100 TABLET ORAL 2 TIMES DAILY
Qty: 14 TABLET | Refills: 0 | Status: SHIPPED | OUTPATIENT
Start: 2021-11-10 | End: 2021-11-17

## 2021-11-10 RX ORDER — DOXYCYCLINE HYCLATE 100 MG
100 TABLET ORAL ONCE
Status: COMPLETED | OUTPATIENT
Start: 2021-11-10 | End: 2021-11-10

## 2021-11-10 RX ADMIN — DOXYCYCLINE HYCLATE 100 MG: 100 TABLET, COATED ORAL at 19:25

## 2021-11-10 ASSESSMENT — PAIN SCALES - GENERAL: PAINLEVEL_OUTOF10: 10

## 2021-11-10 ASSESSMENT — PAIN DESCRIPTION - DESCRIPTORS: DESCRIPTORS: ACHING

## 2021-11-10 ASSESSMENT — PAIN DESCRIPTION - LOCATION: LOCATION: CHEST;RIB CAGE

## 2021-11-10 ASSESSMENT — PAIN DESCRIPTION - PAIN TYPE: TYPE: ACUTE PAIN

## 2021-11-10 NOTE — ED NOTES
[place],lactic acid,venous ph and first set of blood cultures were drawn on patient, Second set needs drawn on patient. EKG completed on patient     Vandana Means  11/10/21 [id number]
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
## 2021-11-11 LAB
EKG ATRIAL RATE: 78 BPM
EKG DIAGNOSIS: NORMAL
EKG P AXIS: 17 DEGREES
EKG P-R INTERVAL: 172 MS
EKG Q-T INTERVAL: 420 MS
EKG QRS DURATION: 118 MS
EKG QTC CALCULATION (BAZETT): 478 MS
EKG R AXIS: -46 DEGREES
EKG T AXIS: 40 DEGREES
EKG VENTRICULAR RATE: 78 BPM

## 2021-11-11 PROCEDURE — 93010 ELECTROCARDIOGRAM REPORT: CPT | Performed by: INTERNAL MEDICINE

## 2021-11-11 NOTE — ED NOTES
Discharge instructions given. Pt verbalized understanding. Pt ambulated to waiting room.         Lian Aldana RN  11/10/21 [id number]

## 2021-12-07 ENCOUNTER — TELEPHONE (OUTPATIENT)
Dept: CARDIOLOGY CLINIC | Age: 66
End: 2021-12-07

## 2021-12-07 NOTE — TELEPHONE ENCOUNTER
Pt called and wants Dr Chrissy Barker to order something to have ribs and lung check out due to a fall. Pt states all family  will do is tell her to go to ED.  Please advise

## 2021-12-21 ENCOUNTER — APPOINTMENT (OUTPATIENT)
Dept: CT IMAGING | Age: 66
End: 2021-12-21
Payer: MEDICARE

## 2021-12-21 ENCOUNTER — APPOINTMENT (OUTPATIENT)
Dept: GENERAL RADIOLOGY | Age: 66
End: 2021-12-21
Payer: MEDICARE

## 2021-12-21 ENCOUNTER — HOSPITAL ENCOUNTER (EMERGENCY)
Age: 66
Discharge: HOME | End: 2021-12-21
Payer: MEDICARE

## 2021-12-21 VITALS
HEART RATE: 62 BPM | WEIGHT: 174.8 LBS | TEMPERATURE: 98 F | RESPIRATION RATE: 16 BRPM | OXYGEN SATURATION: 97 % | SYSTOLIC BLOOD PRESSURE: 195 MMHG | DIASTOLIC BLOOD PRESSURE: 93 MMHG | BODY MASS INDEX: 31.97 KG/M2

## 2021-12-21 DIAGNOSIS — J01.90 ACUTE SINUSITIS, RECURRENCE NOT SPECIFIED, UNSPECIFIED LOCATION: ICD-10-CM

## 2021-12-21 DIAGNOSIS — H92.01 RIGHT EAR PAIN: ICD-10-CM

## 2021-12-21 DIAGNOSIS — J40 BRONCHITIS: ICD-10-CM

## 2021-12-21 DIAGNOSIS — R07.9 CHEST PAIN, UNSPECIFIED TYPE: ICD-10-CM

## 2021-12-21 DIAGNOSIS — R30.0 DYSURIA: ICD-10-CM

## 2021-12-21 DIAGNOSIS — R03.0 ELEVATED BLOOD PRESSURE READING: ICD-10-CM

## 2021-12-21 DIAGNOSIS — S09.90XA INJURY OF HEAD, INITIAL ENCOUNTER: Primary | ICD-10-CM

## 2021-12-21 LAB
ALBUMIN SERPL-MCNC: 4.3 GM/DL (ref 3.4–5)
ALP BLD-CCNC: 94 IU/L (ref 40–128)
ALT SERPL-CCNC: 26 U/L (ref 10–40)
ANION GAP SERPL CALCULATED.3IONS-SCNC: 11 MMOL/L (ref 4–16)
AST SERPL-CCNC: 41 IU/L (ref 15–37)
BACTERIA: NEGATIVE /HPF
BASOPHILS ABSOLUTE: 0.1 K/CU MM
BASOPHILS RELATIVE PERCENT: 1 % (ref 0–1)
BILIRUB SERPL-MCNC: 0.4 MG/DL (ref 0–1)
BILIRUBIN URINE: NEGATIVE MG/DL
BLOOD, URINE: ABNORMAL
BUN BLDV-MCNC: 11 MG/DL (ref 6–23)
CALCIUM SERPL-MCNC: 9.4 MG/DL (ref 8.3–10.6)
CHLORIDE BLD-SCNC: 100 MMOL/L (ref 99–110)
CLARITY: CLEAR
CO2: 26 MMOL/L (ref 21–32)
COLOR: ABNORMAL
CREAT SERPL-MCNC: 0.5 MG/DL (ref 0.6–1.1)
DIFFERENTIAL TYPE: ABNORMAL
EKG ATRIAL RATE: 77 BPM
EKG DIAGNOSIS: NORMAL
EKG P AXIS: 18 DEGREES
EKG P-R INTERVAL: 172 MS
EKG Q-T INTERVAL: 408 MS
EKG QRS DURATION: 114 MS
EKG QTC CALCULATION (BAZETT): 461 MS
EKG R AXIS: -38 DEGREES
EKG T AXIS: 41 DEGREES
EKG VENTRICULAR RATE: 77 BPM
EOSINOPHILS ABSOLUTE: 0.3 K/CU MM
EOSINOPHILS RELATIVE PERCENT: 3.9 % (ref 0–3)
GFR AFRICAN AMERICAN: >60 ML/MIN/1.73M2
GFR NON-AFRICAN AMERICAN: >60 ML/MIN/1.73M2
GLUCOSE BLD-MCNC: 184 MG/DL (ref 70–99)
GLUCOSE, URINE: NEGATIVE MG/DL
HCT VFR BLD CALC: 41.4 % (ref 37–47)
HEMOGLOBIN: 13.9 GM/DL (ref 12.5–16)
IMMATURE NEUTROPHIL %: 0.4 % (ref 0–0.43)
KETONES, URINE: NEGATIVE MG/DL
LEUKOCYTE ESTERASE, URINE: ABNORMAL
LIPASE: 17 IU/L (ref 13–60)
LYMPHOCYTES ABSOLUTE: 2.8 K/CU MM
LYMPHOCYTES RELATIVE PERCENT: 34.3 % (ref 24–44)
MCH RBC QN AUTO: 28.6 PG (ref 27–31)
MCHC RBC AUTO-ENTMCNC: 33.6 % (ref 32–36)
MCV RBC AUTO: 85.2 FL (ref 78–100)
MONOCYTES ABSOLUTE: 0.8 K/CU MM
MONOCYTES RELATIVE PERCENT: 9.6 % (ref 0–4)
MUCUS: ABNORMAL HPF
NITRITE URINE, QUANTITATIVE: NEGATIVE
NUCLEATED RBC %: 0 %
PDW BLD-RTO: 11.9 % (ref 11.7–14.9)
PH, URINE: 8 (ref 5–8)
PLATELET # BLD: 309 K/CU MM (ref 140–440)
PMV BLD AUTO: 9.6 FL (ref 7.5–11.1)
POTASSIUM SERPL-SCNC: 4.3 MMOL/L (ref 3.5–5.1)
PROTEIN UA: NEGATIVE MG/DL
RBC # BLD: 4.86 M/CU MM (ref 4.2–5.4)
RBC URINE: 1 /HPF (ref 0–6)
SARS-COV-2, NAAT: NOT DETECTED
SEGMENTED NEUTROPHILS ABSOLUTE COUNT: 4.1 K/CU MM
SEGMENTED NEUTROPHILS RELATIVE PERCENT: 50.8 % (ref 36–66)
SODIUM BLD-SCNC: 137 MMOL/L (ref 135–145)
SOURCE: NORMAL
SPECIFIC GRAVITY UA: 1.01 (ref 1–1.03)
TOTAL IMMATURE NEUTOROPHIL: 0.03 K/CU MM
TOTAL NUCLEATED RBC: 0 K/CU MM
TOTAL PROTEIN: 7.2 GM/DL (ref 6.4–8.2)
TRICHOMONAS: ABNORMAL /HPF
TROPONIN T: <0.01 NG/ML
UROBILINOGEN, URINE: NEGATIVE MG/DL (ref 0.2–1)
WBC # BLD: 8 K/CU MM (ref 4–10.5)
WBC UA: 8 /HPF (ref 0–5)

## 2021-12-21 PROCEDURE — 6370000000 HC RX 637 (ALT 250 FOR IP): Performed by: PHYSICIAN ASSISTANT

## 2021-12-21 PROCEDURE — 93010 ELECTROCARDIOGRAM REPORT: CPT | Performed by: INTERNAL MEDICINE

## 2021-12-21 PROCEDURE — 99284 EMERGENCY DEPT VISIT MOD MDM: CPT

## 2021-12-21 PROCEDURE — 72125 CT NECK SPINE W/O DYE: CPT

## 2021-12-21 PROCEDURE — 83690 ASSAY OF LIPASE: CPT

## 2021-12-21 PROCEDURE — 81001 URINALYSIS AUTO W/SCOPE: CPT

## 2021-12-21 PROCEDURE — 87077 CULTURE AEROBIC IDENTIFY: CPT

## 2021-12-21 PROCEDURE — 93005 ELECTROCARDIOGRAM TRACING: CPT | Performed by: PHYSICIAN ASSISTANT

## 2021-12-21 PROCEDURE — 84484 ASSAY OF TROPONIN QUANT: CPT

## 2021-12-21 PROCEDURE — 87635 SARS-COV-2 COVID-19 AMP PRB: CPT

## 2021-12-21 PROCEDURE — 85025 COMPLETE CBC W/AUTO DIFF WBC: CPT

## 2021-12-21 PROCEDURE — 71045 X-RAY EXAM CHEST 1 VIEW: CPT

## 2021-12-21 PROCEDURE — 70450 CT HEAD/BRAIN W/O DYE: CPT

## 2021-12-21 PROCEDURE — 36415 COLL VENOUS BLD VENIPUNCTURE: CPT

## 2021-12-21 PROCEDURE — 87086 URINE CULTURE/COLONY COUNT: CPT

## 2021-12-21 PROCEDURE — 80053 COMPREHEN METABOLIC PANEL: CPT

## 2021-12-21 RX ORDER — DOXYCYCLINE HYCLATE 100 MG
100 TABLET ORAL 2 TIMES DAILY
Qty: 14 TABLET | Refills: 0 | Status: SHIPPED | OUTPATIENT
Start: 2021-12-21 | End: 2021-12-28

## 2021-12-21 RX ORDER — LOSARTAN POTASSIUM 50 MG/1
100 TABLET ORAL ONCE
Status: COMPLETED | OUTPATIENT
Start: 2021-12-21 | End: 2021-12-21

## 2021-12-21 RX ORDER — METOPROLOL SUCCINATE 50 MG/1
50 TABLET, EXTENDED RELEASE ORAL ONCE
Status: COMPLETED | OUTPATIENT
Start: 2021-12-21 | End: 2021-12-21

## 2021-12-21 RX ORDER — NIFEDIPINE 30 MG/1
60 TABLET, EXTENDED RELEASE ORAL ONCE
Status: COMPLETED | OUTPATIENT
Start: 2021-12-21 | End: 2021-12-21

## 2021-12-21 RX ORDER — HYDROCODONE BITARTRATE AND ACETAMINOPHEN 5; 325 MG/1; MG/1
1 TABLET ORAL ONCE
Status: COMPLETED | OUTPATIENT
Start: 2021-12-21 | End: 2021-12-21

## 2021-12-21 RX ADMIN — HYDROCODONE BITARTRATE AND ACETAMINOPHEN 1 TABLET: 5; 325 TABLET ORAL at 12:21

## 2021-12-21 RX ADMIN — NIFEDIPINE 60 MG: 30 TABLET, FILM COATED, EXTENDED RELEASE ORAL at 14:30

## 2021-12-21 RX ADMIN — LOSARTAN POTASSIUM 100 MG: 50 TABLET, FILM COATED ORAL at 12:22

## 2021-12-21 RX ADMIN — METOPROLOL SUCCINATE 50 MG: 50 TABLET, EXTENDED RELEASE ORAL at 12:22

## 2021-12-21 ASSESSMENT — PAIN DESCRIPTION - LOCATION: LOCATION: HEAD;EAR

## 2021-12-21 ASSESSMENT — PAIN SCALES - GENERAL
PAINLEVEL_OUTOF10: 10

## 2021-12-21 ASSESSMENT — PAIN DESCRIPTION - PAIN TYPE: TYPE: ACUTE PAIN

## 2021-12-21 ASSESSMENT — PAIN DESCRIPTION - ORIENTATION: ORIENTATION: RIGHT

## 2021-12-21 NOTE — ED PROVIDER NOTES
Javier SHANNON Hall 89 ENCOUNTER      PCP: Harpal Ramirez MD    [address]    Chief Complaint   Patient presents with    Head Injury     hit head on metal door frame 2 weeks ago and again last, denies loss of consciousness either time, has chronic dizziness-no change, takes 81 mg asa daily    Otalgia     right and left ear pain, internal and external worse on right, pain for 2 weeks    Chest Pain       This patient was not evaluated by the attending physician. I have independently evaluated this patient. HPI    Marie Gusman is a 61 y.o. female who presents with multiple complaints. Patient states she hit her head getting out of the vehicle 2 weeks ago and has had associated headache, right ear pain since this time. Patient states she hit her head again yesterday and pain worsened. Patient has associated neck pain. Patient states she has had associated sore throat, nasal congestion with right ear pain for the past 2 weeks. Patient states she has history of chronic cough without significant change. Patient states she started having substernal chest pain today which she describes as \"indigestion\". Patient states she has history of similar without significant change. Patient stages chronic abdominal pain without significant change. Patient denies vomiting, diarrhea, fever.       REVIEW OF SYSTEMS    Constitutional:  Denies fever  HENT: See HPI  Cardiovascular: see HPI  Respiratory: See HPI  GI:  See HPI  :  Denies any urinary symptoms   Musculoskeletal:  Denies back pain  Skin:  Denies rash  Neurologic:  See HPI Denies focal weakness or sensory changes    Lymphatic:  Denies swollen glands     All other review of systems are negative  See HPI and nursing notes for additional information     PAST MEDICAL AND SURGICAL HISTORY    Past Medical History:   Diagnosis Date    Abnormal EKG 04/22/2014    Acid reflux     Anemia     Anesthesia     Nausea/Vomiting Post Op In Past    Anginal pain Stones Numberous Times    Migraines     Nausea & vomiting     Nausea/Vomiting Post Op In Past    Other specified disorder of skin     12- Patient states 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 She is currently being treated with multiple creams and weekly Diflucan. 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  Staph infection Dx 1980's    Toes On Left Foot    Thyroid disease     hypothroidism    Tremor     \"Tremors All Over\"    Urinary incontinence     UTI (urinary tract infection) In Past    No Current Symptoms    UTI (urinary tract infection)     Vertigo     \"Sometimes\"    Wears glasses      Past Surgical History:   Procedure Laterality Date    APPENDECTOMY  1970's    Done With Cholecystectomy    BLADDER SURGERY  1970's Or 1980's    \"Stretched The Opening To The Bladder\", \"Total Of Four Bladder Surgeries\"    BREAST BIOPSY Right 1980's    Twice, Benign    BREAST SURGERY Left 1990's    Five, Benign    CARDIAC CATHETERIZATION  10-18-06    normal coronary angiogram with a normal left ventricular systolic function, patient can be treated medically.     CARDIAC CATHETERIZATION      \"Total 7 Cardiac Catheterizations\"    CARPAL TUNNEL RELEASE Right 1999    CHOLECYSTECTOMY  1970's    Appendectomy Also Done    COLONOSCOPY  Last Done 6-13    One Polyp Removed In Past    DENTAL SURGERY      All Teeth Extracted In Past    DIAGNOSTIC CARDIAC CATH LAB PROCEDURE  01/11/2010    no significant disease, continue medical therapy    ENDOSCOPY, COLON, DIAGNOSTIC  Several     ESOPHAGEAL DILATATION  1980's And 1990's    X 3    FRACTURE SURGERY  1974 Or 1975    Broken Bones Left [place] Due To 6 [facility] 1990's    Incisional Abdominal Hernia Repair  With Mesh    HERNIA REPAIR  1970's    Abdominal Hernia Repair    HYSTERECTOMY, TOTAL ABDOMINAL  1987    JOINT REPLACEMENT  2008    Total Right Knee    KNEE ARTHROSCOPY Right 1999    LITHOTRIPSY  2011    For Kidney Stones    OTHER SURGICAL HISTORY  [id number]    umbilical hernia with mesh    TUBAL LIGATION  1978       CURRENT MEDICATIONS    Current Outpatient Rx   Medication Sig Dispense Refill    doxycycline hyclate (VIBRA-TABS) 100 MG tablet Take 1 tablet by mouth 2 times daily for 7 days 14 tablet 0    metoprolol tartrate (LOPRESSOR) 50 MG tablet Take 1 tablet by mouth See Admin Instructions for 2 days Patient to take 1 tab at 9:30pm on 11/9. Take 1 take at 7:30am on 11/10 2 tablet 0    albuterol sulfate HFA (PROVENTIL HFA) 108 (90 Base) MCG/ACT inhaler Inhale 2 puffs into the lungs every 4 hours as needed for Wheezing or Shortness of Breath With spacer (and mask if indicated). Thanks. 18 g 1    sodium chloride 1 g tablet Take 1 tablet by mouth 2 times daily (with meals) 90 tablet 3    levETIRAcetam (KEPPRA) 750 MG tablet Take 1 tablet by mouth 2 times daily 60 tablet 0    baclofen (LIORESAL) 10 MG tablet 1 tablet 2 times daily      diazePAM (VALIUM) 5 MG tablet as needed.       MUCINEX 600 MG extended release tablet 2 times daily as needed      metoprolol succinate (TOPROL XL) 50 MG extended release tablet Take 1 tablet by mouth daily 30 tablet 5    NIFEdipine (PROCARDIA XL) 60 MG extended release tablet Take 1 tablet by mouth daily 30 tablet 5    magnesium oxide (MAG-OX) 400 MG tablet Take 1 tablet by mouth 3 times daily 90 tablet 0    losartan (COZAAR) 100 MG tablet Take 1 tablet by mouth daily 30 tablet 0    QUEtiapine (SEROQUEL) 25 MG tablet Take 1 tablet by mouth 2 times daily (Patient taking differently: Take 25 mg by mouth 2 times daily Indications: 25 mg in am and 50 mg in pm ) 60 tablet 0    busPIRone (BUSPAR) 10 MG tablet Take 2 tablets by mouth 3 times daily 180 tablet 0    levothyroxine (SYNTHROID) 75 MCG tablet Take 1 tablet by mouth every morning (before breakfast) 30 tablet 3    amitriptyline (ELAVIL) 25 MG tablet Take 1 tablet by mouth nightly 30 tablet 3    HYDROcodone-acetaminophen (NORCO)  MG per tablet Take 1 tablet by mouth every 6 hours as needed.  fluticasone-umeclidin-vilant (TRELEGY ELLIPTA) 100-62.5-25 MCG/INH AEPB Inhale 1 puff into the lungs daily (Patient taking differently: Inhale 1 puff into the lungs daily as needed (only takes prn daily due to yeast infections in mouth, is aware she is supposed to take daily) ) 1 each 5    OXcarbazepine (TRILEPTAL) 300 MG tablet Take 1 tablet by mouth 2 times daily (Patient taking differently: Take 450 mg by mouth 2 times daily ) 60 tablet 3    hydrOXYzine (VISTARIL) 25 MG capsule Take 25 mg by mouth 3 times daily as needed       NOVOLOG FLEXPEN 100 UNIT/ML injection pen Inject into the skin See Admin Instructions 12/01/2020 patient states she follows sliding scale regimen BS > 150      simvastatin (ZOCOR) 20 MG tablet Take 1 tablet by mouth nightly 30 tablet 3    TRESIBA FLEXTOUCH 200 UNIT/ML SOPN Inject 20 Units into the skin every morning (Patient taking differently: Inject into the skin nightly 04/26/21 Patient states she follows sliding scale) 1 pen 2    docusate sodium (COLACE) 100 MG capsule Take 1 capsule by mouth 2 times daily 30 capsule 0    albuterol sulfate  (90 Base) MCG/ACT inhaler Inhale 2 puffs into the lungs every 6 hours as needed for Wheezing or Shortness of Breath (or cough) Please include spacer with instructions for use.  1 Inhaler 0    aluminum & magnesium hydroxide-simethicone (MAALOX MAX) 400-400-40 MG/5ML SUSP Take 15 mLs by mouth every 6 hours as needed (heart burn) 120 mL 0    pantoprazole (PROTONIX) 40 MG tablet Take 1 tablet by mouth daily 30 tablet 0    carbidopa-levodopa (SINEMET)  MG per tablet Take 1 tablet by mouth nightly      polyethylene glycol (GLYCOLAX) packet Take 17 g by mouth daily as needed for Constipation      aspirin 81 MG tablet Take 81 mg by mouth daily      Multiple Vitamins-Iron (MULTI-VITAMIN/IRON PO) Take  by mouth.  montelukast (SINGULAIR) 10 MG tablet Take 10 mg by mouth nightly.          ALLERGIES    Allergies   Allergen Reactions    Abilify [Aripiprazole]      \"Severe Shaking And Restlessness\"    Advil [Ibuprofen Micronized] Palpitations     \"Severe High Blood Pressure\"    Augmentin [Amoxicillin-Pot Clavulanate] Itching and Rash    Bee Venom Swelling     Redness    Ciprofloxacin Itching and Rash    Codeine      \"Severe Abdominal Cramping\"    Darvocet [Propoxyphene N-Acetaminophen] Palpitations     \"Severe High Blood Pressure\"    Darvon [Propoxyphene Hcl] Palpitations     \"Severe High Blood Pressure\"    Decadron [Dexamethasone] Other (See Comments)     seizure  Seizures    Ditropan [Oxybutynin Chloride] Palpitations     \"Severe High Blood Pressure\"    Fioricet [Butalbital-Apap-Caffeine] Palpitations     \"Severe High Blood Pressure\"    Fiorinal-Codeine #3 [Butalbital-Asa-Caff-Codeine]      \"Severe Stomach Cramps\"    Flagyl [Metronidazole] Diarrhea     \"Severe Diarrhea And Cramping\"    Naproxen Palpitations     \"Severe High Blood Pressure\"    Other      \"Allergic To Spider Bites Causing Blackness Of Skin, Severe Itching And Pain\"                                                  \"Allergic To Powder In Gloves Causing Severe Redness And Itching\"    Prozac [Fluoxetine Hcl]      \"Hallucinations\"    Robaxin [Methocarbamol] Palpitations     \"Severe High Blood Pressure\"    Ultram [Tramadol]      \"Severe Stomach Pain\"    Zoloft Palpitations     \"Sever High Blood Pressure\"    Butalbital-Aspirin-Caffeine Other (See Comments)     \"severe stomach pain\"    Coreg [Carvedilol] Other (See Comments)     \"spikes my BP severely and send me into a severe anxiety attack\"    Fluoxetine Other (See Comments)     hallucinations    Oxybutynin Chloride Other (See Comments)     Raises bp    Percocet [Oxycodone-Acetaminophen]      \"knocked me out for 12 hrs\"    Sertraline Other (See Comments)     hallucinations    Tape [Adhesive Tape]      Patient states it tears her skin, including band aids    Reglan [Metoclopramide] Other (See Comments)     \"makes me talk like a baby, but if I take benadryl with it it's fine\"       SOCIAL AND FAMILY HISTORY    Social History     Socioeconomic History    Marital status:      Spouse name: None    Number of children: 3    Years of education: 6    Highest education level: None   Occupational History    None   Tobacco Use    Smoking status: Never Smoker    Smokeless tobacco: Never Used   Vaping Use    Vaping Use: Never used   Substance and Sexual Activity    Alcohol use: No    Drug use: No    Sexual activity: Not Currently   Other Topics Concern    None   Social History Narrative    None     Social Determinants of Health     Financial Resource Strain:     Difficulty of Paying Living Expenses: Not on file   Food Insecurity:     Worried About Running Out of Food in the Last Year: Not on file    Maryjane of Food in the Last Year: Not on file   Transportation Needs:     Lack of Transportation (Medical): Not on file    Lack of Transportation (Non-Medical):  Not on file   Physical Activity:     Days of Exercise per Week: Not on file    Minutes of Exercise per Session: Not on file   Stress:     Feeling of Stress : Not on file   Social Connections:     Frequency of Communication with Friends and Family: Not on file    Frequency of Social Gatherings with Friends and Family: Not on file    Attends Cheondoism Services: Not on file    Active Member of Clubs or Organizations: Not on file    Attends Club or Organization Meetings: Not on file    Marital Status: Not on file   Intimate Partner Violence:     Fear of Current or Ex-Partner: Not on file   [facility] Emotionally Abused: Not on file    Physically Abused: Not on file    Sexually Abused: Not on file   Housing Stability:     Unable to Pay for Housing in the Last Year: Not on file    Number of Places Lived in the Last Year: Not on file    Unstable Housing in the Last Year: Not on file     Family History   Problem Relation Age of Onset    Stroke Mother     Other Mother         Seizures    Diabetes Mother         Borderline Diabetes    High Blood Pressure Mother     Arthritis Mother     Early Death Mother 61        Stroke    Depression Mother     Heart Disease Mother     High Cholesterol Mother     Miscarriages / Patric Frame Mother     Mental Illness Mother     Mental Illness Father     Heart Disease Father         Massive Heart Attack    High Blood Pressure Father     Arthritis Father     High Cholesterol Father     Mental Illness Sister     Hearing Loss Sister     Heart Failure Sister     High Blood Pressure Sister     Arthritis Sister     Heart Disease Sister     Cancer Sister     Mental Illness Brother     Cancer Brother         Liver And Colon Cancer    Early Death Brother 37        Liver And Colon Cancer    Heart Disease Brother         Heart Stents    High Blood Pressure Brother     High Cholesterol Brother     Early Death Brother         65 Years Old,Hit By A Car    Colon Cancer Brother     Heart Disease Brother     Mental Illness Brother     Early Death Brother 61        Heart Attack    Heart Disease Brother         Heart Attack    Mental Illness Daughter         Bipolar    Depression Daughter     Anxiety Disorder Daughter     Bipolar Disorder Daughter     Other Daughter         Stomach And Bowel Problems    Other Son         Seizures         PHYSICAL EXAM    VITAL SIGNS: BP (!) 195/93   Pulse 62   Temp 98 °F (36.7 °C)   Resp 16   Wt 174 lb 12.8 oz (79.3 kg)   SpO2 97%   BMI 31.97 kg/m²    Constitutional:  Well developed, Well nourished  HENT: Normocephalic, Atraumatic, tenderness to top of scalp and right side of scalp. EACs and TMs clear. PERRL. EOMI. Sclera clear. Conjunctiva normal, No discharge. Maxillary and frontal sinus tenderness to percussion. Oropharynx is clear. Neck/Lymphatics: supple, no JVD, no swollen nodes  Cardiovascular:  Normal heart rate, Normal rhythm  Respiratory:  Nonlabored breathing. Normal breath sounds, No wheezing  Abdomen: Bowel sounds normal, Soft, mild generalized tenderness, no masses. No ecchymosis. Musculoskeletal: No edema, No tenderness, No cyanosis  Integument:  Warm, Dry  Neurologic:  Alert & oriented , No focal deficits noted. Normal gross motor coordination & motor strength bilateral upper and lower extremities. Sensation intact.   Psychiatric:  Affect normal, Mood normal.       Labs:  Results for orders placed or performed during the hospital encounter of 12/21/21   COVID-19, Rapid    Specimen: Nasopharyngeal   Result Value Ref Range    Source THROAT     SARS-CoV-2, NAAT NOT DETECTED NOT DETECTED   CBC Auto Differential   Result Value Ref Range    WBC 8.0 4.0 - 10.5 K/CU MM    RBC 4.86 4.2 - 5.4 M/CU MM    Hemoglobin 13.9 12.5 - 16.0 GM/DL    Hematocrit 41.4 37 - 47 %    MCV 85.2 78 - 100 FL    MCH 28.6 27 - 31 PG    MCHC 33.6 32.0 - 36.0 %    RDW 11.9 11.7 - 14.9 %    Platelets 407 672 - 087 K/CU MM    MPV 9.6 7.5 - 11.1 FL    Differential Type AUTOMATED DIFFERENTIAL     Segs Relative 50.8 36 - 66 %    Lymphocytes % 34.3 24 - 44 %    Monocytes % 9.6 (H) 0 - 4 %    Eosinophils % 3.9 (H) 0 - 3 %    Basophils % 1.0 0 - 1 %    Segs Absolute 4.1 K/CU MM    Lymphocytes Absolute 2.8 K/CU MM    Monocytes Absolute 0.8 K/CU MM    Eosinophils Absolute 0.3 K/CU MM    Basophils Absolute 0.1 K/CU MM    Nucleated RBC % 0.0 %    Total Nucleated RBC 0.0 K/CU MM    Total Immature Neutrophil 0.03 K/CU MM    Immature Neutrophil % 0.4 0 - 0.43 %   Comprehensive Metabolic Panel   Result Value Ref Range    Sodium 137 135 - Interpretation  Please see ED physician's note for EKG interpretation        RADIOLOGY    CT CERVICAL SPINE WO CONTRAST   Final Result   No acute intracranial abnormality. Chronic microvascular ischemic changes. No acute fracture or dislocation involving cervical spine. RECOMMENDATIONS:   Unavailable         CT HEAD WO CONTRAST   Final Result   No acute intracranial abnormality. Chronic microvascular ischemic changes. No acute fracture or dislocation involving cervical spine. RECOMMENDATIONS:   Unavailable         XR CHEST PORTABLE   Final Result   Subjective findings consistent with central bronchitis. Otherwise,   radiographically nonacute portable chest.                   ED COURSE & MEDICAL DECISION MAKING      Patient presents as above. Patient has history of elevated blood pressure, has not taken her blood pressure medication yet today. Blood pressure is elevated in the emergency department, home medications ordered. Patient provided Hernandez Contras for pain. See physician note for EKG reading. Patient's blood pressure improves while in emergency department with her home medications. Chest x-ray shows findings consistent with central bronchitis. CBC within normal limits. CMP shows mild elevation in AST at 41 otherwise within normal limits. Troponin negative. Lipase 17. Rapid Covid negative. CT head shows no acute intracranial abnormality. CT cervical spine shows no acute osseous abnormality. Patient states she is having some foul-smelling urine with occasional pain with urination. Urinalysis shows small leukocytes, 8 white blood cells with negative bacteria and nitrites. Urine sent for culture. I discussed labs and imaging with patient today. I discussed possible concussion after head injury and recommend brain rest continuing her pain medication as prescribed. Head injury instructions provided.   I suspect patient has sinusitis and will start her on doxycycline and also have lung coverage with her chest x-ray findings of bronchitis. Patient declines delta troponin for admission for chest pain. Patient states she is currently in the process of following with cardiologist for her off-and-on chest pains. I recommend close follow-up with primary care provider or cardiology within 2 days for recheck and return immediately if new or worsening symptoms develop. Clinical  IMPRESSION    1. Injury of head, initial encounter    2. Chest pain, unspecified type    3. Acute sinusitis, recurrence not specified, unspecified location    4. Right ear pain    5. Elevated blood pressure reading    6. Bronchitis    7. Dysuria        I discussed with patient importance of return the emergency department immediately if new or worsening symptoms develop. Comment: Please note this report has been produced using speech recognition software and may contain errors related to that system including errors in grammar, punctuation, and spelling, as well as words and phrases that may be inappropriate. If there are any questions or concerns please feel free to contact the dictating provider for clarification.            Sarita Rothman PA-C  12/21/21 [id number]

## 2021-12-21 NOTE — ED NOTES
Discharge instructions reviewed with pt. All questions answered at this time. Pt verbalized understanding.       Rafita Barber RN  12/21/21 [id number]

## 2021-12-21 NOTE — ED PROVIDER NOTES
Twelve-lead EKG obtained at 1158 on 21 December 2021 and interpreted by me in the absence of a cardiologist.  There is no criteria ST elevation or reciprocal change. There are no hyperacute T wave changes. There is no sign of acute ischemia or infarction. There is a significant interventricular conduction delay with negative forces across the precordium consistent with an incomplete left bundle branch block. There is appropriate discordance. This tracing shows a normal sinus rhythm with LVH criteria. Rate and intervals are 77 beats per minute, AZ interval 172 milliseconds, QRS duration 114 milliseconds, QTc interval 461 milliseconds, and R axis left shifted at -38 degrees. There is no acute change compared with the most recent EKG dated November 10, 2021 including similar conduction delay.         Rafal Ocasio MD  12/21/21 9904

## 2021-12-22 LAB
CULTURE: ABNORMAL
CULTURE: ABNORMAL
Lab: ABNORMAL
SPECIMEN: ABNORMAL

## 2021-12-29 ENCOUNTER — TELEPHONE (OUTPATIENT)
Dept: NEUROLOGY | Age: 66
End: 2021-12-29

## 2021-12-29 ENCOUNTER — APPOINTMENT (OUTPATIENT)
Dept: GENERAL RADIOLOGY | Age: 66
End: 2021-12-29
Payer: MEDICARE

## 2021-12-29 ENCOUNTER — HOSPITAL ENCOUNTER (EMERGENCY)
Age: 66
Discharge: LEFT BEFORE DISCHARGE | End: 2021-12-29
Attending: EMERGENCY MEDICINE
Payer: MEDICARE

## 2021-12-29 VITALS
SYSTOLIC BLOOD PRESSURE: 182 MMHG | TEMPERATURE: 97.8 F | DIASTOLIC BLOOD PRESSURE: 95 MMHG | WEIGHT: 175 LBS | HEIGHT: 62 IN | RESPIRATION RATE: 22 BRPM | BODY MASS INDEX: 32.2 KG/M2 | HEART RATE: 105 BPM | OXYGEN SATURATION: 98 %

## 2021-12-29 DIAGNOSIS — R51.9 NONINTRACTABLE HEADACHE, UNSPECIFIED CHRONICITY PATTERN, UNSPECIFIED HEADACHE TYPE: Primary | ICD-10-CM

## 2021-12-29 LAB — TROPONIN T: <0.01 NG/ML

## 2021-12-29 PROCEDURE — 71045 X-RAY EXAM CHEST 1 VIEW: CPT

## 2021-12-29 PROCEDURE — 87040 BLOOD CULTURE FOR BACTERIA: CPT

## 2021-12-29 PROCEDURE — 84484 ASSAY OF TROPONIN QUANT: CPT

## 2021-12-29 PROCEDURE — 93005 ELECTROCARDIOGRAM TRACING: CPT | Performed by: PHYSICIAN ASSISTANT

## 2021-12-29 PROCEDURE — 99283 EMERGENCY DEPT VISIT LOW MDM: CPT

## 2021-12-29 ASSESSMENT — PAIN SCALES - GENERAL: PAINLEVEL_OUTOF10: 10

## 2021-12-29 ASSESSMENT — PAIN DESCRIPTION - PAIN TYPE: TYPE: ACUTE PAIN

## 2021-12-29 ASSESSMENT — PAIN DESCRIPTION - LOCATION: LOCATION: HEAD

## 2021-12-29 ASSESSMENT — PAIN DESCRIPTION - DESCRIPTORS: DESCRIPTORS: ACHING

## 2021-12-29 NOTE — ED PROVIDER NOTES
As provider-in-triage, I performed a medical screening history and physical exam on this patient. HISTORY OF PRESENT ILLNESS  Nelda Hawley is 61 y.o. female complained of persisting headache. She does mention recently being evaluated here in the emergency department, was diagnosed with a concussion. She does mention that she has had headaches in the past as well. She states that the headaches are continuing and worsening which prompted her visit to the emergency department. She does mention some chronic nausea no worsening today, but otherwise denies any fevers, chest pains, shortness of breath, numbness, tingling, weakness, reinjury. PHYSICAL EXAM  BP (!) 182/95   Pulse 105   Temp 97.8 °F (36.6 °C) (Oral)   Resp 22   Ht 5' 2\" (1.575 m)   Wt 175 lb (79.4 kg)   SpO2 98%   BMI 32.01 kg/m²     On exam, the patient appears well-hydrated, well-nourished, and in no acute distress. Mucous membranes are moist. Speech is clear. Breathing is unlabored. Skin is dry. Mental status is normal.  Able to extend her knees and move her arms. Face without facial droop. Comment: Please note this report has been produced using speech recognition software and may contain errors related to that system including errors in grammar, punctuation, and spelling, as well as words and phrases that may be inappropriate. If there are any questions or concerns please feel free to contact the dictating provider for clarification.         Reynaldo Hassan PA-C  12/29/21 4410

## 2021-12-29 NOTE — ED TRIAGE NOTES
Patient to triage via EMS with c/o headache. Patient states she had a concussion on December 21 and the headaches have became worse. resps even and unlabored.

## 2021-12-29 NOTE — TELEPHONE ENCOUNTER
Please Advise     Patient wanted to let you know she is going to the emergency room because he headaches are getting increasingly worse.

## 2021-12-30 LAB
EKG ATRIAL RATE: 98 BPM
EKG DIAGNOSIS: NORMAL
EKG P AXIS: 25 DEGREES
EKG P-R INTERVAL: 172 MS
EKG Q-T INTERVAL: 382 MS
EKG QRS DURATION: 112 MS
EKG QTC CALCULATION (BAZETT): 487 MS
EKG R AXIS: -47 DEGREES
EKG T AXIS: 83 DEGREES
EKG VENTRICULAR RATE: 98 BPM

## 2021-12-30 PROCEDURE — 93010 ELECTROCARDIOGRAM REPORT: CPT | Performed by: INTERNAL MEDICINE

## 2021-12-30 NOTE — ED PROVIDER NOTES
Signed up for this patient but never saw them, they left prior to my eval.      Steve Dubose,   12/29/21 [id number]

## 2022-01-03 LAB
CULTURE: NORMAL
CULTURE: NORMAL
Lab: NORMAL
Lab: NORMAL
SPECIMEN: NORMAL
SPECIMEN: NORMAL

## 2022-01-10 ENCOUNTER — APPOINTMENT (OUTPATIENT)
Dept: CT IMAGING | Age: 67
End: 2022-01-10
Payer: MEDICARE

## 2022-01-10 ENCOUNTER — APPOINTMENT (OUTPATIENT)
Dept: GENERAL RADIOLOGY | Age: 67
End: 2022-01-10
Payer: MEDICARE

## 2022-01-10 ENCOUNTER — HOSPITAL ENCOUNTER (OUTPATIENT)
Age: 67
Setting detail: OBSERVATION
Discharge: HOME | End: 2022-01-11
Attending: EMERGENCY MEDICINE | Admitting: INTERNAL MEDICINE
Payer: MEDICARE

## 2022-01-10 DIAGNOSIS — R06.02 SHORTNESS OF BREATH: ICD-10-CM

## 2022-01-10 DIAGNOSIS — R07.9 CHEST PAIN, UNSPECIFIED TYPE: Primary | ICD-10-CM

## 2022-01-10 DIAGNOSIS — R10.84 GENERALIZED ABDOMINAL PAIN: ICD-10-CM

## 2022-01-10 LAB
ALBUMIN SERPL-MCNC: 4.5 GM/DL (ref 3.4–5)
ALP BLD-CCNC: 93 IU/L (ref 40–129)
ALT SERPL-CCNC: 25 U/L (ref 10–40)
ANION GAP SERPL CALCULATED.3IONS-SCNC: 11 MMOL/L (ref 4–16)
AST SERPL-CCNC: 35 IU/L (ref 15–37)
BACTERIA: ABNORMAL /HPF
BASOPHILS ABSOLUTE: 0.1 K/CU MM
BASOPHILS RELATIVE PERCENT: 0.7 % (ref 0–1)
BILIRUB SERPL-MCNC: 0.4 MG/DL (ref 0–1)
BILIRUBIN DIRECT: 0.2 MG/DL (ref 0–0.3)
BILIRUBIN URINE: ABNORMAL MG/DL
BILIRUBIN, INDIRECT: 0.2 MG/DL (ref 0–0.7)
BLOOD, URINE: NEGATIVE
BUN BLDV-MCNC: 12 MG/DL (ref 6–23)
CALCIUM SERPL-MCNC: 9.5 MG/DL (ref 8.3–10.6)
CHLORIDE BLD-SCNC: 91 MMOL/L (ref 99–110)
CHP ED QC CHECK: YES
CLARITY: CLEAR
CO2: 26 MMOL/L (ref 21–32)
COLOR: YELLOW
CREAT SERPL-MCNC: 0.4 MG/DL (ref 0.6–1.1)
DIFFERENTIAL TYPE: ABNORMAL
EOSINOPHILS ABSOLUTE: 0.3 K/CU MM
EOSINOPHILS RELATIVE PERCENT: 2.6 % (ref 0–3)
ESTIMATED AVERAGE GLUCOSE: 194 MG/DL
GFR AFRICAN AMERICAN: >60 ML/MIN/1.73M2
GFR NON-AFRICAN AMERICAN: >60 ML/MIN/1.73M2
GLUCOSE BLD-MCNC: 125 MG/DL (ref 70–99)
GLUCOSE BLD-MCNC: 173 MG/DL
GLUCOSE BLD-MCNC: 173 MG/DL (ref 70–99)
GLUCOSE BLD-MCNC: 220 MG/DL (ref 70–99)
GLUCOSE BLD-MCNC: 227 MG/DL (ref 70–99)
GLUCOSE, URINE: NEGATIVE MG/DL
HBA1C MFR BLD: 8.4 % (ref 4.2–6.3)
HCT VFR BLD CALC: 38.6 % (ref 37–47)
HEMOGLOBIN: 13.5 GM/DL (ref 12.5–16)
ICTOTEST: NEGATIVE
IMMATURE NEUTROPHIL %: 0.4 % (ref 0–0.43)
KETONES, URINE: NEGATIVE MG/DL
LEUKOCYTE ESTERASE, URINE: NEGATIVE
LIPASE: 18 IU/L (ref 13–60)
LYMPHOCYTES ABSOLUTE: 2.5 K/CU MM
LYMPHOCYTES RELATIVE PERCENT: 24 % (ref 24–44)
MCH RBC QN AUTO: 29.1 PG (ref 27–31)
MCHC RBC AUTO-ENTMCNC: 35 % (ref 32–36)
MCV RBC AUTO: 83.2 FL (ref 78–100)
MONOCYTES ABSOLUTE: 1.3 K/CU MM
MONOCYTES RELATIVE PERCENT: 12.3 % (ref 0–4)
MUCUS: ABNORMAL HPF
NITRITE URINE, QUANTITATIVE: NEGATIVE
NUCLEATED RBC %: 0 %
OSMOLALITY URINE: 771 MOS/L (ref 292–1090)
OSMOLALITY: 282 MOS/L (ref 280–300)
PDW BLD-RTO: 11.6 % (ref 11.7–14.9)
PH, URINE: 8 (ref 5–8)
PLATELET # BLD: 285 K/CU MM (ref 140–440)
PMV BLD AUTO: 9.4 FL (ref 7.5–11.1)
POTASSIUM SERPL-SCNC: 4.2 MMOL/L (ref 3.5–5.1)
PRO-BNP: 66.35 PG/ML
PROTEIN UA: 30 MG/DL
RBC # BLD: 4.64 M/CU MM (ref 4.2–5.4)
RBC URINE: 4 /HPF (ref 0–6)
SEGMENTED NEUTROPHILS ABSOLUTE COUNT: 6.2 K/CU MM
SEGMENTED NEUTROPHILS RELATIVE PERCENT: 60 % (ref 36–66)
SODIUM BLD-SCNC: 128 MMOL/L (ref 135–145)
SODIUM BLD-SCNC: 131 MMOL/L (ref 135–145)
SODIUM URINE: 88 MMOL/L (ref 35–167)
SPECIFIC GRAVITY UA: 1.01 (ref 1–1.03)
SQUAMOUS EPITHELIAL: 4 /HPF
TOTAL IMMATURE NEUTOROPHIL: 0.04 K/CU MM
TOTAL NUCLEATED RBC: 0 K/CU MM
TOTAL PROTEIN: 7.2 GM/DL (ref 6.4–8.2)
TROPONIN T: <0.01 NG/ML
UROBILINOGEN, URINE: NORMAL MG/DL (ref 0.2–1)
WBC # BLD: 10.3 K/CU MM (ref 4–10.5)
WBC UA: 68 /HPF (ref 0–5)

## 2022-01-10 PROCEDURE — 71045 X-RAY EXAM CHEST 1 VIEW: CPT

## 2022-01-10 PROCEDURE — 85025 COMPLETE CBC W/AUTO DIFF WBC: CPT

## 2022-01-10 PROCEDURE — 82248 BILIRUBIN DIRECT: CPT

## 2022-01-10 PROCEDURE — 83935 ASSAY OF URINE OSMOLALITY: CPT

## 2022-01-10 PROCEDURE — 84295 ASSAY OF SERUM SODIUM: CPT

## 2022-01-10 PROCEDURE — 81001 URINALYSIS AUTO W/SCOPE: CPT

## 2022-01-10 PROCEDURE — 6360000002 HC RX W HCPCS: Performed by: NURSE PRACTITIONER

## 2022-01-10 PROCEDURE — 6370000000 HC RX 637 (ALT 250 FOR IP): Performed by: INTERNAL MEDICINE

## 2022-01-10 PROCEDURE — 6360000002 HC RX W HCPCS: Performed by: STUDENT IN AN ORGANIZED HEALTH CARE EDUCATION/TRAINING PROGRAM

## 2022-01-10 PROCEDURE — 96375 TX/PRO/DX INJ NEW DRUG ADDON: CPT

## 2022-01-10 PROCEDURE — 96372 THER/PROPH/DIAG INJ SC/IM: CPT

## 2022-01-10 PROCEDURE — C9113 INJ PANTOPRAZOLE SODIUM, VIA: HCPCS | Performed by: NURSE PRACTITIONER

## 2022-01-10 PROCEDURE — 2580000003 HC RX 258: Performed by: NURSE PRACTITIONER

## 2022-01-10 PROCEDURE — 87086 URINE CULTURE/COLONY COUNT: CPT

## 2022-01-10 PROCEDURE — G0378 HOSPITAL OBSERVATION PER HR: HCPCS

## 2022-01-10 PROCEDURE — 2580000003 HC RX 258: Performed by: STUDENT IN AN ORGANIZED HEALTH CARE EDUCATION/TRAINING PROGRAM

## 2022-01-10 PROCEDURE — 6360000004 HC RX CONTRAST MEDICATION: Performed by: PHYSICIAN ASSISTANT

## 2022-01-10 PROCEDURE — 6370000000 HC RX 637 (ALT 250 FOR IP): Performed by: NURSE PRACTITIONER

## 2022-01-10 PROCEDURE — 83690 ASSAY OF LIPASE: CPT

## 2022-01-10 PROCEDURE — 84484 ASSAY OF TROPONIN QUANT: CPT

## 2022-01-10 PROCEDURE — 87077 CULTURE AEROBIC IDENTIFY: CPT

## 2022-01-10 PROCEDURE — 99283 EMERGENCY DEPT VISIT LOW MDM: CPT

## 2022-01-10 PROCEDURE — 74177 CT ABD & PELVIS W/CONTRAST: CPT

## 2022-01-10 PROCEDURE — 82962 GLUCOSE BLOOD TEST: CPT

## 2022-01-10 PROCEDURE — 6370000000 HC RX 637 (ALT 250 FOR IP)

## 2022-01-10 PROCEDURE — 93005 ELECTROCARDIOGRAM TRACING: CPT | Performed by: EMERGENCY MEDICINE

## 2022-01-10 PROCEDURE — 36415 COLL VENOUS BLD VENIPUNCTURE: CPT

## 2022-01-10 PROCEDURE — 80053 COMPREHEN METABOLIC PANEL: CPT

## 2022-01-10 PROCEDURE — 84300 ASSAY OF URINE SODIUM: CPT

## 2022-01-10 PROCEDURE — 83880 ASSAY OF NATRIURETIC PEPTIDE: CPT

## 2022-01-10 PROCEDURE — 83036 HEMOGLOBIN GLYCOSYLATED A1C: CPT

## 2022-01-10 PROCEDURE — 83930 ASSAY OF BLOOD OSMOLALITY: CPT

## 2022-01-10 PROCEDURE — 96374 THER/PROPH/DIAG INJ IV PUSH: CPT

## 2022-01-10 PROCEDURE — 96361 HYDRATE IV INFUSION ADD-ON: CPT

## 2022-01-10 RX ORDER — QUETIAPINE FUMARATE 25 MG/1
TABLET, FILM COATED ORAL
Status: COMPLETED
Start: 2022-01-10 | End: 2022-01-10

## 2022-01-10 RX ORDER — BUSPIRONE HYDROCHLORIDE 5 MG/1
20 TABLET ORAL 3 TIMES DAILY
Status: DISCONTINUED | OUTPATIENT
Start: 2022-01-10 | End: 2022-01-11 | Stop reason: HOSPADM

## 2022-01-10 RX ORDER — PANTOPRAZOLE SODIUM 40 MG/10ML
40 INJECTION, POWDER, LYOPHILIZED, FOR SOLUTION INTRAVENOUS ONCE
Status: COMPLETED | OUTPATIENT
Start: 2022-01-10 | End: 2022-01-10

## 2022-01-10 RX ORDER — ONDANSETRON 2 MG/ML
4 INJECTION INTRAMUSCULAR; INTRAVENOUS EVERY 6 HOURS PRN
Status: DISCONTINUED | OUTPATIENT
Start: 2022-01-10 | End: 2022-01-10 | Stop reason: SDUPTHER

## 2022-01-10 RX ORDER — PANTOPRAZOLE SODIUM 40 MG/1
40 TABLET, DELAYED RELEASE ORAL
Status: DISCONTINUED | OUTPATIENT
Start: 2022-01-10 | End: 2022-01-10

## 2022-01-10 RX ORDER — DEXTROSE MONOHYDRATE 50 MG/ML
100 INJECTION, SOLUTION INTRAVENOUS PRN
Status: DISCONTINUED | OUTPATIENT
Start: 2022-01-10 | End: 2022-01-11 | Stop reason: HOSPADM

## 2022-01-10 RX ORDER — QUETIAPINE FUMARATE 25 MG/1
25 TABLET, FILM COATED ORAL EVERY MORNING
Status: DISCONTINUED | OUTPATIENT
Start: 2022-01-11 | End: 2022-01-11 | Stop reason: HOSPADM

## 2022-01-10 RX ORDER — 0.9 % SODIUM CHLORIDE 0.9 %
1000 INTRAVENOUS SOLUTION INTRAVENOUS ONCE
Status: COMPLETED | OUTPATIENT
Start: 2022-01-10 | End: 2022-01-10

## 2022-01-10 RX ORDER — LEVOTHYROXINE SODIUM 0.03 MG/1
75 TABLET ORAL
Status: DISCONTINUED | OUTPATIENT
Start: 2022-01-11 | End: 2022-01-11 | Stop reason: HOSPADM

## 2022-01-10 RX ORDER — AMITRIPTYLINE HYDROCHLORIDE 25 MG/1
25 TABLET, FILM COATED ORAL NIGHTLY
Status: DISCONTINUED | OUTPATIENT
Start: 2022-01-10 | End: 2022-01-11 | Stop reason: HOSPADM

## 2022-01-10 RX ORDER — DEXTROSE MONOHYDRATE 25 G/50ML
12.5 INJECTION, SOLUTION INTRAVENOUS PRN
Status: DISCONTINUED | OUTPATIENT
Start: 2022-01-10 | End: 2022-01-11 | Stop reason: HOSPADM

## 2022-01-10 RX ORDER — PANTOPRAZOLE SODIUM 40 MG/10ML
40 INJECTION, POWDER, LYOPHILIZED, FOR SOLUTION INTRAVENOUS DAILY
Status: DISCONTINUED | OUTPATIENT
Start: 2022-01-10 | End: 2022-01-10

## 2022-01-10 RX ORDER — ONDANSETRON 4 MG/1
4 TABLET, ORALLY DISINTEGRATING ORAL EVERY 8 HOURS PRN
Status: DISCONTINUED | OUTPATIENT
Start: 2022-01-10 | End: 2022-01-11 | Stop reason: HOSPADM

## 2022-01-10 RX ORDER — HYDROCODONE BITARTRATE AND ACETAMINOPHEN 10; 325 MG/1; MG/1
1 TABLET ORAL EVERY 6 HOURS PRN
Status: DISCONTINUED | OUTPATIENT
Start: 2022-01-10 | End: 2022-01-11 | Stop reason: HOSPADM

## 2022-01-10 RX ORDER — ASPIRIN 81 MG/1
324 TABLET, CHEWABLE ORAL ONCE
Status: DISCONTINUED | OUTPATIENT
Start: 2022-01-10 | End: 2022-01-11 | Stop reason: HOSPADM

## 2022-01-10 RX ORDER — METOPROLOL SUCCINATE 50 MG/1
50 TABLET, EXTENDED RELEASE ORAL DAILY
Status: DISCONTINUED | OUTPATIENT
Start: 2022-01-11 | End: 2022-01-11 | Stop reason: HOSPADM

## 2022-01-10 RX ORDER — INSULIN GLARGINE 100 [IU]/ML
20 INJECTION, SOLUTION SUBCUTANEOUS NIGHTLY
Status: DISCONTINUED | OUTPATIENT
Start: 2022-01-10 | End: 2022-01-11 | Stop reason: HOSPADM

## 2022-01-10 RX ORDER — MONTELUKAST SODIUM 10 MG/1
10 TABLET ORAL NIGHTLY
Status: DISCONTINUED | OUTPATIENT
Start: 2022-01-10 | End: 2022-01-11 | Stop reason: HOSPADM

## 2022-01-10 RX ORDER — POLYETHYLENE GLYCOL 3350 17 G/17G
17 POWDER, FOR SOLUTION ORAL DAILY PRN
Status: DISCONTINUED | OUTPATIENT
Start: 2022-01-10 | End: 2022-01-11 | Stop reason: HOSPADM

## 2022-01-10 RX ORDER — PANTOPRAZOLE SODIUM 40 MG/1
40 TABLET, DELAYED RELEASE ORAL
Status: DISCONTINUED | OUTPATIENT
Start: 2022-01-11 | End: 2022-01-11 | Stop reason: HOSPADM

## 2022-01-10 RX ORDER — QUETIAPINE FUMARATE 25 MG/1
25 TABLET, FILM COATED ORAL 2 TIMES DAILY
Status: DISCONTINUED | OUTPATIENT
Start: 2022-01-10 | End: 2022-01-10

## 2022-01-10 RX ORDER — SODIUM CHLORIDE 9 MG/ML
25 INJECTION, SOLUTION INTRAVENOUS PRN
Status: DISCONTINUED | OUTPATIENT
Start: 2022-01-10 | End: 2022-01-11 | Stop reason: HOSPADM

## 2022-01-10 RX ORDER — LEVETIRACETAM 500 MG/1
750 TABLET ORAL 2 TIMES DAILY
Status: DISCONTINUED | OUTPATIENT
Start: 2022-01-10 | End: 2022-01-11 | Stop reason: HOSPADM

## 2022-01-10 RX ORDER — NICOTINE POLACRILEX 4 MG
15 LOZENGE BUCCAL PRN
Status: DISCONTINUED | OUTPATIENT
Start: 2022-01-10 | End: 2022-01-11 | Stop reason: HOSPADM

## 2022-01-10 RX ORDER — NITROGLYCERIN 0.4 MG/1
0.4 TABLET SUBLINGUAL EVERY 5 MIN PRN
Status: DISCONTINUED | OUTPATIENT
Start: 2022-01-10 | End: 2022-01-11 | Stop reason: HOSPADM

## 2022-01-10 RX ORDER — NIFEDIPINE 30 MG/1
60 TABLET, EXTENDED RELEASE ORAL DAILY
Status: DISCONTINUED | OUTPATIENT
Start: 2022-01-10 | End: 2022-01-11

## 2022-01-10 RX ORDER — ACETAMINOPHEN 325 MG/1
650 TABLET ORAL EVERY 6 HOURS PRN
Status: DISCONTINUED | OUTPATIENT
Start: 2022-01-10 | End: 2022-01-11 | Stop reason: HOSPADM

## 2022-01-10 RX ORDER — QUETIAPINE FUMARATE 25 MG/1
50 TABLET, FILM COATED ORAL NIGHTLY
Status: DISCONTINUED | OUTPATIENT
Start: 2022-01-11 | End: 2022-01-11 | Stop reason: HOSPADM

## 2022-01-10 RX ORDER — INSULIN GLARGINE 100 [IU]/ML
20 INJECTION, SOLUTION SUBCUTANEOUS NIGHTLY
Status: DISCONTINUED | OUTPATIENT
Start: 2022-01-10 | End: 2022-01-10

## 2022-01-10 RX ORDER — ATORVASTATIN CALCIUM 20 MG/1
10 TABLET, FILM COATED ORAL NIGHTLY
Status: DISCONTINUED | OUTPATIENT
Start: 2022-01-10 | End: 2022-01-11 | Stop reason: HOSPADM

## 2022-01-10 RX ORDER — SODIUM CHLORIDE 0.9 % (FLUSH) 0.9 %
5-40 SYRINGE (ML) INJECTION EVERY 12 HOURS SCHEDULED
Status: DISCONTINUED | OUTPATIENT
Start: 2022-01-10 | End: 2022-01-11 | Stop reason: HOSPADM

## 2022-01-10 RX ORDER — ACETAMINOPHEN 650 MG/1
650 SUPPOSITORY RECTAL EVERY 6 HOURS PRN
Status: DISCONTINUED | OUTPATIENT
Start: 2022-01-10 | End: 2022-01-11 | Stop reason: HOSPADM

## 2022-01-10 RX ORDER — LOSARTAN POTASSIUM 50 MG/1
100 TABLET ORAL DAILY
Status: DISCONTINUED | OUTPATIENT
Start: 2022-01-10 | End: 2022-01-11 | Stop reason: HOSPADM

## 2022-01-10 RX ORDER — ALBUTEROL SULFATE 90 UG/1
2 AEROSOL, METERED RESPIRATORY (INHALATION) EVERY 4 HOURS PRN
Status: DISCONTINUED | OUTPATIENT
Start: 2022-01-10 | End: 2022-01-11 | Stop reason: HOSPADM

## 2022-01-10 RX ORDER — SODIUM CHLORIDE 9 MG/ML
INJECTION, SOLUTION INTRAVENOUS CONTINUOUS
Status: ACTIVE | OUTPATIENT
Start: 2022-01-10 | End: 2022-01-11

## 2022-01-10 RX ORDER — ASPIRIN 81 MG/1
81 TABLET, CHEWABLE ORAL DAILY
Status: DISCONTINUED | OUTPATIENT
Start: 2022-01-11 | End: 2022-01-11 | Stop reason: HOSPADM

## 2022-01-10 RX ORDER — MORPHINE SULFATE 2 MG/ML
4 INJECTION, SOLUTION INTRAMUSCULAR; INTRAVENOUS ONCE
Status: COMPLETED | OUTPATIENT
Start: 2022-01-10 | End: 2022-01-10

## 2022-01-10 RX ORDER — SODIUM CHLORIDE 0.9 % (FLUSH) 0.9 %
5-40 SYRINGE (ML) INJECTION PRN
Status: DISCONTINUED | OUTPATIENT
Start: 2022-01-10 | End: 2022-01-11 | Stop reason: HOSPADM

## 2022-01-10 RX ORDER — QUETIAPINE FUMARATE 25 MG/1
25 TABLET, FILM COATED ORAL ONCE
Status: COMPLETED | OUTPATIENT
Start: 2022-01-10 | End: 2022-01-10

## 2022-01-10 RX ORDER — ONDANSETRON 2 MG/ML
4 INJECTION INTRAMUSCULAR; INTRAVENOUS EVERY 6 HOURS PRN
Status: DISCONTINUED | OUTPATIENT
Start: 2022-01-10 | End: 2022-01-11 | Stop reason: HOSPADM

## 2022-01-10 RX ADMIN — LIDOCAINE HYDROCHLORIDE: 20 SOLUTION ORAL; TOPICAL at 17:12

## 2022-01-10 RX ADMIN — LEVETIRACETAM 750 MG: 500 TABLET, FILM COATED ORAL at 21:14

## 2022-01-10 RX ADMIN — CARBIDOPA AND LEVODOPA 1 TABLET: 10; 100 TABLET ORAL at 21:13

## 2022-01-10 RX ADMIN — ATORVASTATIN CALCIUM 10 MG: 20 TABLET, FILM COATED ORAL at 21:18

## 2022-01-10 RX ADMIN — INSULIN GLARGINE 10 UNITS: 100 INJECTION, SOLUTION SUBCUTANEOUS at 21:38

## 2022-01-10 RX ADMIN — BUSPIRONE HYDROCHLORIDE 20 MG: 5 TABLET ORAL at 21:13

## 2022-01-10 RX ADMIN — ENOXAPARIN SODIUM 40 MG: 100 INJECTION SUBCUTANEOUS at 17:15

## 2022-01-10 RX ADMIN — QUETIAPINE FUMARATE 25 MG: 25 TABLET ORAL at 21:15

## 2022-01-10 RX ADMIN — ONDANSETRON 4 MG: 4 TABLET, ORALLY DISINTEGRATING ORAL at 19:32

## 2022-01-10 RX ADMIN — IOPAMIDOL 80 ML: 755 INJECTION, SOLUTION INTRAVENOUS at 13:51

## 2022-01-10 RX ADMIN — INSULIN LISPRO 4 UNITS: 100 INJECTION, SOLUTION INTRAVENOUS; SUBCUTANEOUS at 18:17

## 2022-01-10 RX ADMIN — SODIUM CHLORIDE, PRESERVATIVE FREE 5 ML: 5 INJECTION INTRAVENOUS at 21:40

## 2022-01-10 RX ADMIN — SODIUM CHLORIDE: 9 INJECTION, SOLUTION INTRAVENOUS at 17:09

## 2022-01-10 RX ADMIN — HYDROCODONE BITARTRATE AND ACETAMINOPHEN 1 TABLET: 10; 325 TABLET ORAL at 21:12

## 2022-01-10 RX ADMIN — MORPHINE SULFATE 4 MG: 2 INJECTION, SOLUTION INTRAMUSCULAR; INTRAVENOUS at 13:38

## 2022-01-10 RX ADMIN — MONTELUKAST 10 MG: 10 TABLET, FILM COATED ORAL at 21:13

## 2022-01-10 RX ADMIN — OXCARBAZEPINE 300 MG: 300 TABLET, FILM COATED ORAL at 21:34

## 2022-01-10 RX ADMIN — SODIUM CHLORIDE 1000 ML: 9 INJECTION, SOLUTION INTRAVENOUS at 13:27

## 2022-01-10 RX ADMIN — ONDANSETRON 4 MG: 2 INJECTION INTRAMUSCULAR; INTRAVENOUS at 13:35

## 2022-01-10 RX ADMIN — AMITRIPTYLINE HYDROCHLORIDE 25 MG: 25 TABLET, FILM COATED ORAL at 21:19

## 2022-01-10 RX ADMIN — QUETIAPINE FUMARATE 25 MG: 25 TABLET ORAL at 23:50

## 2022-01-10 RX ADMIN — BUSPIRONE HYDROCHLORIDE 20 MG: 5 TABLET ORAL at 17:14

## 2022-01-10 RX ADMIN — PANTOPRAZOLE SODIUM 40 MG: 40 INJECTION, POWDER, FOR SOLUTION INTRAVENOUS at 17:10

## 2022-01-10 ASSESSMENT — ENCOUNTER SYMPTOMS
DIARRHEA: 1
VOMITING: 1
BACK PAIN: 0
COUGH: 0
VOMITING: 0
NAUSEA: 1
DIARRHEA: 0
BACK PAIN: 1
ABDOMINAL PAIN: 1
CHEST TIGHTNESS: 0
COUGH: 1
SHORTNESS OF BREATH: 1
SHORTNESS OF BREATH: 0

## 2022-01-10 ASSESSMENT — PAIN DESCRIPTION - LOCATION: LOCATION: CHEST;ABDOMEN

## 2022-01-10 ASSESSMENT — PAIN DESCRIPTION - ONSET: ONSET: ON-GOING

## 2022-01-10 ASSESSMENT — PAIN DESCRIPTION - PAIN TYPE: TYPE: ACUTE PAIN

## 2022-01-10 ASSESSMENT — PAIN SCALES - GENERAL
PAINLEVEL_OUTOF10: 10
PAINLEVEL_OUTOF10: 9
PAINLEVEL_OUTOF10: 9

## 2022-01-10 ASSESSMENT — PAIN DESCRIPTION - FREQUENCY: FREQUENCY: CONTINUOUS

## 2022-01-10 ASSESSMENT — PAIN DESCRIPTION - DESCRIPTORS: DESCRIPTORS: CONSTANT

## 2022-01-10 ASSESSMENT — PAIN DESCRIPTION - PROGRESSION: CLINICAL_PROGRESSION: NOT CHANGED

## 2022-01-10 NOTE — ED PROVIDER NOTES
As provider-in-triage, I performed a medical screening history and physical exam on this patient. HISTORY OF PRESENT ILLNESS  Hershall Signs is a 61 y.o. female who presents with chest pain via EMS     PHYSICAL EXAM  BP (!) 156/81   Pulse 83   Resp 18   Ht 5' 2\" (1.575 m)   Wt 175 lb (79.4 kg)   SpO2 98%   BMI 32.01 kg/m²     On exam, the patient appears in no acute distress. Speech is clear. Breathing is unlabored. Moves all extremities    Comment: Please note this report has been produced using speech recognition software and may contain errors related to that system including errors in grammar, punctuation, and spelling, as well as words and phrases that may be inappropriate. If there are any questions or concerns please feel free to contact the dictating provider for clarification.         Homero Harris [address], PA  01/11/22 1004

## 2022-01-10 NOTE — ED NOTES
[phone] perfect serve message sent to Dr Aditi Diallo on in pt consult from hospitalist     Ralph Rg  01/10/22 [phone] Dr Aditi Diallo acknowledged perfect serve message.  Added to treatment team.      Ralph Rg  01/10/22 [id number]

## 2022-01-10 NOTE — CONSULTS
[address], [address] [facility],Third Floor  Phone: [phone]    Fax [phone]                  Martín Ovalles MD, Moni Walter MD, [address] [facility], MD, MD Swathi Whiteside MD Ace Dade, MD Jilda Bench, MD Trude Simons, WENDIE Hagan, WENDIE Jimenez, APRIRIS Blackman, WENDIE Akhtar PA-C    CARDIOLOGY CONSULT NOTE     Reason for consultation:  Chest pain rule out    Referring physician:  No admitting provider for patient encounter. Primary care physician: Cristi Hart MD      Thank you for the consult. Chief Complaints :  Chief Complaint   Patient presents with    Chest Pain        History of present illness:Simona is a 61 y. o.year old  female with a past medical history of hypertension, hyperlipidemia,  diabetes mellitus, back pain, and GERD. Patient presents with chest pressure over the last 3 days that has been gradually growing in intensity. Patient stated that her pain will come and go but started towards the center of her left breast and has now moved towards the right edge of her left breast.  Pressure is fairly constant but does come and go, she stated that she does not notice anything to alleviate or to aggravate her symptoms. Patient has had a cardiac cath in the past which did not show evidence of significant coronary artery disease. She has been having on and off chest pain for years. She is scheduled to get a cardiac CT outpatient. Blood pressure is uncontrolled, usually has SBP in 150s.       Past medical history:    has a past medical history of Abnormal EKG, Acid reflux, Anemia, Anesthesia, Anginal pain ([address] Anxiety, Arthritis, Asthma, Bipolar 1 disorder ([address] Cerebral artery occlusion with cerebral infarction ([address] CHF (congestive heart failure) ([address] Chronic back pain, Chronic kidney disease, DDD (degenerative disc disease), cervical, Depression, Diabetes mellitus ([facility] Diabetic neuropathy ([address] Dizziness, Dry skin, Enlarged ureter, Fatty liver, Fibrocystic breast, Generalized anxiety disorder, Gout, H/O cardiac catheterization, H/O cardiovascular stress test, H/O cardiovascular stress test, H/O cardiovascular stress test, H/O Doppler ultrasound, H/O echocardiogram, H/O echocardiogram, History of Holter monitoring, [place] (hard of hearing), Hx of cardiovascular stress test, Hx of motion sickness, HX OTHER MEDICAL, Hyperlipidemia, Hypertension, IBS (irritable bowel syndrome), Incisional hernia, Kidney stones, Migraines, Nausea & vomiting, Other specified disorder of skin, Panic attacks, Panic attacks, Pneumonia, Pseudoseizures ([address] Restless leg, Shortness of breath, Sleep apnea, Staph infection, Thyroid disease, Tremor, Urinary incontinence, UTI (urinary tract infection), UTI (urinary tract infection), Vertigo, and Wears glasses. Past surgical history:   has a past surgical history that includes Carpal tunnel release (Right, 1999); Diagnostic Cardiac Cath Lab Procedure (01/11/2010); Dental surgery; Colonoscopy (Last Done 6-13); Endoscopy, colon, diagnostic (Several ); Bladder surgery (1970's Or 1980's); Lithotripsy (2011); Breast biopsy (Right, 1980's); Breast surgery (Left, 1990's); hernia repair (6505'P); hernia repair (1970's); Cholecystectomy (1970's); Appendectomy (1970's); Hysterectomy, total abdominal (1987); Tubal ligation (1978); Esophagus dilation (1980's And 1990's); Knee arthroscopy (Right, 1999); joint replacement (2008); other surgical history (06 13 2014); fracture surgery (1974 Or 1975); Cardiac catheterization (10-18-06); and Cardiac catheterization. Social History:   reports that she has never smoked. She has never used smokeless tobacco. She reports that she does not drink alcohol and does not use drugs.   Family history:   no family history of CAD, STROKE of DM at early age    Allergies   Allergen Reactions    Abilify [Aripiprazole]      \"Severe Shaking And Restlessness\"    Advil [Ibuprofen Micronized] Palpitations     \"Severe High Blood Pressure\"    Augmentin [Amoxicillin-Pot Clavulanate] Itching and Rash    Bee Venom Swelling     Redness    Ciprofloxacin Itching and Rash    Codeine      \"Severe Abdominal Cramping\"    Darvocet [Propoxyphene N-Acetaminophen] Palpitations     \"Severe High Blood Pressure\"    Darvon [Propoxyphene Hcl] Palpitations     \"Severe High Blood Pressure\"    Decadron [Dexamethasone] Other (See Comments)     seizure  Seizures    Ditropan [Oxybutynin Chloride] Palpitations     \"Severe High Blood Pressure\"    Fioricet [Butalbital-Apap-Caffeine] Palpitations     \"Severe High Blood Pressure\"    Fiorinal-Codeine #3 [Butalbital-Asa-Caff-Codeine]      \"Severe Stomach Cramps\"    Flagyl [Metronidazole] Diarrhea     \"Severe Diarrhea And Cramping\"    Naproxen Palpitations     \"Severe High Blood Pressure\"    Other      \"Allergic To Spider Bites Causing Blackness Of Skin, Severe Itching And Pain\"                                                  \"Allergic To Powder In Gloves Causing Severe Redness And Itching\"    Prozac [Fluoxetine Hcl]      \"Hallucinations\"    Robaxin [Methocarbamol] Palpitations     \"Severe High Blood Pressure\"    Ultram [Tramadol]      \"Severe Stomach Pain\"    Zoloft Palpitations     \"Sever High Blood Pressure\"    Butalbital-Aspirin-Caffeine Other (See Comments)     \"severe stomach pain\"    Coreg [Carvedilol] Other (See Comments)     \"spikes my BP severely and send me into a severe anxiety attack\"    Fluoxetine Other (See Comments)     hallucinations    Oxybutynin Chloride Other (See Comments)     Raises bp    Percocet [Oxycodone-Acetaminophen]      \"knocked me out for 12 hrs\"    Sertraline Other (See Comments)     hallucinations    Tape [Adhesive Tape]      Patient states it tears her skin, including band aids    Reglan [Metoclopramide] Other (See Comments)     \"makes me talk like a baby, but if I take benadryl with it it's fine\"       ondansetron (ZOFRAN) injection 4 mg, Q6H PRN  aspirin chewable tablet 324 mg, Once  glucose (GLUTOSE) 40 % oral gel 15 g, PRN  dextrose 50 % IV solution, PRN  glucagon (rDNA) injection 1 mg, PRN  dextrose 5 % solution, PRN  sodium chloride flush 0.9 % injection 5-40 mL, 2 times per day  sodium chloride flush 0.9 % injection 5-40 mL, PRN  0.9 % sodium chloride infusion, PRN  ondansetron (ZOFRAN-ODT) disintegrating tablet 4 mg, Q8H PRN   Or  ondansetron (ZOFRAN) injection 4 mg, Q6H PRN  acetaminophen (TYLENOL) tablet 650 mg, Q6H PRN   Or  acetaminophen (TYLENOL) suppository 650 mg, Q6H PRN  polyethylene glycol (GLYCOLAX) packet 17 g, Daily PRN  [START ON 1/11/2022] aspirin chewable tablet 81 mg, Daily  insulin lispro (HUMALOG) injection vial 0-12 Units, TID WC  insulin lispro (HUMALOG) injection vial 0-6 Units, Nightly  enoxaparin (LOVENOX) injection 40 mg, Daily  nitroGLYCERIN (NITROSTAT) SL tablet 0.4 mg, Q5 Min PRN  albuterol sulfate  (90 Base) MCG/ACT inhaler 2 puff, Q4H PRN  amitriptyline (ELAVIL) tablet 25 mg, Nightly  busPIRone (BUSPAR) tablet 20 mg, TID  carbidopa-levodopa (SINEMET)  MG per tablet 1 tablet, Nightly  fluticasone-umeclidin-vilant (TRELEGY ELLIPTA) 100-62.5-25 MCG/INH inhaler 1 puff, Daily PRN  levETIRAcetam (KEPPRA) tablet 750 mg, BID  [START ON 1/11/2022] levothyroxine (SYNTHROID) tablet 75 mcg, QAM AC  losartan (COZAAR) tablet 100 mg, Daily  montelukast (SINGULAIR) tablet 10 mg, Nightly  NIFEdipine (PROCARDIA XL) extended release tablet 60 mg, Daily  Insulin Degludec SOPN 20 Units, Nightly  atorvastatin (LIPITOR) tablet 10 mg, Daily  QUEtiapine (SEROQUEL) tablet 25 mg, BID  pantoprazole (PROTONIX) tablet 40 mg, Daily  [START ON 1/11/2022] metoprolol succinate (TOPROL XL) extended release tablet 50 mg, Daily  OXcarbazepine (TRILEPTAL) tablet 450 mg, BID  pantoprazole (PROTONIX) injection 40 mg, Once  aluminum & magnesium hydroxide-simethicone (MAALOX) 30 mL, lidocaine viscous hcl (XYLOCAINE) 5 mL (GI COCKTAIL), Once      Current Facility-Administered Medications   Medication Dose Route Frequency Provider Last Rate Last Admin    ondansetron (ZOFRAN) injection 4 mg  4 mg IntraVENous Q6H PRN Shira Wheelwright, DO   4 mg at 01/10/22 1335    aspirin chewable tablet 324 mg  324 mg Oral Once Shira Wheelwright, DO        glucose (GLUTOSE) 40 % oral gel 15 g  15 g Oral PRN WENDIE Alejandra CNP        dextrose 50 % IV solution  12.5 g IntraVENous PRN WENDIE Alejandra CNP        glucagon (rDNA) injection 1 mg  1 mg IntraMUSCular PRN Ed Younger, APRN - CNP        dextrose 5 % solution  100 mL/hr IntraVENous PRN WENDIE Alejandra CNP        sodium chloride flush 0.9 % injection 5-40 mL  5-40 mL IntraVENous 2 times per day WENDIE Alejandra CNP        sodium chloride flush 0.9 % injection 5-40 mL  5-40 mL IntraVENous PRN WENDIE Alejandra - CNP        0.9 % sodium chloride infusion  25 mL IntraVENous PRN WENDIE Alejandra CNP        ondansetron (ZOFRAN-ODT) disintegrating tablet 4 mg  4 mg Oral Q8H PRN WENDIE Alejandra CNP        Or    ondansetron (ZOFRAN) injection 4 mg  4 mg IntraVENous Q6H PRN WENDIE Alejandra CNP        acetaminophen (TYLENOL) tablet 650 mg  650 mg Oral Q6H PRN WENDIE Alejandra CNP        Or    acetaminophen (TYLENOL) suppository 650 mg  650 mg Rectal Q6H PRN WENDIE Alejandra CNP        polyethylene glycol (GLYCOLAX) packet 17 g  17 g Oral Daily PRN WENDIE Alejandra CNP        [START ON 1/11/2022] aspirin chewable tablet 81 mg  81 mg Oral Daily WENDIE Alejandra CNP        insulin lispro (HUMALOG) injection vial 0-12 Units  0-12 Units SubCUTAneous TID  WENDIE Alejandra CNP        insulin lispro (HUMALOG) injection vial 0-6 Units  0-6 Units SubCUTAneous Nightly WENDIE Alejandra - CNP        enoxaparin (LOVENOX) injection 40 mg  40 mg SubCUTAneous Daily Cherise Ganser, APRN - CNP        nitroGLYCERIN (NITROSTAT) SL tablet 0.4 mg  0.4 mg SubLINGual Q5 Min PRN Cherise Ganser, APRN - CNP        albuterol sulfate  (90 Base) MCG/ACT inhaler 2 puff  2 puff Inhalation Q4H PRN Cherise Ganser, APRN - CNP        amitriptyline (ELAVIL) tablet 25 mg  25 mg Oral Nightly Cherise Ganser, APRN - CNP        busPIRone (BUSPAR) tablet 20 mg  20 mg Oral TID Cherise Ganser, APRN - CNP        carbidopa-levodopa (SINEMET)  MG per tablet 1 tablet  1 tablet Oral Nightly Cherise Ganser, APRN - CNP        fluticasone-umeclidin-vilant (TRELEGY ELLIPTA) 100-62.5-25 MCG/INH inhaler 1 puff  1 puff Inhalation Daily PRN Cherise Ganser, APRN - CNP        levETIRAcetam (KEPPRA) tablet 750 mg  750 mg Oral BID Cherise Ganser, APRN - CNP        [START ON 1/11/2022] levothyroxine (SYNTHROID) tablet 75 mcg  75 mcg Oral QAM AC Cherise Ganser, APRN - CNP        losartan (COZAAR) tablet 100 mg  100 mg Oral Daily Cherise Ganser, APRN - CNP        montelukast (SINGULAIR) tablet 10 mg  10 mg Oral Nightly Cherise Ganser, APRN - CNP        NIFEdipine (PROCARDIA XL) extended release tablet 60 mg  60 mg Oral Daily Cherise Ganser, APRN - CNP        Insulin Degludec SOPN 20 Units  20 Units SubCUTAneous Nightly Cherise Ganser, APRN - CNP        atorvastatin (LIPITOR) tablet 10 mg  10 mg Oral Daily Cherise Ganser, APRN - CRISTIANO        QUEtiapine (SEROQUEL) tablet 25 mg  25 mg Oral BID Cherise Ganser, APRN - CNP        pantoprazole (PROTONIX) tablet 40 mg  40 mg Oral Daily Cherise Ganser, APRN - CNP        [START ON 1/11/2022] metoprolol succinate (TOPROL XL) extended release tablet 50 mg  50 mg Oral Daily Cherise Ganser, APRN - CNP        OXcarbazepine (TRILEPTAL) tablet 450 mg  450 mg Oral BID Cherise Ganser, APRN - CNP        pantoprazole (PROTONIX) injection 40 mg  40 mg IntraVENous Once Cherise Ganser, APRN - CNP        aluminum & magnesium hydroxide-simethicone (MAALOX) 30 mL, lidocaine viscous hcl (XYLOCAINE) 5 mL (GI COCKTAIL)   Oral Once Davis Pinon, APRN - CNP         Current Outpatient Medications   Medication Sig Dispense Refill    metoprolol tartrate (LOPRESSOR) 50 MG tablet Take 1 tablet by mouth See Admin Instructions for 2 days Patient to take 1 tab at 9:30pm on 11/9. Take 1 take at 7:30am on 11/10 2 tablet 0    albuterol sulfate HFA (PROVENTIL HFA) 108 (90 Base) MCG/ACT inhaler Inhale 2 puffs into the lungs every 4 hours as needed for Wheezing or Shortness of Breath With spacer (and mask if indicated). Thanks. 18 g 1    sodium chloride 1 g tablet Take 1 tablet by mouth 2 times daily (with meals) 90 tablet 3    levETIRAcetam (KEPPRA) 750 MG tablet Take 1 tablet by mouth 2 times daily 60 tablet 0    baclofen (LIORESAL) 10 MG tablet 1 tablet 2 times daily      diazePAM (VALIUM) 5 MG tablet as needed.  MUCINEX 600 MG extended release tablet 2 times daily as needed      metoprolol succinate (TOPROL XL) 50 MG extended release tablet Take 1 tablet by mouth daily 30 tablet 5    NIFEdipine (PROCARDIA XL) 60 MG extended release tablet Take 1 tablet by mouth daily 30 tablet 5    magnesium oxide (MAG-OX) 400 MG tablet Take 1 tablet by mouth 3 times daily 90 tablet 0    losartan (COZAAR) 100 MG tablet Take 1 tablet by mouth daily 30 tablet 0    QUEtiapine (SEROQUEL) 25 MG tablet Take 1 tablet by mouth 2 times daily (Patient taking differently: Take 25 mg by mouth 2 times daily Indications: 25 mg in am and 50 mg in pm ) 60 tablet 0    busPIRone (BUSPAR) 10 MG tablet Take 2 tablets by mouth 3 times daily 180 tablet 0    levothyroxine (SYNTHROID) 75 MCG tablet Take 1 tablet by mouth every morning (before breakfast) 30 tablet 3    amitriptyline (ELAVIL) 25 MG tablet Take 1 tablet by mouth nightly 30 tablet 3    HYDROcodone-acetaminophen (NORCO)  MG per tablet Take 1 tablet by mouth every 6 hours as needed.        fluticasone-umeclidin-vilant (Kizzy Kwana ELLIPTA) 100-62.5-25 MCG/INH AEPB Inhale 1 puff into the lungs daily (Patient taking differently: Inhale 1 puff into the lungs daily as needed (only takes prn daily due to yeast infections in mouth, is aware she is supposed to take daily) ) 1 each 5    OXcarbazepine (TRILEPTAL) 300 MG tablet Take 1 tablet by mouth 2 times daily (Patient taking differently: Take 450 mg by mouth 2 times daily ) 60 tablet 3    hydrOXYzine (VISTARIL) 25 MG capsule Take 25 mg by mouth 3 times daily as needed       NOVOLOG FLEXPEN 100 UNIT/ML injection pen Inject into the skin See Admin Instructions 12/01/2020 patient states she follows sliding scale regimen BS > 150      simvastatin (ZOCOR) 20 MG tablet Take 1 tablet by mouth nightly 30 tablet 3    TRESIBA FLEXTOUCH 200 UNIT/ML SOPN Inject 20 Units into the skin every morning (Patient taking differently: Inject into the skin nightly 04/26/21 Patient states she follows sliding scale) 1 pen 2    docusate sodium (COLACE) 100 MG capsule Take 1 capsule by mouth 2 times daily 30 capsule 0    albuterol sulfate  (90 Base) MCG/ACT inhaler Inhale 2 puffs into the lungs every 6 hours as needed for Wheezing or Shortness of Breath (or cough) Please include spacer with instructions for use. 1 Inhaler 0    aluminum & magnesium hydroxide-simethicone (MAALOX MAX) 400-400-40 MG/5ML SUSP Take 15 mLs by mouth every 6 hours as needed (heart burn) 120 mL 0    pantoprazole (PROTONIX) 40 MG tablet Take 1 tablet by mouth daily 30 tablet 0    carbidopa-levodopa (SINEMET)  MG per tablet Take 1 tablet by mouth nightly      polyethylene glycol (GLYCOLAX) packet Take 17 g by mouth daily as needed for Constipation      aspirin 81 MG tablet Take 81 mg by mouth daily      Multiple Vitamins-Iron (MULTI-VITAMIN/IRON PO) Take  by mouth.  montelukast (SINGULAIR) 10 MG tablet Take 10 mg by mouth nightly. Review of Systems   Constitutional: Negative for diaphoresis, fatigue and fever. Eyes: Negative for visual disturbance. Respiratory: Negative for chest tightness and shortness of breath. Cardiovascular: Positive for leg swelling. Chest pain:          Worsening chest pressure x3 days. Pressure is worse with deep inspiration. Also hurts with palpation   Gastrointestinal: Positive for abdominal pain. Negative for diarrhea and vomiting. Indigestion   Endocrine: Negative for cold intolerance and heat intolerance. Musculoskeletal: Positive for back pain. Skin: Negative for pallor and rash. Neurological: Negative for dizziness, syncope, light-headedness and headaches. Psychiatric/Behavioral: Negative for dysphoric mood. The patient is nervous/anxious. Physical Examination:    Vitals:    01/10/22 1100 01/10/22 1438   BP: (!) 156/81    Pulse: 83    Resp: 18    Temp:  98.7 °F (37.1 °C)   TempSrc:  Oral   SpO2: 98%    Weight: 175 lb (79.4 kg)    Height: 5' 2\" (1.575 m)        Physical Exam  Constitutional:       General: She is not in acute distress. Appearance: She is not diaphoretic. HENT:      Head: Normocephalic and atraumatic. Right Ear: External ear normal.      Left Ear: External ear normal.      Nose: Nose normal.      Mouth/Throat:      Mouth: Mucous membranes are moist.   Eyes:      Extraocular Movements: Extraocular movements intact. Comments: Pupils equal and round   Neck:      Vascular: No carotid bruit. Cardiovascular:      Rate and Rhythm: Normal rate and regular rhythm. Heart sounds: S1 normal and S2 normal. No murmur heard. No friction rub. No gallop. No S3 or S4 sounds. Pulmonary:      Effort: Pulmonary effort is normal. No respiratory distress. Breath sounds: Normal breath sounds. No rales. Chest:      Chest wall: No tenderness. Abdominal:      Palpations: Abdomen is soft. Hernia: A hernia is present. Musculoskeletal:         General: Tenderness:  Tenderness down spine with light palpation.       Right lower leg: No edema. Left lower leg: No edema. Skin:     General: Skin is warm and dry. Capillary Refill: Capillary refill takes less than 2 seconds. Comments: Skin turgor brisk   Neurological:      Mental Status: She is alert and oriented to person, place, and time. Mental status is at baseline. Psychiatric:         Behavior: Behavior is cooperative. Thought Content: Thought content normal.         Judgment: Judgment normal.          Medical decision making and Data review:    Lab Review   Recent Labs     01/10/22  1233   WBC 10.3   HGB 13.5   HCT 38.6         Recent Labs     01/10/22  1150   *   K 4.2   CL 91*   CO2 26   BUN 12   CREATININE 0.4*     Recent Labs     01/10/22  1150   AST 35   ALT 25   BILIDIR 0.2   BILITOT 0.4   ALKPHOS 93     Recent Labs     01/10/22  1150   TROPONINT <0.010       Recent Labs     01/10/22  1150   PROBNP 66.35     Lab Results   Component Value Date    INR 0.98 04/26/2021    PROTIME 11.8 04/26/2021       EKG: Reviewed by me.  01/10/2022   Normal sinus rhythm. No acute ischemic changes noted. LVH    ECHO: Reviewed by me.  07/13/2021   Left ventricular systolic function is normal.   Ejection fraction is visually estimated at 55-60%. Mild left ventricular hypertrophy. Grade I diastolic dysfunction. No evidence of any pericardial effusion. Limited imaging provided; patient unable to tolerate complete      exam    Stress test: 9/28/2021   Normal tracer uptake in all segments of myocardium on stress ans rest   images. Normal Stress nuclear scintigraphic study suggestive of normal   myocardial perfusion. Gated images demonstrate normal left ventricular   systolic function with EF of 60 %.       Chest Xray:  CT HEAD WO CONTRAST    Result Date: 12/21/2021  EXAMINATION: CT OF THE HEAD WITHOUT CONTRAST; CT OF THE CERVICAL SPINE WITHOUT CONTRAST 12/21/2021 1:38 pm TECHNIQUE: CT of the head was performed without the administration of intravenous contrast. Dose modulation, iterative reconstruction, and/or weight based adjustment of the mA/kV was utilized to reduce the radiation dose to as low as reasonably achievable.; CT of the cervical spine was performed without the administration of intravenous contrast. Multiplanar reformatted images are provided for review. Dose modulation, iterative reconstruction, and/or weight based adjustment of the mA/kV was utilized to reduce the radiation dose to as low as reasonably achievable. COMPARISON: 09/30/2021 HISTORY: ORDERING SYSTEM PROVIDED HISTORY: head injury TECHNOLOGIST PROVIDED HISTORY: Has a \"code stroke\" or \"stroke alert\" been called? ->No Reason for exam:->head injury Decision Support Exception - unselect if not a suspected or confirmed emergency medical condition->Emergency Medical Condition (MA) Reason for Exam: Head Injury; Otalgia; FINDINGS: BRAIN/VENTRICLES: There is no acute intracranial hemorrhage, mass effect or midline shift. No abnormal extra-axial fluid collection. Lucencies within the periventricular and subcortical white matter likely represent chronic microvascular ischemic changes. The gray-white differentiation is maintained without evidence of an acute infarct. There is no evidence of hydrocephalus. ORBITS: The visualized portion of the orbits demonstrate no acute abnormality. SINUSES: The visualized paranasal sinuses and mastoid air cells demonstrate no acute abnormality. SOFT TISSUES/SKULL:  No acute abnormality of the visualized skull or soft tissues. CERVICAL SPINE: No cervical spine fracture dislocation. Vertebral body height and alignment within normal limits. Normal cervical spine curvature. Mild degenerative changes with grade 1 anterolisthesis of C4 over C5. Jackie Maid Soft tissues of the neck appear grossly unremarkable. Lung apices are clear. No acute intracranial abnormality. Chronic microvascular ischemic changes. No acute fracture or dislocation involving cervical spine.  RECOMMENDATIONS: Unavailable     CT CERVICAL SPINE WO CONTRAST    Result Date: 12/21/2021  EXAMINATION: CT OF THE HEAD WITHOUT CONTRAST; CT OF THE CERVICAL SPINE WITHOUT CONTRAST 12/21/2021 1:38 pm TECHNIQUE: CT of the head was performed without the administration of intravenous contrast. Dose modulation, iterative reconstruction, and/or weight based adjustment of the mA/kV was utilized to reduce the radiation dose to as low as reasonably achievable.; CT of the cervical spine was performed without the administration of intravenous contrast. Multiplanar reformatted images are provided for review. Dose modulation, iterative reconstruction, and/or weight based adjustment of the mA/kV was utilized to reduce the radiation dose to as low as reasonably achievable. COMPARISON: 09/30/2021 HISTORY: ORDERING SYSTEM PROVIDED HISTORY: head injury TECHNOLOGIST PROVIDED HISTORY: Has a \"code stroke\" or \"stroke alert\" been called? ->No Reason for exam:->head injury Decision Support Exception - unselect if not a suspected or confirmed emergency medical condition->Emergency Medical Condition (MA) Reason for Exam: Head Injury; Otalgia; FINDINGS: BRAIN/VENTRICLES: There is no acute intracranial hemorrhage, mass effect or midline shift. No abnormal extra-axial fluid collection. Lucencies within the periventricular and subcortical white matter likely represent chronic microvascular ischemic changes. The gray-white differentiation is maintained without evidence of an acute infarct. There is no evidence of hydrocephalus. ORBITS: The visualized portion of the orbits demonstrate no acute abnormality. SINUSES: The visualized paranasal sinuses and mastoid air cells demonstrate no acute abnormality. SOFT TISSUES/SKULL:  No acute abnormality of the visualized skull or soft tissues. CERVICAL SPINE: No cervical spine fracture dislocation. Vertebral body height and alignment within normal limits. Normal cervical spine curvature.  Mild degenerative changes with grade 1 anterolisthesis of C4 over C5. Anil Pesa Soft tissues of the neck appear grossly unremarkable. Lung apices are clear. No acute intracranial abnormality. Chronic microvascular ischemic changes. No acute fracture or dislocation involving cervical spine. RECOMMENDATIONS: Unavailable     CT ABDOMEN PELVIS W IV CONTRAST Additional Contrast? None    Result Date: 1/10/2022  EXAMINATION: CT OF THE ABDOMEN AND PELVIS WITH CONTRAST 1/10/2022 1:54 pm TECHNIQUE: CT of the abdomen and pelvis was performed with the administration of intravenous contrast. Multiplanar reformatted images are provided for review. Dose modulation, iterative reconstruction, and/or weight based adjustment of the mA/kV was utilized to reduce the radiation dose to as low as reasonably achievable. COMPARISON: None. HISTORY: ORDERING SYSTEM PROVIDED HISTORY: genearlized pain TECHNOLOGIST PROVIDED HISTORY: Reason for exam:->genearlized pain Additional Contrast?->None Decision Support Exception - unselect if not a suspected or confirmed emergency medical condition->Emergency Medical Condition (MA) Reason for Exam: genearlized pain Relevant Medical/Surgical History: 80 ML ISOVUE  370 FINDINGS: Lower Chest:  Visualized portion of the lower chest demonstrates no acute abnormality. Organs: Diffusely decreased attenuation of the hepatic parenchyma is consistent with steatosis. No focal lesions identified. Bryant April bladder is surgically absent Spleen, adrenals, and pancreas are unremarkable. No biliary ductal dilation. Kidneys enhance symmetrically. No hydroureteronephrosis. GI/Bowel: No bowel obstruction. No evidence of acute appendicitis. Colonic diverticulosis without CT evidence of acute diverticulitis. Pelvis: Bladder is decompressed, limiting detailed evaluation. Status post hysterectomy. No adnexal mass. Peritoneum/Retroperitoneum: Abdominal aorta is normal in course and caliber. The portal vein is patent. No lymphadenopathy.   No ascites or free intraperitoneal air. Bones/Soft Tissues: No acute soft tissue abnormality. No acute osseous abnormality or suspicious osseous lesion. No acute CT abnormality in the abdomen or pelvis. Hepatic steatosis. RECOMMENDATIONS: Unavailable     XR CHEST PORTABLE    Result Date: 1/10/2022  EXAMINATION: ONE XRAY VIEW OF THE CHEST 1/10/2022 11:28 am COMPARISON: 12/29/2021 HISTORY: ORDERING SYSTEM PROVIDED HISTORY: chest pain TECHNOLOGIST PROVIDED HISTORY: Reason for exam:->chest pain Reason for Exam: chest pain FINDINGS: The lungs are without acute focal process. There is no effusion or pneumothorax. The cardiomediastinal silhouette is stable. The osseous structures are stable. No acute process. XR CHEST PORTABLE    Result Date: 12/29/2021  EXAMINATION: ONE XRAY VIEW OF THE CHEST 12/29/2021 3:40 pm COMPARISON: December 21, 2021 HISTORY: ORDERING SYSTEM PROVIDED HISTORY: nausea TECHNOLOGIST PROVIDED HISTORY: Reason for exam:->nausea Reason for Exam: nausea   ha FINDINGS: The cardiomediastinal silhouette is normal in size. The lungs are clear. No pleural effusion or pneumothorax. No subdiaphragmatic free air is present. No acute cardiopulmonary process. XR CHEST PORTABLE    Result Date: 12/21/2021  EXAMINATION: ONE XRAY VIEW OF THE CHEST 12/21/2021 1:06 pm COMPARISON: 11/10/2021 HISTORY: ORDERING SYSTEM PROVIDED HISTORY: chest pain TECHNOLOGIST PROVIDED HISTORY: Reason for exam:->chest pain Reason for Exam: chest pain FINDINGS: Mild peribronchial cuffing and central predominantly interstitial bronchial marking prominence. Findings are consistent with central bronchitis. No pulmonary consolidations or airspace infiltrates. No detectable pleural effusion, pneumothorax, pulmonary edema, cardiomegaly or mediastinal widening. Subjective findings consistent with central bronchitis.   Otherwise, radiographically nonacute portable chest.       All labs, medications and tests reviewed by myself including data from outside source , patient and available family . Continue all other medications of all above medical condition listed as is. Impression:  Active Problems:    Chest pain  Resolved Problems:    * No resolved hospital problems. *      Assessment: 61 y. o.year old with   1. Atypical chest pain  2. Hypertension  3. Dyslipidemia  4. Back pain    Plan and Recommendations:    1. Atypical chest pain:    -serial cardiac enzymes. Initial troponin negative  -EKG reviewed. Normal sinus rhythm no acute ischemic changes  -Last echo completed 07/20/2021 demonstrated EF of 55 to 60% with mild left ventricular hypertrophy. No evidence of valvular disease. Grade 1 diastolic dysfunction  -Cardiac cath 08/20/2017. Did not show evidence of significant coronary artery disease.  -Stress test on 09/28/2021 did not reveal any evidence of ischemia.  -Chest pain unlikely to be ACS   -Scheduled to undergo cardiac CT as outpatient  -Nitroglycerin given in ED, neither of which relieved chest pain  -Continue Toprol XL and aspirin  -No further cardiac work up at this time  2. Hypertension  -Uncontrolled. Last BP: 156/81.  -Patient states systolic blood pressure is usually in the 150s  -Continue  Procardia and Cozaar  -Consider adjustments outpatient.   -Hold parameters if SBP < 90  3. Dyslipidemia   -Continue statin  4. Back pain   -Primary care following    Thank you  much for consult and giving us the opportunity in contributing in the care of this patient. Please feel free to call me for any questions.        Jason Mcghee PA-C, 1/10/2022 3:34 PM
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
## 2022-01-10 NOTE — ED PROVIDER NOTES
ATTENDING NOTE:    I discussed this patient's history and physical findings and reviewed the PA's findings with them, as well as performed an independent assessment and coordinated care with them. My additional findings are: Patient is resting in bed, complaining of continued chest pain. She states \"I have been really sick for the past couple days\". HISTORY OF PRESENT ILLNESS:  Chief Complaint   Patient presents with    Chest Pain   . Casa Gonsales is a 61 y.o. female who presents with chest pain, shortness of breath, cough, fatigue. PHYSICAL EXAM:  VITAL SIGNS:   ED Triage Vitals   Enc Vitals Group      BP       Pulse       Resp       Temp       Temp src       SpO2       Weight       Height       Head Circumference       Peak Flow       Pain Score       Pain Loc       Pain Edu? Excl. in [facility]? Vitals during ED course were reviewed and are as charted. Key Physical Exam Findings:    Heart rate regular with regular rhythm. Lungs clear without wheezes, diminished aeration. Some nasal congestion.       EKG Interpretation    Interpreted by emergency department physician from January 10 at 1052    Rhythm: normal sinus   Rate: normal  Axis: left  Ectopy: none  Conduction: nonspecific interventricular conduction block  ST Segments: no acute change  T Waves: no acute change  Q Waves: none    Clinical Impression: Normal sinus rhythm with rate of 78, no clear ischemia or ectopy    Cassandra Hernández MD      RADIOLOGY/PROCEDURES/LABS/MEDICATIONS ADMINISTERED:    I have reviewed and interpreted all of the currently available lab results from this visit (if applicable):  Results for orders placed or performed during the hospital encounter of 84/99/24   Basic Metabolic Panel w/ Reflex to MG   Result Value Ref Range    Sodium 128 (L) 135 - 145 MMOL/L    Potassium 4.2 3.5 - 5.1 MMOL/L    Chloride 91 (L) 99 - 110 mMol/L    CO2 26 21 - 32 MMOL/L    Anion Gap 11 4 - 16    BUN 12 6 - 23 MG/DL    CREATININE 0.4 (L) 0.6 - 1.1 MG/DL    Glucose 125 (H) 70 - 99 MG/DL    Calcium 9.5 8.3 - 10.6 MG/DL    GFR Non-African American >60 >60 mL/min/1.73m2    GFR African American >60 >60 mL/min/1.73m2   Hepatic Function Panel   Result Value Ref Range    Albumin 4.5 3.4 - 5.0 GM/DL    Total Bilirubin 0.4 0.0 - 1.0 MG/DL    Bilirubin, Direct 0.2 0.0 - 0.3 MG/DL    Bilirubin, Indirect 0.2 0 - 0.7 MG/DL    Alkaline Phosphatase 93 40 - 129 IU/L    AST 35 15 - 37 IU/L    ALT 25 10 - 40 U/L    Total Protein 7.2 6.4 - 8.2 GM/DL   Lipase   Result Value Ref Range    Lipase 18 13 - 60 IU/L   Troponin   Result Value Ref Range    Troponin T <0.010 <0.01 NG/ML   Brain Natriuretic Peptide   Result Value Ref Range    Pro-BNP 66.35 <300 PG/ML   CBC Auto Differential   Result Value Ref Range    WBC 10.3 4.0 - 10.5 K/CU MM    RBC 4.64 4.2 - 5.4 M/CU MM    Hemoglobin 13.5 12.5 - 16.0 GM/DL    Hematocrit 38.6 37 - 47 %    MCV 83.2 78 - 100 FL    MCH 29.1 27 - 31 PG    MCHC 35.0 32.0 - 36.0 %    RDW 11.6 (L) 11.7 - 14.9 %    Platelets 923 991 - 072 K/CU MM    MPV 9.4 7.5 - 11.1 FL    Differential Type AUTOMATED DIFFERENTIAL     Segs Relative 60.0 36 - 66 %    Lymphocytes % 24.0 24 - 44 %    Monocytes % 12.3 (H) 0 - 4 %    Eosinophils % 2.6 0 - 3 %    Basophils % 0.7 0 - 1 %    Segs Absolute 6.2 K/CU MM    Lymphocytes Absolute 2.5 K/CU MM    Monocytes Absolute 1.3 K/CU MM    Eosinophils Absolute 0.3 K/CU MM    Basophils Absolute 0.1 K/CU MM    Nucleated RBC % 0.0 %    Total Nucleated RBC 0.0 K/CU MM    Total Immature Neutrophil 0.04 K/CU MM    Immature Neutrophil % 0.4 0 - 0.43 %   EKG 12 Lead   Result Value Ref Range    Ventricular Rate 78 BPM    Atrial Rate 78 BPM    P-R Interval 174 ms    QRS Duration 116 ms    Q-T Interval 402 ms    QTc Calculation (Bazett) 458 ms    P Axis 14 degrees    R Axis -43 degrees    T Axis 30 degrees    Diagnosis       Normal sinus rhythm  Left axis deviation  Left ventricular hypertrophy with QRS widening  Abnormal ECG  When compared with ECG of 29-DEC-2021 15:40,  Minimal criteria for Septal infarct are no longer present  Nonspecific T wave abnormality now evident in Inferior leads            ABNORMAL LABS:  Labs Reviewed   BASIC METABOLIC PANEL W/ REFLEX TO MG FOR LOW K - Abnormal; Notable for the following components:       Result Value    Sodium 128 (*)     Chloride 91 (*)     CREATININE 0.4 (*)     Glucose 125 (*)     All other components within normal limits   CBC WITH AUTO DIFFERENTIAL - Abnormal; Notable for the following components:    RDW 11.6 (*)     Monocytes % 12.3 (*)     All other components within normal limits   HEPATIC FUNCTION PANEL   LIPASE   TROPONIN   BRAIN NATRIURETIC PEPTIDE   URINE RT REFLEX TO CULTURE         IMAGING STUDIES ORDERED:  XR CHEST PORTABLE  CT ABDOMEN PELVIS W IV CONTRAST    I have personally viewed the imaging studies. The radiologist interpretation is:   XR CHEST PORTABLE   Final Result   No acute process. CT ABDOMEN PELVIS W IV CONTRAST Additional Contrast? None    (Results Pending)         MEDICATIONS ADMINISTERED:  Medications   0.9 % sodium chloride infusion ( IntraVENous New Bag 1/10/22 1709)   morphine (PF) injection 4 mg (4 mg IntraVENous Given 1/10/22 1338)   0.9 % sodium chloride bolus (0 mLs IntraVENous Stopped 1/10/22 1447)         PLAN/ED COURSE:  Last Vitals: BP (!) 170/80   Pulse 74   Temp 98.3 °F (36.8 °C) (Oral)   Resp 16   Ht 5' 2\" (1.575 m)   Wt 175 lb (79.4 kg)   SpO2 96%   BMI 32.01 kg/m²       81-year-old female presents with chest pain. Based on her risk factors, her age she will be hospitalized to the hospitalist for additional cardiac evaluation. Clinical Impression:  1. Chest pain, unspecified type    2. Generalized abdominal pain    3.  Shortness of breath          Disposition medications (if applicable):  Discharge Medication List as of 1/11/2022  1:30 PM      START taking these medications    Details   NIFEdipine (PROCARDIA XL) 90 MG extended release tablet Take 1 tablet by mouth daily, Disp-30 tablet, R-3Normal      sucralfate (CARAFATE) 1 GM tablet Take 1 tablet by mouth 4 times daily, Disp-120 tablet, R-3Normal             ED Provider Disposition Time  DISPOSITION              Electronically signed by Flo Felix MD on 1/12/2022 at 6:58 AM        Flo Felix MD  01/12/22 8070

## 2022-01-10 NOTE — ED NOTES
99 Josseline roberts hospitalist     Cherelle Lozano  01/10/22 7123 [facility] Kamari Sherman NP with [facility] group returned call      Cherelle Lozano  01/10/22 7456

## 2022-01-10 NOTE — ED NOTES
Bed: ED-04  Expected date:   Expected time:   Means of arrival:   Comments:  Addy La RN  01/10/22 7455

## 2022-01-10 NOTE — H&P
History and Physical      Name:  Kiah Delgado /Age/Sex: 1955  (61 y.o. female)   MRN & CSN:  [id number] & [id number] Admission Date/Time: 1/10/2022 10:33 AM   Location:  [facility] PCP: Krystin Smith MD       Hospital Day: 1    Assessment and Plan:   Kiah Delgado is a 61 y.o.  female  who presents with <principal problem not specified>    Assessment and plan:     Chest pain: Unstable angina versus ACS versus GI component. Patient reports 3 days of chest pain points to the location of the right edge of her left breast.  She reports episodes of sweating, dizziness nausea and vomiting. Patient has been evaluated by cardiology. Last stress test 2021-normal tracer uptake in all segments of the myocardium on stress and rest images normal stress nuclear. EF 60%. -Observation  -Serial cardiac enzymes daily-telemetry monitoring  -Cardiac consult for recurrent visits for chest pain  -GI consult  -Last cath-2017-angiographically normal epicardial coronary arteries. Normal left ventricular systolic function wall motion. GERD: Patient on Protonix daily. Patient did have a fluoroscopy upper GI small bowel-small hernia without significant gastroesophageal reflux. Unremarkable appearance of the stomach. Unremarkable small bowel follow-through. 2015.  -Protonix IV x1 dose  -Continue times daily  -GI cocktail admission    Hyponatremia: Sodium 128. States on sodium pills at home. Average baseline of 130-135.  -1 L normal saline bolus  -BMP daily  -Monitor repletion closely  -urine sodium, urine osmolality and serum osmolality pending  -hold p.o. sodium pills gentle IV fluids  -sodium every 6 hours x2    Chronic Conditions: Continue all home medications except as stated above or contraindicated. Diabetes Mellitus type II- not well controlled, with  long tern use of insulin.  managed on Trulicity, last Hgb Z2I-0.8 - 10/2021,   -SSI  - POCT glucose qACHS  -Cont Indulin Degludec 20 units at HS   - hypoglycemia management protocol   -target blood glucose 100-180    History of seizures: Continue Keppra 750 mg twice daily    COPD: Does not appear to be in exacerbation. Continue outpatient MDI regimen    Anxiety: Patient takes Trileptal for hernia 50 mg twice daily per her report also on Seroquel and amitriptyline    Hypertension: Metoprolol succinate 50 mg daily and losartan 100 mg daily    Hypothyroidism: continue levothyroxine 75 mcg daily-TSH 2.0 September 2021    BMI 32.01: kg/m2 Life style modifications      Disposition: Observation. Patient was accepted for continue evaluation and treatment and improvement of clinical symptoms. Discussed assessment and treatment plan with the admitting and supervising physician who agrees with the plan. Diet ADULT DIET; Regular; 4 carb choices (60 gm/meal); No Caffeine  Diet NPO   DVT Prophylaxis [x] Lovenox, []  Heparin, [] SCDs, [] Warfarin  [] NOAC     GI Prophylaxis [x] PPI,  [] H2 Blocker,  [] Carafate,  [] Diet/Tube Feeds   Code Status Full Code     History of Present Illness:     Chief Complaint:   Razia Green is a 61 y.o.  female, with past medical history significant for Anemia, HLD, HTN, DM, seizure, bipolar 1 disorder, who presented to the emergency room with complaint of chest pain. The present condition started 3 days ago. Patient describes chest pain along her left breast on the right edge. She is unable to describe type of pain just states \"it is chest pain \". Patient endorses feeling sweaty, nauseated and vomiting. States she has also felt dizzy more than normal.  She reports pressure-like feeling for 3 days. States nothing made her pain better or worse. States at times the pain will go through to her back. She denies any tearing or ripping sensation. Denies any numbness or tingling in extremities. Denies any headache or vision changes. Does endorse having a hiatal hernia and history of acid reflux. Patient denies any cough, shortness of breath or wheezing. Denies any known recent illnesses. Denies any fever, chills or body aches. On Examination,the patient is able to state history. At the ED, vital signs;T 98.7,HR 83, RR 18, /81 mm/hg,SPO2 98% RA. Significant laboratories were the following: Sodium 128, chloride 91, glucose 125  EKG-sinus rhythm, heart rate 78, QTc 458. No ischemic changes or ectopy is noted. Personally reviewed  The patient was brought to the ED and was subsequently admitted for  further evaluation. and management          Review of Systems   HENT: Negative for congestion. Respiratory: Negative for cough, chest tightness and shortness of breath. Cardiovascular: Positive for chest pain. Negative for palpitations. Reported sweating and dizziness with chest pain   Gastrointestinal: Positive for abdominal pain, nausea and vomiting. Genitourinary: Negative for dysuria. Musculoskeletal: Negative. Skin: Negative. Neurological: Positive for dizziness. Negative for weakness and headaches. Hematological: Negative. Psychiatric/Behavioral: Negative. Objective:   No intake or output data in the 24 hours ending 01/10/22 1433   Vitals:   Vitals:    01/10/22 1100   BP: (!) 156/81   Pulse: 83   Resp: 18   SpO2: 98%     Physical Exam:   GEN- Awake female, NAD, sitting up in bed, appears to be stated age. EYES- Pupils are equally round. HENT- Mucous membranes are moist.  NECK- Supple, no apparent thyromegaly or masses. RESP-Clear to auscultation, no wheezes, rales or rhonchi. Symmetric chest movement while on room air. CARDIO/VASC-  S1/S2 auscultated. Regular rate and rhythm,. Peripheral pulses equal bilaterally and palpable. GI- Abdomen is soft, no tenderness, masses, or guarding. Bowel sounds present. MSK- No gross joint deformities. EXTREMITIES- pulses intact, no swelling, no calf tenderness  SKIN- Normal coloration, warm, dry.   NEURO-Cranial nerves appear grossly intact, normal speech, no lateralizing weakness. PSYCH-Awake, alert, oriented x 4. Past Medical History:      Past Medical History:   Diagnosis Date    Abnormal EKG 04/22/2014    Acid reflux     Anemia     Anesthesia     Nausea/Vomiting Post Op In Past    Anginal pain ([facility])     Denies Chest Pain At This Time    Anxiety     Arthritis     \"All Over\"    Asthma     Bipolar 1 disorder ([address]     CAD (coronary artery disease)     per last cardiac cath.  Cerebral artery occlusion with cerebral infarction ([address]     CHF (congestive heart failure) ([facility])     Chronic back pain     Chronic kidney disease     DDD (degenerative disc disease), cervical     12- Patient reports she was dx with DDD of Cerival spine C6,C7    Depression     Diabetes mellitus ([address] Dx 1990's    Diabetic neuropathy ([address]     \"In My Legs And Feet\"    Dizziness     \"Sometimes\"    Dry skin     Enlarged ureter     Right Side    Fatty liver     Fibrocystic breast     Generalized anxiety disorder     Gout     Pt states she was diagnosed with gout in the past few months.  H/O cardiac catheterization     Showed mild disease per last cath.  H/O cardiovascular stress test 03/15/2010    EF 69%, normal perfusion study except for diaphragmatic artifact, uniform wall motion.  H/O cardiovascular stress test 10/09/2008    EF 60%, no anginia, normal study.  H/O cardiovascular stress test 05/06/2014    EF 66%, no ischemia, normal LV systolic funciton, normal perfusion pattern.  H/O Doppler ultrasound 02/28/2011    CAROTID DOPPLER-normal study.  H/O echocardiogram 05/06/2014    Ef >55%. Impaired LV relaxation.  H/O echocardiogram 10/14/2015    EF 60% Normal LV and systolic function. No significant valvulopathy seen.      History of Holter monitoring 03/24/2015    24 hour - predominant rhythm sinus    [place] (hard of hearing)     Bilateral Ears    Hx of cardiovascular stress test 10/19/2015 lexiscan-normal,EF63%    Hx of motion sickness     HX OTHER MEDICAL     Primary Care Physician Is Dr. Frias  In \[facility] [place] [facility]\""    Hyperlipidemia     Hypertension     IBS (irritable bowel syndrome)     Incisional hernia 04/2014    Kidney stones Last Episode In 2012 Or 2013    Passed Kidney Stones Numberous Times    Migraines     Nausea & vomiting     Nausea/Vomiting Post Op In Past    Other specified disorder of skin     12- Patient states she has a condition of her vaginal area (skin) which starts with the letters Jenna Marx. She is currently being treated with multiple creams and weekly Diflucan.  Panic attacks     Panic attacks     Pneumonia Last Episode In 1980's    Pseudoseizures [facility]) Last One In 1990's    \"Caused From Bad Nerves\"    Restless leg     Shortness of breath     Sleep apnea     12- Has CPAP but does not use due to \"smothering\" feeling with mask.  Staph infection Dx 1980's    Toes On Left Foot    Thyroid disease     hypothroidism    Tremor     \"Tremors All Over\"    Urinary incontinence     UTI (urinary tract infection) In Past    No Current Symptoms    UTI (urinary tract infection)     Vertigo     \"Sometimes\"    Wears glasses      PSHX:  has a past surgical history that includes Carpal tunnel release (Right, 1999); Diagnostic Cardiac Cath Lab Procedure (01/11/2010); Dental surgery; Colonoscopy (Last Done 6-13); Endoscopy, colon, diagnostic (Several ); Bladder surgery (1970's Or 1980's); Lithotripsy (2011); Breast biopsy (Right, 1980's); Breast surgery (Left, 1990's); hernia repair (0574'S); hernia repair (1970's); Cholecystectomy (1970's); Appendectomy (1970's); Hysterectomy, total abdominal (1987); Tubal ligation (1978); Esophagus dilation (1980's And 1990's); Knee arthroscopy (Right, 1999); joint replacement (2008); other surgical history (06 13 2014); fracture surgery (1974 Or 1975);  Cardiac catheterization (10-18-06); and Cardiac catheterization. Allergies:    Allergies   Allergen Reactions    Abilify [Aripiprazole]      \"Severe Shaking And Restlessness\"    Advil [Ibuprofen Micronized] Palpitations     \"Severe High Blood Pressure\"    Augmentin [Amoxicillin-Pot Clavulanate] Itching and Rash    Bee Venom Swelling     Redness    Ciprofloxacin Itching and Rash    Codeine      \"Severe Abdominal Cramping\"    Darvocet [Propoxyphene N-Acetaminophen] Palpitations     \"Severe High Blood Pressure\"    Darvon [Propoxyphene Hcl] Palpitations     \"Severe High Blood Pressure\"    Decadron [Dexamethasone] Other (See Comments)     seizure  Seizures    Ditropan [Oxybutynin Chloride] Palpitations     \"Severe High Blood Pressure\"    Fioricet [Butalbital-Apap-Caffeine] Palpitations     \"Severe High Blood Pressure\"    Fiorinal-Codeine #3 [Butalbital-Asa-Caff-Codeine]      \"Severe Stomach Cramps\"    Flagyl [Metronidazole] Diarrhea     \"Severe Diarrhea And Cramping\"    Naproxen Palpitations     \"Severe High Blood Pressure\"    Other      \"Allergic To Spider Bites Causing Blackness Of Skin, Severe Itching And Pain\"                                                  \"Allergic To Powder In Gloves Causing Severe Redness And Itching\"    Prozac [Fluoxetine Hcl]      \"Hallucinations\"    Robaxin [Methocarbamol] Palpitations     \"Severe High Blood Pressure\"    Ultram [Tramadol]      \"Severe Stomach Pain\"    Zoloft Palpitations     \"Sever High Blood Pressure\"    Butalbital-Aspirin-Caffeine Other (See Comments)     \"severe stomach pain\"    Coreg [Carvedilol] Other (See Comments)     \"spikes my BP severely and send me into a severe anxiety attack\"    Fluoxetine Other (See Comments)     hallucinations    Oxybutynin Chloride Other (See Comments)     Raises bp    Percocet [Oxycodone-Acetaminophen]      \"knocked me out for 12 hrs\"    Sertraline Other (See Comments)     hallucinations    Tape [Adhesive Tape]      Patient states it tears her skin, including band aids    Reglan [Metoclopramide] Other (See Comments)     \"makes me talk like a baby, but if I take benadryl with it it's fine\"       FAM HX: family history includes Anxiety Disorder in her daughter; Arthritis in her father, mother, and sister; Bipolar Disorder in her daughter; Cancer in her brother and sister; Colon Cancer in her brother; Depression in her daughter and mother; Diabetes in her mother; Early Death in her brother; Early Death (age of onset: 37) in her brother; Early Death (age of onset: 61) in her mother; Early Death (age of onset: 61) in her brother; Hearing Loss in her sister; Heart Disease in her brother, brother, brother, father, mother, and sister; Heart Failure in her sister; High Blood Pressure in her brother, father, mother, and sister; High Cholesterol in her brother, father, and mother; Mental Illness in her brother, brother, daughter, father, mother, and sister; Neisha Ruby / [place] in her mother; Other in her daughter, mother, and son; Stroke in her mother. Soc HX:   Social History     Socioeconomic History    Marital status:      Spouse name: Not on file    Number of children: 3    Years of education: 6    Highest education level: Not on file   Occupational History    Not on file   Tobacco Use    Smoking status: Never Smoker    Smokeless tobacco: Never Used   Vaping Use    Vaping Use: Never used   Substance and Sexual Activity    Alcohol use: No    Drug use: No    Sexual activity: Not Currently   Other Topics Concern    Not on file   Social History Narrative    Not on file     Social and family history reviewed with patient/family. Medications:     Home Medication   Prior to Admission medications    Medication Sig Start Date End Date Taking? Authorizing Provider   metoprolol tartrate (LOPRESSOR) 50 MG tablet Take 1 tablet by mouth See Admin Instructions for 2 days Patient to take 1 tab at 9:30pm on 11/9.  Take 1 take at 7:30am on 11/10 11/8/21 11/10/21 Valeria Lay MD   albuterol sulfate HFA (PROVENTIL HFA) 108 (90 Base) MCG/ACT inhaler Inhale 2 puffs into the lungs every 4 hours as needed for Wheezing or Shortness of Breath With spacer (and mask if indicated). Thanks. 11/4/21 12/4/21  Candance Dacia, DO   sodium chloride 1 g tablet Take 1 tablet by mouth 2 times daily (with meals) 10/1/21   Palmira Rangel PA-C   levETIRAcetam (KEPPRA) 750 MG tablet Take 1 tablet by mouth 2 times daily 10/1/21 10/31/21  Palmira Rangel PA-C   baclofen (LIORESAL) 10 MG tablet 1 tablet 2 times daily 9/3/21   Historical Provider, MD   diazePAM (VALIUM) 5 MG tablet as needed. 9/3/21   Historical Provider, MD   MUCINEX 600 MG extended release tablet 2 times daily as needed 9/3/21   Historical Provider, MD   metoprolol succinate (TOPROL XL) 50 MG extended release tablet Take 1 tablet by mouth daily 9/10/21   WENDIE Nolan - CNP   NIFEdipine (PROCARDIA XL) 60 MG extended release tablet Take 1 tablet by mouth daily 9/3/21   Valeria Lay MD   magnesium oxide (MAG-OX) 400 MG tablet Take 1 tablet by mouth 3 times daily 7/15/21   Violeta Holder MD   losartan (COZAAR) 100 MG tablet Take 1 tablet by mouth daily 7/16/21   Violeta Holder MD   QUEtiapine (SEROQUEL) 25 MG tablet Take 1 tablet by mouth 2 times daily  Patient taking differently: Take 25 mg by mouth 2 times daily Indications: 25 mg in am and 50 mg in pm  7/15/21   Violeta Holder MD   busPIRone (BUSPAR) 10 MG tablet Take 2 tablets by mouth 3 times daily 4/28/21   Nick Carpenter MD   levothyroxine (SYNTHROID) 75 MCG tablet Take 1 tablet by mouth every morning (before breakfast) 4/28/21   Nick Carpenter MD   amitriptyline (ELAVIL) 25 MG tablet Take 1 tablet by mouth nightly 4/28/21   Nick Carpenter MD   HYDROcodone-acetaminophen (NORCO)  MG per tablet Take 1 tablet by mouth every 6 hours as needed.   1/22/21   Historical Provider, MD   fluticasone-umeclidin-vilant (TRELEGY ELLIPTA) 100-62.5-25 MCG/INH AEPB Inhale 1 puff into the lungs daily  Patient taking differently: Inhale 1 puff into the lungs daily as needed (only takes prn daily due to yeast infections in mouth, is aware she is supposed to take daily)  12/8/20   Augustina Katz MD   OXcarbazepine (TRILEPTAL) 300 MG tablet Take 1 tablet by mouth 2 times daily  Patient taking differently: Take 450 mg by mouth 2 times daily  12/4/20   Loretta Aaron MD   hydrOXYzine (VISTARIL) 25 MG capsule Take 25 mg by mouth 3 times daily as needed  11/12/20   Historical Provider, MD   NOVOLOG FLEXPEN 100 UNIT/ML injection pen Inject into the skin See Admin Instructions 12/01/2020 patient states she follows sliding scale regimen BS > 150 10/24/20   Historical Provider, MD   simvastatin (ZOCOR) 20 MG tablet Take 1 tablet by mouth nightly 9/10/20   WENDIE Carpenter CNP   TRESIBA FLEXTOUCH 200 UNIT/ML SOPN Inject 20 Units into the skin every morning  Patient taking differently: Inject into the skin nightly 04/26/21 Patient states she follows sliding scale 9/4/20   Arnol Dominguez MD   docusate sodium (COLACE) 100 MG capsule Take 1 capsule by mouth 2 times daily 1/9/20   HAYDEN Yuan   albuterol sulfate  (90 Base) MCG/ACT inhaler Inhale 2 puffs into the lungs every 6 hours as needed for Wheezing or Shortness of Breath (or cough) Please include spacer with instructions for use. 6/11/19   HAYDEN Yuna   aluminum & magnesium hydroxide-simethicone (MAALOX MAX) 400-400-40 MG/5ML SUSP Take 15 mLs by mouth every 6 hours as needed (heart burn) 9/12/18   WENDIE Escoto CNP   pantoprazole (PROTONIX) 40 MG tablet Take 1 tablet by mouth daily 7/20/18   HAYDEN Yuan   carbidopa-levodopa (SINEMET)  MG per tablet Take 1 tablet by mouth nightly 5/14/18   Historical Provider, MD   polyethylene glycol (GLYCOLAX) packet Take 17 g by mouth daily as needed for Constipation    Historical Provider, MD   aspirin 81 MG tablet Take 81 mg by mouth daily Historical Provider, MD   Multiple Vitamins-Iron (MULTI-VITAMIN/IRON PO) Take  by mouth. Historical Provider, MD   montelukast (SINGULAIR) 10 MG tablet Take 10 mg by mouth nightly. Historical Provider, MD     Medications:    aspirin  324 mg Oral Once      Infusions:   PRN Meds: ondansetron, 4 mg, Q6H PRN        Recent Labs     01/10/22  1233   WBC 10.3   HGB 13.5   HCT 38.6         Recent Labs     01/10/22  1150   *   K 4.2   CL 91*   CO2 26   BUN 12   CREATININE 0.4*     Recent Labs     01/10/22  1150   AST 35   ALT 25   BILIDIR 0.2   BILITOT 0.4   ALKPHOS 93     No results for input(s): INR in the last 72 hours. Recent Labs     01/10/22  1150   TROPONINT <0.010        Imaging reviewed  CT HEAD WO CONTRAST    Result Date: 12/21/2021  EXAMINATION: CT OF THE HEAD WITHOUT CONTRAST; CT OF THE CERVICAL SPINE WITHOUT CONTRAST 12/21/2021 1:38 pm TECHNIQUE: CT of the head was performed without the administration of intravenous contrast. Dose modulation, iterative reconstruction, and/or weight based adjustment of the mA/kV was utilized to reduce the radiation dose to as low as reasonably achievable.; CT of the cervical spine was performed without the administration of intravenous contrast. Multiplanar reformatted images are provided for review. Dose modulation, iterative reconstruction, and/or weight based adjustment of the mA/kV was utilized to reduce the radiation dose to as low as reasonably achievable. COMPARISON: 09/30/2021 HISTORY: ORDERING SYSTEM PROVIDED HISTORY: head injury TECHNOLOGIST PROVIDED HISTORY: Has a \"code stroke\" or \"stroke alert\" been called? ->No Reason for exam:->head injury Decision Support Exception - unselect if not a suspected or confirmed emergency medical condition->Emergency Medical Condition ([place]) Reason for Exam: Head Injury; Otalgia; FINDINGS: BRAIN/VENTRICLES: There is no acute intracranial hemorrhage, mass effect or midline shift.   No abnormal extra-axial fluid collection. Lucencies within the periventricular and subcortical white matter likely represent chronic microvascular ischemic changes. The gray-white differentiation is maintained without evidence of an acute infarct. There is no evidence of hydrocephalus. ORBITS: The visualized portion of the orbits demonstrate no acute abnormality. SINUSES: The visualized paranasal sinuses and mastoid air cells demonstrate no acute abnormality. SOFT TISSUES/SKULL:  No acute abnormality of the visualized skull or soft tissues. CERVICAL SPINE: No cervical spine fracture dislocation. Vertebral body height and alignment within normal limits. Normal cervical spine curvature. Mild degenerative changes with grade 1 anterolisthesis of C4 over C5. Jackie Maid Soft tissues of the neck appear grossly unremarkable. Lung apices are clear. No acute intracranial abnormality. Chronic microvascular ischemic changes. No acute fracture or dislocation involving cervical spine. RECOMMENDATIONS: Unavailable     CT CERVICAL SPINE WO CONTRAST    Result Date: 12/21/2021  EXAMINATION: CT OF THE HEAD WITHOUT CONTRAST; CT OF THE CERVICAL SPINE WITHOUT CONTRAST 12/21/2021 1:38 pm TECHNIQUE: CT of the head was performed without the administration of intravenous contrast. Dose modulation, iterative reconstruction, and/or weight based adjustment of the mA/kV was utilized to reduce the radiation dose to as low as reasonably achievable.; CT of the cervical spine was performed without the administration of intravenous contrast. Multiplanar reformatted images are provided for review. Dose modulation, iterative reconstruction, and/or weight based adjustment of the mA/kV was utilized to reduce the radiation dose to as low as reasonably achievable. COMPARISON: 09/30/2021 HISTORY: ORDERING SYSTEM PROVIDED HISTORY: head injury TECHNOLOGIST PROVIDED HISTORY: Has a \"code stroke\" or \"stroke alert\" been called? ->No Reason for exam:->head injury Decision Support Exception - unselect if not a suspected or confirmed emergency medical condition->Emergency Medical Condition (MA) Reason for Exam: Head Injury; Otalgia; FINDINGS: BRAIN/VENTRICLES: There is no acute intracranial hemorrhage, mass effect or midline shift. No abnormal extra-axial fluid collection. Lucencies within the periventricular and subcortical white matter likely represent chronic microvascular ischemic changes. The gray-white differentiation is maintained without evidence of an acute infarct. There is no evidence of hydrocephalus. ORBITS: The visualized portion of the orbits demonstrate no acute abnormality. SINUSES: The visualized paranasal sinuses and mastoid air cells demonstrate no acute abnormality. SOFT TISSUES/SKULL:  No acute abnormality of the visualized skull or soft tissues. CERVICAL SPINE: No cervical spine fracture dislocation. Vertebral body height and alignment within normal limits. Normal cervical spine curvature. Mild degenerative changes with grade 1 anterolisthesis of C4 over C5. Thresa Abed Soft tissues of the neck appear grossly unremarkable. Lung apices are clear. No acute intracranial abnormality. Chronic microvascular ischemic changes. No acute fracture or dislocation involving cervical spine. RECOMMENDATIONS: Unavailable     CT ABDOMEN PELVIS W IV CONTRAST Additional Contrast? None    Result Date: 1/10/2022  EXAMINATION: CT OF THE ABDOMEN AND PELVIS WITH CONTRAST 1/10/2022 1:54 pm TECHNIQUE: CT of the abdomen and pelvis was performed with the administration of intravenous contrast. Multiplanar reformatted images are provided for review. Dose modulation, iterative reconstruction, and/or weight based adjustment of the mA/kV was utilized to reduce the radiation dose to as low as reasonably achievable. COMPARISON: None.  HISTORY: ORDERING SYSTEM PROVIDED HISTORY: genearlized pain TECHNOLOGIST PROVIDED HISTORY: Reason for exam:->genearlized pain Additional Contrast?->None Decision Support Exception - FINDINGS: The cardiomediastinal silhouette is normal in size. The lungs are clear. No pleural effusion or pneumothorax. No subdiaphragmatic free air is present. No acute cardiopulmonary process. XR CHEST PORTABLE    Result Date: 12/21/2021  EXAMINATION: ONE XRAY VIEW OF THE CHEST 12/21/2021 1:06 pm COMPARISON: 11/10/2021 HISTORY: ORDERING SYSTEM PROVIDED HISTORY: chest pain TECHNOLOGIST PROVIDED HISTORY: Reason for exam:->chest pain Reason for Exam: chest pain FINDINGS: Mild peribronchial cuffing and central predominantly interstitial bronchial marking prominence. Findings are consistent with central bronchitis. No pulmonary consolidations or airspace infiltrates. No detectable pleural effusion, pneumothorax, pulmonary edema, cardiomegaly or mediastinal widening. Subjective findings consistent with central bronchitis.   Otherwise, radiographically nonacute portable chest.          Electronically signed by WENDIE Iraheta CNP on 1/10/2022 at 2:33 PM

## 2022-01-11 VITALS
TEMPERATURE: 98.3 F | DIASTOLIC BLOOD PRESSURE: 80 MMHG | HEIGHT: 62 IN | RESPIRATION RATE: 16 BRPM | BODY MASS INDEX: 32.2 KG/M2 | WEIGHT: 175 LBS | HEART RATE: 74 BPM | SYSTOLIC BLOOD PRESSURE: 170 MMHG | OXYGEN SATURATION: 96 %

## 2022-01-11 LAB
CULTURE: ABNORMAL
GLUCOSE BLD-MCNC: 173 MG/DL (ref 70–99)
GLUCOSE BLD-MCNC: 196 MG/DL (ref 70–99)
HCT VFR BLD CALC: 34.3 % (ref 37–47)
HEMOGLOBIN: 11.7 GM/DL (ref 12.5–16)
Lab: ABNORMAL
MCH RBC QN AUTO: 28.4 PG (ref 27–31)
MCHC RBC AUTO-ENTMCNC: 34.1 % (ref 32–36)
MCV RBC AUTO: 83.3 FL (ref 78–100)
PDW BLD-RTO: 12 % (ref 11.7–14.9)
PLATELET # BLD: 242 K/CU MM (ref 140–440)
PMV BLD AUTO: 9.6 FL (ref 7.5–11.1)
RBC # BLD: 4.12 M/CU MM (ref 4.2–5.4)
SODIUM BLD-SCNC: 130 MMOL/L (ref 135–145)
SPECIMEN: ABNORMAL
WBC # BLD: 12.6 K/CU MM (ref 4–10.5)

## 2022-01-11 PROCEDURE — 94761 N-INVAS EAR/PLS OXIMETRY MLT: CPT

## 2022-01-11 PROCEDURE — 82962 GLUCOSE BLOOD TEST: CPT

## 2022-01-11 PROCEDURE — G0378 HOSPITAL OBSERVATION PER HR: HCPCS

## 2022-01-11 PROCEDURE — 99213 OFFICE O/P EST LOW 20 MIN: CPT | Performed by: INTERNAL MEDICINE

## 2022-01-11 PROCEDURE — 96372 THER/PROPH/DIAG INJ SC/IM: CPT

## 2022-01-11 PROCEDURE — 6370000000 HC RX 637 (ALT 250 FOR IP): Performed by: NURSE PRACTITIONER

## 2022-01-11 PROCEDURE — 85027 COMPLETE CBC AUTOMATED: CPT

## 2022-01-11 PROCEDURE — 84295 ASSAY OF SERUM SODIUM: CPT

## 2022-01-11 PROCEDURE — 6360000002 HC RX W HCPCS: Performed by: NURSE PRACTITIONER

## 2022-01-11 PROCEDURE — 6370000000 HC RX 637 (ALT 250 FOR IP): Performed by: INTERNAL MEDICINE

## 2022-01-11 PROCEDURE — 2580000003 HC RX 258: Performed by: NURSE PRACTITIONER

## 2022-01-11 PROCEDURE — 96376 TX/PRO/DX INJ SAME DRUG ADON: CPT

## 2022-01-11 RX ORDER — SUCRALFATE 1 G/1
1 TABLET ORAL 4 TIMES DAILY
Qty: 120 TABLET | Refills: 3 | Status: ON HOLD | OUTPATIENT
Start: 2022-01-11 | End: 2022-03-30

## 2022-01-11 RX ORDER — SUCRALFATE 1 G/1
1 TABLET ORAL EVERY 6 HOURS SCHEDULED
Status: DISCONTINUED | OUTPATIENT
Start: 2022-01-11 | End: 2022-01-11 | Stop reason: HOSPADM

## 2022-01-11 RX ORDER — NIFEDIPINE 90 MG/1
90 TABLET, EXTENDED RELEASE ORAL DAILY
Status: DISCONTINUED | OUTPATIENT
Start: 2022-01-12 | End: 2022-01-11 | Stop reason: HOSPADM

## 2022-01-11 RX ORDER — NIFEDIPINE 90 MG/1
90 TABLET, EXTENDED RELEASE ORAL DAILY
Qty: 30 TABLET | Refills: 3 | Status: SHIPPED | OUTPATIENT
Start: 2022-01-12 | End: 2022-06-16 | Stop reason: SDUPTHER

## 2022-01-11 RX ORDER — PANTOPRAZOLE SODIUM 40 MG/1
40 TABLET, DELAYED RELEASE ORAL
Qty: 30 TABLET | Refills: 3 | Status: SHIPPED | OUTPATIENT
Start: 2022-01-11 | End: 2022-06-16 | Stop reason: SDUPTHER

## 2022-01-11 RX ADMIN — SODIUM CHLORIDE, PRESERVATIVE FREE 10 ML: 5 INJECTION INTRAVENOUS at 09:32

## 2022-01-11 RX ADMIN — INSULIN LISPRO 2 UNITS: 100 INJECTION, SOLUTION INTRAVENOUS; SUBCUTANEOUS at 12:47

## 2022-01-11 RX ADMIN — METOPROLOL SUCCINATE 50 MG: 50 TABLET, EXTENDED RELEASE ORAL at 09:29

## 2022-01-11 RX ADMIN — SUCRALFATE 1 G: 1 TABLET ORAL at 09:28

## 2022-01-11 RX ADMIN — BUSPIRONE HYDROCHLORIDE 20 MG: 5 TABLET ORAL at 06:27

## 2022-01-11 RX ADMIN — ONDANSETRON 4 MG: 2 INJECTION INTRAMUSCULAR; INTRAVENOUS at 12:47

## 2022-01-11 RX ADMIN — PANTOPRAZOLE SODIUM 40 MG: 40 TABLET, DELAYED RELEASE ORAL at 06:28

## 2022-01-11 RX ADMIN — QUETIAPINE FUMARATE 25 MG: 25 TABLET ORAL at 09:29

## 2022-01-11 RX ADMIN — LEVETIRACETAM 750 MG: 500 TABLET, FILM COATED ORAL at 09:28

## 2022-01-11 RX ADMIN — LEVOTHYROXINE SODIUM 75 MCG: 0.03 TABLET ORAL at 06:28

## 2022-01-11 RX ADMIN — HYDROCODONE BITARTRATE AND ACETAMINOPHEN 1 TABLET: 10; 325 TABLET ORAL at 06:28

## 2022-01-11 RX ADMIN — ASPIRIN 81 MG 81 MG: 81 TABLET ORAL at 09:28

## 2022-01-11 RX ADMIN — SUCRALFATE 1 G: 1 TABLET ORAL at 12:46

## 2022-01-11 RX ADMIN — LOSARTAN POTASSIUM 100 MG: 50 TABLET, FILM COATED ORAL at 09:29

## 2022-01-11 RX ADMIN — BUSPIRONE HYDROCHLORIDE 20 MG: 5 TABLET ORAL at 12:46

## 2022-01-11 RX ADMIN — ENOXAPARIN SODIUM 40 MG: 100 INJECTION SUBCUTANEOUS at 09:29

## 2022-01-11 ASSESSMENT — PAIN SCALES - GENERAL
PAINLEVEL_OUTOF10: 8
PAINLEVEL_OUTOF10: 0
PAINLEVEL_OUTOF10: 0

## 2022-01-11 NOTE — ED NOTES
Bed: ED-36  Expected date:   Expected time:   Means of arrival:   Comments:  Geneva Torres RN  01/10/22 8101
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
## 2022-01-11 NOTE — DISCHARGE SUMMARY
Discharge Summary    Name:  Rahul Chi /Age/Sex: 1955  (61 y.o. female)   MRN & CSN:  [id number] & [id number] Admission Date/Time: 1/10/2022 10:33 AM   Attending:  Ty Don MD Discharging Provider WENDIE Soria - [facility]     Hospital Course:   Rahul Chi is a 61 y.o.  female  who presents with chest pain    Hospital Course: Was initially admitted 1/10/2022 for concerns of chest pain. She has recurrent visits to this facility for similar symptoms. Pain is atypical and reproducible right submammary pain. She has a previous normal stress test 2021. TTE (2021) EF 55 to 60%. During hospital stay was evaluated by cardiology who cleared for discharge with outpatient follow-up. And gastroenterology who thought possible chronic gastritis/functional dyspepsia and also recommend an outpatient follow-up with possible EGD. Chest pain, atypical.  ACS ruled out. Reproducible with palpation   Last cath-2017-angiographically normal epicardial coronary arteries   Neg troponin trend, EKG no acute ischemic changes    Cardiology following-no further cardiac work-up   Outpatient cardiac CT     Epigastric pain- possible chronic gastritis    Gastroenterology following   Follow-up outpatient for upper endoscopy   PPI twice daily   Carafate    Hyponatremia (improved)-chronic (130)   Continue sodium chloride tablets upon discharge    Uncontrolled hypertension-nifedipine increased by cardiology (2025    DMII with chronic complications and with long term use of insulin. Last A1c 8.4 (1/10/2022)   Follows with Dr. Terrence Brooke and resume home insulin regimen       The patient expressed appropriate understanding of and agreement with the discharge recommendations, medications, and plan.      Consults this admission:  IP CONSULT TO HOSPITALIST  IP CONSULT TO CARDIOLOGY  IP CONSULT TO GI  IP CONSULT TO GI  IP CONSULT TO GI  IP CONSULT TO GENERAL SURGERY    Discharge Instruction:   Follow up appointments: Cardiology x1 week  Gastroenterology within 1 to 2 weeks  Primary care physician:  within 1 week  Diet:  low fat, low cholesterol diet   Activity: activity as tolerated  Disposition: Discharged to:   [x]Home, []HHC, []SNF, []Acute Rehab, []Hospice   Condition on discharge: Stable    Discharge Medications:        Medication List      START taking these medications    NIFEdipine 90 MG extended release tablet  Commonly known as: PROCARDIA XL  Take 1 tablet by mouth daily  Start taking on: January 12, 2022  Replaces: NIFEdipine 60 MG extended release tablet     sucralfate 1 GM tablet  Commonly known as: CARAFATE  Take 1 tablet by mouth 4 times daily        CHANGE how you take these medications    albuterol sulfate  (90 Base) MCG/ACT inhaler  Commonly known as: Proventil HFA  Inhale 2 puffs into the lungs every 4 hours as needed for Wheezing or Shortness of Breath With spacer (and mask if indicated). Thanks. What changed: Another medication with the same name was removed. Continue taking this medication, and follow the directions you see here. OXcarbazepine 300 MG tablet  Commonly known as: TRILEPTAL  Take 1 tablet by mouth 2 times daily  What changed: how much to take     pantoprazole 40 MG tablet  Commonly known as: Protonix  Take 1 tablet by mouth 2 times daily (before meals)  What changed: when to take this     Trelegy Ellipta 100-62.5-25 MCG/INH Aepb  Generic drug: fluticasone-umeclidin-vilant  Inhale 1 puff into the lungs daily  What changed:   when to take this  reasons to take this     [place] FlexTouch 200 UNIT/ML Sopn  Generic drug: Insulin Degludec  Inject 20 Units into the skin every morning  What changed:   how much to take  when to take this  additional instructions        CONTINUE taking these medications    aluminum & magnesium hydroxide-simethicone 400-400-40 MG/5ML Susp  Commonly known as:  Maalox Max  Take 15 mLs by mouth every 6 hours as needed (heart burn)     amitriptyline 25 MG tablet  Commonly known as: ELAVIL  Take 1 tablet by mouth nightly     aspirin 81 MG tablet     baclofen 10 MG tablet  Commonly known as: LIORESAL     busPIRone 10 MG tablet  Commonly known as: BUSPAR  Take 2 tablets by mouth 3 times daily     carbidopa-levodopa  MG per tablet  Commonly known as: SINEMET     diazePAM 5 MG tablet  Commonly known as: VALIUM     docusate sodium 100 MG capsule  Commonly known as: Colace  Take 1 capsule by mouth 2 times daily     HYDROcodone-acetaminophen  MG per tablet  Commonly known as: NORCO     hydrOXYzine 25 MG capsule  Commonly known as: VISTARIL     levETIRAcetam 750 MG tablet  Commonly known as: KEPPRA  Take 1 tablet by mouth 2 times daily     levothyroxine 75 MCG tablet  Commonly known as: SYNTHROID  Take 1 tablet by mouth every morning (before breakfast)     losartan 100 MG tablet  Commonly known as: COZAAR  Take 1 tablet by mouth daily     magnesium oxide 400 MG tablet  Commonly known as: MAG-OX  Take 1 tablet by mouth 3 times daily     metoprolol succinate 50 MG extended release tablet  Commonly known as: TOPROL XL  Take 1 tablet by mouth daily     montelukast 10 MG tablet  Commonly known as: SINGULAIR     Mucinex 600 MG extended release tablet  Generic drug: guaiFENesin     MULTI-VITAMIN/IRON PO     NovoLOG FlexPen 100 UNIT/ML injection pen  Generic drug: insulin aspart     polyethylene glycol 17 g packet  Commonly known as: GLYCOLAX     QUEtiapine 25 MG tablet  Commonly known as: SEROQUEL  Take 1 tablet by mouth 2 times daily     simvastatin 20 MG tablet  Commonly known as: ZOCOR  Take 1 tablet by mouth nightly     sodium chloride 1 g tablet  Take 1 tablet by mouth 2 times daily (with meals)        STOP taking these medications    metoprolol tartrate 50 MG tablet  Commonly known as: LOPRESSOR     NIFEdipine 60 MG extended release tablet  Commonly known as: PROCARDIA XL  Replaced by: NIFEdipine 90 MG extended release tablet           Where to Get Your Medications      These medications were sent to [address], [address] [facility] [address] [facility]     Phone: [phone]   NIFEdipine 90 MG extended release tablet  pantoprazole 40 MG tablet  sucralfate 1 GM tablet         Objective Findings at Discharge:     Vitals:    01/10/22 2200 01/10/22 2230 01/11/22 0547 01/11/22 0858   BP: 132/65 125/62 (!) 151/77 (!) 189/82   Pulse: 78 76 67 72   Resp: 21 21 16 16   Temp:   97.9 °F (36.6 °C) 97.8 °F (36.6 °C)   TempSrc:   Oral Oral   SpO2: 94% 94% 95%    Weight:       Height:                  Physical Exam: 01/11/22     Gen:  awake, alert, cooperative, no apparent distress obese body habitus  Head/Eyes:  Normocephalic atraumatic, EOMI   NECK:   symmetrical, trachea midline  LUNGS: Normal Effort/ symmetry movement   CARDIOVASCULAR:  Normal rate Tele SR  ABDOMEN: Non tender, non distended, no HSM noted. Epigastric tenderness to palpation  MUSCULOSKELETAL: no gross deformities  NEUROLOGIC: Alert and Oriented,  Cranial nerves II-XII are grossly intact. SKIN:  no bruising or bleeding, normal skin color,  no redness      Data:     Laboratory this visit:  Reviewed  Recent Labs     01/10/22  1233 01/11/22  0440   WBC 10.3 12.6*   HGB 13.5 11.7*   HCT 38.6 34.3*    242      Recent Labs     01/10/22  1150 01/10/22  2108 01/11/22  0440   * 131* 130*   K 4.2  --   --    CL 91*  --   --    CO2 26  --   --    BUN 12  --   --    CREATININE 0.4*  --   --      Recent Labs     01/10/22  1150   AST 35   ALT 25   BILIDIR 0.2   BILITOT 0.4   ALKPHOS 93     Radiology this visit:  Reviewed.     CT HEAD WO CONTRAST    Result Date: 12/21/2021  EXAMINATION: CT OF THE HEAD WITHOUT CONTRAST; CT OF THE CERVICAL SPINE WITHOUT CONTRAST 12/21/2021 1:38 pm TECHNIQUE: CT of the head was performed without the administration of intravenous contrast. Dose modulation, iterative reconstruction, and/or weight based adjustment of the mA/kV was utilized to reduce the radiation dose to as low as reasonably achievable.; CT of the cervical spine was performed without the administration of intravenous contrast. Multiplanar reformatted images are provided for review. Dose modulation, iterative reconstruction, and/or weight based adjustment of the mA/kV was utilized to reduce the radiation dose to as low as reasonably achievable. COMPARISON: 09/30/2021 HISTORY: ORDERING SYSTEM PROVIDED HISTORY: head injury TECHNOLOGIST PROVIDED HISTORY: Has a \"code stroke\" or \"stroke alert\" been called? ->No Reason for exam:->head injury Decision Support Exception - unselect if not a suspected or confirmed emergency medical condition->Emergency Medical Condition (MA) Reason for Exam: Head Injury; Otalgia; FINDINGS: BRAIN/VENTRICLES: There is no acute intracranial hemorrhage, mass effect or midline shift. No abnormal extra-axial fluid collection. Lucencies within the periventricular and subcortical white matter likely represent chronic microvascular ischemic changes. The gray-white differentiation is maintained without evidence of an acute infarct. There is no evidence of hydrocephalus. ORBITS: The visualized portion of the orbits demonstrate no acute abnormality. SINUSES: The visualized paranasal sinuses and mastoid air cells demonstrate no acute abnormality. SOFT TISSUES/SKULL:  No acute abnormality of the visualized skull or soft tissues. CERVICAL SPINE: No cervical spine fracture dislocation. Vertebral body height and alignment within normal limits. Normal cervical spine curvature. Mild degenerative changes with grade 1 anterolisthesis of C4 over C5. Lon Punt Soft tissues of the neck appear grossly unremarkable. Lung apices are clear. No acute intracranial abnormality. Chronic microvascular ischemic changes. No acute fracture or dislocation involving cervical spine. RECOMMENDATIONS: Unavailable     CT CERVICAL SPINE WO CONTRAST    Result Date: 12/21/2021  EXAMINATION: CT OF THE HEAD WITHOUT CONTRAST; CT OF THE CERVICAL SPINE WITHOUT CONTRAST 12/21/2021 1:38 pm TECHNIQUE: CT of the head was performed without the administration of intravenous contrast. Dose modulation, iterative reconstruction, and/or weight based adjustment of the mA/kV was utilized to reduce the radiation dose to as low as reasonably achievable.; CT of the cervical spine was performed without the administration of intravenous contrast. Multiplanar reformatted images are provided for review. Dose modulation, iterative reconstruction, and/or weight based adjustment of the mA/kV was utilized to reduce the radiation dose to as low as reasonably achievable. COMPARISON: 09/30/2021 HISTORY: ORDERING SYSTEM PROVIDED HISTORY: head injury TECHNOLOGIST PROVIDED HISTORY: Has a \"code stroke\" or \"stroke alert\" been called? ->No Reason for exam:->head injury Decision Support Exception - unselect if not a suspected or confirmed emergency medical condition->Emergency Medical Condition (MA) Reason for Exam: Head Injury; Otalgia; FINDINGS: BRAIN/VENTRICLES: There is no acute intracranial hemorrhage, mass effect or midline shift. No abnormal extra-axial fluid collection. Lucencies within the periventricular and subcortical white matter likely represent chronic microvascular ischemic changes. The gray-white differentiation is maintained without evidence of an acute infarct. There is no evidence of hydrocephalus. ORBITS: The visualized portion of the orbits demonstrate no acute abnormality. SINUSES: The visualized paranasal sinuses and mastoid air cells demonstrate no acute abnormality. SOFT TISSUES/SKULL:  No acute abnormality of the visualized skull or soft tissues. CERVICAL SPINE: No cervical spine fracture dislocation. Vertebral body height and alignment within normal limits. Normal cervical spine curvature.  Mild degenerative changes with grade 1 anterolisthesis of C4 over C5. Jorje Freud Soft tissues of the neck appear grossly unremarkable. Lung apices are clear. No acute intracranial abnormality. Chronic microvascular ischemic changes. No acute fracture or dislocation involving cervical spine. RECOMMENDATIONS: Unavailable     CT ABDOMEN PELVIS W IV CONTRAST Additional Contrast? None    Result Date: 1/10/2022  EXAMINATION: CT OF THE ABDOMEN AND PELVIS WITH CONTRAST 1/10/2022 1:54 pm TECHNIQUE: CT of the abdomen and pelvis was performed with the administration of intravenous contrast. Multiplanar reformatted images are provided for review. Dose modulation, iterative reconstruction, and/or weight based adjustment of the mA/kV was utilized to reduce the radiation dose to as low as reasonably achievable. COMPARISON: None. HISTORY: ORDERING SYSTEM PROVIDED HISTORY: genearlized pain TECHNOLOGIST PROVIDED HISTORY: Reason for exam:->genearlized pain Additional Contrast?->None Decision Support Exception - unselect if not a suspected or confirmed emergency medical condition->Emergency Medical Condition (MA) Reason for Exam: genearlized pain Relevant Medical/Surgical History: 80 ML ISOVUE  370 FINDINGS: Lower Chest:  Visualized portion of the lower chest demonstrates no acute abnormality. Organs: Diffusely decreased attenuation of the hepatic parenchyma is consistent with steatosis. No focal lesions identified. Huong Katos bladder is surgically absent Spleen, adrenals, and pancreas are unremarkable. No biliary ductal dilation. Kidneys enhance symmetrically. No hydroureteronephrosis. GI/Bowel: No bowel obstruction. No evidence of acute appendicitis. Colonic diverticulosis without CT evidence of acute diverticulitis. Pelvis: Bladder is decompressed, limiting detailed evaluation. Status post hysterectomy. No adnexal mass. Peritoneum/Retroperitoneum: Abdominal aorta is normal in course and caliber. The portal vein is patent. No lymphadenopathy. No ascites or free intraperitoneal air. Bones/Soft Tissues: No acute soft tissue abnormality. No acute osseous abnormality or suspicious osseous lesion. No acute CT abnormality in the abdomen or pelvis. Hepatic steatosis. RECOMMENDATIONS: Unavailable     XR CHEST PORTABLE    Result Date: 1/10/2022  EXAMINATION: ONE XRAY VIEW OF THE CHEST 1/10/2022 11:28 am COMPARISON: 12/29/2021 HISTORY: ORDERING SYSTEM PROVIDED HISTORY: chest pain TECHNOLOGIST PROVIDED HISTORY: Reason for exam:->chest pain Reason for Exam: chest pain FINDINGS: The lungs are without acute focal process. There is no effusion or pneumothorax. The cardiomediastinal silhouette is stable. The osseous structures are stable. No acute process. XR CHEST PORTABLE    Result Date: 12/29/2021  EXAMINATION: ONE XRAY VIEW OF THE CHEST 12/29/2021 3:40 pm COMPARISON: December 21, 2021 HISTORY: ORDERING SYSTEM PROVIDED HISTORY: nausea TECHNOLOGIST PROVIDED HISTORY: Reason for exam:->nausea Reason for Exam: nausea   ha FINDINGS: The cardiomediastinal silhouette is normal in size. The lungs are clear. No pleural effusion or pneumothorax. No subdiaphragmatic free air is present. No acute cardiopulmonary process. XR CHEST PORTABLE    Result Date: 12/21/2021  EXAMINATION: ONE XRAY VIEW OF THE CHEST 12/21/2021 1:06 pm COMPARISON: 11/10/2021 HISTORY: ORDERING SYSTEM PROVIDED HISTORY: chest pain TECHNOLOGIST PROVIDED HISTORY: Reason for exam:->chest pain Reason for Exam: chest pain FINDINGS: Mild peribronchial cuffing and central predominantly interstitial bronchial marking prominence. Findings are consistent with central bronchitis. No pulmonary consolidations or airspace infiltrates. No detectable pleural effusion, pneumothorax, pulmonary edema, cardiomegaly or mediastinal widening. Subjective findings consistent with central bronchitis.   Otherwise, radiographically nonacute portable chest.       Discharge Time of < 30 minutes    Electronically signed by WENDIE Mooney - CNP on 1/11/2022 at 11:47 AM

## 2022-01-11 NOTE — PROGRESS NOTES
Went over AVS with Simona. Questions answered. Daughter arrived to  patient. Medication was delivered by [facility]. Pt taken to front door by volunteer with a wheelchair.

## 2022-01-11 NOTE — CONSULTS
CARDIOLOGY CONSULT NOTE    Reason for consultation: Chest pain     Referring physician: Rolando Prado MD      Primary care physician: Mala Jha MD        Dear Rolando Prado MD   Thanks for the consult.     History of present illness:Simona is a 61 y. o.year old who  presents with generalized body pain ad also  chest pain for last few days, happening daily, intermittent for 15 to 20 mins and aggravated with activity substernal also,reproducible with palpation, radiated to shoulder, 6/10, tender to touch,associated with shortness of breath, + sweating, nausea, did not get NTG in ED. No fever, no chills, no nausea no vomiting.  Blood pressure, cholesterol, blood glucose and weight are well controlled.     Chief Complaint   Patient presents with    Chest Pain       Past medical history:    has a past medical history of Abnormal EKG, Acid reflux, Anemia, Anesthesia, Anginal pain ([address] Anxiety, Arthritis, Asthma, Bipolar 1 disorder ([address] Cerebral artery occlusion with cerebral infarction ([address] CHF (congestive heart failure) ([address] Chronic back pain, Chronic kidney disease, DDD (degenerative disc disease), cervical, Depression, Diabetes mellitus ([address] Diabetic neuropathy ([address] Dizziness, Dry skin, Enlarged ureter, Fatty liver, Fibrocystic breast, Generalized anxiety disorder, Gout, H/O cardiac catheterization, H/O cardiovascular stress test, H/O cardiovascular stress test, H/O cardiovascular stress test, H/O Doppler ultrasound, H/O echocardiogram, H/O echocardiogram, History of Holter monitoring, [place] (hard of hearing), Hx of cardiovascular stress test, Hx of motion sickness, HX OTHER MEDICAL, Hyperlipidemia, Hypertension, IBS (irritable bowel syndrome), Incisional hernia, Kidney stones, Migraines, Nausea & vomiting, Other specified disorder of skin, Panic attacks, Panic attacks, Pneumonia, Pseudoseizures ([address] Restless leg, Shortness of breath, Sleep apnea, Staph infection, Thyroid disease, Tremor, Urinary incontinence, Vertigo, and Wears glasses. Past surgical history:   has a past surgical history that includes Carpal tunnel release (Right, 1999); Diagnostic Cardiac Cath Lab Procedure (01/11/2010); Dental surgery; Colonoscopy (Last Done 6-13); Endoscopy, colon, diagnostic (Several ); Bladder surgery (1970's Or 1980's); Lithotripsy (2011); Breast biopsy (Right, 1980's); Breast surgery (Left, 1990's); hernia repair (5839'Q); hernia repair (1970's); Cholecystectomy (1970's); Appendectomy (1970's); Hysterectomy, total abdominal (1987); Tubal ligation (1978); Esophagus dilation (1980's And 1990's); Knee arthroscopy (Right, 1999); joint replacement (2008); other surgical history (06 13 2014); fracture surgery (1974 Or 1975); Cardiac catheterization (10-18-06); and Cardiac catheterization. Social History:   reports that she has never smoked. She has never used smokeless tobacco. She reports that she does not drink alcohol and does not use drugs.   Family history:   no family history of CAD, STROKE of DM    sucralfate (CARAFATE) tablet 1 g, 4 times per day  aspirin chewable tablet 324 mg, Once  glucose (GLUTOSE) 40 % oral gel 15 g, PRN  dextrose 50 % IV solution, PRN  glucagon (rDNA) injection 1 mg, PRN  dextrose 5 % solution, PRN  sodium chloride flush 0.9 % injection 5-40 mL, 2 times per day  sodium chloride flush 0.9 % injection 5-40 mL, PRN  0.9 % sodium chloride infusion, PRN  ondansetron (ZOFRAN-ODT) disintegrating tablet 4 mg, Q8H PRN   Or  ondansetron (ZOFRAN) injection 4 mg, Q6H PRN  acetaminophen (TYLENOL) tablet 650 mg, Q6H PRN   Or  acetaminophen (TYLENOL) suppository 650 mg, Q6H PRN  polyethylene glycol (GLYCOLAX) packet 17 g, Daily PRN  aspirin chewable tablet 81 mg, Daily  insulin lispro (HUMALOG) injection vial 0-12 Units, TID WC  enoxaparin (LOVENOX) injection 40 mg, Daily  nitroGLYCERIN (NITROSTAT) SL tablet 0.4 mg, Q5 Min PRN  albuterol sulfate  (90 Base) MCG/ACT inhaler 2 puff, Q4H PRN  amitriptyline (ELAVIL) tablet 25 mg, Nightly  busPIRone (BUSPAR) tablet 20 mg, TID  carbidopa-levodopa (SINEMET)  MG per tablet 1 tablet, Nightly  levETIRAcetam (KEPPRA) tablet 750 mg, BID  levothyroxine (SYNTHROID) tablet 75 mcg, QAM AC  losartan (COZAAR) tablet 100 mg, Daily  montelukast (SINGULAIR) tablet 10 mg, Nightly  NIFEdipine (PROCARDIA XL) extended release tablet 60 mg, Daily  atorvastatin (LIPITOR) tablet 10 mg, Nightly  metoprolol succinate (TOPROL XL) extended release tablet 50 mg, Daily  OXcarbazepine (TRILEPTAL) tablet 450 mg, BID  pantoprazole (PROTONIX) tablet 40 mg, QAM AC  HYDROcodone-acetaminophen (NORCO)  MG per tablet 1 tablet, Q6H PRN  insulin glargine (LANTUS) injection vial 20 Units, Nightly  insulin lispro (HUMALOG) injection vial 0-6 Units, 2 times per day  insulin lispro (HUMALOG) injection vial 10 Units, TID WC  QUEtiapine (SEROQUEL) tablet 25 mg, QAM  QUEtiapine (SEROQUEL) tablet 50 mg, Nightly      Current Facility-Administered Medications   Medication Dose Route Frequency Provider Last Rate Last Admin    sucralfate (CARAFATE) tablet 1 g  1 g Oral 4 times per day Kate Gaston MD   1 g at 01/11/22 1759    aspirin chewable tablet 324 mg  324 mg Oral Once Guadlupe Figures, DO        glucose (GLUTOSE) 40 % oral gel 15 g  15 g Oral PRN Sedonia Sake, APRN - CNP        dextrose 50 % IV solution  12.5 g IntraVENous PRN Sedonia Sake, APRN - CNP        glucagon (rDNA) injection 1 mg  1 mg IntraMUSCular PRN Sedonia Sake, APRN - CNP        dextrose 5 % solution  100 mL/hr IntraVENous PRN Sedonia Sake, APRN - CNP        sodium chloride flush 0.9 % injection 5-40 mL  5-40 mL IntraVENous 2 times per day Sedonia Sake, APRN - CNP   10 mL at 01/11/22 0932    sodium chloride flush 0.9 % injection 5-40 mL  5-40 mL IntraVENous PRN Sedonia Sake, APRN - CNP        0.9 % sodium chloride infusion  25 mL IntraVENous PRN Sedonia Sake, APRN - CNP        ondansetron (ZOFRAN-ODT) disintegrating tablet 4 mg  4 mg Oral Q8H PRN Susanne Seat, APRN - CNP   4 mg at 01/10/22 1932    Or    ondansetron (ZOFRAN) injection 4 mg  4 mg IntraVENous Q6H PRN [place] Seat, APRN - CNP        acetaminophen (TYLENOL) tablet 650 mg  650 mg Oral Q6H PRN [place] Seat, APRN - CNP        Or    acetaminophen (TYLENOL) suppository 650 mg  650 mg Rectal Q6H PRN [place] Seat, APRN - CNP        polyethylene glycol (GLYCOLAX) packet 17 g  17 g Oral Daily PRN Susanne Seat, APRN - CNP        aspirin chewable tablet 81 mg  81 mg Oral Daily Susanne Seat, APRN - CNP   81 mg at 01/11/22 0928    insulin lispro (HUMALOG) injection vial 0-12 Units  0-12 Units SubCUTAneous TID WC [place] Seat, APRN - CNP   4 Units at 01/10/22 1817    enoxaparin (LOVENOX) injection 40 mg  40 mg SubCUTAneous Daily Susanne Seat, APRN - CNP   40 mg at 01/11/22 0929    nitroGLYCERIN (NITROSTAT) SL tablet 0.4 mg  0.4 mg SubLINGual Q5 Min PRN [place] Seat, APRN - CNP        albuterol sulfate  (90 Base) MCG/ACT inhaler 2 puff  2 puff Inhalation Q4H PRN Susanne Seat, APRN - CNP        amitriptyline (ELAVIL) tablet 25 mg  25 mg Oral Nightly [place] Seat, APRN - CNP   25 mg at 01/10/22 2119    busPIRone (BUSPAR) tablet 20 mg  20 mg Oral TID Susanne Seat, APRN - CNP   20 mg at 01/11/22 7606    carbidopa-levodopa (SINEMET)  MG per tablet 1 tablet  1 tablet Oral Nightly [place] Seat, APRN - CNP   1 tablet at 01/10/22 2113    levETIRAcetam (KEPPRA) tablet 750 mg  750 mg Oral BID Susanne Seat, APRN - CNP   750 mg at 01/11/22 9032    levothyroxine (SYNTHROID) tablet 75 mcg  75 mcg Oral QAM AC [place] Seat, APRN - CNP   75 mcg at 01/11/22 3954    losartan (COZAAR) tablet 100 mg  100 mg Oral Daily [place] Seat, APRN - CNP   100 mg at 01/11/22 0929    montelukast (SINGULAIR) tablet 10 mg  10 mg Oral Nightly [place] Seat, APRN - CNP   10 mg at 01/10/22 2113    NIFEdipine (PROCARDIA XL) extended release tablet 60 mg  60 mg Oral Daily Jocelyn Homero, APRN - CNP        atorvastatin (LIPITOR) tablet 10 mg  10 mg Oral Nightly Jocelyn Homero, APRN - CNP   10 mg at 01/10/22 2118    metoprolol succinate (TOPROL XL) extended release tablet 50 mg  50 mg Oral Daily Jocelyn Homero, APRN - CNP   50 mg at 01/11/22 1694    OXcarbazepine (TRILEPTAL) tablet 450 mg  450 mg Oral BID Jocelyn Homero, APRN - CNP   300 mg at 01/10/22 2134    pantoprazole (PROTONIX) tablet 40 mg  40 mg Oral QAM AC Jocelyn Homero, APRN - CNP   40 mg at 01/11/22 1931    HYDROcodone-acetaminophen (NORCO)  MG per tablet 1 tablet  1 tablet Oral Q6H PRN Jocelyn Homero, APRN - CNP   1 tablet at 01/11/22 4724    insulin glargine (LANTUS) injection vial 20 Units  20 Units SubCUTAneous Nightly Anni Hernandez MD   10 Units at 01/10/22 2138    insulin lispro (HUMALOG) injection vial 0-6 Units  0-6 Units SubCUTAneous 2 times per day Anni Hernandez MD   2 Units at 01/10/22 2137    insulin lispro (HUMALOG) injection vial 10 Units  10 Units SubCUTAneous TID WC Anni Hernandez MD        QUEtiapine (SEROQUEL) tablet 25 mg  25 mg Oral QAM Anni Hernandez MD   25 mg at 01/11/22 8049    QUEtiapine (SEROQUEL) tablet 50 mg  50 mg Oral Nightly Anni Hernandez MD              Review of Systems:    · Constitutional: No Fever or Weight Loss    · Eyes: No Decreased Vision  · ENT: No Headaches, Hearing Loss or Vertigo  · Cardiovascular: + chest pain, dyspnea on exertion, palpitations or loss of consciousness  · Respiratory: No cough or wheezing    · Gastrointestinal: No abdominal pain, appetite loss, blood in stools, constipation, diarrhea or heartburn  · Genitourinary: No dysuria, trouble voiding, or hematuria  · Musculoskeletal: No gait disturbance, weakness or joint complaints  · Integumentary: No rash or pruritis  · Neurological: No TIA or stroke symptoms  · Psychiatric: No anxiety or depression  · Endocrine: No malaise, fatigue or temperature intolerance  · Hematologic/Lymphatic: No bleeding problems, blood clots or swollen lymph nodes  · Allergic/Immunologic: No nasal congestion or hives  All systems negative except as marked.      ·    ·    ·      Physical Examination:    Vitals:    01/11/22 0858   BP: (!) 189/82   Pulse: 72   Resp: 16   Temp: 97.8 °F (36.6 °C)   SpO2:       Wt Readings from Last 3 Encounters:   01/10/22 175 lb (79.4 kg)   12/21/21 174 lb 12.8 oz (79.3 kg)   11/10/21 175 lb (79.4 kg)     Body mass index is 32.01 kg/m².        General Appearance: No distress, conversant     Constitutional: Well developed, Well nourished, No acute distress, Non-toxic appearance.    HENT:  Normocephalic, Atraumatic, Bilateral external ears normal, Oropharynx moist, No oral exudates, Nose normal. Neck- Normal range of motion, No tenderness, Supple, No stridor,no apical-carotid delay, no carotid bruit  Eyes: PERRL, EOMI, Conjunctiva normal, No discharge.    Respiratory:  Normal breath sounds, No respiratory distress, No wheezing, No chest tenderness. ,no use of accessory muscles, diaphragm movement is normal  Cardiovascular: (PMI) apex non displaced,no lifts no thrills, no s3,no s4, Normal heart rate, Normal rhythm, No murmurs, No rubs, No gallops. Carotid arteries pulse and amplitude are normal no bruit, no abdominal bruit noted ( normal abdominal aorta ausculation), femoral arteries pulse and amplitude are normal no bruit, pedal pulses are normal.  GI: Bowel sounds normal, Soft, No tenderness, No masses, No pulsatile masses, no hepatosplenomegally, no bruits  : External genitalia appear normal, No masses or lesions. No discharge. No CVA tenderness.    Musculoskeletal: Intact distal pulses, No edema, No tenderness, No cyanosis, No clubbing. Good range of motion in all major joints. No tenderness to palpation or major deformities noted. Back- No tenderness. Integument: Warm, Dry, No erythema, No rash.    Skin: no rash, no ulcers  Lymphatic: No lymphadenopathy noted.    Neurologic: Alert & oriented x 3, Normal motor function, Normal sensory function, No focal deficits noted.    Psychiatric: Affect normal, Judgment normal, Mood normal.   Lab Review        Recent Labs       09/28/16 0010    WBC  12.3*    HGB  14.4    HCT  43.8    PLT  316            Recent Labs       09/28/16 0010    NA  136    K  3.9    CL  95*    CO2  23    BUN  10    CREATININE  0.8           Recent Labs       09/28/16 0010    AST  12*    ALT  10    BILITOT  0.4    ALKPHOS  124       No results for input(s): TROPONINI in the last 72 hours. No results found for: BNP  No results found for: INR, PROTIME        EKG:nsr     Chest Xray:nad     ECHO:pending  Labs, echo, meds reviewed  Assessment:      Recommendations:     1. Chest pain: atypical chest pain, recent stress test was normal, trop are negative, last heart cath was normal in 2017, no need for further cardiac work up. 2. HTN/l uncontrolled, will increase nifedipine to 90mg daily, continue toprol xl  3. DYSLIIDEMIA: continue statins  4. BACK PAIN:' recommend pain controlled  5.  Health maintenance: exerise and diet  All labs, medications and tests reviewed, continue all other medications of all above medical condition listed as is.          Judge Katia MD,   Judge Katia MD, 1/11/2022 9:41 AM

## 2022-01-11 NOTE — PROGRESS NOTES
Outpatient Pharmacy Progress Note for Meds-to-Beds    Total number of Prescriptions Filled: 2  The following medications were dispensed to the patient during the discharge process:  Nifedipine ER 90mg  Sucralfate 1gm    Additional Documentation:  Medication(s) were delivered to the patient's room prior to discharge   The following prescription(s) were refilled to soon per insurance (patient has some at home): Pantoprazole      Thank you for letting us serve your patients.   [facility]    [address], [address] [facility]    Phone: [phone]    Fax: [phone]

## 2022-01-12 LAB
EKG ATRIAL RATE: 78 BPM
EKG DIAGNOSIS: NORMAL
EKG P AXIS: 14 DEGREES
EKG P-R INTERVAL: 174 MS
EKG Q-T INTERVAL: 402 MS
EKG QRS DURATION: 116 MS
EKG QTC CALCULATION (BAZETT): 458 MS
EKG R AXIS: -43 DEGREES
EKG T AXIS: 30 DEGREES
EKG VENTRICULAR RATE: 78 BPM

## 2022-01-12 PROCEDURE — 93010 ELECTROCARDIOGRAM REPORT: CPT | Performed by: INTERNAL MEDICINE

## 2022-01-24 NOTE — PROGRESS NOTES
Progress Note( Dr. Esau Buitrago)  1/23/2022  Subjective:   Admit Date: 1/10/2022  PCP: Komal Alvarez MD    Admitted For : Chest pain GERD GI pain versus cardiac pain    Consulted For: Better control of blood glucose    Interval History: Feels somewhat better seen by GI suggested to do EGD as outpatient    Denies any chest pains,   Denies SOB . Denies nausea or vomiting. No new bowel or bladder symptoms. No intake or output data in the 24 hours ending 01/23/22 2029    DATA    CBC: No results for input(s): WBC, HGB, PLT in the last 72 hours. CMP:No results for input(s): NA, K, CL, CO2, BUN, CREATININE, GLU, CALCIUM, PROT, LABALBU, BILITOT, ALKPHOS, AST, ALT in the last 72 hours. Lipids:   Lab Results   Component Value Date    CHOL 99 04/27/2021    HDL 42 04/27/2021    TRIG 116 04/27/2021     Glucose:No results for input(s): POCGLU in the last 72 hours. [id number]:  Lab Results   Component Value Date    LABA1C 8.4 01/10/2022     High Sensitivity TSH:   Lab Results   Component Value Date    TSHHS 2.000 09/30/2021     Free T3:   Lab Results   Component Value Date    FT3 2.2 07/15/2016     Free T4:  Lab Results   Component Value Date    T4FREE 1.36 07/13/2021       CT ABDOMEN PELVIS W IV CONTRAST Additional Contrast? None   Final Result   No acute CT abnormality in the abdomen or pelvis. Hepatic steatosis. RECOMMENDATIONS:   Unavailable         XR CHEST PORTABLE   Final Result   No acute process.               Scheduled Medicines   Medications:    Infusions:       Objective:   Vitals: BP (!) 170/80   Pulse 74   Temp 98.3 °F (36.8 °C) (Oral)   Resp 16   Ht 5' 2\" (1.575 m)   Wt 175 lb (79.4 kg)   SpO2 96%   BMI 32.01 kg/m²   General appearance: alert and cooperative with exam  Neck: no JVD or bruit  Thyroid : Normal lobes   Lungs: Has Vesicular Breath sounds   Heart:  regular rate and rhythm  Abdomen: soft, non-tender; bowel sounds normal; no masses,  no organomegaly  Musculoskeletal: Normal  Extremities: extremities normal, , no edema  Neurologic:  Awake, alert, oriented to n neuropathy is intact. ,  and gait is normal.    Assessment:     Patient Active Problem List:     Chest pain     H/O Doppler ultrasound     H/O cardiovascular stress test     H/O cardiovascular stress test     H/O cardiovascular stress test     Incarcerated umbilical hernia     Essential hypertension     Type 2 diabetes mellitus with diabetic polyneuropathy, with long-term current use of insulin (HCC)     Tachycardia     Dyslipidemia     Family history of early CAD     NSTEMI (non-ST elevated myocardial infarction) ([facility]     Abnormal nuclear stress test     ILSA (obstructive sleep apnea)     Snoring     Hypersomnolence     Mild intermittent asthma without complication     Asthma exacerbation attacks     Hypertensive emergency     Hallucination, visual     Severe episode of recurrent major depressive disorder, with psychotic features (HCC)     CHEKO (generalized anxiety disorder)     Auditory hallucinations     Bipolar 1 disorder, depressed, severe (HCC)     Dyspnea and respiratory abnormalities     Encephalopathy     Syncope     Bipolar 1 disorder (HCC)     Hyponatremia     Pseudoseizures (HCC)     Panic attacks     Generalized abdominal pain     Diabetes mellitus due to underlying condition with stage 2 chronic kidney disease, without long-term current use of insulin (HCC)     Seizure (HCC)     Amyloid polyneuropathy (HCC)     Anemia     Anxiety     Osteoarthrosis     CHF (congestive heart failure) (HCC)     Coronary atherosclerosis     Chronic pain     Degeneration of lumbar intervertebral disc     Disorder of liver     Dysuria     Fibrocystic breast     Gastroesophageal reflux disease     Gastroparesis     Gout     H/O degenerative disc disease     Hyperglycemia due to type 2 diabetes mellitus ([facility]     Hypothyroidism due to acquired atrophy of thyroid     Irritable bowel syndrome     Low back pain     Memory loss Migraine     Migraine without aura, not refractory     Hyperlipidemia     Neck pain     Neuropathy     Nonalcoholic steatohepatitis (URENA)     Osteoporosis     Polyneuropathy due to type 2 diabetes mellitus ([facility]     Renal impairment     Suicidal ideation     Vitamin B12 deficiency      Plan:     1. Reviewed POC blood glucose . Labs and X ray results   2. Reviewed Current Medicines   3. On meal/ Correction bolus Humalog/ Basal Lantus Insulin regimeMonitor Blood glucose frequently   4. Modified  the dose of Insulin/ other medicines as needed   5. Will follow     .      Federico Thompson MD, MD

## 2022-01-25 ENCOUNTER — HOSPITAL ENCOUNTER (EMERGENCY)
Age: 67
Discharge: HOME | End: 2022-01-25
Payer: MEDICARE

## 2022-01-25 VITALS
SYSTOLIC BLOOD PRESSURE: 125 MMHG | DIASTOLIC BLOOD PRESSURE: 76 MMHG | BODY MASS INDEX: 32.2 KG/M2 | TEMPERATURE: 98.6 F | RESPIRATION RATE: 16 BRPM | OXYGEN SATURATION: 98 % | HEART RATE: 76 BPM | WEIGHT: 175 LBS | HEIGHT: 62 IN

## 2022-01-25 DIAGNOSIS — J01.90 ACUTE NON-RECURRENT SINUSITIS, UNSPECIFIED LOCATION: Primary | ICD-10-CM

## 2022-01-25 PROCEDURE — U0005 INFEC AGEN DETEC AMPLI PROBE: HCPCS

## 2022-01-25 PROCEDURE — 99283 EMERGENCY DEPT VISIT LOW MDM: CPT

## 2022-01-25 PROCEDURE — U0003 INFECTIOUS AGENT DETECTION BY NUCLEIC ACID (DNA OR RNA); SEVERE ACUTE RESPIRATORY SYNDROME CORONAVIRUS 2 (SARS-COV-2) (CORONAVIRUS DISEASE [COVID-19]), AMPLIFIED PROBE TECHNIQUE, MAKING USE OF HIGH THROUGHPUT TECHNOLOGIES AS DESCRIBED BY CMS-2020-01-R: HCPCS

## 2022-01-25 RX ORDER — PSEUDOEPHEDRINE HYDROCHLORIDE 30 MG/1
30 TABLET ORAL EVERY 4 HOURS PRN
Qty: 20 TABLET | Refills: 0 | Status: SHIPPED | OUTPATIENT
Start: 2022-01-25 | End: 2022-02-01

## 2022-01-25 ASSESSMENT — PAIN DESCRIPTION - PAIN TYPE: TYPE: ACUTE PAIN

## 2022-01-25 ASSESSMENT — PAIN SCALES - GENERAL: PAINLEVEL_OUTOF10: 10

## 2022-01-25 ASSESSMENT — PAIN DESCRIPTION - LOCATION: LOCATION: HEAD

## 2022-01-25 ASSESSMENT — PAIN DESCRIPTION - FREQUENCY: FREQUENCY: CONTINUOUS
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
## 2022-01-26 LAB
SARS-COV-2: NOT DETECTED
SOURCE: NORMAL

## 2022-02-28 RX ORDER — METOPROLOL SUCCINATE 50 MG/1
50 TABLET, EXTENDED RELEASE ORAL DAILY
Qty: 30 TABLET | Refills: 5 | Status: SHIPPED | OUTPATIENT
Start: 2022-02-28 | End: 2022-06-16 | Stop reason: SDUPTHER

## 2022-03-14 ENCOUNTER — HOSPITAL ENCOUNTER (EMERGENCY)
Age: 67
Discharge: HOME | End: 2022-03-14
Payer: MEDICARE

## 2022-03-14 VITALS
BODY MASS INDEX: 32.39 KG/M2 | HEART RATE: 74 BPM | DIASTOLIC BLOOD PRESSURE: 67 MMHG | SYSTOLIC BLOOD PRESSURE: 164 MMHG | WEIGHT: 176 LBS | RESPIRATION RATE: 16 BRPM | TEMPERATURE: 97.9 F | OXYGEN SATURATION: 98 % | HEIGHT: 62 IN

## 2022-03-14 DIAGNOSIS — B37.2 INTERTRIGINOUS CANDIDIASIS: Primary | ICD-10-CM

## 2022-03-14 PROCEDURE — 99283 EMERGENCY DEPT VISIT LOW MDM: CPT

## 2022-03-14 RX ORDER — NYSTATIN 100000 [USP'U]/G
POWDER TOPICAL
Qty: 1 EACH | Refills: 0 | Status: SHIPPED | OUTPATIENT
Start: 2022-03-14

## 2022-03-14 ASSESSMENT — PAIN DESCRIPTION - LOCATION: LOCATION: BREAST

## 2022-03-14 ASSESSMENT — PAIN SCALES - GENERAL: PAINLEVEL_OUTOF10: 10

## 2022-03-14 ASSESSMENT — PAIN DESCRIPTION - FREQUENCY: FREQUENCY: CONTINUOUS

## 2022-03-14 ASSESSMENT — PAIN DESCRIPTION - ORIENTATION: ORIENTATION: RIGHT;LEFT

## 2022-03-14 NOTE — ED PROVIDER NOTES
[facility] EMERGENCY DEPARTMENT    PCP: Jonathan Garcia MD    CHIEF COMPLAINT    Chief Complaint   Patient presents with    Other     Yeast infection under breast that started 3 days ago       HPI    Heidy Bolivar is a 61 y.o. female who presents with a rash since the onset 3 days ago. Context patient is concerned she is developing \"yeast infection\" under the bilateral breast, left greater than the right. States she is a diabetic and has this in the past.  States that she is having a lot of moistness under the breast, intermittently sticky and yellow with significant itching and burning sensation. Has been applying over-the-counter petroleum jelly without relief of symptoms. Does state that her diabetes is under control. No other similar rash other body sites including groin or vaginal area. Pain is a burning ache, 6/10 that worsens with palpation of the breast.  She has not been wearing any bras as it has worsened her pain. No other tongue or lip swelling, difficulty breathing, swelling, sore throat. No new product use, contact with plants, new foods, new medications. REVIEW OF SYSTEMS    General: Denies fevers.   ENT: Denies throat swelling or tongue swelling  Pulmonary: Denies wheezes, difficulty breathing,  or chest tightness  Skin: See HPI    All other review of systems are negative  See HPI and nursing notes for additional information     PAST MEDICAL & SURGICAL HISTORY    Past Medical History:   Diagnosis Date    Abnormal EKG 04/22/2014    Acid reflux     Anemia     Anesthesia     Nausea/Vomiting Post Op In Past    [facility]inal pain ([facility])     Denies Chest Pain At This Time    Anxiety     Arthritis     \"All Over\"    Asthma     Bipolar 1 disorder ([address]     Cerebral artery occlusion with cerebral infarction ([address]     CHF (congestive heart failure) (HCC)     Chronic back pain     Chronic kidney disease     DDD (degenerative disc disease), cervical     12- Patient reports she was dx with DDD of Cerival spine C6,C7    Depression     Diabetes mellitus ([address] Dx 1's    Diabetic neuropathy ([address]     \"In My Legs And Feet\"    Dizziness     \"Sometimes\"    Dry skin     Enlarged ureter     Right Side    Fatty liver     Fibrocystic breast     Generalized anxiety disorder     Gout     Pt states she was diagnosed with gout in the past few months.  H/O cardiac catheterization     Showed mild disease per last cath.  H/O cardiovascular stress test 03/15/2010    EF 69%, normal perfusion study except for diaphragmatic artifact, uniform wall motion.  H/O cardiovascular stress test 10/09/2008    EF 60%, no anginia, normal study.  H/O cardiovascular stress test 05/06/2014    EF 66%, no ischemia, normal LV systolic funciton, normal perfusion pattern.  H/O Doppler ultrasound 02/28/2011    CAROTID DOPPLER-normal study.  H/O echocardiogram 05/06/2014    Ef >55%. Impaired LV relaxation.  H/O echocardiogram 10/14/2015    EF 60% Normal LV and systolic function. No significant valvulopathy seen.  History of Holter monitoring 03/24/2015    24 hour - predominant rhythm sinus    [place] (hard of hearing)     Bilateral Ears    Hx of cardiovascular stress test 10/19/2015    lexiscan-normal,EF63%    Hx of motion sickness     HX OTHER MEDICAL     Primary Care Physician Is Dr[facility] Octavio Juarez In [facility]    Hyperlipidemia     Hypertension     IBS (irritable bowel syndrome)     Incisional hernia 04/2014    Kidney stones Last Episode In 2012 Or 2013    Passed Kidney Stones Numberous Times    Migraines     Nausea & vomiting     Nausea/Vomiting Post Op In Past    Other specified disorder of skin     12- Patient states she has a condition of her vaginal area (skin) which starts with the letters Quillian Canter. She is currently being treated with multiple creams and weekly Diflucan.      Panic attacks     Panic Refill    nystatin (MYCOSTATIN) 280133 UNIT/GM powder Apply 3 times daily to affected areas 1 each 0    metoprolol succinate (TOPROL XL) 50 MG extended release tablet Take 1 tablet by mouth daily 30 tablet 5    NIFEdipine (PROCARDIA XL) 90 MG extended release tablet Take 1 tablet by mouth daily 30 tablet 3    pantoprazole (PROTONIX) 40 MG tablet Take 1 tablet by mouth 2 times daily (before meals) 30 tablet 3    sucralfate (CARAFATE) 1 GM tablet Take 1 tablet by mouth 4 times daily 120 tablet 3    albuterol sulfate HFA (PROVENTIL HFA) 108 (90 Base) MCG/ACT inhaler Inhale 2 puffs into the lungs every 4 hours as needed for Wheezing or Shortness of Breath With spacer (and mask if indicated). Thanks. 18 g 1    sodium chloride 1 g tablet Take 1 tablet by mouth 2 times daily (with meals) 90 tablet 3    levETIRAcetam (KEPPRA) 750 MG tablet Take 1 tablet by mouth 2 times daily 60 tablet 0    baclofen (LIORESAL) 10 MG tablet 1 tablet 2 times daily      diazePAM (VALIUM) 5 MG tablet as needed.  MUCINEX 600 MG extended release tablet 2 times daily as needed      magnesium oxide (MAG-OX) 400 MG tablet Take 1 tablet by mouth 3 times daily 90 tablet 0    losartan (COZAAR) 100 MG tablet Take 1 tablet by mouth daily 30 tablet 0    QUEtiapine (SEROQUEL) 25 MG tablet Take 1 tablet by mouth 2 times daily (Patient taking differently: Take 25 mg by mouth 2 times daily Indications: 25 mg in am and 50 mg in pm ) 60 tablet 0    busPIRone (BUSPAR) 10 MG tablet Take 2 tablets by mouth 3 times daily 180 tablet 0    levothyroxine (SYNTHROID) 75 MCG tablet Take 1 tablet by mouth every morning (before breakfast) 30 tablet 3    amitriptyline (ELAVIL) 25 MG tablet Take 1 tablet by mouth nightly 30 tablet 3    HYDROcodone-acetaminophen (NORCO)  MG per tablet Take 1 tablet by mouth every 6 hours as needed.        fluticasone-umeclidin-vilant (TRELEGY ELLIPTA) 100-62.5-25 MCG/INH AEPB Inhale 1 puff into the lungs daily (Patient taking differently: Inhale 1 puff into the lungs daily as needed (only takes prn daily due to yeast infections in mouth, is aware she is supposed to take daily) ) 1 each 5    OXcarbazepine (TRILEPTAL) 300 MG tablet Take 1 tablet by mouth 2 times daily (Patient taking differently: Take 450 mg by mouth 2 times daily ) 60 tablet 3    hydrOXYzine (VISTARIL) 25 MG capsule Take 25 mg by mouth 3 times daily as needed       NOVOLOG FLEXPEN 100 UNIT/ML injection pen Inject into the skin See Admin Instructions 12/01/2020 patient states she follows sliding scale regimen BS > 150      simvastatin (ZOCOR) 20 MG tablet Take 1 tablet by mouth nightly 30 tablet 3    TRESIBA FLEXTOUCH 200 UNIT/ML SOPN Inject 20 Units into the skin every morning (Patient taking differently: Inject into the skin nightly 04/26/21 Patient states she follows sliding scale) 1 pen 2    docusate sodium (COLACE) 100 MG capsule Take 1 capsule by mouth 2 times daily 30 capsule 0    aluminum & magnesium hydroxide-simethicone (MAALOX MAX) 400-400-40 MG/5ML SUSP Take 15 mLs by mouth every 6 hours as needed (heart burn) 120 mL 0    carbidopa-levodopa (SINEMET)  MG per tablet Take 1 tablet by mouth nightly      polyethylene glycol (GLYCOLAX) packet Take 17 g by mouth daily as needed for Constipation      aspirin 81 MG tablet Take 81 mg by mouth daily      Multiple Vitamins-Iron (MULTI-VITAMIN/IRON PO) Take  by mouth.  montelukast (SINGULAIR) 10 MG tablet Take 10 mg by mouth nightly.          ALLERGIES    Allergies   Allergen Reactions    Abilify [Aripiprazole]      \"Severe Shaking And Restlessness\"    Advil [Ibuprofen Micronized] Palpitations     \"Severe High Blood Pressure\"    Augmentin [Amoxicillin-Pot Clavulanate] Itching and Rash    Bee Venom Swelling     Redness    Ciprofloxacin Itching and Rash    Codeine      \"Severe Abdominal Cramping\"    Darvocet [Propoxyphene N-Acetaminophen] Palpitations     \"Severe High Blood Pressure\"    Darvon [Propoxyphene Hcl] Palpitations     \"Severe High Blood Pressure\"    Decadron [Dexamethasone] Other (See Comments)     seizure  Seizures    Ditropan [Oxybutynin Chloride] Palpitations     \"Severe High Blood Pressure\"    Fioricet [Butalbital-Apap-Caffeine] Palpitations     \"Severe High Blood Pressure\"    Fiorinal-Codeine #3 [Butalbital-Asa-Caff-Codeine]      \"Severe Stomach Cramps\"    Flagyl [Metronidazole] Diarrhea     \"Severe Diarrhea And Cramping\"    Naproxen Palpitations     \"Severe High Blood Pressure\"    Other      \"Allergic To Spider Bites Causing Blackness Of Skin, Severe Itching And Pain\"                                                  \"Allergic To Powder In Gloves Causing Severe Redness And Itching\"    Prozac [Fluoxetine Hcl]      \"Hallucinations\"    Robaxin [Methocarbamol] Palpitations     \"Severe High Blood Pressure\"    Ultram [Tramadol]      \"Severe Stomach Pain\"    Zoloft Palpitations     \"Sever High Blood Pressure\"    Butalbital-Aspirin-Caffeine Other (See Comments)     \"severe stomach pain\"    Coreg [Carvedilol] Other (See Comments)     \"spikes my BP severely and send me into a severe anxiety attack\"    Fluoxetine Other (See Comments)     hallucinations    Oxybutynin Chloride Other (See Comments)     Raises bp    Percocet [Oxycodone-Acetaminophen]      \"knocked me out for 12 hrs\"    Sertraline Other (See Comments)     hallucinations    Tape [Adhesive Tape]      Patient states it tears her skin, including band aids    Reglan [Metoclopramide] Other (See Comments)     \"makes me talk like a baby, but if I take benadryl with it it's fine\"       SOCIAL & FAMILY HISTORY    Social History     Socioeconomic History    Marital status:       Spouse name: None    Number of children: 3    Years of education: 6    Highest education level: None   Occupational History    None   Tobacco Use    Smoking status: Never Smoker    Smokeless tobacco: Never Used   Vaping Use    Vaping Use: Never used   Substance and Sexual Activity    Alcohol use: No    Drug use: No    Sexual activity: Not Currently   Other Topics Concern    None   Social History Narrative    None     Social Determinants of Health     Financial Resource Strain:     Difficulty of Paying Living Expenses: Not on file   Food Insecurity:     Worried About Running Out of Food in the Last Year: Not on file    Maryjane of Food in the Last Year: Not on file   Transportation Needs:     Lack of Transportation (Medical): Not on file    Lack of Transportation (Non-Medical):  Not on file   Physical Activity:     Days of Exercise per Week: Not on file    Minutes of Exercise per Session: Not on file   Stress:     Feeling of Stress : Not on file   Social Connections:     Frequency of Communication with Friends and Family: Not on file    Frequency of Social Gatherings with Friends and Family: Not on file    Attends Roman [employer] Services: Not on file    Active Member of Clubs or Organizations: Not on file    Attends Club or Organization Meetings: Not on file    Marital Status: Not on file   Intimate Partner Violence:     Fear of Current or Ex-Partner: Not on file    Emotionally Abused: Not on file    Physically Abused: Not on file    Sexually Abused: Not on file   Housing Stability:     Unable to Pay for Housing in the Last Year: Not on file    Number of [place] in the Last Year: Not on file    Unstable Housing in the Last Year: Not on file     Family History   Problem Relation Age of Onset    Stroke Mother     Other Mother         Seizures    Diabetes Mother         Borderline Diabetes    High Blood Pressure Mother     Arthritis Mother     Early Death Mother 61        Stroke    Depression Mother     Heart Disease Mother     High Cholesterol Mother    Lenny Ding / Stillbirths Mother     Mental Illness Mother     Mental Illness Father     Heart Disease Father         Massive Heart Attack    High Blood Pressure Father     Arthritis Father     High Cholesterol Father     Mental Illness Sister     Hearing Loss Sister     Heart Failure Sister     High Blood Pressure Sister     Arthritis Sister     Heart Disease Sister     Cancer Sister     Mental Illness Brother     Cancer Brother         Liver And Colon Cancer    Early Death Brother 37        Liver And Colon Cancer    Heart Disease Brother         Heart Stents    High Blood Pressure Brother     High Cholesterol Brother     Early Death Brother         65 Years Old,Hit By [employer]    Colon Cancer Brother     Heart Disease Brother     Mental Illness Brother     Early Death Brother 61        Heart Attack    Heart Disease Brother         Heart Attack    Mental Illness Daughter         Bipolar    Depression Daughter     Anxiety Disorder Daughter     Bipolar Disorder Daughter     Other Daughter         Stomach And Bowel Problems    Other Son         Seizures       PHYSICAL EXAM    VITAL SIGNS: BP (!) 164/67   Pulse 74   Temp 97.9 °F (36.6 °C) (Oral)   Resp 16   Ht 5' 2\" (1.575 m)   Wt 176 lb (79.8 kg)   SpO2 98%   BMI 32.19 kg/m²   Constitutional:  Well developed, evaded BMI, nontoxic  HENT:  Atraumatic, Normocephalic, PERRL, EOMIs, conjunctiva clear, sclera nonicteric, No facial or lip swelling or redness  ORAL EXAM:  No tongue swelling, airway patent. Posterior oropharynx is patent, no tonsillar hypertrophy. Uvula midline. Neck/Lymphatics: supple, no JVD, no swollen nodes  Respiratory: No respiratory distress. Lungs clear to auscultation, no wheezes/rhonchi/rales. Equal and symmetrical chest wall rise. No inspiratory stridor  Cardiovascular:  No JVD   Musculoskeletal:  No edema, no acute deformities. Integument: To the underside of the bilateral breasts is mildly erythematous moist lesions without crusting or purulent drainage. Areas are tender without crepitus or fluctuance.   Very minimal developing scattered satellite lesions to the left breast.  No redness warmth or swelling to the bilateral breast, nipples or areola. No oozing vesicles. No petechiae, pustules, purpura, or induration. No signs of superimposed bacterial infection      ED COURSE & MEDICAL DECISION MAKING       Vital signs and nursing notes reviewed during ED course. I have independently evaluated this patient . Supervising MD - Dr Zhen Cuellar - present in the Emergency Department, available for consultation, throughout entirety of  patient care. All pertinent Lab data and radiographic results reviewed with patient at bedside. The patient and/or the family were informed of the results of any tests/labs/imaging, the treatment plan, and time was allotted to answer questions. Differential diagnosis: Vasculitis, bacterial skin infection, viral rash, systemic infectious rash, anaphylaxis, urticaria, exposure to poison ivy or poison oak or other sources of contact dermatitis, vasculitis, bacterial skin infection , viral rash, systemic infectious rash, Anaphylaxis, Urticaria, other      Clinical  IMPRESSION    1. Intertriginous candidiasis      Patient presents with concern for bilateral underside breast rash. On exam, pleasant well-appearing 53-year-old female, afebrile nontoxic, no acute distress. She is localizing a moist mildly erythematous noncrusting rash to the bilateral intertriginous space of the breast without involvement of breast tissue, areolar nipples. There are scattered minimal developing's satellite lesions to the left breast.  Areas are tender to palpation without crepitus, fluctuance or spontaneous drainage. No evidence of developing abscess or secondary cellulitis. Patient's presentation especially in light of history of diabetes is concerning for developing intertriginous candidiasis. We discussed supportive symptomatic care and a topical powder nystatin therapy close PCP follow-up.   No evidence of bacterial process requiring antibiotics. Encouraged to keep area clean dry and open to the air. Encouraged to avoid tight fitting synthetic clothing. Low clinical suspicion for viral rash, syphilis, anaphylaxis, vasculitis, SJS/TEN, necrotizing fasciitis, cellulitis, or abscess. There is no evidences of a secondary bacterial skin infection indicating for antibiotics at this time. Discussed with patient conservative management with pruritus symptom control and follow up with PCP. Patient is comfortable with this plan. Patient is discharged stable condition with prescription for nystatin ointment. Educated to avoid hot showers and encouraged to completely dry the areas after bathing. Patient is to follow-up with PCP in 2-3 days for a rash recheck and to evaluate for any development of bacterial infection indicating for antibiotics at that time. Diagnosis and plan discussed in detail with patient who understands and agrees. Return to emergency Department warning signs discussed in detail with patient today who understands and agrees including but not limited to worsening rash,fever, mouth/tongue/lip swelling, trouble breathing or swallowing, or any new symptoms not present today. Comment: Please note this report has been produced using speech recognition software and may contain errors related to that system including errors in grammar, punctuation, and spelling, as well as words and phrases that may be inappropriate. If there are any questions or concerns please feel free to contact the dictating provider for clarification.         Selam Page PA-C  03/14/22 2130

## 2022-03-18 ENCOUNTER — HOSPITAL ENCOUNTER (EMERGENCY)
Age: 67
Discharge: HOME | End: 2022-03-18
Attending: EMERGENCY MEDICINE
Payer: MEDICARE

## 2022-03-18 VITALS
OXYGEN SATURATION: 96 % | HEART RATE: 84 BPM | WEIGHT: 175 LBS | SYSTOLIC BLOOD PRESSURE: 173 MMHG | BODY MASS INDEX: 34.36 KG/M2 | DIASTOLIC BLOOD PRESSURE: 86 MMHG | TEMPERATURE: 98.3 F | HEIGHT: 60 IN | RESPIRATION RATE: 20 BRPM

## 2022-03-18 DIAGNOSIS — M79.2 NEUROPATHIC PAIN: ICD-10-CM

## 2022-03-18 DIAGNOSIS — G89.29 OTHER CHRONIC PAIN: Primary | ICD-10-CM

## 2022-03-18 PROCEDURE — 99285 EMERGENCY DEPT VISIT HI MDM: CPT

## 2022-03-18 PROCEDURE — 6370000000 HC RX 637 (ALT 250 FOR IP): Performed by: EMERGENCY MEDICINE

## 2022-03-18 RX ORDER — GABAPENTIN 300 MG/1
300 CAPSULE ORAL ONCE
Status: COMPLETED | OUTPATIENT
Start: 2022-03-18 | End: 2022-03-18

## 2022-03-18 RX ADMIN — GABAPENTIN 300 MG: 300 CAPSULE ORAL at 08:45

## 2022-03-18 ASSESSMENT — PAIN - FUNCTIONAL ASSESSMENT: PAIN_FUNCTIONAL_ASSESSMENT: 0-10

## 2022-03-18 ASSESSMENT — PAIN DESCRIPTION - ORIENTATION
ORIENTATION: RIGHT;LEFT
ORIENTATION: RIGHT;LEFT

## 2022-03-18 ASSESSMENT — PAIN DESCRIPTION - LOCATION
LOCATION: LEG
LOCATION: FOOT

## 2022-03-18 ASSESSMENT — PAIN SCALES - GENERAL
PAINLEVEL_OUTOF10: 10
PAINLEVEL_OUTOF10: 10

## 2022-03-18 ASSESSMENT — PAIN DESCRIPTION - PAIN TYPE: TYPE: ACUTE PAIN

## 2022-03-18 ASSESSMENT — PAIN DESCRIPTION - FREQUENCY: FREQUENCY: CONTINUOUS

## 2022-03-18 NOTE — ED TRIAGE NOTES
60 y/o presented to ED due to bilateral lower extremity pain, chronic in nature, states pain has worsen today. Patient states  Having diabetic neuropathy and restless leg syndrome.
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
## 2022-03-29 ENCOUNTER — APPOINTMENT (OUTPATIENT)
Dept: CT IMAGING | Age: 67
DRG: 885 | End: 2022-03-29
Payer: MEDICARE

## 2022-03-29 ENCOUNTER — APPOINTMENT (OUTPATIENT)
Dept: GENERAL RADIOLOGY | Age: 67
DRG: 885 | End: 2022-03-29
Payer: MEDICARE

## 2022-03-29 ENCOUNTER — HOSPITAL ENCOUNTER (INPATIENT)
Age: 67
LOS: 2 days | Discharge: NURSING, REHAB OR LONG-TERM CARE | DRG: 885 | End: 2022-03-31
Attending: EMERGENCY MEDICINE | Admitting: INTERNAL MEDICINE
Payer: MEDICARE

## 2022-03-29 DIAGNOSIS — R20.0 LEFT SIDED NUMBNESS: Primary | ICD-10-CM

## 2022-03-29 DIAGNOSIS — I16.1 HYPERTENSIVE EMERGENCY: ICD-10-CM

## 2022-03-29 DIAGNOSIS — R07.89 CHEST PRESSURE: ICD-10-CM

## 2022-03-29 PROBLEM — R29.90 STROKE-LIKE SYMPTOMS: Status: ACTIVE | Noted: 2022-03-29

## 2022-03-29 PROBLEM — E03.9 HYPOTHYROIDISM: Status: ACTIVE | Noted: 2022-03-29

## 2022-03-29 PROBLEM — F41.9 ANXIETY DISORDER: Status: ACTIVE | Noted: 2022-03-29

## 2022-03-29 PROBLEM — R25.1 TREMOR: Status: ACTIVE | Noted: 2022-03-29

## 2022-03-29 LAB
ALBUMIN SERPL-MCNC: 4.6 GM/DL (ref 3.4–5)
ALP BLD-CCNC: 97 IU/L (ref 40–129)
ALT SERPL-CCNC: 25 U/L (ref 10–40)
ANION GAP SERPL CALCULATED.3IONS-SCNC: 17 MMOL/L (ref 4–16)
AST SERPL-CCNC: 36 IU/L (ref 15–37)
BASOPHILS ABSOLUTE: 0.1 K/CU MM
BASOPHILS RELATIVE PERCENT: 0.5 % (ref 0–1)
BILIRUB SERPL-MCNC: 0.4 MG/DL (ref 0–1)
BUN BLDV-MCNC: 10 MG/DL (ref 6–23)
CALCIUM SERPL-MCNC: 9.6 MG/DL (ref 8.3–10.6)
CHLORIDE BLD-SCNC: 95 MMOL/L (ref 99–110)
CHP ED QC CHECK: NORMAL
CO2: 21 MMOL/L (ref 21–32)
CREAT SERPL-MCNC: 0.6 MG/DL (ref 0.6–1.1)
DIFFERENTIAL TYPE: ABNORMAL
EOSINOPHILS ABSOLUTE: 0.2 K/CU MM
EOSINOPHILS RELATIVE PERCENT: 1.6 % (ref 0–3)
ESTIMATED AVERAGE GLUCOSE: 214 MG/DL
GFR AFRICAN AMERICAN: >60 ML/MIN/1.73M2
GFR NON-AFRICAN AMERICAN: >60 ML/MIN/1.73M2
GLUCOSE BLD-MCNC: 260 MG/DL (ref 70–99)
GLUCOSE BLD-MCNC: 264 MG/DL (ref 70–99)
GLUCOSE BLD-MCNC: 275 MG/DL
GLUCOSE BLD-MCNC: 275 MG/DL (ref 70–99)
GLUCOSE BLD-MCNC: 289 MG/DL (ref 70–99)
HBA1C MFR BLD: 9.1 % (ref 4.2–6.3)
HCT VFR BLD CALC: 42.8 % (ref 37–47)
HEMOGLOBIN: 14.8 GM/DL (ref 12.5–16)
IMMATURE NEUTROPHIL %: 0.3 % (ref 0–0.43)
INR BLD: 0.94 INDEX
LYMPHOCYTES ABSOLUTE: 3.3 K/CU MM
LYMPHOCYTES RELATIVE PERCENT: 35.2 % (ref 24–44)
MCH RBC QN AUTO: 28 PG (ref 27–31)
MCHC RBC AUTO-ENTMCNC: 34.6 % (ref 32–36)
MCV RBC AUTO: 81.1 FL (ref 78–100)
MONOCYTES ABSOLUTE: 0.9 K/CU MM
MONOCYTES RELATIVE PERCENT: 9.4 % (ref 0–4)
NUCLEATED RBC %: 0 %
PDW BLD-RTO: 11.2 % (ref 11.7–14.9)
PLATELET # BLD: 310 K/CU MM (ref 140–440)
PMV BLD AUTO: 9 FL (ref 7.5–11.1)
POTASSIUM SERPL-SCNC: 4.6 MMOL/L (ref 3.5–5.1)
PROTHROMBIN TIME: 12.1 SECONDS (ref 11.7–14.5)
RBC # BLD: 5.28 M/CU MM (ref 4.2–5.4)
SEGMENTED NEUTROPHILS ABSOLUTE COUNT: 4.9 K/CU MM
SEGMENTED NEUTROPHILS RELATIVE PERCENT: 53 % (ref 36–66)
SODIUM BLD-SCNC: 133 MMOL/L (ref 135–145)
TOTAL IMMATURE NEUTOROPHIL: 0.03 K/CU MM
TOTAL NUCLEATED RBC: 0 K/CU MM
TOTAL PROTEIN: 7.4 GM/DL (ref 6.4–8.2)
TROPONIN T: <0.01 NG/ML
TROPONIN T: <0.01 NG/ML
WBC # BLD: 9.2 K/CU MM (ref 4–10.5)

## 2022-03-29 PROCEDURE — 2580000003 HC RX 258: Performed by: NURSE PRACTITIONER

## 2022-03-29 PROCEDURE — 2500000003 HC RX 250 WO HCPCS: Performed by: NURSE PRACTITIONER

## 2022-03-29 PROCEDURE — 83036 HEMOGLOBIN GLYCOSYLATED A1C: CPT

## 2022-03-29 PROCEDURE — 96376 TX/PRO/DX INJ SAME DRUG ADON: CPT

## 2022-03-29 PROCEDURE — 2500000003 HC RX 250 WO HCPCS: Performed by: EMERGENCY MEDICINE

## 2022-03-29 PROCEDURE — 6360000004 HC RX CONTRAST MEDICATION: Performed by: EMERGENCY MEDICINE

## 2022-03-29 PROCEDURE — 82962 GLUCOSE BLOOD TEST: CPT

## 2022-03-29 PROCEDURE — 6370000000 HC RX 637 (ALT 250 FOR IP): Performed by: NURSE PRACTITIONER

## 2022-03-29 PROCEDURE — 2140000000 HC CCU INTERMEDIATE R&B

## 2022-03-29 PROCEDURE — 70496 CT ANGIOGRAPHY HEAD: CPT

## 2022-03-29 PROCEDURE — 6360000002 HC RX W HCPCS: Performed by: EMERGENCY MEDICINE

## 2022-03-29 PROCEDURE — 71045 X-RAY EXAM CHEST 1 VIEW: CPT

## 2022-03-29 PROCEDURE — 96374 THER/PROPH/DIAG INJ IV PUSH: CPT

## 2022-03-29 PROCEDURE — 84484 ASSAY OF TROPONIN QUANT: CPT

## 2022-03-29 PROCEDURE — 85610 PROTHROMBIN TIME: CPT

## 2022-03-29 PROCEDURE — 70450 CT HEAD/BRAIN W/O DYE: CPT

## 2022-03-29 PROCEDURE — 96375 TX/PRO/DX INJ NEW DRUG ADDON: CPT

## 2022-03-29 PROCEDURE — 85025 COMPLETE CBC W/AUTO DIFF WBC: CPT

## 2022-03-29 PROCEDURE — 99285 EMERGENCY DEPT VISIT HI MDM: CPT

## 2022-03-29 PROCEDURE — 80053 COMPREHEN METABOLIC PANEL: CPT

## 2022-03-29 PROCEDURE — 93005 ELECTROCARDIOGRAM TRACING: CPT | Performed by: EMERGENCY MEDICINE

## 2022-03-29 RX ORDER — MONTELUKAST SODIUM 10 MG/1
10 TABLET ORAL NIGHTLY
Status: DISCONTINUED | OUTPATIENT
Start: 2022-03-29 | End: 2022-03-31 | Stop reason: HOSPADM

## 2022-03-29 RX ORDER — MAGNESIUM HYDROXIDE/ALUMINUM HYDROXICE/SIMETHICONE 120; 1200; 1200 MG/30ML; MG/30ML; MG/30ML
15 SUSPENSION ORAL EVERY 6 HOURS PRN
Status: DISCONTINUED | OUTPATIENT
Start: 2022-03-29 | End: 2022-03-31 | Stop reason: HOSPADM

## 2022-03-29 RX ORDER — MAGNESIUM OXIDE 400 MG/1
400 TABLET ORAL 3 TIMES DAILY
Status: DISCONTINUED | OUTPATIENT
Start: 2022-03-29 | End: 2022-03-31 | Stop reason: HOSPADM

## 2022-03-29 RX ORDER — ASPIRIN 81 MG/1
81 TABLET, CHEWABLE ORAL DAILY
Status: DISCONTINUED | OUTPATIENT
Start: 2022-03-30 | End: 2022-03-31 | Stop reason: HOSPADM

## 2022-03-29 RX ORDER — METOPROLOL TARTRATE 5 MG/5ML
5 INJECTION INTRAVENOUS EVERY 6 HOURS PRN
Status: DISCONTINUED | OUTPATIENT
Start: 2022-03-29 | End: 2022-03-30

## 2022-03-29 RX ORDER — GUAIFENESIN 600 MG/1
600 TABLET, EXTENDED RELEASE ORAL 2 TIMES DAILY
Status: DISCONTINUED | OUTPATIENT
Start: 2022-03-29 | End: 2022-03-31 | Stop reason: HOSPADM

## 2022-03-29 RX ORDER — NICOTINE POLACRILEX 4 MG
15 LOZENGE BUCCAL PRN
Status: DISCONTINUED | OUTPATIENT
Start: 2022-03-29 | End: 2022-03-31 | Stop reason: HOSPADM

## 2022-03-29 RX ORDER — ONDANSETRON 2 MG/ML
4 INJECTION INTRAMUSCULAR; INTRAVENOUS ONCE
Status: COMPLETED | OUTPATIENT
Start: 2022-03-29 | End: 2022-03-29

## 2022-03-29 RX ORDER — ONDANSETRON 4 MG/1
4 TABLET, ORALLY DISINTEGRATING ORAL EVERY 8 HOURS PRN
Status: DISCONTINUED | OUTPATIENT
Start: 2022-03-29 | End: 2022-03-29 | Stop reason: SDUPTHER

## 2022-03-29 RX ORDER — ATORVASTATIN CALCIUM 10 MG/1
10 TABLET, FILM COATED ORAL DAILY
Status: DISCONTINUED | OUTPATIENT
Start: 2022-03-30 | End: 2022-03-31 | Stop reason: HOSPADM

## 2022-03-29 RX ORDER — SUCRALFATE 1 G/1
1 TABLET ORAL
Status: DISCONTINUED | OUTPATIENT
Start: 2022-03-29 | End: 2022-03-31 | Stop reason: HOSPADM

## 2022-03-29 RX ORDER — POLYETHYLENE GLYCOL 3350 17 G/17G
17 POWDER, FOR SOLUTION ORAL DAILY
Status: DISCONTINUED | OUTPATIENT
Start: 2022-03-30 | End: 2022-03-31 | Stop reason: HOSPADM

## 2022-03-29 RX ORDER — ALBUTEROL SULFATE 90 UG/1
2 AEROSOL, METERED RESPIRATORY (INHALATION) EVERY 4 HOURS PRN
Status: DISCONTINUED | OUTPATIENT
Start: 2022-03-29 | End: 2022-03-31 | Stop reason: HOSPADM

## 2022-03-29 RX ORDER — POLYETHYLENE GLYCOL 3350 17 G/17G
17 POWDER, FOR SOLUTION ORAL DAILY PRN
Status: DISCONTINUED | OUTPATIENT
Start: 2022-03-29 | End: 2022-03-31 | Stop reason: HOSPADM

## 2022-03-29 RX ORDER — SODIUM CHLORIDE 9 MG/ML
INJECTION, SOLUTION INTRAVENOUS CONTINUOUS
Status: ACTIVE | OUTPATIENT
Start: 2022-03-29 | End: 2022-03-30

## 2022-03-29 RX ORDER — ONDANSETRON 2 MG/ML
4 INJECTION INTRAMUSCULAR; INTRAVENOUS EVERY 6 HOURS PRN
Status: DISCONTINUED | OUTPATIENT
Start: 2022-03-29 | End: 2022-03-29 | Stop reason: SDUPTHER

## 2022-03-29 RX ORDER — QUETIAPINE FUMARATE 25 MG/1
25 TABLET, FILM COATED ORAL 2 TIMES DAILY
Status: DISCONTINUED | OUTPATIENT
Start: 2022-03-29 | End: 2022-03-31 | Stop reason: HOSPADM

## 2022-03-29 RX ORDER — METOPROLOL TARTRATE 5 MG/5ML
5 INJECTION INTRAVENOUS EVERY 6 HOURS
Status: DISCONTINUED | OUTPATIENT
Start: 2022-03-29 | End: 2022-03-30

## 2022-03-29 RX ORDER — LABETALOL HYDROCHLORIDE 5 MG/ML
10 INJECTION, SOLUTION INTRAVENOUS EVERY 10 MIN PRN
Status: DISCONTINUED | OUTPATIENT
Start: 2022-03-29 | End: 2022-03-31 | Stop reason: HOSPADM

## 2022-03-29 RX ORDER — PANTOPRAZOLE SODIUM 40 MG/1
40 TABLET, DELAYED RELEASE ORAL
Status: DISCONTINUED | OUTPATIENT
Start: 2022-03-30 | End: 2022-03-31 | Stop reason: HOSPADM

## 2022-03-29 RX ORDER — DOCUSATE SODIUM 100 MG/1
100 CAPSULE, LIQUID FILLED ORAL 2 TIMES DAILY
Status: DISCONTINUED | OUTPATIENT
Start: 2022-03-29 | End: 2022-03-31 | Stop reason: HOSPADM

## 2022-03-29 RX ORDER — SODIUM CHLORIDE 0.9 % (FLUSH) 0.9 %
5-40 SYRINGE (ML) INJECTION EVERY 12 HOURS SCHEDULED
Status: DISCONTINUED | OUTPATIENT
Start: 2022-03-29 | End: 2022-03-31 | Stop reason: HOSPADM

## 2022-03-29 RX ORDER — LOSARTAN POTASSIUM 100 MG/1
100 TABLET ORAL DAILY
Status: DISCONTINUED | OUTPATIENT
Start: 2022-03-30 | End: 2022-03-31 | Stop reason: HOSPADM

## 2022-03-29 RX ORDER — DEXTROSE MONOHYDRATE 25 G/50ML
12.5 INJECTION, SOLUTION INTRAVENOUS PRN
Status: DISCONTINUED | OUTPATIENT
Start: 2022-03-29 | End: 2022-03-29 | Stop reason: RX

## 2022-03-29 RX ORDER — METOPROLOL TARTRATE 5 MG/5ML
5 INJECTION INTRAVENOUS ONCE
Status: COMPLETED | OUTPATIENT
Start: 2022-03-29 | End: 2022-03-29

## 2022-03-29 RX ORDER — METOPROLOL SUCCINATE 50 MG/1
50 TABLET, EXTENDED RELEASE ORAL DAILY
Status: DISCONTINUED | OUTPATIENT
Start: 2022-03-30 | End: 2022-03-31 | Stop reason: HOSPADM

## 2022-03-29 RX ORDER — AMITRIPTYLINE HYDROCHLORIDE 25 MG/1
25 TABLET, FILM COATED ORAL NIGHTLY
Status: DISCONTINUED | OUTPATIENT
Start: 2022-03-29 | End: 2022-03-31 | Stop reason: HOSPADM

## 2022-03-29 RX ORDER — INSULIN GLARGINE 100 [IU]/ML
0.25 INJECTION, SOLUTION SUBCUTANEOUS NIGHTLY
Status: DISCONTINUED | OUTPATIENT
Start: 2022-03-29 | End: 2022-03-30

## 2022-03-29 RX ORDER — DEXTROSE MONOHYDRATE 50 MG/ML
100 INJECTION, SOLUTION INTRAVENOUS PRN
Status: DISCONTINUED | OUTPATIENT
Start: 2022-03-29 | End: 2022-03-31 | Stop reason: HOSPADM

## 2022-03-29 RX ORDER — NIFEDIPINE 90 MG/1
90 TABLET, EXTENDED RELEASE ORAL DAILY
Status: DISCONTINUED | OUTPATIENT
Start: 2022-03-30 | End: 2022-03-31 | Stop reason: HOSPADM

## 2022-03-29 RX ORDER — BACLOFEN 10 MG/1
10 TABLET ORAL 2 TIMES DAILY
Status: DISCONTINUED | OUTPATIENT
Start: 2022-03-29 | End: 2022-03-31 | Stop reason: HOSPADM

## 2022-03-29 RX ORDER — ONDANSETRON 4 MG/1
4 TABLET, ORALLY DISINTEGRATING ORAL EVERY 8 HOURS PRN
Status: DISCONTINUED | OUTPATIENT
Start: 2022-03-29 | End: 2022-03-31 | Stop reason: HOSPADM

## 2022-03-29 RX ORDER — SODIUM CHLORIDE 1000 MG
1 TABLET, SOLUBLE MISCELLANEOUS 2 TIMES DAILY WITH MEALS
Status: DISCONTINUED | OUTPATIENT
Start: 2022-03-30 | End: 2022-03-31 | Stop reason: HOSPADM

## 2022-03-29 RX ORDER — HYDRALAZINE HYDROCHLORIDE 20 MG/ML
10 INJECTION INTRAMUSCULAR; INTRAVENOUS ONCE
Status: COMPLETED | OUTPATIENT
Start: 2022-03-29 | End: 2022-03-29

## 2022-03-29 RX ORDER — ONDANSETRON 2 MG/ML
4 INJECTION INTRAMUSCULAR; INTRAVENOUS EVERY 6 HOURS PRN
Status: DISCONTINUED | OUTPATIENT
Start: 2022-03-29 | End: 2022-03-31 | Stop reason: HOSPADM

## 2022-03-29 RX ORDER — OXCARBAZEPINE 300 MG/1
300 TABLET, FILM COATED ORAL 2 TIMES DAILY
Status: DISCONTINUED | OUTPATIENT
Start: 2022-03-29 | End: 2022-03-31 | Stop reason: HOSPADM

## 2022-03-29 RX ORDER — SODIUM CHLORIDE 0.9 % (FLUSH) 0.9 %
5-40 SYRINGE (ML) INJECTION PRN
Status: DISCONTINUED | OUTPATIENT
Start: 2022-03-29 | End: 2022-03-31 | Stop reason: HOSPADM

## 2022-03-29 RX ORDER — DIAZEPAM 5 MG/1
5 TABLET ORAL EVERY 12 HOURS PRN
Status: DISCONTINUED | OUTPATIENT
Start: 2022-03-29 | End: 2022-03-31 | Stop reason: HOSPADM

## 2022-03-29 RX ORDER — LEVOTHYROXINE SODIUM 0.03 MG/1
75 TABLET ORAL
Status: DISCONTINUED | OUTPATIENT
Start: 2022-03-30 | End: 2022-03-31 | Stop reason: HOSPADM

## 2022-03-29 RX ORDER — SODIUM CHLORIDE 9 MG/ML
INJECTION, SOLUTION INTRAVENOUS PRN
Status: DISCONTINUED | OUTPATIENT
Start: 2022-03-29 | End: 2022-03-31 | Stop reason: HOSPADM

## 2022-03-29 RX ORDER — HYDROCODONE BITARTRATE AND ACETAMINOPHEN 10; 325 MG/1; MG/1
1 TABLET ORAL EVERY 6 HOURS PRN
Status: DISCONTINUED | OUTPATIENT
Start: 2022-03-29 | End: 2022-03-31 | Stop reason: HOSPADM

## 2022-03-29 RX ADMIN — INSULIN GLARGINE 19 UNITS: 100 INJECTION, SOLUTION SUBCUTANEOUS at 23:36

## 2022-03-29 RX ADMIN — HYDRALAZINE HYDROCHLORIDE 10 MG: 20 INJECTION INTRAMUSCULAR; INTRAVENOUS at 20:53

## 2022-03-29 RX ADMIN — METOPROLOL TARTRATE 5 MG: 5 INJECTION, SOLUTION INTRAVENOUS at 20:34

## 2022-03-29 RX ADMIN — CARBIDOPA AND LEVODOPA 1 TABLET: 10; 100 TABLET ORAL at 23:36

## 2022-03-29 RX ADMIN — QUETIAPINE FUMARATE 25 MG: 25 TABLET ORAL at 23:43

## 2022-03-29 RX ADMIN — SODIUM CHLORIDE, PRESERVATIVE FREE 10 ML: 5 INJECTION INTRAVENOUS at 23:45

## 2022-03-29 RX ADMIN — HYDROCODONE BITARTRATE AND ACETAMINOPHEN 1 TABLET: 10; 325 TABLET ORAL at 23:43

## 2022-03-29 RX ADMIN — METOPROLOL TARTRATE 5 MG: 5 INJECTION INTRAVENOUS at 23:44

## 2022-03-29 RX ADMIN — AMITRIPTYLINE HYDROCHLORIDE 25 MG: 25 TABLET, FILM COATED ORAL at 23:36

## 2022-03-29 RX ADMIN — BACLOFEN 10 MG: 10 TABLET ORAL at 23:43

## 2022-03-29 RX ADMIN — OXCARBAZEPINE 300 MG: 300 TABLET, FILM COATED ORAL at 23:36

## 2022-03-29 RX ADMIN — SODIUM CHLORIDE: 9 INJECTION, SOLUTION INTRAVENOUS at 22:55

## 2022-03-29 RX ADMIN — GUAIFENESIN 600 MG: 600 TABLET, EXTENDED RELEASE ORAL at 23:42

## 2022-03-29 RX ADMIN — IOPAMIDOL 80 ML: 755 INJECTION, SOLUTION INTRAVENOUS at 20:10

## 2022-03-29 RX ADMIN — SUCRALFATE 1 G: 1 TABLET ORAL at 23:43

## 2022-03-29 RX ADMIN — DOCUSATE SODIUM 100 MG: 100 CAPSULE, LIQUID FILLED ORAL at 23:47

## 2022-03-29 RX ADMIN — MONTELUKAST 10 MG: 10 TABLET, FILM COATED ORAL at 23:43

## 2022-03-29 RX ADMIN — ONDANSETRON 4 MG: 2 INJECTION INTRAMUSCULAR; INTRAVENOUS at 21:53

## 2022-03-29 RX ADMIN — METOPROLOL TARTRATE 5 MG: 5 INJECTION, SOLUTION INTRAVENOUS at 21:33

## 2022-03-29 RX ADMIN — MAGNESIUM OXIDE 400 MG (241.3 MG MAGNESIUM) TABLET 400 MG: TABLET at 23:43

## 2022-03-29 ASSESSMENT — ENCOUNTER SYMPTOMS
NAUSEA: 0
EYE REDNESS: 0
VOMITING: 0
SHORTNESS OF BREATH: 0
COUGH: 0
DIARRHEA: 0

## 2022-03-29 ASSESSMENT — PAIN DESCRIPTION - LOCATION
LOCATION: LEG;ARM
LOCATION: ARM;LEG

## 2022-03-29 ASSESSMENT — PAIN - FUNCTIONAL ASSESSMENT
PAIN_FUNCTIONAL_ASSESSMENT: ACTIVITIES ARE NOT PREVENTED
PAIN_FUNCTIONAL_ASSESSMENT: 0-10

## 2022-03-29 ASSESSMENT — PAIN DESCRIPTION - PROGRESSION
CLINICAL_PROGRESSION: NOT CHANGED
CLINICAL_PROGRESSION: NOT CHANGED

## 2022-03-29 ASSESSMENT — PAIN DESCRIPTION - FREQUENCY: FREQUENCY: CONTINUOUS

## 2022-03-29 ASSESSMENT — PAIN SCALES - GENERAL
PAINLEVEL_OUTOF10: 10
PAINLEVEL_OUTOF10: 8
PAINLEVEL_OUTOF10: 8

## 2022-03-29 ASSESSMENT — PAIN DESCRIPTION - ORIENTATION
ORIENTATION: LEFT
ORIENTATION: RIGHT;LEFT;UPPER;LOWER

## 2022-03-29 ASSESSMENT — PAIN DESCRIPTION - DESCRIPTORS: DESCRIPTORS: ACHING;CONSTANT

## 2022-03-29 ASSESSMENT — PAIN DESCRIPTION - PAIN TYPE: TYPE: ACUTE PAIN

## 2022-03-29 ASSESSMENT — PAIN DESCRIPTION - ONSET: ONSET: ON-GOING
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
## 2022-03-30 ENCOUNTER — APPOINTMENT (OUTPATIENT)
Dept: MRI IMAGING | Age: 67
DRG: 885 | End: 2022-03-30
Payer: MEDICARE

## 2022-03-30 LAB
ALBUMIN SERPL-MCNC: 3.9 GM/DL (ref 3.4–5)
ALP BLD-CCNC: 82 IU/L (ref 40–129)
ALT SERPL-CCNC: 11 U/L (ref 10–40)
AMPHETAMINES: NEGATIVE
ANION GAP SERPL CALCULATED.3IONS-SCNC: 15 MMOL/L (ref 4–16)
AST SERPL-CCNC: 41 IU/L (ref 15–37)
BACTERIA: NEGATIVE /HPF
BARBITURATE SCREEN URINE: NEGATIVE
BARBITURATE SCREEN URINE: NEGATIVE
BENZODIAZEPINE SCREEN, URINE: NEGATIVE
BILIRUB SERPL-MCNC: 0.4 MG/DL (ref 0–1)
BILIRUBIN URINE: NEGATIVE MG/DL
BLOOD, URINE: NEGATIVE
BUN BLDV-MCNC: 12 MG/DL (ref 6–23)
CALCIUM SERPL-MCNC: 8.9 MG/DL (ref 8.3–10.6)
CANNABINOID SCREEN URINE: NEGATIVE
CHLORIDE BLD-SCNC: 97 MMOL/L (ref 99–110)
CHOLESTEROL: 97 MG/DL
CLARITY: CLEAR
CO2: 21 MMOL/L (ref 21–32)
COCAINE METABOLITE: NEGATIVE
COLOR: YELLOW
CREAT SERPL-MCNC: 0.6 MG/DL (ref 0.6–1.1)
EKG ATRIAL RATE: 115 BPM
EKG ATRIAL RATE: 68 BPM
EKG DIAGNOSIS: NORMAL
EKG DIAGNOSIS: NORMAL
EKG P AXIS: -21 DEGREES
EKG P AXIS: 33 DEGREES
EKG P-R INTERVAL: 166 MS
EKG P-R INTERVAL: 170 MS
EKG Q-T INTERVAL: 342 MS
EKG Q-T INTERVAL: 450 MS
EKG QRS DURATION: 108 MS
EKG QRS DURATION: 116 MS
EKG QTC CALCULATION (BAZETT): 473 MS
EKG QTC CALCULATION (BAZETT): 478 MS
EKG R AXIS: -44 DEGREES
EKG R AXIS: -47 DEGREES
EKG T AXIS: -28 DEGREES
EKG T AXIS: 77 DEGREES
EKG VENTRICULAR RATE: 115 BPM
EKG VENTRICULAR RATE: 68 BPM
ESTIMATED AVERAGE GLUCOSE: 212 MG/DL
GFR AFRICAN AMERICAN: >60 ML/MIN/1.73M2
GFR NON-AFRICAN AMERICAN: >60 ML/MIN/1.73M2
GLUCOSE BLD-MCNC: 169 MG/DL (ref 70–99)
GLUCOSE BLD-MCNC: 186 MG/DL (ref 70–99)
GLUCOSE BLD-MCNC: 215 MG/DL (ref 70–99)
GLUCOSE BLD-MCNC: 255 MG/DL (ref 70–99)
GLUCOSE BLD-MCNC: 267 MG/DL (ref 70–99)
GLUCOSE BLD-MCNC: 293 MG/DL (ref 70–99)
GLUCOSE BLD-MCNC: 295 MG/DL (ref 70–99)
GLUCOSE, URINE: 100 MG/DL
HBA1C MFR BLD: 9 % (ref 4.2–6.3)
HCT VFR BLD CALC: 41.2 % (ref 37–47)
HDLC SERPL-MCNC: 37 MG/DL
HEMOGLOBIN: 13.9 GM/DL (ref 12.5–16)
KETONES, URINE: ABNORMAL MG/DL
LDL CHOLESTEROL CALCULATED: 35 MG/DL
LEUKOCYTE ESTERASE, URINE: NEGATIVE
LV EF: 53 %
LVEF MODALITY: NORMAL
MCH RBC QN AUTO: 27.6 PG (ref 27–31)
MCHC RBC AUTO-ENTMCNC: 33.7 % (ref 32–36)
MCV RBC AUTO: 81.7 FL (ref 78–100)
NITRITE URINE, QUANTITATIVE: NEGATIVE
OPIATES, URINE: ABNORMAL
OXYCODONE: NEGATIVE
PDW BLD-RTO: 11.6 % (ref 11.7–14.9)
PH, URINE: 5.5 (ref 5–8)
PHENCYCLIDINE, URINE: NEGATIVE
PLATELET # BLD: 274 K/CU MM (ref 140–440)
PMV BLD AUTO: 9.6 FL (ref 7.5–11.1)
POTASSIUM SERPL-SCNC: 4 MMOL/L (ref 3.5–5.1)
PROTEIN UA: ABNORMAL MG/DL
RBC # BLD: 5.04 M/CU MM (ref 4.2–5.4)
RBC URINE: 2 /HPF (ref 0–6)
SODIUM BLD-SCNC: 133 MMOL/L (ref 135–145)
SPECIFIC GRAVITY UA: 1.01 (ref 1–1.03)
SQUAMOUS EPITHELIAL: 1 /HPF
TOTAL PROTEIN: 6.2 GM/DL (ref 6.4–8.2)
TRICHOMONAS: ABNORMAL /HPF
TRIGL SERPL-MCNC: 127 MG/DL
UROBILINOGEN, URINE: 0.2 MG/DL (ref 0.2–1)
WBC # BLD: 24.3 K/CU MM (ref 4–10.5)
WBC UA: ABNORMAL /HPF (ref 0–5)

## 2022-03-30 PROCEDURE — 94150 VITAL CAPACITY TEST: CPT

## 2022-03-30 PROCEDURE — 93306 TTE W/DOPPLER COMPLETE: CPT

## 2022-03-30 PROCEDURE — 80061 LIPID PANEL: CPT

## 2022-03-30 PROCEDURE — 2500000003 HC RX 250 WO HCPCS

## 2022-03-30 PROCEDURE — 84484 ASSAY OF TROPONIN QUANT: CPT

## 2022-03-30 PROCEDURE — 99223 1ST HOSP IP/OBS HIGH 75: CPT | Performed by: PSYCHIATRY & NEUROLOGY

## 2022-03-30 PROCEDURE — 70551 MRI BRAIN STEM W/O DYE: CPT

## 2022-03-30 PROCEDURE — 2500000003 HC RX 250 WO HCPCS: Performed by: NURSE PRACTITIONER

## 2022-03-30 PROCEDURE — 80053 COMPREHEN METABOLIC PANEL: CPT

## 2022-03-30 PROCEDURE — 93010 ELECTROCARDIOGRAM REPORT: CPT | Performed by: INTERNAL MEDICINE

## 2022-03-30 PROCEDURE — 81001 URINALYSIS AUTO W/SCOPE: CPT

## 2022-03-30 PROCEDURE — 97166 OT EVAL MOD COMPLEX 45 MIN: CPT

## 2022-03-30 PROCEDURE — 99222 1ST HOSP IP/OBS MODERATE 55: CPT | Performed by: INTERNAL MEDICINE

## 2022-03-30 PROCEDURE — 2140000000 HC CCU INTERMEDIATE R&B

## 2022-03-30 PROCEDURE — 82962 GLUCOSE BLOOD TEST: CPT

## 2022-03-30 PROCEDURE — 92610 EVALUATE SWALLOWING FUNCTION: CPT

## 2022-03-30 PROCEDURE — 6370000000 HC RX 637 (ALT 250 FOR IP): Performed by: NURSE PRACTITIONER

## 2022-03-30 PROCEDURE — 97530 THERAPEUTIC ACTIVITIES: CPT

## 2022-03-30 PROCEDURE — 83036 HEMOGLOBIN GLYCOSYLATED A1C: CPT

## 2022-03-30 PROCEDURE — 94761 N-INVAS EAR/PLS OXIMETRY MLT: CPT

## 2022-03-30 PROCEDURE — 85027 COMPLETE CBC AUTOMATED: CPT

## 2022-03-30 PROCEDURE — 80307 DRUG TEST PRSMV CHEM ANLYZR: CPT

## 2022-03-30 PROCEDURE — 6360000002 HC RX W HCPCS: Performed by: NURSE PRACTITIONER

## 2022-03-30 PROCEDURE — 94664 DEMO&/EVAL PT USE INHALER: CPT

## 2022-03-30 PROCEDURE — 97162 PT EVAL MOD COMPLEX 30 MIN: CPT

## 2022-03-30 PROCEDURE — 2580000003 HC RX 258: Performed by: NURSE PRACTITIONER

## 2022-03-30 PROCEDURE — 87086 URINE CULTURE/COLONY COUNT: CPT

## 2022-03-30 PROCEDURE — 36415 COLL VENOUS BLD VENIPUNCTURE: CPT

## 2022-03-30 PROCEDURE — 93005 ELECTROCARDIOGRAM TRACING: CPT | Performed by: NURSE PRACTITIONER

## 2022-03-30 PROCEDURE — 6370000000 HC RX 637 (ALT 250 FOR IP): Performed by: INTERNAL MEDICINE

## 2022-03-30 PROCEDURE — 2580000003 HC RX 258

## 2022-03-30 RX ORDER — INSULIN GLARGINE 100 [IU]/ML
30 INJECTION, SOLUTION SUBCUTANEOUS NIGHTLY
Status: DISCONTINUED | OUTPATIENT
Start: 2022-03-30 | End: 2022-03-31 | Stop reason: HOSPADM

## 2022-03-30 RX ADMIN — NIFEDIPINE 90 MG: 90 TABLET, FILM COATED, EXTENDED RELEASE ORAL at 08:28

## 2022-03-30 RX ADMIN — INSULIN LISPRO 6 UNITS: 100 INJECTION, SOLUTION INTRAVENOUS; SUBCUTANEOUS at 08:23

## 2022-03-30 RX ADMIN — GUAIFENESIN 600 MG: 600 TABLET, EXTENDED RELEASE ORAL at 20:50

## 2022-03-30 RX ADMIN — MONTELUKAST 10 MG: 10 TABLET, FILM COATED ORAL at 20:50

## 2022-03-30 RX ADMIN — SODIUM CHLORIDE, PRESERVATIVE FREE 10 ML: 5 INJECTION INTRAVENOUS at 20:50

## 2022-03-30 RX ADMIN — LEVOTHYROXINE SODIUM 75 MCG: 25 TABLET ORAL at 05:16

## 2022-03-30 RX ADMIN — VALPROATE SODIUM 500 MG: 100 INJECTION, SOLUTION INTRAVENOUS at 20:59

## 2022-03-30 RX ADMIN — SODIUM CHLORIDE 1 G: 1 TABLET ORAL at 17:20

## 2022-03-30 RX ADMIN — SODIUM CHLORIDE, PRESERVATIVE FREE 10 ML: 5 INJECTION INTRAVENOUS at 08:23

## 2022-03-30 RX ADMIN — AMITRIPTYLINE HYDROCHLORIDE 25 MG: 25 TABLET, FILM COATED ORAL at 20:50

## 2022-03-30 RX ADMIN — BACLOFEN 10 MG: 10 TABLET ORAL at 08:22

## 2022-03-30 RX ADMIN — BUSPIRONE HYDROCHLORIDE 20 MG: 5 TABLET ORAL at 05:16

## 2022-03-30 RX ADMIN — INSULIN LISPRO 2 UNITS: 100 INJECTION, SOLUTION INTRAVENOUS; SUBCUTANEOUS at 13:00

## 2022-03-30 RX ADMIN — ENOXAPARIN SODIUM 40 MG: 40 INJECTION SUBCUTANEOUS at 08:23

## 2022-03-30 RX ADMIN — OXCARBAZEPINE 300 MG: 300 TABLET, FILM COATED ORAL at 23:02

## 2022-03-30 RX ADMIN — DOCUSATE SODIUM 100 MG: 100 CAPSULE, LIQUID FILLED ORAL at 08:22

## 2022-03-30 RX ADMIN — QUETIAPINE FUMARATE 25 MG: 25 TABLET ORAL at 08:22

## 2022-03-30 RX ADMIN — ASPIRIN 81 MG 81 MG: 81 TABLET ORAL at 08:22

## 2022-03-30 RX ADMIN — MAGNESIUM OXIDE 400 MG (241.3 MG MAGNESIUM) TABLET 400 MG: TABLET at 20:50

## 2022-03-30 RX ADMIN — HYDROCODONE BITARTRATE AND ACETAMINOPHEN 1 TABLET: 10; 325 TABLET ORAL at 12:09

## 2022-03-30 RX ADMIN — DOCUSATE SODIUM 100 MG: 100 CAPSULE, LIQUID FILLED ORAL at 20:50

## 2022-03-30 RX ADMIN — CARBIDOPA AND LEVODOPA 1 TABLET: 10; 100 TABLET ORAL at 20:49

## 2022-03-30 RX ADMIN — BUSPIRONE HYDROCHLORIDE 20 MG: 5 TABLET ORAL at 23:02

## 2022-03-30 RX ADMIN — SODIUM CHLORIDE 1 G: 1 TABLET ORAL at 08:22

## 2022-03-30 RX ADMIN — QUETIAPINE FUMARATE 25 MG: 25 TABLET ORAL at 20:49

## 2022-03-30 RX ADMIN — INSULIN LISPRO 4 UNITS: 100 INJECTION, SOLUTION INTRAVENOUS; SUBCUTANEOUS at 17:20

## 2022-03-30 RX ADMIN — MAGNESIUM OXIDE 400 MG (241.3 MG MAGNESIUM) TABLET 400 MG: TABLET at 08:22

## 2022-03-30 RX ADMIN — MAGNESIUM OXIDE 400 MG (241.3 MG MAGNESIUM) TABLET 400 MG: TABLET at 12:58

## 2022-03-30 RX ADMIN — LOSARTAN POTASSIUM 100 MG: 100 TABLET, FILM COATED ORAL at 08:22

## 2022-03-30 RX ADMIN — ATORVASTATIN CALCIUM 10 MG: 10 TABLET, FILM COATED ORAL at 08:22

## 2022-03-30 RX ADMIN — METOPROLOL SUCCINATE 50 MG: 50 TABLET, EXTENDED RELEASE ORAL at 08:22

## 2022-03-30 RX ADMIN — POLYETHYLENE GLYCOL (3350) 17 G: 17 POWDER, FOR SOLUTION ORAL at 08:23

## 2022-03-30 RX ADMIN — BUSPIRONE HYDROCHLORIDE 20 MG: 5 TABLET ORAL at 00:39

## 2022-03-30 RX ADMIN — ONDANSETRON 4 MG: 2 INJECTION INTRAMUSCULAR; INTRAVENOUS at 14:01

## 2022-03-30 RX ADMIN — INSULIN GLARGINE 30 UNITS: 100 INJECTION, SOLUTION SUBCUTANEOUS at 21:01

## 2022-03-30 RX ADMIN — HYDROCODONE BITARTRATE AND ACETAMINOPHEN 1 TABLET: 10; 325 TABLET ORAL at 20:49

## 2022-03-30 RX ADMIN — BUSPIRONE HYDROCHLORIDE 15 MG: 5 TABLET ORAL at 13:04

## 2022-03-30 RX ADMIN — PANTOPRAZOLE SODIUM 40 MG: 40 TABLET, DELAYED RELEASE ORAL at 05:16

## 2022-03-30 RX ADMIN — OXCARBAZEPINE 300 MG: 300 TABLET, FILM COATED ORAL at 08:28

## 2022-03-30 RX ADMIN — BACLOFEN 10 MG: 10 TABLET ORAL at 20:50

## 2022-03-30 RX ADMIN — METOPROLOL TARTRATE 5 MG: 5 INJECTION INTRAVENOUS at 04:12

## 2022-03-30 RX ADMIN — HYDROCODONE BITARTRATE AND ACETAMINOPHEN 1 TABLET: 10; 325 TABLET ORAL at 05:52

## 2022-03-30 RX ADMIN — GUAIFENESIN 600 MG: 600 TABLET, EXTENDED RELEASE ORAL at 08:22

## 2022-03-30 RX ADMIN — PANTOPRAZOLE SODIUM 40 MG: 40 TABLET, DELAYED RELEASE ORAL at 17:19

## 2022-03-30 ASSESSMENT — PAIN DESCRIPTION - DESCRIPTORS
DESCRIPTORS: ACHING;CONSTANT
DESCRIPTORS: ACHING;DISCOMFORT;CONSTANT

## 2022-03-30 ASSESSMENT — PAIN SCALES - GENERAL
PAINLEVEL_OUTOF10: 4
PAINLEVEL_OUTOF10: 7
PAINLEVEL_OUTOF10: 4
PAINLEVEL_OUTOF10: 0
PAINLEVEL_OUTOF10: 9
PAINLEVEL_OUTOF10: 10
PAINLEVEL_OUTOF10: 10

## 2022-03-30 ASSESSMENT — PAIN DESCRIPTION - PROGRESSION
CLINICAL_PROGRESSION: NOT CHANGED
CLINICAL_PROGRESSION: GRADUALLY WORSENING
CLINICAL_PROGRESSION: NOT CHANGED
CLINICAL_PROGRESSION: GRADUALLY WORSENING
CLINICAL_PROGRESSION: GRADUALLY WORSENING
CLINICAL_PROGRESSION: NOT CHANGED

## 2022-03-30 ASSESSMENT — PAIN DESCRIPTION - FREQUENCY
FREQUENCY: CONTINUOUS
FREQUENCY: CONTINUOUS

## 2022-03-30 ASSESSMENT — PAIN DESCRIPTION - ORIENTATION
ORIENTATION: RIGHT;LEFT;UPPER
ORIENTATION: RIGHT;LEFT;UPPER;LOWER

## 2022-03-30 ASSESSMENT — PAIN - FUNCTIONAL ASSESSMENT
PAIN_FUNCTIONAL_ASSESSMENT: ACTIVITIES ARE NOT PREVENTED
PAIN_FUNCTIONAL_ASSESSMENT: ACTIVITIES ARE NOT PREVENTED

## 2022-03-30 ASSESSMENT — PAIN DESCRIPTION - PAIN TYPE
TYPE: ACUTE PAIN
TYPE: CHRONIC PAIN

## 2022-03-30 ASSESSMENT — PAIN DESCRIPTION - LOCATION
LOCATION: BACK;GENERALIZED
LOCATION: BACK;LEG

## 2022-03-30 ASSESSMENT — PAIN DESCRIPTION - ONSET
ONSET: ON-GOING
ONSET: ON-GOING
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
## 2022-03-30 NOTE — ED NOTES
Dr Sky Hermosillo notified of patients b/p during #2 dose of lopressor     Lashay Olivia, RN  03/29/22 4014

## 2022-03-30 NOTE — CARE COORDINATION
.CM met with pt for d/c planning. Introduced self and updated white board. Pt lives alone in an apt. Pt uses the [employer] for transportation. She has a PCP, has insurance, and is able to afford her medication. Pt uses a walker. Pt has an Aide from [facility] for 6 hours a week. Pt denies any new d/c needs at this time. D/c plan is home alone with and aide for 6hrs a week from [facility]. Notify CM if any new d/c needs arise.   TE
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
## 2022-03-30 NOTE — CARE COORDINATION
PT notified CM that she is recommending SNF for pt. CM discussed the PT recommendation with pt and she is agreeable to go to a SNF. SNF list provided. Pt has requested [facility]. Referral made to Karolina via VM. Requested OT eval via WB.   TE

## 2022-03-30 NOTE — PROGRESS NOTES
Patient home medications counted and sent to pharmacy witnessed by this RN and Rafia Randhawa RN. Copies of list given to patient.

## 2022-03-30 NOTE — CONSULTS
Paul, 1955, 3113/3113-A, 3/30/2022    History  [place]:  The primary encounter diagnosis was Left sided numbness. Diagnoses of Chest pressure and Hypertensive emergency were also pertinent to this visit. Patient  has a past medical history of Abnormal EKG, Acid reflux, Anemia, Anesthesia, Anginal pain ([address] Anxiety, Arthritis, Asthma, Bipolar 1 disorder ([address] Cerebral artery occlusion with cerebral infarction ([address] CHF (congestive heart failure) ([address] Chronic back pain, Chronic kidney disease, DDD (degenerative disc disease), cervical, Depression, Diabetes mellitus ([address] Diabetic neuropathy ([address] Dizziness, Dry skin, Enlarged ureter, Fatty liver, Fibrocystic breast, Generalized anxiety disorder, Gout, H/O cardiac catheterization, H/O cardiovascular stress test, H/O cardiovascular stress test, H/O cardiovascular stress test, H/O Doppler ultrasound, H/O echocardiogram, H/O echocardiogram, History of Holter monitoring, [place] (hard of hearing), Hx of cardiovascular stress test, Hx of motion sickness, HX OTHER MEDICAL, Hyperlipidemia, Hypertension, IBS (irritable bowel syndrome), Incisional hernia, Kidney stones, Migraines, Nausea & vomiting, Other specified disorder of skin, Panic attacks, Panic attacks, Pneumonia, Pseudoseizures ([address] Restless leg, Shortness of breath, Sleep apnea, Staph infection, Thyroid disease, Tremor, Urinary incontinence, Vertigo, and Wears glasses. Patient  has a past surgical history that includes Carpal tunnel release (Right, 1999); Diagnostic Cardiac Cath Lab Procedure (01/11/2010); Dental surgery; Colonoscopy (Last Done 6-13); Endoscopy, colon, diagnostic (Several ); Bladder surgery (1970's Or 1980's); Lithotripsy (2011); Breast biopsy (Right, 1980's); Breast surgery (Left, 1990's); hernia repair (1715'F); hernia repair (1970's); Cholecystectomy (1970's); Appendectomy (1970's);  Hysterectomy, total abdominal (1987); Tubal ligation (1978); Esophagus dilation (1980's And 1990's); Knee arthroscopy (Right, 1999); joint replacement (2008); other surgical history (06 13 2014); fracture surgery (1974 Or 1975); Cardiac catheterization (10-18-06); and Cardiac catheterization. Subjective:  Patient states:  \"I just can't move since this episode. \"    Pain:  States chronic pain in all extremities, does not rate. Communication with other providers:  Handoff to RN  Restrictions: general precautions    Home Setup/Prior level of function    Lives With: Alone  Type of Home: Apartment(5th floor)  Home Layout: One level, Laundry in basement  Home Access: Elevator, Level entry(elevator access to apartment)  Bathroom Shower/Tub: Tub/Shower unit  Bathroom Toilet: Standard  Bathroom Equipment: Grab bars in [address] [place]: 4 wheeled walker(Pt reports she does not always ambulate with AD.)  ADL Assistance: Independent(Pt reports difficulty with tub transfers, which pt reports results in decreased hygiene. Pt reports when her knee is not in pain she is able to complete LB tasks without assist. IND toileting.) Aides 6 hours a week. Homemaking Assistance: Independent  Homemaking Responsibilities: Yes(Pt reports her children grocery shop and pt is able to cook simple meals.)  Ambulation Assistance: Independent  Transfer Assistance: Independent  Active : No    Examination of body systems (includes body structures/functions, activity/participation limitations):  · Observation:  Pt supine in bed upon arrival and agreeable to therapy  · Vision:  Wears glasses  · [email or web address]  · Cardiopulmonary:  No O2 needs  · Cognition: WFL, see OT/SLP note for further evaluation. Musculoskeletal  · ROM R/L:  WFL. · Strength R/L:  2-/5 in formal testing, 4/5 observed in functional mobility, moderate impairment in function and endurance.     · Neuro:  Pt reports decreased sensation in R LE compared to L LE and decreased sensation in L UE compared to R LE.  strength diminished in L hand with formal testing and pt unable to independently move L UE to walker but able to , weightbear, and remove hand from walker without assist. Pt with decreased L LE strength during formal testing but LE does not buckle with weightbearing and able to perform bed mobility without assist.      Mobility:  · Rolling L/R:  supervision  · Supine to sit:  Supervision  · Sit to supine: SBA, pt able to move L LE during bed mobility without assist despite 2-/5 MMT in formal testing  · Transfers: Pt completed STS to/from EOB CGA with cues for hand placement. Pt required use of R UE to place L UE on walker but did not require additional assist to maintain L UE on walker or to remove UE from walker  · Sitting balance:  good. · Standing balance:  good. · Gait: Pt ambulated x3 side steps bilaterally with RW with decreased dat, PF maintained on L LE throughout, dragging of L LE. No LE buckling, no LOB, and pt able to manage walker throughout. Pt completed bout CGA with cues for sequencing    [facility] 6 Clicks Inpatient Mobility:  AM-PAC Inpatient Mobility Raw Score : 15    Safety: patient left supine in bed with alarm on, call light within reach, RN notified, gait belt used. Assessment:  Pt is a 61 y.o. female admitted to the hospital for stroke-like symptoms. Pt is typically mod I with Rollator. Pt is currently performing bed mobility SBA, transferring CGA, and ambulating 3 side steps with RW CGA. Pt is presenting with decreased endurance, impaired balance, impaired transfers, impaired gait, and impaired strength. Pt would benefit from continued acute care PT as well as SNF placement to continue to address impairments. Complexity: moderate    Prognosis: Good, no significant barriers to participation at this time.    Plan Times per week: 2+/week     Equipment: TBD at next level of care    Goals:  Short term goals  Time Frame for Short term goals: 1 week  Short term goal 1: Pt to complete all bed mobility mod I  Short term goal 2: pt to complete all STS transfers to/from bed, commode, and chair mod I  Short term goal 3: Pt to ambulate 22' with LRAD CGA       Treatment plan:  Bed mobility, transfers, balance, gait, TA, TX    Recommendations for NURSING mobility: stand pivot with RW and gait belt    Time:   Time in: 0930  Time out: 0950  Timed treatment minutes: 10  Total time: 20    Electronically signed by:    Martell Stevens PT  3/30/2022, 10:09 AM

## 2022-03-30 NOTE — CONSULTS
Chart reviewed  Full note to follow                    Name:  Bentley Garcia /Age/Sex: 1955  (61 y.o. female)   MRN & CSN:  [id number] & [id number] Admission Date/Time: 3/29/2022  7:34 PM   Location:  3113/3113-A PCP: Moises Arzate, [address] Harris Day: 2          Referring physician:  Jesus Moreno MD         Reason for consultation: Hypertensive urgency        Thanks for referral.    Information source: patient    CC; strokelike symptoms      HPI:   Thank you for involving me in taking  care of Bentley Garcia who  is a 61 y. o.year  Old female  Presents with history of hypertension, chest pain now presents with left arm weakness started yesterday morning however it resolved later also was complaining of recurrent chest pain as well was noted to be markedly hypertensive when she came into ED she was hypertensive with her symptoms the [facility] stroke team was called however it was noted that she was out of the TPA window, her CT scan was however negative, initial work-up for cardiac including troponin and EKGs unremarkable, she has multiple caths done in the past which have been unremarkable                     Past medical history:    has a past medical history of Abnormal EKG, Acid reflux, Anemia, Anesthesia, Anginal pain ([address] Anxiety, Arthritis, Asthma, Bipolar 1 disorder ([address] Cerebral artery occlusion with cerebral infarction ([address] CHF (congestive heart failure) ([address] Chronic back pain, Chronic kidney disease, DDD (degenerative disc disease), cervical, Depression, Diabetes mellitus ([address] Diabetic neuropathy ([address] Dizziness, Dry skin, Enlarged ureter, Fatty liver, Fibrocystic breast, Generalized anxiety disorder, Gout, H/O cardiac catheterization, H/O cardiovascular stress test, H/O cardiovascular stress test, H/O cardiovascular stress test, H/O Doppler ultrasound, H/O echocardiogram, H/O echocardiogram, History of Holter monitoring, [place] (hard of hearing), Hx of cardiovascular stress test, Hx of motion sickness, HX OTHER MEDICAL, Hyperlipidemia, Hypertension, IBS (irritable bowel syndrome), Incisional hernia, Kidney stones, Migraines, Nausea & vomiting, Other specified disorder of skin, Panic attacks, Panic attacks, Pneumonia, Pseudoseizures (HCC), Restless leg, Shortness of breath, Sleep apnea, Staph infection, Thyroid disease, Tremor, Urinary incontinence, Vertigo, and Wears glasses. Past surgical history:   has a past surgical history that includes Carpal tunnel release (Right, 1999); Diagnostic Cardiac Cath Lab Procedure (01/11/2010); Dental surgery; Colonoscopy (Last Done 6-13); Endoscopy, colon, diagnostic (Several ); Bladder surgery (1970's Or 1980's); Lithotripsy (2011); Breast biopsy (Right, 1980's); Breast surgery (Left, 1990's); hernia repair (0605'B); hernia repair (1970's); Cholecystectomy (1970's); Appendectomy (1970's); Hysterectomy, total abdominal (1987); Tubal ligation (1978); Esophagus dilation (1980's And 1990's); Knee arthroscopy (Right, 1999); joint replacement (2008); other surgical history (06 13 2014); fracture surgery (1974 Or 1975); Cardiac catheterization (10-18-06); and Cardiac catheterization. Social History:   reports that she has never smoked. She has never used smokeless tobacco. She reports that she does not drink alcohol and does not use drugs. Family history:  family history includes Anxiety Disorder in her daughter; Arthritis in her father, mother, and sister; Bipolar Disorder in her daughter; Cancer in her brother and sister; Colon Cancer in her brother; Depression in her daughter and mother; Diabetes in her mother; Early Death in her brother; Early Death (age of onset: 37) in her brother; Early Death (age of onset: 61) in her mother; Early Death (age of onset: 61) in her brother; Hearing Loss in her sister; Heart Disease in her brother, brother, brother, father, mother, and sister;  Heart Failure in her sister; High Blood Pressure in her brother, father, mother, Raises bp    Percocet [Oxycodone-Acetaminophen]      \"knocked me out for 12 hrs\"    Sertraline Other (See Comments)     hallucinations  Other reaction(s): Unknown    Tape [Adhesive Tape]      Patient states it tears her skin, including band aids    Reglan [Metoclopramide] Other (See Comments)     \"makes me talk like a baby, but if I take benadryl with it it's fine\"       sodium chloride flush 0.9 % injection 5-40 mL, 2 times per day  sodium chloride flush 0.9 % injection 5-40 mL, PRN  0.9 % sodium chloride infusion, PRN  amitriptyline (ELAVIL) tablet 25 mg, Nightly  baclofen (LIORESAL) tablet 10 mg, BID  carbidopa-levodopa (SINEMET)  MG per tablet 1 tablet, Nightly  HYDROcodone-acetaminophen (NORCO)  MG per tablet 1 tablet, Q6H PRN  levothyroxine (SYNTHROID) tablet 75 mcg, QAM AC  losartan (COZAAR) tablet 100 mg, Daily  magnesium oxide (MAG-OX) tablet 400 mg, TID  metoprolol succinate (TOPROL XL) extended release tablet 50 mg, Daily  montelukast (SINGULAIR) tablet 10 mg, Nightly  NIFEdipine (PROCARDIA XL) extended release tablet 90 mg, Daily  OXcarbazepine (TRILEPTAL) tablet 300 mg, BID  polyethylene glycol (GLYCOLAX) packet 17 g, Daily PRN  QUEtiapine (SEROQUEL) tablet 25 mg, BID  atorvastatin (LIPITOR) tablet 10 mg, Daily  sodium chloride tablet 1 g, BID WC  sucralfate (CARAFATE) tablet 1 g, 4x Daily AC & HS  pantoprazole (PROTONIX) tablet 40 mg, BID AC  guaiFENesin (MUCINEX) extended release tablet 600 mg, BID  docusate sodium (COLACE) capsule 100 mg, BID  diazePAM (VALIUM) tablet 5 mg, Q12H PRN  aspirin chewable tablet 81 mg, Daily  busPIRone (BUSPAR) tablet 20 mg, TID  aluminum & magnesium hydroxide-simethicone (MAALOX) 200-200-20 MG/5ML suspension 15 mL, Q6H PRN  albuterol sulfate  (90 Base) MCG/ACT inhaler 2 puff, Q4H PRN  enoxaparin (LOVENOX) injection 40 mg, Daily  ondansetron (ZOFRAN-ODT) disintegrating tablet 4 mg, Q8H PRN   Or  ondansetron (ZOFRAN) injection 4 mg, Q6H PRN  labetalol (NORMODYNE;TRANDATE) injection 10 mg, Q10 Min PRN  glucose (GLUTOSE) 40 % oral gel 15 g, PRN  glucagon (rDNA) injection 1 mg, PRN  dextrose 5 % solution, PRN  insulin glargine (LANTUS) injection vial 19 Units, Nightly  insulin lispro (HUMALOG) injection vial 6 Units, TID WC  insulin lispro (HUMALOG) injection vial 0-12 Units, TID WC  insulin lispro (HUMALOG) injection vial 0-6 Units, Nightly  polyethylene glycol (GLYCOLAX) packet 17 g, Daily  metoprolol (LOPRESSOR) injection 5 mg, Q6H  metoprolol (LOPRESSOR) injection 5 mg, Q6H PRN  0.9 % sodium chloride infusion, Continuous  dextrose bolus (hypoglycemia) 10% 125 mL, PRN   Or  dextrose bolus (hypoglycemia) 10% 250 mL, PRN      Current Facility-Administered Medications   Medication Dose Route Frequency Provider Last Rate Last Admin    sodium chloride flush 0.9 % injection 5-40 mL  5-40 mL IntraVENous 2 times per day WENDIE Louis CNP   10 mL at 03/29/22 2345    sodium chloride flush 0.9 % injection 5-40 mL  5-40 mL IntraVENous PRN WENDIE Chao CNP        0.9 % sodium chloride infusion   IntraVENous PRN WENDIE Chao CNP        amitriptyline (ELAVIL) tablet 25 mg  25 mg Oral Nightly WENDIE Chao CNP   25 mg at 03/29/22 2336    baclofen (LIORESAL) tablet 10 mg  10 mg Oral BID WENDIE Louis CNP   10 mg at 03/29/22 2343    carbidopa-levodopa (SINEMET)  MG per tablet 1 tablet  1 tablet Oral Nightly WENDIE Chao CNP   1 tablet at 03/29/22 2336    HYDROcodone-acetaminophen (NORCO)  MG per tablet 1 tablet  1 tablet Oral Q6H PRN WNEDIE Louis CNP   1 tablet at 03/30/22 0552    levothyroxine (SYNTHROID) tablet 75 mcg  75 mcg Oral QAM AC WENDIE Chao CNP   75 mcg at 03/30/22 0516    losartan (COZAAR) tablet 100 mg  100 mg Oral Daily Christine Mayberry APRN - CNP        magnesium oxide (MAG-OX) tablet 400 mg  400 mg Oral TID Dominique Furchitrash, APRN - CNP   400 mg at 03/29/22 2343    metoprolol succinate (TOPROL XL) extended release tablet 50 mg  50 mg Oral Daily WENDIE Chao CNP        montelukast (SINGULAIR) tablet 10 mg  10 mg Oral Nightly Dominique Furbish, APRN - CNP   10 mg at 03/29/22 2343    NIFEdipine (PROCARDIA XL) extended release tablet 90 mg  90 mg Oral Daily WENDIE Chao CNP        OXcarbazepine (TRILEPTAL) tablet 300 mg  300 mg Oral BID Dominique Furbish, APRN - CNP   300 mg at 03/29/22 2336    polyethylene glycol (GLYCOLAX) packet 17 g  17 g Oral Daily PRN Dominique Muro, WENDIE - CRISTIANO        QUEtiapine (SEROQUEL) tablet 25 mg  25 mg Oral BID Dominique Furbish, APRN - CNP   25 mg at 03/29/22 2343    atorvastatin (LIPITOR) tablet 10 mg  10 mg Oral Daily WENDIE Chao CNP        sodium chloride tablet 1 g  1 g Oral BID WC WENDIE Chao CNP        sucralfate (CARAFATE) tablet 1 g  1 g Oral 4x Daily AC & HS WENDIE Blunt - CNP   1 g at 03/29/22 2343    pantoprazole (PROTONIX) tablet 40 mg  40 mg Oral BID AC WENDIE Chao - CNP   40 mg at 03/30/22 0516    guaiFENesin (MUCINEX) extended release tablet 600 mg  600 mg Oral BID Dominique Furbish, APRN - CNP   600 mg at 03/29/22 2342    docusate sodium (COLACE) capsule 100 mg  100 mg Oral BID Dominique Furbish, APRN - CNP   100 mg at 03/29/22 2347    diazePAM (VALIUM) tablet 5 mg  5 mg Oral Q12H PRN Dominique Furbish, APRN - CNP        aspirin chewable tablet 81 mg  81 mg Oral Daily WENDIE Chao CNP        busPIRone (BUSPAR) tablet 20 mg  20 mg Oral TID Dominique Furmarcus, APRN - CNP   20 mg at 03/30/22 0516    aluminum & magnesium hydroxide-simethicone (MAALOX) 200-200-20 MG/5ML suspension 15 mL  15 mL Oral Q6H PRN WENDIE Chao - CNP        albuterol sulfate  (90 Base) MCG/ACT inhaler 2 puff  2 puff Inhalation Q4H PRN Syliva Spurr, APRN - CNP        enoxaparin (LOVENOX) injection 40 mg  40 mg [facility]UTAneous Daily [facility]        ondansetron (ZOFRAN-ODT) disintegrating tablet 4 mg  4 mg Oral Q8H PRN Christine I WENDIE Mayberry - CNP        Or    ondansetron (ZOFRAN) injection 4 mg  4 mg IntraVENous Q6H PRN Christine I Shawanda, APRN - CNP        labetalol (NORMODYNE;TRANDATE) injection 10 mg  10 mg IntraVENous Q10 Min PRN Christine I JEEVAN MayberryN - CNP        glucose (GLUTOSE) 40 % oral gel 15 g  15 g Oral PRN [facility] WENDIE Mayberry - CNP        glucagon (rDNA) injection 1 mg  1 mg IntraMUSCular PRN Christine I ShawandaWENDIE rapp - CNP        dextrose 5 % solution  100 mL/hr IntraVENous PRN [facility] WENDIE Mayberry CNP        insulin glargine (LANTUS) injection vial 19 Units  0.25 Units/kg SubCUTAneous Nightly [facility] JEEVAN MayberryN - CNP   19 Units at 03/29/22 2336    insulin lispro (HUMALOG) injection vial 6 Units  0.08 Units/kg SubCUTAneous TID [facility] WENDIE Mayberry - CNP        insulin lispro (HUMALOG) injection vial 0-12 Units  0-12 Units SubCUTAneous TID [facility] WENDIE Mayberry - CNP        insulin lispro (HUMALOG) injection vial 0-6 Units  0-6 Units SubCUTAneous Nightly [facility] WENDIE Mayberry - CNP   3 Units at 03/29/22 2337    polyethylene glycol (GLYCOLAX) packet 17 g  17 g Oral Daily [facility] WENDIE Mayberry CNP        metoprolol (LOPRESSOR) injection 5 mg  5 mg IntraVENous Q6H Christine I JEEVAN MayberryN - CNP   5 mg at 03/30/22 0412    metoprolol (LOPRESSOR) injection 5 mg  5 mg IntraVENous Q6H PRN Christine I WENDIE Mayberry - CNP        0.9 % sodium chloride infusion   IntraVENous Continuous Syliva Spurr, APRN -  mL/hr at 03/29/22 2255 New Bag at 03/29/22 2255    dextrose bolus (hypoglycemia) 10% 125 mL  125 mL IntraVENous PRN WENDIE Chao - CNP        Or    dextrose bolus (hypoglycemia) 10% 250 mL  250 mL IntraVENous MARCOSN Christine Mayberry, APRN - CNP         Review of Systems:  All 14 systems reviewed, all negative except for chest pain    Physical Examination:    BP (!) 128/57   Pulse 84   Temp 98.4 °F (36.9 °C) (Oral)   Resp 17   Ht 5' 2\" (1.575 m)   Wt 169 lb 15.6 oz (77.1 kg)   SpO2 93%   BMI 31.09 kg/m²      Wt Readings from Last 3 Encounters:   03/29/22 169 lb 15.6 oz (77.1 kg)   03/18/22 175 lb (79.4 kg)   01/25/22 175 lb (79.4 kg)     Body mass index is 31.09 kg/m². General Appearance: Fair  Head: normocephalic     Eyes: normal, noninjected conjunctiva    ENT: normal mucosa, noninjected throat, normal     NECK: No JVP  No thyromegaly        Cardiovascular: No thrills palpated   Auscultation: Normal S1 and S2, no murmur   carotid bruit no   Abdominal Aorta no bruit    Respiratory:    Breath sounds Clear = 0    Extremities:  none Edema clubbing ,   no cyanosis    SKIN: Warm and well perfused, no pallor or cyanosis    Vascular exam:  Pedal Pulses: palp  bilaterally        Abdomen:  No masses or tenderness. No organomegaly noted. Neurological:  Oriented to time, place, and person   No focal neurological deficit noted.   Psychiatric:normal mood, no anxiety    Lab Review   Recent Results (from the past 24 hour(s))   POCT Glucose    Collection Time: 03/29/22  7:34 PM   Result Value Ref Range    POC Glucose 275 (H) 70 - 99 MG/DL   CBC with Auto Differential    Collection Time: 03/29/22  7:40 PM   Result Value Ref Range    WBC 9.2 4.0 - 10.5 K/CU MM    RBC 5.28 4.2 - 5.4 M/CU MM    Hemoglobin 14.8 12.5 - 16.0 GM/DL    Hematocrit 42.8 37 - 47 %    MCV 81.1 78 - 100 FL    MCH 28.0 27 - 31 PG    MCHC 34.6 32.0 - 36.0 %    RDW 11.2 (L) 11.7 - 14.9 %    Platelets 673 952 - 225 K/CU MM    MPV 9.0 7.5 - 11.1 FL    Differential Type AUTOMATED DIFFERENTIAL     Segs Relative 53.0 36 - 66 %    Lymphocytes % 35.2 24 - 44 %    Monocytes % 9.4 (H) 0 - 4 %    Eosinophils % 1.6 0 - 3 %    Basophils % 0.5 0 - 1 %    Segs Absolute 4.9 K/CU MM    Lymphocytes Absolute 3.3 K/CU MM    Monocytes Absolute 0.9 K/CU MM    Eosinophils Absolute 0.2 K/CU MM    Basophils Absolute 0.1 K/CU MM    Nucleated RBC % 0.0 %    Total Nucleated RBC 0.0 K/CU MM    Total Immature Neutrophil 0.03 K/CU MM    Immature Neutrophil % 0.3 0 - 0.43 %   Comprehensive Metabolic Panel w/ Reflex to MG    Collection Time: 03/29/22  7:40 PM   Result Value Ref Range    Sodium 133 (L) 135 - 145 MMOL/L    Potassium 4.6 3.5 - 5.1 MMOL/L    Chloride 95 (L) 99 - 110 mMol/L    CO2 21 21 - 32 MMOL/L    BUN 10 6 - 23 MG/DL    CREATININE 0.6 0.6 - 1.1 MG/DL    Glucose 289 (H) 70 - 99 MG/DL    Calcium 9.6 8.3 - 10.6 MG/DL    Albumin 4.6 3.4 - 5.0 GM/DL    Total Protein 7.4 6.4 - 8.2 GM/DL    Total Bilirubin 0.4 0.0 - 1.0 MG/DL    ALT 25 10 - 40 U/L    AST 36 15 - 37 IU/L    Alkaline Phosphatase 97 40 - 129 IU/L    GFR Non-African American >60 >60 mL/min/1.73m2    GFR African American >60 >60 mL/min/1.73m2    Anion Gap 17 (H) 4 - 16   Troponin    Collection Time: 03/29/22  7:40 PM   Result Value Ref Range    Troponin T <0.010 <0.01 NG/ML   Protime-INR    Collection Time: 03/29/22  7:40 PM   Result Value Ref Range    Protime 12.1 11.7 - 14.5 SECONDS    INR 0.94 INDEX   Hemoglobin A1c    Collection Time: 03/29/22  7:50 PM   Result Value Ref Range    Hemoglobin A1C 9.1 (H) 4.2 - 6.3 %    eAG 214 mg/dL   POCT Glucose    Collection Time: 03/29/22  8:01 PM   Result Value Ref Range    Glucose 275 mg/dL    QC OK? ok    EKG 12 Lead    Collection Time: 03/29/22  8:14 PM   Result Value Ref Range    Ventricular Rate 115 BPM    Atrial Rate 115 BPM    P-R Interval 170 ms    QRS Duration 108 ms    Q-T Interval 342 ms    QTc Calculation (Bazett) 473 ms    P Axis 33 degrees    R Axis -47 degrees    T Axis 77 degrees    Diagnosis       Sinus tachycardia  Possible Left atrial enlargement  Left axis deviation  Incomplete right bundle branch block  Left ventricular hypertrophy with repolarization abnormality  Abnormal ECG  When compared with ECG of 10-KASSY-2022 10:52,  T wave inversion now evident in Lateral leads     POCT Glucose    Collection Time: 03/29/22 10:24 PM   Result Value Ref Range    POC Glucose 264 (H) 70 - 99 MG/DL   Troponin    Collection Time: 03/29/22 10:50 PM   Result Value Ref Range    Troponin T <0.010 <0.01 NG/ML   POCT Glucose    Collection Time: 03/29/22 11:36 PM   Result Value Ref Range    POC Glucose 260 (H) 70 - 99 MG/DL   Comprehensive Metabolic Panel w/ Reflex to MG    Collection Time: 03/30/22  2:52 AM   Result Value Ref Range    Sodium 133 (L) 135 - 145 MMOL/L    Potassium 4.0 3.5 - 5.1 MMOL/L    Chloride 97 (L) 99 - 110 mMol/L    CO2 21 21 - 32 MMOL/L    BUN 12 6 - 23 MG/DL    CREATININE 0.6 0.6 - 1.1 MG/DL    Glucose 293 (H) 70 - 99 MG/DL    Calcium 8.9 8.3 - 10.6 MG/DL    Albumin 3.9 3.4 - 5.0 GM/DL    Total Protein 6.2 (L) 6.4 - 8.2 GM/DL    Total Bilirubin 0.4 0.0 - 1.0 MG/DL    ALT 11 10 - 40 U/L    AST 41 (H) 15 - 37 IU/L    Alkaline Phosphatase 82 40 - 129 IU/L    GFR Non-African American >60 >60 mL/min/1.73m2    GFR African American >60 >60 mL/min/1.73m2    Anion Gap 15 4 - 16   CBC    Collection Time: 03/30/22  2:52 AM   Result Value Ref Range    WBC 24.3 (H) 4.0 - 10.5 K/CU MM    RBC 5.04 4.2 - 5.4 M/CU MM    Hemoglobin 13.9 12.5 - 16.0 GM/DL    Hematocrit 41.2 37 - 47 %    MCV 81.7 78 - 100 FL    MCH 27.6 27 - 31 PG    MCHC 33.7 32.0 - 36.0 %    RDW 11.6 (L) 11.7 - 14.9 %    Platelets 661 048 - 078 K/CU MM    MPV 9.6 7.5 - 11.1 FL   POCT Glucose    Collection Time: 03/30/22  4:17 AM   Result Value Ref Range    POC Glucose 295 (H) 70 - 99 MG/DL   EKG 12 Lead    Collection Time: 03/30/22  5:59 AM   Result Value Ref Range    Ventricular Rate 68 BPM    Atrial Rate 68 BPM    P-R Interval 166 ms    QRS Duration 116 ms    Q-T Interval 450 ms    QTc Calculation (Bazett) 478 ms    P Axis -21 degrees    R Axis -44 degrees    T Axis -28 degrees    Diagnosis Normal sinus rhythm  Left axis deviation  Left ventricular hypertrophy with QRS widening  Abnormal ECG  When compared with ECG of 29-MAR-2022 20:14,  Vent. rate has decreased BY  47 BPM  ST no longer depressed in Lateral leads  T wave inversion now evident in Inferior leads  T wave inversion no longer evident in Lateral leads             Assessment/Recommendations:     -Chest pain; serial tropes and EKGs  -Strokelike symptoms being worked up by primary team  -Diabetes mellitus patient is on insulin which will be continued hemoglobin A1c will be checked  -Hypertension patient is on losartan the blood pressure is better now will be closely monitored         Matthew Britton MD, 3/30/2022 6:30 AM       Please note this report has been partially produced using speech recognition software and may contain errors related to that system including errors in grammar, punctuation, and spelling, as well as words and phrases that may be inappropriate. If there are any questions or concerns please feel free to contact the dictating provider for clarification.

## 2022-03-30 NOTE — H&P
V2.0  History and Physical      Name:  Bon Bar /Age/Sex: 1955  (61 y.o. female)   MRN & CSN:  [id number] & [id number] Encounter Date/Time: 3/29/2022 10:04 PM EDT   Location:   PCP: Ashleigh Franco MD       Hospital Day: 1    Assessment and Plan:   Bon Bar is a 61 y.o. female with a pmh of insulin-dependent diabetes, anxiety, bipolar 1 disorder, amyloid polyneuropathy, who presents with strokelike symptoms, hypertensive urgency and chest pain    Strokelike symptoms  Intermittent Left upper extremity weakness   Stroke Alert Called in ED    Last Known Well 0800 this a.m. Not a TPA Cannidate due to last known well of 800   -CT head unremarkable, CTA neck  unremarkable                 MRI brain pending  Echocardiogram ordered  Check lipid panel, hemoglobin A1c  Aspirin 81 mg po qd, Statin continue home dose  Consult neurology              Neuro checks q4 hours    Telemetry monitoring   Physical/ Occupational Therapy consults   Speech therapy consult   [employer]   May be psychosomatic in the context of underlying bipolar disorder   Bipolar 1 disorder   Resume home Seroquel, Sinemet, BuSpar  Hypertensive urgency  Chest pain    EKG unremarkable for ischemia  Echocardiogram  Patient failed to respond to hydralazine in ED, received 2 doses of metoprolol with good response  Resume home medications including losartan, metoprolol, nifedipine   As needed labetalol  Start nicardipine drip if needed  serial troponins  Telemetry   Consult cardiology   Insulin dependent type 2 diabetes   -Stop home insulin, convert to weight-based long-acting glargine plus sliding scale with hypoglycemia protocol   -Hold home oral antihyperglycemics, check hemoglobin A1c    Hyponatremia   , MIV 0.9 @ 100cc/hr x 12 hours. Repeat BMP in am   Chronic Conditions: continue home medication as ordered      All testing  and results reviewed with patient . All questions answered.  Patient and family voiced understanding    This patient was seen and examined in conjunction with Dr. Osorio Tabares. He/She was agreeable with the plan and management as dictated above. Disposition:   Current Living situation: Independent   Expected Disposition: Same   Estimated D/C: 3 days     Diet Diet NPO   GI Prophylaxis  [] PPI,  [] H2 Blocker,  [] Carafate,  [x] Diet/Tube Feeds   DVT Prophylaxis [x] Lovenox, []  Heparin, [] SCDs, [] Ambulation,  [] NOAC   Code Status Full Code   Surrogate Decision Maker/ POA           History from:     patient, electronic medical record    History of Present Illness:     Chief Complaint: Stroke-like symptoms  Shahzad Shane is a 61 y.o. female with a lengthy past medical history who presented to the ED with reports of left arm weakness with began around 8 AM this morning and is since resolved. Patient stated return to 1800. Patient also complained of intermittent chest pain. A stroke alert was called in the ED patient was also found to be hypertensive upon arrival to the ER. Initial NIH was 11 by neurologist at [address] [place] [address]. Patient was outside the window for TPA. Patient denies current complaints for me. She is unclear if she took her home medications. Review of Systems: Need 10 Elements   Review of Systems   Constitutional: Negative for activity change, appetite change, diaphoresis and fatigue. HENT: Negative for congestion and postnasal drip. Eyes: Negative for redness and visual disturbance. Respiratory: Negative for cough and shortness of breath. Cardiovascular: Negative for chest pain and palpitations. Gastrointestinal: Negative for diarrhea, nausea and vomiting. Endocrine: Negative for cold intolerance and heat intolerance. Genitourinary: Negative for difficulty urinating and dysuria. Musculoskeletal: Negative for joint swelling and neck pain. Skin: Negative. Neurological: Positive for dizziness and weakness. Negative for speech difficulty.    Psychiatric/Behavioral: Negative for agitation and confusion. Objective:   No intake or output data in the 24 hours ending 03/29/22 2225   Vitals:   Vitals:    03/29/22 2143 03/29/22 2212 03/29/22 2214 03/29/22 2217   BP: (!) 179/90 (!) 153/84 (!) 153/89 (!) 148/85   Pulse: 118 98 95 103   Resp: 18 23 19 22   Temp:       TempSrc:       SpO2: 95% 96% 96% 95%   Weight:       Height:           Medications Prior to Admission     Prior to Admission medications    Medication Sig Start Date End Date Taking? Authorizing Provider   nystatin (MYCOSTATIN) 506348 UNIT/GM powder Apply 3 times daily to affected areas 3/14/22   Silvia Scott PA-C   metoprolol succinate (TOPROL XL) 50 MG extended release tablet Take 1 tablet by mouth daily 2/28/22   Sabina Mabry MD   NIFEdipine (PROCARDIA XL) 90 MG extended release tablet Take 1 tablet by mouth daily 1/12/22   WENDIE Winston CNP   pantoprazole (PROTONIX) 40 MG tablet Take 1 tablet by mouth 2 times daily (before meals) 1/11/22   WENDIE Winston CNP   sucralfate (CARAFATE) 1 GM tablet Take 1 tablet by mouth 4 times daily 1/11/22   WENDIE Winston CNP   albuterol sulfate HFA (PROVENTIL HFA) 108 (90 Base) MCG/ACT inhaler Inhale 2 puffs into the lungs every 4 hours as needed for Wheezing or Shortness of Breath With spacer (and mask if indicated). Thanks. 11/4/21 12/4/21  Skyler Varma DO   sodium chloride 1 g tablet Take 1 tablet by mouth 2 times daily (with meals) 10/1/21   Jazz Nettles PA-C   levETIRAcetam (KEPPRA) 750 MG tablet Take 1 tablet by mouth 2 times daily 10/1/21 10/31/21  Jazz Nettles PA-C   baclofen (LIORESAL) 10 MG tablet 1 tablet 2 times daily 9/3/21   Historical Provider, MD   diazePAM (VALIUM) 5 MG tablet as needed.  9/3/21   Historical Provider, MD   MUCINEX 600 MG extended release tablet 2 times daily as needed 9/3/21   Historical Provider, MD   magnesium oxide (MAG-OX) 400 MG tablet Take 1 tablet by mouth 3 times daily 7/15/21   Shannan Ramirez MD   losartan (COZAAR) 100 MG tablet Take 1 tablet by mouth daily 7/16/21   Addy Silva MD   QUEtiapine (SEROQUEL) 25 MG tablet Take 1 tablet by mouth 2 times daily  Patient taking differently: Take 25 mg by mouth 2 times daily Indications: 25 mg in am and 50 mg in pm  7/15/21   Addy Silva MD   busPIRone (BUSPAR) 10 MG tablet Take 2 tablets by mouth 3 times daily 4/28/21   Awilda Benedict MD   levothyroxine (SYNTHROID) 75 MCG tablet Take 1 tablet by mouth every morning (before breakfast) 4/28/21   Awilda Benedict MD   amitriptyline (ELAVIL) 25 MG tablet Take 1 tablet by mouth nightly 4/28/21   Awilda Benedict MD   HYDROcodone-acetaminophen (NORCO)  MG per tablet Take 1 tablet by mouth every 6 hours as needed.   1/22/21   Historical Provider, MD   fluticasone-umeclidin-vilant (TRELEGY ELLIPTA) 100-62.5-25 MCG/INH AEPB Inhale 1 puff into the lungs daily  Patient taking differently: Inhale 1 puff into the lungs daily as needed (only takes prn daily due to yeast infections in mouth, is aware she is supposed to take daily)  12/8/20   Arthur Angeles MD   OXcarbazepine (TRILEPTAL) 300 MG tablet Take 1 tablet by mouth 2 times daily  Patient taking differently: Take 450 mg by mouth 2 times daily  12/4/20   Noah Oro MD   hydrOXYzine (VISTARIL) 25 MG capsule Take 25 mg by mouth 3 times daily as needed  11/12/20   Historical Provider, MD   NOVOLOG FLEXPEN 100 UNIT/ML injection pen Inject into the skin See Admin Instructions 12/01/2020 patient states she follows sliding scale regimen BS > 150 10/24/20   Historical Provider, MD   simvastatin (ZOCOR) 20 MG tablet Take 1 tablet by mouth nightly 9/10/20   WENDIE Gibson - CNP   TRESIBA FLEXTOUCH 200 UNIT/ML SOPN Inject 20 Units into the skin every morning  Patient taking differently: Inject into the skin nightly 04/26/21 Patient states she follows sliding scale 9/4/20   Jerome Fitzpatrick MD   docusate sodium (COLACE) 100 MG capsule Take 1 capsule by mouth 2 times daily 1/9/20   HAYDEN Pierce   aluminum & magnesium hydroxide-simethicone (MAALOX MAX) 400-400-40 MG/5ML SUSP Take 15 mLs by mouth every 6 hours as needed (heart burn) 9/12/18   WENDIE Reyes - CRISTIANO   carbidopa-levodopa (SINEMET)  MG per tablet Take 1 tablet by mouth nightly 5/14/18   Historical Provider, MD   polyethylene glycol (GLYCOLAX) packet Take 17 g by mouth daily as needed for Constipation    Historical Provider, MD   aspirin 81 MG tablet Take 81 mg by mouth daily    Historical Provider, MD   Multiple Vitamins-Iron (MULTI-VITAMIN/IRON PO) Take  by mouth. Historical Provider, MD   montelukast (SINGULAIR) 10 MG tablet Take 10 mg by mouth nightly. Historical Provider, MD       Physical Exam: Need 8 Elements   Physical Exam  Vitals and nursing note reviewed. HENT:      Head: Normocephalic. Eyes:      Pupils: Pupils are equal, round, and reactive to light. Cardiovascular:      Rate and Rhythm: Tachycardia present. Pulmonary:      Effort: Pulmonary effort is normal.   Abdominal:      General: Abdomen is flat. Skin:     General: Skin is warm. Capillary Refill: Capillary refill takes more than 3 seconds. Neurological:      Mental Status: She is lethargic and disoriented. GCS: GCS eye subscore is 4. GCS verbal subscore is 4. GCS motor subscore is 6. Motor: Weakness present. No seizure activity.       Coordination: Finger-Nose-Finger Test abnormal.      Comments: NIH 11 per [facility]              Past Medical History:   PMHx   Past Medical History:   Diagnosis Date    Abnormal EKG 04/22/2014    Acid reflux     Anemia     Anesthesia     Nausea/Vomiting Post Op In Past    Anginal pain ([facility])     Denies Chest Pain At This Time    Anxiety     Arthritis     \"All Over\"    Asthma     Bipolar 1 disorder ([facility])     Cerebral artery occlusion with cerebral infarction [facility]     CHF (congestive heart failure) ([facility])     Chronic back pain     Chronic kidney disease     DDD (degenerative disc disease), cervical     12- Patient reports she was dx with DDD of Cerival spine C6,C7    Depression     Diabetes mellitus ([address] Dx 1's    Diabetic neuropathy ([address]     \"In My Legs And Feet\"    Dizziness     \"Sometimes\"    Dry skin     Enlarged ureter     Right Side    Fatty liver     Fibrocystic breast     Generalized anxiety disorder     Gout     Pt states she was diagnosed with gout in the past few months.  H/O cardiac catheterization     Showed mild disease per last cath.  H/O cardiovascular stress test 03/15/2010    EF 69%, normal perfusion study except for diaphragmatic artifact, uniform wall motion.  H/O cardiovascular stress test 10/09/2008    EF 60%, no anginia, normal study.  H/O cardiovascular stress test 05/06/2014    EF 66%, no ischemia, normal LV systolic funciton, normal perfusion pattern.  H/O Doppler ultrasound 02/28/2011    CAROTID DOPPLER-normal study.  H/O echocardiogram 05/06/2014    Ef >55%. Impaired LV relaxation.  H/O echocardiogram 10/14/2015    EF 60% Normal LV and systolic function. No significant valvulopathy seen.  History of Holter monitoring 03/24/2015    24 hour - predominant rhythm sinus    [place] (hard of hearing)     Bilateral Ears    Hx of cardiovascular stress test 10/19/2015    lexiscan-normal,EF63%    Hx of motion sickness     HX OTHER MEDICAL     Primary Care Physician Is Dr. Diaz Sifuentes [place], [place]    Hyperlipidemia     Hypertension     IBS (irritable bowel syndrome)     Incisional hernia 04/2014    Kidney stones Last Episode In 2012 Or 2013    Passed Kidney Stones Numberous Times    Migraines     Nausea & vomiting     Nausea/Vomiting Post Op In Past    Other specified disorder of skin     12- Patient states she has a condition of her vaginal area (skin) which starts with the letters Ketan Arora. She is currently being treated with multiple creams and weekly Diflucan.      Panic attacks     Panic attacks     Pneumonia Last Episode In 1980's    Pseudoseizures [facility]) Last One In 1990's    \"Caused From Bad Nerves\"    Restless leg     Shortness of breath     Sleep apnea     12- Has CPAP but does not use due to \"smothering\" feeling with mask.  Staph infection Dx 1980's    Toes On Left Foot    Thyroid disease     hypothroidism    Tremor     \"Tremors All Over\"    Urinary incontinence     Vertigo     \"Sometimes\"    Wears glasses      PSHX:  has a past surgical history that includes Carpal tunnel release (Right, 1999); Diagnostic Cardiac Cath Lab Procedure (01/11/2010); Dental surgery; Colonoscopy (Last Done 6-13); Endoscopy, colon, diagnostic (Several ); Bladder surgery (1970's Or 1980's); Lithotripsy (2011); Breast biopsy (Right, 1980's); Breast surgery (Left, 1990's); hernia repair (0867'O); hernia repair (1970's); Cholecystectomy (1970's); Appendectomy (1970's); Hysterectomy, total abdominal (1987); Tubal ligation (1978); Esophagus dilation (1980's And 1990's); Knee arthroscopy (Right, 1999); joint replacement (2008); other surgical history (06 13 2014); fracture surgery (1974 Or 1975); Cardiac catheterization (10-18-06); and Cardiac catheterization. Allergies:    Allergies   Allergen Reactions    Abilify [Aripiprazole]      \"Severe Shaking And Restlessness\"    Advil [Ibuprofen Micronized] Palpitations     \"Severe High Blood Pressure\"    Augmentin [Amoxicillin-Pot Clavulanate] Itching and Rash    Bee Venom Swelling     Redness    Ciprofloxacin Itching and Rash    Codeine      \"Severe Abdominal Cramping\"    Darvocet [Propoxyphene N-Acetaminophen] Palpitations     \"Severe High Blood Pressure\"    Darvon [Propoxyphene Hcl] Palpitations     \"Severe High Blood Pressure\"    Decadron [Dexamethasone] Other (See Comments)     seizure  Seizures    Ditropan [Oxybutynin Chloride] Palpitations     \"Severe High Blood Pressure\"    Fioricet [Butalbital-Apap-Caffeine] Palpitations     \"Severe High Blood Pressure\"    Fiorinal-Codeine #3 [Butalbital-Asa-Caff-Codeine]      \"Severe Stomach Cramps\"    Flagyl [Metronidazole] Diarrhea     \"Severe Diarrhea And Cramping\"    Naproxen Palpitations     \"Severe High Blood Pressure\"    Other      \"Allergic To Spider Bites Causing Blackness Of Skin, Severe Itching And Pain\"                                                  \"Allergic To Powder In Gloves Causing Severe Redness And Itching\"    Prozac [Fluoxetine Hcl]      \"Hallucinations\"    Robaxin [Methocarbamol] Palpitations     \"Severe High Blood Pressure\"    Ultram [Tramadol]      \"Severe Stomach Pain\"    Zoloft Palpitations     \"Sever High Blood Pressure\"    Butalbital-Aspirin-Caffeine Other (See Comments)     \"severe stomach pain\"    Coreg [Carvedilol] Other (See Comments)     \"spikes my BP severely and send me into a severe anxiety attack\"    Fluoxetine Other (See Comments)     hallucinations    Oxybutynin Chloride Other (See Comments)     Raises bp    Percocet [Oxycodone-Acetaminophen]      \"knocked me out for 12 hrs\"    Sertraline Other (See Comments)     hallucinations  Other reaction(s): Unknown    Tape [Adhesive Tape]      Patient states it tears her skin, including band aids    Reglan [Metoclopramide] Other (See Comments)     \"makes me talk like a baby, but if I take benadryl with it it's fine\"     Fam HX:  family history includes Anxiety Disorder in her daughter; Arthritis in her father, mother, and sister; Bipolar Disorder in her daughter; Cancer in her brother and sister; Colon Cancer in her brother; Depression in her daughter and mother; Diabetes in her mother; Early Death in her brother; Early Death (age of onset: 37) in her brother; Early Death (age of onset: 61) in her mother; Early Death (age of onset: 61) in her brother; Hearing Loss in her sister; Heart Disease in her brother, brother, brother, father, mother, and sister;  Heart Failure in her sister; High Blood Pressure in her brother, father, mother, and sister; High Cholesterol in her brother, father, and mother; Mental Illness in her brother, brother, daughter, father, mother, and sister; Lucilla Mercy / [place] in her mother; Other in her daughter, mother, and son; Stroke in her mother. Soc HX:   Social History     Socioeconomic History    Marital status:      Spouse name: None    Number of children: 3    Years of education: 6    Highest education level: None   Occupational History    None   Tobacco Use    Smoking status: Never Smoker    Smokeless tobacco: Never Used   Vaping Use    Vaping Use: Never used   Substance and Sexual Activity    Alcohol use: No    Drug use: No    Sexual activity: Not Currently   Other Topics Concern    None   Social History Narrative    None     Social Determinants of Health     Financial Resource Strain:     Difficulty of Paying Living Expenses: Not on file   Food Insecurity:     Worried About Running Out of Food in the Last Year: Not on file    Maryjane of Food in the Last Year: Not on file   Transportation Needs:     Lack of Transportation (Medical): Not on file    Lack of Transportation (Non-Medical):  Not on file   Physical Activity:     Days of Exercise per Week: Not on file    Minutes of Exercise per Session: Not on file   Stress:     Feeling of Stress : Not on file   Social Connections:     Frequency of Communication with Friends and Family: Not on file    Frequency of Social Gatherings with Friends and Family: Not on file    Attends Rastafari Services: Not on file    Active Member of Clubs or Organizations: Not on file    Attends Club or Organization Meetings: Not on file    Marital Status: Not on file   Intimate Partner Violence:     Fear of Current or Ex-Partner: Not on file    Emotionally Abused: Not on file    Physically Abused: Not on file    Sexually Abused: Not on file   Housing Stability:     Unable to Pay for Housing in the Last Year: Not on file  Number of Places Lived in the Last Year: Not on file    Unstable Housing in the Last Year: Not on file       Medications:   Medications:    sodium chloride flush  5-40 mL IntraVENous 2 times per day    amitriptyline  25 mg Oral Nightly    baclofen  10 mg Oral BID    carbidopa-levodopa  1 tablet Oral Nightly    [START ON 3/30/2022] levothyroxine  75 mcg Oral QAM AC    [START ON 3/30/2022] losartan  100 mg Oral Daily    magnesium oxide  400 mg Oral TID    [START ON 3/30/2022] metoprolol succinate  50 mg Oral Daily    montelukast  10 mg Oral Nightly    [START ON 3/30/2022] NIFEdipine  90 mg Oral Daily    OXcarbazepine  300 mg Oral BID    QUEtiapine  25 mg Oral BID    [START ON 3/30/2022] atorvastatin  10 mg Oral Daily    [START ON 3/30/2022] sodium chloride  1 g Oral BID WC    sucralfate  1 g Oral 4x Daily AC & HS    [START ON 3/30/2022] pantoprazole  40 mg Oral BID AC    guaiFENesin  600 mg Oral BID    docusate sodium  100 mg Oral BID    [START ON 3/30/2022] aspirin  81 mg Oral Daily    busPIRone  20 mg Oral TID    [START ON 3/30/2022] enoxaparin  40 mg SubCUTAneous Daily    insulin glargine  0.25 Units/kg SubCUTAneous Nightly    [START ON 3/30/2022] insulin lispro  0.08 Units/kg SubCUTAneous TID WC    [START ON 3/30/2022] insulin lispro  0-12 Units SubCUTAneous TID WC    insulin lispro  0-6 Units SubCUTAneous Nightly    [START ON 3/30/2022] polyethylene glycol  17 g Oral Daily    metoprolol  5 mg IntraVENous Q6H      Infusions:    sodium chloride      dextrose      sodium chloride       PRN Meds: sodium chloride flush, 5-40 mL, PRN  sodium chloride, , PRN  HYDROcodone-acetaminophen, 1 tablet, Q6H PRN  polyethylene glycol, 17 g, Daily PRN  diazePAM, 5 mg, Q12H PRN  aluminum & magnesium hydroxide-simethicone, 15 mL, Q6H PRN  albuterol sulfate HFA, 2 puff, Q4H PRN  ondansetron, 4 mg, Q8H PRN   Or  ondansetron, 4 mg, Q6H PRN  ondansetron, 4 mg, Q8H PRN   Or  ondansetron, 4 mg, Q6H PRN  labetalol, 10 mg, Q10 Min PRN  glucose, 15 g, PRN  glucagon (rDNA), 1 mg, PRN  dextrose, 100 mL/hr, PRN  metoprolol, 5 mg, Q6H PRN        Labs      CBC:   Recent Labs     03/29/22 1940   WBC 9.2   HGB 14.8        BMP:    Recent Labs     03/29/22 1940 03/29/22 2001   *  --    K 4.6  --    CL 95*  --    CO2 21  --    BUN 10  --    CREATININE 0.6  --    GLUCOSE 289* 275     Hepatic:   Recent Labs     03/29/22 1940   AST 36   ALT 25   BILITOT 0.4   ALKPHOS 97     Lipids:   Lab Results   Component Value Date    CHOL 99 04/27/2021    HDL 42 04/27/2021    TRIG 116 04/27/2021     Hemoglobin A1C:   Lab Results   Component Value Date    LABA1C 8.4 01/10/2022     TSH: No results found for: TSH  Troponin:   Lab Results   Component Value Date    TROPONINT <0.010 03/29/2022    TROPONINT <0.010 01/10/2022    TROPONINT <0.010 01/10/2022     Lactic Acid: No results for input(s): LACTA in the last 72 hours. BNP: No results for input(s): PROBNP in the last 72 hours.   UA:  Lab Results   Component Value Date    NITRU NEGATIVE 01/10/2022    NITRU NEGATIVE 11/05/2014    COLORU YELLOW 01/10/2022    WBCUA 68 01/10/2022    RBCUA 4 01/10/2022    MUCUS RARE 01/10/2022    TRICHOMONAS NONE SEEN 12/21/2021    YEAST RARE 09/12/2018    BACTERIA OCCASIONAL 01/10/2022    CLARITYU CLEAR 01/10/2022    SPECGRAV 1.010 01/10/2022    LEUKOCYTESUR NEGATIVE 01/10/2022    UROBILINOGEN NORMAL 01/10/2022    BILIRUBINUR SMALL 01/10/2022    BLOODU NEGATIVE 01/10/2022    GLUCOSEU Normal 11/05/2014    KETUA NEGATIVE 01/10/2022     Urine Cultures: No results found for: Radha Wrightrum  Blood Cultures: No results found for: BC  No results found for: BLOODCULT2  Organism:   Lab Results   Component Value Date    ORG ECOL 04/27/2015       Imaging/Diagnostics Last 24 Hours   CT HEAD WO CONTRAST    Result Date: 3/29/2022  EXAMINATION: CT OF THE HEAD WITHOUT CONTRAST  3/29/2022 7:56 pm TECHNIQUE: CT of the head was performed without the administration of intravenous contrast. Dose modulation, iterative reconstruction, and/or weight based adjustment of the mA/kV was utilized to reduce the radiation dose to as low as reasonably achievable. COMPARISON: CT head December 21, 2021 HISTORY: ORDERING SYSTEM PROVIDED HISTORY: Stroke Symptoms TECHNOLOGIST PROVIDED HISTORY: Has a \"code stroke\" or \"stroke alert\" been called? ->Yes Reason for exam:->Stroke Symptoms Decision Support Exception - unselect if not a suspected or confirmed emergency medical condition->Emergency Medical Condition (MA) Reason for Exam: Stroke Symptoms Additional signs and symptoms: Extremity Weakness; Chest Pain FINDINGS: BRAIN/VENTRICLES: There is no acute intracranial hemorrhage, mass effect or midline shift. No abnormal extra-axial fluid collection. The gray-white differentiation is maintained without evidence of an acute infarct. There is no evidence of hydrocephalus. There is moderate periventricular and subcortical white matter hypoattenuation most consistent with microvascular ischemic changes. There is mild age appropriate global cerebral atrophy. ORBITS: The visualized portion of the orbits demonstrate no acute abnormality. SINUSES: The visualized paranasal sinuses and mastoid air cells demonstrate no acute abnormality. SOFT TISSUES/SKULL:  No acute abnormality of the visualized skull or soft tissues. No acute intracranial abnormality. Chronic microvascular ischemic changes and global cerebral atrophy. The findings were sent to the Radiology Results [address] at 8:04 pm on 3/29/2022to be communicated to a licensed caregiver. XR CHEST PORTABLE    Result Date: 3/29/2022  EXAMINATION: ONE XRAY VIEW OF THE CHEST 3/29/2022 7:41 pm COMPARISON: None.  HISTORY: ORDERING SYSTEM PROVIDED HISTORY: stroke symptoms TECHNOLOGIST PROVIDED HISTORY: Reason for exam:->stroke symptoms Reason for Exam: stroke symptoms Additional signs and symptoms: na Relevant Medical/Surgical History: ckd, hypertension FINDINGS: The lungs are clear, no effusion. No pneumothorax. Heart is normal size. Mediastinal and hilar contours are within normal limits. Bony thorax no acute abnormality. 1. No acute cardiopulmonary abnormality. CTA HEAD NECK W CONTRAST    Result Date: 3/29/2022  EXAMINATION: CTA OF THE HEAD AND NECK WITH CONTRAST 3/29/2022 7:57 pm: TECHNIQUE: CTA of the head and neck was performed with the administration of intravenous contrast. Multiplanar reformatted images are provided for review. MIP images are provided for review. Stenosis of the internal carotid arteries measured using NASCET criteria. Dose modulation, iterative reconstruction, and/or weight based adjustment of the mA/kV was utilized to reduce the radiation dose to as low as reasonably achievable. COMPARISON: None. HISTORY: ORDERING SYSTEM PROVIDED HISTORY: Stroke Symptoms FINDINGS: CTA NECK: AORTIC ARCH/ARCH VESSELS: No dissection or arterial injury. No significant stenosis of the brachiocephalic or subclavian arteries. CAROTID ARTERIES: No dissection, arterial injury, or hemodynamically significant stenosis by NASCET criteria. VERTEBRAL ARTERIES: No dissection, arterial injury, or significant stenosis. SOFT TISSUES: The lung apices are clear. No cervical or superior mediastinal lymphadenopathy. The larynx and pharynx are unremarkable. No acute abnormality of the salivary and thyroid glands. BONES: No acute osseous abnormality. CTA HEAD: ANTERIOR CIRCULATION: No significant stenosis of the intracranial internal carotid, anterior cerebral, or middle cerebral arteries. No aneurysm. POSTERIOR CIRCULATION: No significant stenosis of the vertebral, basilar, or posterior cerebral arteries. No aneurysm. OTHER: No dural venous sinus thrombosis on this non-dedicated study. BRAIN: No mass effect or midline shift. No extra-axial fluid collection. The gray-white differentiation is maintained.      No flow-limiting arterial stenosis in the head and neck. Electronically signed by WENDIE Mcwilliams CNP on 3/29/2022 at 10:25 PM          This dictation was created with voice recognition software. While attempts have been made to review the dictation as it is transcribed, on occasion the spoken word can be misinterpreted by the technology leading to omissions or inappropriate words, phrases or sentences.      Electronically signed by WENDIE Mcwilliams CNP on 3/29/2022 at 10:25 PM

## 2022-03-30 NOTE — PROGRESS NOTES
Occupational Therapy    [facility] CARE OCCUPATIONAL THERAPY EVALUATION  Branden Bryson, 1955, 3113/3113-A, 3/30/2022    History  [place]:  The primary encounter diagnosis was Left sided numbness. Diagnoses of Chest pressure and Hypertensive emergency were also pertinent to this visit. Patient  has a past medical history of Abnormal EKG, Acid reflux, Anemia, Anesthesia, Anginal pain ([address] Anxiety, Arthritis, Asthma, Bipolar 1 disorder ([address] Cerebral artery occlusion with cerebral infarction ([address] CHF (congestive heart failure) ([address] Chronic back pain, Chronic kidney disease, DDD (degenerative disc disease), cervical, Depression, Diabetes mellitus ([address] Diabetic neuropathy ([address] Dizziness, Dry skin, Enlarged ureter, Fatty liver, Fibrocystic breast, Generalized anxiety disorder, Gout, H/O cardiac catheterization, H/O cardiovascular stress test, H/O cardiovascular stress test, H/O cardiovascular stress test, H/O Doppler ultrasound, H/O echocardiogram, H/O echocardiogram, History of Holter monitoring, [place] (hard of hearing), Hx of cardiovascular stress test, Hx of motion sickness, HX OTHER MEDICAL, Hyperlipidemia, Hypertension, IBS (irritable bowel syndrome), Incisional hernia, Kidney stones, Migraines, Nausea & vomiting, Other specified disorder of skin, Panic attacks, Panic attacks, Pneumonia, Pseudoseizures ([address] Restless leg, Shortness of breath, Sleep apnea, Staph infection, Thyroid disease, Tremor, Urinary incontinence, Vertigo, and Wears glasses. Patient  has a past surgical history that includes Carpal tunnel release (Right, 1999); Diagnostic Cardiac Cath Lab Procedure (01/11/2010); Dental surgery; Colonoscopy (Last Done 6-13); Endoscopy, colon, diagnostic (Several ); Bladder surgery (1970's Or 1980's); Lithotripsy (2011); Breast biopsy (Right, 1980's); Breast surgery (Left, 1990's); hernia repair (4443'U); hernia repair (1970's); Cholecystectomy (1970's);  Appendectomy (1970's); Hysterectomy, total abdominal (1987); Tubal ligation (1978); Esophagus dilation (1980's And 1990's); Knee arthroscopy (Right, 1999); joint replacement (2008); other surgical history (06 13 2014); fracture surgery (1974 Or 1975); Cardiac catheterization (10-18-06); and Cardiac catheterization. Subjective:  Patient states: \"My stomach is upset\". Pain:  10/10 nausea and pain in L arm pt reports is due to strokes/CRPS. Communication with other providers:  Handoff to RN  Restrictions: General Precautions, Fall Risk    Home Setup/Prior level of function    Lives With: Alone  Type of Home: Apartment(5th floor)  Home Layout: One level, Laundry in basement  Home Access: Elevator, Level entry(elevator access to apartment)  Bathroom Shower/Tub: Tub/Shower unit  Bathroom Toilet: Standard  Bathroom Equipment: Grab bars in [address]: 4 wheeled walker(Pt reports she does not always ambulate with AD.)  ADL Assistance: Independent(Pt reports difficulty with tub transfers, which pt reports results in decreased hygiene. Pt reports when her knee is not in pain she is able to complete LB tasks without assist. IND toileting.) Aides 6 hours a week. Homemaking Assistance: Independent  Homemaking Responsibilities: Yes(Pt reports her children grocery shop and pt is able to cook simple meals.)  Ambulation Assistance: Independent  Transfer Assistance: Independent  Active : No    Examination of body systems (includes body structures/functions, activity/participation limitations):  · Observation:  Sitting EOB upon arrival, agreeable to therapy   · Vision:  Glasses  · Hearing:  [facility]  · Cardiopulmonary:  No 02 needs. Stable vitals throughout session      Body Systems and functions:  · ROM R/L:  LUE: ~15 degrees shoulder flexion, distal WFL-limited due to reported CRPS; RUE: WFL.     · Strength R/L:  LUE: DNT due to 10/10 reported pain nursing notified; RUE: 4/5,   · Sensation: Numbness in bilateral fingers  · Tone: Normal  · Coordination: WFL   · Perception: WNL    Activities of Daily Living (ADLs):  · Feeding: Romario  · Grooming: Setup (washing hands/face w/ warm washcloth)  · UB bathing: modA  · LB bathing: maxA  · UB dressing: modA  · LB dressing: maxA  · Toileting: maxA    Cognitive and Psychosocial Functioning:  · Overall cognitive status: AxO x 4, displays some attention seeking behaviors able to move LUE when therapist is not looking, decreased safety awareness and problem solving  · Affect: Teary       Mobility:  · Supine to sit:  Pt sitting EOB  · Transfers: Hayley from EOB up to stand, Hayley stand step from EOB to reclining chair. Pt deferred any further mobility  · Sitting balance:  Supervision. · Standing balance:  Hayley. · Functional Mobility Hayley stand step  · Toilet/Shower Transfers: DNT             AM-[facility] Daily Activity Inpatient   How much help for putting on and taking off regular lower body clothing?: Total  How much help for Bathing?: Total  How much help for Toileting?: Total  How much help for putting on and taking off regular upper body clothing?: A Lot  How much help for taking care of personal grooming?: A Little  How much help for eating meals?: None  AM-[facility] Inpatient Daily Activity Raw Score: 12  AM-[facility] Inpatient ADL T-Scale Score : 30.6  ADL Inpatient CMS 0-100% Score: 66.57  ADL Inpatient CMS G-Code Modifier : CL    Treatment:  Therapeutic Activity Training:   Therapeutic activity training was instructed today. Cues were given for safety, sequence, UE/LE placement, awareness, and balance. Activities performed today included bed mobility training, sup-sit, sit-stand, stand step, stand to sit    Safety: patient left in chair with chair alarm, call light within reach, RN notified, gait belt used. Assessment:  Pt is a 60 yo female admitted from home for stroke like symptoms.  Pt at baseline is Independent for ADLs Independent for high level IADLs and Independent for functional transfers/mobility w/ AD. Pt currently presents w/ deficits in ADL and high level IADL independence, functional activity tolerance, dynamic sitting and standing balance and tolerance and functional transfers, BUE strength, LUE ROM and OOB activity Pt would benefit from continued acute care OT services w/ discharge to SNF  Complexity: Moderate  Prognosis: Good, no significant barriers to participation at this time.    Plan  Times per week: 3x+  Times per day: Daily  Current Treatment Recommendations: Strengthening,Endurance Training,Patient/Caregiver Education & Training,Equipment Evaluation, Education, & procurement,Self-Care / Shay Sauceda Re-education,ROM,Balance Training,Pain Management,Home Management Training,Functional Mobility Training,Safety Education & Training,Positioning     Equipment: defer    Goals:  Pt goal: go home  Time Frame for STGs: discharge  Goal 1: Pt will perform UE ADLs Independent  Goal 2: Pt will perform LE ADLs Independent  Goal 3: Pt will perform toileting Independent  Goal 4: Pt will perform functional transfer w/ AD Romario  Goal 5: Pt will perform functional mobility w/ AD Romario  Goal 6: Pt will perform therex/theract in order to increase functional activity tolerance and dynamic standing balance    Treatment plan:  Pt will perform functional task in stand reaching in all 3 planes in order to increase dynamic standing balance and functional activity tolerance    Recommendations for NURSING activity: Up to chair for all 3 meals and up to [facility] for all toileting needs    Time:   Time in: 1325  Time out: 1343  Timed treatment minutes: 8 minutes  Total time: 18 minutes    Electronically signed by:    Dandre JUAREZ/SUE [id number]  2:10 PM,3/30/2022

## 2022-03-30 NOTE — CARE COORDINATION
Pt is approved to go to [facility] tomorrow if medically ready. Notified Dr Soco Merchant via PS. Rapid Covid needed on day of d/c. ELECTRONIC PAS/RR COMPLETED AND PLACED IN PACKET. TE      D/C INSTRUCTIONS ARE ON FRONT OF PACKET LOCATED WITH THE SOFT CHART.

## 2022-03-30 NOTE — PROGRESS NOTES
INTERNAL MED PROGRESS NOTE           Subjective: Reports persistent left-sided weakness. Denies any dysarthria or aphasia. Assessment/Plan:       --TIA/CVA: Patient came in as a stroke alert, was not a TPA candidate. CT head was negative. CTA head and neck was also negative. Being followed by neurology. MRI head pending. Continue PT OT    --HTN: Continue antihypertensives     --Chest pain: Atypical, currently resolved. EKG nonacute, troponin negative    --DM type II: Continue subcu insulin regimen including long-acting and short acting insulin as ordered, continue SSI protocol for more adequate glycemic control. --Bipolar 1: Continue outpatient Seroquel and BuSpar    --Parkinson's disease: Continue outpatient Sinemet    DVT prophylaxis: Heparin/Lovenox      Andrea Holloway MD  3/30/2022 @ 2:11 PM           Objective: Intake/Output Summary (Last 24 hours) at 3/30/2022 1411  Last data filed at 3/30/2022 0815  Gross per 24 hour   Intake 713.06 ml   Output 400 ml   Net 313.06 ml        Vitals:   Vitals:    03/30/22 1203   BP: 114/65   Pulse: 69   Resp: 16   Temp: 98 °F (36.7 °C)   SpO2:        Physical Exam:        Constitutional: Well developed, Well nourished, NAD  Neurologic: Alert & oriented x 3, No focal deficits noted. CNs II-XII grossly intact and symmetrical  Psychiatric: Affect normal, Mood normal.  HENT: Normocephalic, Atraumatic,  Eyes: PERRLA, EOMI, No pallor/scleral icterus. Neck: Normal range of motion, No tenderness, Supple, no bruit  Lymphatic: No cervical lymphadenopathy noted. Cardiovascular: Regular rate and rhythm, normal S1-S2, No murmurs, gallops or rubs. Thorax & Lungs: CTA bilaterally, No respiratory distress, No wheezing   Abdomen: Soft, BS +ve, no tenderness, no rebound or guarding  Skin: Warm, Dry, No erythema, No rash. Back: No tenderness, No CVA tenderness. Extremities: No edema, No tenderness  Musculoskeletal:.  No major deformities noted.                Labs:   Reviewed, as follows:  Recent Results (from the past 24 hour(s))   POCT Glucose    Collection Time: 03/29/22  7:34 PM   Result Value Ref Range    POC Glucose 275 (H) 70 - 99 MG/DL   CBC with Auto Differential    Collection Time: 03/29/22  7:40 PM   Result Value Ref Range    WBC 9.2 4.0 - 10.5 K/CU MM    RBC 5.28 4.2 - 5.4 M/CU MM    Hemoglobin 14.8 12.5 - 16.0 GM/DL    Hematocrit 42.8 37 - 47 %    MCV 81.1 78 - 100 FL    MCH 28.0 27 - 31 PG    MCHC 34.6 32.0 - 36.0 %    RDW 11.2 (L) 11.7 - 14.9 %    Platelets 077 404 - 806 K/CU MM    MPV 9.0 7.5 - 11.1 FL    Differential Type AUTOMATED DIFFERENTIAL     Segs Relative 53.0 36 - 66 %    Lymphocytes % 35.2 24 - 44 %    Monocytes % 9.4 (H) 0 - 4 %    Eosinophils % 1.6 0 - 3 %    Basophils % 0.5 0 - 1 %    Segs Absolute 4.9 K/CU MM    Lymphocytes Absolute 3.3 K/CU MM    Monocytes Absolute 0.9 K/CU MM    Eosinophils Absolute 0.2 K/CU MM    Basophils Absolute 0.1 K/CU MM    Nucleated RBC % 0.0 %    Total Nucleated RBC 0.0 K/CU MM    Total Immature Neutrophil 0.03 K/CU MM    Immature Neutrophil % 0.3 0 - 0.43 %   Comprehensive Metabolic Panel w/ Reflex to MG    Collection Time: 03/29/22  7:40 PM   Result Value Ref Range    Sodium 133 (L) 135 - 145 MMOL/L    Potassium 4.6 3.5 - 5.1 MMOL/L    Chloride 95 (L) 99 - 110 mMol/L    CO2 21 21 - 32 MMOL/L    BUN 10 6 - 23 MG/DL    CREATININE 0.6 0.6 - 1.1 MG/DL    Glucose 289 (H) 70 - 99 MG/DL    Calcium 9.6 8.3 - 10.6 MG/DL    Albumin 4.6 3.4 - 5.0 GM/DL    Total Protein 7.4 6.4 - 8.2 GM/DL    Total Bilirubin 0.4 0.0 - 1.0 MG/DL    ALT 25 10 - 40 U/L    AST 36 15 - 37 IU/L    Alkaline Phosphatase 97 40 - 129 IU/L    GFR Non-African American >60 >60 mL/min/1.73m2    GFR African American >60 >60 mL/min/1.73m2    Anion Gap 17 (H) 4 - 16   Troponin    Collection Time: 03/29/22  7:40 PM   Result Value Ref Range    Troponin T <0.010 <0.01 NG/ML   Protime-INR    Collection Time: 03/29/22  7:40 PM   Result Value Ref Range    Protime 12.1 11.7 - 14.5 SECONDS    INR 0.94 INDEX   Hemoglobin A1c    Collection Time: 03/29/22  7:50 PM   Result Value Ref Range    Hemoglobin A1C 9.1 (H) 4.2 - 6.3 %    eAG 214 mg/dL   POCT Glucose    Collection Time: 03/29/22  8:01 PM   Result Value Ref Range    Glucose 275 mg/dL    QC OK? ok    EKG 12 Lead    Collection Time: 03/29/22  8:14 PM   Result Value Ref Range    Ventricular Rate 115 BPM    Atrial Rate 115 BPM    P-R Interval 170 ms    QRS Duration 108 ms    Q-T Interval 342 ms    QTc Calculation (Bazett) 473 ms    P Axis 33 degrees    R Axis -47 degrees    T Axis 77 degrees    Diagnosis       Sinus tachycardia  Possible Left atrial enlargement  Left axis deviation  Incomplete right bundle branch block  Left ventricular hypertrophy with repolarization abnormality  Abnormal ECG  When compared with ECG of 10-KASSY-2022 10:52,  T wave inversion now evident in Lateral leads     POCT Glucose    Collection Time: 03/29/22 10:24 PM   Result Value Ref Range    POC Glucose 264 (H) 70 - 99 MG/DL   Troponin    Collection Time: 03/29/22 10:50 PM   Result Value Ref Range    Troponin T <0.010 <0.01 NG/ML   POCT Glucose    Collection Time: 03/29/22 11:36 PM   Result Value Ref Range    POC Glucose 260 (H) 70 - 99 MG/DL   Comprehensive Metabolic Panel w/ Reflex to MG    Collection Time: 03/30/22  2:52 AM   Result Value Ref Range    Sodium 133 (L) 135 - 145 MMOL/L    Potassium 4.0 3.5 - 5.1 MMOL/L    Chloride 97 (L) 99 - 110 mMol/L    CO2 21 21 - 32 MMOL/L    BUN 12 6 - 23 MG/DL    CREATININE 0.6 0.6 - 1.1 MG/DL    Glucose 293 (H) 70 - 99 MG/DL    Calcium 8.9 8.3 - 10.6 MG/DL    Albumin 3.9 3.4 - 5.0 GM/DL    Total Protein 6.2 (L) 6.4 - 8.2 GM/DL    Total Bilirubin 0.4 0.0 - 1.0 MG/DL    ALT 11 10 - 40 U/L    AST 41 (H) 15 - 37 IU/L    Alkaline Phosphatase 82 40 - 129 IU/L    GFR Non-African American >60 >60 mL/min/1.73m2    GFR African American >60 >60 mL/min/1.73m2    Anion Gap 15 4 - 16 CBC    Collection Time: 03/30/22  2:52 AM   Result Value Ref Range    WBC 24.3 (H) 4.0 - 10.5 K/CU MM    RBC 5.04 4.2 - 5.4 M/CU MM    Hemoglobin 13.9 12.5 - 16.0 GM/DL    Hematocrit 41.2 37 - 47 %    MCV 81.7 78 - 100 FL    MCH 27.6 27 - 31 PG    MCHC 33.7 32.0 - 36.0 %    RDW 11.6 (L) 11.7 - 14.9 %    Platelets 435 248 - 701 K/CU MM    MPV 9.6 7.5 - 11.1 FL   Hemoglobin A1c    Collection Time: 03/30/22  2:52 AM   Result Value Ref Range    Hemoglobin A1C 9.0 (H) 4.2 - 6.3 %    eAG 212 mg/dL   Lipid panel - fasting    Collection Time: 03/30/22  2:52 AM   Result Value Ref Range    Triglycerides 127 <150 MG/DL    Cholesterol 97 <200 MG/DL    HDL 37 (L) >40 MG/DL    LDL Calculated 35 <100 MG/DL   POCT Glucose    Collection Time: 03/30/22  4:17 AM   Result Value Ref Range    POC Glucose 295 (H) 70 - 99 MG/DL   EKG 12 Lead    Collection Time: 03/30/22  5:59 AM   Result Value Ref Range    Ventricular Rate 68 BPM    Atrial Rate 68 BPM    P-R Interval 166 ms    QRS Duration 116 ms    Q-T Interval 450 ms    QTc Calculation (Bazett) 478 ms    P Axis -21 degrees    R Axis -44 degrees    T Axis -28 degrees    Diagnosis       Normal sinus rhythm  Left axis deviation  Left ventricular hypertrophy with QRS widening  Abnormal ECG  When compared with ECG of 29-MAR-2022 20:14,  Vent.  rate has decreased BY  47 BPM  ST no longer depressed in Lateral leads  T wave inversion now evident in Inferior leads  T wave inversion no longer evident in Lateral leads     POCT Glucose    Collection Time: 03/30/22  6:35 AM   Result Value Ref Range    POC Glucose 267 (H) 70 - 99 MG/DL   POCT Glucose    Collection Time: 03/30/22  8:17 AM   Result Value Ref Range    POC Glucose 255 (H) 70 - 99 MG/DL   Drug Screen,Barbiturates,Urine    Collection Time: 03/30/22  9:00 AM   Result Value Ref Range    Barbiturate Screen, Ur NEGATIVE NEGATIVE   Drug screen multi urine    Collection Time: 03/30/22  9:00 AM   Result Value Ref Range    Cannabinoid Scrn, Ur NEGATIVE NEGATIVE    Amphetamines NEGATIVE NEGATIVE    Cocaine Metabolite NEGATIVE NEGATIVE    Benzodiazepine Screen, Urine NEGATIVE NEGATIVE    Barbiturate Screen, Ur NEGATIVE NEGATIVE    Opiates, Urine UNCONFIRMED POSITIVE (A) NEGATIVE    Phencyclidine, Urine NEGATIVE NEGATIVE    Oxycodone NEGATIVE NEGATIVE   Urinalysis    Collection Time: 03/30/22  9:00 AM   Result Value Ref Range    Color, UA YELLOW YELLOW    Clarity, UA CLEAR CLEAR    Glucose, Urine 100 (A) NEGATIVE MG/DL    Bilirubin Urine NEGATIVE NEGATIVE MG/DL    Ketones, Urine TRACE (A) NEGATIVE MG/DL    Specific Gravity, UA 1.010 1.001 - 1.035    Blood, Urine NEGATIVE NEGATIVE    pH, Urine 5.5 5.0 - 8.0    Protein, UA TRACE (A) NEGATIVE MG/DL    Urobilinogen, Urine 0.2 0.2 - 1.0 MG/DL    Nitrite Urine, Quantitative NEGATIVE NEGATIVE    Leukocyte Esterase, Urine NEGATIVE NEGATIVE    RBC, UA 2 0 - 6 /HPF    WBC, UA NONE SEEN 0 - 5 /HPF    Bacteria, UA NEGATIVE NEGATIVE /HPF    Squam Epithel, UA 1 /HPF    Trichomonas, UA NONE SEEN NONE SEEN /HPF   POCT Glucose    Collection Time: 03/30/22 12:38 PM   Result Value Ref Range    POC Glucose 169 (H) 70 - 99 MG/DL          Body mass index is 31.09 kg/m².  Patient will follow up with PCP for further management as indicated unless otherwise specifically noted above        Bailey Gabriel MD  3/30/2022 @ 2:12 PM

## 2022-03-30 NOTE — ED NOTES
Dr Jorden Mcguire on [facility], RN  03/29/22 2028       Donna Alicea, [facility]  03/29/22 3487

## 2022-03-30 NOTE — PROGRESS NOTES
Speech Language Pathology  Facility/Department: [facility] 3N   CLINICAL BEDSIDE SWALLOW EVALUATION    NAME: Urmila Pulliam  : 1955  MRN: [id number]    ADMISSION DATE: 3/29/2022  ADMITTING DIAGNOSIS: has Chest pain; H/O Doppler ultrasound; H/O cardiovascular stress test; H/O cardiovascular stress test; H/O cardiovascular stress test; Incarcerated umbilical hernia; Essential hypertension; Type 2 diabetes mellitus with diabetic polyneuropathy, with long-term current use of insulin ([address] Tachycardia; Dyslipidemia; Family history of early CAD; NSTEMI (non-ST elevated myocardial infarction) ([address] Abnormal nuclear stress test; ILSA (obstructive sleep apnea); Snoring; Hypersomnolence; Mild intermittent asthma without complication; Asthma exacerbation attacks; Hypertensive emergency; Hallucination, visual; Severe episode of recurrent major depressive disorder, with psychotic features ([address] CHEKO (generalized anxiety disorder); Auditory hallucinations; Bipolar 1 disorder, depressed, severe ([address] Dyspnea and respiratory abnormalities; Encephalopathy; Syncope; Bipolar 1 disorder ([address] Hyponatremia; Pseudoseizures ([address] Panic attacks; Generalized abdominal pain; Diabetes mellitus due to underlying condition with stage 2 chronic kidney disease, without long-term current use of insulin ([address] Seizure ([address] Amyloid polyneuropathy ([address] Anemia; Anxiety; Osteoarthrosis; CHF (congestive heart failure) ([address] Coronary atherosclerosis; Chronic pain; Degeneration of lumbar intervertebral disc; Disorder of liver; Dysuria; Fibrocystic breast; Gastroesophageal reflux disease; Gastroparesis; Gout; H/O degenerative disc disease; Hyperglycemia due to type 2 diabetes mellitus ([address] Hypothyroidism due to acquired atrophy of thyroid; Irritable bowel syndrome; Low back pain; Memory loss; Migraine; Migraine without aura, not refractory; Hyperlipidemia; Neck pain; Neuropathy; Nonalcoholic steatohepatitis ([facility]);  Osteoporosis; Polyneuropathy due to type 2 diabetes mellitus ([facility] Renal impairment; Suicidal ideation; Vitamin B12 deficiency; Hypothyroidism; Tremor; Anxiety disorder; Stroke-like symptoms; and Left sided numbness on their problem list.    Impressions: Bang Magallon was seen for a bedside swallowing evaluation after being admitted to [facility] with stroke-like symptoms. Pt was alert and cooperative throughout assessment. Relevant medical hx includes Bipolar 1 disorder, CHF, depression, anxiety, pneumonia, GERD and thyroid disease. Pt reports globus sensation with meals a baseline. Pt was positioned upright in bed and presented with a clear vocal quality and strong volitional cough. Oral mechanism examination was [place] with no focal orofacial weakness observed at this time. PO trials of regular solids and thin liquids by cup/straw sips were given. Pt demonstrated adequate mastication and oral clearance with all PO trials trials given. Pharyngeal swallow appears intact characterized by timely swallow initiation and 0 overt s/s of aspiration observed with all PO intake. Recommend regular solids/thin liquids with aspiration precautions. No further acute SLP needs were identified at this time.        ONSET DATE: this admission     Date of Eval: 3/30/2022  Evaluating Therapist: ELY Russell    Current Diet level:  Current Diet : Regular  Current Liquid Diet : Thin      Primary Complaint  Patient Complaint: weakness    Pain:  Pain Assessment  Pain Assessment: 0-10  Pain Level: 10  Patient's Stated Pain Goal: No pain  Pain Type: Acute pain  Pain Location: Back,Leg  Pain Orientation: Right,Left,Upper  Pain Descriptors: Aching,Discomfort,Constant  Pain Frequency: Continuous  Pain Onset: On-going  Clinical Progression: Gradually worsening  Functional Pain Assessment: Activities are not prevented  Response to Pain Intervention: Patient Satisfied    Reason for Referral  Bang Magallon was referred for a bedside swallow evaluation to assess the efficiency of her swallow function, identify signs and symptoms of aspiration and make recommendations regarding safe dietary consistencies, effective compensatory strategies, and safe eating environment. Impression  Dysphagia Diagnosis: Swallow function appears grossly intact  Dysphagia Outcome Severity Scale: Level 6: Within functional limits/Modified independence     Treatment Plan  Requires SLP Intervention: No  Duration/Frequency of Treatment: n/a  D/C Recommendations: To be determined       Recommended Diet and Intervention  Diet Solids Recommendation: Regular  Liquid Consistency Recommendation: Thin  Recommended Form of Meds: PO          Compensatory Swallowing Strategies  Compensatory Swallowing Strategies: Upright as possible for all oral intake;Remain upright for 30-45 minutes after meals    Treatment/Goals  Short-term Goals  Timeframe for Short-term Goals: n/a    General  Chart Reviewed: Yes  Behavior/Cognition: Alert; Cooperative;Pleasant mood  Respiratory Status: Room air  O2 Device: None (Room air)  Communication Observation: Functional  Follows Directions: Complex  Dentition: Edentulous  Patient Positioning: Upright in bed  Baseline Vocal Quality: Normal  Volitional Cough: Strong  Prior Dysphagia History: Pt has hx of esophageal dysphagia  Consistencies Administered: Reg solid; Dysphagia Pureed (Dysphagia I); Thin - cup; Thin - straw           Vision/Hearing  Vision  Vision: Within Functional Limits  Hearing  Hearing: Within functional limits    Oral Motor Deficits  Oral/Motor  Oral Motor:  Within functional limits    Oral Phase Dysfunction  Oral Phase  Oral Phase: WFL     Indicators of Pharyngeal Phase Dysfunction   Pharyngeal Phase  Pharyngeal Phase: WFL    Prognosis  Prognosis  Prognosis for safe diet advancement: good  Individuals consulted  Consulted and agree with results and recommendations: Patient    Education  Patient Education: recommendations/POC  Patient Education Response: Verbalizes understanding  Safety Devices in place: Yes  Type of devices:  All fall risk precautions in place       Therapy Time  SLP Individual Minutes  Time In: 5764  Time Out: [address]  Minutes: [address], [address], [id number] [facility], 3/30/2022

## 2022-03-30 NOTE — PROGRESS NOTES
Patient instructed and educated on [employer]. Patient able to do 600 ml. Vital capacity. Patient's goal is 1200ml.  Electronically signed by Donna Hylton RCP on 3/30/2022 at 12:44 PM

## 2022-03-31 VITALS
OXYGEN SATURATION: 97 % | HEART RATE: 79 BPM | DIASTOLIC BLOOD PRESSURE: 63 MMHG | BODY MASS INDEX: 31.77 KG/M2 | TEMPERATURE: 97.9 F | WEIGHT: 172.62 LBS | RESPIRATION RATE: 19 BRPM | SYSTOLIC BLOOD PRESSURE: 131 MMHG | HEIGHT: 62 IN

## 2022-03-31 LAB
CULTURE: NORMAL
GLUCOSE BLD-MCNC: 177 MG/DL (ref 70–99)
GLUCOSE BLD-MCNC: 200 MG/DL (ref 70–99)
GLUCOSE BLD-MCNC: 232 MG/DL (ref 70–99)
GLUCOSE BLD-MCNC: 285 MG/DL (ref 70–99)
GLUCOSE BLD-MCNC: 289 MG/DL (ref 70–99)
Lab: NORMAL
SARS-COV-2, NAAT: NOT DETECTED
SOURCE: NORMAL
SPECIMEN: NORMAL
TROPONIN T: <0.01 NG/ML

## 2022-03-31 PROCEDURE — 97535 SELF CARE MNGMENT TRAINING: CPT

## 2022-03-31 PROCEDURE — 6370000000 HC RX 637 (ALT 250 FOR IP): Performed by: NURSE PRACTITIONER

## 2022-03-31 PROCEDURE — 97530 THERAPEUTIC ACTIVITIES: CPT

## 2022-03-31 PROCEDURE — 87635 SARS-COV-2 COVID-19 AMP PRB: CPT

## 2022-03-31 PROCEDURE — 82962 GLUCOSE BLOOD TEST: CPT

## 2022-03-31 PROCEDURE — 97110 THERAPEUTIC EXERCISES: CPT

## 2022-03-31 PROCEDURE — 6360000002 HC RX W HCPCS: Performed by: NURSE PRACTITIONER

## 2022-03-31 RX ORDER — SUCRALFATE 1 G/1
1 TABLET ORAL 4 TIMES DAILY
Qty: 120 TABLET | Refills: 3 | OUTPATIENT
Start: 2022-03-31 | End: 2022-05-30

## 2022-03-31 RX ORDER — ALBUTEROL SULFATE 90 UG/1
2 AEROSOL, METERED RESPIRATORY (INHALATION) EVERY 4 HOURS PRN
Qty: 18 G | Refills: 1 | Status: SHIPPED | OUTPATIENT
Start: 2022-03-31 | End: 2022-06-16

## 2022-03-31 RX ADMIN — POLYETHYLENE GLYCOL (3350) 17 G: 17 POWDER, FOR SOLUTION ORAL at 09:54

## 2022-03-31 RX ADMIN — METOPROLOL SUCCINATE 50 MG: 50 TABLET, EXTENDED RELEASE ORAL at 09:51

## 2022-03-31 RX ADMIN — LOSARTAN POTASSIUM 100 MG: 100 TABLET, FILM COATED ORAL at 09:50

## 2022-03-31 RX ADMIN — ASPIRIN 81 MG 81 MG: 81 TABLET ORAL at 09:53

## 2022-03-31 RX ADMIN — BUSPIRONE HYDROCHLORIDE 20 MG: 5 TABLET ORAL at 05:55

## 2022-03-31 RX ADMIN — PANTOPRAZOLE SODIUM 40 MG: 40 TABLET, DELAYED RELEASE ORAL at 05:58

## 2022-03-31 RX ADMIN — QUETIAPINE FUMARATE 25 MG: 25 TABLET ORAL at 09:54

## 2022-03-31 RX ADMIN — INSULIN LISPRO 4 UNITS: 100 INJECTION, SOLUTION INTRAVENOUS; SUBCUTANEOUS at 11:30

## 2022-03-31 RX ADMIN — BUSPIRONE HYDROCHLORIDE 20 MG: 5 TABLET ORAL at 13:53

## 2022-03-31 RX ADMIN — SODIUM CHLORIDE 1 G: 1 TABLET ORAL at 10:05

## 2022-03-31 RX ADMIN — MAGNESIUM OXIDE 400 MG (241.3 MG MAGNESIUM) TABLET 400 MG: TABLET at 13:53

## 2022-03-31 RX ADMIN — NIFEDIPINE 90 MG: 90 TABLET, FILM COATED, EXTENDED RELEASE ORAL at 10:12

## 2022-03-31 RX ADMIN — BACLOFEN 10 MG: 10 TABLET ORAL at 09:55

## 2022-03-31 RX ADMIN — INSULIN LISPRO 2 UNITS: 100 INJECTION, SOLUTION INTRAVENOUS; SUBCUTANEOUS at 09:35

## 2022-03-31 RX ADMIN — LEVOTHYROXINE SODIUM 75 MCG: 25 TABLET ORAL at 05:56

## 2022-03-31 RX ADMIN — ENOXAPARIN SODIUM 40 MG: 40 INJECTION SUBCUTANEOUS at 09:49

## 2022-03-31 RX ADMIN — OXCARBAZEPINE 300 MG: 300 TABLET, FILM COATED ORAL at 10:05

## 2022-03-31 RX ADMIN — HYDROCODONE BITARTRATE AND ACETAMINOPHEN 1 TABLET: 10; 325 TABLET ORAL at 06:01

## 2022-03-31 RX ADMIN — DOCUSATE SODIUM 100 MG: 100 CAPSULE, LIQUID FILLED ORAL at 09:51

## 2022-03-31 RX ADMIN — GUAIFENESIN 600 MG: 600 TABLET, EXTENDED RELEASE ORAL at 09:53

## 2022-03-31 RX ADMIN — ATORVASTATIN CALCIUM 10 MG: 10 TABLET, FILM COATED ORAL at 09:54

## 2022-03-31 RX ADMIN — MAGNESIUM OXIDE 400 MG (241.3 MG MAGNESIUM) TABLET 400 MG: TABLET at 09:50

## 2022-03-31 ASSESSMENT — PAIN DESCRIPTION - PROGRESSION
CLINICAL_PROGRESSION: NOT CHANGED

## 2022-03-31 ASSESSMENT — PAIN DESCRIPTION - LOCATION
LOCATION: BACK;GENERALIZED
LOCATION: GENERALIZED;ARM;LEG

## 2022-03-31 ASSESSMENT — PAIN DESCRIPTION - ORIENTATION: ORIENTATION: RIGHT;LEFT;LOWER;UPPER

## 2022-03-31 ASSESSMENT — PAIN DESCRIPTION - PAIN TYPE: TYPE: CHRONIC PAIN

## 2022-03-31 ASSESSMENT — PAIN DESCRIPTION - ONSET: ONSET: ON-GOING

## 2022-03-31 ASSESSMENT — PAIN SCALES - GENERAL
PAINLEVEL_OUTOF10: 3
PAINLEVEL_OUTOF10: 5
PAINLEVEL_OUTOF10: 10
PAINLEVEL_OUTOF10: 4
PAINLEVEL_OUTOF10: 8

## 2022-03-31 ASSESSMENT — PAIN DESCRIPTION - FREQUENCY: FREQUENCY: CONTINUOUS

## 2022-03-31 ASSESSMENT — PAIN DESCRIPTION - DESCRIPTORS: DESCRIPTORS: NUMBNESS

## 2022-03-31 NOTE — CARE COORDINATION
Pt has DC orders. Dian [facility] is able to accept pt today. Transport scheduled for 2pm with [facility]. Dian [facility] advised that pt has transport for 2pm. ANDREINA needs completed and pt needs rapid COVID. PS sent to provider. 1150 - Pt and pt's nurse advised of transport time.

## 2022-03-31 NOTE — PROGRESS NOTES
Took patient to bathroom per request.Tolerated distance to bathroom fine with gait belt on and walker. 1 assist to bathroom. Changed pad and put on underwear. Patient returned to bed. Upon getting to side of bed patient complained of \"paralysis due to neuropathy\" and was safely sat in bed. Pt BP was taken and 139/72, pulse 88, resp 15 and O2 at 96%. Pt was safely put in bed and stated \"I want to use the bathroom more than the bedside toilet but I'm afraid my legs won't make it. \" Pt tolerated fairly well with 1 assist and walker with pivot

## 2022-03-31 NOTE — PROGRESS NOTES
Attempted to call report two times, Murali[employer]illips took a message with our contact info as I was unable to contact the nurse.

## 2022-03-31 NOTE — PROGRESS NOTES
Occupational Therapy    Occupational Therapy Treatment Note      Name: Ariana Jorgensen MRN: [id number] :   1955   Date:  3/31/2022   Admission Date: 3/29/2022 Room:  [address] [place] [address]-A     Primary Problem:  The primary encounter diagnosis was Left sided numbness. Diagnoses of Chest pressure and Hypertensive emergency were also pertinent to this visit. Restrictions/Precautions:  General Precautions, Fall Risk    Communication with other providers:  Per chart review pt appropriate for therapeutic interventions    Subjective:  Patient states: \"I am sleepy. \"  Pain: L UE, did no quantify  (location, type, intensity)    Objective:    Observation:  Pt I long sitting upon arrival.  Objective Measures: Tele, /61    Treatment, including education:  Self Care Training:   Cues were given for safety, sequence, UE/LE placement, visual cues, and balance. Activities performed today included dressing and hygiene. While in long sitting pt instructed through LB dressing of donning R sock with Sup and L sock required min A d/t pt reports difficulty after extended time. Later in session pt required max A to doff bilat socks. Set up and instructed pt through facial hygiene with Sup while sitting upright in bed with implements provided. Therapeutic Activity Training:   Therapeutic activity training was instructed today. Cues were given for safety, sequence, UE/LE placement, awareness, and balance. Activities performed today included bed mobility training, sup-sit, sit-stand, SPT. Pt engaged in bed mobility from long sitting <> EOB with SBA. STS transfer from EOB X2 trials with use of RW with mod A for L hand mgmt onto walker and CGA for transfer with cues for R UE placement for increased safety. Pt provided with and educated on use of reacher for ease of ADL routine, pt able to retrieve item from floor level while sitting unsupported on EOB with SBA.    Pt instructed through SPT from EOB <> recliner with use of RW with CGA and increased time. Pt declined to stay up in chair d/t reports of sitting up earlier. SPT from recliner <> EOB with min A and HHA d/t pt reports not wanting to place L UE onto RW. EOB <> supine with min A for L LE mgmt. Pt provided with leg  and provided with education on use for increased Ind with bed mobility. Therapeutic Exercise:  Cues were given for technique, safety, recruitment, and rationale. Cues were verbal and/or tactile. Instructed pt through yellow thera-band for 1ex x5 reps and reports fatigue in L UE, graded to A/AAROM for 2ex x5 reps for increased ROM needed for UB ADLs. Patient educated on role of OT , benefits of OT and rationale for therapeutic intervention. Educated on need to be patient advocate, benefit of EOB activity. Patient left safely in bed at end of session, with call light in reach, alarm on and nursing aware. Gait belt was used for func transfers / mobility. Assessment / Impression:    Patient's tolerance of treatment: Good  Adverse Reaction: none  Significant change in status and impact: none  Barriers to improvement: weakness      Plan for Next Session:    Cont OT POC    Time in:  840  Time out:  920  Timed treatment minutes:  40  Total treatment time:  40      Electronically signed by:     CHASTITY Barrera,   3/31/2022, 9:21 AM

## 2022-03-31 NOTE — DISCHARGE SUMMARY
V2.0  Discharge Summary    Name:  Shelley Candelaria /Age/Sex: 1955 (12 y.o. female)   Admit Date: 3/29/2022  Discharge Date: 3/31/22    MRN & CSN:  [id number] & [id number] Encounter Date and Time 3/31/22 10:53 AM EDT    Attending:  Pearl Rodriguez MD Discharging Provider: Pearl Rodriguez MD       Hospital Course:     Brief HPI: Shelley Candelaria is a 61 y.o. female who presented with complaint of left-sided weakness. Patient was taken in as a stroke alert, hyperacute CT head and CTA head and neck were noncontributory. Patient underwent MRI of the head imaging which came back negative for any acute infarct. Patient has baseline Parkinson's disease       Brief Problem Based Course:   1. TIA: CVA ruled out. MRI noncontributory. CTA head and neck was nonacute. 2. HTN: Continue outpatient antihypertensive as ordered  3. Chest pain: Atypical, EKG nonacute, troponin negative  4. DM type II: Continue outpatient antidiabetic regimen, follow-up with PCP for further med adjustment as needed. 5. Bipolar 1: Stable, continue outpatient Seroquel and BuSpar  6. Parkinson's disease: Stable, continue outpatient Sinemet    Patient discharged to nursing home today in stable condition      The patient expressed appropriate understanding of, and agreement with the discharge recommendations, medications, and plan.      Consults this admission:  IP CONSULT TO PHARMACY  PHARMACY TO CHANGE BASE FLUIDS  IP CONSULT TO HOSPITALIST  IP CONSULT TO NEUROLOGY  IP CONSULT TO CARDIOLOGY    Discharge Diagnosis:   Stroke-like symptoms        Discharge Instruction:   Follow up appointments:   Primary care physician: Molina Barlow MD within 2 weeks  Diet: diabetic diet   Activity: activity as tolerated  Disposition: Discharged to:   []Home, []C, [x]SNF, []Acute Rehab, []Hospice   Condition on discharge: Stable  Labs and Tests to be Followed up as an outpatient by PCP or Specialist:     Discharge Medications:        Medication List CHANGE how you take these medications    OXcarbazepine 300 MG tablet  Commonly known as: TRILEPTAL  Take 1 tablet by mouth 2 times daily  What changed: how much to take     Trelegy Ellipta 100-62.5-25 MCG/INH Aepb  Generic drug: fluticasone-umeclidin-vilant  Inhale 1 puff into the lungs daily  What changed:   · when to take this  · reasons to take this     Herrera Granado FlexTouch 200 UNIT/ML Sopn  Generic drug: Insulin Degludec  Inject 20 Units into the skin every morning  What changed:   · how much to take  · when to take this  · additional instructions        CONTINUE taking these medications    albuterol sulfate  (90 Base) MCG/ACT inhaler  Commonly known as: Proventil HFA  Inhale 2 puffs into the lungs every 4 hours as needed for Wheezing or Shortness of Breath With spacer (and mask if indicated). Thanks. aluminum & magnesium hydroxide-simethicone 400-400-40 MG/5ML Susp  Commonly known as:  Maalox Max  Take 15 mLs by mouth every 6 hours as needed (heart burn)     amitriptyline 25 MG tablet  Commonly known as: ELAVIL  Take 1 tablet by mouth nightly     aspirin 81 MG tablet     baclofen 10 MG tablet  Commonly known as: LIORESAL     busPIRone 10 MG tablet  Commonly known as: BUSPAR  Take 2 tablets by mouth 3 times daily     carbidopa-levodopa  MG per tablet  Commonly known as: SINEMET     diazePAM 5 MG tablet  Commonly known as: VALIUM     docusate sodium 100 MG capsule  Commonly known as: Colace  Take 1 capsule by mouth 2 times daily     HYDROcodone-acetaminophen  MG per tablet  Commonly known as: NORCO     hydrOXYzine 25 MG capsule  Commonly known as: VISTARIL     levETIRAcetam 750 MG tablet  Commonly known as: KEPPRA  Take 1 tablet by mouth 2 times daily     levothyroxine 75 MCG tablet  Commonly known as: SYNTHROID  Take 1 tablet by mouth every morning (before breakfast)     losartan 100 MG tablet  Commonly known as: COZAAR  Take 1 tablet by mouth daily     magnesium oxide 400 MG tablet  Commonly known as: MAG-OX  Take 1 tablet by mouth 3 times daily     metoprolol succinate 50 MG extended release tablet  Commonly known as: TOPROL XL  Take 1 tablet by mouth daily     montelukast 10 MG tablet  Commonly known as: SINGULAIR     Mucinex 600 MG extended release tablet  Generic drug: guaiFENesin     MULTI-VITAMIN/IRON PO     NIFEdipine 90 MG extended release tablet  Commonly known as: PROCARDIA XL  Take 1 tablet by mouth daily     NovoLOG FlexPen 100 UNIT/ML injection pen  Generic drug: insulin aspart     nystatin 456233 UNIT/GM powder  Commonly known as: MYCOSTATIN  Apply 3 times daily to affected areas     pantoprazole 40 MG tablet  Commonly known as: Protonix  Take 1 tablet by mouth 2 times daily (before meals)     polyethylene glycol 17 g packet  Commonly known as: GLYCOLAX     QUEtiapine 25 MG tablet  Commonly known as: SEROQUEL  Take 1 tablet by mouth 2 times daily     simvastatin 20 MG tablet  Commonly known as: ZOCOR  Take 1 tablet by mouth nightly     sodium chloride 1 g tablet  Take 1 tablet by mouth 2 times daily (with meals)     sucralfate 1 GM tablet  Commonly known as: CARAFATE  Take 1 tablet by mouth 4 times daily           Where to Get Your Medications      These medications were sent to [address] [place] [address], [facility]. [address] [place], [place]    Phone: [phone]   · albuterol sulfate  (90 Base) MCG/ACT inhaler  · sucralfate 1 GM tablet        Objective Findings at Discharge:   /61   Pulse 77   Temp 97.8 °F (36.6 °C) (Oral)   Resp 19   Ht 5' 2\" (1.575 m)   Wt 172 lb 9.9 oz (78.3 kg)   SpO2 96%   BMI 31.57 kg/m²       Physical Exam:   General: NAD  Eyes: EOMI  ENT: neck supple  Cardiovascular: Regular rate.   Respiratory: Clear to auscultation  Gastrointestinal: Soft, non tender  Genitourinary: no suprapubic tenderness  Musculoskeletal: No edema  Skin: warm, dry  Neuro: Alert.  Psych: Mood appropriate. Labs and Imaging   Echocardiogram complete 2D with doppler with color    Result Date: 3/30/2022  Transthoracic Echocardiography Report (TTE)  Demographics   Patient Name       Pari Le   Date of Study       03/30/2022   Date of Birth      1955         Gender              Female   Age                61 year(s)         Race                   Patient Number     [id number]         Room Number         3113   Visit Number       [id number]   Corporate ID       [id number]   Accession Number   [id number]         [address] [facility]   Ordering Physician Radha Hester                     CNP                Physician           Rowena MEDEIROS  Procedure Type of Study   TTE procedure:ECHOCARDIOGRAM COMPLETE 2D W DOPPLER W COLOR. Procedure Date Date: 03/30/2022 Start: 10:18 AM Study Location: [facility] - Echo Lab Technical Quality: Adequate visualization Indications:CVA. Patient Status: Routine Contrast Medium: Bubble Study. Height: 62 inches Weight: 170 pounds BSA: 1.78 m2 BMI: 31.09 kg/m2 HR: 88 bpm BP: 120/57 mmHg  Conclusions   Summary  Technically difficult examination; patient unable to tolerate exam.  Left ventricular systolic function is normal.  Ejection fraction is visually estimated at 50-55%. No significant valvular abnormalities. No evidence of any pericardial effusion. Possible PFO visualized with color Doppler; bubble study was inadequate. Signature   ------------------------------------------------------------------  Electronically signed by Amaya León MD  (Interpreting physician) on 03/30/2022 at 11:28 AM  ------------------------------------------------------------------   Findings   Left Ventricle  Left ventricular systolic function is normal.  Ejection fraction is visually estimated at 50-55%.   Mild left 27.1 cm              LVOT Peak Gradient: 5  MV Peak Gradient: 2.22  AV Area (Continuity):2.27    mmHgLVOT Mean Gradient:  mmHg                    cm2                          3 mmHg   MV P1/2t: 60 msec       LVOT VTI: 19.6 cm  MVA by PHT:3.67 cm2   MV E' Septal Velocity:  7.35 cm/s  MV E' Lateral Velocity:  8.88 cm/s  MV E/E' septal: 10.14  MV E/E' lateral: 8.39      CT HEAD WO CONTRAST    Result Date: 3/29/2022  EXAMINATION: CT OF THE HEAD WITHOUT CONTRAST  3/29/2022 7:56 pm TECHNIQUE: CT of the head was performed without the administration of intravenous contrast. Dose modulation, iterative reconstruction, and/or weight based adjustment of the mA/kV was utilized to reduce the radiation dose to as low as reasonably achievable. COMPARISON: CT head December 21, 2021 HISTORY: ORDERING SYSTEM PROVIDED HISTORY: Stroke Symptoms TECHNOLOGIST PROVIDED HISTORY: Has a \"code stroke\" or \"stroke alert\" been called? ->Yes Reason for exam:->Stroke Symptoms Decision Support Exception - unselect if not a suspected or confirmed emergency medical condition->Emergency Medical Condition (MA) Reason for Exam: Stroke Symptoms Additional signs and symptoms: Extremity Weakness; Chest Pain FINDINGS: BRAIN/VENTRICLES: There is no acute intracranial hemorrhage, mass effect or midline shift. No abnormal extra-axial fluid collection. The gray-white differentiation is maintained without evidence of an acute infarct. There is no evidence of hydrocephalus. There is moderate periventricular and subcortical white matter hypoattenuation most consistent with microvascular ischemic changes. There is mild age appropriate global cerebral atrophy. ORBITS: The visualized portion of the orbits demonstrate no acute abnormality. SINUSES: The visualized paranasal sinuses and mastoid air cells demonstrate no acute abnormality. SOFT TISSUES/SKULL:  No acute abnormality of the visualized skull or soft tissues. No acute intracranial abnormality.  Chronic microvascular ischemic changes and global cerebral atrophy. The findings were sent to the Radiology Results [address] at 8:04 pm on 3/29/2022to be communicated to a licensed caregiver. XR CHEST PORTABLE    Result Date: 3/29/2022  EXAMINATION: ONE XRAY VIEW OF THE CHEST 3/29/2022 7:41 pm COMPARISON: None. HISTORY: ORDERING SYSTEM PROVIDED HISTORY: stroke symptoms TECHNOLOGIST PROVIDED HISTORY: Reason for exam:->stroke symptoms Reason for Exam: stroke symptoms Additional signs and symptoms: na Relevant Medical/Surgical History: ckd, hypertension FINDINGS: The lungs are clear, no effusion. No pneumothorax. Heart is normal size. Mediastinal and hilar contours are within normal limits. Bony thorax no acute abnormality. 1. No acute cardiopulmonary abnormality. CTA HEAD NECK W CONTRAST    Result Date: 3/29/2022  EXAMINATION: CTA OF THE HEAD AND NECK WITH CONTRAST 3/29/2022 7:57 pm: TECHNIQUE: CTA of the head and neck was performed with the administration of intravenous contrast. Multiplanar reformatted images are provided for review. MIP images are provided for review. Stenosis of the internal carotid arteries measured using NASCET criteria. Dose modulation, iterative reconstruction, and/or weight based adjustment of the mA/kV was utilized to reduce the radiation dose to as low as reasonably achievable. COMPARISON: None. HISTORY: ORDERING SYSTEM PROVIDED HISTORY: Stroke Symptoms FINDINGS: CTA NECK: AORTIC ARCH/ARCH VESSELS: No dissection or arterial injury. No significant stenosis of the brachiocephalic or subclavian arteries. CAROTID ARTERIES: No dissection, arterial injury, or hemodynamically significant stenosis by NASCET criteria. VERTEBRAL ARTERIES: No dissection, arterial injury, or significant stenosis. SOFT TISSUES: The lung apices are clear. No cervical or superior mediastinal lymphadenopathy. The larynx and pharynx are unremarkable.   No acute abnormality of the salivary and thyroid glands. BONES: No acute osseous abnormality. CTA HEAD: ANTERIOR CIRCULATION: No significant stenosis of the intracranial internal carotid, anterior cerebral, or middle cerebral arteries. No aneurysm. POSTERIOR CIRCULATION: No significant stenosis of the vertebral, basilar, or posterior cerebral arteries. No aneurysm. OTHER: No dural venous sinus thrombosis on this non-dedicated study. BRAIN: No mass effect or midline shift. No extra-axial fluid collection. The gray-white differentiation is maintained. No flow-limiting arterial stenosis in the head and neck. MRI brain without contrast    Result Date: 3/30/2022  EXAMINATION: MRI OF THE BRAIN WITHOUT CONTRAST  3/30/2022 11:34 am TECHNIQUE: Multiplanar multisequence MRI of the brain was performed without the administration of intravenous contrast. COMPARISON: 04/27/2021. HISTORY: ORDERING SYSTEM PROVIDED HISTORY: Stroke like symptoms, stroke alert TECHNOLOGIST PROVIDED HISTORY: Reason for exam:->Stroke like symptoms, stroke alert Decision Support Exception - unselect if not a suspected or confirmed emergency medical condition->Emergency Medical Condition (MA) Reason for Exam: Stroke like symptoms, stroke alert Relevant Medical/Surgical History: none Initial evaluation. FINDINGS: INTRACRANIAL STRUCTURES/VENTRICLES: There is no acute infarct. No mass effect or midline shift. No evidence of an acute intracranial hemorrhage. Areas of T2 FLAIR hyperintensity are seen in the periventricular and subcortical white matter, which are nonspecific, but may represent chronic microvascular ischemic change. There is mild global parenchymal volume loss. Otherwise, the ventricles and sulci are normal in size and configuration. The sellar/suprasellar regions appear unremarkable. The normal signal voids within the major intracranial vessels appear maintained. ORBITS: The visualized portion of the orbits demonstrate no acute abnormality.  SINUSES: The visualized paranasal sinuses and mastoid air cells demonstrate no acute abnormality. BONES/SOFT TISSUES: The bone marrow signal intensity appears normal. The soft tissues demonstrate no acute abnormality. 1. No acute intracranial abnormality. No acute infarct. 2. Mild global parenchymal volume loss with moderate chronic microvascular ischemic changes. Findings are similar to the prior exam.       CBC:   Recent Labs     03/29/22 1940 03/30/22 0252   WBC 9.2 24.3*   HGB 14.8 13.9    274     BMP:    Recent Labs     03/29/22 1940 03/29/22 2001 03/30/22 0252   *  --  133*   K 4.6  --  4.0   CL 95*  --  97*   CO2 21  --  21   BUN 10  --  12   CREATININE 0.6  --  0.6   GLUCOSE 289* 275 293*     Hepatic:   Recent Labs     03/29/22 1940 03/30/22 0252   AST 36 41*   ALT 25 11   BILITOT 0.4 0.4   ALKPHOS 97 82     Lipids:   Lab Results   Component Value Date    CHOL 97 03/30/2022    HDL 37 03/30/2022    TRIG 127 03/30/2022     Hemoglobin A1C:   Lab Results   Component Value Date    LABA1C 9.0 03/30/2022     TSH: No results found for: TSH  Troponin:   Lab Results   Component Value Date    TROPONINT <0.010 03/30/2022    TROPONINT <0.010 03/29/2022    TROPONINT <0.010 03/29/2022     Lactic Acid: No results for input(s): LACTA in the last 72 hours. BNP: No results for input(s): PROBNP in the last 72 hours.   UA:  Lab Results   Component Value Date    NITRU NEGATIVE 03/30/2022    NITRU NEGATIVE 11/05/2014    COLORU YELLOW 03/30/2022    WBCUA NONE SEEN 03/30/2022    RBCUA 2 03/30/2022    MUCUS RARE 01/10/2022    TRICHOMONAS NONE SEEN 03/30/2022    YEAST RARE 09/12/2018    BACTERIA NEGATIVE 03/30/2022    CLARITYU CLEAR 03/30/2022    SPECGRAV 1.010 03/30/2022    LEUKOCYTESUR NEGATIVE 03/30/2022    UROBILINOGEN 0.2 03/30/2022    BILIRUBINUR NEGATIVE 03/30/2022    BLOODU NEGATIVE 03/30/2022    GLUCOSEU Normal 11/05/2014    KETUA TRACE 03/30/2022     Urine Cultures: No results found for: LABURIN  Blood Cultures: No results found for: BC  No results found for: BLOODCULT2  Organism:   Lab Results   Component Value Date    [facility] ECOL 04/27/2015       Time Spent Discharging patient 35 minutes    Electronically signed by Levy Ledesma MD on 3/31/2022 at 10:53 AM

## 2022-04-01 NOTE — PROGRESS NOTES
Progress Note( Dr. Chelsea Varela)  4/1/2022  Subjective:   Admit Date: 3/29/2022  PCP: Moises Arzate MD    Admitted For :Strokelike symptoms hypertensive urgency and chest pain    Consulted For: Better control of blood glucose    Interval History: Better developed some weakness in the left left arm and leg    Denies any chest pains,   Denies SOB . Denies nausea or vomiting. No new bowel or bladder symptoms. No intake or output data in the 24 hours ending 04/01/22 1934    DATA    CBC: Recent Labs     03/29/22 1940 03/30/22 0252   WBC 9.2 24.3*   HGB 14.8 13.9    274    CMP:  Recent Labs     03/29/22 1940 03/30/22 0252   * 133*   K 4.6 4.0   CL 95* 97*   CO2 21 21   BUN 10 12   CREATININE 0.6 0.6   CALCIUM 9.6 8.9   PROT 7.4 6.2*   LABALBU 4.6 3.9   BILITOT 0.4 0.4   ALKPHOS 97 82   AST 36 41*   ALT 25 11     Lipids:   Lab Results   Component Value Date    CHOL 97 03/30/2022    HDL 37 03/30/2022    TRIG 127 03/30/2022     Glucose:  Recent Labs     03/31/22  1021 03/31/22  1129 03/31/22  1309   POCGLU 285* 232* 200*     [id number]:  Lab Results   Component Value Date    LABA1C 9.0 03/30/2022     High Sensitivity TSH:   Lab Results   Component Value Date    TSHHS 2.000 09/30/2021     Free T3:   Lab Results   Component Value Date    FT3 2.2 07/15/2016     Free T4:  Lab Results   Component Value Date    T4FREE 1.36 07/13/2021       MRI brain without contrast   Final Result   1. No acute intracranial abnormality. No acute infarct. 2. Mild global parenchymal volume loss with moderate chronic microvascular   ischemic changes. Findings are similar to the prior exam.         XR CHEST PORTABLE   Final Result      1. No acute cardiopulmonary abnormality. CTA HEAD NECK W CONTRAST   Final Result   No flow-limiting arterial stenosis in the head and neck. CT HEAD WO CONTRAST   Final Result   No acute intracranial abnormality.       Chronic microvascular ischemic changes and global cerebral atrophy. The findings were sent to the Radiology Results [address] at 8:04   pm on 3/29/2022to be communicated to a licensed caregiver. Scheduled Medicines   Medications:    Infusions:       Objective:   Vitals: /63   Pulse 79   Temp 97.9 °F (36.6 °C) (Oral)   Resp 19   Ht 5' 2\" (1.575 m)   Wt 172 lb 9.9 oz (78.3 kg)   SpO2 97%   BMI 31.57 kg/m²   General appearance: alert and cooperative with exam  Neck: no JVD or bruit  Thyroid : Normal lobes   Lungs: Has Vesicular Breath sounds   Heart:  regular rate and rhythm  Abdomen: soft, non-tender; bowel sounds normal; no masses,  no organomegaly  Musculoskeletal: Normal  Extremities: extremities normal, , no edema  Neurologic:  Awake, alert, oriented to name, place and time. Cranial nerves II-XII are grossly intact. Motor is  intact. Sensory is intact. ,  and gait is normal.    Assessment:     Patient Active Problem List:     Chest pain     H/O Doppler ultrasound     H/O cardiovascular stress test     H/O cardiovascular stress test     H/O cardiovascular stress test     Incarcerated umbilical hernia     Essential hypertension     Type 2 diabetes mellitus with diabetic polyneuropathy, with long-term current use of insulin (HCC)     Tachycardia     Dyslipidemia     Family history of early CAD     NSTEMI (non-ST elevated myocardial infarction) ([address]     Abnormal nuclear stress test     ILSA (obstructive sleep apnea)     Snoring     Hypersomnolence     Mild intermittent asthma without complication     Asthma exacerbation attacks     Hypertensive emergency     Hallucination, visual     Severe episode of recurrent major depressive disorder, with psychotic features (HCC)     CHEKO (generalized anxiety disorder)     Auditory hallucinations     Bipolar 1 disorder, depressed, severe (HCC)     Dyspnea and respiratory abnormalities     Encephalopathy     Syncope     Bipolar 1 disorder ([address]     Hyponatremia     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 attacks     Generalized abdominal pain     Diabetes mellitus due to underlying condition with stage 2 chronic kidney disease, without long-term current use of insulin (HCC)     Seizure (HCC)     Amyloid polyneuropathy (HCC)     Anemia     Anxiety     Osteoarthrosis     CHF (congestive heart failure) (HCC)     Coronary atherosclerosis     Chronic pain     Degeneration of lumbar intervertebral disc     Disorder of liver     Dysuria     Fibrocystic breast     Gastroesophageal reflux disease     Gastroparesis     Gout     H/O degenerative disc disease     Hyperglycemia due to type 2 diabetes mellitus ([address]     Hypothyroidism due to acquired atrophy of thyroid     Irritable bowel syndrome     Low back pain     Memory loss     Migraine     Migraine without aura, not refractory     Hyperlipidemia     Neck pain     Neuropathy     Nonalcoholic steatohepatitis (URENA)     Osteoporosis     Polyneuropathy due to type 2 diabetes mellitus [address]     Renal impairment     Suicidal ideation     Vitamin B12 deficiency     Hypothyroidism     Tremor     Anxiety disorder     Stroke-like symptoms     Left sided numbness      Plan:     1. Reviewed POC blood glucose . Labs and X ray results   2. Reviewed Current Medicines   3. On meal/ Correction bolus Humalog/ Basal Lantus Insulin regime   4. Monitor Blood glucose frequently   5. Modified  the dose of Insulin/ other medicines as needed  6. Physical therapy and possibly SNU  7. Will follow     .      Moises Arzate MD, MD

## 2022-04-04 ENCOUNTER — HOSPITAL ENCOUNTER (OUTPATIENT)
Age: 67
Setting detail: SPECIMEN
Discharge: HOME | End: 2022-04-04

## 2022-04-04 LAB
ALBUMIN SERPL-MCNC: 4.2 GM/DL (ref 3.4–5)
ALP BLD-CCNC: 81 IU/L (ref 40–128)
ALT SERPL-CCNC: 32 U/L (ref 10–40)
ANION GAP SERPL CALCULATED.3IONS-SCNC: 14 MMOL/L (ref 4–16)
AST SERPL-CCNC: 35 IU/L (ref 15–37)
BASOPHILS ABSOLUTE: 0.1 K/CU MM
BASOPHILS RELATIVE PERCENT: 1 % (ref 0–1)
BILIRUB SERPL-MCNC: 0.5 MG/DL (ref 0–1)
BUN BLDV-MCNC: 7 MG/DL (ref 6–23)
CALCIUM SERPL-MCNC: 9.3 MG/DL (ref 8.3–10.6)
CHLORIDE BLD-SCNC: 102 MMOL/L (ref 99–110)
CO2: 24 MMOL/L (ref 21–32)
CREAT SERPL-MCNC: 0.5 MG/DL (ref 0.6–1.1)
DIFFERENTIAL TYPE: ABNORMAL
EOSINOPHILS ABSOLUTE: 0.4 K/CU MM
EOSINOPHILS RELATIVE PERCENT: 4.4 % (ref 0–3)
GFR AFRICAN AMERICAN: >60 ML/MIN/1.73M2
GFR NON-AFRICAN AMERICAN: >60 ML/MIN/1.73M2
GLUCOSE BLD-MCNC: 213 MG/DL (ref 70–99)
HCT VFR BLD CALC: 39 % (ref 37–47)
HEMOGLOBIN: 13 GM/DL (ref 12.5–16)
IMMATURE NEUTROPHIL %: 0.4 % (ref 0–0.43)
LYMPHOCYTES ABSOLUTE: 3.3 K/CU MM
LYMPHOCYTES RELATIVE PERCENT: 39 % (ref 24–44)
MCH RBC QN AUTO: 27.7 PG (ref 27–31)
MCHC RBC AUTO-ENTMCNC: 33.3 % (ref 32–36)
MCV RBC AUTO: 83.2 FL (ref 78–100)
MONOCYTES ABSOLUTE: 0.9 K/CU MM
MONOCYTES RELATIVE PERCENT: 10.4 % (ref 0–4)
NUCLEATED RBC %: 0 %
PDW BLD-RTO: 11.6 % (ref 11.7–14.9)
PLATELET # BLD: 284 K/CU MM (ref 140–440)
PMV BLD AUTO: 9.5 FL (ref 7.5–11.1)
POTASSIUM SERPL-SCNC: 3.8 MMOL/L (ref 3.5–5.1)
RBC # BLD: 4.69 M/CU MM (ref 4.2–5.4)
SEGMENTED NEUTROPHILS ABSOLUTE COUNT: 3.7 K/CU MM
SEGMENTED NEUTROPHILS RELATIVE PERCENT: 44.8 % (ref 36–66)
SODIUM BLD-SCNC: 140 MMOL/L (ref 135–145)
TOTAL IMMATURE NEUTOROPHIL: 0.03 K/CU MM
TOTAL NUCLEATED RBC: 0 K/CU MM
TOTAL PROTEIN: 6.4 GM/DL (ref 6.4–8.2)
WBC # BLD: 8.3 K/CU MM (ref 4–10.5)

## 2022-04-04 PROCEDURE — 36415 COLL VENOUS BLD VENIPUNCTURE: CPT

## 2022-04-04 PROCEDURE — 85025 COMPLETE CBC W/AUTO DIFF WBC: CPT

## 2022-04-04 PROCEDURE — 80053 COMPREHEN METABOLIC PANEL: CPT

## 2022-04-07 ENCOUNTER — HOSPITAL ENCOUNTER (OUTPATIENT)
Age: 67
Setting detail: SPECIMEN
Discharge: HOME | End: 2022-04-07

## 2022-04-07 LAB
ERYTHROCYTE SEDIMENTATION RATE: 2 MM/HR (ref 0–30)
PRO-BNP: 70.14 PG/ML

## 2022-04-07 PROCEDURE — 83880 ASSAY OF NATRIURETIC PEPTIDE: CPT

## 2022-04-07 PROCEDURE — 85652 RBC SED RATE AUTOMATED: CPT

## 2022-04-07 PROCEDURE — 36415 COLL VENOUS BLD VENIPUNCTURE: CPT

## 2022-04-07 NOTE — PROGRESS NOTES
Physician Progress Note      PATIENT:               Sandra Lay  CSN #:                  [id number]  :                       1955  ADMIT DATE:       3/29/2022 7:34 PM  [address] [place] DATE:        3/31/2022 2:18 PM  RESPONDING  PROVIDER #:        Masoud Dick TEXT:    Hospitalists,    Pt admitted with stroke like symptoms and TIA. Pt noted to have  documentation   of possible psychosomatic as possible cause. If possible, please document in   progress notes and discharge summary if you are evaluating and /or treating   any of the following as the suspected cause of the TIA:    The medical record reflects the following:  Risk Factors: HTN  Clinical Indicators: Per H&P: \"May be psychosomatic in the context of   underlying bipolar disorder Bipolar 1 disorder, Hypertensive urgency, Per   Neurology documentation: \" stroke like symptoms likely due to migraine w/   aura. \";; unconfirmed possible PFO on ECHO  Treatment: labs, imaging, Neurology consult, Cardiology consult, ECHO, home   meds    Thank you,  Samy Wright RN CDS  [phone]  Options provided:  -- TIA symptoms due to psychosomatic due to bipolar  -- TIA due to HTN urgency  -- TIA symptoms due to migraine  -- TIA symptoms due to ##please specify)  -- TIA symptoms due to PFO  -- Other - I will add my own diagnosis  -- Disagree - Not applicable / Not valid  -- Disagree - Clinically unable to determine / Unknown  -- Refer to Clinical Documentation Reviewer    PROVIDER RESPONSE TEXT:    TIA symptoms are due to psychosomatic related to bipolar.     Query created by: Frankie Rodriguez on 2022 1:36 PM      Electronically signed by:  Kareem Gomez 2022 2:46 PM

## 2022-04-21 ENCOUNTER — APPOINTMENT (OUTPATIENT)
Dept: GENERAL RADIOLOGY | Age: 67
End: 2022-04-21
Payer: MEDICARE

## 2022-04-21 ENCOUNTER — APPOINTMENT (OUTPATIENT)
Dept: CT IMAGING | Age: 67
End: 2022-04-21
Payer: MEDICARE

## 2022-04-21 ENCOUNTER — HOSPITAL ENCOUNTER (EMERGENCY)
Age: 67
Discharge: HOME | End: 2022-04-21
Attending: STUDENT IN AN ORGANIZED HEALTH CARE EDUCATION/TRAINING PROGRAM
Payer: MEDICARE

## 2022-04-21 VITALS
WEIGHT: 173 LBS | RESPIRATION RATE: 21 BRPM | OXYGEN SATURATION: 96 % | BODY MASS INDEX: 31.83 KG/M2 | SYSTOLIC BLOOD PRESSURE: 149 MMHG | HEIGHT: 62 IN | TEMPERATURE: 99 F | HEART RATE: 108 BPM | DIASTOLIC BLOOD PRESSURE: 83 MMHG

## 2022-04-21 DIAGNOSIS — Z87.09 HISTORY OF ASTHMA: ICD-10-CM

## 2022-04-21 DIAGNOSIS — J40 BRONCHITIS: Primary | ICD-10-CM

## 2022-04-21 LAB
ALBUMIN SERPL-MCNC: 4 GM/DL (ref 3.4–5)
ALP BLD-CCNC: 99 IU/L (ref 40–129)
ALT SERPL-CCNC: 24 U/L (ref 10–40)
ANION GAP SERPL CALCULATED.3IONS-SCNC: 13 MMOL/L (ref 4–16)
AST SERPL-CCNC: 19 IU/L (ref 15–37)
BACTERIA: NEGATIVE /HPF
BASE EXCESS: 4 (ref 0–2.4)
BASOPHILS ABSOLUTE: 0.1 K/CU MM
BASOPHILS RELATIVE PERCENT: 0.4 % (ref 0–1)
BILIRUB SERPL-MCNC: 0.4 MG/DL (ref 0–1)
BILIRUBIN URINE: NEGATIVE MG/DL
BLOOD, URINE: NEGATIVE
BUN BLDV-MCNC: 8 MG/DL (ref 6–23)
CALCIUM SERPL-MCNC: 9.2 MG/DL (ref 8.3–10.6)
CHLORIDE BLD-SCNC: 100 MMOL/L (ref 99–110)
CLARITY: CLEAR
CO2: 23 MMOL/L (ref 21–32)
COLOR: YELLOW
COMMENT: ABNORMAL
CREAT SERPL-MCNC: 0.5 MG/DL (ref 0.6–1.1)
DIFFERENTIAL TYPE: ABNORMAL
EKG ATRIAL RATE: 80 BPM
EKG DIAGNOSIS: NORMAL
EKG P AXIS: 24 DEGREES
EKG P-R INTERVAL: 170 MS
EKG Q-T INTERVAL: 398 MS
EKG QRS DURATION: 112 MS
EKG QTC CALCULATION (BAZETT): 459 MS
EKG R AXIS: -42 DEGREES
EKG T AXIS: 53 DEGREES
EKG VENTRICULAR RATE: 80 BPM
EOSINOPHILS ABSOLUTE: 0.3 K/CU MM
EOSINOPHILS RELATIVE PERCENT: 1.8 % (ref 0–3)
GFR AFRICAN AMERICAN: >60 ML/MIN/1.73M2
GFR NON-AFRICAN AMERICAN: >60 ML/MIN/1.73M2
GLUCOSE BLD-MCNC: 146 MG/DL (ref 70–99)
GLUCOSE, URINE: 500 MG/DL
HCO3 VENOUS: 22.7 MMOL/L (ref 19–25)
HCT VFR BLD CALC: 37.2 % (ref 37–47)
HEMOGLOBIN: 12.2 GM/DL (ref 12.5–16)
IMMATURE NEUTROPHIL %: 0.4 % (ref 0–0.43)
KETONES, URINE: 15 MG/DL
LEUKOCYTE ESTERASE, URINE: ABNORMAL
LYMPHOCYTES ABSOLUTE: 2.6 K/CU MM
LYMPHOCYTES RELATIVE PERCENT: 18.4 % (ref 24–44)
MCH RBC QN AUTO: 27.7 PG (ref 27–31)
MCHC RBC AUTO-ENTMCNC: 32.8 % (ref 32–36)
MCV RBC AUTO: 84.5 FL (ref 78–100)
MONOCYTES ABSOLUTE: 1.4 K/CU MM
MONOCYTES RELATIVE PERCENT: 9.8 % (ref 0–4)
NITRITE URINE, QUANTITATIVE: NEGATIVE
NUCLEATED RBC %: 0 %
O2 SAT, VEN: 67.8 % (ref 50–70)
PCO2, VEN: 44 MMHG (ref 38–52)
PDW BLD-RTO: 11.6 % (ref 11.7–14.9)
PH VENOUS: 7.32 (ref 7.32–7.42)
PH, URINE: 6 (ref 5–8)
PLATELET # BLD: 281 K/CU MM (ref 140–440)
PMV BLD AUTO: 9.8 FL (ref 7.5–11.1)
PO2, VEN: 36 MMHG (ref 28–48)
POTASSIUM SERPL-SCNC: 3.8 MMOL/L (ref 3.5–5.1)
PRO-BNP: 90.67 PG/ML
PROTEIN UA: NEGATIVE MG/DL
RAPID INFLUENZA  B AGN: NEGATIVE
RAPID INFLUENZA A AGN: NEGATIVE
RBC # BLD: 4.4 M/CU MM (ref 4.2–5.4)
RBC URINE: 1 /HPF (ref 0–6)
SARS-COV-2, NAAT: NOT DETECTED
SEGMENTED NEUTROPHILS ABSOLUTE COUNT: 9.9 K/CU MM
SEGMENTED NEUTROPHILS RELATIVE PERCENT: 69.2 % (ref 36–66)
SODIUM BLD-SCNC: 136 MMOL/L (ref 135–145)
SPECIFIC GRAVITY UA: 1.02 (ref 1–1.03)
SQUAMOUS EPITHELIAL: 1 /HPF
TOTAL IMMATURE NEUTOROPHIL: 0.05 K/CU MM
TOTAL NUCLEATED RBC: 0 K/CU MM
TOTAL PROTEIN: 6.5 GM/DL (ref 6.4–8.2)
TRICHOMONAS: ABNORMAL /HPF
TROPONIN T: <0.01 NG/ML
TROPONIN T: <0.01 NG/ML
UROBILINOGEN, URINE: 0.2 MG/DL (ref 0.2–1)
WBC # BLD: 14.2 K/CU MM (ref 4–10.5)
WBC UA: 7 /HPF (ref 0–5)

## 2022-04-21 PROCEDURE — 84484 ASSAY OF TROPONIN QUANT: CPT

## 2022-04-21 PROCEDURE — 87635 SARS-COV-2 COVID-19 AMP PRB: CPT

## 2022-04-21 PROCEDURE — 6360000004 HC RX CONTRAST MEDICATION: Performed by: STUDENT IN AN ORGANIZED HEALTH CARE EDUCATION/TRAINING PROGRAM

## 2022-04-21 PROCEDURE — 74177 CT ABD & PELVIS W/CONTRAST: CPT

## 2022-04-21 PROCEDURE — 83880 ASSAY OF NATRIURETIC PEPTIDE: CPT

## 2022-04-21 PROCEDURE — 71275 CT ANGIOGRAPHY CHEST: CPT

## 2022-04-21 PROCEDURE — 87804 INFLUENZA ASSAY W/OPTIC: CPT

## 2022-04-21 PROCEDURE — 96374 THER/PROPH/DIAG INJ IV PUSH: CPT

## 2022-04-21 PROCEDURE — 83690 ASSAY OF LIPASE: CPT

## 2022-04-21 PROCEDURE — 85025 COMPLETE CBC W/AUTO DIFF WBC: CPT

## 2022-04-21 PROCEDURE — 93010 ELECTROCARDIOGRAM REPORT: CPT | Performed by: INTERNAL MEDICINE

## 2022-04-21 PROCEDURE — 96375 TX/PRO/DX INJ NEW DRUG ADDON: CPT

## 2022-04-21 PROCEDURE — 82805 BLOOD GASES W/O2 SATURATION: CPT

## 2022-04-21 PROCEDURE — 93005 ELECTROCARDIOGRAM TRACING: CPT | Performed by: STUDENT IN AN ORGANIZED HEALTH CARE EDUCATION/TRAINING PROGRAM

## 2022-04-21 PROCEDURE — 80053 COMPREHEN METABOLIC PANEL: CPT

## 2022-04-21 PROCEDURE — 81001 URINALYSIS AUTO W/SCOPE: CPT

## 2022-04-21 PROCEDURE — 6360000002 HC RX W HCPCS: Performed by: STUDENT IN AN ORGANIZED HEALTH CARE EDUCATION/TRAINING PROGRAM

## 2022-04-21 PROCEDURE — 2580000003 HC RX 258: Performed by: STUDENT IN AN ORGANIZED HEALTH CARE EDUCATION/TRAINING PROGRAM

## 2022-04-21 PROCEDURE — 96372 THER/PROPH/DIAG INJ SC/IM: CPT

## 2022-04-21 PROCEDURE — 99285 EMERGENCY DEPT VISIT HI MDM: CPT

## 2022-04-21 PROCEDURE — 87086 URINE CULTURE/COLONY COUNT: CPT

## 2022-04-21 PROCEDURE — 71045 X-RAY EXAM CHEST 1 VIEW: CPT

## 2022-04-21 RX ORDER — GUAIFENESIN/DEXTROMETHORPHAN 100-10MG/5
5 SYRUP ORAL 4 TIMES DAILY PRN
Qty: 120 ML | Refills: 0 | Status: SHIPPED | OUTPATIENT
Start: 2022-04-21 | End: 2022-05-01

## 2022-04-21 RX ORDER — ONDANSETRON 2 MG/ML
4 INJECTION INTRAMUSCULAR; INTRAVENOUS EVERY 6 HOURS PRN
Status: DISCONTINUED | OUTPATIENT
Start: 2022-04-21 | End: 2022-04-21 | Stop reason: HOSPADM

## 2022-04-21 RX ORDER — AZITHROMYCIN 250 MG/1
TABLET, FILM COATED ORAL
Qty: 1 PACKET | Refills: 0 | Status: SHIPPED | OUTPATIENT
Start: 2022-04-21 | End: 2022-05-01

## 2022-04-21 RX ORDER — LORAZEPAM 2 MG/ML
1 INJECTION INTRAMUSCULAR ONCE
Status: COMPLETED | OUTPATIENT
Start: 2022-04-21 | End: 2022-04-21

## 2022-04-21 RX ORDER — 0.9 % SODIUM CHLORIDE 0.9 %
500 INTRAVENOUS SOLUTION INTRAVENOUS ONCE
Status: COMPLETED | OUTPATIENT
Start: 2022-04-21 | End: 2022-04-21

## 2022-04-21 RX ORDER — PROMETHAZINE HYDROCHLORIDE 25 MG/ML
25 INJECTION, SOLUTION INTRAMUSCULAR; INTRAVENOUS ONCE
Status: COMPLETED | OUTPATIENT
Start: 2022-04-21 | End: 2022-04-21

## 2022-04-21 RX ORDER — ONDANSETRON 4 MG/1
4 TABLET, ORALLY DISINTEGRATING ORAL EVERY 8 HOURS PRN
Qty: 15 TABLET | Refills: 0 | Status: SHIPPED | OUTPATIENT
Start: 2022-04-21 | End: 2022-06-16 | Stop reason: SDUPTHER

## 2022-04-21 RX ORDER — ALBUTEROL SULFATE 90 UG/1
2 AEROSOL, METERED RESPIRATORY (INHALATION) EVERY 4 HOURS PRN
Qty: 18 G | Refills: 1 | Status: SHIPPED | OUTPATIENT
Start: 2022-04-21 | End: 2022-06-16

## 2022-04-21 RX ORDER — BENZONATATE 100 MG/1
100 CAPSULE ORAL 3 TIMES DAILY PRN
Qty: 10 CAPSULE | Refills: 0 | Status: SHIPPED | OUTPATIENT
Start: 2022-04-21 | End: 2022-04-28

## 2022-04-21 RX ADMIN — ONDANSETRON 4 MG: 2 INJECTION INTRAMUSCULAR; INTRAVENOUS at 16:38

## 2022-04-21 RX ADMIN — LORAZEPAM 1 MG: 2 INJECTION, SOLUTION INTRAMUSCULAR; INTRAVENOUS at 17:28

## 2022-04-21 RX ADMIN — SODIUM CHLORIDE 500 ML: 9 INJECTION, SOLUTION INTRAVENOUS at 17:28

## 2022-04-21 RX ADMIN — IOPAMIDOL 80 ML: 755 INJECTION, SOLUTION INTRAVENOUS at 16:10

## 2022-04-21 RX ADMIN — PROMETHAZINE HYDROCHLORIDE 25 MG: 25 INJECTION INTRAMUSCULAR; INTRAVENOUS at 17:58

## 2022-04-21 NOTE — CARE COORDINATION
CM noted pt for possible re-admission. Pt was recently admitted 3/29-3/31 for TIA. Pt was d/c to [place] for skilled. Pt returns today for cough and SOB for 11 days. CM went to see pt. The pt said she was d/c from [place] on 4/15. Pt [facility] home. [facility]. Pt DME includes walker. PT states besides the sinus infection she has been doing well at home. No CM needs at this time. Admission is still pending on labs and imaging at this time.

## 2022-04-21 NOTE — ED NOTES
Report given to Reena Rice RN. Care transferred at this time.       Myles Martinez RN  04/21/22 1922

## 2022-04-21 NOTE — ED NOTES
Pt arrived to EMS with complaints of cough and shortness of breath x 11 days.       Tex Lilly RN  04/21/22 9021
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
## 2022-04-22 LAB
CULTURE: NORMAL
LIPASE: 12 IU/L (ref 13–60)
Lab: NORMAL
SPECIMEN: NORMAL

## 2022-04-22 NOTE — PROGRESS NOTES
Discharge teaching given to patient via handout and verbal instruction. Patient verbalizes understanding. Explained follow-up appointments, reviewed new and home medications as outlined on the AVS. Denies any further questions or needs at this time. AVS signed at this time.     Electronically signed by Purnima Antonio RN on 4/21/2022 at 2035

## 2022-05-30 ENCOUNTER — APPOINTMENT (OUTPATIENT)
Dept: CT IMAGING | Age: 67
End: 2022-05-30
Payer: MEDICARE

## 2022-05-30 ENCOUNTER — APPOINTMENT (OUTPATIENT)
Dept: GENERAL RADIOLOGY | Age: 67
End: 2022-05-30
Payer: MEDICARE

## 2022-05-30 ENCOUNTER — HOSPITAL ENCOUNTER (EMERGENCY)
Age: 67
Discharge: HOME | End: 2022-05-30
Attending: STUDENT IN AN ORGANIZED HEALTH CARE EDUCATION/TRAINING PROGRAM
Payer: MEDICARE

## 2022-05-30 VITALS
TEMPERATURE: 98.3 F | DIASTOLIC BLOOD PRESSURE: 78 MMHG | RESPIRATION RATE: 17 BRPM | SYSTOLIC BLOOD PRESSURE: 143 MMHG | HEART RATE: 95 BPM | OXYGEN SATURATION: 95 %

## 2022-05-30 DIAGNOSIS — Z76.0 ENCOUNTER FOR MEDICATION REFILL: ICD-10-CM

## 2022-05-30 DIAGNOSIS — F41.1 ANXIETY STATE: ICD-10-CM

## 2022-05-30 DIAGNOSIS — R11.2 NON-INTRACTABLE VOMITING WITH NAUSEA, UNSPECIFIED VOMITING TYPE: ICD-10-CM

## 2022-05-30 DIAGNOSIS — G89.29 CHRONIC ABDOMINAL PAIN: ICD-10-CM

## 2022-05-30 DIAGNOSIS — R10.9 CHRONIC ABDOMINAL PAIN: ICD-10-CM

## 2022-05-30 DIAGNOSIS — R73.9 HYPERGLYCEMIA: Primary | ICD-10-CM

## 2022-05-30 DIAGNOSIS — R07.9 CHEST PAIN, UNSPECIFIED TYPE: ICD-10-CM

## 2022-05-30 LAB
ALBUMIN SERPL-MCNC: 4.5 GM/DL (ref 3.4–5)
ALP BLD-CCNC: 116 IU/L (ref 40–129)
ALT SERPL-CCNC: 18 U/L (ref 10–40)
ANION GAP SERPL CALCULATED.3IONS-SCNC: 14 MMOL/L (ref 4–16)
AST SERPL-CCNC: 23 IU/L (ref 15–37)
BACTERIA: NEGATIVE /HPF
BASE EXCESS MIXED: 0.3 (ref 0–2.3)
BASOPHILS ABSOLUTE: 0.1 K/CU MM
BASOPHILS RELATIVE PERCENT: 0.7 % (ref 0–1)
BILIRUB SERPL-MCNC: 0.3 MG/DL (ref 0–1)
BILIRUBIN URINE: NEGATIVE MG/DL
BLOOD, URINE: NEGATIVE
BUN BLDV-MCNC: 8 MG/DL (ref 6–23)
CALCIUM SERPL-MCNC: 9.7 MG/DL (ref 8.3–10.6)
CHLORIDE BLD-SCNC: 100 MMOL/L (ref 99–110)
CLARITY: CLEAR
CO2: 23 MMOL/L (ref 21–32)
COLOR: YELLOW
CREAT SERPL-MCNC: 0.6 MG/DL (ref 0.6–1.1)
DIFFERENTIAL TYPE: ABNORMAL
EOSINOPHILS ABSOLUTE: 0.3 K/CU MM
EOSINOPHILS RELATIVE PERCENT: 3.3 % (ref 0–3)
GFR AFRICAN AMERICAN: >60 ML/MIN/1.73M2
GFR NON-AFRICAN AMERICAN: >60 ML/MIN/1.73M2
GLUCOSE BLD-MCNC: 337 MG/DL (ref 70–99)
GLUCOSE BLD-MCNC: 340 MG/DL (ref 70–99)
GLUCOSE, URINE: >1000 MG/DL
HCO3 VENOUS: 26 MMOL/L (ref 19–25)
HCT VFR BLD CALC: 38.5 % (ref 37–47)
HEMOGLOBIN: 13 GM/DL (ref 12.5–16)
IMMATURE NEUTROPHIL %: 0.2 % (ref 0–0.43)
KETONES, URINE: NEGATIVE MG/DL
LACTATE: 2.7 MMOL/L (ref 0.4–2)
LEUKOCYTE ESTERASE, URINE: NEGATIVE
LIPASE: 21 IU/L (ref 13–60)
LYMPHOCYTES ABSOLUTE: 3.6 K/CU MM
LYMPHOCYTES RELATIVE PERCENT: 37.5 % (ref 24–44)
MCH RBC QN AUTO: 28.6 PG (ref 27–31)
MCHC RBC AUTO-ENTMCNC: 33.8 % (ref 32–36)
MCV RBC AUTO: 84.8 FL (ref 78–100)
MONOCYTES ABSOLUTE: 1 K/CU MM
MONOCYTES RELATIVE PERCENT: 10.6 % (ref 0–4)
MUCUS: ABNORMAL HPF
NITRITE URINE, QUANTITATIVE: NEGATIVE
NUCLEATED RBC %: 0 %
O2 SAT, VEN: 92.1 % (ref 50–70)
PCO2, VEN: 45 MMHG (ref 38–52)
PDW BLD-RTO: 12.3 % (ref 11.7–14.9)
PH VENOUS: 7.37 (ref 7.32–7.42)
PH, URINE: 5.5 (ref 5–8)
PLATELET # BLD: 291 K/CU MM (ref 140–440)
PMV BLD AUTO: 9.9 FL (ref 7.5–11.1)
PO2, VEN: 93 MMHG (ref 28–48)
POTASSIUM SERPL-SCNC: 4.4 MMOL/L (ref 3.5–5.1)
PRO-BNP: 60.45 PG/ML
PROTEIN UA: NEGATIVE MG/DL
RAPID INFLUENZA  B AGN: NEGATIVE
RAPID INFLUENZA A AGN: NEGATIVE
RBC # BLD: 4.54 M/CU MM (ref 4.2–5.4)
RBC URINE: ABNORMAL /HPF (ref 0–6)
SARS-COV-2, NAAT: NOT DETECTED
SEGMENTED NEUTROPHILS ABSOLUTE COUNT: 4.6 K/CU MM
SEGMENTED NEUTROPHILS RELATIVE PERCENT: 47.7 % (ref 36–66)
SODIUM BLD-SCNC: 137 MMOL/L (ref 135–145)
SOURCE: NORMAL
SPECIFIC GRAVITY UA: 1.01 (ref 1–1.03)
SQUAMOUS EPITHELIAL: 1 /HPF
TOTAL IMMATURE NEUTOROPHIL: 0.02 K/CU MM
TOTAL NUCLEATED RBC: 0 K/CU MM
TOTAL PROTEIN: 7.5 GM/DL (ref 6.4–8.2)
TRICHOMONAS: ABNORMAL /HPF
TROPONIN T: <0.01 NG/ML
UROBILINOGEN, URINE: 0.2 MG/DL (ref 0.2–1)
WBC # BLD: 9.7 K/CU MM (ref 4–10.5)
WBC UA: 2 /HPF (ref 0–5)

## 2022-05-30 PROCEDURE — 83880 ASSAY OF NATRIURETIC PEPTIDE: CPT

## 2022-05-30 PROCEDURE — 82962 GLUCOSE BLOOD TEST: CPT

## 2022-05-30 PROCEDURE — 82805 BLOOD GASES W/O2 SATURATION: CPT

## 2022-05-30 PROCEDURE — 93005 ELECTROCARDIOGRAM TRACING: CPT | Performed by: STUDENT IN AN ORGANIZED HEALTH CARE EDUCATION/TRAINING PROGRAM

## 2022-05-30 PROCEDURE — 96372 THER/PROPH/DIAG INJ SC/IM: CPT

## 2022-05-30 PROCEDURE — 81001 URINALYSIS AUTO W/SCOPE: CPT

## 2022-05-30 PROCEDURE — 83605 ASSAY OF LACTIC ACID: CPT

## 2022-05-30 PROCEDURE — 85025 COMPLETE CBC W/AUTO DIFF WBC: CPT

## 2022-05-30 PROCEDURE — 84484 ASSAY OF TROPONIN QUANT: CPT

## 2022-05-30 PROCEDURE — 80053 COMPREHEN METABOLIC PANEL: CPT

## 2022-05-30 PROCEDURE — 87635 SARS-COV-2 COVID-19 AMP PRB: CPT

## 2022-05-30 PROCEDURE — C9113 INJ PANTOPRAZOLE SODIUM, VIA: HCPCS | Performed by: EMERGENCY MEDICINE

## 2022-05-30 PROCEDURE — 2580000003 HC RX 258: Performed by: STUDENT IN AN ORGANIZED HEALTH CARE EDUCATION/TRAINING PROGRAM

## 2022-05-30 PROCEDURE — 87804 INFLUENZA ASSAY W/OPTIC: CPT

## 2022-05-30 PROCEDURE — 99285 EMERGENCY DEPT VISIT HI MDM: CPT

## 2022-05-30 PROCEDURE — 70450 CT HEAD/BRAIN W/O DYE: CPT

## 2022-05-30 PROCEDURE — 71275 CT ANGIOGRAPHY CHEST: CPT

## 2022-05-30 PROCEDURE — 6360000002 HC RX W HCPCS: Performed by: EMERGENCY MEDICINE

## 2022-05-30 PROCEDURE — 6360000002 HC RX W HCPCS: Performed by: STUDENT IN AN ORGANIZED HEALTH CARE EDUCATION/TRAINING PROGRAM

## 2022-05-30 PROCEDURE — 74177 CT ABD & PELVIS W/CONTRAST: CPT

## 2022-05-30 PROCEDURE — 6360000004 HC RX CONTRAST MEDICATION: Performed by: EMERGENCY MEDICINE

## 2022-05-30 PROCEDURE — 96374 THER/PROPH/DIAG INJ IV PUSH: CPT

## 2022-05-30 PROCEDURE — 71045 X-RAY EXAM CHEST 1 VIEW: CPT

## 2022-05-30 PROCEDURE — 83690 ASSAY OF LIPASE: CPT

## 2022-05-30 PROCEDURE — 96375 TX/PRO/DX INJ NEW DRUG ADDON: CPT

## 2022-05-30 RX ORDER — PROMETHAZINE HYDROCHLORIDE 25 MG/1
25 TABLET ORAL EVERY 6 HOURS PRN
Qty: 15 TABLET | Refills: 0 | Status: SHIPPED | OUTPATIENT
Start: 2022-05-30 | End: 2022-06-06

## 2022-05-30 RX ORDER — 0.9 % SODIUM CHLORIDE 0.9 %
500 INTRAVENOUS SOLUTION INTRAVENOUS ONCE
Status: COMPLETED | OUTPATIENT
Start: 2022-05-30 | End: 2022-05-30

## 2022-05-30 RX ORDER — PROMETHAZINE HYDROCHLORIDE 25 MG/ML
25 INJECTION, SOLUTION INTRAMUSCULAR; INTRAVENOUS ONCE
Status: COMPLETED | OUTPATIENT
Start: 2022-05-30 | End: 2022-05-30

## 2022-05-30 RX ORDER — DIPHENHYDRAMINE HYDROCHLORIDE 50 MG/ML
12.5 INJECTION INTRAMUSCULAR; INTRAVENOUS ONCE
Status: COMPLETED | OUTPATIENT
Start: 2022-05-30 | End: 2022-05-30

## 2022-05-30 RX ORDER — PANTOPRAZOLE SODIUM 40 MG/10ML
40 INJECTION, POWDER, LYOPHILIZED, FOR SOLUTION INTRAVENOUS ONCE
Status: COMPLETED | OUTPATIENT
Start: 2022-05-30 | End: 2022-05-30

## 2022-05-30 RX ORDER — FENTANYL CITRATE 50 UG/ML
50 INJECTION, SOLUTION INTRAMUSCULAR; INTRAVENOUS ONCE
Status: COMPLETED | OUTPATIENT
Start: 2022-05-30 | End: 2022-05-30

## 2022-05-30 RX ORDER — ONDANSETRON 4 MG/1
4 TABLET, ORALLY DISINTEGRATING ORAL EVERY 8 HOURS PRN
Qty: 15 TABLET | Refills: 1 | Status: SHIPPED | OUTPATIENT
Start: 2022-05-30

## 2022-05-30 RX ORDER — PANTOPRAZOLE SODIUM 40 MG/1
40 TABLET, DELAYED RELEASE ORAL
Qty: 180 TABLET | Refills: 0 | Status: SHIPPED | OUTPATIENT
Start: 2022-05-30 | End: 2022-07-06 | Stop reason: SDUPTHER

## 2022-05-30 RX ORDER — ONDANSETRON 2 MG/ML
4 INJECTION INTRAMUSCULAR; INTRAVENOUS EVERY 6 HOURS PRN
Status: DISCONTINUED | OUTPATIENT
Start: 2022-05-30 | End: 2022-05-30 | Stop reason: HOSPADM

## 2022-05-30 RX ORDER — HYDROXYZINE PAMOATE 25 MG/1
50 CAPSULE ORAL 3 TIMES DAILY PRN
Qty: 40 CAPSULE | Refills: 0 | Status: SHIPPED | OUTPATIENT
Start: 2022-05-30 | End: 2022-06-13

## 2022-05-30 RX ADMIN — PANTOPRAZOLE SODIUM 40 MG: 40 INJECTION, POWDER, LYOPHILIZED, FOR SOLUTION INTRAVENOUS at 08:42

## 2022-05-30 RX ADMIN — FENTANYL CITRATE 50 MCG: 50 INJECTION, SOLUTION INTRAMUSCULAR; INTRAVENOUS at 06:39

## 2022-05-30 RX ADMIN — PROMETHAZINE HYDROCHLORIDE 25 MG: 25 INJECTION INTRAMUSCULAR; INTRAVENOUS at 10:39

## 2022-05-30 RX ADMIN — SODIUM CHLORIDE 500 ML: 9 INJECTION, SOLUTION INTRAVENOUS at 06:41

## 2022-05-30 RX ADMIN — DIPHENHYDRAMINE HYDROCHLORIDE 12.5 MG: 50 INJECTION, SOLUTION INTRAMUSCULAR; INTRAVENOUS at 07:44

## 2022-05-30 RX ADMIN — IOPAMIDOL 80 ML: 755 INJECTION, SOLUTION INTRAVENOUS at 07:42

## 2022-05-30 RX ADMIN — ONDANSETRON 4 MG: 2 INJECTION INTRAMUSCULAR; INTRAVENOUS at 06:40

## 2022-05-30 ASSESSMENT — PAIN - FUNCTIONAL ASSESSMENT: PAIN_FUNCTIONAL_ASSESSMENT: 0-10

## 2022-05-30 ASSESSMENT — PAIN SCALES - GENERAL
PAINLEVEL_OUTOF10: 10
PAINLEVEL_OUTOF10: 10

## 2022-05-30 ASSESSMENT — PAIN DESCRIPTION - LOCATION: LOCATION: CHEST;BACK

## 2022-05-30 NOTE — ED TRIAGE NOTES
PT presents to the ED today via EMS with c/o chest pain, abdominal pain, and hyperglycemia. Per EMS patient is non-compliant with medications. Pt states that the chest pain started tonight and is intermittent. Patient is unable to describe the pain at this time.

## 2022-05-30 NOTE — ED PROVIDER NOTES
Glucose 340 (H) 70 - 99 MG/DL    Calcium 9.7 8.3 - 10.6 MG/DL    Albumin 4.5 3.4 - 5.0 GM/DL    Total Protein 7.5 6.4 - 8.2 GM/DL    Total Bilirubin 0.3 0.0 - 1.0 MG/DL    ALT 18 10 - 40 U/L    AST 23 15 - 37 IU/L    Alkaline Phosphatase 116 40 - 129 IU/L    GFR Non-African American >60 >60 mL/min/1.73m2    GFR African American >60 >60 mL/min/1.73m2    Anion Gap 14 4 - 16   Troponin   Result Value Ref Range    Troponin T <0.010 <0.01 NG/ML   Blood Gas, Venous   Result Value Ref Range    pH, Barry 7.37 7.32 - 7.42    pCO2, Barry 45 38 - 52 mmHG    pO2, Barry 93 (H) 28 - 48 mmHG    Base Exc, Mixed . 3 0 - 2.3    HCO3, Venous 26.0 (H) 19 - 25 MMOL/L    O2 Sat, Barry 92.1 (H) 50 - 70 %   Brain Natriuretic Peptide   Result Value Ref Range    Pro-BNP 60.45 <300 PG/ML   Lipase   Result Value Ref Range    Lipase 21 13 - 60 IU/L   Urinalysis   Result Value Ref Range    Color, UA YELLOW YELLOW    Clarity, UA CLEAR CLEAR    Glucose, Urine >1,000 (A) NEGATIVE MG/DL    Bilirubin Urine NEGATIVE NEGATIVE MG/DL    Ketones, Urine NEGATIVE NEGATIVE MG/DL    Specific Gravity, UA 1.010 1.001 - 1.035    Blood, Urine NEGATIVE NEGATIVE    pH, Urine 5.5 5.0 - 8.0    Protein, UA NEGATIVE NEGATIVE MG/DL    Urobilinogen, Urine 0.2 0.2 - 1.0 MG/DL    Nitrite Urine, Quantitative NEGATIVE NEGATIVE    Leukocyte Esterase, Urine NEGATIVE NEGATIVE    RBC, UA NONE SEEN 0 - 6 /HPF    WBC, UA 2 0 - 5 /HPF    Bacteria, UA NEGATIVE NEGATIVE /HPF    Squam Epithel, UA 1 /HPF    Mucus, UA RARE (A) NEGATIVE HPF    Trichomonas, UA NONE SEEN NONE SEEN /HPF   Lactic Acid   Result Value Ref Range    Lactate 2.7 (HH) 0.4 - 2.0 mMOL/L   POCT Glucose   Result Value Ref Range    POC Glucose 337 (H) 70 - 99 MG/DL   EKG 12 Lead   Result Value Ref Range    Ventricular Rate 84 BPM    Atrial Rate 84 BPM    P-R Interval 176 ms    QRS Duration 112 ms    Q-T Interval 394 ms    QTc Calculation (Bazett) 465 ms    P Axis 13 degrees    R Axis -41 degrees    T Axis 63 degrees Diagnosis       Normal sinus rhythm  Left axis deviation  Incomplete right bundle branch block  Voltage criteria for left ventricular hypertrophy  Possible Lateral infarct , age undetermined  Abnormal ECG  When compared with ECG of 21-APR-2022 09:13,  Incomplete right bundle branch block has replaced Incomplete left bundle branch block        Radiographs (if obtained):    [] Radiologist's Report Reviewed:  CT ABDOMEN PELVIS W IV CONTRAST Additional Contrast? None   Preliminary Result   No evidence of pulmonary embolism or acute pulmonary abnormality. No acute findings to the abdomen or pelvis. No findings to explain the   patient's symptoms. Stable incidental findings as above. CTA PULMONARY W CONTRAST   Preliminary Result   No evidence of pulmonary embolism or acute pulmonary abnormality. No acute findings to the abdomen or pelvis. No findings to explain the   patient's symptoms. Stable incidental findings as above. CT HEAD WO CONTRAST   Final Result   Stable exam without evidence for acute intracranial abnormality. XR CHEST PORTABLE   Final Result   Mild pulmonary vascular congestion             MDM:  37-year-old female presents with concern for hyperglycemia which improved by time of arrival here she is stating she is had blood sugars greater than 550. Here she is not having clinical work-up suggestion of DKA or HHS. Her nausea was controlled in the department. She is concerned that switching her Protonix to Prilosec coupled with running out of her Zofran has caused her signs and symptoms. She has reassuring otherwise work-up without evidence of pneumonia, aortic dissection, intra-abdominal surgical emergency, intra-abdominal infection, colitis, SBO. Patient had mildly elevated lactic which was likely secondary to her nausea and vomiting, IV fluids given. She has lactic less than 4, otherwise active, abdominal exam benign.   Will be discharged to follow as an outpatient, feeling significantly improved. Discharged in stable condition. Agreeable to call her GI tomorrow to arrange close follow-up. Hemodynamically stable at time of disposition. Clinical Impression:  1. Hyperglycemia    2. Chest pain, unspecified type    3. Chronic abdominal pain    4. Anxiety state    5. Non-intractable vomiting with nausea, unspecified vomiting type    6. Encounter for medication refill      Disposition referral (if applicable): Anuja Elizabeth MD  [facility], [address]   [phone]    Call in 1 day      [facility] Emergency Department  [facility] [id number]  [phone]    If symptoms worsen    Camila Leal MD  [address] [id number] [facility] [id number] [phone]    Call in 1 day  to see about establishing primary care    Disposition medications (if applicable):  New Prescriptions    HYDROXYZINE (VISTARIL) 25 MG CAPSULE    Take 2 capsules by mouth 3 times daily as needed for Anxiety (insomnia)    ONDANSETRON (ZOFRAN ODT) 4 MG DISINTEGRATING TABLET    Take 1 tablet by mouth every 8 hours as needed for Nausea    PANTOPRAZOLE (PROTONIX) 40 MG TABLET    Take 1 tablet by mouth 2 times daily (before meals)    PROMETHAZINE (PHENERGAN) 25 MG TABLET    Take 1 tablet by mouth every 6 hours as needed for Nausea       Comment: Please note this report has been produced using speech recognition software and may contain errors related to that system including errors in grammar, punctuation, and spelling, as well as words and phrases that may be inappropriate. Efforts were made to edit the dictations.         Cyril Mohs, MD  05/30/22 [id number]

## 2022-05-30 NOTE — ED NOTES
Pt medicated per [facility] and ready to be d/c'd.       Doug Stahl RN  05/30/22 [id number]

## 2022-05-30 NOTE — ED NOTES
Pt requested her blood sugar be checked,  at this time. Pt states this is about what it was with EMS and that she was initially in the 500s.      Jj Cuadra RN  05/30/22 3035

## 2022-05-30 NOTE — ED NOTES
Pt up to wheelchair for d/c. Pt started vomiting. Dr. Stock Shows aware.  Orders placed and okay to d/c after meds given     Rigo Canela RN  05/30/22 1038
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
## 2022-05-31 LAB
EKG ATRIAL RATE: 84 BPM
EKG DIAGNOSIS: NORMAL
EKG P AXIS: 13 DEGREES
EKG P-R INTERVAL: 176 MS
EKG Q-T INTERVAL: 394 MS
EKG QRS DURATION: 112 MS
EKG QTC CALCULATION (BAZETT): 465 MS
EKG R AXIS: -41 DEGREES
EKG T AXIS: 63 DEGREES
EKG VENTRICULAR RATE: 84 BPM

## 2022-05-31 PROCEDURE — 93010 ELECTROCARDIOGRAM REPORT: CPT | Performed by: INTERNAL MEDICINE

## 2022-06-16 ENCOUNTER — OFFICE VISIT (OUTPATIENT)
Dept: FAMILY MEDICINE CLINIC | Age: 67
End: 2022-06-16
Payer: MEDICARE

## 2022-06-16 VITALS
TEMPERATURE: 98.6 F | BODY MASS INDEX: 32.01 KG/M2 | WEIGHT: 175 LBS | OXYGEN SATURATION: 97 % | HEART RATE: 69 BPM | DIASTOLIC BLOOD PRESSURE: 78 MMHG | SYSTOLIC BLOOD PRESSURE: 112 MMHG

## 2022-06-16 DIAGNOSIS — G89.29 CHRONIC NONINTRACTABLE HEADACHE, UNSPECIFIED HEADACHE TYPE: ICD-10-CM

## 2022-06-16 DIAGNOSIS — F41.8 DEPRESSION WITH ANXIETY: ICD-10-CM

## 2022-06-16 DIAGNOSIS — E03.9 HYPOTHYROIDISM, UNSPECIFIED TYPE: Primary | ICD-10-CM

## 2022-06-16 DIAGNOSIS — G20 PARKINSON'S DISEASE (HCC): ICD-10-CM

## 2022-06-16 DIAGNOSIS — J30.9 ALLERGIC RHINITIS, UNSPECIFIED SEASONALITY, UNSPECIFIED TRIGGER: ICD-10-CM

## 2022-06-16 DIAGNOSIS — E78.5 DYSLIPIDEMIA: ICD-10-CM

## 2022-06-16 DIAGNOSIS — R51.9 CHRONIC NONINTRACTABLE HEADACHE, UNSPECIFIED HEADACHE TYPE: ICD-10-CM

## 2022-06-16 PROCEDURE — G8417 CALC BMI ABV UP PARAM F/U: HCPCS | Performed by: PHYSICIAN ASSISTANT

## 2022-06-16 PROCEDURE — 99214 OFFICE O/P EST MOD 30 MIN: CPT | Performed by: PHYSICIAN ASSISTANT

## 2022-06-16 PROCEDURE — 1036F TOBACCO NON-USER: CPT | Performed by: PHYSICIAN ASSISTANT

## 2022-06-16 PROCEDURE — G8427 DOCREV CUR MEDS BY ELIG CLIN: HCPCS | Performed by: PHYSICIAN ASSISTANT

## 2022-06-16 PROCEDURE — 3017F COLORECTAL CA SCREEN DOC REV: CPT | Performed by: PHYSICIAN ASSISTANT

## 2022-06-16 RX ORDER — GABAPENTIN 300 MG/1
400 CAPSULE ORAL 3 TIMES DAILY
COMMUNITY

## 2022-06-16 RX ORDER — LEVOTHYROXINE SODIUM 0.07 MG/1
75 TABLET ORAL
Qty: 30 TABLET | Refills: 0 | Status: SHIPPED | OUTPATIENT
Start: 2022-06-16 | End: 2022-07-06 | Stop reason: SDUPTHER

## 2022-06-16 RX ORDER — MAGNESIUM OXIDE 400 MG/1
400 TABLET ORAL 3 TIMES DAILY
Qty: 90 TABLET | Refills: 0 | Status: CANCELLED | OUTPATIENT
Start: 2022-06-16

## 2022-06-16 RX ORDER — NIFEDIPINE 90 MG/1
90 TABLET, EXTENDED RELEASE ORAL DAILY
Qty: 30 TABLET | Refills: 3 | Status: SHIPPED | OUTPATIENT
Start: 2022-06-16 | End: 2022-10-24 | Stop reason: SDUPTHER

## 2022-06-16 RX ORDER — SIMVASTATIN 20 MG
20 TABLET ORAL NIGHTLY
Qty: 30 TABLET | Refills: 0 | Status: SHIPPED | OUTPATIENT
Start: 2022-06-16 | End: 2022-07-06 | Stop reason: SDUPTHER

## 2022-06-16 RX ORDER — AMITRIPTYLINE HYDROCHLORIDE 25 MG/1
25 TABLET, FILM COATED ORAL NIGHTLY
Qty: 30 TABLET | Refills: 0 | Status: SHIPPED | OUTPATIENT
Start: 2022-06-16 | End: 2022-07-06 | Stop reason: SDUPTHER

## 2022-06-16 RX ORDER — HYDROXYZINE HYDROCHLORIDE 25 MG/1
2 TABLET, FILM COATED ORAL 2 TIMES DAILY PRN
COMMUNITY
Start: 2022-04-16 | End: 2022-11-01 | Stop reason: ALTCHOICE

## 2022-06-16 RX ORDER — PROMETHAZINE HYDROCHLORIDE 25 MG/1
25 TABLET ORAL EVERY 6 HOURS PRN
COMMUNITY
End: 2022-11-01 | Stop reason: ALTCHOICE

## 2022-06-16 RX ORDER — MONTELUKAST SODIUM 10 MG/1
10 TABLET ORAL NIGHTLY
Qty: 30 TABLET | Refills: 0 | Status: SHIPPED | OUTPATIENT
Start: 2022-06-16 | End: 2022-07-06 | Stop reason: SDUPTHER

## 2022-06-16 RX ORDER — METOPROLOL SUCCINATE 50 MG/1
50 TABLET, EXTENDED RELEASE ORAL DAILY
Qty: 30 TABLET | Refills: 5 | Status: SHIPPED | OUTPATIENT
Start: 2022-06-16

## 2022-06-16 RX ORDER — MAGNESIUM OXIDE 400 MG/1
400 TABLET ORAL 3 TIMES DAILY
Qty: 90 TABLET | Refills: 0 | Status: SHIPPED | OUTPATIENT
Start: 2022-06-16 | End: 2022-08-24 | Stop reason: SDUPTHER

## 2022-06-16 RX ORDER — LOSARTAN POTASSIUM 100 MG/1
100 TABLET ORAL DAILY
Qty: 30 TABLET | Refills: 3 | Status: SHIPPED | OUTPATIENT
Start: 2022-06-16 | End: 2022-06-28
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
## 2022-06-20 ENCOUNTER — HOSPITAL ENCOUNTER (OUTPATIENT)
Age: 67
Discharge: HOME | End: 2022-06-20
Payer: MEDICARE

## 2022-06-20 LAB
BASOPHILS ABSOLUTE: 0.1 K/CU MM
BASOPHILS RELATIVE PERCENT: 0.7 % (ref 0–1)
DIFFERENTIAL TYPE: ABNORMAL
EOSINOPHILS ABSOLUTE: 0.3 K/CU MM
EOSINOPHILS RELATIVE PERCENT: 3.1 % (ref 0–3)
HCT VFR BLD CALC: 40.4 % (ref 37–47)
HEMOGLOBIN: 13.4 GM/DL (ref 12.5–16)
IMMATURE NEUTROPHIL %: 0.1 % (ref 0–0.43)
LYMPHOCYTES ABSOLUTE: 3.1 K/CU MM
LYMPHOCYTES RELATIVE PERCENT: 37.7 % (ref 24–44)
MCH RBC QN AUTO: 28.1 PG (ref 27–31)
MCHC RBC AUTO-ENTMCNC: 33.2 % (ref 32–36)
MCV RBC AUTO: 84.7 FL (ref 78–100)
MONOCYTES ABSOLUTE: 0.9 K/CU MM
MONOCYTES RELATIVE PERCENT: 10.8 % (ref 0–4)
NUCLEATED RBC %: 0 %
PDW BLD-RTO: 11.9 % (ref 11.7–14.9)
PLATELET # BLD: 287 K/CU MM (ref 140–440)
PMV BLD AUTO: 9.6 FL (ref 7.5–11.1)
RBC # BLD: 4.77 M/CU MM (ref 4.2–5.4)
SEGMENTED NEUTROPHILS ABSOLUTE COUNT: 3.9 K/CU MM
SEGMENTED NEUTROPHILS RELATIVE PERCENT: 47.6 % (ref 36–66)
TOTAL IMMATURE NEUTOROPHIL: 0.01 K/CU MM
TOTAL NUCLEATED RBC: 0 K/CU MM
WBC # BLD: 8.1 K/CU MM (ref 4–10.5)

## 2022-06-20 PROCEDURE — 36415 COLL VENOUS BLD VENIPUNCTURE: CPT

## 2022-06-20 PROCEDURE — 85025 COMPLETE CBC W/AUTO DIFF WBC: CPT

## 2022-06-20 PROCEDURE — 82785 ASSAY OF IGE: CPT

## 2022-06-24 LAB — IMMUNOGLOBULIN E: <2 KU/L

## 2022-06-28 ENCOUNTER — OFFICE VISIT (OUTPATIENT)
Dept: CARDIOLOGY CLINIC | Age: 67
End: 2022-06-28
Payer: MEDICARE

## 2022-06-28 VITALS
SYSTOLIC BLOOD PRESSURE: 100 MMHG | HEIGHT: 62 IN | BODY MASS INDEX: 32.2 KG/M2 | WEIGHT: 175 LBS | DIASTOLIC BLOOD PRESSURE: 60 MMHG | HEART RATE: 62 BPM

## 2022-06-28 DIAGNOSIS — I73.9 CLAUDICATION (HCC): Primary | ICD-10-CM

## 2022-06-28 DIAGNOSIS — I50.9 OTHER CONGESTIVE HEART FAILURE (HCC): ICD-10-CM

## 2022-06-28 PROCEDURE — 3017F COLORECTAL CA SCREEN DOC REV: CPT | Performed by: NURSE PRACTITIONER

## 2022-06-28 PROCEDURE — G8427 DOCREV CUR MEDS BY ELIG CLIN: HCPCS | Performed by: NURSE PRACTITIONER

## 2022-06-28 PROCEDURE — G8417 CALC BMI ABV UP PARAM F/U: HCPCS | Performed by: NURSE PRACTITIONER

## 2022-06-28 PROCEDURE — 1036F TOBACCO NON-USER: CPT | Performed by: NURSE PRACTITIONER

## 2022-06-28 PROCEDURE — 99214 OFFICE O/P EST MOD 30 MIN: CPT | Performed by: NURSE PRACTITIONER

## 2022-06-28 RX ORDER — LOSARTAN POTASSIUM 50 MG/1
50 TABLET ORAL DAILY
Qty: 30 TABLET | Refills: 5 | Status: SHIPPED | OUTPATIENT
Start: 2022-06-28

## 2022-06-28 ASSESSMENT — ENCOUNTER SYMPTOMS
SHORTNESS OF BREATH: 0
ORTHOPNEA: 0

## 2022-06-28 NOTE — PROGRESS NOTES
6/28/2022  Primary cardiologist: Dr. Lalitha Martin:   Maria A Reddy  is an established 61 y.o.  female here for concerns for circulation issues in her legs      SUBJECTIVE/OBJECTIVE:  Maria A Reddy is a 61 y.o. female with a history of hypertension, hyperlipidemia, diabetes mellitus and chronic pain. HPI :   Maria A Reddy reports she has noticed pain in legs with right> left. Describes it as a sharp shooting pain that can start soles of her feet and she would have to the top of her foot. Also notes pain to hips with the right being greater than left as well. She has now is having difficult time with walking due to instability and weakness in her legs. She endorses pain with touching legs. She has ongoing edema to the lower legs. Review of Systems   Constitutional: Negative for diaphoresis and malaise/fatigue. Cardiovascular: Positive for claudication and leg swelling. Negative for chest pain, dyspnea on exertion, irregular heartbeat, near-syncope, orthopnea, palpitations and paroxysmal nocturnal dyspnea. Respiratory: Negative for shortness of breath. Musculoskeletal: Positive for arthritis and muscle weakness. Neurological: Positive for dizziness. Negative for light-headedness. Vitals:    06/28/22 1147   BP: 100/60   Pulse: 62   Weight: 175 lb (79.4 kg)   Height: 5' 2\" (1.575 m)     Patient-Reported Vitals 12/28/2020   Patient-Reported Weight -   Patient-Reported Height 5'2\"     Wt Readings from Last 3 Encounters:   06/28/22 175 lb (79.4 kg)   06/16/22 175 lb (79.4 kg)   05/02/22 170 lb (77.1 kg)     Body mass index is 32.01 kg/m². Physical Exam  Vitals reviewed. Neck:      Vascular: No carotid bruit. Cardiovascular:      Rate and Rhythm: Normal rate and regular rhythm. Pulses: Normal pulses. Heart sounds: Normal heart sounds. Pulmonary:      Effort: Pulmonary effort is normal.      Breath sounds: Normal breath sounds. Abdominal:      General: There is no distension.       Tenderness: There is no abdominal tenderness. Musculoskeletal:         General: No tenderness. Right lower leg: No edema. Left lower leg: No edema. Skin:     General: Skin is warm and dry. Capillary Refill: Capillary refill takes less than 2 seconds. Neurological:      General: No focal deficit present. Mental Status: She is alert. Psychiatric:         Mood and Affect: Mood normal.                Current Outpatient Medications   Medication Sig Dispense Refill    metoprolol succinate (TOPROL XL) 50 MG extended release tablet Take 1 tablet by mouth daily 30 tablet 5    NIFEdipine (PROCARDIA XL) 90 MG extended release tablet Take 1 tablet by mouth daily 30 tablet 3    losartan (COZAAR) 100 MG tablet Take 1 tablet by mouth daily 30 tablet 3    hydrOXYzine HCl (ATARAX) 25 MG tablet Take 2 tablets by mouth 2 times daily as needed      promethazine (PHENERGAN) 25 MG tablet Take 25 mg by mouth every 6 hours as needed for Nausea      gabapentin (NEURONTIN) 300 MG capsule Take 300 mg by mouth 3 times daily.       Probiotic Product (PROBIOTIC DIGESTIVE SUPP PO) Take 1 capsule by mouth every morning      levothyroxine (SYNTHROID) 75 MCG tablet Take 1 tablet by mouth every morning (before breakfast) 30 tablet 0    carbidopa-levodopa (SINEMET)  MG per tablet Take 1 tablet by mouth nightly 30 tablet 0    simvastatin (ZOCOR) 20 MG tablet Take 1 tablet by mouth nightly 30 tablet 0    amitriptyline (ELAVIL) 25 MG tablet Take 1 tablet by mouth nightly 30 tablet 0    montelukast (SINGULAIR) 10 MG tablet Take 1 tablet by mouth nightly 30 tablet 0    magnesium oxide (MAG-OX) 400 MG tablet Take 1 tablet by mouth 3 times daily 90 tablet 0    pantoprazole (PROTONIX) 40 MG tablet Take 1 tablet by mouth 2 times daily (before meals) 180 tablet 0    ondansetron (ZOFRAN ODT) 4 MG disintegrating tablet Take 1 tablet by mouth every 8 hours as needed for Nausea 15 tablet 1    albuterol sulfate HFA (VENTOLIN HFA) 108 (90 Base) MCG/ACT inhaler Inhale 2 puffs into the lungs 4 times daily as needed for Wheezing (Patient not taking: Reported on 6/16/2022) 54 g 1    fluticasone (FLONASE) 50 MCG/ACT nasal spray 1 spray by Each Nostril route daily 32 g 1    nystatin (MYCOSTATIN) 242775 UNIT/GM powder Apply 3 times daily to affected areas 1 each 0    levETIRAcetam (KEPPRA) 750 MG tablet Take 1 tablet by mouth 2 times daily 60 tablet 0    baclofen (LIORESAL) 10 MG tablet 1 tablet 2 times daily      diazePAM (VALIUM) 5 MG tablet as needed. (Patient not taking: Reported on 6/16/2022)      MUCINEX 600 MG extended release tablet 2 times daily as needed      QUEtiapine (SEROQUEL) 25 MG tablet Take 1 tablet by mouth 2 times daily (Patient taking differently: Take 25 mg by mouth 2 times daily Indications: 25 mg in am and 50 mg in pm ) 60 tablet 0    busPIRone (BUSPAR) 10 MG tablet Take 2 tablets by mouth 3 times daily 180 tablet 0    HYDROcodone-acetaminophen (NORCO)  MG per tablet Take 1 tablet by mouth every 6 hours as needed.        fluticasone-umeclidin-vilant (TRELEGY ELLIPTA) 100-62.5-25 MCG/INH AEPB Inhale 1 puff into the lungs daily (Patient taking differently: Inhale 1 puff into the lungs daily as needed (only takes prn daily due to yeast infections in mouth, is aware she is supposed to take daily) ) 1 each 5    OXcarbazepine (TRILEPTAL) 300 MG tablet Take 1 tablet by mouth 2 times daily 60 tablet 3    NOVOLOG FLEXPEN 100 UNIT/ML injection pen Inject into the skin See Admin Instructions 12/01/2020 patient states she follows sliding scale regimen BS > 150      TRESIBA FLEXTOUCH 200 UNIT/ML SOPN Inject 20 Units into the skin every morning (Patient taking differently: Inject into the skin nightly 04/26/21 Patient states she follows sliding scale) 1 pen 2    docusate sodium (COLACE) 100 MG capsule Take 1 capsule by mouth 2 times daily 30 capsule 0    aluminum & magnesium hydroxide-simethicone (MAALOX MAX) 400-400-40 MG/5ML SUSP Take 15 mLs by mouth every 6 hours as needed (heart burn) 120 mL 0    aspirin 81 MG tablet Take 81 mg by mouth daily      Multiple Vitamins-Iron (MULTI-VITAMIN/IRON PO) Take  by mouth. No current facility-administered medications for this visit. All pertinent data reviewed and discussed with patient    08/2017 cardiac catheterization  Angiograhically normal epicardial coronary arteries. Normal LV systolic function & wall motion. LVEF is 55 %. Patient tolerated the procedure well. No immediate complications      Echocardiogram 03/2022  Technically difficult examination; patient unable to tolerate exam.   Left ventricular systolic function is normal.   Ejection fraction is visually estimated at 50-55%. No significant valvular abnormalities. No evidence of any pericardial effusion. Possible PFO visualized with color Doppler; bubble study was inadequate. ASSESSMENT/PLAN:    Leg pain  Has leg pain that is described as a sharp shooting pain-which appears to be neuropathy related. With just brushing and against her leg to assess for warmth patient complains of pain. States that she has been told that she has blockage in her legs-recommend check ABIs for further evaluation further testing pending results      HFpEF   Reports he has a history HFpEF past.  Echocardiogram from March 2022 reviewed. Normal left ventricular systolic function with no significant valvular abnormalities noted: 5/30/2022 BNP normal-no significant swelling to lower legs   Weight stable   Does not appear to be in fluid overload. Advised low sodium diet   Weigh self daily       Hypertension  Blood pressure soft today :l decrease losartan to 50 mg daily      Tests ordered: APPLE  Follow-up  As scheduled      Signed:  WENDIE Mcguire CNP, 6/28/2022, 11:56 AM    An electronic signature was used to authenticate this note.     Please note this report has been partially produced using speech recognition software and may contain errors related to that system including errors in grammar, punctuation, and spelling, as well as words and phrases that may be inappropriate. If there are any questions or concerns please feel free to contact the dictating provider for clarification.

## 2022-07-06 ENCOUNTER — OFFICE VISIT (OUTPATIENT)
Dept: FAMILY MEDICINE CLINIC | Age: 67
End: 2022-07-06
Payer: MEDICARE

## 2022-07-06 VITALS
WEIGHT: 177.2 LBS | TEMPERATURE: 96.8 F | SYSTOLIC BLOOD PRESSURE: 132 MMHG | RESPIRATION RATE: 16 BRPM | BODY MASS INDEX: 32.41 KG/M2 | OXYGEN SATURATION: 97 % | DIASTOLIC BLOOD PRESSURE: 78 MMHG | HEART RATE: 73 BPM

## 2022-07-06 DIAGNOSIS — G20 PARKINSON'S DISEASE (HCC): ICD-10-CM

## 2022-07-06 DIAGNOSIS — E08.22 DIABETES MELLITUS DUE TO UNDERLYING CONDITION WITH CHRONIC KIDNEY DISEASE, WITHOUT LONG-TERM CURRENT USE OF INSULIN, UNSPECIFIED CKD STAGE (HCC): ICD-10-CM

## 2022-07-06 DIAGNOSIS — F41.8 DEPRESSION WITH ANXIETY: ICD-10-CM

## 2022-07-06 DIAGNOSIS — E11.42 POLYNEUROPATHY DUE TO TYPE 2 DIABETES MELLITUS (HCC): ICD-10-CM

## 2022-07-06 DIAGNOSIS — Z13.21 ENCOUNTER FOR VITAMIN DEFICIENCY SCREENING: ICD-10-CM

## 2022-07-06 DIAGNOSIS — Z76.0 MEDICATION REFILL: ICD-10-CM

## 2022-07-06 DIAGNOSIS — E78.5 DYSLIPIDEMIA: ICD-10-CM

## 2022-07-06 DIAGNOSIS — E03.9 HYPOTHYROIDISM, UNSPECIFIED TYPE: ICD-10-CM

## 2022-07-06 DIAGNOSIS — Z76.89 ENCOUNTER TO ESTABLISH CARE: Primary | ICD-10-CM

## 2022-07-06 DIAGNOSIS — R56.9 CONVULSIONS, UNSPECIFIED CONVULSION TYPE (HCC): ICD-10-CM

## 2022-07-06 DIAGNOSIS — J30.9 ALLERGIC RHINITIS, UNSPECIFIED SEASONALITY, UNSPECIFIED TRIGGER: ICD-10-CM

## 2022-07-06 PROCEDURE — 2022F DILAT RTA XM EVC RTNOPTHY: CPT | Performed by: NURSE PRACTITIONER

## 2022-07-06 PROCEDURE — 99203 OFFICE O/P NEW LOW 30 MIN: CPT | Performed by: NURSE PRACTITIONER

## 2022-07-06 PROCEDURE — G8427 DOCREV CUR MEDS BY ELIG CLIN: HCPCS | Performed by: NURSE PRACTITIONER

## 2022-07-06 PROCEDURE — 3017F COLORECTAL CA SCREEN DOC REV: CPT | Performed by: NURSE PRACTITIONER

## 2022-07-06 PROCEDURE — 1036F TOBACCO NON-USER: CPT | Performed by: NURSE PRACTITIONER

## 2022-07-06 PROCEDURE — 3046F HEMOGLOBIN A1C LEVEL >9.0%: CPT | Performed by: NURSE PRACTITIONER

## 2022-07-06 PROCEDURE — G8417 CALC BMI ABV UP PARAM F/U: HCPCS | Performed by: NURSE PRACTITIONER

## 2022-07-06 PROCEDURE — 83036 HEMOGLOBIN GLYCOSYLATED A1C: CPT | Performed by: NURSE PRACTITIONER

## 2022-07-06 RX ORDER — SIMVASTATIN 20 MG
20 TABLET ORAL NIGHTLY
Qty: 30 TABLET | Refills: 3 | Status: SHIPPED | OUTPATIENT
Start: 2022-07-06

## 2022-07-06 RX ORDER — MONTELUKAST SODIUM 10 MG/1
10 TABLET ORAL NIGHTLY
Qty: 30 TABLET | Refills: 3 | Status: SHIPPED | OUTPATIENT
Start: 2022-07-06

## 2022-07-06 RX ORDER — LEVOTHYROXINE SODIUM 0.07 MG/1
75 TABLET ORAL
Qty: 30 TABLET | Refills: 3 | Status: SHIPPED | OUTPATIENT
Start: 2022-07-06

## 2022-07-06 RX ORDER — INSULIN DEGLUDEC 200 U/ML
20 INJECTION, SOLUTION SUBCUTANEOUS EVERY MORNING
Qty: 1 PEN | Refills: 2 | Status: SHIPPED | OUTPATIENT
Start: 2022-07-06

## 2022-07-06 RX ORDER — AMITRIPTYLINE HYDROCHLORIDE 25 MG/1
25 TABLET, FILM COATED ORAL NIGHTLY
Qty: 30 TABLET | Refills: 1 | Status: SHIPPED | OUTPATIENT
Start: 2022-07-06 | End: 2022-07-12 | Stop reason: SDUPTHER

## 2022-07-06 RX ORDER — INSULIN ASPART 100 [IU]/ML
15 INJECTION, SOLUTION INTRAVENOUS; SUBCUTANEOUS
Qty: 5 PEN | Refills: 3 | Status: SHIPPED | OUTPATIENT
Start: 2022-07-06

## 2022-07-06 RX ORDER — PANTOPRAZOLE SODIUM 40 MG/1
40 TABLET, DELAYED RELEASE ORAL
Qty: 180 TABLET | Refills: 1 | Status: SHIPPED | OUTPATIENT
Start: 2022-07-06 | End: 2022-09-29 | Stop reason: SDUPTHER

## 2022-07-06 SDOH — ECONOMIC STABILITY: FOOD INSECURITY: WITHIN THE PAST 12 MONTHS, THE FOOD YOU BOUGHT JUST DIDN'T LAST AND YOU DIDN'T HAVE MONEY TO GET MORE.: SOMETIMES TRUE

## 2022-07-06 SDOH — ECONOMIC STABILITY: FOOD INSECURITY: WITHIN THE PAST 12 MONTHS, YOU WORRIED THAT YOUR FOOD WOULD RUN OUT BEFORE YOU GOT MONEY TO BUY MORE.: SOMETIMES TRUE

## 2022-07-06 ASSESSMENT — PATIENT HEALTH QUESTIONNAIRE - PHQ9
10. IF YOU CHECKED OFF ANY PROBLEMS, HOW DIFFICULT HAVE THESE PROBLEMS MADE IT FOR YOU TO DO YOUR WORK, TAKE CARE OF THINGS AT HOME, OR GET ALONG WITH OTHER PEOPLE: 3
7. TROUBLE CONCENTRATING ON THINGS, SUCH AS READING THE NEWSPAPER OR WATCHING TELEVISION: 3
SUM OF ALL RESPONSES TO PHQ QUESTIONS 1-9: 4
2. FEELING DOWN, DEPRESSED OR HOPELESS: 2
3. TROUBLE FALLING OR STAYING ASLEEP: 2
9. THOUGHTS THAT YOU WOULD BE BETTER OFF DEAD, OR OF HURTING YOURSELF: 0
1. LITTLE INTEREST OR PLEASURE IN DOING THINGS: 3
SUM OF ALL RESPONSES TO PHQ QUESTIONS 1-9: 21
SUM OF ALL RESPONSES TO PHQ9 QUESTIONS 1 & 2: 6
4. FEELING TIRED OR HAVING LITTLE ENERGY: 3
6. FEELING BAD ABOUT YOURSELF - OR THAT YOU ARE A FAILURE OR HAVE LET YOURSELF OR YOUR FAMILY DOWN: 1
SUM OF ALL RESPONSES TO PHQ QUESTIONS 1-9: 4
2. FEELING DOWN, DEPRESSED OR HOPELESS: 3
5. POOR APPETITE OR OVEREATING: 3
SUM OF ALL RESPONSES TO PHQ QUESTIONS 1-9: 21
SUM OF ALL RESPONSES TO PHQ QUESTIONS 1-9: 21
1. LITTLE INTEREST OR PLEASURE IN DOING THINGS: 2
SUM OF ALL RESPONSES TO PHQ QUESTIONS 1-9: 21
SUM OF ALL RESPONSES TO PHQ QUESTIONS 1-9: 4
8. MOVING OR SPEAKING SO SLOWLY THAT OTHER PEOPLE COULD HAVE NOTICED. OR THE OPPOSITE, BEING SO FIGETY OR RESTLESS THAT YOU HAVE BEEN MOVING AROUND A LOT MORE THAN USUAL: 3
SUM OF ALL RESPONSES TO PHQ9 QUESTIONS 1 & 2: 4
SUM OF ALL RESPONSES TO PHQ QUESTIONS 1-9: 4

## 2022-07-06 ASSESSMENT — ENCOUNTER SYMPTOMS
COUGH: 0
WHEEZING: 0
CHEST TIGHTNESS: 0
SHORTNESS OF BREATH: 0

## 2022-07-06 ASSESSMENT — SOCIAL DETERMINANTS OF HEALTH (SDOH): HOW HARD IS IT FOR YOU TO PAY FOR THE VERY BASICS LIKE FOOD, HOUSING, MEDICAL CARE, AND HEATING?: SOMEWHAT HARD
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
## 2022-07-08 ASSESSMENT — ENCOUNTER SYMPTOMS
GASTROINTESTINAL NEGATIVE: 1
BACK PAIN: 1

## 2022-07-12 ENCOUNTER — OFFICE VISIT (OUTPATIENT)
Dept: NEUROLOGY | Age: 67
End: 2022-07-12
Payer: MEDICARE

## 2022-07-12 VITALS
BODY MASS INDEX: 32.61 KG/M2 | OXYGEN SATURATION: 97 % | HEART RATE: 73 BPM | HEIGHT: 62 IN | WEIGHT: 177.2 LBS | SYSTOLIC BLOOD PRESSURE: 128 MMHG | DIASTOLIC BLOOD PRESSURE: 72 MMHG

## 2022-07-12 DIAGNOSIS — F41.8 DEPRESSION WITH ANXIETY: ICD-10-CM

## 2022-07-12 DIAGNOSIS — G43.009 MIGRAINE WITHOUT AURA, NOT REFRACTORY: ICD-10-CM

## 2022-07-12 DIAGNOSIS — F31.9 BIPOLAR 1 DISORDER (HCC): ICD-10-CM

## 2022-07-12 DIAGNOSIS — G47.33 OSA (OBSTRUCTIVE SLEEP APNEA): ICD-10-CM

## 2022-07-12 DIAGNOSIS — G43.109 COMPLICATED MIGRAINE: Primary | ICD-10-CM

## 2022-07-12 DIAGNOSIS — I50.9 CONGESTIVE HEART FAILURE, UNSPECIFIED HF CHRONICITY, UNSPECIFIED HEART FAILURE TYPE (HCC): ICD-10-CM

## 2022-07-12 DIAGNOSIS — F41.1 GAD (GENERALIZED ANXIETY DISORDER): ICD-10-CM

## 2022-07-12 DIAGNOSIS — F41.0 PANIC ATTACKS: Chronic | ICD-10-CM

## 2022-07-12 DIAGNOSIS — R56.9 PSEUDOSEIZURES (HCC): ICD-10-CM

## 2022-07-12 DIAGNOSIS — E78.5 DYSLIPIDEMIA: ICD-10-CM

## 2022-07-12 DIAGNOSIS — F41.9 ANXIETY DISORDER, UNSPECIFIED TYPE: ICD-10-CM

## 2022-07-12 DIAGNOSIS — F33.3 SEVERE EPISODE OF RECURRENT MAJOR DEPRESSIVE DISORDER, WITH PSYCHOTIC FEATURES (HCC): ICD-10-CM

## 2022-07-12 PROCEDURE — 3017F COLORECTAL CA SCREEN DOC REV: CPT | Performed by: NURSE PRACTITIONER

## 2022-07-12 PROCEDURE — 1036F TOBACCO NON-USER: CPT | Performed by: NURSE PRACTITIONER

## 2022-07-12 PROCEDURE — G8427 DOCREV CUR MEDS BY ELIG CLIN: HCPCS | Performed by: NURSE PRACTITIONER

## 2022-07-12 PROCEDURE — G8417 CALC BMI ABV UP PARAM F/U: HCPCS | Performed by: NURSE PRACTITIONER

## 2022-07-12 PROCEDURE — 99215 OFFICE O/P EST HI 40 MIN: CPT | Performed by: NURSE PRACTITIONER

## 2022-07-12 RX ORDER — AMITRIPTYLINE HYDROCHLORIDE 25 MG/1
50 TABLET, FILM COATED ORAL NIGHTLY
Qty: 60 TABLET | Refills: 3 | Status: SHIPPED | OUTPATIENT
Start: 2022-07-12

## 2022-07-12 NOTE — PROGRESS NOTES
7/12/22    Hermandedrick Van  1955    Chief Complaint   Patient presents with    Follow-Up from Hospital     pt presents from a hospital visit for left sided weakness, pt states she does not even remember all that     Tremors     pt's pcp wanted her to also f/u to have her hx of convulsions and parkinsons be managed, pt states she has this feeling of almost withdraws where she shakes uncontrollably and she believes it maybe seizure related       History of Present Illness  Isaac Gay is a 61 y.o. female presenting today for follow-up of: Hospital follow-up from occurrence on 3/29/2022 for intermittent left-sided numbness to her face, and upper and lower extremity, slurred speech, and left-sided weakness. Patient has health history of GERD, T2DM, diabetic neuropathy, bipolar 1 disorder, degenerative disc disease, CKD, pseudoseizures, HLD, HTN, migraine headache, Parkinson's disease on Sinemet. CTA head no acute intracranial abnormality, CTA head and neck no flow-limiting arterial stenosis, MRI brain no acute intracranial abnormality, no acute infarct. Discharge diagnosis complicated migraine, chronic daily headache. Patient has had Botox injections in the past that were helpful. This was many years ago, she is unsure how long ago or why she stopped getting Botox injections. Patient to remain on aspirin and statin for secondary stroke prevention. Isaac Gay reports having daily headaches and migraines. She has history of HTN. She reports having a headache every day. She reports waking up with them starting out as a migraine. Pain in right frontal area, light and sound sensitivity, nausea (already always present due to GI issues). Pain rated 10/10. Later in the day the pain will move across her forehead, photo and phonophobia remains as well as nausea, pain remains a 10/10. She reports having floating black or white spots in her vision, both eyes. This occurs all throughout the day.  Daily headaches have been ongoing for 6-12 months. She reports also having left and right sided weakness, numbness left and right side UE/LE, usually slurred speech. All of this occurs every day but will be much worse when her anxiety is high. She is on amitriptyline 25 mg HS, she is unsure why she is on this but has been for \"quite a few years\". She takes norco Q6 hrs (4X daily) 10/325 mg every day, prescribed by pain management who she follows for neck and back pain. She reports that she has history of ILSA, diagnosed several years ago and used a PAP device. She tells me that she stopped using device as she was told that her oxygen levels were fine and she did not need this any longer. She tells me that she is scheduled to have a home sleep study done to determine if she still has ILSA. She tells me today that her PCP is referring her to us to manage her Parkinson's disease and \"convulsions\". She tells me that she was started on Sinemet for management of RLS as she was having shaking in her legs and feet. She denies having RLS symptoms, she reports just having bilateral leg shaking. She denies having a resting tremor or any tremor for that matter. She does have slowness with movement but this is due to chronic pain. She does not feel that she is rigid. Her voice has not softened, her handwriting has not gotten smaller. Isaac Gay is very confused about her health care, her medications, and her medical diagnoses. She is unsure why she is taking the medications that she is taking, she does not know what chronic conditions she has and does not have, she is unable to go through her list with me to get rid of comorbidities that are not actual diagnosis of hers.   Current Outpatient Medications   Medication Sig Dispense Refill    amitriptyline (ELAVIL) 25 MG tablet Take 1 tablet by mouth nightly 30 tablet 1    levothyroxine (SYNTHROID) 75 MCG tablet Take 1 tablet by mouth every morning (before breakfast) 30 tablet 3    areas 1 each 0    levETIRAcetam (KEPPRA) 750 MG tablet Take 1 tablet by mouth 2 times daily 60 tablet 0    baclofen (LIORESAL) 10 MG tablet 1 tablet 2 times daily      diazePAM (VALIUM) 5 MG tablet as needed.  MUCINEX 600 MG extended release tablet 2 times daily as needed      QUEtiapine (SEROQUEL) 25 MG tablet Take 1 tablet by mouth 2 times daily (Patient taking differently: Take 25 mg by mouth 2 times daily Indications: 25 mg in am and 50 mg in pm ) 60 tablet 0    busPIRone (BUSPAR) 10 MG tablet Take 2 tablets by mouth 3 times daily 180 tablet 0    HYDROcodone-acetaminophen (NORCO)  MG per tablet Take 1 tablet by mouth every 6 hours as needed.  fluticasone-umeclidin-vilant (TRELEGY ELLIPTA) 100-62.5-25 MCG/INH AEPB Inhale 1 puff into the lungs daily (Patient taking differently: Inhale 1 puff into the lungs daily as needed (only takes prn daily due to yeast infections in mouth, is aware she is supposed to take daily) ) 1 each 5    OXcarbazepine (TRILEPTAL) 300 MG tablet Take 1 tablet by mouth 2 times daily 60 tablet 3    docusate sodium (COLACE) 100 MG capsule Take 1 capsule by mouth 2 times daily 30 capsule 0    aluminum & magnesium hydroxide-simethicone (MAALOX MAX) 400-400-40 MG/5ML SUSP Take 15 mLs by mouth every 6 hours as needed (heart burn) (Patient not taking: Reported on 7/6/2022) 120 mL 0    aspirin 81 MG tablet Take 81 mg by mouth daily      Multiple Vitamins-Iron (MULTI-VITAMIN/IRON PO) Take  by mouth. No current facility-administered medications for this visit.        Physical Exam:    Mental Status: A&O to self, location, month and year, NAD, speech clear, language fluent, repetition and naming intact, follows commands appropriately     Cranial Nerve Exam:   CN II-XII: PERRL, VFF, no nystagmus, no gaze paresis, sensation V1-V3 intact b/l, muscles of facial expression symmetric; hearing intact to conversational tone, palate elevates symmetrically, shoulder elevation symmetric and tongue protrudes midline with movement side to side.     Motor Exam:       Strength 3/5 BL RUE/RLE Generalized weakness  Tone and bulk normal   No pronator drift     Deep Tendon Reflexes: 2/4 biceps, triceps, brachioradialis, patellar, and achilles b/l; flexor plantar responses b/l     Sensation: Intact light touch/vibration UE's/decreased sensation to vibration to LE's b/l     Coordination/Cerebellum:       Tremors--none      Rapidly alternating movements:  dysdiadochokinesia b/l                Heel-to-Shin: no dysmetria b/l      Finger-to-Nose: no dysmetria b/l     Gait and stance:      Gait: ambulates independently, has good arm swing with ambulation however patient is walking as though she is in pain so she is unable to have a nice steady stride. No tremor with ambulation, no stooped posture.         /72 (Site: Left Upper Arm, Position: Sitting, Cuff Size: Large Adult)   Pulse 73   Ht 5' 2\" (1.575 m)   Wt 177 lb 3.2 oz (80.4 kg)   SpO2 97%   BMI 32.41 kg/m²     Assessment and Plan     Diagnosis Orders   1. Complicated migraine     2. Pseudoseizures ([address]     3. Migraine without aura, not refractory     4. Anxiety disorder, unspecified type     5. CHEKO (generalized anxiety disorder)     6. ILSA (obstructive sleep apnea)     7. Severe episode of recurrent major depressive disorder, with psychotic features ([address]     8. Panic attacks     9. Dyslipidemia     10. Congestive heart failure, unspecified HF chronicity, unspecified heart failure type ([address]     11. Bipolar 1 disorder ([address]       I am going to increase her amitriptyline to 50 mg HS for management of migraine. Che Smallwood did have a migraine start during our exam, I did provide her with a sample of Nurtec, she will let me know if this helps to alleviate her headache. We did discuss following up with pulmonary who ordered her home sleep study to request a polysomnogram for a more reliable result.   HST results are notoriously wrong and often patients will be diagnosed with normal HST results and when restudied by PSG and of having severe apnea. I do feel it is imperative for [employer] to have a PSG since she has history of ILSA, she has daily headaches, she has all of these other comorbidities that can all be harder to manage and increase her risk for CVA, MI, dementia etc. with undiagnosed/untreated ILSA. I did not find any evidence on exam today of Parkinson's, it is unclear to me why she is actually on Sinemet. Since PCP is referring her to our group for management of \"Parkinson's\" and \"convulsions\" I will have her follow-up with Dr. Danny Ku on next visit in 3 months as today's visit was a hospital follow-up for complicated migraine and there was not time to address more than what I have stated in this note. Medications prescribed for the patient were discussed in detail. This included a discussion of the potential risks versus potential benefits of the medications. The patient was given time to ask questions and these were answered to the best of my ability. The patient appeared to understand the information provided. [employer] will let me know how she is doing on the increased dose of amitriptyline. I did encourage her to discontinue her Seroquel at bedtime dose until she sees how she tolerates the increased dose of amitriptyline. Return in about 3 months (around 10/12/2022).     Fern Lozada, WENDIE - NP

## 2022-07-19 ENCOUNTER — TELEPHONE (OUTPATIENT)
Dept: NEUROLOGY | Age: 67
End: 2022-07-19

## 2022-07-19 NOTE — TELEPHONE ENCOUNTER
Pt called and stated the only medication that has helped her for migraines is Reglan. She was taking Reglan for her gastro issues however she found it helps with migraines also. Pt has to take benadryl with it because she has nausea and migraines if she isn't given benadryl with the Reglan.

## 2022-07-28 NOTE — TELEPHONE ENCOUNTER
Per Ira Torres she has cough, nasal and chest congestion, nauseated, abd pain, fatigue but no fever for one week. Telephone visit appt scheduled for 7/29 due to Ira Torres doesn't do virtual appts.

## 2022-07-31 ENCOUNTER — HOSPITAL ENCOUNTER (EMERGENCY)
Age: 67
Discharge: HOME | End: 2022-07-31
Payer: MEDICARE

## 2022-07-31 ENCOUNTER — APPOINTMENT (OUTPATIENT)
Dept: GENERAL RADIOLOGY | Age: 67
End: 2022-07-31
Payer: MEDICARE

## 2022-07-31 VITALS
RESPIRATION RATE: 15 BRPM | WEIGHT: 170 LBS | OXYGEN SATURATION: 96 % | BODY MASS INDEX: 31.09 KG/M2 | HEART RATE: 88 BPM | TEMPERATURE: 98.5 F | DIASTOLIC BLOOD PRESSURE: 80 MMHG | SYSTOLIC BLOOD PRESSURE: 134 MMHG

## 2022-07-31 DIAGNOSIS — H92.03 OTALGIA, BILATERAL: ICD-10-CM

## 2022-07-31 DIAGNOSIS — J06.9 UPPER RESPIRATORY TRACT INFECTION, UNSPECIFIED TYPE: Primary | ICD-10-CM

## 2022-07-31 LAB
SARS-COV-2, NAAT: NOT DETECTED
SOURCE: NORMAL

## 2022-07-31 PROCEDURE — 71046 X-RAY EXAM CHEST 2 VIEWS: CPT

## 2022-07-31 PROCEDURE — 87635 SARS-COV-2 COVID-19 AMP PRB: CPT

## 2022-07-31 PROCEDURE — 6370000000 HC RX 637 (ALT 250 FOR IP): Performed by: PHYSICIAN ASSISTANT

## 2022-07-31 PROCEDURE — 99284 EMERGENCY DEPT VISIT MOD MDM: CPT

## 2022-07-31 RX ORDER — ONDANSETRON 4 MG/1
4 TABLET, ORALLY DISINTEGRATING ORAL ONCE
Status: COMPLETED | OUTPATIENT
Start: 2022-07-31 | End: 2022-07-31

## 2022-07-31 RX ORDER — GUAIFENESIN/DEXTROMETHORPHAN 100-10MG/5
5 SYRUP ORAL 3 TIMES DAILY PRN
Qty: 120 ML | Refills: 0 | Status: SHIPPED | OUTPATIENT
Start: 2022-07-31 | End: 2022-08-05

## 2022-07-31 RX ORDER — GUAIFENESIN/DEXTROMETHORPHAN 100-10MG/5
5 SYRUP ORAL ONCE
Status: COMPLETED | OUTPATIENT
Start: 2022-07-31 | End: 2022-07-31

## 2022-07-31 RX ORDER — PREDNISONE 50 MG/1
50 TABLET ORAL DAILY
Qty: 5 TABLET | Refills: 0 | Status: SHIPPED | OUTPATIENT
Start: 2022-07-31 | End: 2022-08-05

## 2022-07-31 RX ORDER — AZITHROMYCIN 250 MG/1
250 TABLET, FILM COATED ORAL SEE ADMIN INSTRUCTIONS
Qty: 6 TABLET | Refills: 0 | Status: SHIPPED | OUTPATIENT
Start: 2022-07-31 | End: 2022-08-05

## 2022-07-31 RX ADMIN — GUAIFENESIN AND DEXTROMETHORPHAN 5 ML: 100; 10 SYRUP ORAL at 16:33

## 2022-07-31 RX ADMIN — PREDNISONE 50 MG: 20 TABLET ORAL at 16:34

## 2022-07-31 RX ADMIN — ONDANSETRON 4 MG: 4 TABLET, ORALLY DISINTEGRATING ORAL at 17:10

## 2022-07-31 NOTE — ED NOTES
Patient discharging home, AVS reviewed with no questions a this time. Patient instructed to follow up per discharge instructions and to return for worsening symptoms. Respirations equal and unlabored, skin PWD.      Ramírez Moy RN  07/31/22 [id number]
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
## 2022-08-07 ENCOUNTER — TELEPHONE (OUTPATIENT)
Dept: FAMILY MEDICINE CLINIC | Age: 67
End: 2022-08-07

## 2022-08-11 ENCOUNTER — TELEPHONE (OUTPATIENT)
Dept: FAMILY MEDICINE CLINIC | Age: 67
End: 2022-08-11

## 2022-08-11 NOTE — TELEPHONE ENCOUNTER
Pt called complaining of her migraine with nausea and wanted Celia to possibly call in [facility] for relief. Celia, please advise.     Scheduled Pt for 8/17 at 2:45 pm.

## 2022-08-24 RX ORDER — MAGNESIUM OXIDE 400 MG/1
400 TABLET ORAL 3 TIMES DAILY
Qty: 90 TABLET | Refills: 0 | Status: SHIPPED | OUTPATIENT
Start: 2022-08-24

## 2022-09-14 ENCOUNTER — COMMUNITY OUTREACH (OUTPATIENT)
Dept: FAMILY MEDICINE CLINIC | Age: 67
End: 2022-09-14

## 2022-09-14 NOTE — PROGRESS NOTES
Patient's HM shows they are overdue for Mammogram, Colorectal Screening and AWV. [employer] and  files searched. No results to attach to order nor HM updated.

## 2022-09-19 ENCOUNTER — HOSPITAL ENCOUNTER (OUTPATIENT)
Dept: SLEEP CENTER | Age: 67
Discharge: HOME | End: 2022-09-19
Payer: MEDICARE

## 2022-09-19 DIAGNOSIS — G47.33 OSA (OBSTRUCTIVE SLEEP APNEA): ICD-10-CM

## 2022-09-19 DIAGNOSIS — G47.10 HYPERSOMNOLENCE: ICD-10-CM

## 2022-09-19 PROCEDURE — 95810 POLYSOM 6/> YRS 4/> PARAM: CPT

## 2022-09-19 ASSESSMENT — SLEEP AND FATIGUE QUESTIONNAIRES
HOW LIKELY ARE YOU TO NOD OFF OR FALL ASLEEP IN A CAR, WHILE STOPPED FOR A FEW MINUTES IN TRAFFIC: 0
HOW LIKELY ARE YOU TO NOD OFF OR FALL ASLEEP WHILE SITTING AND TALKING TO SOMEONE: 0
HOW LIKELY ARE YOU TO NOD OFF OR FALL ASLEEP WHEN YOU ARE A PASSENGER IN A CAR FOR AN HOUR WITHOUT A BREAK: 1
HOW LIKELY ARE YOU TO NOD OFF OR FALL ASLEEP WHILE WATCHING TV: 2
HOW LIKELY ARE YOU TO NOD OFF OR FALL ASLEEP WHILE LYING DOWN TO REST IN THE AFTERNOON WHEN CIRCUMSTANCES PERMIT: 3
HOW LIKELY ARE YOU TO NOD OFF OR FALL ASLEEP WHILE SITTING QUIETLY AFTER LUNCH WITHOUT ALCOHOL: 0
HOW LIKELY ARE YOU TO NOD OFF OR FALL ASLEEP WHILE SITTING AND READING: 2
HOW LIKELY ARE YOU TO NOD OFF OR FALL ASLEEP WHILE SITTING INACTIVE IN A PUBLIC PLACE: 0
ESS TOTAL SCORE: 8

## 2022-09-20 NOTE — PROGRESS NOTES
9/20/2022  sleep study  for Marie Gusman  1955 is complete. Results are pending physician review.     Electronically signed by Katie Daily RCP on 9/20/2022 at 7:14 AM

## 2022-09-23 LAB — STATUS: NORMAL

## 2022-09-29 ENCOUNTER — TELEMEDICINE (OUTPATIENT)
Dept: FAMILY MEDICINE CLINIC | Age: 67
End: 2022-09-29
Payer: MEDICARE

## 2022-09-29 DIAGNOSIS — R68.89 FLU-LIKE SYMPTOMS: Primary | ICD-10-CM

## 2022-09-29 DIAGNOSIS — Z76.0 MEDICATION REFILL: ICD-10-CM

## 2022-09-29 PROCEDURE — 1123F ACP DISCUSS/DSCN MKR DOCD: CPT | Performed by: NURSE PRACTITIONER

## 2022-09-29 PROCEDURE — G8400 PT W/DXA NO RESULTS DOC: HCPCS | Performed by: NURSE PRACTITIONER

## 2022-09-29 PROCEDURE — G8427 DOCREV CUR MEDS BY ELIG CLIN: HCPCS | Performed by: NURSE PRACTITIONER

## 2022-09-29 PROCEDURE — 99213 OFFICE O/P EST LOW 20 MIN: CPT | Performed by: NURSE PRACTITIONER

## 2022-09-29 PROCEDURE — 3017F COLORECTAL CA SCREEN DOC REV: CPT | Performed by: NURSE PRACTITIONER

## 2022-09-29 PROCEDURE — G8417 CALC BMI ABV UP PARAM F/U: HCPCS | Performed by: NURSE PRACTITIONER

## 2022-09-29 PROCEDURE — 1090F PRES/ABSN URINE INCON ASSESS: CPT | Performed by: NURSE PRACTITIONER

## 2022-09-29 PROCEDURE — 1036F TOBACCO NON-USER: CPT | Performed by: NURSE PRACTITIONER

## 2022-09-29 RX ORDER — PANTOPRAZOLE SODIUM 40 MG/1
40 TABLET, DELAYED RELEASE ORAL
Qty: 180 TABLET | Refills: 1 | Status: SHIPPED | OUTPATIENT
Start: 2022-09-29

## 2022-09-29 RX ORDER — HYDROXYZINE PAMOATE 50 MG/1
50 CAPSULE ORAL 3 TIMES DAILY
COMMUNITY
Start: 2022-09-22

## 2022-09-29 ASSESSMENT — ENCOUNTER SYMPTOMS
SINUS PAIN: 0
COUGH: 0
SORE THROAT: 0
CHEST TIGHTNESS: 0
SHORTNESS OF BREATH: 0
TROUBLE SWALLOWING: 0
WHEEZING: 0
SINUS PRESSURE: 0
GASTROINTESTINAL NEGATIVE: 1

## 2022-09-29 NOTE — PROGRESS NOTES
2022    TELEHEALTH EVALUATION -- Audio/Visual (During  public health emergency)    HPI:    Venda Soulier (:  1955) has requested an audio/video evaluation for the following concern(s):    Steve Burroughs is a 74year old female who requested a virtual video visit. However, her phone would not connect so the visit is convered to a phone visit. She complains of 4 days of head congestion, ear pain, fatigue, loss of taste and smell, headache and body aches. She denies sore throat or cough. She has not done an in-home Covid test.    She is also requesting medication refills. She states she sees mental health, neurology and GI. She has follow up appointments. Review of Systems   Constitutional:  Positive for fatigue. Negative for activity change, appetite change, chills, diaphoresis, fever and unexpected weight change. HENT:  Positive for congestion and ear pain. Negative for ear discharge, sinus pressure, sinus pain, sore throat and trouble swallowing. Respiratory:  Negative for cough, chest tightness, shortness of breath and wheezing. Cardiovascular:  Negative for chest pain and palpitations. Gastrointestinal: Negative. Musculoskeletal:  Positive for myalgias. Neurological:  Positive for headaches. Negative for dizziness and light-headedness. Prior to Visit Medications    Medication Sig Taking?  Authorizing Provider   hydrOXYzine pamoate (VISTARIL) 50 MG capsule Take 50 mg by mouth three times daily Yes Historical Provider, MD   MUCINEX 600 MG extended release tablet Take 1 tablet by mouth 2 times daily Yes KeshawnWENDIE Red CNP   carbidopa-levodopa (SINEMET)  MG per tablet Take 1 tablet by mouth nightly Yes [place]ENDIE frazier CNP   pantoprazole (PROTONIX) 40 MG tablet Take 1 tablet by mouth 2 times daily (before meals) Yes Keshawn WENDIE Enrique CNP   magnesium oxide (MAG-OX) 400 MG tablet Take 1 tablet by mouth 3 times daily Yes [place]ENDIE Red CNP amitriptyline (ELAVIL) 25 MG tablet Take 2 tablets by mouth nightly Yes Pretty Cohen APRN - NP   levothyroxine (SYNTHROID) 75 MCG tablet Take 1 tablet by mouth every morning (before breakfast) Yes Len Patel, APRN - CNP   simvastatin (ZOCOR) 20 MG tablet Take 1 tablet by mouth nightly Yes Len Patel, APRN - CNP   montelukast (SINGULAIR) 10 MG tablet Take 1 tablet by mouth nightly Yes Len Patel, APRN - CNP   NOVOLOG FLEXPEN 100 UNIT/ML injection pen Inject 15 Units into the skin 3 times daily (before meals) 12/01/2020 patient states she follows sliding scale regimen BS > 150 Yes Len Patel, APRN - CNP   TRESIBA FLEXTOUCH 200 UNIT/ML SOPN Inject 20 Units into the skin every morning Yes Len Patel, APRN - CNP   Insulin Pen Needle 31G X 5 MM MISC 1 each by Does not apply route daily Yes Len Patel APRN - CNP   losartan (COZAAR) 50 MG tablet Take 1 tablet by mouth daily Yes WENDIE Pollock - CNP   metoprolol succinate (TOPROL XL) 50 MG extended release tablet Take 1 tablet by mouth daily Yes Stone Lucio MD   NIFEdipine (PROCARDIA XL) 90 MG extended release tablet Take 1 tablet by mouth daily Yes Stone Lucio MD   gabapentin (NEURONTIN) 300 MG capsule Take 400 mg by mouth 3 times daily. Yes Historical Provider, MD   Probiotic Product (PROBIOTIC DIGESTIVE SUPP PO) Take 1 capsule by mouth every morning Yes Historical Provider, MD   ondansetron (ZOFRAN ODT) 4 MG disintegrating tablet Take 1 tablet by mouth every 8 hours as needed for Nausea Yes Veneda President, MD   nystatin (MYCOSTATIN) 663706 UNIT/GM powder Apply 3 times daily to affected areas Yes Jose Mandel PA-C   diazePAM (VALIUM) 5 MG tablet as needed.   Yes Historical Provider, MD   QUEtiapine (SEROQUEL) 25 MG tablet Take 1 tablet by mouth 2 times daily  Patient taking differently: Take 25 mg by mouth 2 times daily Indications: 25 mg in am and 50 mg in pm Yes Jordon Vaughan MD   busPIRone (BUSPAR) 10 MG tablet Take 2 tablets by mouth 3 aluminum & magnesium hydroxide-simethicone (MAALOX MAX) 400-400-40 MG/5ML SUSP Take 15 mLs by mouth every 6 hours as needed (heart burn)  Patient not taking: Reported on 9/29/2022  WENDIE Rayo CNP       Social History     Tobacco Use    Smoking status: Never    Smokeless tobacco: Never   Vaping Use    Vaping Use: Never used   Substance Use Topics    Alcohol use: No    Drug use: No          ASSESSMENT/PLAN:  1. Medication refill  - carbidopa-levodopa (SINEMET)  MG per tablet; Take 1 tablet by mouth nightly  Dispense: 30 tablet; Refill: 1  - pantoprazole (PROTONIX) 40 MG tablet; Take 1 tablet by mouth 2 times daily (before meals)  Dispense: 180 tablet; Refill: 1    2. Flu-like symptoms  - COVID-19; Future    No follow-ups on file. Venda Soulier, was evaluated through a synchronous (real-time) audio-video encounter. The patient (or guardian if applicable) is aware that this is a billable service, which includes applicable co-pays. This Virtual Visit was conducted with patient's (and/or legal guardian's) consent. The visit was conducted pursuant to the emergency declaration under the [address] [place] [address], [address] [place] [address] authority and the [employer] and [employer] Act. Patient identification was verified, and a caregiver was present when appropriate. The patient was located at Home: [id number] [address] [place] [address]. Provider was located at [facility] (Appt Dept): [address] [facility],  [address] [facility]. Total time spent on this encounter: Not billed by time    --WENDIE Underwood CNP on 9/29/2022 at 6:07 PM    An electronic signature was used to authenticate this note.

## 2022-10-11 ENCOUNTER — HOSPITAL ENCOUNTER (OUTPATIENT)
Age: 67
Setting detail: SPECIMEN
Discharge: HOME | End: 2022-10-11
Payer: MEDICARE

## 2022-10-11 ENCOUNTER — HOSPITAL ENCOUNTER (OUTPATIENT)
Age: 67
Discharge: HOME | End: 2022-10-11
Payer: MEDICARE

## 2022-10-11 DIAGNOSIS — F41.8 DEPRESSION WITH ANXIETY: ICD-10-CM

## 2022-10-11 DIAGNOSIS — E03.9 HYPOTHYROIDISM, UNSPECIFIED TYPE: ICD-10-CM

## 2022-10-11 DIAGNOSIS — R56.9 CONVULSIONS, UNSPECIFIED CONVULSION TYPE (HCC): ICD-10-CM

## 2022-10-11 DIAGNOSIS — E08.22 DIABETES MELLITUS DUE TO UNDERLYING CONDITION WITH CHRONIC KIDNEY DISEASE, WITHOUT LONG-TERM CURRENT USE OF INSULIN, UNSPECIFIED CKD STAGE (HCC): ICD-10-CM

## 2022-10-11 LAB
ALBUMIN SERPL-MCNC: 4.6 GM/DL (ref 3.4–5)
ALP BLD-CCNC: 85 IU/L (ref 40–128)
ALT SERPL-CCNC: 24 U/L (ref 10–40)
ANION GAP SERPL CALCULATED.3IONS-SCNC: 12 MMOL/L (ref 4–16)
AST SERPL-CCNC: 31 IU/L (ref 15–37)
BILIRUB SERPL-MCNC: 0.3 MG/DL (ref 0–1)
BUN BLDV-MCNC: 11 MG/DL (ref 6–23)
CALCIUM SERPL-MCNC: 10 MG/DL (ref 8.3–10.6)
CHLORIDE BLD-SCNC: 96 MMOL/L (ref 99–110)
CHOLESTEROL: 134 MG/DL
CO2: 28 MMOL/L (ref 21–32)
CREAT SERPL-MCNC: 0.6 MG/DL (ref 0.6–1.1)
GFR AFRICAN AMERICAN: >60 ML/MIN/1.73M2
GFR NON-AFRICAN AMERICAN: >60 ML/MIN/1.73M2
GLUCOSE BLD-MCNC: 182 MG/DL (ref 70–99)
HDLC SERPL-MCNC: 50 MG/DL
LDL CHOLESTEROL CALCULATED: 29 MG/DL
POTASSIUM SERPL-SCNC: 4.8 MMOL/L (ref 3.5–5.1)
SODIUM BLD-SCNC: 136 MMOL/L (ref 135–145)
TOTAL PROTEIN: 6.9 GM/DL (ref 6.4–8.2)
TRIGL SERPL-MCNC: 276 MG/DL
TSH HIGH SENSITIVITY: 1.55 UIU/ML (ref 0.27–4.2)

## 2022-10-11 PROCEDURE — U0003 INFECTIOUS AGENT DETECTION BY NUCLEIC ACID (DNA OR RNA); SEVERE ACUTE RESPIRATORY SYNDROME CORONAVIRUS 2 (SARS-COV-2) (CORONAVIRUS DISEASE [COVID-19]), AMPLIFIED PROBE TECHNIQUE, MAKING USE OF HIGH THROUGHPUT TECHNOLOGIES AS DESCRIBED BY CMS-2020-01-R: HCPCS

## 2022-10-11 PROCEDURE — 80053 COMPREHEN METABOLIC PANEL: CPT

## 2022-10-11 PROCEDURE — 84443 ASSAY THYROID STIM HORMONE: CPT

## 2022-10-11 PROCEDURE — 80061 LIPID PANEL: CPT

## 2022-10-11 PROCEDURE — U0005 INFEC AGEN DETEC AMPLI PROBE: HCPCS

## 2022-10-11 PROCEDURE — 36415 COLL VENOUS BLD VENIPUNCTURE: CPT

## 2022-10-12 LAB
SARS-COV-2: NOT DETECTED
SOURCE: NORMAL

## 2022-10-13 ENCOUNTER — TELEPHONE (OUTPATIENT)
Dept: FAMILY MEDICINE CLINIC | Age: 67
End: 2022-10-13

## 2022-10-13 ENCOUNTER — HOSPITAL ENCOUNTER (EMERGENCY)
Age: 67
Discharge: LEFT BEFORE DISCHARGE | End: 2022-10-13
Attending: STUDENT IN AN ORGANIZED HEALTH CARE EDUCATION/TRAINING PROGRAM
Payer: MEDICARE

## 2022-10-13 ENCOUNTER — APPOINTMENT (OUTPATIENT)
Dept: GENERAL RADIOLOGY | Age: 67
End: 2022-10-13
Payer: MEDICARE

## 2022-10-13 ENCOUNTER — TELEPHONE (OUTPATIENT)
Dept: CARDIOLOGY CLINIC | Age: 67
End: 2022-10-13

## 2022-10-13 VITALS
WEIGHT: 167 LBS | BODY MASS INDEX: 30.73 KG/M2 | SYSTOLIC BLOOD PRESSURE: 142 MMHG | OXYGEN SATURATION: 96 % | TEMPERATURE: 98.2 F | RESPIRATION RATE: 18 BRPM | HEIGHT: 62 IN | HEART RATE: 78 BPM | DIASTOLIC BLOOD PRESSURE: 92 MMHG

## 2022-10-13 DIAGNOSIS — R07.9 CHEST PAIN, UNSPECIFIED TYPE: Primary | ICD-10-CM

## 2022-10-13 LAB
ALBUMIN SERPL-MCNC: 4.5 GM/DL (ref 3.4–5)
ALP BLD-CCNC: 78 IU/L (ref 40–129)
ALT SERPL-CCNC: 23 U/L (ref 10–40)
ANION GAP SERPL CALCULATED.3IONS-SCNC: 14 MMOL/L (ref 4–16)
APTT: 25.4 SECONDS (ref 25.1–37.1)
AST SERPL-CCNC: 28 IU/L (ref 15–37)
BACTERIA: ABNORMAL /HPF
BASOPHILS ABSOLUTE: 0.1 K/CU MM
BASOPHILS RELATIVE PERCENT: 0.8 % (ref 0–1)
BILIRUB SERPL-MCNC: 0.5 MG/DL (ref 0–1)
BILIRUBIN URINE: NEGATIVE MG/DL
BLOOD, URINE: NEGATIVE
BUN BLDV-MCNC: 10 MG/DL (ref 6–23)
CALCIUM SERPL-MCNC: 9.7 MG/DL (ref 8.3–10.6)
CHLORIDE BLD-SCNC: 95 MMOL/L (ref 99–110)
CLARITY: CLEAR
CO2: 23 MMOL/L (ref 21–32)
COLOR: YELLOW
CREAT SERPL-MCNC: 0.5 MG/DL (ref 0.6–1.1)
DIFFERENTIAL TYPE: ABNORMAL
EOSINOPHILS ABSOLUTE: 0.3 K/CU MM
EOSINOPHILS RELATIVE PERCENT: 3.3 % (ref 0–3)
GFR AFRICAN AMERICAN: >60 ML/MIN/1.73M2
GFR NON-AFRICAN AMERICAN: >60 ML/MIN/1.73M2
GLUCOSE BLD-MCNC: 139 MG/DL (ref 70–99)
GLUCOSE, URINE: NEGATIVE MG/DL
HCT VFR BLD CALC: 41.2 % (ref 37–47)
HEMOGLOBIN: 14.2 GM/DL (ref 12.5–16)
IMMATURE NEUTROPHIL %: 0.2 % (ref 0–0.43)
INR BLD: 0.94 INDEX
KETONES, URINE: NEGATIVE MG/DL
LEUKOCYTE ESTERASE, URINE: ABNORMAL
LIPASE: 17 IU/L (ref 13–60)
LYMPHOCYTES ABSOLUTE: 4.1 K/CU MM
LYMPHOCYTES RELATIVE PERCENT: 43.8 % (ref 24–44)
MCH RBC QN AUTO: 28 PG (ref 27–31)
MCHC RBC AUTO-ENTMCNC: 34.5 % (ref 32–36)
MCV RBC AUTO: 81.1 FL (ref 78–100)
MONOCYTES ABSOLUTE: 1 K/CU MM
MONOCYTES RELATIVE PERCENT: 10.7 % (ref 0–4)
MUCUS: ABNORMAL HPF
NITRITE URINE, QUANTITATIVE: NEGATIVE
NUCLEATED RBC %: 0 %
PDW BLD-RTO: 11.5 % (ref 11.7–14.9)
PH, URINE: 7 (ref 5–8)
PLATELET # BLD: 293 K/CU MM (ref 140–440)
PMV BLD AUTO: 8.6 FL (ref 7.5–11.1)
POTASSIUM SERPL-SCNC: 3.9 MMOL/L (ref 3.5–5.1)
PROTEIN UA: NEGATIVE MG/DL
PROTHROMBIN TIME: 12.1 SECONDS (ref 11.7–14.5)
RBC # BLD: 5.08 M/CU MM (ref 4.2–5.4)
RBC URINE: <1 /HPF (ref 0–6)
SEGMENTED NEUTROPHILS ABSOLUTE COUNT: 3.9 K/CU MM
SEGMENTED NEUTROPHILS RELATIVE PERCENT: 41.2 % (ref 36–66)
SODIUM BLD-SCNC: 132 MMOL/L (ref 135–145)
SPECIFIC GRAVITY UA: 1.02 (ref 1–1.03)
SQUAMOUS EPITHELIAL: 1 /HPF
TOTAL IMMATURE NEUTOROPHIL: 0.02 K/CU MM
TOTAL NUCLEATED RBC: 0 K/CU MM
TOTAL PROTEIN: 7.6 GM/DL (ref 6.4–8.2)
TRICHOMONAS: ABNORMAL /HPF
TROPONIN T: <0.01 NG/ML
UROBILINOGEN, URINE: 0.2 MG/DL (ref 0.2–1)
WBC # BLD: 9.5 K/CU MM (ref 4–10.5)
WBC UA: 27 /HPF (ref 0–5)

## 2022-10-13 PROCEDURE — 93005 ELECTROCARDIOGRAM TRACING: CPT | Performed by: NURSE PRACTITIONER

## 2022-10-13 PROCEDURE — 81001 URINALYSIS AUTO W/SCOPE: CPT

## 2022-10-13 PROCEDURE — 99285 EMERGENCY DEPT VISIT HI MDM: CPT

## 2022-10-13 PROCEDURE — 85730 THROMBOPLASTIN TIME PARTIAL: CPT

## 2022-10-13 PROCEDURE — 85610 PROTHROMBIN TIME: CPT

## 2022-10-13 PROCEDURE — 96374 THER/PROPH/DIAG INJ IV PUSH: CPT

## 2022-10-13 PROCEDURE — 6360000002 HC RX W HCPCS: Performed by: NURSE PRACTITIONER

## 2022-10-13 PROCEDURE — 80053 COMPREHEN METABOLIC PANEL: CPT

## 2022-10-13 PROCEDURE — 83690 ASSAY OF LIPASE: CPT

## 2022-10-13 PROCEDURE — 85025 COMPLETE CBC W/AUTO DIFF WBC: CPT

## 2022-10-13 PROCEDURE — 71045 X-RAY EXAM CHEST 1 VIEW: CPT

## 2022-10-13 PROCEDURE — 84484 ASSAY OF TROPONIN QUANT: CPT

## 2022-10-13 RX ORDER — NITROGLYCERIN 0.4 MG/1
0.4 TABLET SUBLINGUAL EVERY 5 MIN PRN
Status: DISCONTINUED | OUTPATIENT
Start: 2022-10-13 | End: 2022-10-14 | Stop reason: HOSPADM

## 2022-10-13 RX ORDER — ONDANSETRON 4 MG/1
4 TABLET, ORALLY DISINTEGRATING ORAL ONCE
Status: COMPLETED | OUTPATIENT
Start: 2022-10-13 | End: 2022-10-13

## 2022-10-13 RX ORDER — ONDANSETRON 2 MG/ML
4 INJECTION INTRAMUSCULAR; INTRAVENOUS ONCE
Status: COMPLETED | OUTPATIENT
Start: 2022-10-13 | End: 2022-10-13

## 2022-10-13 RX ADMIN — ONDANSETRON 4 MG: 2 INJECTION INTRAMUSCULAR; INTRAVENOUS at 17:37

## 2022-10-13 ASSESSMENT — PAIN DESCRIPTION - LOCATION: LOCATION: CHEST

## 2022-10-13 ASSESSMENT — ENCOUNTER SYMPTOMS
APNEA: 0
BACK PAIN: 0
EYE DISCHARGE: 0
COUGH: 0
WHEEZING: 0
CHEST TIGHTNESS: 1
ABDOMINAL PAIN: 0
ABDOMINAL DISTENTION: 0
DIARRHEA: 0
RHINORRHEA: 0
STRIDOR: 0
COLOR CHANGE: 0
VOMITING: 0
SORE THROAT: 0
NAUSEA: 0
EYE REDNESS: 0

## 2022-10-13 ASSESSMENT — PAIN DESCRIPTION - FREQUENCY: FREQUENCY: INTERMITTENT

## 2022-10-13 ASSESSMENT — PAIN DESCRIPTION - ONSET: ONSET: PROGRESSIVE

## 2022-10-13 ASSESSMENT — PAIN DESCRIPTION - ORIENTATION: ORIENTATION: LEFT;MID

## 2022-10-13 ASSESSMENT — PAIN - FUNCTIONAL ASSESSMENT
PAIN_FUNCTIONAL_ASSESSMENT: PREVENTS OR INTERFERES SOME ACTIVE ACTIVITIES AND ADLS
PAIN_FUNCTIONAL_ASSESSMENT: 0-10

## 2022-10-13 ASSESSMENT — PAIN DESCRIPTION - DIRECTION: RADIATING_TOWARDS: L ARM

## 2022-10-13 ASSESSMENT — PAIN DESCRIPTION - PAIN TYPE: TYPE: ACUTE PAIN

## 2022-10-13 ASSESSMENT — PAIN DESCRIPTION - DESCRIPTORS: DESCRIPTORS: SPASM

## 2022-10-13 ASSESSMENT — PAIN SCALES - GENERAL: PAINLEVEL_OUTOF10: 10
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
## 2022-10-13 NOTE — TELEPHONE ENCOUNTER
Pt has been SOB all of the time and sharp pain above L breast worsening on exertion. Every so often radiates through to her back 10/10 pain scale. BP cuff does not have batteries to take vitals   Advised pt to go to ED, says she will call the squad.

## 2022-10-13 NOTE — ED TRIAGE NOTES
Reports midsternal and L-sided chest pain starting yesterday while sitting. Describes pain as 10/10 spasm. Pain worsened with ambulation. Also with dyspnea at rest and worsening dyspnea on exertion. Noted hot flashes occurring with chest pain. No other symptoms noted. Today noted worsening of dyspnea on exertion and pain radiating into L arm. Took 3 baby aspirin today around 1700.

## 2022-10-13 NOTE — ED NOTES
Pt was offered nitro tablet for cp. Pt refused and stated, \"they don't do anything\".      Yvon Lowe RN  10/13/22 9997

## 2022-10-14 LAB
EKG ATRIAL RATE: 87 BPM
EKG DIAGNOSIS: NORMAL
EKG P AXIS: 23 DEGREES
EKG P-R INTERVAL: 180 MS
EKG Q-T INTERVAL: 412 MS
EKG QRS DURATION: 116 MS
EKG QTC CALCULATION (BAZETT): 495 MS
EKG R AXIS: -44 DEGREES
EKG T AXIS: 43 DEGREES
EKG VENTRICULAR RATE: 87 BPM

## 2022-10-14 PROCEDURE — 93010 ELECTROCARDIOGRAM REPORT: CPT | Performed by: INTERNAL MEDICINE

## 2022-10-14 ASSESSMENT — ENCOUNTER SYMPTOMS: SHORTNESS OF BREATH: 1

## 2022-10-14 NOTE — ED PROVIDER NOTES
I independently examined and evaluated Bernardo Villegas. I personally saw the patient and performed a substantive portion of the visit including all aspects of the medical decision making. In brief their history revealed 74years old female with a past medical history of asthma, CHF, diabetes, who presents to the ED with a history of L sided chest pain. Physical examination is unremarkable. Patient was scheduled for admission for observation and further cardiology work up. Patient preferred to leave against medical advice. Patient is awake and alert x 4, not intoxicated, not altered, capable of making decisions. I explained the potential dangers of leaving against medical advice including worsening morbidity and potential death. Patient expressed her understanding of the potential dangers and insisted on leaving AMA. Patient was encouraged to return to the ED for thorough evaluations as appropriate ASAP. For all further details of the patient's emergency department visit, please see the Advanced Practice Provider's documentation. EKG  Normal sinus rhythm, LAD, LVH with QRS widening  Ventricular rate 87  WI interval 180  QRS duration 116  QT/QTc 412/495  PRT axes 23 -44 43    No STEMI      Labs Reviewed   CBC WITH AUTO DIFFERENTIAL - Abnormal; Notable for the following components:       Result Value    RDW 11.5 (*)     Monocytes % 10.7 (*)     Eosinophils % 3.3 (*)     All other components within normal limits   COMPREHENSIVE METABOLIC PANEL W/ REFLEX TO MG FOR LOW K - Abnormal; Notable for the following components:    Sodium 132 (*)     Chloride 95 (*)     Creatinine 0.5 (*)     Glucose 139 (*)     All other components within normal limits   LIPASE   TROPONIN   APTT   PROTIME-INR   URINALYSIS     XR CHEST PORTABLE   Final Result   No acute process.                       [address] [facility],[address], DO  10/16/22 0347

## 2022-10-14 NOTE — ED NOTES
ED TO INPATIENT SBAR HANDOFF    Patient Name: Chastity Booker   :  1955  74 y.o. MRN:  [id number]  Preferred Name    ED Room #:  ED16/ED-16  Family/Caregiver Present no   Restraints no   Sitter no   Sepsis Risk Score Sepsis Risk Score: 1.55    Situation  Code Status: Prior No additional code details. Allergies: Abilify [aripiprazole], Augmentin [amoxicillin-pot clavulanate], Bee venom, Ciprofloxacin, Codeine, Darvocet [propoxyphene n-acetaminophen], Darvon [propoxyphene hcl], Decadron [dexamethasone], Ditropan [oxybutynin chloride], Fioricet [butalbital-apap-caffeine], Fiorinal-codeine #3 [butalbital-asa-caff-codeine], Flagyl [metronidazole], Naproxen, Other, Prozac [fluoxetine hcl], Robaxin [methocarbamol], Ultram [tramadol], Zoloft, Butalbital-aspirin-caffeine, Coreg [carvedilol], Fluoxetine, Oxybutynin chloride, Percocet [oxycodone-acetaminophen], Sertraline, Tape [adhesive tape], and Reglan [metoclopramide]  Weight: Patient Vitals for the past 96 hrs (Last 3 readings):   Weight   10/13/22 1708 167 lb (75.8 kg)     Arrived from: home  Chief Complaint:   Chief Complaint   Patient presents with    Chest Pain     For the last 2 days    [facility] [address] Problem/Diagnosis:  Active Problems:    * No active hospital problems. *  Resolved Problems:    * No resolved hospital problems. *    Imaging:   XR CHEST PORTABLE   Final Result   No acute process.            Abnormal labs:   Abnormal Labs Reviewed   CBC WITH AUTO DIFFERENTIAL - Abnormal; Notable for the following components:       Result Value    RDW 11.5 (*)     Monocytes % 10.7 (*)     Eosinophils % 3.3 (*)     All other components within normal limits   COMPREHENSIVE METABOLIC PANEL W/ REFLEX TO MG FOR LOW K - Abnormal; Notable for the following components:    Sodium 132 (*)     Chloride 95 (*)     Creatinine 0.5 (*)     Glucose 139 (*)     All other components within normal limits   URINALYSIS - Abnormal; Notable for the following components:    Leukocyte Esterase, Urine MODERATE (*)     All other components within normal limits     Critical values: no     Abnormal Assessment Findings:     Background  Hospital admissions in last 30 days?  no   History:   Past Medical History:   Diagnosis Date    Abnormal EKG 04/22/2014    Acid reflux     Anemia     Anesthesia     Nausea/Vomiting Post Op In Past    Anginal pain (HCC)     Denies Chest Pain At This Time    Anxiety     Arthritis     \"All Over\"    Asthma     Bipolar 1 disorder (HCC)     Cerebral artery occlusion with cerebral infarction ([address]     CHF (congestive heart failure) (HCC)     Chronic back pain     Chronic kidney disease     DDD (degenerative disc disease), cervical     12- Patient reports she was dx with DDD of Cerival spine C6,C7    Depression     Diabetes mellitus ([address] Dx 1990's    Diabetic neuropathy ([address]     \"In My Legs And Feet\"    Dizziness     \"Sometimes\"    Dry skin     Enlarged ureter     Right Side    Fatty liver     Fibrocystic breast     Generalized anxiety disorder     Gout     Pt states she was diagnosed with gout in the past few months.  H/O cardiac catheterization     Showed mild disease per last cath.  H/O cardiovascular stress test 03/15/2010    EF 69%, normal perfusion study except for diaphragmatic artifact, uniform wall motion.  H/O cardiovascular stress test 10/09/2008    EF 60%, no anginia, normal study.  H/O cardiovascular stress test 05/06/2014    EF 66%, no ischemia, normal LV systolic funciton, normal perfusion pattern.  H/O Doppler ultrasound 02/28/2011    CAROTID DOPPLER-normal study.  H/O echocardiogram 05/06/2014    Ef >55%. Impaired LV relaxation.  H/O echocardiogram 10/14/2015    EF 60% Normal LV and systolic function. No significant valvulopathy seen.      History of Holter monitoring 03/24/2015    24 hour - predominant rhythm sinus    [place] ([place] hearing)     Bilateral Ears    Hx of cardiovascular stress test 10/19/2015    lexiscan-normal,EF63%    Hx of motion sickness     HX OTHER MEDICAL     Primary Care Physician Is Dr. Duane Loving [place], [place]    Hyperlipidemia     Hypertension     IBS (irritable bowel syndrome)     Incisional hernia 04/2014    Kidney stones Last Episode In 2012 Or 2013    Passed Kidney Stones Numberous Times    Migraines     Nausea & vomiting     Nausea/Vomiting Post Op In Past    Other specified disorder of skin     12- Patient states she has a condition of her vaginal area (skin) which starts with the letters Lucianne Haver. She is currently being treated with multiple creams and weekly Diflucan. 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     Staph infection Dx 1980's    Toes On Left Foot    Thyroid disease     hypothroidism    Tremor     \"Tremors All Over\"    Urinary incontinence     Vertigo     \"Sometimes\"    Wears glasses        Assessment    Vitals/MEWS: MEWS Score: 1  Level of Consciousness: Alert (0)   Vitals:    10/13/22 1708 10/13/22 2123   BP: (!) 172/87 (!) 158/88   Pulse: 92 72   Resp: 18 16   Temp: 98.2 °F (36.8 °C)    TempSrc: Oral    SpO2: 97% 96%   Weight: 167 lb (75.8 kg)    Height: 5' 2\" (1.575 m)      FiO2 (%):   O2 Flow Rate: O2 Device: None (Room air)    Cardiac Rhythm:    Pain Assessment:[x] Verbal [] Quilla Bogaert Scale  Pain Scale: Pain Assessment  Pain Assessment: 0-10  Pain Level: 10  Patient's Stated Pain Goal: 5  Pain Location: Chest  Pain Orientation: Left, Mid  Pain Descriptors: Spasm  Functional Pain Assessment: Prevents or interferes some active activities and ADLs  Pain Type: Acute pain  Pain Radiating Towards: L arm  Pain Frequency: Intermittent  Pain Onset: Progressive  Last documented pain score (0-10 scale) Pain Level: 10  Last documented pain medication administered:   Mental Status: oriented and alert  C-SSRS: Risk of Suicide: No Risk  Bedside swallow:    Kamiah Coma Scale (GCS): Kamiah Coma Scale  Eye Opening: Spontaneous  Best Verbal Response: Oriented  Best Motor Response: Obeys commands  Leonela Coma Scale Score: 15  Active LDA's:   Peripheral IV 10/13/22 Left Antecubital (Active)     PO Status: Regular  Pertinent or High Risk Medications/Drips: no   o If Yes, please provide details:   Pending Blood Product Administration: no     Blood Cultures: see ED pt care timeline or ED Event log    Recommendation    Pending orders   Plan for Discharge (if known):    Additional Comments:    If any further questions, please call Sending RN at [phone]    Electronically signed by: Electronically signed by Kobe Rangel RN on 10/13/2022 at 9:46 PM     Kobe Rangel RN  10/13/22 [id number]

## 2022-10-24 RX ORDER — NIFEDIPINE 90 MG/1
90 TABLET, EXTENDED RELEASE ORAL DAILY
Qty: 30 TABLET | Refills: 3 | Status: SHIPPED | OUTPATIENT
Start: 2022-10-24

## 2022-10-29 ENCOUNTER — HOSPITAL ENCOUNTER (EMERGENCY)
Age: 67
Discharge: HOME | End: 2022-10-29
Payer: MEDICARE

## 2022-10-29 VITALS
DIASTOLIC BLOOD PRESSURE: 88 MMHG | HEART RATE: 87 BPM | RESPIRATION RATE: 16 BRPM | SYSTOLIC BLOOD PRESSURE: 167 MMHG | BODY MASS INDEX: 31.28 KG/M2 | WEIGHT: 170 LBS | OXYGEN SATURATION: 98 % | HEIGHT: 62 IN | TEMPERATURE: 97.9 F

## 2022-10-29 DIAGNOSIS — B37.2 CANDIDAL SKIN INFECTION: ICD-10-CM

## 2022-10-29 DIAGNOSIS — N39.0 URINARY TRACT INFECTION WITHOUT HEMATURIA, SITE UNSPECIFIED: Primary | ICD-10-CM

## 2022-10-29 LAB
ALBUMIN SERPL-MCNC: 4.8 GM/DL (ref 3.4–5)
ALP BLD-CCNC: 96 IU/L (ref 40–129)
ALT SERPL-CCNC: 23 U/L (ref 10–40)
ANION GAP SERPL CALCULATED.3IONS-SCNC: 13 MMOL/L (ref 4–16)
AST SERPL-CCNC: 26 IU/L (ref 15–37)
BACTERIA: ABNORMAL /HPF
BASOPHILS ABSOLUTE: 0.1 K/CU MM
BASOPHILS RELATIVE PERCENT: 0.9 % (ref 0–1)
BILIRUB SERPL-MCNC: 0.3 MG/DL (ref 0–1)
BILIRUBIN URINE: NEGATIVE MG/DL
BLOOD, URINE: NEGATIVE
BUN BLDV-MCNC: 8 MG/DL (ref 6–23)
CALCIUM SERPL-MCNC: 9.8 MG/DL (ref 8.3–10.6)
CHLORIDE BLD-SCNC: 94 MMOL/L (ref 99–110)
CLARITY: CLEAR
CO2: 25 MMOL/L (ref 21–32)
COLOR: YELLOW
CREAT SERPL-MCNC: 0.5 MG/DL (ref 0.6–1.1)
DIFFERENTIAL TYPE: ABNORMAL
EOSINOPHILS ABSOLUTE: 0.4 K/CU MM
EOSINOPHILS RELATIVE PERCENT: 4 % (ref 0–3)
GFR SERPL CREATININE-BSD FRML MDRD: >60 ML/MIN/1.73M2
GLUCOSE BLD-MCNC: 235 MG/DL (ref 70–99)
GLUCOSE, URINE: 100 MG/DL
HCT VFR BLD CALC: 39.6 % (ref 37–47)
HEMOGLOBIN: 13.7 GM/DL (ref 12.5–16)
IMMATURE NEUTROPHIL %: 0.3 % (ref 0–0.43)
KETONES, URINE: NEGATIVE MG/DL
LEUKOCYTE ESTERASE, URINE: ABNORMAL
LIPASE: 22 IU/L (ref 13–60)
LYMPHOCYTES ABSOLUTE: 2.3 K/CU MM
LYMPHOCYTES RELATIVE PERCENT: 22.8 % (ref 24–44)
MCH RBC QN AUTO: 28.1 PG (ref 27–31)
MCHC RBC AUTO-ENTMCNC: 34.6 % (ref 32–36)
MCV RBC AUTO: 81.3 FL (ref 78–100)
MONOCYTES ABSOLUTE: 1 K/CU MM
MONOCYTES RELATIVE PERCENT: 10.4 % (ref 0–4)
NITRITE URINE, QUANTITATIVE: NEGATIVE
NUCLEATED RBC %: 0 %
PDW BLD-RTO: 11.5 % (ref 11.7–14.9)
PH, URINE: 7 (ref 5–8)
PLATELET # BLD: 287 K/CU MM (ref 140–440)
PMV BLD AUTO: 9.1 FL (ref 7.5–11.1)
POTASSIUM SERPL-SCNC: 4.1 MMOL/L (ref 3.5–5.1)
PROTEIN UA: NEGATIVE MG/DL
RBC # BLD: 4.87 M/CU MM (ref 4.2–5.4)
RBC URINE: <1 /HPF (ref 0–6)
SEGMENTED NEUTROPHILS ABSOLUTE COUNT: 6.2 K/CU MM
SEGMENTED NEUTROPHILS RELATIVE PERCENT: 61.6 % (ref 36–66)
SODIUM BLD-SCNC: 132 MMOL/L (ref 135–145)
SPECIFIC GRAVITY UA: 1.01 (ref 1–1.03)
SQUAMOUS EPITHELIAL: <1 /HPF
TOTAL IMMATURE NEUTOROPHIL: 0.03 K/CU MM
TOTAL NUCLEATED RBC: 0 K/CU MM
TOTAL PROTEIN: 7.6 GM/DL (ref 6.4–8.2)
TRICHOMONAS: ABNORMAL /HPF
UROBILINOGEN, URINE: 0.2 MG/DL (ref 0.2–1)
WBC # BLD: 10 K/CU MM (ref 4–10.5)
WBC UA: 6 /HPF (ref 0–5)

## 2022-10-29 PROCEDURE — 83690 ASSAY OF LIPASE: CPT

## 2022-10-29 PROCEDURE — 81001 URINALYSIS AUTO W/SCOPE: CPT

## 2022-10-29 PROCEDURE — 6370000000 HC RX 637 (ALT 250 FOR IP): Performed by: PHYSICIAN ASSISTANT

## 2022-10-29 PROCEDURE — 85025 COMPLETE CBC W/AUTO DIFF WBC: CPT

## 2022-10-29 PROCEDURE — 80053 COMPREHEN METABOLIC PANEL: CPT

## 2022-10-29 PROCEDURE — 99283 EMERGENCY DEPT VISIT LOW MDM: CPT

## 2022-10-29 RX ORDER — CEPHALEXIN 500 MG/1
500 CAPSULE ORAL 2 TIMES DAILY
Qty: 20 CAPSULE | Refills: 0 | Status: SHIPPED | OUTPATIENT
Start: 2022-10-29 | End: 2022-11-08

## 2022-10-29 RX ORDER — KETOROLAC TROMETHAMINE 30 MG/ML
15 INJECTION, SOLUTION INTRAMUSCULAR; INTRAVENOUS ONCE
Status: DISCONTINUED | OUTPATIENT
Start: 2022-10-29 | End: 2022-10-29

## 2022-10-29 RX ORDER — HYDROCODONE BITARTRATE AND ACETAMINOPHEN 5; 325 MG/1; MG/1
1 TABLET ORAL ONCE
Status: COMPLETED | OUTPATIENT
Start: 2022-10-29 | End: 2022-10-29

## 2022-10-29 RX ORDER — ONDANSETRON 4 MG/1
4 TABLET, ORALLY DISINTEGRATING ORAL ONCE
Status: COMPLETED | OUTPATIENT
Start: 2022-10-29 | End: 2022-10-29

## 2022-10-29 RX ORDER — FLUCONAZOLE 150 MG/1
150 TABLET ORAL ONCE
Qty: 1 TABLET | Refills: 0 | Status: SHIPPED | OUTPATIENT
Start: 2022-10-29 | End: 2022-10-29

## 2022-10-29 RX ORDER — FLUCONAZOLE 100 MG/1
200 TABLET ORAL ONCE
Status: COMPLETED | OUTPATIENT
Start: 2022-10-29 | End: 2022-10-29

## 2022-10-29 RX ORDER — PHENAZOPYRIDINE HYDROCHLORIDE 200 MG/1
200 TABLET, FILM COATED ORAL 3 TIMES DAILY PRN
Qty: 21 TABLET | Refills: 0 | Status: SHIPPED | OUTPATIENT
Start: 2022-10-29 | End: 2022-11-05

## 2022-10-29 RX ADMIN — HYDROCODONE BITARTRATE AND ACETAMINOPHEN 1 TABLET: 5; 325 TABLET ORAL at 08:50

## 2022-10-29 RX ADMIN — HYDROCODONE BITARTRATE AND ACETAMINOPHEN 1 TABLET: 5; 325 TABLET ORAL at 08:51

## 2022-10-29 RX ADMIN — ONDANSETRON 4 MG: 4 TABLET, ORALLY DISINTEGRATING ORAL at 08:49

## 2022-10-29 RX ADMIN — FLUCONAZOLE 200 MG: 100 TABLET ORAL at 08:50

## 2022-10-29 ASSESSMENT — PAIN DESCRIPTION - LOCATION
LOCATION: ABDOMEN;BACK

## 2022-10-29 ASSESSMENT — PAIN SCALES - GENERAL
PAINLEVEL_OUTOF10: 10
PAINLEVEL_OUTOF10: 8
PAINLEVEL_OUTOF10: 10
PAINLEVEL_OUTOF10: 10

## 2022-10-29 ASSESSMENT — PAIN DESCRIPTION - DESCRIPTORS: DESCRIPTORS: SPASM

## 2022-10-29 ASSESSMENT — PAIN - FUNCTIONAL ASSESSMENT: PAIN_FUNCTIONAL_ASSESSMENT: 0-10
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
## 2022-10-31 ENCOUNTER — TELEPHONE (OUTPATIENT)
Dept: FAMILY MEDICINE CLINIC | Age: 67
End: 2022-10-31

## 2022-10-31 NOTE — TELEPHONE ENCOUNTER
Per John ortiz went to ER on Saturday 10/29 for urinary tract infection. New diana states symptoms are not getting better and scheduled an appt for 11/1/22.

## 2022-11-01 ENCOUNTER — OFFICE VISIT (OUTPATIENT)
Dept: FAMILY MEDICINE CLINIC | Age: 67
End: 2022-11-01
Payer: MEDICARE

## 2022-11-01 VITALS
HEART RATE: 74 BPM | RESPIRATION RATE: 18 BRPM | OXYGEN SATURATION: 95 % | DIASTOLIC BLOOD PRESSURE: 60 MMHG | SYSTOLIC BLOOD PRESSURE: 112 MMHG | WEIGHT: 172 LBS | BODY MASS INDEX: 31.46 KG/M2

## 2022-11-01 DIAGNOSIS — E11.42 TYPE 2 DIABETES MELLITUS WITH DIABETIC POLYNEUROPATHY, WITH LONG-TERM CURRENT USE OF INSULIN (HCC): Primary | ICD-10-CM

## 2022-11-01 DIAGNOSIS — N39.42 URINARY INCONTINENCE WITHOUT SENSORY AWARENESS: ICD-10-CM

## 2022-11-01 DIAGNOSIS — R20.0 LEFT SIDED NUMBNESS: ICD-10-CM

## 2022-11-01 DIAGNOSIS — N39.0 CHRONIC UTI (URINARY TRACT INFECTION): ICD-10-CM

## 2022-11-01 DIAGNOSIS — R01.1 MURMUR: ICD-10-CM

## 2022-11-01 DIAGNOSIS — Z09 HOSPITAL DISCHARGE FOLLOW-UP: ICD-10-CM

## 2022-11-01 DIAGNOSIS — Z79.4 TYPE 2 DIABETES MELLITUS WITH DIABETIC POLYNEUROPATHY, WITH LONG-TERM CURRENT USE OF INSULIN (HCC): Primary | ICD-10-CM

## 2022-11-01 DIAGNOSIS — Z12.31 ENCOUNTER FOR SCREENING MAMMOGRAM FOR MALIGNANT NEOPLASM OF BREAST: ICD-10-CM

## 2022-11-01 DIAGNOSIS — G20 PARKINSON'S DISEASE (HCC): ICD-10-CM

## 2022-11-01 PROCEDURE — 1090F PRES/ABSN URINE INCON ASSESS: CPT | Performed by: NURSE PRACTITIONER

## 2022-11-01 PROCEDURE — G8427 DOCREV CUR MEDS BY ELIG CLIN: HCPCS | Performed by: NURSE PRACTITIONER

## 2022-11-01 PROCEDURE — 3078F DIAST BP <80 MM HG: CPT | Performed by: NURSE PRACTITIONER

## 2022-11-01 PROCEDURE — 83036 HEMOGLOBIN GLYCOSYLATED A1C: CPT | Performed by: NURSE PRACTITIONER

## 2022-11-01 PROCEDURE — 3046F HEMOGLOBIN A1C LEVEL >9.0%: CPT | Performed by: NURSE PRACTITIONER

## 2022-11-01 PROCEDURE — 0509F URINE INCON PLAN DOCD: CPT | Performed by: NURSE PRACTITIONER

## 2022-11-01 PROCEDURE — 2022F DILAT RTA XM EVC RTNOPTHY: CPT | Performed by: NURSE PRACTITIONER

## 2022-11-01 PROCEDURE — G8417 CALC BMI ABV UP PARAM F/U: HCPCS | Performed by: NURSE PRACTITIONER

## 2022-11-01 PROCEDURE — 1123F ACP DISCUSS/DSCN MKR DOCD: CPT | Performed by: NURSE PRACTITIONER

## 2022-11-01 PROCEDURE — 3074F SYST BP LT 130 MM HG: CPT | Performed by: NURSE PRACTITIONER

## 2022-11-01 PROCEDURE — 1111F DSCHRG MED/CURRENT MED MERGE: CPT | Performed by: NURSE PRACTITIONER

## 2022-11-01 PROCEDURE — 1036F TOBACCO NON-USER: CPT | Performed by: NURSE PRACTITIONER

## 2022-11-01 PROCEDURE — 3017F COLORECTAL CA SCREEN DOC REV: CPT | Performed by: NURSE PRACTITIONER

## 2022-11-01 PROCEDURE — G8400 PT W/DXA NO RESULTS DOC: HCPCS | Performed by: NURSE PRACTITIONER

## 2022-11-01 PROCEDURE — 99214 OFFICE O/P EST MOD 30 MIN: CPT | Performed by: NURSE PRACTITIONER

## 2022-11-01 PROCEDURE — G8484 FLU IMMUNIZE NO ADMIN: HCPCS | Performed by: NURSE PRACTITIONER

## 2022-11-01 ASSESSMENT — ENCOUNTER SYMPTOMS
TROUBLE SWALLOWING: 0
EYES NEGATIVE: 1
RESPIRATORY NEGATIVE: 1
GASTROINTESTINAL NEGATIVE: 1
CHEST TIGHTNESS: 0
WHEEZING: 0
SHORTNESS OF BREATH: 0

## 2022-11-01 NOTE — PROGRESS NOTES
Ney Xiomara  1955  74 y.o. SUBJECT MARCIA:    Chief Complaint   Patient presents with    Follow-Up from Hospital     Here for follow up on bladder spasms, yeast infection and uti. Still having yeast infection and bladder spasms. Tammie Mcfarland a 74year old female is seen today for a follow up visit from ED [facility] on 10/29 for bladder spasms,UTI,weakness,headache,fatigue. Pt was complaining of a rash in vaginal area. Pt mentioned she has not cleansed her vaginal area for several weeks.           Current Outpatient Medications on File Prior to Visit   Medication Sig Dispense Refill    cephALEXin (KEFLEX) 500 MG capsule Take 1 capsule by mouth 2 times daily for 10 days 20 capsule 0    phenazopyridine (PYRIDIUM) 200 MG tablet Take 1 tablet by mouth 3 times daily as needed for Pain 21 tablet 0    NIFEdipine (PROCARDIA XL) 90 MG extended release tablet Take 1 tablet by mouth daily 30 tablet 3    hydrOXYzine pamoate (VISTARIL) 50 MG capsule Take 50 mg by mouth three times daily      carbidopa-levodopa (SINEMET)  MG per tablet Take 1 tablet by mouth nightly 30 tablet 1    pantoprazole (PROTONIX) 40 MG tablet Take 1 tablet by mouth 2 times daily (before meals) 180 tablet 1    magnesium oxide (MAG-OX) 400 MG tablet Take 1 tablet by mouth 3 times daily 90 tablet 0    amitriptyline (ELAVIL) 25 MG tablet Take 2 tablets by mouth nightly 60 tablet 3    levothyroxine (SYNTHROID) 75 MCG tablet Take 1 tablet by mouth every morning (before breakfast) 30 tablet 3    simvastatin (ZOCOR) 20 MG tablet Take 1 tablet by mouth nightly 30 tablet 3    montelukast (SINGULAIR) 10 MG tablet Take 1 tablet by mouth nightly 30 tablet 3    NOVOLOG FLEXPEN 100 UNIT/ML injection pen Inject 15 Units into the skin 3 times daily (before meals) 12/01/2020 patient states she follows sliding scale regimen BS > 150 5 pen 3    TRESIBA FLEXTOUCH 200 UNIT/ML SOPN Inject 20 Units into the skin every morning 1 pen 2    Insulin Pen Needle 31G X 5 MM MISC 1 each by Does not apply route daily 100 each 3    losartan (COZAAR) 50 MG tablet Take 1 tablet by mouth daily 30 tablet 5    metoprolol succinate (TOPROL XL) 50 MG extended release tablet Take 1 tablet by mouth daily 30 tablet 5    gabapentin (NEURONTIN) 300 MG capsule Take 400 mg by mouth 3 times daily. Probiotic Product (PROBIOTIC DIGESTIVE SUPP PO) Take 1 capsule by mouth every morning      ondansetron (ZOFRAN ODT) 4 MG disintegrating tablet Take 1 tablet by mouth every 8 hours as needed for Nausea 15 tablet 1    fluticasone (FLONASE) 50 MCG/ACT nasal spray 1 spray by Each Nostril route daily 32 g 1    nystatin (MYCOSTATIN) 283344 UNIT/GM powder Apply 3 times daily to affected areas 1 each 0    levETIRAcetam (KEPPRA) 750 MG tablet Take 1 tablet by mouth 2 times daily 60 tablet 0    QUEtiapine (SEROQUEL) 25 MG tablet Take 1 tablet by mouth 2 times daily (Patient taking differently: Take 25 mg by mouth 2 times daily Indications: 25 mg in am and 50 mg in pm) 60 tablet 0    busPIRone (BUSPAR) 10 MG tablet Take 2 tablets by mouth 3 times daily 180 tablet 0    HYDROcodone-acetaminophen (NORCO)  MG per tablet Take 1 tablet by mouth every 6 hours as needed. fluticasone-umeclidin-vilant (TRELEGY ELLIPTA) 100-62.5-25 MCG/INH AEPB Inhale 1 puff into the lungs daily (Patient taking differently: Inhale 1 puff into the lungs daily as needed (only takes prn daily due to yeast infections in mouth, is aware she is supposed to take daily)) 1 each 5    OXcarbazepine (TRILEPTAL) 300 MG tablet Take 1 tablet by mouth 2 times daily 60 tablet 3    docusate sodium (COLACE) 100 MG capsule Take 1 capsule by mouth 2 times daily 30 capsule 0    aspirin 81 MG tablet Take 81 mg by mouth daily      Multiple Vitamins-Iron (MULTI-VITAMIN/IRON PO) Take  by mouth.       MUCINEX 600 MG extended release tablet Take 1 tablet by mouth 2 times daily (Patient not taking: Reported on 11/1/2022) 14 tablet 0    [DISCONTINUED] sucralfate (CARAFATE) 1 GM tablet Take 1 tablet by mouth 4 times daily 120 tablet 3    [DISCONTINUED] sodium chloride 1 g tablet Take 1 tablet by mouth 2 times daily (with meals) 90 tablet 3    baclofen (LIORESAL) 10 MG tablet 1 tablet 2 times daily (Patient not taking: No sig reported)      diazePAM (VALIUM) 5 MG tablet as needed. (Patient not taking: Reported on 11/1/2022)      aluminum & magnesium hydroxide-simethicone (MAALOX MAX) 400-400-40 MG/5ML SUSP Take 15 mLs by mouth every 6 hours as needed (heart burn) (Patient not taking: No sig reported) 120 mL 0     No current facility-administered medications on file prior to visit. Past Medical History:   Diagnosis Date    Abnormal EKG 04/22/2014    Acid reflux     Anemia     Anesthesia     Nausea/Vomiting Post Op In Past    Anginal pain (HCC)     Denies Chest Pain At This Time    Anxiety     Arthritis     \"All Over\"    Asthma     Bipolar 1 disorder (HCC)     Cerebral artery occlusion with cerebral infarction (HCC)     CHF (congestive heart failure) ([facility])     Chronic back pain     Chronic kidney disease     DDD (degenerative disc disease), cervical     12- Patient reports she was dx with DDD of Cerival spine C6,C7    Depression     Diabetes mellitus ([address] Dx 1990's    Diabetic neuropathy ([address]     \"In My Legs And Feet\"    Dizziness     \"Sometimes\"    Dry skin     Enlarged ureter     Right Side    Fatty liver     Fibrocystic breast     Generalized anxiety disorder     Gout     Pt states she was diagnosed with gout in the past few months. H/O cardiac catheterization     Showed mild disease per last cath. H/O cardiovascular stress test 03/15/2010    EF 69%, normal perfusion study except for diaphragmatic artifact, uniform wall motion. H/O cardiovascular stress test 10/09/2008    EF 60%, no anginia, normal study. H/O cardiovascular stress test 05/06/2014    EF 66%, no ischemia, normal LV systolic funciton, normal perfusion pattern.      H/O Doppler ultrasound 02/28/2011    CAROTID DOPPLER-normal study. H/O echocardiogram 05/06/2014    Ef >55%. Impaired LV relaxation. H/O echocardiogram 10/14/2015    EF 60% Normal LV and systolic function. No significant valvulopathy seen. History of Holter monitoring 03/24/2015    24 hour - predominant rhythm sinus    [place] (hard of hearing)     Bilateral Ears    Hx of cardiovascular stress test 10/19/2015    lexiscan-normal,EF63%    Hx of motion sickness     HX OTHER MEDICAL     Primary Care Physician Is Dr. Gia Clemons In [facility]    Hyperlipidemia     Hypertension     IBS (irritable bowel syndrome)     Incisional hernia 04/2014    Kidney stones Last Episode In 2012 Or 2013    Passed Kidney Stones Numberous Times    Migraines     Nausea & vomiting     Nausea/Vomiting Post Op In Past    Other specified disorder of skin     12- Patient states she has a condition of her vaginal area (skin) which starts with the letters Leandrew Done. She is currently being treated with multiple creams and weekly Diflucan. 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     Staph infection Dx 1980's    Toes On Left Foot    Thyroid disease     hypothroidism    Tremor     \"Tremors All Over\"    Urinary incontinence     Vertigo     \"Sometimes\"    Wears glasses      Past Surgical History:   Procedure Laterality Date    APPENDECTOMY  1970's    Done With Cholecystectomy    BLADDER SURGERY  1970's Or 1980's    \"Stretched The Opening To The Bladder\", \"Total Of Four Bladder Surgeries\"    BREAST BIOPSY Right 1980's    Twice, Benign    BREAST SURGERY Left 1990's    Five, Benign    CARDIAC CATHETERIZATION  10-18-06    normal coronary angiogram with a normal left ventricular systolic function, patient can be treated medically.     CARDIAC CATHETERIZATION      \"Total 7 Cardiac Catheterizations\"    CARPAL TUNNEL RELEASE Right 1999    CHOLECYSTECTOMY  1970's    Appendectomy Also Done    COLONOSCOPY  Last Done 6-13    One Polyp Removed In Past    DENTAL SURGERY      All Teeth Extracted In Past    DIAGNOSTIC CARDIAC CATH LAB PROCEDURE  01/11/2010    no significant disease, continue medical therapy    ENDOSCOPY, COLON, DIAGNOSTIC  Several     ESOPHAGEAL DILATATION  1980's And 1990's    X 3    FRACTURE SURGERY  1974 Or 1975    Broken Bones Left [place] Due To [address]  1990's    Incisional Abdominal Hernia Repair  With Mesh    HERNIA REPAIR  1970's    Abdominal Hernia Repair    HYSTERECTOMY, TOTAL ABDOMINAL (CERVIX REMOVED)  1987    JOINT REPLACEMENT  2008    Total Right Knee    KNEE ARTHROSCOPY Right 1999    LITHOTRIPSY  2011    For Kidney Stones    OTHER SURGICAL HISTORY  06 13 [id number]    umbilical hernia with mesh    TUBAL LIGATION  1978     Family History   Problem Relation Age of Onset    Stroke Mother     Other Mother         Seizures    Diabetes Mother         Borderline Diabetes    High Blood Pressure Mother     Arthritis Mother     Early Death Mother 61        Stroke    Depression Mother     Heart Disease Mother     High Cholesterol Mother     Miscarriages / Stillbirths Mother     Mental Illness Mother     Mental Illness Father     Heart Disease Father         Massive Heart Attack    High Blood Pressure Father     Arthritis Father     High Cholesterol Father     Mental Illness Sister     Hearing Loss Sister     Heart Failure Sister     High Blood Pressure Sister     Arthritis Sister     Heart Disease Sister     Cancer Sister     Mental Illness Brother     Cancer Brother         Liver And Colon Cancer    Early Death Brother 37        Liver And Colon Cancer    Heart Disease Brother         Heart Stents    High Blood Pressure Brother     High Cholesterol Brother     Early Death Brother         8.5 Years Old,Hit By A Car    Colon Cancer Brother     Heart Disease Brother     Mental Illness Brother     Early Death Brother 61        Heart Attack    Heart Disease Brother         Heart Attack    Mental Illness Daughter         Bipolar    Depression Daughter     Anxiety Disorder Daughter     Bipolar Disorder Daughter     Other Daughter         Stomach And Bowel Problems    Other Son         Seizures     Social History     Socioeconomic History    Marital status:      Spouse name: Not on file    Number of children: 3    Years of education: 6    Highest education level: Not on file   Occupational History    Not on file   Tobacco Use    Smoking status: Never    Smokeless tobacco: Never   Vaping Use    Vaping Use: Never used   Substance and Sexual Activity    Alcohol use: No    Drug use: No    Sexual activity: Not Currently   Other Topics Concern    Not on file   Social History Narrative    Not on file     Social Determinants of Health     Financial Resource Strain: Medium Risk    Difficulty of Paying Living Expenses: Somewhat hard   Food Insecurity: Food Insecurity Present    Worried About Running Out of Food in the Last Year: Sometimes true    Ran Out of Food in the Last Year: Sometimes true   Transportation Needs: Not on file   Physical Activity: Not on file   Stress: Not on file   Social Connections: Not on file   Intimate Partner Violence: Not on file   Housing Stability: Not on file       Review of Systems   Constitutional: Negative. HENT: Negative. Eyes: Negative. Respiratory: Negative. Cardiovascular: Negative. Gastrointestinal: Negative. Endocrine: Negative. Genitourinary:  Positive for pelvic pain and urgency. Musculoskeletal: Negative. Skin:  Positive for rash. Neurological: Negative. Psychiatric/Behavioral:  The patient is nervous/anxious.       OBJECTIVE:     /60   Pulse 74   Resp 18   Wt 172 lb (78 kg)   SpO2 95%   BMI 31.46 kg/m²     Physical Exam  Cardiovascular:      Rate and Rhythm: Normal rate and regular rhythm. Pulses:           Dorsalis pedis pulses are 2+ on the right side and 2+ on the left side. Heart sounds: Murmur heard. Pulmonary:      Breath sounds: Normal breath sounds. Abdominal:      General: Bowel sounds are normal.      Palpations: Abdomen is soft. Musculoskeletal:        Feet:    Feet:      Right foot:      Protective Sensation: 6 sites tested. 2 sites sensed. Skin integrity: Dry skin present. Toenail Condition: Right toenails are abnormally thick and long. Fungal disease present. Left foot:      Protective Sensation: 6 sites tested. 3 sites sensed. Skin integrity: Dry skin present. Toenail Condition: Left toenails are abnormally thick and long. Fungal disease present. Comments: Little scab on left second toe  Skin:     General: Skin is warm and dry. Coloration: Skin is pale. Findings: Rash present. Neurological:      Mental Status: She is alert.        Results in Past 30 Days  Result Component Current Result Ref Range Previous Result Ref Range   Albumin 4.8 (10/29/2022) 3.4 - 5.0 GM/DL 4.5 (10/13/2022) 3.4 - 5.0 GM/DL   Alkaline Phosphatase 96 (10/29/2022) 40 - 129 IU/L 78 (10/13/2022) 40 - 129 IU/L   ALT 23 (10/29/2022) 10 - 40 U/L 23 (10/13/2022) 10 - 40 U/L   AST 26 (10/29/2022) 15 - 37 IU/L 28 (10/13/2022) 15 - 37 IU/L   BUN 8 (10/29/2022) 6 - 23 MG/DL 10 (10/13/2022) 6 - 23 MG/DL   Calcium 9.8 (10/29/2022) 8.3 - 10.6 MG/DL 9.7 (10/13/2022) 8.3 - 10.6 MG/DL   Chloride 94 (L) (10/29/2022) 99 - 110 mMol/L 95 (L) (10/13/2022) 99 - 110 mMol/L   CO2 25 (10/29/2022) 21 - 32 MMOL/L 23 (10/13/2022) 21 - 32 MMOL/L   Creatinine 0.5 (L) (10/29/2022) 0.6 - 1.1 MG/DL 0.5 (L) (10/13/2022) 0.6 - 1.1 MG/DL   Est, Glom Filt Rate >60 (10/29/2022) >60 mL/min/1.73m2 >60 (10/13/2022) >60 mL/min/1.73m2   GFR  >60 (10/13/2022) >60 mL/min/1.73m2 >60 (10/11/2022) >60 mL/min/1.73m2 Glucose 235 (H) (10/29/2022) 70 - 99 MG/ (H) (10/13/2022) 70 - 99 MG/DL   Potassium 4.1 (10/29/2022) 3.5 - 5.1 MMOL/L 3.9 (10/13/2022) 3.5 - 5.1 MMOL/L   Sodium 132 (L) (10/29/2022) 135 - 145 MMOL/L 132 (L) (10/13/2022) 135 - 145 MMOL/L   Total Bilirubin 0.3 (10/29/2022) 0.0 - 1.0 MG/DL 0.5 (10/13/2022) 0.0 - 1.0 MG/DL   Total Protein 7.6 (10/29/2022) 6.4 - 8.2 GM/DL 7.6 (10/13/2022) 6.4 - 8.2 GM/DL     Hemoglobin A1C (%)   Date Value   03/30/2022 9.0 (H)     LDL Calculated (MG/DL)   Date Value   10/11/2022 29       Lab Results   Component Value Date/Time    WBC 10.0 10/29/2022 08:30 AM    WBC 9.5 10/13/2022 05:31 PM    WBC 8.1 06/20/2022 05:32 PM    HGB 13.7 10/29/2022 08:30 AM    HGB 14.2 10/13/2022 05:31 PM    HGB 13.4 06/20/2022 05:32 PM    HCT 39.6 10/29/2022 08:30 AM    HCT 41.2 10/13/2022 05:31 PM    HCT 40.4 06/20/2022 05:32 PM    MCV 81.3 10/29/2022 08:30 AM    MCV 81.1 10/13/2022 05:31 PM    MCV 84.7 06/20/2022 05:32 PM     10/29/2022 08:30 AM     10/13/2022 05:31 PM     06/20/2022 05:32 PM    SEGSABS 6.2 10/29/2022 08:30 AM    SEGSABS 3.9 10/13/2022 05:31 PM    SEGSABS 3.9 06/20/2022 05:32 PM    LYMPHSABS 2.3 10/29/2022 08:30 AM    MONOSABS 1.0 10/29/2022 08:30 AM    EOSABS 0.4 10/29/2022 08:30 AM    BASOSABS 0.1 10/29/2022 08:30 AM     Lab Results   Component Value Date/Time    TSHHS 1.550 10/11/2022 04:08 PM     Lab Results   Component Value Date/Time    LABALBU 4.8 10/29/2022 08:30 AM    BILITOT 0.3 10/29/2022 08:30 AM    BILIDIR 0.2 01/10/2022 11:50 AM    IBILI 0.2 01/10/2022 11:50 AM    AST 26 10/29/2022 08:30 AM    ALT 23 10/29/2022 08:30 AM    ALKPHOS 96 10/29/2022 08:30 AM             No results found for this visit on 11/01/22. ASSESSMENT AND PLAN:     1. Hospital discharge follow-up    - SD DISCHARGE MEDS RECONCILED W/ CURRENT OUTPATIENT MED LIST    2.  Type 2 diabetes mellitus with diabetic polyneuropathy, with long-term current use of insulin (HCC)    - POCT glycosylated hemoglobin (Hb A1C)  - Amb External Referral To Podiatry  -  DIABETES FOOT EXAM  - Handicap Placard MISC; by Does not apply route  Dispense: 1 each; Refill: 0    3. Chronic UTI (urinary tract infection)    - MATT - Lacy Rawls MD, Urology, [facility]    4. Urinary incontinence without sensory awareness    - MATT Rawls MD, Urology, [facility]    5. Parkinson's disease ([facility] [address]    - Handicap Placard MISC; by Does not apply route  Dispense: 1 each; Refill: 0    6. Left sided numbness    - Handicap Placard MISC; by Does not apply route  Dispense: 1 each; Refill: 0    7. Encounter for screening mammogram for malignant neoplasm of breast      - MEGHAN DIGITAL SCREEN W CAD BILATERAL PER PROTOCOL; Future      Return in about 3 months (around 2/1/2023) for DM, HgA1C, neuro. Care discussed with patient. Patient educated on signs and symptoms of exacerbation and when to seek further medical attention. Advised to call for any problems, questions, or concerns. Patient verbalizes understanding and agrees with plan. Medications reviewed and reconciled. Continue current medications. Appropriate prescriptions are ordered. Risks and benefits of meds are discussed. After visit summary provided.

## 2022-11-01 NOTE — PROGRESS NOTES
Chastity Booker  1955  74 y.o. SUBJECT MARCIA:    Chief Complaint   Patient presents with    Follow-Up from Hospital     Here for follow up on bladder spasms, yeast infection and uti. Still having yeast infection and bladder spasms. Malissa Jose is a 74year old female who is in for ED follow up. She states her urinary symptoms have improved. She complains of irritation in the perineal area and states she was told she had yeast infection. She just took her seond anti-yeast pill. She states she is also using a cream that is helping. She is incontinent of urine and wears pads which she does not always change frequently. She states she had seen urology and told she had a \"tilted bladder\" and could have surgery. She states she never did follow up with them. She states her blood sugars have been okay. She continues to use insulin. She has many chronic medical problems including diabetes, bipolar I disorder, seizures, coronary artery disease, tremors, polyneuropathy, anemia, hypertension, hypothyroidism and gout. She sees cardiology, neurology and pain management. Urinary Tract Infection  This is a chronic problem. The current episode started 1 to 4 weeks ago. The problem has been gradually improving since onset. Pertinent negatives include no hematuria or pain. Risk factors include diabetes.        Current Outpatient Medications on File Prior to Visit   Medication Sig Dispense Refill    cephALEXin (KEFLEX) 500 MG capsule Take 1 capsule by mouth 2 times daily for 10 days 20 capsule 0    phenazopyridine (PYRIDIUM) 200 MG tablet Take 1 tablet by mouth 3 times daily as needed for Pain 21 tablet 0    NIFEdipine (PROCARDIA XL) 90 MG extended release tablet Take 1 tablet by mouth daily 30 tablet 3    hydrOXYzine pamoate (VISTARIL) 50 MG capsule Take 50 mg by mouth three times daily      carbidopa-levodopa (SINEMET)  MG per tablet Take 1 tablet by mouth nightly 30 tablet 1    pantoprazole (PROTONIX) 40 MG tablet Take 1 tablet by mouth 2 times daily (before meals) 180 tablet 1    magnesium oxide (MAG-OX) 400 MG tablet Take 1 tablet by mouth 3 times daily 90 tablet 0    amitriptyline (ELAVIL) 25 MG tablet Take 2 tablets by mouth nightly 60 tablet 3    levothyroxine (SYNTHROID) 75 MCG tablet Take 1 tablet by mouth every morning (before breakfast) 30 tablet 3    simvastatin (ZOCOR) 20 MG tablet Take 1 tablet by mouth nightly 30 tablet 3    montelukast (SINGULAIR) 10 MG tablet Take 1 tablet by mouth nightly 30 tablet 3    NOVOLOG FLEXPEN 100 UNIT/ML injection pen Inject 15 Units into the skin 3 times daily (before meals) 12/01/2020 patient states she follows sliding scale regimen BS > 150 5 pen 3    TRESIBA FLEXTOUCH 200 UNIT/ML SOPN Inject 20 Units into the skin every morning 1 pen 2    Insulin Pen Needle 31G X 5 MM MISC 1 each by Does not apply route daily 100 each 3    losartan (COZAAR) 50 MG tablet Take 1 tablet by mouth daily 30 tablet 5    metoprolol succinate (TOPROL XL) 50 MG extended release tablet Take 1 tablet by mouth daily 30 tablet 5    gabapentin (NEURONTIN) 300 MG capsule Take 400 mg by mouth 3 times daily.       Probiotic Product (PROBIOTIC DIGESTIVE SUPP PO) Take 1 capsule by mouth every morning      ondansetron (ZOFRAN ODT) 4 MG disintegrating tablet Take 1 tablet by mouth every 8 hours as needed for Nausea 15 tablet 1    fluticasone (FLONASE) 50 MCG/ACT nasal spray 1 spray by Each Nostril route daily 32 g 1    nystatin (MYCOSTATIN) 801069 UNIT/GM powder Apply 3 times daily to affected areas 1 each 0    levETIRAcetam (KEPPRA) 750 MG tablet Take 1 tablet by mouth 2 times daily 60 tablet 0    QUEtiapine (SEROQUEL) 25 MG tablet Take 1 tablet by mouth 2 times daily (Patient taking differently: Take 25 mg by mouth 2 times daily Indications: 25 mg in am and 50 mg in pm) 60 tablet 0    busPIRone (BUSPAR) 10 MG tablet Take 2 tablets by mouth 3 times daily 180 tablet 0    HYDROcodone-acetaminophen (NORCO)  MG per tablet Take 1 tablet by mouth every 6 hours as needed. fluticasone-umeclidin-vilant (TRELEGY ELLIPTA) 100-62.5-25 MCG/INH AEPB Inhale 1 puff into the lungs daily (Patient taking differently: Inhale 1 puff into the lungs daily as needed (only takes prn daily due to yeast infections in mouth, is aware she is supposed to take daily)) 1 each 5    OXcarbazepine (TRILEPTAL) 300 MG tablet Take 1 tablet by mouth 2 times daily 60 tablet 3    docusate sodium (COLACE) 100 MG capsule Take 1 capsule by mouth 2 times daily 30 capsule 0    aspirin 81 MG tablet Take 81 mg by mouth daily      Multiple Vitamins-Iron (MULTI-VITAMIN/IRON PO) Take  by mouth. MUCINEX 600 MG extended release tablet Take 1 tablet by mouth 2 times daily (Patient not taking: Reported on 11/1/2022) 14 tablet 0    [DISCONTINUED] sucralfate (CARAFATE) 1 GM tablet Take 1 tablet by mouth 4 times daily 120 tablet 3    [DISCONTINUED] sodium chloride 1 g tablet Take 1 tablet by mouth 2 times daily (with meals) 90 tablet 3    baclofen (LIORESAL) 10 MG tablet 1 tablet 2 times daily (Patient not taking: No sig reported)      diazePAM (VALIUM) 5 MG tablet as needed. (Patient not taking: Reported on 11/1/2022)      aluminum & magnesium hydroxide-simethicone (MAALOX MAX) 400-400-40 MG/5ML SUSP Take 15 mLs by mouth every 6 hours as needed (heart burn) (Patient not taking: No sig reported) 120 mL 0     No current facility-administered medications on file prior to visit.        Past Medical History:   Diagnosis Date    Abnormal EKG 04/22/2014    Acid reflux     Anemia     Anesthesia     Nausea/Vomiting Post Op In Past    Anginal pain (HCC)     Denies Chest Pain At This Time    Anxiety     Arthritis     \"All Over\"    Asthma     Bipolar 1 disorder (HCC)     Cerebral artery occlusion with cerebral infarction (HCC)     CHF (congestive heart failure) (HCC)     Chronic back pain     Chronic kidney disease     DDD (degenerative disc disease), cervical 12- Patient reports she was dx with DDD of Cerival spine C6,C7    Depression     Diabetes mellitus ([address] Dx 1990's    Diabetic neuropathy ([address]     \"In My Legs And Feet\"    Dizziness     \"Sometimes\"    Dry skin     Enlarged ureter     Right Side    Fatty liver     Fibrocystic breast     Generalized anxiety disorder     Gout     Pt states she was diagnosed with gout in the past few months. H/O cardiac catheterization     Showed mild disease per last cath. H/O cardiovascular stress test 03/15/2010    EF 69%, normal perfusion study except for diaphragmatic artifact, uniform wall motion. H/O cardiovascular stress test 10/09/2008    EF 60%, no anginia, normal study. H/O cardiovascular stress test 05/06/2014    EF 66%, no ischemia, normal LV systolic funciton, normal perfusion pattern. H/O Doppler ultrasound 02/28/2011    CAROTID DOPPLER-normal study. H/O echocardiogram 05/06/2014    Ef >55%. Impaired LV relaxation. H/O echocardiogram 10/14/2015    EF 60% Normal LV and systolic function. No significant valvulopathy seen. History of Holter monitoring 03/24/2015    24 hour - predominant rhythm sinus    [place] (hard of hearing)     Bilateral Ears    Hx of cardiovascular stress test 10/19/2015    lexiscan-normal,EF63%    Hx of motion sickness     HX OTHER MEDICAL     Primary Care Physician Is Dr. Bo Heard In [facility]    Hyperlipidemia     Hypertension     IBS (irritable bowel syndrome)     Incisional hernia 04/2014    Kidney stones Last Episode In 2012 Or 2013    Passed Kidney Stones Numberous Times    Migraines     Nausea & vomiting     Nausea/Vomiting Post Op In Past    Other specified disorder of skin     12- Patient states she has a condition of her vaginal area (skin) which starts with the letters Macel Hipps. She is currently being treated with multiple creams and weekly Diflucan.      Panic attacks     Panic attacks     Pneumonia Last Episode In 1980's    Pseudoseizures ([address] Last One In 1990's    \"Caused From Bad Nerves\"    Restless leg     Shortness of breath     Sleep apnea     12- Has CPAP but does not use due to \"smothering\" feeling with mask. Staph infection Dx 1980's    Toes On Left Foot    Thyroid disease     hypothroidism    Tremor     \"Tremors All Over\"    Urinary incontinence     Vertigo     \"Sometimes\"    Wears glasses      Past Surgical History:   Procedure Laterality Date    APPENDECTOMY  1970's    Done With Cholecystectomy    BLADDER SURGERY  1970's Or 1980's    \"Stretched The Opening To The Bladder\", \"Total Of Four Bladder Surgeries\"    BREAST BIOPSY Right 1980's    Twice, Benign    BREAST SURGERY Left 1990's    Five, Benign    CARDIAC CATHETERIZATION  10-18-06    normal coronary angiogram with a normal left ventricular systolic function, patient can be treated medically.     CARDIAC CATHETERIZATION      \"Total 7 Cardiac Catheterizations\"    CARPAL TUNNEL RELEASE Right 1999    CHOLECYSTECTOMY  1970's    Appendectomy Also Done    COLONOSCOPY  Last Done 6-13    One Polyp Removed In Past    DENTAL SURGERY      All Teeth Extracted In Past    DIAGNOSTIC CARDIAC CATH LAB PROCEDURE  01/11/2010    no significant disease, continue medical therapy    ENDOSCOPY, COLON, DIAGNOSTIC  Several     ESOPHAGEAL DILATATION  1980's And 1990's    X 3    FRACTURE SURGERY  1974 Or 1975    Broken Bones Left [facility] Due To [address]  1990's    Incisional Abdominal Hernia Repair  With Mesh    HERNIA REPAIR  1970's    Abdominal Hernia Repair    HYSTERECTOMY, TOTAL ABDOMINAL (CERVIX REMOVED)  1987    JOINT REPLACEMENT  2008    Total Right Knee    KNEE ARTHROSCOPY Right 1999    LITHOTRIPSY  2011    For Kidney Stones    OTHER SURGICAL HISTORY  [id number]    umbilical hernia with mesh    TUBAL LIGATION  1978     Family History   Problem Relation Age of Onset    Stroke Mother     Other Mother         Seizures    Diabetes Mother         Borderline Diabetes    High Blood Pressure Mother     Arthritis Mother     Early Death Mother 61        Stroke    Depression Mother     Heart Disease Mother     High Cholesterol Mother     Miscarriages / Stillbirths Mother     Mental Illness Mother     Mental Illness Father     Heart Disease Father         Massive Heart Attack    High Blood Pressure Father     Arthritis Father     High Cholesterol Father     Mental Illness Sister     Hearing Loss Sister     Heart Failure Sister     High Blood Pressure Sister     Arthritis Sister     Heart Disease Sister     Cancer Sister     Mental Illness Brother     Cancer Brother         Liver And Colon Cancer    Early Death Brother 37        Liver And Colon Cancer    Heart Disease Brother         Heart Stents    High Blood Pressure Brother     High Cholesterol Brother     Early Death Brother         65 Years Old,Hit By A Car    Colon Cancer Brother     Heart Disease Brother     Mental Illness Brother     Early Death Brother 61        Heart Attack    Heart Disease Brother         Heart Attack    Mental Illness Daughter         Bipolar    Depression Daughter     Anxiety Disorder Daughter     Bipolar Disorder Daughter     Other Daughter         Stomach And Bowel Problems    Other Son         Seizures     Social History     Socioeconomic History    Marital status:       Spouse name: Not on file    Number of children: 3    Years of education: 6    Highest education level: Not on file   Occupational History    Not on file   Tobacco Use    Smoking status: Never    Smokeless tobacco: Never   Vaping Use    Vaping Use: Never used   Substance and Sexual Activity    Alcohol use: No    Drug use: No    Sexual activity: Not Currently   Other Topics Concern    Not on file   Social History Narrative    Not on file     Social Determinants of Health     Financial Resource Strain: Medium Risk    Difficulty of Paying Living Expenses: Somewhat hard   Food Insecurity: Food Insecurity Present    Worried About Running Out of Food in the Last Year: Sometimes true    Ran Out of Food in the Last Year: Sometimes true   Transportation Needs: Not on file   Physical Activity: Not on file   Stress: Not on file   Social Connections: Not on file   Intimate Partner Violence: Not on file   Housing Stability: Not on file       Review of Systems   Constitutional:  Negative for activity change, appetite change, chills, diaphoresis, fatigue, fever and unexpected weight change. HENT:  Negative for trouble swallowing. Respiratory:  Negative for cough, chest tightness, shortness of breath and wheezing. Cardiovascular:  Negative for chest pain and palpitations. Gastrointestinal: Negative. Genitourinary:  Negative for decreased urine volume, difficulty urinating, dysuria, flank pain, hematuria, pelvic pain and vaginal bleeding. Musculoskeletal:  Positive for arthralgias and back pain. Skin:  Positive for rash. Neurological:  Negative for dizziness, weakness, light-headedness and headaches. Psychiatric/Behavioral:  Positive for dysphoric mood. Negative for agitation and confusion. OBJECTIVE:     /60   Pulse 74   Resp 18   Wt 172 lb (78 kg)   SpO2 95%   BMI 31.46 kg/m²     Physical Exam  Vitals reviewed. Constitutional:       General: She is not in acute distress. Appearance: Normal appearance. She is well-developed. She is not ill-appearing or diaphoretic. HENT:      Head: Normocephalic and atraumatic. Right Ear: External ear normal.      Left Ear: External ear normal.   Eyes:      Conjunctiva/sclera: Conjunctivae normal.      Pupils: Pupils are equal, round, and reactive to light. Cardiovascular:      Rate and Rhythm: Normal rate and regular rhythm. Heart sounds: Murmur heard. Systolic murmur is present with a grade of 1/6. No friction rub. No gallop. Pulmonary:      Effort: Pulmonary effort is normal.      Breath sounds: Normal breath sounds. Abdominal:      Palpations: Abdomen is soft. Musculoskeletal:         General: Normal range of motion. Cervical back: Normal range of motion and neck supple. Right lower leg: Edema (slight) present. Left lower leg: Edema (slight) present. Skin:     General: Skin is warm and dry. Neurological:      Mental Status: She is alert and oriented to person, place, and time. Psychiatric:         Behavior: Behavior normal.         Thought Content:  Thought content normal.         Judgment: Judgment normal.       Results in Past 30 Days  Result Component Current Result Ref Range Previous Result Ref Range   Albumin 4.8 (10/29/2022) 3.4 - 5.0 GM/DL 4.5 (10/13/2022) 3.4 - 5.0 GM/DL   Alkaline Phosphatase 96 (10/29/2022) 40 - 129 IU/L 78 (10/13/2022) 40 - 129 IU/L   ALT 23 (10/29/2022) 10 - 40 U/L 23 (10/13/2022) 10 - 40 U/L   AST 26 (10/29/2022) 15 - 37 IU/L 28 (10/13/2022) 15 - 37 IU/L   BUN 8 (10/29/2022) 6 - 23 MG/DL 10 (10/13/2022) 6 - 23 MG/DL   Calcium 9.8 (10/29/2022) 8.3 - 10.6 MG/DL 9.7 (10/13/2022) 8.3 - 10.6 MG/DL   Chloride 94 (L) (10/29/2022) 99 - 110 mMol/L 95 (L) (10/13/2022) 99 - 110 mMol/L   CO2 25 (10/29/2022) 21 - 32 MMOL/L 23 (10/13/2022) 21 - 32 MMOL/L   Creatinine 0.5 (L) (10/29/2022) 0.6 - 1.1 MG/DL 0.5 (L) (10/13/2022) 0.6 - 1.1 MG/DL   Est, Glom Filt Rate >60 (10/29/2022) >60 mL/min/1.73m2 >60 (10/13/2022) >60 mL/min/1.73m2   GFR  >60 (10/13/2022) >60 mL/min/1.73m2 >60 (10/11/2022) >60 mL/min/1.73m2   Glucose 235 (H) (10/29/2022) 70 - 99 MG/ (H) (10/13/2022) 70 - 99 MG/DL   Potassium 4.1 (10/29/2022) 3.5 - 5.1 MMOL/L 3.9 (10/13/2022) 3.5 - 5.1 MMOL/L   Sodium 132 (L) (10/29/2022) 135 - 145 MMOL/L 132 (L) (10/13/2022) 135 - 145 MMOL/L   Total Bilirubin 0.3 (10/29/2022) 0.0 - 1.0 MG/DL 0.5 (10/13/2022) 0.0 - 1.0 MG/DL   Total Protein 7.6 (10/29/2022) 6.4 - 8.2 GM/DL 7.6 (10/13/2022) 6.4 - 8.2 GM/DL     Hemoglobin A1C (%)   Date Value   03/30/2022 9.0 (H)     LDL Calculated (MG/DL)   Date Value   10/11/2022 29 Lab Results   Component Value Date/Time    WBC 10.0 10/29/2022 08:30 AM    WBC 9.5 10/13/2022 05:31 PM    WBC 8.1 06/20/2022 05:32 PM    HGB 13.7 10/29/2022 08:30 AM    HGB 14.2 10/13/2022 05:31 PM    HGB 13.4 06/20/2022 05:32 PM    HCT 39.6 10/29/2022 08:30 AM    HCT 41.2 10/13/2022 05:31 PM    HCT 40.4 06/20/2022 05:32 PM    MCV 81.3 10/29/2022 08:30 AM    MCV 81.1 10/13/2022 05:31 PM    MCV 84.7 06/20/2022 05:32 PM     10/29/2022 08:30 AM     10/13/2022 05:31 PM     06/20/2022 05:32 PM    SEGSABS 6.2 10/29/2022 08:30 AM    SEGSABS 3.9 10/13/2022 05:31 PM    SEGSABS 3.9 06/20/2022 05:32 PM    LYMPHSABS 2.3 10/29/2022 08:30 AM    MONOSABS 1.0 10/29/2022 08:30 AM    EOSABS 0.4 10/29/2022 08:30 AM    BASOSABS 0.1 10/29/2022 08:30 AM     Lab Results   Component Value Date/Time    TSHHS 1.550 10/11/2022 04:08 PM     Lab Results   Component Value Date/Time    LABALBU 4.8 10/29/2022 08:30 AM    BILITOT 0.3 10/29/2022 08:30 AM    BILIDIR 0.2 01/10/2022 11:50 AM    IBILI 0.2 01/10/2022 11:50 AM    AST 26 10/29/2022 08:30 AM    ALT 23 10/29/2022 08:30 AM    ALKPHOS 96 10/29/2022 08:30 AM             No results found for this visit on 11/01/22. ASSESSMENT AND PLAN:     1. Hospital discharge follow-up  - PA DISCHARGE MEDS RECONCILED W/ CURRENT OUTPATIENT MED LIST    2. Type 2 diabetes mellitus with diabetic polyneuropathy, with long-term current use of insulin (HCC)  - improving  - POCT glycosylated hemoglobin (Hb A1C)  - Amb External Referral To Podiatry  -  DIABETES FOOT EXAM  - [place] [facility] [place] Does not apply route  Dispense: 1 each; Refill: 0    3. Chronic UTI (urinary tract infection)  - [facility] - Floirdalma Turner MD, Urology, [place] [facility]    4. Urinary incontinence without sensory awareness  - [facility] - Floridalma Turner MD, Urology, [place]    5. [facility]'s disease ([facility]  - managed by neurology  - [place] [facility] [place] Does not apply route  Dispense: 1 each; Refill: 0    6.  Left sided numbness  - Handicap Placard [facility]; by Does not apply route  Dispense: 1 each; Refill: 0    7. Encounter for screening mammogram for malignant neoplasm of breast  - [facility] DIGITAL SCREEN W CAD BILATERAL PER PROTOCOL; Future    8. Murmur    Keep appointments with cardiology and neurology. Continue current medication regimen. Encouraged to revisit with urology. To call PCP office with name of DME company for re-order of incontinent pads. Verbalized understanding and agreement with plan. Affirmation I spent at least 35 minutes of time reviewing patient's history, previous & current medical problems & all Labs + testing. This includes chart prep even prior to the visit. Various goals are discussed and multiple questions answered. Relevant counselling performed. Return in about 3 months (around 2/1/2023) for DM, HgA1C, neuro. Care discussed with patient. Patient educated on signs and symptoms of exacerbation and when to seek further medical attention. Advised to call for any problems, questions, or concerns. Patient verbalizes understanding and agrees with plan. Medications reviewed and reconciled. Continue current medications. Appropriate prescriptions are ordered. Risks and benefits of meds are discussed. After visit summary provided.

## 2022-11-01 NOTE — PATIENT INSTRUCTIONS
Wash peineal area at least three times a day. Keep area clean and dry. Allow area to dry by not wearing pads for several hours. Change incontinent pads when they become wet.

## 2022-11-03 ASSESSMENT — ENCOUNTER SYMPTOMS
BACK PAIN: 1
GASTROINTESTINAL NEGATIVE: 1
COUGH: 0

## 2022-11-08 ENCOUNTER — OFFICE VISIT (OUTPATIENT)
Dept: NEUROLOGY | Age: 67
End: 2022-11-08
Payer: MEDICARE

## 2022-11-08 VITALS
BODY MASS INDEX: 31.65 KG/M2 | WEIGHT: 172 LBS | OXYGEN SATURATION: 96 % | SYSTOLIC BLOOD PRESSURE: 132 MMHG | HEIGHT: 62 IN | HEART RATE: 72 BPM | DIASTOLIC BLOOD PRESSURE: 82 MMHG

## 2022-11-08 DIAGNOSIS — G47.33 OSA (OBSTRUCTIVE SLEEP APNEA): ICD-10-CM

## 2022-11-08 DIAGNOSIS — G43.009 MIGRAINE WITHOUT AURA, NOT REFRACTORY: ICD-10-CM

## 2022-11-08 DIAGNOSIS — G25.81 RESTLESS LEG SYNDROME: Primary | ICD-10-CM

## 2022-11-08 DIAGNOSIS — E11.42 DIABETIC PERIPHERAL NEUROPATHY (HCC): ICD-10-CM

## 2022-11-08 PROCEDURE — 3074F SYST BP LT 130 MM HG: CPT | Performed by: PSYCHIATRY & NEUROLOGY

## 2022-11-08 PROCEDURE — 1123F ACP DISCUSS/DSCN MKR DOCD: CPT | Performed by: PSYCHIATRY & NEUROLOGY

## 2022-11-08 PROCEDURE — G8427 DOCREV CUR MEDS BY ELIG CLIN: HCPCS | Performed by: PSYCHIATRY & NEUROLOGY

## 2022-11-08 PROCEDURE — 2022F DILAT RTA XM EVC RTNOPTHY: CPT | Performed by: PSYCHIATRY & NEUROLOGY

## 2022-11-08 PROCEDURE — G8400 PT W/DXA NO RESULTS DOC: HCPCS | Performed by: PSYCHIATRY & NEUROLOGY

## 2022-11-08 PROCEDURE — 3046F HEMOGLOBIN A1C LEVEL >9.0%: CPT | Performed by: PSYCHIATRY & NEUROLOGY

## 2022-11-08 PROCEDURE — 99214 OFFICE O/P EST MOD 30 MIN: CPT | Performed by: PSYCHIATRY & NEUROLOGY

## 2022-11-08 PROCEDURE — G8417 CALC BMI ABV UP PARAM F/U: HCPCS | Performed by: PSYCHIATRY & NEUROLOGY

## 2022-11-08 PROCEDURE — 1090F PRES/ABSN URINE INCON ASSESS: CPT | Performed by: PSYCHIATRY & NEUROLOGY

## 2022-11-08 PROCEDURE — 3078F DIAST BP <80 MM HG: CPT | Performed by: PSYCHIATRY & NEUROLOGY

## 2022-11-08 PROCEDURE — G8484 FLU IMMUNIZE NO ADMIN: HCPCS | Performed by: PSYCHIATRY & NEUROLOGY

## 2022-11-08 PROCEDURE — 1036F TOBACCO NON-USER: CPT | Performed by: PSYCHIATRY & NEUROLOGY

## 2022-11-08 PROCEDURE — 3017F COLORECTAL CA SCREEN DOC REV: CPT | Performed by: PSYCHIATRY & NEUROLOGY

## 2022-11-08 RX ORDER — AMITRIPTYLINE HYDROCHLORIDE 25 MG/1
50 TABLET, FILM COATED ORAL NIGHTLY
Qty: 60 TABLET | Refills: 5 | Status: SHIPPED | OUTPATIENT
Start: 2022-11-08

## 2022-11-08 NOTE — PROGRESS NOTES
11/8/22    Delpha Cutting  1955    Chief Complaint   Patient presents with    Follow-up     Migraines. History of Present Illness    Carol Franco is here for follow-up of migraine. She states that the increase in amitriptyline to 50 mg at bedtime did not seem to help her sleep but states that it did not seem to help her headache. She has a headache from the time she wakes up to the time she fell asleep. She tells me it is a \"10 out of 10\" in intensity all the time. She did try Nurtec and it seems to help for a little bit but then did not have a lasting effect. She is on carbidopa levodopa 10 mg / 100 mg at bedtime for restless leg syndrome. She tells me that it helps a lot. She ran out of it for 10 to 14 days at 1 point and states that it was horrible and her legs started shaking and jerking in. She had previously been on Botox in the past for migraines and had some good relief. She has previously been on propranolol, Topamax, verapamil, metoprolol, amitriptyline, gabapentin, and duloxetine for migraine without good relief. She did to get a sleep study with sleep medicine. She tells me she has not yet gotten a result.       Current Outpatient Medications   Medication Sig Dispense Refill    amitriptyline (ELAVIL) 25 MG tablet Take 2 tablets by mouth nightly 60 tablet 5    Handicap Placard MISC by Does not apply route 1 each 0    cephALEXin (KEFLEX) 500 MG capsule Take 1 capsule by mouth 2 times daily for 10 days 20 capsule 0    NIFEdipine (PROCARDIA XL) 90 MG extended release tablet Take 1 tablet by mouth daily 30 tablet 3    hydrOXYzine pamoate (VISTARIL) 50 MG capsule Take 50 mg by mouth three times daily      carbidopa-levodopa (SINEMET)  MG per tablet Take 1 tablet by mouth nightly 30 tablet 1    pantoprazole (PROTONIX) 40 MG tablet Take 1 tablet by mouth 2 times daily (before meals) 180 tablet 1    magnesium oxide (MAG-OX) 400 MG tablet Take 1 tablet by mouth 3 times daily 90 tablet 0 levothyroxine (SYNTHROID) 75 MCG tablet Take 1 tablet by mouth every morning (before breakfast) 30 tablet 3    simvastatin (ZOCOR) 20 MG tablet Take 1 tablet by mouth nightly 30 tablet 3    montelukast (SINGULAIR) 10 MG tablet Take 1 tablet by mouth nightly 30 tablet 3    NOVOLOG FLEXPEN 100 UNIT/ML injection pen Inject 15 Units into the skin 3 times daily (before meals) 12/01/2020 patient states she follows sliding scale regimen BS > 150 5 pen 3    TRESIBA FLEXTOUCH 200 UNIT/ML SOPN Inject 20 Units into the skin every morning (Patient taking differently: Inject 60 Units into the skin at bedtime) 1 pen 2    Insulin Pen Needle 31G X 5 MM MISC 1 each by Does not apply route daily 100 each 3    losartan (COZAAR) 50 MG tablet Take 1 tablet by mouth daily 30 tablet 5    metoprolol succinate (TOPROL XL) 50 MG extended release tablet Take 1 tablet by mouth daily 30 tablet 5    gabapentin (NEURONTIN) 300 MG capsule Take 400 mg by mouth 3 times daily. Probiotic Product (PROBIOTIC DIGESTIVE SUPP PO) Take 1 capsule by mouth every morning      ondansetron (ZOFRAN ODT) 4 MG disintegrating tablet Take 1 tablet by mouth every 8 hours as needed for Nausea 15 tablet 1    fluticasone (FLONASE) 50 MCG/ACT nasal spray 1 spray by Each Nostril route daily 32 g 1    nystatin (MYCOSTATIN) 995712 UNIT/GM powder Apply 3 times daily to affected areas 1 each 0    QUEtiapine (SEROQUEL) 25 MG tablet Take 1 tablet by mouth 2 times daily (Patient taking differently: Take 25 mg by mouth 2 times daily Indications: 25 mg in am and 50 mg in pm) 60 tablet 0    busPIRone (BUSPAR) 10 MG tablet Take 2 tablets by mouth 3 times daily 180 tablet 0    HYDROcodone-acetaminophen (NORCO)  MG per tablet Take 1 tablet by mouth every 6 hours as needed.        fluticasone-umeclidin-vilant (TRELEGY ELLIPTA) 100-62.5-25 MCG/INH AEPB Inhale 1 puff into the lungs daily (Patient taking differently: Inhale 1 puff into the lungs daily as needed (only takes prn daily due to yeast infections in mouth, is aware she is supposed to take daily)) 1 each 5    OXcarbazepine (TRILEPTAL) 300 MG tablet Take 1 tablet by mouth 2 times daily 60 tablet 3    docusate sodium (COLACE) 100 MG capsule Take 1 capsule by mouth 2 times daily 30 capsule 0    aspirin 81 MG tablet Take 81 mg by mouth daily      Multiple Vitamins-Iron (MULTI-VITAMIN/IRON PO) Take  by mouth. MUCINEX 600 MG extended release tablet Take 1 tablet by mouth 2 times daily (Patient not taking: No sig reported) 14 tablet 0    levETIRAcetam (KEPPRA) 750 MG tablet Take 1 tablet by mouth 2 times daily 60 tablet 0    baclofen (LIORESAL) 10 MG tablet 1 tablet 2 times daily (Patient not taking: No sig reported)      diazePAM (VALIUM) 5 MG tablet as needed. (Patient not taking: No sig reported)      aluminum & magnesium hydroxide-simethicone (MAALOX MAX) 400-400-40 MG/5ML SUSP Take 15 mLs by mouth every 6 hours as needed (heart burn) (Patient not taking: No sig reported) 120 mL 0     No current facility-administered medications for this visit. Physical Exam:    Mental Status: A&O to self, location, month and year, NAD, speech clear, language fluent, repetition and naming intact, follows commands appropriately     Cranial Nerve Exam:   CN II-XII: PERRL, VFF, no nystagmus, no gaze paresis, sensation V1-V3 intact b/l, muscles of facial expression symmetric; hearing intact to conversational tone, palate elevates symmetrically, shoulder elevation symmetric and tongue protrudes midline with movement side to side.      Motor Exam:       Strength 4/5 BL RUE/RLE Generalized weakness  Tone and bulk normal   No pronator drift     Deep Tendon Reflexes: 2/4 biceps, triceps, brachioradialis, patellar, and achilles b/l; flexor plantar responses b/l     Sensation: Intact light touch/vibration UE's/decreased sensation to vibration to LE's b/l     Coordination/Cerebellum:       Tremors--none      Rapidly alternating movements: dysdiadochokinesia b/l                Heel-to-Shin: no dysmetria b/l      Finger-to-Nose: no dysmetria b/l     Gait and stance:      Gait: ambulates independently, ambulates without assistance         /82 (Site: Left Upper Arm, Position: Sitting, Cuff Size: Large Adult)   Pulse 72   Ht 5' 2\" (1.575 m)   Wt 172 lb (78 kg)   SpO2 96%   BMI 31.46 kg/m²     Assessment and Plan     Diagnosis Orders   1. Restless leg syndrome  Iron and TIBC    Ferritin      2. Migraine without aura, not refractory  amitriptyline (ELAVIL) 25 MG tablet      3. Diabetic peripheral neuropathy ([address]        4. ILSA (obstructive sleep apnea)            Simona and I discussed multiple aspects of her care and agreed upon the following plan:  -- Continue amitriptyline 50 mg at bedtime for migraine prevention  -- Defer dosing of Seroquel at bedtime to mental health specialist.  -- Check iron studies due to restless legs. -- Continue carbidopa levodopa 10 mg as 100 mg at bedtime for restless leg syndrome. -- Continue follow-up with pulmonology for sleep study results. We will see if she needs a CPAP. If treatment of underlying obstructive sleep apnea helps with headaches will defer any adjustments and see if treatment of ILSA improves headaches. --If she does not have ILSA or treatment of underlying ILSA does not improve headaches we will attempt to get her approved for Botox or trial an injectable CGRP inhibitor CGRP inhibitor. Return in about 3 months (around 2/8/2023) for Follow-up me or GISELL.     Adarsh Carvajal DO

## 2022-11-15 DIAGNOSIS — J30.9 ALLERGIC RHINITIS, UNSPECIFIED SEASONALITY, UNSPECIFIED TRIGGER: ICD-10-CM

## 2022-11-15 DIAGNOSIS — Z76.0 MEDICATION REFILL: ICD-10-CM

## 2022-11-16 RX ORDER — MONTELUKAST SODIUM 10 MG/1
TABLET ORAL
Qty: 30 TABLET | Refills: 3 | Status: SHIPPED | OUTPATIENT
Start: 2022-11-16

## 2022-11-18 ENCOUNTER — TELEPHONE (OUTPATIENT)
Dept: FAMILY MEDICINE CLINIC | Age: 67
End: 2022-11-18

## 2022-11-18 DIAGNOSIS — E11.42 TYPE 2 DIABETES MELLITUS WITH DIABETIC POLYNEUROPATHY, WITH LONG-TERM CURRENT USE OF INSULIN (HCC): Primary | ICD-10-CM

## 2022-11-18 DIAGNOSIS — R20.0 LEFT SIDED NUMBNESS: ICD-10-CM

## 2022-11-18 DIAGNOSIS — E11.42 POLYNEUROPATHY DUE TO TYPE 2 DIABETES MELLITUS (HCC): ICD-10-CM

## 2022-11-18 DIAGNOSIS — Z79.4 TYPE 2 DIABETES MELLITUS WITH DIABETIC POLYNEUROPATHY, WITH LONG-TERM CURRENT USE OF INSULIN (HCC): Primary | ICD-10-CM

## 2022-11-18 DIAGNOSIS — G20 PARKINSON'S DISEASE (HCC): ICD-10-CM

## 2022-11-18 NOTE — TELEPHONE ENCOUNTER
Per Tequila UNDERWOOD  From [address] [place] [address] [phone] she needs orders for an Aide faxed to [facility] at [phone] ([facility] [phone] contact Danny Morrison)     Note to WENDIE Perez

## 2022-11-21 DIAGNOSIS — G20 PARKINSON'S DISEASE (HCC): ICD-10-CM

## 2022-11-21 DIAGNOSIS — Z79.4 TYPE 2 DIABETES MELLITUS WITH DIABETIC POLYNEUROPATHY, WITH LONG-TERM CURRENT USE OF INSULIN (HCC): Primary | ICD-10-CM

## 2022-11-21 DIAGNOSIS — R20.0 LEFT SIDED NUMBNESS: ICD-10-CM

## 2022-11-21 DIAGNOSIS — E11.42 TYPE 2 DIABETES MELLITUS WITH DIABETIC POLYNEUROPATHY, WITH LONG-TERM CURRENT USE OF INSULIN (HCC): Primary | ICD-10-CM

## 2022-11-21 DIAGNOSIS — E11.42 POLYNEUROPATHY DUE TO TYPE 2 DIABETES MELLITUS (HCC): ICD-10-CM

## 2022-11-21 RX ORDER — CALCIUM CITRATE/VITAMIN D3 200MG-6.25
1 TABLET ORAL DAILY
Qty: 100 EACH | Refills: 3 | Status: SHIPPED | OUTPATIENT
Start: 2022-11-21

## 2022-11-21 RX ORDER — HYDROXYZINE 50 MG/1
TABLET, FILM COATED ORAL
COMMUNITY
Start: 2022-10-21

## 2022-11-21 RX ORDER — GABAPENTIN 400 MG/1
CAPSULE ORAL
COMMUNITY
Start: 2022-10-24

## 2022-11-21 NOTE — TELEPHONE ENCOUNTER
Pt requests True Metrix Test of 50 ( 3 together 150) [employer]. Celia, please advise. Notified pt to call pulmonologist for Mucinex and blood glucose strips were sent to her pharmacy. Pt voiced understanding.

## 2022-11-23 ENCOUNTER — OFFICE VISIT (OUTPATIENT)
Dept: CARDIOLOGY CLINIC | Age: 67
End: 2022-11-23
Payer: MEDICARE

## 2022-11-23 VITALS
SYSTOLIC BLOOD PRESSURE: 118 MMHG | OXYGEN SATURATION: 96 % | DIASTOLIC BLOOD PRESSURE: 60 MMHG | HEART RATE: 79 BPM | WEIGHT: 170.4 LBS | BODY MASS INDEX: 31.36 KG/M2 | HEIGHT: 62 IN

## 2022-11-23 DIAGNOSIS — R22.43 LOCALIZED SWELLING OF BOTH LOWER LEGS: ICD-10-CM

## 2022-11-23 DIAGNOSIS — I10 ESSENTIAL (PRIMARY) HYPERTENSION: ICD-10-CM

## 2022-11-23 DIAGNOSIS — S91.109S NON-HEALING OPEN WOUND OF TOE, SEQUELA: ICD-10-CM

## 2022-11-23 DIAGNOSIS — R52 PAIN AGGRAVATED BY WALKING: Primary | ICD-10-CM

## 2022-11-23 PROCEDURE — G8427 DOCREV CUR MEDS BY ELIG CLIN: HCPCS | Performed by: NURSE PRACTITIONER

## 2022-11-23 PROCEDURE — 99214 OFFICE O/P EST MOD 30 MIN: CPT | Performed by: NURSE PRACTITIONER

## 2022-11-23 PROCEDURE — 1123F ACP DISCUSS/DSCN MKR DOCD: CPT | Performed by: NURSE PRACTITIONER

## 2022-11-23 PROCEDURE — 3078F DIAST BP <80 MM HG: CPT | Performed by: NURSE PRACTITIONER

## 2022-11-23 PROCEDURE — G8484 FLU IMMUNIZE NO ADMIN: HCPCS | Performed by: NURSE PRACTITIONER

## 2022-11-23 PROCEDURE — 3017F COLORECTAL CA SCREEN DOC REV: CPT | Performed by: NURSE PRACTITIONER

## 2022-11-23 PROCEDURE — 1090F PRES/ABSN URINE INCON ASSESS: CPT | Performed by: NURSE PRACTITIONER

## 2022-11-23 PROCEDURE — G8417 CALC BMI ABV UP PARAM F/U: HCPCS | Performed by: NURSE PRACTITIONER

## 2022-11-23 PROCEDURE — 1036F TOBACCO NON-USER: CPT | Performed by: NURSE PRACTITIONER

## 2022-11-23 PROCEDURE — 3074F SYST BP LT 130 MM HG: CPT | Performed by: NURSE PRACTITIONER

## 2022-11-23 PROCEDURE — G8400 PT W/DXA NO RESULTS DOC: HCPCS | Performed by: NURSE PRACTITIONER

## 2022-11-23 RX ORDER — ORPHENADRINE CITRATE 100 MG/1
TABLET, EXTENDED RELEASE ORAL
COMMUNITY
Start: 2022-11-22

## 2022-11-23 RX ORDER — LOSARTAN POTASSIUM 50 MG/1
50 TABLET ORAL DAILY
Qty: 30 TABLET | Refills: 5 | Status: SHIPPED | OUTPATIENT
Start: 2022-11-23

## 2022-11-23 ASSESSMENT — ENCOUNTER SYMPTOMS
ORTHOPNEA: 0
SHORTNESS OF BREATH: 1

## 2022-11-23 NOTE — PROGRESS NOTES
11/23/2022  Primary cardiologist: Dr. Brad Sotomayor:   Steve Tay  is an established 74 y.o.  female here for concerns for circulation issues in her legs      SUBJECTIVE/OBJECTIVE:  Steve Tay is a 74 y.o. female with a history of hypertension, hyperlipidemia, diabetes mellitus and chronic pain. HPI :   Steve Tay reports that she has been having issues with progressive swelling of her lower legs and pain. Review of Systems   Constitutional: Negative for diaphoresis and malaise/fatigue. Cardiovascular:  Positive for claudication and leg swelling. Negative for chest pain, dyspnea on exertion, irregular heartbeat, near-syncope, orthopnea, palpitations and paroxysmal nocturnal dyspnea. Respiratory:  Positive for shortness of breath. Skin:         Having toe nails removed due to black wounds   Musculoskeletal:  Positive for arthritis and muscle weakness. Neurological:  Positive for dizziness. Negative for light-headedness. Vitals:    11/23/22 1116   BP: 118/60   Site: Left Upper Arm   Position: Sitting   Cuff Size: Medium Adult   Pulse: 79   SpO2: 96%   Weight: 170 lb 6.4 oz (77.3 kg)   Height: 5' 2\" (1.575 m)     Patient-Reported Vitals 9/29/2022   Patient-Reported Weight 167lb   Patient-Reported Height 5 2   Patient-Reported Pulse unable to obtain   Patient-Reported Temperature unable to obtain     Wt Readings from Last 3 Encounters:   11/23/22 170 lb 6.4 oz (77.3 kg)   11/08/22 172 lb (78 kg)   11/01/22 172 lb (78 kg)     Body mass index is 31.17 kg/m². Physical Exam  Vitals reviewed. Constitutional:       General: She is not in acute distress. Appearance: Normal appearance. She is obese. She is not ill-appearing. HENT:      Head: Atraumatic. Neck:      Vascular: No carotid bruit. Cardiovascular:      Rate and Rhythm: Normal rate and regular rhythm. Pulses:           Dorsalis pedis pulses are 1+ on the right side and 1+ on the left side.         Posterior tibial pulses are 1+ on the right side and 1+ on the left side. Heart sounds: Normal heart sounds. No murmur heard. Pulmonary:      Effort: Pulmonary effort is normal. No respiratory distress. Breath sounds: Normal breath sounds. Musculoskeletal:         General: No deformity. Cervical back: Neck supple. No muscular tenderness. Right lower le+ Pitting Edema present. Left lower le+ Pitting Edema present. Neurological:      Mental Status: She is alert. Current Outpatient Medications   Medication Sig Dispense Refill    orphenadrine (NORFLEX) 100 MG extended release tablet       gabapentin (NEURONTIN) 400 MG capsule       blood glucose test strips (TRUE METRIX BLOOD GLUCOSE TEST) strip 1 each by In Vitro route daily As needed.  100 each 3    guaiFENesin (MUCINEX) 600 MG extended release tablet Take 1 tablet by mouth 2 times daily 30 tablet 1    montelukast (SINGULAIR) 10 MG tablet TAKE ONE (1) TABLET BY MOUTH EACH NIGHT AT BEDTIME 30 tablet 3    amitriptyline (ELAVIL) 25 MG tablet Take 2 tablets by mouth nightly 60 tablet 5    Handicap Placard MISC by Does not apply route 1 each 0    NIFEdipine (PROCARDIA XL) 90 MG extended release tablet Take 1 tablet by mouth daily 30 tablet 3    hydrOXYzine pamoate (VISTARIL) 50 MG capsule Take 50 mg by mouth three times daily      carbidopa-levodopa (SINEMET)  MG per tablet Take 1 tablet by mouth nightly 30 tablet 1    pantoprazole (PROTONIX) 40 MG tablet Take 1 tablet by mouth 2 times daily (before meals) 180 tablet 1    magnesium oxide (MAG-OX) 400 MG tablet Take 1 tablet by mouth 3 times daily 90 tablet 0    levothyroxine (SYNTHROID) 75 MCG tablet Take 1 tablet by mouth every morning (before breakfast) 30 tablet 3    simvastatin (ZOCOR) 20 MG tablet Take 1 tablet by mouth nightly 30 tablet 3    NOVOLOG FLEXPEN 100 UNIT/ML injection pen Inject 15 Units into the skin 3 times daily (before meals) 2020 patient states she follows sliding scale regimen BS > 150 5 pen 3    TRESIBA FLEXTOUCH 200 UNIT/ML SOPN Inject 20 Units into the skin every morning (Patient taking differently: Inject 60 Units into the skin at bedtime) 1 pen 2    Insulin Pen Needle 31G X 5 MM MISC 1 each by Does not apply route daily 100 each 3    losartan (COZAAR) 50 MG tablet Take 1 tablet by mouth daily 30 tablet 5    metoprolol succinate (TOPROL XL) 50 MG extended release tablet Take 1 tablet by mouth daily 30 tablet 5    Probiotic Product (PROBIOTIC DIGESTIVE SUPP PO) Take 1 capsule by mouth every morning      ondansetron (ZOFRAN ODT) 4 MG disintegrating tablet Take 1 tablet by mouth every 8 hours as needed for Nausea 15 tablet 1    fluticasone (FLONASE) 50 MCG/ACT nasal spray 1 spray by Each Nostril route daily 32 g 1    nystatin (MYCOSTATIN) 617317 UNIT/GM powder Apply 3 times daily to affected areas 1 each 0    levETIRAcetam (KEPPRA) 750 MG tablet Take 1 tablet by mouth 2 times daily 60 tablet 0    diazePAM (VALIUM) 5 MG tablet as needed. QUEtiapine (SEROQUEL) 25 MG tablet Take 1 tablet by mouth 2 times daily (Patient taking differently: Take 25 mg by mouth 2 times daily Indications: 25 mg in am and 50 mg in pm) 60 tablet 0    busPIRone (BUSPAR) 10 MG tablet Take 2 tablets by mouth 3 times daily 180 tablet 0    HYDROcodone-acetaminophen (NORCO)  MG per tablet Take 1 tablet by mouth every 6 hours as needed.        fluticasone-umeclidin-vilant (TRELEGY ELLIPTA) 100-62.5-25 MCG/INH AEPB Inhale 1 puff into the lungs daily (Patient taking differently: Inhale 1 puff into the lungs daily as needed (only takes prn daily due to yeast infections in mouth, is aware she is supposed to take daily)) 1 each 5    OXcarbazepine (TRILEPTAL) 300 MG tablet Take 1 tablet by mouth 2 times daily 60 tablet 3    docusate sodium (COLACE) 100 MG capsule Take 1 capsule by mouth 2 times daily 30 capsule 0    aluminum & magnesium hydroxide-simethicone (MAALOX MAX) 400-400-40 MG/5ML SUSP Take 15 mLs by mouth every 6 hours as needed (heart burn) 120 mL 0    aspirin 81 MG tablet Take 81 mg by mouth daily      Multiple Vitamins-Iron (MULTI-VITAMIN/IRON PO) Take  by mouth. hydrOXYzine HCl (ATARAX) 50 MG tablet  (Patient not taking: Reported on 11/23/2022)      baclofen (LIORESAL) 10 MG tablet 1 tablet 2 times daily (Patient not taking: No sig reported)       No current facility-administered medications for this visit. All pertinent data reviewed and discussed with patient    08/2017 cardiac catheterization  Angiograhically normal epicardial coronary arteries. Normal LV systolic function & wall motion. LVEF is 55 %. Patient tolerated the procedure well. No immediate complications      Echocardiogram 03/2022  Technically difficult examination; patient unable to tolerate exam.   Left ventricular systolic function is normal.   Ejection fraction is visually estimated at 50-55%. No significant valvular abnormalities. No evidence of any pericardial effusion. Possible PFO visualized with color Doppler; bubble study was inadequate. ASSESSMENT/PLAN:  Leg pain  Has pain with walking in addition to the neuropathy. She has swelling worse than it had been previously. She states that she was told she had blockage in the legs 30-40 years ago. She was not able to complete the APPLE due to pain her legs and pain medication not helping. She now has a non healing black wound on her toe that pediatry is monitoring. Will check Aorta with run off to legs. HFpEF   Reports he has a history HFpEF past.  Echocardiogram from March 2022 reviewed. Normal left ventricular systolic function with no significant valvular abnormalities noted:  Weight stable   Does not appear to be in fluid overload. Advised low sodium diet   Weigh self daily       Hypertension  Blood pressure soft today :l decrease losartan to 50 mg daily      Tests ordered: CT abdomin with run off.      Follow-up  As scheduled      Signed:  Jeannette Briscoe Jak Quevedo, WENDIE - CRISTIANO, 11/23/2022, 11:44 AM    An electronic signature was used to authenticate this note. Please note this report has been partially produced using speech recognition software and may contain errors related to that system including errors in grammar, punctuation, and spelling, as well as words and phrases that may be inappropriate. If there are any questions or concerns please feel free to contact the dictating provider for clarification.

## 2022-11-28 ENCOUNTER — TELEPHONE (OUTPATIENT)
Dept: NEUROLOGY | Age: 67
End: 2022-11-28

## 2022-11-28 NOTE — TELEPHONE ENCOUNTER
Patient called and states that the [facility] lab told her that none of her lab orders are covered by her insurance and that she does not know what to do. Asked patient if she called the insurance company to get more information and she stated no. Told patient she needs to call her insurance to find out what is going on with her plan and ask them how to proceed with getting labs done. Patient stated understanding.

## 2022-12-15 DIAGNOSIS — E03.9 HYPOTHYROIDISM, UNSPECIFIED TYPE: ICD-10-CM

## 2022-12-15 DIAGNOSIS — Z76.0 MEDICATION REFILL: ICD-10-CM

## 2022-12-15 DIAGNOSIS — E78.5 DYSLIPIDEMIA: ICD-10-CM

## 2022-12-15 RX ORDER — LEVOTHYROXINE SODIUM 0.07 MG/1
TABLET ORAL
Qty: 30 TABLET | Refills: 3 | Status: SHIPPED | OUTPATIENT
Start: 2022-12-15

## 2022-12-15 RX ORDER — SIMVASTATIN 20 MG
TABLET ORAL
Qty: 30 TABLET | Refills: 3 | Status: SHIPPED | OUTPATIENT
Start: 2022-12-15

## 2022-12-20 ENCOUNTER — HOSPITAL ENCOUNTER (OUTPATIENT)
Age: 67
Setting detail: OBSERVATION
Discharge: HOME | End: 2022-12-21
Attending: EMERGENCY MEDICINE | Admitting: STUDENT IN AN ORGANIZED HEALTH CARE EDUCATION/TRAINING PROGRAM
Payer: MEDICARE

## 2022-12-20 ENCOUNTER — APPOINTMENT (OUTPATIENT)
Dept: GENERAL RADIOLOGY | Age: 67
End: 2022-12-20
Payer: MEDICARE

## 2022-12-20 ENCOUNTER — TELEPHONE (OUTPATIENT)
Dept: FAMILY MEDICINE CLINIC | Age: 67
End: 2022-12-20

## 2022-12-20 DIAGNOSIS — R07.9 CHEST PAIN, UNSPECIFIED TYPE: Primary | ICD-10-CM

## 2022-12-20 LAB
ALBUMIN SERPL-MCNC: 4.3 GM/DL (ref 3.4–5)
ALP BLD-CCNC: 84 IU/L (ref 40–129)
ALT SERPL-CCNC: 31 U/L (ref 10–40)
ANION GAP SERPL CALCULATED.3IONS-SCNC: 14 MMOL/L (ref 4–16)
AST SERPL-CCNC: 33 IU/L (ref 15–37)
BACTERIA: NEGATIVE /HPF
BASOPHILS ABSOLUTE: 0.1 K/CU MM
BASOPHILS RELATIVE PERCENT: 0.5 % (ref 0–1)
BILIRUB SERPL-MCNC: 0.4 MG/DL (ref 0–1)
BILIRUBIN URINE: NEGATIVE MG/DL
BLOOD, URINE: NEGATIVE
BUN BLDV-MCNC: 10 MG/DL (ref 6–23)
CALCIUM SERPL-MCNC: 9.4 MG/DL (ref 8.3–10.6)
CHLORIDE BLD-SCNC: 96 MMOL/L (ref 99–110)
CLARITY: CLEAR
CO2: 25 MMOL/L (ref 21–32)
COLOR: YELLOW
CREAT SERPL-MCNC: 0.5 MG/DL (ref 0.6–1.1)
DIFFERENTIAL TYPE: ABNORMAL
EKG ATRIAL RATE: 76 BPM
EKG DIAGNOSIS: NORMAL
EKG P AXIS: 19 DEGREES
EKG P-R INTERVAL: 176 MS
EKG Q-T INTERVAL: 444 MS
EKG QRS DURATION: 110 MS
EKG QTC CALCULATION (BAZETT): 499 MS
EKG R AXIS: -45 DEGREES
EKG T AXIS: 44 DEGREES
EKG VENTRICULAR RATE: 76 BPM
EOSINOPHILS ABSOLUTE: 0.2 K/CU MM
EOSINOPHILS RELATIVE PERCENT: 1.9 % (ref 0–3)
GFR SERPL CREATININE-BSD FRML MDRD: >60 ML/MIN/1.73M2
GLUCOSE BLD-MCNC: 144 MG/DL (ref 70–99)
GLUCOSE BLD-MCNC: 221 MG/DL (ref 70–99)
GLUCOSE BLD-MCNC: 273 MG/DL (ref 70–99)
GLUCOSE, URINE: NEGATIVE MG/DL
HCT VFR BLD CALC: 39.2 % (ref 37–47)
HEMOGLOBIN: 13.4 GM/DL (ref 12.5–16)
IMMATURE NEUTROPHIL %: 0.3 % (ref 0–0.43)
KETONES, URINE: NEGATIVE MG/DL
LEUKOCYTE ESTERASE, URINE: ABNORMAL
LIPASE: 15 IU/L (ref 13–60)
LIPASE: 19 IU/L (ref 13–60)
LYMPHOCYTES ABSOLUTE: 3 K/CU MM
LYMPHOCYTES RELATIVE PERCENT: 26.4 % (ref 24–44)
MAGNESIUM: 2 MG/DL (ref 1.8–2.4)
MCH RBC QN AUTO: 27.7 PG (ref 27–31)
MCHC RBC AUTO-ENTMCNC: 34.2 % (ref 32–36)
MCV RBC AUTO: 81.2 FL (ref 78–100)
MONOCYTES ABSOLUTE: 0.9 K/CU MM
MONOCYTES RELATIVE PERCENT: 8 % (ref 0–4)
NITRITE URINE, QUANTITATIVE: NEGATIVE
NUCLEATED RBC %: 0 %
PDW BLD-RTO: 11.6 % (ref 11.7–14.9)
PH, URINE: 6.5 (ref 5–8)
PLATELET # BLD: 268 K/CU MM (ref 140–440)
PMV BLD AUTO: 9.1 FL (ref 7.5–11.1)
POTASSIUM SERPL-SCNC: 4.3 MMOL/L (ref 3.5–5.1)
PRO-BNP: 215.5 PG/ML
PROTEIN UA: NEGATIVE MG/DL
RBC # BLD: 4.83 M/CU MM (ref 4.2–5.4)
RBC URINE: 1 /HPF (ref 0–6)
SEGMENTED NEUTROPHILS ABSOLUTE COUNT: 7.1 K/CU MM
SEGMENTED NEUTROPHILS RELATIVE PERCENT: 62.9 % (ref 36–66)
SODIUM BLD-SCNC: 135 MMOL/L (ref 135–145)
SPECIFIC GRAVITY UA: 1.01 (ref 1–1.03)
SQUAMOUS EPITHELIAL: <1 /HPF
TOTAL IMMATURE NEUTOROPHIL: 0.03 K/CU MM
TOTAL NUCLEATED RBC: 0 K/CU MM
TOTAL PROTEIN: 7.6 GM/DL (ref 6.4–8.2)
TRICHOMONAS: ABNORMAL /HPF
TROPONIN T: <0.01 NG/ML
TSH HIGH SENSITIVITY: 1.31 UIU/ML (ref 0.27–4.2)
UROBILINOGEN, URINE: 0.2 MG/DL (ref 0.2–1)
WBC # BLD: 11.3 K/CU MM (ref 4–10.5)
WBC UA: 8 /HPF (ref 0–5)

## 2022-12-20 PROCEDURE — 96372 THER/PROPH/DIAG INJ SC/IM: CPT

## 2022-12-20 PROCEDURE — 82962 GLUCOSE BLOOD TEST: CPT

## 2022-12-20 PROCEDURE — G0378 HOSPITAL OBSERVATION PER HR: HCPCS

## 2022-12-20 PROCEDURE — 71045 X-RAY EXAM CHEST 1 VIEW: CPT

## 2022-12-20 PROCEDURE — 99285 EMERGENCY DEPT VISIT HI MDM: CPT

## 2022-12-20 PROCEDURE — 36415 COLL VENOUS BLD VENIPUNCTURE: CPT

## 2022-12-20 PROCEDURE — 83880 ASSAY OF NATRIURETIC PEPTIDE: CPT

## 2022-12-20 PROCEDURE — 96374 THER/PROPH/DIAG INJ IV PUSH: CPT

## 2022-12-20 PROCEDURE — 6360000002 HC RX W HCPCS: Performed by: STUDENT IN AN ORGANIZED HEALTH CARE EDUCATION/TRAINING PROGRAM

## 2022-12-20 PROCEDURE — 2580000003 HC RX 258: Performed by: STUDENT IN AN ORGANIZED HEALTH CARE EDUCATION/TRAINING PROGRAM

## 2022-12-20 PROCEDURE — 84443 ASSAY THYROID STIM HORMONE: CPT

## 2022-12-20 PROCEDURE — 80053 COMPREHEN METABOLIC PANEL: CPT

## 2022-12-20 PROCEDURE — 2500000003 HC RX 250 WO HCPCS: Performed by: STUDENT IN AN ORGANIZED HEALTH CARE EDUCATION/TRAINING PROGRAM

## 2022-12-20 PROCEDURE — 83735 ASSAY OF MAGNESIUM: CPT

## 2022-12-20 PROCEDURE — 93005 ELECTROCARDIOGRAM TRACING: CPT | Performed by: EMERGENCY MEDICINE

## 2022-12-20 PROCEDURE — 83690 ASSAY OF LIPASE: CPT

## 2022-12-20 PROCEDURE — 6370000000 HC RX 637 (ALT 250 FOR IP): Performed by: STUDENT IN AN ORGANIZED HEALTH CARE EDUCATION/TRAINING PROGRAM

## 2022-12-20 PROCEDURE — 84484 ASSAY OF TROPONIN QUANT: CPT

## 2022-12-20 PROCEDURE — 85025 COMPLETE CBC W/AUTO DIFF WBC: CPT

## 2022-12-20 PROCEDURE — 81003 URINALYSIS AUTO W/O SCOPE: CPT

## 2022-12-20 RX ORDER — ONDANSETRON 2 MG/ML
4 INJECTION INTRAMUSCULAR; INTRAVENOUS EVERY 6 HOURS PRN
Status: DISCONTINUED | OUTPATIENT
Start: 2022-12-20 | End: 2022-12-21 | Stop reason: HOSPADM

## 2022-12-20 RX ORDER — SODIUM CHLORIDE 0.9 % (FLUSH) 0.9 %
5-40 SYRINGE (ML) INJECTION EVERY 12 HOURS SCHEDULED
Status: DISCONTINUED | OUTPATIENT
Start: 2022-12-20 | End: 2022-12-21 | Stop reason: HOSPADM

## 2022-12-20 RX ORDER — FLUTICASONE PROPIONATE 50 MCG
1 SPRAY, SUSPENSION (ML) NASAL DAILY
Status: DISCONTINUED | OUTPATIENT
Start: 2022-12-21 | End: 2022-12-21 | Stop reason: HOSPADM

## 2022-12-20 RX ORDER — INSULIN LISPRO 100 [IU]/ML
0-4 INJECTION, SOLUTION INTRAVENOUS; SUBCUTANEOUS
Status: DISCONTINUED | OUTPATIENT
Start: 2022-12-20 | End: 2022-12-21 | Stop reason: HOSPADM

## 2022-12-20 RX ORDER — MONTELUKAST SODIUM 10 MG/1
10 TABLET ORAL NIGHTLY
Status: DISCONTINUED | OUTPATIENT
Start: 2022-12-20 | End: 2022-12-21 | Stop reason: HOSPADM

## 2022-12-20 RX ORDER — ASPIRIN 81 MG/1
81 TABLET ORAL DAILY
Status: DISCONTINUED | OUTPATIENT
Start: 2022-12-21 | End: 2022-12-21 | Stop reason: HOSPADM

## 2022-12-20 RX ORDER — INSULIN LISPRO 100 [IU]/ML
5 INJECTION, SOLUTION INTRAVENOUS; SUBCUTANEOUS
Status: DISCONTINUED | OUTPATIENT
Start: 2022-12-20 | End: 2022-12-21 | Stop reason: HOSPADM

## 2022-12-20 RX ORDER — HYDROCODONE BITARTRATE AND ACETAMINOPHEN 10; 325 MG/1; MG/1
1 TABLET ORAL EVERY 6 HOURS PRN
Status: DISCONTINUED | OUTPATIENT
Start: 2022-12-20 | End: 2022-12-21 | Stop reason: HOSPADM

## 2022-12-20 RX ORDER — LOSARTAN POTASSIUM 25 MG/1
50 TABLET ORAL DAILY
Status: DISCONTINUED | OUTPATIENT
Start: 2022-12-21 | End: 2022-12-21 | Stop reason: HOSPADM

## 2022-12-20 RX ORDER — MAGNESIUM HYDROXIDE/ALUMINUM HYDROXICE/SIMETHICONE 120; 1200; 1200 MG/30ML; MG/30ML; MG/30ML
30 SUSPENSION ORAL EVERY 6 HOURS PRN
Status: DISCONTINUED | OUTPATIENT
Start: 2022-12-20 | End: 2022-12-21 | Stop reason: HOSPADM

## 2022-12-20 RX ORDER — INSULIN GLARGINE 100 [IU]/ML
48 INJECTION, SOLUTION SUBCUTANEOUS NIGHTLY
Status: DISCONTINUED | OUTPATIENT
Start: 2022-12-20 | End: 2022-12-21 | Stop reason: HOSPADM

## 2022-12-20 RX ORDER — ATORVASTATIN CALCIUM 10 MG/1
10 TABLET, FILM COATED ORAL NIGHTLY
Status: DISCONTINUED | OUTPATIENT
Start: 2022-12-20 | End: 2022-12-21 | Stop reason: HOSPADM

## 2022-12-20 RX ORDER — DEXTROSE MONOHYDRATE 100 MG/ML
INJECTION, SOLUTION INTRAVENOUS CONTINUOUS PRN
Status: DISCONTINUED | OUTPATIENT
Start: 2022-12-20 | End: 2022-12-21 | Stop reason: HOSPADM

## 2022-12-20 RX ORDER — SODIUM CHLORIDE 0.9 % (FLUSH) 0.9 %
5-40 SYRINGE (ML) INJECTION PRN
Status: DISCONTINUED | OUTPATIENT
Start: 2022-12-20 | End: 2022-12-21 | Stop reason: HOSPADM

## 2022-12-20 RX ORDER — AMITRIPTYLINE HYDROCHLORIDE 50 MG/1
50 TABLET, FILM COATED ORAL NIGHTLY
Status: DISCONTINUED | OUTPATIENT
Start: 2022-12-20 | End: 2022-12-21 | Stop reason: HOSPADM

## 2022-12-20 RX ORDER — OXCARBAZEPINE 300 MG/1
300 TABLET, FILM COATED ORAL 2 TIMES DAILY
Status: DISCONTINUED | OUTPATIENT
Start: 2022-12-20 | End: 2022-12-21 | Stop reason: HOSPADM

## 2022-12-20 RX ORDER — GABAPENTIN 400 MG/1
400 CAPSULE ORAL NIGHTLY
Status: DISCONTINUED | OUTPATIENT
Start: 2022-12-20 | End: 2022-12-21 | Stop reason: HOSPADM

## 2022-12-20 RX ORDER — INSULIN LISPRO 100 [IU]/ML
0-4 INJECTION, SOLUTION INTRAVENOUS; SUBCUTANEOUS NIGHTLY
Status: DISCONTINUED | OUTPATIENT
Start: 2022-12-20 | End: 2022-12-21 | Stop reason: HOSPADM

## 2022-12-20 RX ORDER — QUETIAPINE FUMARATE 25 MG/1
25 TABLET, FILM COATED ORAL DAILY
Status: DISCONTINUED | OUTPATIENT
Start: 2022-12-21 | End: 2022-12-21 | Stop reason: HOSPADM

## 2022-12-20 RX ORDER — NIFEDIPINE 90 MG/1
90 TABLET, EXTENDED RELEASE ORAL DAILY
Status: DISCONTINUED | OUTPATIENT
Start: 2022-12-21 | End: 2022-12-21 | Stop reason: HOSPADM

## 2022-12-20 RX ORDER — LANOLIN ALCOHOL/MO/W.PET/CERES
400 CREAM (GRAM) TOPICAL 3 TIMES DAILY
Status: DISCONTINUED | OUTPATIENT
Start: 2022-12-20 | End: 2022-12-21 | Stop reason: CLARIF

## 2022-12-20 RX ORDER — ONDANSETRON 4 MG/1
4 TABLET, ORALLY DISINTEGRATING ORAL EVERY 8 HOURS PRN
Status: DISCONTINUED | OUTPATIENT
Start: 2022-12-20 | End: 2022-12-21 | Stop reason: HOSPADM

## 2022-12-20 RX ORDER — DIAZEPAM 5 MG/1
5 TABLET ORAL EVERY 6 HOURS PRN
Status: DISCONTINUED | OUTPATIENT
Start: 2022-12-20 | End: 2022-12-21 | Stop reason: HOSPADM

## 2022-12-20 RX ORDER — GUAIFENESIN 600 MG/1
600 TABLET, EXTENDED RELEASE ORAL 2 TIMES DAILY
Status: DISCONTINUED | OUTPATIENT
Start: 2022-12-20 | End: 2022-12-21 | Stop reason: HOSPADM

## 2022-12-20 RX ORDER — BUDESONIDE AND FORMOTEROL FUMARATE DIHYDRATE 160; 4.5 UG/1; UG/1
2 AEROSOL RESPIRATORY (INHALATION) 2 TIMES DAILY
Status: DISCONTINUED | OUTPATIENT
Start: 2022-12-20 | End: 2022-12-21 | Stop reason: HOSPADM

## 2022-12-20 RX ORDER — ENOXAPARIN SODIUM 100 MG/ML
40 INJECTION SUBCUTANEOUS EVERY 24 HOURS
Status: DISCONTINUED | OUTPATIENT
Start: 2022-12-20 | End: 2022-12-21 | Stop reason: HOSPADM

## 2022-12-20 RX ORDER — ORPHENADRINE CITRATE 100 MG/1
100 TABLET, EXTENDED RELEASE ORAL 2 TIMES DAILY
Status: DISCONTINUED | OUTPATIENT
Start: 2022-12-20 | End: 2022-12-21 | Stop reason: HOSPADM

## 2022-12-20 RX ORDER — SODIUM CHLORIDE 9 MG/ML
INJECTION, SOLUTION INTRAVENOUS PRN
Status: DISCONTINUED | OUTPATIENT
Start: 2022-12-20 | End: 2022-12-21 | Stop reason: HOSPADM

## 2022-12-20 RX ORDER — QUETIAPINE FUMARATE 25 MG/1
50 TABLET, FILM COATED ORAL NIGHTLY
Status: DISCONTINUED | OUTPATIENT
Start: 2022-12-20 | End: 2022-12-21 | Stop reason: HOSPADM

## 2022-12-20 RX ORDER — NIFEDIPINE 90 MG/1
90 TABLET, EXTENDED RELEASE ORAL DAILY
Status: DISCONTINUED | OUTPATIENT
Start: 2022-12-20 | End: 2022-12-20 | Stop reason: SDUPTHER

## 2022-12-20 RX ORDER — METOPROLOL SUCCINATE 50 MG/1
50 TABLET, EXTENDED RELEASE ORAL DAILY
Status: DISCONTINUED | OUTPATIENT
Start: 2022-12-21 | End: 2022-12-21 | Stop reason: HOSPADM

## 2022-12-20 RX ORDER — ACETAMINOPHEN 325 MG/1
650 TABLET ORAL EVERY 4 HOURS PRN
Status: DISCONTINUED | OUTPATIENT
Start: 2022-12-20 | End: 2022-12-21 | Stop reason: HOSPADM

## 2022-12-20 RX ORDER — LEVOTHYROXINE SODIUM 0.07 MG/1
75 TABLET ORAL DAILY
Status: DISCONTINUED | OUTPATIENT
Start: 2022-12-20 | End: 2022-12-21 | Stop reason: HOSPADM

## 2022-12-20 RX ORDER — PANTOPRAZOLE SODIUM 40 MG/1
40 TABLET, DELAYED RELEASE ORAL
Status: DISCONTINUED | OUTPATIENT
Start: 2022-12-20 | End: 2022-12-21 | Stop reason: HOSPADM

## 2022-12-20 RX ADMIN — MICONAZOLE NITRATE: 2 POWDER TOPICAL at 20:37

## 2022-12-20 RX ADMIN — OXCARBAZEPINE 300 MG: 300 TABLET, FILM COATED ORAL at 23:25

## 2022-12-20 RX ADMIN — LEVETIRACETAM 750 MG: 500 TABLET, FILM COATED ORAL at 20:21

## 2022-12-20 RX ADMIN — AMITRIPTYLINE HYDROCHLORIDE 50 MG: 50 TABLET, FILM COATED ORAL at 20:22

## 2022-12-20 RX ADMIN — BUSPIRONE HYDROCHLORIDE 20 MG: 5 TABLET ORAL at 20:22

## 2022-12-20 RX ADMIN — QUETIAPINE FUMARATE 50 MG: 25 TABLET ORAL at 20:22

## 2022-12-20 RX ADMIN — ONDANSETRON 4 MG: 2 INJECTION INTRAMUSCULAR; INTRAVENOUS at 20:37

## 2022-12-20 RX ADMIN — DIAZEPAM 5 MG: 5 TABLET ORAL at 23:25

## 2022-12-20 RX ADMIN — BUSPIRONE HYDROCHLORIDE 20 MG: 5 TABLET ORAL at 18:26

## 2022-12-20 RX ADMIN — GABAPENTIN 400 MG: 400 CAPSULE ORAL at 20:22

## 2022-12-20 RX ADMIN — MONTELUKAST 10 MG: 10 TABLET, FILM COATED ORAL at 20:22

## 2022-12-20 RX ADMIN — PANTOPRAZOLE SODIUM 40 MG: 40 TABLET, DELAYED RELEASE ORAL at 18:26

## 2022-12-20 RX ADMIN — CARBIDOPA AND LEVODOPA 1 TABLET: 10; 100 TABLET ORAL at 20:23

## 2022-12-20 RX ADMIN — INSULIN LISPRO 5 UNITS: 100 INJECTION, SOLUTION INTRAVENOUS; SUBCUTANEOUS at 18:29

## 2022-12-20 RX ADMIN — LEVOTHYROXINE SODIUM 75 MCG: 0.07 TABLET ORAL at 18:26

## 2022-12-20 RX ADMIN — ENOXAPARIN SODIUM 40 MG: 100 INJECTION SUBCUTANEOUS at 18:27

## 2022-12-20 RX ADMIN — ALUMINUM HYDROXIDE, MAGNESIUM HYDROXIDE, AND SIMETHICONE 30 ML: 200; 200; 20 SUSPENSION ORAL at 18:39

## 2022-12-20 RX ADMIN — ATORVASTATIN CALCIUM 10 MG: 10 TABLET, FILM COATED ORAL at 20:22

## 2022-12-20 RX ADMIN — INSULIN GLARGINE 48 UNITS: 100 INJECTION, SOLUTION SUBCUTANEOUS at 23:26

## 2022-12-20 RX ADMIN — Medication 400 MG: at 20:23

## 2022-12-20 RX ADMIN — ORPHENADRINE CITRATE 100 MG: 100 TABLET, EXTENDED RELEASE ORAL at 20:21

## 2022-12-20 RX ADMIN — SODIUM CHLORIDE, PRESERVATIVE FREE 10 ML: 5 INJECTION INTRAVENOUS at 20:24

## 2022-12-20 RX ADMIN — GUAIFENESIN 600 MG: 600 TABLET, EXTENDED RELEASE ORAL at 20:22

## 2022-12-20 RX ADMIN — HYDROCODONE BITARTRATE AND ACETAMINOPHEN 1 TABLET: 10; 325 TABLET ORAL at 18:25

## 2022-12-20 SDOH — ECONOMIC STABILITY: HOUSING INSECURITY
IN THE LAST 12 MONTHS, WAS THERE A TIME WHEN YOU DID NOT HAVE A STEADY PLACE TO SLEEP OR SLEPT IN A SHELTER (INCLUDING NOW)?: NO

## 2022-12-20 SDOH — ECONOMIC STABILITY: FOOD INSECURITY: WITHIN THE PAST 12 MONTHS, THE FOOD YOU BOUGHT JUST DIDN'T LAST AND YOU DIDN'T HAVE MONEY TO GET MORE.: SOMETIMES TRUE

## 2022-12-20 SDOH — ECONOMIC STABILITY: FOOD INSECURITY: WITHIN THE PAST 12 MONTHS, YOU WORRIED THAT YOUR FOOD WOULD RUN OUT BEFORE YOU GOT MONEY TO BUY MORE.: SOMETIMES TRUE

## 2022-12-20 SDOH — ECONOMIC STABILITY: TRANSPORTATION INSECURITY
IN THE PAST 12 MONTHS, HAS LACK OF TRANSPORTATION KEPT YOU FROM MEETINGS, WORK, OR FROM GETTING THINGS NEEDED FOR DAILY LIVING?: YES

## 2022-12-20 SDOH — HEALTH STABILITY: PHYSICAL HEALTH: ON AVERAGE, HOW MANY DAYS PER WEEK DO YOU ENGAGE IN MODERATE TO STRENUOUS EXERCISE (LIKE A BRISK WALK)?: 0 DAYS

## 2022-12-20 SDOH — HEALTH STABILITY: PHYSICAL HEALTH: ON AVERAGE, HOW MANY MINUTES DO YOU ENGAGE IN EXERCISE AT THIS LEVEL?: 0 MIN

## 2022-12-20 SDOH — ECONOMIC STABILITY: HOUSING INSECURITY: IN THE LAST 12 MONTHS, HOW MANY PLACES HAVE YOU LIVED?: 1

## 2022-12-20 SDOH — ECONOMIC STABILITY: TRANSPORTATION INSECURITY
IN THE PAST 12 MONTHS, HAS THE LACK OF TRANSPORTATION KEPT YOU FROM MEDICAL APPOINTMENTS OR FROM GETTING MEDICATIONS?: YES

## 2022-12-20 SDOH — ECONOMIC STABILITY: INCOME INSECURITY: IN THE LAST 12 MONTHS, WAS THERE A TIME WHEN YOU WERE NOT ABLE TO PAY THE MORTGAGE OR RENT ON TIME?: NO

## 2022-12-20 ASSESSMENT — PATIENT HEALTH QUESTIONNAIRE - PHQ9
SUM OF ALL RESPONSES TO PHQ9 QUESTIONS 1 & 2: 2
1. LITTLE INTEREST OR PLEASURE IN DOING THINGS: SEVERAL DAYS
2. FEELING DOWN, DEPRESSED OR HOPELESS: SEVERAL DAYS
10. IF YOU CHECKED OFF ANY PROBLEMS, HOW DIFFICULT HAVE THESE PROBLEMS MADE IT FOR YOU TO DO YOUR WORK, TAKE CARE OF THINGS AT HOME, OR GET ALONG WITH OTHER PEOPLE: NOT DIFFICULT AT ALL

## 2022-12-20 ASSESSMENT — SOCIAL DETERMINANTS OF HEALTH (SDOH)
IN A TYPICAL WEEK, HOW MANY TIMES DO YOU TALK ON THE PHONE WITH FAMILY, FRIENDS, OR NEIGHBORS?: MORE THAN THREE TIMES A WEEK
HOW OFTEN DO YOU GET TOGETHER WITH FRIENDS OR RELATIVES?: MORE THAN THREE TIMES A WEEK
WITHIN THE LAST YEAR, HAVE TO BEEN RAPED OR FORCED TO HAVE ANY KIND OF SEXUAL ACTIVITY BY YOUR PARTNER OR EX-PARTNER?: NO
WITHIN THE LAST YEAR, HAVE YOU BEEN HUMILIATED OR EMOTIONALLY ABUSED IN OTHER WAYS BY YOUR PARTNER OR EX-PARTNER?: NO
HOW OFTEN DO YOU ATTEND CHURCH OR RELIGIOUS SERVICES?: NEVER
DO YOU BELONG TO ANY CLUBS OR ORGANIZATIONS SUCH AS CHURCH GROUPS UNIONS, FRATERNAL OR ATHLETIC GROUPS, OR SCHOOL GROUPS?: NO
HOW OFTEN DO YOU ATTENT MEETINGS OF THE CLUB OR ORGANIZATION YOU BELONG TO?: NEVER
WITHIN THE LAST YEAR, HAVE YOU BEEN KICKED, HIT, SLAPPED, OR OTHERWISE PHYSICALLY HURT BY YOUR PARTNER OR EX-PARTNER?: NO
WITHIN THE LAST YEAR, HAVE YOU BEEN AFRAID OF YOUR PARTNER OR EX-PARTNER?: NO
HOW HARD IS IT FOR YOU TO PAY FOR THE VERY BASICS LIKE FOOD, HOUSING, MEDICAL CARE, AND HEATING?: SOMEWHAT HARD

## 2022-12-20 ASSESSMENT — PAIN SCALES - GENERAL
PAINLEVEL_OUTOF10: 10
PAINLEVEL_OUTOF10: 8

## 2022-12-20 ASSESSMENT — PAIN DESCRIPTION - LOCATION
LOCATION: CHEST
LOCATION: GENERALIZED

## 2022-12-20 ASSESSMENT — PAIN DESCRIPTION - DESCRIPTORS: DESCRIPTORS: ACHING

## 2022-12-20 ASSESSMENT — PAIN DESCRIPTION - ORIENTATION: ORIENTATION: MID

## 2022-12-20 ASSESSMENT — LIFESTYLE VARIABLES
HOW MANY STANDARD DRINKS CONTAINING ALCOHOL DO YOU HAVE ON A TYPICAL DAY: PATIENT DOES NOT DRINK
HOW OFTEN DO YOU HAVE A DRINK CONTAINING ALCOHOL: NEVER

## 2022-12-20 NOTE — H&P
V2.0  History and Physical      Name:  Nelson Schulte /Age/Sex: 1955  (74 y.o. female)   MRN & CSN:  [id number] & [id number] Encounter Date/Time: 2022 4:10 PM EST   Location:  ED18/ED-18 PCP: Lisa Bush, [address] [facility] Day: 1    Assessment and Plan:   Nelson Schulte is a 74 y.o. female with pmh of fibromyalgia, pseudoseizures, panic attacks, type II DM, dyslipidemia, depression, generalized anxiety disorder, hypertension, asthma, bipolar, seizure disorder, GERD, IBS, neuropathy, tremor, and hypothyroidism who presents with chest pain. Hospital Problems             Last Modified POA    * (Principal) Chest pain 2022 Yes       Chest pain -nonreproducible, sharp stabbing that goes to back, started at rest, last stress test 2021 was normal, negative troponin, EKG with no acute changes.   Chest x-ray negative.  -Trend troponin  -Cardiology consult  -Telemetry  -TSH, lipase  -Continue home aspirin    Hypertension-restart home losartan, metoprolol succinate 50 mg, nifedipine  Hyperlipidemia-statin  type II [id number], hypoglycemia protocol, home Tresiba, low-dose sliding scale, scheduled 5 units 3 times daily from 15 3 times daily at home, will adjust as appropriate  Hypothyroidism  Bipolar disorder/anxiety/depression-restart home BuSpar, Seroquel, Oxcarbazepine, as needed diazepam  Seizure disorder-restart home Keppra  Fibromyalgia-restart home amitriptyline, gabapentin  Chronic pain-Home opioids as needed  Asthma-Home montelukast, home inhalers  Tremor-Home dopa    Disposition:   Current Living situation: Home  Expected Disposition: Home  Estimated D/C: 1 to 2 days    Diet No diet orders on file   DVT Prophylaxis [x] Lovenox, []  Heparin, [] SCDs, [] Ambulation,  [] Eliquis, [] Xarelto, [] Coumadin   Code Status Prior   Surrogate Decision Maker/ POA      History from:     patient, electronic medical record    History of Present Illness:     Chief Complaint: Chest pain  Alverto Shove Karmen Poole is a 74 y.o. female with pmh of fibromyalgia, pseudoseizures, panic attacks, type II DM, dyslipidemia, depression, generalized anxiety disorder, hypertension, asthma, bipolar, seizure disorder, GERD, IBS, neuropathy, tremor, and hypothyroidism who presents with chest pain. Patient reports chest pain that started 10 AM today, she was at rest, describes pain as sharp stabbing pain that goes to her back. No fever no chills no rigors, no shortness of breath, no cough, no dysuria. In the ED patient with negative troponin EKG was unremarkable acute ischemic changes. Pain is nonreproducible on exam.  Normal stress test from September 2021. Review of Systems: Need 10 Elements   Review of Systems    10 point review of systems negative except as above. Objective:   No intake or output data in the 24 hours ending 12/20/22 1610   Vitals:   Vitals:    12/20/22 1332 12/20/22 1403 12/20/22 1432 12/20/22 1502   BP: 135/74 (!) 150/75 135/71 132/71   Pulse: 71 74 70 71   Resp: 14 15 14 15   Temp:    98.1 °F (36.7 °C)   TempSrc:    Oral   SpO2: 96% 97% 96% 95%   Weight:           Medications Prior to Admission     Prior to Admission medications    Medication Sig Start Date End Date Taking? Authorizing Provider   levothyroxine (SYNTHROID) 75 MCG tablet TAKE ONE (1) TABLET BY MOUTH EVERY MORNING (BEFORE BREAKFAST) 12/15/22   WENDIE Ornelas CNP   simvastatin (ZOCOR) 20 MG tablet TAKE ONE (1) TABLET BY MOUTH NIGHTLY 12/15/22   WENDIE Ornelas CNP   orphenadrine (NORFLEX) 100 MG extended release tablet  11/22/22   Historical Provider, MD   losartan (COZAAR) 50 MG tablet Take 1 tablet by mouth daily 11/23/22   WENDIE Alvarez CNP   gabapentin (NEURONTIN) 400 MG capsule  10/24/22   Historical Provider, MD   hydrOXYzine HCl (ATARAX) 50 MG tablet  10/21/22   Historical Provider, MD   blood glucose test strips (TRUE METRIX BLOOD GLUCOSE TEST) strip 1 each by In Vitro route daily As needed.  11/21/22 WENDIE Lincoln CNP   guaiFENesin (MUCINEX) 600 MG extended release tablet Take 1 tablet by mouth 2 times daily 11/21/22   Cholo Bear MD   montelukast (SINGULAIR) 10 MG tablet TAKE ONE (1) TABLET BY MOUTH EACH NIGHT AT BEDTIME 11/16/22   WENDIE Lincoln CNP   amitriptyline (ELAVIL) 25 MG tablet Take 2 tablets by mouth nightly 11/8/22   Keith Nam,    Handicap Placard MISC by Does not apply route 11/1/22   WENDIE Lincoln CNP   NIFEdipine (PROCARDIA XL) 90 MG extended release tablet Take 1 tablet by mouth daily 10/24/22   Salas Dailey MD   hydrOXYzine pamoate (VISTARIL) 50 MG capsule Take 50 mg by mouth three times daily 9/22/22   Historical Provider, MD   carbidopa-levodopa (SINEMET)  MG per tablet Take 1 tablet by mouth nightly 9/29/22   WENDIE Lincoln CNP   pantoprazole (PROTONIX) 40 MG tablet Take 1 tablet by mouth 2 times daily (before meals) 9/29/22   WENDIE Lincoln CNP   magnesium oxide (MAG-OX) 400 MG tablet Take 1 tablet by mouth 3 times daily 8/24/22   WENDIE Lincoln CNP   NOVOLOG FLEXPEN 100 UNIT/ML injection pen Inject 15 Units into the skin 3 times daily (before meals) 12/01/2020 patient states she follows sliding scale regimen BS > 150 7/6/22   WENDIE Lincoln CNP   TRESIBA FLEXTOUCH 200 UNIT/ML SOPN Inject 20 Units into the skin every morning  Patient taking differently: Inject 60 Units into the skin at bedtime 7/6/22   WENDIE Lincoln CNP   Insulin Pen Needle 31G X 5 MM MISC 1 each by Does not apply route daily 7/6/22   WENDIE Lincoln CNP   metoprolol succinate (TOPROL XL) 50 MG extended release tablet Take 1 tablet by mouth daily 6/16/22   Salas Dailey MD   Probiotic Product (PROBIOTIC DIGESTIVE SUPP PO) Take 1 capsule by mouth every morning    Historical Provider, MD   ondansetron (ZOFRAN ODT) 4 MG disintegrating tablet Take 1 tablet by mouth every 8 hours as needed for Nausea 5/30/22   Dixie Ang MD   fluticasone (FLONASE) 50 MCG/ACT nasal spray 1 spray by Each Nostril route daily 5/2/22   Viraj Nuno MD   sucralfate (CARAFATE) 1 GM tablet Take 1 tablet by mouth 4 times daily 3/31/22 5/30/22  Kenzie Roberts MD   nystatin (MYCOSTATIN) 649528 UNIT/GM powder Apply 3 times daily to affected areas 3/14/22   Sandy Abebe PA-C   levETIRAcetam (KEPPRA) 750 MG tablet Take 1 tablet by mouth 2 times daily 10/1/21 11/23/22  Jose L Michelle PA-C   sodium chloride 1 g tablet Take 1 tablet by mouth 2 times daily (with meals) 10/1/21 5/30/22  Jose L Michelle PA-C   baclofen (LIORESAL) 10 MG tablet 1 tablet 2 times daily  Patient not taking: No sig reported 9/3/21   Historical Provider, MD   diazePAM (VALIUM) 5 MG tablet as needed. 9/3/21   Historical Provider, MD   QUEtiapine (SEROQUEL) 25 MG tablet Take 1 tablet by mouth 2 times daily  Patient taking differently: Take 25 mg by mouth 2 times daily Indications: 25 mg in am and 50 mg in pm 7/15/21   Vini Mandujano MD   busPIRone (BUSPAR) 10 MG tablet Take 2 tablets by mouth 3 times daily 4/28/21   Hattie Asencio MD   HYDROcodone-acetaminophen (NORCO)  MG per tablet Take 1 tablet by mouth every 6 hours as needed.   1/22/21   Historical Provider, MD   fluticasone-umeclidin-vilant (TRELEGY ELLIPTA) 100-62.5-25 MCG/INH AEPB Inhale 1 puff into the lungs daily  Patient taking differently: Inhale 1 puff into the lungs daily as needed (only takes prn daily due to yeast infections in mouth, is aware she is supposed to take daily) 12/8/20   Lashonda Fishman MD   OXcarbazepine (TRILEPTAL) 300 MG tablet Take 1 tablet by mouth 2 times daily 12/4/20   Phyllis Perales MD   docusate sodium (COLACE) 100 MG capsule Take 1 capsule by mouth 2 times daily 1/9/20   Tip Dennard, PA   aluminum & magnesium hydroxide-simethicone (MAALOX MAX) 400-400-40 MG/5ML SUSP Take 15 mLs by mouth every 6 hours as needed (heart burn) 9/12/18   WENDIE Bruce - CNP   aspirin 81 MG tablet Take 81 mg by mouth daily    Historical Provider, MD   Multiple Vitamins-Iron (MULTI-VITAMIN/IRON PO) Take  by mouth. Historical Provider, MD       Physical Exam: Need 8 Elements   Physical Exam     General: NAD  Eyes: EOMI  ENT: neck supple  Cardiovascular: Regular rhythm, normal rate, no reproducible chest pain on palpation  Respiratory: Clear to auscultation  Gastrointestinal: Soft, non tender  Genitourinary: no suprapubic tenderness  Musculoskeletal: No edema  Skin: warm, dry  Neuro: Alert. Oriented x4, nonfocal exam, normal speech  Psych: Mood appropriate. Past Medical History:   PMHx   Past Medical History:   Diagnosis Date    Abnormal EKG 04/22/2014    Acid reflux     Anemia     Anesthesia     Nausea/Vomiting Post Op In Past    Anginal pain (HCC)     Denies Chest Pain At This Time    Anxiety     Arthritis     \"All Over\"    Asthma     Bipolar 1 disorder (HCC)     Cerebral artery occlusion with cerebral infarction (HCC)     CHF (congestive heart failure) (HCC)     Chronic back pain     Chronic kidney disease     DDD (degenerative disc disease), cervical     12- Patient reports she was dx with DDD of Cerival spine C6,C7    Depression     Diabetes mellitus ([address] Dx 1990's    Diabetic neuropathy ([address]     \"In My Legs And Feet\"    Dizziness     \"Sometimes\"    Dry skin     Enlarged ureter     Right Side    Fatty liver     Fibrocystic breast     Generalized anxiety disorder     Gout     Pt states she was diagnosed with gout in the past few months. H/O cardiac catheterization     Showed mild disease per last cath. H/O cardiovascular stress test 03/15/2010    EF 69%, normal perfusion study except for diaphragmatic artifact, uniform wall motion. H/O cardiovascular stress test 10/09/2008    EF 60%, no anginia, normal study. H/O cardiovascular stress test 05/06/2014    EF 66%, no ischemia, normal LV systolic funciton, normal perfusion pattern.      H/O Doppler ultrasound 02/28/2011    CAROTID DOPPLER-normal study. H/O echocardiogram 05/06/2014    Ef >55%. Impaired LV relaxation. H/O echocardiogram 10/14/2015    EF 60% Normal LV and systolic function. No significant valvulopathy seen. History of Holter monitoring 03/24/2015    24 hour - predominant rhythm sinus    [place] (hard of hearing)     Bilateral Ears    Hx of cardiovascular stress test 10/19/2015    lexiscan-normal,EF63%    Hx of motion sickness     HX OTHER MEDICAL     Primary Care Physician Is Dr. Amy [facility] In [facility] [place]    Hyperlipidemia     Hypertension     IBS (irritable bowel syndrome)     Incisional hernia 04/2014    Kidney stones Last Episode In 2012 Or 2013    Passed Kidney Stones Numberous Times    Migraines     Nausea & vomiting     Nausea/Vomiting Post Op In Past    Other specified disorder of skin     12- Patient states she has a condition of her vaginal area (skin) which starts with the letters Loye Dom. She is currently being treated with multiple creams and weekly Diflucan. Panic attacks     Panic attacks     Pneumonia Last Episode In 1980's    Pseudoseizures [facility]) Last One In 1990's    \"Caused From Bad Nerves\"    Restless leg     Shortness of breath     Sleep apnea     12- Has CPAP but does not use due to \"smothering\" feeling with mask. Staph infection Dx 1980's    Toes On Left Foot    Thyroid disease     hypothroidism    Tremor     \"Tremors All Over\"    Urinary incontinence     Vertigo     \"Sometimes\"    Wears glasses      PSHX:  has a past surgical history that includes Carpal tunnel release (Right, 1999); Diagnostic Cardiac Cath Lab Procedure (01/11/2010); Dental surgery; Colonoscopy (Last Done 6-13); Endoscopy, colon, diagnostic (Several ); Bladder surgery (1970's Or 1980's); Lithotripsy (2011); Breast biopsy (Right, 1980's); Breast surgery (Left, 1990's); hernia repair (1479'O); hernia repair (1970's); Cholecystectomy (1970's); Appendectomy (1970's); Hysterectomy, total abdominal (1987);  Tubal ligation (1978); Esophagus dilation (1980's And 1990's); Knee arthroscopy (Right, 1999); joint replacement (2008); other surgical history (06 13 2014); fracture surgery (1974 Or 1975); Cardiac catheterization (10-18-06); and Cardiac catheterization. Allergies:    Allergies   Allergen Reactions    Abilify [Aripiprazole]      \"Severe Shaking And Restlessness\"    Augmentin [Amoxicillin-Pot Clavulanate] Itching and Rash    Bee Venom Swelling     Redness    Ciprofloxacin Itching and Rash    Codeine      \"Severe Abdominal Cramping\"    Darvocet [Propoxyphene N-Acetaminophen] Palpitations     \"Severe High Blood Pressure\"    Darvon [Propoxyphene Hcl] Palpitations     \"Severe High Blood Pressure\"    Decadron [Dexamethasone] Other (See Comments)     seizure  Seizures    Ditropan [Oxybutynin Chloride] Palpitations     \"Severe High Blood Pressure\"    Fioricet [Butalbital-Apap-Caffeine] Palpitations     \"Severe High Blood Pressure\"    Fiorinal-Codeine #3 [Butalbital-Asa-Caff-Codeine]      \"Severe Stomach Cramps\"    Flagyl [Metronidazole] Diarrhea     \"Severe Diarrhea And Cramping\"    Naproxen Palpitations     \"Severe High Blood Pressure\"    Other      \"Allergic To Spider Bites Causing Blackness Of Skin, Severe Itching And Pain\"                                                  \"Allergic To Powder In Gloves Causing Severe Redness And Itching\"    Prozac [Fluoxetine Hcl]      \"Hallucinations\"    Robaxin [Methocarbamol] Palpitations     \"Severe High Blood Pressure\"    Ultram [Tramadol]      \"Severe Stomach Pain\"    Zoloft Palpitations     \"Sever High Blood Pressure\"    Butalbital-Aspirin-Caffeine Other (See Comments)     \"severe stomach pain\"    Coreg [Carvedilol] Other (See Comments)     \"spikes my BP severely and send me into a severe anxiety attack\"    Fluoxetine Other (See Comments)     hallucinations    Oxybutynin Chloride Other (See Comments)     Raises bp    Percocet [Oxycodone-Acetaminophen]      \"knocked me out for 12 hrs\" Sertraline Other (See Comments)     hallucinations  Other reaction(s): Unknown    Tape [Adhesive Tape]      Patient states it tears her skin, including band aids    Reglan [Metoclopramide] Other (See Comments)     \"makes me talk like a baby, but if I take benadryl with it it's fine\"     Fam HX:  family history includes Anxiety Disorder in her daughter; Arthritis in her father, mother, and sister; Bipolar Disorder in her daughter; Cancer in her brother and sister; Colon Cancer in her brother; Depression in her daughter and mother; Diabetes in her mother; Early Death in her brother; Early Death (age of onset: 37) in her brother; Early Death (age of onset: 61) in her mother; Early Death (age of onset: 61) in her brother; Hearing Loss in her sister; Heart Disease in her brother, brother, brother, father, mother, and sister; Heart Failure in her sister; High Blood Pressure in her brother, father, mother, and sister; High Cholesterol in her brother, father, and mother; Mental Illness in her brother, brother, daughter, father, mother, and sister; [employer] / [place] Colace in her mother; Other in her daughter, mother, and son; Stroke in her mother. Soc HX:   Social History     Socioeconomic History    Marital status:       Spouse name: None    Number of children: 3    Years of education: 11    Highest education level: None   Tobacco Use    Smoking status: Never    Smokeless tobacco: Never   Vaping Use    Vaping Use: Never used   Substance and Sexual Activity    Alcohol use: No     Comment: tea sometimes    Drug use: No    Sexual activity: Not Currently     Social Determinants of Health     Financial Resource Strain: Medium Risk    Difficulty of Paying Living Expenses: Somewhat hard   Food Insecurity: Food Insecurity Present    Worried About Running Out of Food in the Last Year: Sometimes true    Ran Out of Food in the Last Year: Sometimes true       Medications:   Medications:    Infusions:   PRN Meds:     Labs CBC:   Recent Labs     12/20/22  1225   WBC 11.3*   HGB 13.4        BMP:    Recent Labs     12/20/22  1225      K 4.3   CL 96*   CO2 25   BUN 10   CREATININE 0.5*   GLUCOSE 221*     Hepatic:   Recent Labs     12/20/22  1225   AST 33   ALT 31   BILITOT 0.4   ALKPHOS 84     Lipids:   Lab Results   Component Value Date/Time    CHOL 134 10/11/2022 04:08 PM    HDL 50 10/11/2022 04:08 PM    TRIG 276 10/11/2022 04:08 PM     Hemoglobin A1C:   Lab Results   Component Value Date/Time    LABA1C 9.0 03/30/2022 02:52 AM     TSH: No results found for: TSH  Troponin:   Lab Results   Component Value Date/Time    TROPONINT <0.010 12/20/2022 02:38 PM    TROPONINT <0.010 12/20/2022 12:25 PM    TROPONINT <0.010 10/13/2022 05:31 PM     Lactic Acid: No results for input(s): LACTA in the last 72 hours.   BNP:   Recent Labs     12/20/22  1225   PROBNP 215.5     UA:  Lab Results   Component Value Date/Time    NITRU NEGATIVE 12/20/2022 02:38 PM    NITRU NEGATIVE 11/05/2014 01:27 PM    COLORU YELLOW 12/20/2022 02:38 PM    WBCUA 8 12/20/2022 02:38 PM    RBCUA 1 12/20/2022 02:38 PM    MUCUS RARE 10/13/2022 07:50 PM    TRICHOMONAS NONE SEEN 12/20/2022 02:38 PM    YEAST RARE 09/12/2018 03:05 PM    BACTERIA NEGATIVE 12/20/2022 02:38 PM    CLARITYU CLEAR 12/20/2022 02:38 PM    SPECGRAV 1.010 12/20/2022 02:38 PM    LEUKOCYTESUR TRACE 12/20/2022 02:38 PM    UROBILINOGEN 0.2 12/20/2022 02:38 PM    BILIRUBINUR NEGATIVE 12/20/2022 02:38 PM    BLOODU NEGATIVE 12/20/2022 02:38 PM    GLUCOSEU Normal 11/05/2014 01:38 PM    KETUA NEGATIVE 12/20/2022 02:38 PM     Urine Cultures: No results found for: LABURIN  Blood Cultures: No results found for: BC  No results found for: BLOODCULT2  Organism:   Lab Results   Component Value Date/Time    ORG ECOL 04/27/2015 08:15 PM       Imaging/Diagnostics Last 24 Hours   XR CHEST PORTABLE    Result Date: 12/20/2022  EXAMINATION: ONE XRAY VIEW OF THE CHEST 12/20/2022 1:06 pm COMPARISON: 10/13/2022 HISTORY: ORDERING SYSTEM PROVIDED HISTORY: Chest Pain TECHNOLOGIST PROVIDED HISTORY: Reason for exam:->Chest Pain Reason for Exam: chest pain    sob FINDINGS: The cardiomediastinal silhouette is unchanged. Similar streaky bibasilar opacities favor scarring/atelectasis. No new focal consolidation, pneumothorax, or pleural effusion. Osseous structures are grossly unchanged. No acute process. Personally reviewed Lab Studies, Imaging, and EKG.      Electronically signed by Selvin Agarwal MD on 12/20/2022 at 4:10 PM

## 2022-12-20 NOTE — ED NOTES
Pt c/o feeling short of breath and requesting oxygen - spo2 96% on room air. Pt placed on 0.5L nasal cannula for comfort.      Nicole Arzate RN  12/20/22 [id number]
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
## 2022-12-20 NOTE — ED TRIAGE NOTES
Patient to the ED via EMS with complaints of CP and SOB x1 hour. Patient reports mid sternal chest pain into back and radiates to left shoulder and left jaw. Received 324mg of ASA and 1 nitro en route via EMS without relief. Patient reports chest pain continues at 10/10.

## 2022-12-20 NOTE — PLAN OF CARE
Problem: Discharge Planning  Goal: Discharge to home or other facility with appropriate resources  Outcome: Progressing  Flowsheets (Taken 12/20/2022 2403)  Discharge to home or other facility with appropriate resources:   Identify barriers to discharge with patient and caregiver   Identify discharge learning needs (meds, wound care, etc)   Refer to discharge planning if patient needs post-hospital services based on physician order or complex needs related to functional status, cognitive ability or social support system   Arrange for needed discharge resources and transportation as appropriate     Problem: Safety - Adult  Goal: Free from fall injury  Outcome: Progressing     Problem: ABCDS Injury Assessment  Goal: Absence of physical injury  Outcome: Progressing

## 2022-12-20 NOTE — CARE COORDINATION
CM - Room # 25 --Dr Janny Clarke criteria for CP -reviewed at this time, criteria supports the OBSERVATION status    Larry/ CM / RN
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
## 2022-12-20 NOTE — PROGRESS NOTES
Pt admitted to room 4019 from ED. VS are stable, pt is A&Ox4. 4 eyes skin assessment performed with Alverto Osborn RN no wounds note. Pt was oriented to the room. Pt is resting comfortably at this time.

## 2022-12-20 NOTE — PROGRESS NOTES
4 Eyes Skin Assessment     NAME:  Bernardo Villegas  YOB: 1955  MEDICAL RECORD NUMBER:  [id number]    The patient is being assessed for  Admission    I agree that One RN have performed a thorough Head to Toe Skin Assessment on the patient. ALL assessment sites listed below have been assessed. Areas assessed by both nurses:    Head, Face, Ears, Shoulders, Back, Chest, Arms, Elbows, Hands, Sacrum. Buttock, Coccyx, Ischium, and Legs. Feet and Heels        Does the Patient have a Wound?  No noted wound(s)       [place] Prevention initiated by RN: No   Wound Care Orders initiated by RN: No    Pressure Injury (Stage 3,4, Unstageable, DTI, NWPT, and Complex wounds) if present place referral order by RN under : No    New and Established Ostomies, if present place, referral order under : No      Nurse 1 eSignature: Electronically signed by Reena Gipson LPN on 79/02/94 at 4:86 PM EST    **SHARE this note so that the co-signing nurse is able to place an eSignature**    Nurse 2 eSignature: Electronically signed by Gerber Pavon RN on 12/20/22 at 6:56 PM EST

## 2022-12-21 VITALS
SYSTOLIC BLOOD PRESSURE: 129 MMHG | WEIGHT: 170 LBS | RESPIRATION RATE: 18 BRPM | HEIGHT: 62 IN | DIASTOLIC BLOOD PRESSURE: 82 MMHG | OXYGEN SATURATION: 98 % | TEMPERATURE: 97.3 F | BODY MASS INDEX: 31.28 KG/M2 | HEART RATE: 75 BPM

## 2022-12-21 LAB
GLUCOSE BLD-MCNC: 135 MG/DL (ref 70–99)
GLUCOSE BLD-MCNC: 261 MG/DL (ref 70–99)
TROPONIN T: <0.01 NG/ML

## 2022-12-21 PROCEDURE — 84484 ASSAY OF TROPONIN QUANT: CPT

## 2022-12-21 PROCEDURE — 36415 COLL VENOUS BLD VENIPUNCTURE: CPT

## 2022-12-21 PROCEDURE — G0378 HOSPITAL OBSERVATION PER HR: HCPCS

## 2022-12-21 PROCEDURE — 94761 N-INVAS EAR/PLS OXIMETRY MLT: CPT

## 2022-12-21 PROCEDURE — 2700000000 HC OXYGEN THERAPY PER DAY

## 2022-12-21 PROCEDURE — 6370000000 HC RX 637 (ALT 250 FOR IP): Performed by: STUDENT IN AN ORGANIZED HEALTH CARE EDUCATION/TRAINING PROGRAM

## 2022-12-21 PROCEDURE — 2580000003 HC RX 258: Performed by: STUDENT IN AN ORGANIZED HEALTH CARE EDUCATION/TRAINING PROGRAM

## 2022-12-21 PROCEDURE — 82962 GLUCOSE BLOOD TEST: CPT

## 2022-12-21 RX ORDER — LANOLIN ALCOHOL/MO/W.PET/CERES
400 CREAM (GRAM) TOPICAL 3 TIMES DAILY
Status: DISCONTINUED | OUTPATIENT
Start: 2022-12-21 | End: 2022-12-21 | Stop reason: HOSPADM

## 2022-12-21 RX ADMIN — PANTOPRAZOLE SODIUM 40 MG: 40 TABLET, DELAYED RELEASE ORAL at 05:32

## 2022-12-21 RX ADMIN — NIFEDIPINE 90 MG: 90 TABLET, FILM COATED, EXTENDED RELEASE ORAL at 09:49

## 2022-12-21 RX ADMIN — GUAIFENESIN 600 MG: 600 TABLET, EXTENDED RELEASE ORAL at 09:54

## 2022-12-21 RX ADMIN — HYDROCODONE BITARTRATE AND ACETAMINOPHEN 1 TABLET: 10; 325 TABLET ORAL at 09:58

## 2022-12-21 RX ADMIN — Medication 400 MG: at 09:51

## 2022-12-21 RX ADMIN — MICONAZOLE NITRATE: 2 POWDER TOPICAL at 09:55

## 2022-12-21 RX ADMIN — LEVOTHYROXINE SODIUM 75 MCG: 0.07 TABLET ORAL at 05:32

## 2022-12-21 RX ADMIN — METOPROLOL SUCCINATE 50 MG: 50 TABLET, EXTENDED RELEASE ORAL at 09:51

## 2022-12-21 RX ADMIN — FLUTICASONE PROPIONATE 1 SPRAY: 50 SPRAY, METERED NASAL at 09:49

## 2022-12-21 RX ADMIN — ASPIRIN 81 MG: 81 TABLET, COATED ORAL at 09:51

## 2022-12-21 RX ADMIN — LEVETIRACETAM 750 MG: 500 TABLET, FILM COATED ORAL at 09:50

## 2022-12-21 RX ADMIN — OXCARBAZEPINE 300 MG: 300 TABLET, FILM COATED ORAL at 09:49

## 2022-12-21 RX ADMIN — BUSPIRONE HYDROCHLORIDE 20 MG: 5 TABLET ORAL at 09:49

## 2022-12-21 RX ADMIN — ORPHENADRINE CITRATE 100 MG: 100 TABLET, EXTENDED RELEASE ORAL at 09:51

## 2022-12-21 RX ADMIN — QUETIAPINE FUMARATE 25 MG: 25 TABLET ORAL at 09:50

## 2022-12-21 RX ADMIN — INSULIN LISPRO 5 UNITS: 100 INJECTION, SOLUTION INTRAVENOUS; SUBCUTANEOUS at 09:46

## 2022-12-21 RX ADMIN — SODIUM CHLORIDE, PRESERVATIVE FREE 10 ML: 5 INJECTION INTRAVENOUS at 09:52

## 2022-12-21 RX ADMIN — DIAZEPAM 5 MG: 5 TABLET ORAL at 09:58

## 2022-12-21 RX ADMIN — LOSARTAN POTASSIUM 50 MG: 25 TABLET, FILM COATED ORAL at 09:51

## 2022-12-21 ASSESSMENT — PAIN DESCRIPTION - ORIENTATION: ORIENTATION: MID

## 2022-12-21 ASSESSMENT — PAIN SCALES - GENERAL: PAINLEVEL_OUTOF10: 7

## 2022-12-21 ASSESSMENT — PAIN DESCRIPTION - LOCATION: LOCATION: GENERALIZED

## 2022-12-21 NOTE — PLAN OF CARE
Problem: Discharge Planning  Goal: Discharge to home or other facility with appropriate resources  12/21/2022 1146 by Cinthia Hawley RN  Outcome: Adequate for Discharge  12/21/2022 1038 by Cinthia Hawley RN  Outcome: Progressing

## 2022-12-21 NOTE — PROGRESS NOTES
Cardiology Progress Note     Admit Date:  12/20/2022    Consult reason/ Seen today for :       Subjective and  Overnight Events :  chest pain free , she is anxious and does not want a stress test      Chief complain on admission : 74 y. o.year old who is admitted for  Chief Complaint   Patient presents with    Chest Pain     Started 1 hour ago, mid chest into back. Had 324mg of ASA and 1 nitro en route     Shortness of Breath      Assessment / Plan:    Chest pain serial troponins are negative   she gets very anxious a stress test has had several normal stress test and cath in the past would not pursue any further testing and patient at this time can be charged with outpatient follow-up with cardiology  Shortness of breath BNP is normal doubt CHF Echo shows preserved EF  Blood pressures well controlled but may get elevated with anxiety continue losartan 50 mg daily and Toprol-XL 50 mg daily if she has increased pressures can increase losartan 100 mg daily continue nifedipine 90 mg daily  Treat anxiety  DVT prophylaxis if no contraindication  6.    Dyslipidemia: continue statins   Follow-up in office will sign off        Past medical history:    has a past medical history of Abnormal EKG, Acid reflux, Anemia, Anesthesia, Anginal pain ([address] Anxiety, Arthritis, Asthma, Bipolar 1 disorder ([address] Cerebral artery occlusion with cerebral infarction ([address] CHF (congestive heart failure) ([address] Chronic back pain, Chronic kidney disease, DDD (degenerative disc disease), cervical, Depression, Diabetes mellitus ([address] Diabetic neuropathy ([address] Dizziness, Dry skin, Enlarged ureter, Fatty liver, Fibrocystic breast, Generalized anxiety disorder, Gout, H/O cardiac catheterization, H/O cardiovascular stress test, H/O cardiovascular stress test, H/O cardiovascular stress test, H/O Doppler ultrasound, H/O echocardiogram, H/O echocardiogram, History of Holter monitoring, [place] (hard of hearing), Hx of cardiovascular stress test, Hx of motion sickness, HX OTHER MEDICAL, Hyperlipidemia, Hypertension, IBS (irritable bowel syndrome), Incisional hernia, Kidney stones, Migraines, Nausea & vomiting, Other specified disorder of skin, Panic attacks, Panic attacks, Pneumonia, Pseudoseizures (HCC), Restless leg, Shortness of breath, Sleep apnea, Staph infection, Thyroid disease, Tremor, Urinary incontinence, Vertigo, and Wears glasses. Past surgical history:   has a past surgical history that includes Carpal tunnel release (Right, 1999); Diagnostic Cardiac Cath Lab Procedure (01/11/2010); Dental surgery; Colonoscopy (Last Done 6-13); Endoscopy, colon, diagnostic (Several ); Bladder surgery (1970's Or 1980's); Lithotripsy (2011); Breast biopsy (Right, 1980's); Breast surgery (Left, 1990's); hernia repair (5427'J); hernia repair (1970's); Cholecystectomy (1970's); Appendectomy (1970's); Hysterectomy, total abdominal (1987); Tubal ligation (1978); Esophagus dilation (1980's And 1990's); Knee arthroscopy (Right, 1999); joint replacement (2008); other surgical history (06 13 2014); fracture surgery (1974 Or 1975); Cardiac catheterization (10-18-06); and Cardiac catheterization. Social History:   reports that she has never smoked. She has never used smokeless tobacco. She reports that she does not drink alcohol and does not use drugs. Family history:  family history includes Anxiety Disorder in her daughter; Arthritis in her father, mother, and sister; Bipolar Disorder in her daughter; Cancer in her brother and sister; Colon Cancer in her brother; Depression in her daughter and mother; Diabetes in her mother; Early Death in her brother; Early Death (age of onset: 37) in her brother; Early Death (age of onset: 61) in her mother; Early Death (age of onset: 61) in her brother; Hearing Loss in her sister; Heart Disease in her brother, brother, brother, father, mother, and sister;  Heart Failure in her sister; High Blood Pressure in her brother, father, mother, and sister; High Cholesterol in her brother, father, and mother; Mental Illness in her brother, brother, daughter, father, mother, and sister; [place] / [place] in her mother; Other in her daughter, mother, and son; Stroke in her mother.     Allergies   Allergen Reactions    Abilify [Aripiprazole]      \"Severe Shaking And Restlessness\"    Augmentin [Amoxicillin-Pot Clavulanate] Itching and Rash    Bee Venom Swelling     Redness    Ciprofloxacin Itching and Rash    Codeine      \"Severe Abdominal Cramping\"    Darvocet [Propoxyphene N-Acetaminophen] Palpitations     \"Severe High Blood Pressure\"    Darvon [Propoxyphene Hcl] Palpitations     \"Severe High Blood Pressure\"    Decadron [Dexamethasone] Other (See Comments)     seizure  Seizures    Ditropan [Oxybutynin Chloride] Palpitations     \"Severe High Blood Pressure\"    Fioricet [Butalbital-Apap-Caffeine] Palpitations     \"Severe High Blood Pressure\"    Fiorinal-Codeine #3 [Butalbital-Asa-Caff-Codeine]      \"Severe Stomach Cramps\"    Flagyl [Metronidazole] Diarrhea     \"Severe Diarrhea And Cramping\"    Naproxen Palpitations     \"Severe High Blood Pressure\"    Other      \"Allergic To Spider Bites Causing Blackness Of Skin, Severe Itching And Pain\"                                                  \"Allergic To Powder In Gloves Causing Severe Redness And Itching\"    Prozac [Fluoxetine Hcl]      \"Hallucinations\"    Robaxin [Methocarbamol] Palpitations     \"Severe High Blood Pressure\"    Ultram [Tramadol]      \"Severe Stomach Pain\"    Zoloft Palpitations     \"Sever High Blood Pressure\"    Butalbital-Aspirin-Caffeine Other (See Comments)     \"severe stomach pain\"    Coreg [Carvedilol] Other (See Comments)     \"spikes my BP severely and send me into a severe anxiety attack\"    Fluoxetine Other (See Comments)     hallucinations    Oxybutynin Chloride Other (See Comments)     Raises bp    Percocet [Oxycodone-Acetaminophen]      \"knocked me out for 12 hrs\"    Sertraline Other (See Comments)     hallucinations  Other reaction(s): Unknown    Tape [Adhesive Tape]      Patient states it tears her skin, including band aids    Reglan [Metoclopramide] Other (See Comments)     \"makes me talk like a baby, but if I take benadryl with it it's fine\"       Review of Systems:    All 14 systems were reviewed and are negative  Except for the positive findings  which as documented     BP (!) 113/58   Pulse 63   Temp 97.7 °F (36.5 °C)   Resp 16   Ht 5' 2\" (1.575 m)   Wt 170 lb (77.1 kg)   SpO2 98%   BMI 31.09 kg/m²   No intake or output data in the 24 hours ending 12/21/22 0855  Physical Exam:  Constitutional:  Well developed, Well nourished, No acute distress, Non-toxic appearance. HENT:  Normocephalic, Atraumatic, Bilateral external ears normal, Oropharynx moist, No oral exudates, Nose normal. Neck- Normal range of motion, No tenderness, Supple, No stridor. Eyes:  PERRL, EOMI, Conjunctiva normal, No discharge. Respiratory:  Normal breath sounds, No respiratory distress, No wheezing, No chest tenderness. Cardiovascular:  Normal heart rate, Normal rhythm, No murmurs, No rubs, No gallops, JVP not elevated  Abdomen/GI:  Bowel sounds normal, Soft, No tenderness, No masses, No pulsatile masses. Musculoskeletal:  Intact distal pulses, No edema, No tenderness, No cyanosis, No clubbing. Good range of motion in all major joints. No tenderness to palpation or major deformities noted. Back- No tenderness. Integument:  Warm, Dry, No erythema, No rash. Lymphatic:  No lymphadenopathy noted. Neurologic:  Alert & oriented x 3, Normal motor function, Normal sensory function, No focal deficits noted.    Psychiatric:  Affect  and  Mood :no change    Medications:    magnesium oxide  400 mg Oral TID    sodium chloride flush  5-40 mL IntraVENous 2 times per day    enoxaparin  40 mg SubCUTAneous Q24H    amitriptyline  50 mg Oral Nightly    aspirin  81 mg Oral Daily    busPIRone  20 mg Oral TID    carbidopa-levodopa  1 tablet Oral Nightly    fluticasone  1 spray Each Nostril Daily    gabapentin  400 mg Oral Nightly    guaiFENesin  600 mg Oral BID    levETIRAcetam  750 mg Oral BID    levothyroxine  75 mcg Oral Daily    losartan  50 mg Oral Daily    metoprolol succinate  50 mg Oral Daily    montelukast  10 mg Oral Nightly    insulin lispro  5 Units SubCUTAneous TID WC    miconazole   Topical BID    pantoprazole  40 mg Oral BID AC    OXcarbazepine  300 mg Oral BID    orphenadrine  100 mg Oral BID    QUEtiapine  25 mg Oral Daily    atorvastatin  10 mg Oral Nightly    insulin glargine  48 Units SubCUTAneous Nightly    insulin lispro  0-4 Units SubCUTAneous TID WC    insulin lispro  0-4 Units SubCUTAneous Nightly    NIFEdipine  90 mg Oral Daily    QUEtiapine  50 mg Oral Nightly    budesonide-formoterol  2 puff Inhalation BID    tiotropium  2 puff Inhalation Daily      sodium chloride      dextrose       sodium chloride flush, sodium chloride, acetaminophen, ondansetron **OR** ondansetron, aluminum & magnesium hydroxide-simethicone, diazePAM, HYDROcodone-acetaminophen, glucose, dextrose bolus **OR** dextrose bolus, glucagon (rDNA), dextrose    Lab Data:  CBC:   Recent Labs     12/20/22  1225   WBC 11.3*   HGB 13.4   HCT 39.2   MCV 81.2        BMP:   Recent Labs     12/20/22  1225      K 4.3   CL 96*   CO2 25   BUN 10   CREATININE 0.5*     PT/INR: No results for input(s): PROTIME, INR in the last 72 hours. BNP:    Recent Labs     12/20/22  1225   PROBNP 215.5     TROPONIN:   Recent Labs     12/20/22  1438 12/20/22  2137 12/21/22  0448   TROPONINT <0.010 <0.010 <0.010        ECHO :   echocardiogram    Assessment:  74 y. o.year old who is admitted for  Chief Complaint   Patient presents with    Chest Pain     Started 1 hour ago, mid chest into back.  Had 324mg of ASA and 1 nitro en route     Shortness of Breath    , active issues as noted below:  Impression:  Principal Problem:    Chest pain  Resolved Problems:    * No resolved hospital problems. *            All labs, medications and tests reviewed by myself , continue all other medications of all above medical condition listed as is except for changes mentioned above. Thank you very much for consult , please call with questions.     Jarred Birmingham MD, MD 12/21/2022 8:55 AM

## 2022-12-21 NOTE — CONSULTS
CARDIOLOGY CONSULT NOTE   Reason for consultation:  chest pain / SOB    Referring physician:  Mei Go MD     Primary care physician: Darnell White      Dear  Dr. Mei Go MD   Thanks for the consult. Chief Complaints :  Chief Complaint   Patient presents with    Chest Pain     Started 1 hour ago, mid chest into back. Had 324mg of ASA and 1 nitro en route     Shortness of Breath        History of present illness:Simona is a 74 y. o.year old who presents with complains of shortness of breath which is ongoing for quite some time she also says she has bilateral leg swelling. She says he started having chest pain which radiated to her left side of her jaw as well as left arm she took some nitro in route to the hospital but did not help she does have previous history of fibromyalgia pseudoseizures panic attack and significant anxiety bipolar disorder she says she cannot get stress test as it causes a lot of anxiety in the past it takes several days for her to have a stress test due to anxiety.   She is at several cardiac caths in the past as well as several stress test which did not show any significant obstructive abnormality    Past medical history:    has a past medical history of Abnormal EKG, Acid reflux, Anemia, Anesthesia, Anginal pain ([address] Anxiety, Arthritis, Asthma, Bipolar 1 disorder ([address] Cerebral artery occlusion with cerebral infarction ([address] CHF (congestive heart failure) ([address] Chronic back pain, Chronic kidney disease, DDD (degenerative disc disease), cervical, Depression, Diabetes mellitus ([address] Diabetic neuropathy ([address] Dizziness, Dry skin, Enlarged ureter, Fatty liver, Fibrocystic breast, Generalized anxiety disorder, Gout, H/O cardiac catheterization, H/O cardiovascular stress test, H/O cardiovascular stress test, H/O cardiovascular stress test, H/O Doppler ultrasound, H/O echocardiogram, H/O echocardiogram, History of Holter monitoring, [employer] [place] (hard of hearing), Hx of cardiovascular stress test, Hx of motion sickness, HX OTHER MEDICAL, Hyperlipidemia, Hypertension, IBS (irritable bowel syndrome), Incisional hernia, Kidney stones, Migraines, Nausea & vomiting, Other specified disorder of skin, Panic attacks, Panic attacks, Pneumonia, Pseudoseizures (HCC), Restless leg, Shortness of breath, Sleep apnea, Staph infection, Thyroid disease, Tremor, Urinary incontinence, Vertigo, and Wears glasses. Past surgical history:   has a past surgical history that includes Carpal tunnel release (Right, 1999); Diagnostic Cardiac Cath Lab Procedure (01/11/2010); Dental surgery; Colonoscopy (Last Done 6-13); Endoscopy, colon, diagnostic (Several ); Bladder surgery (1970's Or 1980's); Lithotripsy (2011); Breast biopsy (Right, 1980's); Breast surgery (Left, 1990's); hernia repair (9460'M); hernia repair (1970's); Cholecystectomy (1970's); Appendectomy (1970's); Hysterectomy, total abdominal (1987); Tubal ligation (1978); Esophagus dilation (1980's And 1990's); Knee arthroscopy (Right, 1999); joint replacement (2008); other surgical history (06 13 2014); fracture surgery (1974 Or 1975); Cardiac catheterization (10-18-06); and Cardiac catheterization. Social History:   reports that she has never smoked. She has never used smokeless tobacco. She reports that she does not drink alcohol and does not use drugs.   Family history:   no family history of CAD, STROKE of DM at early age    Allergies   Allergen Reactions    Abilify [Aripiprazole]      \"Severe Shaking And Restlessness\"    Augmentin [Amoxicillin-Pot Clavulanate] Itching and Rash    Bee Venom Swelling     Redness    Ciprofloxacin Itching and Rash    Codeine      \"Severe Abdominal Cramping\"    Darvocet [Propoxyphene N-Acetaminophen] Palpitations     \"Severe High Blood Pressure\"    Darvon [Propoxyphene Hcl] Palpitations     \"Severe High Blood Pressure\"    Decadron [Dexamethasone] Other (See Comments)     seizure  Seizures    Ditropan [Oxybutynin Chloride] Palpitations     \"Severe High Blood Pressure\"    Fioricet [Butalbital-Apap-Caffeine] Palpitations     \"Severe High Blood Pressure\"    Fiorinal-Codeine #3 [Butalbital-Asa-Caff-Codeine]      \"Severe Stomach Cramps\"    Flagyl [Metronidazole] Diarrhea     \"Severe Diarrhea And Cramping\"    Naproxen Palpitations     \"Severe High Blood Pressure\"    Other      \"Allergic To Spider Bites Causing Blackness Of Skin, Severe Itching And Pain\"                                                  \"Allergic To Powder In Gloves Causing Severe Redness And Itching\"    Prozac [Fluoxetine Hcl]      \"Hallucinations\"    Robaxin [Methocarbamol] Palpitations     \"Severe High Blood Pressure\"    Ultram [Tramadol]      \"Severe Stomach Pain\"    Zoloft Palpitations     \"Sever High Blood Pressure\"    Butalbital-Aspirin-Caffeine Other (See Comments)     \"severe stomach pain\"    Coreg [Carvedilol] Other (See Comments)     \"spikes my BP severely and send me into a severe anxiety attack\"    Fluoxetine Other (See Comments)     hallucinations    Oxybutynin Chloride Other (See Comments)     Raises bp    Percocet [Oxycodone-Acetaminophen]      \"knocked me out for 12 hrs\"    Sertraline Other (See Comments)     hallucinations  Other reaction(s): Unknown    Tape [Adhesive Tape]      Patient states it tears her skin, including band aids    Reglan [Metoclopramide] Other (See Comments)     \"makes me talk like a baby, but if I take benadryl with it it's fine\"       sodium chloride flush 0.9 % injection 5-40 mL, 2 times per day  sodium chloride flush 0.9 % injection 5-40 mL, PRN  0.9 % sodium chloride infusion, PRN  enoxaparin (LOVENOX) injection 40 mg, Q24H  acetaminophen (TYLENOL) tablet 650 mg, Q4H PRN  ondansetron (ZOFRAN-ODT) disintegrating tablet 4 mg, Q8H PRN   Or  ondansetron (ZOFRAN) injection 4 mg, Q6H PRN  aluminum & magnesium hydroxide-simethicone (MAALOX) 200-200-20 MG/5ML suspension 30 mL, Q6H PRN  amitriptyline (ELAVIL) tablet 50 mg, Nightly  [START ON 12/21/2022] aspirin EC tablet 81 mg, Daily  busPIRone (BUSPAR) tablet 20 mg, TID  carbidopa-levodopa (SINEMET)  MG per tablet 1 tablet, Nightly  diazePAM (VALIUM) tablet 5 mg, Q6H PRN  [START ON 12/21/2022] fluticasone (FLONASE) 50 MCG/ACT nasal spray 1 spray, Daily  gabapentin (NEURONTIN) capsule 400 mg, Nightly  guaiFENesin (MUCINEX) extended release tablet 600 mg, BID  HYDROcodone-acetaminophen (NORCO)  MG per tablet 1 tablet, Q6H PRN  levETIRAcetam (KEPPRA) tablet 750 mg, BID  levothyroxine (SYNTHROID) tablet 75 mcg, Daily  [START ON 12/21/2022] losartan (COZAAR) tablet 50 mg, Daily  magnesium oxide (MAG-OX) tablet 400 mg, TID  [START ON 12/21/2022] metoprolol succinate (TOPROL XL) extended release tablet 50 mg, Daily  montelukast (SINGULAIR) tablet 10 mg, Nightly  insulin lispro (HUMALOG) injection vial 5 Units, TID WC  miconazole (MICOTIN) 2 % powder, BID  pantoprazole (PROTONIX) tablet 40 mg, BID AC  OXcarbazepine (TRILEPTAL) tablet 300 mg, BID  orphenadrine (NORFLEX) extended release tablet 100 mg, BID  [START ON 12/21/2022] QUEtiapine (SEROQUEL) tablet 25 mg, Daily  atorvastatin (LIPITOR) tablet 10 mg, Nightly  insulin glargine (LANTUS) injection vial 48 Units, Nightly  glucose chewable tablet 16 g, PRN  dextrose bolus 10% 125 mL, PRN   Or  dextrose bolus 10% 250 mL, PRN  glucagon (rDNA) injection 1 mg, PRN  dextrose 10 % infusion, Continuous PRN  insulin lispro (HUMALOG) injection vial 0-4 Units, TID WC  insulin lispro (HUMALOG) injection vial 0-4 Units, Nightly  [START ON 12/21/2022] NIFEdipine (PROCARDIA XL) extended release tablet 90 mg, Daily  QUEtiapine (SEROQUEL) tablet 50 mg, Nightly  budesonide-formoterol (SYMBICORT) 160-4.5 MCG/ACT inhaler 2 puff, BID  tiotropium (SPIRIVA RESPIMAT) 2.5 MCG/ACT inhaler 2 puff, Daily      Current Facility-Administered Medications   Medication Dose Route Frequency Provider Last Rate Last Admin    sodium chloride flush 0.9 % injection 5-40 mL  5-40 mL IntraVENous 2 times per day Leldon Cranker, MD        sodium chloride flush 0.9 % injection 5-40 mL  5-40 mL IntraVENous PRN Leldon Cranker, MD        0.9 % sodium chloride infusion   IntraVENous PRN Leldon Cranker, MD        enoxaparin (LOVENOX) injection 40 mg  40 mg SubCUTAneous Q24H Leldon Cranker, MD   40 mg at 12/20/22 1827    acetaminophen (TYLENOL) tablet 650 mg  650 mg Oral Q4H PRN Leldon Cranker, MD        ondansetron (ZOFRAN-ODT) disintegrating tablet 4 mg  4 mg Oral Q8H PRN Leldon Cranker, MD        Or    ondansetron (ZOFRAN) injection 4 mg  4 mg IntraVENous Q6H PRN Leldon Cranker, MD        aluminum & magnesium hydroxide-simethicone (MAALOX) 200-200-20 MG/5ML suspension 30 mL  30 mL Oral Q6H PRN Leldon Cranker, MD   30 mL at 12/20/22 1839    amitriptyline (ELAVIL) tablet 50 mg  50 mg Oral Nightly Leldon Cranker, MD        [START ON 12/21/2022] aspirin EC tablet 81 mg  81 mg Oral Daily Leldon Cranker, MD        busPIRone (BUSPAR) tablet 20 mg  20 mg Oral TID Leldon Cranker, MD   20 mg at 12/20/22 1826    carbidopa-levodopa (SINEMET)  MG per tablet 1 tablet  1 tablet Oral Nightly Leldon Cranker, MD        diazePAM (VALIUM) tablet 5 mg  5 mg Oral Q6H PRN Leldon Cranker, MD        [START ON 12/21/2022] fluticasone (FLONASE) 50 MCG/ACT nasal spray 1 spray  1 spray Each Nostril Daily Leldon Cranker, MD        gabapentin (NEURONTIN) capsule 400 mg  400 mg Oral Nightly Leldon Cranker, MD        guaiFENesin (MUCINEX) extended release tablet 600 mg  600 mg Oral BID Leldon Cranker, MD        HYDROcodone-acetaminophen (NORCO)  MG per tablet 1 tablet  1 tablet Oral Q6H PRN Leldon Cranker, MD   1 tablet at 12/20/22 1825    levETIRAcetam (KEPPRA) tablet 750 mg  750 mg Oral BID Leldon Cranker, MD        levothyroxine (SYNTHROID) tablet 75 mcg  75 mcg Oral Daily Leldon Cranker, MD   75 mcg at 12/20/22 1826    [START ON 12/21/2022] losartan (COZAAR) tablet 50 mg  50 mg Oral Daily Leldon Cranker, MD magnesium oxide (MAG-OX) tablet 400 mg  400 mg Oral TID Susan Hashimoto, MD        [START ON 12/21/2022] metoprolol succinate (TOPROL XL) extended release tablet 50 mg  50 mg Oral Daily Susan Hashimoto, MD        montelukast (SINGULAIR) tablet 10 mg  10 mg Oral Nightly Susan Hashimoto, MD        insulin lispro (HUMALOG) injection vial 5 Units  5 Units SubCUTAneous TID  Susan Hashimoto, MD   5 Units at 12/20/22 1829    miconazole (MICOTIN) 2 % powder   Topical BID Susan Hashimoto, MD        pantoprazole (PROTONIX) tablet 40 mg  40 mg Oral BID AC Susan Hashimoto, MD   40 mg at 12/20/22 1826    OXcarbazepine (TRILEPTAL) tablet 300 mg  300 mg Oral BID Susan Hashimoto, MD        orphenadrine (NORFLEX) extended release tablet 100 mg  100 mg Oral BID Susan Hashimoto, MD        [START ON 12/21/2022] QUEtiapine (SEROQUEL) tablet 25 mg  25 mg Oral Daily Susan Hashimoto, MD        atorvastatin (LIPITOR) tablet 10 mg  10 mg Oral Nightly Susan Hashimoto, MD        insulin glargine (LANTUS) injection vial 48 Units  48 Units SubCUTAneous Nightly Susan Hashimoto, MD        glucose chewable tablet 16 g  4 tablet Oral PRN Susan Hashimoto, MD        dextrose bolus 10% 125 mL  125 mL IntraVENous PRN Susan Hashimoto, MD        Or    dextrose bolus 10% 250 mL  250 mL IntraVENous PRN Susan Hashimoto, MD        glucagon (rDNA) injection 1 mg  1 mg SubCUTAneous PRN Susan Hashimoto, MD        dextrose 10 % infusion   IntraVENous Continuous PRN Susan Hashimoto, MD        insulin lispro (HUMALOG) injection vial 0-4 Units  0-4 Units SubCUTAneous TID  Susan Hashimoto, MD        insulin lispro (HUMALOG) injection vial 0-4 Units  0-4 Units SubCUTAneous Nightly Susan Hashimoto, MD        [START ON 12/21/2022] NIFEdipine (PROCARDIA XL) extended release tablet 90 mg  90 mg Oral Daily Susan Hashimoto, MD        QUEtiapine (SEROQUEL) tablet 50 mg  50 mg Oral Nightly Susan Hashimoto, MD        budesonide-formoterol (SYMBICORT) 160-4.5 MCG/ACT inhaler 2 puff  2 puff Inhalation BID Dorian Ghanshyam Almaguer MD        tiotropium (SPIRIVA RESPIMAT) 2.5 MCG/ACT inhaler 2 puff  2 puff Inhalation Daily Loretta Mckinnon MD         Review of Systems:   Constitutional: No Fever or Weight Loss   Eyes: No Decreased Vision  ENT: No Headaches, Hearing Loss or Vertigo  Cardiovascular: As per HPI  Respiratory: As per HPI  Gastrointestinal: No abdominal pain, appetite loss, blood in stools, constipation, diarrhea or heartburn  Genitourinary: No dysuria, trouble voiding, or hematuria  Musculoskeletal:  No gait disturbance, weakness or joint complaints  Integumentary: No rash or pruritis  Neurological: No TIA or stroke symptoms  Psychiatric: Positive for bipolar disorder  Endocrine: No malaise, fatigue or temperature intolerance  Hematologic/Lymphatic: No bleeding problems, blood clots or swollen lymph nodes  Allergic/Immunologic: No nasal congestion or hives  All systems negative except as marked. Physical Examination:    Vitals:    12/20/22 1650 12/20/22 1748 12/20/22 1825 12/20/22 1855   BP: (!) 146/84      Pulse: 67      Resp:   18 16   Temp:       TempSrc:       SpO2: 99%      Weight:  170 lb (77.1 kg)     Height:  5' 2\" (1.575 m)         General Appearance:  No distress, conversant    Constitutional:  Well developed, Well nourished, No acute distress, Non-toxic appearance. HENT:  Normocephalic, Atraumatic, Bilateral external ears normal, Oropharynx moist, No oral exudates, Nose normal. Neck- Normal range of motion, No tenderness, Supple, No stridor,no apical-carotid delay  Lymphatics : no palpable lymph nodes  Eyes:  PERRL, EOMI, Conjunctiva normal, No discharge. Respiratory:  Normal breath sounds, No respiratory distress, No wheezing, No chest tenderness. ,no use of accessory muscles, crackles Present  Cardiovascular: (PMI) apex non displaced,no lifts no thrills, ankle swelling Present , 1+, s1 and s2 audible,Murmur. Absent , JVD not noted    Abdomen /GI:  Bowel sounds normal, Soft, No tenderness, No masses, No gross visceromegaly   :  No costovertebral angle tenderness   Musculoskeletal:  No edema, no tenderness, no deformities. Back- no tenderness  Integument:  Well hydrated, no rash   Lymphatic:  No lymphadenopathy noted   Neurologic:  Alert & oriented x 3, CN 2-12 normal, normal motor function, normal sensory function, no focal deficits noted           Medical decision making and Data review:    Lab Review   Recent Labs     12/20/22  1225   WBC 11.3*   HGB 13.4   HCT 39.2         Recent Labs     12/20/22  1225      K 4.3   CL 96*   CO2 25   BUN 10   CREATININE 0.5*     Recent Labs     12/20/22  1225   AST 33   ALT 31   BILITOT 0.4   ALKPHOS 84     Recent Labs     12/20/22  1225 12/20/22  1438   [address] <0.010 <0.010       Recent Labs     12/20/22  1225   PROBNP 215.5     Lab Results   Component Value Date    INR 0.94 10/13/2022    PROTIME 12.1 10/13/2022       EKG: (reviewed by myself)    ECHO:(reviewed by myself)    Chest Xray:(reviewed by myself)  XR CHEST PORTABLE    Result Date: 12/20/2022  EXAMINATION: ONE XRAY VIEW OF THE CHEST 12/20/2022 1:06 pm COMPARISON: 10/13/2022 HISTORY: ORDERING SYSTEM PROVIDED HISTORY: Chest Pain TECHNOLOGIST PROVIDED HISTORY: Reason for exam:->Chest Pain Reason for Exam: chest pain    sob FINDINGS: The cardiomediastinal silhouette is unchanged. Similar streaky bibasilar opacities favor scarring/atelectasis. No new focal consolidation, pneumothorax, or pleural effusion. Osseous structures are grossly unchanged. No acute process. All labs, medications and tests reviewed by myself including data  from outside source , patient and available family . Continue all other medications of all above medical condition listed as is. Impression:  Principal Problem:    Chest pain  Resolved Problems:    * No resolved hospital problems. *      Assessment: 74 y. o.year old with PMH of  has a past medical history of Abnormal EKG, Acid reflux, Anemia, Anesthesia, Anginal pain ([address] Anxiety, Arthritis, Asthma, Bipolar 1 disorder ([address] Cerebral artery occlusion with cerebral infarction ([address] CHF (congestive heart failure) ([address] Chronic back pain, Chronic kidney disease, DDD (degenerative disc disease), cervical, Depression, Diabetes mellitus ([address] Diabetic neuropathy ([address] Dizziness, Dry skin, Enlarged ureter, Fatty liver, Fibrocystic breast, Generalized anxiety disorder, Gout, H/O cardiac catheterization, H/O cardiovascular stress test, H/O cardiovascular stress test, H/O cardiovascular stress test, H/O Doppler ultrasound, H/O echocardiogram, H/O echocardiogram, History of Holter monitoring, [place] (hard of hearing), Hx of cardiovascular stress test, Hx of motion sickness, HX OTHER MEDICAL, Hyperlipidemia, Hypertension, IBS (irritable bowel syndrome), Incisional hernia, Kidney stones, Migraines, Nausea & vomiting, Other specified disorder of skin, Panic attacks, Panic attacks, Pneumonia, Pseudoseizures ([address] Restless leg, Shortness of breath, Sleep apnea, Staph infection, Thyroid disease, Tremor, Urinary incontinence, Vertigo, and Wears glasses. Plan and Recommendations:    Elevated Troponin : Check serial troponin if they are not rising we will continue medical management for now. She gets very anxious a stress test has had several normal stress test and cath in the past would not pursue any further testing and patient at this time can be charged with outpatient follow-up with cardiology  Chest Pain: serial cardiac enzymes, EKG reviewed, further plan as per enzymes and clinical course  Shortness of breath BNP is normal doubt CHF Echo shows preserved EF  Blood pressures well controlled but may get elevated with anxiety continue losartan 50 mg daily and Toprol-XL 50 mg daily if she has increased pressures can increase losartan 100 mg daily continue nifedipine 90 mg daily  Treat anxiety  DVT prophylaxis if no contraindication  6.    Dyslipidemia: continue statins Thank you  much for consult and giving us the opportunity in contributing in the care of this patient. Please feel free to call me for any questions.        Taran Couch MD, 12/20/2022 8:18 PM

## 2022-12-21 NOTE — CARE COORDINATION
Pt on discharge- discussed pt in IDR. Pt admitted from home, active with [address]. TC to [facility], notified of pt's discharge to resume services and faxed updated clinical.  No further CM needs id'd.

## 2022-12-21 NOTE — DISCHARGE SUMMARY
V2.0  Discharge Summary    Name:  Goldie Lombardo /Age/Sex: 1955 (75 y.o. female)   Admit Date: 2022  Discharge Date: 22    MRN & CSN:  [id number] & [id number] Encounter Date and Time 22 11:08 AM EST    Attending:  Rekha Penaloza MD Discharging Provider: WENDIE Berry [facility] Course:     Brief HPI: Goldie Lombardo is a 74 y.o. female who presented with Chest Pain in the ED, was given ASA and Nitro, chest pain is now resolved. Had negative Troponins x 3, EKG negative for acute process, CXR negative, all labwork was unremarkable as well. She is now chest pain free, so will have her follow up with her Cardiologist outpatient. Cardiology may order outpatient testing as needed for likely Anginal Chest Pain . Brief Problem Based Course:     Chest pain: r/o ACS, chest pain now resolved   -nonreproducible, sharp stabbing that goes to back, started at rest, last stress test 2021 was normal, ECHO 3/22- 50-55% LVEF, possible PFO, negative troponins x 3, EKG with no acute changes.   Chest x-ray negative.  -Cardiology Consult: per Dr. Annette Bangura, if troponins are neg and she is stable, will follow her outpatient, he does not believe her to have ACS  -Telemetry DC'd  -TSH- 1.310 , lipase- 15, BNP-215  -Continue home aspirin     Hypertension  -restart home losartan , metoprolol succinate 50 mg po qd, nifedipine  Hyperlipidemia  -cont statin  Type II DM  -Accu-Cheks, hypoglycemia protocol, home Tresiba, low-dose sliding scale, scheduled 5 units 3 times daily from 15 3 times daily at home, will adjust as appropriate  Hypothyroidism  -cont levothyroxine 75 mcg PO Qam  Bipolar disorder/anxiety/depression  -cont home BuSpar, Seroquel, Oxcarbazepine, as needed diazepam  Seizure disorder  -con home Keppra  Fibromyalgia  -con home amitriptyline, gabapentin  Chronic pain  -Home opioids as needed  Asthma  -Home montelukast 10 mg PO QD and home inhalers  Tremor  -cont home Sinemet 10/100 PO QHS     This patient was seen and examined and/or discussed in conjunction with Dr. Sergei Bay. He was agreeable with the patient's plan at discharge as dictated above. The patient expressed appropriate understanding of, and agreement with the discharge recommendations, medications, and plan. Consults this admission:  IP CONSULT TO CARDIOLOGY    Discharge Diagnosis:   Chest pain    Discharge Instruction:   Follow up appointments: Cardiology in 1-2 weeks   Primary care physician: WENDIE Ramos CNP within 2 weeks  Diet: Diabetic-cardiac diet   Activity: activity as tolerated  Disposition: Discharged to:   [x]Home, []C, []SNF, []Acute Rehab, []Hospice n/a  Condition on discharge: Stable  Labs and Tests to be Followed up as an outpatient by PCP or Specialist: Cardiology may order outpatient testing     Discharge Medications:        Medication List        CHANGE how you take these medications      Trelegy Ellipta 100-62.5-25 MCG/ACT Aepb inhaler  Generic drug: fluticasone-umeclidin-vilant  Inhale 1 puff into the lungs daily  What changed:   when to take this  reasons to take this            CONTINUE taking these medications      aluminum & magnesium hydroxide-simethicone 400-400-40 MG/5ML Susp  Commonly known as:  Maalox Max  Take 15 mLs by mouth every 6 hours as needed (heart burn)     amitriptyline 25 MG tablet  Commonly known as: ELAVIL  Take 2 tablets by mouth nightly     aspirin 81 MG tablet     busPIRone 10 MG tablet  Commonly known as: BUSPAR  Take 2 tablets by mouth 3 times daily     carbidopa-levodopa  MG per tablet  Commonly known as: SINEMET  Take 1 tablet by mouth nightly     diazePAM 5 MG tablet  Commonly known as: VALIUM     docusate sodium 100 MG capsule  Commonly known as: Colace  Take 1 capsule by mouth 2 times daily     fluticasone 50 MCG/ACT nasal spray  Commonly known as: FLONASE  1 spray by Each Nostril route daily     gabapentin 400 MG capsule  Commonly known as: NEURONTIN guaiFENesin 600 MG extended release tablet  Commonly known as: MUCINEX  Take 1 tablet by mouth 2 times daily     Handicap Placard Misc  by Does not apply route     HYDROcodone-acetaminophen  MG per tablet  Commonly known as: NORCO     hydrOXYzine pamoate 50 MG capsule  Commonly known as: VISTARIL     Insulin Pen Needle 31G X 5 MM Misc  1 each by Does not apply route daily     levETIRAcetam 750 MG tablet  Commonly known as: KEPPRA  Take 1 tablet by mouth 2 times daily     levothyroxine 75 MCG tablet  Commonly known as: SYNTHROID  TAKE ONE (1) TABLET BY MOUTH EVERY MORNING (BEFORE BREAKFAST)     losartan 50 MG tablet  Commonly known as: COZAAR  Take 1 tablet by mouth daily     magnesium oxide 400 MG tablet  Commonly known as: MAG-OX  Take 1 tablet by mouth 3 times daily     metoprolol succinate 50 MG extended release tablet  Commonly known as: TOPROL XL  Take 1 tablet by mouth daily     montelukast 10 MG tablet  Commonly known as: SINGULAIR  TAKE ONE (1) TABLET BY MOUTH EACH NIGHT AT BEDTIME     MULTI-VITAMIN/IRON PO     NIFEdipine 90 MG extended release tablet  Commonly known as: PROCARDIA XL  Take 1 tablet by mouth daily     NovoLOG FlexPen 100 UNIT/ML injection pen  Generic drug: insulin aspart  Inject 15 Units into the skin 3 times daily (before meals) 12/01/2020 patient states she follows sliding scale regimen BS > 150     nystatin 219151 UNIT/GM powder  Commonly known as: MYCOSTATIN  Apply 3 times daily to affected areas     ondansetron 4 MG disintegrating tablet  Commonly known as: Zofran ODT  Take 1 tablet by mouth every 8 hours as needed for Nausea     orphenadrine 100 MG extended release tablet  Commonly known as: NORFLEX     OXcarbazepine 300 MG tablet  Commonly known as: TRILEPTAL  Take 1 tablet by mouth 2 times daily     pantoprazole 40 MG tablet  Commonly known as: PROTONIX  Take 1 tablet by mouth 2 times daily (before meals)     PROBIOTIC DIGESTIVE SUPP PO     QUEtiapine 25 MG tablet  Commonly known as: SEROQUEL  Take 1 tablet by mouth 2 times daily     simvastatin 20 MG tablet  Commonly known as: ZOCOR  TAKE ONE (1) TABLET BY MOUTH NIGHTLY     True Metrix Blood Glucose Test strip  Generic drug: blood glucose test strips  1 each by In Vitro route daily As needed. STOP taking these medications      baclofen 10 MG tablet  Commonly known as: LIORESAL     hydrOXYzine HCl 50 MG tablet  Commonly known as: ATARAX     sodium chloride 1 g tablet     sucralfate 1 GM tablet  Commonly known as: CARAFATE            ASK your doctor about these medications      Tresiba FlexTouch 200 UNIT/ML Sopn  Generic drug: Insulin Degludec  Inject 20 Units into the skin every morning             Objective Findings at Discharge:   /82   Pulse 75   Temp 97.3 °F (36.3 °C) (Oral)   Resp 18   Ht 5' 2\" (1.575 m)   Wt 170 lb (77.1 kg)   SpO2 98%   BMI 31.09 kg/m²       Physical Exam:   General: NAD  Eyes: EOMI  ENT: neck supple  Cardiovascular: Regular rate and rhythm, normal S1/S2, no gallops or murmurs   Respiratory: Clear to auscultation bilaterally, no rhonchi, rales or wheezes   Gastrointestinal: Soft, non tender, BS X 4  Genitourinary: no suprapubic tenderness  Musculoskeletal: +1 pitting edema in Bilat LE  Skin: warm, dry  Neuro: Alert and oriented x 3   Psych: Mood appropriate. Labs and Imaging   XR CHEST PORTABLE    Result Date: 12/20/2022  EXAMINATION: ONE XRAY VIEW OF THE CHEST 12/20/2022 1:06 pm COMPARISON: 10/13/2022 HISTORY: ORDERING SYSTEM PROVIDED HISTORY: Chest Pain TECHNOLOGIST PROVIDED HISTORY: Reason for exam:->Chest Pain Reason for Exam: chest pain    sob FINDINGS: The cardiomediastinal silhouette is unchanged. Similar streaky bibasilar opacities favor scarring/atelectasis. No new focal consolidation, pneumothorax, or pleural effusion. Osseous structures are grossly unchanged. No acute process.        CBC:   Recent Labs     12/20/22  1225   WBC 11.3*   HGB 13.4        BMP: Recent Labs     12/20/22  1225      K 4.3   CL 96*   CO2 25   BUN 10   CREATININE 0.5*   GLUCOSE 221*     Hepatic:   Recent Labs     12/20/22  1225   AST 33   ALT 31   BILITOT 0.4   ALKPHOS 84     Lipids:   Lab Results   Component Value Date/Time    CHOL 134 10/11/2022 04:08 PM    HDL 50 10/11/2022 04:08 PM    TRIG 276 10/11/2022 04:08 PM     Hemoglobin A1C:   Lab Results   Component Value Date/Time    LABA1C 9.0 03/30/2022 02:52 AM     TSH: No results found for: TSH  Troponin:   Lab Results   Component Value Date/Time    TROPONINT <0.010 12/21/2022 04:48 AM    TROPONINT <0.010 12/20/2022 09:37 PM    TROPONINT <0.010 12/20/2022 02:38 PM     Lactic Acid: No results for input(s): LACTA in the last 72 hours.   BNP:   Recent Labs     12/20/22  1225   PROBNP 215.5     UA:  Lab Results   Component Value Date/Time    NITRU NEGATIVE 12/20/2022 02:38 PM    NITRU NEGATIVE 11/05/2014 01:27 PM    COLORU YELLOW 12/20/2022 02:38 PM    WBCUA 8 12/20/2022 02:38 PM    RBCUA 1 12/20/2022 02:38 PM    MUCUS RARE 10/13/2022 07:50 PM    TRICHOMONAS NONE SEEN 12/20/2022 02:38 PM    YEAST RARE 09/12/2018 03:05 PM    BACTERIA NEGATIVE 12/20/2022 02:38 PM    CLARITYU CLEAR 12/20/2022 02:38 PM    SPECGRAV 1.010 12/20/2022 02:38 PM    LEUKOCYTESUR TRACE 12/20/2022 02:38 PM    UROBILINOGEN 0.2 12/20/2022 02:38 PM    BILIRUBINUR NEGATIVE 12/20/2022 02:38 PM    BLOODU NEGATIVE 12/20/2022 02:38 PM    GLUCOSEU Normal 11/05/2014 01:38 PM    KETUA NEGATIVE 12/20/2022 02:38 PM     Urine Cultures: No results found for: LABURIN  Blood Cultures: No results found for: BC  No results found for: BLOODCULT2  Organism:   Lab Results   Component Value Date/Time    ORG ECOL 04/27/2015 08:15 PM       Time Spent Discharging patient 35 minutes    Electronically signed by WENDIE Billy CNP on 12/21/2022 at 11:45 AM

## 2022-12-21 NOTE — PROGRESS NOTES
Patient discharging from unit, this nurse went over discharge instructions with patient, this nurse educated patient on upcoming appointments and to keep those appointments, this nurse provided patient with some education hand outs regarding chest pain, this nurse removed IV, patient escorted to Lobby by tech and ride is waiting at the entrance

## 2022-12-26 NOTE — PROGRESS NOTES
Call addressed around: 9/4/2020 4:00    Reason for call:   Rapid response/stroke alert    Physical Exam:   Vitals:    09/04/20 0445   BP: 126/70   Pulse: 105   Resp:    Temp:    SpO2: 95%      Mental status: Initially confused to place and time with no focal deficits/tearful on exam, unable to correctly identify her daughter at bedside, reevaluated after CT imaging and no focal deficits:  A&O to self, location, month and year, NAD, speech clear, language fluent, repetition and naming intact, follows commands appropriately  Cranial Nerve Exam:   CN II-XII:  PERRL, no nystagmus, no gaze paresis, sensation intact b/l, muscles of facial expression symmetric; hearing intact to conversational tone, palate elevates symmetrically, shoulder elevation symmetric and tongue protrudes midline with movement side to side. Motor Exam:       Strength 5/5 UE's/LE's b/l  Tone normal   No pronator drift     Sensation: Intact light touch UE's/LE's b/l     Coordination/Cerebellum:       Tremors--none      Rapidly alternating movements: normal bilaterally      Heel-to-Shin: no dysmetria b/l      Finger-to-Nose: no dysmetria b/l     Gait and stance:      Gait: deferred for safety     CV:RRR  Lungs: CTA    Medical decision making/Orders placed:   Rapid response/stroke alert was called due to increased patient confusion. Last known well was 2:50 AM and stroke alert was called around 4 AM.  Patient has had no sedating medications. She was being treated for urinary tract infection. Patient's daughter was at bedside and stated that she at times has panic attacks with pseudoseizure activity. Patient was tearful and appeared very anxious. Patient did have a brief approximately 10-second seizure that involved upper and lower extremities that occurred after raising right leg. She did not bite her tongue or urinate herself. She regained consciousness after episode. Patient was not hypoglycemic.   CT head and CTA head and neck stat 82yF DLD hypothyroid chronic back pain no longer on opiates hx cerebral aneurysm s/p coiling p/w upper back pain and SOB.  Pt profoundly dyspneic, both at rest and w/ any movement, placed on O2 in the ED for comfort though not hypoxic (still profoundly tachycardic  at rest, 130 w/ exertion).  EKG w/o ischemia or arrhythmia.  CXR w/o ptx or pna.  Labs reassuring.  CTA w/o PE or pathologic back issue but w/ b/l interstitial opacities, infectious vs inflammatory vs chronic lung disease.  Will treat with IV abx given sudden onset sx and admit for pulm consult and further care.

## 2023-01-26 ENCOUNTER — TELEPHONE (OUTPATIENT)
Dept: NEUROLOGY | Age: 68
End: 2023-01-26

## 2023-01-26 NOTE — TELEPHONE ENCOUNTER
Received call from pt stating Edie Serrano needs approval for her to continue Seroquel 25mg q am and 50mg q pm. Per last ov note, Seroquel rx deferred to mental health specialist. LM for Edie Serrano ph# [phone] to see exactly what is needed.

## 2023-01-27 NOTE — TELEPHONE ENCOUNTER
Moira from [address] [facility] called asking for Reta Brown to discuss Anthony Mcbride taking over the patient's Seroquel, and needed to know if that was true.

## 2023-02-01 ENCOUNTER — OFFICE VISIT (OUTPATIENT)
Dept: FAMILY MEDICINE CLINIC | Age: 68
End: 2023-02-01
Payer: MEDICARE

## 2023-02-01 ENCOUNTER — HOSPITAL ENCOUNTER (OUTPATIENT)
Age: 68
Setting detail: SPECIMEN
Discharge: HOME | End: 2023-02-01
Payer: MEDICARE

## 2023-02-01 VITALS
WEIGHT: 180.4 LBS | OXYGEN SATURATION: 97 % | HEART RATE: 72 BPM | HEIGHT: 62 IN | BODY MASS INDEX: 33.2 KG/M2 | TEMPERATURE: 97 F

## 2023-02-01 DIAGNOSIS — J06.9 VIRAL URI WITH COUGH: Primary | ICD-10-CM

## 2023-02-01 PROCEDURE — 1123F ACP DISCUSS/DSCN MKR DOCD: CPT | Performed by: NURSE PRACTITIONER

## 2023-02-01 PROCEDURE — U0005 INFEC AGEN DETEC AMPLI PROBE: HCPCS

## 2023-02-01 PROCEDURE — 3017F COLORECTAL CA SCREEN DOC REV: CPT | Performed by: NURSE PRACTITIONER

## 2023-02-01 PROCEDURE — 99213 OFFICE O/P EST LOW 20 MIN: CPT | Performed by: NURSE PRACTITIONER

## 2023-02-01 PROCEDURE — G8400 PT W/DXA NO RESULTS DOC: HCPCS | Performed by: NURSE PRACTITIONER

## 2023-02-01 PROCEDURE — 1036F TOBACCO NON-USER: CPT | Performed by: NURSE PRACTITIONER

## 2023-02-01 PROCEDURE — G8484 FLU IMMUNIZE NO ADMIN: HCPCS | Performed by: NURSE PRACTITIONER

## 2023-02-01 PROCEDURE — 1090F PRES/ABSN URINE INCON ASSESS: CPT | Performed by: NURSE PRACTITIONER

## 2023-02-01 PROCEDURE — G8417 CALC BMI ABV UP PARAM F/U: HCPCS | Performed by: NURSE PRACTITIONER

## 2023-02-01 PROCEDURE — U0003 INFECTIOUS AGENT DETECTION BY NUCLEIC ACID (DNA OR RNA); SEVERE ACUTE RESPIRATORY SYNDROME CORONAVIRUS 2 (SARS-COV-2) (CORONAVIRUS DISEASE [COVID-19]), AMPLIFIED PROBE TECHNIQUE, MAKING USE OF HIGH THROUGHPUT TECHNOLOGIES AS DESCRIBED BY CMS-2020-01-R: HCPCS

## 2023-02-01 PROCEDURE — G8427 DOCREV CUR MEDS BY ELIG CLIN: HCPCS | Performed by: NURSE PRACTITIONER

## 2023-02-01 NOTE — PATIENT INSTRUCTIONS
Your COVID 19 test can take 1-5 days for the results to come back. We ask that you make a Mychart page and view your test results this way. You are encouraged to Self quarantine until you know your results. If positive, please work on contact tracing. Increase fluids and rest  Saline nasal spray as needed for nasal congestion  Warm salt water gargles as needed for throat discomfort  Monitor temperature twice a day  Tylenol as needed for fevers and/or discomfort. Big deep breaths periodically throughout the day  Regular Mucinex over the counter as needed for chest congestion  If symptoms worsen -Go to the ER. Follow up with your primary care provider      To Whom it May Concern:    Anna Short was tested for COVID-19 2/1/2023. He/she must stay home until test results are back. If test is negative, ok to return to work/school. If test is positive, isolate for a total of 5 days, starting from day 1 of symptom onset. After 5 days, if fever-free for 24 hours and there has been a gradual improvement in symptoms, may come out of isolation, but must consistently wear a mask when around other people for 5 additional days. (5 days isolation, 5 days mask-wearing). We do not recommend retesting as patients may continue to test positive for months even though no longer contagious. It is suggested you call [address] or [address] with any questions regarding isolation timeframe/return to work/school details.

## 2023-02-01 NOTE — PROGRESS NOTES
2/1/2023    HPI:  Chief complaint and history of present illness as per medical assistant/nurse documented today in the Flu/COVID-19 clinic. Patient is here with complaints of cough, chest/nasal congestion, SOB, headache, sore throat, muscle aches, nausea, loss of taste, and fatigue x 3 days. Patient states she has not had the covid vaccine. MEDICATIONS:  Prior to Visit Medications    Medication Sig Taking? Authorizing Provider   levothyroxine (SYNTHROID) 75 MCG tablet TAKE ONE (1) TABLET BY MOUTH EVERY MORNING (BEFORE BREAKFAST)  WEDNIE Dougherty CNP   simvastatin (ZOCOR) 20 MG tablet TAKE ONE (1) TABLET BY MOUTH NIGHTLY  WENDIE Dougherty CNP   orphenadrine (NORFLEX) 100 MG extended release tablet   Historical Provider, MD   losartan (COZAAR) 50 MG tablet Take 1 tablet by mouth daily  WENDIE Patterson CNP   gabapentin (NEURONTIN) 400 MG capsule   Historical Provider, MD   blood glucose test strips (TRUE METRIX BLOOD GLUCOSE TEST) strip 1 each by In Vitro route daily As needed.   WENDIE Dougherty CNP   guaiFENesin (MUCINEX) 600 MG extended release tablet Take 1 tablet by mouth 2 times daily  Levi Boyer MD   montelukast (SINGULAIR) 10 MG tablet TAKE ONE (1) TABLET BY MOUTH EACH NIGHT AT BEDTIME  WENDIE Dougherty CNP   amitriptyline (ELAVIL) 25 MG tablet Take 2 tablets by mouth nightly  Ru Shipley DO   Handicap Placard MISC by Does not apply route  WENDIE Dougherty CNP   NIFEdipine (PROCARDIA XL) 90 MG extended release tablet Take 1 tablet by mouth daily  Jose Fuller MD   hydrOXYzine pamoate (VISTARIL) 50 MG capsule Take 50 mg by mouth three times daily  Historical Provider, MD   carbidopa-levodopa (SINEMET)  MG per tablet Take 1 tablet by mouth nightly  WENDIE Dougherty CNP   pantoprazole (PROTONIX) 40 MG tablet Take 1 tablet by mouth 2 times daily (before meals)  WENDIE Dougherty CNP   magnesium oxide (MAG-OX) 400 MG tablet Take 1 tablet by mouth 3 times daily  WENDIE Mckeon CNP   NOVOLOG FLEXPEN 100 UNIT/ML injection pen Inject 15 Units into the skin 3 times daily (before meals) 12/01/2020 patient states she follows sliding scale regimen BS > 150  Celia M WENDIE Nayak CNP   TRESIBA FLEXTOUCH 200 UNIT/ML SOPN Inject 20 Units into the skin every morning  Patient taking differently: Inject 60 Units into the skin at bedtime  WENDIE Mckeon CNP   Insulin Pen Needle 31G X 5 MM MISC 1 each by Does not apply route daily  WENDIE Mckeon CNP   metoprolol succinate (TOPROL XL) 50 MG extended release tablet Take 1 tablet by mouth daily  Joseph Conner MD   Probiotic Product (PROBIOTIC DIGESTIVE SUPP PO) Take 1 capsule by mouth every morning  Historical Provider, MD   ondansetron (ZOFRAN ODT) 4 MG disintegrating tablet Take 1 tablet by mouth every 8 hours as needed for Nausea  Mahesh Whitfield MD   fluticasone (FLONASE) 50 MCG/ACT nasal spray 1 spray by Each Nostril route daily  Brianna Olson MD   sucralfate (CARAFATE) 1 GM tablet Take 1 tablet by mouth 4 times daily  Sherif Armando MD   nystatin (MYCOSTATIN) 667277 UNIT/GM powder Apply 3 times daily to affected areas  Tiny Jeffrey PA-C   levETIRAcetam (KEPPRA) 750 MG tablet Take 1 tablet by mouth 2 times daily  Deacon Garcia PA-C   sodium chloride 1 g tablet Take 1 tablet by mouth 2 times daily (with meals)  Deacon Garcia PA-C   diazePAM (VALIUM) 5 MG tablet as needed. Historical Provider, MD   QUEtiapine (SEROQUEL) 25 MG tablet Take 1 tablet by mouth 2 times daily  Patient taking differently: Take 25 mg by mouth 2 times daily Indications: 25 mg in am and 50 mg in pm  Brooklyn Melo MD   busPIRone (BUSPAR) 10 MG tablet Take 2 tablets by mouth 3 times daily  Crispin Griffin MD   HYDROcodone-acetaminophen (NORCO)  MG per tablet Take 1 tablet by mouth every 6 hours as needed.    Historical Provider, MD   fluticasone-umeclidin-vilant (TRELEGY ELLIPTA) 100-62.5-25 MCG/INH AEPB Inhale 1 puff into the lungs daily  Amina Teresa MD   OXcarbazepine (TRILEPTAL) 300 MG tablet Take 1 tablet by mouth 2 times daily  Raffy Blair MD   docusate sodium (COLACE) 100 MG capsule Take 1 capsule by mouth 2 times daily  HAYDEN Vasquez   aluminum & magnesium hydroxide-simethicone (MAALOX MAX) 400-400-40 MG/5ML SUSP Take 15 mLs by mouth every 6 hours as needed (heart burn)  William Goetz, APRN - CNP   aspirin 81 MG tablet Take 81 mg by mouth daily  Historical Provider, MD   Multiple Vitamins-Iron (MULTI-VITAMIN/IRON PO) Take  by mouth.   Historical Provider, MD       Allergies   Allergen Reactions    Abilify [Aripiprazole]      \"Severe Shaking And Restlessness\"    Augmentin [Amoxicillin-Pot Clavulanate] Itching and Rash    Bee Venom Swelling     Redness    Ciprofloxacin Itching and Rash    Codeine      \"Severe Abdominal Cramping\"    Darvocet [Propoxyphene N-Acetaminophen] Palpitations     \"Severe High Blood Pressure\"    Darvon [Propoxyphene Hcl] Palpitations     \"Severe High Blood Pressure\"    Decadron [Dexamethasone] Other (See Comments)     seizure  Seizures    Ditropan [Oxybutynin Chloride] Palpitations     \"Severe High Blood Pressure\"    Fioricet [Butalbital-Apap-Caffeine] Palpitations     \"Severe High Blood Pressure\"    Fiorinal-Codeine #3 [Butalbital-Asa-Caff-Codeine]      \"Severe Stomach Cramps\"    Flagyl [Metronidazole] Diarrhea     \"Severe Diarrhea And Cramping\"    Naproxen Palpitations     \"Severe High Blood Pressure\"    Other      \"Allergic To Spider Bites Causing Blackness Of Skin, Severe Itching And Pain\"                                                  \"Allergic To Powder In Gloves Causing Severe Redness And Itching\"    Prozac [Fluoxetine Hcl]      \"Hallucinations\"    Robaxin [Methocarbamol] Palpitations     \"Severe High Blood Pressure\"    Ultram [Tramadol]      \"Severe Stomach Pain\"    Zoloft Palpitations     \"Sever High Blood Pressure\"    Butalbital-Aspirin-Caffeine Other (See Comments)     \"severe stomach pain\"    Coreg [Carvedilol] Other (See Comments)     \"spikes my BP severely and send me into a severe anxiety attack\"    Fluoxetine Other (See Comments)     hallucinations    Oxybutynin Chloride Other (See Comments)     Raises bp    Percocet [Oxycodone-Acetaminophen]      \"knocked me out for 12 hrs\"    Sertraline Other (See Comments)     hallucinations  Other reaction(s): Unknown    Tape [Adhesive Tape]      Patient states it tears her skin, including band aids    Reglan [Metoclopramide] Other (See Comments)     \"makes me talk like a baby, but if I take benadryl with it it's fine\"   ,   Past Medical History:   Diagnosis Date    Abnormal EKG 04/22/2014    Acid reflux     Anemia     Anesthesia     Nausea/Vomiting Post Op In Past    Anginal pain ([address]     Denies Chest Pain At This Time    Anxiety     Arthritis     \"All Over\"    Asthma     Bipolar 1 disorder ([address]     Cerebral artery occlusion with cerebral infarction ([address]     CHF (congestive heart failure) ([address]     Chronic back pain     Chronic kidney disease     DDD (degenerative disc disease), cervical     12- Patient reports she was dx with DDD of Cerival spine C6,C7    Depression     Diabetes mellitus ([address] Dx 1990's    Diabetic neuropathy ([address]     \"In My Legs And Feet\"    Dizziness     \"Sometimes\"    Dry skin     Enlarged ureter     Right Side    Fatty liver     Fibrocystic breast     Generalized anxiety disorder     Gout     Pt states she was diagnosed with gout in the past few months. H/O cardiac catheterization     Showed mild disease per last cath. H/O cardiovascular stress test 03/15/2010    EF 69%, normal perfusion study except for diaphragmatic artifact, uniform wall motion. H/O cardiovascular stress test 10/09/2008    EF 60%, no anginia, normal study. H/O cardiovascular stress test 05/06/2014    EF 66%, no ischemia, normal LV systolic funciton, normal perfusion pattern.      H/O Doppler ultrasound 02/28/2011    CAROTID DOPPLER-normal study. H/O echocardiogram 05/06/2014    Ef >55%. Impaired LV relaxation. H/O echocardiogram 10/14/2015    EF 60% Normal LV and systolic function. No significant valvulopathy seen. History of Holter monitoring 03/24/2015    24 hour - predominant rhythm sinus    [place] (hard of hearing)     Bilateral Ears    Hx of cardiovascular stress test 10/19/2015    lexiscan-normal,EF63%    Hx of motion sickness     HX OTHER MEDICAL     Primary Care Physician Is Dr. Duane Loving In [facility]    Hyperlipidemia     Hypertension     IBS (irritable bowel syndrome)     Incisional hernia 04/2014    Kidney stones Last Episode In 2012 Or 2013    Passed Kidney Stones Numberous Times    Migraines     Nausea & vomiting     Nausea/Vomiting Post Op In Past    Other specified disorder of skin     12- Patient states she has a condition of her vaginal area (skin) which starts with the letters Jose Antonioianne Haver. She is currently being treated with multiple creams and weekly Diflucan. 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 Staph infection Dx 1980's    Toes On Left Foot    Thyroid disease     hypothroidism    Tremor     \"Tremors All Over\"    Urinary incontinence     Vertigo     \"Sometimes\"    Wears glasses    ,   Past Surgical History:   Procedure Laterality Date    APPENDECTOMY  1970's    Done With Cholecystectomy    BLADDER SURGERY  1970's Or 1980's    \"Stretched The Opening To The Bladder\", \"Total Of Four Bladder Surgeries\"    BREAST BIOPSY Right 1980's    Twice, Benign    BREAST SURGERY Left 1990's    Five, Benign    CARDIAC CATHETERIZATION  10-18-06    normal coronary angiogram with a normal left ventricular systolic function, patient can be treated medically. CARDIAC CATHETERIZATION      \"Total 7 Cardiac Catheterizations\"    CARPAL TUNNEL RELEASE Right 1999    CHOLECYSTECTOMY  1970's    Appendectomy Also Done    COLONOSCOPY  Last Done 6-13    One Polyp Removed In Past    DENTAL SURGERY      All Teeth Extracted In Past    DIAGNOSTIC CARDIAC CATH LAB PROCEDURE  01/11/2010    no significant disease, continue medical therapy    ENDOSCOPY, COLON, DIAGNOSTIC  Several     ESOPHAGEAL DILATATION  1980's And 1990's    X 3    FRACTURE SURGERY  1974 Or 1975    Broken Bones Left [employer] Due To [address]  1990's    Incisional Abdominal Hernia Repair  With Mesh    HERNIA REPAIR  1970's    Abdominal Hernia Repair    HYSTERECTOMY, TOTAL ABDOMINAL (CERVIX REMOVED)  1987    JOINT REPLACEMENT  2008    Total Right Knee    KNEE ARTHROSCOPY Right 1999    LITHOTRIPSY  2011    For Kidney Stones    OTHER SURGICAL HISTORY  06 13 2921    umbilical hernia with mesh    TUBAL LIGATION  1978   ,   Social History     Tobacco Use    Smoking status: Never    Smokeless tobacco: Never   Vaping Use    Vaping Use: Never used   Substance Use Topics    Alcohol use: No     Comment: tea sometimes    Drug use: No   ,   Family History   Problem Relation Age of Onset    Stroke Mother     Other Mother         Seizures    Diabetes Mother         Borderline Diabetes    High Blood Pressure Mother     Arthritis Mother     Early Death Mother 61        Stroke    Depression Mother     Heart Disease Mother     High Cholesterol Mother     Miscarriages / Stillbirths Mother     Mental Illness Mother     Mental Illness Father     Heart Disease Father         Massive Heart Attack    High Blood Pressure Father     Arthritis Father     High Cholesterol Father     Mental Illness Sister     Hearing Loss Sister     Heart Failure Sister     High Blood Pressure Sister     Arthritis Sister     Heart Disease Sister     Cancer Sister     Mental Illness Brother     Cancer Brother         Liver And Colon Cancer Early Death Brother 37        Liver And Colon Cancer    Heart Disease Brother         Heart Stents    High Blood Pressure Brother     High Cholesterol Brother     Early Death Brother         65 Years Old,Hit By A Car    Colon Cancer Brother     Heart Disease Brother     Mental Illness Brother     Early Death Brother 61        Heart Attack    Heart Disease Brother         Heart Attack    Mental Illness Daughter         Bipolar    Depression Daughter     Anxiety Disorder Daughter     Bipolar Disorder Daughter     Other Daughter         Stomach And Bowel Problems    Other Son         Seizures   ,   Immunization History   Administered Date(s) Administered    Tdap (Boostrix, Adacel) 08/01/2016   ,   Health Maintenance   Topic Date Due    COVID-19 Vaccine (1) Never done    Pneumococcal 65+ years Vaccine (1 - PCV) Never done    HIV screen  Never done    Diabetic Alb to Cr ratio (uACR) test  Never done    Diabetic retinal exam  Never done    Colorectal Cancer Screen  Never done    Shingles vaccine (1 of 2) Never done    DEXA (modify frequency per FRAX score)  Never done    Breast cancer screen  10/16/2016    Annual Wellness Visit (AWV)  Never done    A1C test (Diabetic or Prediabetic)  06/30/2022    Flu vaccine (1) Never done    Depression Monitoring  07/06/2023    Lipids  10/11/2023    Diabetic foot exam  11/01/2023    GFR test (Diabetes, CKD 3-4, OR last GFR 15-59)  12/20/2023    DTaP/Tdap/Td vaccine (2 - Td or Tdap) 08/01/2026    Hepatitis C screen  Completed    Hepatitis A vaccine  Aged Out    Hib vaccine  Aged Out    Meningococcal (ACWY) vaccine  Aged Out       PHYSICAL EXAM:  Physical Exam  Constitutional:       Appearance: Normal appearance. HENT:      Head: Normocephalic. Right Ear: Tympanic membrane, ear canal and external ear normal.      Left Ear: Tympanic membrane, ear canal and external ear normal.      Nose: Nose normal.      Mouth/Throat:      Lips: Pink.       Mouth: Mucous membranes are moist. Pharynx: Oropharynx is clear. Cardiovascular:      Rate and Rhythm: Normal rate and regular rhythm. Heart sounds: Normal heart sounds. Pulmonary:      Effort: Pulmonary effort is normal.      Breath sounds: Normal breath sounds. Musculoskeletal:      Cervical back: Neck supple. Skin:     General: Skin is warm and dry. Neurological:      Mental Status: She is alert and oriented to person, place, and time. Psychiatric:         Mood and Affect: Mood normal.         Behavior: Behavior normal.       ASSESSMENT/PLAN:  1. Viral URI with cough  Your COVID 19 test can take 1-5 days for the results to come back. We ask that you make a Mychart page and view your test results this way. You are encouraged to Self quarantine until you know your results. If positive, please work on contact tracing. Increase fluids and rest  Saline nasal spray as needed for nasal congestion  Warm salt water gargles as needed for throat discomfort  Monitor temperature twice a day  Tylenol as needed for fevers and/or discomfort. Big deep breaths periodically throughout the day  Regular Mucinex over the counter as needed for chest congestion  If symptoms worsen -Go to the ER. Follow up with your primary care provider  - COVID-19      FOLLOW-UP:  Return if symptoms worsen or fail to improve.     In addition to other information, the printed after visit summary provided to the patient includes:  [x] COVID-19 Self care instructions  [x] COVID-19 General patient information

## 2023-02-01 NOTE — PROGRESS NOTES
2/1/23  Naveed Holmans  1955    FLU/COVID-19 CLINIC EVALUATION    HPI SYMPTOMS:    Employer:    [] Fevers  [] Chills  [x] Cough  [] Coughing up blood  [x] Chest Congestion  [x] Nasal Congestion  [x] Feeling short of breath  [] Sometimes  [] Frequently  [x] All the time  [] Chest pain  [x] Headaches  []Tolerable  [x] Severe  [x] Sore throat  [x] Muscle aches  [x] Nausea  [] Vomiting  []Unable to keep fluids down  [] Diarrhea  []Severe    [x] OTHER SYMPTOMS:  Loss of taste and Fatigue    Symptom Duration:   [] 1  [] 2   [x] 3   [] 4    [] 5   [] 6   [] 7   [] 8   [] 9   [] 10   [] 11   [] 12   [] 13   [] 14   [] Longer than 14 days    Symptom course:   [x] Worsening     [] Stable     [] Improving    RISK FACTORS:    [] Pregnant or possibly pregnant  [] Age over 61  [x] Diabetes  [x] Heart disease  [x] Asthma  [] COPD/Other chronic lung diseases  [] Active Cancer  [] On Chemotherapy  [] Taking oral steroids  [] History Lymphoma/Leukemia  [] Close contact with a lab confirmed COVID-19 patient within 14 days of symptom onset  [] History of travel from affected geographical areas within 14 days of symptom onset       VITALS:  There were no vitals filed for this visit. TESTS:    POCT FLU:  [] Positive     []Negative    ASSESSMENT:    [] Flu  [] Possible COVID-19  [] Strep    PLAN:    [] Discharge home with written instructions for:  [] Flu management  [] Possible COVID-19 infection with self-quarantine and management of symptoms  [] Follow-up with primary care physician or emergency department if worsens  [] Evaluation per physician/NP/PA in clinic  [] Sent to ER       An  electronic signature was used to authenticate this note.      --Vangie Valenzuela LPN on 1/4/8890 at 6:45 PM

## 2023-02-02 DIAGNOSIS — U07.1 LAB TEST POSITIVE FOR DETECTION OF COVID-19 VIRUS: Primary | ICD-10-CM

## 2023-02-02 LAB
SARS-COV-2 RNA RESP QL NAA+PROBE: DETECTED
SOURCE: ABNORMAL

## 2023-02-02 RX ORDER — NIRMATRELVIR AND RITONAVIR 150-100 MG
KIT ORAL
Qty: 20 TABLET | Refills: 0 | Status: SHIPPED | OUTPATIENT
Start: 2023-02-02 | End: 2023-02-03 | Stop reason: SDUPTHER

## 2023-02-03 ENCOUNTER — TELEPHONE (OUTPATIENT)
Dept: FAMILY MEDICINE CLINIC | Age: 68
End: 2023-02-03

## 2023-02-03 DIAGNOSIS — R05.9 COUGH, UNSPECIFIED TYPE: Primary | ICD-10-CM

## 2023-02-03 DIAGNOSIS — U07.1 LAB TEST POSITIVE FOR DETECTION OF COVID-19 VIRUS: ICD-10-CM

## 2023-02-03 DIAGNOSIS — U07.1 COVID-19: ICD-10-CM

## 2023-02-03 RX ORDER — BENZONATATE 100 MG/1
100 CAPSULE ORAL 3 TIMES DAILY PRN
Qty: 30 CAPSULE | Refills: 0 | Status: SHIPPED | OUTPATIENT
Start: 2023-02-03 | End: 2023-02-13

## 2023-02-03 RX ORDER — GUAIFENESIN/DEXTROMETHORPHAN 100-10MG/5
5 SYRUP ORAL 3 TIMES DAILY PRN
Qty: 120 ML | Refills: 0 | Status: SHIPPED | OUTPATIENT
Start: 2023-02-03 | End: 2023-02-13

## 2023-02-03 RX ORDER — GUAIFENESIN/DEXTROMETHORPHAN 100-10MG/5
5 SYRUP ORAL 2 TIMES DAILY
Qty: 100 ML | Refills: 0 | Status: SHIPPED | OUTPATIENT
Start: 2023-02-03 | End: 2023-02-13

## 2023-02-03 RX ORDER — NIRMATRELVIR AND RITONAVIR 150-100 MG
KIT ORAL
Qty: 20 TABLET | Refills: 0 | Status: SHIPPED | OUTPATIENT
Start: 2023-02-03 | End: 2023-02-08

## 2023-02-03 RX ORDER — BENZONATATE 100 MG/1
100 CAPSULE ORAL 3 TIMES DAILY PRN
Qty: 30 CAPSULE | Refills: 0 | Status: SHIPPED | OUTPATIENT
Start: 2023-02-03 | End: 2023-02-03 | Stop reason: SDUPTHER

## 2023-02-03 NOTE — TELEPHONE ENCOUNTER
Patient called complaining of cough and requested medication. Rx for robitussin TITA and tessalon pearles sent to her pharmacy. She states she has not started paxlovid because her one daughter is going now to pick it up from [place]. Advised to call if other concerns or complaints. Verbalized understanding and agreement with plan.

## 2023-02-03 NOTE — TELEPHONE ENCOUNTER
Scott Redd called stating [facility] did not have her prescription for Paxlovid and it will not be in until 2/8/23. I explained to Scott Prior I will call [facility] on [address] to see if they had it in stock and she stated [facility] and [facility] [address] did not take her insurance. I called to speak to the Pharmacist at [facility] on [address] and explained I verified with the pharmacy on 2/2/23 that they had it in stock before sending the prescription. The Pharmacist stated they had made a mistake and they were out of stock. I then called [facility] on [address] who stated they had it in stock but could not confirm if it was covered by [employer]. [facility] on [facility] [facility] also has it and state it will be covered. Simona requests the prescription to go to [facility] on [facility] [address] [address] [facility]. Simona informed the Tessalon pearls and Robitussin DM for her cough was called to [facility]. Simona requests the Tessalon pearls and Robitussin DM to go to [facility] on [address] [facility] also. Note to WENDIE Burns.

## 2023-02-06 DIAGNOSIS — E08.22 DIABETES MELLITUS DUE TO UNDERLYING CONDITION WITH CHRONIC KIDNEY DISEASE, WITHOUT LONG-TERM CURRENT USE OF INSULIN, UNSPECIFIED CKD STAGE (HCC): ICD-10-CM

## 2023-02-06 DIAGNOSIS — Z76.0 MEDICATION REFILL: ICD-10-CM

## 2023-02-06 RX ORDER — INSULIN ASPART 100 [IU]/ML
15 INJECTION, SOLUTION INTRAVENOUS; SUBCUTANEOUS
Qty: 5 ADJUSTABLE DOSE PRE-FILLED PEN SYRINGE | Refills: 0 | Status: SHIPPED | OUTPATIENT
Start: 2023-02-06

## 2023-02-06 NOTE — TELEPHONE ENCOUNTER
Joo pt and she stated she still needs an approval. Pt states she is currently taking Seroquel as stated in previous msmg q am and 50mg qhs. Pt also missed last appt d/t COVID and stated she will call back to reschedule since she is still not feeling well. Joo Mckeon at [facility] and he states that he was unaware of any questions regarding rx and that they have pt taking Seroquel 25mg qd. Faxed 22 and 22 ov notes in order to coordinate care to [facility] at [facility] fax# [phone] and advised to call with any questions.

## 2023-02-08 ENCOUNTER — TELEPHONE (OUTPATIENT)
Dept: CARDIOLOGY CLINIC | Age: 68
End: 2023-02-08

## 2023-02-08 NOTE — TELEPHONE ENCOUNTER
Patient called in stating she was confused and had questions about her medicine. Patient stated Dr. Aileen Vasquez told her to take 2 extra Seroquel pills. Read the patient the last office visit note stating to defer all Seroquel dosing questions to mental health who is the prescriber. Patient states that mental health wanted her to contact us, explained that was probably due to wanting our notes to see if there was changes and what we had down as far as what was being taken for Seroquel. Patient states that they have her on 25 mg once daily, explained that if another provider is in charge of a drug therapy unfortunately other providers handling her care normally will not prescribe changes to it but rather sometimes make recommendations unless the provider and or patient deems it more appropriate for our office to manage. Stated that to keep confusion down and more organized for the patient, she needs to defer her mental health doctor for all Seroquel questions. Patient stated understanding.

## 2023-02-08 NOTE — TELEPHONE ENCOUNTER
Patient called her feet and legs are swollen   Feels like she is gaining weight , she has COVID positive 2/1

## 2023-02-08 NOTE — TELEPHONE ENCOUNTER
Patient states lower extremity swelling has been present for months but worse now. Feet, ankles and lower legs. Ankles and tops of feet red, patient currently has covid.

## 2023-02-09 ENCOUNTER — APPOINTMENT (OUTPATIENT)
Dept: ULTRASOUND IMAGING | Age: 68
End: 2023-02-09
Payer: MEDICARE

## 2023-02-09 ENCOUNTER — TELEPHONE (OUTPATIENT)
Dept: FAMILY MEDICINE CLINIC | Age: 68
End: 2023-02-09

## 2023-02-09 ENCOUNTER — TELEPHONE (OUTPATIENT)
Dept: CARDIOLOGY CLINIC | Age: 68
End: 2023-02-09

## 2023-02-09 ENCOUNTER — APPOINTMENT (OUTPATIENT)
Dept: GENERAL RADIOLOGY | Age: 68
End: 2023-02-09
Payer: MEDICARE

## 2023-02-09 ENCOUNTER — HOSPITAL ENCOUNTER (EMERGENCY)
Age: 68
Discharge: HOME | End: 2023-02-09
Attending: EMERGENCY MEDICINE
Payer: MEDICARE

## 2023-02-09 ENCOUNTER — TELEPHONE (OUTPATIENT)
Dept: NEUROLOGY | Age: 68
End: 2023-02-09

## 2023-02-09 VITALS
HEART RATE: 70 BPM | DIASTOLIC BLOOD PRESSURE: 66 MMHG | RESPIRATION RATE: 19 BRPM | OXYGEN SATURATION: 96 % | SYSTOLIC BLOOD PRESSURE: 124 MMHG | TEMPERATURE: 98.2 F

## 2023-02-09 DIAGNOSIS — U07.1 COVID: Primary | ICD-10-CM

## 2023-02-09 DIAGNOSIS — E87.1 HYPONATREMIA: ICD-10-CM

## 2023-02-09 LAB
ALBUMIN SERPL-MCNC: 4.4 GM/DL (ref 3.4–5)
ALP BLD-CCNC: 80 IU/L (ref 40–128)
ALT SERPL-CCNC: 24 U/L (ref 10–40)
ANION GAP SERPL CALCULATED.3IONS-SCNC: 12 MMOL/L (ref 4–16)
AST SERPL-CCNC: 29 IU/L (ref 15–37)
BACTERIA: NEGATIVE /HPF
BASOPHILS ABSOLUTE: 0.1 K/CU MM
BASOPHILS RELATIVE PERCENT: 0.6 % (ref 0–1)
BILIRUB SERPL-MCNC: 0.4 MG/DL (ref 0–1)
BILIRUBIN URINE: NEGATIVE MG/DL
BLOOD, URINE: NEGATIVE
BUN SERPL-MCNC: 9 MG/DL (ref 6–23)
CALCIUM SERPL-MCNC: 9.8 MG/DL (ref 8.3–10.6)
CHLORIDE BLD-SCNC: 86 MMOL/L (ref 99–110)
CLARITY: ABNORMAL
CO2: 27 MMOL/L (ref 21–32)
COLOR: YELLOW
CREAT SERPL-MCNC: 0.5 MG/DL (ref 0.6–1.1)
DIFFERENTIAL TYPE: ABNORMAL
EKG ATRIAL RATE: 92 BPM
EKG DIAGNOSIS: NORMAL
EKG P AXIS: 6 DEGREES
EKG P-R INTERVAL: 178 MS
EKG Q-T INTERVAL: 394 MS
EKG QRS DURATION: 114 MS
EKG QTC CALCULATION (BAZETT): 487 MS
EKG R AXIS: -41 DEGREES
EKG T AXIS: 48 DEGREES
EKG VENTRICULAR RATE: 92 BPM
EOSINOPHILS ABSOLUTE: 0.4 K/CU MM
EOSINOPHILS RELATIVE PERCENT: 3.7 % (ref 0–3)
GFR SERPL CREATININE-BSD FRML MDRD: >60 ML/MIN/1.73M2
GLUCOSE BLD-MCNC: 158 MG/DL (ref 70–99)
GLUCOSE SERPL-MCNC: 201 MG/DL (ref 70–99)
GLUCOSE, URINE: NEGATIVE MG/DL
HCT VFR BLD CALC: 35.7 % (ref 37–47)
HEMOGLOBIN: 12.4 GM/DL (ref 12.5–16)
IMMATURE NEUTROPHIL %: 0.3 % (ref 0–0.43)
KETONES, URINE: NEGATIVE MG/DL
LEUKOCYTE ESTERASE, URINE: ABNORMAL
LYMPHOCYTES ABSOLUTE: 3.2 K/CU MM
LYMPHOCYTES RELATIVE PERCENT: 30.6 % (ref 24–44)
MCH RBC QN AUTO: 27.9 PG (ref 27–31)
MCHC RBC AUTO-ENTMCNC: 34.7 % (ref 32–36)
MCV RBC AUTO: 80.4 FL (ref 78–100)
MONOCYTES ABSOLUTE: 1.1 K/CU MM
MONOCYTES RELATIVE PERCENT: 10.7 % (ref 0–4)
NITRITE URINE, QUANTITATIVE: NEGATIVE
NUCLEATED RBC %: 0 %
PDW BLD-RTO: 11.4 % (ref 11.7–14.9)
PH, URINE: 7.5 (ref 5–8)
PLATELET # BLD: 266 K/CU MM (ref 140–440)
PMV BLD AUTO: 8.8 FL (ref 7.5–11.1)
POTASSIUM SERPL-SCNC: 3.8 MMOL/L (ref 3.5–5.1)
PRO-BNP: 182.2 PG/ML
PROTEIN UA: NEGATIVE MG/DL
RBC # BLD: 4.44 M/CU MM (ref 4.2–5.4)
RBC URINE: NORMAL /HPF (ref 0–6)
SEGMENTED NEUTROPHILS ABSOLUTE COUNT: 5.7 K/CU MM
SEGMENTED NEUTROPHILS RELATIVE PERCENT: 54.1 % (ref 36–66)
SODIUM BLD-SCNC: 125 MMOL/L (ref 135–145)
SPECIFIC GRAVITY UA: 1.01 (ref 1–1.03)
SQUAMOUS EPITHELIAL: 1 /HPF
TOTAL IMMATURE NEUTOROPHIL: 0.03 K/CU MM
TOTAL NUCLEATED RBC: 0 K/CU MM
TOTAL PROTEIN: 7.3 GM/DL (ref 6.4–8.2)
TRICHOMONAS: NORMAL /HPF
TROPONIN T: <0.01 NG/ML
UROBILINOGEN, URINE: 0.2 MG/DL (ref 0.2–1)
WBC # BLD: 10.5 K/CU MM (ref 4–10.5)
WBC UA: 4 /HPF (ref 0–5)

## 2023-02-09 PROCEDURE — 83880 ASSAY OF NATRIURETIC PEPTIDE: CPT

## 2023-02-09 PROCEDURE — 84484 ASSAY OF TROPONIN QUANT: CPT

## 2023-02-09 PROCEDURE — 93005 ELECTROCARDIOGRAM TRACING: CPT | Performed by: EMERGENCY MEDICINE

## 2023-02-09 PROCEDURE — 99285 EMERGENCY DEPT VISIT HI MDM: CPT

## 2023-02-09 PROCEDURE — 80053 COMPREHEN METABOLIC PANEL: CPT

## 2023-02-09 PROCEDURE — 2580000003 HC RX 258: Performed by: EMERGENCY MEDICINE

## 2023-02-09 PROCEDURE — 6370000000 HC RX 637 (ALT 250 FOR IP): Performed by: EMERGENCY MEDICINE

## 2023-02-09 PROCEDURE — 93970 EXTREMITY STUDY: CPT

## 2023-02-09 PROCEDURE — 93010 ELECTROCARDIOGRAM REPORT: CPT | Performed by: INTERNAL MEDICINE

## 2023-02-09 PROCEDURE — 81001 URINALYSIS AUTO W/SCOPE: CPT

## 2023-02-09 PROCEDURE — 71045 X-RAY EXAM CHEST 1 VIEW: CPT

## 2023-02-09 PROCEDURE — 82962 GLUCOSE BLOOD TEST: CPT

## 2023-02-09 PROCEDURE — 85025 COMPLETE CBC W/AUTO DIFF WBC: CPT

## 2023-02-09 RX ORDER — 0.9 % SODIUM CHLORIDE 0.9 %
500 INTRAVENOUS SOLUTION INTRAVENOUS ONCE
Status: COMPLETED | OUTPATIENT
Start: 2023-02-09 | End: 2023-02-09

## 2023-02-09 RX ORDER — HYDROCODONE BITARTRATE AND ACETAMINOPHEN 5; 325 MG/1; MG/1
2 TABLET ORAL
Status: COMPLETED | OUTPATIENT
Start: 2023-02-09 | End: 2023-02-09

## 2023-02-09 RX ORDER — GABAPENTIN 300 MG/1
300 CAPSULE ORAL ONCE
Status: COMPLETED | OUTPATIENT
Start: 2023-02-09 | End: 2023-02-09

## 2023-02-09 RX ADMIN — SODIUM CHLORIDE 500 ML: 9 INJECTION, SOLUTION INTRAVENOUS at 13:03

## 2023-02-09 RX ADMIN — HYDROCODONE BITARTRATE AND ACETAMINOPHEN 2 TABLET: 5; 325 TABLET ORAL at 13:03

## 2023-02-09 RX ADMIN — GABAPENTIN 300 MG: 300 CAPSULE ORAL at 13:03

## 2023-02-09 ASSESSMENT — PAIN SCALES - GENERAL: PAINLEVEL_OUTOF10: 4

## 2023-02-09 NOTE — TELEPHONE ENCOUNTER
Pt called and states she has woken up the last 3 days with a severe headache and today is having some shortness of breath. Her cardiologist advised her to seek emergency medical attention as she tested positive for COVID 2/1/23. She just wanted to let us know as well.  She states she is getting ready to call the squad to take her to the hospital.

## 2023-02-09 NOTE — TELEPHONE ENCOUNTER
Patient advised per Ignacio Notice Np that she cannot be seen in office until 5 days after covid symptoms subside. Advised to go to ED. Patient advised and voices understanding.

## 2023-02-09 NOTE — ED PROVIDER NOTES
Triage Chief Complaint:    Shortness of Breath (Just diagnosed with covid ) and Chest Pain    HPI   Blanca Bhatti is a 74 y.o. female that presents She states that with the cough there is some chest discomfort. Recently diagnosed with COVID-19 infection and was worried that might be causing some of her symptoms. She states this is not exertional in nature. Patient has not noticed any shortness of breath with this. Has not had any change in vision hearing or speech. She states that she has not had any abdominal pain nausea or vomiting. Has not noticed any change in leg swelling. She states she has chronic leg swelling and chronic pain in her legs which is unchanged. She has been on her anticoagulation has not had any missed doses. Mild headache. History from : Patient    Limitations to history : None    ROS:  10 systems reviewed and negative except as above. Past Medical History:   Diagnosis Date    Abnormal EKG 04/22/2014    Acid reflux     Anemia     Anesthesia     Nausea/Vomiting Post Op In Past    Anginal pain (HCC)     Denies Chest Pain At This Time    Anxiety     Arthritis     \"All Over\"    Asthma     Bipolar 1 disorder (HCC)     Cerebral artery occlusion with cerebral infarction (HCC)     CHF (congestive heart failure) (HCC)     Chronic back pain     Chronic kidney disease     DDD (degenerative disc disease), cervical     12- Patient reports she was dx with DDD of Cerival spine C6,C7    Depression     Diabetes mellitus ([address] Dx 1990's    Diabetic neuropathy ([address]     \"In My Legs And Feet\"    Dizziness     \"Sometimes\"    Dry skin     Enlarged ureter     Right Side    Fatty liver     Fibrocystic breast     Generalized anxiety disorder     Gout     Pt states she was diagnosed with gout in the past few months. H/O cardiac catheterization     Showed mild disease per last cath.     H/O cardiovascular stress test 03/15/2010    EF 69%, normal perfusion study except for diaphragmatic artifact, uniform wall motion. H/O cardiovascular stress test 10/09/2008    EF 60%, no anginia, normal study. H/O cardiovascular stress test 05/06/2014    EF 66%, no ischemia, normal LV systolic funciton, normal perfusion pattern. H/O Doppler ultrasound 02/28/2011    CAROTID DOPPLER-normal study. H/O echocardiogram 05/06/2014    Ef >55%. Impaired LV relaxation. H/O echocardiogram 10/14/2015    EF 60% Normal LV and systolic function. No significant valvulopathy seen. History of Holter monitoring 03/24/2015    24 hour - predominant rhythm sinus    [place] (hard of hearing)     Bilateral Ears    Hx of cardiovascular stress test 10/19/2015    lexiscan-normal,EF63%    Hx of motion sickness     HX OTHER MEDICAL     Primary Care Physician Is Dr. Rory Novoa In [facility]    Hyperlipidemia     Hypertension     IBS (irritable bowel syndrome)     Incisional hernia 04/2014    Kidney stones Last Episode In 2012 Or 2013    Passed Kidney Stones Numberous Times    Migraines     Nausea & vomiting     Nausea/Vomiting Post Op In Past    Other specified disorder of skin     12- Patient states she has a condition of her vaginal area (skin) which starts with the letters Wilmer Jules. She is currently being treated with multiple creams and weekly Diflucan. 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     Staph infection Dx 1980's    Toes On Left Foot    Thyroid disease     hypothroidism    Tremor     \"Tremors All Over\"    Urinary incontinence     Vertigo     \"Sometimes\"    Wears glasses      Past Surgical History:   Procedure Laterality Date    APPENDECTOMY  1970's    Done With Cholecystectomy    BLADDER SURGERY  1970's Or 1980's    \"Stretched The Opening To The Bladder\", \"Total Of Four Bladder Surgeries\"    BREAST BIOPSY Right 1980's    Twice, Benign    BREAST SURGERY Left 1990's    Five, Benign    CARDIAC CATHETERIZATION  10-18-06    normal coronary angiogram with a normal left ventricular systolic function, patient can be treated medically.     CARDIAC CATHETERIZATION      \"Total 7 Cardiac Catheterizations\"    CARPAL TUNNEL RELEASE Right 1999    CHOLECYSTECTOMY  1970's    Appendectomy Also Done    COLONOSCOPY  Last Done 6-13    One Polyp Removed In Past    DENTAL SURGERY      All Teeth Extracted In Past    DIAGNOSTIC CARDIAC CATH LAB PROCEDURE  01/11/2010    no significant disease, continue medical therapy    ENDOSCOPY, COLON, DIAGNOSTIC  Several     ESOPHAGEAL DILATATION  1980's And 1990's    X 3    FRACTURE SURGERY  1974 Or 1975    Broken Bones Left [place] Due To [address]  1990's    Incisional Abdominal Hernia Repair  With Mesh    HERNIA REPAIR  1970's    Abdominal Hernia Repair    HYSTERECTOMY, TOTAL ABDOMINAL (CERVIX REMOVED)  1987    JOINT REPLACEMENT  2008    Total Right Knee    KNEE ARTHROSCOPY Right 1999    LITHOTRIPSY  2011    For Kidney Stones    OTHER SURGICAL HISTORY  06 [id number]    umbilical hernia with mesh    TUBAL LIGATION  1978     Family History   Problem Relation Age of Onset    Stroke Mother     Other Mother         Seizures    Diabetes Mother         Borderline Diabetes    High Blood Pressure Mother     Arthritis Mother     Early Death Mother 61        Stroke    Depression Mother     Heart Disease Mother     High Cholesterol Mother     Miscarriages / Stillbirths Mother     Mental Illness Mother     Mental Illness Father     Heart Disease Father         Massive Heart Attack    High Blood Pressure Father     Arthritis Father     High Cholesterol Father     Mental Illness Sister     Hearing Loss Sister     Heart Failure Sister     High Blood Pressure Sister     Arthritis Sister     Heart Disease Sister     Cancer Sister     Mental Illness Brother Cancer Brother         Liver And Colon Cancer    Early Death Brother 37        Liver And Colon Cancer    Heart Disease Brother         Heart Stents    High Blood Pressure Brother     High Cholesterol Brother     Early Death Brother         65 Years Old,Hit By A Car    Colon Cancer Brother     Heart Disease Brother     Mental Illness Brother     Early Death Brother 61        Heart Attack    Heart Disease Brother         Heart Attack    Mental Illness Daughter         Bipolar    Depression Daughter     Anxiety Disorder Daughter     Bipolar Disorder Daughter     Other Daughter         Stomach And Bowel Problems    Other Son         Seizures     Social History     Socioeconomic History    Marital status:      Spouse name: Not on file    Number of children: 3    Years of education: 6    Highest education level: Not on file   Occupational History    Not on file   Tobacco Use    Smoking status: Never    Smokeless tobacco: Never   Vaping Use    Vaping Use: Never used   Substance and Sexual Activity    Alcohol use: No     Comment: tea sometimes    Drug use: No    Sexual activity: Not Currently   Other Topics Concern    Not on file   Social History Narrative    Not on file     Social Determinants of Health     Financial Resource Strain: Medium Risk    Difficulty of Paying Living Expenses: Somewhat hard   Food Insecurity: Food Insecurity Present    Worried About Running Out of Food in the Last Year: Sometimes true    Ran Out of Food in the Last Year: Sometimes true   Transportation Needs: Unmet Transportation Needs    Lack of Transportation (Medical): Yes    Lack of Transportation (Non-Medical): Yes   Physical Activity: Inactive    Days of Exercise per Week: 0 days    Minutes of Exercise per Session: 0 min   Stress: Stress Concern Present    Feeling of Stress :  To some extent   Social Connections: Socially Isolated    Frequency of Communication with Friends and Family: More than three times a week    Frequency of Social Gatherings with Friends and Family: More than three times a week    Attends Yarsani Services: Never    Active Member of Clubs or Organizations: No    Attends Club or Organization Meetings: Never    Marital Status:    Intimate Partner Violence: Not At Risk    Fear of Current or Ex-Partner: No    Emotionally Abused: No    Physically Abused: No    Sexually Abused: No   Housing Stability: Low Risk     Unable to Pay for Housing in the Last Year: No    Number of Places Lived in the Last Year: 1    Unstable Housing in the Last Year: No     No current facility-administered medications for this encounter. Current Outpatient Medications   Medication Sig Dispense Refill    NOVOLOG FLEXPEN 100 UNIT/ML injection pen Inject 15 Units into the skin 3 times daily (before meals) 12/01/2020 patient states she follows sliding scale regimen BS > 150 5 Adjustable Dose Pre-filled Pen Syringe 0    Insulin Pen Needle 31G X 5 MM MISC 1 each by Does not apply route daily 100 each 3    guaiFENesin-dextromethorphan (ROBITUSSIN DM) 100-10 MG/5ML syrup Take 5 mLs by mouth 3 times daily as needed for Cough 120 mL 0    benzonatate (TESSALON) 100 MG capsule Take 1 capsule by mouth 3 times daily as needed for Cough 30 capsule 0    guaiFENesin-dextromethorphan (ROBITUSSIN DM) 100-10 MG/5ML syrup Take 5 mLs by mouth in the morning and 5 mLs in the evening. Do all this for 10 days. 100 mL 0    levothyroxine (SYNTHROID) 75 MCG tablet TAKE ONE (1) TABLET BY MOUTH EVERY MORNING (BEFORE BREAKFAST) 30 tablet 3    simvastatin (ZOCOR) 20 MG tablet TAKE ONE (1) TABLET BY MOUTH NIGHTLY 30 tablet 3    orphenadrine (NORFLEX) 100 MG extended release tablet       losartan (COZAAR) 50 MG tablet Take 1 tablet by mouth daily 30 tablet 5    gabapentin (NEURONTIN) 400 MG capsule       blood glucose test strips (TRUE METRIX BLOOD GLUCOSE TEST) strip 1 each by In Vitro route daily As needed.  100 each 3    guaiFENesin (MUCINEX) 600 MG extended release tablet Take 1 tablet by mouth 2 times daily 30 tablet 1    montelukast (SINGULAIR) 10 MG tablet TAKE ONE (1) TABLET BY MOUTH EACH NIGHT AT BEDTIME 30 tablet 3    amitriptyline (ELAVIL) 25 MG tablet Take 2 tablets by mouth nightly 60 tablet 5    Handicap Placard MISC by Does not apply route 1 each 0    NIFEdipine (PROCARDIA XL) 90 MG extended release tablet Take 1 tablet by mouth daily 30 tablet 3    hydrOXYzine pamoate (VISTARIL) 50 MG capsule Take 50 mg by mouth three times daily      carbidopa-levodopa (SINEMET)  MG per tablet Take 1 tablet by mouth nightly 30 tablet 1    pantoprazole (PROTONIX) 40 MG tablet Take 1 tablet by mouth 2 times daily (before meals) 180 tablet 1    magnesium oxide (MAG-OX) 400 MG tablet Take 1 tablet by mouth 3 times daily 90 tablet 0    TRESIBA FLEXTOUCH 200 UNIT/ML SOPN Inject 20 Units into the skin every morning (Patient taking differently: Inject 60 Units into the skin at bedtime) 1 pen 2    metoprolol succinate (TOPROL XL) 50 MG extended release tablet Take 1 tablet by mouth daily 30 tablet 5    Probiotic Product (PROBIOTIC DIGESTIVE SUPP PO) Take 1 capsule by mouth every morning      ondansetron (ZOFRAN ODT) 4 MG disintegrating tablet Take 1 tablet by mouth every 8 hours as needed for Nausea 15 tablet 1    fluticasone (FLONASE) 50 MCG/ACT nasal spray 1 spray by Each Nostril route daily 32 g 1    nystatin (MYCOSTATIN) 473544 UNIT/GM powder Apply 3 times daily to affected areas 1 each 0    levETIRAcetam (KEPPRA) 750 MG tablet Take 1 tablet by mouth 2 times daily 60 tablet 0    diazePAM (VALIUM) 5 MG tablet as needed.       QUEtiapine (SEROQUEL) 25 MG tablet Take 1 tablet by mouth 2 times daily (Patient taking differently: Take 25 mg by mouth 2 times daily Indications: 25 mg in am and 50 mg in pm) 60 tablet 0    busPIRone (BUSPAR) 10 MG tablet Take 2 tablets by mouth 3 times daily 180 tablet 0    HYDROcodone-acetaminophen (NORCO)  MG per tablet Take 1 tablet by mouth every 6 hours as needed. fluticasone-umeclidin-vilant (TRELEGY ELLIPTA) 100-62.5-25 MCG/INH AEPB Inhale 1 puff into the lungs daily 1 each 5    OXcarbazepine (TRILEPTAL) 300 MG tablet Take 1 tablet by mouth 2 times daily 60 tablet 3    docusate sodium (COLACE) 100 MG capsule Take 1 capsule by mouth 2 times daily 30 capsule 0    aluminum & magnesium hydroxide-simethicone (MAALOX MAX) 400-400-40 MG/5ML SUSP Take 15 mLs by mouth every 6 hours as needed (heart burn) 120 mL 0    aspirin 81 MG tablet Take 81 mg by mouth daily      Multiple Vitamins-Iron (MULTI-VITAMIN/IRON PO) Take  by mouth.        Allergies   Allergen Reactions    Abilify [Aripiprazole]      \"Severe Shaking And Restlessness\"    Augmentin [Amoxicillin-Pot Clavulanate] Itching and Rash    Bee Venom Swelling     Redness    Ciprofloxacin Itching and Rash    Codeine      \"Severe Abdominal Cramping\"    Darvocet [Propoxyphene N-Acetaminophen] Palpitations     \"Severe High Blood Pressure\"    Darvon [Propoxyphene Hcl] Palpitations     \"Severe High Blood Pressure\"    Decadron [Dexamethasone] Other (See Comments)     seizure  Seizures    Ditropan [Oxybutynin Chloride] Palpitations     \"Severe High Blood Pressure\"    Fioricet [Butalbital-Apap-Caffeine] Palpitations     \"Severe High Blood Pressure\"    Fiorinal-Codeine #3 [Butalbital-Asa-Caff-Codeine]      \"Severe Stomach Cramps\"    Flagyl [Metronidazole] Diarrhea     \"Severe Diarrhea And Cramping\"    Naproxen Palpitations     \"Severe High Blood Pressure\"    Other      \"Allergic To Spider Bites Causing Blackness Of Skin, Severe Itching And Pain\"                                                  \"Allergic To Powder In Gloves Causing Severe Redness And Itching\"    Prozac [Fluoxetine Hcl]      \"Hallucinations\"    Robaxin [Methocarbamol] Palpitations     \"Severe High Blood Pressure\"    Ultram [Tramadol]      \"Severe Stomach Pain\"    Zoloft Palpitations     \"Sever High Blood Pressure\"    Butalbital-Aspirin-Caffeine Other (See Comments)     \"severe stomach pain\"    Coreg [Carvedilol] Other (See Comments)     \"spikes my BP severely and send me into a severe anxiety attack\"    Fluoxetine Other (See Comments)     hallucinations    Oxybutynin Chloride Other (See Comments)     Raises bp    Percocet [Oxycodone-Acetaminophen]      \"knocked me out for 12 hrs\"    Sertraline Other (See Comments)     hallucinations  Other reaction(s): Unknown    Tape [Adhesive Tape]      Patient states it tears her skin, including band aids    Reglan [Metoclopramide] Other (See Comments)     \"makes me talk like a baby, but if I take benadryl with it it's fine\"       Nursing Notes Reviewed    Physical Exam:     ED Triage Vitals [02/09/23 1031]   Enc Vitals Group      /63      Heart Rate 92      Resp 20      Temp 98.2 °F (36.8 °C)      Temp Source Oral      SpO2 96 %      Weight       Height       Head Circumference       Peak Flow       Pain Score       Pain Loc       Pain Edu? Excl. in [address]? My pulse ox interpretation is - normal    General appearance:  No acute distress. Skin:  Warm. Dry. Eye:  Extraocular movements intact. Ears, nose, mouth and throat:  Oral mucosa moist   Neck:  Trachea midline. Extremity:  No swelling. Normal ROM     Heart:  Regular rate and rhythm. Perfusion:  intact  Respiratory:  Lungs clear to auscultation bilaterally. Respirations nonlabored. Chest wall: Reproducible pain with palpation  Abdominal:  Normal bowel sounds. Soft. Nontender. Non distended. Back:  No CVA tenderness to palpation     Neurological:  Alert and oriented times 3.   Motor and sensory grossly intact, coordination intact          Psychiatric:  Appropriate      I have reviewed and interpreted all of the currently available lab results from this visit (if applicable):  Results for orders placed or performed during the hospital encounter of 02/09/23   CBC with Auto Differential   Result Value Ref Range    WBC 10.5 4.0 - 10.5 K/CU MM    RBC 4.44 4.2 - 5.4 M/CU MM    Hemoglobin 12.4 (L) 12.5 - 16.0 GM/DL    Hematocrit 35.7 (L) 37 - 47 %    MCV 80.4 78 - 100 FL    MCH 27.9 27 - 31 PG    MCHC 34.7 32.0 - 36.0 %    RDW 11.4 (L) 11.7 - 14.9 %    Platelets 613 409 - 086 K/CU MM    MPV 8.8 7.5 - 11.1 FL    Differential Type AUTOMATED DIFFERENTIAL     Segs Relative 54.1 36 - 66 %    Lymphocytes % 30.6 24 - 44 %    Monocytes % 10.7 (H) 0 - 4 %    Eosinophils % 3.7 (H) 0 - 3 %    Basophils % 0.6 0 - 1 %    Segs Absolute 5.7 K/CU MM    Lymphocytes Absolute 3.2 K/CU MM    Monocytes Absolute 1.1 K/CU MM    Eosinophils Absolute 0.4 K/CU MM    Basophils Absolute 0.1 K/CU MM    Nucleated RBC % 0.0 %    Total Nucleated RBC 0.0 K/CU MM    Total Immature Neutrophil 0.03 K/CU MM    Immature Neutrophil % 0.3 0 - 0.43 %   Troponin   Result Value Ref Range    Troponin T <0.010 <0.01 NG/ML   Comprehensive Metabolic Panel w/ Reflex to MG   Result Value Ref Range    Sodium 125 (L) 135 - 145 MMOL/L    Potassium 3.8 3.5 - 5.1 MMOL/L    Chloride 86 (L) 99 - 110 mMol/L    CO2 27 21 - 32 MMOL/L    BUN 9 6 - 23 MG/DL    Creatinine 0.5 (L) 0.6 - 1.1 MG/DL    Est, Glom Filt Rate >60 >60 mL/min/1.73m2    Glucose 201 (H) 70 - 99 MG/DL    Calcium 9.8 8.3 - 10.6 MG/DL    Albumin 4.4 3.4 - 5.0 GM/DL    Total Protein 7.3 6.4 - 8.2 GM/DL    Total Bilirubin 0.4 0.0 - 1.0 MG/DL    ALT 24 10 - 40 U/L    AST 29 15 - 37 IU/L    Alkaline Phosphatase 80 40 - 128 IU/L    Anion Gap 12 4 - 16   Brain Natriuretic Peptide   Result Value Ref Range    Pro-.2 <300 PG/ML   Urinalysis   Result Value Ref Range    Color, UA YELLOW YELLOW    Clarity, UA CLOUDY (A) CLEAR    Glucose, Urine NEGATIVE NEGATIVE MG/DL    Bilirubin Urine NEGATIVE NEGATIVE MG/DL    Ketones, Urine NEGATIVE NEGATIVE MG/DL    Specific Gravity, UA 1.015 1.001 - 1.035    Blood, Urine NEGATIVE NEGATIVE    pH, Urine 7.5 5.0 - 8.0    Protein, UA NEGATIVE NEGATIVE MG/DL    Urobilinogen, Urine 0.2 0.2 - 1.0 MG/DL    Nitrite Urine, Quantitative NEGATIVE NEGATIVE    Leukocyte Esterase, Urine SMALL NUMBER OR AMOUNT OBSERVED (A) NEGATIVE   Microscopic Urinalysis   Result Value Ref Range    RBC, UA NONE SEEN 0 - 6 /HPF    WBC, UA 4 0 - 5 /HPF    Bacteria, UA NEGATIVE NEGATIVE /HPF    Squam Epithel, UA 1 /HPF    Trichomonas, UA NONE SEEN NONE SEEN /HPF   POCT Glucose   Result Value Ref Range    POC Glucose 158 (H) 70 - 99 MG/DL   EKG 12 Lead   Result Value Ref Range    Ventricular Rate 92 BPM    Atrial Rate 92 BPM    P-R Interval 178 ms    QRS Duration 114 ms    Q-T Interval 394 ms    QTc Calculation (Bazett) 487 ms    P Axis 6 degrees    R Axis -41 degrees    T Axis 48 degrees    Diagnosis       Normal sinus rhythm  Left axis deviation  Incomplete right bundle branch block  Voltage criteria for left ventricular hypertrophy  Cannot rule out Septal infarct (cited on or before 13-OCT-2022)  Possible Lateral infarct , age undetermined  Abnormal ECG  When compared with ECG of 20-DEC-2022 12:20,  Borderline criteria for Lateral infarct are now present  Questionable change in initial forces of Septal leads  Confirmed by Darlin Flores ([id number]) on 2/9/2023 1:29:48 PM        Radiographs (if obtained):  [] The following radiograph was interpreted by myself in the absence of a radiologist:   [x] Radiologist's Report Reviewed:  VL DUP LOWER EXTREMITY VENOUS BILATERAL   Final Result   No evidence of DVT in either lower extremity. XR CHEST PORTABLE   Final Result   No acute process. Procedures:    12 lead EKG per my interpretation:  Normal Sinus Rhythm  Rate: 92  QTc is  normal  There are no T wave changes appreciated. There are no ST wave changes appreciated.     Prior EKG to compare with was available and is similar    (All EKG's are interpreted by myself in the absence of a cardiologist)      Chart review shows recent radiographs:  XR CHEST PORTABLE    Result Date: 2/9/2023  EXAMINATION: ONE XRAY VIEW OF THE CHEST 2/9/2023 10:39 am COMPARISON: 12/20/2022 HISTORY: ORDERING SYSTEM PROVIDED HISTORY: chest pain TECHNOLOGIST PROVIDED HISTORY: Reason for exam:->chest pain Reason for Exam: chest pain FINDINGS: The lungs are without acute focal process. There is no effusion or pneumothorax. The cardiomediastinal silhouette is stable. The osseous structures are stable. No acute process. VL DUP LOWER EXTREMITY VENOUS BILATERAL    Result Date: 2/9/2023  EXAMINATION: DUPLEX VENOUS ULTRASOUND OF THE BILATERAL LOWER EXTREMITIES2/9/2023 1:13 pm TECHNIQUE: Duplex ultrasound using B-mode/gray scaled imaging, Doppler spectral analysis and color flow Doppler was obtained of the deep venous structures of the lower bilateral extremities. COMPARISON: None. HISTORY: Reason for exam:->pain and swelling FINDINGS: Limited exam due to lower extremity swelling. The visualized veins of the bilateral lower extremities are patent and free of echogenic thrombus. The veins demonstrate good compressibility with normal color flow study and spectral analysis. No evidence of DVT in either lower extremity. MDM:     Discussion with Other Professionals : None    Social Determinants: None    Records Reviewed : Outpatient Notes show that the patient did try to get in contact with her neurology team for persistent headaches since she was diagnosed with COVID    Chronic conditions affecting care: HTN, DM    Labs ordered and results as above (interpreted by myself), CBC showed no significant concerning anemia, troponin was either normal or close to baseline so lower suspicion for acute MI, myocarditis, pericarditis, urinalysis did not show evidence of urinary tract infection or hematuria, and BNP is reassuring. Her chemistry did show slightly low sodium of 125 but otherwise was fairly reassuring. Glucose was slightly elevated but not in DKA  Imaging interpreted and reviewed by myself: CXR showed no evidence of pneumonia, new effusion, or pneumothorax.   DVT study did not show any evidence of DVT. EKG interpreted by myself as above, not acutely concerning    Patient was given the following medications:  Medications   0.9 % sodium chloride bolus (0 mLs IntraVENous Stopped 2/9/23 1537)   HYDROcodone-acetaminophen (NORCO) 5-325 MG per tablet 2 tablet (2 tablets Oral Given 2/9/23 1303)   gabapentin (NEURONTIN) capsule 300 mg (300 mg Oral Given 2/9/23 1303)       Disposition Discussion:  Did consider hospitalization but this patient is clinically well-appearing here recently diagnosed with COVID and did have reproducible pain to palpation so likely costochondritis. Initial troponin was negative and I do feel that with her present symptoms for the last several days I do think that discharge is reasonable she has appropriate follow-up with her primary care doctor as well as her cardiologist within the next few days. She states she is going to call them first thing in the morning to follow-up. Return precautions advised. Heart score of 4 discussed with the patient. She was concerned that she might have a urinary tract infection however urinalysis is not concerning and we discussed deferring antibiotics for now she is also going to follow-up for hyponatremia with her primary care doctor. Patient was given bolus and is feeling improved. Disposition: Discharged     I am the primary physician of record    Clinical Impression:  1. COVID    2.  Hyponatremia      Disposition referral (if applicable):  WENDIE Molina CNP 7342  [phone]    Schedule an appointment as soon as possible for a visit in 1 week  for repeat labs (sodium was low)    [facility] Emergency Department  [place] [id number] [phone]    If symptoms worsen  Disposition medications (if applicable):  Discharge Medication List as of 2/9/2023  4:45 PM          Comment: Please note this report has been produced using speech recognition software and may contain errors related to that system including errors in grammar, punctuation, and spelling, as well as words and phrases that may be inappropriate. If there are any questions or concerns please feel free to contact the dictating provider for clarification.        Shiela Giordano MD  02/09/23 [id number]

## 2023-02-09 NOTE — TELEPHONE ENCOUNTER
Victor M Terrell called stating she was advised to go to the ED because of shortness of breath. She states this is getting worse. She called cardiology who advised her to seek emergent care. I also advised her to go to the ED because of having Covid and worsening of symptoms. Voiced understanding and agreement with plan.

## 2023-02-09 NOTE — TELEPHONE ENCOUNTER
A lot of my migraine patients have worsening headaches during the COVID illness. Hopefully as a COVID illness gets better the headaches will improve as well. If not, we can always discuss adjustments in migraine therapy.

## 2023-02-09 NOTE — ED NOTES
Kim Porras MD., [facility] [place]    [address] [facility] [place], [address] [facility]    Office [phone],[facility] [phone]           Al Pearson is a 78 y.o. female referred for evaluation of hx of SVT . Consult requested by Kp Rivas DO    CC - as documented in EMR    Dear Dr. Kp Rivas DO    I had the pleasure of seeing Ms. Al Pearson in the office today. Assessment:     Fatigue  History of SVT-s/p ablation 2013  Hypertension  Hyperlipidemia  Diabetes mellitus-not controlled  Goiter - needs surgery ;       Plan:      Stress nuclear study. Echocardiogram.  Blood pressure controlled. Lipids controlled per patient  Follow-up 4 to 6 weeks/earlier as needed. Aggressive cardiovascular risk for modification. Patient understands the plan. All questions were answered to the patient's satisfaction. I appreciate the opportunity to be involved in Ms. Sun. See note below for details. Please do not hesitate to contact us   with questions or concerns. Kim Porras MD      Cardiac Testing/ Procedures: A. Cardiac Cath/PCI:    B.ECHO/MICHELA:    C.StressNuclear/Stress ECHO/Stress test:    D.Vascular:    E. EP: 2013 - SVT ablation    F. Miscellaneous:    History:     Al Pearson is a 78 y.o. female who is referred establish cardiac care/prior to thyroid surgery    Patient used to be followed by Dr. Nikki Reyes. She has had SVT ablation 2013. Remote history of cardiac work-up including stress test being negative per patient. Complains of fatigue. No chest pain/dyspnea. No swelling lower extremities. No palpitations. Home SBP in 120s    Last HbA1c - 8 - few months    80-90s SVT;  Was on medications; continued to have breakthrough episodes - Had ablation 2013 - since then no breakthrough episodes    Past Medical/Surgical and Family/Social History:     Past Medical History:   Diagnosis Date    Arthritis     Diabetes ([address]     Goiter     5 Pt now also c/o urinary s/s.        Marjan Chakraborty RN  02/09/23 1807 total 1 resting on \"windpipe\".  HX OTHER MEDICAL     strep/ tonsillitis    Hypercholesterolemia     Hypertension     Pancreatitis     SVT (supraventricular tachycardia) ([address]     ablation in       Past Surgical History:   Procedure Laterality Date    COLONOSCOPY N/A 2019    [facility]      HX TUBAL LIGATION      SD CARDIAC SURG PROCEDURE UNLIST  2013    ablation     Family History   Problem Relation Age of Onset    Cancer Father 61        multi myloma    Cancer Brother 52        colon/liver    Cancer Daughter 29        cancerous colon polyps      Social History     Tobacco Use    Smoking status: Former Smoker     Packs/day: 1.00     Years: 17.00     Pack years: 17.00     Quit date: 1979     Years since quittin.7    Smokeless tobacco: Never Used   Substance Use Topics    Alcohol use: No    Drug use: No            Medications:       Current Outpatient Medications   Medication Sig Dispense    insulin glargine,hum.rec.anlog (TOUJEO MAX U-300 SOLOSTAR SC) 45 Units by SubCUTAneous route.  VITAMIN B COMPLEX PO Take 1 Tablet by mouth daily. 400 iu daily     Blood-Glucose Transmitter (Dexcom G6 Transmitter) yolie Dexcom G6 Transmitter device     spironolactone (ALDACTONE) 25 mg tablet Take 25 mg by mouth two (2) times a day.  pravastatin (PRAVACHOL) 10 mg tablet Take 10 mg by mouth nightly. Indications: high cholesterol     insulin lispro (HUMALOG) 100 unit/mL kwikpen by SubCUTAneous route Before breakfast, lunch, dinner and at bedtime. Indications: type 2 diabetes mellitus     amLODIPine (NORVASC) 5 mg tablet Take 5 mg by mouth daily. Indications: high blood pressure     aspirin delayed-release 81 mg tablet Take 1 Tab by mouth daily. 30 Tab    magnesium oxide (MAG-OX) 400 mg tablet Take 2 Tabs by mouth daily.  60 Tab    metformin (GLUCOPHAGE) 1,000 mg tablet Take 1 Tab by mouth two (2) times daily (with meals). (Patient taking differently: Take 500 mg by mouth two (2) times daily (with meals). ) 1 Tab    glucose blood VI test strips (BLOOD GLUCOSE TEST) strip by Does Not Apply route. 1 box 1 Package    cholecalciferol (Vitamin D3) (2,000 UNITS /50 MCG) cap capsule Take 2,000 Units by mouth daily.  vitamin E (AQUA GEMS) 400 unit capsule Take 400 Units by mouth daily.  glimepiride (AMARYL) 2 mg tablet Take 2 mg by mouth two (2) times a day (before meals).  Ramipril 10 mg Tab Take 10 mg by mouth daily.  carvedilol (COREG) 25 mg tablet Take 25 mg by mouth two (2) times a day. Crestor 10mg daily    No current facility-administered medications for this visit. Review of Systems:     (bold if positive, if negative)    Gen:  Eyes:  ENT:   decreased hearingCVS: As in HPI Pulm:  GI:  :  MS:  Pain, arthritisSkin:  Psych:  Endo:  Hem:  Renal:  Neuro:  Physical Exam:     Visit Vitals  /78 (BP 1 Location: Left upper arm, BP Patient Position: Sitting)   Pulse 68   Ht 5' 5\" (1.651 m)   Wt 187 lb (84.8 kg)   SpO2 97%   BMI 31.12 kg/m²          Gen: Well-developed, well-nourished, in no acute distress  Neck: Supple,No JVD, No Carotid Bruit,   Resp: No accessory muscle use, Clear breath sounds, No rales or rhonchi  Card: Regular Rate,Rythm,Normal S1, S2, No murmurs, rubs or gallop. No thrills. Abd:  Soft, BS+,   MSK: No cyanosis  Skin: No rashes    Neuro: moving all four extremities , follows commands appropriately  Psych:  Good insight, oriented to person, place , alert, Nml Affect  LE: No edema    EKG: Sinus rhythm, normal axis, nonspecific ST-T changes        Medication Side Effects and Warnings were discussed with patient: yes  Patient Labs were reviewed and/or requested:  yes  Patient Past Records were reviewed and/or requested: yes    Total time:       mins    ATTENTION:   This medical record was transcribed using an electronic medical records/speech recognition system. Although proofread, it may and can contain electronic, spelling and other errors. Corrections may be executed at a later time. Please feel free to contact us for any clarifications as needed.       Anna Varner MD

## 2023-02-15 ENCOUNTER — OFFICE VISIT (OUTPATIENT)
Dept: FAMILY MEDICINE CLINIC | Age: 68
End: 2023-02-15
Payer: MEDICARE

## 2023-02-15 VITALS
TEMPERATURE: 98 F | BODY MASS INDEX: 32.92 KG/M2 | RESPIRATION RATE: 18 BRPM | HEART RATE: 70 BPM | WEIGHT: 180 LBS | SYSTOLIC BLOOD PRESSURE: 122 MMHG | DIASTOLIC BLOOD PRESSURE: 80 MMHG | OXYGEN SATURATION: 98 %

## 2023-02-15 DIAGNOSIS — R60.0 BILATERAL EDEMA OF LOWER EXTREMITY: ICD-10-CM

## 2023-02-15 DIAGNOSIS — E87.1 HYPONATREMIA: Primary | ICD-10-CM

## 2023-02-15 PROCEDURE — 1123F ACP DISCUSS/DSCN MKR DOCD: CPT | Performed by: NURSE PRACTITIONER

## 2023-02-15 PROCEDURE — 1036F TOBACCO NON-USER: CPT | Performed by: NURSE PRACTITIONER

## 2023-02-15 PROCEDURE — G8417 CALC BMI ABV UP PARAM F/U: HCPCS | Performed by: NURSE PRACTITIONER

## 2023-02-15 PROCEDURE — 1090F PRES/ABSN URINE INCON ASSESS: CPT | Performed by: NURSE PRACTITIONER

## 2023-02-15 PROCEDURE — 99213 OFFICE O/P EST LOW 20 MIN: CPT | Performed by: NURSE PRACTITIONER

## 2023-02-15 PROCEDURE — 3074F SYST BP LT 130 MM HG: CPT | Performed by: NURSE PRACTITIONER

## 2023-02-15 PROCEDURE — 3078F DIAST BP <80 MM HG: CPT | Performed by: NURSE PRACTITIONER

## 2023-02-15 PROCEDURE — G8484 FLU IMMUNIZE NO ADMIN: HCPCS | Performed by: NURSE PRACTITIONER

## 2023-02-15 PROCEDURE — 3017F COLORECTAL CA SCREEN DOC REV: CPT | Performed by: NURSE PRACTITIONER

## 2023-02-15 PROCEDURE — G8427 DOCREV CUR MEDS BY ELIG CLIN: HCPCS | Performed by: NURSE PRACTITIONER

## 2023-02-15 PROCEDURE — G8400 PT W/DXA NO RESULTS DOC: HCPCS | Performed by: NURSE PRACTITIONER

## 2023-02-15 RX ORDER — POTASSIUM CHLORIDE 20 MEQ/1
20 TABLET, EXTENDED RELEASE ORAL DAILY
Qty: 5 TABLET | Refills: 0 | Status: SHIPPED | OUTPATIENT
Start: 2023-02-15

## 2023-02-15 RX ORDER — FUROSEMIDE 20 MG/1
20 TABLET ORAL DAILY
Qty: 5 TABLET | Refills: 0 | Status: SHIPPED | OUTPATIENT
Start: 2023-02-15

## 2023-02-15 NOTE — PROGRESS NOTES
Bobbi Santos  1955  74 y.o. SUBJECT MARCIA:    Chief Complaint   Patient presents with    Follow-up     2-9-2023, Feet and lower legs are swollen. Was told Sodium level is low. LEONEL Santos is a 74 y.o. female that is here for ED follow up. She was seen in the ED on 2/9/23 with the cough and some chest discomfort. She was diagnosed with Covid on 2/1/23 and started on Paxlovid which she has completed. Her daughter is here and states her mother complains of increase swelling in feet and lower legs. Delana Prader states she has had swelling and is to wear compression socks but does not like wearing them. She denies shortness of breath or chest pain. During visit medications were discussed and consider decreasing some medications. Her daughter states they are concerned because of the number of medications Delana Prader has on the list.      Current Outpatient Medications on File Prior to Visit   Medication Sig Dispense Refill    NOVOLOG FLEXPEN 100 UNIT/ML injection pen Inject 15 Units into the skin 3 times daily (before meals) 12/01/2020 patient states she follows sliding scale regimen BS > 150 5 Adjustable Dose Pre-filled Pen Syringe 0    Insulin Pen Needle 31G X 5 MM MISC 1 each by Does not apply route daily 100 each 3    levothyroxine (SYNTHROID) 75 MCG tablet TAKE ONE (1) TABLET BY MOUTH EVERY MORNING (BEFORE BREAKFAST) 30 tablet 3    simvastatin (ZOCOR) 20 MG tablet TAKE ONE (1) TABLET BY MOUTH NIGHTLY 30 tablet 3    orphenadrine (NORFLEX) 100 MG extended release tablet       losartan (COZAAR) 50 MG tablet Take 1 tablet by mouth daily 30 tablet 5    gabapentin (NEURONTIN) 400 MG capsule       blood glucose test strips (TRUE METRIX BLOOD GLUCOSE TEST) strip 1 each by In Vitro route daily As needed.  100 each 3    montelukast (SINGULAIR) 10 MG tablet TAKE ONE (1) TABLET BY MOUTH EACH NIGHT AT BEDTIME 30 tablet 3    amitriptyline (ELAVIL) 25 MG tablet Take 2 tablets by mouth nightly 60 tablet 5 Handicap Placard MISC by Does not apply route 1 each 0    NIFEdipine (PROCARDIA XL) 90 MG extended release tablet Take 1 tablet by mouth daily 30 tablet 3    hydrOXYzine pamoate (VISTARIL) 50 MG capsule Take 50 mg by mouth three times daily      carbidopa-levodopa (SINEMET)  MG per tablet Take 1 tablet by mouth nightly 30 tablet 1    pantoprazole (PROTONIX) 40 MG tablet Take 1 tablet by mouth 2 times daily (before meals) 180 tablet 1    magnesium oxide (MAG-OX) 400 MG tablet Take 1 tablet by mouth 3 times daily 90 tablet 0    TRESIBA FLEXTOUCH 200 UNIT/ML SOPN Inject 20 Units into the skin every morning (Patient taking differently: Inject 60 Units into the skin at bedtime) 1 pen 2    metoprolol succinate (TOPROL XL) 50 MG extended release tablet Take 1 tablet by mouth daily 30 tablet 5    Probiotic Product (PROBIOTIC DIGESTIVE SUPP PO) Take 1 capsule by mouth every morning      ondansetron (ZOFRAN ODT) 4 MG disintegrating tablet Take 1 tablet by mouth every 8 hours as needed for Nausea 15 tablet 1    fluticasone (FLONASE) 50 MCG/ACT nasal spray 1 spray by Each Nostril route daily 32 g 1    nystatin (MYCOSTATIN) 218463 UNIT/GM powder Apply 3 times daily to affected areas 1 each 0    QUEtiapine (SEROQUEL) 25 MG tablet Take 1 tablet by mouth 2 times daily (Patient taking differently: Take 25 mg by mouth 2 times daily Indications: 25 mg in am and 50 mg in pm) 60 tablet 0    busPIRone (BUSPAR) 10 MG tablet Take 2 tablets by mouth 3 times daily 180 tablet 0    HYDROcodone-acetaminophen (NORCO)  MG per tablet Take 1 tablet by mouth every 6 hours as needed.        fluticasone-umeclidin-vilant (TRELEGY ELLIPTA) 100-62.5-25 MCG/INH AEPB Inhale 1 puff into the lungs daily 1 each 5    OXcarbazepine (TRILEPTAL) 300 MG tablet Take 1 tablet by mouth 2 times daily 60 tablet 3    docusate sodium (COLACE) 100 MG capsule Take 1 capsule by mouth 2 times daily 30 capsule 0    aluminum & magnesium hydroxide-simethicone (MAALOX MAX) 400-400-40 MG/5ML SUSP Take 15 mLs by mouth every 6 hours as needed (heart burn) 120 mL 0    aspirin 81 MG tablet Take 81 mg by mouth daily      Multiple Vitamins-Iron (MULTI-VITAMIN/IRON PO) Take  by mouth.      guaiFENesin (MUCINEX) 600 MG extended release tablet Take 1 tablet by mouth 2 times daily (Patient not taking: Reported on 2/15/2023) 30 tablet 1    [DISCONTINUED] sucralfate (CARAFATE) 1 GM tablet Take 1 tablet by mouth 4 times daily 120 tablet 3    levETIRAcetam (KEPPRA) 750 MG tablet Take 1 tablet by mouth 2 times daily 60 tablet 0    [DISCONTINUED] sodium chloride 1 g tablet Take 1 tablet by mouth 2 times daily (with meals) 90 tablet 3     No current facility-administered medications on file prior to visit. Past Medical History:   Diagnosis Date    Abnormal EKG 04/22/2014    Acid reflux     Anemia     Anesthesia     Nausea/Vomiting Post Op In Past    Anginal pain (HCC)     Denies Chest Pain At This Time    Anxiety     Arthritis     \"All Over\"    Asthma     Bipolar 1 disorder (HCC)     Cerebral artery occlusion with cerebral infarction (HCC)     CHF (congestive heart failure) (HCC)     Chronic back pain     Chronic kidney disease     DDD (degenerative disc disease), cervical     12- Patient reports she was dx with DDD of Cerival spine C6,C7    Depression     Diabetes mellitus ([address] Dx 1990's    Diabetic neuropathy ([address]     \"In My Legs And Feet\"    Dizziness     \"Sometimes\"    Dry skin     Enlarged ureter     Right Side    Fatty liver     Fibrocystic breast     Generalized anxiety disorder     Gout     Pt states she was diagnosed with gout in the past few months. H/O cardiac catheterization     Showed mild disease per last cath. H/O cardiovascular stress test 03/15/2010    EF 69%, normal perfusion study except for diaphragmatic artifact, uniform wall motion. H/O cardiovascular stress test 10/09/2008    EF 60%, no anginia, normal study.     H/O cardiovascular stress test 05/06/2014    EF 66%, no ischemia, normal LV systolic funciton, normal perfusion pattern. H/O Doppler ultrasound 02/28/2011    CAROTID DOPPLER-normal study. H/O echocardiogram 05/06/2014    Ef >55%. Impaired LV relaxation. H/O echocardiogram 10/14/2015    EF 60% Normal LV and systolic function. No significant valvulopathy seen. History of Holter monitoring 03/24/2015    24 hour - predominant rhythm sinus    [place] (hard of hearing)     Bilateral Ears    Hx of cardiovascular stress test 10/19/2015    lexiscan-normal,EF63%    Hx of motion sickness     HX OTHER MEDICAL     Primary Care Physician Is Dr. Bharati Mendosa In [facility]    Hyperlipidemia     Hypertension     IBS (irritable bowel syndrome)     Incisional hernia 04/2014    Kidney stones Last Episode In 2012 Or 2013    Passed Kidney Stones Numberous Times    Migraines     Nausea & vomiting     Nausea/Vomiting Post Op In Past    Other specified disorder of skin     12- Patient states she has a condition of her vaginal area (skin) which starts with the letters [place]. She is currently being treated with multiple creams and weekly Diflucan. 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     Staph infection Dx 1980's    Toes On Left Foot    Thyroid disease     hypothroidism    Tremor     \"Tremors All Over\"    Urinary incontinence     Vertigo     \"Sometimes\"    Wears glasses      Past Surgical History:   Procedure Laterality Date    APPENDECTOMY  1970's    Done With Cholecystectomy    BLADDER SURGERY  1970's Or 1980's    \"Stretched The Opening To The Bladder\", \"Total Of Four Bladder Surgeries\"    BREAST BIOPSY Right 1980's    Twice, Benign    BREAST SURGERY Left 1990's    Five, Benign    CARDIAC CATHETERIZATION  10-18-06 normal coronary angiogram with a normal left ventricular systolic function, patient can be treated medically.     CARDIAC CATHETERIZATION      \"Total 7 Cardiac Catheterizations\"    CARPAL TUNNEL RELEASE Right 1999    CHOLECYSTECTOMY  1970's    Appendectomy Also Done    COLONOSCOPY  Last Done 6-13    One Polyp Removed In Past    DENTAL SURGERY      All Teeth Extracted In Past    DIAGNOSTIC CARDIAC CATH LAB PROCEDURE  01/11/2010    no significant disease, continue medical therapy    ENDOSCOPY, COLON, DIAGNOSTIC  Several     ESOPHAGEAL DILATATION  1980's And 1990's    X 3    FRACTURE SURGERY  1974 Or 1975    Broken Bones Left [place] Due To [address]  1990's    Incisional Abdominal Hernia Repair  With Mesh    HERNIA REPAIR  1970's    Abdominal Hernia Repair    HYSTERECTOMY, TOTAL ABDOMINAL (CERVIX REMOVED)  1987    JOINT REPLACEMENT  2008    Total Right Knee    KNEE ARTHROSCOPY Right 1999    LITHOTRIPSY  2011    For Kidney Stones    OTHER SURGICAL HISTORY  06 13 1064    umbilical hernia with mesh    TUBAL LIGATION  1978     Family History   Problem Relation Age of Onset    Stroke Mother     Other Mother         Seizures    Diabetes Mother         Borderline Diabetes    High Blood Pressure Mother     Arthritis Mother     Early Death Mother 61        Stroke    Depression Mother     Heart Disease Mother     High Cholesterol Mother     Miscarriages / Stillbirths Mother     Mental Illness Mother     Mental Illness Father     Heart Disease Father         Massive Heart Attack    High Blood Pressure Father     Arthritis Father     High Cholesterol Father     Mental Illness Sister     Hearing Loss Sister     Heart Failure Sister     High Blood Pressure Sister     Arthritis Sister     Heart Disease Sister     Cancer Sister     Mental Illness Brother     Cancer Brother         Liver And Colon Cancer    Early Death Brother 37        Liver And Colon Cancer    Heart Disease Brother         Heart Stents High Blood Pressure Brother     High Cholesterol Brother     Early Death Brother         65 Years Old,Hit By A Car    Colon Cancer Brother     Heart Disease Brother     Mental Illness Brother     Early Death Brother 61        Heart Attack    Heart Disease Brother         Heart Attack    Mental Illness Daughter         Bipolar    Depression Daughter     Anxiety Disorder Daughter     Bipolar Disorder Daughter     Other Daughter         Stomach And Bowel Problems    Other Son         Seizures     Social History     Socioeconomic History    Marital status:      Spouse name: Not on file    Number of children: 3    Years of education: 6    Highest education level: Not on file   Occupational History    Not on file   Tobacco Use    Smoking status: Never    Smokeless tobacco: Never   Vaping Use    Vaping Use: Never used   Substance and Sexual Activity    Alcohol use: No     Comment: tea sometimes    Drug use: No    Sexual activity: Not Currently   Other Topics Concern    Not on file   Social History Narrative    Not on file     Social Determinants of Health     Financial Resource Strain: Medium Risk    Difficulty of Paying Living Expenses: Somewhat hard   Food Insecurity: Food Insecurity Present    Worried About Running Out of Food in the Last Year: Sometimes true    Ran Out of Food in the Last Year: Sometimes true   Transportation Needs: Unmet Transportation Needs    Lack of Transportation (Medical): Yes    Lack of Transportation (Non-Medical): Yes   Physical Activity: Inactive    Days of Exercise per Week: 0 days    Minutes of Exercise per Session: 0 min   Stress: Stress Concern Present    Feeling of Stress :  To some extent   Social Connections: Socially Isolated    Frequency of Communication with Friends and Family: More than three times a week    Frequency of Social Gatherings with Friends and Family: More than three times a week    Attends Restoration Services: Never    Active Member of Clubs or Organizations: No Attends Club or Organization Meetings: Never    Marital Status:    Intimate Partner Violence: Not At Risk    Fear of Current or Ex-Partner: No    Emotionally Abused: No    Physically Abused: No    Sexually Abused: No   Housing Stability: Low Risk     Unable to Pay for Housing in the Last Year: No    Number of Places Lived in the Last Year: 1    Unstable Housing in the Last Year: No       Review of Systems   Constitutional:  Negative for activity change, appetite change, chills, diaphoresis, fatigue, fever and unexpected weight change. Respiratory:  Negative for cough, chest tightness, shortness of breath and wheezing. Cardiovascular:  Positive for leg swelling. Negative for chest pain and palpitations. Gastrointestinal:  Negative for abdominal distention, abdominal pain and nausea. Neurological:  Negative for dizziness, tremors, seizures, weakness, light-headedness, numbness and headaches. Psychiatric/Behavioral:  Positive for dysphoric mood. The patient is nervous/anxious. OBJECTIVE:     /80 (Site: Right Upper Arm, Position: Sitting, Cuff Size: Medium Adult)   Pulse 70   Temp 98 °F (36.7 °C) (Infrared)   Resp 18   Wt 180 lb (81.6 kg)   SpO2 98%   BMI 32.92 kg/m²     Physical Exam  Vitals reviewed. Constitutional:       General: She is not in acute distress. Appearance: Normal appearance. She is well-developed. She is not ill-appearing or diaphoretic. HENT:      Head: Normocephalic and atraumatic. Right Ear: External ear normal.      Left Ear: External ear normal.      Nose: Nose normal.   Eyes:      General: No scleral icterus. Conjunctiva/sclera: Conjunctivae normal.      Pupils: Pupils are equal, round, and reactive to light. Cardiovascular:      Rate and Rhythm: Normal rate and regular rhythm. Heart sounds: Normal heart sounds. No murmur heard. No friction rub. No gallop.    Pulmonary:      Effort: Pulmonary effort is normal. No respiratory distress. Breath sounds: Normal breath sounds. No wheezing. Chest:      Chest wall: No tenderness. Abdominal:      Palpations: Abdomen is soft. Musculoskeletal:         General: Swelling and tenderness present. Normal range of motion. Cervical back: Normal range of motion and neck supple. Right lower leg: Edema present. Left lower leg: Edema present. Skin:     General: Skin is warm and dry. Neurological:      Mental Status: She is alert and oriented to person, place, and time. Gait: Gait normal.   Psychiatric:         Behavior: Behavior normal.         Thought Content:  Thought content normal.         Judgment: Judgment normal.       Results in Past 30 Days  Result Component Current Result Ref Range Previous Result Ref Range   Albumin 4.4 (2/9/2023) 3.4 - 5.0 GM/DL Not in Time Range    Alkaline Phosphatase 80 (2/9/2023) 40 - 128 IU/L Not in Time Range    ALT 24 (2/9/2023) 10 - 40 U/L Not in Time Range    AST 29 (2/9/2023) 15 - 37 IU/L Not in Time Range    BUN 9 (2/9/2023) 6 - 23 MG/DL Not in Time Range    Calcium 9.8 (2/9/2023) 8.3 - 10.6 MG/DL Not in Time Range    Chloride 86 (L) (2/9/2023) 99 - 110 mMol/L Not in Time Range    CO2 27 (2/9/2023) 21 - 32 MMOL/L Not in Time Range    Creatinine 0.5 (L) (2/9/2023) 0.6 - 1.1 MG/DL Not in Time Range    Est, Glom Filt Rate >60 (2/9/2023) >60 mL/min/1.73m2 Not in Time Range    Glucose 201 (H) (2/9/2023) 70 - 99 MG/DL Not in Time Range    Potassium 3.8 (2/9/2023) 3.5 - 5.1 MMOL/L Not in Time Range    Sodium 125 (L) (2/9/2023) 135 - 145 MMOL/L Not in Time Range    Total Bilirubin 0.4 (2/9/2023) 0.0 - 1.0 MG/DL Not in Time Range    Total Protein 7.3 (2/9/2023) 6.4 - 8.2 GM/DL Not in Time Range      Hemoglobin A1C (%)   Date Value   03/30/2022 9.0 (H)     LDL Calculated (MG/DL)   Date Value   10/11/2022 29       Lab Results   Component Value Date/Time    WBC 10.5 02/09/2023 10:48 AM    WBC 11.3 12/20/2022 12:25 PM    WBC 10.0 10/29/2022 08:30 AM    HGB 12.4 02/09/2023 10:48 AM    HGB 13.4 12/20/2022 12:25 PM    HGB 13.7 10/29/2022 08:30 AM    HCT 35.7 02/09/2023 10:48 AM    HCT 39.2 12/20/2022 12:25 PM    HCT 39.6 10/29/2022 08:30 AM    MCV 80.4 02/09/2023 10:48 AM    MCV 81.2 12/20/2022 12:25 PM    MCV 81.3 10/29/2022 08:30 AM     02/09/2023 10:48 AM     12/20/2022 12:25 PM     10/29/2022 08:30 AM    SEGSABS 5.7 02/09/2023 10:48 AM    SEGSABS 7.1 12/20/2022 12:25 PM    SEGSABS 6.2 10/29/2022 08:30 AM    LYMPHSABS 3.2 02/09/2023 10:48 AM    MONOSABS 1.1 02/09/2023 10:48 AM    EOSABS 0.4 02/09/2023 10:48 AM    BASOSABS 0.1 02/09/2023 10:48 AM     Lab Results   Component Value Date/Time    TSHHS 1.310 12/20/2022 06:04 PM     Lab Results   Component Value Date/Time    LABALBU 4.4 02/09/2023 10:48 AM    BILITOT 0.4 02/09/2023 10:48 AM    BILIDIR 0.2 01/10/2022 11:50 AM    IBILI 0.2 01/10/2022 11:50 AM    AST 29 02/09/2023 10:48 AM    ALT 24 02/09/2023 10:48 AM    ALKPHOS 80 02/09/2023 10:48 AM             No results found for this visit on 02/15/23. ASSESSMENT AND PLAN:     1. Hyponatremia  - Basic Metabolic Panel; Future    2. Bilateral edema of lower extremity  - furosemide (LASIX) 20 MG tablet; Take 1 tablet by mouth daily  Dispense: 5 tablet; Refill: 0  - potassium chloride (KLOR-CON M) 20 MEQ extended release tablet; Take 1 tablet by mouth daily  Dispense: 5 tablet; Refill: 0    Take medication as directed. Get blood work done. Bring in list of all medications with dosages or bring in bottles at next visit. Need to review what she is taking and not taking. Verbalized understanding and agreement with plan. No follow-ups on file. Care discussed with patient. Patient educated on signs and symptoms of exacerbation and when to seek further medical attention. Advised to call for any problems, questions, or concerns. Patient verbalizes understanding and agrees with plan. Medications reviewed and reconciled.  Continue current medications. Appropriate prescriptions are ordered. Risks and benefits of meds are discussed. After visit summary provided.

## 2023-02-17 ENCOUNTER — TELEPHONE (OUTPATIENT)
Dept: FAMILY MEDICINE CLINIC | Age: 68
End: 2023-02-17

## 2023-02-17 ASSESSMENT — ENCOUNTER SYMPTOMS
SHORTNESS OF BREATH: 0
NAUSEA: 0
CHEST TIGHTNESS: 0
WHEEZING: 0
ABDOMINAL PAIN: 0
COUGH: 0
ABDOMINAL DISTENTION: 0

## 2023-02-17 NOTE — TELEPHONE ENCOUNTER
Jayden Segovia states she is having issues with her eyes, brain, and heart. Jayden Segovia states she has an appointment with the cardiologist on 2/23 but has no transportation. Jayden Segovia was advised to contact their office to get her appointment rescheduled. Jayden Segovia states she is on the waiting list for her eye doctor and advised to have her daughter check her insurance to see who else is in network with her insurance to see if she can see a eye doctor sooner. Jayden Segovia states she is not having any chest pains or pressure but still have a little shortness of breath from Covid. Jayden Segovia also stated her pain doctor discharged her in December 2022 due to missing too many appointments. She stated they would call in 2 mons of medication for her. Jayden Segovia wants to discuss with Michael Madrigal she feels her medications arent working and that they are working against one another. Michael Madrigal was advised during the call and instructed Jayden Segovia to keep her appointments on 2/23/23 with Michael Madrigal and to be sure to go to her Cardiology appointment as well on 2/23/23. Note to WENDIE Hand.

## 2023-02-21 ENCOUNTER — TELEPHONE (OUTPATIENT)
Dept: FAMILY MEDICINE CLINIC | Age: 68
End: 2023-02-21

## 2023-02-21 NOTE — TELEPHONE ENCOUNTER
Pt called to let Tori Devi know feet are no better. Lasix helps only when laying down. Once up, the feet swell up. Celia, please advise.

## 2023-02-22 ENCOUNTER — TELEPHONE (OUTPATIENT)
Dept: FAMILY MEDICINE CLINIC | Age: 68
End: 2023-02-22

## 2023-02-22 DIAGNOSIS — R39.9 SYMPTOMS OF URINARY TRACT INFECTION: Primary | ICD-10-CM

## 2023-02-22 NOTE — TELEPHONE ENCOUNTER
Pt requesting urine tested for UTI. Pt states there is pain before, during, and after urination, also severe muscle spasms in that area. Could you please add this to the lab orders. Celia, please advise.

## 2023-02-23 ENCOUNTER — OFFICE VISIT (OUTPATIENT)
Dept: FAMILY MEDICINE CLINIC | Age: 68
End: 2023-02-23
Payer: MEDICARE

## 2023-02-23 ENCOUNTER — HOSPITAL ENCOUNTER (OUTPATIENT)
Age: 68
Discharge: HOME | End: 2023-02-23
Payer: MEDICARE

## 2023-02-23 VITALS
TEMPERATURE: 98.3 F | DIASTOLIC BLOOD PRESSURE: 78 MMHG | OXYGEN SATURATION: 98 % | HEART RATE: 74 BPM | SYSTOLIC BLOOD PRESSURE: 120 MMHG | WEIGHT: 178 LBS | BODY MASS INDEX: 32.56 KG/M2 | RESPIRATION RATE: 18 BRPM

## 2023-02-23 DIAGNOSIS — R60.0 BILATERAL EDEMA OF LOWER EXTREMITY: ICD-10-CM

## 2023-02-23 DIAGNOSIS — E78.5 DYSLIPIDEMIA: ICD-10-CM

## 2023-02-23 DIAGNOSIS — E03.9 HYPOTHYROIDISM, UNSPECIFIED TYPE: ICD-10-CM

## 2023-02-23 DIAGNOSIS — E87.1 HYPONATREMIA: Primary | ICD-10-CM

## 2023-02-23 DIAGNOSIS — Z76.0 MEDICATION REFILL: ICD-10-CM

## 2023-02-23 DIAGNOSIS — J30.9 ALLERGIC RHINITIS, UNSPECIFIED SEASONALITY, UNSPECIFIED TRIGGER: ICD-10-CM

## 2023-02-23 DIAGNOSIS — E08.22 DIABETES MELLITUS DUE TO UNDERLYING CONDITION WITH CHRONIC KIDNEY DISEASE, WITHOUT LONG-TERM CURRENT USE OF INSULIN, UNSPECIFIED CKD STAGE (HCC): ICD-10-CM

## 2023-02-23 DIAGNOSIS — F31.4 BIPOLAR 1 DISORDER, DEPRESSED, SEVERE (HCC): ICD-10-CM

## 2023-02-23 DIAGNOSIS — I10 ESSENTIAL HYPERTENSION: ICD-10-CM

## 2023-02-23 LAB
ANION GAP SERPL CALCULATED.3IONS-SCNC: 12 MMOL/L (ref 4–16)
BILIRUBIN URINE: NEGATIVE MG/DL
BLOOD, URINE: NEGATIVE
BUN SERPL-MCNC: 9 MG/DL (ref 6–23)
CALCIUM SERPL-MCNC: 9.6 MG/DL (ref 8.3–10.6)
CHLORIDE BLD-SCNC: 94 MMOL/L (ref 99–110)
CLARITY: CLEAR
CO2: 27 MMOL/L (ref 21–32)
COLOR: YELLOW
COMMENT UA: ABNORMAL
CREAT SERPL-MCNC: 0.5 MG/DL (ref 0.6–1.1)
GFR SERPL CREATININE-BSD FRML MDRD: >60 ML/MIN/1.73M2
GLUCOSE SERPL-MCNC: 201 MG/DL (ref 70–99)
GLUCOSE, URINE: 500 MG/DL
HBA1C MFR BLD: 8.1 %
KETONES, URINE: ABNORMAL MG/DL
LEUKOCYTE ESTERASE, URINE: NEGATIVE
NITRITE URINE, QUANTITATIVE: NEGATIVE
PH, URINE: 5.5 (ref 5–8)
POTASSIUM SERPL-SCNC: 4.3 MMOL/L (ref 3.5–5.1)
PROTEIN UA: NEGATIVE MG/DL
SODIUM BLD-SCNC: 133 MMOL/L (ref 135–145)
SPECIFIC GRAVITY UA: 1.02 (ref 1–1.03)
UROBILINOGEN, URINE: 0.2 MG/DL (ref 0.2–1)

## 2023-02-23 PROCEDURE — 1090F PRES/ABSN URINE INCON ASSESS: CPT | Performed by: NURSE PRACTITIONER

## 2023-02-23 PROCEDURE — 1123F ACP DISCUSS/DSCN MKR DOCD: CPT | Performed by: NURSE PRACTITIONER

## 2023-02-23 PROCEDURE — G8484 FLU IMMUNIZE NO ADMIN: HCPCS | Performed by: NURSE PRACTITIONER

## 2023-02-23 PROCEDURE — G8427 DOCREV CUR MEDS BY ELIG CLIN: HCPCS | Performed by: NURSE PRACTITIONER

## 2023-02-23 PROCEDURE — 1036F TOBACCO NON-USER: CPT | Performed by: NURSE PRACTITIONER

## 2023-02-23 PROCEDURE — G8417 CALC BMI ABV UP PARAM F/U: HCPCS | Performed by: NURSE PRACTITIONER

## 2023-02-23 PROCEDURE — 80048 BASIC METABOLIC PNL TOTAL CA: CPT

## 2023-02-23 PROCEDURE — 83036 HEMOGLOBIN GLYCOSYLATED A1C: CPT | Performed by: NURSE PRACTITIONER

## 2023-02-23 PROCEDURE — 3078F DIAST BP <80 MM HG: CPT | Performed by: NURSE PRACTITIONER

## 2023-02-23 PROCEDURE — 81003 URINALYSIS AUTO W/O SCOPE: CPT

## 2023-02-23 PROCEDURE — 3017F COLORECTAL CA SCREEN DOC REV: CPT | Performed by: NURSE PRACTITIONER

## 2023-02-23 PROCEDURE — 99214 OFFICE O/P EST MOD 30 MIN: CPT | Performed by: NURSE PRACTITIONER

## 2023-02-23 PROCEDURE — G8400 PT W/DXA NO RESULTS DOC: HCPCS | Performed by: NURSE PRACTITIONER

## 2023-02-23 PROCEDURE — 36415 COLL VENOUS BLD VENIPUNCTURE: CPT

## 2023-02-23 PROCEDURE — 3074F SYST BP LT 130 MM HG: CPT | Performed by: NURSE PRACTITIONER

## 2023-02-23 RX ORDER — INSULIN ASPART 100 [IU]/ML
INJECTION, SOLUTION INTRAVENOUS; SUBCUTANEOUS
Qty: 5 ADJUSTABLE DOSE PRE-FILLED PEN SYRINGE | Refills: 0 | Status: SHIPPED | OUTPATIENT
Start: 2023-02-23

## 2023-02-23 RX ORDER — FUROSEMIDE 20 MG/1
20 TABLET ORAL DAILY
Qty: 5 TABLET | Refills: 0 | Status: SHIPPED | OUTPATIENT
Start: 2023-02-23

## 2023-02-23 RX ORDER — MAGNESIUM OXIDE 400 MG/1
400 TABLET ORAL 3 TIMES DAILY
Qty: 90 TABLET | Refills: 0 | Status: SHIPPED | OUTPATIENT
Start: 2023-02-23

## 2023-02-23 RX ORDER — INSULIN DEGLUDEC 200 U/ML
20 INJECTION, SOLUTION SUBCUTANEOUS EVERY MORNING
Qty: 5 ADJUSTABLE DOSE PRE-FILLED PEN SYRINGE | Refills: 2 | Status: CANCELLED | OUTPATIENT
Start: 2023-02-23

## 2023-02-23 RX ORDER — SIMVASTATIN 20 MG
TABLET ORAL
Qty: 30 TABLET | Refills: 3 | Status: SHIPPED | OUTPATIENT
Start: 2023-02-23

## 2023-02-23 RX ORDER — FLASH GLUCOSE SENSOR
1 KIT MISCELLANEOUS CONTINUOUS
Qty: 2 EACH | Refills: 3 | Status: SHIPPED
Start: 2023-02-23 | End: 2023-02-27 | Stop reason: ALTCHOICE

## 2023-02-23 RX ORDER — PANTOPRAZOLE SODIUM 40 MG/1
40 TABLET, DELAYED RELEASE ORAL
Qty: 180 TABLET | Refills: 1 | Status: SHIPPED | OUTPATIENT
Start: 2023-02-23

## 2023-02-23 RX ORDER — LEVOTHYROXINE SODIUM 0.07 MG/1
TABLET ORAL
Qty: 30 TABLET | Refills: 3 | Status: SHIPPED | OUTPATIENT
Start: 2023-02-23

## 2023-02-23 RX ORDER — FLASH GLUCOSE SCANNING READER
1 EACH MISCELLANEOUS CONTINUOUS
Qty: 1 EACH | Refills: 3 | Status: SHIPPED
Start: 2023-02-23 | End: 2023-02-27 | Stop reason: ALTCHOICE

## 2023-02-23 RX ORDER — LEVETIRACETAM 750 MG/1
750 TABLET ORAL 2 TIMES DAILY
Qty: 60 TABLET | Refills: 0 | Status: SHIPPED | OUTPATIENT
Start: 2023-02-23 | End: 2023-03-25

## 2023-02-23 RX ORDER — INSULIN DEGLUDEC INJECTION 100 U/ML
25 INJECTION, SOLUTION SUBCUTANEOUS NIGHTLY
Qty: 5 ADJUSTABLE DOSE PRE-FILLED PEN SYRINGE | Refills: 2 | Status: SHIPPED | OUTPATIENT
Start: 2023-02-23

## 2023-02-23 RX ORDER — POTASSIUM CHLORIDE 20 MEQ/1
20 TABLET, EXTENDED RELEASE ORAL DAILY
Qty: 5 TABLET | Refills: 0 | Status: SHIPPED | OUTPATIENT
Start: 2023-02-23

## 2023-02-23 RX ORDER — GLUCOSAMINE HCL/CHONDROITIN SU 500-400 MG
CAPSULE ORAL
Qty: 200 STRIP | Refills: 3 | Status: SHIPPED | OUTPATIENT
Start: 2023-02-23

## 2023-02-23 RX ORDER — MONTELUKAST SODIUM 10 MG/1
TABLET ORAL
Qty: 30 TABLET | Refills: 3 | Status: SHIPPED | OUTPATIENT
Start: 2023-02-23

## 2023-02-23 SDOH — ECONOMIC STABILITY: FOOD INSECURITY: WITHIN THE PAST 12 MONTHS, YOU WORRIED THAT YOUR FOOD WOULD RUN OUT BEFORE YOU GOT MONEY TO BUY MORE.: NEVER TRUE

## 2023-02-23 SDOH — ECONOMIC STABILITY: FOOD INSECURITY: WITHIN THE PAST 12 MONTHS, THE FOOD YOU BOUGHT JUST DIDN'T LAST AND YOU DIDN'T HAVE MONEY TO GET MORE.: NEVER TRUE

## 2023-02-23 SDOH — ECONOMIC STABILITY: INCOME INSECURITY: HOW HARD IS IT FOR YOU TO PAY FOR THE VERY BASICS LIKE FOOD, HOUSING, MEDICAL CARE, AND HEATING?: NOT VERY HARD

## 2023-02-23 ASSESSMENT — COLUMBIA-SUICIDE SEVERITY RATING SCALE - C-SSRS
1. WITHIN THE PAST MONTH, HAVE YOU WISHED YOU WERE DEAD OR WISHED YOU COULD GO TO SLEEP AND NOT WAKE UP?: NO
2. HAVE YOU ACTUALLY HAD ANY THOUGHTS OF KILLING YOURSELF?: NO
6. HAVE YOU EVER DONE ANYTHING, STARTED TO DO ANYTHING, OR PREPARED TO DO ANYTHING TO END YOUR LIFE?: NO

## 2023-02-23 ASSESSMENT — PATIENT HEALTH QUESTIONNAIRE - PHQ9
7. TROUBLE CONCENTRATING ON THINGS, SUCH AS READING THE NEWSPAPER OR WATCHING TELEVISION: 3
SUM OF ALL RESPONSES TO PHQ QUESTIONS 1-9: 11
9. THOUGHTS THAT YOU WOULD BE BETTER OFF DEAD, OR OF HURTING YOURSELF: 2
3. TROUBLE FALLING OR STAYING ASLEEP: 1
6. FEELING BAD ABOUT YOURSELF - OR THAT YOU ARE A FAILURE OR HAVE LET YOURSELF OR YOUR FAMILY DOWN: 0
8. MOVING OR SPEAKING SO SLOWLY THAT OTHER PEOPLE COULD HAVE NOTICED. OR THE OPPOSITE, BEING SO FIGETY OR RESTLESS THAT YOU HAVE BEEN MOVING AROUND A LOT MORE THAN USUAL: 0
SUM OF ALL RESPONSES TO PHQ QUESTIONS 1-9: 9
SUM OF ALL RESPONSES TO PHQ QUESTIONS 1-9: 11
SUM OF ALL RESPONSES TO PHQ QUESTIONS 1-9: 11
5. POOR APPETITE OR OVEREATING: 2
SUM OF ALL RESPONSES TO PHQ9 QUESTIONS 1 & 2: 1
1. LITTLE INTEREST OR PLEASURE IN DOING THINGS: 0
2. FEELING DOWN, DEPRESSED OR HOPELESS: 1
4. FEELING TIRED OR HAVING LITTLE ENERGY: 2
10. IF YOU CHECKED OFF ANY PROBLEMS, HOW DIFFICULT HAVE THESE PROBLEMS MADE IT FOR YOU TO DO YOUR WORK, TAKE CARE OF THINGS AT HOME, OR GET ALONG WITH OTHER PEOPLE: 2

## 2023-02-23 NOTE — PROGRESS NOTES
Kelsy Jacobo  1955  74 y.o. SUBJECT MARCIA:    Chief Complaint   Patient presents with    3 Month Follow-Up    Diabetes    Leg Swelling     Feet are pretty swollen. Hands and fingers are slightly swollen. LEONEL Ward is a 74year old female who is in for 3 month follow up of diabetes and leg swelling. One of her daughters is at this visit. Diabetes  She uses [place] and regular insulin for her diabetes. HgA1C today is down (8.1%) from last reading of 9.0%. she states she is trying to watch certain foods in diet so she can lower blood sugar. She has not had a recent diabetic foot exam because she owes money and no appointment will be made until she settles her account. She states she does not have the funds to do so. She states she has an eye exam - 3/1 for diabetic exam.    Pain management   She continues to complain of back pain. She states she was dismissed from previous pain clinic for no shows and needs to find another. She continues to have leg and foot swelling. She states there is pain and when she took the last view pills of Lasix there was some decrease in the swelling but not much. She has been elevating her legs without much relief. Anxiety  She is seeing mental health and has an appointment next week. She states she will keep that appointment. Concern is the number of medications she is taking and interactions.      Current Outpatient Medications on File Prior to Visit   Medication Sig Dispense Refill    orphenadrine (NORFLEX) 100 MG extended release tablet  (Patient not taking: Reported on 2/27/2023)      gabapentin (NEURONTIN) 400 MG capsule       guaiFENesin (MUCINEX) 600 MG extended release tablet Take 1 tablet by mouth 2 times daily 30 tablet 1    amitriptyline (ELAVIL) 25 MG tablet Take 2 tablets by mouth nightly 60 tablet 5    Handicap Placard MISC by Does not apply route 1 each 0    hydrOXYzine pamoate (VISTARIL) 50 MG capsule Take 50 mg by mouth three times daily Probiotic Product (PROBIOTIC DIGESTIVE SUPP PO) Take 1 capsule by mouth every morning      ondansetron (ZOFRAN ODT) 4 MG disintegrating tablet Take 1 tablet by mouth every 8 hours as needed for Nausea 15 tablet 1    fluticasone (FLONASE) 50 MCG/ACT nasal spray 1 spray by Each Nostril route daily 32 g 1    nystatin (MYCOSTATIN) 788480 UNIT/GM powder Apply 3 times daily to affected areas 1 each 0    QUEtiapine (SEROQUEL) 25 MG tablet Take 1 tablet by mouth 2 times daily (Patient taking differently: Take 25 mg by mouth 2 times daily Indications: 25 mg in am and 50 mg in pm) 60 tablet 0    busPIRone (BUSPAR) 10 MG tablet Take 2 tablets by mouth 3 times daily 180 tablet 0    HYDROcodone-acetaminophen (NORCO)  MG per tablet Take 1 tablet by mouth every 6 hours as needed. fluticasone-umeclidin-vilant (TRELEGY ELLIPTA) 100-62.5-25 MCG/INH AEPB Inhale 1 puff into the lungs daily 1 each 5    OXcarbazepine (TRILEPTAL) 300 MG tablet Take 1 tablet by mouth 2 times daily 60 tablet 3    docusate sodium (COLACE) 100 MG capsule Take 1 capsule by mouth 2 times daily 30 capsule 0    aluminum & magnesium hydroxide-simethicone (MAALOX MAX) 400-400-40 MG/5ML SUSP Take 15 mLs by mouth every 6 hours as needed (heart burn) 120 mL 0    aspirin 81 MG tablet Take 81 mg by mouth daily      Multiple Vitamins-Iron (MULTI-VITAMIN/IRON PO) Take  by mouth. [DISCONTINUED] sucralfate (CARAFATE) 1 GM tablet Take 1 tablet by mouth 4 times daily 120 tablet 3    [DISCONTINUED] sodium chloride 1 g tablet Take 1 tablet by mouth 2 times daily (with meals) 90 tablet 3     No current facility-administered medications on file prior to visit.        Past Medical History:   Diagnosis Date    Abnormal EKG 04/22/2014    Acid reflux     Anemia     Anesthesia     Nausea/Vomiting Post Op In Past    Anginal pain (HCC)     Denies Chest Pain At This Time    Anxiety     Arthritis     \"All Over\"    Asthma     Bipolar 1 disorder (HCC)     Cerebral artery occlusion with cerebral infarction [facility])     CHF (congestive heart failure) (HCC)     Chronic back pain     Chronic kidney disease     DDD (degenerative disc disease), cervical     12- Patient reports she was dx with DDD of Cerival spine C6,C7    Depression     Diabetes mellitus ([address] Dx 1990's    Diabetic neuropathy ([address]     \"In My Legs And Feet\"    Dizziness     \"Sometimes\"    Dry skin     Enlarged ureter     Right Side    Fatty liver     Fibrocystic breast     Generalized anxiety disorder     Gout     Pt states she was diagnosed with gout in the past few months. H/O cardiac catheterization     Showed mild disease per last cath. H/O cardiovascular stress test 03/15/2010    EF 69%, normal perfusion study except for diaphragmatic artifact, uniform wall motion. H/O cardiovascular stress test 10/09/2008    EF 60%, no anginia, normal study. H/O cardiovascular stress test 05/06/2014    EF 66%, no ischemia, normal LV systolic funciton, normal perfusion pattern. H/O Doppler ultrasound 02/28/2011    CAROTID DOPPLER-normal study. H/O echocardiogram 05/06/2014    Ef >55%. Impaired LV relaxation. H/O echocardiogram 10/14/2015    EF 60% Normal LV and systolic function. No significant valvulopathy seen.      History of Holter monitoring 03/24/2015    24 hour - predominant rhythm sinus    [place] (hard of hearing)     Bilateral Ears    Hx of cardiovascular stress test 10/19/2015    lexiscan-normal,EF63%    Hx of motion sickness     HX OTHER MEDICAL     Primary Care Physician Is Dr. Vanessa Kaminski In [facility]    Hyperlipidemia     Hypertension     IBS (irritable bowel syndrome)     Incisional hernia 04/2014    Kidney stones Last Episode In 2012 Or 2013    Passed Kidney Stones Numberous Times    Migraines     Nausea & vomiting     Nausea/Vomiting Post Op In Past    NSTEMI (non-ST elevated myocardial infarction) ([address] 8/14/2017    Other specified disorder of skin     12- Patient states she has a condition of her vaginal area (skin) which starts with the letters Jossiejose maria Mcdermottpari. She is currently being treated with multiple creams and weekly Diflucan. 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 Staph infection Dx 1980's    Toes On Left Foot    Thyroid disease     hypothroidism    Tremor     \"Tremors All Over\"    Urinary incontinence     Vertigo     \"Sometimes\"    Wears glasses      Past Surgical History:   Procedure Laterality Date    APPENDECTOMY  1970's    Done With Cholecystectomy    BLADDER SURGERY  1970's Or 1980's    \"Stretched The Opening To The Bladder\", \"Total Of Four Bladder Surgeries\"    BREAST BIOPSY Right 1980's    Twice, Benign    BREAST SURGERY Left 1990's    Five, Benign    CARDIAC CATHETERIZATION  10-18-06    normal coronary angiogram with a normal left ventricular systolic function, patient can be treated medically.     CARDIAC CATHETERIZATION      \"Total 7 Cardiac Catheterizations\"    CARPAL TUNNEL RELEASE Right 1999    CHOLECYSTECTOMY  1970's    Appendectomy Also Done    COLONOSCOPY  Last Done 6-13    One Polyp Removed In Past    DENTAL SURGERY      All Teeth Extracted In Past    DIAGNOSTIC CARDIAC CATH LAB PROCEDURE  01/11/2010    no significant disease, continue medical therapy    ENDOSCOPY, COLON, DIAGNOSTIC  Several     ESOPHAGEAL DILATATION  1980's And 1990's    X 3    FRACTURE SURGERY  1974 Or 1975    Broken Bones Left [employer]ain[employer] Due To [address]  1990's    Incisional Abdominal Hernia Repair  With Mesh    HERNIA REPAIR  1970's    Abdominal Hernia Repair    HYSTERECTOMY, TOTAL ABDOMINAL (CERVIX REMOVED)  1987    JOINT REPLACEMENT  2008    Total Right Knee    KNEE ARTHROSCOPY Right 1999    LITHOTRIPSY  2011    For Kidney Stones    OTHER SURGICAL HISTORY  [id number]    umbilical hernia with mesh    TUBAL LIGATION  1978     Family History   Problem Relation Age of Onset    Stroke Mother     Other Mother         Seizures    Diabetes Mother         Borderline Diabetes    High Blood Pressure Mother     Arthritis Mother     Early Death Mother 61        Stroke    Depression Mother     Heart Disease Mother     High Cholesterol Mother     [facility] [place] [facility]     Mental Illness Mother     Mental Illness Father     Heart Disease Father         Massive Heart Attack    High Blood Pressure Father     Arthritis Father     High Cholesterol Father     Mental Illness Sister     Hearing Loss Sister     Heart Failure Sister     High Blood Pressure Sister     Arthritis Sister     Heart Disease Sister     Cancer Sister     Mental Illness Brother     Cancer Brother         Liver And Colon Cancer    Early Death Brother 37        Liver And Colon Cancer    Heart Disease Brother         Heart Stents    High Blood Pressure Brother     High Cholesterol Brother     Early Death Brother         65 Years Old,Hit By A Car    Colon Cancer Brother     Heart Disease Brother     Mental Illness Brother     Early Death Brother 61        Heart Attack    Heart Disease Brother         Heart Attack    Mental Illness Daughter         Bipolar    Depression Daughter     Anxiety Disorder Daughter     Bipolar Disorder Daughter     Other Daughter         Stomach And Bowel Problems    Other Son         Seizures     Social History     Socioeconomic History    Marital status:       Spouse name: Not on file    Number of children: 3    Years of education: 6    Highest education level: Not on file   Occupational History    Not on file   Tobacco Use    Smoking status: Never    Smokeless tobacco: Never   Vaping Use    Vaping Use: Never used   Substance and Sexual Activity    Alcohol use: No     Comment: tea sometimes    Drug use: No    Sexual activity: Not Currently   Other Topics Concern    Not on file   Social History Narrative    Not on file     Social Determinants of Health     Financial Resource Strain: Low Risk     Difficulty of Paying Living Expenses: Not very hard   Food Insecurity: No Food Insecurity    Worried About Running Out of Food in the Last Year: Never true    Ran Out of Food in the Last Year: Never true   Transportation Needs: Unmet Transportation Needs    Lack of Transportation (Medical): Yes    Lack of Transportation (Non-Medical): No   Physical Activity: Inactive    Days of Exercise per Week: 0 days    Minutes of Exercise per Session: 0 min   Stress: Stress Concern Present    Feeling of Stress : To some extent   Social Connections: Socially Isolated    Frequency of Communication with Friends and Family: More than three times a week    Frequency of Social Gatherings with Friends and Family: More than three times a week    Attends [employer] Services: Never    Active Member of Clubs or Organizations: No    Attends Club or Organization Meetings: Never    Marital Status:    Intimate Partner Violence: Not At Risk    Fear of Current or Ex-Partner: No    Emotionally Abused: No    Physically Abused: No    Sexually Abused: No   Housing Stability: Low Risk     Unable to Pay for Housing in the Last Year: No    Number of Places Lived in the Last Year: 1    Unstable Housing in the Last Year: No       Review of Systems   Constitutional:  Negative for activity change, appetite change, chills, diaphoresis, fatigue, fever and unexpected weight change. HENT:  Negative for trouble swallowing. Respiratory:  Negative for cough, chest tightness, shortness of breath and wheezing. Cardiovascular:  Positive for leg swelling. Negative for chest pain and palpitations. Gastrointestinal: Negative. Genitourinary: Negative. Musculoskeletal:  Positive for back pain. Skin: Negative. Neurological:  Negative for dizziness, syncope, light-headedness and numbness. Psychiatric/Behavioral:  Positive for dysphoric mood. The patient is nervous/anxious. OBJECTIVE:     /78 (Site: Right Upper Arm, Position: Sitting, Cuff Size: Medium Adult)   Pulse 74   Temp 98.3 °F (36.8 °C) (Infrared)   Resp 18   Wt 178 lb (80.7 kg)   SpO2 98%   BMI 32.56 kg/m²     Physical Exam  Vitals reviewed. Constitutional:       General: She is not in acute distress. Appearance: Normal appearance. She is well-developed. She is obese. She is not ill-appearing or diaphoretic. HENT:      Head: Normocephalic and atraumatic. Right Ear: External ear normal.      Left Ear: External ear normal.      Nose: Nose normal.   Eyes:      General: No scleral icterus. Conjunctiva/sclera: Conjunctivae normal.      Pupils: Pupils are equal, round, and reactive to light. Cardiovascular:      Rate and Rhythm: Normal rate and regular rhythm. Heart sounds: Normal heart sounds. No murmur heard. No friction rub. No gallop. Pulmonary:      Effort: Pulmonary effort is normal. No respiratory distress. Breath sounds: Normal breath sounds. No wheezing. Chest:      Chest wall: No tenderness. Abdominal:      Palpations: Abdomen is soft. Musculoskeletal:         General: Tenderness (both legs) present. Normal range of motion. Cervical back: Normal range of motion and neck supple. Right lower leg: Edema present. Left lower leg: Edema present. Skin:     General: Skin is warm and dry. Neurological:      Mental Status: She is alert and oriented to person, place, and time. Gait: Gait normal.   Psychiatric:         Behavior: Behavior normal.         Thought Content:  Thought content normal.         Judgment: Judgment normal.       Results in Past 30 Days  Result Component Current Result Ref Range Previous Result Ref Range   Albumin 4.4 (2/9/2023) 3.4 - 5.0 GM/DL Not in Time Range    Alkaline Phosphatase 80 (2/9/2023) 40 - 128 IU/L Not in Time Range    ALT 24 (2/9/2023) 10 - 40 U/L Not in Time Range    AST 29 (2/9/2023) 15 - 37 IU/L Not in Time Range    BUN 9 (2/23/2023) 6 - 23 MG/DL 9 (2/9/2023) 6 - 23 MG/DL   Calcium 9.6 (2/23/2023) 8.3 - 10.6 MG/DL 9.8 (2/9/2023) 8.3 - 10.6 MG/DL   Chloride 94 (L) (2/23/2023) 99 - 110 mMol/L 86 (L) (2/9/2023) 99 - 110 mMol/L   CO2 27 (2/23/2023) 21 - 32 MMOL/L 27 (2/9/2023) 21 - 32 MMOL/L   Creatinine 0.5 (L) (2/23/2023) 0.6 - 1.1 MG/DL 0.5 (L) (2/9/2023) 0.6 - 1.1 MG/DL   Est, Glom Filt Rate >60 (2/23/2023) >60 mL/min/1.73m2 >60 (2/9/2023) >60 mL/min/1.73m2   Glucose 201 (H) (2/23/2023) 70 - 99 MG/ (H) (2/9/2023) 70 - 99 MG/DL   Potassium 4.3 (2/23/2023) 3.5 - 5.1 MMOL/L 3.8 (2/9/2023) 3.5 - 5.1 MMOL/L   Sodium 133 (L) (2/23/2023) 135 - 145 MMOL/L 125 (L) (2/9/2023) 135 - 145 MMOL/L   Total Bilirubin 0.4 (2/9/2023) 0.0 - 1.0 MG/DL Not in Time Range    Total Protein 7.3 (2/9/2023) 6.4 - 8.2 GM/DL Not in Time Range      Hemoglobin A1C (%)   Date Value   02/23/2023 8.1     LDL Calculated (MG/DL)   Date Value   10/11/2022 29       Lab Results   Component Value Date/Time    WBC 10.5 02/09/2023 10:48 AM    WBC 11.3 12/20/2022 12:25 PM    WBC 10.0 10/29/2022 08:30 AM    HGB 12.4 02/09/2023 10:48 AM    HGB 13.4 12/20/2022 12:25 PM    HGB 13.7 10/29/2022 08:30 AM    HCT 35.7 02/09/2023 10:48 AM    HCT 39.2 12/20/2022 12:25 PM    HCT 39.6 10/29/2022 08:30 AM    MCV 80.4 02/09/2023 10:48 AM    MCV 81.2 12/20/2022 12:25 PM    MCV 81.3 10/29/2022 08:30 AM     02/09/2023 10:48 AM     12/20/2022 12:25 PM     10/29/2022 08:30 AM    SEGSABS 5.7 02/09/2023 10:48 AM    SEGSABS 7.1 12/20/2022 12:25 PM    SEGSABS 6.2 10/29/2022 08:30 AM    LYMPHSABS 3.2 02/09/2023 10:48 AM    MONOSABS 1.1 02/09/2023 10:48 AM    EOSABS 0.4 02/09/2023 10:48 AM    BASOSABS 0.1 02/09/2023 10:48 AM     Lab Results   Component Value Date/Time    TSHHS 1.310 12/20/2022 06:04 PM     Lab Results   Component Value Date/Time    LABALBU 4.4 02/09/2023 10:48 AM    BILITOT 0.4 02/09/2023 10:48 AM    BILIDIR 0.2 01/10/2022 11:50 AM    IBILI 0.2 01/10/2022 11:50 AM    AST 29 02/09/2023 10:48 AM    ALT 24 02/09/2023 10:48 AM    ALKPHOS 80 02/09/2023 10:48 AM             Results for orders placed or performed in visit on 02/23/23   POCT glycosylated hemoglobin (Hb A1C)   Result Value Ref Range    Hemoglobin A1C 8.1 %       ASSESSMENT AND PLAN:     1. Essential hypertension  - stable    2. Hyponatremia  - last sodium 133, up from 125    3. B[facility] disorder, depressed[facility]  - managed by mental health    4. Bilateral edema of lower extremity  - advised to keep appointment with cardiology  - furosemide (LASIX) 20 MG tablet; Take 1 tablet by mouth daily  Dispense: 5 tablet; Refill: 0  - potassium chloride (KLOR-CON M) 20 MEQ extended release tablet; Take 1 tablet by mouth daily  Dispense: 5 tablet; Refill: 0    5. Diabetes mellitus due to underlying condition with chronic kidney disease, without long-term current use of insulin, unspecified CKD stage (HCC)  - improving  - Insulin Pen Needle 31G X 5 MM MISC; 1 each by Does not apply route daily  Dispense: 100 each; Refill: 3  - NOVOLOG FLEXPEN 100 UNIT/ML injection pen; Give regular insulin based on sliding scale regimen BS > 150, give 15 units; >200, give 17 units, >250, give 19 units; >300 give 21 units. Dispense: 5 Adjustable Dose Pre-filled Pen Syringe; Refill: 0  - blood glucose monitor strips; Test three times a day & as needed for symptoms of irregular blood glucose. Dispense sufficient amount for indicated testing frequency plus additional to accommodate PRN testing needs. Dispense: 200 strip; Refill: 3  - Insulin Degludec (TRESIBA FLEXTOUCH) 100 UNIT/ML SOPN; Inject 25 Units into the skin nightly  Dispense: 5 Adjustable Dose Pre-filled Pen Syringe; Refill: 2    6.  Hypothyroidism, unspecified type  - levothyroxine (SYNTHROID) 75 MCG tablet; TAKE ONE (1) TABLET BY MOUTH EVERY MORNING (BEFORE BREAKFAST) Dispense: 30 tablet; Refill: 3    7. Medication refill  - levothyroxine (SYNTHROID) 75 MCG tablet; TAKE ONE (1) TABLET BY MOUTH EVERY MORNING (BEFORE BREAKFAST)  Dispense: 30 tablet; Refill: 3  - levETIRAcetam (KEPPRA) 750 MG tablet; Take 1 tablet by mouth 2 times daily  Dispense: 60 tablet; Refill: 0  - magnesium oxide (MAG-OX) 400 MG tablet; Take 1 tablet by mouth 3 times daily  Dispense: 90 tablet; Refill: 0  - montelukast (SINGULAIR) 10 MG tablet; TAKE ONE (1) TABLET BY MOUTH EACH NIGHT AT BEDTIME  Dispense: 30 tablet; Refill: 3  - NOVOLOG FLEXPEN 100 UNIT/ML injection pen; Give regular insulin based on sliding scale regimen BS > 150, give 15 units; >200, give 17 units, >250, give 19 units; >300 give 21 units. Dispense: 5 Adjustable Dose Pre-filled Pen Syringe; Refill: 0  - pantoprazole (PROTONIX) 40 MG tablet; Take 1 tablet by mouth 2 times daily (before meals)  Dispense: 180 tablet; Refill: 1  - simvastatin (ZOCOR) 20 MG tablet; TAKE ONE (1) TABLET BY MOUTH NIGHTLY  Dispense: 30 tablet; Refill: 3    8. Allergic rhinitis, unspecified seasonality, unspecified trigger  - montelukast (SINGULAIR) 10 MG tablet; TAKE ONE (1) TABLET BY MOUTH EACH NIGHT AT BEDTIME  Dispense: 30 tablet; Refill: 3    9. Dyslipidemia  - simvastatin (ZOCOR) 20 MG tablet; TAKE ONE (1) TABLET BY MOUTH NIGHTLY  Dispense: 30 tablet; Refill: 3    Continue current medication regimen. Follow up with cardiology. Get diabetic eye exam and foot exam.  Keep appointment with mental health. Medications refilled. No follow-ups on file. Care discussed with patient. Patient educated on signs and symptoms of exacerbation and when to seek further medical attention. Advised to call for any problems, questions, or concerns. Patient verbalizes understanding and agrees with plan. Medications reviewed and reconciled. Continue current medications. Appropriate prescriptions are ordered. Risks and benefits of meds are discussed.      After visit summary provided.

## 2023-02-27 ENCOUNTER — OFFICE VISIT (OUTPATIENT)
Dept: CARDIOLOGY CLINIC | Age: 68
End: 2023-02-27
Payer: MEDICARE

## 2023-02-27 VITALS — DIASTOLIC BLOOD PRESSURE: 68 MMHG | HEART RATE: 80 BPM | SYSTOLIC BLOOD PRESSURE: 128 MMHG

## 2023-02-27 DIAGNOSIS — E78.5 DYSLIPIDEMIA: ICD-10-CM

## 2023-02-27 DIAGNOSIS — I73.9 CLAUDICATION (HCC): ICD-10-CM

## 2023-02-27 DIAGNOSIS — I50.31 ACUTE DIASTOLIC CONGESTIVE HEART FAILURE (HCC): Primary | ICD-10-CM

## 2023-02-27 DIAGNOSIS — I10 ESSENTIAL HYPERTENSION: ICD-10-CM

## 2023-02-27 PROBLEM — I21.4 NSTEMI (NON-ST ELEVATED MYOCARDIAL INFARCTION) (HCC): Status: RESOLVED | Noted: 2017-08-14 | Resolved: 2023-02-27

## 2023-02-27 PROBLEM — I50.32 CHRONIC DIASTOLIC CONGESTIVE HEART FAILURE (HCC): Status: ACTIVE | Noted: 2021-10-12

## 2023-02-27 PROBLEM — I49.2 JUNCTIONAL PREMATURE DEPOLARIZATION (HCC): Status: ACTIVE | Noted: 2023-02-27

## 2023-02-27 PROCEDURE — 1090F PRES/ABSN URINE INCON ASSESS: CPT | Performed by: NURSE PRACTITIONER

## 2023-02-27 PROCEDURE — G8400 PT W/DXA NO RESULTS DOC: HCPCS | Performed by: NURSE PRACTITIONER

## 2023-02-27 PROCEDURE — 1123F ACP DISCUSS/DSCN MKR DOCD: CPT | Performed by: NURSE PRACTITIONER

## 2023-02-27 PROCEDURE — 99214 OFFICE O/P EST MOD 30 MIN: CPT | Performed by: NURSE PRACTITIONER

## 2023-02-27 PROCEDURE — G8417 CALC BMI ABV UP PARAM F/U: HCPCS | Performed by: NURSE PRACTITIONER

## 2023-02-27 PROCEDURE — G8484 FLU IMMUNIZE NO ADMIN: HCPCS | Performed by: NURSE PRACTITIONER

## 2023-02-27 PROCEDURE — 3078F DIAST BP <80 MM HG: CPT | Performed by: NURSE PRACTITIONER

## 2023-02-27 PROCEDURE — 1036F TOBACCO NON-USER: CPT | Performed by: NURSE PRACTITIONER

## 2023-02-27 PROCEDURE — G8427 DOCREV CUR MEDS BY ELIG CLIN: HCPCS | Performed by: NURSE PRACTITIONER

## 2023-02-27 PROCEDURE — 3017F COLORECTAL CA SCREEN DOC REV: CPT | Performed by: NURSE PRACTITIONER

## 2023-02-27 PROCEDURE — 3074F SYST BP LT 130 MM HG: CPT | Performed by: NURSE PRACTITIONER

## 2023-02-27 RX ORDER — METOLAZONE 2.5 MG/1
2.5 TABLET ORAL DAILY
Qty: 5 TABLET | Refills: 0 | Status: SHIPPED | OUTPATIENT
Start: 2023-02-27

## 2023-02-27 RX ORDER — NIFEDIPINE 90 MG/1
90 TABLET, EXTENDED RELEASE ORAL DAILY
Qty: 30 TABLET | Refills: 3 | Status: SHIPPED
Start: 2023-02-27 | End: 2023-02-27 | Stop reason: DRUGHIGH

## 2023-02-27 RX ORDER — NIFEDIPINE 60 MG/1
60 TABLET, EXTENDED RELEASE ORAL DAILY
Qty: 90 TABLET | Refills: 1 | Status: SHIPPED | OUTPATIENT
Start: 2023-02-27

## 2023-02-27 RX ORDER — METOPROLOL SUCCINATE 50 MG/1
50 TABLET, EXTENDED RELEASE ORAL DAILY
Qty: 30 TABLET | Refills: 5 | Status: SHIPPED | OUTPATIENT
Start: 2023-02-27

## 2023-02-27 RX ORDER — LOSARTAN POTASSIUM 50 MG/1
50 TABLET ORAL DAILY
Qty: 30 TABLET | Refills: 5 | Status: SHIPPED | OUTPATIENT
Start: 2023-02-27

## 2023-02-27 ASSESSMENT — ENCOUNTER SYMPTOMS
SLEEP DISTURBANCES DUE TO BREATHING: 0
COLOR CHANGE: 1
SHORTNESS OF BREATH: 0
ORTHOPNEA: 0

## 2023-02-27 NOTE — PROGRESS NOTES
2/27/2023  Primary cardiologist: Dr. Lewis Row:   Carol Fishman  is an established 74 y.o.  female here for concerns for circulation issues in her legs      SUBJECTIVE/OBJECTIVE:  Carol Fishman is a 74 y.o. female with a history of hypertension, hyperlipidemia, diabetes mellitus and chronic pain. HPI :   Carol Fishman reports that she has been having issues with progressive swelling of her lower legs and pain. She states that her PCP has been giving her some lasix intermittently over the last month. She states that her swelling is not significantly changed since starting lasix. She is concerned it is not improving. She is eating more salt as her sodium on her labs was low. Review of Systems   Constitutional: Negative for malaise/fatigue. Eyes:  Negative for visual disturbance. Cardiovascular:  Positive for leg swelling. Negative for chest pain, claudication, cyanosis, dyspnea on exertion, irregular heartbeat, near-syncope, orthopnea, palpitations, paroxysmal nocturnal dyspnea and syncope. Respiratory:  Negative for shortness of breath and sleep disturbances due to breathing. Skin:  Positive for color change. Neurological:  Negative for focal weakness, light-headedness and weakness. Psychiatric/Behavioral:  Negative for depression. The patient is not nervous/anxious. Vitals:    02/27/23 1428   BP: 128/68   Site: Left Upper Arm   Position: Sitting   Cuff Size: Large Adult   Pulse: 80     Patient-Reported Vitals 9/29/2022   Patient-Reported Weight 167lb   Patient-Reported Height 5 2   Patient-Reported Pulse unable to obtain   Patient-Reported Temperature unable to obtain     Wt Readings from Last 3 Encounters:   02/23/23 178 lb (80.7 kg)   02/20/23 180 lb (81.6 kg)   02/15/23 180 lb (81.6 kg)     There is no height or weight on file to calculate BMI. Physical Exam  Vitals reviewed. Constitutional:       General: She is not in acute distress. Appearance: Normal appearance. She is obese.  She is not ill-appearing. HENT:      Head: Atraumatic. Neck:      Vascular: No carotid bruit. Cardiovascular:      Rate and Rhythm: Normal rate and regular rhythm. Pulses:           Dorsalis pedis pulses are 1+ on the right side and 1+ on the left side. Posterior tibial pulses are 1+ on the right side and 1+ on the left side. Heart sounds: Normal heart sounds. No murmur heard. Pulmonary:      Effort: Pulmonary effort is normal. No respiratory distress. Breath sounds: Normal breath sounds. Musculoskeletal:         General: No deformity. Cervical back: Neck supple. No muscular tenderness. Right lower le+ Pitting Edema present. Left lower le+ Pitting Edema present. Neurological:      Mental Status: She is alert. Current Outpatient Medications   Medication Sig Dispense Refill    NIFEdipine (PROCARDIA XL) 90 MG extended release tablet Take 1 tablet by mouth daily 30 tablet 3    losartan (COZAAR) 50 MG tablet Take 1 tablet by mouth daily 30 tablet 5    metoprolol succinate (TOPROL XL) 50 MG extended release tablet Take 1 tablet by mouth daily 30 tablet 5    furosemide (LASIX) 20 MG tablet Take 1 tablet by mouth daily 5 tablet 0    montelukast (SINGULAIR) 10 MG tablet TAKE ONE (1) TABLET BY MOUTH EACH NIGHT AT BEDTIME 30 tablet 3    NOVOLOG FLEXPEN 100 UNIT/ML injection pen Give regular insulin based on sliding scale regimen BS > 150, give 15 units; >200, give 17 units, >250, give 19 units; >300 give 21 units. 5 Adjustable Dose Pre-filled Pen Syringe 0    pantoprazole (PROTONIX) 40 MG tablet Take 1 tablet by mouth 2 times daily (before meals) 180 tablet 1    simvastatin (ZOCOR) 20 MG tablet TAKE ONE (1) TABLET BY MOUTH NIGHTLY 30 tablet 3    potassium chloride (KLOR-CON M) 20 MEQ extended release tablet Take 1 tablet by mouth daily 5 tablet 0    blood glucose monitor strips Test three times a day & as needed for symptoms of irregular blood glucose.  Dispense sufficient amount for indicated testing frequency plus additional to accommodate PRN testing needs. 200 strip 3    amitriptyline (ELAVIL) 25 MG tablet Take 2 tablets by mouth nightly 60 tablet 5    Probiotic Product (PROBIOTIC DIGESTIVE SUPP PO) Take 1 capsule by mouth every morning      ondansetron (ZOFRAN ODT) 4 MG disintegrating tablet Take 1 tablet by mouth every 8 hours as needed for Nausea 15 tablet 1    fluticasone (FLONASE) 50 MCG/ACT nasal spray 1 spray by Each Nostril route daily 32 g 1    nystatin (MYCOSTATIN) 002936 UNIT/GM powder Apply 3 times daily to affected areas 1 each 0    QUEtiapine (SEROQUEL) 25 MG tablet Take 1 tablet by mouth 2 times daily (Patient taking differently: Take 25 mg by mouth 2 times daily Indications: 25 mg in am and 50 mg in pm) 60 tablet 0    busPIRone (BUSPAR) 10 MG tablet Take 2 tablets by mouth 3 times daily 180 tablet 0    fluticasone-umeclidin-vilant (TRELEGY ELLIPTA) 100-62.5-25 MCG/INH AEPB Inhale 1 puff into the lungs daily 1 each 5    OXcarbazepine (TRILEPTAL) 300 MG tablet Take 1 tablet by mouth 2 times daily 60 tablet 3    docusate sodium (COLACE) 100 MG capsule Take 1 capsule by mouth 2 times daily 30 capsule 0    aluminum & magnesium hydroxide-simethicone (MAALOX MAX) 400-400-40 MG/5ML SUSP Take 15 mLs by mouth every 6 hours as needed (heart burn) 120 mL 0    aspirin 81 MG tablet Take 81 mg by mouth daily      Multiple Vitamins-Iron (MULTI-VITAMIN/IRON PO) Take  by mouth.       Insulin Pen Needle 31G X 5 MM MISC 1 each by Does not apply route daily 100 each 3    levothyroxine (SYNTHROID) 75 MCG tablet TAKE ONE (1) TABLET BY MOUTH EVERY MORNING (BEFORE BREAKFAST) 30 tablet 3    levETIRAcetam (KEPPRA) 750 MG tablet Take 1 tablet by mouth 2 times daily 60 tablet 0    magnesium oxide (MAG-OX) 400 MG tablet Take 1 tablet by mouth 3 times daily 90 tablet 0    Insulin Degludec (TRESIBA FLEXTOUCH) 100 UNIT/ML SOPN Inject 25 Units into the skin nightly 5 Adjustable Dose Pre-filled Pen Syringe 2    Continuous Blood Gluc Sensor (FREESTYLE CHUYITA 2 SENSOR) MISC 1 each by Does not apply route continuous (Patient not taking: Reported on 2/27/2023) 2 each 3    Continuous Blood Gluc  (FREESTYLE CHUYITA 2 READER) WES 1 each by Does not apply route continuous (Patient not taking: Reported on 2/27/2023) 1 each 3    orphenadrine (NORFLEX) 100 MG extended release tablet  (Patient not taking: Reported on 2/27/2023)      gabapentin (NEURONTIN) 400 MG capsule       guaiFENesin (MUCINEX) 600 MG extended release tablet Take 1 tablet by mouth 2 times daily 30 tablet 1    Handicap Placard MISC by Does not apply route 1 each 0    hydrOXYzine pamoate (VISTARIL) 50 MG capsule Take 50 mg by mouth three times daily      carbidopa-levodopa (SINEMET)  MG per tablet Take 1 tablet by mouth nightly (Patient not taking: No sig reported) 30 tablet 1    HYDROcodone-acetaminophen (NORCO)  MG per tablet Take 1 tablet by mouth every 6 hours as needed. No current facility-administered medications for this visit. All pertinent data reviewed and discussed with patient    08/2017 cardiac catheterization  Angiograhically normal epicardial coronary arteries. Normal LV systolic function & wall motion. LVEF is 55 %. Patient tolerated the procedure well. No immediate complications      Echocardiogram 03/2022  Technically difficult examination; patient unable to tolerate exam.   Left ventricular systolic function is normal.   Ejection fraction is visually estimated at 50-55%. No significant valvular abnormalities. No evidence of any pericardial effusion. Possible PFO visualized with color Doppler; bubble study was inadequate. ASSESSMENT/PLAN:  Leg pain/ claudication   Has pain with walking in addition to the neuropathy. She has swelling worse than it had been previously. She states that she was told she had blockage in the legs 30-40 years ago.    She was not able to complete the APPLE due to pain her legs and pain medication not helping. Will check Aorta with run off to legs. No DVT noted on US   May have venous congestion. Will evaluate after CTA and swelling improved. She is having too much pain with light touch to tolerate venous US. Lower leg swelling  Agree with starting lasix from PCP. Add metolazone 2.5 mg daily. Decrease procardia as CCB could be contributing to swelling  Eliminate all sodium from diet. Check labs in 1 week. HFpEF   Reports she has a history HFpEF past.  Echocardiogram from March 2022 reviewed. Normal left ventricular systolic function with no significant valvular abnormalities noted:  Weight is fluctuating frequently. She thinks it is up today 2 lbs from yesterday. Advised low sodium diet   Weigh self daily       Hypertension  Good today. Will see how she looks after decrease in nifedipine. Continue losartan 50 mg daily. Tests ordered: CT abdomin with run off, labs    Follow-up: 1-2 weeks    Signed:  WENDIE Flores CNP, 2/27/2023, 2:37 PM    An electronic signature was used to authenticate this note. Please note this report has been partially produced using speech recognition software and may contain errors related to that system including errors in grammar, punctuation, and spelling, as well as words and phrases that may be inappropriate. If there are any questions or concerns please feel free to contact the dictating provider for clarification.

## 2023-02-28 ASSESSMENT — ENCOUNTER SYMPTOMS
TROUBLE SWALLOWING: 0
COUGH: 0
BACK PAIN: 1
CHEST TIGHTNESS: 0
SHORTNESS OF BREATH: 0
GASTROINTESTINAL NEGATIVE: 1
WHEEZING: 0

## 2023-03-08 ENCOUNTER — NURSE ONLY (OUTPATIENT)
Dept: FAMILY MEDICINE CLINIC | Age: 68
End: 2023-03-08

## 2023-03-08 DIAGNOSIS — E11.42 TYPE 2 DIABETES MELLITUS WITH DIABETIC POLYNEUROPATHY, WITH LONG-TERM CURRENT USE OF INSULIN (HCC): Primary | ICD-10-CM

## 2023-03-08 DIAGNOSIS — Z79.4 TYPE 2 DIABETES MELLITUS WITH DIABETIC POLYNEUROPATHY, WITH LONG-TERM CURRENT USE OF INSULIN (HCC): Primary | ICD-10-CM

## 2023-03-08 NOTE — PROGRESS NOTES
Patient & her daughter present for initiation of Freestyle Cely personal CGM  Referring provider: Celia Nayak CNP    Cely 2 sensor inserted on back of left Arm. Sensor initiated via Cely 2  carla on pt personal mobile device.  Reviewed interstitial glucose versus capillary glucose.  Instructed on  setting and set up the appropriate alarms.   Time and date set on   70 for Low Alarm   300for High Alarm.  Explained the arrows and the meaning of their blood sugar.    Patient instructed the system is water resistant and can shower. To reinforce sensor with tape as needed if it begins to peel away from the skin.  Encouraged patient to call [facility] or the [facility] with any questions.    Patient verbalizes, via teach back, the understanding of:  Difference between sensor glucose and blood glucose meter.  Fingerstick confirmations required for calibrations.  Blood sugar trend arrows.  Site selection, rotation, preparation.  Proper steps to sensor insertion.  Starting the sensor.  Navigation of status, set up, and alert screens.  Scanning frequency.  Options for support    Patient will need additional education regarding application and disposal of sensors due to cognitive impairment.  Daughter will also be learning to provide at home support.    Chantale Schulte, RAMSEY RN  Diabetes Educator  [facility]  [phone] office  [phone] cell  [phone] fax  [email or web address]

## 2023-03-13 ENCOUNTER — OFFICE VISIT (OUTPATIENT)
Dept: CARDIOLOGY CLINIC | Age: 68
End: 2023-03-13
Payer: MEDICARE

## 2023-03-13 VITALS
HEART RATE: 70 BPM | SYSTOLIC BLOOD PRESSURE: 138 MMHG | DIASTOLIC BLOOD PRESSURE: 76 MMHG | HEIGHT: 62 IN | WEIGHT: 178 LBS | BODY MASS INDEX: 32.76 KG/M2

## 2023-03-13 DIAGNOSIS — I25.10 ATHEROSCLEROSIS OF NATIVE CORONARY ARTERY OF NATIVE HEART WITHOUT ANGINA PECTORIS: ICD-10-CM

## 2023-03-13 DIAGNOSIS — R60.0 BILATERAL EDEMA OF LOWER EXTREMITY: Primary | ICD-10-CM

## 2023-03-13 DIAGNOSIS — I73.9 CLAUDICATION (HCC): ICD-10-CM

## 2023-03-13 DIAGNOSIS — I10 ESSENTIAL HYPERTENSION: ICD-10-CM

## 2023-03-13 DIAGNOSIS — I50.32 CHRONIC DIASTOLIC CONGESTIVE HEART FAILURE (HCC): ICD-10-CM

## 2023-03-13 DIAGNOSIS — E78.5 DYSLIPIDEMIA: ICD-10-CM

## 2023-03-13 PROCEDURE — 3017F COLORECTAL CA SCREEN DOC REV: CPT | Performed by: NURSE PRACTITIONER

## 2023-03-13 PROCEDURE — 3078F DIAST BP <80 MM HG: CPT | Performed by: NURSE PRACTITIONER

## 2023-03-13 PROCEDURE — 1090F PRES/ABSN URINE INCON ASSESS: CPT | Performed by: NURSE PRACTITIONER

## 2023-03-13 PROCEDURE — G8400 PT W/DXA NO RESULTS DOC: HCPCS | Performed by: NURSE PRACTITIONER

## 2023-03-13 PROCEDURE — 99214 OFFICE O/P EST MOD 30 MIN: CPT | Performed by: NURSE PRACTITIONER

## 2023-03-13 PROCEDURE — 3074F SYST BP LT 130 MM HG: CPT | Performed by: NURSE PRACTITIONER

## 2023-03-13 PROCEDURE — 1036F TOBACCO NON-USER: CPT | Performed by: NURSE PRACTITIONER

## 2023-03-13 PROCEDURE — G8417 CALC BMI ABV UP PARAM F/U: HCPCS | Performed by: NURSE PRACTITIONER

## 2023-03-13 PROCEDURE — G8484 FLU IMMUNIZE NO ADMIN: HCPCS | Performed by: NURSE PRACTITIONER

## 2023-03-13 PROCEDURE — 1123F ACP DISCUSS/DSCN MKR DOCD: CPT | Performed by: NURSE PRACTITIONER

## 2023-03-13 PROCEDURE — G8427 DOCREV CUR MEDS BY ELIG CLIN: HCPCS | Performed by: NURSE PRACTITIONER

## 2023-03-13 RX ORDER — FUROSEMIDE 20 MG/1
20 TABLET ORAL DAILY
Qty: 30 TABLET | Refills: 0 | Status: SHIPPED | OUTPATIENT
Start: 2023-03-13

## 2023-03-13 RX ORDER — NIFEDIPINE 30 MG/1
30 TABLET, EXTENDED RELEASE ORAL DAILY
Qty: 90 TABLET | Refills: 3 | Status: SHIPPED | OUTPATIENT
Start: 2023-03-13

## 2023-03-13 RX ORDER — POTASSIUM CHLORIDE 20 MEQ/1
20 TABLET, EXTENDED RELEASE ORAL 2 TIMES DAILY
Qty: 60 TABLET | Refills: 0 | Status: SHIPPED | OUTPATIENT
Start: 2023-03-13

## 2023-03-13 RX ORDER — LOSARTAN POTASSIUM 100 MG/1
100 TABLET ORAL DAILY
Qty: 90 TABLET | Refills: 3 | Status: SHIPPED | OUTPATIENT
Start: 2023-03-13

## 2023-03-13 RX ORDER — METOLAZONE 2.5 MG/1
2.5 TABLET ORAL DAILY
Qty: 5 TABLET | Refills: 0 | Status: SHIPPED | OUTPATIENT
Start: 2023-03-13

## 2023-03-13 ASSESSMENT — ENCOUNTER SYMPTOMS
COLOR CHANGE: 1
ORTHOPNEA: 0
SHORTNESS OF BREATH: 0
SLEEP DISTURBANCES DUE TO BREATHING: 0

## 2023-03-13 NOTE — PROGRESS NOTES
3/13/2023  Primary cardiologist: Dr. Bearden Effort:   Che Smallwood  is an established 74 y.o.  female here for concerns for circulation issues in her legs      SUBJECTIVE/OBJECTIVE:  Che Smallwood is a 74 y.o. female with a history of hypertension, hyperlipidemia, diabetes mellitus and chronic pain. HPI :   Che Smallwood reports that she has been having issues with progressive swelling of her lower legs and pain. She states that her PCP has been giving her some lasix intermittently over the last month. She states that her swelling is not significantly changed since starting lasix. She is concerned it is not improving. She is eating more salt as her sodium on her labs was low. Review of Systems   Constitutional: Negative for malaise/fatigue. Eyes:  Negative for visual disturbance. Cardiovascular:  Positive for leg swelling. Negative for chest pain, claudication, cyanosis, dyspnea on exertion, irregular heartbeat, near-syncope, orthopnea, palpitations, paroxysmal nocturnal dyspnea and syncope. Respiratory:  Negative for shortness of breath and sleep disturbances due to breathing. Skin:  Positive for color change. Neurological:  Negative for focal weakness, light-headedness and weakness. Psychiatric/Behavioral:  Negative for depression. The patient is not nervous/anxious. Vitals:    03/13/23 1502   BP: 138/76   Site: Left Upper Arm   Position: Sitting   Cuff Size: Medium Adult   Pulse: 70   Weight: 178 lb (80.7 kg)   Height: 5' 2\" (1.575 m)     Patient-Reported Vitals 9/29/2022   Patient-Reported Weight 167lb   Patient-Reported Height 5 2   Patient-Reported Pulse unable to obtain   Patient-Reported Temperature unable to obtain     Wt Readings from Last 3 Encounters:   03/13/23 178 lb (80.7 kg)   02/23/23 178 lb (80.7 kg)   02/20/23 180 lb (81.6 kg)     Body mass index is 32.56 kg/m². Physical Exam  Vitals reviewed. Constitutional:       General: She is not in acute distress.      Appearance: Normal

## 2023-03-13 NOTE — PATIENT INSTRUCTIONS
**It is YOUR responsibilty to bring medication bottles and/or updated medication list to [address] [place] [address]. This will allow us to better serve you and all your healthcare needs**  [facility] Locations - No appointment necessary. Sites open Monday to Friday. Call your preferred location for test preparation, business   hours and other information you need. [employer]. [address] Constanza Pathak, [address] [facility]  Phone: [phone] [place]  [address] [facility]  [address] [place]estina etienne, [address]  Phone: [phone]     Please be informed that if you contact our office outside of normal business hours the physician on call cannot help with any scheduling or rescheduling issues, procedure instruction questions or any type of medication issue. We advise you for any urgent/emergency that you go to the nearest emergency room! PLEASE CALL OUR OFFICE DURING NORMAL BUSINESS HOURS    Monday - Friday   8 am to 5 pm    [place]: [phone]    [place]:  [phone]  Thank you for allowing us to care for you today! We want to ensure we can follow your treatment plan and we strive to give you the best outcomes and experience possible. If you ever have a life threatening emergency and call 911 - for an ambulance (EMS)   Our providers can only care for you at:   [facility] or [facility]. Even if you have someone take you or you drive yourself we can only care for you in a [facility] facility. Our providers are not setup at the other healthcare locations!

## 2023-03-14 ENCOUNTER — OFFICE VISIT (OUTPATIENT)
Dept: NEUROLOGY | Age: 68
End: 2023-03-14
Payer: MEDICARE

## 2023-03-14 ENCOUNTER — TELEPHONE (OUTPATIENT)
Dept: CARDIOLOGY CLINIC | Age: 68
End: 2023-03-14

## 2023-03-14 VITALS
SYSTOLIC BLOOD PRESSURE: 130 MMHG | OXYGEN SATURATION: 97 % | DIASTOLIC BLOOD PRESSURE: 72 MMHG | BODY MASS INDEX: 32.76 KG/M2 | HEART RATE: 72 BPM | HEIGHT: 62 IN | WEIGHT: 178 LBS

## 2023-03-14 DIAGNOSIS — I10 ESSENTIAL (PRIMARY) HYPERTENSION: ICD-10-CM

## 2023-03-14 DIAGNOSIS — G47.33 OSA (OBSTRUCTIVE SLEEP APNEA): ICD-10-CM

## 2023-03-14 DIAGNOSIS — R52 PAIN AGGRAVATED BY WALKING: ICD-10-CM

## 2023-03-14 DIAGNOSIS — G43.009 MIGRAINE WITHOUT AURA, NOT REFRACTORY: Primary | ICD-10-CM

## 2023-03-14 DIAGNOSIS — R60.0 BILATERAL EDEMA OF LOWER EXTREMITY: Primary | ICD-10-CM

## 2023-03-14 DIAGNOSIS — S91.109S NON-HEALING OPEN WOUND OF TOE, SEQUELA: ICD-10-CM

## 2023-03-14 DIAGNOSIS — E11.42 DIABETIC PERIPHERAL NEUROPATHY (HCC): ICD-10-CM

## 2023-03-14 DIAGNOSIS — I73.9 CLAUDICATION (HCC): ICD-10-CM

## 2023-03-14 DIAGNOSIS — D50.8 OTHER IRON DEFICIENCY ANEMIA: ICD-10-CM

## 2023-03-14 DIAGNOSIS — G47.34 NOCTURNAL HYPOXIA: ICD-10-CM

## 2023-03-14 DIAGNOSIS — G25.81 RESTLESS LEG SYNDROME: ICD-10-CM

## 2023-03-14 PROBLEM — G20 PARKINSON'S DISEASE (HCC): Status: ACTIVE | Noted: 2023-03-14

## 2023-03-14 PROBLEM — G20.A1 PARKINSON'S DISEASE: Status: ACTIVE | Noted: 2023-03-14

## 2023-03-14 PROCEDURE — G8484 FLU IMMUNIZE NO ADMIN: HCPCS | Performed by: NURSE PRACTITIONER

## 2023-03-14 PROCEDURE — G8417 CALC BMI ABV UP PARAM F/U: HCPCS | Performed by: NURSE PRACTITIONER

## 2023-03-14 PROCEDURE — 99215 OFFICE O/P EST HI 40 MIN: CPT | Performed by: NURSE PRACTITIONER

## 2023-03-14 PROCEDURE — 2022F DILAT RTA XM EVC RTNOPTHY: CPT | Performed by: NURSE PRACTITIONER

## 2023-03-14 PROCEDURE — 3052F HG A1C>EQUAL 8.0%<EQUAL 9.0%: CPT | Performed by: NURSE PRACTITIONER

## 2023-03-14 PROCEDURE — 3075F SYST BP GE 130 - 139MM HG: CPT | Performed by: NURSE PRACTITIONER

## 2023-03-14 PROCEDURE — 1123F ACP DISCUSS/DSCN MKR DOCD: CPT | Performed by: NURSE PRACTITIONER

## 2023-03-14 PROCEDURE — 36415 COLL VENOUS BLD VENIPUNCTURE: CPT | Performed by: NURSE PRACTITIONER

## 2023-03-14 PROCEDURE — 1036F TOBACCO NON-USER: CPT | Performed by: NURSE PRACTITIONER

## 2023-03-14 PROCEDURE — G8400 PT W/DXA NO RESULTS DOC: HCPCS | Performed by: NURSE PRACTITIONER

## 2023-03-14 PROCEDURE — 3017F COLORECTAL CA SCREEN DOC REV: CPT | Performed by: NURSE PRACTITIONER

## 2023-03-14 PROCEDURE — 1090F PRES/ABSN URINE INCON ASSESS: CPT | Performed by: NURSE PRACTITIONER

## 2023-03-14 PROCEDURE — G8427 DOCREV CUR MEDS BY ELIG CLIN: HCPCS | Performed by: NURSE PRACTITIONER

## 2023-03-14 PROCEDURE — 3078F DIAST BP <80 MM HG: CPT | Performed by: NURSE PRACTITIONER

## 2023-03-14 NOTE — PROGRESS NOTES
3/14/23    Naveed Fillers  1955    Chief Complaint   Patient presents with    Migraine     Pt presents for migraine f/u, pt states she has headaches everyday     Peripheral Neuropathy     Pt presents for f/u of neuropathy, pt states the neuropathy has worsened, pt states she believes she may have RLS        History of Present Illness  Margoth Austin is a 74 y.o. female presenting today for follow-up of: Migraine. Margoth Austin remains on amitriptyline 50 mg at bedtime for migraine prevention. On last visit November 2022, iron studies were ordered due to increase in restless leg symptoms, I do not see that these were completed. She tells me today she was unable to have these labs drawn due to expense as they were coded under RLS rather than anemia so there was a charge associated with the lab draw. She was to remain on carbidopa levodopa 10/100 for management. She reported having had a sleep study but did not yet have results. Botox or CRGP inhibitor were discussed on last visit and further preventative therapy is necessary. Patient did notify the office in February that she was diagnosed with COVID, reported having severe headaches. She is not on sinemet as she ran out. She tells me she is having a great deal of leg discomfort. It was noted in last office visit also that patient was on Seroquel, she was told to continue following with psychiatry for management. 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 Also oxygen saturation levels less than 88% for 249 minutes of total sleep time. She is now scheduled for another PSG/MSLT to rule out narcolepsy. She denies having a history narcolepsy symptoms. She is quite pale today, she does have a history of anemia.       Current Outpatient Medications   Medication Sig Dispense Refill    furosemide (LASIX) 20 MG tablet Take 1 tablet by mouth daily 30 tablet 0    metOLazone (ZAROXOLYN) 2.5 MG tablet Take 1 tablet by mouth daily 5 tablet 0    potassium chloride (KLOR-CON M) 20 MEQ extended release tablet Take 1 tablet by mouth 2 times daily 60 tablet 0    NIFEdipine (PROCARDIA XL) 30 MG extended release tablet Take 1 tablet by mouth daily 90 tablet 3    losartan (COZAAR) 100 MG tablet Take 1 tablet by mouth daily 90 tablet 3    metoprolol succinate (TOPROL XL) 50 MG extended release tablet Take 1 tablet by mouth daily 30 tablet 5    Insulin Pen Needle 31G X 5 MM MISC 1 each by Does not apply route daily 100 each 3    levothyroxine (SYNTHROID) 75 MCG tablet TAKE ONE (1) TABLET BY MOUTH EVERY MORNING (BEFORE BREAKFAST) 30 tablet 3    levETIRAcetam (KEPPRA) 750 MG tablet Take 1 tablet by mouth 2 times daily 60 tablet 0    magnesium oxide (MAG-OX) 400 MG tablet Take 1 tablet by mouth 3 times daily 90 tablet 0    montelukast (SINGULAIR) 10 MG tablet TAKE ONE (1) TABLET BY MOUTH EACH NIGHT AT BEDTIME 30 tablet 3    NOVOLOG FLEXPEN 100 UNIT/ML injection pen Give regular insulin based on sliding scale regimen BS > 150, give 15 units; >200, give 17 units, >250, give 19 units; >300 give 21 units. 5 Adjustable Dose Pre-filled Pen Syringe 0    pantoprazole (PROTONIX) 40 MG tablet Take 1 tablet by mouth 2 times daily (before meals) 180 tablet 1    simvastatin (ZOCOR) 20 MG tablet TAKE ONE (1) TABLET BY MOUTH NIGHTLY 30 tablet 3    blood glucose monitor strips Test three times a day & as needed for symptoms of irregular blood glucose. Dispense sufficient amount for indicated testing frequency plus additional to accommodate PRN testing needs.  200 strip 3    Insulin Degludec (TRESIBA FLEXTOUCH) 100 UNIT/ML SOPN Inject 25 Units into the skin nightly 5 Adjustable Dose Pre-filled Pen Syringe 2    orphenadrine (NORFLEX) 100 MG extended release tablet  (Patient not taking: Reported on 2/27/2023)      gabapentin (NEURONTIN) 400 MG capsule       guaiFENesin (MUCINEX) 600 MG extended release tablet Take 1 tablet by mouth 2 times daily 30 tablet 1    amitriptyline (ELAVIL) 25 MG tablet Take 2 tablets by mouth nightly 60 tablet 5    Handicap Placard MISC by Does not apply route 1 each 0    hydrOXYzine pamoate (VISTARIL) 50 MG capsule Take 50 mg by mouth three times daily      Probiotic Product (PROBIOTIC DIGESTIVE SUPP PO) Take 1 capsule by mouth every morning      ondansetron (ZOFRAN ODT) 4 MG disintegrating tablet Take 1 tablet by mouth every 8 hours as needed for Nausea 15 tablet 1    fluticasone (FLONASE) 50 MCG/ACT nasal spray 1 spray by Each Nostril route daily 32 g 1    nystatin (MYCOSTATIN) 469202 UNIT/GM powder Apply 3 times daily to affected areas 1 each 0    QUEtiapine (SEROQUEL) 25 MG tablet Take 1 tablet by mouth 2 times daily (Patient taking differently: Take 25 mg by mouth 2 times daily Indications: 25 mg in am and 50 mg in pm) 60 tablet 0    busPIRone (BUSPAR) 10 MG tablet Take 2 tablets by mouth 3 times daily 180 tablet 0    HYDROcodone-acetaminophen (NORCO)  MG per tablet Take 1 tablet by mouth every 6 hours as needed. fluticasone-umeclidin-vilant (TRELEGY ELLIPTA) 100-62.5-25 MCG/INH AEPB Inhale 1 puff into the lungs daily 1 each 5    OXcarbazepine (TRILEPTAL) 300 MG tablet Take 1 tablet by mouth 2 times daily 60 tablet 3    docusate sodium (COLACE) 100 MG capsule Take 1 capsule by mouth 2 times daily 30 capsule 0    aluminum & magnesium hydroxide-simethicone (MAALOX MAX) 400-400-40 MG/5ML SUSP Take 15 mLs by mouth every 6 hours as needed (heart burn) 120 mL 0    aspirin 81 MG tablet Take 81 mg by mouth daily      Multiple Vitamins-Iron (MULTI-VITAMIN/IRON PO) Take  by mouth. No current facility-administered medications for this visit.        Physical Exam:       Mental Status: A&O to self, location, month and year, NAD, speech clear, language fluent, repetition and naming intact, follows commands appropriately     Cranial Nerve Exam:   CN II-XII: PERRL, VFF, no nystagmus, no gaze paresis, sensation V1-V3 intact b/l, muscles of facial expression symmetric; hearing intact to conversational tone, palate elevates symmetrically, shoulder elevation symmetric and tongue protrudes midline with movement side to side.     Motor Exam:       Strength 4/5 BL RUE/RLE Generalized weakness  Tone and bulk normal   No pronator drift     Deep Tendon Reflexes: 2/4 biceps, triceps, brachioradialis, patellar, and achilles b/l; flexor plantar responses b/l     Sensation: Intact light touch/vibration UE's/decreased sensation to vibration to LE's b/l     Coordination/Cerebellum:       Tremors--none      Rapidly alternating movements:  dysdiadochokinesia b/l                Heel-to-Shin: no dysmetria b/l      Finger-to-Nose: no dysmetria b/l     Gait and stance:      Gait: ambulates independently, ambulates without assistance    /72 (Site: Right Upper Arm, Position: Sitting, Cuff Size: Large Adult)   Pulse 72   Ht 5' 2\" (1.575 m)   Wt 178 lb (80.7 kg)   SpO2 97%   BMI 32.56 kg/m²     Assessment and Plan     Diagnosis Orders   1. Other iron deficiency anemia  Ferritin    Iron and TIBC      2. Restless leg syndrome        3. ILSA (obstructive sleep apnea)  [facility]      4. Nocturnal hypoxia  [facility]        I disagree with sleep providers assessment of her PSG.  She does have moderate REM related ILSA as well as nocturnal hypoxia.  I believe she needs to be started on CPAP therapy for management of ILSA with a overnight pulse oximetry study once she is on Pap therapy to ensure that she is oxygenating effectively and if not then supplemental oxygen would be necessary.  I am going to order iron studies again today coated under anemia both for iron level as well as ferritin level due to restless leg symptoms.  I will forward results onto PCP for management if her levels require this.  For ferritin, I would like to see her ferritin level greater than 75 and if not this would require iron supplementation  but if overall iron levels are low I would expect PCP to refer her to GI for further testing for etiology. In regard to migraine, I would not expect any improvement until her ILSA/hypoxia is well managed, we will discuss on follow-up visit in 3 months. For now she will remain on amitriptyline for migraine preventative therapy. Medications prescribed for the patient were discussed in detail. This included a discussion of the potential risks versus potential benefits of the medications. The patient was given time to ask questions and these were answered to the best of my ability. The patient appeared to understand the information provided. Return in about 3 months (around 6/14/2023).     WENDIE Flores - DONNA

## 2023-03-15 LAB
FERRITIN SERPL IA-MCNC: 209.4 NG/ML (ref 15–150)
IRON SATN MFR SERPL: 27 % (ref 15–50)
IRON SERPL-MCNC: 86 UG/DL (ref 37–145)
TIBC SERPL-MCNC: 314 UG/DL (ref 260–445)

## 2023-03-20 ENCOUNTER — TELEPHONE (OUTPATIENT)
Dept: FAMILY MEDICINE CLINIC | Age: 68
End: 2023-03-20

## 2023-03-22 ENCOUNTER — OFFICE VISIT (OUTPATIENT)
Dept: FAMILY MEDICINE CLINIC | Age: 68
End: 2023-03-22

## 2023-03-22 DIAGNOSIS — E11.42 TYPE 2 DIABETES MELLITUS WITH DIABETIC POLYNEUROPATHY, WITH LONG-TERM CURRENT USE OF INSULIN (HCC): Primary | Chronic | ICD-10-CM

## 2023-03-22 DIAGNOSIS — Z79.4 TYPE 2 DIABETES MELLITUS WITH DIABETIC POLYNEUROPATHY, WITH LONG-TERM CURRENT USE OF INSULIN (HCC): Primary | Chronic | ICD-10-CM

## 2023-03-22 PROCEDURE — 99999 PR OFFICE/OUTPT VISIT,PROCEDURE ONLY: CPT

## 2023-04-03 ENCOUNTER — TELEPHONE (OUTPATIENT)
Dept: FAMILY MEDICINE CLINIC | Age: 68
End: 2023-04-03

## 2023-04-03 ENCOUNTER — HOSPITAL ENCOUNTER (OUTPATIENT)
Dept: CT IMAGING | Age: 68
Discharge: HOME | End: 2023-04-03
Payer: MEDICARE

## 2023-04-03 DIAGNOSIS — R60.0 BILATERAL EDEMA OF LOWER EXTREMITY: ICD-10-CM

## 2023-04-03 DIAGNOSIS — I73.9 CLAUDICATION (HCC): ICD-10-CM

## 2023-04-03 DIAGNOSIS — S91.109S NON-HEALING OPEN WOUND OF TOE, SEQUELA: ICD-10-CM

## 2023-04-03 DIAGNOSIS — I10 ESSENTIAL (PRIMARY) HYPERTENSION: ICD-10-CM

## 2023-04-03 DIAGNOSIS — R52 PAIN AGGRAVATED BY WALKING: ICD-10-CM

## 2023-04-03 LAB
EGFR, POC: >60 ML/MIN/1.73M2
POC CREATININE: 0.6 MG/DL (ref 0.6–1.1)

## 2023-04-03 PROCEDURE — 82565 ASSAY OF CREATININE: CPT

## 2023-04-03 PROCEDURE — 6360000004 HC RX CONTRAST MEDICATION: Performed by: NURSE PRACTITIONER

## 2023-04-03 PROCEDURE — 75635 CT ANGIO ABDOMINAL ARTERIES: CPT

## 2023-04-03 RX ORDER — SODIUM CHLORIDE 0.9 % (FLUSH) 0.9 %
5-40 SYRINGE (ML) INJECTION PRN
Status: DISCONTINUED | OUTPATIENT
Start: 2023-04-03 | End: 2023-04-04 | Stop reason: HOSPADM

## 2023-04-03 RX ADMIN — IOPAMIDOL 95 ML: 755 INJECTION, SOLUTION INTRAVENOUS at 13:20

## 2023-04-03 NOTE — TELEPHONE ENCOUNTER
Received call from pt that sometime during the night/early morning hours today, her Cely sensor came off. Offered patient an appt for today to help place a new sensor. She was due to change sensor in 2 days, can move appt from Wednesday to today if patient schedule allows. She has testing at [address] [place] [address] with arrival at 12:30pm.  Instructed patient to notify [address] Dubois @ [phone] that sensor came off for possible replacement. Solange Seo, MSN, APRN, FNP-C  Diabetes [address] [place] [phone] office  [phone] fax  [email or web address]

## 2023-04-12 ENCOUNTER — TELEPHONE (OUTPATIENT)
Dept: CARDIOLOGY CLINIC | Age: 68
End: 2023-04-12

## 2023-04-19 ENCOUNTER — OFFICE VISIT (OUTPATIENT)
Dept: CARDIOLOGY CLINIC | Age: 68
End: 2023-04-19
Payer: MEDICARE

## 2023-04-19 VITALS
DIASTOLIC BLOOD PRESSURE: 86 MMHG | WEIGHT: 177.2 LBS | HEART RATE: 83 BPM | HEIGHT: 62 IN | SYSTOLIC BLOOD PRESSURE: 138 MMHG | BODY MASS INDEX: 32.61 KG/M2

## 2023-04-19 DIAGNOSIS — I10 ESSENTIAL HYPERTENSION: ICD-10-CM

## 2023-04-19 DIAGNOSIS — I50.32 CHRONIC DIASTOLIC CONGESTIVE HEART FAILURE (HCC): ICD-10-CM

## 2023-04-19 DIAGNOSIS — R22.43 LOCALIZED SWELLING OF BOTH LOWER LEGS: Primary | ICD-10-CM

## 2023-04-19 PROCEDURE — 99214 OFFICE O/P EST MOD 30 MIN: CPT | Performed by: NURSE PRACTITIONER

## 2023-04-19 PROCEDURE — 3017F COLORECTAL CA SCREEN DOC REV: CPT | Performed by: NURSE PRACTITIONER

## 2023-04-19 PROCEDURE — 1123F ACP DISCUSS/DSCN MKR DOCD: CPT | Performed by: NURSE PRACTITIONER

## 2023-04-19 PROCEDURE — 1036F TOBACCO NON-USER: CPT | Performed by: NURSE PRACTITIONER

## 2023-04-19 PROCEDURE — 3079F DIAST BP 80-89 MM HG: CPT | Performed by: NURSE PRACTITIONER

## 2023-04-19 PROCEDURE — G8417 CALC BMI ABV UP PARAM F/U: HCPCS | Performed by: NURSE PRACTITIONER

## 2023-04-19 PROCEDURE — G8427 DOCREV CUR MEDS BY ELIG CLIN: HCPCS | Performed by: NURSE PRACTITIONER

## 2023-04-19 PROCEDURE — G8400 PT W/DXA NO RESULTS DOC: HCPCS | Performed by: NURSE PRACTITIONER

## 2023-04-19 PROCEDURE — 1090F PRES/ABSN URINE INCON ASSESS: CPT | Performed by: NURSE PRACTITIONER

## 2023-04-19 PROCEDURE — 3075F SYST BP GE 130 - 139MM HG: CPT | Performed by: NURSE PRACTITIONER

## 2023-04-19 RX ORDER — LOSARTAN POTASSIUM 100 MG/1
100 TABLET ORAL DAILY
Qty: 90 TABLET | Refills: 3 | Status: SHIPPED | OUTPATIENT
Start: 2023-04-19

## 2023-04-19 RX ORDER — NIFEDIPINE 30 MG/1
30 TABLET, EXTENDED RELEASE ORAL DAILY
Qty: 90 TABLET | Refills: 3 | Status: SHIPPED | OUTPATIENT
Start: 2023-04-19

## 2023-04-19 ASSESSMENT — ENCOUNTER SYMPTOMS
SLEEP DISTURBANCES DUE TO BREATHING: 0
SHORTNESS OF BREATH: 0
ORTHOPNEA: 0

## 2023-04-19 NOTE — PROGRESS NOTES
she has a history HFpEF past.  Echocardiogram from March 2022 reviewed. Normal left ventricular systolic function with no significant valvular abnormalities noted:  She states weight is okay recently. Swelling is significantly improved. Advised low sodium diet   Weigh self daily       Hypertension  controlled  Will decrease nifedipine to 30 mg daily and increase losartan 100 mg daily. Will see how she looks. Tests ordered: venous US     Follow-up: 1 month    Signed:  WENDIE Negrete CNP, 4/19/2023, 2:12 PM    An electronic signature was used to authenticate this note. Please note this report has been partially produced using speech recognition software and may contain errors related to that system including errors in grammar, punctuation, and spelling, as well as words and phrases that may be inappropriate. If there are any questions or concerns please feel free to contact the dictating provider for clarification.

## 2023-04-21 ENCOUNTER — TELEPHONE (OUTPATIENT)
Dept: CARDIOLOGY CLINIC | Age: 68
End: 2023-04-21

## 2023-04-25 DIAGNOSIS — R60.0 BILATERAL EDEMA OF LOWER EXTREMITY: ICD-10-CM

## 2023-04-25 RX ORDER — FUROSEMIDE 20 MG/1
20 TABLET ORAL DAILY
Qty: 30 TABLET | Refills: 0 | Status: SHIPPED | OUTPATIENT
Start: 2023-04-25

## 2023-04-25 RX ORDER — FUROSEMIDE 20 MG/1
20 TABLET ORAL DAILY
Qty: 30 TABLET | Refills: 5 | OUTPATIENT
Start: 2023-04-25

## 2023-04-26 ENCOUNTER — OFFICE VISIT (OUTPATIENT)
Dept: PULMONOLOGY | Age: 68
End: 2023-04-26
Payer: MEDICARE

## 2023-04-26 ENCOUNTER — TELEPHONE (OUTPATIENT)
Dept: FAMILY MEDICINE CLINIC | Age: 68
End: 2023-04-26

## 2023-04-26 ENCOUNTER — TELEPHONE (OUTPATIENT)
Dept: NEUROLOGY | Age: 68
End: 2023-04-26

## 2023-04-26 VITALS
OXYGEN SATURATION: 98 % | WEIGHT: 178.6 LBS | DIASTOLIC BLOOD PRESSURE: 80 MMHG | SYSTOLIC BLOOD PRESSURE: 144 MMHG | HEIGHT: 62 IN | BODY MASS INDEX: 32.87 KG/M2 | HEART RATE: 82 BPM

## 2023-04-26 DIAGNOSIS — Z79.4 TYPE 2 DIABETES MELLITUS WITH DIABETIC POLYNEUROPATHY, WITH LONG-TERM CURRENT USE OF INSULIN (HCC): ICD-10-CM

## 2023-04-26 DIAGNOSIS — R09.02 OXYGEN DESATURATION: Primary | ICD-10-CM

## 2023-04-26 DIAGNOSIS — L60.2 LONG TOENAIL: Primary | ICD-10-CM

## 2023-04-26 DIAGNOSIS — E11.42 TYPE 2 DIABETES MELLITUS WITH DIABETIC POLYNEUROPATHY, WITH LONG-TERM CURRENT USE OF INSULIN (HCC): ICD-10-CM

## 2023-04-26 DIAGNOSIS — J45.20 MILD INTERMITTENT ASTHMA WITHOUT COMPLICATION: ICD-10-CM

## 2023-04-26 DIAGNOSIS — G47.33 OSA (OBSTRUCTIVE SLEEP APNEA): ICD-10-CM

## 2023-04-26 PROCEDURE — G8400 PT W/DXA NO RESULTS DOC: HCPCS | Performed by: NURSE PRACTITIONER

## 2023-04-26 PROCEDURE — 3079F DIAST BP 80-89 MM HG: CPT | Performed by: NURSE PRACTITIONER

## 2023-04-26 PROCEDURE — G8427 DOCREV CUR MEDS BY ELIG CLIN: HCPCS | Performed by: NURSE PRACTITIONER

## 2023-04-26 PROCEDURE — 3017F COLORECTAL CA SCREEN DOC REV: CPT | Performed by: NURSE PRACTITIONER

## 2023-04-26 PROCEDURE — G8417 CALC BMI ABV UP PARAM F/U: HCPCS | Performed by: NURSE PRACTITIONER

## 2023-04-26 PROCEDURE — 99214 OFFICE O/P EST MOD 30 MIN: CPT | Performed by: NURSE PRACTITIONER

## 2023-04-26 PROCEDURE — 1090F PRES/ABSN URINE INCON ASSESS: CPT | Performed by: NURSE PRACTITIONER

## 2023-04-26 PROCEDURE — 1123F ACP DISCUSS/DSCN MKR DOCD: CPT | Performed by: NURSE PRACTITIONER

## 2023-04-26 PROCEDURE — 3077F SYST BP >= 140 MM HG: CPT | Performed by: NURSE PRACTITIONER

## 2023-04-26 PROCEDURE — 1036F TOBACCO NON-USER: CPT | Performed by: NURSE PRACTITIONER

## 2023-04-26 RX ORDER — DULOXETIN HYDROCHLORIDE 20 MG/1
CAPSULE, DELAYED RELEASE ORAL
COMMUNITY
Start: 2023-04-19

## 2023-04-26 ASSESSMENT — SLEEP AND FATIGUE QUESTIONNAIRES
HOW LIKELY ARE YOU TO NOD OFF OR FALL ASLEEP WHILE SITTING QUIETLY AFTER LUNCH WITHOUT ALCOHOL: 2
NECK CIRCUMFERENCE (INCHES): 16
HOW LIKELY ARE YOU TO NOD OFF OR FALL ASLEEP WHILE SITTING AND TALKING TO SOMEONE: 2
HOW LIKELY ARE YOU TO NOD OFF OR FALL ASLEEP WHEN YOU ARE A PASSENGER IN A CAR FOR AN HOUR WITHOUT A BREAK: 3
HOW LIKELY ARE YOU TO NOD OFF OR FALL ASLEEP WHILE WATCHING TV: 1
ESS TOTAL SCORE: 13
HOW LIKELY ARE YOU TO NOD OFF OR FALL ASLEEP IN A CAR, WHILE STOPPED FOR A FEW MINUTES IN TRAFFIC: 0
HOW LIKELY ARE YOU TO NOD OFF OR FALL ASLEEP WHILE SITTING AND READING: 1
HOW LIKELY ARE YOU TO NOD OFF OR FALL ASLEEP WHILE LYING DOWN TO REST IN THE AFTERNOON WHEN CIRCUMSTANCES PERMIT: 3
HOW LIKELY ARE YOU TO NOD OFF OR FALL ASLEEP WHILE SITTING INACTIVE IN A PUBLIC PLACE: 1

## 2023-04-26 ASSESSMENT — ENCOUNTER SYMPTOMS
EYES NEGATIVE: 1
RESPIRATORY NEGATIVE: 1
GASTROINTESTINAL NEGATIVE: 1
ALLERGIC/IMMUNOLOGIC NEGATIVE: 1

## 2023-04-26 NOTE — PROGRESS NOTES
Lanette Lucio (:  1955) is a 74 y.o. female,New patient, here for evaluation of the following chief complaint(s): sleep apnea and oxygen desaturation. Subjective   SUBJECTIVE/OBJECTIVE:  Bhavna Velazquez is a pleasant 75 yo who is here in pulmonary clinic to discuss results of  sleep study and to get set up for a CPAP machine. Chelychong Garcia states that she was prescribed a sleep study by Berry Pyle MD but has since stopped going to him. She reports that she was told by Berry Pyle MD that her saturation oxygenation dropped to 80% during her sleep study and that she also needed to be placed on CPAP because of apnea. Wilhemenia Hodgkins has a current medical status of HTN and HLD in which she takes medications. Her BP is 144/80 during the time of visit. She states that she takes the medication, but then was asked how  Trelegy is working for her. Yoditemilie Jose stated that it works when she takes it. She described taking it once as needed. I discussed with Mitzi Garcia that Trelegy is a scheduled prescribed medication that should be taken once daily to manage and control Asthma symptoms. I discussed with Mitzi Garcia that Albuterol rescue inhaler is used as need for immediate relief of sob and wheezing. I am not convinced that Simona's noncompliance is intentional. There may some confusion on how and when to take medication. I discussed with Mitzi Garcia the difference between Trelegy and Albuterol medication, why she takes them and provided her with instruction on how and when to take each of them. Sleep Results  AHI 2.5 events per hour. Low saturation 87%  Mean saturation 92%    This study is from 2017 and she was not provided a CPAP machine. , but she was prescribed nocturnal oxygen at 2 liters nasal cannula. There seems to be some confusion on rather she was able to discuss results with Dr. General Mcfarlane. Review of Systems   Constitutional: Negative. HENT: Negative. Eyes: Negative.

## 2023-04-26 NOTE — TELEPHONE ENCOUNTER
Jenny Zarate called stating she has an appointment with [facility] ([phone]) today at 10am and is requesting a referral to be faxed to the office ([phone]) due to bilateral toenail fungas and clipping of toenails.     Note to Kain Mata, WENDIE

## 2023-05-02 ENCOUNTER — PROCEDURE VISIT (OUTPATIENT)
Dept: CARDIOLOGY CLINIC | Age: 68
End: 2023-05-02
Payer: MEDICARE

## 2023-05-02 DIAGNOSIS — R22.43 LOCALIZED SWELLING OF BOTH LOWER LEGS: ICD-10-CM

## 2023-05-02 PROCEDURE — 93970 EXTREMITY STUDY: CPT | Performed by: INTERNAL MEDICINE

## 2023-05-03 ENCOUNTER — TELEPHONE (OUTPATIENT)
Dept: FAMILY MEDICINE CLINIC | Age: 68
End: 2023-05-03

## 2023-05-03 ENCOUNTER — TELEPHONE (OUTPATIENT)
Dept: CARDIOLOGY CLINIC | Age: 68
End: 2023-05-03

## 2023-05-03 DIAGNOSIS — Z76.0 MEDICATION REFILL: ICD-10-CM

## 2023-05-03 DIAGNOSIS — E08.22 DIABETES MELLITUS DUE TO UNDERLYING CONDITION WITH CHRONIC KIDNEY DISEASE, WITHOUT LONG-TERM CURRENT USE OF INSULIN, UNSPECIFIED CKD STAGE (HCC): ICD-10-CM

## 2023-05-03 RX ORDER — INSULIN ASPART 100 [IU]/ML
INJECTION, SOLUTION INTRAVENOUS; SUBCUTANEOUS
Qty: 5 ADJUSTABLE DOSE PRE-FILLED PEN SYRINGE | Refills: 0 | OUTPATIENT
Start: 2023-05-03

## 2023-05-03 RX ORDER — INSULIN ASPART 100 [IU]/ML
INJECTION, SOLUTION INTRAVENOUS; SUBCUTANEOUS
Qty: 5 ADJUSTABLE DOSE PRE-FILLED PEN SYRINGE | Refills: 0 | Status: SHIPPED | OUTPATIENT
Start: 2023-05-03 | End: 2023-06-06 | Stop reason: SDUPTHER

## 2023-05-03 NOTE — TELEPHONE ENCOUNTER
Per Victor M Terrell due to having elevated glucose she has been checking herself 3 times a day vs once daily and now she is out of her pen needles. Victor M Terrell states she is not due to renew her prescription until the end of May. Note to WENDIE Rincon please advise.

## 2023-05-03 NOTE — TELEPHONE ENCOUNTER
----- Message from WENDIE Wiseman CNP sent at 5/2/2023  9:03 PM EDT -----  Recommend compression stockings  ----- Message -----  From: Panchito Whitaker Incoming Cardiovascular Results From [facility]  Sent: 5/2/2023   5:55 PM EDT  To: WENDIE Wiseman CNP     No evidence of DVT or SVT in the bilateral common femoral vein, femoral   vein, popliteal vein, greater saphenous vein or small saphenous vein. Significant reflux noted of the Right SSV Mid Calf (5.5s). No significant reflux noted in the veins of the left lower extremity. Spoke with patients daughter regarding results of lower extremity doppler. Patients daughter voiced understanding.

## 2023-05-09 ENCOUNTER — OFFICE VISIT (OUTPATIENT)
Dept: FAMILY MEDICINE CLINIC | Age: 68
End: 2023-05-09
Payer: MEDICARE

## 2023-05-09 VITALS
HEART RATE: 80 BPM | OXYGEN SATURATION: 97 % | SYSTOLIC BLOOD PRESSURE: 130 MMHG | RESPIRATION RATE: 18 BRPM | DIASTOLIC BLOOD PRESSURE: 74 MMHG | TEMPERATURE: 97.5 F | WEIGHT: 178 LBS | BODY MASS INDEX: 32.56 KG/M2

## 2023-05-09 DIAGNOSIS — G47.9 SLEEP DISTURBANCE: ICD-10-CM

## 2023-05-09 DIAGNOSIS — E08.22 DIABETES MELLITUS DUE TO UNDERLYING CONDITION WITH CHRONIC KIDNEY DISEASE, WITHOUT LONG-TERM CURRENT USE OF INSULIN, UNSPECIFIED CKD STAGE (HCC): Primary | ICD-10-CM

## 2023-05-09 DIAGNOSIS — R53.83 FATIGUE, UNSPECIFIED TYPE: ICD-10-CM

## 2023-05-09 DIAGNOSIS — Z76.0 MEDICATION REFILL: ICD-10-CM

## 2023-05-09 DIAGNOSIS — I10 ESSENTIAL HYPERTENSION: ICD-10-CM

## 2023-05-09 PROCEDURE — 1123F ACP DISCUSS/DSCN MKR DOCD: CPT | Performed by: NURSE PRACTITIONER

## 2023-05-09 PROCEDURE — 3078F DIAST BP <80 MM HG: CPT | Performed by: NURSE PRACTITIONER

## 2023-05-09 PROCEDURE — 3017F COLORECTAL CA SCREEN DOC REV: CPT | Performed by: NURSE PRACTITIONER

## 2023-05-09 PROCEDURE — 99214 OFFICE O/P EST MOD 30 MIN: CPT | Performed by: NURSE PRACTITIONER

## 2023-05-09 PROCEDURE — 1090F PRES/ABSN URINE INCON ASSESS: CPT | Performed by: NURSE PRACTITIONER

## 2023-05-09 PROCEDURE — 1036F TOBACCO NON-USER: CPT | Performed by: NURSE PRACTITIONER

## 2023-05-09 PROCEDURE — G8427 DOCREV CUR MEDS BY ELIG CLIN: HCPCS | Performed by: NURSE PRACTITIONER

## 2023-05-09 PROCEDURE — 3074F SYST BP LT 130 MM HG: CPT | Performed by: NURSE PRACTITIONER

## 2023-05-09 PROCEDURE — G8400 PT W/DXA NO RESULTS DOC: HCPCS | Performed by: NURSE PRACTITIONER

## 2023-05-09 PROCEDURE — G8417 CALC BMI ABV UP PARAM F/U: HCPCS | Performed by: NURSE PRACTITIONER

## 2023-05-09 RX ORDER — LEVETIRACETAM 500 MG/1
TABLET ORAL
COMMUNITY
Start: 2023-04-19

## 2023-05-09 NOTE — PROGRESS NOTES
diagnosed with gout in the past few months. H/O cardiac catheterization     Showed mild disease per last cath. H/O cardiovascular stress test 03/15/2010    EF 69%, normal perfusion study except for diaphragmatic artifact, uniform wall motion. H/O cardiovascular stress test 10/09/2008    EF 60%, no anginia, normal study. H/O cardiovascular stress test 05/06/2014    EF 66%, no ischemia, normal LV systolic funciton, normal perfusion pattern. H/O Doppler ultrasound 02/28/2011    CAROTID DOPPLER-normal study. H/O echocardiogram 05/06/2014    Ef >55%. Impaired LV relaxation. H/O echocardiogram 10/14/2015    EF 60% Normal LV and systolic function. No significant valvulopathy seen. History of Holter monitoring 03/24/2015    24 hour - predominant rhythm sinus    [place] (hard of hearing)     Bilateral Ears    Hx of cardiovascular stress test 10/19/2015    lexiscan-normal,EF63%    Hx of motion sickness     HX OTHER MEDICAL     Primary Care Physician Is Dr. Trixie Hudson In \[facility]\""    Hyperlipidemia     Hypertension     IBS (irritable bowel syndrome)     Incisional hernia 04/2014    Kidney stones Last Episode In 2012 Or 2013    Passed Kidney Stones Numberous Times    Migraines     Nausea & vomiting     Nausea/Vomiting Post Op In Past    NSTEMI (non-ST elevated myocardial infarction) ([facility] [address] 8/14/2017    Other specified disorder of skin     12- Patient states she has a condition of her vaginal area (skin) which starts with the letters Frantz Just. She is currently being treated with multiple creams and weekly Diflucan. 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     Staph infection Dx 1980's    Toes On Left Foot    Thyroid disease     hypothroidism    Tremor     \"Tremors All

## 2023-05-12 ENCOUNTER — TELEPHONE (OUTPATIENT)
Dept: CARDIOLOGY CLINIC | Age: 68
End: 2023-05-12

## 2023-05-12 ENCOUNTER — TELEPHONE (OUTPATIENT)
Dept: FAMILY MEDICINE CLINIC | Age: 68
End: 2023-05-12

## 2023-05-12 DIAGNOSIS — Z76.0 MEDICATION REFILL: ICD-10-CM

## 2023-05-12 DIAGNOSIS — R60.0 BILATERAL EDEMA OF LOWER EXTREMITY: ICD-10-CM

## 2023-05-12 RX ORDER — PANTOPRAZOLE SODIUM 40 MG/1
40 TABLET, DELAYED RELEASE ORAL
Qty: 180 TABLET | Refills: 1 | Status: SHIPPED | OUTPATIENT
Start: 2023-05-12

## 2023-05-12 RX ORDER — FUROSEMIDE 20 MG/1
20 TABLET ORAL DAILY
Qty: 30 TABLET | Refills: 3 | Status: SHIPPED | OUTPATIENT
Start: 2023-05-12

## 2023-05-12 RX ORDER — FUROSEMIDE 20 MG/1
20 TABLET ORAL DAILY
Qty: 30 TABLET | Refills: 3 | Status: SHIPPED | OUTPATIENT
Start: 2023-05-12 | End: 2023-05-12 | Stop reason: SDUPTHER

## 2023-05-12 RX ORDER — FUROSEMIDE 20 MG/1
TABLET ORAL
Qty: 30 TABLET | Refills: 0 | OUTPATIENT
Start: 2023-05-12

## 2023-05-12 RX ORDER — POTASSIUM CHLORIDE 20 MEQ/1
20 TABLET, EXTENDED RELEASE ORAL 2 TIMES DAILY
Qty: 60 TABLET | Refills: 3 | Status: SHIPPED | OUTPATIENT
Start: 2023-05-12 | End: 2023-05-12 | Stop reason: SDUPTHER

## 2023-05-12 RX ORDER — POTASSIUM CHLORIDE 20 MEQ/1
20 TABLET, EXTENDED RELEASE ORAL 2 TIMES DAILY
Qty: 60 TABLET | Refills: 3 | Status: SHIPPED | OUTPATIENT
Start: 2023-05-12

## 2023-05-12 ASSESSMENT — ENCOUNTER SYMPTOMS
WHEEZING: 0
COUGH: 0
CHEST TIGHTNESS: 0
SHORTNESS OF BREATH: 0

## 2023-05-12 NOTE — TELEPHONE ENCOUNTER
Pt called asking for lasix and potassium refills.  Her home care nurse had called earlier for lasix   [facility]  next appt 06/06

## 2023-05-17 ENCOUNTER — TELEPHONE (OUTPATIENT)
Dept: FAMILY MEDICINE CLINIC | Age: 68
End: 2023-05-17

## 2023-05-17 DIAGNOSIS — G43.009 MIGRAINE WITHOUT AURA, NOT REFRACTORY: ICD-10-CM

## 2023-05-17 DIAGNOSIS — Z76.0 MEDICATION REFILL: ICD-10-CM

## 2023-05-17 RX ORDER — AMITRIPTYLINE HYDROCHLORIDE 25 MG/1
50 TABLET, FILM COATED ORAL NIGHTLY
Qty: 60 TABLET | Refills: 5 | Status: SHIPPED | OUTPATIENT
Start: 2023-05-17

## 2023-05-17 RX ORDER — PANTOPRAZOLE SODIUM 40 MG/1
40 TABLET, DELAYED RELEASE ORAL
Qty: 180 TABLET | Refills: 1 | OUTPATIENT
Start: 2023-05-17

## 2023-05-17 RX ORDER — PANTOPRAZOLE SODIUM 40 MG/1
40 TABLET, DELAYED RELEASE ORAL
Qty: 180 TABLET | Refills: 1 | Status: CANCELLED | OUTPATIENT
Start: 2023-05-17

## 2023-05-17 NOTE — TELEPHONE ENCOUNTER
Patient called in a refill of the following, patient is not scheduled for a follow up until 6/14, please send in a refill.      Requested Prescriptions     Pending Prescriptions Disp Refills    amitriptyline (ELAVIL) 25 MG tablet 60 tablet 5     Sig: Take 2 tablets by mouth nightly

## 2023-05-22 DIAGNOSIS — Z76.0 MEDICATION REFILL: ICD-10-CM

## 2023-05-22 RX ORDER — PANTOPRAZOLE SODIUM 40 MG/1
40 TABLET, DELAYED RELEASE ORAL
Qty: 180 TABLET | Refills: 1 | OUTPATIENT
Start: 2023-05-22

## 2023-05-23 ENCOUNTER — TELEPHONE (OUTPATIENT)
Dept: CARDIOLOGY CLINIC | Age: 68
End: 2023-05-23

## 2023-05-24 ENCOUNTER — HOSPITAL ENCOUNTER (INPATIENT)
Age: 68
LOS: 2 days | Discharge: HOME HEALTH | End: 2023-05-27
Attending: EMERGENCY MEDICINE
Payer: MEDICARE

## 2023-05-24 ENCOUNTER — APPOINTMENT (OUTPATIENT)
Dept: GENERAL RADIOLOGY | Age: 68
End: 2023-05-24
Payer: MEDICARE

## 2023-05-24 ENCOUNTER — TELEPHONE (OUTPATIENT)
Dept: FAMILY MEDICINE CLINIC | Age: 68
End: 2023-05-24

## 2023-05-24 DIAGNOSIS — I21.4 NSTEMI (NON-ST ELEVATED MYOCARDIAL INFARCTION) (HCC): ICD-10-CM

## 2023-05-24 DIAGNOSIS — J20.9 ACUTE BRONCHITIS, UNSPECIFIED ORGANISM: Primary | ICD-10-CM

## 2023-05-24 DIAGNOSIS — Z76.0 MEDICATION REFILL: ICD-10-CM

## 2023-05-24 LAB
ALBUMIN SERPL-MCNC: 4.4 GM/DL (ref 3.4–5)
ALP BLD-CCNC: 87 IU/L (ref 40–129)
ALT SERPL-CCNC: 22 U/L (ref 10–40)
ANION GAP SERPL CALCULATED.3IONS-SCNC: 12 MMOL/L (ref 4–16)
AST SERPL-CCNC: 20 IU/L (ref 15–37)
B PARAP IS1001 DNA NPH QL NAA+NON-PROBE: NOT DETECTED
B PERT.PT PRMT NPH QL NAA+NON-PROBE: NOT DETECTED
BASOPHILS ABSOLUTE: 0.1 K/CU MM
BASOPHILS RELATIVE PERCENT: 0.8 % (ref 0–1)
BILIRUB SERPL-MCNC: 0.3 MG/DL (ref 0–1)
BUN SERPL-MCNC: 12 MG/DL (ref 6–23)
C PNEUM DNA NPH QL NAA+NON-PROBE: NOT DETECTED
CALCIUM SERPL-MCNC: 9.3 MG/DL (ref 8.3–10.6)
CHLORIDE BLD-SCNC: 92 MMOL/L (ref 99–110)
CO2: 26 MMOL/L (ref 21–32)
CREAT SERPL-MCNC: 0.6 MG/DL (ref 0.6–1.1)
D DIMER: 79 NG/ML(DDU)
DIFFERENTIAL TYPE: ABNORMAL
EOSINOPHILS ABSOLUTE: 0.3 K/CU MM
EOSINOPHILS RELATIVE PERCENT: 2.9 % (ref 0–3)
FLUAV H1 2009 PAN RNA NPH NAA+NON-PROBE: NOT DETECTED
FLUAV H1 RNA NPH QL NAA+NON-PROBE: NOT DETECTED
FLUAV H3 RNA NPH QL NAA+NON-PROBE: NOT DETECTED
FLUAV RNA NPH QL NAA+NON-PROBE: NOT DETECTED
FLUBV RNA NPH QL NAA+NON-PROBE: NOT DETECTED
GFR SERPL CREATININE-BSD FRML MDRD: >60 ML/MIN/1.73M2
GLUCOSE SERPL-MCNC: 177 MG/DL (ref 70–99)
HADV DNA NPH QL NAA+NON-PROBE: NOT DETECTED
HCOV 229E RNA NPH QL NAA+NON-PROBE: NOT DETECTED
HCOV HKU1 RNA NPH QL NAA+NON-PROBE: NOT DETECTED
HCOV NL63 RNA NPH QL NAA+NON-PROBE: NOT DETECTED
HCOV OC43 RNA NPH QL NAA+NON-PROBE: NOT DETECTED
HCT VFR BLD CALC: 39.1 % (ref 37–47)
HEMOGLOBIN: 12.6 GM/DL (ref 12.5–16)
HMPV RNA NPH QL NAA+NON-PROBE: NOT DETECTED
HPIV1 RNA NPH QL NAA+NON-PROBE: NOT DETECTED
HPIV2 RNA NPH QL NAA+NON-PROBE: NOT DETECTED
HPIV3 RNA NPH QL NAA+NON-PROBE: NOT DETECTED
HPIV4 RNA NPH QL NAA+NON-PROBE: NOT DETECTED
IMMATURE NEUTROPHIL %: 0.3 % (ref 0–0.43)
LYMPHOCYTES ABSOLUTE: 3.6 K/CU MM
LYMPHOCYTES RELATIVE PERCENT: 35.8 % (ref 24–44)
M PNEUMO DNA NPH QL NAA+NON-PROBE: NOT DETECTED
MCH RBC QN AUTO: 26.5 PG (ref 27–31)
MCHC RBC AUTO-ENTMCNC: 32.2 % (ref 32–36)
MCV RBC AUTO: 82.1 FL (ref 78–100)
MONOCYTES ABSOLUTE: 1.2 K/CU MM
MONOCYTES RELATIVE PERCENT: 11.4 % (ref 0–4)
NUCLEATED RBC %: 0 %
PDW BLD-RTO: 11.6 % (ref 11.7–14.9)
PLATELET # BLD: 306 K/CU MM (ref 140–440)
PMV BLD AUTO: 9.2 FL (ref 7.5–11.1)
POTASSIUM SERPL-SCNC: 3.7 MMOL/L (ref 3.5–5.1)
PRO-BNP: 53.39 PG/ML
RBC # BLD: 4.76 M/CU MM (ref 4.2–5.4)
RSV RNA NPH QL NAA+NON-PROBE: NOT DETECTED
RV+EV RNA NPH QL NAA+NON-PROBE: NOT DETECTED
SARS-COV-2 RNA NPH QL NAA+NON-PROBE: NOT DETECTED
SEGMENTED NEUTROPHILS ABSOLUTE COUNT: 4.9 K/CU MM
SEGMENTED NEUTROPHILS RELATIVE PERCENT: 48.8 % (ref 36–66)
SODIUM BLD-SCNC: 130 MMOL/L (ref 135–145)
TOTAL IMMATURE NEUTOROPHIL: 0.03 K/CU MM
TOTAL NUCLEATED RBC: 0 K/CU MM
TOTAL PROTEIN: 7.4 GM/DL (ref 6.4–8.2)
TROPONIN T: 1.58 NG/ML
TROPONIN T: <0.01 NG/ML
WBC # BLD: 10.1 K/CU MM (ref 4–10.5)

## 2023-05-24 PROCEDURE — 96375 TX/PRO/DX INJ NEW DRUG ADDON: CPT

## 2023-05-24 PROCEDURE — 99285 EMERGENCY DEPT VISIT HI MDM: CPT

## 2023-05-24 PROCEDURE — 80053 COMPREHEN METABOLIC PANEL: CPT

## 2023-05-24 PROCEDURE — 0202U NFCT DS 22 TRGT SARS-COV-2: CPT

## 2023-05-24 PROCEDURE — 84484 ASSAY OF TROPONIN QUANT: CPT

## 2023-05-24 PROCEDURE — 6370000000 HC RX 637 (ALT 250 FOR IP): Performed by: EMERGENCY MEDICINE

## 2023-05-24 PROCEDURE — 71045 X-RAY EXAM CHEST 1 VIEW: CPT

## 2023-05-24 PROCEDURE — 96374 THER/PROPH/DIAG INJ IV PUSH: CPT

## 2023-05-24 PROCEDURE — 85025 COMPLETE CBC W/AUTO DIFF WBC: CPT

## 2023-05-24 PROCEDURE — 93005 ELECTROCARDIOGRAM TRACING: CPT | Performed by: EMERGENCY MEDICINE

## 2023-05-24 PROCEDURE — 85379 FIBRIN DEGRADATION QUANT: CPT

## 2023-05-24 PROCEDURE — 83880 ASSAY OF NATRIURETIC PEPTIDE: CPT

## 2023-05-24 RX ORDER — ONDANSETRON 2 MG/ML
8 INJECTION INTRAMUSCULAR; INTRAVENOUS ONCE
Status: COMPLETED | OUTPATIENT
Start: 2023-05-24 | End: 2023-05-25

## 2023-05-24 RX ORDER — NITROGLYCERIN 20 MG/100ML
5-200 INJECTION INTRAVENOUS CONTINUOUS
Status: DISCONTINUED | OUTPATIENT
Start: 2023-05-24 | End: 2023-05-25

## 2023-05-24 RX ORDER — ASPIRIN 81 MG/1
324 TABLET, CHEWABLE ORAL ONCE
Status: DISCONTINUED | OUTPATIENT
Start: 2023-05-24 | End: 2023-05-25

## 2023-05-24 RX ORDER — ASPIRIN 81 MG/1
324 TABLET, CHEWABLE ORAL ONCE
Status: DISCONTINUED | OUTPATIENT
Start: 2023-05-24 | End: 2023-05-26

## 2023-05-24 RX ORDER — HEPARIN SODIUM 1000 [USP'U]/ML
2000 INJECTION, SOLUTION INTRAVENOUS; SUBCUTANEOUS PRN
Status: DISCONTINUED | OUTPATIENT
Start: 2023-05-25 | End: 2023-05-25

## 2023-05-24 RX ORDER — ACETAMINOPHEN 325 MG/1
650 TABLET ORAL ONCE
Status: COMPLETED | OUTPATIENT
Start: 2023-05-24 | End: 2023-05-24

## 2023-05-24 RX ORDER — CLOPIDOGREL 300 MG/1
600 TABLET, FILM COATED ORAL ONCE
Status: DISCONTINUED | OUTPATIENT
Start: 2023-05-25 | End: 2023-05-26

## 2023-05-24 RX ORDER — ONDANSETRON 4 MG/1
4 TABLET, ORALLY DISINTEGRATING ORAL ONCE
Status: COMPLETED | OUTPATIENT
Start: 2023-05-24 | End: 2023-05-24

## 2023-05-24 RX ORDER — HEPARIN SODIUM 1000 [USP'U]/ML
4000 INJECTION, SOLUTION INTRAVENOUS; SUBCUTANEOUS PRN
Status: DISCONTINUED | OUTPATIENT
Start: 2023-05-25 | End: 2023-05-25

## 2023-05-24 RX ORDER — HEPARIN SODIUM 1000 [USP'U]/ML
4000 INJECTION, SOLUTION INTRAVENOUS; SUBCUTANEOUS ONCE
Status: COMPLETED | OUTPATIENT
Start: 2023-05-24 | End: 2023-05-25

## 2023-05-24 RX ORDER — HEPARIN SODIUM 10000 [USP'U]/100ML
5-30 INJECTION, SOLUTION INTRAVENOUS CONTINUOUS
Status: DISCONTINUED | OUTPATIENT
Start: 2023-05-24 | End: 2023-05-25

## 2023-05-24 RX ORDER — BENZONATATE 200 MG/1
200 CAPSULE ORAL 3 TIMES DAILY PRN
Qty: 30 CAPSULE | Refills: 0 | Status: SHIPPED
Start: 2023-05-24 | End: 2023-05-26 | Stop reason: ALTCHOICE

## 2023-05-24 RX ADMIN — ONDANSETRON 4 MG: 4 TABLET, ORALLY DISINTEGRATING ORAL at 21:07

## 2023-05-24 RX ADMIN — ACETAMINOPHEN 650 MG: 325 TABLET ORAL at 21:29

## 2023-05-24 ASSESSMENT — PAIN SCALES - GENERAL
PAINLEVEL_OUTOF10: 10
PAINLEVEL_OUTOF10: 8

## 2023-05-24 ASSESSMENT — PAIN DESCRIPTION - LOCATION: LOCATION: CHEST;BACK

## 2023-05-24 ASSESSMENT — PAIN - FUNCTIONAL ASSESSMENT: PAIN_FUNCTIONAL_ASSESSMENT: 0-10

## 2023-05-24 ASSESSMENT — HEART SCORE: ECG: 0

## 2023-05-24 NOTE — ED TRIAGE NOTES
Pt arrived by EMS c/o chest and back pain that has been going on for three days, has a hx of hypertension.

## 2023-05-24 NOTE — TELEPHONE ENCOUNTER
Paul Mcintyre called stating she is having shortness of breath, cough and green nasal drainage. Paul Mcintyre states she's sure she may have a sinus infection but due to having CHF and shortness of breath she is going to the Emergency room and do not wish to be seen in clinic. Paul Mcintyre was informed to contact the office to schedule a ED follow up.     Note to WENDIE Kaur

## 2023-05-25 PROBLEM — R07.89 ATYPICAL CHEST PAIN: Status: ACTIVE | Noted: 2023-05-25

## 2023-05-25 PROBLEM — J20.9 ACUTE BRONCHITIS: Status: ACTIVE | Noted: 2023-05-25

## 2023-05-25 LAB
ANION GAP SERPL CALCULATED.3IONS-SCNC: 19 MMOL/L (ref 4–16)
APTT: 208.5 SECONDS (ref 25.1–37.1)
BUN SERPL-MCNC: 10 MG/DL (ref 6–23)
CALCIUM SERPL-MCNC: 8.7 MG/DL (ref 8.3–10.6)
CHLORIDE BLD-SCNC: 95 MMOL/L (ref 99–110)
CHOLEST SERPL-MCNC: 133 MG/DL
CO2: 18 MMOL/L (ref 21–32)
CREAT SERPL-MCNC: 0.7 MG/DL (ref 0.6–1.1)
EKG ATRIAL RATE: 68 BPM
EKG ATRIAL RATE: 77 BPM
EKG ATRIAL RATE: 80 BPM
EKG DIAGNOSIS: NORMAL
EKG P AXIS: 16 DEGREES
EKG P AXIS: 17 DEGREES
EKG P AXIS: 7 DEGREES
EKG P-R INTERVAL: 178 MS
EKG P-R INTERVAL: 178 MS
EKG P-R INTERVAL: 180 MS
EKG Q-T INTERVAL: 422 MS
EKG Q-T INTERVAL: 430 MS
EKG Q-T INTERVAL: 444 MS
EKG QRS DURATION: 114 MS
EKG QRS DURATION: 116 MS
EKG QRS DURATION: 118 MS
EKG QTC CALCULATION (BAZETT): 472 MS
EKG QTC CALCULATION (BAZETT): 477 MS
EKG QTC CALCULATION (BAZETT): 495 MS
EKG R AXIS: -42 DEGREES
EKG T AXIS: -6 DEGREES
EKG T AXIS: 20 DEGREES
EKG T AXIS: 32 DEGREES
EKG VENTRICULAR RATE: 68 BPM
EKG VENTRICULAR RATE: 77 BPM
EKG VENTRICULAR RATE: 80 BPM
ESTIMATED AVERAGE GLUCOSE: 203 MG/DL
GFR SERPL CREATININE-BSD FRML MDRD: >60 ML/MIN/1.73M2
GLUCOSE BLD-MCNC: 231 MG/DL (ref 70–99)
GLUCOSE BLD-MCNC: 251 MG/DL (ref 70–99)
GLUCOSE BLD-MCNC: 313 MG/DL (ref 70–99)
GLUCOSE BLD-MCNC: 351 MG/DL (ref 70–99)
GLUCOSE SERPL-MCNC: 282 MG/DL (ref 70–99)
HBA1C MFR BLD: 8.7 % (ref 4.2–6.3)
HCT VFR BLD CALC: 36.9 % (ref 37–47)
HCT VFR BLD CALC: 38.1 % (ref 37–47)
HCT VFR BLD CALC: 42.3 % (ref 37–47)
HDLC SERPL-MCNC: 53 MG/DL
HEMOGLOBIN: 12.4 GM/DL (ref 12.5–16)
HEMOGLOBIN: 12.8 GM/DL (ref 12.5–16)
HEMOGLOBIN: 14.1 GM/DL (ref 12.5–16)
LDLC SERPL CALC-MCNC: 45 MG/DL
LV EF: 55 %
LVEF MODALITY: NORMAL
MAGNESIUM: 1.9 MG/DL (ref 1.8–2.4)
MCH RBC QN AUTO: 27.1 PG (ref 27–31)
MCH RBC QN AUTO: 27.2 PG (ref 27–31)
MCH RBC QN AUTO: 27.4 PG (ref 27–31)
MCHC RBC AUTO-ENTMCNC: 33.3 % (ref 32–36)
MCHC RBC AUTO-ENTMCNC: 33.6 % (ref 32–36)
MCHC RBC AUTO-ENTMCNC: 33.6 % (ref 32–36)
MCV RBC AUTO: 80.7 FL (ref 78–100)
MCV RBC AUTO: 81.1 FL (ref 78–100)
MCV RBC AUTO: 82.3 FL (ref 78–100)
PDW BLD-RTO: 11.5 % (ref 11.7–14.9)
PDW BLD-RTO: 11.7 % (ref 11.7–14.9)
PDW BLD-RTO: 11.7 % (ref 11.7–14.9)
PHOSPHORUS: 2.1 MG/DL (ref 2.5–4.9)
PLATELET # BLD: 309 K/CU MM (ref 140–440)
PLATELET # BLD: 324 K/CU MM (ref 140–440)
PLATELET # BLD: 362 K/CU MM (ref 140–440)
PMV BLD AUTO: 10.2 FL (ref 7.5–11.1)
PMV BLD AUTO: 9.3 FL (ref 7.5–11.1)
PMV BLD AUTO: 9.3 FL (ref 7.5–11.1)
POTASSIUM SERPL-SCNC: 4.4 MMOL/L (ref 3.5–5.1)
RBC # BLD: 4.57 M/CU MM (ref 4.2–5.4)
RBC # BLD: 4.7 M/CU MM (ref 4.2–5.4)
RBC # BLD: 5.14 M/CU MM (ref 4.2–5.4)
REASON FOR REJECTION: NORMAL
REJECTED TEST: NORMAL
SODIUM BLD-SCNC: 132 MMOL/L (ref 135–145)
TRIGL SERPL-MCNC: 173 MG/DL
TROPONIN T: <0.01 NG/ML
WBC # BLD: 10.6 K/CU MM (ref 4–10.5)
WBC # BLD: 26.9 K/CU MM (ref 4–10.5)
WBC # BLD: 9.4 K/CU MM (ref 4–10.5)

## 2023-05-25 PROCEDURE — 93010 ELECTROCARDIOGRAM REPORT: CPT | Performed by: INTERNAL MEDICINE

## 2023-05-25 PROCEDURE — 6360000004 HC RX CONTRAST MEDICATION

## 2023-05-25 PROCEDURE — 93458 L HRT ARTERY/VENTRICLE ANGIO: CPT | Performed by: INTERNAL MEDICINE

## 2023-05-25 PROCEDURE — 2500000003 HC RX 250 WO HCPCS: Performed by: EMERGENCY MEDICINE

## 2023-05-25 PROCEDURE — 6360000002 HC RX W HCPCS

## 2023-05-25 PROCEDURE — 36415 COLL VENOUS BLD VENIPUNCTURE: CPT

## 2023-05-25 PROCEDURE — C1769 GUIDE WIRE: HCPCS

## 2023-05-25 PROCEDURE — 84100 ASSAY OF PHOSPHORUS: CPT

## 2023-05-25 PROCEDURE — 80061 LIPID PANEL: CPT

## 2023-05-25 PROCEDURE — 2580000003 HC RX 258: Performed by: STUDENT IN AN ORGANIZED HEALTH CARE EDUCATION/TRAINING PROGRAM

## 2023-05-25 PROCEDURE — 83735 ASSAY OF MAGNESIUM: CPT

## 2023-05-25 PROCEDURE — 93571 IV DOP VEL&/PRESS C FLO 1ST: CPT

## 2023-05-25 PROCEDURE — 83036 HEMOGLOBIN GLYCOSYLATED A1C: CPT

## 2023-05-25 PROCEDURE — 84484 ASSAY OF TROPONIN QUANT: CPT

## 2023-05-25 PROCEDURE — 6370000000 HC RX 637 (ALT 250 FOR IP): Performed by: INTERNAL MEDICINE

## 2023-05-25 PROCEDURE — 93571 IV DOP VEL&/PRESS C FLO 1ST: CPT | Performed by: INTERNAL MEDICINE

## 2023-05-25 PROCEDURE — 2709999900 HC NON-CHARGEABLE SUPPLY

## 2023-05-25 PROCEDURE — 6370000000 HC RX 637 (ALT 250 FOR IP): Performed by: STUDENT IN AN ORGANIZED HEALTH CARE EDUCATION/TRAINING PROGRAM

## 2023-05-25 PROCEDURE — 93005 ELECTROCARDIOGRAM TRACING: CPT | Performed by: STUDENT IN AN ORGANIZED HEALTH CARE EDUCATION/TRAINING PROGRAM

## 2023-05-25 PROCEDURE — 93458 L HRT ARTERY/VENTRICLE ANGIO: CPT

## 2023-05-25 PROCEDURE — 93306 TTE W/DOPPLER COMPLETE: CPT

## 2023-05-25 PROCEDURE — B2111ZZ FLUOROSCOPY OF MULTIPLE CORONARY ARTERIES USING LOW OSMOLAR CONTRAST: ICD-10-PCS | Performed by: INTERNAL MEDICINE

## 2023-05-25 PROCEDURE — C1887 CATHETER, GUIDING: HCPCS

## 2023-05-25 PROCEDURE — 2140000000 HC CCU INTERMEDIATE R&B

## 2023-05-25 PROCEDURE — 2500000003 HC RX 250 WO HCPCS: Performed by: INTERNAL MEDICINE

## 2023-05-25 PROCEDURE — 6360000002 HC RX W HCPCS: Performed by: EMERGENCY MEDICINE

## 2023-05-25 PROCEDURE — 82962 GLUCOSE BLOOD TEST: CPT

## 2023-05-25 PROCEDURE — 2580000003 HC RX 258

## 2023-05-25 PROCEDURE — 6360000002 HC RX W HCPCS: Performed by: STUDENT IN AN ORGANIZED HEALTH CARE EDUCATION/TRAINING PROGRAM

## 2023-05-25 PROCEDURE — 85730 THROMBOPLASTIN TIME PARTIAL: CPT

## 2023-05-25 PROCEDURE — 80048 BASIC METABOLIC PNL TOTAL CA: CPT

## 2023-05-25 PROCEDURE — 4A023N7 MEASUREMENT OF CARDIAC SAMPLING AND PRESSURE, LEFT HEART, PERCUTANEOUS APPROACH: ICD-10-PCS | Performed by: INTERNAL MEDICINE

## 2023-05-25 PROCEDURE — 99223 1ST HOSP IP/OBS HIGH 75: CPT | Performed by: INTERNAL MEDICINE

## 2023-05-25 PROCEDURE — C1894 INTRO/SHEATH, NON-LASER: HCPCS

## 2023-05-25 PROCEDURE — 2580000003 HC RX 258: Performed by: INTERNAL MEDICINE

## 2023-05-25 PROCEDURE — 85027 COMPLETE CBC AUTOMATED: CPT

## 2023-05-25 RX ORDER — LOSARTAN POTASSIUM 100 MG/1
100 TABLET ORAL DAILY
Status: DISCONTINUED | OUTPATIENT
Start: 2023-05-25 | End: 2023-05-27 | Stop reason: HOSPADM

## 2023-05-25 RX ORDER — LEVOTHYROXINE SODIUM 0.07 MG/1
75 TABLET ORAL DAILY
Status: DISCONTINUED | OUTPATIENT
Start: 2023-05-25 | End: 2023-05-27 | Stop reason: HOSPADM

## 2023-05-25 RX ORDER — IPRATROPIUM BROMIDE AND ALBUTEROL SULFATE 2.5; .5 MG/3ML; MG/3ML
1 SOLUTION RESPIRATORY (INHALATION) EVERY 4 HOURS PRN
Status: DISCONTINUED | OUTPATIENT
Start: 2023-05-25 | End: 2023-05-27 | Stop reason: HOSPADM

## 2023-05-25 RX ORDER — HEPARIN SODIUM 1000 [USP'U]/ML
2000 INJECTION, SOLUTION INTRAVENOUS; SUBCUTANEOUS PRN
Status: DISCONTINUED | OUTPATIENT
Start: 2023-05-25 | End: 2023-05-25 | Stop reason: ALTCHOICE

## 2023-05-25 RX ORDER — SODIUM CHLORIDE 9 MG/ML
INJECTION, SOLUTION INTRAVENOUS PRN
Status: DISCONTINUED | OUTPATIENT
Start: 2023-05-25 | End: 2023-05-27 | Stop reason: HOSPADM

## 2023-05-25 RX ORDER — SODIUM CHLORIDE 0.9 % (FLUSH) 0.9 %
5-40 SYRINGE (ML) INJECTION PRN
Status: DISCONTINUED | OUTPATIENT
Start: 2023-05-25 | End: 2023-05-27 | Stop reason: HOSPADM

## 2023-05-25 RX ORDER — CLOPIDOGREL BISULFATE 75 MG/1
75 TABLET ORAL DAILY
Status: DISCONTINUED | OUTPATIENT
Start: 2023-05-25 | End: 2023-05-26

## 2023-05-25 RX ORDER — METOPROLOL SUCCINATE 50 MG/1
50 TABLET, EXTENDED RELEASE ORAL DAILY
Status: DISCONTINUED | OUTPATIENT
Start: 2023-05-25 | End: 2023-05-27 | Stop reason: HOSPADM

## 2023-05-25 RX ORDER — INSULIN GLARGINE 100 [IU]/ML
30 INJECTION, SOLUTION SUBCUTANEOUS NIGHTLY
Status: DISCONTINUED | OUTPATIENT
Start: 2023-05-25 | End: 2023-05-27 | Stop reason: HOSPADM

## 2023-05-25 RX ORDER — INSULIN LISPRO 100 [IU]/ML
0-4 INJECTION, SOLUTION INTRAVENOUS; SUBCUTANEOUS NIGHTLY
Status: DISCONTINUED | OUTPATIENT
Start: 2023-05-25 | End: 2023-05-26

## 2023-05-25 RX ORDER — IPRATROPIUM BROMIDE AND ALBUTEROL SULFATE 2.5; .5 MG/3ML; MG/3ML
1 SOLUTION RESPIRATORY (INHALATION)
Status: DISCONTINUED | OUTPATIENT
Start: 2023-05-25 | End: 2023-05-25

## 2023-05-25 RX ORDER — FAMOTIDINE 10 MG/ML
20 INJECTION, SOLUTION INTRAVENOUS ONCE
Status: COMPLETED | OUTPATIENT
Start: 2023-05-25 | End: 2023-05-25

## 2023-05-25 RX ORDER — METHYLPREDNISOLONE SODIUM SUCCINATE 40 MG/ML
40 INJECTION, POWDER, LYOPHILIZED, FOR SOLUTION INTRAMUSCULAR; INTRAVENOUS DAILY
Status: DISCONTINUED | OUTPATIENT
Start: 2023-05-25 | End: 2023-05-27 | Stop reason: HOSPADM

## 2023-05-25 RX ORDER — ASPIRIN 81 MG/1
81 TABLET, CHEWABLE ORAL DAILY
Status: DISCONTINUED | OUTPATIENT
Start: 2023-05-25 | End: 2023-05-27 | Stop reason: HOSPADM

## 2023-05-25 RX ORDER — SODIUM CHLORIDE 0.9 % (FLUSH) 0.9 %
5-40 SYRINGE (ML) INJECTION EVERY 12 HOURS SCHEDULED
Status: DISCONTINUED | OUTPATIENT
Start: 2023-05-25 | End: 2023-05-27 | Stop reason: HOSPADM

## 2023-05-25 RX ORDER — ATORVASTATIN CALCIUM 40 MG/1
40 TABLET, FILM COATED ORAL NIGHTLY
Status: DISCONTINUED | OUTPATIENT
Start: 2023-05-25 | End: 2023-05-27 | Stop reason: HOSPADM

## 2023-05-25 RX ORDER — GABAPENTIN 400 MG/1
400 CAPSULE ORAL 2 TIMES DAILY
Status: DISCONTINUED | OUTPATIENT
Start: 2023-05-25 | End: 2023-05-27 | Stop reason: HOSPADM

## 2023-05-25 RX ORDER — INSULIN LISPRO 100 [IU]/ML
0-4 INJECTION, SOLUTION INTRAVENOUS; SUBCUTANEOUS
Status: DISCONTINUED | OUTPATIENT
Start: 2023-05-25 | End: 2023-05-26

## 2023-05-25 RX ORDER — DEXTROSE MONOHYDRATE 100 MG/ML
INJECTION, SOLUTION INTRAVENOUS CONTINUOUS PRN
Status: DISCONTINUED | OUTPATIENT
Start: 2023-05-25 | End: 2023-05-27 | Stop reason: HOSPADM

## 2023-05-25 RX ORDER — LEVETIRACETAM 500 MG/1
500 TABLET ORAL 2 TIMES DAILY
Status: DISCONTINUED | OUTPATIENT
Start: 2023-05-25 | End: 2023-05-25

## 2023-05-25 RX ORDER — PANTOPRAZOLE SODIUM 40 MG/1
40 TABLET, DELAYED RELEASE ORAL
Status: DISCONTINUED | OUTPATIENT
Start: 2023-05-25 | End: 2023-05-27 | Stop reason: HOSPADM

## 2023-05-25 RX ORDER — GLUCAGON 1 MG/ML
1 KIT INJECTION PRN
Status: DISCONTINUED | OUTPATIENT
Start: 2023-05-25 | End: 2023-05-27 | Stop reason: HOSPADM

## 2023-05-25 RX ORDER — ONDANSETRON 4 MG/1
4 TABLET, ORALLY DISINTEGRATING ORAL EVERY 8 HOURS PRN
Status: DISCONTINUED | OUTPATIENT
Start: 2023-05-25 | End: 2023-05-27 | Stop reason: HOSPADM

## 2023-05-25 RX ORDER — GUAIFENESIN 600 MG/1
600 TABLET, EXTENDED RELEASE ORAL 2 TIMES DAILY
Status: DISCONTINUED | OUTPATIENT
Start: 2023-05-25 | End: 2023-05-27 | Stop reason: HOSPADM

## 2023-05-25 RX ORDER — HEPARIN SODIUM 1000 [USP'U]/ML
4000 INJECTION, SOLUTION INTRAVENOUS; SUBCUTANEOUS PRN
Status: DISCONTINUED | OUTPATIENT
Start: 2023-05-25 | End: 2023-05-25 | Stop reason: ALTCHOICE

## 2023-05-25 RX ORDER — FUROSEMIDE 20 MG/1
20 TABLET ORAL DAILY
Status: DISCONTINUED | OUTPATIENT
Start: 2023-05-25 | End: 2023-05-27 | Stop reason: HOSPADM

## 2023-05-25 RX ORDER — ACETAMINOPHEN 325 MG/1
650 TABLET ORAL EVERY 6 HOURS PRN
Status: DISCONTINUED | OUTPATIENT
Start: 2023-05-25 | End: 2023-05-27 | Stop reason: HOSPADM

## 2023-05-25 RX ORDER — AMITRIPTYLINE HYDROCHLORIDE 50 MG/1
50 TABLET, FILM COATED ORAL NIGHTLY
Status: DISCONTINUED | OUTPATIENT
Start: 2023-05-25 | End: 2023-05-27 | Stop reason: HOSPADM

## 2023-05-25 RX ORDER — POLYETHYLENE GLYCOL 3350 17 G/17G
17 POWDER, FOR SOLUTION ORAL DAILY PRN
Status: DISCONTINUED | OUTPATIENT
Start: 2023-05-25 | End: 2023-05-27 | Stop reason: HOSPADM

## 2023-05-25 RX ORDER — HEPARIN SODIUM 10000 [USP'U]/100ML
5-30 INJECTION, SOLUTION INTRAVENOUS CONTINUOUS
Status: DISCONTINUED | OUTPATIENT
Start: 2023-05-25 | End: 2023-05-25

## 2023-05-25 RX ORDER — ACETAMINOPHEN 650 MG/1
650 SUPPOSITORY RECTAL EVERY 6 HOURS PRN
Status: DISCONTINUED | OUTPATIENT
Start: 2023-05-25 | End: 2023-05-27 | Stop reason: HOSPADM

## 2023-05-25 RX ORDER — ONDANSETRON 2 MG/ML
4 INJECTION INTRAMUSCULAR; INTRAVENOUS EVERY 6 HOURS PRN
Status: DISCONTINUED | OUTPATIENT
Start: 2023-05-25 | End: 2023-05-27 | Stop reason: HOSPADM

## 2023-05-25 RX ORDER — DOCUSATE SODIUM 100 MG/1
100 CAPSULE, LIQUID FILLED ORAL 2 TIMES DAILY
Status: DISCONTINUED | OUTPATIENT
Start: 2023-05-25 | End: 2023-05-27 | Stop reason: HOSPADM

## 2023-05-25 RX ORDER — ENOXAPARIN SODIUM 100 MG/ML
40 INJECTION SUBCUTANEOUS DAILY
Status: DISCONTINUED | OUTPATIENT
Start: 2023-05-25 | End: 2023-05-27 | Stop reason: HOSPADM

## 2023-05-25 RX ADMIN — INSULIN LISPRO 4 UNITS: 100 INJECTION, SOLUTION INTRAVENOUS; SUBCUTANEOUS at 21:12

## 2023-05-25 RX ADMIN — CLOPIDOGREL BISULFATE 75 MG: 75 TABLET ORAL at 21:14

## 2023-05-25 RX ADMIN — HEPARIN SODIUM 4000 UNITS: 1000 INJECTION INTRAVENOUS; SUBCUTANEOUS at 01:01

## 2023-05-25 RX ADMIN — BUSPIRONE HYDROCHLORIDE 20 MG: 5 TABLET ORAL at 21:13

## 2023-05-25 RX ADMIN — GABAPENTIN 400 MG: 400 CAPSULE ORAL at 21:14

## 2023-05-25 RX ADMIN — AMITRIPTYLINE HYDROCHLORIDE 50 MG: 50 TABLET, FILM COATED ORAL at 21:13

## 2023-05-25 RX ADMIN — SODIUM CHLORIDE, PRESERVATIVE FREE 10 ML: 5 INJECTION INTRAVENOUS at 21:17

## 2023-05-25 RX ADMIN — INSULIN LISPRO 2 UNITS: 100 INJECTION, SOLUTION INTRAVENOUS; SUBCUTANEOUS at 14:50

## 2023-05-25 RX ADMIN — FAMOTIDINE 20 MG: 10 INJECTION, SOLUTION INTRAVENOUS at 11:41

## 2023-05-25 RX ADMIN — SODIUM CHLORIDE: 9 INJECTION, SOLUTION INTRAVENOUS at 21:21

## 2023-05-25 RX ADMIN — LOSARTAN POTASSIUM 100 MG: 100 TABLET, FILM COATED ORAL at 14:58

## 2023-05-25 RX ADMIN — GUAIFENESIN 600 MG: 600 TABLET, EXTENDED RELEASE ORAL at 21:14

## 2023-05-25 RX ADMIN — ASPIRIN 81 MG CHEWABLE TABLET 81 MG: 81 TABLET CHEWABLE at 14:59

## 2023-05-25 RX ADMIN — DOCUSATE SODIUM 100 MG: 100 CAPSULE, LIQUID FILLED ORAL at 21:14

## 2023-05-25 RX ADMIN — LEVETIRACETAM 500 MG: 100 INJECTION, SOLUTION INTRAVENOUS at 21:22

## 2023-05-25 RX ADMIN — AZITHROMYCIN MONOHYDRATE 500 MG: 500 INJECTION, POWDER, LYOPHILIZED, FOR SOLUTION INTRAVENOUS at 04:58

## 2023-05-25 RX ADMIN — NITROGLYCERIN 5 MCG/MIN: 20 INJECTION INTRAVENOUS at 01:57

## 2023-05-25 RX ADMIN — PANTOPRAZOLE SODIUM 40 MG: 40 TABLET, DELAYED RELEASE ORAL at 14:59

## 2023-05-25 RX ADMIN — ATORVASTATIN CALCIUM 40 MG: 40 TABLET, FILM COATED ORAL at 21:14

## 2023-05-25 RX ADMIN — LEVETIRACETAM 500 MG: 100 INJECTION, SOLUTION INTRAVENOUS at 14:14

## 2023-05-25 RX ADMIN — ONDANSETRON 8 MG: 2 INJECTION INTRAMUSCULAR; INTRAVENOUS at 00:56

## 2023-05-25 RX ADMIN — METOPROLOL SUCCINATE 50 MG: 50 TABLET, EXTENDED RELEASE ORAL at 14:59

## 2023-05-25 RX ADMIN — ONDANSETRON 4 MG: 2 INJECTION INTRAMUSCULAR; INTRAVENOUS at 05:09

## 2023-05-25 RX ADMIN — INSULIN LISPRO 3 UNITS: 100 INJECTION, SOLUTION INTRAVENOUS; SUBCUTANEOUS at 16:55

## 2023-05-25 RX ADMIN — BUSPIRONE HYDROCHLORIDE 20 MG: 5 TABLET ORAL at 14:58

## 2023-05-25 RX ADMIN — INSULIN GLARGINE 30 UNITS: 100 INJECTION, SOLUTION SUBCUTANEOUS at 21:12

## 2023-05-25 RX ADMIN — GABAPENTIN 400 MG: 400 CAPSULE ORAL at 14:59

## 2023-05-25 RX ADMIN — FUROSEMIDE 20 MG: 20 TABLET ORAL at 14:59

## 2023-05-25 ASSESSMENT — PAIN DESCRIPTION - LOCATION
LOCATION: CHEST;BACK
LOCATION: CHEST

## 2023-05-25 ASSESSMENT — PAIN DESCRIPTION - ORIENTATION: ORIENTATION: MID

## 2023-05-25 ASSESSMENT — PAIN DESCRIPTION - DESCRIPTORS
DESCRIPTORS: SHARP
DESCRIPTORS: SHARP;STABBING

## 2023-05-25 ASSESSMENT — PAIN SCALES - GENERAL
PAINLEVEL_OUTOF10: 10

## 2023-05-25 ASSESSMENT — PAIN - FUNCTIONAL ASSESSMENT: PAIN_FUNCTIONAL_ASSESSMENT: ACTIVITIES ARE NOT PREVENTED

## 2023-05-25 NOTE — CARE COORDINATION
05/25/23 0913   Service Assessment   Patient Orientation Alert and Oriented   Cognition Alert   History Provided By Medical Record   Primary [place] is: Patient Declined (Legal Next of Kin Remains as Decision Maker)   PCP Verified by CM Yes   Prior Functional Level Assistance with the following:;Mobility   Current Functional Level Assistance with the following:;Mobility   Can patient return to prior living arrangement Yes   Ability to make needs known: Good   Family able to assist with home care needs: No   Would you like for me to discuss the discharge plan with any other family members/significant others, and if so, who? No   Financial Resources Medicare;Medicaid     Chart reviewed, screened for discharge planning. Pt from home alone with aide services. Pt has insurance and PCP.   CM following for needs

## 2023-05-25 NOTE — DISCHARGE INSTRUCTIONS
You came in because of chest pain. You underwent a left heart angiogram which did not show any significant blockages. You are now stable for discharge home. You will continue on aspirin and atorvastatin. You also were diagnosed with acute bronchitis and received antibiotics and steroids for it. You will be sent home with Flonase, you will take 1 spray in each nostril once a day until your sinus issues have resolved. If your symptoms come back or worsen, please return to the emergency room. Advised patient on limitations.  -No lifting over 20 pounds for 7-10 days.  -Avoid driving for 67-76 hours  -No water submersion (bath, hot tub, swimming), but okay to shower. Some bruising is common and should not be alarming. Advised patient to return to emergency department ASAP if they notice bleeding bright red blood. Potential life threatening condition. Informed to contact the office if they notice new fever, excessive warmth, redness, swelling, palpable mass, or pus draining from site.

## 2023-05-25 NOTE — ED PROVIDER NOTES
Emergency Department Encounter  Location: [facility]    Patient: Dyllan Olson  MRN: [id number]  : 1955  Date of evaluation: 2023  ED Provider: Nadir Christensen DO    Chief Complaint:    Chest Pain, Back Pain, and Shortness of Breath    [place]:  Dyllan Olson is a 74 y.o. female that presents to the emergency department with concern for cough, nasal congestion and generalized malaise for the past 2 to 3 days. She denies measured fever or chills. Reports that her chest has been hurting, sometimes worse with cough. Admits she feels more short of breath. No falls or syncopal events. Does report several family members who are ill with similar symptoms. Past Medical History:   Diagnosis Date    Abnormal EKG 2014    Acid reflux     Anemia     Anesthesia     Nausea/Vomiting Post Op In Past    Anginal pain (HCC)     Denies Chest Pain At This Time    Anxiety     Arthritis     \"All Over\"    Asthma     Bipolar 1 disorder (HCC)     Cerebral artery occlusion with cerebral infarction (HCC)     CHF (congestive heart failure) (HCC)     Chronic back pain     Chronic kidney disease     DDD (degenerative disc disease), cervical     2014 Patient reports she was dx with DDD of Cerival spine C6,C7    Depression     Diabetes mellitus ([address] Dx     Diabetic neuropathy ([address]     \"In My Legs And Feet\"    Dizziness     \"Sometimes\"    Dry skin     Enlarged ureter     Right Side    Fatty liver     Fibrocystic breast     Generalized anxiety disorder     Gout     Pt states she was diagnosed with gout in the past few months. H/O cardiac catheterization     Showed mild disease per last cath. H/O cardiovascular stress test 03/15/2010    EF 69%, normal perfusion study except for diaphragmatic artifact, uniform wall motion. H/O cardiovascular stress test 10/09/2008    EF 60%, no anginia, normal study.     H/O cardiovascular stress test 2014    EF 66%, no ischemia, normal LV systolic funciton, normal

## 2023-05-25 NOTE — ED NOTES
Care and report given to [facility] CHAVO WALTERS  05/24/23 9749
Pt stating using oxygen at night only.      Sharren Felty, RN  05/24/23 2027
Generalized anxiety disorder     Gout     Pt states she was diagnosed with gout in the past few months. H/O cardiac catheterization     Showed mild disease per last cath. H/O cardiovascular stress test 03/15/2010    EF 69%, normal perfusion study except for diaphragmatic artifact, uniform wall motion. H/O cardiovascular stress test 10/09/2008    EF 60%, no anginia, normal study. H/O cardiovascular stress test 05/06/2014    EF 66%, no ischemia, normal LV systolic funciton, normal perfusion pattern. H/O Doppler ultrasound 02/28/2011    CAROTID DOPPLER-normal study. H/O echocardiogram 05/06/2014    Ef >55%. Impaired LV relaxation. H/O echocardiogram 10/14/2015    EF 60% Normal LV and systolic function. No significant valvulopathy seen. History of Holter monitoring 03/24/2015    24 hour - predominant rhythm sinus    [place] (hard of hearing)     Bilateral Ears    Hx of cardiovascular stress test 10/19/2015    lexiscan-normal,EF63%    Hx of motion sickness     HX OTHER MEDICAL     Primary Care Physician Is Dr. Teresa Marie In [facility]    Hyperlipidemia     Hypertension     IBS (irritable bowel syndrome)     Incisional hernia 04/2014    Kidney stones Last Episode In 2012 Or 2013    Passed Kidney Stones Numberous Times    Migraines     Nausea & vomiting     Nausea/Vomiting Post Op In Past    NSTEMI (non-ST elevated myocardial infarction) ([facility] [address] 8/14/2017    Other specified disorder of skin     12- Patient states she has a condition of her vaginal area (skin) which starts with the letters Shawn Reap. She is currently being treated with multiple creams and weekly Diflucan. 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     Staph infection Dx 1980's    Toes On Left Foot

## 2023-05-25 NOTE — H&P
1 g tablet Take 1 tablet by mouth 2 times daily (with meals) 10/1/21 5/30/22  Jennifer Nunn PA-C   QUEtiapine (SEROQUEL) 25 MG tablet Take 1 tablet by mouth 2 times daily  Patient taking differently: Take 1 tablet by mouth 2 times daily Indications: 25 mg in am and 50 mg in pm 7/15/21   Juan Pablo Evans MD   busPIRone (BUSPAR) 10 MG tablet Take 2 tablets by mouth 3 times daily 4/28/21   Kristen Richards MD   HYDROcodone-acetaminophen (NORCO)  MG per tablet Take 1 tablet by mouth every 6 hours as needed. Patient not taking: Reported on 5/9/2023 1/22/21   Historical Provider, MD   fluticasone-umeclidin-vilant (TRELEGY ELLIPTA) 100-62.5-25 MCG/INH AEPB Inhale 1 puff into the lungs daily 12/8/20   Raeann Carl MD   OXcarbazepine (TRILEPTAL) 300 MG tablet Take 1 tablet by mouth 2 times daily 12/4/20   Ricardo Harrington MD   docusate sodium (COLACE) 100 MG capsule Take 1 capsule by mouth 2 times daily 1/9/20   HAYDEN Gonzalez   aluminum & magnesium hydroxide-simethicone (MAALOX MAX) 394-044-75 MG/5ML SUSP Take 15 mLs by mouth every 6 hours as needed (heart burn) 9/12/18   WENDIE Morales - CNP   aspirin 81 MG tablet Take 1 tablet by mouth daily    Historical Provider, MD   Multiple Vitamins-Iron (MULTI-VITAMIN/IRON PO) Take  by mouth. Historical Provider, MD       Physical Exam: Need 8 Elements   Physical Exam  Vitals reviewed. Constitutional:       Appearance: Normal appearance. She is normal weight. HENT:      Head: Normocephalic. Nose: Nose normal.      Mouth/Throat:      Mouth: Mucous membranes are moist.   Eyes:      Conjunctiva/sclera: Conjunctivae normal.      Pupils: Pupils are equal, round, and reactive to light. Cardiovascular:      Rate and Rhythm: Normal rate and regular rhythm. Pulses: Normal pulses. Heart sounds: Normal heart sounds. No murmur heard. Pulmonary:      Effort: Pulmonary effort is normal.      Breath sounds: Rales present. No wheezing or rhonchi.

## 2023-05-25 NOTE — ED PROVIDER NOTES
Emergency Department Encounter  Location: [address] [place] [address]    Patient: Bg Brothers  MRN: [id number]  : 1955  Date of evaluation: 2023  ED Provider: Deisi Beckham MD    2300:p.mNilam Brothers was checked out to me by Dr. Rashaun Garcia. Please see his/her initial documentation for details of the patient's initial ED presentation, physical exam and completed studies. In brief, Bg Brothers is a 74 y.o. female that presented to the emergency department with viral-like symptoms but also with intermittent central and right-sided chest pain.     I have reviewed and interpreted all of the currently available lab results and diagnostics from this visit:  Results for orders placed or performed during the hospital encounter of 23   Respiratory Panel, Molecular, with COVID-19 (Restricted: peds pts or suitable admitted adults)    Specimen: Nasopharyngeal   Result Value Ref Range    Adenovirus Detection by PCR NOT DETECTED NOT DETECTED    Coronavirus 229E PCR NOT DETECTED NOT DETECTED    Coronavirus HKU1 PCR NOT DETECTED NOT DETECTED    Coronavirus NL63 PCR NOT DETECTED NOT DETECTED    Coronavirus OC43 PCR NOT DETECTED NOT DETECTED    SARS-CoV-2 NOT DETECTED NOT DETECTED    Human Metapneumovirus PCR NOT DETECTED NOT DETECTED    Rhinovirus Enterovirus PCR NOT DETECTED NOT DETECTED    Influenza A by PCR NOT DETECTED NOT DETECTED    Influenza A H1 Pandemic PCR NOT DETECTED NOT DETECTED    Influenza A H1 () PCR NOT DETECTED NOT DETECTED    Influenza A H3 PCR NOT DETECTED NOT DETECTED    Influenza B by PCR NOT DETECTED NOT DETECTED    Parainfluenza 1 PCR NOT DETECTED NOT DETECTED    Parainfluenza 2 PCR NOT DETECTED NOT DETECTED    Parainfluenza 3 PCR NOT DETECTED NOT DETECTED    Parainfluenza 4 PCR NOT DETECTED NOT DETECTED    RSV PCR NOT DETECTED NOT DETECTED    Bordetella parapertussis by PCR NOT DETECTED NOT DETECTED    B Pertussis by PCR NOT DETECTED

## 2023-05-26 LAB
ANION GAP SERPL CALCULATED.3IONS-SCNC: 14 MMOL/L (ref 4–16)
BUN SERPL-MCNC: 13 MG/DL (ref 6–23)
CALCIUM SERPL-MCNC: 8.4 MG/DL (ref 8.3–10.6)
CHLORIDE BLD-SCNC: 99 MMOL/L (ref 99–110)
CO2: 22 MMOL/L (ref 21–32)
CREAT SERPL-MCNC: 0.7 MG/DL (ref 0.6–1.1)
GFR SERPL CREATININE-BSD FRML MDRD: >60 ML/MIN/1.73M2
GLUCOSE BLD-MCNC: 215 MG/DL (ref 70–99)
GLUCOSE BLD-MCNC: 293 MG/DL (ref 70–99)
GLUCOSE BLD-MCNC: 307 MG/DL (ref 70–99)
GLUCOSE BLD-MCNC: 362 MG/DL (ref 70–99)
GLUCOSE SERPL-MCNC: 174 MG/DL (ref 70–99)
HCT VFR BLD CALC: 36.8 % (ref 37–47)
HEMOGLOBIN: 11.5 GM/DL (ref 12.5–16)
MAGNESIUM: 2.1 MG/DL (ref 1.8–2.4)
MCH RBC QN AUTO: 26.7 PG (ref 27–31)
MCHC RBC AUTO-ENTMCNC: 31.3 % (ref 32–36)
MCV RBC AUTO: 85.4 FL (ref 78–100)
PDW BLD-RTO: 11.9 % (ref 11.7–14.9)
PHOSPHORUS: 3.4 MG/DL (ref 2.5–4.9)
PLATELET # BLD: 266 K/CU MM (ref 140–440)
PMV BLD AUTO: 9.5 FL (ref 7.5–11.1)
POTASSIUM SERPL-SCNC: 4 MMOL/L (ref 3.5–5.1)
RBC # BLD: 4.31 M/CU MM (ref 4.2–5.4)
SODIUM BLD-SCNC: 135 MMOL/L (ref 135–145)
TROPONIN T: <0.01 NG/ML
WBC # BLD: 12.9 K/CU MM (ref 4–10.5)

## 2023-05-26 PROCEDURE — 6360000002 HC RX W HCPCS: Performed by: STUDENT IN AN ORGANIZED HEALTH CARE EDUCATION/TRAINING PROGRAM

## 2023-05-26 PROCEDURE — 83735 ASSAY OF MAGNESIUM: CPT

## 2023-05-26 PROCEDURE — 2700000000 HC OXYGEN THERAPY PER DAY

## 2023-05-26 PROCEDURE — 2140000000 HC CCU INTERMEDIATE R&B

## 2023-05-26 PROCEDURE — 82962 GLUCOSE BLOOD TEST: CPT

## 2023-05-26 PROCEDURE — 6370000000 HC RX 637 (ALT 250 FOR IP): Performed by: STUDENT IN AN ORGANIZED HEALTH CARE EDUCATION/TRAINING PROGRAM

## 2023-05-26 PROCEDURE — 80048 BASIC METABOLIC PNL TOTAL CA: CPT

## 2023-05-26 PROCEDURE — 6370000000 HC RX 637 (ALT 250 FOR IP)

## 2023-05-26 PROCEDURE — 6370000000 HC RX 637 (ALT 250 FOR IP): Performed by: INTERNAL MEDICINE

## 2023-05-26 PROCEDURE — 97530 THERAPEUTIC ACTIVITIES: CPT

## 2023-05-26 PROCEDURE — 97162 PT EVAL MOD COMPLEX 30 MIN: CPT

## 2023-05-26 PROCEDURE — 97166 OT EVAL MOD COMPLEX 45 MIN: CPT

## 2023-05-26 PROCEDURE — 84100 ASSAY OF PHOSPHORUS: CPT

## 2023-05-26 PROCEDURE — 2580000003 HC RX 258: Performed by: STUDENT IN AN ORGANIZED HEALTH CARE EDUCATION/TRAINING PROGRAM

## 2023-05-26 PROCEDURE — 2580000003 HC RX 258: Performed by: INTERNAL MEDICINE

## 2023-05-26 PROCEDURE — 85027 COMPLETE CBC AUTOMATED: CPT

## 2023-05-26 PROCEDURE — 99232 SBSQ HOSP IP/OBS MODERATE 35: CPT | Performed by: INTERNAL MEDICINE

## 2023-05-26 PROCEDURE — APPSS30 APP SPLIT SHARED TIME 16-30 MINUTES

## 2023-05-26 PROCEDURE — 97116 GAIT TRAINING THERAPY: CPT

## 2023-05-26 PROCEDURE — 36415 COLL VENOUS BLD VENIPUNCTURE: CPT

## 2023-05-26 PROCEDURE — 94761 N-INVAS EAR/PLS OXIMETRY MLT: CPT

## 2023-05-26 PROCEDURE — 6360000002 HC RX W HCPCS

## 2023-05-26 PROCEDURE — 2580000003 HC RX 258

## 2023-05-26 PROCEDURE — 84484 ASSAY OF TROPONIN QUANT: CPT

## 2023-05-26 RX ORDER — INSULIN LISPRO 100 [IU]/ML
4 INJECTION, SOLUTION INTRAVENOUS; SUBCUTANEOUS ONCE
Status: COMPLETED | OUTPATIENT
Start: 2023-05-26 | End: 2023-05-26

## 2023-05-26 RX ORDER — INSULIN LISPRO 100 [IU]/ML
0-4 INJECTION, SOLUTION INTRAVENOUS; SUBCUTANEOUS NIGHTLY
Status: DISCONTINUED | OUTPATIENT
Start: 2023-05-26 | End: 2023-05-27 | Stop reason: HOSPADM

## 2023-05-26 RX ORDER — CLOPIDOGREL 300 MG/1
600 TABLET, FILM COATED ORAL ONCE
Status: COMPLETED | OUTPATIENT
Start: 2023-05-26 | End: 2023-05-26

## 2023-05-26 RX ORDER — INSULIN LISPRO 100 [IU]/ML
0-8 INJECTION, SOLUTION INTRAVENOUS; SUBCUTANEOUS
Status: DISCONTINUED | OUTPATIENT
Start: 2023-05-26 | End: 2023-05-27 | Stop reason: HOSPADM

## 2023-05-26 RX ORDER — CLOPIDOGREL BISULFATE 75 MG/1
75 TABLET ORAL DAILY
Status: DISCONTINUED | OUTPATIENT
Start: 2023-05-27 | End: 2023-05-26

## 2023-05-26 RX ORDER — LEVETIRACETAM 500 MG/1
500 TABLET ORAL 2 TIMES DAILY
Status: DISCONTINUED | OUTPATIENT
Start: 2023-05-26 | End: 2023-05-27 | Stop reason: HOSPADM

## 2023-05-26 RX ORDER — CLOPIDOGREL 300 MG/1
600 TABLET, FILM COATED ORAL DAILY
Status: DISCONTINUED | OUTPATIENT
Start: 2023-05-26 | End: 2023-05-26

## 2023-05-26 RX ADMIN — FUROSEMIDE 20 MG: 20 TABLET ORAL at 08:46

## 2023-05-26 RX ADMIN — AMITRIPTYLINE HYDROCHLORIDE 50 MG: 50 TABLET, FILM COATED ORAL at 21:10

## 2023-05-26 RX ADMIN — DOCUSATE SODIUM 100 MG: 100 CAPSULE, LIQUID FILLED ORAL at 21:09

## 2023-05-26 RX ADMIN — INSULIN LISPRO 4 UNITS: 100 INJECTION, SOLUTION INTRAVENOUS; SUBCUTANEOUS at 17:35

## 2023-05-26 RX ADMIN — PANTOPRAZOLE SODIUM 40 MG: 40 TABLET, DELAYED RELEASE ORAL at 04:32

## 2023-05-26 RX ADMIN — PANTOPRAZOLE SODIUM 40 MG: 40 TABLET, DELAYED RELEASE ORAL at 15:28

## 2023-05-26 RX ADMIN — LEVETIRACETAM 500 MG: 500 TABLET, FILM COATED ORAL at 21:10

## 2023-05-26 RX ADMIN — DOCUSATE SODIUM 100 MG: 100 CAPSULE, LIQUID FILLED ORAL at 08:45

## 2023-05-26 RX ADMIN — SODIUM CHLORIDE, PRESERVATIVE FREE 10 ML: 5 INJECTION INTRAVENOUS at 08:51

## 2023-05-26 RX ADMIN — BUSPIRONE HYDROCHLORIDE 20 MG: 5 TABLET ORAL at 08:45

## 2023-05-26 RX ADMIN — ONDANSETRON 4 MG: 2 INJECTION INTRAMUSCULAR; INTRAVENOUS at 08:30

## 2023-05-26 RX ADMIN — INSULIN LISPRO 4 UNITS: 100 INJECTION, SOLUTION INTRAVENOUS; SUBCUTANEOUS at 16:38

## 2023-05-26 RX ADMIN — ASPIRIN 81 MG CHEWABLE TABLET 81 MG: 81 TABLET CHEWABLE at 08:46

## 2023-05-26 RX ADMIN — SODIUM CHLORIDE, PRESERVATIVE FREE 10 ML: 5 INJECTION INTRAVENOUS at 21:13

## 2023-05-26 RX ADMIN — GUAIFENESIN 600 MG: 600 TABLET, EXTENDED RELEASE ORAL at 08:46

## 2023-05-26 RX ADMIN — METOPROLOL SUCCINATE 50 MG: 50 TABLET, EXTENDED RELEASE ORAL at 08:46

## 2023-05-26 RX ADMIN — LEVOTHYROXINE SODIUM 75 MCG: 0.07 TABLET ORAL at 04:32

## 2023-05-26 RX ADMIN — BUSPIRONE HYDROCHLORIDE 20 MG: 5 TABLET ORAL at 21:10

## 2023-05-26 RX ADMIN — SODIUM CHLORIDE, PRESERVATIVE FREE 20 ML: 5 INJECTION INTRAVENOUS at 21:12

## 2023-05-26 RX ADMIN — LEVETIRACETAM 500 MG: 100 INJECTION, SOLUTION INTRAVENOUS at 10:50

## 2023-05-26 RX ADMIN — GABAPENTIN 400 MG: 400 CAPSULE ORAL at 08:46

## 2023-05-26 RX ADMIN — LOSARTAN POTASSIUM 100 MG: 100 TABLET, FILM COATED ORAL at 08:46

## 2023-05-26 RX ADMIN — ATORVASTATIN CALCIUM 40 MG: 40 TABLET, FILM COATED ORAL at 21:10

## 2023-05-26 RX ADMIN — AZITHROMYCIN MONOHYDRATE 500 MG: 500 INJECTION, POWDER, LYOPHILIZED, FOR SOLUTION INTRAVENOUS at 04:30

## 2023-05-26 RX ADMIN — GABAPENTIN 400 MG: 400 CAPSULE ORAL at 21:10

## 2023-05-26 RX ADMIN — CLOPIDOGREL BISULFATE 600 MG: 300 TABLET, FILM COATED ORAL at 13:51

## 2023-05-26 RX ADMIN — BUSPIRONE HYDROCHLORIDE 20 MG: 5 TABLET ORAL at 15:28

## 2023-05-26 RX ADMIN — ONDANSETRON 4 MG: 2 INJECTION INTRAMUSCULAR; INTRAVENOUS at 18:50

## 2023-05-26 RX ADMIN — INSULIN GLARGINE 30 UNITS: 100 INJECTION, SOLUTION SUBCUTANEOUS at 21:09

## 2023-05-26 RX ADMIN — INSULIN LISPRO 3 UNITS: 100 INJECTION, SOLUTION INTRAVENOUS; SUBCUTANEOUS at 11:19

## 2023-05-26 RX ADMIN — INSULIN LISPRO 1 UNITS: 100 INJECTION, SOLUTION INTRAVENOUS; SUBCUTANEOUS at 08:44

## 2023-05-26 RX ADMIN — GUAIFENESIN 600 MG: 600 TABLET, EXTENDED RELEASE ORAL at 21:10

## 2023-05-26 RX ADMIN — METHYLPREDNISOLONE SODIUM SUCCINATE 40 MG: 40 INJECTION, POWDER, LYOPHILIZED, FOR SOLUTION INTRAMUSCULAR; INTRAVENOUS at 10:54

## 2023-05-26 ASSESSMENT — PAIN DESCRIPTION - DESCRIPTORS: DESCRIPTORS: SHARP

## 2023-05-26 ASSESSMENT — PAIN - FUNCTIONAL ASSESSMENT: PAIN_FUNCTIONAL_ASSESSMENT: ACTIVITIES ARE NOT PREVENTED

## 2023-05-26 ASSESSMENT — PAIN DESCRIPTION - LOCATION: LOCATION: CHEST

## 2023-05-26 ASSESSMENT — PAIN SCALES - GENERAL: PAINLEVEL_OUTOF10: 10

## 2023-05-26 ASSESSMENT — PAIN DESCRIPTION - ORIENTATION: ORIENTATION: MID

## 2023-05-26 NOTE — FLOWSHEET NOTE
05/26/23 1641   Treatment Team Notification   Reason for Communication Critical results  (pt , 3 units of insulin given)   Type of Critical Result POC test   Critical POC Result Type Glucose   Team Member Name Adis Herron NP   Treatment Team Role Other (Comment)  (NP)   Method of Communication Secure Message   Response Waiting for response   Notification Time 1640

## 2023-05-26 NOTE — PLAN OF CARE
Problem: Discharge Planning  Goal: Discharge to home or other facility with appropriate resources  Outcome: Progressing     Problem: Pain  Goal: Verbalizes/displays adequate comfort level or baseline comfort level  Outcome: Progressing     Problem: ABCDS Injury Assessment  Goal: Absence of physical injury  Outcome: Progressing     Problem: Safety - Adult  Goal: Free from fall injury  Outcome: Progressing     Problem: Chronic Conditions and Co-morbidities  Goal: Patient's chronic conditions and co-morbidity symptoms are monitored and maintained or improved  Outcome: Progressing     Problem: Cardiovascular - Adult  Goal: Maintains optimal cardiac output and hemodynamic stability  Outcome: Progressing  Goal: Absence of cardiac dysrhythmias or at baseline  Outcome: Progressing     Problem: Infection - Adult  Goal: Absence of infection at discharge  Outcome: Progressing  Goal: Absence of infection during hospitalization  Outcome: Progressing     Problem: Metabolic/Fluid and Electrolytes - Adult  Goal: Electrolytes maintained within normal limits  Outcome: Progressing  Goal: Hemodynamic stability and optimal renal function maintained  Outcome: Progressing  Goal: Glucose maintained within prescribed range  Outcome: Progressing

## 2023-05-26 NOTE — CONSULTS
[id number] IR[address] Elise, 1955, 3101/3101-A, 5/26/2023      Discharge Recommendation: SNF    History:  [place]:  The primary encounter diagnosis was Acute bronchitis, unspecified organism. A diagnosis of NSTEMI (non-ST elevated myocardial infarction) [facility]) was also pertinent to this visit. Past Medical History:   Diagnosis Date    Abnormal EKG 04/22/2014    Acid reflux     Anemia     Anesthesia     Nausea/Vomiting Post Op In Past    Anginal pain (HCC)     Denies Chest Pain At This Time    Anxiety     Arthritis     \"All Over\"    Asthma     Bipolar 1 disorder (HCC)     Cerebral artery occlusion with cerebral infarction (HCC)     CHF (congestive heart failure) (HCC)     Chronic back pain     Chronic kidney disease     DDD (degenerative disc disease), cervical     12- Patient reports she was dx with DDD of Cerival spine C6,C7    Depression     Diabetes mellitus ([address] Dx 1990's    Diabetic neuropathy ([address]     \"In My Legs And Feet\"    Dizziness     \"Sometimes\"    Dry skin     Enlarged ureter     Right Side    Fatty liver     Fibrocystic breast     Generalized anxiety disorder     Gout     Pt states she was diagnosed with gout in the past few months. H/O cardiac catheterization     Showed mild disease per last cath. H/O cardiovascular stress test 03/15/2010    EF 69%, normal perfusion study except for diaphragmatic artifact, uniform wall motion. H/O cardiovascular stress test 10/09/2008    EF 60%, no anginia, normal study. H/O cardiovascular stress test 05/06/2014    EF 66%, no ischemia, normal LV systolic funciton, normal perfusion pattern. H/O Doppler ultrasound 02/28/2011    CAROTID DOPPLER-normal study. H/O echocardiogram 05/06/2014    Ef >55%. Impaired LV relaxation. H/O echocardiogram 10/14/2015    EF 60% Normal LV and systolic function. No significant valvulopathy seen.      History of Holter monitoring 03/24/2015    24
Paul, 1955, 3101/3101-A, 5/26/2023    History  [place]:  The primary encounter diagnosis was Acute bronchitis, unspecified organism. A diagnosis of NSTEMI (non-ST elevated myocardial infarction) [facility]) was also pertinent to this visit. Patient  has a past medical history of Abnormal EKG, Acid reflux, Anemia, Anesthesia, Anginal pain ([address] Anxiety, Arthritis, Asthma, Bipolar 1 disorder ([address] Cerebral artery occlusion with cerebral infarction ([address] CHF (congestive heart failure) ([address] Chronic back pain, Chronic kidney disease, DDD (degenerative disc disease), cervical, Depression, Diabetes mellitus ([address] Diabetic neuropathy ([address] Dizziness, Dry skin, Enlarged ureter, Fatty liver, Fibrocystic breast, Generalized anxiety disorder, Gout, H/O cardiac catheterization, H/O cardiovascular stress test, H/O cardiovascular stress test, H/O cardiovascular stress test, H/O Doppler ultrasound, H/O echocardiogram, H/O echocardiogram, History of Holter monitoring, [place] (hard of hearing), Hx of cardiovascular stress test, Hx of motion sickness, HX OTHER MEDICAL, Hyperlipidemia, Hypertension, IBS (irritable bowel syndrome), Incisional hernia, Kidney stones, Migraines, Nausea & vomiting, NSTEMI (non-ST elevated myocardial infarction) ([address] Other specified disorder of skin, Panic attacks, Panic attacks, Pneumonia, Pseudoseizures, Restless leg, Shortness of breath, Sleep apnea, Staph infection, Thyroid disease, Tremor, Urinary incontinence, Vertigo, and Wears glasses. Patient  has a past surgical history that includes Carpal tunnel release (Right, 1999); Diagnostic Cardiac Cath Lab Procedure (01/11/2010); Dental surgery; Colonoscopy (Last Done 6-13); Endoscopy, colon, diagnostic (Several ); Bladder surgery (1970's Or 1980's); Lithotripsy (2011); Breast biopsy (Right, 1980's);  Breast surgery (Left, 1990's); hernia repair (3176'G); hernia repair (1970's);
12:33 PM

## 2023-05-26 NOTE — CARE COORDINATION
PT/OT notified CM that they are recommending SNF. CM met with pt to discuss the PT/OT recommendations. Pt adamantly refused SNF. She states that she is active with [facility] and they can provide PT/OT. Pt states that she has a rollator walker at home. Pt denies any other d/c needs. D/c plan is home with [facility]. Notify CM if any new d/c needs arise. TE      NOTIFY [facility] AT [facility] -[phone] AND FAX THE H&P, AVS AND [facility] ORDERS -[phone] WHEN PT IS DISCHARGED.

## 2023-05-27 VITALS
HEART RATE: 68 BPM | TEMPERATURE: 97.6 F | WEIGHT: 177.5 LBS | RESPIRATION RATE: 17 BRPM | OXYGEN SATURATION: 99 % | HEIGHT: 62 IN | BODY MASS INDEX: 32.66 KG/M2 | SYSTOLIC BLOOD PRESSURE: 125 MMHG | DIASTOLIC BLOOD PRESSURE: 56 MMHG

## 2023-05-27 LAB
ANION GAP SERPL CALCULATED.3IONS-SCNC: 12 MMOL/L (ref 4–16)
BUN SERPL-MCNC: 14 MG/DL (ref 6–23)
CALCIUM SERPL-MCNC: 8.9 MG/DL (ref 8.3–10.6)
CHLORIDE BLD-SCNC: 100 MMOL/L (ref 99–110)
CO2: 24 MMOL/L (ref 21–32)
CREAT SERPL-MCNC: 0.6 MG/DL (ref 0.6–1.1)
GFR SERPL CREATININE-BSD FRML MDRD: >60 ML/MIN/1.73M2
GLUCOSE BLD-MCNC: 212 MG/DL (ref 70–99)
GLUCOSE BLD-MCNC: 332 MG/DL (ref 70–99)
GLUCOSE SERPL-MCNC: 210 MG/DL (ref 70–99)
HCT VFR BLD CALC: 35 % (ref 37–47)
HEMOGLOBIN: 11.3 GM/DL (ref 12.5–16)
MAGNESIUM: 2 MG/DL (ref 1.8–2.4)
MCH RBC QN AUTO: 26.6 PG (ref 27–31)
MCHC RBC AUTO-ENTMCNC: 32.3 % (ref 32–36)
MCV RBC AUTO: 82.4 FL (ref 78–100)
PDW BLD-RTO: 11.8 % (ref 11.7–14.9)
PHOSPHORUS: 2.9 MG/DL (ref 2.5–4.9)
PLATELET # BLD: 263 K/CU MM (ref 140–440)
PMV BLD AUTO: 9.7 FL (ref 7.5–11.1)
POTASSIUM SERPL-SCNC: 4.3 MMOL/L (ref 3.5–5.1)
RBC # BLD: 4.25 M/CU MM (ref 4.2–5.4)
SODIUM BLD-SCNC: 136 MMOL/L (ref 135–145)
WBC # BLD: 12 K/CU MM (ref 4–10.5)

## 2023-05-27 PROCEDURE — 2580000003 HC RX 258: Performed by: STUDENT IN AN ORGANIZED HEALTH CARE EDUCATION/TRAINING PROGRAM

## 2023-05-27 PROCEDURE — 85027 COMPLETE CBC AUTOMATED: CPT

## 2023-05-27 PROCEDURE — 83735 ASSAY OF MAGNESIUM: CPT

## 2023-05-27 PROCEDURE — 2580000003 HC RX 258: Performed by: INTERNAL MEDICINE

## 2023-05-27 PROCEDURE — 82962 GLUCOSE BLOOD TEST: CPT

## 2023-05-27 PROCEDURE — 6370000000 HC RX 637 (ALT 250 FOR IP)

## 2023-05-27 PROCEDURE — 94761 N-INVAS EAR/PLS OXIMETRY MLT: CPT

## 2023-05-27 PROCEDURE — 36415 COLL VENOUS BLD VENIPUNCTURE: CPT

## 2023-05-27 PROCEDURE — 6370000000 HC RX 637 (ALT 250 FOR IP): Performed by: STUDENT IN AN ORGANIZED HEALTH CARE EDUCATION/TRAINING PROGRAM

## 2023-05-27 PROCEDURE — 84100 ASSAY OF PHOSPHORUS: CPT

## 2023-05-27 PROCEDURE — 6360000002 HC RX W HCPCS: Performed by: STUDENT IN AN ORGANIZED HEALTH CARE EDUCATION/TRAINING PROGRAM

## 2023-05-27 PROCEDURE — 6370000000 HC RX 637 (ALT 250 FOR IP): Performed by: NURSE PRACTITIONER

## 2023-05-27 PROCEDURE — 80048 BASIC METABOLIC PNL TOTAL CA: CPT

## 2023-05-27 RX ORDER — LORAZEPAM 0.5 MG/1
0.5 TABLET ORAL EVERY 6 HOURS PRN
Status: DISCONTINUED | OUTPATIENT
Start: 2023-05-27 | End: 2023-05-27 | Stop reason: HOSPADM

## 2023-05-27 RX ORDER — FLUTICASONE PROPIONATE 50 MCG
1 SPRAY, SUSPENSION (ML) NASAL DAILY
Qty: 32 G | Refills: 1 | Status: SHIPPED | OUTPATIENT
Start: 2023-05-27

## 2023-05-27 RX ORDER — ATORVASTATIN CALCIUM 40 MG/1
40 TABLET, FILM COATED ORAL NIGHTLY
Qty: 30 TABLET | Refills: 3 | Status: SHIPPED | OUTPATIENT
Start: 2023-05-27

## 2023-05-27 RX ORDER — BUSPIRONE HYDROCHLORIDE 10 MG/1
10 TABLET ORAL 3 TIMES DAILY
Qty: 90 TABLET | Refills: 0 | Status: SHIPPED | OUTPATIENT
Start: 2023-05-27

## 2023-05-27 RX ORDER — HYDROCODONE BITARTRATE AND ACETAMINOPHEN 5; 325 MG/1; MG/1
2 TABLET ORAL EVERY 6 HOURS PRN
Status: DISCONTINUED | OUTPATIENT
Start: 2023-05-27 | End: 2023-05-27 | Stop reason: HOSPADM

## 2023-05-27 RX ORDER — LEVETIRACETAM 500 MG/1
500 TABLET ORAL 2 TIMES DAILY
Qty: 60 TABLET | Refills: 3 | Status: SHIPPED | OUTPATIENT
Start: 2023-05-27

## 2023-05-27 RX ADMIN — HYDROCODONE BITARTRATE AND ACETAMINOPHEN 2 TABLET: 5; 325 TABLET ORAL at 04:40

## 2023-05-27 RX ADMIN — SODIUM CHLORIDE, PRESERVATIVE FREE 10 ML: 5 INJECTION INTRAVENOUS at 08:47

## 2023-05-27 RX ADMIN — LOSARTAN POTASSIUM 100 MG: 100 TABLET, FILM COATED ORAL at 08:47

## 2023-05-27 RX ADMIN — DOCUSATE SODIUM 100 MG: 100 CAPSULE, LIQUID FILLED ORAL at 08:48

## 2023-05-27 RX ADMIN — GUAIFENESIN 600 MG: 600 TABLET, EXTENDED RELEASE ORAL at 08:48

## 2023-05-27 RX ADMIN — FUROSEMIDE 20 MG: 20 TABLET ORAL at 08:48

## 2023-05-27 RX ADMIN — LEVOTHYROXINE SODIUM 75 MCG: 0.07 TABLET ORAL at 04:40

## 2023-05-27 RX ADMIN — ONDANSETRON 4 MG: 4 TABLET, ORALLY DISINTEGRATING ORAL at 02:17

## 2023-05-27 RX ADMIN — ASPIRIN 81 MG CHEWABLE TABLET 81 MG: 81 TABLET CHEWABLE at 08:49

## 2023-05-27 RX ADMIN — METHYLPREDNISOLONE SODIUM SUCCINATE 40 MG: 40 INJECTION, POWDER, LYOPHILIZED, FOR SOLUTION INTRAMUSCULAR; INTRAVENOUS at 08:49

## 2023-05-27 RX ADMIN — AZITHROMYCIN MONOHYDRATE 500 MG: 500 INJECTION, POWDER, LYOPHILIZED, FOR SOLUTION INTRAVENOUS at 04:39

## 2023-05-27 RX ADMIN — BUSPIRONE HYDROCHLORIDE 20 MG: 5 TABLET ORAL at 08:48

## 2023-05-27 RX ADMIN — PANTOPRAZOLE SODIUM 40 MG: 40 TABLET, DELAYED RELEASE ORAL at 04:40

## 2023-05-27 RX ADMIN — METOPROLOL SUCCINATE 50 MG: 50 TABLET, EXTENDED RELEASE ORAL at 08:48

## 2023-05-27 RX ADMIN — SODIUM CHLORIDE, PRESERVATIVE FREE 10 ML: 5 INJECTION INTRAVENOUS at 08:49

## 2023-05-27 RX ADMIN — INSULIN LISPRO 2 UNITS: 100 INJECTION, SOLUTION INTRAVENOUS; SUBCUTANEOUS at 08:48

## 2023-05-27 RX ADMIN — LEVETIRACETAM 500 MG: 500 TABLET, FILM COATED ORAL at 08:48

## 2023-05-27 RX ADMIN — INSULIN LISPRO 6 UNITS: 100 INJECTION, SOLUTION INTRAVENOUS; SUBCUTANEOUS at 11:35

## 2023-05-27 RX ADMIN — LORAZEPAM 0.5 MG: 0.5 TABLET ORAL at 08:47

## 2023-05-27 RX ADMIN — GABAPENTIN 400 MG: 400 CAPSULE ORAL at 08:48

## 2023-05-27 RX ADMIN — ACETAMINOPHEN 650 MG: 325 TABLET ORAL at 02:17

## 2023-05-27 ASSESSMENT — PAIN DESCRIPTION - LOCATION
LOCATION: GENERALIZED
LOCATION: LEG

## 2023-05-27 ASSESSMENT — PAIN DESCRIPTION - DESCRIPTORS
DESCRIPTORS: ACHING;CRAMPING;DISCOMFORT;SHOOTING
DESCRIPTORS: ACHING;CRAMPING;DISCOMFORT

## 2023-05-27 ASSESSMENT — PAIN DESCRIPTION - FREQUENCY: FREQUENCY: CONTINUOUS

## 2023-05-27 ASSESSMENT — PAIN DESCRIPTION - ONSET: ONSET: ON-GOING

## 2023-05-27 ASSESSMENT — PAIN - FUNCTIONAL ASSESSMENT
PAIN_FUNCTIONAL_ASSESSMENT: PREVENTS OR INTERFERES WITH MANY ACTIVE NOT PASSIVE ACTIVITIES
PAIN_FUNCTIONAL_ASSESSMENT: PREVENTS OR INTERFERES WITH ALL ACTIVE AND SOME PASSIVE ACTIVITIES

## 2023-05-27 ASSESSMENT — PAIN SCALES - GENERAL
PAINLEVEL_OUTOF10: 10
PAINLEVEL_OUTOF10: 0

## 2023-05-27 ASSESSMENT — PAIN DESCRIPTION - PAIN TYPE: TYPE: CHRONIC PAIN

## 2023-05-27 ASSESSMENT — PAIN DESCRIPTION - ORIENTATION: ORIENTATION: LEFT;RIGHT;LOWER

## 2023-05-27 NOTE — PROGRESS NOTES
4 Eyes Skin Assessment     NAME:  Yodit Monteiro  YOB: 1955  MEDICAL RECORD NUMBER:  [id number]    The patient is being assessed for  Admission    I agree that at least one RN has performed a thorough Head to Toe Skin Assessment on the patient. ALL assessment sites listed below have been assessed. Areas assessed by both nurses:    Head, Face, Ears, Shoulders, Back, Chest, Arms, Elbows, Hands, Sacrum. Buttock, Coccyx, Ischium, Legs. Feet and Heels, and Under Medical Devices         Does the Patient have a Wound?  No noted wound(s)       Bethel Prevention initiated by RN: No  Wound Care Orders initiated by RN: No    Pressure Injury (Stage 3,4, Unstageable, DTI, NWPT, and Complex wounds) if present, place Wound referral order by RN under : No    New Ostomies, if present place, Ostomy referral order under : No     Nurse 1 eSignature: Electronically signed by Wendy Austin RN on 5/25/23 at 4:30 AM EDT    **SHARE this note so that the co-signing nurse can place an eSignature**    Nurse 2 eSignature: Electronically signed by Travis Zaragoza LPN on 6/75/10 at 0:50 AM EDT
4 Eyes Skin Assessment     The patient is being assess for  Admission    I agree that 2 RN's have performed a thorough Head to Toe Skin Assessment on the patient. ALL assessment sites listed below have been assessed. Areas assessed by both nurses:    [x]   Head, Face, and Ears   [x]   Shoulders, Back, and Chest  [x]   Arms, Elbows, and Hands   [x]   Coccyx, Sacrum, and IschIum  [x]   Legs, Feet, and Heels        Does the Patient have Skin Breakdown?   No         Bethel Prevention initiated:  No   Wound Care Orders initiated:  No      [facility] nurse consulted for Pressure Injury (Stage 3,4, Unstageable, DTI, NWPT, and Complex wounds), New and Established Ostomies:  No      Nurse 1 eSignature: Electronically signed by Beverley Quinn RN on 5/25/23 at 2:35 PM EDT    **SHARE this note so that the co-signing nurse is able to place an eSignature**    Nurse 2 eSignature: Electronically signed by Tacos Bautista RN on 5/25/23 at 6:20 PM EDT
Called the pharmacy and spoke to [facility] in regards to the Heparin drip being restarted according to the weight change and new order. aPTT is at 208.5 and the pharmacist was okay to have it restarted until the next aPTT check as per new order.
D/c instructions gone over with pt, all questions/concerns addressed at this time.  Pt assisted to front lobby via w/c to meet daughter for p/u
Educated patient on need for left heart catheterization. Procedure was explained in detail as well as risks and benefits. Patient voiced understanding and was agreeable to undergoing procedure. Consent was obtained. Plan for left heart catheterization today.     Shin Braden PA-C 05/25/23 10:20 AM
Received from cath lab. Monitor and alarms on. Call light within reach.
Report called to [facility].  Transported to [facility] with [facility]
Upon check patient wiggling in bed, moving herself. R arm with half dollar sized hematoma. TR band removed. Manual pressure held until hemostasis achieved.
V2.0  [facility]      Name:  Dyllan Olson /Age/Sex: 1955  (74 y.o. female)   MRN & CSN:  [id number] & [id number] Encounter Date/Time: 2023 2:59 PM EDT    [place]  [facility] [place] 2[place] [place]A PCP: Heddy Fothergill, [address] [facility] Day: 4    Assessment and Plan:   Dyllan Olson is a 74 y.o. female with pmh as noted below who presents with Atypical chest pain    Atypical chest pain 2/2  NSTEMI: EKG nonspecific. --Troponin <0.01; 1.58; .   -- Cardiology following, patient was started  on heparin drip, Plavix, Lipitor  -- Continue troponin trending. --Telemetry in place. --2D Echocardiogram pending   -- Denies chest pain this morning, however reported that it started on right-sided chest    --Patient is n.p.o., plan for left heart cath today    Productive cough with concerns for acute bacterial bronchitis:   Started on Solu-Medrol   - DuoNebs along with Mucinex. -- started on azithromycin IV    Insulin-dependent diabetes mellitus type 2: We will start the patient on 15 units Lantus with 5 units 3 times daily with Humalog with meals and sliding scale insulin  -POCT ACHS when able to eat, every 4 when n.p.o.  - Hypoglycemic protocol  - Hemoglobin A1c ordered    Generalized anxiety disorder:   Underlying history of panic attacks. Anxious personality. Continue home medications    History of seizure disorder  - Continue Keppra, will change to IV while n.p.o. Chronic muscle spasms uses Flexeril    Chronic hypoxic respiratory failure with underlying history of asthma and suspected obstructive sleep apnea undiagnosed: Uses 2 L oxygen at nighttime. O2 saturation trend greater than 95% overnight   we will continue to monitor on room air. Use nasal cannula if need be    Nonobstructive coronary artery disease: Reportedly have history of previous cardiac catheterization in 2017 that was normal    Irritable bowel syndrome       Diet ADULT DIET;  Regular; 3 carb choices (45
Collection Time: 05/25/23  4:12 PM   Result Value Ref Range    Magnesium 1.9 1.8 - 2.4 mg/dl   Phosphorus    Collection Time: 05/25/23  4:12 PM   Result Value Ref Range    Phosphorus 2.1 (L) 2.5 - 4.9 MG/DL   Troponin    Collection Time: 05/25/23  4:12 PM   Result Value Ref Range    Troponin T <0.010 <0.01 NG/ML   POCT Glucose    Collection Time: 05/25/23  4:49 PM   Result Value Ref Range    POC Glucose 313 (H) 70 - 99 MG/DL   POCT Glucose    Collection Time: 05/25/23  8:08 PM   Result Value Ref Range    POC Glucose 351 (H) 70 - 99 MG/DL   Troponin    Collection Time: 05/25/23 11:18 PM   Result Value Ref Range    Troponin T <0.010 <0.01 NG/ML   Troponin    Collection Time: 05/26/23  2:47 AM   Result Value Ref Range    Troponin T <0.010 <0.01 NG/ML   CBC    Collection Time: 05/26/23  2:47 AM   Result Value Ref Range    WBC 12.9 (H) 4.0 - 10.5 K/CU MM    RBC 4.31 4.2 - 5.4 M/CU MM    Hemoglobin 11.5 (L) 12.5 - 16.0 GM/DL    Hematocrit 36.8 (L) 37 - 47 %    MCV 85.4 78 - 100 FL    MCH 26.7 (L) 27 - 31 PG    MCHC 31.3 (L) 32.0 - 36.0 %    RDW 11.9 11.7 - 14.9 %    Platelets 835 695 - 179 K/CU MM    MPV 9.5 7.5 - 11.1 FL   Basic Metabolic Panel    Collection Time: 05/26/23  2:47 AM   Result Value Ref Range    Sodium 135 135 - 145 MMOL/L    Potassium 4.0 3.5 - 5.1 MMOL/L    Chloride 99 99 - 110 mMol/L    CO2 22 21 - 32 MMOL/L    Anion Gap 14 4 - 16    BUN 13 6 - 23 MG/DL    Creatinine 0.7 0.6 - 1.1 MG/DL    Est, Glom Filt Rate >60 >60 mL/min/1.73m2    Glucose 174 (H) 70 - 99 MG/DL    Calcium 8.4 8.3 - 10.6 MG/DL   Magnesium    Collection Time: 05/26/23  2:47 AM   Result Value Ref Range    Magnesium 2.1 1.8 - 2.4 mg/dl   Phosphorus    Collection Time: 05/26/23  2:47 AM   Result Value Ref Range    Phosphorus 3.4 2.5 - 4.9 MG/DL   POCT Glucose    Collection Time: 05/26/23  6:42 AM   Result Value Ref Range    POC Glucose 215 (H) 70 - 99 MG/DL   Troponin    Collection Time: 05/26/23  9:56 AM   Result Value Ref Range
in the last 72 hours. TROPONIN:   Recent Labs     05/25/23  2318 05/26/23  0247 05/26/23  0956   TROPONINT <0.010 <0.010 <0.010     Labs, consult, tests reviewed          Amina Galeas PA-C, 5/26/2023 11:33 AM         CARDIOLOGY ATTENDING ADDENDUM    I have seen, spoken to and examined this patient personally, independent of the NP/PAC. I have reviewed the hospital care given to date and reviewed all pertinent labs and imaging. I have spoken with patient, nursing staff and provided written and verbal instructions . The above note has been reviewed. I have spent substantive amount of time in formulating patient care. Physical Exam:    General:   alert  Head: normal  Eye: normal  Chest:   Clear        MEDICAL DECISION MAKING :      History of nonobstructive CAD in the past  Risk factors of diabetes mellitus hypertension  No chest pain at this time. LHC does not show significant CAD        Repeat troponins have been repeatedly negative. 2nd troponin possible lab error? Stop Plavix  Cont ASA      Essential hypertension: Continue with Toprol-XL 50 mg p.o. daily, continue with losartan 100 mg p.o. daily    Hyperlipidemia: Continue with high intensity statins, Lipitor 40 mg p.o. daily    ? Coughing ? Bronchitis and management as per primary team      Cardiology will sign off, please call with any questions. Patient to follow up with Dr. Sunitha Reynolds in 1-2 weeks.         Dr. Bertina Mortimer, MD
subdiaphragmatic free air. No acute cardiopulmonary process.        Electronically signed by WENDIE Billings CNP on 5/25/2023 at 2:59 PM

## 2023-05-29 NOTE — DISCHARGE SUMMARY
V2.0  Discharge Summary    Name:  Shaylee May /Age/Sex: 1955 (9 y.o. female)   Admit Date: 2023  Discharge Date: 23    MRN & CSN:  [id number] & [id number] Encounter Date and Time 23 2:41 PM EDT    Attending:  No att. providers found Discharging Provider: Brittany Gonzales MD       Hospital Course:     Brief HPI: Shaylee May is a 74 y.o. female who presented with 3 to 4 days of worsening cough associated with green phlegm. Also having right-sided chest pain which is worse with coughing. Patient's troponin was found to be 1.58. Echo showing EF of 55%, no abnormalities. LHC was done which showed no CAD. Patient stable for discharge home. Follow-up with cardiology and PCP. Brief Problem Based Course:     Atypical chest pain 2/2  NSTEMI: EKG nonspecific. --Troponin <0.01; 1.58; .   -- Cardiology following, patient was started  on heparin drip, Plavix, Lipitor  -- Continue troponin trending. --Telemetry in place. --2D Echocardiogram pending   -- Denies chest pain this morning, however reported that it started on right-sided chest  LHC: No significant CAD, okay for discharge home  Follow-up with cardiology and PCP     Productive cough with concerns for acute bacterial bronchitis-resolved  Received Solu-Medrol inpatient  - DuoNebs along with Mucinex. -- Completed azithromycin and patient was improved so steroids were not continued     Insulin-dependent diabetes mellitus type 2: We will start the patient on 15 units Lantus with 5 units 3 times daily with Humalog with meals and sliding scale insulin  -POCT ACHS when able to eat, every 4 when n.p.o. Discharged on home meds     Generalized anxiety disorder:   Underlying history of panic attacks. Anxious personality. Continue home medications     History of seizure disorder  - Continue Keppra, will change to IV while n.p.o.      Chronic muscle spasms uses Flexeril     Chronic hypoxic respiratory failure with

## 2023-05-30 ENCOUNTER — TELEPHONE (OUTPATIENT)
Dept: FAMILY MEDICINE CLINIC | Age: 68
End: 2023-05-30

## 2023-05-31 ENCOUNTER — TELEPHONE (OUTPATIENT)
Dept: FAMILY MEDICINE CLINIC | Age: 68
End: 2023-05-31

## 2023-05-31 NOTE — TELEPHONE ENCOUNTER
Care Transitions Initial Follow Up Call    Outreach made within 2 business days of discharge: Yes    Patient: Heath Lane Patient : 1955   MRN: [id number]  Reason for Admission: There are no discharge diagnoses documented for the most recent discharge. Discharge Date: 23       Spoke with: PT    Discharge department/facility: [facility]    TCM Interactive Patient Contact:  Was patient able to fill all prescriptions: Yes  Was patient instructed to bring all medications to the follow-up visit: Yes  Is patient taking all medications as directed in the discharge summary?  Yes  Does patient understand their discharge instructions: Yes  Does patient have questions or concerns that need addressed prior to 7-14 day follow up office visit: no    Scheduled appointment with PCP within 7-14 days    Follow Up  Future Appointments   Date Time Provider Jimena Jones   2023 12:00 PM Patito Messina MD [facility] Heart [facility]   2023  4:15 PM Christiane Arias APRN - CNP [facility]   2023 10:30 AM Timmy Burrell APRN - CNP [address] [facility] PULM [facility]   2023 12:45 PM Ang Lombardo APRN -  [facility] -   2023 11:45 AM Machelle Barahona APRN - CNP [place] [facility]       Priscilla Smith MA

## 2023-06-06 ENCOUNTER — OFFICE VISIT (OUTPATIENT)
Dept: FAMILY MEDICINE CLINIC | Age: 68
End: 2023-06-06
Payer: MEDICARE

## 2023-06-06 VITALS
HEART RATE: 78 BPM | WEIGHT: 177.6 LBS | BODY MASS INDEX: 32.68 KG/M2 | DIASTOLIC BLOOD PRESSURE: 60 MMHG | SYSTOLIC BLOOD PRESSURE: 108 MMHG | HEIGHT: 62 IN | OXYGEN SATURATION: 97 % | TEMPERATURE: 97.3 F

## 2023-06-06 DIAGNOSIS — Z09 HOSPITAL DISCHARGE FOLLOW-UP: Primary | ICD-10-CM

## 2023-06-06 DIAGNOSIS — E11.42 POLYNEUROPATHY DUE TO TYPE 2 DIABETES MELLITUS (HCC): ICD-10-CM

## 2023-06-06 DIAGNOSIS — M25.552 CHRONIC LEFT HIP PAIN: ICD-10-CM

## 2023-06-06 DIAGNOSIS — R07.89 ATYPICAL CHEST PAIN: ICD-10-CM

## 2023-06-06 DIAGNOSIS — E08.22 DIABETES MELLITUS DUE TO UNDERLYING CONDITION WITH CHRONIC KIDNEY DISEASE, WITHOUT LONG-TERM CURRENT USE OF INSULIN, UNSPECIFIED CKD STAGE (HCC): ICD-10-CM

## 2023-06-06 DIAGNOSIS — Z76.0 MEDICATION REFILL: ICD-10-CM

## 2023-06-06 DIAGNOSIS — R05.9 COUGH, UNSPECIFIED TYPE: ICD-10-CM

## 2023-06-06 DIAGNOSIS — G89.29 CHRONIC LEFT HIP PAIN: ICD-10-CM

## 2023-06-06 PROCEDURE — 1036F TOBACCO NON-USER: CPT | Performed by: NURSE PRACTITIONER

## 2023-06-06 PROCEDURE — 1124F ACP DISCUSS-NO DSCNMKR DOCD: CPT | Performed by: NURSE PRACTITIONER

## 2023-06-06 PROCEDURE — 99214 OFFICE O/P EST MOD 30 MIN: CPT | Performed by: NURSE PRACTITIONER

## 2023-06-06 PROCEDURE — 1090F PRES/ABSN URINE INCON ASSESS: CPT | Performed by: NURSE PRACTITIONER

## 2023-06-06 PROCEDURE — G8417 CALC BMI ABV UP PARAM F/U: HCPCS | Performed by: NURSE PRACTITIONER

## 2023-06-06 PROCEDURE — G8427 DOCREV CUR MEDS BY ELIG CLIN: HCPCS | Performed by: NURSE PRACTITIONER

## 2023-06-06 PROCEDURE — 3078F DIAST BP <80 MM HG: CPT | Performed by: NURSE PRACTITIONER

## 2023-06-06 PROCEDURE — 3074F SYST BP LT 130 MM HG: CPT | Performed by: NURSE PRACTITIONER

## 2023-06-06 PROCEDURE — 1111F DSCHRG MED/CURRENT MED MERGE: CPT | Performed by: NURSE PRACTITIONER

## 2023-06-06 PROCEDURE — 3017F COLORECTAL CA SCREEN DOC REV: CPT | Performed by: NURSE PRACTITIONER

## 2023-06-06 PROCEDURE — 2022F DILAT RTA XM EVC RTNOPTHY: CPT | Performed by: NURSE PRACTITIONER

## 2023-06-06 PROCEDURE — 3052F HG A1C>EQUAL 8.0%<EQUAL 9.0%: CPT | Performed by: NURSE PRACTITIONER

## 2023-06-06 PROCEDURE — G8400 PT W/DXA NO RESULTS DOC: HCPCS | Performed by: NURSE PRACTITIONER

## 2023-06-06 RX ORDER — GABAPENTIN 600 MG/1
600 TABLET ORAL 3 TIMES DAILY
Qty: 90 TABLET | Refills: 0 | Status: SHIPPED | OUTPATIENT
Start: 2023-06-06 | End: 2023-07-06

## 2023-06-06 RX ORDER — INSULIN ASPART 100 [IU]/ML
INJECTION, SOLUTION INTRAVENOUS; SUBCUTANEOUS
Qty: 5 ADJUSTABLE DOSE PRE-FILLED PEN SYRINGE | Refills: 0 | Status: SHIPPED | OUTPATIENT
Start: 2023-06-06

## 2023-06-06 RX ORDER — BENZONATATE 100 MG/1
100 CAPSULE ORAL 3 TIMES DAILY PRN
Qty: 30 CAPSULE | Refills: 0 | Status: SHIPPED | OUTPATIENT
Start: 2023-06-06 | End: 2023-06-16

## 2023-06-06 ASSESSMENT — ENCOUNTER SYMPTOMS
SHORTNESS OF BREATH: 1
CHEST TIGHTNESS: 0
WHEEZING: 0
COUGH: 1

## 2023-06-06 ASSESSMENT — PATIENT HEALTH QUESTIONNAIRE - PHQ9
8. MOVING OR SPEAKING SO SLOWLY THAT OTHER PEOPLE COULD HAVE NOTICED. OR THE OPPOSITE, BEING SO FIGETY OR RESTLESS THAT YOU HAVE BEEN MOVING AROUND A LOT MORE THAN USUAL: 1
10. IF YOU CHECKED OFF ANY PROBLEMS, HOW DIFFICULT HAVE THESE PROBLEMS MADE IT FOR YOU TO DO YOUR WORK, TAKE CARE OF THINGS AT HOME, OR GET ALONG WITH OTHER PEOPLE: 2
5. POOR APPETITE OR OVEREATING: 3
2. FEELING DOWN, DEPRESSED OR HOPELESS: 3
9. THOUGHTS THAT YOU WOULD BE BETTER OFF DEAD, OR OF HURTING YOURSELF: 0
1. LITTLE INTEREST OR PLEASURE IN DOING THINGS: 1
SUM OF ALL RESPONSES TO PHQ9 QUESTIONS 1 & 2: 4
6. FEELING BAD ABOUT YOURSELF - OR THAT YOU ARE A FAILURE OR HAVE LET YOURSELF OR YOUR FAMILY DOWN: 0
SUM OF ALL RESPONSES TO PHQ QUESTIONS 1-9: 15
4. FEELING TIRED OR HAVING LITTLE ENERGY: 2
3. TROUBLE FALLING OR STAYING ASLEEP: 2
SUM OF ALL RESPONSES TO PHQ QUESTIONS 1-9: 15
SUM OF ALL RESPONSES TO PHQ QUESTIONS 1-9: 15
7. TROUBLE CONCENTRATING ON THINGS, SUCH AS READING THE NEWSPAPER OR WATCHING TELEVISION: 3
SUM OF ALL RESPONSES TO PHQ QUESTIONS 1-9: 15

## 2023-06-06 NOTE — PROGRESS NOTES
21 units. , Disp-5 Adjustable Dose Pre-filled Pen Syringe, R-0, DAWNormal      Medical Decision Making: moderate complexity  Return in 3 months (on 9/6/2023) for HgA1C, DM. On this date 6/6/2023 I have spent 35 minutes reviewing previous notes, test results and face to face with the patient discussing the diagnosis and importance of compliance with the treatment plan as well as documenting on the day of the visit. Subjective:     Inpatient course: Discharge summary reviewed- see chart. Jhoan Field is a 74year old female who is in for hospital follow up. She states she feels better but is complaining of left hip pain. She states she sees ortho for this and needs an appointment. She complains of a cough that is dry, nonproductive. She states she went to the [email or web address] [facility] the weekend and while walking got short of breath. She denies chest pain or palpitations. She states she has an upcoming appointment with pulmonology. She states her blood sugars have been high and this morning her reading was 385. She states she has been eating candy and knows this behavior will raise sugar but could not help it. She states she will need some refills.     Interval history/Current status: stable    Patient Active Problem List   Diagnosis    H/O Doppler ultrasound    H/O cardiovascular stress test    H/O cardiovascular stress test    H/O cardiovascular stress test    Incarcerated umbilical hernia    Essential hypertension    Type 2 diabetes mellitus with diabetic polyneuropathy, with long-term current use of insulin (HCC)    Tachycardia    Dyslipidemia    Family history of early CAD    Abnormal nuclear stress test    ILSA (obstructive sleep apnea)    Snoring    Hypersomnolence    Mild intermittent asthma without complication    Asthma exacerbation attacks    Hallucination, visual    Severe episode of recurrent major depressive disorder, with psychotic features (HCC)    CHEKO (generalized anxiety disorder)    Auditory

## 2023-06-07 ENCOUNTER — OFFICE VISIT (OUTPATIENT)
Dept: CARDIOLOGY CLINIC | Age: 68
End: 2023-06-07
Payer: MEDICARE

## 2023-06-07 ENCOUNTER — OFFICE VISIT (OUTPATIENT)
Dept: PULMONOLOGY | Age: 68
End: 2023-06-07
Payer: MEDICARE

## 2023-06-07 VITALS
DIASTOLIC BLOOD PRESSURE: 72 MMHG | HEIGHT: 62 IN | HEART RATE: 72 BPM | WEIGHT: 181 LBS | BODY MASS INDEX: 33.31 KG/M2 | SYSTOLIC BLOOD PRESSURE: 118 MMHG

## 2023-06-07 VITALS
DIASTOLIC BLOOD PRESSURE: 68 MMHG | OXYGEN SATURATION: 96 % | SYSTOLIC BLOOD PRESSURE: 136 MMHG | RESPIRATION RATE: 16 BRPM | HEART RATE: 69 BPM | BODY MASS INDEX: 33.31 KG/M2 | WEIGHT: 181 LBS | HEIGHT: 62 IN

## 2023-06-07 DIAGNOSIS — I50.32 CHRONIC DIASTOLIC CONGESTIVE HEART FAILURE (HCC): ICD-10-CM

## 2023-06-07 DIAGNOSIS — J45.20 MILD INTERMITTENT ASTHMA WITHOUT COMPLICATION: Primary | ICD-10-CM

## 2023-06-07 DIAGNOSIS — G47.34 NOCTURNAL OXYGEN DESATURATION: ICD-10-CM

## 2023-06-07 DIAGNOSIS — R07.89 ATYPICAL CHEST PAIN: Primary | ICD-10-CM

## 2023-06-07 PROCEDURE — 3074F SYST BP LT 130 MM HG: CPT | Performed by: NURSE PRACTITIONER

## 2023-06-07 PROCEDURE — G8417 CALC BMI ABV UP PARAM F/U: HCPCS | Performed by: INTERNAL MEDICINE

## 2023-06-07 PROCEDURE — 1111F DSCHRG MED/CURRENT MED MERGE: CPT | Performed by: INTERNAL MEDICINE

## 2023-06-07 PROCEDURE — G8428 CUR MEDS NOT DOCUMENT: HCPCS | Performed by: INTERNAL MEDICINE

## 2023-06-07 PROCEDURE — 1090F PRES/ABSN URINE INCON ASSESS: CPT | Performed by: INTERNAL MEDICINE

## 2023-06-07 PROCEDURE — 1124F ACP DISCUSS-NO DSCNMKR DOCD: CPT | Performed by: NURSE PRACTITIONER

## 2023-06-07 PROCEDURE — 1036F TOBACCO NON-USER: CPT | Performed by: NURSE PRACTITIONER

## 2023-06-07 PROCEDURE — G8400 PT W/DXA NO RESULTS DOC: HCPCS | Performed by: INTERNAL MEDICINE

## 2023-06-07 PROCEDURE — 3017F COLORECTAL CA SCREEN DOC REV: CPT | Performed by: NURSE PRACTITIONER

## 2023-06-07 PROCEDURE — 3017F COLORECTAL CA SCREEN DOC REV: CPT | Performed by: INTERNAL MEDICINE

## 2023-06-07 PROCEDURE — 1124F ACP DISCUSS-NO DSCNMKR DOCD: CPT | Performed by: INTERNAL MEDICINE

## 2023-06-07 PROCEDURE — 1036F TOBACCO NON-USER: CPT | Performed by: INTERNAL MEDICINE

## 2023-06-07 PROCEDURE — G8417 CALC BMI ABV UP PARAM F/U: HCPCS | Performed by: NURSE PRACTITIONER

## 2023-06-07 PROCEDURE — G8400 PT W/DXA NO RESULTS DOC: HCPCS | Performed by: NURSE PRACTITIONER

## 2023-06-07 PROCEDURE — 1111F DSCHRG MED/CURRENT MED MERGE: CPT | Performed by: NURSE PRACTITIONER

## 2023-06-07 PROCEDURE — 99214 OFFICE O/P EST MOD 30 MIN: CPT | Performed by: NURSE PRACTITIONER

## 2023-06-07 PROCEDURE — 1090F PRES/ABSN URINE INCON ASSESS: CPT | Performed by: NURSE PRACTITIONER

## 2023-06-07 PROCEDURE — 3074F SYST BP LT 130 MM HG: CPT | Performed by: INTERNAL MEDICINE

## 2023-06-07 PROCEDURE — 99214 OFFICE O/P EST MOD 30 MIN: CPT | Performed by: INTERNAL MEDICINE

## 2023-06-07 PROCEDURE — G8427 DOCREV CUR MEDS BY ELIG CLIN: HCPCS | Performed by: NURSE PRACTITIONER

## 2023-06-07 PROCEDURE — 3078F DIAST BP <80 MM HG: CPT | Performed by: INTERNAL MEDICINE

## 2023-06-07 PROCEDURE — 3078F DIAST BP <80 MM HG: CPT | Performed by: NURSE PRACTITIONER

## 2023-06-07 RX ORDER — DOXYCYCLINE HYCLATE 100 MG
100 TABLET ORAL 2 TIMES DAILY
Qty: 14 TABLET | Refills: 0 | Status: SHIPPED | OUTPATIENT
Start: 2023-06-07 | End: 2023-06-14

## 2023-06-07 RX ORDER — FLUCONAZOLE 100 MG/1
100 TABLET ORAL DAILY
Qty: 7 TABLET | Refills: 0 | Status: SHIPPED | OUTPATIENT
Start: 2023-06-07 | End: 2023-06-14

## 2023-06-07 ASSESSMENT — ENCOUNTER SYMPTOMS
GASTROINTESTINAL NEGATIVE: 1
SHORTNESS OF BREATH: 1
COUGH: 1

## 2023-06-07 NOTE — PROGRESS NOTES
Disorder of liver 10/12/2021    Fibrocystic breast 10/12/2021    Gout 10/12/2021    H/O degenerative disc disease 10/12/2021    Irritable bowel syndrome 10/12/2021    Memory loss 10/12/2021    Migraine without aura, not refractory 10/12/2021    Hyperlipidemia 10/12/2021    Neuropathy 37/06/0393    Nonalcoholic steatohepatitis (URENA) 10/12/2021    Osteoporosis 10/12/2021    Polyneuropathy due to type 2 diabetes mellitus ([address] 10/12/2021    Renal impairment 10/12/2021    Seizure ([address] 09/30/2021    Generalized abdominal pain     Diabetes mellitus due to underlying condition with stage 2 chronic kidney disease, without long-term current use of insulin (HCC)     Hyponatremia     Encephalopathy 04/26/2021    Syncope 04/26/2021    Dyspnea and respiratory abnormalities 12/01/2020    Bipolar 1 disorder, depressed, severe ([facility] 09/04/2020    Hallucination, visual 09/02/2020    Asthma exacerbation attacks 01/01/2019    Mild intermittent asthma without complication 23/72/9624    ILSA (obstructive sleep apnea) 10/27/2017    Snoring 10/27/2017    Hypersomnolence 10/27/2017    Hypothyroidism due to acquired atrophy of thyroid 10/12/2016    Dysuria 07/28/2016    Migraine 07/28/2016    Gastroparesis 07/14/2016    Tachycardia 03/30/2016    Essential hypertension     Type 2 diabetes mellitus with diabetic polyneuropathy, with long-term current use of insulin ([facility])     Gastroesophageal reflux disease 08/19/2015    Low back pain 08/19/2015    Neck pain 08/19/2015    Vitamin B12 deficiency 06/22/2015    Anxiety 02/18/2015    Suicidal ideation 02/18/2015    Incarcerated umbilical hernia 62/05/3712    H/O cardiovascular stress test 05/06/2014    H/O Doppler ultrasound     H/O cardiovascular stress test     H/O cardiovascular stress test      Current Outpatient Medications   Medication Sig Dispense Refill    doxycycline hyclate (VIBRA-TABS) 100 MG tablet Take 1 tablet by mouth 2 times daily for 7 days 14 tablet 0    fluconazole (DIFLUCAN)

## 2023-06-07 NOTE — PROGRESS NOTES
Manju Berrios (:  1955) is a 74 y.o. female,Established patient, here for evaluation of the following chief complaint(s):  Follow-up and Results        Subjective   SUBJECTIVE/OBJECTIVE:  Lenore Girard is a pleasant 75 yo who is here in pulmonary clinic for follow up on over night pulse ox study for nocturnal desaturation. She has been tested for sleep apnea and does not have ILSA evident by an AHI of 2.5 events which is less than 5. She does have a diagnosis of chronic nocturnal desaturation. She wears oxygen at night on 2 L/NC. She recently completed an overnight pulse ox study to evaluate need for continuous oxygen. Jeff Waldrop had a recent  ED to hospital admission in May/2023 for atypical chest pain, NSTEMI and productive cough with green sputum, acute bacterial bronchitis. She was started on Azithromycin by her PCP. She is completing the course. No steroids were prescribed outpatient. She did receive oxygen periodically during her stay in hospital. She presents today still with course cough. She states that she continues to produce small amounts of green sputum. Results of the study demonstrate that she should  continue using oxygen at night. Overnight Pulse study results:  SpOs Data  Low sat *0%  Mean 90.2%   96% when awake  Time spent less than 88% was 58 minutes and 27 seconds  Time spent less than 89% was 22 minutes and 12 seconds  Os desaturation was 185 events  [address] 51     HR  High was 75 bpm  Low was 62 bpm  Average was 65%bpm    She was bradycardia for 7 minutes and 21 seconds. There was no tachycardia    Chest Xray 2023 Comparison with 2023  HISTORY:  ORDERING SYSTEM PROVIDED HISTORY: Chest pain  TECHNOLOGIST PROVIDED HISTORY:  Reason for exam:->Chest pain  Reason for Exam: chest paiin     sob     FINDINGS:  The cardiomediastinal silhouette is normal in size. The lungs are clear. No pleural effusion or pneumothorax. No subdiaphragmatic free air.      IMPRESSION:  No

## 2023-06-07 NOTE — PATIENT INSTRUCTIONS
Please be informed that if you contact our office outside of normal business hours the physician on call cannot help with any scheduling or rescheduling issues, procedure instruction questions or any type of medication issue. We advise you for any urgent/emergency that you go to the nearest emergency room! PLEASE CALL OUR OFFICE DURING NORMAL BUSINESS HOURS    Monday - Friday   8 am to 5 pm    [address]: [phone]    [place]:  [phone]  **It is YOUR responsibilty to bring medication bottles and/or updated medication list to [address] [place] [address]. This will allow us to better serve you and all your healthcare needs**  [facility] Locations - No appointment necessary. Sites open Monday to Friday. Call your preferred location for test preparation, business   hours and other information you need. [employer]. [address] Constanza Pathak, [address] [facility]  Phone: [phone] [place]  [address] [facility]  [address] [place], [address] [facility]  Phone: [phone]     Thank you for allowing us to care for you today! We want to ensure we can follow your treatment plan and we strive to give you the best outcomes and experience possible. If you ever have a life threatening emergency and call 911 - for an ambulance (EMS)   Our providers can only care for you at:   [facility] or [facility]. Even if you have someone take you or you drive yourself we can only care for you in a [facility]. Our providers are not setup at the other healthcare locations!

## 2023-06-16 ENCOUNTER — TELEPHONE (OUTPATIENT)
Dept: NEUROLOGY | Age: 68
End: 2023-06-16

## 2023-06-19 DIAGNOSIS — G43.009 MIGRAINE WITHOUT AURA, NOT REFRACTORY: Primary | ICD-10-CM

## 2023-06-19 RX ORDER — UBROGEPANT 100 MG/1
100 TABLET ORAL PRN
Qty: 10 TABLET | Refills: 2 | Status: SHIPPED | OUTPATIENT
Start: 2023-06-19

## 2023-06-20 DIAGNOSIS — G43.109 COMPLICATED MIGRAINE: ICD-10-CM

## 2023-06-20 DIAGNOSIS — G43.009 MIGRAINE WITHOUT AURA, NOT REFRACTORY: Primary | ICD-10-CM

## 2023-06-22 NOTE — TELEPHONE ENCOUNTER
Received call from [address] [place] [address]. Set up Botox delivery for 6/23/23. [address] [facility].  Office hours are Monday-Friday 8am-4:30pm.

## 2023-06-22 NOTE — TELEPHONE ENCOUNTER
Called and spoke to Annia Pires and let her know she needs to call [employer] to give consent to ship Botox. Number [phone]. She states she will call later this afternoon.

## 2023-06-26 ENCOUNTER — TELEPHONE (OUTPATIENT)
Dept: FAMILY MEDICINE CLINIC | Age: 68
End: 2023-06-26

## 2023-07-21 DIAGNOSIS — E11.42 POLYNEUROPATHY DUE TO TYPE 2 DIABETES MELLITUS (HCC): ICD-10-CM

## 2023-07-21 RX ORDER — GABAPENTIN 600 MG/1
600 TABLET ORAL 3 TIMES DAILY
Qty: 90 TABLET | Refills: 0 | Status: SHIPPED | OUTPATIENT
Start: 2023-07-21 | End: 2023-08-20

## 2023-07-30 NOTE — PROGRESS NOTES
100-25 MG per tablet Take 1 tablet by mouth daily 30 tablet 0    losartan-hydrochlorothiazide (HYZAAR) 100-25 MG per tablet Take 1 tablet by mouth daily 30 tablet 2    NIFEdipine (ADALAT CC) 60 MG extended release tablet Take 1 tablet by mouth daily 30 tablet 2    levothyroxine (SYNTHROID) 25 MCG tablet Take 1 tablet by mouth every morning 30 tablet 3    diphenhydrAMINE (BENADRYL) 25 MG tablet Take 25 mg by mouth every 6 hours as needed      MUCINEX 600 MG extended release tablet 2 times daily      HYDROcodone-acetaminophen (NORCO) 5-325 MG per tablet Take 1-2 tablets by mouth every 4 hours as needed.  tiZANidine (ZANAFLEX) 4 MG tablet as needed      DICLOFENAC SODIUM EX Apply topically      tiotropium (SPIRIVA RESPIMAT) 1.25 MCG/ACT AERS inhaler Inhale 2 puffs into the lungs daily      docusate sodium (COLACE) 100 MG capsule Take 1 capsule by mouth 2 times daily 30 capsule 0    albuterol sulfate  (90 Base) MCG/ACT inhaler Inhale 2 puffs into the lungs every 6 hours as needed for Wheezing or Shortness of Breath (or cough) Please include spacer with instructions for use.  1 Inhaler 0    QUEtiapine (SEROQUEL) 25 MG tablet Take 25 mg by mouth every evening      TRESIBA FLEXTOUCH 200 UNIT/ML SOPN Inject 30 Units into the skin every morning (Patient taking differently: Inject 60 Units into the skin nightly ) 1 pen 2    Respiratory Therapy Supplies (NEBULIZER COMPRESSOR) KIT 1 kit by Does not apply route once for 1 dose 1 kit 0    ipratropium-albuterol (DUONEB) 0.5-2.5 (3) MG/3ML SOLN nebulizer solution Inhale 3 mLs into the lungs every 4 hours (while awake) 360 mL 2    levETIRAcetam (KEPPRA) 750 MG tablet Take 1 tablet by mouth nightly 60 tablet 3    aluminum & magnesium hydroxide-simethicone (MAALOX MAX) 400-400-40 MG/5ML SUSP Take 15 mLs by mouth every 6 hours as needed (heart burn) 120 mL 0    pantoprazole (PROTONIX) 40 MG tablet Take 1 tablet by mouth daily 30 tablet 0    heart failure) (HCC)     Chronic back pain     Chronic kidney disease     DDD (degenerative disc disease), cervical     12- Patient reports she was dx with DDD of Cerival spine C6,C7    Depression     Diabetes mellitus ([address] Dx 1990's    Diabetic neuropathy ([facility] [address]     \"In My Legs And Feet\"    Dizziness     \"Sometimes\"    Dry skin     Enlarged ureter     Right Side    Fatty liver     Fibrocystic breast     Gout     Pt states she was diagnosed with gout in the past few months.  H/O cardiac catheterization     Showed mild disease per last cath.  H/O cardiovascular stress test 03/15/2010    EF 69%, normal perfusion study except for diaphragmatic artifact, uniform wall motion.  H/O cardiovascular stress test 10/09/2008    EF 60%, no anginia, normal study.  H/O cardiovascular stress test 05/06/2014    EF 66%, no ischemia, normal LV systolic funciton, normal perfusion pattern.  H/O Doppler ultrasound 02/28/2011    CAROTID DOPPLER-normal study.  H/O echocardiogram 5/6/2014    Ef >55%. Impaired LV relaxation.  H/O echocardiogram 10/14/15    EF 60% Normal LV and systolic function. No significant valvulopathy seen.      History of Holter monitoring 3/24/15    24 hour - predominant rhythm sinus    [place] (hard of hearing)     Bilateral Ears    Hx of cardiovascular stress test 10/19/2015    lexiscan-normal,EF63%    Hx of motion sickness     HX OTHER MEDICAL     Primary [facility] Phys[facility]    Hyperlipidemia     Hypertension     IBS (irritable bowel syndrome)     Incisional hernia 4/2014    Kidney stones Last Episode In 2012 Or 2013    Passed Kidney Stones Numberous Times    Migraines     Nausea & vomiting     Nausea/Vomiting Post Op In Past    Other specified disorder of skin     12- Patient states she has a condition of her vaginal area (skin) which starts with the letters [place]. She is currently being treated with multiple creams and weekly heartburn  Genito-Urinary: Negative for urinary frequency/urgency  Musculoskeletal: Negative for muscle pain, muscular weakness, negative for pain in arm and leg or swelling in foot and leg  Neurological: Negative for dizziness, headaches, memory loss, numbness/tingling, visual changes, syncope  Dermatological: Negative for rash    Objective: There were no vitals filed for this visit. There were no vitals taken for this visit. Patient-Reported Vitals 7/16/2020   Patient-Reported Weight 167.8 lbs   Patient-Reported Height 5 2        Wt Readings from Last 3 Encounters:   07/14/20 166 lb 4.8 oz (75.4 kg)   06/30/20 178 lb 14.4 oz (81.1 kg)   06/24/20 174 lb (78.9 kg)     There is no height or weight on file to calculate BMI. GENERAL - Alert, oriented, pleasant, in no apparent distress.     Lab Review   Lab Results   Component Value Date    CKTOTAL 33 06/27/2020    TROPONINT <0.010 07/13/2020     BNP:    Lab Results   Component Value Date    BNP 51.0 02/15/2014     PT/INR:    Lab Results   Component Value Date    INR 0.95 06/24/2020     Lab Results   Component Value Date    LABA1C 7.8 (H) 06/27/2020    LABA1C 8.1 (H) 05/26/2020     Lab Results   Component Value Date    WBC 10.7 (H) 07/12/2020    HCT 37.2 07/12/2020    MCV 85.3 07/12/2020     07/12/2020     Lab Results   Component Value Date    CHOL 135 06/28/2020    TRIG 263 (H) 06/28/2020    HDL 58 06/28/2020    LDLCALC 63 07/15/2016    LDLDIRECT 56 06/28/2020     Lab Results   Component Value Date    ALT 23 07/12/2020    AST 36 07/12/2020     BMP:    Lab Results   Component Value Date     07/12/2020    K 4.2 07/12/2020    K 4.2 03/15/2018    CL 98 07/12/2020    CO2 25 07/12/2020    BUN 13 07/12/2020    CREATININE 0.7 07/12/2020     CMP:   Lab Results   Component Value Date     07/12/2020    K 4.2 07/12/2020    K 4.2 03/15/2018    CL 98 07/12/2020    CO2 25 07/12/2020    BUN 13 07/12/2020    PROT 7.3 07/12/2020    PROT 6.6 03/12/2011     TSH: which precluded assurance of safe in person visit at the time of service. The patient consented to and accepts potential risks associated with telemedical evaluation and care was taken to assess patti presence of any medical issues that would be more  appropriate for expedited in -person care. Pursuant to the emergency declaration under the [facility], [facility] waiver authority and the [employer] Act, this Virtual  Visit was conducted, with patient's consent, to reduce the patient's risk of exposure to COVID-19 and provide continuity of care for an established patient. Services were provided through a  discussion on pphone to substitute for in-person clinic visit. TELEPHONE WAS USED TO COMMUNICATE WITH PT    I Confirm this is a Patient Initiated Episode with an Established Patient who has not had a related appointment within my department in the past 7 days or scheduled within the next 24 hours. The call was not tied to face to face office visit or procedure that has occurred in in prior 7 days.   Based on our conversation /evaluation , a subsequent office visit for the patient's problem is not indicated for  24 hrs      Time spent; 25 m  Location; [facility], [address], [address] [facility]  Office staff ie MA were utilised             Mamta Razo MD    [facility]

## 2023-08-03 DIAGNOSIS — Z76.0 MEDICATION REFILL: ICD-10-CM

## 2023-08-03 DIAGNOSIS — E03.9 HYPOTHYROIDISM, UNSPECIFIED TYPE: ICD-10-CM

## 2023-08-03 RX ORDER — LEVOTHYROXINE SODIUM 0.07 MG/1
TABLET ORAL
Qty: 30 TABLET | Refills: 3 | Status: SHIPPED | OUTPATIENT
Start: 2023-08-03

## 2023-08-11 ENCOUNTER — TELEPHONE (OUTPATIENT)
Dept: FAMILY MEDICINE CLINIC | Age: 68
End: 2023-08-11

## 2023-08-11 DIAGNOSIS — E11.42 TYPE 2 DIABETES MELLITUS WITH DIABETIC POLYNEUROPATHY, WITH LONG-TERM CURRENT USE OF INSULIN (HCC): Primary | Chronic | ICD-10-CM

## 2023-08-11 DIAGNOSIS — Z79.4 TYPE 2 DIABETES MELLITUS WITH DIABETIC POLYNEUROPATHY, WITH LONG-TERM CURRENT USE OF INSULIN (HCC): Primary | Chronic | ICD-10-CM

## 2023-08-11 RX ORDER — INSULIN LISPRO 100 [IU]/ML
15 INJECTION, SOLUTION INTRAVENOUS; SUBCUTANEOUS
Qty: 5 ADJUSTABLE DOSE PRE-FILLED PEN SYRINGE | Refills: 3 | Status: SHIPPED | OUTPATIENT
Start: 2023-08-11

## 2023-08-11 NOTE — TELEPHONE ENCOUNTER
Ubaldo Godwin called via [employer] with concern regarding Novolog. She states her insurance will not approve brand. She states she would need new rx for generic. PCP received a mailing stating temporary supply of Novolog available and supplied 3 alternatives. New Rx for Humalog sent to her pharmacy. Verbalized understanding and agreement with plan.

## 2023-08-12 ENCOUNTER — HOSPITAL ENCOUNTER (EMERGENCY)
Age: 68
Discharge: HOME | End: 2023-08-12
Attending: EMERGENCY MEDICINE
Payer: MEDICARE

## 2023-08-12 ENCOUNTER — APPOINTMENT (OUTPATIENT)
Dept: GENERAL RADIOLOGY | Age: 68
End: 2023-08-12
Payer: MEDICARE

## 2023-08-12 VITALS
WEIGHT: 178 LBS | HEART RATE: 81 BPM | TEMPERATURE: 98.5 F | DIASTOLIC BLOOD PRESSURE: 95 MMHG | SYSTOLIC BLOOD PRESSURE: 183 MMHG | RESPIRATION RATE: 24 BRPM | HEIGHT: 62 IN | OXYGEN SATURATION: 94 % | BODY MASS INDEX: 32.76 KG/M2

## 2023-08-12 DIAGNOSIS — S83.92XA SPRAIN OF LEFT KNEE, UNSPECIFIED LIGAMENT, INITIAL ENCOUNTER: Primary | ICD-10-CM

## 2023-08-12 DIAGNOSIS — M17.12 TRICOMPARTMENT OSTEOARTHRITIS OF LEFT KNEE: ICD-10-CM

## 2023-08-12 PROCEDURE — 99283 EMERGENCY DEPT VISIT LOW MDM: CPT

## 2023-08-12 PROCEDURE — 73560 X-RAY EXAM OF KNEE 1 OR 2: CPT

## 2023-08-12 PROCEDURE — 6370000000 HC RX 637 (ALT 250 FOR IP): Performed by: EMERGENCY MEDICINE

## 2023-08-12 RX ORDER — HYDROCODONE BITARTRATE AND ACETAMINOPHEN 5; 325 MG/1; MG/1
2 TABLET ORAL ONCE
Status: COMPLETED | OUTPATIENT
Start: 2023-08-12 | End: 2023-08-12

## 2023-08-12 RX ADMIN — HYDROCODONE BITARTRATE AND ACETAMINOPHEN 2 TABLET: 5; 325 TABLET ORAL at 22:56

## 2023-08-12 ASSESSMENT — PAIN SCALES - GENERAL
PAINLEVEL_OUTOF10: 10
PAINLEVEL_OUTOF10: 10

## 2023-08-12 ASSESSMENT — PAIN - FUNCTIONAL ASSESSMENT: PAIN_FUNCTIONAL_ASSESSMENT: 0-10

## 2023-08-14 NOTE — ED PROVIDER NOTES
Emergency Department Encounter  Location: [facility] EMERGENCY DEPARTMENT    Patient: Rani Maddox  MRN: [id number]  : 1955  Date of evaluation: 2023  ED Provider: Marvel Prasad DO    Chief Complaint:    Knee Pain (States was walking and heard a pop. )    [place]:  Rani Maddox is a 79 y.o. female that presents to the emergency department with concern for left knee pain. Patient describes she was in her kitchen getting food. She turned to walk out of the room and had immediate pain in her left knee. States the knee did not give out but is very painful. Has not been able bear weight on it. No direct injuries otherwise. No additional complaints. Previously been in her usual state of health. Past Medical History:   Diagnosis Date    Abnormal EKG 2014    Acid reflux     Anemia     Anesthesia     Nausea/Vomiting Post Op In Past    Anginal pain (HCC)     Denies Chest Pain At This Time    Anxiety     Arthritis     \"All Over\"    Asthma     Bipolar 1 disorder (HCC)     Cerebral artery occlusion with cerebral infarction (HCC)     CHF (congestive heart failure) (HCC)     Chronic back pain     Chronic kidney disease     DDD (degenerative disc disease), cervical     2014 Patient reports she was dx with DDD of Cerival spine C6,C7    Depression     Diabetes mellitus ([address]) Dx     Diabetic neuropathy ([address])     \"In My Legs And Feet\"    Dizziness     \"Sometimes\"    Dry skin     Enlarged ureter     Right Side    Fatty liver     Fibrocystic breast     Generalized anxiety disorder     Gout     Pt states she was diagnosed with gout in the past few months. H/O cardiac catheterization     Showed mild disease per last cath. H/O cardiovascular stress test 03/15/2010    EF 69%, normal perfusion study except for diaphragmatic artifact, uniform wall motion. H/O cardiovascular stress test 10/09/2008    EF 60%, no anginia, normal study.     H/O

## 2023-08-16 ENCOUNTER — TELEPHONE (OUTPATIENT)
Dept: FAMILY MEDICINE CLINIC | Age: 68
End: 2023-08-16

## 2023-08-16 ENCOUNTER — APPOINTMENT (OUTPATIENT)
Dept: CT IMAGING | Age: 68
End: 2023-08-16
Payer: MEDICARE

## 2023-08-16 ENCOUNTER — HOSPITAL ENCOUNTER (EMERGENCY)
Age: 68
Discharge: HOME | End: 2023-08-16
Attending: EMERGENCY MEDICINE
Payer: MEDICARE

## 2023-08-16 ENCOUNTER — APPOINTMENT (OUTPATIENT)
Dept: GENERAL RADIOLOGY | Age: 68
End: 2023-08-16
Payer: MEDICARE

## 2023-08-16 VITALS
BODY MASS INDEX: 32.5 KG/M2 | RESPIRATION RATE: 18 BRPM | HEIGHT: 62 IN | SYSTOLIC BLOOD PRESSURE: 129 MMHG | WEIGHT: 176.6 LBS | TEMPERATURE: 97.7 F | OXYGEN SATURATION: 96 % | HEART RATE: 77 BPM | DIASTOLIC BLOOD PRESSURE: 74 MMHG

## 2023-08-16 DIAGNOSIS — S09.90XA INJURY OF HEAD, INITIAL ENCOUNTER: ICD-10-CM

## 2023-08-16 DIAGNOSIS — R73.9 HYPERGLYCEMIA: Primary | ICD-10-CM

## 2023-08-16 DIAGNOSIS — R05.9 COUGH, UNSPECIFIED TYPE: ICD-10-CM

## 2023-08-16 LAB
ALBUMIN SERPL-MCNC: 4.2 GM/DL (ref 3.4–5)
ALP BLD-CCNC: 112 IU/L (ref 40–129)
ALT SERPL-CCNC: 18 U/L (ref 10–40)
ANION GAP SERPL CALCULATED.3IONS-SCNC: 12 MMOL/L (ref 4–16)
AST SERPL-CCNC: 15 IU/L (ref 15–37)
B-OH-BUTYR SERPL-MCNC: 0.8 MG/DL (ref 0–3)
BASE EXCESS MIXED: 3.6 (ref 0–2.3)
BASOPHILS ABSOLUTE: 0.1 K/CU MM
BASOPHILS RELATIVE PERCENT: 0.5 % (ref 0–1)
BILIRUB SERPL-MCNC: 0.3 MG/DL (ref 0–1)
BILIRUBIN URINE: NEGATIVE MG/DL
BLOOD, URINE: NEGATIVE
BUN SERPL-MCNC: 10 MG/DL (ref 6–23)
CALCIUM SERPL-MCNC: 9.5 MG/DL (ref 8.3–10.6)
CHLORIDE BLD-SCNC: 97 MMOL/L (ref 99–110)
CLARITY: CLEAR
CO2: 25 MMOL/L (ref 21–32)
COLOR: YELLOW
COMMENT UA: ABNORMAL
COMMENT: ABNORMAL
CREAT SERPL-MCNC: 0.8 MG/DL (ref 0.6–1.1)
DIFFERENTIAL TYPE: ABNORMAL
EOSINOPHILS ABSOLUTE: 0.3 K/CU MM
EOSINOPHILS RELATIVE PERCENT: 2.8 % (ref 0–3)
GFR SERPL CREATININE-BSD FRML MDRD: >60 ML/MIN/1.73M2
GLUCOSE BLD-MCNC: 137 MG/DL (ref 70–99)
GLUCOSE BLD-MCNC: 451 MG/DL (ref 70–99)
GLUCOSE SERPL-MCNC: 451 MG/DL (ref 70–99)
GLUCOSE, URINE: >1000 MG/DL
HCO3 VENOUS: 30.1 MMOL/L (ref 19–25)
HCT VFR BLD CALC: 37.1 % (ref 37–47)
HEMOGLOBIN: 12.3 GM/DL (ref 12.5–16)
IMMATURE NEUTROPHIL %: 0.4 % (ref 0–0.43)
KETONES, URINE: NEGATIVE MG/DL
LEUKOCYTE ESTERASE, URINE: NEGATIVE
LIPASE: 21 IU/L (ref 13–60)
LYMPHOCYTES ABSOLUTE: 2.7 K/CU MM
LYMPHOCYTES RELATIVE PERCENT: 27.2 % (ref 24–44)
MCH RBC QN AUTO: 27.2 PG (ref 27–31)
MCHC RBC AUTO-ENTMCNC: 33.2 % (ref 32–36)
MCV RBC AUTO: 82.1 FL (ref 78–100)
MONOCYTES ABSOLUTE: 0.8 K/CU MM
MONOCYTES RELATIVE PERCENT: 8.2 % (ref 0–4)
NITRITE URINE, QUANTITATIVE: NEGATIVE
NUCLEATED RBC %: 0 %
O2 SAT, VEN: 94.8 % (ref 50–70)
PCO2, VEN: 52 MMHG (ref 38–52)
PDW BLD-RTO: 12.1 % (ref 11.7–14.9)
PH VENOUS: 7.37 (ref 7.32–7.42)
PH, URINE: 5.5 (ref 5–8)
PLATELET # BLD: 246 K/CU MM (ref 140–440)
PMV BLD AUTO: 9.6 FL (ref 7.5–11.1)
PO2, VEN: 110 MMHG (ref 28–48)
POTASSIUM SERPL-SCNC: 4.1 MMOL/L (ref 3.5–5.1)
PRO-BNP: <5 PG/ML
PROTEIN UA: NEGATIVE MG/DL
RBC # BLD: 4.52 M/CU MM (ref 4.2–5.4)
SEGMENTED NEUTROPHILS ABSOLUTE COUNT: 5.9 K/CU MM
SEGMENTED NEUTROPHILS RELATIVE PERCENT: 60.9 % (ref 36–66)
SODIUM BLD-SCNC: 134 MMOL/L (ref 135–145)
SPECIFIC GRAVITY UA: 1.01 (ref 1–1.03)
TOTAL IMMATURE NEUTOROPHIL: 0.04 K/CU MM
TOTAL NUCLEATED RBC: 0 K/CU MM
TOTAL PROTEIN: 6.9 GM/DL (ref 6.4–8.2)
UROBILINOGEN, URINE: 0.2 MG/DL (ref 0.2–1)
WBC # BLD: 9.8 K/CU MM (ref 4–10.5)

## 2023-08-16 PROCEDURE — 83880 ASSAY OF NATRIURETIC PEPTIDE: CPT

## 2023-08-16 PROCEDURE — 83690 ASSAY OF LIPASE: CPT

## 2023-08-16 PROCEDURE — 99285 EMERGENCY DEPT VISIT HI MDM: CPT

## 2023-08-16 PROCEDURE — 82010 KETONE BODYS QUAN: CPT

## 2023-08-16 PROCEDURE — 82805 BLOOD GASES W/O2 SATURATION: CPT

## 2023-08-16 PROCEDURE — 81003 URINALYSIS AUTO W/O SCOPE: CPT

## 2023-08-16 PROCEDURE — 85025 COMPLETE CBC W/AUTO DIFF WBC: CPT

## 2023-08-16 PROCEDURE — 6370000000 HC RX 637 (ALT 250 FOR IP): Performed by: EMERGENCY MEDICINE

## 2023-08-16 PROCEDURE — 70450 CT HEAD/BRAIN W/O DYE: CPT

## 2023-08-16 PROCEDURE — 96374 THER/PROPH/DIAG INJ IV PUSH: CPT

## 2023-08-16 PROCEDURE — 6370000000 HC RX 637 (ALT 250 FOR IP): Performed by: NURSE PRACTITIONER

## 2023-08-16 PROCEDURE — 71045 X-RAY EXAM CHEST 1 VIEW: CPT

## 2023-08-16 PROCEDURE — 82962 GLUCOSE BLOOD TEST: CPT

## 2023-08-16 PROCEDURE — 2580000003 HC RX 258: Performed by: EMERGENCY MEDICINE

## 2023-08-16 PROCEDURE — 80053 COMPREHEN METABOLIC PANEL: CPT

## 2023-08-16 PROCEDURE — 93005 ELECTROCARDIOGRAM TRACING: CPT | Performed by: NURSE PRACTITIONER

## 2023-08-16 RX ORDER — ACETAMINOPHEN 500 MG
1000 TABLET ORAL ONCE
Status: COMPLETED | OUTPATIENT
Start: 2023-08-16 | End: 2023-08-16

## 2023-08-16 RX ORDER — 0.9 % SODIUM CHLORIDE 0.9 %
1000 INTRAVENOUS SOLUTION INTRAVENOUS ONCE
Status: COMPLETED | OUTPATIENT
Start: 2023-08-16 | End: 2023-08-16

## 2023-08-16 RX ORDER — ONDANSETRON 2 MG/ML
4 INJECTION INTRAMUSCULAR; INTRAVENOUS EVERY 30 MIN PRN
Status: DISCONTINUED | OUTPATIENT
Start: 2023-08-16 | End: 2023-08-16 | Stop reason: HOSPADM

## 2023-08-16 RX ORDER — 0.9 % SODIUM CHLORIDE 0.9 %
1000 INTRAVENOUS SOLUTION INTRAVENOUS ONCE
Status: DISCONTINUED | OUTPATIENT
Start: 2023-08-16 | End: 2023-08-16 | Stop reason: HOSPADM

## 2023-08-16 RX ADMIN — INSULIN HUMAN 5 UNITS: 100 INJECTION, SOLUTION PARENTERAL at 16:16

## 2023-08-16 RX ADMIN — SODIUM CHLORIDE 1000 ML: 9 INJECTION, SOLUTION INTRAVENOUS at 15:59

## 2023-08-16 RX ADMIN — ACETAMINOPHEN 1000 MG: 500 TABLET ORAL at 15:55

## 2023-08-16 ASSESSMENT — PAIN SCALES - GENERAL: PAINLEVEL_OUTOF10: 10

## 2023-08-16 ASSESSMENT — PAIN DESCRIPTION - LOCATION: LOCATION: HEAD

## 2023-08-16 ASSESSMENT — ENCOUNTER SYMPTOMS
TROUBLE SWALLOWING: 0
ABDOMINAL PAIN: 0
SORE THROAT: 0
SHORTNESS OF BREATH: 0

## 2023-08-16 ASSESSMENT — PAIN - FUNCTIONAL ASSESSMENT: PAIN_FUNCTIONAL_ASSESSMENT: 0-10

## 2023-08-16 NOTE — ED PROVIDER NOTES
[id number] [facility] ENCOUNTER      Pt Name: Antionette Guzmán  MRN: [id number]  [facility] 1955  Date of evaluation: 8/16/2023  Provider: WENDIE Alan CNP  PCP: WENDIE Eldridge CNP  Note Started: 2:39 PM EDT 8/16/23    I am the Primary Clinician of Record. I have seen and evaluated this patient with my supervising physician No att. providers found. CHIEF COMPLAINT       Chief Complaint   Patient presents with    Hyperglycemia       HISTORY OF PRESENT ILLNESS: 1 or more Elements   Antionette Guzmán is a 79 y.o. female who presents to the ER with concerns of her blood sugar. She states that she took her blood sugar this morning it was 451. She took 35 units of NovoLog around 1145. When she checked her sugar again at 1330 her blood sugar was 503. She admits however that in between taking the insulin and rechecking her blood sugar she ate 3-5 lemon cookies. She has not eaten anything else today. States that her blood sugars usually run between 203 100. She denies chest pain, shortness of breath, blurred vision but does endorse a headache. She has not taken anything for her headache. Does use Ubrelvy for migraine headaches. States that she hit her head on her recliner a couple days ago although the headache did not start until last night. Information obtained from patient. I have reviewed the nursing triage documentation and agree unless otherwise noted. REVIEW OF SYSTEMS :    Review of Systems   Constitutional:  Negative for appetite change, fatigue and fever. HENT:  Negative for congestion, sore throat and trouble swallowing. Respiratory:  Negative for shortness of breath. Cardiovascular:  Negative for chest pain. Gastrointestinal:  Negative for abdominal pain. Genitourinary:  Negative for dysuria. Musculoskeletal:  Negative for myalgias. Skin:  Negative for rash.    Neurological: Negative for weakness and headaches. Positives and Pertinent negatives as per HPI. SURGICAL HISTORY     Past Surgical History:   Procedure Laterality Date    APPENDECTOMY  1970's    Done With Cholecystectomy    BLADDER SURGERY  1970's Or 1980's    \"Stretched The Opening To The Bladder\", \"Total Of Four Bladder Surgeries\"    BREAST BIOPSY Right 1980's    Twice, Benign    BREAST SURGERY Left 1990's    Five, Benign    CARDIAC CATHETERIZATION  10-18-06    normal coronary angiogram with a normal left ventricular systolic function, patient can be treated medically.     CARDIAC CATHETERIZATION      \"Total 7 Cardiac Catheterizations\"    CARPAL TUNNEL RELEASE Right 1999    CHOLECYSTECTOMY  1970's    Appendectomy Also Done    COLONOSCOPY  Last Done 6-13    One Polyp Removed In Past    DENTAL SURGERY      All Teeth Extracted In Past    DIAGNOSTIC CARDIAC CATH LAB PROCEDURE  01/11/2010    no significant disease, continue medical therapy    ENDOSCOPY, COLON, DIAGNOSTIC  Several     ESOPHAGEAL DILATATION  1980's And 1990's    X 3    FRACTURE SURGERY  1974 Or 1975    Broken Bones Left [place] Due To [address]  1990's    Incisional Abdominal Hernia Repair  With Mesh    HERNIA REPAIR  1970's    Abdominal Hernia Repair    HYSTERECTOMY, TOTAL ABDOMINAL (CERVIX REMOVED)  1987    JOINT REPLACEMENT  2008    Total Right Knee    KNEE ARTHROSCOPY Right 1999    LITHOTRIPSY  2011    For Kidney Stones    OTHER SURGICAL HISTORY  06 13 9714    umbilical hernia with mesh    TUBAL LIGATION  1978       CURRENTMEDICATIONS       Previous Medications    ALUMINUM & MAGNESIUM HYDROXIDE-SIMETHICONE (MAALOX MAX) 400-400-40 MG/5ML SUSP    Take 15 mLs by mouth every 6 hours as needed (heart burn)    AMITRIPTYLINE (ELAVIL) 25 MG TABLET    Take 2 tablets by mouth nightly    ASPIRIN 81 MG TABLET    Take 1 tablet by mouth daily    ATORVASTATIN (LIPITOR) 40 MG TABLET    Take 1 tablet by mouth nightly    BLOOD GLUCOSE MONITOR STRIPS

## 2023-08-16 NOTE — ED PROVIDER NOTES
SANDRITA Koehler. ARSENIO am the primary physician of record, and independently examined and evaluated Little Araujo. In brief their history revealed a 74-year-old female presents with complaint of high blood sugar, headache. Patient states she hit her head last night on a steel pole did not pass out, no blood thinners. She states today she had a couple cookies her home health aide came over checked her blood sugar was over 400 they gave her a self insulin rechecked and it was over 500 so they sent her here. Patient states she feels little bit lightheaded confused due to her sugar being high has a slight headache no nausea vomiting no chest pain shortness of breath has had a cough. No abdominal pain no bowel or bladder symptoms. No other questions or concerns. .    Their focused exam revealed alert oriented female resting in bed no distress normocephalic atraumatic sclera clear lungs clear airway normal no stridor no drooling cranial nerves intact grossly pupils equal reactive, no trauma to her head. Breath sounds are clear abdomen soft nontender obese bowel sounds normal.  Heart regular rhythm 2+ pulses throughout, 5 out of 5 strength throughout skin has no rash or swelling. ED course: Patient seen with NP please see her note. Patient with complaint of high blood sugar, cough, headache. Again patient states she hit her head last night on a pole did not pass out no blood thinners. Today she had a couple cookies home health care aide took her blood sugars over 400 taking yourself insulin drink some water rechecked it is over 500 they brought her here. Otherwise she appears well just feels lightheaded due to her sugar being high. Physical exam otherwise is normal.  No trauma to her head breath sounds are clear she has had a cough as mentioned. We will check labs, imaging of head and chest EKG is normal.  We will give her IV fluids we will check for DKA.   So far work-up negative including labs imaging. She was given a small dose of insulin IV fluids and repeat blood sugar came down to 137 she is okay to go home no signs of DKA no signs of infection patient stable discharge given return precautions and follow-up information. Total critical care time today provided was at least 20 minutes. This excludes seperately billable procedure. Critical care time provided for reviewing labs, images given IV fluids for hyperglycemia as well as a small dose of insulin. That required close evaluation and/or intervention with concern for patient decompensation. 12 lead EKG per my interpretation:  Normal Sinus Rhythm 76  Axis is   Left axis deviation  QTc is   456  There is no specific T wave changes appreciated. There is no specific ST wave changes appreciated. Prior EKG to compare with was not available       All diagnostic, treatment, and disposition decisions were made by myself in conjunction with the Advanced Practice Provider. For all further details of the patient's emergency department visit, please see the Advanced Practice Provider's documentation.        Eliseo Chris DO  08/16/23 [id number]

## 2023-08-16 NOTE — ED NOTES
Patient reporting giving herself 35 units of regular insulin around 1230 and blood glucose 451 mg/dl. At recheck 1330 reading was 503 mg/dl     Sky Skinner.  CHAVO Mora  08/16/23 [id number]

## 2023-08-16 NOTE — ED NOTES
Patient given discharge instructions at this time. Verbalizes understanding and denies any further needs, questions, concerns or complaints. Patient ambulatory to front of ER in standing, up position in stable condition.        Can Harding RN  08/16/23 1946

## 2023-08-16 NOTE — ACP (ADVANCE CARE PLANNING)
Patient does not have any ACP documents/Medical Power of . LSW notes hospital will follow [place]'s Next of Kin hierarchy in the following descending order for priority:    Guardian  Spouse  Majority of adult Children  Parents  Majority of adult Siblings  Nearest Relative not described above    Per [place] Next of Kin hierarchy: Patients 2 adult children will be [address] [facility].

## 2023-08-18 DIAGNOSIS — E11.42 POLYNEUROPATHY DUE TO TYPE 2 DIABETES MELLITUS (HCC): ICD-10-CM

## 2023-08-18 LAB
EKG ATRIAL RATE: 76 BPM
EKG DIAGNOSIS: NORMAL
EKG P AXIS: 19 DEGREES
EKG P-R INTERVAL: 172 MS
EKG Q-T INTERVAL: 406 MS
EKG QRS DURATION: 108 MS
EKG QTC CALCULATION (BAZETT): 456 MS
EKG R AXIS: -40 DEGREES
EKG T AXIS: 31 DEGREES
EKG VENTRICULAR RATE: 76 BPM

## 2023-08-18 PROCEDURE — 93010 ELECTROCARDIOGRAM REPORT: CPT | Performed by: INTERNAL MEDICINE

## 2023-08-21 RX ORDER — GABAPENTIN 600 MG/1
TABLET ORAL
Qty: 180 TABLET | Refills: 0 | Status: SHIPPED | OUTPATIENT
Start: 2023-08-21 | End: 2023-10-20

## 2023-08-23 ENCOUNTER — TELEPHONE (OUTPATIENT)
Dept: NEUROLOGY | Age: 68
End: 2023-08-23

## 2023-08-23 NOTE — TELEPHONE ENCOUNTER
Tristinum called to schedule Botox delivery for pt. Let them know to put it on hold as we still have Botox here due to her cancelling her first appt. She states verbal understanding. Pt has follow up with NP in September and will discuss if she would still like to move forward with Botox treatment.

## 2023-08-29 RX ORDER — ATORVASTATIN CALCIUM 40 MG/1
TABLET, FILM COATED ORAL
Qty: 30 TABLET | Refills: 3 | Status: SHIPPED | OUTPATIENT
Start: 2023-08-29

## 2023-08-30 RX ORDER — METOPROLOL SUCCINATE 50 MG/1
50 TABLET, EXTENDED RELEASE ORAL DAILY
Qty: 30 TABLET | Refills: 5 | Status: SHIPPED | OUTPATIENT
Start: 2023-08-30

## 2023-09-07 ENCOUNTER — OFFICE VISIT (OUTPATIENT)
Dept: CARDIOLOGY CLINIC | Age: 68
End: 2023-09-07
Payer: MEDICARE

## 2023-09-07 VITALS
SYSTOLIC BLOOD PRESSURE: 104 MMHG | HEART RATE: 76 BPM | WEIGHT: 174.4 LBS | BODY MASS INDEX: 32.09 KG/M2 | HEIGHT: 62 IN | DIASTOLIC BLOOD PRESSURE: 60 MMHG | OXYGEN SATURATION: 96 %

## 2023-09-07 DIAGNOSIS — E78.2 MIXED HYPERLIPIDEMIA: ICD-10-CM

## 2023-09-07 DIAGNOSIS — I10 ESSENTIAL HYPERTENSION: Primary | ICD-10-CM

## 2023-09-07 PROBLEM — I50.32 CHRONIC DIASTOLIC CONGESTIVE HEART FAILURE (HCC): Status: RESOLVED | Noted: 2021-10-12 | Resolved: 2023-09-07

## 2023-09-07 PROCEDURE — 3017F COLORECTAL CA SCREEN DOC REV: CPT | Performed by: NURSE PRACTITIONER

## 2023-09-07 PROCEDURE — 1090F PRES/ABSN URINE INCON ASSESS: CPT | Performed by: NURSE PRACTITIONER

## 2023-09-07 PROCEDURE — 3078F DIAST BP <80 MM HG: CPT | Performed by: NURSE PRACTITIONER

## 2023-09-07 PROCEDURE — G8427 DOCREV CUR MEDS BY ELIG CLIN: HCPCS | Performed by: NURSE PRACTITIONER

## 2023-09-07 PROCEDURE — 3074F SYST BP LT 130 MM HG: CPT | Performed by: NURSE PRACTITIONER

## 2023-09-07 PROCEDURE — G8400 PT W/DXA NO RESULTS DOC: HCPCS | Performed by: NURSE PRACTITIONER

## 2023-09-07 PROCEDURE — 1036F TOBACCO NON-USER: CPT | Performed by: NURSE PRACTITIONER

## 2023-09-07 PROCEDURE — G8417 CALC BMI ABV UP PARAM F/U: HCPCS | Performed by: NURSE PRACTITIONER

## 2023-09-07 PROCEDURE — 99214 OFFICE O/P EST MOD 30 MIN: CPT | Performed by: NURSE PRACTITIONER

## 2023-09-07 PROCEDURE — 1124F ACP DISCUSS-NO DSCNMKR DOCD: CPT | Performed by: NURSE PRACTITIONER

## 2023-09-07 RX ORDER — LOSARTAN POTASSIUM 50 MG/1
50 TABLET ORAL DAILY
Qty: 30 TABLET | Refills: 3 | Status: SHIPPED | OUTPATIENT
Start: 2023-09-07

## 2023-09-07 ASSESSMENT — ENCOUNTER SYMPTOMS
ORTHOPNEA: 0
SHORTNESS OF BREATH: 0

## 2023-09-07 NOTE — PROGRESS NOTES
and with light palpation  Cardiovascular:      Rate and Rhythm: Normal rate and regular rhythm. Pulses: Normal pulses. Heart sounds: Normal heart sounds. No murmur heard. Chest:      Chest wall: Tenderness present. Musculoskeletal:      Right lower leg: No edema. Left lower leg: No edema. Skin:     Capillary Refill: Capillary refill takes less than 2 seconds. Neurological:      General: No focal deficit present. Mental Status: She is alert. Current Outpatient Medications   Medication Sig Dispense Refill    metoprolol succinate (TOPROL XL) 50 MG extended release tablet Take 1 tablet by mouth daily 30 tablet 5    atorvastatin (LIPITOR) 40 MG tablet TAKE ONE (1) TABLET BY MOUTH EVERY NIGHT 30 tablet 3    gabapentin (NEURONTIN) 600 MG tablet TAKE ONE (1) TABLET BY MOUTH THREE TIMES DAILY 180 tablet 0    insulin lispro, 1 Unit Dial, (HUMALOG KWIKPEN) 100 UNIT/ML SOPN Inject 15 Units into the skin 3 times daily (before meals) Give regular insulin based on sliding scale regimen BS > 150, give 15 units; >200, give 17 units, >250, give 19 units; >300 give 21 units. 5 Adjustable Dose Pre-filled Pen Syringe 3    levothyroxine (SYNTHROID) 75 MCG tablet TAKE ONE (1) TABLET BY MOUTH EVERY MORNING BEFORE BREAKFAST 30 tablet 3    Continuous Blood Gluc  (FREESTYLE CHUYITA 2 READER) WES by Does not apply route      Ubrogepant (UBRELVY) 100 MG TABS Lot: [id number] Exp: 6/24 2 tablet 0    Onabotulinumtoxin A (BOTOX) 200 units injection Inject 200 units into the head & neck muscle once every 90 days for migraines.  1 each 3    Ubrogepant (UBRELVY) 100 MG TABS Take 100 mg by mouth as needed (Migraine) May repeat after 2 hours if needed 10 tablet 2    carbidopa-levodopa (SINEMET)  MG per tablet Take 1 tablet by mouth nightly 30 tablet 3    Insulin Pen Needle 31G X 5 MM MISC 1 each by Does not apply route daily 100 each 3    busPIRone (BUSPAR) 10 MG tablet Take 1 tablet by mouth 3 times

## 2023-09-07 NOTE — PATIENT INSTRUCTIONS
Please be informed that if you contact our office outside of normal business hours the physician on call cannot help with any scheduling or rescheduling issues, procedure instruction questions or any type of medication issue. We advise you for any urgent/emergency that you go to the nearest emergency room! PLEASE CALL OUR OFFICE DURING NORMAL BUSINESS HOURS    Monday - Friday   8 am to 5 pm    [place]: [address] [place]: [phone]    [place]:  [phone]    **It is YOUR responsibilty to bring medication bottles and/or updated medication list to [facility]. This will allow us to better serve you and all your healthcare needs**    Thank you for allowing us to care for you today! We want to ensure we can follow your treatment plan and we strive to give you the best outcomes and experience possible. If you ever have a life threatening emergency and call 911 - for an ambulance (EMS)   Our providers can only care for you at:   [facility] or [facility]. Even if you have someone take you or you drive yourself we can only care for you in a [facility] facility. Our providers are not setup at the other healthcare locations!

## 2023-09-18 ENCOUNTER — OFFICE VISIT (OUTPATIENT)
Dept: NEUROLOGY | Age: 68
End: 2023-09-18
Payer: MEDICARE

## 2023-09-18 VITALS
HEIGHT: 62 IN | DIASTOLIC BLOOD PRESSURE: 84 MMHG | OXYGEN SATURATION: 98 % | WEIGHT: 170.2 LBS | HEART RATE: 70 BPM | SYSTOLIC BLOOD PRESSURE: 136 MMHG | BODY MASS INDEX: 31.32 KG/M2

## 2023-09-18 DIAGNOSIS — G43.009 MIGRAINE WITHOUT AURA, NOT REFRACTORY: ICD-10-CM

## 2023-09-18 DIAGNOSIS — F44.5 PSYCHOGENIC NONEPILEPTIC SEIZURE: ICD-10-CM

## 2023-09-18 DIAGNOSIS — G25.81 RLS (RESTLESS LEGS SYNDROME): Primary | ICD-10-CM

## 2023-09-18 PROCEDURE — G8427 DOCREV CUR MEDS BY ELIG CLIN: HCPCS | Performed by: NURSE PRACTITIONER

## 2023-09-18 PROCEDURE — 3078F DIAST BP <80 MM HG: CPT | Performed by: NURSE PRACTITIONER

## 2023-09-18 PROCEDURE — G8417 CALC BMI ABV UP PARAM F/U: HCPCS | Performed by: NURSE PRACTITIONER

## 2023-09-18 PROCEDURE — 3017F COLORECTAL CA SCREEN DOC REV: CPT | Performed by: NURSE PRACTITIONER

## 2023-09-18 PROCEDURE — 1036F TOBACCO NON-USER: CPT | Performed by: NURSE PRACTITIONER

## 2023-09-18 PROCEDURE — 99213 OFFICE O/P EST LOW 20 MIN: CPT | Performed by: NURSE PRACTITIONER

## 2023-09-18 PROCEDURE — 1090F PRES/ABSN URINE INCON ASSESS: CPT | Performed by: NURSE PRACTITIONER

## 2023-09-18 PROCEDURE — 3074F SYST BP LT 130 MM HG: CPT | Performed by: NURSE PRACTITIONER

## 2023-09-18 PROCEDURE — 1124F ACP DISCUSS-NO DSCNMKR DOCD: CPT | Performed by: NURSE PRACTITIONER

## 2023-09-18 PROCEDURE — G8400 PT W/DXA NO RESULTS DOC: HCPCS | Performed by: NURSE PRACTITIONER

## 2023-09-18 RX ORDER — AMITRIPTYLINE HYDROCHLORIDE 25 MG/1
50 TABLET, FILM COATED ORAL NIGHTLY
Qty: 60 TABLET | Refills: 5 | Status: SHIPPED | OUTPATIENT
Start: 2023-09-18

## 2023-09-18 RX ORDER — LEVETIRACETAM 500 MG/1
500 TABLET ORAL 2 TIMES DAILY
Qty: 60 TABLET | Refills: 3 | Status: SHIPPED | OUTPATIENT
Start: 2023-09-18

## 2023-09-18 RX ORDER — SUCRALFATE ORAL 1 G/10ML
1 SUSPENSION ORAL 2 TIMES DAILY
COMMUNITY

## 2023-09-18 RX ORDER — UBROGEPANT 100 MG/1
100 TABLET ORAL PRN
Qty: 10 TABLET | Refills: 2 | Status: SHIPPED | OUTPATIENT
Start: 2023-09-18

## 2023-09-18 NOTE — PROGRESS NOTES
9/29/23    [facility]  1955    Chief Complaint   Patient presents with    Follow-up     Pt presents for follow-up for migraines and restless leg syndrome. History of Present Illness  Isaac Vann is a 79 y.o. female presenting today for follow-up of:  Migraine, RLS, neuropathy, hx seudoseizures. Isaac Vann remains on amitriptyline 50 mg at bedtime for migraine prevention. She remains on Carbidopa levodopa 10 mg/100 mg for RLS. She tells me that she gets a migraine 30/30 days. They are located in the right frontal region. They are associated with light and sound sensitivity. She has nausea without vomiting. She has had Botox in the past which helped decrease the frequency of her migraines by 50%. Last visit, we agreed on Botox for further migraine prevention. She missed her appointment due to having diarrhea she tells me. She is still interested in Botox. She is taking Keppra 500 mg BID, this was prescribed several years ago by another neurologist in [facility]. She asked if our group would take over her keppra. She has been diagnosed with pseudoseizures in the past confirmed by video EEG. We discussed pseudoseizures however Isaac Vann would like to remain on Keppra as she has been stale on this medication. Last visit, she was having 30/30 migraines and Botox was ordered however she  cancelled her appointment.    Migraine preventatives tried: Propranolol, Topamax, verapamil, metoprolol, amitriptyline, gabapentin, duloxetine  Abortive therapy: [facility]    Current Outpatient Medications   Medication Sig Dispense Refill    sucralfate (CARAFATE) 1 GM/10ML suspension Take 10 mLs by mouth in the morning and at bedtime      amitriptyline (ELAVIL) 25 MG tablet Take 2 tablets by mouth nightly 60 tablet 5    carbidopa-levodopa (SINEMET)  MG per tablet Take 1 tablet by mouth nightly 30 tablet 3    Ubrogepant (UBRELVY) 100 MG TABS Take 100 mg by mouth as needed (Migraine) May repeat after 2 hours

## 2023-09-26 ENCOUNTER — TELEPHONE (OUTPATIENT)
Dept: FAMILY MEDICINE CLINIC | Age: 68
End: 2023-09-26

## 2023-09-26 RX ORDER — BLOOD-GLUCOSE METER
1 EACH MISCELLANEOUS DAILY
COMMUNITY
Start: 2023-07-21

## 2023-09-26 NOTE — TELEPHONE ENCOUNTER
Darryn Martinez is requesting test stirps for her [place] FLEX BLOOD GLUCOSE MONITORING SYSTEM w/Device KIT to be sent to [facility]. Darryn Martinez states she checks herself 3-4 times a day.

## 2023-09-27 ENCOUNTER — OFFICE VISIT (OUTPATIENT)
Dept: FAMILY MEDICINE CLINIC | Age: 68
End: 2023-09-27
Payer: MEDICARE

## 2023-09-27 VITALS
RESPIRATION RATE: 20 BRPM | BODY MASS INDEX: 31.39 KG/M2 | DIASTOLIC BLOOD PRESSURE: 74 MMHG | SYSTOLIC BLOOD PRESSURE: 128 MMHG | TEMPERATURE: 97 F | WEIGHT: 171.6 LBS | HEART RATE: 70 BPM | OXYGEN SATURATION: 97 %

## 2023-09-27 DIAGNOSIS — E08.22 DIABETES MELLITUS DUE TO UNDERLYING CONDITION WITH CHRONIC KIDNEY DISEASE, WITHOUT LONG-TERM CURRENT USE OF INSULIN, UNSPECIFIED CKD STAGE (HCC): Primary | ICD-10-CM

## 2023-09-27 DIAGNOSIS — N39.42 URINARY INCONTINENCE WITHOUT SENSORY AWARENESS: ICD-10-CM

## 2023-09-27 DIAGNOSIS — R39.9 UTI SYMPTOMS: ICD-10-CM

## 2023-09-27 DIAGNOSIS — N60.19 FIBROCYSTIC BREAST DISEASE (FCBD), UNSPECIFIED LATERALITY: ICD-10-CM

## 2023-09-27 DIAGNOSIS — G89.29 CHRONIC BILATERAL LOW BACK PAIN WITHOUT SCIATICA: ICD-10-CM

## 2023-09-27 DIAGNOSIS — N64.4 PAIN OF BOTH BREASTS: ICD-10-CM

## 2023-09-27 DIAGNOSIS — Z78.0 POST-MENOPAUSAL: ICD-10-CM

## 2023-09-27 DIAGNOSIS — Z23 NEED FOR INFLUENZA VACCINATION: ICD-10-CM

## 2023-09-27 DIAGNOSIS — M54.50 CHRONIC BILATERAL LOW BACK PAIN WITHOUT SCIATICA: ICD-10-CM

## 2023-09-27 DIAGNOSIS — Z23 NEED FOR VACCINATION AGAINST STREPTOCOCCUS PNEUMONIAE: ICD-10-CM

## 2023-09-27 LAB
BILIRUBIN, POC: ABNORMAL
BLOOD URINE, POC: ABNORMAL
CLARITY, POC: ABNORMAL
COLOR, POC: ABNORMAL
GLUCOSE URINE, POC: ABNORMAL
KETONES, POC: ABNORMAL
LEUKOCYTE EST, POC: ABNORMAL
NITRITE, POC: ABNORMAL
PH, POC: 5.5
PROTEIN, POC: ABNORMAL
SPECIFIC GRAVITY, POC: 1.02
UROBILINOGEN, POC: ABNORMAL

## 2023-09-27 PROCEDURE — 99214 OFFICE O/P EST MOD 30 MIN: CPT | Performed by: NURSE PRACTITIONER

## 2023-09-27 PROCEDURE — 90694 VACC AIIV4 NO PRSRV 0.5ML IM: CPT | Performed by: NURSE PRACTITIONER

## 2023-09-27 PROCEDURE — 3017F COLORECTAL CA SCREEN DOC REV: CPT | Performed by: NURSE PRACTITIONER

## 2023-09-27 PROCEDURE — G8427 DOCREV CUR MEDS BY ELIG CLIN: HCPCS | Performed by: NURSE PRACTITIONER

## 2023-09-27 PROCEDURE — 3078F DIAST BP <80 MM HG: CPT | Performed by: NURSE PRACTITIONER

## 2023-09-27 PROCEDURE — G0008 ADMIN INFLUENZA VIRUS VAC: HCPCS | Performed by: NURSE PRACTITIONER

## 2023-09-27 PROCEDURE — 3074F SYST BP LT 130 MM HG: CPT | Performed by: NURSE PRACTITIONER

## 2023-09-27 PROCEDURE — 1124F ACP DISCUSS-NO DSCNMKR DOCD: CPT | Performed by: NURSE PRACTITIONER

## 2023-09-27 PROCEDURE — G0009 ADMIN PNEUMOCOCCAL VACCINE: HCPCS | Performed by: NURSE PRACTITIONER

## 2023-09-27 PROCEDURE — 0509F URINE INCON PLAN DOCD: CPT | Performed by: NURSE PRACTITIONER

## 2023-09-27 PROCEDURE — 81002 URINALYSIS NONAUTO W/O SCOPE: CPT | Performed by: NURSE PRACTITIONER

## 2023-09-27 PROCEDURE — G8417 CALC BMI ABV UP PARAM F/U: HCPCS | Performed by: NURSE PRACTITIONER

## 2023-09-27 PROCEDURE — 90677 PCV20 VACCINE IM: CPT | Performed by: NURSE PRACTITIONER

## 2023-09-27 PROCEDURE — G8400 PT W/DXA NO RESULTS DOC: HCPCS | Performed by: NURSE PRACTITIONER

## 2023-09-27 PROCEDURE — 1036F TOBACCO NON-USER: CPT | Performed by: NURSE PRACTITIONER

## 2023-09-27 PROCEDURE — 1090F PRES/ABSN URINE INCON ASSESS: CPT | Performed by: NURSE PRACTITIONER

## 2023-09-27 RX ORDER — GLUCOSAMINE HCL/CHONDROITIN SU 500-400 MG
CAPSULE ORAL
Qty: 100 STRIP | Refills: 3 | Status: SHIPPED | OUTPATIENT
Start: 2023-09-27

## 2023-09-27 SDOH — ECONOMIC STABILITY: INCOME INSECURITY: HOW HARD IS IT FOR YOU TO PAY FOR THE VERY BASICS LIKE FOOD, HOUSING, MEDICAL CARE, AND HEATING?: VERY HARD

## 2023-09-27 SDOH — ECONOMIC STABILITY: FOOD INSECURITY: WITHIN THE PAST 12 MONTHS, THE FOOD YOU BOUGHT JUST DIDN'T LAST AND YOU DIDN'T HAVE MONEY TO GET MORE.: NEVER TRUE

## 2023-09-27 SDOH — ECONOMIC STABILITY: FOOD INSECURITY: WITHIN THE PAST 12 MONTHS, YOU WORRIED THAT YOUR FOOD WOULD RUN OUT BEFORE YOU GOT MONEY TO BUY MORE.: NEVER TRUE

## 2023-09-27 ASSESSMENT — ENCOUNTER SYMPTOMS
COUGH: 0
CHEST TIGHTNESS: 0
WHEEZING: 0
SHORTNESS OF BREATH: 0

## 2023-09-27 NOTE — PROGRESS NOTES
Lindsey Nail  1955  79 y.o. SUBJECT MARCIA:    No chief complaint on file. LEONEL Crook is a 79year old female who is in for diabetes follow up. She states she checked blood sugar this morning which was 168. She states she has a nurse who helps medication set up (Fridays). She also has a nurse who comes on Tuesdays who does assessments. Diabetes  She states she checked blood sugar this morning which was 168. She had a HgA1C on 9/18/23 through screening done through [facility], reported 9.3%. She uses Trulicity at bedtime and has sliding scale insulin used before meals. She states she has been doing blood sugar checks 4 times a day because she was unable to get CGM because of cost and insurance. She states she has a nurse who helps medication set up (Fridays). She also has a nurse who comes on Tuesdays who does assessments. Migraine headache  Will be getting botox next month. She states she has a migraine most days of the month. She was given Katherene Gala which helped but was only able to get 10 tablets. Explained that this medication is for onset of headache and not daily use. She states she will keep upcoming appointment with neurology. Vaccines  She states she wishes to get updated vaccines while she is here. Breast pain  She mentions that she has bilateral breasts pain. She states she is not up to date on her mammograms. She states she was diagnosed with benign fibrocystic breasts. She denies nipple discharge, denies color changes of breasts. Urinary problems  She states she continues to have problem with urinary incontinence. She states she never received call from urology. Requesting another referral. She also complains of UTI symptoms of abdominal discomfort, frequency.     Current Outpatient Medications on File Prior to Visit   Medication Sig Dispense Refill    Blood Glucose Monitoring Suppl (ONETOUCH VERIO FLEX SYSTEM) w/Device KIT Inject 1 Device into the skin daily      sucralfate

## 2023-09-28 LAB — BACTERIA UR CULT: NORMAL

## 2023-10-19 ENCOUNTER — TELEPHONE (OUTPATIENT)
Dept: NEUROLOGY | Age: 68
End: 2023-10-19

## 2023-10-19 DIAGNOSIS — E11.42 POLYNEUROPATHY DUE TO TYPE 2 DIABETES MELLITUS (HCC): ICD-10-CM

## 2023-10-19 NOTE — TELEPHONE ENCOUNTER
Pt called during scheduled appointment for Botox stating she did not have transportation. Discussed with Dr. Mickey Marquez. Scheduled pt for next available Botox date 1/9/23 and informed pt of importance of keeping Botox appointments due to consistency needed for results of tx and limited scheduling availability. She requested we call her dtr Dinh Drake regarding information. Also informed pt dtr Dinh Drake.

## 2023-10-20 RX ORDER — GABAPENTIN 600 MG/1
TABLET ORAL
Qty: 90 TABLET | Refills: 1 | Status: SHIPPED | OUTPATIENT
Start: 2023-10-20 | End: 2023-12-19

## 2023-10-25 ENCOUNTER — TELEPHONE (OUTPATIENT)
Dept: FAMILY MEDICINE CLINIC | Age: 68
End: 2023-10-25

## 2023-10-25 DIAGNOSIS — E11.42 POLYNEUROPATHY DUE TO TYPE 2 DIABETES MELLITUS (HCC): ICD-10-CM

## 2023-10-25 DIAGNOSIS — E11.42 TYPE 2 DIABETES MELLITUS WITH DIABETIC POLYNEUROPATHY, WITH LONG-TERM CURRENT USE OF INSULIN (HCC): Primary | ICD-10-CM

## 2023-10-25 DIAGNOSIS — Z79.4 TYPE 2 DIABETES MELLITUS WITH DIABETIC POLYNEUROPATHY, WITH LONG-TERM CURRENT USE OF INSULIN (HCC): Primary | ICD-10-CM

## 2023-10-25 RX ORDER — INSULIN LISPRO 100 [IU]/ML
15 INJECTION, SOLUTION INTRAVENOUS; SUBCUTANEOUS
Qty: 5 ADJUSTABLE DOSE PRE-FILLED PEN SYRINGE | Refills: 1 | Status: SHIPPED | OUTPATIENT
Start: 2023-10-25

## 2023-10-25 RX ORDER — GABAPENTIN 600 MG/1
TABLET ORAL
Qty: 90 TABLET | Refills: 1 | OUTPATIENT
Start: 2023-10-25 | End: 2023-12-24

## 2023-10-25 NOTE — TELEPHONE ENCOUNTER
Phylicia Randle, RN, Home Health Nurse call regarding Simona's blood sugars. She states she is concerned because Gerson Adam has been dosing self with insulin based on sliding scale. She states Gerson Adam is not eating properly, eating a lot of sweats, sweat drinks. She states at lunch today her blood sugar before lunch was 154, she gave self 15 units and blood sugar dropped to 70 but came back up after she ate some sugar items. Will change sliding scale to start at 250 and to give 17 units. The nurse also states Gerson Adam has fired several home health aids because they are not able to spend extra time after completing their assigned tasks. Advised that Gerson Adam may need a sooner appointment than scheduled January date.

## 2023-11-10 ENCOUNTER — TELEPHONE (OUTPATIENT)
Dept: FAMILY MEDICINE CLINIC | Age: 68
End: 2023-11-10

## 2023-11-10 NOTE — TELEPHONE ENCOUNTER
Lauren Almaguer called stating she has had diarrhea and nausea x1 week off and on. She refused scheduling an appointment and stated she was waiting for her nurse to come in. Nasshelley Tripp also stated she was placing a call to Dr Elizabeth Nick. Lauren Almaguer informed to rest her gut and go to clear liquids for the first 24hrs and slowly incorporate the brat diet; examples were given. Simona voiced understanding. Note to Rosa Elena Hager, WENDIE.

## 2023-11-10 NOTE — TELEPHONE ENCOUNTER
Noted. From last information Paddy Habermann does not eat right, eating more candy and gives some of her foods away.

## 2023-11-13 ENCOUNTER — HOSPITAL ENCOUNTER (OUTPATIENT)
Age: 68
Discharge: HOME | End: 2023-11-13
Payer: MEDICARE

## 2023-11-13 LAB
ALBUMIN SERPL-MCNC: 4.1 GM/DL (ref 3.4–5)
ALP BLD-CCNC: 127 IU/L (ref 40–128)
ALT SERPL-CCNC: 44 U/L (ref 10–40)
ANION GAP SERPL CALCULATED.3IONS-SCNC: 12 MMOL/L (ref 4–16)
AST SERPL-CCNC: 28 IU/L (ref 15–37)
BASOPHILS ABSOLUTE: 0.1 K/CU MM
BASOPHILS RELATIVE PERCENT: 0.7 % (ref 0–1)
BILIRUB SERPL-MCNC: 0.4 MG/DL (ref 0–1)
BUN SERPL-MCNC: 14 MG/DL (ref 6–23)
CALCIUM SERPL-MCNC: 9.5 MG/DL (ref 8.3–10.6)
CHLORIDE BLD-SCNC: 97 MMOL/L (ref 99–110)
CHOLEST SERPL-MCNC: 80 MG/DL
CO2: 26 MMOL/L (ref 21–32)
CREAT SERPL-MCNC: 0.6 MG/DL (ref 0.6–1.1)
DIFFERENTIAL TYPE: ABNORMAL
EOSINOPHILS ABSOLUTE: 0.4 K/CU MM
EOSINOPHILS RELATIVE PERCENT: 4.3 % (ref 0–3)
GFR SERPL CREATININE-BSD FRML MDRD: >60 ML/MIN/1.73M2
GLUCOSE SERPL-MCNC: 268 MG/DL (ref 70–99)
HCT VFR BLD CALC: 40.2 % (ref 37–47)
HDLC SERPL-MCNC: 31 MG/DL
HEMOGLOBIN: 13.1 GM/DL (ref 12.5–16)
IMMATURE NEUTROPHIL %: 0.4 % (ref 0–0.43)
LDLC SERPL CALC-MCNC: 24 MG/DL
LYMPHOCYTES ABSOLUTE: 3.6 K/CU MM
LYMPHOCYTES RELATIVE PERCENT: 37.7 % (ref 24–44)
MCH RBC QN AUTO: 26.8 PG (ref 27–31)
MCHC RBC AUTO-ENTMCNC: 32.6 % (ref 32–36)
MCV RBC AUTO: 82.4 FL (ref 78–100)
MONOCYTES ABSOLUTE: 0.8 K/CU MM
MONOCYTES RELATIVE PERCENT: 8.1 % (ref 0–4)
NUCLEATED RBC %: 0 %
PDW BLD-RTO: 12 % (ref 11.7–14.9)
PLATELET # BLD: 313 K/CU MM (ref 140–440)
PMV BLD AUTO: 9.8 FL (ref 7.5–11.1)
POTASSIUM SERPL-SCNC: 4.7 MMOL/L (ref 3.5–5.1)
RBC # BLD: 4.88 M/CU MM (ref 4.2–5.4)
SEGMENTED NEUTROPHILS ABSOLUTE COUNT: 4.6 K/CU MM
SEGMENTED NEUTROPHILS RELATIVE PERCENT: 48.8 % (ref 36–66)
SODIUM BLD-SCNC: 135 MMOL/L (ref 135–145)
TOTAL IMMATURE NEUTOROPHIL: 0.04 K/CU MM
TOTAL NUCLEATED RBC: 0 K/CU MM
TOTAL PROTEIN: 6.9 GM/DL (ref 6.4–8.2)
TRIGL SERPL-MCNC: 123 MG/DL
TSH SERPL DL<=0.005 MIU/L-ACNC: 1.44 UIU/ML (ref 0.27–4.2)
WBC # BLD: 9.5 K/CU MM (ref 4–10.5)

## 2023-11-13 PROCEDURE — 85025 COMPLETE CBC W/AUTO DIFF WBC: CPT

## 2023-11-13 PROCEDURE — 36415 COLL VENOUS BLD VENIPUNCTURE: CPT

## 2023-11-13 PROCEDURE — 80053 COMPREHEN METABOLIC PANEL: CPT

## 2023-11-13 PROCEDURE — 80061 LIPID PANEL: CPT

## 2023-11-13 PROCEDURE — 84443 ASSAY THYROID STIM HORMONE: CPT

## 2023-11-21 ENCOUNTER — TELEPHONE (OUTPATIENT)
Dept: FAMILY MEDICINE CLINIC | Age: 68
End: 2023-11-21

## 2023-11-21 DIAGNOSIS — R05.8 OTHER COUGH: Primary | ICD-10-CM

## 2023-11-21 RX ORDER — BENZONATATE 100 MG/1
100 CAPSULE ORAL 3 TIMES DAILY PRN
Qty: 30 CAPSULE | Refills: 0 | Status: SHIPPED | OUTPATIENT
Start: 2023-11-21

## 2023-11-21 NOTE — TELEPHONE ENCOUNTER
Notified pt, Will send in Rx tessalon pearles. Take as directed, increase fluids, rest.  Pt voiced understanding.

## 2023-11-21 NOTE — TELEPHONE ENCOUNTER
Pt states that she was exposed to children with a cold, runny nose, cough and sinus congested. Pt has a cough, sore throat, sinus draining and nasal congestion. Pt is wanting Celia to order Mika Ledbetter. Celia, please advise.

## 2023-11-22 ENCOUNTER — TELEPHONE (OUTPATIENT)
Dept: FAMILY MEDICINE CLINIC | Age: 68
End: 2023-11-22

## 2023-11-22 DIAGNOSIS — J45.21 MILD INTERMITTENT ASTHMA WITH ACUTE EXACERBATION: ICD-10-CM

## 2023-11-22 DIAGNOSIS — R05.9 COUGH, UNSPECIFIED TYPE: Primary | ICD-10-CM

## 2023-11-22 DIAGNOSIS — R05.8 COUGH WITH SPUTUM: ICD-10-CM

## 2023-11-22 RX ORDER — AZITHROMYCIN 250 MG/1
250 TABLET, FILM COATED ORAL SEE ADMIN INSTRUCTIONS
Qty: 6 TABLET | Refills: 0 | Status: SHIPPED | OUTPATIENT
Start: 2023-11-22 | End: 2023-11-27

## 2023-11-22 NOTE — TELEPHONE ENCOUNTER
Per Toni Vernon she is coughing of green mucus x4-5 days that seems worse today. Toni Vernon states she has not picked up her prescription for Tessalon pearls.     Note to WENDIE Garg

## 2023-11-30 DIAGNOSIS — E03.9 HYPOTHYROIDISM, UNSPECIFIED TYPE: ICD-10-CM

## 2023-11-30 DIAGNOSIS — Z76.0 MEDICATION REFILL: ICD-10-CM

## 2023-12-01 RX ORDER — LEVOTHYROXINE SODIUM 0.07 MG/1
TABLET ORAL
Qty: 30 TABLET | Refills: 3 | Status: SHIPPED | OUTPATIENT
Start: 2023-12-01

## 2023-12-26 ENCOUNTER — TELEPHONE (OUTPATIENT)
Dept: NEUROLOGY | Age: 68
End: 2023-12-26

## 2023-12-26 NOTE — TELEPHONE ENCOUNTER
Received notification from [employer] stating they've been trying to reach pt for Botox. Attempted to call pt and no answer, no voicemail. Notified pt dtr Belen Gomez that she will need to call [address] [place] [address] to give approval to ship.

## 2024-01-02 DIAGNOSIS — R60.0 BILATERAL EDEMA OF LOWER EXTREMITY: ICD-10-CM

## 2024-01-02 RX ORDER — POTASSIUM CHLORIDE 20 MEQ/1
TABLET, EXTENDED RELEASE ORAL
Qty: 60 TABLET | Refills: 3 | Status: SHIPPED | OUTPATIENT
Start: 2024-01-02

## 2024-01-02 RX ORDER — ATORVASTATIN CALCIUM 40 MG/1
TABLET, FILM COATED ORAL
Qty: 30 TABLET | Refills: 3 | Status: SHIPPED | OUTPATIENT
Start: 2024-01-02 | End: 2024-01-03 | Stop reason: SDUPTHER

## 2024-01-02 RX ORDER — FUROSEMIDE 20 MG/1
20 TABLET ORAL DAILY
Qty: 30 TABLET | Refills: 3 | Status: SHIPPED | OUTPATIENT
Start: 2024-01-02

## 2024-01-02 RX ORDER — LOSARTAN POTASSIUM 50 MG/1
50 TABLET ORAL DAILY
Qty: 30 TABLET | Refills: 3 | Status: SHIPPED | OUTPATIENT
Start: 2024-01-02

## 2024-01-03 ENCOUNTER — OFFICE VISIT (OUTPATIENT)
Dept: FAMILY MEDICINE CLINIC | Age: 69
End: 2024-01-03
Payer: MEDICARE

## 2024-01-03 VITALS
SYSTOLIC BLOOD PRESSURE: 118 MMHG | HEART RATE: 72 BPM | RESPIRATION RATE: 20 BRPM | WEIGHT: 176 LBS | DIASTOLIC BLOOD PRESSURE: 72 MMHG | BODY MASS INDEX: 32.19 KG/M2 | TEMPERATURE: 97.7 F | OXYGEN SATURATION: 97 %

## 2024-01-03 DIAGNOSIS — M79.89 SWELLING OF LEFT FOOT: ICD-10-CM

## 2024-01-03 DIAGNOSIS — Z91.81 AT HIGH RISK FOR FALLS: ICD-10-CM

## 2024-01-03 DIAGNOSIS — J30.9 ALLERGIC RHINITIS, UNSPECIFIED SEASONALITY, UNSPECIFIED TRIGGER: ICD-10-CM

## 2024-01-03 DIAGNOSIS — H91.90 HEARING DIFFICULTY, UNSPECIFIED LATERALITY: ICD-10-CM

## 2024-01-03 DIAGNOSIS — Z76.0 MEDICATION REFILL: ICD-10-CM

## 2024-01-03 DIAGNOSIS — E11.42 POLYNEUROPATHY DUE TO TYPE 2 DIABETES MELLITUS (HCC): ICD-10-CM

## 2024-01-03 DIAGNOSIS — E08.22 DIABETES MELLITUS DUE TO UNDERLYING CONDITION WITH CHRONIC KIDNEY DISEASE, WITHOUT LONG-TERM CURRENT USE OF INSULIN, UNSPECIFIED CKD STAGE (HCC): Primary | ICD-10-CM

## 2024-01-03 LAB — HBA1C MFR BLD: 9 %

## 2024-01-03 PROCEDURE — 3074F SYST BP LT 130 MM HG: CPT | Performed by: NURSE PRACTITIONER

## 2024-01-03 PROCEDURE — 99214 OFFICE O/P EST MOD 30 MIN: CPT | Performed by: NURSE PRACTITIONER

## 2024-01-03 PROCEDURE — 83036 HEMOGLOBIN GLYCOSYLATED A1C: CPT | Performed by: NURSE PRACTITIONER

## 2024-01-03 PROCEDURE — G8417 CALC BMI ABV UP PARAM F/U: HCPCS | Performed by: NURSE PRACTITIONER

## 2024-01-03 PROCEDURE — 3078F DIAST BP <80 MM HG: CPT | Performed by: NURSE PRACTITIONER

## 2024-01-03 PROCEDURE — 1090F PRES/ABSN URINE INCON ASSESS: CPT | Performed by: NURSE PRACTITIONER

## 2024-01-03 PROCEDURE — G8484 FLU IMMUNIZE NO ADMIN: HCPCS | Performed by: NURSE PRACTITIONER

## 2024-01-03 PROCEDURE — 3017F COLORECTAL CA SCREEN DOC REV: CPT | Performed by: NURSE PRACTITIONER

## 2024-01-03 PROCEDURE — 1036F TOBACCO NON-USER: CPT | Performed by: NURSE PRACTITIONER

## 2024-01-03 PROCEDURE — G8427 DOCREV CUR MEDS BY ELIG CLIN: HCPCS | Performed by: NURSE PRACTITIONER

## 2024-01-03 PROCEDURE — 1124F ACP DISCUSS-NO DSCNMKR DOCD: CPT | Performed by: NURSE PRACTITIONER

## 2024-01-03 PROCEDURE — 2022F DILAT RTA XM EVC RTNOPTHY: CPT | Performed by: NURSE PRACTITIONER

## 2024-01-03 PROCEDURE — 3052F HG A1C>EQUAL 8.0%<EQUAL 9.0%: CPT | Performed by: NURSE PRACTITIONER

## 2024-01-03 PROCEDURE — G8400 PT W/DXA NO RESULTS DOC: HCPCS | Performed by: NURSE PRACTITIONER

## 2024-01-03 RX ORDER — GABAPENTIN 600 MG/1
TABLET ORAL
Qty: 90 TABLET | Refills: 3 | Status: SHIPPED | OUTPATIENT
Start: 2024-01-03 | End: 2024-03-04

## 2024-01-03 RX ORDER — ATORVASTATIN CALCIUM 40 MG/1
TABLET, FILM COATED ORAL
Qty: 90 TABLET | Refills: 3 | Status: SHIPPED | OUTPATIENT
Start: 2024-01-03

## 2024-01-03 RX ORDER — MONTELUKAST SODIUM 10 MG/1
TABLET ORAL
Qty: 90 TABLET | Refills: 3 | Status: SHIPPED | OUTPATIENT
Start: 2024-01-03

## 2024-01-03 ASSESSMENT — PATIENT HEALTH QUESTIONNAIRE - PHQ9
SUM OF ALL RESPONSES TO PHQ9 QUESTIONS 1 & 2: 3
6. FEELING BAD ABOUT YOURSELF - OR THAT YOU ARE A FAILURE OR HAVE LET YOURSELF OR YOUR FAMILY DOWN: 1
1. LITTLE INTEREST OR PLEASURE IN DOING THINGS: 0
SUM OF ALL RESPONSES TO PHQ QUESTIONS 1-9: 10
7. TROUBLE CONCENTRATING ON THINGS, SUCH AS READING THE NEWSPAPER OR WATCHING TELEVISION: 2
2. FEELING DOWN, DEPRESSED OR HOPELESS: 3
SUM OF ALL RESPONSES TO PHQ QUESTIONS 1-9: 10
10. IF YOU CHECKED OFF ANY PROBLEMS, HOW DIFFICULT HAVE THESE PROBLEMS MADE IT FOR YOU TO DO YOUR WORK, TAKE CARE OF THINGS AT HOME, OR GET ALONG WITH OTHER PEOPLE: 1
8. MOVING OR SPEAKING SO SLOWLY THAT OTHER PEOPLE COULD HAVE NOTICED. OR THE OPPOSITE, BEING SO FIGETY OR RESTLESS THAT YOU HAVE BEEN MOVING AROUND A LOT MORE THAN USUAL: 0
3. TROUBLE FALLING OR STAYING ASLEEP: 1
9. THOUGHTS THAT YOU WOULD BE BETTER OFF DEAD, OR OF HURTING YOURSELF: 0
5. POOR APPETITE OR OVEREATING: 1
4. FEELING TIRED OR HAVING LITTLE ENERGY: 2

## 2024-01-03 ASSESSMENT — ENCOUNTER SYMPTOMS
SHORTNESS OF BREATH: 1
CHEST TIGHTNESS: 0
WHEEZING: 0
COUGH: 0
TROUBLE SWALLOWING: 1

## 2024-01-03 NOTE — PROGRESS NOTES
Simona Anna  1955  68 y.o.    SUBJECT MARCIA:    Chief Complaint   Patient presents with    Follow-up    Foot Swelling     Left >>Right foot swelling and pain    Diabetes     A1C       HPI  Simona is a 68-year-old female who is in for follow-up of foot swelling.      Foot swelling  She states the swelling is worse on the left and is also somewhat painful.  She is accompanied by her daughter who states that her mother moved in with her sister on December 19.  The home has flight of stairs that Simona goes up and down during the day.   She states that the swelling has occurred before and she is supposed to wear compression stockings but finds them to be uncomfortable and thinks that maybe she has the wrong size. She states she is on lasix with potassium but has not been elevated legs as before, has been on feet more.    Diabetes  Her hemoglobin A1c is up today from last reading today it is 9.0%.  She states she has decreased fruit juice, she only drinks 0 soda and unsweetened almond milk along with water. According to the daughter Simona is not eating the correct foods for management of her diabetes.  She states she feels she is getting more exercise because of where she is living now and does a lot of of up and down stairs during the day.  Her daughter says that she no longer just stays in a chair.    Hearing loss  She complains of chronic problem of hearing loss that seems to be getting worse. She had an evaluation by in home health care provider and stated she would receive call to follow up on getting hearing aids because she qualifies with her current insurance. She states she never received a call and is wanting to follow up on getting her hearing evaluated and getting hearing aids.    Current Outpatient Medications on File Prior to Visit   Medication Sig Dispense Refill    losartan (COZAAR) 50 MG tablet Take 1 tablet by mouth daily 30 tablet 3    furosemide (LASIX) 20 MG tablet Take 1 tablet by mouth

## 2024-01-08 ENCOUNTER — TELEPHONE (OUTPATIENT)
Dept: CARDIOLOGY CLINIC | Age: 69
End: 2024-01-08

## 2024-01-08 ENCOUNTER — TELEPHONE (OUTPATIENT)
Dept: FAMILY MEDICINE CLINIC | Age: 69
End: 2024-01-08

## 2024-01-08 DIAGNOSIS — M25.472 ANKLE SWELLING, LEFT: Primary | ICD-10-CM

## 2024-01-08 NOTE — TELEPHONE ENCOUNTER
Per Simona on Saturday the swelling on her left foot has worsen.  She called the Cardiologist today and they would like to do a ultrasound of her left foot to check for blood clots.  Simona states she is in extreme pain and when she stands her foot turns red.  Simona states her ultrasound is scheduled for Wednesday and she wanted Celia to know.  Simona instructed to call scheduling back to see if she can get in sooner due to she did not express to them her pain and discoloration.  Simona voiced understanding.    Note to WENDIE Friedman

## 2024-01-11 ENCOUNTER — TELEPHONE (OUTPATIENT)
Dept: CARDIOLOGY CLINIC | Age: 69
End: 2024-01-11

## 2024-01-11 NOTE — TELEPHONE ENCOUNTER
Called patient to provide her with results. Patient verbalized understanding and confirmed next appointment. Patient declined seeing Dr. Barnes at this time and said if she changes her mind that she will talk with Dr. Ni about it at her appointment in June.         No evidence of deep vein or superficial vein thrombosis in the left lower extremity. Vessels demonstrate normal compressibility, color filling, and phasic and spontaneous flow.    Edema noted on top of the left foot, however no other significant sonographic findings noted.    Significant reflux noted in the left GSV Distal Calf (1.8s).    Comp socks , see dr barnes

## 2024-01-11 NOTE — TELEPHONE ENCOUNTER
----- Message from WENDIE Hartmann - CNP sent at 1/10/2024  4:06 PM EST -----  Please phone results- no DVT : has reflux - add compression socks- recommend to see Dr Guaman : if she wants will place order

## 2024-01-22 ENCOUNTER — TELEPHONE (OUTPATIENT)
Dept: CARDIOLOGY CLINIC | Age: 69
End: 2024-01-22

## 2024-01-22 NOTE — TELEPHONE ENCOUNTER
Patient called her left foot & toes are very   Red and painful , she stated that she can't  Stand on that foot .   29-Mar-2019 07:07

## 2024-01-22 NOTE — TELEPHONE ENCOUNTER
Patient states third and fourth toes on left foot are beet red and at base of toes it is blood red. Patient waiting for  to get her stockings

## 2024-01-25 ENCOUNTER — TELEPHONE (OUTPATIENT)
Dept: FAMILY MEDICINE CLINIC | Age: 69
End: 2024-01-25

## 2024-01-25 DIAGNOSIS — G47.33 OSA (OBSTRUCTIVE SLEEP APNEA): ICD-10-CM

## 2024-01-25 DIAGNOSIS — R05.9 COUGH, UNSPECIFIED TYPE: Primary | ICD-10-CM

## 2024-01-25 DIAGNOSIS — M79.89 LEG SWELLING: ICD-10-CM

## 2024-01-25 DIAGNOSIS — I10 ESSENTIAL HYPERTENSION: ICD-10-CM

## 2024-01-25 DIAGNOSIS — J45.20 MILD INTERMITTENT ASTHMA WITHOUT COMPLICATION: ICD-10-CM

## 2024-01-25 RX ORDER — BLOOD PRESSURE TEST KIT
1 KIT MISCELLANEOUS
Qty: 1 KIT | Refills: 0 | Status: SHIPPED | OUTPATIENT
Start: 2024-01-26

## 2024-01-25 NOTE — TELEPHONE ENCOUNTER
Tequila from [facility] ([phone]) called stating Simona is requesting a digital blood pressure cuff, pulse Ox and compression stockings.  Tequila is requesting the scripts to be faxed to [phone].    Note to WENDIE Friedman

## 2024-01-25 NOTE — TELEPHONE ENCOUNTER
Information faxed to Tequila UNDERWOOD At [facility].

## 2024-01-29 ENCOUNTER — TELEPHONE (OUTPATIENT)
Dept: FAMILY MEDICINE CLINIC | Age: 69
End: 2024-01-29

## 2024-01-29 NOTE — TELEPHONE ENCOUNTER
Per Yvonne FONTAINE from [facility] Simona is out of her pin needles, strips and lancets due to testing 3-4 times a day and PRN.  She also stated Simona knocks off the sensors even with the cover up on the arm in the shower.  Yvonne is requesting a refill of the pin needles, strips and lancets to be sent to [facility] and enough to get her through the 30 days.  Yvonne states she is completely out of the strips, pin needles and lancets.      Note to WENDIE Friedman

## 2024-01-30 DIAGNOSIS — Z76.0 MEDICATION REFILL: ICD-10-CM

## 2024-01-30 DIAGNOSIS — E08.22 DIABETES MELLITUS DUE TO UNDERLYING CONDITION WITH CHRONIC KIDNEY DISEASE, WITHOUT LONG-TERM CURRENT USE OF INSULIN, UNSPECIFIED CKD STAGE (HCC): ICD-10-CM

## 2024-01-30 RX ORDER — GLUCOSAMINE HCL/CHONDROITIN SU 500-400 MG
CAPSULE ORAL
Qty: 200 STRIP | Refills: 3 | Status: SHIPPED | OUTPATIENT
Start: 2024-01-30

## 2024-01-30 RX ORDER — LANCETS 30 GAUGE
EACH MISCELLANEOUS
Qty: 300 EACH | Refills: 3 | Status: SHIPPED | OUTPATIENT
Start: 2024-01-30

## 2024-01-31 ENCOUNTER — HOSPITAL ENCOUNTER (OUTPATIENT)
Age: 69
Discharge: HOME | End: 2024-01-31
Payer: MEDICARE

## 2024-01-31 ENCOUNTER — HOSPITAL ENCOUNTER (OUTPATIENT)
Dept: GENERAL RADIOLOGY | Age: 69
Discharge: HOME | End: 2024-01-31
Payer: MEDICARE

## 2024-01-31 ENCOUNTER — OFFICE VISIT (OUTPATIENT)
Dept: FAMILY MEDICINE CLINIC | Age: 69
End: 2024-01-31
Payer: MEDICARE

## 2024-01-31 VITALS
BODY MASS INDEX: 31.64 KG/M2 | SYSTOLIC BLOOD PRESSURE: 132 MMHG | DIASTOLIC BLOOD PRESSURE: 80 MMHG | TEMPERATURE: 97.2 F | HEART RATE: 80 BPM | RESPIRATION RATE: 20 BRPM | OXYGEN SATURATION: 97 % | WEIGHT: 173 LBS

## 2024-01-31 DIAGNOSIS — M10.072 ACUTE IDIOPATHIC GOUT OF LEFT FOOT: ICD-10-CM

## 2024-01-31 DIAGNOSIS — M79.672 LEFT FOOT PAIN: ICD-10-CM

## 2024-01-31 DIAGNOSIS — M79.89 SWELLING OF LEFT FOOT: Primary | ICD-10-CM

## 2024-01-31 DIAGNOSIS — M79.89 SWELLING OF LEFT FOOT: ICD-10-CM

## 2024-01-31 LAB
ERYTHROCYTE SEDIMENTATION RATE: 9 MM/HR (ref 0–30)
URATE SERPL-MCNC: 3.4 MG/DL (ref 2.6–6)

## 2024-01-31 PROCEDURE — 3075F SYST BP GE 130 - 139MM HG: CPT | Performed by: NURSE PRACTITIONER

## 2024-01-31 PROCEDURE — G8400 PT W/DXA NO RESULTS DOC: HCPCS | Performed by: NURSE PRACTITIONER

## 2024-01-31 PROCEDURE — 84550 ASSAY OF BLOOD/URIC ACID: CPT

## 2024-01-31 PROCEDURE — 85652 RBC SED RATE AUTOMATED: CPT

## 2024-01-31 PROCEDURE — G8484 FLU IMMUNIZE NO ADMIN: HCPCS | Performed by: NURSE PRACTITIONER

## 2024-01-31 PROCEDURE — 3017F COLORECTAL CA SCREEN DOC REV: CPT | Performed by: NURSE PRACTITIONER

## 2024-01-31 PROCEDURE — G8417 CALC BMI ABV UP PARAM F/U: HCPCS | Performed by: NURSE PRACTITIONER

## 2024-01-31 PROCEDURE — 1090F PRES/ABSN URINE INCON ASSESS: CPT | Performed by: NURSE PRACTITIONER

## 2024-01-31 PROCEDURE — 36415 COLL VENOUS BLD VENIPUNCTURE: CPT

## 2024-01-31 PROCEDURE — 99213 OFFICE O/P EST LOW 20 MIN: CPT | Performed by: NURSE PRACTITIONER

## 2024-01-31 PROCEDURE — 73630 X-RAY EXAM OF FOOT: CPT

## 2024-01-31 PROCEDURE — 3079F DIAST BP 80-89 MM HG: CPT | Performed by: NURSE PRACTITIONER

## 2024-01-31 PROCEDURE — 1124F ACP DISCUSS-NO DSCNMKR DOCD: CPT | Performed by: NURSE PRACTITIONER

## 2024-01-31 PROCEDURE — G8427 DOCREV CUR MEDS BY ELIG CLIN: HCPCS | Performed by: NURSE PRACTITIONER

## 2024-01-31 PROCEDURE — 1036F TOBACCO NON-USER: CPT | Performed by: NURSE PRACTITIONER

## 2024-01-31 RX ORDER — ALLOPURINOL 100 MG/1
100 TABLET ORAL DAILY
Qty: 15 TABLET | Refills: 0 | Status: CANCELLED | OUTPATIENT
Start: 2024-01-31

## 2024-01-31 NOTE — PROGRESS NOTES
Daughter     Other Daughter         Stomach And Bowel Problems    Other Son         Seizures     Social History     Socioeconomic History    Marital status:      Spouse name: Not on file    Number of children: 3    Years of education: 11    Highest education level: Not on file   Occupational History    Not on file   Tobacco Use    Smoking status: Never    Smokeless tobacco: Never   Vaping Use    Vaping Use: Never used   Substance and Sexual Activity    Alcohol use: No     Comment: tea sometimes    Drug use: No    Sexual activity: Not Currently   Other Topics Concern    Not on file   Social History Narrative    Not on file     Social Determinants of Health     Financial Resource Strain: High Risk (9/27/2023)    Overall Financial Resource Strain (CARDIA)     Difficulty of Paying Living Expenses: Very hard   Food Insecurity: Not on file (9/27/2023)   Transportation Needs: Unknown (9/27/2023)    PRAPARE - Transportation     Lack of Transportation (Medical): Not on file     Lack of Transportation (Non-Medical): No   Recent Concern: Transportation Needs - Unmet Transportation Needs (9/27/2023)    PRAPARE - Transportation     Lack of Transportation (Medical): Yes     Lack of Transportation (Non-Medical): No   Physical Activity: Inactive (12/20/2022)    Exercise Vital Sign     Days of Exercise per Week: 0 days     Minutes of Exercise per Session: 0 min   Stress: Stress Concern Present (12/20/2022)    [place] [employer] - Occupational Stress Questionnaire     Feeling of Stress : To some extent   Social Connections: Socially Isolated (12/20/2022)    Social Connection and Isolation Panel [NHANES]     Frequency of Communication with Friends and Family: More than three times a week     Frequency of Social Gatherings with Friends and Family: More than three times a week     Attends Anabaptism Services: Never     Active Member of Clubs or Organizations: No     Attends Club or Organization Meetings:

## 2024-02-01 ENCOUNTER — TELEPHONE (OUTPATIENT)
Dept: FAMILY MEDICINE CLINIC | Age: 69
End: 2024-02-01

## 2024-02-01 DIAGNOSIS — Z79.4 TYPE 2 DIABETES MELLITUS WITH DIABETIC POLYNEUROPATHY, WITH LONG-TERM CURRENT USE OF INSULIN (HCC): ICD-10-CM

## 2024-02-01 DIAGNOSIS — E11.42 TYPE 2 DIABETES MELLITUS WITH DIABETIC POLYNEUROPATHY, WITH LONG-TERM CURRENT USE OF INSULIN (HCC): ICD-10-CM

## 2024-02-01 RX ORDER — INSULIN LISPRO 100 [IU]/ML
15 INJECTION, SOLUTION INTRAVENOUS; SUBCUTANEOUS
Qty: 5 ADJUSTABLE DOSE PRE-FILLED PEN SYRINGE | Refills: 1 | Status: SHIPPED | OUTPATIENT
Start: 2024-02-01

## 2024-02-01 NOTE — TELEPHONE ENCOUNTER
Contact Simona regarding foot xray. Received alert call from radiology. Patient informed that she has an acute fracture of the left 3rd metatarsal. She was advised that we would try to get her an appointment with Dr. Hernández (podiatry), ASAP. She states she has a immobilizing boot but is for the right foot. Advised to stay off the left foot, use her walker and put as little weight on left foot as possible until seen by podiatry. Advised that lab results for assessment of gout are negative and to not take allopurinol. Verbalized understanding and agreement with plan.

## 2024-02-02 ASSESSMENT — ENCOUNTER SYMPTOMS
WHEEZING: 0
SHORTNESS OF BREATH: 0
COUGH: 0
CHEST TIGHTNESS: 0

## 2024-02-05 LAB
ALBUMIN SERPL-MCNC: 3.9 G/DL
ALBUMIN SERPL-MCNC: 4 G/DL
ALP BLD-CCNC: 129 U/L
ALT SERPL-CCNC: 68 U/L
ANION GAP SERPL CALCULATED.3IONS-SCNC: NORMAL MMOL/L
AST SERPL-CCNC: 41 U/L
BILIRUB SERPL-MCNC: 0.4 MG/DL (ref 0.1–1.4)
BUN / CREAT RATIO: 9
BUN BLDV-MCNC: 10 MG/DL
BUN BLDV-MCNC: NORMAL MG/DL
CALCIUM SERPL-MCNC: 9.4 MG/DL
CALCIUM SERPL-MCNC: 9.4 MG/DL
CHLORIDE BLD-SCNC: 98 MMOL/L
CHLORIDE BLD-SCNC: NORMAL MMOL/L
CO2: 24 MMOL/L
CO2: NORMAL
CREAT SERPL-MCNC: 0.7 MG/DL
CREAT SERPL-MCNC: 0.8 MG/DL
EGFR: 95
GFR SERPL CREATININE-BSD FRML MDRD: 81 ML/MIN/{1.73_M2}
GLUCOSE BLD-MCNC: 313 MG/DL
GLUCOSE: 316
PHOSPHORUS: 1.9 MG/DL
POTASSIUM SERPL-SCNC: 4.9 MMOL/L
POTASSIUM SERPL-SCNC: NORMAL MMOL/L
SODIUM BLD-SCNC: 133 MMOL/L
SODIUM BLD-SCNC: NORMAL MMOL/L
TOTAL PROTEIN: 6.9

## 2024-02-06 ENCOUNTER — TELEPHONE (OUTPATIENT)
Dept: FAMILY MEDICINE CLINIC | Age: 69
End: 2024-02-06

## 2024-02-06 NOTE — TELEPHONE ENCOUNTER
Marysol with [facility], Dr Hernández, called to report pt declined CT Scan Of Left Foot. Dr Hernández is very concerned with possible Charcot arthropathy. Will fax office notes.    Celia, please advise    Called patient, no answer, left message to call. Will discuss her denial for CT ordered by podiatrist.

## 2024-02-06 NOTE — TELEPHONE ENCOUNTER
Simona returned call. She states she initially declined the CT scan because if dye is used she becomes very ill. Advised to call podiatrist back and     She states she is having a lot of pain and is keeping leg elevated more than usual. She is also concerned because podiatrist mentioned possible surgery and she is not wanting to do that.  Encouraged Simona to contact podiatrist, get new order for CT without contrast. Verbalized understanding and agreement with plan.

## 2024-02-07 NOTE — TELEPHONE ENCOUNTER
Spoke with patient yesterday, 2/6/24. She states she will have the CT scan but not with contrast. She explained that she had notified the office of this. She states she will contact the office and request the CT order without contrast.

## 2024-02-20 ENCOUNTER — TELEPHONE (OUTPATIENT)
Dept: FAMILY MEDICINE CLINIC | Age: 69
End: 2024-02-20

## 2024-02-20 NOTE — TELEPHONE ENCOUNTER
Simona called stating her toe on her broken foot is black and wants to know what has caused it?  She states she has called Dr Hernández office and is awaiting a call back.    Simona also would like to know due to her A1C is elevated is it safe to proceed with her surgery?      Simona reports she has been vomiting and believes she has caught a stomach virus from her family.  She has been on clear liquids and popsicles for x2 days and taking zofran.    Note to WENDIE Friedman

## 2024-02-27 DIAGNOSIS — G25.81 RLS (RESTLESS LEGS SYNDROME): ICD-10-CM

## 2024-02-27 NOTE — TELEPHONE ENCOUNTER
Received fax request for carbidopa-levodopa. Last ov 9/18/23, due for follow up in December. Pt stated she is having transportation issues but will call back to schedule. Rx pending.       Requested Prescriptions     Pending Prescriptions Disp Refills    carbidopa-levodopa (SINEMET)  MG per tablet 30 tablet 0     Sig: Take 1 tablet by mouth nightly

## 2024-03-21 RX ORDER — METOPROLOL SUCCINATE 50 MG/1
50 TABLET, EXTENDED RELEASE ORAL DAILY
Qty: 30 TABLET | Refills: 5 | Status: SHIPPED | OUTPATIENT
Start: 2024-03-21

## 2024-03-29 DIAGNOSIS — E08.22 DIABETES MELLITUS DUE TO UNDERLYING CONDITION WITH CHRONIC KIDNEY DISEASE, WITHOUT LONG-TERM CURRENT USE OF INSULIN, UNSPECIFIED CKD STAGE (HCC): ICD-10-CM

## 2024-03-29 DIAGNOSIS — Z76.0 MEDICATION REFILL: ICD-10-CM

## 2024-03-29 NOTE — TELEPHONE ENCOUNTER
Pt called in she is needing a refill on her Insulin Degludec. Medication is pended. Pt also  her foot and is getting a hard cast placed on April 5th with

## 2024-04-05 DIAGNOSIS — Z79.4 TYPE 2 DIABETES MELLITUS WITH DIABETIC POLYNEUROPATHY, WITH LONG-TERM CURRENT USE OF INSULIN (HCC): Primary | ICD-10-CM

## 2024-04-05 DIAGNOSIS — E11.42 TYPE 2 DIABETES MELLITUS WITH DIABETIC POLYNEUROPATHY, WITH LONG-TERM CURRENT USE OF INSULIN (HCC): Primary | ICD-10-CM

## 2024-04-12 ENCOUNTER — TELEPHONE (OUTPATIENT)
Dept: FAMILY MEDICINE CLINIC | Age: 69
End: 2024-04-12

## 2024-04-12 NOTE — TELEPHONE ENCOUNTER
Patient called to inform PCP that she received a hard cast on her left foot and states it's soft. She has already spoken to her foot and ankle specialist and is awaiting a returning call. Explained I will speak with PCP and to inform us if a new cast will be needed.

## 2024-04-19 DIAGNOSIS — G25.81 RLS (RESTLESS LEGS SYNDROME): ICD-10-CM

## 2024-04-19 RX ORDER — NIFEDIPINE 30 MG/1
30 TABLET, EXTENDED RELEASE ORAL DAILY
Qty: 90 TABLET | Refills: 3 | Status: SHIPPED | OUTPATIENT
Start: 2024-04-19

## 2024-05-09 ENCOUNTER — OFFICE VISIT (OUTPATIENT)
Dept: FAMILY MEDICINE CLINIC | Age: 69
End: 2024-05-09
Payer: MEDICARE

## 2024-05-09 VITALS
SYSTOLIC BLOOD PRESSURE: 124 MMHG | RESPIRATION RATE: 18 BRPM | DIASTOLIC BLOOD PRESSURE: 74 MMHG | OXYGEN SATURATION: 96 % | TEMPERATURE: 97.9 F | HEART RATE: 75 BPM

## 2024-05-09 DIAGNOSIS — E08.22 DIABETES MELLITUS DUE TO UNDERLYING CONDITION WITH CHRONIC KIDNEY DISEASE, WITHOUT LONG-TERM CURRENT USE OF INSULIN, UNSPECIFIED CKD STAGE (HCC): Primary | ICD-10-CM

## 2024-05-09 DIAGNOSIS — M79.89 SWELLING OF LEFT FOOT: ICD-10-CM

## 2024-05-09 DIAGNOSIS — E55.9 VITAMIN D DEFICIENCY: ICD-10-CM

## 2024-05-09 LAB — HBA1C MFR BLD: 9.5 %

## 2024-05-09 PROCEDURE — 3078F DIAST BP <80 MM HG: CPT | Performed by: NURSE PRACTITIONER

## 2024-05-09 PROCEDURE — 83036 HEMOGLOBIN GLYCOSYLATED A1C: CPT | Performed by: NURSE PRACTITIONER

## 2024-05-09 PROCEDURE — 1036F TOBACCO NON-USER: CPT | Performed by: NURSE PRACTITIONER

## 2024-05-09 PROCEDURE — G8400 PT W/DXA NO RESULTS DOC: HCPCS | Performed by: NURSE PRACTITIONER

## 2024-05-09 PROCEDURE — 3074F SYST BP LT 130 MM HG: CPT | Performed by: NURSE PRACTITIONER

## 2024-05-09 PROCEDURE — 99213 OFFICE O/P EST LOW 20 MIN: CPT | Performed by: NURSE PRACTITIONER

## 2024-05-09 PROCEDURE — 36415 COLL VENOUS BLD VENIPUNCTURE: CPT | Performed by: NURSE PRACTITIONER

## 2024-05-09 PROCEDURE — G8417 CALC BMI ABV UP PARAM F/U: HCPCS | Performed by: NURSE PRACTITIONER

## 2024-05-09 PROCEDURE — G8427 DOCREV CUR MEDS BY ELIG CLIN: HCPCS | Performed by: NURSE PRACTITIONER

## 2024-05-09 PROCEDURE — 3017F COLORECTAL CA SCREEN DOC REV: CPT | Performed by: NURSE PRACTITIONER

## 2024-05-09 PROCEDURE — 1124F ACP DISCUSS-NO DSCNMKR DOCD: CPT | Performed by: NURSE PRACTITIONER

## 2024-05-09 PROCEDURE — 1090F PRES/ABSN URINE INCON ASSESS: CPT | Performed by: NURSE PRACTITIONER

## 2024-05-09 RX ORDER — PEN NEEDLE, DIABETIC 30 GX3/16"
1 NEEDLE, DISPOSABLE MISCELLANEOUS 4 TIMES DAILY
Qty: 300 EACH | Refills: 5 | Status: SHIPPED | OUTPATIENT
Start: 2024-05-09

## 2024-05-09 RX ORDER — INSULIN DEGLUDEC 100 U/ML
20 INJECTION, SOLUTION SUBCUTANEOUS 2 TIMES DAILY
Qty: 3 ML | Refills: 3 | Status: SHIPPED | OUTPATIENT
Start: 2024-05-09

## 2024-05-09 RX ORDER — LANCETS 30 GAUGE
EACH MISCELLANEOUS
Qty: 300 EACH | Refills: 3 | Status: SHIPPED | OUTPATIENT
Start: 2024-05-09

## 2024-05-09 NOTE — PROGRESS NOTES
Simona Anna  1955  68 y.o.    SUBJECT MARCIA:    Chief Complaint   Patient presents with    3 Month Follow-Up     Here for 3 month follow up on DM, sugars have been 200-300       HPI  Simona is a 68 year old female who is in for follow up of diabetes. She is using a scooter because left ankle problem, has cast in place. Her granddaughter is here with her. She gives a past medical history of bipolar I disorder, cerebral artery occlusion with cerebral infarction, CHF, claudication, type II diabetes mellitus, diabetic neuropathy, NSTEMI, seizure, anemia, arthiits, asthma, auditory hallucinations, chronic back pain, coronary atherosclerosis,     Diabetes  Her hemoglobin A1c is up today from last reading today it is 9.0%.  She states she has decreased fruit juice, she only drinks zero soda and unsweetened almond milk along with water. According to the granddaughter Simona is not eating the correct foods for management of her diabetes.  She states Simona has unhealthy snacks a lot of days instead of eating balanced meal. She states she feels she is getting more exercise because of where she is living now and does a lot of of up and down stairs during the day.  Her daughter says that she no longer just stays in a chair.    She had a left foot fracture and pain and is being treated by podiatry for the condition.         Current Outpatient Medications on File Prior to Visit   Medication Sig Dispense Refill    NIFEdipine (PROCARDIA XL) 30 MG extended release tablet Take 1 tablet by mouth daily 90 tablet 3    carbidopa-levodopa (SINEMET)  MG per tablet Take 1 tablet by mouth nightly 30 tablet 0    Insulin Pen Needle 31G X 5 MM MISC To administer insulin four times a day. (Patient taking differently: 1 each by In Vitro route in the morning, at noon, in the evening, and at bedtime To administer insulin four times a day.) 100 each 5    Insulin Pen Needle 31G X 5 MM MISC 1 each by Does not apply route daily 300

## 2024-05-10 DIAGNOSIS — Z79.4 TYPE 2 DIABETES MELLITUS WITH DIABETIC POLYNEUROPATHY, WITH LONG-TERM CURRENT USE OF INSULIN (HCC): ICD-10-CM

## 2024-05-10 DIAGNOSIS — E11.42 TYPE 2 DIABETES MELLITUS WITH DIABETIC POLYNEUROPATHY, WITH LONG-TERM CURRENT USE OF INSULIN (HCC): ICD-10-CM

## 2024-05-10 LAB — 25(OH)D3 SERPL-MCNC: 31.2 NG/ML

## 2024-05-13 RX ORDER — INSULIN LISPRO 100 [IU]/ML
INJECTION, SOLUTION INTRAVENOUS; SUBCUTANEOUS
Qty: 15 ADJUSTABLE DOSE PRE-FILLED PEN SYRINGE | Refills: 1 | Status: SHIPPED | OUTPATIENT
Start: 2024-05-13

## 2024-05-21 DIAGNOSIS — R60.0 BILATERAL EDEMA OF LOWER EXTREMITY: ICD-10-CM

## 2024-05-21 DIAGNOSIS — Z76.0 MEDICATION REFILL: ICD-10-CM

## 2024-05-21 DIAGNOSIS — E03.9 HYPOTHYROIDISM, UNSPECIFIED TYPE: ICD-10-CM

## 2024-05-21 DIAGNOSIS — F44.5 PSYCHOGENIC NONEPILEPTIC SEIZURE: ICD-10-CM

## 2024-05-21 RX ORDER — LOSARTAN POTASSIUM 50 MG/1
50 TABLET ORAL DAILY
Qty: 30 TABLET | Refills: 5 | Status: SHIPPED | OUTPATIENT
Start: 2024-05-21

## 2024-05-21 RX ORDER — LEVOTHYROXINE SODIUM 0.07 MG/1
TABLET ORAL
Qty: 30 TABLET | Refills: 3 | Status: SHIPPED | OUTPATIENT
Start: 2024-05-21

## 2024-05-21 NOTE — TELEPHONE ENCOUNTER
Last ov 9/18/23, next ov past due. Notified pt that our office cannot continue to prescribe medications without office visit. She voiced understanding and stated she will call to schedule tomorrow after her orthopedic appointment. Rx pending.     Requested Prescriptions     Pending Prescriptions Disp Refills    levETIRAcetam (KEPPRA) 500 MG tablet [Pharmacy Med Name: levETIRAcetam Oral Tablet 500  MG  Tablet] 60 tablet 3     Sig: TAKE ONE (1) TABLET BY MOUTH TWO TIMES DAILY

## 2024-05-22 RX ORDER — LEVETIRACETAM 500 MG/1
TABLET ORAL
Qty: 60 TABLET | Refills: 0 | Status: SHIPPED | OUTPATIENT
Start: 2024-05-22

## 2024-05-23 RX ORDER — FUROSEMIDE 20 MG/1
20 TABLET ORAL DAILY
Qty: 30 TABLET | Refills: 5 | Status: SHIPPED | OUTPATIENT
Start: 2024-05-23

## 2024-06-05 ENCOUNTER — TELEPHONE (OUTPATIENT)
Dept: NEUROLOGY | Age: 69
End: 2024-06-05

## 2024-06-05 NOTE — TELEPHONE ENCOUNTER
Received call from Mary with [facility] inquiring about carbidopa-levodopa and if pt has Parkinson's dx. Informed that pt does not have dx of Parkinson's or memory loss but there was some confusion reported during ov.

## 2024-06-14 ENCOUNTER — COMMUNITY OUTREACH (OUTPATIENT)
Dept: FAMILY MEDICINE CLINIC | Age: 69
End: 2024-06-14

## 2024-06-18 DIAGNOSIS — G43.009 MIGRAINE WITHOUT AURA, NOT REFRACTORY: ICD-10-CM

## 2024-06-18 DIAGNOSIS — R60.0 BILATERAL EDEMA OF LOWER EXTREMITY: ICD-10-CM

## 2024-06-18 DIAGNOSIS — F44.5 PSYCHOGENIC NONEPILEPTIC SEIZURE: ICD-10-CM

## 2024-06-18 NOTE — TELEPHONE ENCOUNTER
Last Visit: 9/18/23    Next visit: 6/20/24    Rx is pending.     Requested Prescriptions     Pending Prescriptions Disp Refills    levETIRAcetam (KEPPRA) 500 MG tablet [Pharmacy Med Name: levETIRAcetam Oral Tablet 500  MG  Tablet] 60 tablet 0     Sig: TAKE ONE (1) TABLET BY MOUTH TWO TIMES DAILY    amitriptyline (ELAVIL) 25 MG tablet [Pharmacy Med Name: Amitriptyline HCl Oral Tablet 25 MG 25 MG  Tablet] 60 tablet 5     Sig: TAKE TWO (2) TABLETS BY MOUTH NIGHTLY

## 2024-06-19 DIAGNOSIS — G25.81 RLS (RESTLESS LEGS SYNDROME): ICD-10-CM

## 2024-06-19 RX ORDER — AMITRIPTYLINE HYDROCHLORIDE 25 MG/1
TABLET, FILM COATED ORAL
Qty: 60 TABLET | Refills: 5 | Status: SHIPPED | OUTPATIENT
Start: 2024-06-19

## 2024-06-19 RX ORDER — LEVETIRACETAM 500 MG/1
TABLET ORAL
Qty: 60 TABLET | Refills: 0 | Status: SHIPPED | OUTPATIENT
Start: 2024-06-19

## 2024-06-20 ENCOUNTER — TELEPHONE (OUTPATIENT)
Dept: NEUROLOGY | Age: 69
End: 2024-06-20

## 2024-06-20 RX ORDER — POTASSIUM CHLORIDE 20 MEQ/1
TABLET, EXTENDED RELEASE ORAL
Qty: 60 TABLET | Refills: 5 | Status: SHIPPED | OUTPATIENT
Start: 2024-06-20

## 2024-06-20 NOTE — TELEPHONE ENCOUNTER
[facility] called stating 2 of 3 prescriptions did not come across.  Gave verbal order for Keppra and Elavil.

## 2024-07-02 DIAGNOSIS — E11.42 POLYNEUROPATHY DUE TO TYPE 2 DIABETES MELLITUS (HCC): ICD-10-CM

## 2024-07-02 DIAGNOSIS — Z76.0 MEDICATION REFILL: ICD-10-CM

## 2024-07-02 RX ORDER — GABAPENTIN 600 MG/1
TABLET ORAL
Qty: 90 TABLET | Refills: 3 | Status: SHIPPED | OUTPATIENT
Start: 2024-07-02 | End: 2024-11-06

## 2024-07-10 ENCOUNTER — OFFICE VISIT (OUTPATIENT)
Dept: CARDIOLOGY CLINIC | Age: 69
End: 2024-07-10
Payer: MEDICARE

## 2024-07-10 VITALS
OXYGEN SATURATION: 98 % | WEIGHT: 170 LBS | SYSTOLIC BLOOD PRESSURE: 134 MMHG | BODY MASS INDEX: 31.28 KG/M2 | HEART RATE: 68 BPM | DIASTOLIC BLOOD PRESSURE: 78 MMHG | HEIGHT: 62 IN

## 2024-07-10 DIAGNOSIS — E78.2 MIXED HYPERLIPIDEMIA: Primary | ICD-10-CM

## 2024-07-10 PROCEDURE — 1036F TOBACCO NON-USER: CPT | Performed by: INTERNAL MEDICINE

## 2024-07-10 PROCEDURE — G8417 CALC BMI ABV UP PARAM F/U: HCPCS | Performed by: INTERNAL MEDICINE

## 2024-07-10 PROCEDURE — 1124F ACP DISCUSS-NO DSCNMKR DOCD: CPT | Performed by: INTERNAL MEDICINE

## 2024-07-10 PROCEDURE — 3017F COLORECTAL CA SCREEN DOC REV: CPT | Performed by: INTERNAL MEDICINE

## 2024-07-10 PROCEDURE — G8400 PT W/DXA NO RESULTS DOC: HCPCS | Performed by: INTERNAL MEDICINE

## 2024-07-10 PROCEDURE — 3078F DIAST BP <80 MM HG: CPT | Performed by: INTERNAL MEDICINE

## 2024-07-10 PROCEDURE — 99214 OFFICE O/P EST MOD 30 MIN: CPT | Performed by: INTERNAL MEDICINE

## 2024-07-10 PROCEDURE — G8427 DOCREV CUR MEDS BY ELIG CLIN: HCPCS | Performed by: INTERNAL MEDICINE

## 2024-07-10 PROCEDURE — 1090F PRES/ABSN URINE INCON ASSESS: CPT | Performed by: INTERNAL MEDICINE

## 2024-07-10 PROCEDURE — 3075F SYST BP GE 130 - 139MM HG: CPT | Performed by: INTERNAL MEDICINE

## 2024-07-10 NOTE — PATIENT INSTRUCTIONS
**It is YOUR responsibilty to bring medication bottles and/or updated medication list to EACH APPOINTMENT. This will allow us to better serve you and all your healthcare needs**   Thank you for allowing us to care for you today!   We want to ensure we can follow your treatment plan and we strive to give you the best outcomes and experience possible.   If you ever have a life threatening emergency and call 911 - for an ambulance (EMS)   Our providers can only care for you at:   [facility] or [facility].   Even if you have someone take you or you drive yourself we can only care for you in a [facility]. Our providers are not setup at the other healthcare locations!   Please be informed that if you contact our office outside of normal business hours the physician on call cannot help with any scheduling or rescheduling issues, procedure instruction questions or any type of medication issue.    We advise you for any urgent/emergency that you go to the nearest emergency room!    PLEASE CALL OUR OFFICE DURING NORMAL BUSINESS HOURS    Monday - Friday   8 am to 5 pm    [place]: [phone]    [place]: [phone]    [place]:  [phone]  We are committed to providing you the best care possible.    If you receive a survey after visiting one of our offices, please take time to share your experience concerning your physician office visit.  These surveys are confidential and no health information about you is shared.    We are eager to improve for you and we are counting on your feedback to help make that happen.

## 2024-07-10 NOTE — PROGRESS NOTES
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    Panic attacks F41.0    Generalized abdominal pain R10.84    Diabetes mellitus due to underlying condition with stage 2 chronic kidney disease, without long-term current use of insulin ([facility]) E08.22, N18.2    Seizure ([facility]) R56.9    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, G63    Anemia D64.9    Anxiety F41.9    Osteoarthrosis M19.90    Coronary atherosclerosis I25.10    Chronic pain G89.29    Degeneration of lumbar intervertebral disc M51.36    Disorder of liver K76.9    Dysuria R30.0    Fibrocystic breast N60.19    Gastroesophageal reflux disease K21.9    Gastroparesis K31.84    Gout M10.9    H/O degenerative disc disease Z87.39    Hypothyroidism due to acquired atrophy of thyroid E03.4    Irritable bowel syndrome K58.9    Low back pain M54.50    Memory loss R41.3    Migraine G43.909    Migraine without aura, not refractory G43.009    Hyperlipidemia E78.5    Neck pain M54.2    Neuropathy G62.9    Nonalcoholic steatohepatitis (URENA) K75.81    Osteoporosis M81.0    Polyneuropathy due to type 2 diabetes mellitus ([facility]) E11.42    Renal impairment N28.9    Suicidal ideation R45.851    Vitamin B12 deficiency E53.8    Hypothyroidism E03.9    Tremor R25.1    Anxiety disorder F41.9    Stroke-like symptoms R29.90    Left sided numbness R20.0    Claudication ([facility]) I73.9    Parkinson's disease ([facility]) G20.A1    Atypical chest pain R07.89    Acute bronchitis J20.9    Nocturnal oxygen desaturation G47.34    Leg swelling M79.89       Assessment & Plan:    -Patient had a cath done admitted with chest pain

## 2024-07-17 DIAGNOSIS — G25.81 RLS (RESTLESS LEGS SYNDROME): ICD-10-CM

## 2024-07-17 DIAGNOSIS — Z79.4 TYPE 2 DIABETES MELLITUS WITH DIABETIC POLYNEUROPATHY, WITH LONG-TERM CURRENT USE OF INSULIN (HCC): ICD-10-CM

## 2024-07-17 DIAGNOSIS — G43.009 MIGRAINE WITHOUT AURA, NOT REFRACTORY: ICD-10-CM

## 2024-07-17 DIAGNOSIS — E11.42 TYPE 2 DIABETES MELLITUS WITH DIABETIC POLYNEUROPATHY, WITH LONG-TERM CURRENT USE OF INSULIN (HCC): ICD-10-CM

## 2024-07-17 RX ORDER — AMITRIPTYLINE HYDROCHLORIDE 25 MG/1
TABLET, FILM COATED ORAL
Qty: 60 TABLET | Refills: 5 | OUTPATIENT
Start: 2024-07-17

## 2024-07-17 NOTE — TELEPHONE ENCOUNTER
Received carbidopa-levodopa rx request from [facility]. Last ov 9/18/23, next ov scheduled 8/29/24. Rx pending.     Requested Prescriptions     Pending Prescriptions Disp Refills    carbidopa-levodopa (SINEMET)  MG per tablet 30 tablet 1     Sig: Take 1 tablet by mouth nightly     Refused Prescriptions Disp Refills    amitriptyline (ELAVIL) 25 MG tablet [Pharmacy Med Name: Amitriptyline HCl Oral Tablet 25 MG 25 MG  Tablet] 60 tablet 5     Sig: TAKE TWO (2) TABLETS BY MOUTH NIGHTLY     Refused By: RAMEZ SINGH     Reason for Refusal: Duplicate Request

## 2024-07-18 RX ORDER — INSULIN LISPRO 100 [IU]/ML
INJECTION, SOLUTION INTRAVENOUS; SUBCUTANEOUS
Qty: 5 ADJUSTABLE DOSE PRE-FILLED PEN SYRINGE | Refills: 1 | Status: SHIPPED | OUTPATIENT
Start: 2024-07-18

## 2024-07-22 ENCOUNTER — TELEPHONE (OUTPATIENT)
Dept: FAMILY MEDICINE CLINIC | Age: 69
End: 2024-07-22

## 2024-07-22 NOTE — TELEPHONE ENCOUNTER
After seeing the foot Doctor today, Left Foot, found additional fracture with MRI. Pt in a soft cast (til 7/26), boot to be worn except when sleeping. Also bone stimulator. If no improvement, foot surgery.     Celia, please note.

## 2024-08-13 DIAGNOSIS — F44.5 PSYCHOGENIC NONEPILEPTIC SEIZURE: ICD-10-CM

## 2024-08-13 RX ORDER — LEVETIRACETAM 500 MG/1
TABLET ORAL
Qty: 60 TABLET | Refills: 3 | Status: SHIPPED | OUTPATIENT
Start: 2024-08-13

## 2024-08-13 NOTE — TELEPHONE ENCOUNTER
Last Visit: 9/18/23    Next Visit: 8/15/24    Rx is pending.     Requested Prescriptions     Pending Prescriptions Disp Refills    levETIRAcetam (KEPPRA) 500 MG tablet [Pharmacy Med Name: levETIRAcetam Oral Tablet 500  MG  Tablet] 60 tablet 0     Sig: TAKE ONE (1) TABLET BY MOUTH TWO TIMES DAILY

## 2024-08-15 ENCOUNTER — OFFICE VISIT (OUTPATIENT)
Dept: FAMILY MEDICINE CLINIC | Age: 69
End: 2024-08-15
Payer: MEDICARE

## 2024-08-15 VITALS
TEMPERATURE: 98.3 F | WEIGHT: 170 LBS | HEART RATE: 88 BPM | BODY MASS INDEX: 31.09 KG/M2 | OXYGEN SATURATION: 97 % | RESPIRATION RATE: 20 BRPM | DIASTOLIC BLOOD PRESSURE: 78 MMHG | SYSTOLIC BLOOD PRESSURE: 126 MMHG

## 2024-08-15 DIAGNOSIS — N39.490 OVERFLOW INCONTINENCE OF URINE: ICD-10-CM

## 2024-08-15 DIAGNOSIS — Z79.4 TYPE 2 DIABETES MELLITUS WITH DIABETIC POLYNEUROPATHY, WITH LONG-TERM CURRENT USE OF INSULIN (HCC): Primary | ICD-10-CM

## 2024-08-15 DIAGNOSIS — E11.42 TYPE 2 DIABETES MELLITUS WITH DIABETIC POLYNEUROPATHY, WITH LONG-TERM CURRENT USE OF INSULIN (HCC): Primary | ICD-10-CM

## 2024-08-15 LAB — HBA1C MFR BLD: 8.3 %

## 2024-08-15 PROCEDURE — 3074F SYST BP LT 130 MM HG: CPT | Performed by: NURSE PRACTITIONER

## 2024-08-15 PROCEDURE — 1124F ACP DISCUSS-NO DSCNMKR DOCD: CPT | Performed by: NURSE PRACTITIONER

## 2024-08-15 PROCEDURE — 0509F URINE INCON PLAN DOCD: CPT | Performed by: NURSE PRACTITIONER

## 2024-08-15 PROCEDURE — 3078F DIAST BP <80 MM HG: CPT | Performed by: NURSE PRACTITIONER

## 2024-08-15 PROCEDURE — 3052F HG A1C>EQUAL 8.0%<EQUAL 9.0%: CPT | Performed by: NURSE PRACTITIONER

## 2024-08-15 PROCEDURE — G8400 PT W/DXA NO RESULTS DOC: HCPCS | Performed by: NURSE PRACTITIONER

## 2024-08-15 PROCEDURE — G8427 DOCREV CUR MEDS BY ELIG CLIN: HCPCS | Performed by: NURSE PRACTITIONER

## 2024-08-15 PROCEDURE — 2022F DILAT RTA XM EVC RTNOPTHY: CPT | Performed by: NURSE PRACTITIONER

## 2024-08-15 PROCEDURE — G8417 CALC BMI ABV UP PARAM F/U: HCPCS | Performed by: NURSE PRACTITIONER

## 2024-08-15 PROCEDURE — 1090F PRES/ABSN URINE INCON ASSESS: CPT | Performed by: NURSE PRACTITIONER

## 2024-08-15 PROCEDURE — 3017F COLORECTAL CA SCREEN DOC REV: CPT | Performed by: NURSE PRACTITIONER

## 2024-08-15 PROCEDURE — 99213 OFFICE O/P EST LOW 20 MIN: CPT | Performed by: NURSE PRACTITIONER

## 2024-08-15 PROCEDURE — 1036F TOBACCO NON-USER: CPT | Performed by: NURSE PRACTITIONER

## 2024-08-15 PROCEDURE — 83036 HEMOGLOBIN GLYCOSYLATED A1C: CPT | Performed by: NURSE PRACTITIONER

## 2024-08-15 RX ORDER — ETOH/EUC OIL/MENTH/PEP/WINTERG
1 SPRAY, NON-AEROSOL (ML) MUCOUS MEMBRANE NIGHTLY PRN
Qty: 20 EACH | Refills: 3 | Status: SHIPPED | OUTPATIENT
Start: 2024-08-15

## 2024-08-15 RX ORDER — HYDROCODONE BITARTRATE AND ACETAMINOPHEN 5; 325 MG/1; MG/1
TABLET ORAL
COMMUNITY
Start: 2024-08-12

## 2024-08-15 ASSESSMENT — PATIENT HEALTH QUESTIONNAIRE - PHQ9
SUM OF ALL RESPONSES TO PHQ QUESTIONS 1-9: 7
3. TROUBLE FALLING OR STAYING ASLEEP: SEVERAL DAYS
6. FEELING BAD ABOUT YOURSELF - OR THAT YOU ARE A FAILURE OR HAVE LET YOURSELF OR YOUR FAMILY DOWN: SEVERAL DAYS
SUM OF ALL RESPONSES TO PHQ9 QUESTIONS 1 & 2: 1
9. THOUGHTS THAT YOU WOULD BE BETTER OFF DEAD, OR OF HURTING YOURSELF: NOT AT ALL
SUM OF ALL RESPONSES TO PHQ QUESTIONS 1-9: 7
5. POOR APPETITE OR OVEREATING: SEVERAL DAYS
SUM OF ALL RESPONSES TO PHQ QUESTIONS 1-9: 7
1. LITTLE INTEREST OR PLEASURE IN DOING THINGS: NOT AT ALL
10. IF YOU CHECKED OFF ANY PROBLEMS, HOW DIFFICULT HAVE THESE PROBLEMS MADE IT FOR YOU TO DO YOUR WORK, TAKE CARE OF THINGS AT HOME, OR GET ALONG WITH OTHER PEOPLE: SOMEWHAT DIFFICULT
7. TROUBLE CONCENTRATING ON THINGS, SUCH AS READING THE NEWSPAPER OR WATCHING TELEVISION: SEVERAL DAYS
SUM OF ALL RESPONSES TO PHQ QUESTIONS 1-9: 7
4. FEELING TIRED OR HAVING LITTLE ENERGY: SEVERAL DAYS
2. FEELING DOWN, DEPRESSED OR HOPELESS: SEVERAL DAYS

## 2024-08-15 ASSESSMENT — ANXIETY QUESTIONNAIRES
GAD7 TOTAL SCORE: 5
7. FEELING AFRAID AS IF SOMETHING AWFUL MIGHT HAPPEN: SEVERAL DAYS
IF YOU CHECKED OFF ANY PROBLEMS ON THIS QUESTIONNAIRE, HOW DIFFICULT HAVE THESE PROBLEMS MADE IT FOR YOU TO DO YOUR WORK, TAKE CARE OF THINGS AT HOME, OR GET ALONG WITH OTHER PEOPLE: SOMEWHAT DIFFICULT
2. NOT BEING ABLE TO STOP OR CONTROL WORRYING: NOT AT ALL
3. WORRYING TOO MUCH ABOUT DIFFERENT THINGS: SEVERAL DAYS
1. FEELING NERVOUS, ANXIOUS, OR ON EDGE: SEVERAL DAYS
5. BEING SO RESTLESS THAT IT IS HARD TO SIT STILL: SEVERAL DAYS
6. BECOMING EASILY ANNOYED OR IRRITABLE: NOT AT ALL
4. TROUBLE RELAXING: SEVERAL DAYS

## 2024-08-15 ASSESSMENT — ENCOUNTER SYMPTOMS
CHEST TIGHTNESS: 0
GASTROINTESTINAL NEGATIVE: 1
WHEEZING: 0
COUGH: 0
SHORTNESS OF BREATH: 0

## 2024-08-15 NOTE — PROGRESS NOTES
LYMPHSABS 3.6 11/13/2023 04:35 PM    MONOSABS 0.8 11/13/2023 04:35 PM    EOSABS 0.4 11/13/2023 04:35 PM    BASOSABS 0.1 11/13/2023 04:35 PM     Lab Results   Component Value Date/Time    TSHHS 1.440 11/13/2023 04:35 PM     Lab Results   Component Value Date/Time    BILITOT 0.4 02/05/2024 12:00 AM    BILIDIR 0.2 01/10/2022 11:50 AM    IBILI 0.2 01/10/2022 11:50 AM    AST 41 02/05/2024 12:00 AM    ALT 68 02/05/2024 12:00 AM    ALKPHOS 129 02/05/2024 12:00 AM             Results for orders placed or performed in visit on 08/15/24   POCT glycosylated hemoglobin (Hb A1C)   Result Value Ref Range    Hemoglobin A1C 8.3 %       ASSESSMENT AND PLAN:     1. Type 2 diabetes mellitus with diabetic polyneuropathy, with long-term current use of insulin (HCC)  - not at goal, continue current medication and dietary regimen  - POCT glycosylated hemoglobin (Hb A1C)    2. Overflow incontinence of urine  - Adhesive Bandages (ADHESIVE/LARGE/3\"X4\") PADS; 1 Box by Does not apply route daily  Dispense: 6 each; Refill: 1  - Incontinence Supply Disposable (UNDERPADS EXTRA LARGE) MISC; 1 each by Does not apply route nightly as needed (urinary incontinence)  Dispense: 20 each; Refill: 3      Return in about 3 months (around 11/15/2024) for DM, HgA1C.    Care discussed with patient. Patient educated on signs and symptoms of exacerbation and when to seek further medical attention. Advised to call for any problems, questions, or concerns. Patient verbalizes understanding and agrees with plan.     Medications reviewed and reconciled. Continue current medications.  Appropriate prescriptions are ordered. Risks and benefits of meds are discussed.     After visit summary provided.

## 2024-08-29 ENCOUNTER — OFFICE VISIT (OUTPATIENT)
Dept: NEUROLOGY | Age: 69
End: 2024-08-29
Payer: MEDICARE

## 2024-08-29 VITALS
WEIGHT: 171.4 LBS | DIASTOLIC BLOOD PRESSURE: 92 MMHG | BODY MASS INDEX: 31.35 KG/M2 | SYSTOLIC BLOOD PRESSURE: 162 MMHG | OXYGEN SATURATION: 96 % | HEART RATE: 76 BPM

## 2024-08-29 DIAGNOSIS — G25.81 RLS (RESTLESS LEGS SYNDROME): ICD-10-CM

## 2024-08-29 DIAGNOSIS — F44.5 PSYCHOGENIC NONEPILEPTIC SEIZURE: ICD-10-CM

## 2024-08-29 DIAGNOSIS — G43.009 MIGRAINE WITHOUT AURA, NOT REFRACTORY: ICD-10-CM

## 2024-08-29 PROCEDURE — 99214 OFFICE O/P EST MOD 30 MIN: CPT | Performed by: NURSE PRACTITIONER

## 2024-08-29 PROCEDURE — 3077F SYST BP >= 140 MM HG: CPT | Performed by: NURSE PRACTITIONER

## 2024-08-29 PROCEDURE — 1090F PRES/ABSN URINE INCON ASSESS: CPT | Performed by: NURSE PRACTITIONER

## 2024-08-29 PROCEDURE — G8400 PT W/DXA NO RESULTS DOC: HCPCS | Performed by: NURSE PRACTITIONER

## 2024-08-29 PROCEDURE — G8427 DOCREV CUR MEDS BY ELIG CLIN: HCPCS | Performed by: NURSE PRACTITIONER

## 2024-08-29 PROCEDURE — 3017F COLORECTAL CA SCREEN DOC REV: CPT | Performed by: NURSE PRACTITIONER

## 2024-08-29 PROCEDURE — G8417 CALC BMI ABV UP PARAM F/U: HCPCS | Performed by: NURSE PRACTITIONER

## 2024-08-29 PROCEDURE — 1036F TOBACCO NON-USER: CPT | Performed by: NURSE PRACTITIONER

## 2024-08-29 PROCEDURE — 1124F ACP DISCUSS-NO DSCNMKR DOCD: CPT | Performed by: NURSE PRACTITIONER

## 2024-08-29 PROCEDURE — 3080F DIAST BP >= 90 MM HG: CPT | Performed by: NURSE PRACTITIONER

## 2024-08-29 RX ORDER — UBROGEPANT 100 MG/1
100 TABLET ORAL PRN
Qty: 10 TABLET | Refills: 2 | Status: SHIPPED | OUTPATIENT
Start: 2024-08-29

## 2024-08-29 RX ORDER — CARBIDOPA/LEVODOPA 10MG-100MG
1 TABLET ORAL NIGHTLY
Qty: 30 TABLET | Refills: 1 | Status: SHIPPED | OUTPATIENT
Start: 2024-08-29

## 2024-08-29 RX ORDER — DIVALPROEX SODIUM 500 MG/1
500 TABLET, DELAYED RELEASE ORAL 2 TIMES DAILY
Qty: 60 TABLET | Refills: 5 | Status: SHIPPED | OUTPATIENT
Start: 2024-08-29

## 2024-08-29 NOTE — PATIENT INSTRUCTIONS
Wean off of Elavil and Keppra by taking one tablet of each daily then discontinue. Start Depakote 500 mg twice daily.

## 2024-08-29 NOTE — PROGRESS NOTES
8/29/24    Simona Anna  1955    Chief Complaint   Patient presents with    Follow-up     3m follow up for Migraine without aura, not refractory and RLS (restless legs syndrome).          History of Present Illness  Simona is a 69 y.o. female presenting today for follow-up of: Migraine, RLS, neuropathy, history of pseudoseizures.  She is on amitriptyline 50 mg at bedtime for migraine prevention.  She is on carbidopa levodopa 10 mg / 100 mg for RLS.  She is on Keppra 500 mg twice daily prescribed several years ago by another neurologist in [place].  She has been diagnosed with pseudoseizures in the past confirmed by video EEG.    Migraines 2-3 days per week.  Ubrelvy is effective.  Denies any breakthrough seizure activity.    She lives with her daughter.  She is not driving.  She manages her own medications, daughter helps with finances.  Her daughter does most of the cooking and cleaning but Simona is able to.  Discussed last visit that she is on several CNS affecting medications.  No concerns for dementia.  She has a mental health specialist.    Migraine preventatives tried: Propranolol, Topamax, verapamil, metoprolol, amitriptyline, gabapentin, duloxetine  Abortive therapy: [facility]  Current Outpatient Medications   Medication Sig Dispense Refill    Ubrogepant (UBRELVY) 100 MG TABS Take 100 mg by mouth as needed (Migraine) May repeat after 2 hours if needed 10 tablet 2    carbidopa-levodopa (SINEMET)  MG per tablet Take 1 tablet by mouth nightly 30 tablet 1    divalproex (DEPAKOTE) 500 MG DR tablet Take 1 tablet by mouth 2 times daily 60 tablet 5    HYDROcodone-acetaminophen (NORCO) 5-325 MG per tablet       insulin lispro, 1 Unit Dial, (HUMALOG/ADMELOG) 100 UNIT/ML SOPN INJECT 15 UNITS SUBQ TID BEFORE MEALS: [phone] [phone] OVER [phone] 5 Adjustable Dose Pre-filled Pen Syringe 1    gabapentin (NEURONTIN) 600 MG tablet TAKE ONE (1) TABLET BY MOUTH

## 2024-09-11 DIAGNOSIS — Z79.4 TYPE 2 DIABETES MELLITUS WITH DIABETIC POLYNEUROPATHY, WITH LONG-TERM CURRENT USE OF INSULIN (HCC): ICD-10-CM

## 2024-09-11 DIAGNOSIS — Z76.0 MEDICATION REFILL: ICD-10-CM

## 2024-09-11 DIAGNOSIS — E03.9 HYPOTHYROIDISM, UNSPECIFIED TYPE: ICD-10-CM

## 2024-09-11 DIAGNOSIS — E11.42 TYPE 2 DIABETES MELLITUS WITH DIABETIC POLYNEUROPATHY, WITH LONG-TERM CURRENT USE OF INSULIN (HCC): ICD-10-CM

## 2024-09-11 RX ORDER — METOPROLOL SUCCINATE 50 MG/1
50 TABLET, EXTENDED RELEASE ORAL DAILY
Qty: 30 TABLET | Refills: 5 | Status: SHIPPED | OUTPATIENT
Start: 2024-09-11

## 2024-09-11 RX ORDER — LEVOTHYROXINE SODIUM 75 UG/1
TABLET ORAL
Qty: 30 TABLET | Refills: 3 | Status: SHIPPED | OUTPATIENT
Start: 2024-09-11

## 2024-09-11 RX ORDER — INSULIN LISPRO 100 [IU]/ML
INJECTION, SOLUTION INTRAVENOUS; SUBCUTANEOUS
Qty: 5 ADJUSTABLE DOSE PRE-FILLED PEN SYRINGE | Refills: 3 | Status: SHIPPED | OUTPATIENT
Start: 2024-09-11

## 2024-09-12 DIAGNOSIS — E08.22 DIABETES MELLITUS DUE TO UNDERLYING CONDITION WITH CHRONIC KIDNEY DISEASE, WITHOUT LONG-TERM CURRENT USE OF INSULIN, UNSPECIFIED CKD STAGE (HCC): ICD-10-CM

## 2024-09-12 RX ORDER — INSULIN DEGLUDEC 100 U/ML
INJECTION, SOLUTION SUBCUTANEOUS
Qty: 5 ADJUSTABLE DOSE PRE-FILLED PEN SYRINGE | Refills: 3 | Status: SHIPPED | OUTPATIENT
Start: 2024-09-12

## 2024-09-23 ENCOUNTER — TELEPHONE (OUTPATIENT)
Dept: CARDIOLOGY CLINIC | Age: 69
End: 2024-09-23

## 2024-09-24 ENCOUNTER — TELEPHONE (OUTPATIENT)
Dept: FAMILY MEDICINE CLINIC | Age: 69
End: 2024-09-24

## 2024-10-08 ENCOUNTER — TELEPHONE (OUTPATIENT)
Dept: PULMONOLOGY | Age: 69
End: 2024-10-08

## 2024-10-08 NOTE — TELEPHONE ENCOUNTER
Patient came into office requesting [facility] to be contacted and her oxygen to be dismissed  as she has not been using it and would like them to take it back

## 2024-10-11 NOTE — TELEPHONE ENCOUNTER
Van aguila, I spoke with Simona,     After discussing her testing results indicating need for oxygen, she has decided to continue using it.

## 2024-10-14 ENCOUNTER — APPOINTMENT (OUTPATIENT)
Dept: NON INVASIVE DIAGNOSTICS | Age: 69
End: 2024-10-14
Payer: MEDICARE

## 2024-10-14 ENCOUNTER — APPOINTMENT (OUTPATIENT)
Dept: GENERAL RADIOLOGY | Age: 69
End: 2024-10-14
Payer: MEDICARE

## 2024-10-14 ENCOUNTER — HOSPITAL ENCOUNTER (OUTPATIENT)
Age: 69
Setting detail: OBSERVATION
Discharge: HOME | End: 2024-10-14
Attending: EMERGENCY MEDICINE | Admitting: INTERNAL MEDICINE
Payer: MEDICARE

## 2024-10-14 VITALS
TEMPERATURE: 97.5 F | DIASTOLIC BLOOD PRESSURE: 73 MMHG | BODY MASS INDEX: 31.28 KG/M2 | SYSTOLIC BLOOD PRESSURE: 135 MMHG | WEIGHT: 170 LBS | HEART RATE: 62 BPM | RESPIRATION RATE: 16 BRPM | HEIGHT: 62 IN | OXYGEN SATURATION: 96 %

## 2024-10-14 DIAGNOSIS — I25.5 ISCHEMIC CARDIOMYOPATHY: ICD-10-CM

## 2024-10-14 DIAGNOSIS — I24.9 ACUTE CORONARY SYNDROME (HCC): ICD-10-CM

## 2024-10-14 DIAGNOSIS — R07.9 CHEST PAIN, UNSPECIFIED TYPE: Primary | ICD-10-CM

## 2024-10-14 PROBLEM — I25.119 CHEST PAIN DUE TO CAD (HCC): Status: ACTIVE | Noted: 2024-10-14

## 2024-10-14 LAB
ALBUMIN SERPL-MCNC: 4.1 G/DL (ref 3.4–5)
ALBUMIN/GLOB SERPL: 1.6 {RATIO} (ref 1.1–2.2)
ALP SERPL-CCNC: 85 U/L (ref 40–129)
ALT SERPL-CCNC: 12 U/L (ref 10–40)
ANION GAP SERPL CALCULATED.3IONS-SCNC: 17 MMOL/L (ref 9–17)
AST SERPL-CCNC: 18 U/L (ref 15–37)
BASOPHILS # BLD: 0.06 K/UL
BASOPHILS NFR BLD: 1 % (ref 0–1)
BILIRUB SERPL-MCNC: 0.5 MG/DL (ref 0–1)
BILIRUB UR QL STRIP: NEGATIVE
BUN SERPL-MCNC: 16 MG/DL (ref 7–20)
CALCIUM SERPL-MCNC: 9.3 MG/DL (ref 8.3–10.6)
CHLORIDE SERPL-SCNC: 101 MMOL/L (ref 99–110)
CLARITY UR: CLEAR
CO2 SERPL-SCNC: 21 MMOL/L (ref 21–32)
COLOR UR: YELLOW
COMMENT: ABNORMAL
CREAT SERPL-MCNC: 0.7 MG/DL (ref 0.6–1.2)
ECHO AO ROOT DIAM: 3.2 CM
ECHO AO ROOT INDEX: 1.8 CM/M2
ECHO AV AREA PEAK VELOCITY: 2.1 CM2
ECHO AV AREA VTI: 2.3 CM2
ECHO AV AREA/BSA PEAK VELOCITY: 1.2 CM2/M2
ECHO AV AREA/BSA VTI: 1.3 CM2/M2
ECHO AV MEAN GRADIENT: 7 MMHG
ECHO AV MEAN VELOCITY: 1.2 M/S
ECHO AV PEAK GRADIENT: 14 MMHG
ECHO AV PEAK VELOCITY: 1.9 M/S
ECHO AV VELOCITY RATIO: 0.63
ECHO AV VTI: 36 CM
ECHO BSA: 1.84 M2
ECHO EST RA PRESSURE: 3 MMHG
ECHO IVC PROX: 1.2 CM
ECHO LA AREA 4C: 10 CM2
ECHO LA DIAMETER INDEX: 1.69 CM/M2
ECHO LA DIAMETER: 3 CM
ECHO LA MAJOR AXIS: 5.3 CM
ECHO LA TO AORTIC ROOT RATIO: 0.94
ECHO LA VOL MOD A4C: 15 ML (ref 22–52)
ECHO LA VOLUME INDEX MOD A4C: 8 ML/M2 (ref 16–34)
ECHO LV E' LATERAL VELOCITY: 11.3 CM/S
ECHO LV E' SEPTAL VELOCITY: 11.1 CM/S
ECHO LV EDV A4C: 54 ML
ECHO LV EDV INDEX A4C: 30 ML/M2
ECHO LV EF PHYSICIAN: 55 %
ECHO LV EJECTION FRACTION A4C: 60 %
ECHO LV ESV A4C: 22 ML
ECHO LV ESV INDEX A4C: 12 ML/M2
ECHO LV FRACTIONAL SHORTENING: 22 % (ref 28–44)
ECHO LV INTERNAL DIMENSION DIASTOLE INDEX: 2.53 CM/M2
ECHO LV INTERNAL DIMENSION DIASTOLIC: 4.5 CM (ref 3.9–5.3)
ECHO LV INTERNAL DIMENSION SYSTOLIC INDEX: 1.97 CM/M2
ECHO LV INTERNAL DIMENSION SYSTOLIC: 3.5 CM
ECHO LV IVSD: 1.1 CM (ref 0.6–0.9)
ECHO LV MASS 2D: 175 G (ref 67–162)
ECHO LV MASS INDEX 2D: 98.3 G/M2 (ref 43–95)
ECHO LV POSTERIOR WALL DIASTOLIC: 1.1 CM (ref 0.6–0.9)
ECHO LV RELATIVE WALL THICKNESS RATIO: 0.49
ECHO LVOT AREA: 3.1 CM2
ECHO LVOT AV VTI INDEX: 0.71
ECHO LVOT DIAM: 2 CM
ECHO LVOT MEAN GRADIENT: 3 MMHG
ECHO LVOT PEAK GRADIENT: 6 MMHG
ECHO LVOT PEAK VELOCITY: 1.2 M/S
ECHO LVOT STROKE VOLUME INDEX: 45 ML/M2
ECHO LVOT SV: 80.1 ML
ECHO LVOT VTI: 25.5 CM
ECHO MV A VELOCITY: 1.1 M/S
ECHO MV E DECELERATION TIME (DT): 211 MS
ECHO MV E VELOCITY: 0.91 M/S
ECHO MV E/A RATIO: 0.83
ECHO MV E/E' LATERAL: 8.05
ECHO MV E/E' RATIO (AVERAGED): 8.13
ECHO MV E/E' SEPTAL: 8.2
ECHO RIGHT VENTRICULAR SYSTOLIC PRESSURE (RVSP): 32 MMHG
ECHO RV MID DIMENSION: 3 CM
ECHO TV REGURGITANT MAX VELOCITY: 2.71 M/S
ECHO TV REGURGITANT PEAK GRADIENT: 29 MMHG
EKG ATRIAL RATE: 102 BPM
EKG DIAGNOSIS: NORMAL
EKG P AXIS: -20 DEGREES
EKG P-R INTERVAL: 152 MS
EKG Q-T INTERVAL: 362 MS
EKG QRS DURATION: 108 MS
EKG QTC CALCULATION (BAZETT): 471 MS
EKG R AXIS: -44 DEGREES
EKG T AXIS: 83 DEGREES
EKG VENTRICULAR RATE: 102 BPM
EOSINOPHIL # BLD: 0.48 K/UL
EOSINOPHILS RELATIVE PERCENT: 5 % (ref 0–3)
ERYTHROCYTE [DISTWIDTH] IN BLOOD BY AUTOMATED COUNT: 12.7 % (ref 11.7–14.9)
GFR, ESTIMATED: 85 ML/MIN/1.73M2
GLUCOSE BLD-MCNC: 135 MG/DL (ref 74–99)
GLUCOSE BLD-MCNC: 141 MG/DL (ref 74–99)
GLUCOSE BLD-MCNC: 155 MG/DL (ref 74–99)
GLUCOSE SERPL-MCNC: 226 MG/DL (ref 74–99)
GLUCOSE UR STRIP-MCNC: >=1000 MG/DL
HCT VFR BLD AUTO: 41.5 % (ref 37–47)
HGB BLD-MCNC: 13.9 G/DL (ref 12.5–16)
HGB UR QL STRIP.AUTO: NEGATIVE
IMM GRANULOCYTES # BLD AUTO: 0.01 K/UL
IMM GRANULOCYTES NFR BLD: 0 %
KETONES UR STRIP-MCNC: 15 MG/DL
LEUKOCYTE ESTERASE UR QL STRIP: NEGATIVE
LYMPHOCYTES NFR BLD: 4.24 K/UL
LYMPHOCYTES RELATIVE PERCENT: 44 % (ref 24–44)
MCH RBC QN AUTO: 28 PG (ref 27–31)
MCHC RBC AUTO-ENTMCNC: 33.5 G/DL (ref 32–36)
MCV RBC AUTO: 83.5 FL (ref 78–100)
MONOCYTES NFR BLD: 1.02 K/UL
MONOCYTES NFR BLD: 11 % (ref 0–4)
NEUTROPHILS NFR BLD: 40 % (ref 36–66)
NEUTS SEG NFR BLD: 3.87 K/UL
NITRITE UR QL STRIP: NEGATIVE
PH UR STRIP: 6 [PH] (ref 5–8)
PLATELET # BLD AUTO: 259 K/UL (ref 140–440)
PMV BLD AUTO: 9.9 FL (ref 7.5–11.1)
POTASSIUM SERPL-SCNC: 3.7 MMOL/L (ref 3.5–5.1)
PROT SERPL-MCNC: 6.7 G/DL (ref 6.4–8.2)
PROT UR STRIP-MCNC: NEGATIVE MG/DL
RBC # BLD AUTO: 4.97 M/UL (ref 4.2–5.4)
SODIUM SERPL-SCNC: 139 MMOL/L (ref 136–145)
SP GR UR STRIP: 1.01 (ref 1–1.03)
TROPONIN I SERPL HS-MCNC: 7 NG/L (ref 0–14)
TROPONIN I SERPL HS-MCNC: 8 NG/L (ref 0–14)
TROPONIN I SERPL HS-MCNC: 8 NG/L (ref 0–14)
UROBILINOGEN UR STRIP-ACNC: 0.2 EU/DL (ref 0–1)
WBC OTHER # BLD: 9.7 K/UL (ref 4–10.5)

## 2024-10-14 PROCEDURE — 6370000000 HC RX 637 (ALT 250 FOR IP): Performed by: NURSE PRACTITIONER

## 2024-10-14 PROCEDURE — 80053 COMPREHEN METABOLIC PANEL: CPT

## 2024-10-14 PROCEDURE — 84484 ASSAY OF TROPONIN QUANT: CPT

## 2024-10-14 PROCEDURE — 99223 1ST HOSP IP/OBS HIGH 75: CPT | Performed by: INTERNAL MEDICINE

## 2024-10-14 PROCEDURE — 96372 THER/PROPH/DIAG INJ SC/IM: CPT

## 2024-10-14 PROCEDURE — 6370000000 HC RX 637 (ALT 250 FOR IP): Performed by: INTERNAL MEDICINE

## 2024-10-14 PROCEDURE — 93005 ELECTROCARDIOGRAM TRACING: CPT | Performed by: EMERGENCY MEDICINE

## 2024-10-14 PROCEDURE — 93005 ELECTROCARDIOGRAM TRACING: CPT | Performed by: NURSE PRACTITIONER

## 2024-10-14 PROCEDURE — 36415 COLL VENOUS BLD VENIPUNCTURE: CPT

## 2024-10-14 PROCEDURE — 6370000000 HC RX 637 (ALT 250 FOR IP): Performed by: EMERGENCY MEDICINE

## 2024-10-14 PROCEDURE — 93306 TTE W/DOPPLER COMPLETE: CPT

## 2024-10-14 PROCEDURE — 85025 COMPLETE CBC W/AUTO DIFF WBC: CPT

## 2024-10-14 PROCEDURE — 81003 URINALYSIS AUTO W/O SCOPE: CPT

## 2024-10-14 PROCEDURE — 2580000003 HC RX 258: Performed by: NURSE PRACTITIONER

## 2024-10-14 PROCEDURE — G0378 HOSPITAL OBSERVATION PER HR: HCPCS

## 2024-10-14 PROCEDURE — 2580000003 HC RX 258: Performed by: STUDENT IN AN ORGANIZED HEALTH CARE EDUCATION/TRAINING PROGRAM

## 2024-10-14 PROCEDURE — 6360000002 HC RX W HCPCS: Performed by: NURSE PRACTITIONER

## 2024-10-14 PROCEDURE — 99285 EMERGENCY DEPT VISIT HI MDM: CPT

## 2024-10-14 PROCEDURE — 93010 ELECTROCARDIOGRAM REPORT: CPT | Performed by: INTERNAL MEDICINE

## 2024-10-14 PROCEDURE — 82962 GLUCOSE BLOOD TEST: CPT

## 2024-10-14 PROCEDURE — 71045 X-RAY EXAM CHEST 1 VIEW: CPT

## 2024-10-14 RX ORDER — INSULIN LISPRO 100 [IU]/ML
0-4 INJECTION, SOLUTION INTRAVENOUS; SUBCUTANEOUS EVERY 4 HOURS
Status: DISCONTINUED | OUTPATIENT
Start: 2024-10-14 | End: 2024-10-14 | Stop reason: HOSPADM

## 2024-10-14 RX ORDER — SODIUM CHLORIDE 0.9 % (FLUSH) 0.9 %
5-40 SYRINGE (ML) INJECTION EVERY 12 HOURS SCHEDULED
Status: DISCONTINUED | OUTPATIENT
Start: 2024-10-14 | End: 2024-10-14 | Stop reason: HOSPADM

## 2024-10-14 RX ORDER — SODIUM CHLORIDE 0.9 % (FLUSH) 0.9 %
5-40 SYRINGE (ML) INJECTION PRN
Status: DISCONTINUED | OUTPATIENT
Start: 2024-10-14 | End: 2024-10-14 | Stop reason: HOSPADM

## 2024-10-14 RX ORDER — ISOSORBIDE MONONITRATE 30 MG/1
30 TABLET, EXTENDED RELEASE ORAL DAILY
Status: DISCONTINUED | OUTPATIENT
Start: 2024-10-14 | End: 2024-10-14 | Stop reason: HOSPADM

## 2024-10-14 RX ORDER — GLUCAGON 1 MG/ML
1 KIT INJECTION PRN
Status: DISCONTINUED | OUTPATIENT
Start: 2024-10-14 | End: 2024-10-14 | Stop reason: HOSPADM

## 2024-10-14 RX ORDER — GUAIFENESIN 600 MG/1
600 TABLET, EXTENDED RELEASE ORAL 2 TIMES DAILY
Status: DISCONTINUED | OUTPATIENT
Start: 2024-10-14 | End: 2024-10-14 | Stop reason: HOSPADM

## 2024-10-14 RX ORDER — LOSARTAN POTASSIUM 25 MG/1
50 TABLET ORAL DAILY
Status: DISCONTINUED | OUTPATIENT
Start: 2024-10-14 | End: 2024-10-14 | Stop reason: HOSPADM

## 2024-10-14 RX ORDER — DIVALPROEX SODIUM 500 MG/1
500 TABLET, DELAYED RELEASE ORAL 2 TIMES DAILY
Status: DISCONTINUED | OUTPATIENT
Start: 2024-10-14 | End: 2024-10-14 | Stop reason: HOSPADM

## 2024-10-14 RX ORDER — ONDANSETRON 2 MG/ML
4 INJECTION INTRAMUSCULAR; INTRAVENOUS EVERY 6 HOURS PRN
Status: DISCONTINUED | OUTPATIENT
Start: 2024-10-14 | End: 2024-10-14 | Stop reason: SDUPTHER

## 2024-10-14 RX ORDER — ATORVASTATIN CALCIUM 40 MG/1
80 TABLET, FILM COATED ORAL NIGHTLY
Status: DISCONTINUED | OUTPATIENT
Start: 2024-10-14 | End: 2024-10-14 | Stop reason: HOSPADM

## 2024-10-14 RX ORDER — ACETAMINOPHEN 650 MG/1
650 SUPPOSITORY RECTAL EVERY 6 HOURS PRN
Status: DISCONTINUED | OUTPATIENT
Start: 2024-10-14 | End: 2024-10-14 | Stop reason: HOSPADM

## 2024-10-14 RX ORDER — BUSPIRONE HYDROCHLORIDE 5 MG/1
10 TABLET ORAL 3 TIMES DAILY
Status: DISCONTINUED | OUTPATIENT
Start: 2024-10-14 | End: 2024-10-14 | Stop reason: HOSPADM

## 2024-10-14 RX ORDER — ONDANSETRON 2 MG/ML
4 INJECTION INTRAMUSCULAR; INTRAVENOUS EVERY 6 HOURS PRN
Status: DISCONTINUED | OUTPATIENT
Start: 2024-10-14 | End: 2024-10-14

## 2024-10-14 RX ORDER — QUETIAPINE FUMARATE 25 MG/1
25 TABLET, FILM COATED ORAL 2 TIMES DAILY
Status: DISCONTINUED | OUTPATIENT
Start: 2024-10-14 | End: 2024-10-14 | Stop reason: HOSPADM

## 2024-10-14 RX ORDER — FLUTICASONE PROPIONATE 50 MCG
1 SPRAY, SUSPENSION (ML) NASAL DAILY PRN
Status: DISCONTINUED | OUTPATIENT
Start: 2024-10-14 | End: 2024-10-14 | Stop reason: HOSPADM

## 2024-10-14 RX ORDER — INSULIN LISPRO 100 [IU]/ML
0-4 INJECTION, SOLUTION INTRAVENOUS; SUBCUTANEOUS
Status: DISCONTINUED | OUTPATIENT
Start: 2024-10-14 | End: 2024-10-14 | Stop reason: HOSPADM

## 2024-10-14 RX ORDER — ONDANSETRON 4 MG/1
4 TABLET, ORALLY DISINTEGRATING ORAL EVERY 8 HOURS PRN
Status: DISCONTINUED | OUTPATIENT
Start: 2024-10-14 | End: 2024-10-14

## 2024-10-14 RX ORDER — NITROGLYCERIN 0.4 MG/1
0.4 TABLET SUBLINGUAL ONCE
Status: COMPLETED | OUTPATIENT
Start: 2024-10-14 | End: 2024-10-14

## 2024-10-14 RX ORDER — INSULIN GLARGINE 100 [IU]/ML
16 INJECTION, SOLUTION SUBCUTANEOUS 2 TIMES DAILY
Status: DISCONTINUED | OUTPATIENT
Start: 2024-10-14 | End: 2024-10-14 | Stop reason: HOSPADM

## 2024-10-14 RX ORDER — POTASSIUM CHLORIDE 1500 MG/1
40 TABLET, EXTENDED RELEASE ORAL PRN
Status: DISCONTINUED | OUTPATIENT
Start: 2024-10-14 | End: 2024-10-14 | Stop reason: HOSPADM

## 2024-10-14 RX ORDER — ATORVASTATIN CALCIUM 40 MG/1
40 TABLET, FILM COATED ORAL NIGHTLY
Status: DISCONTINUED | OUTPATIENT
Start: 2024-10-14 | End: 2024-10-14 | Stop reason: ALTCHOICE

## 2024-10-14 RX ORDER — POTASSIUM CHLORIDE 7.45 MG/ML
10 INJECTION INTRAVENOUS PRN
Status: DISCONTINUED | OUTPATIENT
Start: 2024-10-14 | End: 2024-10-14 | Stop reason: HOSPADM

## 2024-10-14 RX ORDER — POTASSIUM CHLORIDE 1500 MG/1
40 TABLET, EXTENDED RELEASE ORAL PRN
Status: DISCONTINUED | OUTPATIENT
Start: 2024-10-14 | End: 2024-10-14 | Stop reason: SDUPTHER

## 2024-10-14 RX ORDER — ASPIRIN 81 MG/1
81 TABLET, CHEWABLE ORAL DAILY
Status: DISCONTINUED | OUTPATIENT
Start: 2024-10-14 | End: 2024-10-14 | Stop reason: HOSPADM

## 2024-10-14 RX ORDER — HYDROCODONE BITARTRATE AND ACETAMINOPHEN 5; 325 MG/1; MG/1
1 TABLET ORAL EVERY 6 HOURS PRN
Status: DISCONTINUED | OUTPATIENT
Start: 2024-10-14 | End: 2024-10-14 | Stop reason: HOSPADM

## 2024-10-14 RX ORDER — POTASSIUM CHLORIDE 1500 MG/1
20 TABLET, EXTENDED RELEASE ORAL 2 TIMES DAILY WITH MEALS
Status: DISCONTINUED | OUTPATIENT
Start: 2024-10-14 | End: 2024-10-14 | Stop reason: HOSPADM

## 2024-10-14 RX ORDER — ENOXAPARIN SODIUM 100 MG/ML
40 INJECTION SUBCUTANEOUS DAILY
Status: DISCONTINUED | OUTPATIENT
Start: 2024-10-14 | End: 2024-10-14 | Stop reason: HOSPADM

## 2024-10-14 RX ORDER — CARBIDOPA/LEVODOPA 10MG-100MG
1 TABLET ORAL NIGHTLY
Status: DISCONTINUED | OUTPATIENT
Start: 2024-10-14 | End: 2024-10-14 | Stop reason: HOSPADM

## 2024-10-14 RX ORDER — SODIUM CHLORIDE 9 MG/ML
INJECTION, SOLUTION INTRAVENOUS PRN
Status: DISCONTINUED | OUTPATIENT
Start: 2024-10-14 | End: 2024-10-14 | Stop reason: HOSPADM

## 2024-10-14 RX ORDER — POTASSIUM CHLORIDE 7.45 MG/ML
10 INJECTION INTRAVENOUS PRN
Status: DISCONTINUED | OUTPATIENT
Start: 2024-10-14 | End: 2024-10-14 | Stop reason: SDUPTHER

## 2024-10-14 RX ORDER — DULOXETIN HYDROCHLORIDE 30 MG/1
30 CAPSULE, DELAYED RELEASE ORAL 2 TIMES DAILY
Status: DISCONTINUED | OUTPATIENT
Start: 2024-10-14 | End: 2024-10-14 | Stop reason: HOSPADM

## 2024-10-14 RX ORDER — DEXTROSE MONOHYDRATE 100 MG/ML
INJECTION, SOLUTION INTRAVENOUS CONTINUOUS PRN
Status: DISCONTINUED | OUTPATIENT
Start: 2024-10-14 | End: 2024-10-14 | Stop reason: HOSPADM

## 2024-10-14 RX ORDER — POLYETHYLENE GLYCOL 3350 17 G/17G
17 POWDER, FOR SOLUTION ORAL DAILY PRN
Status: DISCONTINUED | OUTPATIENT
Start: 2024-10-14 | End: 2024-10-14 | Stop reason: SDUPTHER

## 2024-10-14 RX ORDER — ASPIRIN 81 MG/1
324 TABLET, CHEWABLE ORAL ONCE
Status: COMPLETED | OUTPATIENT
Start: 2024-10-14 | End: 2024-10-14

## 2024-10-14 RX ORDER — METOPROLOL SUCCINATE 50 MG/1
50 TABLET, EXTENDED RELEASE ORAL DAILY
Status: DISCONTINUED | OUTPATIENT
Start: 2024-10-14 | End: 2024-10-14 | Stop reason: HOSPADM

## 2024-10-14 RX ORDER — ONDANSETRON 4 MG/1
4 TABLET, ORALLY DISINTEGRATING ORAL EVERY 8 HOURS PRN
Status: DISCONTINUED | OUTPATIENT
Start: 2024-10-14 | End: 2024-10-14 | Stop reason: SDUPTHER

## 2024-10-14 RX ORDER — GABAPENTIN 300 MG/1
600 CAPSULE ORAL 3 TIMES DAILY
Status: DISCONTINUED | OUTPATIENT
Start: 2024-10-14 | End: 2024-10-14 | Stop reason: HOSPADM

## 2024-10-14 RX ORDER — MONTELUKAST SODIUM 10 MG/1
10 TABLET ORAL NIGHTLY
Status: DISCONTINUED | OUTPATIENT
Start: 2024-10-14 | End: 2024-10-14 | Stop reason: HOSPADM

## 2024-10-14 RX ORDER — OXYCODONE HYDROCHLORIDE 5 MG/1
5 TABLET ORAL ONCE
Status: COMPLETED | OUTPATIENT
Start: 2024-10-14 | End: 2024-10-14

## 2024-10-14 RX ORDER — POLYETHYLENE GLYCOL 3350 17 G/17G
17 POWDER, FOR SOLUTION ORAL DAILY PRN
Status: DISCONTINUED | OUTPATIENT
Start: 2024-10-14 | End: 2024-10-14 | Stop reason: HOSPADM

## 2024-10-14 RX ORDER — MAGNESIUM SULFATE IN WATER 40 MG/ML
2000 INJECTION, SOLUTION INTRAVENOUS PRN
Status: DISCONTINUED | OUTPATIENT
Start: 2024-10-14 | End: 2024-10-14 | Stop reason: HOSPADM

## 2024-10-14 RX ORDER — HYDROXYZINE PAMOATE 25 MG/1
50 CAPSULE ORAL
Status: DISCONTINUED | OUTPATIENT
Start: 2024-10-14 | End: 2024-10-14 | Stop reason: HOSPADM

## 2024-10-14 RX ORDER — ACETAMINOPHEN 325 MG/1
650 TABLET ORAL EVERY 6 HOURS PRN
Status: DISCONTINUED | OUTPATIENT
Start: 2024-10-14 | End: 2024-10-14 | Stop reason: HOSPADM

## 2024-10-14 RX ORDER — MAGNESIUM SULFATE IN WATER 40 MG/ML
2000 INJECTION, SOLUTION INTRAVENOUS PRN
Status: DISCONTINUED | OUTPATIENT
Start: 2024-10-14 | End: 2024-10-14 | Stop reason: SDUPTHER

## 2024-10-14 RX ORDER — ENOXAPARIN SODIUM 100 MG/ML
40 INJECTION SUBCUTANEOUS DAILY
Status: DISCONTINUED | OUTPATIENT
Start: 2024-10-14 | End: 2024-10-14 | Stop reason: SDUPTHER

## 2024-10-14 RX ORDER — NIFEDIPINE 30 MG/1
30 TABLET, EXTENDED RELEASE ORAL DAILY
Status: DISCONTINUED | OUTPATIENT
Start: 2024-10-14 | End: 2024-10-14 | Stop reason: HOSPADM

## 2024-10-14 RX ORDER — DOCUSATE SODIUM 100 MG/1
100 CAPSULE, LIQUID FILLED ORAL 2 TIMES DAILY
Status: DISCONTINUED | OUTPATIENT
Start: 2024-10-14 | End: 2024-10-14 | Stop reason: HOSPADM

## 2024-10-14 RX ORDER — FUROSEMIDE 20 MG
20 TABLET ORAL DAILY
Status: DISCONTINUED | OUTPATIENT
Start: 2024-10-14 | End: 2024-10-14 | Stop reason: HOSPADM

## 2024-10-14 RX ORDER — LANOLIN ALCOHOL/MO/W.PET/CERES
400 CREAM (GRAM) TOPICAL 3 TIMES DAILY
Status: DISCONTINUED | OUTPATIENT
Start: 2024-10-14 | End: 2024-10-14 | Stop reason: HOSPADM

## 2024-10-14 RX ORDER — SUCRALFATE 1 G/1
1 TABLET ORAL
Status: DISCONTINUED | OUTPATIENT
Start: 2024-10-14 | End: 2024-10-14 | Stop reason: HOSPADM

## 2024-10-14 RX ORDER — PANTOPRAZOLE SODIUM 40 MG/1
40 TABLET, DELAYED RELEASE ORAL DAILY
Status: DISCONTINUED | OUTPATIENT
Start: 2024-10-14 | End: 2024-10-14 | Stop reason: HOSPADM

## 2024-10-14 RX ADMIN — QUETIAPINE FUMARATE 25 MG: 25 TABLET ORAL at 12:51

## 2024-10-14 RX ADMIN — SODIUM CHLORIDE, PRESERVATIVE FREE 10 ML: 5 INJECTION INTRAVENOUS at 12:56

## 2024-10-14 RX ADMIN — Medication 400 MG: at 12:55

## 2024-10-14 RX ADMIN — OXYCODONE HYDROCHLORIDE 5 MG: 5 TABLET ORAL at 04:19

## 2024-10-14 RX ADMIN — INSULIN GLARGINE 16 UNITS: 100 INJECTION, SOLUTION SUBCUTANEOUS at 12:47

## 2024-10-14 RX ADMIN — NITROGLYCERIN 0.4 MG: 0.4 TABLET SUBLINGUAL at 04:19

## 2024-10-14 RX ADMIN — FUROSEMIDE 20 MG: 20 TABLET ORAL at 12:51

## 2024-10-14 RX ADMIN — BUSPIRONE HYDROCHLORIDE 10 MG: 5 TABLET ORAL at 12:55

## 2024-10-14 RX ADMIN — GUAIFENESIN 600 MG: 600 TABLET, EXTENDED RELEASE ORAL at 12:52

## 2024-10-14 RX ADMIN — EMPAGLIFLOZIN 10 MG: 10 TABLET, FILM COATED ORAL at 12:52

## 2024-10-14 RX ADMIN — SUCRALFATE 1 G: 1 TABLET ORAL at 12:52

## 2024-10-14 RX ADMIN — HYDROXYZINE PAMOATE 50 MG: 25 CAPSULE ORAL at 12:55

## 2024-10-14 RX ADMIN — DOCUSATE SODIUM 100 MG: 100 CAPSULE, LIQUID FILLED ORAL at 12:51

## 2024-10-14 RX ADMIN — NIFEDIPINE 30 MG: 30 TABLET, FILM COATED, EXTENDED RELEASE ORAL at 12:52

## 2024-10-14 RX ADMIN — SODIUM CHLORIDE, PRESERVATIVE FREE 10 ML: 5 INJECTION INTRAVENOUS at 12:57

## 2024-10-14 RX ADMIN — HYDROCODONE BITARTRATE AND ACETAMINOPHEN 1 TABLET: 5; 325 TABLET ORAL at 16:11

## 2024-10-14 RX ADMIN — DIVALPROEX SODIUM 500 MG: 500 TABLET, DELAYED RELEASE ORAL at 12:56

## 2024-10-14 RX ADMIN — POTASSIUM CHLORIDE 20 MEQ: 1500 TABLET, EXTENDED RELEASE ORAL at 12:47

## 2024-10-14 RX ADMIN — GABAPENTIN 600 MG: 300 CAPSULE ORAL at 12:55

## 2024-10-14 RX ADMIN — ENOXAPARIN SODIUM 40 MG: 100 INJECTION SUBCUTANEOUS at 12:50

## 2024-10-14 RX ADMIN — ISOSORBIDE MONONITRATE 30 MG: 30 TABLET, EXTENDED RELEASE ORAL at 12:50

## 2024-10-14 RX ADMIN — LEVOTHYROXINE SODIUM 75 MCG: 25 TABLET ORAL at 12:51

## 2024-10-14 RX ADMIN — METOPROLOL SUCCINATE 50 MG: 50 TABLET, EXTENDED RELEASE ORAL at 12:48

## 2024-10-14 RX ADMIN — LOSARTAN POTASSIUM 50 MG: 25 TABLET, FILM COATED ORAL at 12:50

## 2024-10-14 RX ADMIN — ASPIRIN 324 MG: 81 TABLET, CHEWABLE ORAL at 04:19

## 2024-10-14 RX ADMIN — ASPIRIN 81 MG: 81 TABLET, CHEWABLE ORAL at 12:49

## 2024-10-14 RX ADMIN — PANTOPRAZOLE SODIUM 40 MG: 40 TABLET, DELAYED RELEASE ORAL at 12:55

## 2024-10-14 ASSESSMENT — PAIN DESCRIPTION - ORIENTATION
ORIENTATION: LEFT
ORIENTATION: LEFT

## 2024-10-14 ASSESSMENT — LIFESTYLE VARIABLES
HOW OFTEN DO YOU HAVE A DRINK CONTAINING ALCOHOL: NEVER
HOW MANY STANDARD DRINKS CONTAINING ALCOHOL DO YOU HAVE ON A TYPICAL DAY: PATIENT DOES NOT DRINK

## 2024-10-14 ASSESSMENT — PAIN - FUNCTIONAL ASSESSMENT: PAIN_FUNCTIONAL_ASSESSMENT: 0-10

## 2024-10-14 ASSESSMENT — PAIN DESCRIPTION - DESCRIPTORS
DESCRIPTORS: SHOOTING;SHARP
DESCRIPTORS: ACHING;DISCOMFORT

## 2024-10-14 ASSESSMENT — PAIN SCALES - GENERAL
PAINLEVEL_OUTOF10: 10

## 2024-10-14 ASSESSMENT — PAIN DESCRIPTION - LOCATION
LOCATION: CHEST
LOCATION: BREAST

## 2024-10-14 NOTE — PROGRESS NOTES
Spiritual Health History and Assessment/Progress Note  [facility]    Spiritual/Emotional Needs,  ,  ,      Name: Simona Anna MRN: [id number]    Age: 69 y.o.     Sex: female   Language: English   Mandaeism: Patient Refused   Chest pain     Date: 10/14/2024            Total Time Calculated: 26 min              Spiritual Assessment began in [facility]ERVATION        Referral/Consult From: Nurse   Encounter Overview/Reason: Spiritual/Emotional Needs  Service Provided For: Patient    Cindy, Belief, Meaning:   Patient identifies as spiritual  Family/Friends No family/friends present      Importance and Influence:  Patient has spiritual/personal beliefs that influence decisions regarding their health  Family/Friends No family/friends present    Community:  Patient is connected with a spiritual community and feels well-supported. Support system includes: Children and Extended family  Family/Friends No family/friends present    Assessment and Plan of Care:     Patient Interventions include: Facilitated expression of thoughts and feelings  Family/Friends Interventions include: No family/friends present    Patient Plan of Care: Spiritual Care available upon further referral  Family/Friends Plan of Care: Spiritual Care available upon further referral    Electronically signed by Chaplain Julianne on 10/14/2024 at 12:48 PM

## 2024-10-14 NOTE — ED PROVIDER NOTES
anxiety disorder)    Auditory hallucinations    Bipolar 1 disorder, depressed, severe (HCC)    Dyspnea and respiratory abnormalities    Encephalopathy    Syncope    Hyponatremia    Pseudoseizures    Panic attacks    Generalized abdominal pain    Diabetes mellitus due to underlying condition with stage 2 chronic kidney disease, without long-term current use of insulin (HCC)    Seizure (HCC)    Amyloid polyneuropathy (HCC)    Anemia    Anxiety    Osteoarthrosis    Coronary atherosclerosis    Chronic pain    Degeneration of lumbar intervertebral disc    Disorder of liver    Dysuria    Fibrocystic breast    Gastroesophageal reflux disease    Gastroparesis    Gout    H/O degenerative disc disease    Hypothyroidism due to acquired atrophy of thyroid    Irritable bowel syndrome    Low back pain    Memory loss    Migraine    Migraine without aura, not refractory    Hyperlipidemia    Neck pain    Neuropathy    Nonalcoholic steatohepatitis (URENA)    Osteoporosis    Polyneuropathy due to type 2 diabetes mellitus (HCC)    Renal impairment    Suicidal ideation    Vitamin B12 deficiency    Hypothyroidism    Tremor    Anxiety disorder    Stroke-like symptoms    Left sided numbness    Claudication (HCC)    Parkinson's disease (HCC)    Atypical chest pain    Acute bronchitis    Nocturnal oxygen desaturation    Leg swelling         SURGICAL HISTORY       Past Surgical History:   Procedure Laterality Date    APPENDECTOMY  1970's    Done With Cholecystectomy    BLADDER SURGERY  1970's Or 1980's    \"Stretched The Opening To The Bladder\", \"Total Of Four Bladder Surgeries\"    BREAST BIOPSY Right 1980's    Twice, Benign    BREAST SURGERY Left 1990's    Five, Benign    CARDIAC CATHETERIZATION  10-18-06    normal coronary angiogram with a normal left ventricular systolic function, patient can be treated medically.    CARDIAC CATHETERIZATION      \"Total 7 Cardiac Catheterizations\"    CARPAL TUNNEL RELEASE Right 1999    CHOLECYSTECTOMY  1970's

## 2024-10-14 NOTE — ED TRIAGE NOTES
Patient presents to the ED from home with complaints of chest pain. Patient states that it started approx 1 hour ago. States its on the left side and she also has a pain in her neck that goes down her left arm. Patient denies taking medications prior to arrival to help. She takes norco regularly for pain and states she last took it 1.5 hours ago. Rates pain 10/10 at this time.

## 2024-10-14 NOTE — H&P
V2.0  History and Physical      Name:  Simona Anna /Age/Sex: 1955  (69 y.o. female)   MRN & CSN:  [id number] & [id number] Encounter Date/Time: 10/14/2024 8:16 AM EDT   Location:  OBS  PCP: Celia Nayak APRN - CNP       Hospital Day: 1    Assessment and Plan:   Simona Anna is a 69 y.o. female with a pmh of CHF, HTN, HLD, DMII, Hypothyroidism, Bipolar disorder,  COPD, Obesity, and Anxiety who presents with Chest pain    Hospital Problems             Last Modified POA    * (Principal) Chest pain 10/14/2024 Yes    Chest pain due to CAD (HCC) 10/14/2024 Yes       Plan:    Chest pain due to CAD  Hx of CAD. Last cath on 2023 which showed 30% stenosis of mid LAD. EKG non ischemic. Trop negative x 3. CXR nonacute  -Refused stress test  -Started on Imdur 30 mg daily  -Continue ASA, statins, and BB  -Echo pending  -Cardiology consulted in ED. Appreciate assistance     DMII with chronic insulin use  # Diabetic neuropathy  A1c 8.7(2023)  -SSI and Lantus  -Hypoglycemia protocol  -Continue Gabapentin    Chronic HFpEF, Compensated  -Continue diuretics, BB, ARBs, and Jardiance  -Daily weight  -Monitor I&O    HTN  Continue Metoprolol, Losartan, and Nifedipine    HLD  -On Atorvastatin    Hypothyroidism  -On Levothyroxine    COPD, not in exacerbation  -Continue Singulair  -Albuterol PRN    Anxiety  -Continue Cymbalta      Bipolar disorder  -Continue Seroquel and Depakote    Migraine Headache  -On Ubrelvy      Obesity class I with BMI 31.09  -Educated about lifestyle changes    Disposition:   Current Living situation: Home  Expected Disposition: Home  Estimated D/C: Home    Diet Diet NPO Exceptions are: Sips of Water with Meds   DVT Prophylaxis [x] Lovenox, []  Heparin, [] SCDs, [] Ambulation,  [] Eliquis, [] Xarelto, [] Coumadin   Code Status Full Code   Surrogate Decision Maker/ POA Self     Personally reviewed Lab Studies and Imaging     Discussed management of the case with Dr. Penn who

## 2024-10-14 NOTE — DISCHARGE SUMMARY
02:47 AM    TROPONINT <0.010 05/25/2023 11:18 PM     Lactic Acid: No results for input(s): \"LACTA\" in the last 72 hours.  BNP: No results for input(s): \"PROBNP\" in the last 72 hours.  UA:  Lab Results   Component Value Date/Time    NITRU NEGATIVE 08/16/2023 04:19 PM    NITRU NEGATIVE 11/05/2014 01:27 PM    COLORU dark yellow 09/27/2023 04:18 PM    COLORU YELLOW 08/16/2023 04:19 PM    PHUR 5.5 09/27/2023 04:18 PM    WBCUA 4 02/09/2023 12:20 PM    RBCUA NONE SEEN 02/09/2023 12:20 PM    MUCUS RARE 10/13/2022 07:50 PM    TRICHOMONAS NONE SEEN 02/09/2023 12:20 PM    YEAST RARE 09/12/2018 03:05 PM    BACTERIA NEGATIVE 02/09/2023 12:20 PM    CLARITYU cloudy 09/27/2023 04:18 PM    CLARITYU CLEAR 08/16/2023 04:19 PM    SPECGRAV 1.025 09/27/2023 04:18 PM    LEUKOCYTESUR small 09/27/2023 04:18 PM    LEUKOCYTESUR NEGATIVE 08/16/2023 04:19 PM    UROBILINOGEN 0.2 08/16/2023 04:19 PM    BILIRUBINUR neg 09/27/2023 04:18 PM    BLOODU neg 09/27/2023 04:18 PM    BLOODU NEGATIVE 08/16/2023 04:19 PM    GLUCOSEU neg 09/27/2023 04:18 PM    KETUA neg 09/27/2023 04:18 PM    KETUA NEGATIVE 08/16/2023 04:19 PM     Urine Cultures:   Lab Results   Component Value Date/Time    LABURIN  09/27/2023 04:02 PM     <50,000 CFU/ml mixed skin/urogenital kirstin. No further workup     Blood Cultures: No results found for: \"BC\"  No results found for: \"BLOODCULT2\"  Organism:   Lab Results   Component Value Date/Time    ORG ECOL 04/27/2015 08:15 PM       Time Spent Discharging patient 35 minutes    Electronically signed by WENDIE Webb CNP on 10/14/2024 at 8:17 AM

## 2024-10-14 NOTE — CARE COORDINATION
Chart reviewed. Pt is from home. Pt has PCP and insurance to assist with medical expenses. Pt is INDP. No needs identified at this time. Cm to remain available if any specific needs arise.

## 2024-10-14 NOTE — CONSULTS
data into the electronic medical record, and the Heart Team MD and NPs are not responsible for these. Any errors will be corrected upon verification with documented reports.

## 2024-10-14 NOTE — ED NOTES
Floor knows about sbar in [place]   
Diflucan.     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    Staph infection Dx 1980's    Toes On Left Foot    Thyroid disease     hypothroidism    Tremor     \"Tremors All Over\"    Urinary incontinence     Vertigo     \"Sometimes\"    Wears glasses        Assessment    Vitals: MEWS Score: 1  Level of Consciousness: Alert (0)   Vitals:    10/14/24 0336 10/14/24 0348 10/14/24 0400 10/14/24 0430   BP:  (!) 150/73 (!) 157/80 126/64   Pulse:  100 96 (!) 102   Resp:  20 20 21   Temp:  97.5 °F (36.4 °C)     TempSrc:  Oral     SpO2:  98%     Weight: 77.1 kg (170 lb) 77.1 kg (170 lb)     Height: 1.575 m (5' 2\") 1.575 m (5' 2\")         PO Status: Regular    O2 Flow Rate: O2 Device: None (Room air)      Cardiac Rhythm: NS    Last documented pain medication administered: Aspirin 324, nitrostat 0.4, and roxicodone 5 at 0419    NIH Score: NIH       Active LDA's:   Peripheral IV 10/14/24 Right Antecubital (Active)       Pertinent or High Risk Medications/Drips: no   If Yes, please provide details: none      Blood Product Administration: no  If Yes, please provide details: none    Recommendation    Incomplete orders: Admission orders   Additional Comments: none   If any further questions, please call Sending RN at [phone]    Electronically signed by: Electronically signed by Christiane Mendes RN on 10/14/2024 at 6:54 AM

## 2024-10-14 NOTE — PLAN OF CARE
Patient admitted for chest pain. Skeleton orders placed with consult to cardiology. Defer med rec and remaining care to primary team.     Bladimir Stewart MD  Night Physician

## 2024-10-14 NOTE — PROGRESS NOTES
Pt complained of pain 10/10 to under L breast. Messaged provider who ordered stat EKG. EKG done. Provider reviewed and asked for cardiology to ok patient's discharge. Contacted Cardiology who said ok for patient to go home. Pt stated the pain under her breast has resolved. IV removed with catheter intact. Pt left with all belongings. Pt escorted to lobby via wheelchair by tech. Daughter here to take patient home.

## 2024-10-15 ENCOUNTER — CARE COORDINATION (OUTPATIENT)
Dept: CASE MANAGEMENT | Age: 69
End: 2024-10-15

## 2024-10-15 ENCOUNTER — TELEPHONE (OUTPATIENT)
Dept: FAMILY MEDICINE CLINIC | Age: 69
End: 2024-10-15

## 2024-10-15 DIAGNOSIS — I10 ESSENTIAL HYPERTENSION: ICD-10-CM

## 2024-10-15 DIAGNOSIS — E78.5 HYPERLIPIDEMIA, UNSPECIFIED HYPERLIPIDEMIA TYPE: ICD-10-CM

## 2024-10-15 DIAGNOSIS — E03.9 HYPOTHYROIDISM, UNSPECIFIED TYPE: Primary | ICD-10-CM

## 2024-10-15 LAB
EKG ATRIAL RATE: 59 BPM
EKG DIAGNOSIS: NORMAL
EKG P AXIS: 15 DEGREES
EKG P-R INTERVAL: 174 MS
EKG Q-T INTERVAL: 508 MS
EKG QRS DURATION: 106 MS
EKG QTC CALCULATION (BAZETT): 502 MS
EKG R AXIS: -40 DEGREES
EKG T AXIS: -7 DEGREES
EKG VENTRICULAR RATE: 59 BPM

## 2024-10-15 NOTE — TELEPHONE ENCOUNTER
Care Transitions Initial Follow Up Call    Outreach made within 2 business days of discharge: Yes    Patient: Simona Anna Patient : 1955   MRN: [id number]  Reason for Admission: 10/14/24  Discharge Date: 10/14/24       Spoke with: Simona Anna    Discharge department/facility: [facility]    TCM Interactive Patient Contact:  Was patient able to fill all prescriptions: Yes  Was patient instructed to bring all medications to the follow-up visit: Yes  Is patient taking all medications as directed in the discharge summary? Yes  Does patient understand their discharge instructions: Yes  Does patient have questions or concerns that need addressed prior to 7-14 day follow up office visit: no    Additional needs identified to be addressed with provider  Per WENDIE Friedman states no hosp follow up required.  Will see Simona at next scheduled appointment 11/15/24.               Megan Gao MA

## 2024-10-15 NOTE — CARE COORDINATION
Care Transitions Note    Initial Call - Call within 2 business days of discharge: Yes    Patient Current Location:  Home: [address] [address]    Care Transition Nurse contacted Patient and Jose Saeed, by telephone to perform post hospital discharge assessment, verified Patient name and  as identifiers. Provided introduction to self, and explanation of Care Transition Nurse role.     Patient: Simona Anna    Patient : 1955   MRN: [id number]    Reason for Admission: Chest Pain (refused stress test)   Discharge Date: 10/14/24  RURS: No data recorded  Facility: [facility] 10/14/24    Last Discharge Facility       Date Complaint Diagnosis Description Type Department Provider    10/14/24 Chest Pain Chest pain, unspecified type ... ED to Hosp-Admission (Discharged) (ADMITTED) [facility] Cristino Penn MD; Deng Walker...   Was this an external facility discharge? No    Additional needs identified to be addressed with provider   Denies need       Method of communication with provider: none.    Patients top risk factors for readmission: medical condition-as above and chf, htn, dm, copd and medication management    Interventions to address risk factors:   Review of patient management of conditions/medications: AVS    Care Summary Note: Patient reports \"hanging in there\". Denies chest pain/pressure, sob, palps, orthopnea, ac distress. Noted to be speaking in complete, unlabored sentences. Reports she feels symptoms are related \"to my stomach\". Reports stomach acid, burning when swallowing. Denies choking episodes. Reports she follows w/ Dr Mercer-GI and plans to schedule appt--encouraged asap appt given symptoms. Reports taking Protonix as directed.  Advised blander diet until further advised by GI--avoid spicy foods and sitting upright when eating and at least 30 mins afterwards. Denies need for RD.   Denies need for cardiology appt. Reports she is non-smoker. Reports appetite, fluid

## 2024-10-22 ENCOUNTER — CARE COORDINATION (OUTPATIENT)
Dept: CASE MANAGEMENT | Age: 69
End: 2024-10-22

## 2024-10-22 NOTE — CARE COORDINATION
Care Transitions Note    Follow Up Call     Patient: Simona Anna                             Patient : 1955   MRN: [id number]                             Reason for Admission: Chest Pain (refused stress test)   Discharge Date: 10/14/24     RURS: No data recorded  Facility: [facility] 10/14/24    Patient Current Location:  Home: [address] [address]    Care Transition Nurse contacted patient by telephone. Verified name and  as identifiers.    Additional needs identified to be addressed with provider   Patient: Simona Anna                             Patient : 1955   MRN: [id number]                             Reason for Admission: Chest Pain (refused stress test)   Facility: [facility] 10/14/24    Dr Mercer:  Patient reports she has followed w/ you in the past.   Patient reports continued GERD like symptoms including stomach acid, heart burn, burping.   Patient on Protonix 40 mg bid however has only been taking qd-- advised her rx is written bid. Patient prescribed Carafate 1gm bid however has not been taking. Stressed importance of taking these meds as directed. Patient has however been taking Pepto quite frequently and now reports issue w/ constipation. Advised Patient to stop Pepto and take her prescribed Protonix, Carafate instead.     Please further advise Patient and schedule in office appt as she is in need of further rx education.     Thanks,  Salina Peña RN, [phone]         Method of communication with provider: chart routing Dr Mercer- GI.    Care Summary Note: Patient reports continued GERD like symptoms including stomach acid, heart burn, burping. Denies n/v/d.  Patient prescribed Protonix 40 mg bid however has only been taking qd-- advised her rx is written for twice daily. Patient also prescribed Carafate 1gm bid however has not been taking although reports \"I have a bunch of it\". Stressed importance of taking these meds as directed in mgt of

## 2024-10-23 ENCOUNTER — CARE COORDINATION (OUTPATIENT)
Dept: CASE MANAGEMENT | Age: 69
End: 2024-10-23

## 2024-10-23 NOTE — CARE COORDINATION
[phone]    1/20/2025 4:00 PM Sandra Ni MD Cardiology [phone]            Care Transition Nurse provided contact information.  Plan for follow-up call in 2-5 days based on severity of symptoms and risk factors.  Plan for next call: confirm GI appt scheduled, confirm she stopped Pepto, confirm taking Carafate and Protonix correctly, bms?, gi c/o?     Salina Peña RN

## 2024-10-29 ENCOUNTER — CARE COORDINATION (OUTPATIENT)
Dept: CASE MANAGEMENT | Age: 69
End: 2024-10-29

## 2024-10-29 NOTE — CARE COORDINATION
Care Transitions Note    Follow Up Call     Patient: Simona Anna                             Patient : 1955   MRN: [id number]                             Reason for Admission: Chest Pain (refused stress test)   Discharge Date: 10/14/24     RURS: No data recorded  Facility: [facility] 10/14/24     2nd attempt to reach for Care Transition follow up call unsuccessful as no answer, no vm options. UTR letter sent via [employer].     Outreach Attempts:   Mychart message sent.   Unable to leave message.     Care Summary Note: Was able to determine Patient has the following services--   Ladonna (\"Tequila\") [employer]  [phone]   Future HHA M-F 5 days week x 2 hrs each visit, home delivered meals, ERS thru [employer].   Shira Jiang-  Glenn STONE   Active w/ [facility] [phone] for weekly RN visits.       Follow Up Appointment:   Future Appointments         Provider Specialty Dept Phone    11/15/2024 9:45 AM Celia Nayak APRN - CNP Family Medicine [phone]    2024 3:15 PM Tony Toscano [facility] Neurology [phone]    2024 3:45 PM Tony Toscano [facility] Neurology [phone]    2025 4:00 PM Sandra Ni MD Cardiology [phone]            Plan for follow-up call in 2-5 days based on severity of symptoms and risk factors. Plan for next call: will make 3rd/final attempt to reach Patient later this week   confirm GI appt scheduled, confirm she stopped Pepto, confirm taking Carafate and Protonix correctly, bms?, gi c/o?     Salina Peña RN

## 2024-10-31 ENCOUNTER — CARE COORDINATION (OUTPATIENT)
Dept: CASE MANAGEMENT | Age: 69
End: 2024-10-31

## 2024-10-31 NOTE — CARE COORDINATION
Care Transitions Note    Follow Up Call     Patient: Simona Anna                             Patient : 1955   MRN: [id number]                             Reason for Admission: Chest Pain (refused stress test)   Discharge Date: 10/14/24     RURS: No data recorded  Facility: [facility] 10/14/24    3rd unsuccessful attempt to reach for Care Transition follow up call as no answer, no vm option and no return call from txtrt message.  CT program closed per protocol, no further outreach scheduled.       Outreach Attempts:   Unable to leave message.     Follow Up Appointment:   Future Appointments         Provider Specialty Dept Phone    2024 4:30 PM Celia Nayak APRN - CNP Family Medicine [phone]    2024 3:15 PM Tony Toscano [facility] Neurology [phone]    2024 3:45 PM Tony Toscano [facility] Neurology [phone]    2025 4:00 PM Sandra Ni MD Cardiology [phone]              Salina Peña RN

## 2024-11-05 DIAGNOSIS — E08.22 DIABETES MELLITUS DUE TO UNDERLYING CONDITION WITH CHRONIC KIDNEY DISEASE, WITHOUT LONG-TERM CURRENT USE OF INSULIN, UNSPECIFIED CKD STAGE (HCC): ICD-10-CM

## 2024-11-05 DIAGNOSIS — R60.0 BILATERAL EDEMA OF LOWER EXTREMITY: ICD-10-CM

## 2024-11-05 RX ORDER — EMPAGLIFLOZIN 10 MG/1
10 TABLET, FILM COATED ORAL DAILY
Qty: 30 TABLET | Refills: 5 | Status: SHIPPED | OUTPATIENT
Start: 2024-11-05

## 2024-11-05 RX ORDER — LOSARTAN POTASSIUM 50 MG/1
50 TABLET ORAL DAILY
Qty: 30 TABLET | Refills: 5 | Status: SHIPPED | OUTPATIENT
Start: 2024-11-05

## 2024-11-05 RX ORDER — FUROSEMIDE 20 MG/1
20 TABLET ORAL DAILY
Qty: 30 TABLET | Refills: 5 | Status: SHIPPED | OUTPATIENT
Start: 2024-11-05

## 2024-11-08 ENCOUNTER — TELEPHONE (OUTPATIENT)
Dept: FAMILY MEDICINE CLINIC | Age: 69
End: 2024-11-08

## 2024-11-08 NOTE — TELEPHONE ENCOUNTER
Right ear flared up, pain, drainage, went to Urgent care was given ear drops and return in 3 days if no better, otherwise, she will be in for her appointment 11/18 for DM follow up.    Pt states that her B/S is up from the steroid ear drops.    Celia, please note.

## 2024-11-18 ENCOUNTER — OFFICE VISIT (OUTPATIENT)
Dept: FAMILY MEDICINE CLINIC | Age: 69
End: 2024-11-18
Payer: MEDICARE

## 2024-11-18 VITALS
SYSTOLIC BLOOD PRESSURE: 128 MMHG | RESPIRATION RATE: 18 BRPM | BODY MASS INDEX: 32.37 KG/M2 | WEIGHT: 177 LBS | DIASTOLIC BLOOD PRESSURE: 74 MMHG | TEMPERATURE: 97 F | OXYGEN SATURATION: 97 % | HEART RATE: 90 BPM

## 2024-11-18 DIAGNOSIS — E11.42 TYPE 2 DIABETES MELLITUS WITH DIABETIC POLYNEUROPATHY, WITH LONG-TERM CURRENT USE OF INSULIN (HCC): ICD-10-CM

## 2024-11-18 DIAGNOSIS — R42 DIZZINESS: ICD-10-CM

## 2024-11-18 DIAGNOSIS — H92.01 CHRONIC RIGHT EAR PAIN: Primary | ICD-10-CM

## 2024-11-18 DIAGNOSIS — Z76.0 MEDICATION REFILL: ICD-10-CM

## 2024-11-18 DIAGNOSIS — G89.29 CHRONIC RIGHT EAR PAIN: Primary | ICD-10-CM

## 2024-11-18 DIAGNOSIS — H91.91 HEARING LOSS OF RIGHT EAR, UNSPECIFIED HEARING LOSS TYPE: ICD-10-CM

## 2024-11-18 DIAGNOSIS — E11.42 POLYNEUROPATHY DUE TO TYPE 2 DIABETES MELLITUS (HCC): ICD-10-CM

## 2024-11-18 DIAGNOSIS — Z79.4 TYPE 2 DIABETES MELLITUS WITH DIABETIC POLYNEUROPATHY, WITH LONG-TERM CURRENT USE OF INSULIN (HCC): ICD-10-CM

## 2024-11-18 LAB — HBA1C MFR BLD: 7.4 %

## 2024-11-18 PROCEDURE — 3017F COLORECTAL CA SCREEN DOC REV: CPT | Performed by: NURSE PRACTITIONER

## 2024-11-18 PROCEDURE — 1124F ACP DISCUSS-NO DSCNMKR DOCD: CPT | Performed by: NURSE PRACTITIONER

## 2024-11-18 PROCEDURE — 2022F DILAT RTA XM EVC RTNOPTHY: CPT | Performed by: NURSE PRACTITIONER

## 2024-11-18 PROCEDURE — 1126F AMNT PAIN NOTED NONE PRSNT: CPT | Performed by: NURSE PRACTITIONER

## 2024-11-18 PROCEDURE — G8484 FLU IMMUNIZE NO ADMIN: HCPCS | Performed by: NURSE PRACTITIONER

## 2024-11-18 PROCEDURE — 1125F AMNT PAIN NOTED PAIN PRSNT: CPT | Performed by: NURSE PRACTITIONER

## 2024-11-18 PROCEDURE — 1090F PRES/ABSN URINE INCON ASSESS: CPT | Performed by: NURSE PRACTITIONER

## 2024-11-18 PROCEDURE — 99214 OFFICE O/P EST MOD 30 MIN: CPT | Performed by: NURSE PRACTITIONER

## 2024-11-18 PROCEDURE — 3078F DIAST BP <80 MM HG: CPT | Performed by: NURSE PRACTITIONER

## 2024-11-18 PROCEDURE — G8427 DOCREV CUR MEDS BY ELIG CLIN: HCPCS | Performed by: NURSE PRACTITIONER

## 2024-11-18 PROCEDURE — 83036 HEMOGLOBIN GLYCOSYLATED A1C: CPT | Performed by: NURSE PRACTITIONER

## 2024-11-18 PROCEDURE — 3074F SYST BP LT 130 MM HG: CPT | Performed by: NURSE PRACTITIONER

## 2024-11-18 PROCEDURE — 1036F TOBACCO NON-USER: CPT | Performed by: NURSE PRACTITIONER

## 2024-11-18 PROCEDURE — 3051F HG A1C>EQUAL 7.0%<8.0%: CPT | Performed by: NURSE PRACTITIONER

## 2024-11-18 PROCEDURE — G8400 PT W/DXA NO RESULTS DOC: HCPCS | Performed by: NURSE PRACTITIONER

## 2024-11-18 PROCEDURE — G8417 CALC BMI ABV UP PARAM F/U: HCPCS | Performed by: NURSE PRACTITIONER

## 2024-11-18 RX ORDER — GABAPENTIN 600 MG/1
TABLET ORAL
Qty: 90 TABLET | Refills: 3 | Status: SHIPPED | OUTPATIENT
Start: 2024-11-18 | End: 2025-03-25

## 2024-11-18 RX ORDER — MECLIZINE HCL 12.5 MG 12.5 MG/1
12.5 TABLET ORAL 3 TIMES DAILY PRN
Qty: 15 TABLET | Refills: 0 | Status: SHIPPED | OUTPATIENT
Start: 2024-11-18 | End: 2024-11-28

## 2024-11-18 RX ORDER — CEFDINIR 300 MG/1
300 CAPSULE ORAL 2 TIMES DAILY
COMMUNITY
Start: 2024-11-13

## 2024-11-18 RX ORDER — NEOMYCIN SULFATE, POLYMYXIN B SULFATE AND HYDROCORTISONE 10; 3.5; 1 MG/ML; MG/ML; [USP'U]/ML
4 SUSPENSION/ DROPS AURICULAR (OTIC) 3 TIMES DAILY
COMMUNITY
Start: 2024-11-06

## 2024-11-18 SDOH — ECONOMIC STABILITY: FOOD INSECURITY: WITHIN THE PAST 12 MONTHS, THE FOOD YOU BOUGHT JUST DIDN'T LAST AND YOU DIDN'T HAVE MONEY TO GET MORE.: NEVER TRUE

## 2024-11-18 SDOH — ECONOMIC STABILITY: INCOME INSECURITY: HOW HARD IS IT FOR YOU TO PAY FOR THE VERY BASICS LIKE FOOD, HOUSING, MEDICAL CARE, AND HEATING?: NOT VERY HARD

## 2024-11-18 SDOH — ECONOMIC STABILITY: FOOD INSECURITY: WITHIN THE PAST 12 MONTHS, YOU WORRIED THAT YOUR FOOD WOULD RUN OUT BEFORE YOU GOT MONEY TO BUY MORE.: NEVER TRUE

## 2024-11-18 ASSESSMENT — ENCOUNTER SYMPTOMS
RHINORRHEA: 0
WHEEZING: 0
SHORTNESS OF BREATH: 0
CHEST TIGHTNESS: 0
COUGH: 0
SINUS PAIN: 0
SINUS PRESSURE: 0

## 2024-11-18 NOTE — PROGRESS NOTES
Simona Anna  1955  69 y.o.    SUBJECT MARCIA:    Chief Complaint   Patient presents with    Follow-up     [facility] last week    Ear Pain     Right >> Left ear hurts, stabbing pain. Antibiotics don't seem to help.    Dizziness     Not balanced, feel like about to fall but catch myself.       HUNTER Jarvis is a 69 year old female who is in with hearing problems and dizziness. She states she went to Urgent Care on 11/5/24 for ear pain. She went back on 11/1324 because of continue pain. She was given antibiotics and antibiotic ear drop. She states the pain is worse and her hearing is impaired. She denies fevers, chills or sweats. She she has an irritated throat but not sore.     Diabetes  She states she has been compliant with taking medications for diabetes and really watching diet. HgA1C today is 7.7%. She takes Jardiance and Tresiba.     Ear pain  She complains of right ear hurting more than left. She states she was treated by an urgent care and prescribed antibodies which have not helped. She is concerned because of hearing loss.    Current Outpatient Medications on File Prior to Visit   Medication Sig Dispense Refill    cefdinir (OMNICEF) 300 MG capsule Take 1 capsule by mouth 2 times daily      neomycin-polymyxin-hydrocortisone (CORTISPORIN) 3.5-12674-8 otic suspension Place 4 drops into the right ear 3 times daily      losartan (COZAAR) 50 MG tablet Take 1 tablet by mouth daily 30 tablet 5    furosemide (LASIX) 20 MG tablet Take 1 tablet by mouth daily 30 tablet 5    JARDIANCE 10 MG tablet TAKE ONE (1) TABLET BY MOUTH ONCE DAILY 30 tablet 5    Insulin Degludec (TRESIBA FLEXTOUCH) 100 UNIT/ML SOPN INJECT TWENTY (20) UNITS SUBCUTANEOUSLY EVERY MORNING AND AT BEDTIME 5 Adjustable Dose Pre-filled Pen Syringe 3    insulin lispro, 1 Unit Dial, (HUMALOG/ADMELOG) 100 UNIT/ML SOPN INJECT 15 UNITS SUBQ TID BEFORE MEALS: [phone] 5

## 2024-11-19 ENCOUNTER — TELEPHONE (OUTPATIENT)
Dept: NEUROLOGY | Age: 69
End: 2024-11-19

## 2024-11-19 ASSESSMENT — ENCOUNTER SYMPTOMS: NAUSEA: 0

## 2024-11-19 NOTE — TELEPHONE ENCOUNTER
Botox in refrigerator and pt had not come to office for scheduled visits. Botox vial 200 units lot# [id number] exp: 5/26 NDC: 1197-1662-85 and Botox vial 200 units lot# [id number] exp: 2/26 NDC: 4019-4515-97 discarded due to discontinuation of treatment. Witnessed per Yesenia Pandya.

## 2024-11-21 ENCOUNTER — OFFICE VISIT (OUTPATIENT)
Dept: NEUROLOGY | Age: 69
End: 2024-11-21
Payer: MEDICARE

## 2024-11-21 VITALS
BODY MASS INDEX: 32.34 KG/M2 | OXYGEN SATURATION: 97 % | DIASTOLIC BLOOD PRESSURE: 88 MMHG | SYSTOLIC BLOOD PRESSURE: 142 MMHG | WEIGHT: 176.8 LBS | HEART RATE: 68 BPM

## 2024-11-21 DIAGNOSIS — G25.81 RLS (RESTLESS LEGS SYNDROME): ICD-10-CM

## 2024-11-21 DIAGNOSIS — G43.009 MIGRAINE WITHOUT AURA, NOT REFRACTORY: ICD-10-CM

## 2024-11-21 DIAGNOSIS — G25.81 RESTLESS LEG SYNDROME: Primary | ICD-10-CM

## 2024-11-21 DIAGNOSIS — F44.5 PSYCHOGENIC NONEPILEPTIC SEIZURE: ICD-10-CM

## 2024-11-21 PROCEDURE — 1090F PRES/ABSN URINE INCON ASSESS: CPT | Performed by: NURSE PRACTITIONER

## 2024-11-21 PROCEDURE — G8484 FLU IMMUNIZE NO ADMIN: HCPCS | Performed by: NURSE PRACTITIONER

## 2024-11-21 PROCEDURE — 99213 OFFICE O/P EST LOW 20 MIN: CPT | Performed by: NURSE PRACTITIONER

## 2024-11-21 PROCEDURE — 1159F MED LIST DOCD IN RCRD: CPT | Performed by: NURSE PRACTITIONER

## 2024-11-21 PROCEDURE — G8417 CALC BMI ABV UP PARAM F/U: HCPCS | Performed by: NURSE PRACTITIONER

## 2024-11-21 PROCEDURE — 3017F COLORECTAL CA SCREEN DOC REV: CPT | Performed by: NURSE PRACTITIONER

## 2024-11-21 PROCEDURE — 3077F SYST BP >= 140 MM HG: CPT | Performed by: NURSE PRACTITIONER

## 2024-11-21 PROCEDURE — 1124F ACP DISCUSS-NO DSCNMKR DOCD: CPT | Performed by: NURSE PRACTITIONER

## 2024-11-21 PROCEDURE — 1036F TOBACCO NON-USER: CPT | Performed by: NURSE PRACTITIONER

## 2024-11-21 PROCEDURE — G8427 DOCREV CUR MEDS BY ELIG CLIN: HCPCS | Performed by: NURSE PRACTITIONER

## 2024-11-21 PROCEDURE — G8400 PT W/DXA NO RESULTS DOC: HCPCS | Performed by: NURSE PRACTITIONER

## 2024-11-21 PROCEDURE — 3079F DIAST BP 80-89 MM HG: CPT | Performed by: NURSE PRACTITIONER

## 2024-11-21 RX ORDER — CARBIDOPA/LEVODOPA 10MG-100MG
1 TABLET ORAL NIGHTLY
Qty: 90 TABLET | Refills: 3 | Status: SHIPPED | OUTPATIENT
Start: 2024-11-21

## 2024-11-21 RX ORDER — DIVALPROEX SODIUM 500 MG/1
500 TABLET, DELAYED RELEASE ORAL 2 TIMES DAILY
Qty: 180 TABLET | Refills: 3 | Status: SHIPPED | OUTPATIENT
Start: 2024-11-21

## 2024-11-21 RX ORDER — UBROGEPANT 100 MG/1
100 TABLET ORAL PRN
Qty: 10 TABLET | Refills: 2 | Status: SHIPPED | OUTPATIENT
Start: 2024-11-21

## 2024-11-21 NOTE — PROGRESS NOTES
MG per tablet      3. Migraine without aura, not refractory  divalproex (DEPAKOTE) 500 MG DR tablet    Ubrogepant (UBRELVY) 100 MG TABS      4. Psychogenic nonepileptic seizure        Simona was seen in neurological follow up in regards to migraine, RLS, neuropathy, history of pseudoseizures.   -She is maintained on carbidopa levodopa for RLS, Depakote 500 mg twice daily for management of her mood, migraine, PNES.  She has Ubrelvy on hand for abortive therapy.  -Refills provided, we will see her back in 1 year or sooner if needed.    Return in about 1 year (around 11/21/2025).    Tony Toscano, APRN - CNP

## 2024-12-04 ENCOUNTER — TELEPHONE (OUTPATIENT)
Dept: CARDIOLOGY CLINIC | Age: 69
End: 2024-12-04

## 2025-01-07 DIAGNOSIS — G43.009 MIGRAINE WITHOUT AURA, NOT REFRACTORY: ICD-10-CM

## 2025-01-07 RX ORDER — UBROGEPANT 100 MG/1
TABLET ORAL
Qty: 10 TABLET | Refills: 0 | OUTPATIENT
Start: 2025-01-07

## 2025-01-08 ENCOUNTER — TELEPHONE (OUTPATIENT)
Age: 70
End: 2025-01-08

## 2025-01-08 DIAGNOSIS — G43.009 MIGRAINE WITHOUT AURA, NOT REFRACTORY: ICD-10-CM

## 2025-01-09 DIAGNOSIS — G43.009 MIGRAINE WITHOUT AURA, NOT REFRACTORY: ICD-10-CM

## 2025-01-09 RX ORDER — UBROGEPANT 100 MG/1
TABLET ORAL
Qty: 10 TABLET | Refills: 0 | OUTPATIENT
Start: 2025-01-09

## 2025-01-13 RX ORDER — UBROGEPANT 100 MG/1
100 TABLET ORAL PRN
Qty: 10 TABLET | Refills: 2 | Status: SHIPPED | OUTPATIENT
Start: 2025-01-13

## 2025-01-15 DIAGNOSIS — R60.0 BILATERAL EDEMA OF LOWER EXTREMITY: ICD-10-CM

## 2025-01-15 NOTE — TELEPHONE ENCOUNTER
Pt needs potassium  chloride refill    [facility] [address]

## 2025-01-16 RX ORDER — POTASSIUM CHLORIDE 1500 MG/1
TABLET, EXTENDED RELEASE ORAL
Qty: 60 TABLET | Refills: 5 | Status: SHIPPED | OUTPATIENT
Start: 2025-01-16

## 2025-01-20 ENCOUNTER — OFFICE VISIT (OUTPATIENT)
Dept: CARDIOLOGY CLINIC | Age: 70
End: 2025-01-20
Payer: MEDICARE

## 2025-01-20 VITALS — WEIGHT: 177 LBS | HEIGHT: 62 IN | BODY MASS INDEX: 32.57 KG/M2

## 2025-01-20 DIAGNOSIS — R42 DIZZINESS: Primary | ICD-10-CM

## 2025-01-20 DIAGNOSIS — R60.0 BILATERAL EDEMA OF LOWER EXTREMITY: ICD-10-CM

## 2025-01-20 PROCEDURE — 1124F ACP DISCUSS-NO DSCNMKR DOCD: CPT | Performed by: INTERNAL MEDICINE

## 2025-01-20 PROCEDURE — G8417 CALC BMI ABV UP PARAM F/U: HCPCS | Performed by: INTERNAL MEDICINE

## 2025-01-20 PROCEDURE — 1159F MED LIST DOCD IN RCRD: CPT | Performed by: INTERNAL MEDICINE

## 2025-01-20 PROCEDURE — 1090F PRES/ABSN URINE INCON ASSESS: CPT | Performed by: INTERNAL MEDICINE

## 2025-01-20 PROCEDURE — 1036F TOBACCO NON-USER: CPT | Performed by: INTERNAL MEDICINE

## 2025-01-20 PROCEDURE — 3017F COLORECTAL CA SCREEN DOC REV: CPT | Performed by: INTERNAL MEDICINE

## 2025-01-20 PROCEDURE — G8400 PT W/DXA NO RESULTS DOC: HCPCS | Performed by: INTERNAL MEDICINE

## 2025-01-20 PROCEDURE — G8427 DOCREV CUR MEDS BY ELIG CLIN: HCPCS | Performed by: INTERNAL MEDICINE

## 2025-01-20 PROCEDURE — 99214 OFFICE O/P EST MOD 30 MIN: CPT | Performed by: INTERNAL MEDICINE

## 2025-01-20 RX ORDER — NIFEDIPINE 30 MG/1
30 TABLET, EXTENDED RELEASE ORAL DAILY
Qty: 90 TABLET | Refills: 3 | Status: SHIPPED | OUTPATIENT
Start: 2025-01-20

## 2025-01-20 RX ORDER — POTASSIUM CHLORIDE 1500 MG/1
TABLET, EXTENDED RELEASE ORAL
Qty: 60 TABLET | Refills: 5 | Status: SHIPPED | OUTPATIENT
Start: 2025-01-20

## 2025-01-20 NOTE — PROGRESS NOTES
CARDIOLOGY NOTE      1/20/2025    RE: Simona Anna  (1955)                               TO:  Celia Garcia, APRN - CNP            CHIEF COMPLAINT   Simona is a 69 y.o. female who was seen today for management of  htn                                    HPI:   Patient is here for    - Hypertension,is  well controlled, pt is  compliant with medicines  - Hyperlipidimea, lipids are in acceptable range. Pt  is  compliant with medicines  - Diabetes mellitus, blood glucose level is  well controlled. Pt is compliant with meds and diet                The patient does have cardiac complaints of recurrent CP , has Multiple Cardiac risk factors  Patient was in the hospital underwent left cardiac cath and also the IFR of the left anterior descending artery which showed that the IFR was normal suggestive of nonsignificant hemodynamically nonsignificant stenosis    Has LLE #    Has dizzines      Simona Anna has the following history recorded in care path:  Patient Active Problem List    Diagnosis Date Noted    Severe episode of recurrent major depressive disorder, with psychotic features (HCC) 09/04/2020    Auditory hallucinations 09/04/2020    Abnormal nuclear stress test 08/15/2017    Dyslipidemia     Family history of early CAD     Parkinson's disease (HCC) 03/14/2023    Claudication (HCC) 02/27/2023    Pseudoseizures 07/13/2021    Panic attacks 07/13/2021    CHEKO (generalized anxiety disorder) 09/04/2020    Chest pain 10/14/2024    Chest pain due to CAD (HCC) 10/14/2024    Leg swelling 01/25/2024    Nocturnal oxygen desaturation 06/07/2023    Atypical chest pain 05/25/2023    Acute bronchitis 05/25/2023    Left sided numbness     Hypothyroidism 03/29/2022    Tremor 03/29/2022    Anxiety disorder 03/29/2022    Stroke-like symptoms 03/29/2022    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

## 2025-01-21 DIAGNOSIS — E03.9 HYPOTHYROIDISM, UNSPECIFIED TYPE: ICD-10-CM

## 2025-01-21 DIAGNOSIS — J30.9 ALLERGIC RHINITIS, UNSPECIFIED SEASONALITY, UNSPECIFIED TRIGGER: ICD-10-CM

## 2025-01-21 DIAGNOSIS — Z76.0 MEDICATION REFILL: ICD-10-CM

## 2025-01-21 RX ORDER — MONTELUKAST SODIUM 10 MG/1
TABLET ORAL
Qty: 90 TABLET | Refills: 3 | Status: SHIPPED | OUTPATIENT
Start: 2025-01-21

## 2025-01-21 RX ORDER — LEVOTHYROXINE SODIUM 75 UG/1
TABLET ORAL
Qty: 30 TABLET | Refills: 3 | Status: SHIPPED | OUTPATIENT
Start: 2025-01-21

## 2025-01-27 ENCOUNTER — OFFICE VISIT (OUTPATIENT)
Dept: FAMILY MEDICINE CLINIC | Age: 70
End: 2025-01-27
Payer: MEDICARE

## 2025-01-27 VITALS
RESPIRATION RATE: 18 BRPM | BODY MASS INDEX: 32.01 KG/M2 | DIASTOLIC BLOOD PRESSURE: 72 MMHG | TEMPERATURE: 98 F | WEIGHT: 175 LBS | SYSTOLIC BLOOD PRESSURE: 128 MMHG | OXYGEN SATURATION: 98 % | HEART RATE: 80 BPM

## 2025-01-27 DIAGNOSIS — R30.0 DYSURIA: ICD-10-CM

## 2025-01-27 DIAGNOSIS — R35.0 URINARY FREQUENCY: Primary | ICD-10-CM

## 2025-01-27 DIAGNOSIS — R39.15 URGENCY OF URINATION: ICD-10-CM

## 2025-01-27 DIAGNOSIS — Z79.4 TYPE 2 DIABETES MELLITUS WITH DIABETIC POLYNEUROPATHY, WITH LONG-TERM CURRENT USE OF INSULIN (HCC): ICD-10-CM

## 2025-01-27 DIAGNOSIS — E11.42 TYPE 2 DIABETES MELLITUS WITH DIABETIC POLYNEUROPATHY, WITH LONG-TERM CURRENT USE OF INSULIN (HCC): ICD-10-CM

## 2025-01-27 LAB
BILIRUBIN, POC: NEGATIVE
BLOOD URINE, POC: NEGATIVE
CLARITY, POC: NORMAL
COLOR, POC: NORMAL
GLUCOSE URINE, POC: NORMAL MG/DL
KETONES, POC: NEGATIVE MG/DL
LEUKOCYTE EST, POC: NEGATIVE
NITRITE, POC: NEGATIVE
PH, POC: 5
PROTEIN, POC: NEGATIVE MG/DL
SPECIFIC GRAVITY, POC: 1.01
UROBILINOGEN, POC: 0.2 MG/DL

## 2025-01-27 PROCEDURE — G8417 CALC BMI ABV UP PARAM F/U: HCPCS | Performed by: NURSE PRACTITIONER

## 2025-01-27 PROCEDURE — G8400 PT W/DXA NO RESULTS DOC: HCPCS | Performed by: NURSE PRACTITIONER

## 2025-01-27 PROCEDURE — 1090F PRES/ABSN URINE INCON ASSESS: CPT | Performed by: NURSE PRACTITIONER

## 2025-01-27 PROCEDURE — 1159F MED LIST DOCD IN RCRD: CPT | Performed by: NURSE PRACTITIONER

## 2025-01-27 PROCEDURE — 1124F ACP DISCUSS-NO DSCNMKR DOCD: CPT | Performed by: NURSE PRACTITIONER

## 2025-01-27 PROCEDURE — G8427 DOCREV CUR MEDS BY ELIG CLIN: HCPCS | Performed by: NURSE PRACTITIONER

## 2025-01-27 PROCEDURE — 3078F DIAST BP <80 MM HG: CPT | Performed by: NURSE PRACTITIONER

## 2025-01-27 PROCEDURE — 1160F RVW MEDS BY RX/DR IN RCRD: CPT | Performed by: NURSE PRACTITIONER

## 2025-01-27 PROCEDURE — 99214 OFFICE O/P EST MOD 30 MIN: CPT | Performed by: NURSE PRACTITIONER

## 2025-01-27 PROCEDURE — 81002 URINALYSIS NONAUTO W/O SCOPE: CPT | Performed by: NURSE PRACTITIONER

## 2025-01-27 PROCEDURE — 2022F DILAT RTA XM EVC RTNOPTHY: CPT | Performed by: NURSE PRACTITIONER

## 2025-01-27 PROCEDURE — 3074F SYST BP LT 130 MM HG: CPT | Performed by: NURSE PRACTITIONER

## 2025-01-27 PROCEDURE — 1125F AMNT PAIN NOTED PAIN PRSNT: CPT | Performed by: NURSE PRACTITIONER

## 2025-01-27 PROCEDURE — 3046F HEMOGLOBIN A1C LEVEL >9.0%: CPT | Performed by: NURSE PRACTITIONER

## 2025-01-27 PROCEDURE — 3017F COLORECTAL CA SCREEN DOC REV: CPT | Performed by: NURSE PRACTITIONER

## 2025-01-27 PROCEDURE — 1036F TOBACCO NON-USER: CPT | Performed by: NURSE PRACTITIONER

## 2025-01-27 RX ORDER — MONTELUKAST SODIUM 10 MG/1
10 TABLET ORAL NIGHTLY
COMMUNITY
Start: 2024-12-03 | End: 2025-01-27 | Stop reason: CLARIF

## 2025-01-27 RX ORDER — GLUCOSAMINE HCL/CHONDROITIN SU 500-400 MG
CAPSULE ORAL
Qty: 300 EACH | Refills: 11 | Status: SHIPPED | OUTPATIENT
Start: 2025-01-27

## 2025-01-27 RX ORDER — LOPERAMIDE HYDROCHLORIDE 2 MG/1
2 TABLET ORAL PRN
COMMUNITY
Start: 2024-12-03

## 2025-01-27 RX ORDER — DOCUSATE SODIUM 100 MG/1
100 CAPSULE, LIQUID FILLED ORAL 2 TIMES DAILY
COMMUNITY
Start: 2024-12-03

## 2025-01-27 RX ORDER — VITAMIN E (DL,TOCOPHERYL ACET) 45 MG/0.25
1 DROPS ORAL DAILY
COMMUNITY
Start: 2024-12-03

## 2025-01-27 RX ORDER — ASPIRIN 81 MG/1
81 TABLET ORAL DAILY
COMMUNITY
Start: 2024-12-03

## 2025-01-27 ASSESSMENT — PATIENT HEALTH QUESTIONNAIRE - PHQ9
2. FEELING DOWN, DEPRESSED OR HOPELESS: SEVERAL DAYS
4. FEELING TIRED OR HAVING LITTLE ENERGY: NEARLY EVERY DAY
SUM OF ALL RESPONSES TO PHQ QUESTIONS 1-9: 10
10. IF YOU CHECKED OFF ANY PROBLEMS, HOW DIFFICULT HAVE THESE PROBLEMS MADE IT FOR YOU TO DO YOUR WORK, TAKE CARE OF THINGS AT HOME, OR GET ALONG WITH OTHER PEOPLE: VERY DIFFICULT
1. LITTLE INTEREST OR PLEASURE IN DOING THINGS: NOT AT ALL
SUM OF ALL RESPONSES TO PHQ QUESTIONS 1-9: 10
5. POOR APPETITE OR OVEREATING: NEARLY EVERY DAY
9. THOUGHTS THAT YOU WOULD BE BETTER OFF DEAD, OR OF HURTING YOURSELF: NOT AT ALL
SUM OF ALL RESPONSES TO PHQ9 QUESTIONS 1 & 2: 1
3. TROUBLE FALLING OR STAYING ASLEEP: NEARLY EVERY DAY
6. FEELING BAD ABOUT YOURSELF - OR THAT YOU ARE A FAILURE OR HAVE LET YOURSELF OR YOUR FAMILY DOWN: NOT AT ALL
SUM OF ALL RESPONSES TO PHQ QUESTIONS 1-9: 10
7. TROUBLE CONCENTRATING ON THINGS, SUCH AS READING THE NEWSPAPER OR WATCHING TELEVISION: NOT AT ALL
8. MOVING OR SPEAKING SO SLOWLY THAT OTHER PEOPLE COULD HAVE NOTICED. OR THE OPPOSITE, BEING SO FIGETY OR RESTLESS THAT YOU HAVE BEEN MOVING AROUND A LOT MORE THAN USUAL: NOT AT ALL
SUM OF ALL RESPONSES TO PHQ QUESTIONS 1-9: 10

## 2025-01-27 ASSESSMENT — ENCOUNTER SYMPTOMS
SHORTNESS OF BREATH: 0
CHOKING: 1
TROUBLE SWALLOWING: 1
CHEST TIGHTNESS: 0
COUGH: 0
WHEEZING: 0
ABDOMINAL PAIN: 1
NAUSEA: 0

## 2025-01-27 NOTE — PROGRESS NOTES
MG extended release tablet Take 1 tablet by mouth 2 times daily (Patient not taking: Reported on 1/20/2025) 30 tablet 1    [DISCONTINUED] sodium chloride 1 g tablet Take 1 tablet by mouth 2 times daily (with meals) 90 tablet 3     No current facility-administered medications on file prior to visit.       Past Medical History:   Diagnosis Date    Abnormal EKG 04/22/2014    Acid reflux     Anemia     Anesthesia     Nausea/Vomiting Post Op In Past    Anginal pain (HCC)     Denies Chest Pain At This Time    Anxiety     Arthritis     \"All Over\"    Asthma     Bipolar 1 disorder (HCC)     Cerebral artery occlusion with cerebral infarction (HCC)     CHF (congestive heart failure) (HCC)     Chronic back pain     Chronic kidney disease     DDD (degenerative disc disease), cervical     12- Patient reports she was dx with DDD of Cerival spine C6,C7    Depression     Diabetes mellitus (HCC) Dx 1990's    Diabetic neuropathy (HCC)     \"In My Legs And Feet\"    Dizziness     \"Sometimes\"    Dry skin     Enlarged ureter     Right Side    Fatty liver     Fibrocystic breast     Generalized anxiety disorder     Gout     Pt states she was diagnosed with gout in the past few months.    H/O cardiac catheterization     Showed mild disease per last cath.    H/O cardiovascular stress test 03/15/2010    EF 69%, normal perfusion study except for diaphragmatic artifact, uniform wall motion.    H/O cardiovascular stress test 10/09/2008    EF 60%, no anginia, normal study.    H/O cardiovascular stress test 05/06/2014    EF 66%, no ischemia, normal LV systolic funciton, normal perfusion pattern.     H/O Doppler ultrasound 02/28/2011    CAROTID DOPPLER-normal study.    H/O echocardiogram 05/06/2014    Ef >55%. Impaired LV relaxation.    H/O echocardiogram 10/14/2015    EF 60% Normal LV and systolic function. No significant valvulopathy seen.     H/O percutaneous left heart catheterization 05/25/2023    Mild MID LAD stenosis 30%, IFR 0.97,0.96,

## 2025-01-28 ENCOUNTER — TELEPHONE (OUTPATIENT)
Dept: CARDIOLOGY CLINIC | Age: 70
End: 2025-01-28

## 2025-01-28 LAB — BACTERIA UR CULT: NORMAL

## 2025-02-05 ENCOUNTER — HOSPITAL ENCOUNTER (OUTPATIENT)
Age: 70
Discharge: HOME | End: 2025-02-05
Payer: MEDICARE

## 2025-02-05 DIAGNOSIS — I10 ESSENTIAL HYPERTENSION: ICD-10-CM

## 2025-02-05 DIAGNOSIS — E03.9 HYPOTHYROIDISM, UNSPECIFIED TYPE: ICD-10-CM

## 2025-02-05 DIAGNOSIS — E78.5 HYPERLIPIDEMIA, UNSPECIFIED HYPERLIPIDEMIA TYPE: ICD-10-CM

## 2025-02-05 LAB
CHOLEST SERPL-MCNC: 147 MG/DL (ref 125–199)
HDLC SERPL-MCNC: 53 MG/DL
LDLC SERPL CALC-MCNC: 62 MG/DL
TRIGL SERPL-MCNC: 162 MG/DL
TSH SERPL DL<=0.05 MIU/L-ACNC: 1.42 UIU/ML (ref 0.27–4.2)

## 2025-02-05 PROCEDURE — 84443 ASSAY THYROID STIM HORMONE: CPT

## 2025-02-05 PROCEDURE — 80061 LIPID PANEL: CPT

## 2025-02-10 ENCOUNTER — TELEPHONE (OUTPATIENT)
Dept: CARDIOLOGY CLINIC | Age: 70
End: 2025-02-10

## 2025-02-19 ENCOUNTER — OFFICE VISIT (OUTPATIENT)
Dept: FAMILY MEDICINE CLINIC | Age: 70
End: 2025-02-19

## 2025-02-19 VITALS
RESPIRATION RATE: 18 BRPM | OXYGEN SATURATION: 97 % | WEIGHT: 175.8 LBS | HEART RATE: 80 BPM | TEMPERATURE: 97 F | DIASTOLIC BLOOD PRESSURE: 74 MMHG | HEIGHT: 62 IN | BODY MASS INDEX: 32.35 KG/M2 | SYSTOLIC BLOOD PRESSURE: 132 MMHG

## 2025-02-19 VITALS
OXYGEN SATURATION: 97 % | SYSTOLIC BLOOD PRESSURE: 132 MMHG | HEART RATE: 80 BPM | TEMPERATURE: 97 F | WEIGHT: 175.8 LBS | RESPIRATION RATE: 18 BRPM | DIASTOLIC BLOOD PRESSURE: 74 MMHG | HEIGHT: 62 IN | BODY MASS INDEX: 32.35 KG/M2

## 2025-02-19 DIAGNOSIS — E11.42 POLYNEUROPATHY DUE TO TYPE 2 DIABETES MELLITUS (HCC): ICD-10-CM

## 2025-02-19 DIAGNOSIS — Z78.0 POST-MENOPAUSAL: ICD-10-CM

## 2025-02-19 DIAGNOSIS — J31.0 OTHER RHINITIS: ICD-10-CM

## 2025-02-19 DIAGNOSIS — Z76.0 MEDICATION REFILL: ICD-10-CM

## 2025-02-19 DIAGNOSIS — Z91.81 HISTORY OF FALL: ICD-10-CM

## 2025-02-19 DIAGNOSIS — G89.29 CHRONIC BREAST PAIN: ICD-10-CM

## 2025-02-19 DIAGNOSIS — R68.89 FLU-LIKE SYMPTOMS: Primary | ICD-10-CM

## 2025-02-19 DIAGNOSIS — Z00.00 INITIAL MEDICARE ANNUAL WELLNESS VISIT: Primary | ICD-10-CM

## 2025-02-19 DIAGNOSIS — N64.4 CHRONIC BREAST PAIN: ICD-10-CM

## 2025-02-19 DIAGNOSIS — Z12.31 SCREENING MAMMOGRAM FOR BREAST CANCER: ICD-10-CM

## 2025-02-19 DIAGNOSIS — J30.9 ALLERGIC RHINITIS, UNSPECIFIED SEASONALITY, UNSPECIFIED TRIGGER: ICD-10-CM

## 2025-02-19 DIAGNOSIS — E03.9 HYPOTHYROIDISM, UNSPECIFIED TYPE: ICD-10-CM

## 2025-02-19 DIAGNOSIS — Z79.4 TYPE 2 DIABETES MELLITUS WITH DIABETIC POLYNEUROPATHY, WITH LONG-TERM CURRENT USE OF INSULIN (HCC): ICD-10-CM

## 2025-02-19 DIAGNOSIS — J02.9 SORE THROAT: ICD-10-CM

## 2025-02-19 DIAGNOSIS — E11.42 TYPE 2 DIABETES MELLITUS WITH DIABETIC POLYNEUROPATHY, WITH LONG-TERM CURRENT USE OF INSULIN (HCC): ICD-10-CM

## 2025-02-19 LAB
HBA1C MFR BLD: 8.6 %
S PYO AG THROAT QL: NORMAL

## 2025-02-19 RX ORDER — MAGNESIUM OXIDE 400 MG/1
400 TABLET ORAL 3 TIMES DAILY
Qty: 90 TABLET | Refills: 0 | Status: SHIPPED | OUTPATIENT
Start: 2025-02-19

## 2025-02-19 RX ORDER — INSULIN LISPRO 100 [IU]/ML
INJECTION, SOLUTION INTRAVENOUS; SUBCUTANEOUS
Qty: 5 ADJUSTABLE DOSE PRE-FILLED PEN SYRINGE | Refills: 3 | Status: SHIPPED | OUTPATIENT
Start: 2025-02-19

## 2025-02-19 RX ORDER — GABAPENTIN 600 MG/1
TABLET ORAL
Qty: 90 TABLET | Refills: 3 | Status: SHIPPED | OUTPATIENT
Start: 2025-02-19 | End: 2025-06-26

## 2025-02-19 RX ORDER — MONTELUKAST SODIUM 10 MG/1
TABLET ORAL
Qty: 90 TABLET | Refills: 3 | Status: SHIPPED | OUTPATIENT
Start: 2025-02-19

## 2025-02-19 RX ORDER — INSULIN DEGLUDEC 100 U/ML
20 INJECTION, SOLUTION SUBCUTANEOUS 2 TIMES DAILY
Qty: 5 ADJUSTABLE DOSE PRE-FILLED PEN SYRINGE | Refills: 3 | Status: SHIPPED | OUTPATIENT
Start: 2025-02-19

## 2025-02-19 RX ORDER — FLUTICASONE PROPIONATE 50 MCG
1 SPRAY, SUSPENSION (ML) NASAL DAILY
Qty: 16 G | Refills: 0 | Status: SHIPPED | OUTPATIENT
Start: 2025-02-19

## 2025-02-19 RX ORDER — QUETIAPINE FUMARATE 50 MG/1
50 TABLET, FILM COATED ORAL 2 TIMES DAILY
COMMUNITY
Start: 2025-02-10

## 2025-02-19 RX ORDER — ECHINACEA PURPUREA EXTRACT 125 MG
1 TABLET ORAL PRN
Qty: 1 EACH | Refills: 3 | Status: SHIPPED | OUTPATIENT
Start: 2025-02-19

## 2025-02-19 RX ORDER — LEVOTHYROXINE SODIUM 75 UG/1
TABLET ORAL
Qty: 30 TABLET | Refills: 5 | Status: SHIPPED | OUTPATIENT
Start: 2025-02-19

## 2025-02-19 SDOH — ECONOMIC STABILITY: FOOD INSECURITY: WITHIN THE PAST 12 MONTHS, YOU WORRIED THAT YOUR FOOD WOULD RUN OUT BEFORE YOU GOT MONEY TO BUY MORE.: NEVER TRUE

## 2025-02-19 SDOH — ECONOMIC STABILITY: FOOD INSECURITY: WITHIN THE PAST 12 MONTHS, THE FOOD YOU BOUGHT JUST DIDN'T LAST AND YOU DIDN'T HAVE MONEY TO GET MORE.: NEVER TRUE

## 2025-02-19 SDOH — HEALTH STABILITY: PHYSICAL HEALTH: ON AVERAGE, HOW MANY DAYS PER WEEK DO YOU ENGAGE IN MODERATE TO STRENUOUS EXERCISE (LIKE A BRISK WALK)?: 5 DAYS

## 2025-02-19 SDOH — HEALTH STABILITY: PHYSICAL HEALTH: ON AVERAGE, HOW MANY MINUTES DO YOU ENGAGE IN EXERCISE AT THIS LEVEL?: 10 MIN

## 2025-02-19 ASSESSMENT — LIFESTYLE VARIABLES
HOW OFTEN DO YOU HAVE SIX OR MORE DRINKS ON ONE OCCASION: 1
HOW MANY STANDARD DRINKS CONTAINING ALCOHOL DO YOU HAVE ON A TYPICAL DAY: PATIENT DOES NOT DRINK
HOW OFTEN DO YOU HAVE A DRINK CONTAINING ALCOHOL: NEVER
HOW MANY STANDARD DRINKS CONTAINING ALCOHOL DO YOU HAVE ON A TYPICAL DAY: 0
HOW OFTEN DO YOU HAVE A DRINK CONTAINING ALCOHOL: 1

## 2025-02-19 ASSESSMENT — ANXIETY QUESTIONNAIRES
2. NOT BEING ABLE TO STOP OR CONTROL WORRYING: MORE THAN HALF THE DAYS
4. TROUBLE RELAXING: MORE THAN HALF THE DAYS
3. WORRYING TOO MUCH ABOUT DIFFERENT THINGS: MORE THAN HALF THE DAYS
IF YOU CHECKED OFF ANY PROBLEMS ON THIS QUESTIONNAIRE, HOW DIFFICULT HAVE THESE PROBLEMS MADE IT FOR YOU TO DO YOUR WORK, TAKE CARE OF THINGS AT HOME, OR GET ALONG WITH OTHER PEOPLE: VERY DIFFICULT
6. BECOMING EASILY ANNOYED OR IRRITABLE: NOT AT ALL
7. FEELING AFRAID AS IF SOMETHING AWFUL MIGHT HAPPEN: MORE THAN HALF THE DAYS
1. FEELING NERVOUS, ANXIOUS, OR ON EDGE: MORE THAN HALF THE DAYS
GAD7 TOTAL SCORE: 12
5. BEING SO RESTLESS THAT IT IS HARD TO SIT STILL: MORE THAN HALF THE DAYS

## 2025-02-19 ASSESSMENT — PATIENT HEALTH QUESTIONNAIRE - PHQ9
SUM OF ALL RESPONSES TO PHQ9 QUESTIONS 1 & 2: 1
1. LITTLE INTEREST OR PLEASURE IN DOING THINGS: NOT AT ALL
SUM OF ALL RESPONSES TO PHQ9 QUESTIONS 1 & 2: 1
SUM OF ALL RESPONSES TO PHQ QUESTIONS 1-9: 1
7. TROUBLE CONCENTRATING ON THINGS, SUCH AS READING THE NEWSPAPER OR WATCHING TELEVISION: MORE THAN HALF THE DAYS
SUM OF ALL RESPONSES TO PHQ QUESTIONS 1-9: 1
5. POOR APPETITE OR OVEREATING: MORE THAN HALF THE DAYS
4. FEELING TIRED OR HAVING LITTLE ENERGY: MORE THAN HALF THE DAYS
3. TROUBLE FALLING OR STAYING ASLEEP: SEVERAL DAYS
SUM OF ALL RESPONSES TO PHQ QUESTIONS 1-9: 1
10. IF YOU CHECKED OFF ANY PROBLEMS, HOW DIFFICULT HAVE THESE PROBLEMS MADE IT FOR YOU TO DO YOUR WORK, TAKE CARE OF THINGS AT HOME, OR GET ALONG WITH OTHER PEOPLE: VERY DIFFICULT
SUM OF ALL RESPONSES TO PHQ QUESTIONS 1-9: 8
2. FEELING DOWN, DEPRESSED OR HOPELESS: SEVERAL DAYS
SUM OF ALL RESPONSES TO PHQ QUESTIONS 1-9: 8
8. MOVING OR SPEAKING SO SLOWLY THAT OTHER PEOPLE COULD HAVE NOTICED. OR THE OPPOSITE, BEING SO FIGETY OR RESTLESS THAT YOU HAVE BEEN MOVING AROUND A LOT MORE THAN USUAL: NOT AT ALL
SUM OF ALL RESPONSES TO PHQ QUESTIONS 1-9: 8
1. LITTLE INTEREST OR PLEASURE IN DOING THINGS: NOT AT ALL
SUM OF ALL RESPONSES TO PHQ QUESTIONS 1-9: 1
6. FEELING BAD ABOUT YOURSELF - OR THAT YOU ARE A FAILURE OR HAVE LET YOURSELF OR YOUR FAMILY DOWN: NOT AT ALL
SUM OF ALL RESPONSES TO PHQ QUESTIONS 1-9: 8
9. THOUGHTS THAT YOU WOULD BE BETTER OFF DEAD, OR OF HURTING YOURSELF: NOT AT ALL
2. FEELING DOWN, DEPRESSED OR HOPELESS: SEVERAL DAYS

## 2025-02-19 ASSESSMENT — ENCOUNTER SYMPTOMS
TROUBLE SWALLOWING: 0
SINUS PAIN: 0
COUGH: 1
CHEST TIGHTNESS: 0
SHORTNESS OF BREATH: 0
SORE THROAT: 1
SINUS PRESSURE: 1
WHEEZING: 0

## 2025-02-19 NOTE — PROGRESS NOTES
TAKE ONE (1) TABLET BY MOUTH THREE TIMES DAILY  Celia Nayak APRN - CNP   Insulin Degludec (TRESIBA FLEXTOUCH) 100 UNIT/ML SOPN Inject 20 Units into the skin in the morning and 20 Units in the evening.  Celia Nayak APRN - CNP   insulin lispro, 1 Unit Dial, (HUMALOG/ADMELOG) 100 UNIT/ML SOPN Inject 18 units under skin before meals three times a day.  Celia Nayak APRN - CNP   fluticasone (FLONASE) 50 MCG/ACT nasal spray 1 spray by Each Nostril route daily  Celia Nayak APRN - CNP   sodium chloride (ALTAMIST SPRAY) 0.65 % nasal spray 1 spray by Nasal route as needed for Congestion  Celia Nayak APRN - CNP   loperamide (ANTI-DIARRHEAL) 2 MG tablet Take 1 tablet by mouth as needed  Patient not taking: Reported on 2/19/2025  Mona Moreno MD   cefdinir (OMNICEF) 300 MG capsule Take 1 capsule by mouth 2 times daily  Patient not taking: Reported on 1/20/2025  Mona Moreno MD   neomycin-polymyxin-hydrocortisone (CORTISPORIN) 3.5-35081-0 otic suspension Place 4 drops into the right ear 3 times daily  Patient not taking: Reported on 2/19/2025  Mona Moreno MD   HYDROcodone-acetaminophen (NORCO) 5-325 MG per tablet   Mona Moreno MD   Blood Pressure KIT 1 each by Does not apply route three times a week  Patient not taking: Reported on 1/27/2025  Celia Nayak APRN - CNP   Continuous Blood Gluc  (FREESTYLE CHUYITA 2 READER) WES 1 each by Does not apply route daily  Patient not taking: Reported on 5/9/2024  Celia Nayak APRN - CNP   Continuous Blood Gluc Sensor (FREESTYLE CHUYITA 2 SENSOR) MISC 1 each by Does not apply route continuous  Patient not taking: Reported on 5/9/2024  Celia Nayak APRN - CNP   sucralfate (CARAFATE) 1 GM/10ML suspension Take 10 mLs by mouth in the morning and at bedtime  Patient not taking: Reported on 1/20/2025  Mona Moreno MD   Continuous Blood Gluc  (FREESTYLE CHUYITA 2 READER) WES by Does not apply route  Patient not

## 2025-02-19 NOTE — PROGRESS NOTES
adenopathy.   Skin:     General: Skin is warm and dry.   Neurological:      Mental Status: She is alert and oriented to person, place, and time.      Motor: No weakness.      Gait: Gait normal.   Psychiatric:         Behavior: Behavior normal.         Thought Content: Thought content normal.         Judgment: Judgment normal.         No results found for requested labs within last 30 days.     Hemoglobin A1C (%)   Date Value   02/19/2025 8.6     LDL Direct (MG/DL)   Date Value   04/27/2021 45       Lab Results   Component Value Date/Time    WBC 9.7 10/14/2024 03:56 AM    WBC 9.5 11/13/2023 04:35 PM    WBC 9.8 08/16/2023 02:50 PM    NEUTROABS 3.87 10/14/2024 03:56 AM    NEUTROABS 4.6 11/13/2023 04:35 PM    NEUTROABS 5.9 08/16/2023 02:50 PM    NEUTROABS 4.9 05/24/2023 07:23 PM    HGB 13.9 10/14/2024 03:56 AM    HGB 13.1 11/13/2023 04:35 PM    HGB 12.3 08/16/2023 02:50 PM    HCT 41.5 10/14/2024 03:56 AM    HCT 40.2 11/13/2023 04:35 PM    HCT 37.1 08/16/2023 02:50 PM    MCV 83.5 10/14/2024 03:56 AM    MCV 82.4 11/13/2023 04:35 PM    MCV 82.1 08/16/2023 02:50 PM     10/14/2024 03:56 AM     11/13/2023 04:35 PM     08/16/2023 02:50 PM    LYMPHSABS 4.24 10/14/2024 03:56 AM    MONOSABS 1.02 10/14/2024 03:56 AM    EOSABS 0.48 10/14/2024 03:56 AM    BASOSABS 0.06 10/14/2024 03:56 AM     Lab Results   Component Value Date/Time    TSH 1.42 02/05/2025 10:30 AM    TSHHS 1.440 11/13/2023 04:35 PM     Lab Results   Component Value Date/Time    BILITOT 0.5 10/14/2024 03:56 AM    BILIDIR 0.2 01/10/2022 11:50 AM    IBILI 0.2 01/10/2022 11:50 AM    AST 18 10/14/2024 03:56 AM    ALT 12 10/14/2024 03:56 AM    ALKPHOS 85 10/14/2024 03:56 AM             Results for orders placed or performed in visit on 02/19/25   POCT rapid strep A   Result Value Ref Range    Strep A Ag None Detected None Detected   POCT glycosylated hemoglobin (Hb A1C)   Result Value Ref Range    Hemoglobin A1C 8.6 %       ASSESSMENT AND PLAN:     1.

## 2025-02-28 ENCOUNTER — CARE COORDINATION (OUTPATIENT)
Dept: CARE COORDINATION | Age: 70
End: 2025-02-28

## 2025-02-28 NOTE — CARE COORDINATION
Ambulatory Care Coordination Note     2/28/2025 4:04 PM     Patient outreach attempt by this ACM today to offer care management services. ACM was unable to reach the patient by telephone today;   Phone rang with no answer or option to leave a voice message.     ACM: Paloma Barry RN     Care Summary Note: ACM received PCP referral for care management services. ACM attempted to contact patient today to offer services. ACM will outreach patient at a later date.     PCP/Specialist follow up:   Future Appointments         Provider Specialty Dept Phone    4/2/2025 2:20 PM (Arrive by 2:05 PM) [facility] MAMMO ROOM 2 Radiology [phone]    4/2/2025 2:50 PM (Arrive by 2:20 PM) [facility] US ROOM 2 Radiology [phone]    4/8/2025 2:30 PM (Arrive by 2:15 PM) [facility] BONE DENSITY ROOM Radiology [phone]    6/19/2025 3:30 PM Celia Nayak, APRN - CNP Family Medicine [phone]    7/23/2025 2:20 PM Sandra Ni MD Cardiology [phone]    2/23/2026 3:30 PM Celia Nayak, APRN - CNP Family Medicine [phone]            Follow Up:   Plan for next ACM outreach in approximately 1-2 days  to complete:  - outreach attempt to offer care management services.

## 2025-03-04 ENCOUNTER — CARE COORDINATION (OUTPATIENT)
Dept: CARE COORDINATION | Age: 70
End: 2025-03-04

## 2025-03-04 NOTE — CARE COORDINATION
Ambulatory Care Coordination Note     3/4/2025 9:20 AM     Patient Current Location:  Home: [facility] [id number]     This patient was received as a referral from Provider.    ACM contacted the patient by telephone. Verified name and  with patient as identifiers. Provided introduction to self, and explanation of the ACM role.   Patient accepted care management services at this time.          ACM: Paloma Barry RN     Challenges to be reviewed by the provider   Additional needs identified to be addressed with provider No  none         Method of communication with provider: none.    Utilization: Initial Call - N/A    Care Summary Note: ACM placed call to patient today to offer care management services. ACM explained care management program and ACM role and patient is agreeable to enrollment at this time. Patient denies any symptom or concerns to report to provider at this time. She states she has some sinus issues right now, but it's not bothering her and she states it's due to chronic allergies.     Patient politely declines completing SDOH/CC Protocol assessments at this time as her HHA is currently at her home. Patient reports HHA comes Tuesday-Friday from 7:30 AM - 10 AM. Patient agreeable to completing initial assessments during next outreach & ACM will plan to call when HHA is not at the home.     Patient denies any needs at this time.     Offered patient enrollment in the Remote Patient Monitoring (RPM) program for in-home monitoring: Deferred at this time because initial outreach; will discuss at next outreach.     Assessments Completed:   General Assessment    Do you have any symptoms that are causing concern?: No          Medications Reviewed:   Not completed during this call: Will attempt to complete during future outreach    Advance Care Planning:   Not reviewed during this call     Care Planning:   Not completed during this call    PCP/Specialist follow up:

## 2025-03-10 ENCOUNTER — TELEPHONE (OUTPATIENT)
Dept: FAMILY MEDICINE CLINIC | Age: 70
End: 2025-03-10

## 2025-03-10 NOTE — TELEPHONE ENCOUNTER
Patient called stating she wanted to let you know she went to Urgent Care on 03/08/2025. She was diagnosed with influenza, She was prescribed Tamiflu and an inhaler. They informed her the medication would affect her blood sugar.

## 2025-03-14 ENCOUNTER — COMMUNITY OUTREACH (OUTPATIENT)
Dept: FAMILY MEDICINE CLINIC | Age: 70
End: 2025-03-14

## 2025-03-14 NOTE — PROGRESS NOTES
Patient's HM shows they are overdue for Mammogram.  Care Everywhere and  files searched.  No results to attach to order nor HM updated.     Orders placed 2/19/25, scheduled for 4/2/25

## 2025-03-17 ENCOUNTER — CARE COORDINATION (OUTPATIENT)
Dept: CARE COORDINATION | Age: 70
End: 2025-03-17

## 2025-03-17 NOTE — CARE COORDINATION
Ambulatory Care Coordination Note     3/17/2025 10:56 AM     Patient outreach attempt by this ACM today to perform care management follow up . ACM was unable to reach the patient by telephone today;   No option to leave a voicemail     ACM: Paloma Barry, RN     Care Summary Note: ACM attempted to contact patient today for care management follow-up. There was no answer or option to leave a voicemail. ACM will outreach patient at a later date.     PCP/Specialist follow up:   Future Appointments         Provider Specialty Dept Phone    4/2/2025 2:20 PM (Arrive by 2:05 PM) [facility] MAMMO ROOM 2 Radiology [phone]    4/2/2025 2:50 PM (Arrive by 2:20 PM) [facility] US ROOM 2 Radiology [phone]    4/8/2025 2:30 PM (Arrive by 2:15 PM) [facility] BONE DENSITY ROOM Radiology [phone]    6/19/2025 3:30 PM Celia Nayak, APRN - CNP Family Medicine [phone]    7/23/2025 2:20 PM Sandra Ni MD Cardiology [phone]    2/23/2026 3:30 PM Celia Nayak, APRN - CNP Family Medicine [phone]            Follow Up:   Plan for next AC outreach in approximately 1 week to complete:  - CC Protocol assessments  - disease specific assessments  - SDOH assessments.

## 2025-03-18 DIAGNOSIS — G43.009 MIGRAINE WITHOUT AURA, NOT REFRACTORY: ICD-10-CM

## 2025-03-19 DIAGNOSIS — E08.22 DIABETES MELLITUS DUE TO UNDERLYING CONDITION WITH CHRONIC KIDNEY DISEASE, WITHOUT LONG-TERM CURRENT USE OF INSULIN, UNSPECIFIED CKD STAGE (HCC): ICD-10-CM

## 2025-03-19 DIAGNOSIS — Z76.0 MEDICATION REFILL: ICD-10-CM

## 2025-03-19 RX ORDER — GLUCOSAMINE HCL/CHONDROITIN SU 500-400 MG
CAPSULE ORAL
Qty: 200 STRIP | Refills: 3 | Status: SHIPPED | OUTPATIENT
Start: 2025-03-19

## 2025-03-19 RX ORDER — METOPROLOL SUCCINATE 50 MG/1
50 TABLET, EXTENDED RELEASE ORAL DAILY
Qty: 30 TABLET | Refills: 5 | Status: SHIPPED | OUTPATIENT
Start: 2025-03-19

## 2025-03-19 RX ORDER — DIVALPROEX SODIUM 500 MG/1
500 TABLET, DELAYED RELEASE ORAL 2 TIMES DAILY
Qty: 60 TABLET | Refills: 3 | Status: SHIPPED | OUTPATIENT
Start: 2025-03-19

## 2025-03-19 NOTE — TELEPHONE ENCOUNTER
Attempted to contact pt and vm is not setup.     Last Visit: 11/21/24    No follow up on file.     Rx is pending.     Requested Prescriptions     Pending Prescriptions Disp Refills    divalproex (DEPAKOTE) 500 MG DR tablet [Pharmacy Med Name: Divalproex Sodium 500 MG Oral Tablet Delayed Release] 60 tablet 0     Sig: Take 1 tablet by mouth twice daily

## 2025-03-24 ENCOUNTER — CARE COORDINATION (OUTPATIENT)
Dept: CARE COORDINATION | Age: 70
End: 2025-03-24

## 2025-03-24 NOTE — CARE COORDINATION
Ambulatory Care Coordination Note     3/24/2025 12:39 PM     ACM outreach attempt by this ACM today to perform care management follow up . ACM was unable to reach the patient by telephone today;   Unable to leave message.       PCP/Specialist follow up:   Future Appointments         Provider Specialty Dept Phone    4/2/2025 2:20 PM (Arrive by 2:05 PM) [facility] MAMMO ROOM 2 Radiology [phone]    4/2/2025 2:50 PM (Arrive by 2:20 PM) [facility] US ROOM 2 Radiology [phone]    4/8/2025 2:30 PM (Arrive by 2:15 PM) [facility] BONE DENSITY ROOM Radiology [phone]    6/19/2025 3:30 PM Celia Nayak, APRN - CNP Family Medicine [phone]    7/23/2025 2:20 PM Sandra Ni MD Cardiology [phone]    2/23/2026 3:30 PM Celia Nayak, APRN - CNP Family Medicine [phone]            Follow Up:   Plan for next ACM outreach in approximately 1 week

## 2025-03-31 ENCOUNTER — CARE COORDINATION (OUTPATIENT)
Dept: CARE COORDINATION | Age: 70
End: 2025-03-31

## 2025-03-31 NOTE — CARE COORDINATION
Ambulatory Care Coordination Note     3/31/2025 10:47 AM     Patient outreach attempt by this ACM today to perform care management follow up . ACM was unable to reach the patient by telephone today;   letter mailed requesting patient  to contact this ACM.   Phone rings with no answer or option to leave a message.     ACM: Paloma Barry RN     Care Summary Note: ACM attempted to contact patient today for care management follow-up. ACM will make final outreach attempt at a later date.     PCP/Specialist follow up:   Future Appointments         Provider Specialty Dept Phone    4/2/2025 2:20 PM (Arrive by 2:05 PM) [facility] MAMMO ROOM 2 Radiology [phone]    4/2/2025 2:50 PM (Arrive by 2:20 PM) [facility] US ROOM 2 Radiology [phone]    4/8/2025 2:30 PM (Arrive by 2:15 PM) [facility] BONE DENSITY ROOM Radiology [phone]    6/19/2025 3:30 PM Celia Nayak, APRN - CNP Family Medicine [phone]    7/23/2025 2:20 PM Sandra Ni MD Cardiology [phone]    2/23/2026 3:30 PM Celia Nayak, WENDIE - CNP Family Medicine [phone]            Follow Up:   Plan for next ACM outreach in approximately 1 week to complete:  - CC Protocol assessments  - disease specific assessments  - SDOH assessments  - advance care planning  - education .

## 2025-04-02 ENCOUNTER — TELEPHONE (OUTPATIENT)
Dept: FAMILY MEDICINE CLINIC | Age: 70
End: 2025-04-02

## 2025-04-02 NOTE — TELEPHONE ENCOUNTER
Simona called stating she has a yeast infection under simone breast and had to cancel her Mammogram.    Celia was consulted during the call and suggested using Desitin cream and her powder she already has for five days and if there is no improvement to contact the office.      Simona voiced understanding.    Note routed to WENDIE Friedman

## 2025-04-07 ENCOUNTER — CARE COORDINATION (OUTPATIENT)
Dept: CARE COORDINATION | Age: 70
End: 2025-04-07

## 2025-04-07 NOTE — CARE COORDINATION
Ambulatory Care Coordination Note     4/7/2025 3:37 PM     patient outreach attempt by this ACM today to perform care management follow up . ACM was unable to reach the patient by telephone today;   Phone rings with no answer or option to leave a message.     Patient closed (unable to reach patient) from the High Risk Care Management program on 4/7/2025. No further Ambulatory Care Manager follow up scheduled.

## 2025-04-08 ENCOUNTER — TELEPHONE (OUTPATIENT)
Dept: FAMILY MEDICINE CLINIC | Age: 70
End: 2025-04-08

## 2025-04-08 DIAGNOSIS — I73.9 CLAUDICATION: ICD-10-CM

## 2025-04-08 DIAGNOSIS — B37.2 YEAST DERMATITIS: ICD-10-CM

## 2025-04-08 DIAGNOSIS — R25.1 TREMOR: ICD-10-CM

## 2025-04-08 DIAGNOSIS — Z79.4 TYPE 2 DIABETES MELLITUS WITH DIABETIC POLYNEUROPATHY, WITH LONG-TERM CURRENT USE OF INSULIN (HCC): Primary | ICD-10-CM

## 2025-04-08 DIAGNOSIS — E11.42 TYPE 2 DIABETES MELLITUS WITH DIABETIC POLYNEUROPATHY, WITH LONG-TERM CURRENT USE OF INSULIN (HCC): Primary | ICD-10-CM

## 2025-04-08 RX ORDER — NYSTATIN 100000 [USP'U]/G
POWDER TOPICAL
Qty: 1 EACH | Refills: 0 | Status: SHIPPED | OUTPATIENT
Start: 2025-04-08

## 2025-04-08 NOTE — TELEPHONE ENCOUNTER
Bedside commode, walker ordered. Prescriptions sent to her pharmacy. Call with questions or concerns.

## 2025-04-08 NOTE — TELEPHONE ENCOUNTER
Simona called requesting a bedside commode and walker.    Simona states in place of the A&D ointment she is going to try a powder similar to Nystatin powder that she purchased over the counter to treat under her breast.    Simona will call in 3 days if the powder doesn't help.    Note to WENDIE Friedman

## 2025-04-17 ENCOUNTER — TELEPHONE (OUTPATIENT)
Dept: FAMILY MEDICINE CLINIC | Age: 70
End: 2025-04-17

## 2025-04-17 NOTE — TELEPHONE ENCOUNTER
[address]  [address]    NAME: WOODY STANTON DOB/AGE/SEX: 1973 - 49 - M  PHYSICIAN: Johnny Morales D.O.                                  ADMIT DATE: 22  UNIT #: [id number]                                                ACCT #: [id number]                                                                    LOC//BED: [id number]                                             PHYSICIAN REPORT                                     GASTROENTEROLOGY CONSULTATION                                         REPORT # : [id number]  Consultation  Patient Examined By  Frances David M.D.    12/15/22 13:43    Reason for Consultation  hepatic abscess  Requested By  Thank you Dr. Morales for asking us to participate in the care of this patient    History of Present Illness  Patient is a 48 yo male from [place] that is currently here visiting family that presented to the  ED for further evaluation after a syncopal episode. Pt is Greenlandic speaking so  was used.  Pt reports a 6 day history of bodyaches, fevers, chills, and fatigue. Pt reports having episodes  of vomiting over the last 2 days and has been unable to keep solids or liquids down. Pt was seen  this AM mainly complaining of body aches. Pt does report sick contacts. He denies diarrhea or signs  of bleeding. Pt had a CT a/p which showed a 5 cm lesion on the right hepatic lobe concerning for  abscess. His tbili today 1.3, AST 40, ALT 84, Alk phos 246. He denies any episodes of emesis during  admission.    Past Medical History  Prior traumatic injury to shoulder and leg surgery    Past Surgical History  as above    Family History  noncontributory    Social History  Smoking Status:  Never smoker  Hx Smoking Exposure:  No  Current Smoke Exposure:   Per Simona Mercer instructed her to take Benefiber for her diarrhea and they are ordering a test for her stool.    She wanted to let Celia know.  Note to WENDIE Friedman   No  Have You Smoked in the Last 12:  No  Hx Alcohol Use:  No  Recreational Drug Use:  No  Living Arrangements:  With Spouse    Allergies  Coded Allergies:       No Known Drug Allergy (Unverified , 12/14/22)    Active Medications  Active Medications    Active Medications  Acetaminophen 650 mg Q6H  PRN PO PAIN MILD (LEVEL 1 - 3) Last administered on 12/15/22at 10:21;  Admin Dose 650 MG;  Start 12/15/22 at 10:15  Acetaminophen/ Hydrocodone Bitart 1 tab Q6H  PRN PO PAIN SEVERE (LEVEL 7 - 10);  Start 12/15/22 at  10:15  Dextrose 25 ml PRN  PRN IV HYPOGLYCEMIA;  Start 12/15/22 at 01:00  Dextrose 50 ml PRN  PRN IV HYPOGLYCEMIA;  Start 12/15/22 at 01:00  Glucagon 1 mg PRN  PRN IM BLOOD GLUCOSE LESS THAN 70;  Start 12/15/22 at 01:00  Insulin Aspart  NIGHTLY SUBCUT ;  Start 12/15/22 at 22:00  Insulin Aspart SLIDING SCALE TID  AC SUBCUT ;  Start 12/15/22 at 07:00  Miscellaneous Information 1 ea PHARMACY TO DOSE IV ;  Start 12/15/22 at 01:30  Piperacillin Sod/ Tazobactam Sod 3.375 gm/Sodium Chloride 100 ml @  25 mls/hr Q8HR IV  Last  administered on 12/15/22at 05:56; Admin Dose 25 MLS/HR;  Start 12/15/22 at 06:00  Sodium Chloride 1,000 ml @  125 mls/hr CONT IV  Last administered on 12/15/22at 12:26; Admin Dose  125 MLS/HR;  Start 12/15/22 at 01:00  Vancomycin HCl 350 ml @  175 mls/hr Q12H IV  Last administered on 12/15/22at 12:26; Admin Dose 175  MLS/HR;  Start 12/15/22 at 12:00    Review of Systems  Review of Systems  11 point ROS is negative except that listed in the HPI    Height/Weight/BMI  Height (Feet):  5  Height (Inches):  9  Weight (Pounds):  161.0  Weight (Ounces):  9.60  Weight (Kilograms):  73.30  Body Mass Index:  23.77  Body Mass Index:  23.85    Physical Exam    Vital Signs        Date Time  Temp Pulse Resp B/P (MAP) Pulse Ox O2 Delivery O2 Flow Rate FiO2    12/15/22 08:45 97.4 92 18 111/73 (86) 97 Room Air      Physical Exam  Constitutional:  Normal exam, Alert, Awake, Vitals Signs TPR BP reviewed.  HEENT:   PERRLA  Mouth: Oral mucosa dry, tongue is midline, uvula is midline  Neck:  Supple  Cardiovascular:  Heart sounds normal, rate regular  Respiratory:  Chest normal to palpation, Fair air entry bilaterally  Abdomen:  Bowel sounds normal, Soft, RUQ tenderness  Extremities:  No calf tenderness, No joint swelling or erythema  Neurological:  Grossly within normal limits, moves upper and lower extremities at will  Skin/Hair/Nails:  No obvious rashes  Behavioral Health/Psych:  Affect normal, Mood normal, Speech normal    Diagnostic Studies  Diagnostic Studies  CT a/p impression: 1. 5 cm ill-defined low-density lesion in the right hepatic lobe, suspicious for  underlying abscess, given history of fevers. An underlying mass lesion could have a similar appear  ance. Follow-up is recommended.  2. There are a few additional small nonspecific low-density lesions in the liver which are  indeterminate. While these could represent small cysts or hemangiomas, tiny microabscesses cannot  be excluded.  3. Several bilateral renal cysts.    Assessment and Plan  Assessment  Hepatic abscess  Elevated LFT's    Plan  Continue IV abx  HIV labs pending  Check hepatitis panel  Monitor LFT's  Blood cx pending  IR consulted for drainage of liver abscess    Laboratory Data                                             Laboratory Tests      Test   12/15/22  11:32 12/15/22  09:13 12/15/22  07:17 12/15/22  07:08    Bedside Glucose 198  H   201  H    Urine Color  Coelen  H    Urine Appearance  Hazy  H    Urine pH  5.0    Urine Specific Gravity  1.053  H    Urine Protein  100    Urine Glucose (UA)  50    Urine Ketones  20  H    Urine Blood  Small  H    Urine Nitrite  Negative    Urine Bilirubin  Neg    Urine Urobilinogen  2.0  H    Urine Leukocyte Esterase  Negative    Urine RBC  3-5  H    Urine Amorphous Urates  Moderate  H    Urine Bacteria  Few  H    Prothrombin Time   12.9    Prothromb Time International  Ratio     1.1        Test   12/15/22  05:10  12/14/22  17:15 12/14/22  17:10      White Blood Count 10.9    7.2    Red Blood Count 4.49    4.94    Hemoglobin 12.1  L  13.4    Hematocrit 36.8  L  40.0    Mean Corpuscular Volume 82.0    80.9    Mean Corpuscular Hemoglobin 27.1    27.2    Mean Corpuscular Hemoglobin  Concent 33.0     33.6        Red Cell Distribution Width 12.8    12.6    Platelet Count 182    180    Mean Platelet Volume 8.2    8.1    Neutrophils (%) (Auto) 81.7    95.7    Lymphocytes (%) (Auto) 9.0    2.7    Monocytes (%) (Auto) 8.6    1.2    Eosinophils (%) (Auto) 0.2    0.0    Basophils (%) (Auto) 0.5    0.4    Neutrophils # (Auto) 8.9  H  6.9    Lymphocytes # (Auto) 1.0  #  0.2  L    Monocytes # (Auto) 0.9  #  0.1  L    Eosinophils # (Auto) 0.0    0.0    Basophils # (Auto) 0.0    0.0    Sodium Level 135    132  L    Potassium Level 4.2    3.6    Chloride Level 101    97  L    Carbon Dioxide Level 28  #  22    Anion Gap 6  L  13    Blood Urea Nitrogen 17.0    17.0    Creatinine 0.91    0.88    Estimat Glomerular Filtration  Rate > 60     > 60        BUN/Creatinine Ratio 18.7    19.3    Glucose Level 238  H  370  H    Estimated Average Glucose Pending    Hemoglobin A1c Pending    Calcium Level 8.0  L  9.0    Calcium Adjusted for Albumin 8.8    Total Bilirubin 1.3  H  2.3  H    Aspartate Amino Transf  (AST/SGOT) 40  H     54  H        Alanine Aminotransferase  (ALT/SGPT) 84  H     105  H        Alkaline Phosphatase 246  H  336  H    Total Protein 5.6  L  6.5    Albumin 3.0  L  3.6    Albumin/Globulin Ratio 1.15    1.24    HIV-1 Antibody Pending    HIV-1 Antibody (EIA) Pending    HIV-1 Direct Antigen Pending    HIV-1 RNA, Qualitative (TMA) Pending    HIV-2 Antibody Pending    HIV-2 Antibody (EIA) Pending    HIV (1 2) Ag and Ab Comment Pending    HIV (1 2) Ab Differential  Comment Pending            Coronavirus 2019 Source  Nasal swab    Coronavirus 2019 (BIJAN)  Negative    Influenza Virus Type A (PCR)  Negative    Influenza Virus Type B (PCR)   Negative    Whole Blood Lactic Acid   1.65    Magnesium Level   1.6    Total Creatine Kinase   22  L    Troponin I High Sensitivity   5    B-Type Natriuretic Peptide   36.0            Frances David M.D.                      Dec 15, 2022 13:54    <Electronically signed by Frances Daivd M.D.> 12/15/22 1354        ______________________________________________  DRAFT UNTIL SIGNED      CC: Frances David M.D.;

## 2025-05-08 ENCOUNTER — OFFICE VISIT (OUTPATIENT)
Dept: FAMILY MEDICINE CLINIC | Age: 70
End: 2025-05-08
Payer: MEDICARE

## 2025-05-08 VITALS
OXYGEN SATURATION: 99 % | SYSTOLIC BLOOD PRESSURE: 130 MMHG | TEMPERATURE: 98.2 F | BODY MASS INDEX: 31.47 KG/M2 | HEART RATE: 78 BPM | DIASTOLIC BLOOD PRESSURE: 74 MMHG | RESPIRATION RATE: 18 BRPM | HEIGHT: 62 IN | WEIGHT: 171 LBS

## 2025-05-08 DIAGNOSIS — E03.9 HYPOTHYROIDISM, UNSPECIFIED TYPE: ICD-10-CM

## 2025-05-08 DIAGNOSIS — J30.9 ALLERGIC RHINITIS, UNSPECIFIED SEASONALITY, UNSPECIFIED TRIGGER: ICD-10-CM

## 2025-05-08 DIAGNOSIS — R10.84 GENERALIZED ABDOMINAL PAIN: Primary | ICD-10-CM

## 2025-05-08 DIAGNOSIS — Z76.0 MEDICATION REFILL: ICD-10-CM

## 2025-05-08 PROCEDURE — 1126F AMNT PAIN NOTED NONE PRSNT: CPT | Performed by: NURSE PRACTITIONER

## 2025-05-08 PROCEDURE — 1124F ACP DISCUSS-NO DSCNMKR DOCD: CPT | Performed by: NURSE PRACTITIONER

## 2025-05-08 PROCEDURE — G8400 PT W/DXA NO RESULTS DOC: HCPCS | Performed by: NURSE PRACTITIONER

## 2025-05-08 PROCEDURE — 99213 OFFICE O/P EST LOW 20 MIN: CPT | Performed by: NURSE PRACTITIONER

## 2025-05-08 PROCEDURE — 3017F COLORECTAL CA SCREEN DOC REV: CPT | Performed by: NURSE PRACTITIONER

## 2025-05-08 PROCEDURE — 3075F SYST BP GE 130 - 139MM HG: CPT | Performed by: NURSE PRACTITIONER

## 2025-05-08 PROCEDURE — 3078F DIAST BP <80 MM HG: CPT | Performed by: NURSE PRACTITIONER

## 2025-05-08 PROCEDURE — G8427 DOCREV CUR MEDS BY ELIG CLIN: HCPCS | Performed by: NURSE PRACTITIONER

## 2025-05-08 PROCEDURE — 1160F RVW MEDS BY RX/DR IN RCRD: CPT | Performed by: NURSE PRACTITIONER

## 2025-05-08 PROCEDURE — 1159F MED LIST DOCD IN RCRD: CPT | Performed by: NURSE PRACTITIONER

## 2025-05-08 PROCEDURE — 1090F PRES/ABSN URINE INCON ASSESS: CPT | Performed by: NURSE PRACTITIONER

## 2025-05-08 PROCEDURE — 1036F TOBACCO NON-USER: CPT | Performed by: NURSE PRACTITIONER

## 2025-05-08 PROCEDURE — G8417 CALC BMI ABV UP PARAM F/U: HCPCS | Performed by: NURSE PRACTITIONER

## 2025-05-08 RX ORDER — FLUTICASONE FUROATE, UMECLIDINIUM BROMIDE AND VILANTEROL TRIFENATATE 100; 62.5; 25 UG/1; UG/1; UG/1
1 POWDER RESPIRATORY (INHALATION) DAILY
COMMUNITY
Start: 2025-03-08

## 2025-05-08 RX ORDER — DICYCLOMINE HYDROCHLORIDE 10 MG/1
10 CAPSULE ORAL
COMMUNITY
Start: 2025-05-01

## 2025-05-08 ASSESSMENT — PATIENT HEALTH QUESTIONNAIRE - PHQ9
2. FEELING DOWN, DEPRESSED OR HOPELESS: SEVERAL DAYS
SUM OF ALL RESPONSES TO PHQ QUESTIONS 1-9: 5
SUM OF ALL RESPONSES TO PHQ QUESTIONS 1-9: 5
5. POOR APPETITE OR OVEREATING: SEVERAL DAYS
1. LITTLE INTEREST OR PLEASURE IN DOING THINGS: NOT AT ALL
4. FEELING TIRED OR HAVING LITTLE ENERGY: SEVERAL DAYS
6. FEELING BAD ABOUT YOURSELF - OR THAT YOU ARE A FAILURE OR HAVE LET YOURSELF OR YOUR FAMILY DOWN: NOT AT ALL
7. TROUBLE CONCENTRATING ON THINGS, SUCH AS READING THE NEWSPAPER OR WATCHING TELEVISION: SEVERAL DAYS
SUM OF ALL RESPONSES TO PHQ QUESTIONS 1-9: 5
10. IF YOU CHECKED OFF ANY PROBLEMS, HOW DIFFICULT HAVE THESE PROBLEMS MADE IT FOR YOU TO DO YOUR WORK, TAKE CARE OF THINGS AT HOME, OR GET ALONG WITH OTHER PEOPLE: VERY DIFFICULT
8. MOVING OR SPEAKING SO SLOWLY THAT OTHER PEOPLE COULD HAVE NOTICED. OR THE OPPOSITE, BEING SO FIGETY OR RESTLESS THAT YOU HAVE BEEN MOVING AROUND A LOT MORE THAN USUAL: NOT AT ALL
9. THOUGHTS THAT YOU WOULD BE BETTER OFF DEAD, OR OF HURTING YOURSELF: NOT AT ALL
3. TROUBLE FALLING OR STAYING ASLEEP: SEVERAL DAYS
SUM OF ALL RESPONSES TO PHQ QUESTIONS 1-9: 5

## 2025-05-08 ASSESSMENT — ANXIETY QUESTIONNAIRES
5. BEING SO RESTLESS THAT IT IS HARD TO SIT STILL: MORE THAN HALF THE DAYS
4. TROUBLE RELAXING: MORE THAN HALF THE DAYS
7. FEELING AFRAID AS IF SOMETHING AWFUL MIGHT HAPPEN: SEVERAL DAYS
GAD7 TOTAL SCORE: 10
IF YOU CHECKED OFF ANY PROBLEMS ON THIS QUESTIONNAIRE, HOW DIFFICULT HAVE THESE PROBLEMS MADE IT FOR YOU TO DO YOUR WORK, TAKE CARE OF THINGS AT HOME, OR GET ALONG WITH OTHER PEOPLE: VERY DIFFICULT
2. NOT BEING ABLE TO STOP OR CONTROL WORRYING: MORE THAN HALF THE DAYS
1. FEELING NERVOUS, ANXIOUS, OR ON EDGE: SEVERAL DAYS
6. BECOMING EASILY ANNOYED OR IRRITABLE: NOT AT ALL
3. WORRYING TOO MUCH ABOUT DIFFERENT THINGS: MORE THAN HALF THE DAYS

## 2025-05-08 NOTE — PROGRESS NOTES
Simona L Wilfrido  1955  69 y.o.    SUBJECT MARCIA:    Chief Complaint   Patient presents with    Follow-up     [facility], had diarrhea for 3 weeks.    Diarrhea     Had it for 3 weeks, was taking medication for, stopped it 5/3. For past 4 days has constipation, started Colace today.       History of Present Illness  The patient presents for evaluation of constipation. She is not walking with    She was in the emergency room at the end of April, approximately 2 weeks ago, due to a prolonged episode of diarrhea lasting 1.5 months. She was informed that she had a viral infection. Currently, she is experiencing constipation, which has persisted for the past 4 days. She has been under the care of Dr. Mercer, a gastroenterologist, who requested a stool sample. However, due to her ongoing constipation, she has been unable to provide this sample. She was prescribed Bentyl and advised to contact the office if severe pain persisted until Monday She did not contact their office.. She was also advised to maintain a bland diet and incorporate Benefiber into her regimen. She was also recommended to undergo a colonoscopy this year. Her last bowel movement occurred 4 days ago, which was soft in consistency. She reports occasional sharp pains radiating from her abdomen to her back on both sides, which she initially attributed to her hiatal hernia. She has resumed taking a stool softener today. Despite maintaining adequate hydration, she continues to experience constipation. She has a family history of colon cancer, with her younger brother having succumbed to the disease.    FAMILY HISTORY  Her baby brother  from colon cancer.       Current Outpatient Medications on File Prior to Visit   Medication Sig Dispense Refill    dicyclomine (BENTYL) 10 MG capsule Take 1 capsule by mouth 4 times daily (before meals and nightly)      TRELEGY ELLIPTA 100-62.5-25 MCG/ACT AEPB inhaler Inhale 1 puff into the lungs daily      nystatin

## 2025-05-12 DIAGNOSIS — R60.0 BILATERAL EDEMA OF LOWER EXTREMITY: ICD-10-CM

## 2025-05-12 RX ORDER — LOSARTAN POTASSIUM 50 MG/1
50 TABLET ORAL DAILY
Qty: 30 TABLET | Refills: 5 | Status: SHIPPED | OUTPATIENT
Start: 2025-05-12

## 2025-05-12 RX ORDER — LEVOTHYROXINE SODIUM 75 UG/1
TABLET ORAL
Qty: 30 TABLET | Refills: 5 | Status: SHIPPED | OUTPATIENT
Start: 2025-05-12

## 2025-05-12 RX ORDER — MONTELUKAST SODIUM 10 MG/1
TABLET ORAL
Qty: 90 TABLET | Refills: 3 | Status: SHIPPED | OUTPATIENT
Start: 2025-05-12

## 2025-05-12 RX ORDER — ATORVASTATIN CALCIUM 40 MG/1
TABLET, FILM COATED ORAL
Qty: 90 TABLET | Refills: 3 | Status: SHIPPED | OUTPATIENT
Start: 2025-05-12

## 2025-05-12 RX ORDER — FUROSEMIDE 20 MG/1
20 TABLET ORAL DAILY
Qty: 30 TABLET | Refills: 5 | Status: SHIPPED | OUTPATIENT
Start: 2025-05-12

## 2025-05-13 DIAGNOSIS — R60.0 BILATERAL EDEMA OF LOWER EXTREMITY: ICD-10-CM

## 2025-05-13 RX ORDER — LOSARTAN POTASSIUM 50 MG/1
50 TABLET ORAL DAILY
Qty: 30 TABLET | Refills: 5 | OUTPATIENT
Start: 2025-05-13

## 2025-05-13 RX ORDER — FUROSEMIDE 20 MG/1
20 TABLET ORAL DAILY
Qty: 30 TABLET | Refills: 5 | OUTPATIENT
Start: 2025-05-13

## 2025-05-22 ENCOUNTER — OFFICE VISIT (OUTPATIENT)
Dept: FAMILY MEDICINE CLINIC | Age: 70
End: 2025-05-22
Payer: MEDICARE

## 2025-05-22 VITALS
BODY MASS INDEX: 31.09 KG/M2 | WEIGHT: 170 LBS | SYSTOLIC BLOOD PRESSURE: 128 MMHG | DIASTOLIC BLOOD PRESSURE: 76 MMHG | HEART RATE: 76 BPM | OXYGEN SATURATION: 98 % | TEMPERATURE: 87.8 F | RESPIRATION RATE: 20 BRPM

## 2025-05-22 DIAGNOSIS — Z12.31 SCREENING MAMMOGRAM FOR BREAST CANCER: ICD-10-CM

## 2025-05-22 DIAGNOSIS — E11.42 TYPE 2 DIABETES MELLITUS WITH DIABETIC POLYNEUROPATHY, WITH LONG-TERM CURRENT USE OF INSULIN (HCC): Primary | Chronic | ICD-10-CM

## 2025-05-22 DIAGNOSIS — Z12.11 SCREENING FOR COLON CANCER: ICD-10-CM

## 2025-05-22 DIAGNOSIS — Z79.4 TYPE 2 DIABETES MELLITUS WITH DIABETIC POLYNEUROPATHY, WITH LONG-TERM CURRENT USE OF INSULIN (HCC): Primary | Chronic | ICD-10-CM

## 2025-05-22 LAB — HBA1C MFR BLD: 7.7 %

## 2025-05-22 PROCEDURE — G8427 DOCREV CUR MEDS BY ELIG CLIN: HCPCS | Performed by: NURSE PRACTITIONER

## 2025-05-22 PROCEDURE — G8400 PT W/DXA NO RESULTS DOC: HCPCS | Performed by: NURSE PRACTITIONER

## 2025-05-22 PROCEDURE — 83036 HEMOGLOBIN GLYCOSYLATED A1C: CPT | Performed by: NURSE PRACTITIONER

## 2025-05-22 PROCEDURE — 99212 OFFICE O/P EST SF 10 MIN: CPT | Performed by: NURSE PRACTITIONER

## 2025-05-22 PROCEDURE — 3051F HG A1C>EQUAL 7.0%<8.0%: CPT | Performed by: NURSE PRACTITIONER

## 2025-05-22 PROCEDURE — 3078F DIAST BP <80 MM HG: CPT | Performed by: NURSE PRACTITIONER

## 2025-05-22 PROCEDURE — 1036F TOBACCO NON-USER: CPT | Performed by: NURSE PRACTITIONER

## 2025-05-22 PROCEDURE — 3074F SYST BP LT 130 MM HG: CPT | Performed by: NURSE PRACTITIONER

## 2025-05-22 PROCEDURE — 3017F COLORECTAL CA SCREEN DOC REV: CPT | Performed by: NURSE PRACTITIONER

## 2025-05-22 PROCEDURE — 1160F RVW MEDS BY RX/DR IN RCRD: CPT | Performed by: NURSE PRACTITIONER

## 2025-05-22 PROCEDURE — G8417 CALC BMI ABV UP PARAM F/U: HCPCS | Performed by: NURSE PRACTITIONER

## 2025-05-22 PROCEDURE — 1090F PRES/ABSN URINE INCON ASSESS: CPT | Performed by: NURSE PRACTITIONER

## 2025-05-22 PROCEDURE — 2022F DILAT RTA XM EVC RTNOPTHY: CPT | Performed by: NURSE PRACTITIONER

## 2025-05-22 PROCEDURE — 1124F ACP DISCUSS-NO DSCNMKR DOCD: CPT | Performed by: NURSE PRACTITIONER

## 2025-05-22 PROCEDURE — 1126F AMNT PAIN NOTED NONE PRSNT: CPT | Performed by: NURSE PRACTITIONER

## 2025-05-22 PROCEDURE — 1159F MED LIST DOCD IN RCRD: CPT | Performed by: NURSE PRACTITIONER

## 2025-05-22 ASSESSMENT — ENCOUNTER SYMPTOMS
COUGH: 0
ABDOMINAL PAIN: 1
DIARRHEA: 1
CONSTIPATION: 1
WHEEZING: 0
CHEST TIGHTNESS: 0
BLOOD IN STOOL: 0
SHORTNESS OF BREATH: 0

## 2025-05-22 NOTE — PROGRESS NOTES
Ref Range    Hemoglobin A1C 7.7 %       ASSESSMENT AND PLAN:     1. Type 2 diabetes mellitus with diabetic polyneuropathy, with long-term current use of insulin (HCC)  - improved, continue current medication regimen  - POCT glycosylated hemoglobin (Hb A1C)    2. Screening for colon cancer  - [facility]ATT - Hakan Mercer MD, Gastroenterology, [place]    3. Screening mammogram for breast cancer  - will get scheduled      Return in about 3 months (around 8/22/2025) for DM, HgA1C.    Care discussed with patient. Patient educated on signs and symptoms of exacerbation and when to seek further medical attention. Advised to call for any problems, questions, or concerns. Patient verbalizes understanding and agrees with plan.     Medications reviewed and reconciled. Continue current medications.  Appropriate prescriptions are ordered. Risks and benefits of meds are discussed.     After visit summary provided.      The patient (or guardian, if applicable) and other individuals in attendance with the patient were advised that Artificial Intelligence will be utilized during this visit to record and process the conversation to generate a clinical note. The patient (or guardian, if applicable) and other individuals in attendance at the appointment consented to the use of AI, including the recording.

## 2025-06-04 ENCOUNTER — TELEPHONE (OUTPATIENT)
Dept: FAMILY MEDICINE CLINIC | Age: 70
End: 2025-06-04

## 2025-06-04 DIAGNOSIS — R35.0 URINARY FREQUENCY: Primary | ICD-10-CM

## 2025-06-04 DIAGNOSIS — R30.0 DYSURIA: ICD-10-CM

## 2025-06-04 DIAGNOSIS — R39.15 URGENCY OF URINATION: ICD-10-CM

## 2025-06-04 DIAGNOSIS — R31.9 HEMATURIA, UNSPECIFIED TYPE: ICD-10-CM

## 2025-06-05 NOTE — TELEPHONE ENCOUNTER
Cranberry extract should be okay for her. She should have a urinalysis with culture to check for UTI. I will put in order for this if she can go to the lab for urine collection.  
Noted.  
Per Simona she is having Urinary urgency, muscle spasms /cramping when urinating, urine cloudy, dysuria all x3 days, 2 days ago spotting blood when wiped that since stop.     Her daughter bought her Cranberry extract 500mg capsules from over the counter and Simona wants to know if it will be ok to take?  
Pt called to inform that her daughter is taking her to Urgent Care for bladder spasm. Pt states it feels like kidney stones. Will do urine culture there.    RIZWANA Yang  
Simona voiced understanding.  
Family

## 2025-06-23 ENCOUNTER — OFFICE VISIT (OUTPATIENT)
Dept: FAMILY MEDICINE CLINIC | Age: 70
End: 2025-06-23
Payer: MEDICARE

## 2025-06-23 VITALS
OXYGEN SATURATION: 96 % | BODY MASS INDEX: 30.95 KG/M2 | HEIGHT: 62 IN | TEMPERATURE: 97.2 F | DIASTOLIC BLOOD PRESSURE: 62 MMHG | WEIGHT: 168.2 LBS | SYSTOLIC BLOOD PRESSURE: 102 MMHG | HEART RATE: 68 BPM

## 2025-06-23 DIAGNOSIS — Z79.4 TYPE 2 DIABETES MELLITUS WITH DIABETIC POLYNEUROPATHY, WITH LONG-TERM CURRENT USE OF INSULIN (HCC): ICD-10-CM

## 2025-06-23 DIAGNOSIS — E11.42 TYPE 2 DIABETES MELLITUS WITH DIABETIC POLYNEUROPATHY, WITH LONG-TERM CURRENT USE OF INSULIN (HCC): ICD-10-CM

## 2025-06-23 DIAGNOSIS — H92.01 OTALGIA OF RIGHT EAR: ICD-10-CM

## 2025-06-23 DIAGNOSIS — R30.0 DYSURIA: Primary | ICD-10-CM

## 2025-06-23 LAB
BILIRUBIN, POC: NEGATIVE
BLOOD URINE, POC: ABNORMAL
CLARITY, POC: ABNORMAL
COLOR, POC: ABNORMAL
GLUCOSE URINE, POC: 500 MG/DL
KETONES, POC: NEGATIVE MG/DL
LEUKOCYTE EST, POC: ABNORMAL
NITRITE, POC: NEGATIVE
PH, POC: 6
PROTEIN, POC: NEGATIVE MG/DL
SPECIFIC GRAVITY, POC: 1.01
UROBILINOGEN, POC: 0.2 MG/DL

## 2025-06-23 PROCEDURE — G8417 CALC BMI ABV UP PARAM F/U: HCPCS | Performed by: NURSE PRACTITIONER

## 2025-06-23 PROCEDURE — 1036F TOBACCO NON-USER: CPT | Performed by: NURSE PRACTITIONER

## 2025-06-23 PROCEDURE — 1124F ACP DISCUSS-NO DSCNMKR DOCD: CPT | Performed by: NURSE PRACTITIONER

## 2025-06-23 PROCEDURE — 1125F AMNT PAIN NOTED PAIN PRSNT: CPT | Performed by: NURSE PRACTITIONER

## 2025-06-23 PROCEDURE — 99214 OFFICE O/P EST MOD 30 MIN: CPT | Performed by: NURSE PRACTITIONER

## 2025-06-23 PROCEDURE — G8427 DOCREV CUR MEDS BY ELIG CLIN: HCPCS | Performed by: NURSE PRACTITIONER

## 2025-06-23 PROCEDURE — 3017F COLORECTAL CA SCREEN DOC REV: CPT | Performed by: NURSE PRACTITIONER

## 2025-06-23 PROCEDURE — G8400 PT W/DXA NO RESULTS DOC: HCPCS | Performed by: NURSE PRACTITIONER

## 2025-06-23 PROCEDURE — 81002 URINALYSIS NONAUTO W/O SCOPE: CPT | Performed by: NURSE PRACTITIONER

## 2025-06-23 PROCEDURE — 2022F DILAT RTA XM EVC RTNOPTHY: CPT | Performed by: NURSE PRACTITIONER

## 2025-06-23 PROCEDURE — 3051F HG A1C>EQUAL 7.0%<8.0%: CPT | Performed by: NURSE PRACTITIONER

## 2025-06-23 PROCEDURE — 1090F PRES/ABSN URINE INCON ASSESS: CPT | Performed by: NURSE PRACTITIONER

## 2025-06-23 PROCEDURE — 3078F DIAST BP <80 MM HG: CPT | Performed by: NURSE PRACTITIONER

## 2025-06-23 PROCEDURE — 3074F SYST BP LT 130 MM HG: CPT | Performed by: NURSE PRACTITIONER

## 2025-06-23 RX ORDER — CITALOPRAM HYDROBROMIDE 20 MG/1
20 TABLET ORAL DAILY
COMMUNITY

## 2025-06-23 ASSESSMENT — PATIENT HEALTH QUESTIONNAIRE - PHQ9
10. IF YOU CHECKED OFF ANY PROBLEMS, HOW DIFFICULT HAVE THESE PROBLEMS MADE IT FOR YOU TO DO YOUR WORK, TAKE CARE OF THINGS AT HOME, OR GET ALONG WITH OTHER PEOPLE: SOMEWHAT DIFFICULT
8. MOVING OR SPEAKING SO SLOWLY THAT OTHER PEOPLE COULD HAVE NOTICED. OR THE OPPOSITE, BEING SO FIGETY OR RESTLESS THAT YOU HAVE BEEN MOVING AROUND A LOT MORE THAN USUAL: NOT AT ALL
SUM OF ALL RESPONSES TO PHQ QUESTIONS 1-9: 15
1. LITTLE INTEREST OR PLEASURE IN DOING THINGS: SEVERAL DAYS
3. TROUBLE FALLING OR STAYING ASLEEP: SEVERAL DAYS
SUM OF ALL RESPONSES TO PHQ QUESTIONS 1-9: 15
SUM OF ALL RESPONSES TO PHQ QUESTIONS 1-9: 15
5. POOR APPETITE OR OVEREATING: SEVERAL DAYS
SUM OF ALL RESPONSES TO PHQ QUESTIONS 1-9: 15
4. FEELING TIRED OR HAVING LITTLE ENERGY: NEARLY EVERY DAY
9. THOUGHTS THAT YOU WOULD BE BETTER OFF DEAD, OR OF HURTING YOURSELF: NOT AT ALL
2. FEELING DOWN, DEPRESSED OR HOPELESS: NEARLY EVERY DAY
6. FEELING BAD ABOUT YOURSELF - OR THAT YOU ARE A FAILURE OR HAVE LET YOURSELF OR YOUR FAMILY DOWN: NEARLY EVERY DAY
7. TROUBLE CONCENTRATING ON THINGS, SUCH AS READING THE NEWSPAPER OR WATCHING TELEVISION: NEARLY EVERY DAY

## 2025-06-24 ASSESSMENT — ENCOUNTER SYMPTOMS
COUGH: 0
SHORTNESS OF BREATH: 0
GASTROINTESTINAL NEGATIVE: 1
CHEST TIGHTNESS: 0
WHEEZING: 0

## 2025-06-24 NOTE — PROGRESS NOTES
daily 30 capsule 0    Multiple Vitamins-Iron (MULTI-VITAMIN/IRON PO) Take  by mouth.      dicyclomine (BENTYL) 10 MG capsule Take 1 capsule by mouth 4 times daily (before meals and nightly) (Patient not taking: Reported on 6/23/2025)      Zinc Oxide 10 % OINT Apply 1 each topically 2 times daily (Patient not taking: Reported on 6/23/2025) 85 g 2    fluticasone (FLONASE) 50 MCG/ACT nasal spray 1 spray by Each Nostril route daily (Patient not taking: Reported on 5/8/2025) 16 g 0    sodium chloride (ALTAMIST SPRAY) 0.65 % nasal spray 1 spray by Nasal route as needed for Congestion (Patient not taking: Reported on 6/23/2025) 1 each 3    Probiotic Product (PROBIOTIC 10 ULTRA STRENGTH) CAPS Take 1 caplet by mouth daily (Patient not taking: Reported on 5/8/2025)      Dexlansoprazole (DEXILANT PO) Take by mouth (Patient not taking: Reported on 5/8/2025)      cefdinir (OMNICEF) 300 MG capsule Take 1 capsule by mouth 2 times daily (Patient not taking: Reported on 6/23/2025)      neomycin-polymyxin-hydrocortisone (CORTISPORIN) 3.5-58985-5 otic suspension Place 4 drops into the right ear 3 times daily (Patient not taking: Reported on 2/19/2025)      HYDROcodone-acetaminophen (NORCO) 5-325 MG per tablet  (Patient not taking: Reported on 6/23/2025)      Continuous Blood Gluc  (FREESTYLE CHUYITA 2 READER) WES 1 each by Does not apply route daily (Patient not taking: Reported on 6/23/2025) 1 each 1    Continuous Blood Gluc Sensor (FREESTYLE CHUYITA 2 SENSOR) MISC 1 each by Does not apply route continuous (Patient not taking: Reported on 6/23/2025) 2 each 6    sucralfate (CARAFATE) 1 GM/10ML suspension Take 10 mLs by mouth in the morning and at bedtime (Patient not taking: Reported on 6/23/2025)      Continuous Blood Gluc  (FREESTYLE CHUYITA 2 READER) WES by Does not apply route (Patient not taking: Reported on 6/23/2025)      fluticasone (FLONASE) 50 MCG/ACT nasal spray 1 spray by Each Nostril route daily (Patient not

## 2025-06-25 ENCOUNTER — CARE COORDINATION (OUTPATIENT)
Dept: CARE COORDINATION | Age: 70
End: 2025-06-25

## 2025-06-25 NOTE — CARE COORDINATION
Patient  Readiness: Acceptance  Method: Explanation  Response: Verbalizes Understanding, Needs Reinforcement    Education Comments  No comments found.         PCP/Specialist follow up:   Future Appointments         Provider Specialty Dept Phone    7/23/2025 2:20 PM Sandra Ni MD Cardiology [phone]    8/25/2025 4:15 PM Celia Nayak, WENDIE - CNP Family Medicine [phone]    2/23/2026 3:30 PM Celia Nayak APRN [facility] [phone]            Follow Up:   Plan for next ACM outreach in approximately 2 weeks to complete:  - CC Protocol assessments  - disease specific assessments  - SDOH assessments  - education   - status of Diabetes Management referral.   Patient  is agreeable to this plan.

## 2025-06-26 ENCOUNTER — CARE COORDINATION (OUTPATIENT)
Dept: CARE COORDINATION | Age: 70
End: 2025-06-26

## 2025-06-26 NOTE — CARE COORDINATION
ACM placed call to patient to relay PCP instructions (see PCP response within 6/25/25 care coordination encounter) for mealtime Humalog sliding scale orders as follows:    If blood sugar is 150-249 - no insulin  250-299 - 15 units  300-349 - 18 units  350-399 - 19 units  399 or greater - 20 units    ACM encouraged patient to follow these instructions until she is established with Diabetes specialist who make provide updates to her insulin orders. Patient verbalized understanding.     Patient reports she fell last night. States she was sitting on the side of her bed and didn't turn her light on and was holding onto her BSC to get out of bed and the BSC tipped over and patient fell. She denies hitting her head or suffering other injuries. She reports she's just a little sore. Patient does states she has a walker at home, but wasn't using it. ACM educated patient on fall risk hazards and encouraged her to utilize her walker, keep room free of clutter, keep room well lit with light or nightlight. Patient verbalized understanding.

## 2025-06-26 NOTE — TELEPHONE ENCOUNTER
Karla Villegas for the delay in responding. I took some time to go through Simona's medications and the insulin sliding scale that was prescribed at the time she established care with me. I went back to this. Apparently, at some point she was getting just 18 units with meals. Since she is inconsistent with eating, the following sliding scale for insulin lispro (Humalog) is as follows:  Check blood sugar before meal. If blood sugar is 150-249 - no insulin; 250-299 - 15 units; 300-349 - 18 units; 350-399 - 19 units; 399 or greater - 20 units. She has an appointment with the diabetic specialist in a week or so who will evaluate and tweak the medications for diabetes.    I hope this helps and that Simona is able to better manage her diabetes. Let me know if I need

## 2025-06-30 ENCOUNTER — CARE COORDINATION (OUTPATIENT)
Dept: CARE COORDINATION | Age: 70
End: 2025-06-30

## 2025-06-30 DIAGNOSIS — E11.42 TYPE 2 DIABETES MELLITUS WITH DIABETIC POLYNEUROPATHY, WITH LONG-TERM CURRENT USE OF INSULIN (HCC): Primary | ICD-10-CM

## 2025-06-30 DIAGNOSIS — R25.1 TREMOR: ICD-10-CM

## 2025-06-30 DIAGNOSIS — Z79.4 TYPE 2 DIABETES MELLITUS WITH DIABETIC POLYNEUROPATHY, WITH LONG-TERM CURRENT USE OF INSULIN (HCC): Primary | ICD-10-CM

## 2025-06-30 DIAGNOSIS — Z91.81 HISTORY OF FALL: ICD-10-CM

## 2025-06-30 DIAGNOSIS — Z91.81 AT HIGH RISK FOR FALLS: ICD-10-CM

## 2025-06-30 NOTE — CARE COORDINATION
for suction grab bar as well.     Phone call to [facility] and spoke with Edwar who reported Chetna Radha - [phone]    Attempted phone call to [facility]. No answer, left vm message introducing self, requesting return call, contact number provided.     Identified Needs:  HHA  Grab bar      Social Work Plan:  SW will outreach her assigned Glenn Handykendy  to request HHA services.   SW will assist Pt in obtaining grab bar (suction)  SW will assist with coordination of services, follow up and support  Next Steps: SW will make next outreach to Pt in one week to follow up regarding HHA / grab bar.    Method of Communication With Provider (if appropriate): Chart Routing       Goals Addressed                      This Visit's Progress      Patient Stated (pt-stated)         Pt will obtain suction grab bar, per her request    Barriers: financial, lack of support, overwhelmed by complexity of regimen, stress, and time constraints  Plan for overcoming my barriers: BEV will assist with coordination of services, follow up and support  Confidence: 10/10  Anticipated Goal Completion Date: 9/30/25        Patient Stated (pt-stated)         Pt is requesting HHA services.     Barriers: lack of support, overwhelmed by complexity of regimen, stress, and time constraints  Plan for overcoming my barriers: BEV will assist with requesting HHA services through Waiver  and will offer follow up and support  Confidence: 10/10  Anticipated Goal Completion Date: 9/30/25

## 2025-07-07 ENCOUNTER — CARE COORDINATION (OUTPATIENT)
Dept: CARE COORDINATION | Age: 70
End: 2025-07-07

## 2025-07-07 ENCOUNTER — OFFICE VISIT (OUTPATIENT)
Dept: FAMILY MEDICINE CLINIC | Age: 70
End: 2025-07-07
Payer: MEDICARE

## 2025-07-07 VITALS
BODY MASS INDEX: 31.76 KG/M2 | WEIGHT: 172.6 LBS | TEMPERATURE: 97.4 F | DIASTOLIC BLOOD PRESSURE: 62 MMHG | OXYGEN SATURATION: 95 % | SYSTOLIC BLOOD PRESSURE: 118 MMHG | HEIGHT: 62 IN | HEART RATE: 62 BPM

## 2025-07-07 DIAGNOSIS — E11.42 POLYNEUROPATHY DUE TO TYPE 2 DIABETES MELLITUS (HCC): ICD-10-CM

## 2025-07-07 DIAGNOSIS — Z09 HOSPITAL DISCHARGE FOLLOW-UP: ICD-10-CM

## 2025-07-07 DIAGNOSIS — R30.0 DYSURIA: ICD-10-CM

## 2025-07-07 DIAGNOSIS — R39.14: ICD-10-CM

## 2025-07-07 DIAGNOSIS — N64.4 PAIN OF BOTH BREASTS: Primary | ICD-10-CM

## 2025-07-07 DIAGNOSIS — B37.2 YEAST DERMATITIS: ICD-10-CM

## 2025-07-07 DIAGNOSIS — Z76.0 MEDICATION REFILL: ICD-10-CM

## 2025-07-07 DIAGNOSIS — R22.43 LOCALIZED SWELLING OF BOTH FEET: ICD-10-CM

## 2025-07-07 PROCEDURE — 1125F AMNT PAIN NOTED PAIN PRSNT: CPT | Performed by: NURSE PRACTITIONER

## 2025-07-07 PROCEDURE — G8427 DOCREV CUR MEDS BY ELIG CLIN: HCPCS | Performed by: NURSE PRACTITIONER

## 2025-07-07 PROCEDURE — 3051F HG A1C>EQUAL 7.0%<8.0%: CPT | Performed by: NURSE PRACTITIONER

## 2025-07-07 PROCEDURE — 99214 OFFICE O/P EST MOD 30 MIN: CPT | Performed by: NURSE PRACTITIONER

## 2025-07-07 PROCEDURE — 1124F ACP DISCUSS-NO DSCNMKR DOCD: CPT | Performed by: NURSE PRACTITIONER

## 2025-07-07 PROCEDURE — 3017F COLORECTAL CA SCREEN DOC REV: CPT | Performed by: NURSE PRACTITIONER

## 2025-07-07 PROCEDURE — 1160F RVW MEDS BY RX/DR IN RCRD: CPT | Performed by: NURSE PRACTITIONER

## 2025-07-07 PROCEDURE — 1036F TOBACCO NON-USER: CPT | Performed by: NURSE PRACTITIONER

## 2025-07-07 PROCEDURE — G8417 CALC BMI ABV UP PARAM F/U: HCPCS | Performed by: NURSE PRACTITIONER

## 2025-07-07 PROCEDURE — 1090F PRES/ABSN URINE INCON ASSESS: CPT | Performed by: NURSE PRACTITIONER

## 2025-07-07 PROCEDURE — 2022F DILAT RTA XM EVC RTNOPTHY: CPT | Performed by: NURSE PRACTITIONER

## 2025-07-07 PROCEDURE — 1159F MED LIST DOCD IN RCRD: CPT | Performed by: NURSE PRACTITIONER

## 2025-07-07 PROCEDURE — G8400 PT W/DXA NO RESULTS DOC: HCPCS | Performed by: NURSE PRACTITIONER

## 2025-07-07 PROCEDURE — 3074F SYST BP LT 130 MM HG: CPT | Performed by: NURSE PRACTITIONER

## 2025-07-07 PROCEDURE — 3078F DIAST BP <80 MM HG: CPT | Performed by: NURSE PRACTITIONER

## 2025-07-07 RX ORDER — GABAPENTIN 600 MG/1
TABLET ORAL
Qty: 90 TABLET | Refills: 3 | Status: SHIPPED | OUTPATIENT
Start: 2025-07-07 | End: 2025-11-11

## 2025-07-07 ASSESSMENT — PATIENT HEALTH QUESTIONNAIRE - PHQ9
10. IF YOU CHECKED OFF ANY PROBLEMS, HOW DIFFICULT HAVE THESE PROBLEMS MADE IT FOR YOU TO DO YOUR WORK, TAKE CARE OF THINGS AT HOME, OR GET ALONG WITH OTHER PEOPLE: NOT DIFFICULT AT ALL
8. MOVING OR SPEAKING SO SLOWLY THAT OTHER PEOPLE COULD HAVE NOTICED. OR THE OPPOSITE, BEING SO FIGETY OR RESTLESS THAT YOU HAVE BEEN MOVING AROUND A LOT MORE THAN USUAL: NOT AT ALL
3. TROUBLE FALLING OR STAYING ASLEEP: NEARLY EVERY DAY
5. POOR APPETITE OR OVEREATING: SEVERAL DAYS
6. FEELING BAD ABOUT YOURSELF - OR THAT YOU ARE A FAILURE OR HAVE LET YOURSELF OR YOUR FAMILY DOWN: NOT AT ALL
SUM OF ALL RESPONSES TO PHQ QUESTIONS 1-9: 8
4. FEELING TIRED OR HAVING LITTLE ENERGY: NEARLY EVERY DAY
2. FEELING DOWN, DEPRESSED OR HOPELESS: NOT AT ALL
SUM OF ALL RESPONSES TO PHQ QUESTIONS 1-9: 8
SUM OF ALL RESPONSES TO PHQ QUESTIONS 1-9: 8
7. TROUBLE CONCENTRATING ON THINGS, SUCH AS READING THE NEWSPAPER OR WATCHING TELEVISION: NOT AT ALL
SUM OF ALL RESPONSES TO PHQ QUESTIONS 1-9: 8
1. LITTLE INTEREST OR PLEASURE IN DOING THINGS: SEVERAL DAYS
9. THOUGHTS THAT YOU WOULD BE BETTER OFF DEAD, OR OF HURTING YOURSELF: NOT AT ALL

## 2025-07-07 ASSESSMENT — ENCOUNTER SYMPTOMS
CHEST TIGHTNESS: 0
ABDOMINAL PAIN: 1
SHORTNESS OF BREATH: 0
COUGH: 0
WHEEZING: 0

## 2025-07-07 NOTE — PROGRESS NOTES
Simona Anna  1955  69 y.o.    SUBJECT MARCIA:    Chief Complaint   Patient presents with   • Breast Pain     Right breast, x1 month    • Urinary Frequency     Strong odor, dark and cloudy, pain   • Foot Swelling     Pt states toes are puffy, pain       History of Present Illness         Results         Current Outpatient Medications on File Prior to Visit   Medication Sig Dispense Refill   • citalopram (CELEXA) 20 MG tablet Take 1 tablet by mouth daily     • montelukast (SINGULAIR) 10 MG tablet TAKE ONE (1) TABLET BY MOUTH EACH NIGHT AT BEDTIME 90 tablet 3   • levothyroxine (SYNTHROID) 75 MCG tablet TAKE ONE (1) TABLET BY MOUTH EVERY MORNING BEFORE BREAKFAST 30 tablet 5   • atorvastatin (LIPITOR) 40 MG tablet TAKE ONE (1) TABLET BY MOUTH EVERY NIGHT 90 tablet 3   • losartan (COZAAR) 50 MG tablet Take 1 tablet by mouth daily 30 tablet 5   • furosemide (LASIX) 20 MG tablet Take 1 tablet by mouth daily 30 tablet 5   • TRELEGY ELLIPTA 100-62.5-25 MCG/ACT AEPB inhaler Inhale 1 puff into the lungs daily     • nystatin (MYCOSTATIN) 733582 UNIT/GM powder Apply 3 times daily to affected areas 1 each 0   • metoprolol succinate (TOPROL XL) 50 MG extended release tablet Take 1 tablet by mouth daily 30 tablet 5   • divalproex (DEPAKOTE) 500 MG DR tablet Take 1 tablet by mouth twice daily 60 tablet 3   • blood glucose monitor strips Test three times a day & as needed for symptoms of irregular blood glucose. Dispense sufficient amount for indicated testing frequency plus additional to accommodate PRN testing needs. 200 strip 3   • QUEtiapine (SEROQUEL) 50 MG tablet Take 1 tablet by mouth 2 times daily     • magnesium oxide (MAG-OX) 400 MG tablet Take 1 tablet by mouth 3 times daily 90 tablet 0   • empagliflozin (JARDIANCE) 10 MG tablet Take 1 tablet by mouth daily 30 tablet 5   • Insulin Degludec (TRESIBA FLEXTOUCH) 100 UNIT/ML SOPN Inject 20 Units into the skin in the morning and 20 Units in the evening. 5 Adjustable Dose 
mouth in the morning and at bedtime (Patient not taking: Reported on 1/20/2025)      Continuous Blood Gluc  (FREESTYLE CHUYITA 2 READER) WES by Does not apply route (Patient not taking: Reported on 5/9/2024)      fluticasone (FLONASE) 50 MCG/ACT nasal spray 1 spray by Each Nostril route daily (Patient not taking: Reported on 11/18/2024) 32 g 1    pantoprazole (PROTONIX) 40 MG tablet Take 1 tablet by mouth 2 times daily (before meals) (Patient not taking: Reported on 1/20/2025) 180 tablet 1    orphenadrine (NORFLEX) 100 MG extended release tablet  (Patient not taking: Reported on 11/18/2024)      guaiFENesin (MUCINEX) 600 MG extended release tablet Take 1 tablet by mouth 2 times daily (Patient not taking: Reported on 1/20/2025) 30 tablet 1    [DISCONTINUED] sodium chloride 1 g tablet Take 1 tablet by mouth 2 times daily (with meals) 90 tablet 3     No current facility-administered medications on file prior to visit.       Past Medical History:   Diagnosis Date    Abnormal EKG 04/22/2014    Acid reflux     Anemia     Anesthesia     Nausea/Vomiting Post Op In Past    Anginal pain     Denies Chest Pain At This Time    Anxiety     Arthritis     \"All Over\"    Asthma     Bipolar 1 disorder (HCC)     Cerebral artery occlusion with cerebral infarction (HCC)     CHF (congestive heart failure) (HCC)     Chronic back pain     Chronic kidney disease     DDD (degenerative disc disease), cervical     12- Patient reports she was dx with DDD of Cerival spine C6,C7    Depression     Diabetes mellitus (HCC) Dx 1990's    Diabetic neuropathy (HCC)     \"In My Legs And Feet\"    Dizziness     \"Sometimes\"    Dry skin     Enlarged ureter     Right Side    Fatty liver     Fibrocystic breast     Generalized anxiety disorder     Gout     Pt states she was diagnosed with gout in the past few months.    H/O cardiac catheterization     Showed mild disease per last cath.    H/O cardiovascular stress test 03/15/2010    EF 69%, normal

## 2025-07-07 NOTE — CARE COORDINATION
Attempted phone call to Pt to follow up regarding HHA services, grab bar. Attempts were made on both Pt's mobile and personal numbers. Not able to leave a vm message on either number.    BEV plan of care: SW will make next outreach attempt on 7/15 to follow up regarding HHA and grab bar.

## 2025-07-08 ENCOUNTER — CARE COORDINATION (OUTPATIENT)
Dept: CARE COORDINATION | Age: 70
End: 2025-07-08

## 2025-07-08 ENCOUNTER — TELEPHONE (OUTPATIENT)
Dept: CARDIOLOGY CLINIC | Age: 70
End: 2025-07-08

## 2025-07-08 DIAGNOSIS — R06.02 SOB (SHORTNESS OF BREATH): Primary | ICD-10-CM

## 2025-07-08 DIAGNOSIS — G43.009 MIGRAINE WITHOUT AURA, NOT REFRACTORY: ICD-10-CM

## 2025-07-08 NOTE — CARE COORDINATION
Patient Current Location: Home: [address] , [address]  [facility] [address]     Pt denied receiving a call from [employer] CM regarding HHA hours. SW offered to try her again. Pt asked if she can receive more than 1 grab bar. SW explained that insurance will cover one grab bar, but it's possible waiver can obtain a 2nd for her. SW will ask her CM.     Phone call to Iva, [facility]  who reported she can put in request for home mod for grab bars - would need to be permanent - not suction, will discuss with Pt.   Discussed with Iva that this SW just got off the phone with Pt. Iva reported she would try to give her a call now then as she has tried to contact her multiple times with no answer.     BEV plan of care: BEV will make next outreach to Pt to follow up regarding HHA / grab bars in 2 weeks.

## 2025-07-08 NOTE — TELEPHONE ENCOUNTER
Patient called her feet and toes are very swollen  When she stands up her feet turn bright red   She feels her lasix needs adjusted .

## 2025-07-08 NOTE — TELEPHONE ENCOUNTER
Spoke with patient she is concerned with the swelling on her feet & toes. She is wanting to know if her lasix can be adjusted

## 2025-07-08 NOTE — TELEPHONE ENCOUNTER
Patient left  asking for us to return call due to needing refill on medication. Attempted to call patient to ask which medication, no answer, number was not available. Called and spoke with daughter who passed phone to patient, patient states she needs refill on Depakote. Was told she has no more refills. Upon review, she should have last refill this month, I advised her to call pharmacy to check. She was last seen in 11/21/2024 and is expected to follow up around 11/21/2025.

## 2025-07-09 PROBLEM — R39.14: Status: ACTIVE | Noted: 2025-07-09

## 2025-07-11 DIAGNOSIS — G43.009 MIGRAINE WITHOUT AURA, NOT REFRACTORY: ICD-10-CM

## 2025-07-11 NOTE — TELEPHONE ENCOUNTER
Checked with pharmacy and per tech, states patient already used all 4 refills since last refill. Spoke with pharmacist and called in month supply refill in meantime so patient does not run out. He stated he will take care of it.

## 2025-07-16 ENCOUNTER — CARE COORDINATION (OUTPATIENT)
Dept: CARE COORDINATION | Age: 70
End: 2025-07-16

## 2025-07-16 RX ORDER — UBROGEPANT 100 MG/1
TABLET ORAL
Qty: 10 TABLET | Refills: 0 | Status: ACTIVE | OUTPATIENT
Start: 2025-07-16

## 2025-07-16 RX ORDER — DIVALPROEX SODIUM 500 MG/1
500 TABLET, DELAYED RELEASE ORAL 2 TIMES DAILY
Qty: 60 TABLET | Refills: 0 | Status: SHIPPED | OUTPATIENT
Start: 2025-07-16

## 2025-07-16 NOTE — CARE COORDINATION
Ambulatory Care Coordination Note     7/16/2025 3:02 PM     LPN CC outreach attempt by this ACM today to perform care management follow up . ACM was unable to reach the patient by telephone today;   Unable to leave .     ACM: Kalli Light LPN     PCP/Specialist follow up:   Future Appointments         Provider Specialty Dept Phone    7/25/2025 4:15 PM [facility] VASCULAR Cardiology [phone]    8/6/2025 4:10 PM Sandra Ni MD Cardiology [phone]    8/11/2025 3:30 PM Tata Schulte APRN - CNP Family Medicine [phone]    8/25/2025 4:15 PM Celia Nayak, WENDIE [facility] [phone]    2/23/2026 3:30 PM Celia Nayak, WENDIE [facility] [phone]            Follow Up:   Plan for next AC outreach in approximately 2 weeks to complete:  - CC Protocol assessments  - disease specific assessments  - SDOH assessments  - advance care planning  - education   - BG, using insulin as directed?  - any more falls?  - status of Diabetes Management referral  - JOEY mcclain-status of SW referral.

## 2025-07-21 RX ORDER — PEN NEEDLE, DIABETIC 31 GX5/16"
NEEDLE, DISPOSABLE MISCELLANEOUS
Qty: 100 EACH | Refills: 0 | Status: SHIPPED | OUTPATIENT
Start: 2025-07-21

## 2025-07-21 RX ORDER — DIVALPROEX SODIUM 500 MG/1
500 TABLET, DELAYED RELEASE ORAL 2 TIMES DAILY
Qty: 60 TABLET | Refills: 3 | Status: SHIPPED | OUTPATIENT
Start: 2025-07-21

## 2025-07-22 ENCOUNTER — CARE COORDINATION (OUTPATIENT)
Dept: CARE COORDINATION | Age: 70
End: 2025-07-22

## 2025-07-22 DIAGNOSIS — R60.0 BILATERAL EDEMA OF LOWER EXTREMITY: ICD-10-CM

## 2025-07-22 RX ORDER — POTASSIUM CHLORIDE 1500 MG/1
20 TABLET, EXTENDED RELEASE ORAL 2 TIMES DAILY
Qty: 60 TABLET | Refills: 5 | Status: SHIPPED | OUTPATIENT
Start: 2025-07-22

## 2025-07-22 NOTE — CARE COORDINATION
Patient Current Location: Home: [address] , [facility] [address]  [place] [address]     Phone call to Pt to follow up regarding HHA and grab bar. Pt reported she hasn't heard from [employer] . Pt stated if she doesn't recognize the number, she doesn't answer. SW provided her with [employer]  number and advised her to call to discuss. Pt was in agreement.     BEV plan of care: BEV will make next outreach to Pt in one week to follow up regarding HHA and grab bar.

## 2025-07-24 ENCOUNTER — TELEPHONE (OUTPATIENT)
Dept: CARDIOLOGY CLINIC | Age: 70
End: 2025-07-24

## 2025-07-29 ENCOUNTER — CARE COORDINATION (OUTPATIENT)
Dept: CARE COORDINATION | Age: 70
End: 2025-07-29

## 2025-07-29 NOTE — CARE COORDINATION
Patient Current Location: Home: [address] [address]     Phone call to Pt to follow up regarding grab bar and HHA.   Pt has a suction grab bar, but still needs HHA. Pt confirmed she still has [employer] CM phone number this SW provided, but has not had time to call. SW reminded Pt that she can set up HHA services for her once she calls her. Pt stated understanding.     BEV plan of care: SW will make next outreach to Pt in 2 weeks to follow up regarding HHA.

## 2025-08-04 ENCOUNTER — CARE COORDINATION (OUTPATIENT)
Dept: CARE COORDINATION | Age: 70
End: 2025-08-04

## 2025-08-12 ENCOUNTER — CARE COORDINATION (OUTPATIENT)
Dept: CARE COORDINATION | Age: 70
End: 2025-08-12

## 2025-08-12 DIAGNOSIS — G43.009 MIGRAINE WITHOUT AURA, NOT REFRACTORY: Primary | ICD-10-CM

## 2025-08-12 RX ORDER — RIMEGEPANT SULFATE 75 MG/75MG
75 TABLET, ORALLY DISINTEGRATING ORAL PRN
Qty: 8 TABLET | Refills: 2 | Status: ACTIVE | OUTPATIENT
Start: 2025-08-12

## 2025-08-12 SDOH — ECONOMIC STABILITY: FOOD INSECURITY: WITHIN THE PAST 12 MONTHS, THE FOOD YOU BOUGHT JUST DIDN'T LAST AND YOU DIDN'T HAVE MONEY TO GET MORE.: NEVER TRUE

## 2025-08-12 SDOH — ECONOMIC STABILITY: FOOD INSECURITY: WITHIN THE PAST 12 MONTHS, YOU WORRIED THAT YOUR FOOD WOULD RUN OUT BEFORE YOU GOT MONEY TO BUY MORE.: NEVER TRUE

## 2025-08-13 DIAGNOSIS — I10 ESSENTIAL HYPERTENSION: Primary | ICD-10-CM

## 2025-08-14 ENCOUNTER — CARE COORDINATION (OUTPATIENT)
Dept: PRIMARY CARE CLINIC | Age: 70
End: 2025-08-14

## 2025-08-20 ENCOUNTER — CARE COORDINATION (OUTPATIENT)
Dept: CARE COORDINATION | Age: 70
End: 2025-08-20

## 2025-08-22 ENCOUNTER — CARE COORDINATION (OUTPATIENT)
Dept: CASE MANAGEMENT | Age: 70
End: 2025-08-22

## 2025-08-25 ENCOUNTER — CARE COORDINATION (OUTPATIENT)
Dept: CASE MANAGEMENT | Age: 70
End: 2025-08-25

## 2025-08-26 ENCOUNTER — CARE COORDINATION (OUTPATIENT)
Dept: CARE COORDINATION | Age: 70
End: 2025-08-26

## 2025-08-26 ENCOUNTER — TELEPHONE (OUTPATIENT)
Dept: CARDIOLOGY CLINIC | Age: 70
End: 2025-08-26

## 2025-08-26 ENCOUNTER — TELEPHONE (OUTPATIENT)
Dept: FAMILY MEDICINE CLINIC | Age: 70
End: 2025-08-26

## 2025-08-27 ENCOUNTER — OFFICE VISIT (OUTPATIENT)
Dept: FAMILY MEDICINE CLINIC | Age: 70
End: 2025-08-27

## 2025-08-27 ENCOUNTER — CARE COORDINATION (OUTPATIENT)
Dept: CARE COORDINATION | Age: 70
End: 2025-08-27

## 2025-08-27 VITALS
HEART RATE: 61 BPM | SYSTOLIC BLOOD PRESSURE: 132 MMHG | HEIGHT: 62 IN | OXYGEN SATURATION: 96 % | DIASTOLIC BLOOD PRESSURE: 70 MMHG | WEIGHT: 173.4 LBS | TEMPERATURE: 97.1 F | BODY MASS INDEX: 31.91 KG/M2

## 2025-08-27 DIAGNOSIS — R06.83 SNORING: ICD-10-CM

## 2025-08-27 DIAGNOSIS — G47.33 OSA (OBSTRUCTIVE SLEEP APNEA): ICD-10-CM

## 2025-08-27 DIAGNOSIS — Z79.4 TYPE 2 DIABETES MELLITUS WITH DIABETIC POLYNEUROPATHY, WITH LONG-TERM CURRENT USE OF INSULIN (HCC): Primary | ICD-10-CM

## 2025-08-27 DIAGNOSIS — F33.3 SEVERE EPISODE OF RECURRENT MAJOR DEPRESSIVE DISORDER, WITH PSYCHOTIC FEATURES (HCC): ICD-10-CM

## 2025-08-27 DIAGNOSIS — E03.9 HYPOTHYROIDISM, UNSPECIFIED TYPE: ICD-10-CM

## 2025-08-27 DIAGNOSIS — G43.009 MIGRAINE WITHOUT AURA, NOT REFRACTORY: Primary | ICD-10-CM

## 2025-08-27 DIAGNOSIS — Z76.0 MEDICATION REFILL: ICD-10-CM

## 2025-08-27 DIAGNOSIS — E11.42 TYPE 2 DIABETES MELLITUS WITH DIABETIC POLYNEUROPATHY, WITH LONG-TERM CURRENT USE OF INSULIN (HCC): Primary | ICD-10-CM

## 2025-08-27 DIAGNOSIS — F41.9 ANXIETY: ICD-10-CM

## 2025-08-27 DIAGNOSIS — R25.1 TREMOR: ICD-10-CM

## 2025-08-27 LAB — HBA1C MFR BLD: 7.8 %

## 2025-08-27 RX ORDER — UBROGEPANT 100 MG/1
100 TABLET ORAL PRN
Qty: 10 TABLET | Refills: 2 | Status: ACTIVE | OUTPATIENT
Start: 2025-08-27

## 2025-08-27 RX ORDER — CITALOPRAM HYDROBROMIDE 20 MG/1
20 TABLET ORAL DAILY
Qty: 30 TABLET | Refills: 5 | Status: SHIPPED | OUTPATIENT
Start: 2025-08-27

## 2025-08-27 RX ORDER — PEN NEEDLE, DIABETIC 31 GX5/16"
1 NEEDLE, DISPOSABLE MISCELLANEOUS 3 TIMES DAILY
Qty: 200 EACH | Refills: 3 | Status: SHIPPED | OUTPATIENT
Start: 2025-08-27

## 2025-08-27 RX ORDER — QUETIAPINE FUMARATE 50 MG/1
50 TABLET, FILM COATED ORAL 2 TIMES DAILY
Qty: 60 TABLET | Refills: 3 | Status: SHIPPED | OUTPATIENT
Start: 2025-08-27

## 2025-08-27 RX ORDER — INSULIN LISPRO 100 [IU]/ML
INJECTION, SOLUTION INTRAVENOUS; SUBCUTANEOUS
Qty: 5 ADJUSTABLE DOSE PRE-FILLED PEN SYRINGE | Refills: 3 | Status: SHIPPED | OUTPATIENT
Start: 2025-08-27

## 2025-08-27 ASSESSMENT — ENCOUNTER SYMPTOMS
CHEST TIGHTNESS: 0
CONSTIPATION: 1
COUGH: 0
WHEEZING: 0
SHORTNESS OF BREATH: 0

## 2025-08-27 ASSESSMENT — PATIENT HEALTH QUESTIONNAIRE - PHQ9
3. TROUBLE FALLING OR STAYING ASLEEP: NEARLY EVERY DAY
SUM OF ALL RESPONSES TO PHQ QUESTIONS 1-9: 16
2. FEELING DOWN, DEPRESSED OR HOPELESS: SEVERAL DAYS
1. LITTLE INTEREST OR PLEASURE IN DOING THINGS: NEARLY EVERY DAY
6. FEELING BAD ABOUT YOURSELF - OR THAT YOU ARE A FAILURE OR HAVE LET YOURSELF OR YOUR FAMILY DOWN: NOT AT ALL
8. MOVING OR SPEAKING SO SLOWLY THAT OTHER PEOPLE COULD HAVE NOTICED. OR THE OPPOSITE, BEING SO FIGETY OR RESTLESS THAT YOU HAVE BEEN MOVING AROUND A LOT MORE THAN USUAL: NOT AT ALL
10. IF YOU CHECKED OFF ANY PROBLEMS, HOW DIFFICULT HAVE THESE PROBLEMS MADE IT FOR YOU TO DO YOUR WORK, TAKE CARE OF THINGS AT HOME, OR GET ALONG WITH OTHER PEOPLE: NOT DIFFICULT AT ALL
5. POOR APPETITE OR OVEREATING: NEARLY EVERY DAY
7. TROUBLE CONCENTRATING ON THINGS, SUCH AS READING THE NEWSPAPER OR WATCHING TELEVISION: NEARLY EVERY DAY
9. THOUGHTS THAT YOU WOULD BE BETTER OFF DEAD, OR OF HURTING YOURSELF: NOT AT ALL
SUM OF ALL RESPONSES TO PHQ QUESTIONS 1-9: 16
4. FEELING TIRED OR HAVING LITTLE ENERGY: NEARLY EVERY DAY

## 2025-08-28 ENCOUNTER — CARE COORDINATION (OUTPATIENT)
Dept: CARE COORDINATION | Age: 70
End: 2025-08-28

## 2025-08-28 LAB
CREAT UR-MCNC: 21.7 MG/DL (ref 28–259)
MICROALBUMIN UR DL<=1MG/L-MCNC: <1.2 MG/DL
MICROALBUMIN/CREAT UR: ABNORMAL MG/G (ref 0–30)

## 2025-08-29 VITALS — HEART RATE: 61 BPM | DIASTOLIC BLOOD PRESSURE: 82 MMHG | SYSTOLIC BLOOD PRESSURE: 134 MMHG

## 2025-09-02 ENCOUNTER — CARE COORDINATION (OUTPATIENT)
Dept: CARE COORDINATION | Age: 70
End: 2025-09-02

## 2025-09-02 ENCOUNTER — TELEPHONE (OUTPATIENT)
Dept: CARDIOLOGY CLINIC | Age: 70
End: 2025-09-02

## 2025-09-03 ENCOUNTER — HOSPITAL ENCOUNTER (OUTPATIENT)
Dept: LAB | Age: 70
Discharge: HOME | End: 2025-09-03
Payer: MEDICARE

## 2025-09-03 DIAGNOSIS — R06.02 SOB (SHORTNESS OF BREATH): ICD-10-CM

## 2025-09-03 DIAGNOSIS — F33.3 SEVERE EPISODE OF RECURRENT MAJOR DEPRESSIVE DISORDER, WITH PSYCHOTIC FEATURES (HCC): Primary | ICD-10-CM

## 2025-09-03 DIAGNOSIS — F31.4 BIPOLAR 1 DISORDER, DEPRESSED, SEVERE (HCC): ICD-10-CM

## 2025-09-03 DIAGNOSIS — Z76.0 MEDICATION REFILL: ICD-10-CM

## 2025-09-03 LAB — BNP SERPL-MCNC: 90 PG/ML (ref 0–125)

## 2025-09-03 PROCEDURE — 83880 ASSAY OF NATRIURETIC PEPTIDE: CPT

## 2025-09-03 RX ORDER — BUSPIRONE HYDROCHLORIDE 10 MG/1
10 TABLET ORAL 3 TIMES DAILY
Qty: 45 TABLET | Refills: 0 | Status: SHIPPED | OUTPATIENT
Start: 2025-09-03 | End: 2025-09-18

## 2025-09-04 ENCOUNTER — CARE COORDINATION (OUTPATIENT)
Dept: CARE COORDINATION | Age: 70
End: 2025-09-04